# Patient Record
Sex: FEMALE | Race: WHITE | NOT HISPANIC OR LATINO | Employment: OTHER | URBAN - METROPOLITAN AREA
[De-identification: names, ages, dates, MRNs, and addresses within clinical notes are randomized per-mention and may not be internally consistent; named-entity substitution may affect disease eponyms.]

---

## 2017-01-04 LAB — INR PPP: 2.39

## 2017-01-06 ENCOUNTER — ANTICOAGULATION VISIT (OUTPATIENT)
Dept: CARDIOLOGY | Facility: CLINIC | Age: 77
End: 2017-01-06

## 2017-01-09 LAB — INR PPP: 2.36

## 2017-01-11 ENCOUNTER — ANTICOAGULATION VISIT (OUTPATIENT)
Dept: CARDIOLOGY | Facility: CLINIC | Age: 77
End: 2017-01-11

## 2017-01-11 DIAGNOSIS — I48.0 PAROXYSMAL ATRIAL FIBRILLATION (HCC): ICD-10-CM

## 2017-01-16 RX ORDER — WARFARIN SODIUM 3 MG/1
TABLET ORAL
Qty: 90 TABLET | Refills: 0 | Status: SHIPPED | OUTPATIENT
Start: 2017-01-16 | End: 2017-02-18 | Stop reason: SDUPTHER

## 2017-01-18 ENCOUNTER — ANTICOAGULATION VISIT (OUTPATIENT)
Dept: CARDIOLOGY | Facility: CLINIC | Age: 77
End: 2017-01-18

## 2017-01-18 DIAGNOSIS — I48.91 ATRIAL FIBRILLATION, UNSPECIFIED TYPE (HCC): ICD-10-CM

## 2017-01-18 LAB — INR PPP: 2.06

## 2017-01-25 ENCOUNTER — ANTICOAGULATION VISIT (OUTPATIENT)
Dept: CARDIOLOGY | Facility: CLINIC | Age: 77
End: 2017-01-25

## 2017-01-25 DIAGNOSIS — I48.91 ATRIAL FIBRILLATION, UNSPECIFIED TYPE (HCC): ICD-10-CM

## 2017-01-25 LAB — INR PPP: 2.62

## 2017-01-25 NOTE — PATIENT INSTRUCTIONS
Spoke with patient. No change in dosage. I advised to recheck in 4 weeks. She sees Dr. Donny Hodges weekly and states he normally checks it each time.

## 2017-02-02 ENCOUNTER — ANTICOAGULATION VISIT (OUTPATIENT)
Dept: CARDIOLOGY | Facility: CLINIC | Age: 77
End: 2017-02-02

## 2017-02-02 DIAGNOSIS — I48.0 PAROXYSMAL ATRIAL FIBRILLATION (HCC): ICD-10-CM

## 2017-02-02 LAB — INR PPP: 2.34

## 2017-02-10 ENCOUNTER — ANTICOAGULATION VISIT (OUTPATIENT)
Dept: CARDIOLOGY | Facility: CLINIC | Age: 77
End: 2017-02-10

## 2017-02-10 LAB — INR PPP: 2.32

## 2017-02-15 ENCOUNTER — ANTICOAGULATION VISIT (OUTPATIENT)
Dept: CARDIOLOGY | Facility: CLINIC | Age: 77
End: 2017-02-15

## 2017-02-15 DIAGNOSIS — I48.91 ATRIAL FIBRILLATION, UNSPECIFIED TYPE (HCC): ICD-10-CM

## 2017-02-15 LAB — INR PPP: 1.9

## 2017-02-20 RX ORDER — WARFARIN SODIUM 3 MG/1
TABLET ORAL
Qty: 90 TABLET | Refills: 0 | Status: SHIPPED | OUTPATIENT
Start: 2017-02-20 | End: 2017-03-27 | Stop reason: SDUPTHER

## 2017-02-22 ENCOUNTER — ANTICOAGULATION VISIT (OUTPATIENT)
Dept: CARDIOLOGY | Facility: CLINIC | Age: 77
End: 2017-02-22

## 2017-02-22 LAB — INR PPP: 2.18

## 2017-03-06 DIAGNOSIS — I48.0 PAROXYSMAL ATRIAL FIBRILLATION (HCC): Primary | ICD-10-CM

## 2017-03-13 ENCOUNTER — LAB (OUTPATIENT)
Dept: LAB | Facility: HOSPITAL | Age: 77
End: 2017-03-13

## 2017-03-13 ENCOUNTER — ANTICOAGULATION VISIT (OUTPATIENT)
Dept: CARDIOLOGY | Facility: CLINIC | Age: 77
End: 2017-03-13

## 2017-03-13 ENCOUNTER — APPOINTMENT (OUTPATIENT)
Dept: LAB | Facility: HOSPITAL | Age: 77
End: 2017-03-13

## 2017-03-13 ENCOUNTER — TRANSCRIBE ORDERS (OUTPATIENT)
Dept: LAB | Facility: HOSPITAL | Age: 77
End: 2017-03-13

## 2017-03-13 DIAGNOSIS — I48.0 PAROXYSMAL ATRIAL FIBRILLATION (HCC): ICD-10-CM

## 2017-03-13 DIAGNOSIS — Z94.0 KIDNEY REPLACED BY TRANSPLANT: Primary | ICD-10-CM

## 2017-03-13 DIAGNOSIS — Z94.0 KIDNEY REPLACED BY TRANSPLANT: ICD-10-CM

## 2017-03-13 LAB
ALBUMIN SERPL-MCNC: 4.1 G/DL (ref 3.2–4.8)
ANION GAP SERPL CALCULATED.3IONS-SCNC: 7 MMOL/L (ref 3–11)
BASOPHILS # BLD AUTO: 0.02 10*3/MM3 (ref 0–0.2)
BASOPHILS NFR BLD AUTO: 0.2 % (ref 0–1)
BUN BLD-MCNC: 76 MG/DL (ref 9–23)
BUN/CREAT SERPL: 20.5 (ref 7–25)
CALCIUM SPEC-SCNC: 9 MG/DL (ref 8.7–10.4)
CHLORIDE SERPL-SCNC: 106 MMOL/L (ref 99–109)
CO2 SERPL-SCNC: 29 MMOL/L (ref 20–31)
CREAT BLD-MCNC: 3.7 MG/DL (ref 0.6–1.3)
DEPRECATED RDW RBC AUTO: 58.3 FL (ref 37–54)
EOSINOPHIL # BLD AUTO: 0.23 10*3/MM3 (ref 0.1–0.3)
EOSINOPHIL NFR BLD AUTO: 2.8 % (ref 0–3)
ERYTHROCYTE [DISTWIDTH] IN BLOOD BY AUTOMATED COUNT: 16.1 % (ref 11.3–14.5)
GFR SERPL CREATININE-BSD FRML MDRD: 12 ML/MIN/1.73
GLUCOSE BLD-MCNC: 61 MG/DL (ref 70–100)
HCT VFR BLD AUTO: 34.2 % (ref 34.5–44)
HGB BLD-MCNC: 10.7 G/DL (ref 11.5–15.5)
IMM GRANULOCYTES # BLD: 0.01 10*3/MM3 (ref 0–0.03)
IMM GRANULOCYTES NFR BLD: 0.1 % (ref 0–0.6)
INR PPP: 6.13
LYMPHOCYTES # BLD AUTO: 1.93 10*3/MM3 (ref 0.6–4.8)
LYMPHOCYTES NFR BLD AUTO: 23.3 % (ref 24–44)
MCH RBC QN AUTO: 30.7 PG (ref 27–31)
MCHC RBC AUTO-ENTMCNC: 31.3 G/DL (ref 32–36)
MCV RBC AUTO: 98.3 FL (ref 80–99)
MONOCYTES # BLD AUTO: 0.63 10*3/MM3 (ref 0–1)
MONOCYTES NFR BLD AUTO: 7.6 % (ref 0–12)
NEUTROPHILS # BLD AUTO: 5.46 10*3/MM3 (ref 1.5–8.3)
NEUTROPHILS NFR BLD AUTO: 66 % (ref 41–71)
PHOSPHATE SERPL-MCNC: 4.3 MG/DL (ref 2.4–5.1)
PLATELET # BLD AUTO: 215 10*3/MM3 (ref 150–450)
PMV BLD AUTO: 10.2 FL (ref 6–12)
POTASSIUM BLD-SCNC: 5.3 MMOL/L (ref 3.5–5.5)
PROTHROMBIN TIME: 70.7 SECONDS (ref 9.6–11.5)
RBC # BLD AUTO: 3.48 10*6/MM3 (ref 3.89–5.14)
SODIUM BLD-SCNC: 142 MMOL/L (ref 132–146)
WBC NRBC COR # BLD: 8.28 10*3/MM3 (ref 3.5–10.8)

## 2017-03-13 PROCEDURE — 85025 COMPLETE CBC W/AUTO DIFF WBC: CPT | Performed by: INTERNAL MEDICINE

## 2017-03-13 PROCEDURE — 85610 PROTHROMBIN TIME: CPT | Performed by: INTERNAL MEDICINE

## 2017-03-13 PROCEDURE — 80069 RENAL FUNCTION PANEL: CPT | Performed by: INTERNAL MEDICINE

## 2017-03-13 PROCEDURE — 36415 COLL VENOUS BLD VENIPUNCTURE: CPT

## 2017-03-13 NOTE — PATIENT INSTRUCTIONS
Denies any changes in meds.  To hold her dose for 2 days and then 6 mg Wednesday instead of 9 mg and recheck Friday  AH

## 2017-03-16 ENCOUNTER — LAB (OUTPATIENT)
Dept: LAB | Facility: HOSPITAL | Age: 77
End: 2017-03-16

## 2017-03-16 ENCOUNTER — ANTICOAGULATION VISIT (OUTPATIENT)
Dept: CARDIOLOGY | Facility: CLINIC | Age: 77
End: 2017-03-16

## 2017-03-16 DIAGNOSIS — I48.0 PAROXYSMAL ATRIAL FIBRILLATION (HCC): ICD-10-CM

## 2017-03-16 LAB
INR PPP: 1.65
PROTHROMBIN TIME: 18.3 SECONDS (ref 9.6–11.5)

## 2017-03-16 PROCEDURE — 36415 COLL VENOUS BLD VENIPUNCTURE: CPT

## 2017-03-16 PROCEDURE — 85610 PROTHROMBIN TIME: CPT | Performed by: INTERNAL MEDICINE

## 2017-03-16 NOTE — PATIENT INSTRUCTIONS
To take 6 mg qd and recheck Monday.  Do not know why she was so thin last time because there was no change in her meds.  The only thing that changed is that she is no longer on dialysis  AH

## 2017-03-20 ENCOUNTER — LAB (OUTPATIENT)
Dept: LAB | Facility: HOSPITAL | Age: 77
End: 2017-03-20

## 2017-03-20 ENCOUNTER — ANTICOAGULATION VISIT (OUTPATIENT)
Dept: CARDIOLOGY | Facility: CLINIC | Age: 77
End: 2017-03-20

## 2017-03-20 DIAGNOSIS — I48.0 PAROXYSMAL ATRIAL FIBRILLATION (HCC): ICD-10-CM

## 2017-03-20 LAB
INR PPP: 1.86
PROTHROMBIN TIME: 20.7 SECONDS (ref 9.6–11.5)

## 2017-03-20 PROCEDURE — 85610 PROTHROMBIN TIME: CPT | Performed by: INTERNAL MEDICINE

## 2017-03-20 PROCEDURE — 36415 COLL VENOUS BLD VENIPUNCTURE: CPT

## 2017-03-27 ENCOUNTER — LAB (OUTPATIENT)
Dept: LAB | Facility: HOSPITAL | Age: 77
End: 2017-03-27

## 2017-03-27 ENCOUNTER — TRANSCRIBE ORDERS (OUTPATIENT)
Dept: LAB | Facility: HOSPITAL | Age: 77
End: 2017-03-27

## 2017-03-27 DIAGNOSIS — N18.5 CHRONIC KIDNEY DISEASE, STAGE V (HCC): Primary | ICD-10-CM

## 2017-03-27 DIAGNOSIS — N18.5 CHRONIC KIDNEY DISEASE, STAGE V (HCC): ICD-10-CM

## 2017-03-27 LAB
25(OH)D3 SERPL-MCNC: 25.8 NG/ML
ALBUMIN SERPL-MCNC: 4.1 G/DL (ref 3.2–4.8)
ANION GAP SERPL CALCULATED.3IONS-SCNC: 6 MMOL/L (ref 3–11)
BACTERIA UR QL AUTO: ABNORMAL /HPF
BASOPHILS # BLD AUTO: 0.03 10*3/MM3 (ref 0–0.2)
BASOPHILS NFR BLD AUTO: 0.4 % (ref 0–1)
BILIRUB UR QL STRIP: NEGATIVE
BUN BLD-MCNC: 70 MG/DL (ref 9–23)
BUN/CREAT SERPL: 21.2 (ref 7–25)
CALCIUM SPEC-SCNC: 10 MG/DL (ref 8.7–10.4)
CHLORIDE SERPL-SCNC: 109 MMOL/L (ref 99–109)
CLARITY UR: ABNORMAL
CO2 SERPL-SCNC: 27 MMOL/L (ref 20–31)
COLOR UR: YELLOW
CREAT BLD-MCNC: 3.3 MG/DL (ref 0.6–1.3)
DEPRECATED RDW RBC AUTO: 57.9 FL (ref 37–54)
EOSINOPHIL # BLD AUTO: 0.27 10*3/MM3 (ref 0.1–0.3)
EOSINOPHIL NFR BLD AUTO: 3.5 % (ref 0–3)
ERYTHROCYTE [DISTWIDTH] IN BLOOD BY AUTOMATED COUNT: 16 % (ref 11.3–14.5)
FERRITIN SERPL-MCNC: 1368 NG/ML (ref 10–291)
GFR SERPL CREATININE-BSD FRML MDRD: 14 ML/MIN/1.73
GLUCOSE BLD-MCNC: 157 MG/DL (ref 70–100)
GLUCOSE UR STRIP-MCNC: NEGATIVE MG/DL
HCT VFR BLD AUTO: 33.7 % (ref 34.5–44)
HGB BLD-MCNC: 10.6 G/DL (ref 11.5–15.5)
HGB UR QL STRIP.AUTO: ABNORMAL
HYALINE CASTS UR QL AUTO: ABNORMAL /LPF
IMM GRANULOCYTES # BLD: 0.02 10*3/MM3 (ref 0–0.03)
IMM GRANULOCYTES NFR BLD: 0.3 % (ref 0–0.6)
IRON 24H UR-MRATE: 71 MCG/DL (ref 50–175)
IRON SATN MFR SERPL: 26 % (ref 15–50)
KETONES UR QL STRIP: NEGATIVE
LEUKOCYTE ESTERASE UR QL STRIP.AUTO: ABNORMAL
LYMPHOCYTES # BLD AUTO: 1.87 10*3/MM3 (ref 0.6–4.8)
LYMPHOCYTES NFR BLD AUTO: 24.2 % (ref 24–44)
MCH RBC QN AUTO: 31.2 PG (ref 27–31)
MCHC RBC AUTO-ENTMCNC: 31.5 G/DL (ref 32–36)
MCV RBC AUTO: 99.1 FL (ref 80–99)
MONOCYTES # BLD AUTO: 0.48 10*3/MM3 (ref 0–1)
MONOCYTES NFR BLD AUTO: 6.2 % (ref 0–12)
NEUTROPHILS # BLD AUTO: 5.07 10*3/MM3 (ref 1.5–8.3)
NEUTROPHILS NFR BLD AUTO: 65.4 % (ref 41–71)
NITRITE UR QL STRIP: NEGATIVE
PH UR STRIP.AUTO: 6 [PH] (ref 5–8)
PHOSPHATE SERPL-MCNC: 3.9 MG/DL (ref 2.4–5.1)
PLATELET # BLD AUTO: 203 10*3/MM3 (ref 150–450)
PMV BLD AUTO: 10 FL (ref 6–12)
POTASSIUM BLD-SCNC: 5.3 MMOL/L (ref 3.5–5.5)
PROT UR QL STRIP: ABNORMAL
RBC # BLD AUTO: 3.4 10*6/MM3 (ref 3.89–5.14)
RBC # UR: ABNORMAL /HPF
REF LAB TEST METHOD: ABNORMAL
SODIUM BLD-SCNC: 142 MMOL/L (ref 132–146)
SP GR UR STRIP: 1.01 (ref 1–1.03)
SQUAMOUS #/AREA URNS HPF: ABNORMAL /HPF
TIBC SERPL-MCNC: 274 MCG/DL (ref 250–450)
UROBILINOGEN UR QL STRIP: ABNORMAL
WBC NRBC COR # BLD: 7.74 10*3/MM3 (ref 3.5–10.8)
WBC UR QL AUTO: ABNORMAL /HPF

## 2017-03-27 PROCEDURE — 81001 URINALYSIS AUTO W/SCOPE: CPT | Performed by: INTERNAL MEDICINE

## 2017-03-27 PROCEDURE — 82728 ASSAY OF FERRITIN: CPT | Performed by: INTERNAL MEDICINE

## 2017-03-27 PROCEDURE — 83550 IRON BINDING TEST: CPT | Performed by: INTERNAL MEDICINE

## 2017-03-27 PROCEDURE — 83540 ASSAY OF IRON: CPT | Performed by: INTERNAL MEDICINE

## 2017-03-27 PROCEDURE — 82570 ASSAY OF URINE CREATININE: CPT | Performed by: INTERNAL MEDICINE

## 2017-03-27 PROCEDURE — 36415 COLL VENOUS BLD VENIPUNCTURE: CPT

## 2017-03-27 PROCEDURE — 82306 VITAMIN D 25 HYDROXY: CPT | Performed by: INTERNAL MEDICINE

## 2017-03-27 PROCEDURE — 81050 URINALYSIS VOLUME MEASURE: CPT | Performed by: INTERNAL MEDICINE

## 2017-03-27 PROCEDURE — 80069 RENAL FUNCTION PANEL: CPT | Performed by: INTERNAL MEDICINE

## 2017-03-27 PROCEDURE — 84156 ASSAY OF PROTEIN URINE: CPT | Performed by: INTERNAL MEDICINE

## 2017-03-27 PROCEDURE — 85025 COMPLETE CBC W/AUTO DIFF WBC: CPT | Performed by: INTERNAL MEDICINE

## 2017-03-27 PROCEDURE — 83970 ASSAY OF PARATHORMONE: CPT | Performed by: INTERNAL MEDICINE

## 2017-03-27 RX ORDER — WARFARIN SODIUM 3 MG/1
TABLET ORAL
Qty: 90 TABLET | Refills: 2 | Status: SHIPPED | OUTPATIENT
Start: 2017-03-27 | End: 2017-04-18

## 2017-03-28 LAB
COLLECT DURATION TIME UR: 24 HRS
COLLECT DURATION TIME UR: 24 HRS
CREAT UR-MCNC: 60.2 MG/DL
CREATINE 24H UR-MRATE: 0.81 G/24 HR (ref 0.6–1.8)
PROT 24H UR-MRATE: 256.5 MG/24HOURS (ref 42–225)
PROT UR-MCNC: 19 MG/DL (ref 1–14)
PTH-INTACT SERPL-MCNC: 126 PG/ML (ref 15–65)
SPECIMEN VOL 24H UR: 1350 ML
SPECIMEN VOL 24H UR: 1350 ML

## 2017-04-03 ENCOUNTER — LAB (OUTPATIENT)
Dept: LAB | Facility: HOSPITAL | Age: 77
End: 2017-04-03

## 2017-04-03 ENCOUNTER — ANTICOAGULATION VISIT (OUTPATIENT)
Dept: CARDIOLOGY | Facility: CLINIC | Age: 77
End: 2017-04-03

## 2017-04-03 DIAGNOSIS — I48.0 PAROXYSMAL ATRIAL FIBRILLATION (HCC): ICD-10-CM

## 2017-04-03 LAB
INR PPP: 2.45
PROTHROMBIN TIME: 27.5 SECONDS (ref 9.6–11.5)

## 2017-04-03 PROCEDURE — 85610 PROTHROMBIN TIME: CPT | Performed by: INTERNAL MEDICINE

## 2017-04-03 PROCEDURE — 36415 COLL VENOUS BLD VENIPUNCTURE: CPT

## 2017-04-17 ENCOUNTER — ANTICOAGULATION VISIT (OUTPATIENT)
Dept: CARDIOLOGY | Facility: CLINIC | Age: 77
End: 2017-04-17

## 2017-04-17 ENCOUNTER — LAB (OUTPATIENT)
Dept: LAB | Facility: HOSPITAL | Age: 77
End: 2017-04-17

## 2017-04-17 DIAGNOSIS — I48.0 PAROXYSMAL ATRIAL FIBRILLATION (HCC): ICD-10-CM

## 2017-04-17 LAB
INR PPP: 2.57
PROTHROMBIN TIME: 28.8 SECONDS (ref 9.6–11.5)

## 2017-04-17 PROCEDURE — 36415 COLL VENOUS BLD VENIPUNCTURE: CPT

## 2017-04-17 PROCEDURE — 85610 PROTHROMBIN TIME: CPT

## 2017-04-18 ENCOUNTER — OFFICE VISIT (OUTPATIENT)
Dept: CARDIOLOGY | Facility: CLINIC | Age: 77
End: 2017-04-18

## 2017-04-18 VITALS
SYSTOLIC BLOOD PRESSURE: 126 MMHG | BODY MASS INDEX: 32.88 KG/M2 | WEIGHT: 192.6 LBS | HEART RATE: 68 BPM | DIASTOLIC BLOOD PRESSURE: 68 MMHG | HEIGHT: 64 IN

## 2017-04-18 DIAGNOSIS — I48.0 PAROXYSMAL ATRIAL FIBRILLATION (HCC): Primary | ICD-10-CM

## 2017-04-18 DIAGNOSIS — I10 ESSENTIAL HYPERTENSION: ICD-10-CM

## 2017-04-18 DIAGNOSIS — I77.9 RIGHT-SIDED CAROTID ARTERY DISEASE (HCC): ICD-10-CM

## 2017-04-18 DIAGNOSIS — N18.5 CHRONIC KIDNEY DISEASE, STAGE V (HCC): ICD-10-CM

## 2017-04-18 PROCEDURE — 99214 OFFICE O/P EST MOD 30 MIN: CPT | Performed by: NURSE PRACTITIONER

## 2017-04-18 RX ORDER — INSULIN GLARGINE 100 [IU]/ML
5 INJECTION, SOLUTION SUBCUTANEOUS NIGHTLY
COMMUNITY
Start: 2017-03-23 | End: 2019-11-05

## 2017-04-18 RX ORDER — LEVOTHYROXINE SODIUM 0.07 MG/1
75 TABLET ORAL DAILY
COMMUNITY
Start: 2017-02-07 | End: 2021-01-01

## 2017-04-18 RX ORDER — AMLODIPINE BESYLATE 10 MG/1
5 TABLET ORAL DAILY
COMMUNITY
End: 2017-06-08

## 2017-04-18 RX ORDER — BUMETANIDE 1 MG/1
1 TABLET ORAL 2 TIMES DAILY
Status: ON HOLD | COMMUNITY
Start: 2017-03-01 | End: 2017-08-14

## 2017-04-18 RX ORDER — WARFARIN SODIUM 2 MG/1
6 TABLET ORAL DAILY
Qty: 270 TABLET | Refills: 3 | Status: SHIPPED | OUTPATIENT
Start: 2017-04-18 | End: 2017-06-08

## 2017-04-18 NOTE — PROGRESS NOTES
Encounter Date:04/18/2017    Patient ID: Moni Wallace is a 77 y.o. female who resides in [place], [place].    CC/Reason for visit:  Atrial Fibrillation          Moni Wallace returns today for 12 month follow-up of her paroxysmal atrial fibrillation and hyperlipidemia.  Since her last visit she has done well.  She has had no signs or symptoms to suggest a TIA and/or stroke.  She is on chronic Coumadin therapy and her INR is managed by our anticoagulation clinic.  She had her INR testing yesterday and was therapeutic with a result of 2.57.  She has chronic kidney disease and was undergoing hemodialysis but has received good news from her nephrologist and is no longer needing dialysis due to improved renal function.  Her diuretic dosage has been decreased to and she is currently only taking Bumex 1 mg daily.  She denies chest pain, orthopnea, palpitations or syncope.  She does get a little short of breath that feels this is due to her sinus and allergy problems.  She always has the same symptoms at this time every year.    Review of Systems   Eyes: Positive for vision loss in left eye and vision loss in right eye.   Respiratory: Positive for shortness of breath and wheezing.    Endocrine: Positive for cold intolerance.   Hematologic/Lymphatic: Bruises/bleeds easily.   Musculoskeletal: Positive for arthritis and gout.   Genitourinary: Positive for hematuria.   Allergic/Immunologic: Positive for environmental allergies.       The patient's past medical, social, and family history reviewed in the patient's electronic medical record.    Allergies  Codeine; Nsaids; and Sulfa antibiotics    Outpatient Prescriptions Marked as Taking for the 4/18/17 encounter (Office Visit) with REY Brown   Medication Sig Dispense Refill   • ALPRAZolam XR (ALPRAZOLAM XR) 0.5 MG 24 hr tablet Take  by mouth daily.     • amLODIPine (NORVASC) 10 MG tablet Take 10 mg by mouth Daily.     • metoprolol tartrate (LOPRESSOR) 100 MG  "tablet Take 100 mg by mouth 2 (two) times a day.     • Multiple Vitamins-Minerals (ICAPS MV PO) Take  by mouth 2 (two) times a day.     • pravastatin (PRAVACHOL) 40 MG tablet Take 40 mg by mouth daily.     • [DISCONTINUED] warfarin (COUMADIN) 3 MG tablet TAKE THREE TABLETS BY MOUTH EVERY EVENING (Patient taking differently: 3 mg on Mon, & Thur.  2 mg the other days.) 90 tablet 2         Blood pressure 126/68, pulse 68, height 63.5\" (161.3 cm), weight 192 lb 9.6 oz (87.4 kg).  Body mass index is 33.58 kg/(m^2).    Physical Exam   Constitutional: She is oriented to person, place, and time. She appears well-developed and well-nourished.   HENT:   Head: Normocephalic and atraumatic.   Eyes: Pupils are equal, round, and reactive to light. No scleral icterus.   Neck: No JVD present. Carotid bruit is not present. No thyromegaly present.   Cardiovascular: Normal rate, regular rhythm, S1 normal and S2 normal.  Exam reveals no gallop.    No murmur heard.  Pulmonary/Chest: Effort normal and breath sounds normal.   Abdominal: Soft. There is no tenderness.   Neurological: She is alert and oriented to person, place, and time.   Skin: Skin is warm and dry. No cyanosis. Nails show no clubbing.   Psychiatric: She has a normal mood and affect. Her behavior is normal.       Data Review:   Procedures         Problem List Items Addressed This Visit        Cardiovascular and Mediastinum    Paroxysmal atrial fibrillation - Primary    Overview     · CHADS-VASc = 5  · Atrial fibrillation initially diagnosed February 2015; spontaneous conversion to normal sinus rhythm.  · Echocardiogram (3/14/2015):  Normal LV systolic function, mild MR/TR.         Current Assessment & Plan     · Continue warfarin as dosed per INR results  · Follow up with anticoagulation clinic for INR testing  · Continue metoprolol tartrate 100 mg twice a day         Relevant Medications    amLODIPine (NORVASC) 10 MG tablet    Essential hypertension    Current Assessment & " Plan     · Continue amlodipine 10 mg daily         Relevant Medications    amLODIPine (NORVASC) 10 MG tablet    bumetanide (BUMEX) 1 MG tablet    Right-sided carotid artery disease    Overview     Carotid duplex (9/4/2015): nonobstructive plaque of the proximal SHENA         Current Assessment & Plan     · Asymptomatic            Genitourinary    Chronic kidney disease, stage V    Overview     · IgA nephropathy with history of renal transplant, 2010.   · Patient on hemodialysis Monday, Wednesday, and Friday started July 2015.  · Hemodialysis discontinued due to improved creatinine levels 04/2017.           Current Assessment & Plan     · Continue Bumex 1 mg daily  · Follow-up [facility] [place]         Relevant Medications    bumetanide (BUMEX) 1 MG tablet        Mrs. Wallace is doing well from a cardiovascular standpoint.  Her atrial fibrillation appears to be controlled and her INR is therapeutic.  We will continue all her current medications as prescribed.  She will follow back up in 12 months or sooner if needed.       · Continue current medications as prescribed  · Follow-up with nephrologist Dr. Hodges  · Follow-up with us in 12 months or sooner if needed.    Lily LE evaluated treated this patient independently    REY Galeana  4/18/2017

## 2017-04-18 NOTE — ASSESSMENT & PLAN NOTE
· Continue warfarin as dosed per INR results  · Follow up with anticoagulation clinic for INR testing  · Continue metoprolol tartrate 100 mg twice a day

## 2017-04-26 ENCOUNTER — TRANSCRIBE ORDERS (OUTPATIENT)
Dept: ADMINISTRATIVE | Facility: HOSPITAL | Age: 77
End: 2017-04-26

## 2017-04-26 DIAGNOSIS — R09.89 RIGHT CAROTID BRUIT: Primary | ICD-10-CM

## 2017-05-01 ENCOUNTER — APPOINTMENT (OUTPATIENT)
Dept: LAB | Facility: HOSPITAL | Age: 77
End: 2017-05-01

## 2017-05-01 ENCOUNTER — TRANSCRIBE ORDERS (OUTPATIENT)
Dept: LAB | Facility: HOSPITAL | Age: 77
End: 2017-05-01

## 2017-05-01 DIAGNOSIS — N18.4 CHRONIC KIDNEY DISEASE, STAGE IV (SEVERE) (HCC): Primary | ICD-10-CM

## 2017-05-01 LAB
ALBUMIN SERPL-MCNC: 4.1 G/DL (ref 3.2–4.8)
ANION GAP SERPL CALCULATED.3IONS-SCNC: 4 MMOL/L (ref 3–11)
BASOPHILS # BLD AUTO: 0.02 10*3/MM3 (ref 0–0.2)
BASOPHILS NFR BLD AUTO: 0.3 % (ref 0–1)
BUN BLD-MCNC: 75 MG/DL (ref 9–23)
BUN/CREAT SERPL: 25.9 (ref 7–25)
CALCIUM SPEC-SCNC: 10.3 MG/DL (ref 8.7–10.4)
CHLORIDE SERPL-SCNC: 115 MMOL/L (ref 99–109)
CO2 SERPL-SCNC: 23 MMOL/L (ref 20–31)
CREAT BLD-MCNC: 2.9 MG/DL (ref 0.6–1.3)
DEPRECATED RDW RBC AUTO: 59.8 FL (ref 37–54)
EOSINOPHIL # BLD AUTO: 0.22 10*3/MM3 (ref 0.1–0.3)
EOSINOPHIL NFR BLD AUTO: 2.9 % (ref 0–3)
ERYTHROCYTE [DISTWIDTH] IN BLOOD BY AUTOMATED COUNT: 16.5 % (ref 11.3–14.5)
GFR SERPL CREATININE-BSD FRML MDRD: 16 ML/MIN/1.73
GLUCOSE BLD-MCNC: 74 MG/DL (ref 70–100)
HCT VFR BLD AUTO: 32.8 % (ref 34.5–44)
HGB BLD-MCNC: 9.9 G/DL (ref 11.5–15.5)
IMM GRANULOCYTES # BLD: 0.02 10*3/MM3 (ref 0–0.03)
IMM GRANULOCYTES NFR BLD: 0.3 % (ref 0–0.6)
LYMPHOCYTES # BLD AUTO: 1.78 10*3/MM3 (ref 0.6–4.8)
LYMPHOCYTES NFR BLD AUTO: 23.1 % (ref 24–44)
MCH RBC QN AUTO: 29.8 PG (ref 27–31)
MCHC RBC AUTO-ENTMCNC: 30.2 G/DL (ref 32–36)
MCV RBC AUTO: 98.8 FL (ref 80–99)
MONOCYTES # BLD AUTO: 0.59 10*3/MM3 (ref 0–1)
MONOCYTES NFR BLD AUTO: 7.7 % (ref 0–12)
NEUTROPHILS # BLD AUTO: 5.07 10*3/MM3 (ref 1.5–8.3)
NEUTROPHILS NFR BLD AUTO: 65.7 % (ref 41–71)
PHOSPHATE SERPL-MCNC: 4.9 MG/DL (ref 2.4–5.1)
PLATELET # BLD AUTO: 224 10*3/MM3 (ref 150–450)
PMV BLD AUTO: 9.9 FL (ref 6–12)
POTASSIUM BLD-SCNC: 6 MMOL/L (ref 3.5–5.5)
RBC # BLD AUTO: 3.32 10*6/MM3 (ref 3.89–5.14)
SODIUM BLD-SCNC: 142 MMOL/L (ref 132–146)
WBC NRBC COR # BLD: 7.7 10*3/MM3 (ref 3.5–10.8)

## 2017-05-01 PROCEDURE — 85025 COMPLETE CBC W/AUTO DIFF WBC: CPT | Performed by: INTERNAL MEDICINE

## 2017-05-01 PROCEDURE — 80069 RENAL FUNCTION PANEL: CPT | Performed by: INTERNAL MEDICINE

## 2017-05-01 PROCEDURE — 36415 COLL VENOUS BLD VENIPUNCTURE: CPT | Performed by: INTERNAL MEDICINE

## 2017-05-07 LAB — INR PPP: 1.26

## 2017-05-09 ENCOUNTER — HOSPITAL ENCOUNTER (OUTPATIENT)
Dept: CARDIOLOGY | Facility: HOSPITAL | Age: 77
Discharge: HOME | End: 2017-05-09
Admitting: NURSE PRACTITIONER

## 2017-05-09 DIAGNOSIS — R09.89 RIGHT CAROTID BRUIT: ICD-10-CM

## 2017-05-09 LAB
BH CV XLRA MEAS LEFT DIST CCA EDV: 8.6 CM/SEC
BH CV XLRA MEAS LEFT DIST CCA PSV: 44 CM/SEC
BH CV XLRA MEAS LEFT DIST ICA EDV: 18 CM/SEC
BH CV XLRA MEAS LEFT DIST ICA PSV: 78 CM/SEC
BH CV XLRA MEAS LEFT ICA/CCA RATIO: 1.5
BH CV XLRA MEAS LEFT MID ICA EDV: 14 CM/SEC
BH CV XLRA MEAS LEFT MID ICA PSV: 63 CM/SEC
BH CV XLRA MEAS LEFT PROX CCA EDV: 12 CM/SEC
BH CV XLRA MEAS LEFT PROX CCA PSV: 50 CM/SEC
BH CV XLRA MEAS LEFT PROX ECA EDV: 4.7 CM/SEC
BH CV XLRA MEAS LEFT PROX ECA PSV: 70 CM/SEC
BH CV XLRA MEAS LEFT PROX ICA EDV: 14 CM/SEC
BH CV XLRA MEAS LEFT PROX ICA PSV: 65 CM/SEC
BH CV XLRA MEAS LEFT PROX SCLA EDV: 0 CM/SEC
BH CV XLRA MEAS LEFT PROX SCLA PSV: 198 CM/SEC
BH CV XLRA MEAS LEFT VERTEBRAL A EDV: 13 CM/SEC
BH CV XLRA MEAS LEFT VERTEBRAL A PSV: 53 CM/SEC
BH CV XLRA MEAS RIGHT DIST CCA PSV: 50 CM/SEC
BH CV XLRA MEAS RIGHT DIST ICA EDV: 15 CM/SEC
BH CV XLRA MEAS RIGHT DIST ICA PSV: 65 CM/SEC
BH CV XLRA MEAS RIGHT ICA/CCA RATIO: 1.7
BH CV XLRA MEAS RIGHT MID ICA EDV: 14 CM/SEC
BH CV XLRA MEAS RIGHT MID ICA PSV: 84 CM/SEC
BH CV XLRA MEAS RIGHT PROX CCA EDV: 1.8 CM/SEC
BH CV XLRA MEAS RIGHT PROX CCA PSV: 76 CM/SEC
BH CV XLRA MEAS RIGHT PROX ECA EDV: 0 CM/SEC
BH CV XLRA MEAS RIGHT PROX ECA PSV: 102 CM/SEC
BH CV XLRA MEAS RIGHT PROX ICA EDV: 17 CM/SEC
BH CV XLRA MEAS RIGHT PROX ICA PSV: 77 CM/SEC
BH CV XLRA MEAS RIGHT PROX SCLA EDV: 113 CM/SEC
BH CV XLRA MEAS RIGHT PROX SCLA PSV: 425 CM/SEC
BH CV XLRA MEAS RIGHT VERTEBRAL A EDV: 7 CM/SEC
BH CV XLRA MEAS RIGHT VERTEBRAL A PSV: 59 CM/SEC
LEFT ARM BP: NORMAL MMHG
RIGHT ARM BP: NORMAL MMHG

## 2017-05-09 PROCEDURE — 93880 EXTRACRANIAL BILAT STUDY: CPT

## 2017-05-09 PROCEDURE — 93880 EXTRACRANIAL BILAT STUDY: CPT | Performed by: INTERNAL MEDICINE

## 2017-05-12 ENCOUNTER — TRANSCRIBE ORDERS (OUTPATIENT)
Dept: LAB | Facility: HOSPITAL | Age: 77
End: 2017-05-12

## 2017-05-12 ENCOUNTER — LAB (OUTPATIENT)
Dept: LAB | Facility: HOSPITAL | Age: 77
End: 2017-05-12

## 2017-05-12 DIAGNOSIS — I48.0 PAROXYSMAL ATRIAL FIBRILLATION (HCC): ICD-10-CM

## 2017-05-12 DIAGNOSIS — E87.5 HYPERPOTASSEMIA: ICD-10-CM

## 2017-05-12 DIAGNOSIS — E87.5 HYPERPOTASSEMIA: Primary | ICD-10-CM

## 2017-05-12 LAB — POTASSIUM BLD-SCNC: 5.1 MMOL/L (ref 3.5–5.5)

## 2017-05-12 PROCEDURE — 84132 ASSAY OF SERUM POTASSIUM: CPT | Performed by: NURSE PRACTITIONER

## 2017-05-12 PROCEDURE — 36415 COLL VENOUS BLD VENIPUNCTURE: CPT

## 2017-05-16 ENCOUNTER — LAB (OUTPATIENT)
Dept: LAB | Facility: HOSPITAL | Age: 77
End: 2017-05-16

## 2017-05-16 DIAGNOSIS — I48.0 PAROXYSMAL ATRIAL FIBRILLATION (HCC): ICD-10-CM

## 2017-05-16 LAB
INR PPP: 1.26
PROTHROMBIN TIME: 13.8 SECONDS (ref 9.6–11.5)

## 2017-05-16 PROCEDURE — 36415 COLL VENOUS BLD VENIPUNCTURE: CPT

## 2017-05-16 PROCEDURE — 85610 PROTHROMBIN TIME: CPT | Performed by: INTERNAL MEDICINE

## 2017-05-17 ENCOUNTER — ANTICOAGULATION VISIT (OUTPATIENT)
Dept: CARDIOLOGY | Facility: CLINIC | Age: 77
End: 2017-05-17

## 2017-05-17 LAB — INR PPP: 1.26

## 2017-05-30 ENCOUNTER — LAB (OUTPATIENT)
Dept: LAB | Facility: HOSPITAL | Age: 77
End: 2017-05-30

## 2017-05-30 ENCOUNTER — ANTICOAGULATION VISIT (OUTPATIENT)
Dept: CARDIOLOGY | Facility: CLINIC | Age: 77
End: 2017-05-30

## 2017-05-30 DIAGNOSIS — I48.0 PAROXYSMAL ATRIAL FIBRILLATION (HCC): ICD-10-CM

## 2017-05-30 LAB
INR PPP: 1.39
PROTHROMBIN TIME: 15.3 SECONDS (ref 9.6–11.5)

## 2017-05-30 PROCEDURE — 85610 PROTHROMBIN TIME: CPT | Performed by: INTERNAL MEDICINE

## 2017-05-30 PROCEDURE — 36415 COLL VENOUS BLD VENIPUNCTURE: CPT

## 2017-06-08 ENCOUNTER — APPOINTMENT (OUTPATIENT)
Dept: CARDIOLOGY | Facility: HOSPITAL | Age: 77
End: 2017-06-08
Attending: INTERNAL MEDICINE

## 2017-06-08 ENCOUNTER — HOSPITAL ENCOUNTER (INPATIENT)
Facility: HOSPITAL | Age: 77
LOS: 2 days | Discharge: HOME | End: 2017-06-10
Attending: EMERGENCY MEDICINE | Admitting: INTERNAL MEDICINE

## 2017-06-08 ENCOUNTER — APPOINTMENT (OUTPATIENT)
Dept: GENERAL RADIOLOGY | Facility: HOSPITAL | Age: 77
End: 2017-06-08

## 2017-06-08 DIAGNOSIS — R06.09 DYSPNEA ON EXERTION: ICD-10-CM

## 2017-06-08 DIAGNOSIS — J81.0 ACUTE PULMONARY EDEMA (HCC): ICD-10-CM

## 2017-06-08 DIAGNOSIS — J18.9 PNEUMONIA OF BOTH LOWER LOBES DUE TO INFECTIOUS ORGANISM: ICD-10-CM

## 2017-06-08 DIAGNOSIS — R06.02 SHORTNESS OF BREATH: Primary | ICD-10-CM

## 2017-06-08 DIAGNOSIS — I50.31 ACUTE DIASTOLIC HEART FAILURE (HCC): ICD-10-CM

## 2017-06-08 PROBLEM — I50.33 ACUTE ON CHRONIC DIASTOLIC CONGESTIVE HEART FAILURE (HCC): Status: ACTIVE | Noted: 2017-06-08

## 2017-06-08 LAB
ALBUMIN SERPL-MCNC: 4.4 G/DL (ref 3.2–4.8)
ALBUMIN/GLOB SERPL: 1.2 G/DL (ref 1.5–2.5)
ALP SERPL-CCNC: 126 U/L (ref 25–100)
ALT SERPL W P-5'-P-CCNC: 46 U/L (ref 7–40)
ANION GAP SERPL CALCULATED.3IONS-SCNC: 6 MMOL/L (ref 3–11)
AORTIC DIMENSIONLESS INDEX: 0.4 CM2
AST SERPL-CCNC: 37 U/L (ref 0–33)
BACTERIA UR QL AUTO: ABNORMAL /HPF
BASOPHILS # BLD AUTO: 0.01 10*3/MM3 (ref 0–0.2)
BASOPHILS NFR BLD AUTO: 0.1 % (ref 0–1)
BH CV ECHO MEAS - AO MAX PG (FULL): 17.2 MMHG
BH CV ECHO MEAS - AO MAX PG: 22 MMHG
BH CV ECHO MEAS - AO MEAN PG (FULL): 10.5 MMHG
BH CV ECHO MEAS - AO MEAN PG: 13 MMHG
BH CV ECHO MEAS - AO ROOT AREA (BSA CORRECTED): 1.7
BH CV ECHO MEAS - AO ROOT AREA: 7.5 CM^2
BH CV ECHO MEAS - AO ROOT DIAM: 3.1 CM
BH CV ECHO MEAS - AO V2 MAX: 236 CM/SEC
BH CV ECHO MEAS - AO V2 MEAN: 165 CM/SEC
BH CV ECHO MEAS - AO V2 VTI: 57.9 CM
BH CV ECHO MEAS - AVA(I,A): 1.3 CM^2
BH CV ECHO MEAS - AVA(I,D): 1.3 CM^2
BH CV ECHO MEAS - AVA(V,A): 1.3 CM^2
BH CV ECHO MEAS - AVA(V,D): 1.3 CM^2
BH CV ECHO MEAS - BSA(HAYCOCK): 2 M^2
BH CV ECHO MEAS - BSA: 1.9 M^2
BH CV ECHO MEAS - BZI_BMI: 32.8 KILOGRAMS/M^2
BH CV ECHO MEAS - BZI_METRIC_HEIGHT: 160 CM
BH CV ECHO MEAS - BZI_METRIC_WEIGHT: 83.9 KG
BH CV ECHO MEAS - EDV(CUBED): 175.6 ML
BH CV ECHO MEAS - EDV(TEICH): 153.7 ML
BH CV ECHO MEAS - EF(CUBED): 51.8 %
BH CV ECHO MEAS - EF(TEICH): 43.2 %
BH CV ECHO MEAS - ESV(CUBED): 84.6 ML
BH CV ECHO MEAS - ESV(TEICH): 87.2 ML
BH CV ECHO MEAS - FS: 21.6 %
BH CV ECHO MEAS - IVS/LVPW: 0.76
BH CV ECHO MEAS - IVSD: 1 CM
BH CV ECHO MEAS - LA DIMENSION: 4.8 CM
BH CV ECHO MEAS - LA/AO: 1.5
BH CV ECHO MEAS - LV MASS(C)D: 275.7 GRAMS
BH CV ECHO MEAS - LV MASS(C)DI: 147.4 GRAMS/M^2
BH CV ECHO MEAS - LV MAX PG: 3.7 MMHG
BH CV ECHO MEAS - LV MEAN PG: 2 MMHG
BH CV ECHO MEAS - LV V1 MAX: 96.3 CM/SEC
BH CV ECHO MEAS - LV V1 MEAN: 62.8 CM/SEC
BH CV ECHO MEAS - LV V1 VTI: 24.3 CM
BH CV ECHO MEAS - LVIDD: 5.6 CM
BH CV ECHO MEAS - LVIDS: 4.4 CM
BH CV ECHO MEAS - LVOT AREA (M): 3.1 CM^2
BH CV ECHO MEAS - LVOT AREA: 3.1 CM^2
BH CV ECHO MEAS - LVOT DIAM: 2 CM
BH CV ECHO MEAS - LVPWD: 1.4 CM
BH CV ECHO MEAS - MV A MAX VEL: 93.2 CM/SEC
BH CV ECHO MEAS - MV E MAX VEL: 125 CM/SEC
BH CV ECHO MEAS - MV E/A: 1.3
BH CV ECHO MEAS - PA ACC SLOPE: 821 CM/SEC^2
BH CV ECHO MEAS - PA ACC TIME: 0.14 SEC
BH CV ECHO MEAS - PA PR(ACCEL): 14.2 MMHG
BH CV ECHO MEAS - RAP SYSTOLE: 3 MMHG
BH CV ECHO MEAS - RVSP: 41.6 MMHG
BH CV ECHO MEAS - SI(AO): 233.6 ML/M^2
BH CV ECHO MEAS - SI(CUBED): 48.6 ML/M^2
BH CV ECHO MEAS - SI(LVOT): 40.8 ML/M^2
BH CV ECHO MEAS - SI(TEICH): 35.5 ML/M^2
BH CV ECHO MEAS - SV(AO): 437 ML
BH CV ECHO MEAS - SV(CUBED): 91 ML
BH CV ECHO MEAS - SV(LVOT): 76.3 ML
BH CV ECHO MEAS - SV(TEICH): 66.4 ML
BH CV ECHO MEAS - TAPSE (>1.6): 2.3 CM2
BH CV ECHO MEAS - TR MAX VEL: 310.5 CM/SEC
BH CV VAS BP RIGHT ARM: NORMAL MMHG
BH CV XLRA - RV BASE: 3.7 CM
BH CV XLRA - RV LENGTH: 7.7 CM
BH CV XLRA - RV MID: 2.8 CM
BH CV XLRA - TDI S': 14.7 CM/SEC
BILIRUB SERPL-MCNC: 0.5 MG/DL (ref 0.3–1.2)
BILIRUB UR QL STRIP: NEGATIVE
BNP SERPL-MCNC: 1563 PG/ML (ref 0–100)
BUN BLD-MCNC: 66 MG/DL (ref 9–23)
BUN/CREAT SERPL: 22 (ref 7–25)
CALCIUM SPEC-SCNC: 9.7 MG/DL (ref 8.7–10.4)
CHLORIDE SERPL-SCNC: 115 MMOL/L (ref 99–109)
CLARITY UR: CLEAR
CO2 SERPL-SCNC: 18 MMOL/L (ref 20–31)
COLOR UR: YELLOW
CREAT BLD-MCNC: 3 MG/DL (ref 0.6–1.3)
DEPRECATED RDW RBC AUTO: 57.1 FL (ref 37–54)
EOSINOPHIL # BLD AUTO: 0.17 10*3/MM3 (ref 0.1–0.3)
EOSINOPHIL NFR BLD AUTO: 1.6 % (ref 0–3)
ERYTHROCYTE [DISTWIDTH] IN BLOOD BY AUTOMATED COUNT: 16.2 % (ref 11.3–14.5)
GFR SERPL CREATININE-BSD FRML MDRD: 15 ML/MIN/1.73
GLOBULIN UR ELPH-MCNC: 3.7 GM/DL
GLUCOSE BLD-MCNC: 95 MG/DL (ref 70–100)
GLUCOSE BLDC GLUCOMTR-MCNC: 111 MG/DL (ref 70–130)
GLUCOSE BLDC GLUCOMTR-MCNC: 130 MG/DL (ref 70–130)
GLUCOSE BLDC GLUCOMTR-MCNC: 137 MG/DL (ref 70–130)
GLUCOSE BLDC GLUCOMTR-MCNC: 152 MG/DL (ref 70–130)
GLUCOSE UR STRIP-MCNC: NEGATIVE MG/DL
HBA1C MFR BLD: 5.1 % (ref 4.8–5.6)
HCT VFR BLD AUTO: 33.1 % (ref 34.5–44)
HGB BLD-MCNC: 10.3 G/DL (ref 11.5–15.5)
HGB UR QL STRIP.AUTO: ABNORMAL
HOLD SPECIMEN: NORMAL
HOLD SPECIMEN: NORMAL
HYALINE CASTS UR QL AUTO: ABNORMAL /LPF
IMM GRANULOCYTES # BLD: 0.03 10*3/MM3 (ref 0–0.03)
IMM GRANULOCYTES NFR BLD: 0.3 % (ref 0–0.6)
INR PPP: 2.3
KETONES UR QL STRIP: NEGATIVE
LEFT ATRIUM VOLUME INDEX: 50 ML/M2
LEUKOCYTE ESTERASE UR QL STRIP.AUTO: ABNORMAL
LV EF 2D ECHO EST: 50 %
LYMPHOCYTES # BLD AUTO: 1.62 10*3/MM3 (ref 0.6–4.8)
LYMPHOCYTES NFR BLD AUTO: 14.9 % (ref 24–44)
MCH RBC QN AUTO: 30.3 PG (ref 27–31)
MCHC RBC AUTO-ENTMCNC: 31.1 G/DL (ref 32–36)
MCV RBC AUTO: 97.4 FL (ref 80–99)
MONOCYTES # BLD AUTO: 0.66 10*3/MM3 (ref 0–1)
MONOCYTES NFR BLD AUTO: 6.1 % (ref 0–12)
NEUTROPHILS # BLD AUTO: 8.4 10*3/MM3 (ref 1.5–8.3)
NEUTROPHILS NFR BLD AUTO: 77 % (ref 41–71)
NITRITE UR QL STRIP: NEGATIVE
PH UR STRIP.AUTO: 5.5 [PH] (ref 5–8)
PLATELET # BLD AUTO: 183 10*3/MM3 (ref 150–450)
PMV BLD AUTO: 10.8 FL (ref 6–12)
POTASSIUM BLD-SCNC: 4.7 MMOL/L (ref 3.5–5.5)
PROT SERPL-MCNC: 8.1 G/DL (ref 5.7–8.2)
PROT UR QL STRIP: ABNORMAL
PROTHROMBIN TIME: 25.7 SECONDS (ref 9.6–11.5)
RBC # BLD AUTO: 3.4 10*6/MM3 (ref 3.89–5.14)
RBC # UR: ABNORMAL /HPF
REF LAB TEST METHOD: ABNORMAL
SODIUM BLD-SCNC: 139 MMOL/L (ref 132–146)
SP GR UR STRIP: 1.01 (ref 1–1.03)
SQUAMOUS #/AREA URNS HPF: ABNORMAL /HPF
TROPONIN I SERPL-MCNC: 0.04 NG/ML
UROBILINOGEN UR QL STRIP: ABNORMAL
WBC NRBC COR # BLD: 10.89 10*3/MM3 (ref 3.5–10.8)
WBC UR QL AUTO: ABNORMAL /HPF
WHOLE BLOOD HOLD SPECIMEN: NORMAL
WHOLE BLOOD HOLD SPECIMEN: NORMAL

## 2017-06-08 PROCEDURE — 82962 GLUCOSE BLOOD TEST: CPT

## 2017-06-08 PROCEDURE — 85610 PROTHROMBIN TIME: CPT | Performed by: EMERGENCY MEDICINE

## 2017-06-08 PROCEDURE — 99223 1ST HOSP IP/OBS HIGH 75: CPT | Performed by: INTERNAL MEDICINE

## 2017-06-08 PROCEDURE — 83880 ASSAY OF NATRIURETIC PEPTIDE: CPT | Performed by: EMERGENCY MEDICINE

## 2017-06-08 PROCEDURE — 25010000002 PIPERACILLIN-TAZOBACTAM: Performed by: INTERNAL MEDICINE

## 2017-06-08 PROCEDURE — 99221 1ST HOSP IP/OBS SF/LOW 40: CPT | Performed by: INTERNAL MEDICINE

## 2017-06-08 PROCEDURE — 84484 ASSAY OF TROPONIN QUANT: CPT | Performed by: EMERGENCY MEDICINE

## 2017-06-08 PROCEDURE — 25010000002 SULFUR HEXAFLUORIDE MICROSPH 60.7-25 MG RECONSTITUTED SUSPENSION: Performed by: INTERNAL MEDICINE

## 2017-06-08 PROCEDURE — 80053 COMPREHEN METABOLIC PANEL: CPT | Performed by: EMERGENCY MEDICINE

## 2017-06-08 PROCEDURE — 63710000001 INSULIN LISPRO (HUMAN) PER 5 UNITS: Performed by: INTERNAL MEDICINE

## 2017-06-08 PROCEDURE — 87040 BLOOD CULTURE FOR BACTERIA: CPT | Performed by: EMERGENCY MEDICINE

## 2017-06-08 PROCEDURE — 25010000002 AZITHROMYCIN: Performed by: EMERGENCY MEDICINE

## 2017-06-08 PROCEDURE — 71010 HC CHEST PA OR AP: CPT

## 2017-06-08 PROCEDURE — 93306 TTE W/DOPPLER COMPLETE: CPT | Performed by: INTERNAL MEDICINE

## 2017-06-08 PROCEDURE — 83036 HEMOGLOBIN GLYCOSYLATED A1C: CPT | Performed by: INTERNAL MEDICINE

## 2017-06-08 PROCEDURE — 85025 COMPLETE CBC W/AUTO DIFF WBC: CPT | Performed by: EMERGENCY MEDICINE

## 2017-06-08 PROCEDURE — 93005 ELECTROCARDIOGRAM TRACING: CPT

## 2017-06-08 PROCEDURE — 99285 EMERGENCY DEPT VISIT HI MDM: CPT

## 2017-06-08 PROCEDURE — 81001 URINALYSIS AUTO W/SCOPE: CPT | Performed by: EMERGENCY MEDICINE

## 2017-06-08 PROCEDURE — C8929 TTE W OR WO FOL WCON,DOPPLER: HCPCS

## 2017-06-08 PROCEDURE — 25010000002 PIPERACILLIN-TAZOBACTAM: Performed by: EMERGENCY MEDICINE

## 2017-06-08 RX ORDER — DEXTROSE MONOHYDRATE 25 G/50ML
25 INJECTION, SOLUTION INTRAVENOUS
Status: DISCONTINUED | OUTPATIENT
Start: 2017-06-08 | End: 2017-06-10 | Stop reason: HOSPADM

## 2017-06-08 RX ORDER — SODIUM CHLORIDE 0.9 % (FLUSH) 0.9 %
1-10 SYRINGE (ML) INJECTION AS NEEDED
Status: DISCONTINUED | OUTPATIENT
Start: 2017-06-08 | End: 2017-06-10 | Stop reason: HOSPADM

## 2017-06-08 RX ORDER — ONDANSETRON 8 MG/1
8 TABLET, ORALLY DISINTEGRATING ORAL EVERY 12 HOURS PRN
Status: ON HOLD | COMMUNITY
End: 2017-08-09

## 2017-06-08 RX ORDER — SODIUM CHLORIDE 0.9 % (FLUSH) 0.9 %
10 SYRINGE (ML) INJECTION AS NEEDED
Status: DISCONTINUED | OUTPATIENT
Start: 2017-06-08 | End: 2017-06-10 | Stop reason: HOSPADM

## 2017-06-08 RX ORDER — WARFARIN SODIUM 6 MG/1
6 TABLET ORAL DAILY
Status: ON HOLD | COMMUNITY
End: 2017-08-09

## 2017-06-08 RX ORDER — ALPRAZOLAM 0.25 MG/1
0.25 TABLET ORAL ONCE
Status: COMPLETED | OUTPATIENT
Start: 2017-06-08 | End: 2017-06-08

## 2017-06-08 RX ORDER — AMLODIPINE BESYLATE 5 MG/1
10 TABLET ORAL DAILY
COMMUNITY
End: 2018-06-14

## 2017-06-08 RX ORDER — ATORVASTATIN CALCIUM 10 MG/1
10 TABLET, FILM COATED ORAL NIGHTLY
Status: DISCONTINUED | OUTPATIENT
Start: 2017-06-08 | End: 2017-06-10 | Stop reason: HOSPADM

## 2017-06-08 RX ORDER — ALPRAZOLAM 0.5 MG/1
0.5 TABLET ORAL 2 TIMES DAILY PRN
COMMUNITY
End: 2018-05-25 | Stop reason: SDUPTHER

## 2017-06-08 RX ORDER — BUMETANIDE 0.25 MG/ML
2 INJECTION INTRAMUSCULAR; INTRAVENOUS ONCE
Status: DISCONTINUED | OUTPATIENT
Start: 2017-06-08 | End: 2017-06-08

## 2017-06-08 RX ORDER — METOPROLOL TARTRATE 100 MG/1
100 TABLET ORAL EVERY 12 HOURS SCHEDULED
Status: DISCONTINUED | OUTPATIENT
Start: 2017-06-08 | End: 2017-06-10 | Stop reason: HOSPADM

## 2017-06-08 RX ORDER — AMLODIPINE BESYLATE 10 MG/1
10 TABLET ORAL DAILY
Status: DISCONTINUED | OUTPATIENT
Start: 2017-06-08 | End: 2017-06-10 | Stop reason: HOSPADM

## 2017-06-08 RX ORDER — SODIUM BICARBONATE 650 MG/1
1300 TABLET ORAL 2 TIMES DAILY
Status: DISCONTINUED | OUTPATIENT
Start: 2017-06-08 | End: 2017-06-10 | Stop reason: HOSPADM

## 2017-06-08 RX ORDER — WARFARIN SODIUM 3 MG/1
9 TABLET ORAL TAKE AS DIRECTED
Status: ON HOLD | COMMUNITY
End: 2017-08-09

## 2017-06-08 RX ORDER — NICOTINE POLACRILEX 4 MG
15 LOZENGE BUCCAL
Status: DISCONTINUED | OUTPATIENT
Start: 2017-06-08 | End: 2017-06-10 | Stop reason: HOSPADM

## 2017-06-08 RX ORDER — BUMETANIDE 0.25 MG/ML
1 INJECTION INTRAMUSCULAR; INTRAVENOUS ONCE
Status: COMPLETED | OUTPATIENT
Start: 2017-06-08 | End: 2017-06-08

## 2017-06-08 RX ORDER — LEVOTHYROXINE SODIUM 0.07 MG/1
75 TABLET ORAL
Status: DISCONTINUED | OUTPATIENT
Start: 2017-06-08 | End: 2017-06-10 | Stop reason: HOSPADM

## 2017-06-08 RX ORDER — WARFARIN SODIUM 3 MG/1
6 TABLET ORAL
Status: DISCONTINUED | OUTPATIENT
Start: 2017-06-08 | End: 2017-06-09

## 2017-06-08 RX ADMIN — BUMETANIDE 1 MG: 0.25 INJECTION, SOLUTION INTRAMUSCULAR; INTRAVENOUS at 16:33

## 2017-06-08 RX ADMIN — ALPRAZOLAM 0.25 MG: 0.25 TABLET ORAL at 22:03

## 2017-06-08 RX ADMIN — SODIUM BICARBONATE 650 MG TABLET 1300 MG: at 14:16

## 2017-06-08 RX ADMIN — PIPERACILLIN AND TAZOBACTAM 2.25 G: 2; .25 INJECTION, POWDER, LYOPHILIZED, FOR SOLUTION INTRAVENOUS; PARENTERAL at 11:44

## 2017-06-08 RX ADMIN — PIPERACILLIN AND TAZOBACTAM 2.25 G: 2; .25 INJECTION, POWDER, LYOPHILIZED, FOR SOLUTION INTRAVENOUS; PARENTERAL at 17:50

## 2017-06-08 RX ADMIN — LEVOTHYROXINE SODIUM 75 MCG: 75 TABLET ORAL at 11:43

## 2017-06-08 RX ADMIN — METOPROLOL TARTRATE 100 MG: 100 TABLET ORAL at 11:43

## 2017-06-08 RX ADMIN — INSULIN LISPRO 2 UNITS: 100 INJECTION, SOLUTION INTRAVENOUS; SUBCUTANEOUS at 17:49

## 2017-06-08 RX ADMIN — WARFARIN SODIUM 6 MG: 3 TABLET ORAL at 17:50

## 2017-06-08 RX ADMIN — METOPROLOL TARTRATE 100 MG: 100 TABLET ORAL at 21:04

## 2017-06-08 RX ADMIN — SULFUR HEXAFLUORIDE 2 ML: KIT at 15:25

## 2017-06-08 RX ADMIN — ATORVASTATIN CALCIUM 10 MG: 10 TABLET, FILM COATED ORAL at 21:04

## 2017-06-08 RX ADMIN — AZITHROMYCIN 500 MG: 500 INJECTION, POWDER, LYOPHILIZED, FOR SOLUTION INTRAVENOUS at 07:32

## 2017-06-08 RX ADMIN — AMLODIPINE BESYLATE 10 MG: 10 TABLET ORAL at 11:43

## 2017-06-08 RX ADMIN — PIPERACILLIN SODIUM,TAZOBACTAM SODIUM 3.38 G: 3; .375 INJECTION, POWDER, FOR SOLUTION INTRAVENOUS at 06:41

## 2017-06-08 NOTE — CONSULTS
Referring Provider: Sylvester  Reason for Consultation:CKD    Subjective     Chief complaint generalized weakness and shortness of breath    History of present illness:  Patient is a 77-year-old female known to our service she had a history of chronic kidney disease was on dialysis for some time when she was taken off.  Primary disease is IgA nephropathy she also had a kidney transplant in 2010.  She was last dialyzed in April 2017.  Patient is admitted because of generalized weakness and shortness of breath.  Patient stated that she had some diarrhea recently and has been taking her Bumex..  Her other complaints was cough with left-sided chest pain, sinus symptoms were worsening when she came into the ER.  White count was slightly elevated chest x-ray showed mild prominence of interstitial markings.  She was started on antibiotics for suspected pneumonia.  She is feeling much better at this time.  Her creatinine is at the baseline at this time.  She had denied any gross hematuria or recent rash.    History  Past Medical History:   Diagnosis Date   • Adenomatous colon polyp    • Chronic kidney disease    • Clostridium difficile infection    • Diabetes mellitus    • Gastric polyps    • Hypertension    • PAF (paroxysmal atrial fibrillation)    • Tubular adenoma     excision    • Ulcer of gastric fundus    • Vitamin D deficiency    ,   Past Surgical History:   Procedure Laterality Date   • BREAST BIOPSY Left 1990'S   • CARPAL TUNNEL RELEASE Right    • CATARACT EXTRACTION     • DIAGNOSTIC LAPAROSCOPY     • HERNIA REPAIR     • HERNIA REPAIR     • TOTAL KNEE ARTHROPLASTY     • TOTAL KNEE ARTHROPLASTY      Left knee    • TRANSPLANTATION RENAL     • TUBAL ABDOMINAL LIGATION     • UPPER GASTROINTESTINAL ENDOSCOPY  05/20/2013   ,   Family History   Problem Relation Age of Onset   • Leukemia Mother    • Stroke Father    • Breast cancer Neg Hx    • Ovarian cancer Neg Hx    ,   Social History   Substance Use Topics   • Smoking  status: Never Smoker   • Smokeless tobacco: Never Used   • Alcohol use No   ,   Prescriptions Prior to Admission   Medication Sig Dispense Refill Last Dose   • ALPRAZolam (XANAX) 0.5 MG tablet Take 0.5 mg by mouth 2 (Two) Times a Day As Needed for Anxiety.      • amLODIPine (NORVASC) 5 MG tablet Take 5 mg by mouth Daily.      • bumetanide (BUMEX) 1 MG tablet Take 1 mg by mouth Daily.      • diclofenac (VOLTAREN) 1 % gel gel 4 g 4 (Four) Times a Day As Needed.      • Fluticasone Furoate-Vilanterol (BREO ELLIPTA) 200-25 MCG/INH aerosol powder  Inhale 1 puff Daily.      • LANTUS SOLOSTAR 100 UNIT/ML injection pen 12 Units Every Night.      • levothyroxine (SYNTHROID, LEVOTHROID) 75 MCG tablet Take 75 mcg by mouth Daily.      • metoprolol tartrate (LOPRESSOR) 100 MG tablet Take 100 mg by mouth 2 (two) times a day.   Taking   • ondansetron ODT (ZOFRAN-ODT) 8 MG disintegrating tablet Take 8 mg by mouth Every 12 (Twelve) Hours As Needed for Nausea or Vomiting.      • pravastatin (PRAVACHOL) 40 MG tablet Take 40 mg by mouth daily.   Taking   • PROAIR  (90 BASE) MCG/ACT inhaler Inhale 2 puffs Every 6 (Six) Hours As Needed.      • warfarin (COUMADIN) 3 MG tablet Take 9 mg by mouth Take As Directed. Sunday, Tuesday, Wednesday, Friday, Saturday      • warfarin (COUMADIN) 6 MG tablet Take 6 mg by mouth 2 (Two) Times a Week. Monday, Thursday      • Multiple Vitamins-Minerals (ICAPS MV PO) Take  by mouth Daily.   Taking   • Patiromer Sorbitex Calcium (VELTASSA) 8.4 G pack Take 8.4 g by mouth Daily.      , Scheduled Meds:    amLODIPine 10 mg Oral Daily   atorvastatin 10 mg Oral Nightly   [START ON 6/9/2017] azithromycin 500 mg Intravenous Q24H   insulin lispro 0-7 Units Subcutaneous 4x Daily With Meals & Nightly   levothyroxine 75 mcg Oral Q AM   metoprolol tartrate 100 mg Oral Q12H   piperacillin-tazobactam 2.25 g Intravenous Q6H   warfarin 6 mg Oral Once per day on Mon Thu   [START ON 6/9/2017] warfarin 9 mg Oral Once per  day on Sun Tue Wed Fri Sat   , Continuous Infusions:    Pharmacy to dose warfarin     and PRN Meds:  dextrose  •  dextrose  •  glucagon (human recombinant)  •  Pharmacy to dose warfarin  •  sodium chloride  •  sodium chloride    Review of Systems  All systems were reviewed and negative except for:  Constitution:  positive for chills  Cardiovascular: positive for  chest pressure / pain, at rest and See HPI  Gastrointestinal: postitive for  diarrhea  Musculoskeletal: positive for  muscle weakness and See HPI    Objective     Vital Signs  Temp:  [97.9 °F (36.6 °C)-98.5 °F (36.9 °C)] 97.9 °F (36.6 °C)  Heart Rate:  [70-84] 73  Resp:  [18-28] 18  BP: (128-167)/(60-84) 144/61    I/O this shift:  In: 450 [IV Piggyback:450]  Out: 200 [Urine:200]       Physical Exam:     General Appearance:    Alert, cooperative, in no acute distressComfortable in bed.     Head:    Normocephalic, without obvious abnormality, atraumatic   Eyes:              conjunctivae and sclerae normal, no   icterus, no pallor, corneas clear, PERRLA   Ears:    Ears appear intact with no abnormalities noted   Throat:   No oral lesions, no thrush, oral mucosa moist   Neck:   No adenopathy, supple, trachea midline, no thyromegaly, no     carotid bruit, no JVD   Back:     No kyphosis present, no scoliosis present    Lungs:     Clear to auscultation,respirations regular, even and                   unlabored    Heart:    Regular rhythm and normal rate, normal S1 and S2, Positive grade 2/6  murmur, no gallop, no rub, no click   Breast Exam:    Deferred   Abdomen:     Normal bowel sounds, no masses, no organomegaly, soft        non-tender, non-distended, no guarding, no rebound                 tenderness   Genitalia:    Deferred   Extremities:   Moves all extremities well, no edema, no cyanosis, no              redness   Pulses:   Pulses palpable and equal bilaterally   Skin:   No bleeding, bruising or rash   Lymph nodes:   No palpable adenopathy   Neurologic:    Grossly intact, no focal deficit noted.  Alert oriented ×3.         Results Review:   I reviewed the patient's new clinical results.    WBC WBC   Date Value Ref Range Status   06/08/2017 10.89 (H) 3.50 - 10.80 10*3/mm3 Final      HGB Hemoglobin   Date Value Ref Range Status   06/08/2017 10.3 (L) 11.5 - 15.5 g/dL Final      HCT Hematocrit   Date Value Ref Range Status   06/08/2017 33.1 (L) 34.5 - 44.0 % Final      Platlets No results found for: LABPLAT   MCV MCV   Date Value Ref Range Status   06/08/2017 97.4 80.0 - 99.0 fL Final          Sodium Sodium   Date Value Ref Range Status   06/08/2017 139 132 - 146 mmol/L Final      Potassium Potassium   Date Value Ref Range Status   06/08/2017 4.7 3.5 - 5.5 mmol/L Final      Chloride Chloride   Date Value Ref Range Status   06/08/2017 115 (H) 99 - 109 mmol/L Final      CO2 CO2   Date Value Ref Range Status   06/08/2017 18.0 (L) 20.0 - 31.0 mmol/L Final      BUN BUN   Date Value Ref Range Status   06/08/2017 66 (H) 9 - 23 mg/dL Final      Creatinine Creatinine   Date Value Ref Range Status   06/08/2017 3.00 (H) 0.60 - 1.30 mg/dL Final      Calcium Calcium   Date Value Ref Range Status   06/08/2017 9.7 8.7 - 10.4 mg/dL Final      PO4 No results found for: CAPO4   Albumin Albumin   Date Value Ref Range Status   06/08/2017 4.40 3.20 - 4.80 g/dL Final      Magnesium No results found for: MG   Uric Acid No results found for: URICACID           amLODIPine 10 mg Oral Daily   atorvastatin 10 mg Oral Nightly   [START ON 6/9/2017] azithromycin 500 mg Intravenous Q24H   insulin lispro 0-7 Units Subcutaneous 4x Daily With Meals & Nightly   levothyroxine 75 mcg Oral Q AM   metoprolol tartrate 100 mg Oral Q12H   piperacillin-tazobactam 2.25 g Intravenous Q6H   warfarin 6 mg Oral Once per day on Mon Thu   [START ON 6/9/2017] warfarin 9 mg Oral Once per day on Sun Tue Wed Fri Sat       Pharmacy to dose warfarin        Assessment/Plan     Principal Problem:    Dyspnea on exertion  Active  Problems:    Paroxysmal atrial fibrillation    Essential hypertension    Type 2 diabetes mellitus    Chronic kidney disease, stage V  #1 CK D stage IV stable.  2.  Metabolic acidosis in the setting of CK D stage IV.  And recent diarrhea.   3.  Essential hypertension.  4.  Failed kidney transplant, native kidney disease IgA nephropathy.  5.  Shortness of breath workup in progress at this time.\  Plan:  Avoid nephrotoxic medications.    Add sodium bicarbonate 1300 mg twice a day.  Labs in the morning.      I discussed the patients findings and my recommendations with patient and family    Allen Chambers MD  06/08/17  @NOW

## 2017-06-08 NOTE — CONSULTS
Inpatient Consult to Cardiology  Consult performed by: PREETI ISLAS  Consult ordered by: WILD DIALLO  Reason for consult: CHF, atrial fibrillation        [facility]  Cardiovascular Consultation Note         The patient is a 77-year-old female with a history of paroxysmal atrial fibrillation, previous renal transplant with stage IV-V chronic kidney disease who was previously receiving hemodialysis until February of this year.  The patient presented to [facility] this morning with complaints of shortness of breath noted with minimal exertion which she states has been going on for the last 1-2 weeks.  Her daughter, who is in the room with her, feels her shortness of breath has been gradually progressive over the last several months.  The patient endorses fevers and chills.  In the emergency room, a chest x-ray demonstrated bilateral increase in vascular markings and a BNP was greater than 1000.  When talking to her this afternoon, she appears to Clinic as she had just returned from her echocardiogram.      Past medical and surgical history, social and family history reviewed in EMR.    REVIEW OF SYSTEMS:   H&P ROS reviewed and pertinent CV ROS as noted in HPI.         Vital Sign Min/Max for last 24 hours  Temp  Min: 97.9 °F (36.6 °C)  Max: 98.5 °F (36.9 °C)   BP  Min: 128/60  Max: 167/74   Pulse  Min: 70  Max: 84   Resp  Min: 18  Max: 28   SpO2  Min: 92 %  Max: 96 %   No Data Recorded      Intake/Output Summary (Last 24 hours) at 06/08/17 1752  Last data filed at 06/08/17 1749   Gross per 24 hour   Intake              670 ml   Output              525 ml   Net              145 ml             Physical Exam   Constitutional: She is oriented to person, place, and time. She appears well-developed and well-nourished.   HENT:   Head: Normocephalic and atraumatic.   Eyes: Pupils are equal, round, and reactive to light. No scleral icterus.   Neck: JVD present.  Carotid bruit is not present. No thyromegaly present.   Cardiovascular: Normal rate, regular rhythm, S1 normal and S2 normal.  Exam reveals no gallop.    Murmur heard.  High-pitched blowing holosystolic murmur is present with a grade of 2/6  at the apex  Pulmonary/Chest: Effort normal. Tachypnea noted. She has rales.   Abdominal: Soft. There is no tenderness.   Neurological: She is alert and oriented to person, place, and time.   Skin: Skin is warm and dry. No cyanosis. Nails show no clubbing.   Psychiatric: She has a normal mood and affect. Her behavior is normal.     EKG: Sinus rhythm with no ischemic ST or T-wave changes.    Echo (6/8/17): LVEF 50%.  Grade 2 diastolic dysfunction.  Mild to moderate MR.  RVSP approximately 40 mmHg.    Lab Review:     Labs reviewed in the electronic medical record.  Pertinent findings include:  Lab Results   Component Value Date    GLUCOSE 95 06/08/2017    BUN 66 (H) 06/08/2017    CREATININE 3.00 (H) 06/08/2017    EGFRIFNONA 15 (L) 06/08/2017    BCR 22.0 06/08/2017    K 4.7 06/08/2017    CO2 18.0 (L) 06/08/2017    CALCIUM 9.7 06/08/2017    PROTENTOTREF 5.9 (L) 02/13/2015    ALBUMIN 4.40 06/08/2017    LABIL2 1.2 (L) 06/08/2017    AST 37 (H) 06/08/2017    ALT 46 (H) 06/08/2017     Lab Results   Component Value Date    WBC 10.89 (H) 06/08/2017    HGB 10.3 (L) 06/08/2017    HCT 33.1 (L) 06/08/2017    MCV 97.4 06/08/2017     06/08/2017         Results from last 7 days  Lab Units 06/08/17  1012   HEMOGLOBIN A1C % 5.10              Hospital Problem List     * (Principal)Dyspnea on exertion    Acute on chronic diastolic congestive heart failure    Overview Signed 6/8/2017  5:43 PM by Jeff Joe MD     · Echo (6/8/17): LVEF 50%.  Grade 2 diastolic dysfunction.  Mild to moderate MR.  RVSP 41 mmHg         Paroxysmal atrial fibrillation    Overview Addendum 8/2/2016 12:07 PM by Jeff Joe MD     · CHADS-VASc = 5  · Atrial fibrillation initially diagnosed  February 2015; spontaneous conversion to normal sinus rhythm.  · Echocardiogram (3/14/2015):  Normal LV systolic function, mild MR/TR.         Essential hypertension    Type 2 diabetes mellitus    Chronic kidney disease, stage V    Overview Addendum 6/8/2017  5:52 PM by Jeff Joe MD     · IgA nephropathy with history of renal transplant, 2010.   · Patient on hemodialysis Monday, Wednesday, and Friday started July 2015.  · Hemodialysis discontinued 2/2017.             The patient appears to be in acute on chronic diastolic heart failure.  Previously, her volume was controlled with hemodialysis but since discontinuing dialysis in February, I suspect she is been gradually developing fluid overload.  Given her advanced kidney disease and tenuous nature of requiring dialysis, I would prefer that the nephrologist manage her diuretics.  Presently she is normotensive and I defer starting afterload reducing agents such as hydralazine at this time.  Obviously, ACE inhibitor/ARB therapy is contraindicated given her renal insufficiency.       · Nephrology to dictate diuretic therapy in this woman who appears to have acute on chronic diastolic heart failure in the setting of advanced kidney disease and recent discontinuation of hemodialysis.  · Continue metoprolol and warfarin for treatment of paroxysmal atrial fibrillation  · Please call with any additional questions.        Jeff Joe MD  6/8/2017

## 2017-06-08 NOTE — PROGRESS NOTES
Discharge Planning Assessment  [facility]     Patient Name: Moni Wallace  MRN: [id number]  Today's Date: 6/8/2017    Admit Date: 6/8/2017          Discharge Needs Assessment       06/08/17 0848    Living Environment    Lives With child(chavo), adult    Living Arrangements house    Home Accessibility no concerns    Type of Financial/Environmental Concern unable to afford medication(s)    Transportation Available car;family or friend will provide    Living Environment    Provides Primary Care For no one    Primary Care Provided By child(chavo) (specify)    Caregiving Concerns lives with her son Yadiel    Able to Return to Prior Living Arrangements yes    Discharge Needs Assessment    Concerns To Be Addressed denies needs/concerns at this time    Readmission Within The Last 30 Days no previous admission in last 30 days    Outpatient/Agency/Support Group Needs homecare agency (specify level of care)    Anticipated Changes Related to Illness none    Equipment Currently Used at Home none    Discharge Contact Information if Applicable Yadiel Wallace [phone]  Sawyer Wallace @ [phone]            Discharge Plan       06/08/17 0851    Case Management/Social Work Plan    Plan initial    Additional Comments Pt lives in [place] with one of her sons, Yadiel. She states she is independent but has a cleaning lady come in 1x/month. She has been a pt at [facility] prior to this admission- she states she wants to go home and not to a rehab facility. She also has had HH back in 2015 Her PCP is Slim Wick,.        Discharge Placement     No information found                Demographic Summary     None            Functional Status       06/08/17 0846    Functional Status Current    Ambulation 2-->assistive person    Transferring 2-->assistive person    Toileting 2-->assistive person    Communication 0-->understands/communicates without difficulty    Swallowing (if score 2 or more for any item, consult Rehab Services) 0-->swallows  foods/liquids without difficulty    Change in Functional Status Since Onset of Current Illness/Injury no    Functional Status Prior    Ambulation 0-->independent    Transferring 0-->independent    Toileting 0-->independent    Bathing 0-->independent    Dressing 0-->independent    Eating 0-->independent    Communication 0-->understands/communicates without difficulty    Swallowing 0-->swallows foods/liquids without difficulty    IADL    Medications independent    Meal Preparation independent    Housekeeping assistive person    Laundry assistive person    Shopping assistive person    Oral Care independent    Activity Tolerance    Current Activity Limitations none    Usual Activity Tolerance good    Current Activity Tolerance moderate    Cognitive/Perceptual/Developmental    Current Mental Status/Cognitive Functioning no deficits noted    Recent Changes in Mental Status/Cognitive Functioning no changes    Developmental Stage (Eriksson's Stages of Development) Stage 8 (65 years-death/Late Adulthood) Integrity vs. Despair    Employment/Financial    Source Of Income pension/intermediate    Financial Concerns unable to afford medications    Who Manages Finances If Patient Unable Pt states she has a dialysis drug she is having a tough time to pay for            Psychosocial     None            Abuse/Neglect     None            Legal     None            Substance Abuse     None            Patient Forms     None          Aimee Arita

## 2017-06-08 NOTE — CONSULTS
"Pharmacy Consult  -  Warfarin    Moni Wallace is a  77 y.o. female , pharmacy consulted for warfarin dosing  Height - 63.75\" (161.9 cm)  Weight - 185 lb (83.9 kg)    Consulting Provider: - Dr. Phan  Indication: - Cardiac Dysrhythmia   Goal INR: - 2-3  Home Regimen:   6 mg on Mon Thu               9 mg on Sun Tue Wed Fri Sat     Bridge Therapy: No    Drug-Drug Interactions with current regimen:   1. Azithromycin-increase risk of bleeding    Warfarin Dosing During Admission:    Date  6/8           INR  2.30           Dose  6 mg                Labs:      Results from last 7 days  Lab Units 06/08/17  0256   INR  2.30   HEMOGLOBIN g/dL 10.3*   HEMATOCRIT % 33.1*   PLATELETS 10*3/mm3 183       Results from last 7 days  Lab Units 06/08/17  0256   SODIUM mmol/L 139   POTASSIUM mmol/L 4.7   CHLORIDE mmol/L 115*   TOTAL CO2 mmol/L 18.0*   BUN mg/dL 66*   CREATININE mg/dL 3.00*   CALCIUM mg/dL 9.7   BILIRUBIN mg/dL 0.5   ALK PHOS U/L 126*   ALT (SGPT) U/L 46*   AST (SGOT) U/L 37*   GLUCOSE mg/dL 95       Current dietary intake: new admission      Assessment/Plan:   1. INR is therapeutic with a value of 2.30.        -Pharmacy will start the patient home regimen given that the patient is therapeutic upon admission.    -   Pharmacy will monitor carefully for DDI with azithromycin and will adjust dosage if INR start to trend-up significantly.        2. Daily PT/INR       3. Monitor for s/sx of bleeding thrombosis.       Thank you,  Rosana Ochoa, PharmD.  6/8/2017  12:36 PM                 "

## 2017-06-08 NOTE — ED PROVIDER NOTES
Subjective   HPI Comments: Mrs. Moni Wallace is a 77 y.o. female who presents to the ED with c/o SOA. She has developed worsening SOA for about a week now with significant worsening this morning at 0100. She complains of wheezes, ESCAMILLA, chills, chest pain 4 days ago, and abdominal gas, but denies any fevers, productive cough, increased stress, or any other acute sx at this time.     Pt is on blood thinning medication. She has a hx of asthma, a-fib, DM, HLD, and HTN.    Pt had a kidney transplant in 2008 and started dialysis last three times a week. A couple months ago, her renal function improved and was taken off dialysis.    Patient is a 77 y.o. female presenting with shortness of breath.   History provided by:  Patient  Shortness of Breath   Severity:  Moderate  Onset quality:  Sudden  Duration:  1 week  Timing:  Constant  Progression:  Worsening  Chronicity:  New  Relieved by:  None tried  Worsened by:  Exertion  Ineffective treatments:  None tried  Associated symptoms: chest pain and wheezing    Associated symptoms: no abdominal pain, no cough, no fever and no vomiting        Review of Systems   Constitutional: Positive for chills. Negative for fever.   HENT: Negative.    Respiratory: Positive for shortness of breath and wheezing. Negative for cough.    Cardiovascular: Positive for chest pain.   Gastrointestinal: Negative for abdominal pain, diarrhea, nausea and vomiting.        Abdominal gas   Musculoskeletal: Negative.    All other systems reviewed and are negative.      Past Medical History:   Diagnosis Date   • Adenomatous colon polyp    • Chronic kidney disease    • Clostridium difficile infection    • Diabetes mellitus    • Gastric polyps    • Hypertension    • PAF (paroxysmal atrial fibrillation)    • Tubular adenoma     excision    • Ulcer of gastric fundus    • Vitamin D deficiency        Allergies   Allergen Reactions   • Codeine    • Nsaids    • Sulfa Antibiotics        Past Surgical History:    Procedure Laterality Date   • BREAST BIOPSY Left 1990'S   • CARPAL TUNNEL RELEASE Right    • CATARACT EXTRACTION     • DIAGNOSTIC LAPAROSCOPY     • HERNIA REPAIR     • HERNIA REPAIR     • TOTAL KNEE ARTHROPLASTY     • TOTAL KNEE ARTHROPLASTY      Left knee    • TRANSPLANTATION RENAL     • TUBAL ABDOMINAL LIGATION     • UPPER GASTROINTESTINAL ENDOSCOPY  05/20/2013       Family History   Problem Relation Age of Onset   • Leukemia Mother    • Stroke Father    • Breast cancer Neg Hx    • Ovarian cancer Neg Hx        Social History     Social History   • Marital status:      Spouse name: N/A   • Number of children: N/A   • Years of education: N/A     Social History Main Topics   • Smoking status: Never Smoker   • Smokeless tobacco: Never Used   • Alcohol use No   • Drug use: No   • Sexual activity: Defer     Other Topics Concern   • None     Social History Narrative         Objective   Physical Exam   Constitutional: She is oriented to person, place, and time. She appears well-developed and well-nourished. No distress.   HENT:   Head: Normocephalic and atraumatic.   Eyes: Conjunctivae are normal.   Neck: Normal range of motion. Neck supple.   Cardiovascular: Normal rate, regular rhythm, normal heart sounds and intact distal pulses.    Pulmonary/Chest: Effort normal and breath sounds normal. No respiratory distress.   Abdominal: Soft. There is no tenderness.   Musculoskeletal: Normal range of motion. She exhibits no edema.   Fistula in RUE   Neurological: She is alert and oriented to person, place, and time. She has normal strength.   Skin: Skin is warm and dry.   Psychiatric: Her behavior is normal. Her mood appears anxious.   Nursing note and vitals reviewed.      Procedures         ED Course  ED Course     Recent Results (from the past 24 hour(s))   Comprehensive Metabolic Panel    Collection Time: 06/08/17  2:56 AM   Result Value Ref Range    Glucose 95 70 - 100 mg/dL    BUN 66 (H) 9 - 23 mg/dL    Creatinine  3.00 (H) 0.60 - 1.30 mg/dL    Sodium 139 132 - 146 mmol/L    Potassium 4.7 3.5 - 5.5 mmol/L    Chloride 115 (H) 99 - 109 mmol/L    CO2 18.0 (L) 20.0 - 31.0 mmol/L    Calcium 9.7 8.7 - 10.4 mg/dL    Total Protein 8.1 5.7 - 8.2 g/dL    Albumin 4.40 3.20 - 4.80 g/dL    ALT (SGPT) 46 (H) 7 - 40 U/L    AST (SGOT) 37 (H) 0 - 33 U/L    Alkaline Phosphatase 126 (H) 25 - 100 U/L    Total Bilirubin 0.5 0.3 - 1.2 mg/dL    eGFR Non African Amer 15 (L) >60 mL/min/1.73    Globulin 3.7 gm/dL    A/G Ratio 1.2 (L) 1.5 - 2.5 g/dL    BUN/Creatinine Ratio 22.0 7.0 - 25.0    Anion Gap 6.0 3.0 - 11.0 mmol/L   BNP    Collection Time: 06/08/17  2:56 AM   Result Value Ref Range    BNP 1563.0 (H) 0.0 - 100.0 pg/mL   Protime-INR    Collection Time: 06/08/17  2:56 AM   Result Value Ref Range    Protime 25.7 (H) 9.6 - 11.5 Seconds    INR 2.30    Light Blue Top    Collection Time: 06/08/17  2:56 AM   Result Value Ref Range    Extra Tube hold for add-on    Green Top (Gel)    Collection Time: 06/08/17  2:56 AM   Result Value Ref Range    Extra Tube Hold for add-ons.    Lavender Top    Collection Time: 06/08/17  2:56 AM   Result Value Ref Range    Extra Tube hold for add-on    Gold Top - SST    Collection Time: 06/08/17  2:56 AM   Result Value Ref Range    Extra Tube Hold for add-ons.    CBC Auto Differential    Collection Time: 06/08/17  2:56 AM   Result Value Ref Range    WBC 10.89 (H) 3.50 - 10.80 10*3/mm3    RBC 3.40 (L) 3.89 - 5.14 10*6/mm3    Hemoglobin 10.3 (L) 11.5 - 15.5 g/dL    Hematocrit 33.1 (L) 34.5 - 44.0 %    MCV 97.4 80.0 - 99.0 fL    MCH 30.3 27.0 - 31.0 pg    MCHC 31.1 (L) 32.0 - 36.0 g/dL    RDW 16.2 (H) 11.3 - 14.5 %    RDW-SD 57.1 (H) 37.0 - 54.0 fl    MPV 10.8 6.0 - 12.0 fL    Platelets 183 150 - 450 10*3/mm3    Neutrophil % 77.0 (H) 41.0 - 71.0 %    Lymphocyte % 14.9 (L) 24.0 - 44.0 %    Monocyte % 6.1 0.0 - 12.0 %    Eosinophil % 1.6 0.0 - 3.0 %    Basophil % 0.1 0.0 - 1.0 %    Immature Grans % 0.3 0.0 - 0.6 %     Neutrophils, Absolute 8.40 (H) 1.50 - 8.30 10*3/mm3    Lymphocytes, Absolute 1.62 0.60 - 4.80 10*3/mm3    Monocytes, Absolute 0.66 0.00 - 1.00 10*3/mm3    Eosinophils, Absolute 0.17 0.10 - 0.30 10*3/mm3    Basophils, Absolute 0.01 0.00 - 0.20 10*3/mm3    Immature Grans, Absolute 0.03 0.00 - 0.03 10*3/mm3   Troponin    Collection Time: 06/08/17  2:56 AM   Result Value Ref Range    Troponin I 0.037 <=0.039 ng/mL     Note: In addition to lab results from this visit, the labs listed above may include labs taken at another facility or during a different encounter within the last 24 hours. Please correlate lab times with ED admission and discharge times for further clarification of the services performed during this visit.    XR Chest 1 View   Final Result   Abnormal   1.  Enlarged cardiac silhouette.   2.  There is mild prominence of the interstitial markings, primarily on the    right.  This may be related to interstitial pulmonary edema.  No focal    consolidation.      THIS DOCUMENT HAS BEEN ELECTRONICALLY SIGNED BY JULIAN SAUNDERS MD        Vitals:    06/08/17 0430 06/08/17 0500 06/08/17 0530 06/08/17 0600   BP: 140/64 147/68 141/66 144/60   BP Location:       Patient Position:       Pulse: 70 72 74 75   Resp:       Temp:       TempSrc:       SpO2: 92% 94% 94% 93%   Weight:       Height:         Medications   sodium chloride 0.9 % flush 10 mL (not administered)   AZITHROMYCIN 500 MG/250 ML 0.9% NS IVPB (MBP) (not administered)   piperacillin-tazobactam (ZOSYN) 3.375 g/100 mL 0.9% NS IVPB (mbp) (3.375 g Intravenous New Bag 6/8/17 0641)     ECG/EMG Results (last 24 hours)     Procedure Component Value Units Date/Time    ECG 12 Lead [[id number]] Collected:  06/08/17 0253     Updated:  06/08/17 0255                        [facility]    Final diagnoses:   Shortness of breath   Acute pulmonary edema   Pneumonia of both lower lobes due to infectious organism       Documentation assistance provided by jose alejandro HILLIARD.  Information  recorded by the scribe was done at my direction and has been verified and validated by me.     Petra Brunson  06/08/17 0335       Tim Santoyo DO  06/11/17 [id number]

## 2017-06-08 NOTE — H&P
HOSPITAL MEDICINE HISTORY AND PHYSICAL    PCP: Slim Wick MD  Specialists:    Chief Complaint: SOA    Subjective     History of Present Illness  Mrs. Wallace is a pleasant 77-year-old female with a history of paroxysmal A. 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  She presents to [facility] ED with complaints of one week of progressively worsening dyspnea on exertion that has been previously associated with some chills, cough and left-sided chest pain.  She is otherwise been in her usual state of health.  Her symptoms progressively worsened around 1 AM this morning and said presentation to the ED.  On arrival here patient was not hypoxic currently on room air, does endorse some mild increased work of breathing, initial workup included a WBC of 10, BUN/Cr 66/3, BNP  1563, chest x-ray that was significant for mild prominence of interstitial markings suggestive of interstitial pulmonary edema with no focal consolidation.  She was started on antibiotics for suspected pneumonia, hospital medicine service was called to admit.  On our evaluation a shunt seemed comfortable did endorse the above as mentioned, she denied any fevers, nausea vomiting, abdominal pain, no chest pain at the moment.      Review of Systems   Otherwise complete ROS is negative except as mentioned in the HPI.    Past Medical History:   Past Medical History:   Diagnosis Date   • Adenomatous colon polyp    • Chronic kidney disease    • Clostridium difficile infection    • Diabetes mellitus    • Gastric polyps    • Hypertension    • PAF (paroxysmal atrial fibrillation)    • Tubular adenoma     excision    • Ulcer of gastric fundus    • Vitamin D deficiency        Past Surgical History:  Past Surgical History:   Procedure Laterality Date    • BREAST BIOPSY Left 1990'S   • CARPAL TUNNEL RELEASE Right    • CATARACT EXTRACTION     • DIAGNOSTIC LAPAROSCOPY     • HERNIA REPAIR     • HERNIA REPAIR     • TOTAL KNEE ARTHROPLASTY     • TOTAL KNEE ARTHROPLASTY      Left knee    • TRANSPLANTATION RENAL     • TUBAL ABDOMINAL LIGATION     • UPPER GASTROINTESTINAL ENDOSCOPY  05/20/2013       Family History: family history includes Leukemia in her mother; Stroke in her father. There is no history of Breast cancer or Ovarian cancer.     Social History:  reports that she has never smoked. She has never used smokeless tobacco. She reports that she does not drink alcohol or use illicit drugs.    Medications:  Prescriptions Prior to Admission   Medication Sig Dispense Refill Last Dose   • ALPRAZolam (XANAX) 0.5 MG tablet Take 0.5 mg by mouth 2 (Two) Times a Day As Needed for Anxiety.      • amLODIPine (NORVASC) 5 MG tablet Take 5 mg by mouth Daily.      • bumetanide (BUMEX) 1 MG tablet Take 1 mg by mouth 2 (Two) Times a Day.      • diclofenac (VOLTAREN) 1 % gel gel 4 g 4 (Four) Times a Day As Needed.      • Fluticasone Furoate-Vilanterol (BREO ELLIPTA) 200-25 MCG/INH aerosol powder  Inhale 1 puff Daily.      • LANTUS SOLOSTAR 100 UNIT/ML injection pen 18 Units Every Night.      • levothyroxine (SYNTHROID, LEVOTHROID) 75 MCG tablet Take 75 mcg by mouth Daily.      • metoprolol tartrate (LOPRESSOR) 100 MG tablet Take 100 mg by mouth 2 (two) times a day.   Taking   • ondansetron ODT (ZOFRAN-ODT) 8 MG disintegrating tablet Take 8 mg by mouth Every 12 (Twelve) Hours As Needed for Nausea or Vomiting.      • pravastatin (PRAVACHOL) 40 MG tablet Take 40 mg by mouth daily.   Taking   • PROAIR  (90 BASE) MCG/ACT inhaler Inhale 2 puffs Every 6 (Six) Hours As Needed.      • warfarin (COUMADIN) 3 MG tablet Take 9 mg by mouth Take As Directed. Sunday, Tuesday, Wednesday, Friday, Saturday      • warfarin (COUMADIN) 6 MG tablet Take 6 mg by mouth 2 (Two) Times a Week. Monday,  "Thursday      • Multiple Vitamins-Minerals (ICAPS MV PO) Take  by mouth 2 (two) times a day.   Taking   • Patiromer Sorbitex Calcium (VELTASSA) 8.4 G pack Take 8.4 g by mouth Daily.        Allergies   Allergen Reactions   • Codeine    • Nsaids    • Sulfa Antibiotics        Objective    Physical Exam:   Vital Signs: /61 (BP Location: Left leg, Patient Position: Lying)  Pulse 72  Temp 97.9 °F (36.6 °C) (Oral)   Resp 18  Ht 63.75\" (161.9 cm)  Wt 185 lb (83.9 kg)  LMP  (Approximate)  SpO2 96%  BMI 32 kg/m2  Physical Exam  Temp:  [97.9 °F (36.6 °C)-98.5 °F (36.9 °C)] 97.9 °F (36.6 °C)  Heart Rate:  [70-84] 72  Resp:  [18-28] 18  BP: (128-167)/(60-84) 144/61     Constitutional: no acute distress, awake, alert  Eyes: PERRLA, sclerae anicteric, no conjunctival injection  Neck: supple, no thyromegaly, trachea midline  Respiratory: Clear to auscultation bilaterally,  Moderately labored respirations   Cardiovascular: RRR, no murmurs, rubs, or gallops, palpable pedal pulses bilaterally  Gastrointestinal: Positive bowel sounds, soft, nontender, nondistended  Musculoskeletal: No bilateral ankle edema, no clubbing or cyanosis to bilateral lower extremities  Psychiatric: oriented x 3, appropriate affect, cooperative  Neurologic: Strength symmetric in all extremities, Cranial Nerves grossly intact to confrontation     Results Reviewed:  I have personally reviewed current lab, radiology, and data and agree.    Results from last 7 days  Lab Units 06/08/17  0256   WBC 10*3/mm3 10.89*   HEMOGLOBIN g/dL 10.3*   PLATELETS 10*3/mm3 183       Results from last 7 days  Lab Units 06/08/17  0256   SODIUM mmol/L 139   POTASSIUM mmol/L 4.7   TOTAL CO2 mmol/L 18.0*   CREATININE mg/dL 3.00*   GLUCOSE mg/dL 95   CALCIUM mg/dL 9.7   EXAM:  XR Chest, 1 View     CLINICAL HISTORY:  77 years, female; Signs and symptoms; Shortness of breath; Additional info:   SOA triage protocol     TECHNIQUE:  Frontal view of the " chest.     COMPARISON:  [facility] - OX CHEST PA AND LATERAL 12/22/2015 9:55:52 AM     FINDINGS:  Lungs: There is mild prominence of the interstitial markings, primarily in   the right mid to lower lung. No focal consolidation.  Pleural space: Unremarkable. No pneumothorax.  Heart: There is moderate enlargement of the cardiac silhouette. This is   slightly increased compared to the prior study.  Mediastinum: Unremarkable.  Bones/joints: No acute osseous findings.     IMPRESSION:  1. Enlarged cardiac silhouette.  2. There is mild prominence of the interstitial markings, primarily on the   right. This may be related to interstitial pulmonary edema. No focal   consolidation.        Assessment/Plan   Assessment & Plan  Principal Problem:    Dyspnea on exertion  Active Problems:    Paroxysmal atrial fibrillation    Essential hypertension    Type 2 diabetes mellitus    Chronic kidney disease, stage V    77-year-old female, with history of proximal A. fib, HTN, T2 DM, CK D stage IV status post renal transplant presents with dyspnea on exertion currently unknown, ddx therefore broad likely multifactorial secondary to suspected CHF vs exacerbation vs  HCAP vs valvular d/o. Patient currently is not hypoxic, has no known hx of CHF, last echo 3/14/2015 showed normal LV function with mild TR/MR she has elevated BNP, with no overt clinical signs of volume overload, cxr suggestive of interstitial pulmonary edema    Plan:  - started on abx, we will continue these for now, though de-escalate quickly as doubt she has underlying pna, f/u blood and urine cultures  - will get Echo, hold home bumex for now, defer to cardiology on diuresis  - consult renal for hx of CKD s/p tx s/p HD  - repeat BNP  - Continue home rx for chronic medical conditions  - DVT on coumadin, pmarmacy to dose  - continue home rx for chronci medical conditions  - Cide status: Full    INPATIENT status due to the need for care which can only be reasonably provided in an  hospital setting such as aggressive/expedited ancillary services and/or consultation services and the necessity for IV medications, close physician monitoring and/or the possible need for procedures.  In such, I feel patient’s risk for adverse outcomes and need for care warrant INPATIENT evaluation and predict the patient’s care encounter to likely last beyond 2 midnights.      Juan Phan MD   06/08/17   10:22 AM

## 2017-06-09 LAB
ALBUMIN SERPL-MCNC: 3.4 G/DL (ref 3.2–4.8)
ANION GAP SERPL CALCULATED.3IONS-SCNC: 7 MMOL/L (ref 3–11)
BNP SERPL-MCNC: 1839 PG/ML (ref 0–100)
BUN BLD-MCNC: 65 MG/DL (ref 9–23)
BUN/CREAT SERPL: 19.7 (ref 7–25)
C DIFF TOX GENS STL QL NAA+PROBE: NOT DETECTED
CALCIUM SPEC-SCNC: 8.9 MG/DL (ref 8.7–10.4)
CHLORIDE SERPL-SCNC: 116 MMOL/L (ref 99–109)
CO2 SERPL-SCNC: 19 MMOL/L (ref 20–31)
CREAT BLD-MCNC: 3.3 MG/DL (ref 0.6–1.3)
DEPRECATED RDW RBC AUTO: 57.3 FL (ref 37–54)
ERYTHROCYTE [DISTWIDTH] IN BLOOD BY AUTOMATED COUNT: 16 % (ref 11.3–14.5)
GFR SERPL CREATININE-BSD FRML MDRD: 14 ML/MIN/1.73
GLUCOSE BLD-MCNC: 104 MG/DL (ref 70–100)
GLUCOSE BLDC GLUCOMTR-MCNC: 110 MG/DL (ref 70–130)
GLUCOSE BLDC GLUCOMTR-MCNC: 132 MG/DL (ref 70–130)
GLUCOSE BLDC GLUCOMTR-MCNC: 136 MG/DL (ref 70–130)
GLUCOSE BLDC GLUCOMTR-MCNC: 147 MG/DL (ref 70–130)
HCT VFR BLD AUTO: 27 % (ref 34.5–44)
HEMOCCULT STL QL: NEGATIVE
HGB BLD-MCNC: 8.3 G/DL (ref 11.5–15.5)
INR PPP: 2.82
IRON 24H UR-MRATE: 11 MCG/DL (ref 50–175)
IRON SATN MFR SERPL: 5 % (ref 15–50)
MCH RBC QN AUTO: 29.6 PG (ref 27–31)
MCHC RBC AUTO-ENTMCNC: 30.7 G/DL (ref 32–36)
MCV RBC AUTO: 96.4 FL (ref 80–99)
PHOSPHATE SERPL-MCNC: 3.1 MG/DL (ref 2.4–5.1)
PLATELET # BLD AUTO: 143 10*3/MM3 (ref 150–450)
PMV BLD AUTO: 10.1 FL (ref 6–12)
POTASSIUM BLD-SCNC: 4.8 MMOL/L (ref 3.5–5.5)
PROTHROMBIN TIME: 31.7 SECONDS (ref 9.6–11.5)
RBC # BLD AUTO: 2.8 10*6/MM3 (ref 3.89–5.14)
SODIUM BLD-SCNC: 142 MMOL/L (ref 132–146)
TIBC SERPL-MCNC: 228 MCG/DL (ref 250–450)
WBC NRBC COR # BLD: 8.85 10*3/MM3 (ref 3.5–10.8)

## 2017-06-09 PROCEDURE — 82962 GLUCOSE BLOOD TEST: CPT

## 2017-06-09 PROCEDURE — 82270 OCCULT BLOOD FECES: CPT | Performed by: INTERNAL MEDICINE

## 2017-06-09 PROCEDURE — 83540 ASSAY OF IRON: CPT | Performed by: INTERNAL MEDICINE

## 2017-06-09 PROCEDURE — 85610 PROTHROMBIN TIME: CPT

## 2017-06-09 PROCEDURE — 85027 COMPLETE CBC AUTOMATED: CPT | Performed by: INTERNAL MEDICINE

## 2017-06-09 PROCEDURE — 99232 SBSQ HOSP IP/OBS MODERATE 35: CPT | Performed by: INTERNAL MEDICINE

## 2017-06-09 PROCEDURE — 94640 AIRWAY INHALATION TREATMENT: CPT

## 2017-06-09 PROCEDURE — 83880 ASSAY OF NATRIURETIC PEPTIDE: CPT | Performed by: INTERNAL MEDICINE

## 2017-06-09 PROCEDURE — 25010000002 PIPERACILLIN-TAZOBACTAM: Performed by: INTERNAL MEDICINE

## 2017-06-09 PROCEDURE — 80069 RENAL FUNCTION PANEL: CPT | Performed by: INTERNAL MEDICINE

## 2017-06-09 PROCEDURE — 25010000002 AZITHROMYCIN: Performed by: INTERNAL MEDICINE

## 2017-06-09 PROCEDURE — 83550 IRON BINDING TEST: CPT | Performed by: INTERNAL MEDICINE

## 2017-06-09 PROCEDURE — 87493 C DIFF AMPLIFIED PROBE: CPT | Performed by: INTERNAL MEDICINE

## 2017-06-09 RX ORDER — BUMETANIDE 1 MG/1
1 TABLET ORAL ONCE
Status: COMPLETED | OUTPATIENT
Start: 2017-06-09 | End: 2017-06-09

## 2017-06-09 RX ORDER — IPRATROPIUM BROMIDE AND ALBUTEROL SULFATE 2.5; .5 MG/3ML; MG/3ML
3 SOLUTION RESPIRATORY (INHALATION) EVERY 4 HOURS PRN
Status: DISCONTINUED | OUTPATIENT
Start: 2017-06-09 | End: 2017-06-10 | Stop reason: HOSPADM

## 2017-06-09 RX ORDER — ALPRAZOLAM 0.5 MG/1
0.5 TABLET ORAL 2 TIMES DAILY PRN
Status: DISCONTINUED | OUTPATIENT
Start: 2017-06-09 | End: 2017-06-10 | Stop reason: HOSPADM

## 2017-06-09 RX ORDER — WARFARIN SODIUM 3 MG/1
3 TABLET ORAL
Status: COMPLETED | OUTPATIENT
Start: 2017-06-09 | End: 2017-06-09

## 2017-06-09 RX ADMIN — PIPERACILLIN AND TAZOBACTAM 2.25 G: 2; .25 INJECTION, POWDER, LYOPHILIZED, FOR SOLUTION INTRAVENOUS; PARENTERAL at 00:07

## 2017-06-09 RX ADMIN — AZITHROMYCIN 500 MG: 500 INJECTION, POWDER, LYOPHILIZED, FOR SOLUTION INTRAVENOUS at 09:38

## 2017-06-09 RX ADMIN — BUMETANIDE 1 MG: 1 TABLET ORAL at 13:15

## 2017-06-09 RX ADMIN — SODIUM BICARBONATE 650 MG TABLET 1300 MG: at 09:38

## 2017-06-09 RX ADMIN — LEVOTHYROXINE SODIUM 75 MCG: 75 TABLET ORAL at 06:13

## 2017-06-09 RX ADMIN — WARFARIN SODIUM 3 MG: 3 TABLET ORAL at 18:00

## 2017-06-09 RX ADMIN — AMLODIPINE BESYLATE 10 MG: 10 TABLET ORAL at 09:38

## 2017-06-09 RX ADMIN — IPRATROPIUM BROMIDE AND ALBUTEROL SULFATE 3 ML: .5; 3 SOLUTION RESPIRATORY (INHALATION) at 11:40

## 2017-06-09 RX ADMIN — PIPERACILLIN AND TAZOBACTAM 2.25 G: 2; .25 INJECTION, POWDER, LYOPHILIZED, FOR SOLUTION INTRAVENOUS; PARENTERAL at 06:13

## 2017-06-09 RX ADMIN — ATORVASTATIN CALCIUM 10 MG: 10 TABLET, FILM COATED ORAL at 21:27

## 2017-06-09 RX ADMIN — ALPRAZOLAM 0.5 MG: 0.5 TABLET ORAL at 21:27

## 2017-06-09 RX ADMIN — SODIUM BICARBONATE 650 MG TABLET 1300 MG: at 18:00

## 2017-06-09 RX ADMIN — METOPROLOL TARTRATE 100 MG: 100 TABLET ORAL at 09:38

## 2017-06-09 RX ADMIN — METOPROLOL TARTRATE 100 MG: 100 TABLET ORAL at 21:27

## 2017-06-09 NOTE — PROGRESS NOTES
"      HOSPITALIST DAILY PROGRESS NOTE    Chief Complaint: ESCAMILLA    Subjective   SUBJECTIVE/OVERNIGHT EVENTS   Patient had some increased SOA overnight with some wheezing, requiring supp O2 and neb rx. This morning she states that she is comfortable, endorses diarrhea, no CP or palpitations.    Review of Systems:  Gen-no fevers, no chills  CV-no chest pain, no palpitations  Resp-no cough, no dyspnea  GI- +diarrhea no N/V, no abd pain    Objective   OBJECTIVE   I have reviewed the vital signs.  /58 (BP Location: Left arm, Patient Position: Lying)  Pulse 78  Temp 97.9 °F (36.6 °C) (Oral)   Resp 18  Ht 63.75\" (161.9 cm)  Wt 190 lb (86.2 kg)  LMP  (Approximate)  SpO2 96%  BMI 32.87 kg/m2    Physical Exam:  Gen-no acute distress, comfortable  CV-RRR, S1 S2 normal, no m/r/g  Resp-mod labored respirations, mild crackles R>L, no wheezes  Abd-soft, NT, ND, +BS  Ext-no edema  Neuro-A&Ox3, no focal deficits  Psych-appropriate mood and affect    Results:  I have reviewed the labs, culture data, radiology results, and diagnostic studies.    Results from last 7 days  Lab Units 06/09/17  0642 06/08/17  0256   WBC 10*3/mm3 8.85 10.89*   HEMOGLOBIN g/dL 8.3* 10.3*   HEMATOCRIT % 27.0* 33.1*   PLATELETS 10*3/mm3 143* 183       Results from last 7 days  Lab Units 06/09/17  0642   SODIUM mmol/L 142   POTASSIUM mmol/L 4.8   CHLORIDE mmol/L 116*   TOTAL CO2 mmol/L 19.0*   BUN mg/dL 65*   CREATININE mg/dL 3.30*   GLUCOSE mg/dL 104*   CALCIUM mg/dL 8.9       Culture Data:  Cultures:    Blood Culture   Date Value Ref Range Status   06/08/2017 No growth at 24 hours  Preliminary   06/08/2017 No growth at 24 hours  Preliminary     · Left ventricular systolic function is normal. Estimated EF = 50%.  · Left ventricular diastolic dysfunction (grade II) consistent with pseudonormalization.  · Left atrial cavity size is dilated.  · Mild-to-moderate mitral valve regurgitation is present  · Mild aortic valve stenosis is " present.  · Moderate tricuspid valve regurgitation is present.  · Calculated right ventricular systolic pressure from tricuspid regurgitation is 41.6 mmHg.    I have reviewed the medications.      Assessment/Plan   ASSESSMENT/PLAN    Principal Problem:    Dyspnea on exertion  Active Problems:    Paroxysmal atrial fibrillation    Essential hypertension    Type 2 diabetes mellitus    Chronic kidney disease, stage V    Acute on chronic diastolic congestive heart failure    77-year-old female, with history of proximal A. fib, HTN, T2 DM, CK D stage IV status post renal transplant presents with dyspnea on exertion etiology  Likely acute on chronic diastolic HF exacerbation.she has elevated BNP, with no overt clinical signs of volume overload, cxr suggestive of interstitial pulmonary edema     Plan:  - started on abx, we will discontinue these today as doubt she has underlying pna  - Echo with diastolic dysfunction, EF 50%  - continue diuresis per renal has hx of CKD s/p tx s/p HD  - cardiology following  - repeat BNP worse, 1839 up from 1563  - Continue home rx for chronic medical conditions  - DVT on coumadin  - PT/OT    Dispo: anticipate d/c in a few days    Juan Phan MD  06/09/17  8:27 AM

## 2017-06-09 NOTE — SIGNIFICANT NOTE
RN reports pt wanting her chronic BID PRN xanax, she takes every night and needing. Reviewed chart - here for ESCAMILLA of cardiac and renal origin.    Will give xanax lower dose x1, recommend for rounding provider to re-eval if continuation of a standing PRN order is appropriate.

## 2017-06-09 NOTE — PROGRESS NOTES
"Pharmacy Consult - Warfarin     Moni Wallace is a 77 y.o. female , pharmacy consulted for warfarin dosing  Height - 63.75\" (161.9 cm)  Weight - 185 lb (83.9 kg)     Consulting Provider: - Dr. Phan  Indication: - Cardiac Dysrhythmia   Goal INR: - 2-3  Home Regimen:  6 mg on Mon Thu   9 mg on Sun Tue Wed Fri Sat      Bridge Therapy: No     Drug-Drug Interactions with current regimen:  1. Azithromycin-increase risk of bleeding     Warfarin Dosing During Admission:     Date  6/8 6/9                 INR  2.30  2.82                 Dose  6 mg  3 mg                       Labs:        Results from last 7 days  Lab Units 06/08/17  0256   INR   2.30   HEMOGLOBIN g/dL 10.3*   HEMATOCRIT % 33.1*   PLATELETS 10*3/mm3 183         Results from last 7 days  Lab Units 06/08/17  0256   SODIUM mmol/L 139   POTASSIUM mmol/L 4.7   CHLORIDE mmol/L 115*   TOTAL CO2 mmol/L 18.0*   BUN mg/dL 66*   CREATININE mg/dL 3.00*   CALCIUM mg/dL 9.7   BILIRUBIN mg/dL 0.5   ALK PHOS U/L 126*   ALT (SGPT) U/L 46*   AST (SGOT) U/L 37*   GLUCOSE mg/dL 95         Current dietary intake: new admission        Assessment/Plan:   1. INR is therapeutic with a value of 2.82 vs 2.30 yesterday.     -Pt is scheduled to receive warfarin 9 mg for tonight. Given the recent increase in patient's INR and DDI with azithromycin (d/adonay on 6/9), will give warfarin 3 mg for tonight and re-assess tomorrow     2. Daily PT/INR         3. Monitor for s/sx of bleeding thrombosis.         Thank you,  Rosana Ochoa, PharmD.  6/8/2017  12:36 PM       "

## 2017-06-09 NOTE — PROGRESS NOTES
[facility] at [facility]  IP Progress Note      Chief Complaint/Reason for service:    · Acute on chronic diastolic heart failure  · Atrial fibrillation         Patient states her breathing has partially improved.    Past medical, surgical, social and family history reviewed in the patient's electronic medical record.           Vital Sign Min/Max for last 24 hours  Temp  Min: 97.5 °F (36.4 °C)  Max: 97.9 °F (36.6 °C)   BP  Min: 128/60  Max: 148/66   Pulse  Min: 71  Max: 81   Resp  Min: 18  Max: 24   SpO2  Min: 93 %  Max: 97 %   Flow (L/min)  Min: 2  Max: 2      Intake/Output Summary (Last 24 hours) at 06/09/17 0763  Last data filed at 06/09/17 0613   Gross per 24 hour   Intake             1070 ml   Output             1425 ml   Net             -355 ml           Physical Exam   Constitutional: She is oriented to person, place, and time. She appears well-nourished.   HENT:   Head: Normocephalic and atraumatic.   Eyes: No scleral icterus.   Neck: Neck supple. JVD present.   Cardiovascular: Normal rate and regular rhythm.    Murmur heard.   Systolic murmur is present with a grade of 2/6   Pulmonary/Chest: Effort normal and breath sounds normal.   Neurological: She is alert and oriented to person, place, and time.   Skin: Skin is warm and dry.       Results Review:   I reviewed the patient's recent labs in the electronic medical record.      Tele:  Sinus    Echo (6/8/17 showed normal LV systolic function.  Grade 2 diastolic dysfunction.         Hospital Problem List     * (Principal)Dyspnea on exertion    Acute on chronic diastolic congestive heart failure    Overview Signed 6/8/2017  5:43 PM by Jeff Joe MD     · Echo (6/8/17): LVEF 50%.  Grade 2 diastolic dysfunction.  Mild to moderate MR.  RVSP 41 mmHg         Paroxysmal atrial fibrillation    Overview Addendum 8/2/2016 12:07 PM by Jeff Joe MD     · CHADS-VASc = 5  · Atrial fibrillation initially diagnosed  February 2015; spontaneous conversion to normal sinus rhythm.  · Echocardiogram (3/14/2015):  Normal LV systolic function, mild MR/TR.         Essential hypertension    Type 2 diabetes mellitus    Chronic kidney disease, stage V    Overview Addendum 6/8/2017  5:52 PM by Jeff Joe MD     · IgA nephropathy with history of renal transplant, 2010.   · Patient on hemodialysis Monday, Wednesday, and Friday started July 2015.  · Hemodialysis discontinued 2/2017.                      · Management of hypervolemia/diuretics per nephrology  · Continue present medical therapy for atrial fibrillation  · Please call with any additional questions.    Jeff Joe MD  6/9/2017

## 2017-06-09 NOTE — CONSULTS
Referring Provider: Sylvester  Reason for Consultation:CKD    Subjective      a little soa today no cp    History of present illness:  Patient is a 77-year-old female known to our service she had a history of chronic kidney disease was on dialysis for some time when she was taken off.  Primary disease is IgA nephropathy she also had a kidney transplant in 2010.  She was last dialyzed in April 2017.  Patient is admitted because of generalized weakness and shortness of breath.  Patient stated that she had some diarrhea recently and has been taking her Bumex..  Her other complaints was cough with left-sided chest pain, sinus symptoms were worsening when she came into the ER.  White count was slightly elevated chest x-ray showed mild prominence of interstitial markings.  She was started on antibiotics for suspected pneumonia.  She is feeling much better at this time.  Her creatinine is at the baseline at this time.  She had denied any gross hematuria or recent rash.    Review of Systems  + SOA no cp, no nausea or vomiting    Objective     Vital Signs  Temp:  [97.5 °F (36.4 °C)-98.7 °F (37.1 °C)] 98.7 °F (37.1 °C)  Heart Rate:  [] 75  Resp:  [18-24] 20  BP: (125-148)/(54-66) 136/60    I/O this shift:  In: 370 [P.O.:120; IV Piggyback:250]  Out: 250 [Urine:250]  I/O last 3 completed shifts:  In: 1170 [P.O.:420; IV Piggyback:750]  Out: 1425 [Urine:1425]    Physical Exam:     General Appearance:    Alert, cooperative, in no acute distressComfortable in bed.     Head:    Normocephalic, without obvious abnormality, atraumatic   Eyes:              conjunctivae and sclerae normal, no   icterus, no pallor, corneas clear, PERRLA   Ears:    Ears appear intact with no abnormalities noted   Throat:   No oral lesions, no thrush, oral mucosa moist   Neck:   No adenopathy, supple, trachea midline, no thyromegaly, no     carotid bruit, no JVD   Back:     No kyphosis present, no scoliosis present    Lungs:     Clear to  auscultation,respirations regular, even and                   unlabored    Heart:    Regular rhythm and normal rate, normal S1 and S2, Positive grade 2/6  murmur, no gallop, no rub, no click   Breast Exam:    Deferred   Abdomen:     Normal bowel sounds, no masses, no organomegaly, soft        non-tender, non-distended, no guarding, no rebound                 tenderness   Genitalia:    Deferred   Extremities:   Moves all extremities well, no edema, no cyanosis, no              redness   Pulses:   Pulses palpable and equal bilaterally   Skin:   No bleeding, bruising or rash   Lymph nodes:   No palpable adenopathy   Neurologic:   Grossly intact, no focal deficit noted.  Alert oriented ×3.         Results Review:   I reviewed the patient's new clinical results.    WBC WBC   Date Value Ref Range Status   06/09/2017 8.85 3.50 - 10.80 10*3/mm3 Final   06/08/2017 10.89 (H) 3.50 - 10.80 10*3/mm3 Final      HGB Hemoglobin   Date Value Ref Range Status   06/09/2017 8.3 (L) 11.5 - 15.5 g/dL Final   06/08/2017 10.3 (L) 11.5 - 15.5 g/dL Final      HCT Hematocrit   Date Value Ref Range Status   06/09/2017 27.0 (L) 34.5 - 44.0 % Final   06/08/2017 33.1 (L) 34.5 - 44.0 % Final      Platlets No results found for: LABPLAT   MCV MCV   Date Value Ref Range Status   06/09/2017 96.4 80.0 - 99.0 fL Final   06/08/2017 97.4 80.0 - 99.0 fL Final          Sodium Sodium   Date Value Ref Range Status   06/09/2017 142 132 - 146 mmol/L Final   06/08/2017 139 132 - 146 mmol/L Final      Potassium Potassium   Date Value Ref Range Status   06/09/2017 4.8 3.5 - 5.5 mmol/L Final   06/08/2017 4.7 3.5 - 5.5 mmol/L Final      Chloride Chloride   Date Value Ref Range Status   06/09/2017 116 (H) 99 - 109 mmol/L Final   06/08/2017 115 (H) 99 - 109 mmol/L Final      CO2 CO2   Date Value Ref Range Status   06/09/2017 19.0 (L) 20.0 - 31.0 mmol/L Final   06/08/2017 18.0 (L) 20.0 - 31.0 mmol/L Final      BUN BUN   Date Value Ref Range Status   06/09/2017 65 (H) 9  - 23 mg/dL Final   06/08/2017 66 (H) 9 - 23 mg/dL Final      Creatinine Creatinine   Date Value Ref Range Status   06/09/2017 3.30 (H) 0.60 - 1.30 mg/dL Final   06/08/2017 3.00 (H) 0.60 - 1.30 mg/dL Final      Calcium Calcium   Date Value Ref Range Status   06/09/2017 8.9 8.7 - 10.4 mg/dL Final   06/08/2017 9.7 8.7 - 10.4 mg/dL Final      PO4 No results found for: CAPO4   Albumin Albumin   Date Value Ref Range Status   06/09/2017 3.40 3.20 - 4.80 g/dL Final   06/08/2017 4.40 3.20 - 4.80 g/dL Final      Magnesium No results found for: MG   Uric Acid No results found for: URICACID           amLODIPine 10 mg Oral Daily   atorvastatin 10 mg Oral Nightly   insulin lispro 0-7 Units Subcutaneous 4x Daily With Meals & Nightly   levothyroxine 75 mcg Oral Q AM   metoprolol tartrate 100 mg Oral Q12H   sodium bicarbonate 1,300 mg Oral BID   warfarin 6 mg Oral Once per day on Mon Thu   warfarin 9 mg Oral Once per day on Sun Tue Wed Fri Sat       Pharmacy to dose warfarin        Assessment/Plan     Principal Problem:    Dyspnea on exertion  Active Problems:    Paroxysmal atrial fibrillation    Essential hypertension    Type 2 diabetes mellitus    Chronic kidney disease, stage V    Acute on chronic diastolic congestive heart failure  #1 CK D stage IV stable.  2.  Metabolic acidosis in the setting of CK D stage IV.  And recent diarrhea.   3.  Essential hypertension.  4.  Failed kidney transplant, native kidney disease IgA nephropathy.  5.  Shortness of breath workup in progress at this time.\  6. Low Iron- will give iv iron once infection is better  Plan:  Avoid nephrotoxic medications.    Add sodium bicarbonate 1300 mg twice a day.  Labs in the morning.  Will give bumex 1 mg po x 1  Chest x-ray in am to compare from admission      I discussed the patients findings and my recommendations with patient and Hospitalist    Ranjeet Uribe MD  06/09/17  @NOW

## 2017-06-10 ENCOUNTER — APPOINTMENT (OUTPATIENT)
Dept: GENERAL RADIOLOGY | Facility: HOSPITAL | Age: 77
End: 2017-06-10

## 2017-06-10 VITALS
HEART RATE: 82 BPM | BODY MASS INDEX: 32.44 KG/M2 | HEIGHT: 64 IN | TEMPERATURE: 97.4 F | OXYGEN SATURATION: 97 % | SYSTOLIC BLOOD PRESSURE: 108 MMHG | DIASTOLIC BLOOD PRESSURE: 70 MMHG | RESPIRATION RATE: 18 BRPM | WEIGHT: 190 LBS

## 2017-06-10 PROBLEM — R06.09 DYSPNEA ON EXERTION: Status: RESOLVED | Noted: 2017-06-08 | Resolved: 2017-06-10

## 2017-06-10 PROBLEM — I50.33 ACUTE ON CHRONIC DIASTOLIC CONGESTIVE HEART FAILURE (HCC): Status: RESOLVED | Noted: 2017-06-08 | Resolved: 2017-06-10

## 2017-06-10 LAB
ANION GAP SERPL CALCULATED.3IONS-SCNC: 8 MMOL/L (ref 3–11)
BASOPHILS # BLD AUTO: 0.02 10*3/MM3 (ref 0–0.2)
BASOPHILS NFR BLD AUTO: 0.2 % (ref 0–1)
BNP SERPL-MCNC: 1395 PG/ML (ref 0–100)
BUN BLD-MCNC: 59 MG/DL (ref 9–23)
BUN/CREAT SERPL: 18.4 (ref 7–25)
CALCIUM SPEC-SCNC: 9.1 MG/DL (ref 8.7–10.4)
CHLORIDE SERPL-SCNC: 118 MMOL/L (ref 99–109)
CO2 SERPL-SCNC: 19 MMOL/L (ref 20–31)
CREAT BLD-MCNC: 3.2 MG/DL (ref 0.6–1.3)
DEPRECATED RDW RBC AUTO: 54.7 FL (ref 37–54)
EOSINOPHIL # BLD AUTO: 0.28 10*3/MM3 (ref 0.1–0.3)
EOSINOPHIL NFR BLD AUTO: 3 % (ref 0–3)
ERYTHROCYTE [DISTWIDTH] IN BLOOD BY AUTOMATED COUNT: 15.9 % (ref 11.3–14.5)
GFR SERPL CREATININE-BSD FRML MDRD: 14 ML/MIN/1.73
GLUCOSE BLD-MCNC: 100 MG/DL (ref 70–100)
GLUCOSE BLDC GLUCOMTR-MCNC: 114 MG/DL (ref 70–130)
GLUCOSE BLDC GLUCOMTR-MCNC: 121 MG/DL (ref 70–130)
HCT VFR BLD AUTO: 25 % (ref 34.5–44)
HGB BLD-MCNC: 7.8 G/DL (ref 11.5–15.5)
IMM GRANULOCYTES # BLD: 0.02 10*3/MM3 (ref 0–0.03)
IMM GRANULOCYTES NFR BLD: 0.2 % (ref 0–0.6)
INR PPP: 2.92
LYMPHOCYTES # BLD AUTO: 1.71 10*3/MM3 (ref 0.6–4.8)
LYMPHOCYTES NFR BLD AUTO: 18.5 % (ref 24–44)
MCH RBC QN AUTO: 29.4 PG (ref 27–31)
MCHC RBC AUTO-ENTMCNC: 31.2 G/DL (ref 32–36)
MCV RBC AUTO: 94.3 FL (ref 80–99)
MONOCYTES # BLD AUTO: 0.76 10*3/MM3 (ref 0–1)
MONOCYTES NFR BLD AUTO: 8.2 % (ref 0–12)
NEUTROPHILS # BLD AUTO: 6.43 10*3/MM3 (ref 1.5–8.3)
NEUTROPHILS NFR BLD AUTO: 69.9 % (ref 41–71)
PLATELET # BLD AUTO: 143 10*3/MM3 (ref 150–450)
PMV BLD AUTO: 10.3 FL (ref 6–12)
POTASSIUM BLD-SCNC: 4.7 MMOL/L (ref 3.5–5.5)
PROTHROMBIN TIME: 32.9 SECONDS (ref 9.6–11.5)
RBC # BLD AUTO: 2.65 10*6/MM3 (ref 3.89–5.14)
SODIUM BLD-SCNC: 145 MMOL/L (ref 132–146)
WBC NRBC COR # BLD: 9.22 10*3/MM3 (ref 3.5–10.8)

## 2017-06-10 PROCEDURE — 99239 HOSP IP/OBS DSCHRG MGMT >30: CPT | Performed by: INTERNAL MEDICINE

## 2017-06-10 PROCEDURE — 80048 BASIC METABOLIC PNL TOTAL CA: CPT | Performed by: INTERNAL MEDICINE

## 2017-06-10 PROCEDURE — 85025 COMPLETE CBC W/AUTO DIFF WBC: CPT | Performed by: INTERNAL MEDICINE

## 2017-06-10 PROCEDURE — 85610 PROTHROMBIN TIME: CPT

## 2017-06-10 PROCEDURE — 83880 ASSAY OF NATRIURETIC PEPTIDE: CPT | Performed by: INTERNAL MEDICINE

## 2017-06-10 PROCEDURE — 82962 GLUCOSE BLOOD TEST: CPT

## 2017-06-10 PROCEDURE — 71010 HC CHEST PA OR AP: CPT

## 2017-06-10 RX ORDER — ACETAMINOPHEN 325 MG/1
650 TABLET ORAL EVERY 6 HOURS PRN
Status: DISCONTINUED | OUTPATIENT
Start: 2017-06-10 | End: 2017-06-10 | Stop reason: HOSPADM

## 2017-06-10 RX ORDER — SODIUM BICARBONATE 650 MG/1
1300 TABLET ORAL 2 TIMES DAILY
Qty: 120 TABLET | Refills: 0 | Status: SHIPPED | OUTPATIENT
Start: 2017-06-10 | End: 2017-07-10

## 2017-06-10 RX ORDER — WARFARIN SODIUM 3 MG/1
6 TABLET ORAL
Status: DISCONTINUED | OUTPATIENT
Start: 2017-06-10 | End: 2017-06-10 | Stop reason: HOSPADM

## 2017-06-10 RX ORDER — BUMETANIDE 1 MG/1
1 TABLET ORAL DAILY
Status: DISCONTINUED | OUTPATIENT
Start: 2017-06-10 | End: 2017-06-10 | Stop reason: HOSPADM

## 2017-06-10 RX ADMIN — AMLODIPINE BESYLATE 10 MG: 10 TABLET ORAL at 08:56

## 2017-06-10 RX ADMIN — LEVOTHYROXINE SODIUM 75 MCG: 75 TABLET ORAL at 05:19

## 2017-06-10 RX ADMIN — SODIUM BICARBONATE 650 MG TABLET 1300 MG: at 08:56

## 2017-06-10 RX ADMIN — ACETAMINOPHEN 650 MG: 325 TABLET, FILM COATED ORAL at 05:31

## 2017-06-10 RX ADMIN — BUMETANIDE 1 MG: 1 TABLET ORAL at 12:34

## 2017-06-10 RX ADMIN — METOPROLOL TARTRATE 100 MG: 100 TABLET ORAL at 08:56

## 2017-06-10 NOTE — PLAN OF CARE
Problem: Pneumonia (Adult)  Goal: Signs and Symptoms of Listed Potential Problems Will be Absent or Manageable (Pneumonia)  Outcome: Ongoing (interventions implemented as appropriate)    Problem: Patient Care Overview (Adult)  Goal: Plan of Care Review  Outcome: Ongoing (interventions implemented as appropriate)    06/10/17 0702   Coping/Psychosocial Response Interventions   Plan Of Care Reviewed With patient   Patient Care Overview   Progress improving   Outcome Evaluation   Outcome Summary/Follow up Plan PT had slight dyspnea on exertion but only needed 2L o2 to sleep, VSS.

## 2017-06-10 NOTE — PROGRESS NOTES
"[facility] Progress Note    6/8/2017        Reason for visit:  Pneumonia, soa    ROS:    Pulm:  mild SOA  CV:  No CP    I&O:    Intake/Output Summary (Last 24 hours) at 06/10/17 1046  Last data filed at 06/10/17 0524   Gross per 24 hour   Intake              540 ml   Output             1300 ml   Net             -760 ml       PE:   Blood pressure 135/69, pulse 75, temperature 98.3 °F (36.8 °C), temperature source Oral, resp. rate 16, height 63.75\" (161.9 cm), weight 190 lb (86.2 kg), SpO2 95 %.    GENERAL: Awake and alert, in no acute distress.   HEENT: PER, No conjunctivitis  NECK: Supple, no JVD     HEART: RRR; No murmur, rubs, gallops.   LUNGS: Few rhales  ABDOMEN: Soft, nontender, nondistended. Positive bowel sounds. No rebound or guarding. NO mass or HSM.  EXT:  No cyanosis, clubbing or edema. No cord.  : Genitalia generally unremarkable.  Without Orozco catheter.  SKIN: Warm and dry without cutaneous eruptions on Inspection/palpation.    NEURO: Alert and oriented x 3, Motor 5/5 bilaterally    Medications:    Current Facility-Administered Medications:   •  acetaminophen (TYLENOL) tablet 650 mg, 650 mg, Oral, Q6H PRN, Compa Webb PA-C, 650 mg at 06/10/17 0531  •  ALPRAZolam (XANAX) tablet 0.5 mg, 0.5 mg, Oral, BID PRN, Juan Phan MD, 0.5 mg at 06/09/17 2127  •  amLODIPine (NORVASC) tablet 10 mg, 10 mg, Oral, Daily, Juan Phan MD, 10 mg at 06/10/17 0856  •  atorvastatin (LIPITOR) tablet 10 mg, 10 mg, Oral, Nightly, Juan Phan MD, 10 mg at 06/09/17 2127  •  bumetanide (BUMEX) tablet 1 mg, 1 mg, Oral, Daily, Ranjeet Uribe MD  •  dextrose (D50W) solution 25 g, 25 g, Intravenous, Q15 Min PRN, Juan Phan MD  •  dextrose (GLUTOSE) oral gel 15 g, 15 g, Oral, Q15 Min PRN, Juan Phan MD  •  glucagon (GLUCAGEN) injection 1 mg, 1 mg, Subcutaneous, Q15 Min PRN, Juan Phan MD  •  insulin lispro (humaLOG) injection 0-7 Units, 0-7 Units, Subcutaneous, 4x Daily With Meals & Nightly, Juan Phan MD, 2 " Units at 06/08/17 1749  •  ipratropium-albuterol (DUO-NEB) nebulizer solution 3 mL, 3 mL, Nebulization, Q4H PRN, Juan Phan MD, 3 mL at 06/09/17 1140  •  levothyroxine (SYNTHROID, LEVOTHROID) tablet 75 mcg, 75 mcg, Oral, Q AM, Juan Phan MD, 75 mcg at 06/10/17 0519  •  metoprolol tartrate (LOPRESSOR) tablet 100 mg, 100 mg, Oral, Q12H, Juan Phan MD, 100 mg at 06/10/17 0856  •  Pharmacy to dose warfarin, , Does not apply, Continuous PRN, Juan Phan MD  •  sodium bicarbonate tablet 1,300 mg, 1,300 mg, Oral, BID, Allen Chambers MD, 1,300 mg at 06/10/17 0856  •  sodium chloride 0.9 % flush 1-10 mL, 1-10 mL, Intravenous, PRN, Juan Phan MD  •  sodium chloride 0.9 % flush 10 mL, 10 mL, Intravenous, PRN, Tim Santoyo DO  •  warfarin (COUMADIN) tablet 6 mg, 6 mg, Oral, Daily, Slim Villanueva [facility]    Meds reviewed and doses adjusted for renal function    Labs:    Results from last 7 days  Lab Units 06/10/17  0700 06/09/17  0642 06/08/17  0256   WBC 10*3/mm3 9.22 8.85 10.89*   HEMOGLOBIN g/dL 7.8* 8.3* 10.3*   HEMATOCRIT % 25.0* 27.0* 33.1*   PLATELETS 10*3/mm3 143* 143* 183       Results from last 7 days  Lab Units 06/10/17  0700 06/09/17  0642 06/08/17  0256   SODIUM mmol/L 145 142 139   POTASSIUM mmol/L 4.7 4.8 4.7   CHLORIDE mmol/L 118* 116* 115*   TOTAL CO2 mmol/L 19.0* 19.0* 18.0*   BUN mg/dL 59* 65* 66*   CREATININE mg/dL 3.20* 3.30* 3.00*   GLUCOSE mg/dL 100 104* 95   CALCIUM mg/dL 9.1 8.9 9.7   PHOSPHORUS mg/dL  --  3.1  --        Results from last 7 days  Lab Units 06/08/17 0256   ALK PHOS U/L 126*   BILIRUBIN mg/dL 0.5   ALT (SGPT) U/L 46*   AST (SGOT) U/L 37*     Invalid input(s): UCOL, UCLR, UPH, USG, UPRO, UBLD, UNITR, ELEU, URBC, UFC, UBACT    Estimated Creatinine Clearance: 15.8 mL/min (by C-G formula based on Cr of 3.2).    Radiology:  Imaging Results (last 72 hours)     Procedure Component Value Units Date/Time    XR Chest 1 View [[id number]]  (Abnormal) Collected:  06/08/17 0255      Updated:  06/08/17 0410    Narrative:         EXAM:    XR Chest, 1 View    CLINICAL HISTORY:    77 years, female; Signs and symptoms; Shortness of breath; Additional info:   SOA triage protocol    TECHNIQUE:    Frontal view of the chest.    COMPARISON:    [facility] - OX CHEST PA AND LATERAL 12/22/2015 9:55:52 AM    FINDINGS:    Lungs:  There is mild prominence of the interstitial markings, primarily in   the right mid to lower lung.  No focal consolidation.    Pleural space:  Unremarkable.  No pneumothorax.    Heart:  There is moderate enlargement of the cardiac silhouette.  This is   slightly increased compared to the prior study.    Mediastinum:  Unremarkable.    Bones/joints:  No acute osseous findings.      Impression:       1.  Enlarged cardiac silhouette.  2.  There is mild prominence of the interstitial markings, primarily on the   right.  This may be related to interstitial pulmonary edema.  No focal   consolidation.    THIS DOCUMENT HAS BEEN ELECTRONICALLY SIGNED BY JULIAN SAUNDERS MD    XR Chest 1 View [[id number]] Updated:  06/10/17 0418          Renal Imaging:  Chets x-ray reviewed, enlarged heart- + congestion      IMPRESSION:  #1 CK D stage IV stable.  2. Metabolic acidosis in the setting of CK D stage IV. And recent diarrhea.   3. Essential hypertension.  4. Failed kidney transplant, native kidney disease IgA nephropathy.  5. Shortness of breath workup in progress at this time.\  6. Low Iron- will give iv iron once infection is better  Plan:  Avoid nephrotoxic medications.   Add sodium bicarbonate 1300 mg twice a day. Labs in the morning.  Will give bumex 1 mg po daily as this is what she takes at home  Discussed with patient and family          Ranjeet Uribe MD  6/10/2017  10:46 AM

## 2017-06-10 NOTE — DISCHARGE SUMMARY
[facility] Medicine Services  DISCHARGE SUMMARY       Date of Admission: 6/8/2017  Date of Discharge:  6/10/2017  Primary Care Physician: Slim Wick MD  Consulting Physician(s)     Provider Relationship    Jeff Joe MD Consulting Physician    Allen Chambers MD Consulting Physician          Discharge Diagnoses:  Active Hospital Problems (** Indicates Principal Problem)    Diagnosis Date Noted   • Chronic kidney disease, stage V [N18.5] 07/22/2016     · IgA nephropathy with history of renal transplant, 2010.   · Patient on hemodialysis Monday, Wednesday, and Friday started July 2015.  · Hemodialysis discontinued 2/2017.     • Essential hypertension [I10] 07/22/2016   • Paroxysmal atrial fibrillation [I48.0] 07/22/2016     · CHADS-VASc = 5  · Atrial fibrillation initially diagnosed February 2015; spontaneous conversion to normal sinus rhythm.  · Echocardiogram (3/14/2015):  Normal LV systolic function, mild MR/TR.     • Type 2 diabetes mellitus [E11.9] 07/22/2016      Resolved Hospital Problems    Diagnosis Date Noted Date Resolved   • **Dyspnea on exertion [R06.09] 06/08/2017 06/10/2017   • Acute on chronic diastolic congestive heart failure [I50.33] 06/08/2017 06/10/2017     · Echo (6/8/17): LVEF 50%.  Grade 2 diastolic dysfunction.  Mild to moderate MR.  RVSP 41 mmHg         Presenting Problem/History of Present Illness  Dyspnea on exertion [R06.09]     Chief Complaint on Day of Discharge: ESCAMILLA    History of Present Illness on Day of Discharge:No acute events overnight, patient denies any SOA, no fevers or chills, ambulating well, wants to go home.     Hospital Course  Patient is a 77 y.o. female  with history of proximal A. fib, HTN, T2 DM, CK D stage IV s/p renal tx, s/p HD presented with acute dyspnea on exertion. The etiology was likely secondary to acute on chronic diastolic HF exacerbation. On presentation to [facility] ED patient was not hypoxic, w/u included a  cxr suggestive of interstitial pulmonary edema,  she had elevated BNP at 1563 and 1839, her Cr 3.0 , she did not have overt clinical signs of volume overload. Echo was obtained that showed EF 50% with LV diastolic dysfunction. She was started on abx for suspected pna, this was however de-escalated as it became apparent that she was have HF exacerbation.Cardiology and nephrology were consulted. Diuresis was recommended, she did respond well to IV bumex, her BNP was trending down but high, she will continue her home bumex dose as advised. She will follow up with  and nephrology as previously scheduled. Of note, patient did develop some diarrhea that was negative for c.diff, this resolved with the discontinuation of abx. On day of discharge,patient was able to ambulate without dyspnea, she denied any SOA, no fevers or chills, she was thus deemed stable for d/c with follow up as above.     Procedures Performed  None     Consults:   Consults     Date and Time Order Name Status Description    6/8/2017 1011 Inpatient Consult to Cardiology Completed     6/8/2017 1011 Inpatient Consult to Nephrology            Pertinent Test Results:  · Left ventricular systolic function is normal. Estimated EF = 50%.  · Left ventricular diastolic dysfunction (grade II) consistent with pseudonormalization.  · Left atrial cavity size is dilated.  · Mild-to-moderate mitral valve regurgitation is present  · Mild aortic valve stenosis is present.  · Moderate tricuspid valve regurgitation is present.  · Calculated right ventricular systolic pressure from tricuspid regurgitation is 41.6 mmHg.    Results for RUY PHILLIPS (MRN [id number]) as of 6/10/2017 11:50   Ref. Range 6/10/2017 07:00   Glucose Latest Ref Range: 70 - 100 mg/dL 100   Sodium Latest Ref Range: 132 - 146 mmol/L 145   Potassium Latest Ref Range: 3.5 - 5.5 mmol/L 4.7   CO2 Latest Ref Range: 20.0 - 31.0 mmol/L 19.0 (L)   Chloride Latest Ref Range: 99 - 109 mmol/L 118 (H)    Anion Gap Latest Ref Range: 3.0 - 11.0 mmol/L 8.0   Creatinine Latest Ref Range: 0.60 - 1.30 mg/dL 3.20 (H)   BUN Latest Ref Range: 9 - 23 mg/dL 59 (H)   BUN/Creatinine Ratio Latest Ref Range: 7.0 - 25.0  18.4   Calcium Latest Ref Range: 8.7 - 10.4 mg/dL 9.1   eGFR Non African Amer Latest Ref Range: >60 mL/min/1.73 14 (L)   Protime Latest Ref Range: 9.6 - 11.5 Seconds 32.9 (H)   INR Unknown 2.92   WBC Latest Ref Range: 3.50 - 10.80 10*3/mm3 9.22   RBC Latest Ref Range: 3.89 - 5.14 10*6/mm3 2.65 (L)   Hemoglobin Latest Ref Range: 11.5 - 15.5 g/dL 7.8 (L)   Hematocrit Latest Ref Range: 34.5 - 44.0 % 25.0 (L)   RDW Latest Ref Range: 11.3 - 14.5 % 15.9 (H)   MCV Latest Ref Range: 80.0 - 99.0 fL 94.3   MCH Latest Ref Range: 27.0 - 31.0 pg 29.4   MCHC Latest Ref Range: 32.0 - 36.0 g/dL 31.2 (L)   MPV Latest Ref Range: 6.0 - 12.0 fL 10.3   Platelets Latest Ref Range: 150 - 450 10*3/mm3 143 (L)   RDW-SD Latest Ref Range: 37.0 - 54.0 fl 54.7 (H)     Condition on Discharge:  stable    Physical Exam  Temp:  [97.7 °F (36.5 °C)-99.2 °F (37.3 °C)] 98.3 °F (36.8 °C)  Heart Rate:  [72-79] 75  Resp:  [16-18] 16  BP: (128-148)/(46-69) 135/69     Constitutional: no acute distress, awake, alert  Respiratory: Clear to auscultation bilaterally, nonlabored respirations   Cardiovascular: RRR, no murmurs, rubs, or gallops, palpable pedal pulses bilaterally  Gastrointestinal: Positive bowel sounds, soft, nontender, nondistended  Musculoskeletal: No bilateral ankle edema  Psychiatric: oriented x 3, appropriate affect, cooperative  Neurologic: Strength symmetric in all extremities     Discharge Disposition  Home or Self Care    Discharge Medications   Moni Wallace   Home Medication Instructions SOL:[id number]    Printed on:06/10/17 1148   Medication Information                      ALPRAZolam (XANAX) 0.5 MG tablet  Take 0.5 mg by mouth 2 (Two) Times a Day As Needed for Anxiety.             amLODIPine (NORVASC) 5 MG tablet  Take 5 mg by  mouth Daily.             bumetanide (BUMEX) 1 MG tablet  Take 1 mg by mouth Daily.             diclofenac (VOLTAREN) 1 % gel gel  4 g 4 (Four) Times a Day As Needed.             Fluticasone Furoate-Vilanterol (BREO ELLIPTA) 200-25 MCG/INH aerosol powder   Inhale 1 puff Daily.             LANTUS SOLOSTAR 100 UNIT/ML injection pen  12 Units Every Night.             levothyroxine (SYNTHROID, LEVOTHROID) 75 MCG tablet  Take 75 mcg by mouth Daily.             metoprolol tartrate (LOPRESSOR) 100 MG tablet  Take 100 mg by mouth 2 (two) times a day.             Multiple Vitamins-Minerals (ICAPS MV PO)  Take  by mouth Daily.             ondansetron ODT (ZOFRAN-ODT) 8 MG disintegrating tablet  Take 8 mg by mouth Every 12 (Twelve) Hours As Needed for Nausea or Vomiting.             Patiromer Sorbitex Calcium (VELTASSA) 8.4 G pack  Take 8.4 g by mouth Daily.             pravastatin (PRAVACHOL) 40 MG tablet  Take 40 mg by mouth daily.             PROAIR  (90 BASE) MCG/ACT inhaler  Inhale 2 puffs Every 6 (Six) Hours As Needed.             sodium bicarbonate 650 MG tablet  Take 2 tablets by mouth 2 (Two) Times a Day for 30 days.             warfarin (COUMADIN) 3 MG tablet  Take 9 mg by mouth Take As Directed. Sunday, Tuesday, Wednesday, Friday, Saturday             warfarin (COUMADIN) 6 MG tablet  Take 6 mg by mouth 2 (Two) Times a Week. Monday, Thursday               Discharge Diet:   Diet Instructions     Advance Diet As Tolerated                   Discharge Care Plan / Instructions:    Activity at Discharge:   Activity Instructions     Activity as Tolerated                   Follow-up Appointments  Future Appointments  Date Time Provider Department Center   4/24/2018 11:30 AM Jeff Joe MD [facility] None     Additional Instructions for the Follow-ups that You Need to Schedule     Discharge Follow-up with PCP    As directed    Follow Up Details:  in one week       Discharge Follow-up with Specialty    As  directed    Specialty:  Dr Joe   Follow Up Details:  As previously scheduled               Test Results Pending at Discharge   Order Current Status    Blood Culture Preliminary result    Blood Culture Preliminary result         Juan Phan MD 06/10/17 11:49 AM    Time: 40 minutes    Please note that portions of this note may have been completed with a voice recognition program. Efforts were made to edit the dictations, but occasionally words are mistranscribed.

## 2017-06-10 NOTE — PROGRESS NOTES
"Pharmacy Consult - Warfarin     Moni Wallace is a 77 y.o. female , pharmacy consulted for warfarin dosing  Height - 63.75\" (161.9 cm)  Weight - 185 lb (83.9 kg)     Consulting Provider: - Dr. Phan  Indication: - Cardiac Dysrhythmia   Goal INR: - 2-3  Home Regimen:  6 mg on Mon Thu   9 mg on Sun Tue Wed Fri Sat      Bridge Therapy: No     Drug-Drug Interactions with current regimen:  1. Azithromycin-increase risk of bleeding     Warfarin Dosing During Admission:     Date  6/8  6/9  6/10               INR  2.30  2.82  2.92               Dose  6 mg  3 mg  6mg                     Labs:     Lab Results   Component Value Date    WBC 9.22 06/10/2017    HGB 7.8 (L) 06/10/2017    HCT 25.0 (L) 06/10/2017    MCV 94.3 06/10/2017     (L) 06/10/2017     Lab Results   Component Value Date    INR 2.92 06/10/2017    INR 2.82 06/09/2017    INR 2.30 06/08/2017    PROTIME 32.9 (H) 06/10/2017    PROTIME 31.7 (H) 06/09/2017    PROTIME 25.7 (H) 06/08/2017        Lab Results   Component Value Date    GLUCOSE 100 06/10/2017    CALCIUM 9.1 06/10/2017     06/10/2017    K 4.7 06/10/2017    CO2 19.0 (L) 06/10/2017     (H) 06/10/2017    BUN 59 (H) 06/10/2017    CREATININE 3.20 (H) 06/10/2017    EGFRIFNONA 14 (L) 06/10/2017    BCR 18.4 06/10/2017    ANIONGAP 8.0 06/10/2017             Current dietary intake: 50% to 75% of meals eaten        Assessment/Plan:    1. Will start patient on 6mg daily.   2. Daily PT/INR    3. Monitor for s/sx of bleeding thrombosis.    Slim Villanueva [facility]  6/10/2017  8:51 AM         "

## 2017-06-13 LAB
BACTERIA SPEC AEROBE CULT: NORMAL
BACTERIA SPEC AEROBE CULT: NORMAL

## 2017-06-26 ENCOUNTER — ANTICOAGULATION VISIT (OUTPATIENT)
Dept: CARDIOLOGY | Facility: CLINIC | Age: 77
End: 2017-06-26

## 2017-06-26 LAB — INR PPP: 1.5

## 2017-06-26 NOTE — PATIENT INSTRUCTIONS
Lab work done in Dr Wick office on 6/21/17 and sent to us 6/26/17.  Patient to take 9 mg today and then resume and recheck on Friday   AH

## 2017-06-30 ENCOUNTER — LAB (OUTPATIENT)
Dept: LAB | Facility: HOSPITAL | Age: 77
End: 2017-06-30

## 2017-06-30 ENCOUNTER — ANTICOAGULATION VISIT (OUTPATIENT)
Dept: CARDIOLOGY | Facility: CLINIC | Age: 77
End: 2017-06-30

## 2017-06-30 ENCOUNTER — TRANSCRIBE ORDERS (OUTPATIENT)
Dept: LAB | Facility: HOSPITAL | Age: 77
End: 2017-06-30

## 2017-06-30 DIAGNOSIS — Z79.01 LONG TERM (CURRENT) USE OF ANTICOAGULANTS: Primary | ICD-10-CM

## 2017-06-30 DIAGNOSIS — I25.10 UNSPECIFIED CARDIOVASCULAR DISEASE: Primary | ICD-10-CM

## 2017-06-30 LAB
ANION GAP SERPL CALCULATED.3IONS-SCNC: 8 MMOL/L (ref 3–11)
BUN BLD-MCNC: 49 MG/DL (ref 9–23)
BUN/CREAT SERPL: 17.5 (ref 7–25)
CALCIUM SPEC-SCNC: 9.9 MG/DL (ref 8.7–10.4)
CHLORIDE SERPL-SCNC: 111 MMOL/L (ref 99–109)
CO2 SERPL-SCNC: 26 MMOL/L (ref 20–31)
CREAT BLD-MCNC: 2.8 MG/DL (ref 0.6–1.3)
GFR SERPL CREATININE-BSD FRML MDRD: 16 ML/MIN/1.73
GLUCOSE BLD-MCNC: 153 MG/DL (ref 70–100)
INR PPP: 1.76
POTASSIUM BLD-SCNC: 4.2 MMOL/L (ref 3.5–5.5)
PROTHROMBIN TIME: 19.5 SECONDS (ref 9.6–11.5)
SODIUM BLD-SCNC: 145 MMOL/L (ref 132–146)

## 2017-06-30 PROCEDURE — 85610 PROTHROMBIN TIME: CPT | Performed by: INTERNAL MEDICINE

## 2017-06-30 PROCEDURE — 36415 COLL VENOUS BLD VENIPUNCTURE: CPT | Performed by: INTERNAL MEDICINE

## 2017-06-30 PROCEDURE — 80048 BASIC METABOLIC PNL TOTAL CA: CPT | Performed by: INTERNAL MEDICINE

## 2017-07-01 ENCOUNTER — HOSPITAL ENCOUNTER (EMERGENCY)
Facility: HOSPITAL | Age: 77
Discharge: HOME | End: 2017-07-01
Attending: EMERGENCY MEDICINE | Admitting: EMERGENCY MEDICINE

## 2017-07-01 ENCOUNTER — APPOINTMENT (OUTPATIENT)
Dept: GENERAL RADIOLOGY | Facility: HOSPITAL | Age: 77
End: 2017-07-01

## 2017-07-01 VITALS
TEMPERATURE: 98.1 F | SYSTOLIC BLOOD PRESSURE: 174 MMHG | BODY MASS INDEX: 34.55 KG/M2 | HEART RATE: 73 BPM | OXYGEN SATURATION: 93 % | DIASTOLIC BLOOD PRESSURE: 100 MMHG | RESPIRATION RATE: 14 BRPM | HEIGHT: 63 IN | WEIGHT: 195 LBS

## 2017-07-01 DIAGNOSIS — R11.0 NAUSEA: Primary | ICD-10-CM

## 2017-07-01 DIAGNOSIS — Z86.19 HISTORY OF HELICOBACTER PYLORI INFECTION: ICD-10-CM

## 2017-07-01 DIAGNOSIS — R63.0 ANOREXIA: ICD-10-CM

## 2017-07-01 DIAGNOSIS — I50.32 CHRONIC DIASTOLIC CONGESTIVE HEART FAILURE (HCC): ICD-10-CM

## 2017-07-01 LAB
ALBUMIN SERPL-MCNC: 4.2 G/DL (ref 3.2–4.8)
ALBUMIN/GLOB SERPL: 1.2 G/DL (ref 1.5–2.5)
ALP SERPL-CCNC: 123 U/L (ref 25–100)
ALT SERPL W P-5'-P-CCNC: 54 U/L (ref 7–40)
ANION GAP SERPL CALCULATED.3IONS-SCNC: 10 MMOL/L (ref 3–11)
AST SERPL-CCNC: 48 U/L (ref 0–33)
BACTERIA UR QL AUTO: ABNORMAL /HPF
BASOPHILS # BLD AUTO: 0.01 10*3/MM3 (ref 0–0.2)
BASOPHILS NFR BLD AUTO: 0.1 % (ref 0–1)
BILIRUB SERPL-MCNC: 0.4 MG/DL (ref 0.3–1.2)
BILIRUB UR QL STRIP: NEGATIVE
BNP SERPL-MCNC: 1637 PG/ML (ref 0–100)
BUN BLD-MCNC: 51 MG/DL (ref 9–23)
BUN/CREAT SERPL: 17 (ref 7–25)
CALCIUM SPEC-SCNC: 9.9 MG/DL (ref 8.7–10.4)
CHLORIDE SERPL-SCNC: 111 MMOL/L (ref 99–109)
CLARITY UR: CLEAR
CO2 SERPL-SCNC: 21 MMOL/L (ref 20–31)
COLOR UR: YELLOW
CREAT BLD-MCNC: 3 MG/DL (ref 0.6–1.3)
DEPRECATED RDW RBC AUTO: 59.5 FL (ref 37–54)
EOSINOPHIL # BLD AUTO: 0.2 10*3/MM3 (ref 0–0.3)
EOSINOPHIL NFR BLD AUTO: 1.9 % (ref 0–3)
ERYTHROCYTE [DISTWIDTH] IN BLOOD BY AUTOMATED COUNT: 17 % (ref 11.3–14.5)
GFR SERPL CREATININE-BSD FRML MDRD: 15 ML/MIN/1.73
GLOBULIN UR ELPH-MCNC: 3.4 GM/DL
GLUCOSE BLD-MCNC: 102 MG/DL (ref 70–100)
GLUCOSE BLDC GLUCOMTR-MCNC: 109 MG/DL (ref 70–130)
GLUCOSE UR STRIP-MCNC: NEGATIVE MG/DL
HCT VFR BLD AUTO: 32 % (ref 34.5–44)
HGB BLD-MCNC: 9.7 G/DL (ref 11.5–15.5)
HGB UR QL STRIP.AUTO: ABNORMAL
HOLD SPECIMEN: NORMAL
HOLD SPECIMEN: NORMAL
HYALINE CASTS UR QL AUTO: ABNORMAL /LPF
IMM GRANULOCYTES # BLD: 0.02 10*3/MM3 (ref 0–0.03)
IMM GRANULOCYTES NFR BLD: 0.2 % (ref 0–0.6)
INR PPP: 1.82
KETONES UR QL STRIP: NEGATIVE
LEUKOCYTE ESTERASE UR QL STRIP.AUTO: NEGATIVE
LYMPHOCYTES # BLD AUTO: 2.31 10*3/MM3 (ref 0.6–4.8)
LYMPHOCYTES NFR BLD AUTO: 21.8 % (ref 24–44)
MAGNESIUM SERPL-MCNC: 2.2 MG/DL (ref 1.3–2.7)
MCH RBC QN AUTO: 29.3 PG (ref 27–31)
MCHC RBC AUTO-ENTMCNC: 30.3 G/DL (ref 32–36)
MCV RBC AUTO: 96.7 FL (ref 80–99)
MONOCYTES # BLD AUTO: 0.77 10*3/MM3 (ref 0–1)
MONOCYTES NFR BLD AUTO: 7.3 % (ref 0–12)
NEUTROPHILS # BLD AUTO: 7.29 10*3/MM3 (ref 1.5–8.3)
NEUTROPHILS NFR BLD AUTO: 68.7 % (ref 41–71)
NITRITE UR QL STRIP: NEGATIVE
PH UR STRIP.AUTO: 7 [PH] (ref 5–8)
PLATELET # BLD AUTO: 208 10*3/MM3 (ref 150–450)
PMV BLD AUTO: 9.7 FL (ref 6–12)
POTASSIUM BLD-SCNC: 4.2 MMOL/L (ref 3.5–5.5)
PROT SERPL-MCNC: 7.6 G/DL (ref 5.7–8.2)
PROT UR QL STRIP: ABNORMAL
PROTHROMBIN TIME: 20.2 SECONDS (ref 9.6–11.5)
RBC # BLD AUTO: 3.31 10*6/MM3 (ref 3.89–5.14)
RBC # UR: ABNORMAL /HPF
REF LAB TEST METHOD: ABNORMAL
SODIUM BLD-SCNC: 142 MMOL/L (ref 132–146)
SP GR UR STRIP: 1.01 (ref 1–1.03)
SQUAMOUS #/AREA URNS HPF: ABNORMAL /HPF
TROPONIN I SERPL-MCNC: 0.01 NG/ML (ref 0–0.07)
UROBILINOGEN UR QL STRIP: ABNORMAL
WBC NRBC COR # BLD: 10.6 10*3/MM3 (ref 3.5–10.8)
WBC UR QL AUTO: ABNORMAL /HPF
WHOLE BLOOD HOLD SPECIMEN: NORMAL
WHOLE BLOOD HOLD SPECIMEN: NORMAL

## 2017-07-01 PROCEDURE — 81001 URINALYSIS AUTO W/SCOPE: CPT | Performed by: EMERGENCY MEDICINE

## 2017-07-01 PROCEDURE — 85610 PROTHROMBIN TIME: CPT | Performed by: EMERGENCY MEDICINE

## 2017-07-01 PROCEDURE — 83735 ASSAY OF MAGNESIUM: CPT | Performed by: EMERGENCY MEDICINE

## 2017-07-01 PROCEDURE — 84484 ASSAY OF TROPONIN QUANT: CPT

## 2017-07-01 PROCEDURE — 83880 ASSAY OF NATRIURETIC PEPTIDE: CPT | Performed by: PHYSICIAN ASSISTANT

## 2017-07-01 PROCEDURE — 80053 COMPREHEN METABOLIC PANEL: CPT | Performed by: EMERGENCY MEDICINE

## 2017-07-01 PROCEDURE — 93005 ELECTROCARDIOGRAM TRACING: CPT | Performed by: EMERGENCY MEDICINE

## 2017-07-01 PROCEDURE — 82962 GLUCOSE BLOOD TEST: CPT

## 2017-07-01 PROCEDURE — 71010 HC CHEST PA OR AP: CPT

## 2017-07-01 PROCEDURE — 85025 COMPLETE CBC W/AUTO DIFF WBC: CPT | Performed by: EMERGENCY MEDICINE

## 2017-07-01 PROCEDURE — 99284 EMERGENCY DEPT VISIT MOD MDM: CPT

## 2017-07-01 RX ORDER — SODIUM CHLORIDE 0.9 % (FLUSH) 0.9 %
10 SYRINGE (ML) INJECTION AS NEEDED
Status: DISCONTINUED | OUTPATIENT
Start: 2017-07-01 | End: 2017-07-01 | Stop reason: HOSPADM

## 2017-07-01 RX ORDER — PANTOPRAZOLE SODIUM 40 MG/1
40 TABLET, DELAYED RELEASE ORAL DAILY
Qty: 30 TABLET | Refills: 0 | Status: SHIPPED | OUTPATIENT
Start: 2017-07-01 | End: 2017-09-14 | Stop reason: SDUPTHER

## 2017-07-01 NOTE — DISCHARGE INSTRUCTIONS
Recommend patient to follow up with Dr. Perez, GI specialist, since she is having recurrent nausea and anorexia.  She has history of H. Pylori. Patient may need repeat endoscopy.  She is stable regarding her CHF and chronic kidney disease.

## 2017-07-01 NOTE — ED PROVIDER NOTES
Subjective   HPI Comments: 78 yo female presents with generalized weakness x 2 days.  She reports some anorexia with nausea that started a couple of days ago.  She ate sausage and eggs for breakfast, but didn't want any lunch.  She denied any emesis.  She did have one episode of diarrhea this morning, but none since.  No blood or mucous present.  She called her PCP, Dr. Wick, this AM and he told her to take Imodium and drink pedialyte.  She was just recently in our hospital for admission from 6/8/17-6/10/17 for CHF Exacerbation, Paroxysmal A Fib, and CKD Stage 5.  She was given an abx x 1 dose during the admission for questionable pneumonia, but when it was decided that she had CHF exacerbation, abx was discontinued.  Echo was also performed during her admission and EF was 50%.  She was given IV diuretics and discharged to home on her normal Bumex.  She denies any increased SOA or pedal edema to extremities today.  She denies any dysuria, frequency, or hematuria.  She is on Coumadin for paroxysmal A Fib and is followed by Dr. Joe.  She is also followed by [facility] for CKD.  She denies any fever or chills.  She denies any headache, dizziness, or near syncope/syncope.      Patient is a 77 y.o. female presenting with weakness.   History provided by:  Patient  Weakness - Generalized   Severity:  Mild  Onset quality:  Gradual  Duration:  2 days  Timing:  Constant  Progression:  Unchanged  Chronicity:  Recurrent  Context: not change in medication and not urinary tract infection    Context comment:  Recent admission here at [facility] from 6/8/17-6/10/17 for CHF exacerbation, and Paroxysmal A Fib.  Relieved by:  Nothing  Worsened by:  Nothing  Ineffective treatments:  None tried  Associated symptoms: anorexia (decreased appetite after breakfast.), diarrhea (x1 episode today.) and nausea    Associated symptoms: no abdominal pain, no chest pain, no dizziness, no dysuria, no falls, no fever, no  frequency, no headaches, no near-syncope, no shortness of breath, no syncope and no vomiting    Diarrhea:     Diarrhea characteristics: loose BM x 1 this AM.  No blood or mucous.    Number of occurrences:  1    Severity:  Mild    Duration:  1 day    Progression:  Improving  Risk factors: congestive heart failure    Risk factors comment:  No recent antibiotics.      Review of Systems   Constitutional: Positive for appetite change (anorexia after breakfast today.) and fatigue. Negative for fever. Activity change: weakness.   HENT: Negative.    Respiratory: Negative.  Negative for shortness of breath.    Cardiovascular: Positive for leg swelling (trace pedal edema; long-standing CHF.). Negative for chest pain, palpitations, syncope and near-syncope.   Gastrointestinal: Positive for anorexia (decreased appetite after breakfast.), diarrhea (x1 episode today.) and nausea. Negative for abdominal distention, abdominal pain and vomiting.   Genitourinary: Negative.  Negative for dysuria and frequency.   Musculoskeletal: Negative.  Negative for falls.   Neurological: Positive for weakness (generalized weakness). Negative for dizziness, tremors, light-headedness, numbness and headaches.   Psychiatric/Behavioral: Negative.        Past Medical History:   Diagnosis Date   • Adenomatous colon polyp    • CHF (congestive heart failure)    • Chronic kidney disease    • Clostridium difficile infection    • Diabetes mellitus    • Gastric polyps    • Hypertension    • PAF (paroxysmal atrial fibrillation)    • Tubular adenoma     excision    • Ulcer of gastric fundus    • Vitamin D deficiency        Allergies   Allergen Reactions   • Codeine    • Nsaids    • Sulfa Antibiotics        Past Surgical History:   Procedure Laterality Date   • BREAST BIOPSY Left 1990'S   • CARPAL TUNNEL RELEASE Right    • CATARACT EXTRACTION     • DIAGNOSTIC LAPAROSCOPY     • HERNIA REPAIR     • HERNIA REPAIR     • TOTAL KNEE ARTHROPLASTY     • TOTAL KNEE  ARTHROPLASTY      Left knee    • TRANSPLANTATION RENAL     • TUBAL ABDOMINAL LIGATION     • UPPER GASTROINTESTINAL ENDOSCOPY  05/20/2013       Family History   Problem Relation Age of Onset   • Leukemia Mother    • Stroke Father    • Breast cancer Neg Hx    • Ovarian cancer Neg Hx        Social History     Social History   • Marital status:      Spouse name: N/A   • Number of children: N/A   • Years of education: N/A     Social History Main Topics   • Smoking status: Never Smoker   • Smokeless tobacco: Never Used   • Alcohol use No   • Drug use: No   • Sexual activity: Defer     Other Topics Concern   • None     Social History Narrative   • None           Objective   Physical Exam   Constitutional: She is oriented to person, place, and time. She appears well-developed and well-nourished. No distress.   HENT:   Head: Normocephalic and atraumatic.   Eyes: Conjunctivae and EOM are normal. Pupils are equal, round, and reactive to light. No scleral icterus.   Neck: Normal range of motion. Neck supple.   Cardiovascular: Normal rate, regular rhythm and normal heart sounds.    Trace pedal edema bilaterally.   Pulmonary/Chest: Effort normal and breath sounds normal. No respiratory distress.   Lungs have good air exchange bilaterally.  No wheezes, rhonchi, or rales.   Abdominal: Soft. Bowel sounds are normal. She exhibits no distension. There is no hepatosplenomegaly. There is no tenderness. There is negative Aguirre's sign.   Abdominal exam is completely benign.   Musculoskeletal: Normal range of motion. She exhibits no edema.   Lymphadenopathy:     She has no cervical adenopathy.   Neurological: She is alert and oriented to person, place, and time. She has normal reflexes.   Mild generalized weakness appreciated. No focal deficits.   Skin: Skin is warm and dry. She is not diaphoretic.   Psychiatric: She has a normal mood and affect. Her behavior is normal.   Nursing note and vitals reviewed.      Procedures         ED  Course  ED Course   Comment By Time   CXR showed some pulmonary vascular congestion and CMG, but no acute process.  Lung sounds are good on exam and pedal edema is trace.  Discussed all results with patient and family.  Patient is more concerned about persistent nausea.  She has been seen by Dr. Perez, GI specialist, in 10/2016 for EGD and Colonoscopy.  She reports she had a benign polyp removed.  She also reports history of H. Pylori in the past when she had similar symptoms.  Abdominal exam is benign today.  I feel she is stable for discharge to home and recommend GI follow up.  I will start PPI on discharge. Brianna Lawrence PA-C 07/01 1850        Recent Results (from the past 24 hour(s))   Comprehensive Metabolic Panel    Collection Time: 07/01/17  3:44 PM   Result Value Ref Range    Glucose 102 (H) 70 - 100 mg/dL    BUN 51 (H) 9 - 23 mg/dL    Creatinine 3.00 (H) 0.60 - 1.30 mg/dL    Sodium 142 132 - 146 mmol/L    Potassium 4.2 3.5 - 5.5 mmol/L    Chloride 111 (H) 99 - 109 mmol/L    CO2 21.0 20.0 - 31.0 mmol/L    Calcium 9.9 8.7 - 10.4 mg/dL    Total Protein 7.6 5.7 - 8.2 g/dL    Albumin 4.20 3.20 - 4.80 g/dL    ALT (SGPT) 54 (H) 7 - 40 U/L    AST (SGOT) 48 (H) 0 - 33 U/L    Alkaline Phosphatase 123 (H) 25 - 100 U/L    Total Bilirubin 0.4 0.3 - 1.2 mg/dL    eGFR Non African Amer 15 (L) >60 mL/min/1.73    Globulin 3.4 gm/dL    A/G Ratio 1.2 (L) 1.5 - 2.5 g/dL    BUN/Creatinine Ratio 17.0 7.0 - 25.0    Anion Gap 10.0 3.0 - 11.0 mmol/L   Magnesium    Collection Time: 07/01/17  3:44 PM   Result Value Ref Range    Magnesium 2.2 1.3 - 2.7 mg/dL   Light Blue Top    Collection Time: 07/01/17  3:44 PM   Result Value Ref Range    Extra Tube hold for add-on    Green Top (Gel)    Collection Time: 07/01/17  3:44 PM   Result Value Ref Range    Extra Tube Hold for add-ons.    Lavender Top    Collection Time: 07/01/17  3:44 PM   Result Value Ref Range    Extra Tube hold for add-on    Gold Top - SST    Collection Time: 07/01/17   3:44 PM   Result Value Ref Range    Extra Tube Hold for add-ons.    CBC Auto Differential    Collection Time: 07/01/17  3:44 PM   Result Value Ref Range    WBC 10.60 3.50 - 10.80 10*3/mm3    RBC 3.31 (L) 3.89 - 5.14 10*6/mm3    Hemoglobin 9.7 (L) 11.5 - 15.5 g/dL    Hematocrit 32.0 (L) 34.5 - 44.0 %    MCV 96.7 80.0 - 99.0 fL    MCH 29.3 27.0 - 31.0 pg    MCHC 30.3 (L) 32.0 - 36.0 g/dL    RDW 17.0 (H) 11.3 - 14.5 %    RDW-SD 59.5 (H) 37.0 - 54.0 fl    MPV 9.7 6.0 - 12.0 fL    Platelets 208 150 - 450 10*3/mm3    Neutrophil % 68.7 41.0 - 71.0 %    Lymphocyte % 21.8 (L) 24.0 - 44.0 %    Monocyte % 7.3 0.0 - 12.0 %    Eosinophil % 1.9 0.0 - 3.0 %    Basophil % 0.1 0.0 - 1.0 %    Immature Grans % 0.2 0.0 - 0.6 %    Neutrophils, Absolute 7.29 1.50 - 8.30 10*3/mm3    Lymphocytes, Absolute 2.31 0.60 - 4.80 10*3/mm3    Monocytes, Absolute 0.77 0.00 - 1.00 10*3/mm3    Eosinophils, Absolute 0.20 0.00 - 0.30 10*3/mm3    Basophils, Absolute 0.01 0.00 - 0.20 10*3/mm3    Immature Grans, Absolute 0.02 0.00 - 0.03 10*3/mm3   Protime-INR    Collection Time: 07/01/17  3:44 PM   Result Value Ref Range    Protime 20.2 (H) 9.6 - 11.5 Seconds    INR 1.82    POC Troponin, Rapid    Collection Time: 07/01/17  3:45 PM   Result Value Ref Range    Troponin I 0.01 0.00 - 0.07 ng/mL   POC Glucose Fingerstick    Collection Time: 07/01/17  4:02 PM   Result Value Ref Range    Glucose 109 70 - 130 mg/dL   BNP    Collection Time: 07/01/17  4:33 PM   Result Value Ref Range    BNP 1637.0 (H) 0.0 - 100.0 pg/mL   Urinalysis With / Culture If Indicated    Collection Time: 07/01/17  4:54 PM   Result Value Ref Range    Color, UA Yellow Yellow, Straw    Appearance, UA Clear Clear    pH, UA 7.0 5.0 - 8.0    Specific Gravity, UA 1.008 1.001 - 1.030    Glucose, UA Negative Negative    Ketones, UA Negative Negative    Bilirubin, UA Negative Negative    Blood, UA Moderate (2+) (A) Negative    Protein, UA >=300 mg/dL (3+) (A) Negative    Leuk Esterase, UA Negative  Negative    Nitrite, UA Negative Negative    Urobilinogen, UA 0.2 E.U./dL 0.2 - 1.0 E.U./dL   Urinalysis, Microscopic Only    Collection Time: 07/01/17  4:54 PM   Result Value Ref Range    RBC, UA 13-20 (A) None Seen, 0-2 /HPF    WBC, UA 0-2 (A) None Seen /HPF    Bacteria, UA None Seen None Seen, Trace /HPF    Squamous Epithelial Cells, UA 0-2 None Seen, 0-2 /HPF    Hyaline Casts, UA 0-6 0 - 6 /LPF    Methodology Automated Microscopy      Note: In addition to lab results from this visit, the labs listed above may include labs taken at another facility or during a different encounter within the last 24 hours. Please correlate lab times with ED admission and discharge times for further clarification of the services performed during this visit.    XR Chest 1 View   Preliminary Result   Enlargement of the heart with prominence of the pulmonary   vascularity. No acute parenchymal disease.       DICTATED:     07/01/2017   EDITED:         07/01//2017                Vitals:    07/01/17 1700 07/01/17 1730 07/01/17 1819 07/01/17 1830   BP: 146/57 162/76 176/75 174/100   BP Location:       Patient Position:       Pulse: 78 73 77 73   Resp:       Temp:       TempSrc:       SpO2: 94% 91% 94% 93%   Weight:       Height:         Medications   sodium chloride 0.9 % flush 10 mL (not administered)     ECG/EMG Results (last 24 hours)     Procedure Component Value Units Date/Time    ECG 12 Lead [[id number]] Collected:  07/01/17 1541     Updated:  07/01/17 1549                  [facility]    Final diagnoses:   Nausea   Anorexia   History of Helicobacter pylori infection   Chronic diastolic congestive heart failure            Brianna Lawrence PA-C  07/01/17 [id number]

## 2017-07-10 ENCOUNTER — TELEPHONE (OUTPATIENT)
Dept: CARDIOLOGY | Facility: CLINIC | Age: 77
End: 2017-07-10

## 2017-07-10 NOTE — TELEPHONE ENCOUNTER
Patient called and saying that she need an EGD and will need to be off of the Warfarin for about 3 days.  Is that ok.   AH

## 2017-07-20 ENCOUNTER — OUTSIDE FACILITY SERVICE (OUTPATIENT)
Dept: GASTROENTEROLOGY | Facility: CLINIC | Age: 77
End: 2017-07-20

## 2017-07-20 ENCOUNTER — LAB REQUISITION (OUTPATIENT)
Dept: LAB | Facility: HOSPITAL | Age: 77
End: 2017-07-20

## 2017-07-20 DIAGNOSIS — R10.13 EPIGASTRIC PAIN: ICD-10-CM

## 2017-07-20 DIAGNOSIS — K25.9 GASTRIC ULCER WITHOUT HEMORRHAGE OR PERFORATION: ICD-10-CM

## 2017-07-20 DIAGNOSIS — B96.81 HELICOBACTER PYLORI (H. PYLORI) AS THE CAUSE OF DISEASES CLASSIFIED ELSEWHERE: ICD-10-CM

## 2017-07-20 PROCEDURE — 88305 TISSUE EXAM BY PATHOLOGIST: CPT | Performed by: INTERNAL MEDICINE

## 2017-07-20 PROCEDURE — 88342 IMHCHEM/IMCYTCHM 1ST ANTB: CPT | Performed by: INTERNAL MEDICINE

## 2017-07-20 PROCEDURE — 43239 EGD BIOPSY SINGLE/MULTIPLE: CPT | Performed by: INTERNAL MEDICINE

## 2017-07-25 LAB
CYTO UR: NORMAL
LAB AP CASE REPORT: NORMAL
LAB AP CLINICAL INFORMATION: NORMAL
LAB AP SPECIAL STAINS: NORMAL
Lab: NORMAL
PATH REPORT.FINAL DX SPEC: NORMAL
PATH REPORT.GROSS SPEC: NORMAL

## 2017-07-26 ENCOUNTER — LAB (OUTPATIENT)
Dept: LAB | Facility: HOSPITAL | Age: 77
End: 2017-07-26

## 2017-07-26 DIAGNOSIS — I48.0 PAROXYSMAL ATRIAL FIBRILLATION (HCC): ICD-10-CM

## 2017-07-26 LAB
INR PPP: 1.37
PROTHROMBIN TIME: 15.1 SECONDS (ref 9.6–11.5)

## 2017-07-26 PROCEDURE — 85610 PROTHROMBIN TIME: CPT | Performed by: INTERNAL MEDICINE

## 2017-07-26 PROCEDURE — 36415 COLL VENOUS BLD VENIPUNCTURE: CPT

## 2017-07-27 ENCOUNTER — ANTICOAGULATION VISIT (OUTPATIENT)
Dept: CARDIOLOGY | Facility: CLINIC | Age: 77
End: 2017-07-27

## 2017-07-27 PROBLEM — I50.31 ACUTE DIASTOLIC HEART FAILURE (HCC): Status: RESOLVED | Noted: 2017-06-08 | Resolved: 2017-07-27

## 2017-07-27 NOTE — PATIENT INSTRUCTIONS
For some reason the patient had removed her from the coag clinic so I had to put her back in it.   She had been off for an EGD and now back on the 9 mg of Warfarin qd.   Has not been back on it yet so will recheck 1 week   AH

## 2017-08-01 ENCOUNTER — LAB (OUTPATIENT)
Dept: LAB | Facility: HOSPITAL | Age: 77
End: 2017-08-01

## 2017-08-01 ENCOUNTER — TRANSCRIBE ORDERS (OUTPATIENT)
Dept: LAB | Facility: HOSPITAL | Age: 77
End: 2017-08-01

## 2017-08-01 DIAGNOSIS — E87.5 HYPERPOTASSEMIA: ICD-10-CM

## 2017-08-01 DIAGNOSIS — N18.4 CHRONIC KIDNEY DISEASE, STAGE IV (SEVERE) (HCC): Primary | ICD-10-CM

## 2017-08-01 DIAGNOSIS — I48.0 PAROXYSMAL ATRIAL FIBRILLATION (HCC): Primary | ICD-10-CM

## 2017-08-01 DIAGNOSIS — N18.4 CHRONIC KIDNEY DISEASE, STAGE IV (SEVERE) (HCC): ICD-10-CM

## 2017-08-01 LAB
25(OH)D3 SERPL-MCNC: 35.5 NG/ML
ALBUMIN SERPL-MCNC: 3.8 G/DL (ref 3.2–4.8)
ANION GAP SERPL CALCULATED.3IONS-SCNC: 7 MMOL/L (ref 3–11)
BASOPHILS # BLD AUTO: 0.03 10*3/MM3 (ref 0–0.2)
BASOPHILS NFR BLD AUTO: 0.4 % (ref 0–1)
BUN BLD-MCNC: 51 MG/DL (ref 9–23)
BUN/CREAT SERPL: 17 (ref 7–25)
CALCIUM SPEC-SCNC: 9.2 MG/DL (ref 8.7–10.4)
CHLORIDE SERPL-SCNC: 112 MMOL/L (ref 99–109)
CO2 SERPL-SCNC: 22 MMOL/L (ref 20–31)
CREAT BLD-MCNC: 3 MG/DL (ref 0.6–1.3)
DEPRECATED RDW RBC AUTO: 56.5 FL (ref 37–54)
EOSINOPHIL # BLD AUTO: 0.34 10*3/MM3 (ref 0–0.3)
EOSINOPHIL NFR BLD AUTO: 4.4 % (ref 0–3)
ERYTHROCYTE [DISTWIDTH] IN BLOOD BY AUTOMATED COUNT: 16.2 % (ref 11.3–14.5)
FERRITIN SERPL-MCNC: 936 NG/ML (ref 10–291)
GFR SERPL CREATININE-BSD FRML MDRD: 15 ML/MIN/1.73
GLUCOSE BLD-MCNC: 133 MG/DL (ref 70–100)
HCT VFR BLD AUTO: 33.7 % (ref 34.5–44)
HGB BLD-MCNC: 10.2 G/DL (ref 11.5–15.5)
IMM GRANULOCYTES # BLD: 0.02 10*3/MM3 (ref 0–0.03)
IMM GRANULOCYTES NFR BLD: 0.3 % (ref 0–0.6)
INR PPP: 2.01
IRON 24H UR-MRATE: 48 MCG/DL (ref 50–175)
IRON SATN MFR SERPL: 18 % (ref 15–50)
LYMPHOCYTES # BLD AUTO: 1.72 10*3/MM3 (ref 0.6–4.8)
LYMPHOCYTES NFR BLD AUTO: 22.3 % (ref 24–44)
MCH RBC QN AUTO: 28.7 PG (ref 27–31)
MCHC RBC AUTO-ENTMCNC: 30.3 G/DL (ref 32–36)
MCV RBC AUTO: 94.7 FL (ref 80–99)
MONOCYTES # BLD AUTO: 0.58 10*3/MM3 (ref 0–1)
MONOCYTES NFR BLD AUTO: 7.5 % (ref 0–12)
NEUTROPHILS # BLD AUTO: 5.04 10*3/MM3 (ref 1.5–8.3)
NEUTROPHILS NFR BLD AUTO: 65.1 % (ref 41–71)
PHOSPHATE SERPL-MCNC: 3.7 MG/DL (ref 2.4–5.1)
PLATELET # BLD AUTO: 230 10*3/MM3 (ref 150–450)
PMV BLD AUTO: 10.5 FL (ref 6–12)
POTASSIUM BLD-SCNC: 4.9 MMOL/L (ref 3.5–5.5)
PROTHROMBIN TIME: 22.4 SECONDS (ref 9.6–11.5)
RBC # BLD AUTO: 3.56 10*6/MM3 (ref 3.89–5.14)
SODIUM BLD-SCNC: 141 MMOL/L (ref 132–146)
TIBC SERPL-MCNC: 262 MCG/DL (ref 250–450)
WBC NRBC COR # BLD: 7.73 10*3/MM3 (ref 3.5–10.8)

## 2017-08-01 PROCEDURE — 83550 IRON BINDING TEST: CPT | Performed by: NURSE PRACTITIONER

## 2017-08-01 PROCEDURE — 82728 ASSAY OF FERRITIN: CPT | Performed by: NURSE PRACTITIONER

## 2017-08-01 PROCEDURE — 36415 COLL VENOUS BLD VENIPUNCTURE: CPT

## 2017-08-01 PROCEDURE — 83540 ASSAY OF IRON: CPT | Performed by: NURSE PRACTITIONER

## 2017-08-01 PROCEDURE — 80069 RENAL FUNCTION PANEL: CPT | Performed by: NURSE PRACTITIONER

## 2017-08-01 PROCEDURE — 85025 COMPLETE CBC W/AUTO DIFF WBC: CPT | Performed by: NURSE PRACTITIONER

## 2017-08-01 PROCEDURE — 85610 PROTHROMBIN TIME: CPT | Performed by: INTERNAL MEDICINE

## 2017-08-01 PROCEDURE — 83970 ASSAY OF PARATHORMONE: CPT | Performed by: NURSE PRACTITIONER

## 2017-08-01 PROCEDURE — 82306 VITAMIN D 25 HYDROXY: CPT | Performed by: NURSE PRACTITIONER

## 2017-08-02 ENCOUNTER — ANTICOAGULATION VISIT (OUTPATIENT)
Dept: CARDIOLOGY | Facility: CLINIC | Age: 77
End: 2017-08-02

## 2017-08-02 LAB — PTH-INTACT SERPL-MCNC: 149 PG/ML (ref 15–65)

## 2017-08-07 ENCOUNTER — APPOINTMENT (OUTPATIENT)
Dept: GENERAL RADIOLOGY | Facility: HOSPITAL | Age: 77
End: 2017-08-07

## 2017-08-07 ENCOUNTER — APPOINTMENT (OUTPATIENT)
Dept: CARDIOLOGY | Facility: HOSPITAL | Age: 77
End: 2017-08-07

## 2017-08-07 ENCOUNTER — HOSPITAL ENCOUNTER (INPATIENT)
Facility: HOSPITAL | Age: 77
LOS: 7 days | Discharge: HOME HEALTH | End: 2017-08-14
Attending: EMERGENCY MEDICINE | Admitting: INTERNAL MEDICINE

## 2017-08-07 DIAGNOSIS — I50.31 ACUTE DIASTOLIC HEART FAILURE (HCC): ICD-10-CM

## 2017-08-07 DIAGNOSIS — I50.9 CONGESTIVE HEART FAILURE, UNSPECIFIED CONGESTIVE HEART FAILURE CHRONICITY, UNSPECIFIED CONGESTIVE HEART FAILURE TYPE: Primary | ICD-10-CM

## 2017-08-07 DIAGNOSIS — I48.0 PAROXYSMAL ATRIAL FIBRILLATION (HCC): ICD-10-CM

## 2017-08-07 DIAGNOSIS — D72.829 LEUKOCYTOSIS, UNSPECIFIED TYPE: ICD-10-CM

## 2017-08-07 PROBLEM — J18.9 PNEUMONIA: Status: ACTIVE | Noted: 2017-08-07

## 2017-08-07 PROBLEM — E87.20 METABOLIC ACIDOSIS: Status: ACTIVE | Noted: 2017-08-07

## 2017-08-07 LAB
ALBUMIN SERPL-MCNC: 4.3 G/DL (ref 3.2–4.8)
ALBUMIN/GLOB SERPL: 1.2 G/DL (ref 1.5–2.5)
ALP SERPL-CCNC: 147 U/L (ref 25–100)
ALT SERPL W P-5'-P-CCNC: 44 U/L (ref 7–40)
ANION GAP SERPL CALCULATED.3IONS-SCNC: 14 MMOL/L (ref 3–11)
AST SERPL-CCNC: 60 U/L (ref 0–33)
BACTERIA UR QL AUTO: ABNORMAL /HPF
BASOPHILS # BLD AUTO: 0.02 10*3/MM3 (ref 0–0.2)
BASOPHILS NFR BLD AUTO: 0.1 % (ref 0–1)
BILIRUB SERPL-MCNC: 0.4 MG/DL (ref 0.3–1.2)
BILIRUB UR QL STRIP: NEGATIVE
BNP SERPL-MCNC: 1460 PG/ML (ref 0–100)
BUN BLD-MCNC: 49 MG/DL (ref 9–23)
BUN/CREAT SERPL: 16.3 (ref 7–25)
CALCIUM SPEC-SCNC: 9.8 MG/DL (ref 8.7–10.4)
CHLORIDE SERPL-SCNC: 112 MMOL/L (ref 99–109)
CLARITY UR: CLEAR
CO2 SERPL-SCNC: 16 MMOL/L (ref 20–31)
COLOR UR: YELLOW
CREAT BLD-MCNC: 3 MG/DL (ref 0.6–1.3)
D-LACTATE SERPL-SCNC: 0.9 MMOL/L (ref 0.5–2)
DEPRECATED RDW RBC AUTO: 53.2 FL (ref 37–54)
EOSINOPHIL # BLD AUTO: 0.09 10*3/MM3 (ref 0–0.3)
EOSINOPHIL NFR BLD AUTO: 0.5 % (ref 0–3)
ERYTHROCYTE [DISTWIDTH] IN BLOOD BY AUTOMATED COUNT: 15.9 % (ref 11.3–14.5)
GFR SERPL CREATININE-BSD FRML MDRD: 15 ML/MIN/1.73
GLOBULIN UR ELPH-MCNC: 3.7 GM/DL
GLUCOSE BLD-MCNC: 158 MG/DL (ref 70–100)
GLUCOSE BLDC GLUCOMTR-MCNC: 113 MG/DL (ref 70–130)
GLUCOSE BLDC GLUCOMTR-MCNC: 172 MG/DL (ref 70–130)
GLUCOSE BLDC GLUCOMTR-MCNC: 176 MG/DL (ref 70–130)
GLUCOSE UR STRIP-MCNC: NEGATIVE MG/DL
HCT VFR BLD AUTO: 33.3 % (ref 34.5–44)
HGB BLD-MCNC: 10.4 G/DL (ref 11.5–15.5)
HGB UR QL STRIP.AUTO: ABNORMAL
HOLD SPECIMEN: NORMAL
HOLD SPECIMEN: NORMAL
HYALINE CASTS UR QL AUTO: ABNORMAL /LPF
IMM GRANULOCYTES # BLD: 0.05 10*3/MM3 (ref 0–0.03)
IMM GRANULOCYTES NFR BLD: 0.3 % (ref 0–0.6)
INR PPP: 2.65
INR PPP: 2.81
KETONES UR QL STRIP: NEGATIVE
LEUKOCYTE ESTERASE UR QL STRIP.AUTO: NEGATIVE
LYMPHOCYTES # BLD AUTO: 2.61 10*3/MM3 (ref 0.6–4.8)
LYMPHOCYTES NFR BLD AUTO: 14.9 % (ref 24–44)
MCH RBC QN AUTO: 28.3 PG (ref 27–31)
MCHC RBC AUTO-ENTMCNC: 31.2 G/DL (ref 32–36)
MCV RBC AUTO: 90.7 FL (ref 80–99)
MONOCYTES # BLD AUTO: 0.67 10*3/MM3 (ref 0–1)
MONOCYTES NFR BLD AUTO: 3.8 % (ref 0–12)
NEUTROPHILS # BLD AUTO: 14.11 10*3/MM3 (ref 1.5–8.3)
NEUTROPHILS NFR BLD AUTO: 80.4 % (ref 41–71)
NITRITE UR QL STRIP: NEGATIVE
PH UR STRIP.AUTO: 5.5 [PH] (ref 5–8)
PLATELET # BLD AUTO: 201 10*3/MM3 (ref 150–450)
PMV BLD AUTO: 10.3 FL (ref 6–12)
POTASSIUM BLD-SCNC: 4 MMOL/L (ref 3.5–5.5)
PROT SERPL-MCNC: 8 G/DL (ref 5.7–8.2)
PROT UR QL STRIP: ABNORMAL
PROTHROMBIN TIME: 29.8 SECONDS (ref 9.6–11.5)
PROTHROMBIN TIME: 31.6 SECONDS (ref 9.6–11.5)
RBC # BLD AUTO: 3.67 10*6/MM3 (ref 3.89–5.14)
RBC # UR: ABNORMAL /HPF
REF LAB TEST METHOD: ABNORMAL
SODIUM BLD-SCNC: 142 MMOL/L (ref 132–146)
SP GR UR STRIP: 1.01 (ref 1–1.03)
SQUAMOUS #/AREA URNS HPF: ABNORMAL /HPF
TROPONIN I SERPL-MCNC: 0.01 NG/ML (ref 0–0.07)
UROBILINOGEN UR QL STRIP: ABNORMAL
VANCOMYCIN SERPL-MCNC: 1.6 MCG/ML (ref 5–40)
WBC NRBC COR # BLD: 17.55 10*3/MM3 (ref 3.5–10.8)
WBC UR QL AUTO: ABNORMAL /HPF
WHOLE BLOOD HOLD SPECIMEN: NORMAL
WHOLE BLOOD HOLD SPECIMEN: NORMAL

## 2017-08-07 PROCEDURE — 87070 CULTURE OTHR SPECIMN AEROBIC: CPT | Performed by: INTERNAL MEDICINE

## 2017-08-07 PROCEDURE — 25010000002 AZITHROMYCIN: Performed by: EMERGENCY MEDICINE

## 2017-08-07 PROCEDURE — 94760 N-INVAS EAR/PLS OXIMETRY 1: CPT

## 2017-08-07 PROCEDURE — 87040 BLOOD CULTURE FOR BACTERIA: CPT | Performed by: EMERGENCY MEDICINE

## 2017-08-07 PROCEDURE — 80202 ASSAY OF VANCOMYCIN: CPT

## 2017-08-07 PROCEDURE — 94799 UNLISTED PULMONARY SVC/PX: CPT

## 2017-08-07 PROCEDURE — 94640 AIRWAY INHALATION TREATMENT: CPT

## 2017-08-07 PROCEDURE — 80053 COMPREHEN METABOLIC PANEL: CPT | Performed by: EMERGENCY MEDICINE

## 2017-08-07 PROCEDURE — 84484 ASSAY OF TROPONIN QUANT: CPT

## 2017-08-07 PROCEDURE — 87205 SMEAR GRAM STAIN: CPT | Performed by: INTERNAL MEDICINE

## 2017-08-07 PROCEDURE — 81003 URINALYSIS AUTO W/O SCOPE: CPT | Performed by: EMERGENCY MEDICINE

## 2017-08-07 PROCEDURE — 83605 ASSAY OF LACTIC ACID: CPT | Performed by: EMERGENCY MEDICINE

## 2017-08-07 PROCEDURE — 25010000002 VANCOMYCIN HCL IN NACL 1.75-0.9 GM/500ML-% SOLUTION

## 2017-08-07 PROCEDURE — 81001 URINALYSIS AUTO W/SCOPE: CPT | Performed by: EMERGENCY MEDICINE

## 2017-08-07 PROCEDURE — 85025 COMPLETE CBC W/AUTO DIFF WBC: CPT | Performed by: EMERGENCY MEDICINE

## 2017-08-07 PROCEDURE — 85610 PROTHROMBIN TIME: CPT | Performed by: NURSE PRACTITIONER

## 2017-08-07 PROCEDURE — 25010000002 FUROSEMIDE PER 20 MG: Performed by: EMERGENCY MEDICINE

## 2017-08-07 PROCEDURE — 25010000003 CEFTRIAXONE PER 250 MG: Performed by: EMERGENCY MEDICINE

## 2017-08-07 PROCEDURE — 83880 ASSAY OF NATRIURETIC PEPTIDE: CPT | Performed by: EMERGENCY MEDICINE

## 2017-08-07 PROCEDURE — 99223 1ST HOSP IP/OBS HIGH 75: CPT | Performed by: INTERNAL MEDICINE

## 2017-08-07 PROCEDURE — 71010 HC CHEST PA OR AP: CPT

## 2017-08-07 PROCEDURE — 85610 PROTHROMBIN TIME: CPT | Performed by: EMERGENCY MEDICINE

## 2017-08-07 PROCEDURE — 82962 GLUCOSE BLOOD TEST: CPT

## 2017-08-07 PROCEDURE — 63710000001 INSULIN DETEMIR PER 5 UNITS: Performed by: NURSE PRACTITIONER

## 2017-08-07 PROCEDURE — 99285 EMERGENCY DEPT VISIT HI MDM: CPT

## 2017-08-07 PROCEDURE — 93005 ELECTROCARDIOGRAM TRACING: CPT | Performed by: EMERGENCY MEDICINE

## 2017-08-07 RX ORDER — AMLODIPINE BESYLATE 5 MG/1
5 TABLET ORAL DAILY
Status: DISCONTINUED | OUTPATIENT
Start: 2017-08-07 | End: 2017-08-14 | Stop reason: HOSPADM

## 2017-08-07 RX ORDER — CHOLECALCIFEROL (VITAMIN D3) 50 MCG
2000 TABLET ORAL DAILY
COMMUNITY
End: 2020-12-08

## 2017-08-07 RX ORDER — IPRATROPIUM BROMIDE AND ALBUTEROL SULFATE 2.5; .5 MG/3ML; MG/3ML
3 SOLUTION RESPIRATORY (INHALATION)
Status: DISCONTINUED | OUTPATIENT
Start: 2017-08-07 | End: 2017-08-14 | Stop reason: HOSPADM

## 2017-08-07 RX ORDER — FUROSEMIDE 10 MG/ML
40 INJECTION INTRAMUSCULAR; INTRAVENOUS ONCE
Status: COMPLETED | OUTPATIENT
Start: 2017-08-07 | End: 2017-08-07

## 2017-08-07 RX ORDER — CEFTRIAXONE SODIUM 2 G/50ML
2 INJECTION, SOLUTION INTRAVENOUS ONCE
Status: COMPLETED | OUTPATIENT
Start: 2017-08-07 | End: 2017-08-07

## 2017-08-07 RX ORDER — ONDANSETRON 2 MG/ML
4 INJECTION INTRAMUSCULAR; INTRAVENOUS EVERY 6 HOURS PRN
Status: DISCONTINUED | OUTPATIENT
Start: 2017-08-07 | End: 2017-08-14 | Stop reason: HOSPADM

## 2017-08-07 RX ORDER — SENNA AND DOCUSATE SODIUM 50; 8.6 MG/1; MG/1
2 TABLET, FILM COATED ORAL 2 TIMES DAILY
Status: DISCONTINUED | OUTPATIENT
Start: 2017-08-07 | End: 2017-08-14 | Stop reason: HOSPADM

## 2017-08-07 RX ORDER — BUMETANIDE 0.25 MG/ML
1 INJECTION INTRAMUSCULAR; INTRAVENOUS EVERY 12 HOURS
Status: DISCONTINUED | OUTPATIENT
Start: 2017-08-07 | End: 2017-08-07

## 2017-08-07 RX ORDER — LEVOTHYROXINE SODIUM 0.07 MG/1
75 TABLET ORAL
Status: DISCONTINUED | OUTPATIENT
Start: 2017-08-07 | End: 2017-08-14 | Stop reason: HOSPADM

## 2017-08-07 RX ORDER — CEFTRIAXONE SODIUM 1 G/50ML
1 INJECTION, SOLUTION INTRAVENOUS EVERY 24 HOURS
Status: DISCONTINUED | OUTPATIENT
Start: 2017-08-08 | End: 2017-08-14

## 2017-08-07 RX ORDER — ONDANSETRON 4 MG/1
4 TABLET, FILM COATED ORAL EVERY 6 HOURS PRN
Status: DISCONTINUED | OUTPATIENT
Start: 2017-08-07 | End: 2017-08-14 | Stop reason: HOSPADM

## 2017-08-07 RX ORDER — HEPARIN SODIUM 5000 [USP'U]/ML
5000 INJECTION, SOLUTION INTRAVENOUS; SUBCUTANEOUS EVERY 12 HOURS SCHEDULED
Status: DISCONTINUED | OUTPATIENT
Start: 2017-08-07 | End: 2017-08-07

## 2017-08-07 RX ORDER — METOPROLOL TARTRATE 100 MG/1
100 TABLET ORAL 2 TIMES DAILY
Status: DISCONTINUED | OUTPATIENT
Start: 2017-08-07 | End: 2017-08-14 | Stop reason: HOSPADM

## 2017-08-07 RX ORDER — ATORVASTATIN CALCIUM 10 MG/1
10 TABLET, FILM COATED ORAL NIGHTLY
Status: DISCONTINUED | OUTPATIENT
Start: 2017-08-07 | End: 2017-08-14 | Stop reason: HOSPADM

## 2017-08-07 RX ORDER — SODIUM CHLORIDE 0.9 % (FLUSH) 0.9 %
10 SYRINGE (ML) INJECTION AS NEEDED
Status: DISCONTINUED | OUTPATIENT
Start: 2017-08-07 | End: 2017-08-07

## 2017-08-07 RX ORDER — WARFARIN SODIUM 3 MG/1
6 TABLET ORAL
Status: DISCONTINUED | OUTPATIENT
Start: 2017-08-07 | End: 2017-08-08

## 2017-08-07 RX ORDER — ALPRAZOLAM 0.5 MG/1
0.5 TABLET ORAL 2 TIMES DAILY PRN
Status: DISCONTINUED | OUTPATIENT
Start: 2017-08-07 | End: 2017-08-14 | Stop reason: HOSPADM

## 2017-08-07 RX ORDER — ACETAMINOPHEN 325 MG/1
650 TABLET ORAL EVERY 4 HOURS PRN
Status: DISCONTINUED | OUTPATIENT
Start: 2017-08-07 | End: 2017-08-14 | Stop reason: HOSPADM

## 2017-08-07 RX ORDER — VANCOMYCIN HYDROCHLORIDE 1 G/200ML
10 INJECTION, SOLUTION INTRAVENOUS
Status: DISCONTINUED | OUTPATIENT
Start: 2017-08-09 | End: 2017-08-08

## 2017-08-07 RX ORDER — IPRATROPIUM BROMIDE AND ALBUTEROL SULFATE 2.5; .5 MG/3ML; MG/3ML
3 SOLUTION RESPIRATORY (INHALATION) ONCE
Status: COMPLETED | OUTPATIENT
Start: 2017-08-07 | End: 2017-08-07

## 2017-08-07 RX ORDER — BUDESONIDE AND FORMOTEROL FUMARATE DIHYDRATE 80; 4.5 UG/1; UG/1
2 AEROSOL RESPIRATORY (INHALATION)
Status: DISCONTINUED | OUTPATIENT
Start: 2017-08-07 | End: 2017-08-14 | Stop reason: HOSPADM

## 2017-08-07 RX ORDER — BUMETANIDE 0.25 MG/ML
1 INJECTION INTRAMUSCULAR; INTRAVENOUS EVERY 12 HOURS SCHEDULED
Status: DISCONTINUED | OUTPATIENT
Start: 2017-08-08 | End: 2017-08-12

## 2017-08-07 RX ORDER — BUMETANIDE 0.25 MG/ML
2 INJECTION INTRAMUSCULAR; INTRAVENOUS ONCE
Status: COMPLETED | OUTPATIENT
Start: 2017-08-07 | End: 2017-08-07

## 2017-08-07 RX ORDER — SODIUM CHLORIDE 0.9 % (FLUSH) 0.9 %
1-10 SYRINGE (ML) INJECTION AS NEEDED
Status: DISCONTINUED | OUTPATIENT
Start: 2017-08-07 | End: 2017-08-14 | Stop reason: HOSPADM

## 2017-08-07 RX ORDER — WARFARIN SODIUM 3 MG/1
9 TABLET ORAL
Status: DISCONTINUED | OUTPATIENT
Start: 2017-08-08 | End: 2017-08-08

## 2017-08-07 RX ORDER — VANCOMYCIN 1.75 GRAM/500 ML IN 0.9 % SODIUM CHLORIDE INTRAVENOUS
20 ONCE
Status: COMPLETED | OUTPATIENT
Start: 2017-08-07 | End: 2017-08-07

## 2017-08-07 RX ADMIN — ATORVASTATIN CALCIUM 10 MG: 10 TABLET, FILM COATED ORAL at 21:14

## 2017-08-07 RX ADMIN — IPRATROPIUM BROMIDE AND ALBUTEROL SULFATE 3 ML: .5; 3 SOLUTION RESPIRATORY (INHALATION) at 08:27

## 2017-08-07 RX ADMIN — METOPROLOL TARTRATE 100 MG: 100 TABLET, FILM COATED ORAL at 17:50

## 2017-08-07 RX ADMIN — BUDESONIDE AND FORMOTEROL FUMARATE DIHYDRATE 2 PUFF: 80; 4.5 AEROSOL RESPIRATORY (INHALATION) at 20:00

## 2017-08-07 RX ADMIN — WARFARIN SODIUM 6 MG: 3 TABLET ORAL at 17:50

## 2017-08-07 RX ADMIN — Medication 2 TABLET: at 17:50

## 2017-08-07 RX ADMIN — AZITHROMYCIN 500 MG: 500 INJECTION, POWDER, LYOPHILIZED, FOR SOLUTION INTRAVENOUS at 08:56

## 2017-08-07 RX ADMIN — BUMETANIDE 1 MG: 0.25 INJECTION, SOLUTION INTRAMUSCULAR; INTRAVENOUS at 15:11

## 2017-08-07 RX ADMIN — IPRATROPIUM BROMIDE AND ALBUTEROL SULFATE 3 ML: .5; 3 SOLUTION RESPIRATORY (INHALATION) at 13:01

## 2017-08-07 RX ADMIN — AMLODIPINE BESYLATE 5 MG: 5 TABLET ORAL at 15:10

## 2017-08-07 RX ADMIN — IPRATROPIUM BROMIDE AND ALBUTEROL SULFATE 3 ML: .5; 3 SOLUTION RESPIRATORY (INHALATION) at 19:59

## 2017-08-07 RX ADMIN — BUMETANIDE 2 MG: 0.25 INJECTION, SOLUTION INTRAMUSCULAR; INTRAVENOUS at 22:11

## 2017-08-07 RX ADMIN — IPRATROPIUM BROMIDE AND ALBUTEROL SULFATE 3 ML: .5; 3 SOLUTION RESPIRATORY (INHALATION) at 06:01

## 2017-08-07 RX ADMIN — CEFTRIAXONE SODIUM 2 G: 2 INJECTION, SOLUTION INTRAVENOUS at 08:37

## 2017-08-07 RX ADMIN — Medication 1750 MG: at 15:11

## 2017-08-07 RX ADMIN — INSULIN DETEMIR 12 UNITS: 100 INJECTION, SOLUTION SUBCUTANEOUS at 21:14

## 2017-08-07 RX ADMIN — FUROSEMIDE 40 MG: 10 INJECTION, SOLUTION INTRAMUSCULAR; INTRAVENOUS at 06:53

## 2017-08-07 RX ADMIN — IPRATROPIUM BROMIDE AND ALBUTEROL SULFATE 3 ML: .5; 3 SOLUTION RESPIRATORY (INHALATION) at 16:49

## 2017-08-07 RX ADMIN — LEVOTHYROXINE SODIUM 75 MCG: 75 TABLET ORAL at 15:10

## 2017-08-07 RX ADMIN — ALPRAZOLAM 0.5 MG: 0.5 TABLET ORAL at 17:50

## 2017-08-07 NOTE — PLAN OF CARE
Problem: Cardiac: Heart Failure (Adult)  Goal: Signs and Symptoms of Listed Potential Problems Will be Absent or Manageable (Cardiac: Heart Failure)  Outcome: Ongoing (interventions implemented as appropriate)    08/07/17 0912   Cardiac: Heart Failure   Problems Assessed (Heart Failure) dysrhythmia/arrhythmia   Problems Present (Heart Failure) functional decline/self-care deficit;respiratory compromise

## 2017-08-07 NOTE — PROGRESS NOTES
"Pharmacy Consult  -  Warfarin    Moni Wallace is a  77 y.o. female   Height - 63\" (160 cm)  Weight - 193 lb 1.6 oz (87.6 kg)    Consulting Provider: - Hospital Medicine  Indication: - Paroxysmal atrial fibrillation  Goal INR: - 2-3  Home Regimen:   - 6 mg Mon, Thurs   - 9 mg Sun, Tues, Wed, Fri, Sat       Drug-Drug Interactions with current regimen:   None    Warfarin Dosing During Admission:    Date  8/7           INR  2.81           Dose  6 mg                 Labs:      Results from last 7 days     Lab Units 08/07/17  1313 08/07/17  0546 08/01/17  1312   INR  2.81 2.65 2.01   HEMOGLOBIN g/dL --  10.4* 10.2*   HEMATOCRIT % --  33.3* 33.7*   PLATELETS 10*3/mm3 --  201 230       Results from last 7 days     Lab Units 08/07/17  0546 08/01/17  1312   SODIUM mmol/L 142 141   POTASSIUM mmol/L 4.0 4.9   CHLORIDE mmol/L 112* 112*   CO2 mmol/L 16.0* 22.0   BUN mg/dL 49* 51*   CREATININE mg/dL 3.00* 3.00*   CALCIUM mg/dL 9.8 9.2   BILIRUBIN mg/dL 0.4 --    ALK PHOS U/L 147* --    ALT (SGPT) U/L 44* --    AST (SGOT) U/L 60* --    GLUCOSE mg/dL 158* 133*       Current dietary intake:       Assessment/Plan:   Pt is M,W,F Dialysis pt.  Home dose is noted above.  Will Continue home dose and follow INR daily       Thank you  Tor Polk [facility]  8/7/2017  2:23 PM      "

## 2017-08-07 NOTE — CONSULTS
NAL Consult Note    Moni Wallace  1940  [id number]    Date of Admit:  8/7/2017    Date of Consult: 8/7/2017        Requesting Provider: No ref. provider found  Evaluating Physician: Ranjeet Uribe MD        Reason for Consultation:    History of present illness:    Patient is a 77 y.o.  Yr old female with chief complaint of cough, shortness of breath and fatigue for 1-2 days.  Her son who lives with her had recently been ill with upper respiratory symptoms.   patient does mention that she's been having more shortness of breath than before.  She was hospitalized just about a month to 2 months back for the same complaints.  She denies fever but reports chills and rigors.         Past Medical History:   Diagnosis Date   • Adenomatous colon polyp    • CHF (congestive heart failure)    • Chronic kidney disease    • Clostridium difficile infection    • Diabetes mellitus    • Gastric polyps    • Hypertension    • Hypothyroidism    • Macular degeneration    • PAF (paroxysmal atrial fibrillation) 2015    x1 after knee surgery   • Tubular adenoma     excision    • Ulcer of gastric fundus    • Vitamin D deficiency        Past Surgical History:   Procedure Laterality Date   • BREAST BIOPSY Left 1990'S   • CARPAL TUNNEL RELEASE Right    • CATARACT EXTRACTION Bilateral    • DIAGNOSTIC LAPAROSCOPY     • HERNIA REPAIR     • HERNIA REPAIR     • TOTAL KNEE ARTHROPLASTY     • TOTAL KNEE ARTHROPLASTY      Left knee    • TRANSPLANTATION RENAL Right    • TUBAL ABDOMINAL LIGATION     • UPPER GASTROINTESTINAL ENDOSCOPY  05/20/2013       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 There is no history of Breast cancer or Ovarian cancer.    Allergies   Allergen Reactions   • Codeine    • Nsaids    • Sulfa Antibiotics        Medication:    Current Facility-Administered Medications:   •  acetaminophen (TYLENOL) tablet 650 mg, 650 mg, Oral, Q4H PRN, Tyson Mcdonald MD  •  ALPRAZolam (XANAX) tablet 0.5 mg, 0.5 mg, Oral, BID PRN, REY Peres, 0.5 mg at 08/07/17 1750  •  amLODIPine (NORVASC) tablet 5 mg, 5 mg, Oral, Daily, REY Peres, 5 mg at 08/07/17 1510  •  atorvastatin (LIPITOR) tablet 10 mg, 10 mg, Oral, Nightly, REY Peres  •  [START ON 8/8/2017] AZITHROMYCIN 500 MG/250 ML 0.9% NS IVPB (MBP), 500 mg, Intravenous, Q24H, REY Peres  •  budesonide-formoterol (SYMBICORT) 80-4.5 MCG/ACT inhaler 2 puff, 2 puff, Inhalation, BID - RT, REY Peres  •  bumetanide (BUMEX) injection 1 mg, 1 mg, Intravenous, Q12H, REY Peres, 1 mg at 08/07/17 1511  •  [START ON 8/8/2017] cefTRIAXone (ROCEPHIN) IVPB 1 g, 1 g, Intravenous, Q24H, REY Peres  •  insulin detemir (LEVEMIR) injection 12 Units, 12 Units, Subcutaneous, Nightly, REY Peres  •  ipratropium-albuterol (DUO-NEB) nebulizer solution 3 mL, 3 mL, Nebulization, 4x Daily - RT, Tyson Mcdonald MD, 3 mL at 08/07/17 1649  •  levothyroxine (SYNTHROID, LEVOTHROID) tablet 75 mcg, 75 mcg, Oral, Q AM, REY Peres, 75 mcg at 08/07/17 1510  •  metoprolol tartrate (LOPRESSOR) tablet 100 mg, 100 mg, Oral, BID, Inocencia Dooley APRN, 100 mg at 08/07/17 1750  •  ondansetron (ZOFRAN) tablet 4 mg, 4 mg, Oral, Q6H PRN **OR** ondansetron (ZOFRAN) injection 4 mg, 4 mg, Intravenous, Q6H PRN, Tyson Mcdonald MD  •  Pharmacy to dose  vancomycin, , Does not apply, Continuous PRN, REY Peres  •  Pharmacy to dose warfarin, , Does not apply, Continuous PRN, REY Peres  •  sennosides-docusate sodium (SENOKOT-S) 8.6-50 MG tablet 2 tablet, 2 tablet, Oral, BID, REY Peres, 2 tablet at 08/07/17 1750  •  sodium chloride 0.9 % flush 1-10 mL, 1-10 mL, Intravenous, PRN, Tyson Mcdonald MD  •  sodium chloride 0.9 % flush 1-10 mL, 1-10 mL, Intravenous, PRN, REY Peres  •  [START ON 8/9/2017] Vancomcyin level due post dialysis 8/9 1400 Hold Vanco if level greater than 20, , Does not apply, Once, Tor Polk [facility]  •  [START ON 8/9/2017] vancomycin (VANCOCIN) in iso-osmotic dextrose IVPB 1 g (premix) 200 mL, 10 mg/kg, Intravenous, Once per day on Mon Wed Fri, Tor Polk [facility]  •  warfarin (COUMADIN) tablet 6 mg, 6 mg, Oral, Once per day on Mon Thu, REY Peres, 6 mg at 08/07/17 1750  •  [START ON 8/8/2017] warfarin (COUMADIN) tablet 9 mg, 9 mg, Oral, Once per day on Sun Tue Wed Fri Sat, REY Peres    Prescriptions Prior to Admission   Medication Sig Dispense Refill Last Dose   • ALPRAZolam (XANAX) 0.5 MG tablet Take 0.5 mg by mouth 2 (Two) Times a Day As Needed for Anxiety.      • amLODIPine (NORVASC) 5 MG tablet Take 5 mg by mouth Daily.   8/6/2017   • bumetanide (BUMEX) 1 MG tablet Take 1 mg by mouth Daily.   8/6/2017   • Cholecalciferol (VITAMIN D) 2000 UNITS tablet Take 2,000 Units by mouth Daily.   8/6/2017   • Fluticasone Furoate-Vilanterol (BREO ELLIPTA) 200-25 MCG/INH aerosol powder  Inhale 1 puff Daily.   8/6/2017   • LANTUS SOLOSTAR 100 UNIT/ML injection pen 12 Units Every Night.   8/6/2017   • levothyroxine (SYNTHROID, LEVOTHROID) 75 MCG tablet Take 75 mcg by mouth Daily.   8/6/2017   • metoprolol tartrate (LOPRESSOR) 100 MG tablet Take 100 mg by mouth 2 (two) times a day.   Taking   • Multiple Vitamins-Minerals (ICAPS MV PO) Take  by mouth Daily.   Taking   • OMEPRAZOLE PO Take  by mouth.      •  "ondansetron ODT (ZOFRAN-ODT) 8 MG disintegrating tablet Take 8 mg by mouth Every 12 (Twelve) Hours As Needed for Nausea or Vomiting.      • Patiromer Sorbitex Calcium (VELTASSA) 8.4 G pack Take 8.4 g by mouth Daily.      • pravastatin (PRAVACHOL) 40 MG tablet Take 40 mg by mouth daily.   Taking   • PROAIR  (90 BASE) MCG/ACT inhaler Inhale 2 puffs Every 6 (Six) Hours As Needed.      • warfarin (COUMADIN) 3 MG tablet Take 9 mg by mouth Take As Directed. Sunday, Tuesday, Wednesday, Friday, Saturday      • warfarin (COUMADIN) 6 MG tablet Take 6 mg by mouth Daily. Every day      • diclofenac (VOLTAREN) 1 % gel gel 4 g 4 (Four) Times a Day As Needed.      • pantoprazole (PROTONIX) 40 MG EC tablet Take 1 tablet by mouth Daily. 30 tablet 0        Review of Systems:  Constitutional: Positive for chills and fever.        Rigors   HENT: Negative for hearing loss.    Eyes: Negative for visual disturbance.   Respiratory: Positive for cough and shortness of breath.         Yellow sputum  No hemoptysis   Cardiovascular: Negative for chest pain.   Gastrointestinal: Negative for constipation, diarrhea, nausea and vomiting.        Dark stools 3-4 weeks ago, with ulcers on scope.  No further melena   Genitourinary: Negative for dysuria.   Neurological: Positive for weakness. Negative for dizziness.   Psychiatric/Behavioral: Negative for agitation and confusion. Full review of systems reviewed and negative otherwise for acute complaints    Physical Exam:   Vital Signs   Blood pressure 145/54, pulse 93, temperature 97.9 °F (36.6 °C), temperature source Oral, resp. rate 22, height 63\" (160 cm), weight 193 lb 1.6 oz (87.6 kg), SpO2 96 %.     GENERAL: Awake and alert, in no acute distress.   HEENT: Normocephalic, atraumatic.  PER.  No conjunctivitis. No icterus. Oropharynx clear without evidence of thrush or exudate. No evidence of peridontal disease.    NECK: Supple without nuchal rigidity. No mass.  Positive for JVD  LYMPH: No " painful cervical, axillary or inguinal lymphadenopathy.  HEART: RRR; No murmur, rubs, gallops. No bruits in neck, abdomen, or groins, no edema  LUNGS: Normal respiratory effort. Nonlabored. No dullness.  Bilateral rhales and scattered crackles in the on the left side  ABDOMEN: Soft, nontender, nondistended. Positive bowel sounds. No rebound or guarding. NO mass or HSM.  JOINTS:  Full range of motion, no redness or tenderness.  EXT:  No cyanosis, clubbing or edema.  :  External genitalia without swelling or lesion  SKIN: Warm and dry without rash  NEURO: Oriented to PPT. No focal neurological deficits. Strength equal bilateral  PSYCHIATRIC: Normal insight and judgement. Cooperative with PE    Laboratory Data    Results from last 7 days  Lab Units 08/07/17  0546 08/01/17  1312   HEMOGLOBIN g/dL 10.4* 10.2*   HEMATOCRIT % 33.3* 33.7*       Results from last 7 days  Lab Units 08/07/17  0546 08/01/17  1312   SODIUM mmol/L 142 141   POTASSIUM mmol/L 4.0 4.9   CHLORIDE mmol/L 112* 112*   CO2 mmol/L 16.0* 22.0   BUN mg/dL 49* 51*   CREATININE mg/dL 3.00* 3.00*   CALCIUM mg/dL 9.8 9.2   PHOSPHORUS mg/dL  --  3.7   ALBUMIN g/dL 4.30 3.80       Results from last 7 days  Lab Units 08/07/17  0546   GLUCOSE mg/dL 158*       Results from last 7 days  Lab Units 08/07/17  0725   COLOR UA  Yellow   CLARITY UA  Clear   PH, URINE  5.5   SPECIFIC GRAVITY, URINE  1.013   GLUCOSE UA  Negative   KETONES UA  Negative   BILIRUBIN UA  Negative   PROTEIN UA  100 mg/dL (2+)*   BLOOD UA  Large (3+)*   LEUKOCYTES UA  Negative   NITRITE UA  Negative       Results from last 7 days  Lab Units 08/07/17  0546   ALK PHOS U/L 147*   BILIRUBIN mg/dL 0.4   ALT (SGPT) U/L 44*   AST (SGOT) U/L 60*     Estimated Creatinine Clearance: 16.7 mL/min (by C-G formula based on Cr of 3).    Radiology:      Renal Imaging:    I personally read  the radiographic studies    Impression:   1-chronic kidney disease stage IV status post kidney transplant-patient was  temporarily on dialysis for almost 1-1/2 year.  She has been off dialysis for several months.  2- anemia-hemoglobin is low need to assess iron studies  3-volume overload/congestive heart failure-seems like this is her second or third admission for congestive heart failure.  She has a big fistula which probably is the reason for high output cardiac failure.  4-hypertension possibly related to volume    PLAN: Thank you for asking us to see Daijasindy NIKKO Wallace, I recommend the following:   I requested the nurse to give her 2 mg of Bumex and a bit early around 9 PM and then put her on the schedule for 1 mg twice a day IV from tomorrow morning on regular schedule.  I have also requested cardiology to see the patient to evaluate fistula as a cause for congestive heart failure as I feel that her fistula needs to be reduced or even ligated.  This was mentioned to her by Dr. Castillo.  Assessment iron studies for the need of IV iron.  There is no need for dialysis at this time.    Ranjeet Uribe MD  8/7/2017  7:44 PM

## 2017-08-07 NOTE — PROGRESS NOTES
"Pharmacy Consult-Vancomycin Dosing  Moni Wallace is a  77 y.o. female receiving vancomycin therapy.     Indication: R/O Pneumonia  Consulting Provider: Hospital Medicine  ID Consult: No    Goal Trough: 15 - 20       Current Antimicrobial Therapy  IV Anti-Infectives       Ordered     Dose/Rate Route Frequency Start Stop      08/07/17 1255  cefTRIAXone (ROCEPHIN) IVPB 1 g     Ordering Provider:  REY Peres    1 g  over 30 Minutes Intravenous Every 24 Hours 08/08/17 0900      08/07/17 1255  AZITHROMYCIN 500 MG/250 ML 0.9% NS IVPB (MBP)     Ordering Provider:  REY Peres    500 mg  over 60 Minutes Intravenous Every 24 Hours 08/08/17 0900      08/07/17 1249  Pharmacy to dose vancomycin     Ordering Provider:  REY Peres     Does not apply Continuous PRN 08/07/17 1248      08/07/17 0751  cefTRIAXone (ROCEPHIN) IVPB 2 g     Ordering Provider:  Kemar Robbins MD    2 g  over 30 Minutes Intravenous Once 08/07/17 0815 08/07/17 0907    08/07/17 0751  AZITHROMYCIN 500 MG/250 ML 0.9% NS IVPB (MBP)     Ordering Provider:  Kemar Robbins MD    500 mg  over 60 Minutes Intravenous Once 08/07/17 0815 08/07/17 0956            Allergies  Codeine; Nsaids; and Sulfa antibiotics    Labs      Results from last 7 days     Lab Units 08/07/17  0546 08/01/17  1312   BUN mg/dL 49* 51*   CREATININE mg/dL 3.00* 3.00*         Results from last 7 days     Lab Units 08/07/17  0546 08/01/17  1312   WBC 10*3/mm3 17.55* 7.73       Max Temperature in Last 24 Hours: 99.8    Evaluation of Dosing     Last Dose Received in the ED/Outside Facility: None    Ht - 63\" (160 cm)  Wt - 193 lb 1.6 oz (87.6 kg)     Patient on hemodialysis Monday, Wednesday, and Friday started July 2015          Microbiology and Radiology      Urin and Blood Cultures Pending         Respiratory culture:     Evaluation of Level    Prior to 2nd dose after dialysis Wed 8/9.      Assessment/Plan:    Will load with 20 mg/kg and then continue 10 mg/kg after each " dialysis to start on Wed 8/9.      Level to be drawn Wed after dialysis before dose is due.    Will continue to follow.   Thank you,  Wei Polk, PharmD, BCPS   8/7/2017  2:00 PM

## 2017-08-07 NOTE — ED PROVIDER NOTES
"Subjective   History of Present Illness  This 77-year-old female presents to the emergency department via EMS with complaints of feeling unwell.  The patient states she has been wheezing and feeling short of breath when last several days.  Additionally she has had a \"low-grade temperature\".  She's had a bit of a cough productive of some slightly discolored sputum.  She's had no nausea vomiting or diarrhea she denies abdominal pain chest pain dysuria urgency or frequency.    Patient's past medical history significant for history of congestive heart failure as well as hypertension, asthma, diabetes, chronic renal insufficiency status post kidney failure necessitating dialysis and subsequently renal transplant in 2010.  Per the patient her transplanted kidney has since functioning however she tells me that her native kidneys are now working to some extent.  Regardless she denies pain in the transplanted kidney (right lower quadrant placement).  There is also a history of hypothyroidism and recent diagnosis of gastric ulcers    Current medications as noted on the chart    Social history she does not smoke drink or utilize drugs  Review of Systems   Constitutional: Negative for chills and fever.   Respiratory: Positive for cough, shortness of breath and wheezing.    Gastrointestinal: Negative for abdominal pain, diarrhea, nausea and vomiting.   Genitourinary: Negative for dysuria, flank pain, frequency and urgency.   All other systems reviewed and are negative.      Past Medical History:   Diagnosis Date   • Adenomatous colon polyp    • CHF (congestive heart failure)    • Chronic kidney disease    • Clostridium difficile infection    • Diabetes mellitus    • Gastric polyps    • Hypertension    • PAF (paroxysmal atrial fibrillation)    • Tubular adenoma     excision    • Ulcer of gastric fundus    • Vitamin D deficiency        Allergies   Allergen Reactions   • Codeine    • Nsaids    • Sulfa Antibiotics        Past " Surgical History:   Procedure Laterality Date   • BREAST BIOPSY Left 1990'S   • CARPAL TUNNEL RELEASE Right    • CATARACT EXTRACTION     • DIAGNOSTIC LAPAROSCOPY     • HERNIA REPAIR     • HERNIA REPAIR     • TOTAL KNEE ARTHROPLASTY     • TOTAL KNEE ARTHROPLASTY      Left knee    • TRANSPLANTATION RENAL     • TUBAL ABDOMINAL LIGATION     • UPPER GASTROINTESTINAL ENDOSCOPY  05/20/2013       Family History   Problem Relation Age of Onset   • Leukemia Mother    • Stroke Father    • Breast cancer Neg Hx    • Ovarian cancer Neg Hx        Social History     Social History   • Marital status:      Spouse name: N/A   • Number of children: N/A   • Years of education: N/A     Social History Main Topics   • Smoking status: Never Smoker   • Smokeless tobacco: Never Used   • Alcohol use No   • Drug use: No   • Sexual activity: Defer     Other Topics Concern   • None     Social History Narrative           Objective   Physical Exam   Constitutional: She is oriented to person, place, and time. She appears well-developed and well-nourished. No distress.   HENT:   Head: Normocephalic and atraumatic.   Mouth/Throat: Oropharynx is clear and moist.   Eyes: Pupils are equal, round, and reactive to light. Scleral icterus is present.   Neck: Normal range of motion. Neck supple. No JVD present.   Cardiovascular: Regular rhythm.  Exam reveals no gallop and no friction rub.    No murmur heard.  Pulmonary/Chest: Effort normal. She has wheezes. She has rales.   Abdominal: Soft. Bowel sounds are normal. She exhibits no distension. There is no tenderness. There is no rebound and no guarding.   Musculoskeletal: She exhibits no edema.   Lymphadenopathy:     She has no cervical adenopathy.   Neurological: She is alert and oriented to person, place, and time. No cranial nerve deficit. Coordination normal.   Skin: Skin is warm and dry. No rash noted. She is not diaphoretic.   Psychiatric: She has a normal mood and affect. Her behavior is normal.    Nursing note and vitals reviewed.    The patient is mildly tachycardic with pulse of 108 no murmurs are noted.  On chest examination there are diffuse rhonchi and wheezing.  Rales at the bases.  There is no jugular venous distention.    The patient's white count is elevated at 17,000 at this point I have not found a specific infectious source for this leukocytosis.  We will cover the patient for possible nominal pneumonic source as she is coughing and does have some somewhat greenish-appearing sputum.  Regardless she does have evidence for fluid overload with an enlarged heart and curly B-lines and cephalization of her vessels.  Lasix is been administered.  BNP is elevated at 1460.  She is noted to have some renal insufficiency however this appears to be stable in review of her previous charts.    Assessment congestive heart failure  Possible pneumonia, leukocytosis    Plan patient is received diuretics and antibiotics in the emergency department I think given her age and presenting symptoms that admission is most appropriate at this time is had with the hospitalist regarding this and they are in agreement.  I spoken with the patient and the relative that is sitting with her regarding our findings as well as the plan and they too are in agreement.  Procedures         ED Course  ED Course                  MDM    Final diagnoses:   Congestive heart failure, unspecified congestive heart failure chronicity, unspecified congestive heart failure type   Leukocytosis, unspecified type            Kemar Robbins MD  08/08/17 0021

## 2017-08-07 NOTE — PROGRESS NOTES
Discharge Planning Assessment  [facility]     Patient Name: Moni Wallace  MRN: [id number]  Today's Date: 8/7/2017    Admit Date: 8/7/2017          Discharge Needs Assessment       08/07/17 1101    Living Environment    Lives With child(chavo), adult    Living Arrangements house    Transportation Available car;family or friend will provide    Living Environment    Provides Primary Care For no one    Quality Of Family Relationships supportive    Able to Return to Prior Living Arrangements yes    Discharge Needs Assessment    Concerns To Be Addressed discharge planning concerns    Readmission Within The Last 30 Days no previous admission in last 30 days    Outpatient/Agency/Support Group Needs physician;other (see comments)   [facility] coumadin clinic follows patient's coumadin and INR results    Anticipated Changes Related to Illness none    Equipment Currently Used at Home cane, straight;walker, rolling   has avlb, but does not use currently    Equipment Needed After Discharge none    Discharge Facility/Level Of Care Needs home with home health    Discharge Disposition home or self-care;home healthcare service    Discharge Contact Information if Applicable [phone]    Discharge Planning Comments home with help from [facility] and home health            Discharge Plan       08/07/17 1106    Case Management/Social Work Plan    Plan home with help from [facility] and home health    Patient/Family In Agreement With Plan yes    Additional Comments Ms. Wallace lives in [place], her adult son Yadiel lives with her. Son, Adriano is currently at bedside. Ms. Wallace states she has been fairly independent PTA, had no use of home health or DME services. Discussed with Ms. Wallace about home health and [facility] and she would like to do both upon discharge to help manage her HF symptoms. SUHAIL consulted HF navigators to see during this admission. Ms. Wallace has elected to use [facility] upon discharge. SUHAIL  spoke with Kaye Moreno, Liaison with [employer] and she will place on her list. WIll need home health orders fro skilled nsg, telehealth prior to discharge. Ms. Wallace is followed by Dr. Joe at [facility] and their coumadin clinic follows and manages her coumadin and INR checks. Otherwise, Ms. Wallace nor son have any concerns or needs identified at this time. CM will continue to follow.         Discharge Placement     No information found                Demographic Summary       08/07/17 1057    Referral Information    Admission Type inpatient    Arrived From home or self-care    Referral Source admission list    Record Reviewed clinical discipline documentation;history and physical;medical record    Contact Information    Permission Granted to Share Information With     Primary Care Physician Information    Name erasmo gagnon            Functional Status       08/07/17 1057    Functional Status Current    Ambulation 2-->assistive person    Transferring 2-->assistive person    Toileting 2-->assistive person    Bathing 2-->assistive person    Dressing 0-->independent    Eating 0-->independent    Communication 0-->understands/communicates without difficulty    Swallowing (if score 2 or more for any item, consult Rehab Services) 0-->swallows foods/liquids without difficulty    Change in Functional Status Since Onset of Current Illness/Injury no    Functional Status Prior    Ambulation 0-->independent    Transferring 0-->independent    Toileting 0-->independent    Bathing 0-->independent    Dressing 0-->independent    Eating 0-->independent    Communication 0-->understands/communicates without difficulty    Swallowing 0-->swallows foods/liquids without difficulty    IADL    Medications independent   has rx drug coverage. Difficulty affording meds if she was not already receiving assistance. Gave her and son website resources for additional help if needed.     Meal Preparation independent    Housekeeping  independent    Laundry independent    Shopping independent    Oral Care independent    Activity Tolerance    Current Activity Limitations none    Usual Activity Tolerance moderate    Current Activity Tolerance fair    Cognitive/Perceptual/Developmental    Current Mental Status/Cognitive Functioning no deficits noted    Recent Changes in Mental Status/Cognitive Functioning no changes    Employment/Financial    Financial Concerns none   has medicare a/b and [employer] insurance with no recent changes to coverage            Psychosocial     None            Abuse/Neglect     None            Legal     None            Substance Abuse     None            Patient Forms     None          Elinor Orellana RN

## 2017-08-07 NOTE — H&P
[facility] Medicine Services  HISTORY AND PHYSICAL    Primary Care Physician: Slim Wick MD    Subjective     Chief Complaint:  SOB/cough    History of Present Illness:     77-year-old female presented to ED with chief complaint of cough, shortness of breath and fatigue for 1-2 days.  Her son who lives with her had recently been ill with upper respiratory symptoms.  She denies fever but reports chills and rigors.      Review of Systems   Constitutional: Positive for chills and fever.        Rigors   HENT: Negative for hearing loss.    Eyes: Negative for visual disturbance.   Respiratory: Positive for cough and shortness of breath.         Yellow sputum  No hemoptysis   Cardiovascular: Negative for chest pain.   Gastrointestinal: Negative for constipation, diarrhea, nausea and vomiting.        Dark stools 3-4 weeks ago, with ulcers on scope.  No further melena   Genitourinary: Negative for dysuria.   Neurological: Positive for weakness. Negative for dizziness.   Psychiatric/Behavioral: Negative for agitation and confusion.          Past Medical History:   Diagnosis Date   • Adenomatous colon polyp    • CHF (congestive heart failure)    • Chronic kidney disease    • Clostridium difficile infection    • Diabetes mellitus    • Gastric polyps    • Hypertension    • Hypothyroidism    • Macular degeneration    • PAF (paroxysmal atrial fibrillation) 2015    x1 after knee surgery   • Tubular adenoma     excision    • Ulcer of gastric fundus    • Vitamin D deficiency        Past Surgical History:   Procedure Laterality Date   • BREAST BIOPSY Left 1990'S   • CARPAL TUNNEL RELEASE Right    • CATARACT EXTRACTION Bilateral    • DIAGNOSTIC LAPAROSCOPY     • HERNIA REPAIR     • HERNIA REPAIR     • TOTAL KNEE ARTHROPLASTY     • TOTAL KNEE ARTHROPLASTY      Left knee    • TRANSPLANTATION RENAL Right    • TUBAL ABDOMINAL LIGATION     • UPPER GASTROINTESTINAL ENDOSCOPY  05/20/2013       Family  History   Problem Relation Age of Onset   • Leukemia Mother    • Stroke Father    • Dementia Sister    • Kidney disease Sister    • Diabetic kidney disease Sister    • Lung cancer Brother    • Breast cancer Neg Hx    • Ovarian cancer Neg Hx      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      • amLODIPine (NORVASC) 5 MG tablet Take 5 mg by mouth Daily.   8/6/2017   • bumetanide (BUMEX) 1 MG tablet Take 1 mg by mouth Daily.   8/6/2017   • Cholecalciferol (VITAMIN D) 2000 UNITS tablet Take 2,000 Units by mouth Daily.   8/6/2017   • Fluticasone Furoate-Vilanterol (BREO ELLIPTA) 200-25 MCG/INH aerosol powder  Inhale 1 puff Daily.   8/6/2017   • LANTUS SOLOSTAR 100 UNIT/ML injection pen 12 Units Every Night.   8/6/2017   • levothyroxine (SYNTHROID, LEVOTHROID) 75 MCG tablet Take 75 mcg by mouth Daily.   8/6/2017   • metoprolol tartrate (LOPRESSOR) 100 MG tablet Take 100 mg by mouth 2 (two) times a day.   Taking   • Multiple Vitamins-Minerals (ICAPS MV PO) Take  by mouth Daily.   Taking   • OMEPRAZOLE PO Take  by mouth.      • ondansetron ODT (ZOFRAN-ODT) 8 MG disintegrating tablet Take 8 mg by mouth Every 12 (Twelve) Hours As Needed for Nausea or Vomiting.      • Patiromer Sorbitex Calcium (VELTASSA) 8.4 G pack Take 8.4 g by mouth Daily.      • pravastatin (PRAVACHOL) 40  "MG tablet Take 40 mg by mouth daily.   Taking   • PROAIR  (90 BASE) MCG/ACT inhaler Inhale 2 puffs Every 6 (Six) Hours As Needed.      • warfarin (COUMADIN) 3 MG tablet Take 9 mg by mouth Take As Directed. Sunday, Tuesday, Wednesday, Friday, Saturday      • warfarin (COUMADIN) 6 MG tablet Take 6 mg by mouth Daily. Every day      • diclofenac (VOLTAREN) 1 % gel gel 4 g 4 (Four) Times a Day As Needed.      • pantoprazole (PROTONIX) 40 MG EC tablet Take 1 tablet by mouth Daily. 30 tablet 0        Allergies:  Allergies   Allergen Reactions   • Codeine    • Nsaids    • Sulfa Antibiotics          Objective     Physical Exam:  Vital Signs: /65 (BP Location: Left arm, Patient Position: Lying)  Pulse 99  Temp 98.6 °F (37 °C) (Oral)   Resp 26  Ht 63\" (160 cm)  Wt 193 lb 1.6 oz (87.6 kg)  LMP  (Approximate)  SpO2 97%  BMI 34.21 kg/m2  Physical Exam   Constitutional: She is oriented to person, place, and time. She appears well-developed.   Ill-appearing, but nontoxic  Son at bedside   HENT:   Head: Normocephalic.   Eyes: No scleral icterus.   Neck: Neck supple.   Cardiovascular: Normal rate, regular rhythm and intact distal pulses.  Exam reveals no friction rub.    No murmur heard.  Pulmonary/Chest: Effort normal. No respiratory distress. She has wheezes.   Course throughout   Abdominal: Soft. Bowel sounds are normal. She exhibits no distension.   Neurological: She is alert and oriented to person, place, and time.   Skin: Skin is warm and dry.   Psychiatric: She has a normal mood and affect. Her behavior is normal.   Vitals reviewed.      Results Reviewed:    Results from last 7 days  Lab Units 08/07/17  0546   WBC 10*3/mm3 17.55*   HEMOGLOBIN g/dL 10.4*   PLATELETS 10*3/mm3 201       Results from last 7 days  Lab Units 08/07/17  0546   SODIUM mmol/L 142   POTASSIUM mmol/L 4.0   CO2 mmol/L 16.0*   CREATININE mg/dL 3.00*   GLUCOSE mg/dL 158*   CALCIUM mg/dL 9.8       Assessment / Plan     Problem List: "   Hospital Problem List     * (Principal)CHF (congestive heart failure)    Pneumonia    Leukocytosis    Paroxysmal atrial fibrillation    Overview Addendum 8/2/2016 12:07 PM by Jeff Joe MD     · CHADS-VASc = 5  · Atrial fibrillation initially diagnosed February 2015; spontaneous conversion to normal sinus rhythm.  · Echocardiogram (3/14/2015):  Normal LV systolic function, mild MR/TR.         Essential hypertension    Type 2 diabetes mellitus    Chronic kidney disease, stage V    Overview Addendum 6/8/2017  5:52 PM by Jeff Joe MD     · IgA nephropathy with history of renal transplant, 2010.   · Patient on hemodialysis Monday, Wednesday, and Friday started July 2015.  · Hemodialysis discontinued 2/2017.         Anemia of renal disease    Overview Deleted 8/2/2016 12:03 PM by Jeff Joe MD            Hypertension    PAF (paroxysmal atrial fibrillation)    Overview Signed 8/7/2017 11:32 AM by REY Peres     x1 after knee surgery         Diabetes mellitus    Vitamin D deficiency    Metabolic acidosis          Plan:  --IV antibiotics  --Renal consult  --diurese  --ECHO  --repeat CXR in am      DVT prophylaxis:  Anticoagulated/mechanical  Code Status:  Full code  Admission Status: Patient will be admitted to (LEXOBS or LEXINPT)     REY Peres 08/07/17 12:50 PM    Brief Attending Note       I have seen and examined the patient, performing an independent face-to-face diagnostic evaluation with plan of care reviewed and developed with the advanced practice clinician (APC).       Brief Summary Statement/HPI:   78 yo with history of CHF (EF 50%, diastolic dysfunction), CKD IV (prior kidney transplant, subsequent loss of renal function and re-initiation of dialysis, but now off HD since April 2017) and recent hospitalization in June for volume overload, presents today after several days increasing dyspnea and cough. Ms Wallace denies fever or chills, but the cough has  been productive. No increased lower extremity edema or weight change ( but th patient does not weigh herself daily). Moderate leukocytosis (17K), elevated BNP (1460) with CXR showing increased vascularization and interstitial markings, but no discrete infiltrate.        Attending Physical Exam:  Gen-appears mildly ill, occasional coughing  HEENT-NCAT, oropharynx clear  CV-Regular, mild tachycardia, no murmur  Resp-bilateral rhonchi  Abd-soft, non-tender, non-distended, normo active bowel sounds; overweight  Ext-No lower extremity cyanosis, clubbing or edema bilaterally  Neuro-alert and oriented x 3, speech clear, moves all extremities   Psych-normal affect   Skin- No rash on exposed UE or LE bilaterally      amLODIPine 5 mg Oral Daily   atorvastatin 10 mg Oral Nightly   [START ON 8/8/2017] azithromycin 500 mg Intravenous Q24H   budesonide-formoterol 2 puff Inhalation BID - RT   bumetanide 1 mg Intravenous Q12H   [START ON 8/8/2017] ceftriaxone 1 g Intravenous Q24H   insulin detemir 12 Units Subcutaneous Nightly   ipratropium-albuterol 3 mL Nebulization 4x Daily - RT   levothyroxine 75 mcg Oral Q AM   metoprolol tartrate 100 mg Oral BID   sennosides-docusate sodium 2 tablet Oral BID   [START ON 8/9/2017] Pharmacy Consult - Pharmacy to dose  Does not apply Once   [START ON 8/9/2017] vancomycin 10 mg/kg Intravenous Once per day on Mon Wed Fri   vancomycin 20 mg/kg Intravenous Once   warfarin 6 mg Oral Once per day on Mon Thu   [START ON 8/8/2017] warfarin 9 mg Oral Once per day on Sun Tue Wed Fri Sat         Brief Assessment/Plan:    A/C Diastolic Heart Failure  - continue IV bumex, monitor I/Os, daily wts    Possible pneumonia vs Bronchitis  - WBC 17K  - CXR no discrete infiltrate  - sputum and blood cultures  - will continue broad spectrum abx -- included Vanc due to recent hospitalization, but consider stopping this antibiotic soon if cultures negative and patient showing clinical improvement  - CXR and CBC  am    Leukocytosis    CKD IV  - h/o prior transplant and recent dialysis  - avoid nephrotoxins  - Nephrology consultation    Paroxysmal atrial fibrillation  - coumadin, PharmD to dose  - INR 2.8    HTN    Dyslipidemia    DMII  - basal bolus    VTE proph: coumadin                    For additional information see above per APC which I have reviewed and/or edited.      Tyson Mcdonald MD  08/07/17  2:04 PM

## 2017-08-08 ENCOUNTER — APPOINTMENT (OUTPATIENT)
Dept: CARDIOLOGY | Facility: HOSPITAL | Age: 77
End: 2017-08-08

## 2017-08-08 ENCOUNTER — APPOINTMENT (OUTPATIENT)
Dept: GENERAL RADIOLOGY | Facility: HOSPITAL | Age: 77
End: 2017-08-08

## 2017-08-08 PROBLEM — J40 BRONCHITIS: Status: ACTIVE | Noted: 2017-08-07

## 2017-08-08 PROBLEM — J20.9 BRONCHOSPASM WITH BRONCHITIS, ACUTE: Status: ACTIVE | Noted: 2017-08-08

## 2017-08-08 PROBLEM — I48.91 ATRIAL FIBRILLATION WITH RVR (HCC): Status: ACTIVE | Noted: 2017-08-08

## 2017-08-08 PROBLEM — I38 HEART VALVE PROBLEM: Status: ACTIVE | Noted: 2017-08-08

## 2017-08-08 PROBLEM — I50.33 ACUTE ON CHRONIC DIASTOLIC (CONGESTIVE) HEART FAILURE (HCC): Status: ACTIVE | Noted: 2017-08-07

## 2017-08-08 LAB
ANION GAP SERPL CALCULATED.3IONS-SCNC: 9 MMOL/L (ref 3–11)
BASOPHILS # BLD AUTO: 0.01 10*3/MM3 (ref 0–0.2)
BASOPHILS NFR BLD AUTO: 0.1 % (ref 0–1)
BNP SERPL-MCNC: 1254 PG/ML (ref 0–100)
BUN BLD-MCNC: 60 MG/DL (ref 9–23)
BUN/CREAT SERPL: 19.4 (ref 7–25)
CALCIUM SPEC-SCNC: 8.7 MG/DL (ref 8.7–10.4)
CHLORIDE SERPL-SCNC: 113 MMOL/L (ref 99–109)
CO2 SERPL-SCNC: 19 MMOL/L (ref 20–31)
CREAT BLD-MCNC: 3.1 MG/DL (ref 0.6–1.3)
DEPRECATED RDW RBC AUTO: 55.2 FL (ref 37–54)
EOSINOPHIL # BLD AUTO: 0.11 10*3/MM3 (ref 0–0.3)
EOSINOPHIL NFR BLD AUTO: 1 % (ref 0–3)
ERYTHROCYTE [DISTWIDTH] IN BLOOD BY AUTOMATED COUNT: 16.3 % (ref 11.3–14.5)
FERRITIN SERPL-MCNC: 788 NG/ML (ref 10–291)
GFR SERPL CREATININE-BSD FRML MDRD: 15 ML/MIN/1.73
GLUCOSE BLD-MCNC: 80 MG/DL (ref 70–100)
HBA1C MFR BLD: 5.5 % (ref 4.8–5.6)
HCT VFR BLD AUTO: 27.9 % (ref 34.5–44)
HGB BLD-MCNC: 8.9 G/DL (ref 11.5–15.5)
IMM GRANULOCYTES # BLD: 0.05 10*3/MM3 (ref 0–0.03)
IMM GRANULOCYTES NFR BLD: 0.5 % (ref 0–0.6)
INR PPP: 3.28
IRON 24H UR-MRATE: 12 MCG/DL (ref 50–175)
IRON SATN MFR SERPL: 5 % (ref 15–50)
LYMPHOCYTES # BLD AUTO: 1.21 10*3/MM3 (ref 0.6–4.8)
LYMPHOCYTES NFR BLD AUTO: 10.9 % (ref 24–44)
MCH RBC QN AUTO: 29.3 PG (ref 27–31)
MCHC RBC AUTO-ENTMCNC: 31.9 G/DL (ref 32–36)
MCV RBC AUTO: 91.8 FL (ref 80–99)
MONOCYTES # BLD AUTO: 1.04 10*3/MM3 (ref 0–1)
MONOCYTES NFR BLD AUTO: 9.4 % (ref 0–12)
NEUTROPHILS # BLD AUTO: 8.65 10*3/MM3 (ref 1.5–8.3)
NEUTROPHILS NFR BLD AUTO: 78.1 % (ref 41–71)
PLATELET # BLD AUTO: 164 10*3/MM3 (ref 150–450)
PMV BLD AUTO: 10.6 FL (ref 6–12)
POTASSIUM BLD-SCNC: 3.7 MMOL/L (ref 3.5–5.5)
PROTHROMBIN TIME: 37.1 SECONDS (ref 9.6–11.5)
RBC # BLD AUTO: 3.04 10*6/MM3 (ref 3.89–5.14)
SODIUM BLD-SCNC: 141 MMOL/L (ref 132–146)
TIBC SERPL-MCNC: 229 MCG/DL (ref 250–450)
WBC NRBC COR # BLD: 11.07 10*3/MM3 (ref 3.5–10.8)

## 2017-08-08 PROCEDURE — 80048 BASIC METABOLIC PNL TOTAL CA: CPT | Performed by: INTERNAL MEDICINE

## 2017-08-08 PROCEDURE — 83550 IRON BINDING TEST: CPT | Performed by: INTERNAL MEDICINE

## 2017-08-08 PROCEDURE — 25010000002 DIGOXIN PER 500 MCG: Performed by: PHYSICIAN ASSISTANT

## 2017-08-08 PROCEDURE — 25010000002 CEFTRIAXONE PER 250 MG: Performed by: NURSE PRACTITIONER

## 2017-08-08 PROCEDURE — 83880 ASSAY OF NATRIURETIC PEPTIDE: CPT | Performed by: INTERNAL MEDICINE

## 2017-08-08 PROCEDURE — 25010000002 AZITHROMYCIN: Performed by: NURSE PRACTITIONER

## 2017-08-08 PROCEDURE — 83540 ASSAY OF IRON: CPT | Performed by: INTERNAL MEDICINE

## 2017-08-08 PROCEDURE — 94799 UNLISTED PULMONARY SVC/PX: CPT

## 2017-08-08 PROCEDURE — 71020 HC CHEST PA AND LATERAL: CPT

## 2017-08-08 PROCEDURE — 85025 COMPLETE CBC W/AUTO DIFF WBC: CPT | Performed by: INTERNAL MEDICINE

## 2017-08-08 PROCEDURE — 63710000001 INSULIN DETEMIR PER 5 UNITS: Performed by: NURSE PRACTITIONER

## 2017-08-08 PROCEDURE — 99221 1ST HOSP IP/OBS SF/LOW 40: CPT | Performed by: INTERNAL MEDICINE

## 2017-08-08 PROCEDURE — 94640 AIRWAY INHALATION TREATMENT: CPT

## 2017-08-08 PROCEDURE — 99233 SBSQ HOSP IP/OBS HIGH 50: CPT | Performed by: HOSPITALIST

## 2017-08-08 PROCEDURE — 82728 ASSAY OF FERRITIN: CPT | Performed by: INTERNAL MEDICINE

## 2017-08-08 PROCEDURE — 85610 PROTHROMBIN TIME: CPT | Performed by: NURSE PRACTITIONER

## 2017-08-08 PROCEDURE — 83036 HEMOGLOBIN GLYCOSYLATED A1C: CPT | Performed by: NURSE PRACTITIONER

## 2017-08-08 PROCEDURE — 94760 N-INVAS EAR/PLS OXIMETRY 1: CPT

## 2017-08-08 RX ORDER — AMIODARONE HYDROCHLORIDE 200 MG/1
400 TABLET ORAL EVERY 12 HOURS SCHEDULED
Status: DISCONTINUED | OUTPATIENT
Start: 2017-08-08 | End: 2017-08-09

## 2017-08-08 RX ORDER — DILTIAZEM HCL IN NACL,ISO-OSM 125 MG/125
5-15 PLASTIC BAG, INJECTION (ML) INTRAVENOUS
Status: DISCONTINUED | OUTPATIENT
Start: 2017-08-08 | End: 2017-08-09

## 2017-08-08 RX ORDER — DIGOXIN 0.25 MG/ML
125 INJECTION INTRAMUSCULAR; INTRAVENOUS ONCE
Status: COMPLETED | OUTPATIENT
Start: 2017-08-08 | End: 2017-08-08

## 2017-08-08 RX ADMIN — IPRATROPIUM BROMIDE AND ALBUTEROL SULFATE 3 ML: .5; 3 SOLUTION RESPIRATORY (INHALATION) at 08:30

## 2017-08-08 RX ADMIN — ATORVASTATIN CALCIUM 10 MG: 10 TABLET, FILM COATED ORAL at 20:48

## 2017-08-08 RX ADMIN — IPRATROPIUM BROMIDE AND ALBUTEROL SULFATE 3 ML: .5; 3 SOLUTION RESPIRATORY (INHALATION) at 12:22

## 2017-08-08 RX ADMIN — AMIODARONE HYDROCHLORIDE 400 MG: 200 TABLET ORAL at 16:38

## 2017-08-08 RX ADMIN — LEVOTHYROXINE SODIUM 75 MCG: 75 TABLET ORAL at 06:03

## 2017-08-08 RX ADMIN — AZITHROMYCIN 500 MG: 500 INJECTION, POWDER, LYOPHILIZED, FOR SOLUTION INTRAVENOUS at 10:02

## 2017-08-08 RX ADMIN — DIGOXIN 125 MCG: 0.25 INJECTION INTRAMUSCULAR; INTRAVENOUS at 11:35

## 2017-08-08 RX ADMIN — CEFTRIAXONE SODIUM 1 G: 1 INJECTION, SOLUTION INTRAVENOUS at 09:01

## 2017-08-08 RX ADMIN — BUMETANIDE 1 MG: 0.25 INJECTION INTRAMUSCULAR; INTRAVENOUS at 09:01

## 2017-08-08 RX ADMIN — BUDESONIDE AND FORMOTEROL FUMARATE DIHYDRATE 2 PUFF: 80; 4.5 AEROSOL RESPIRATORY (INHALATION) at 20:16

## 2017-08-08 RX ADMIN — IPRATROPIUM BROMIDE AND ALBUTEROL SULFATE 3 ML: .5; 3 SOLUTION RESPIRATORY (INHALATION) at 20:16

## 2017-08-08 RX ADMIN — METOPROLOL TARTRATE 100 MG: 100 TABLET, FILM COATED ORAL at 18:01

## 2017-08-08 RX ADMIN — METOPROLOL TARTRATE 100 MG: 100 TABLET, FILM COATED ORAL at 08:59

## 2017-08-08 RX ADMIN — Medication 2 TABLET: at 18:01

## 2017-08-08 RX ADMIN — IPRATROPIUM BROMIDE AND ALBUTEROL SULFATE 3 ML: .5; 3 SOLUTION RESPIRATORY (INHALATION) at 16:00

## 2017-08-08 RX ADMIN — ALPRAZOLAM 0.5 MG: 0.5 TABLET ORAL at 20:48

## 2017-08-08 RX ADMIN — INSULIN DETEMIR 12 UNITS: 100 INJECTION, SOLUTION SUBCUTANEOUS at 20:49

## 2017-08-08 RX ADMIN — DILTIAZEM HCL-SODIUM CHLORIDE IV SOLN 125 MG/125ML-0.9% 5 MG/HR: 125-0.9/125 SOLUTION at 10:01

## 2017-08-08 RX ADMIN — BUMETANIDE 1 MG: 0.25 INJECTION INTRAMUSCULAR; INTRAVENOUS at 20:49

## 2017-08-08 RX ADMIN — AMLODIPINE BESYLATE 5 MG: 5 TABLET ORAL at 08:59

## 2017-08-08 RX ADMIN — Medication 2 TABLET: at 08:59

## 2017-08-08 RX ADMIN — BUDESONIDE AND FORMOTEROL FUMARATE DIHYDRATE 2 PUFF: 80; 4.5 AEROSOL RESPIRATORY (INHALATION) at 08:30

## 2017-08-08 RX ADMIN — BENZOCAINE AND MENTHOL 1 LOZENGE: 15; 3.6 LOZENGE ORAL at 06:03

## 2017-08-08 NOTE — CONSULTS
[facility]        Date of Hospital Visit: 17      Place of Service: [facility]    Patient Name: Moni Wallace  :1940    Referral Provider: No ref. provider found  Primary Care Provider: Slim Wick MD    Chief complaint/Reason for Consultation:  shortness of breath    Problem List:  Problem   Atrial Fibrillation With Rvr   Bronchospasm With Bronchitis, Acute   Acute Diastolic Heart Failure    1. Echo 17:  · Left ventricular systolic function is normal. Estimated EF = 50%.  · Left ventricular diastolic dysfunction (grade II) consistent with pseudonormalization.  · Left atrial cavity size is dilated.  · Calculated right ventricular systolic pressure from tricuspid regurgitation is 41.6 mmHg.     Paroxysmal Atrial Fibrillation    · CHADS-VASc = 5  · Atrial fibrillation initially diagnosed 2015; spontaneous conversion to normal sinus rhythm.  · Echocardiogram (17) Left atrial cavity size is dilated. Left atrial volume is severely increased. 4.8cm     Chronic Kidney Disease, Stage IV (Severe)    · IgA nephropathy with history of renal transplant, .   · Patient on hemodialysis Monday, Wednesday, and Friday started 2015.  · Hemodialysis discontinued 2017.     Heart Valve Problem    1.  Echo 17:  · Mild-to-moderate mitral valve regurgitation is present  · Mild aortic valve stenosis is present.  · Moderate tricuspid valve regurgitation is present.     Essential Hypertension   Type 2 Diabetes Mellitus         History of Present Illness:  77-year-old hypertensive diabetic female with stage IV kidney disease.  She has a chronic history of paroxysmal atrial fibrillation.  She was previously maintained on amiodarone which was discontinued in 2016 after prolonged maintenance of sinus rhythm.  She was admitted at this facility in  of this year with diastolic congestive heart failure.  She was doing fairly well at  home until this past weekend.  She reports having progressive fatigue, wheezing and shortness of breath.  She presented [facility] department yesterday was admitted to the hospital service for acute bronchitis complicated by diastolic congestive heart failure.  This morning she went to the x-ray department for repeat chest x-ray and upon returning went into atrial fibrillation with rapid ventricular response.  She is oriented and started on a Cardizem drip by the hospitalist service.  She tells me that she has been out of her usual inhalers for over a week and that her son has had an upper respiratory infection over the past week.  She denies lower extremity edema or chest pain.  Other than her inhalers she has been compliant with her medications.  She does not weigh herself on a daily basis.  She may have had some dietary high sodium intake recently.            Allergies   Allergen Reactions   • Codeine    • Nsaids    • Sulfa Antibiotics        Past Medical History:   Diagnosis Date   • Adenomatous colon polyp    • Chronic kidney disease    • Clostridium difficile infection    • Diabetes mellitus    • Gastric polyps    • Hypothyroidism    • Macular degeneration    • Tubular adenoma     excision    • Ulcer of gastric fundus    • Vitamin D deficiency        Past Surgical History:   Procedure Laterality Date   • BREAST BIOPSY Left 1990'S   • CARPAL TUNNEL RELEASE Right    • CATARACT EXTRACTION Bilateral    • DIAGNOSTIC LAPAROSCOPY     • HERNIA REPAIR     • HERNIA REPAIR     • TOTAL KNEE ARTHROPLASTY     • TOTAL KNEE ARTHROPLASTY      Left knee    • TRANSPLANTATION RENAL Right    • TUBAL ABDOMINAL LIGATION     • UPPER GASTROINTESTINAL ENDOSCOPY  05/20/2013         Prescriptions Prior to Admission   Medication Sig Dispense Refill Last Dose   • ALPRAZolam (XANAX) 0.5 MG tablet Take 0.5 mg by mouth 2 (Two) Times a Day As Needed for Anxiety.      • amLODIPine (NORVASC) 5 MG tablet Take 5 mg by mouth Daily.    8/6/2017   • bumetanide (BUMEX) 1 MG tablet Take 1 mg by mouth Daily.   8/6/2017   • Cholecalciferol (VITAMIN D) 2000 UNITS tablet Take 2,000 Units by mouth Daily.   8/6/2017   • Fluticasone Furoate-Vilanterol (BREO ELLIPTA) 200-25 MCG/INH aerosol powder  Inhale 1 puff Daily.   8/6/2017   • LANTUS SOLOSTAR 100 UNIT/ML injection pen 12 Units Every Night.   8/6/2017   • levothyroxine (SYNTHROID, LEVOTHROID) 75 MCG tablet Take 75 mcg by mouth Daily.   8/6/2017   • metoprolol tartrate (LOPRESSOR) 100 MG tablet Take 100 mg by mouth 2 (two) times a day.   Taking   • Multiple Vitamins-Minerals (ICAPS MV PO) Take  by mouth Daily.   Taking   • OMEPRAZOLE PO Take  by mouth.      • ondansetron ODT (ZOFRAN-ODT) 8 MG disintegrating tablet Take 8 mg by mouth Every 12 (Twelve) Hours As Needed for Nausea or Vomiting.      • Patiromer Sorbitex Calcium (VELTASSA) 8.4 G pack Take 8.4 g by mouth Daily.      • pravastatin (PRAVACHOL) 40 MG tablet Take 40 mg by mouth daily.   Taking   • PROAIR  (90 BASE) MCG/ACT inhaler Inhale 2 puffs Every 6 (Six) Hours As Needed.      • warfarin (COUMADIN) 3 MG tablet Take 9 mg by mouth Take As Directed. Sunday, Tuesday, Wednesday, Friday, Saturday      • warfarin (COUMADIN) 6 MG tablet Take 6 mg by mouth Daily. Every day      • diclofenac (VOLTAREN) 1 % gel gel 4 g 4 (Four) Times a Day As Needed.      • pantoprazole (PROTONIX) 40 MG EC tablet Take 1 tablet by mouth Daily. 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      Social History Main Topics   • Smoking status: Never Smoker   • Smokeless tobacco: Never Used   • Alcohol use No   • Drug use: No   • Sexual activity: Defer     Other Topics Concern   • Not on file     Social History Narrative     "Lives at home, 1 son lives with her    Not current with HH    No assistive devices used       Family History   Problem Relation Age of Onset   • Leukemia Mother    • Stroke Father    • Dementia Sister    • Kidney disease Sister    • Diabetic kidney disease Sister    • Lung cancer Brother    • Breast cancer Neg Hx    • Ovarian cancer Neg Hx        REVIEW OF SYSTEMS:   Review of Systems   Constitution: Positive for malaise/fatigue.   HENT: Negative.    Eyes: Negative.    Cardiovascular: Positive for dyspnea on exertion. Negative for chest pain.   Respiratory: Positive for cough and shortness of breath.    Endocrine: Negative.    Hematologic/Lymphatic: Negative.    Skin: Negative.    Musculoskeletal: Negative.    Gastrointestinal: Negative.    Genitourinary: Negative.    Neurological: Negative.    Psychiatric/Behavioral: Negative.    Allergic/Immunologic: Negative.    All other systems reviewed and are negative.        Objective:     Vitals:    08/08/17 0000 08/08/17 0400 08/08/17 0800 08/08/17 0830   BP: 147/64 159/86 161/64    BP Location: Left arm Left arm Left arm    Patient Position: Lying Lying Lying    Pulse: 81 86 89 80   Resp: 20 20 20 20   Temp: 98.9 °F (37.2 °C) 98.4 °F (36.9 °C) 98.2 °F (36.8 °C)    TempSrc: Oral Oral Oral    SpO2: 97% 96%  96%   Weight:       Height:           Intake/Output Summary (Last 24 hours) at 08/08/17 1112  Last data filed at 08/08/17 0800   Gross per 24 hour   Intake              860 ml   Output             1875 ml   Net            -1015 ml       Body mass index is 34.21 kg/(m^2).  Flowsheet Rows         First Filed Value    Admission Height  63\" (160 cm) Documented at 08/07/2017 0524    Admission Weight  190 lb (86.2 kg) Documented at 08/07/2017 0524          Physical Exam   General: No acute distress, well-developed and well-nourished.    Skin: Skin is warm and dry. No obvious cyanosis, erythema or pallor.   HEENT: Atraumatic, normocephalic, no conjunctival pallor, no scleral " icterus.   Neck: Supple, no JVD. Normal carotid upstrokes, no bruits.    Chest:No respiratory distresWheezes in upper and lower lobes bilaterally no crackles or rhonchi   Cardiovascular: Rapid and irregular   Pulses:Radial and pedal pulses are 2+ and symmetric.    Abdomen: Soft, non tender, normal bowel sounds.   Musculoskeletal/Extremities:  No clubbing, cyanosis or edema. No gross deformity.   Neurological: Alert and oriented to person, place, and time, no gross focal deficits.   Psychiatric: Normal mood and affect.Speech and behavior is normal.        Lab Review:                  Results from last 7 days  Lab Units 08/08/17  0530   SODIUM mmol/L 141   POTASSIUM mmol/L 3.7   CHLORIDE mmol/L 113*   CO2 mmol/L 19.0*   BUN mg/dL 60*   CREATININE mg/dL 3.10*   GLUCOSE mg/dL 80   CALCIUM mg/dL 8.7           Results from last 7 days  Lab Units 08/08/17  0530   WBC 10*3/mm3 11.07*   HEMOGLOBIN g/dL 8.9*   HEMATOCRIT % 27.9*   PLATELETS 10*3/mm3 164       Results from last 7 days  Lab Units 08/08/17  0530 08/07/17  1313 08/07/17  0546   INR  3.28 2.81 2.65                   Results from last 7 days  Lab Units 08/08/17  0530   BNP pg/mL 1254.0*     Telemetry: Afib 110    EKG: CARD EKG FINDINGS: unchanged from previous tracings          RECORDS REVIEWED:    Assessment:   · Acute atrial fibrillation with rapid ventricular response likely triggered by bronchospasm  · Acute diastolic congestive heart failure  · Acute bronchospasm likely secondary to viral bronchitis  · Chronic kidney disease stage IV, current serum creatinine 3.1          Plan:   · Single dose of IV digoxin  · She'll need control of bronchospasm in order to maintain sinus rhythm  · She would benefit from an ischemic study after resolution of current issues  ·   This pleasant lady came in with shortness of breath wheezing and was recently exposed to an upper respiratory infection.  She's been here multiple times with fluid overload.  Her cardiac catheter many  years ago but did not have one prior to her transplant 2010.  Today after she went for study came back in A. fib RVR and was started on a diltiazem drip.  She only had 1 episode of prior A. fib had been treated with amiodarone but Dr. Joe stop it since she had no further episodes.  There was talking the patient may need to have her AV fistula ligated because of high output failure.  The patient came in her BNP was elevated and she's been diuresed and her breathing is better but clinically she still sounds very rhonchorous and wheezy.  Physical exam she has JVP  Heart is now tachycardic and irregular  At this point time we will continue diltiazem and give her IV dig.  Was in an probably give her IV amiodarone to try to cardiovert her.  There is a chance she'll decompensate get worse heart failure with the A. fib RVR.  If she fails to spontaneously convert she'll need a synchronized cardioversion.  Also she'll need a right heart catheter with compression of her AV fistula to see she has output heart failure from the fistula          Scribed for Bill Denise MD by Adryan Senior PA-C. 8/8/2017  11:13 AM

## 2017-08-08 NOTE — PROGRESS NOTES
"      HOSPITALIST DAILY PROGRESS NOTE    Chief Complaint: f/u SOA, cough    Subjective   SUBJECTIVE/OVERNIGHT EVENTS   Patient seen this morning. She has flipped into Afib with RVR upon returning from CXR. She denies chest pain. Says her breathing is actually better than yesterday. Coughing up some yellow sputum.    Review of Systems:  Gen-no fevers, no chills  CV-no chest pain, no palpitations  Resp-+cough, improving dyspnea  GI-no N/V/D, no abd pain    Objective   OBJECTIVE   I have reviewed the vital signs.  /64 (BP Location: Left arm, Patient Position: Lying)  Pulse 80  Temp 98.2 °F (36.8 °C) (Oral)   Resp 20  Ht 63\" (160 cm)  Wt 193 lb 1.6 oz (87.6 kg)  LMP  (Approximate)  SpO2 96%  BMI 34.21 kg/m2    Physical Exam:  Gen-no acute distress  CV-irregularly irregular, tachycardic, S1 S2 normal, no m/r/g  Resp-bilateral coarse breath sounds, no wheezes  Abd-soft, NT, ND, +BS  Ext-no edema  Neuro-A&Ox3, no focal deficits  Psych-appropriate mood    Results:  I have reviewed the labs, culture data, radiology results, and diagnostic studies.      Results from last 7 days  Lab Units 08/08/17  0530 08/07/17  0546 08/01/17  1312   WBC 10*3/mm3 11.07* 17.55* 7.73   HEMOGLOBIN g/dL 8.9* 10.4* 10.2*   HEMATOCRIT % 27.9* 33.3* 33.7*   PLATELETS 10*3/mm3 164 201 230       Results from last 7 days  Lab Units 08/08/17  0530   SODIUM mmol/L 141   POTASSIUM mmol/L 3.7   CHLORIDE mmol/L 113*   CO2 mmol/L 19.0*   BUN mg/dL 60*   CREATININE mg/dL 3.10*   GLUCOSE mg/dL 80   CALCIUM mg/dL 8.7       Culture Data:  Cultures:    Blood Culture   Date Value Ref Range Status   08/07/2017 No growth at 24 hours  Preliminary   08/07/2017 No growth at 24 hours  Preliminary     Respiratory Culture   Date Value Ref Range Status   08/07/2017 Scant growth (1+) Normal Respiratory [place]  Preliminary         Radiology Results:  Imaging Results (last 24 hours)     Procedure Component Value Units Date/Time    XR Chest PA & Lateral " [[id number]] Updated:  08/08/17 0824          I have reviewed the medications.      Assessment/Plan   ASSESSMENT/PLAN    Principal Problem:    Acute on chronic diastolic (congestive) heart failure  Active Problems:    Bronchitis    Leukocytosis    Atrial fibrillation with RVR    Metabolic acidosis    Paroxysmal atrial fibrillation    Essential hypertension    Type 2 diabetes mellitus    Chronic kidney disease, stage V    Anemia of renal disease    Hypertension    Diabetes mellitus    Vitamin D deficiency    78 yo F with hx of chronic diastolic CHF, CKD Stage 4 s/p prior renal transplant with failure requiring re-initiation of dialysis but has been off HD since April 2017, and recent hospitalization in June 2017 due to volume overload, now presents with several days of increasing dyspnea and cough.     PLAN:  --Her SOA is likely multifactorial related to acute on chronic diastolic heart failure as well as probable bronchitis (no clear infiltrates on CXR seen).  --Continue IV Bumex. Renal following. No need for HD at this time.  --Consult Cardiology due to CHF and Afib with RVR. I have started her on a Cardizem drip for rate control. Continue home dose of Metoprolol and Coumadin (pharmacy to dose).  --Continue Rocephin and Azithromycin for bronchitis vs. possible pneumonia. Stop Vanc.     Complex patient.      I expect patient to be discharged in: ~3 days    Deja Joseph MD  08/08/17  9:24 AM

## 2017-08-08 NOTE — PLAN OF CARE
Problem: Patient Care Overview (Adult)  Goal: Plan of Care Review  Outcome: Ongoing (interventions implemented as appropriate)    08/08/17 1645   Coping/Psychosocial Response Interventions   Plan Of Care Reviewed With patient;daughter;son   Patient Care Overview   Progress progress toward functional goals as expected         Problem: Cardiac: Heart Failure (Adult)  Goal: Signs and Symptoms of Listed Potential Problems Will be Absent or Manageable (Cardiac: Heart Failure)  Outcome: Ongoing (interventions implemented as appropriate)    08/08/17 1645   Cardiac: Heart Failure   Problems Assessed (Heart Failure) dysrhythmia/arrhythmia;respiratory compromise   Problems Present (Heart Failure) dysrhythmia/arrhythmia;respiratory compromise

## 2017-08-08 NOTE — PLAN OF CARE
Problem: Patient Care Overview (Adult)  Goal: Plan of Care Review  Outcome: Ongoing (interventions implemented as appropriate)    08/08/17 0443   Coping/Psychosocial Response Interventions   Plan Of Care Reviewed With patient   Patient Care Overview   Progress no change       Goal: Adult Individualization and Mutuality  Outcome: Ongoing (interventions implemented as appropriate)  Goal: Discharge Needs Assessment  Outcome: Ongoing (interventions implemented as appropriate)    Problem: Cardiac: Heart Failure (Adult)  Goal: Signs and Symptoms of Listed Potential Problems Will be Absent or Manageable (Cardiac: Heart Failure)  Outcome: Ongoing (interventions implemented as appropriate)

## 2017-08-08 NOTE — PROGRESS NOTES
"Pharmacy Consult  -  Warfarin    Moni Wallace is a  77 y.o. female   Height - 63\" (160 cm)  Weight - 193 lb 1.6 oz (87.6 kg)    Consulting Provider: - Hospital Medicine  Indication: - Paroxysmal atrial fibrillation  Goal INR: - 2-3  Home Regimen:   - 6 mg Daily - This is per patient report of 3 x 2 mg tablets daily.  A call to [facility] indicated that she filled 2 mg tablets on 7/20.         Drug-Drug Interactions with current regimen:   Azythromycin increased bleeding risk              Levothyroxine increased bleeding risk        Warfarin Dosing During Admission:    Date  8/7 8/8          INR  2.81 3.28          Dose  6 mg  D/C for procedures               Labs:    Results from last 7 days   Lab Units 08/08/17  0530 08/07/17  1313 08/07/17  0546 08/01/17  1312   INR  3.28 2.81 2.65 2.01   HEMOGLOBIN g/dL 8.9* --  10.4* 10.2*   HEMATOCRIT % 27.9* --  33.3* 33.7*   PLATELETS 10*3/mm3 164 --  201 230     Results from last 7 days   Lab Units 08/08/17  0530 08/07/17  0546 08/01/17  1312   SODIUM mmol/L 141 142 141   POTASSIUM mmol/L 3.7 4.0 4.9   CHLORIDE mmol/L 113* 112* 112*   CO2 mmol/L 19.0* 16.0* 22.0   BUN mg/dL 60* 49* 51*   CREATININE mg/dL 3.10* 3.00* 3.00*   CALCIUM mg/dL 8.7 9.8 9.2   BILIRUBIN mg/dL --  0.4 --    ALK PHOS U/L --  147* --    ALT (SGPT) U/L --  44* --    AST (SGOT) U/L --  60* --    GLUCOSE mg/dL 80 158* 133*       Current dietary intake:       Assessment/Plan:   Pt is M,W,F Dialysis pt.  Home dose appears to currently be 6 mg daily after talking to Ms. Wallace.   Warfarin was discontinued today for procedures  by Dr. Michelle   We will continue to follow Ms. Wallace.       Thank you,   Wei Polk, PharmD, BCPS   8/8/2017  11:52 AM            "

## 2017-08-08 NOTE — PROGRESS NOTES
"   LOS: 1 day    Patient Care Team:  Slim Wick MD as PCP - General  Slim Wick MD as PCP - Family Medicine  Jeff Joe MD as Consulting Physician (Cardiology)  Donny Hodges MD as Consulting Physician (Nephrology)    Chief Complaint:      Subjective     Interval History:   No acute events overnight. No new complaints.     Review of Systems:   No chest pain or shortness of breath.     Objective     Vital Sign Min/Max for last 24 hours  Temp  Min: 97.5 °F (36.4 °C)  Max: 98.9 °F (37.2 °C)   BP  Min: 145/54  Max: 161/64   Pulse  Min: 80  Max: 97   Resp  Min: 20  Max: 24   SpO2  Min: 96 %  Max: 97 %   Flow (L/min)  Min: 2  Max: 3   No Data Recorded     Flowsheet Rows         First Filed Value    Admission Height  63\" (160 cm) Documented at 08/07/2017 0524    Admission Weight  190 lb (86.2 kg) Documented at 08/07/2017 0524          I/O this shift:  In: -   Out: 450 [Urine:450]  I/O last 3 completed shifts:  In: 860 [P.O.:360; IV Piggyback:500]  Out: 1725 [Urine:1725]    Physical Exam:     General Appearance:    Alert, cooperative, in no acute distress   Head:    Normocephalic, without obvious abnormality, atraumatic   Eyes:            Lids and lashes normal, conjunctivae and sclerae normal, no   icterus, no pallor, corneas clear, PERRLA           Neck:   No adenopathy, supple, trachea midline, no thyromegaly, no     carotid bruit, no JVD       Lungs:     Decrease air entry to bases,respirations regular, even and       unlabored    Heart:    Regular rhythm and normal rate, normal S1 and S2, no            murmur, no gallop, no rub, no click       Abdomen:     Normal bowel sounds, no masses, no organomegaly, soft        non-tender, non-distended, no guarding, no rebound                 tenderness       Extremities:   Moves all extremities well, no edema, no cyanosis, no              redness               Neurologic:   Cranial nerves 2 - 12 grossly intact, sensation intact, DTR   "      present and equal bilaterally         WBC WBC   Date Value Ref Range Status   08/08/2017 11.07 (H) 3.50 - 10.80 10*3/mm3 Final   08/07/2017 17.55 (H) 3.50 - 10.80 10*3/mm3 Final      HGB Hemoglobin   Date Value Ref Range Status   08/08/2017 8.9 (L) 11.5 - 15.5 g/dL Final   08/07/2017 10.4 (L) 11.5 - 15.5 g/dL Final      HCT Hematocrit   Date Value Ref Range Status   08/08/2017 27.9 (L) 34.5 - 44.0 % Final   08/07/2017 33.3 (L) 34.5 - 44.0 % Final      Platlets No results found for: LABPLAT   MCV MCV   Date Value Ref Range Status   08/08/2017 91.8 80.0 - 99.0 fL Final   08/07/2017 90.7 80.0 - 99.0 fL Final          Sodium Sodium   Date Value Ref Range Status   08/08/2017 141 132 - 146 mmol/L Final   08/07/2017 142 132 - 146 mmol/L Final      Potassium Potassium   Date Value Ref Range Status   08/08/2017 3.7 3.5 - 5.5 mmol/L Final   08/07/2017 4.0 3.5 - 5.5 mmol/L Final      Chloride Chloride   Date Value Ref Range Status   08/08/2017 113 (H) 99 - 109 mmol/L Final   08/07/2017 112 (H) 99 - 109 mmol/L Final      CO2 CO2   Date Value Ref Range Status   08/08/2017 19.0 (L) 20.0 - 31.0 mmol/L Final   08/07/2017 16.0 (L) 20.0 - 31.0 mmol/L Final      BUN BUN   Date Value Ref Range Status   08/08/2017 60 (H) 9 - 23 mg/dL Final   08/07/2017 49 (H) 9 - 23 mg/dL Final      Creatinine Creatinine   Date Value Ref Range Status   08/08/2017 3.10 (H) 0.60 - 1.30 mg/dL Final   08/07/2017 3.00 (H) 0.60 - 1.30 mg/dL Final      Calcium Calcium   Date Value Ref Range Status   08/08/2017 8.7 8.7 - 10.4 mg/dL Final   08/07/2017 9.8 8.7 - 10.4 mg/dL Final      PO4 No results found for: CAPO4   Albumin Albumin   Date Value Ref Range Status   08/07/2017 4.30 3.20 - 4.80 g/dL Final      Magnesium No results found for: MG   Uric Acid No results found for: URICACID        Results Review:     I reviewed the patient's new clinical results.      amLODIPine 5 mg Oral Daily   atorvastatin 10 mg Oral Nightly   azithromycin 500 mg Intravenous  Q24H   budesonide-formoterol 2 puff Inhalation BID - RT   bumetanide 1 mg Intravenous Q12H   ceftriaxone 1 g Intravenous Q24H   insulin detemir 12 Units Subcutaneous Nightly   ipratropium-albuterol 3 mL Nebulization 4x Daily - RT   levothyroxine 75 mcg Oral Q AM   metoprolol tartrate 100 mg Oral BID   sennosides-docusate sodium 2 tablet Oral BID       diltiaZEM 5-15 mg/hr Last Rate: 5 mg/hr (08/08/17 1001)   Pharmacy to dose warfarin         Medication Review:     Assessment/Plan     Active Problems:    Paroxysmal atrial fibrillation    Essential hypertension    Type 2 diabetes mellitus    Chronic kidney disease, stage IV (severe)    Anemia of renal disease    Diabetes mellitus    Vitamin D deficiency    Bronchitis    Leukocytosis    Metabolic acidosis    Atrial fibrillation with RVR    Heart valve problem    Bronchospasm with bronchitis, acute      1-chronic kidney disease stage IV status post kidney transplant-patient was temporarily on dialysis for almost 1-1/2 year.  She has been off dialysis for several months.  2- anemia-hemoglobin is low need to assess iron studies  3-volume overload/congestive heart failure-seems like this is her second or third admission for congestive heart failure.  She has a big fistula which probably is the reason for high output cardiac failure.  4-hypertension possibly related to volume     PLAN:   Continue with diuresis.   Monitor I/o   Avoid nephrotoxic agents  Appreciate cardiology input.   No need of dialysis at this time.     Albina Will MD  08/08/17  12:05 PM

## 2017-08-09 ENCOUNTER — APPOINTMENT (OUTPATIENT)
Dept: CARDIOLOGY | Facility: HOSPITAL | Age: 77
End: 2017-08-09

## 2017-08-09 LAB
ANION GAP SERPL CALCULATED.3IONS-SCNC: 10 MMOL/L (ref 3–11)
BACTERIA SPEC RESP CULT: NORMAL
BH CV ECHO MEAS - AO MAX PG (FULL): 30.3 MMHG
BH CV ECHO MEAS - AO MAX PG: 36 MMHG
BH CV ECHO MEAS - AO MEAN PG (FULL): 19 MMHG
BH CV ECHO MEAS - AO MEAN PG: 22 MMHG
BH CV ECHO MEAS - AO ROOT AREA (BSA CORRECTED): 1.5
BH CV ECHO MEAS - AO ROOT AREA: 6.2 CM^2
BH CV ECHO MEAS - AO ROOT DIAM: 2.8 CM
BH CV ECHO MEAS - AO V2 MAX: 300 CM/SEC
BH CV ECHO MEAS - AO V2 MEAN: 223 CM/SEC
BH CV ECHO MEAS - AO V2 VTI: 68.5 CM
BH CV ECHO MEAS - AVA(I,A): 1.2 CM^2
BH CV ECHO MEAS - AVA(I,D): 1.2 CM^2
BH CV ECHO MEAS - AVA(V,A): 1.2 CM^2
BH CV ECHO MEAS - AVA(V,D): 1.2 CM^2
BH CV ECHO MEAS - BSA(HAYCOCK): 2 M^2
BH CV ECHO MEAS - BSA: 1.9 M^2
BH CV ECHO MEAS - BZI_BMI: 34.9 KILOGRAMS/M^2
BH CV ECHO MEAS - BZI_METRIC_HEIGHT: 160 CM
BH CV ECHO MEAS - BZI_METRIC_WEIGHT: 89.4 KG
BH CV ECHO MEAS - CONTRAST EF (2CH): 60.2 ML/M^2
BH CV ECHO MEAS - CONTRAST EF 4CH: 48.5 ML/M^2
BH CV ECHO MEAS - EDV(CUBED): 246.5 ML
BH CV ECHO MEAS - EDV(MOD-SP2): 128 ML
BH CV ECHO MEAS - EDV(MOD-SP4): 136 ML
BH CV ECHO MEAS - EDV(TEICH): 199 ML
BH CV ECHO MEAS - EF(CUBED): 65.4 %
BH CV ECHO MEAS - EF(MOD-SP2): 60.2 %
BH CV ECHO MEAS - EF(MOD-SP4): 48.5 %
BH CV ECHO MEAS - EF(TEICH): 55.9 %
BH CV ECHO MEAS - ESV(CUBED): 85.2 ML
BH CV ECHO MEAS - ESV(MOD-SP2): 51 ML
BH CV ECHO MEAS - ESV(MOD-SP4): 70 ML
BH CV ECHO MEAS - ESV(TEICH): 87.7 ML
BH CV ECHO MEAS - FS: 29.8 %
BH CV ECHO MEAS - IVS/LVPW: 0.97
BH CV ECHO MEAS - IVSD: 0.93 CM
BH CV ECHO MEAS - LA DIMENSION: 4.5 CM
BH CV ECHO MEAS - LA/AO: 1.6
BH CV ECHO MEAS - LAT PEAK E' VEL: 4.8 CM/SEC
BH CV ECHO MEAS - LV DIASTOLIC VOL/BSA (35-75): 70.8 ML/M^2
BH CV ECHO MEAS - LV MASS(C)D: 248.1 GRAMS
BH CV ECHO MEAS - LV MASS(C)DI: 129.1 GRAMS/M^2
BH CV ECHO MEAS - LV MAX PG: 5.7 MMHG
BH CV ECHO MEAS - LV MEAN PG: 3 MMHG
BH CV ECHO MEAS - LV SYSTOLIC VOL/BSA (12-30): 36.4 ML/M^2
BH CV ECHO MEAS - LV V1 MAX: 119 CM/SEC
BH CV ECHO MEAS - LV V1 MEAN: 84.1 CM/SEC
BH CV ECHO MEAS - LV V1 VTI: 27.2 CM
BH CV ECHO MEAS - LVIDD: 6.3 CM
BH CV ECHO MEAS - LVIDS: 4.4 CM
BH CV ECHO MEAS - LVLD AP2: 7 CM
BH CV ECHO MEAS - LVLD AP4: 7.8 CM
BH CV ECHO MEAS - LVLS AP2: 6.9 CM
BH CV ECHO MEAS - LVLS AP4: 6.3 CM
BH CV ECHO MEAS - LVOT AREA (M): 3.1 CM^2
BH CV ECHO MEAS - LVOT AREA: 3.1 CM^2
BH CV ECHO MEAS - LVOT DIAM: 2 CM
BH CV ECHO MEAS - LVPWD: 0.96 CM
BH CV ECHO MEAS - MED PEAK E' VEL: 5.81 CM/SEC
BH CV ECHO MEAS - MV A MAX VEL: 97.1 CM/SEC
BH CV ECHO MEAS - MV DEC TIME: 0.13 SEC
BH CV ECHO MEAS - MV E MAX VEL: 138 CM/SEC
BH CV ECHO MEAS - MV E/A: 1.4
BH CV ECHO MEAS - PA ACC SLOPE: 921 CM/SEC^2
BH CV ECHO MEAS - PA ACC TIME: 0.1 SEC
BH CV ECHO MEAS - PA PR(ACCEL): 32.7 MMHG
BH CV ECHO MEAS - PULM DIAS VEL: 119 CM/SEC
BH CV ECHO MEAS - PULM S/D: 0.66
BH CV ECHO MEAS - PULM SYS VEL: 78.4 CM/SEC
BH CV ECHO MEAS - RVDD: 3.9 CM
BH CV ECHO MEAS - SI(AO): 219.5 ML/M^2
BH CV ECHO MEAS - SI(CUBED): 84 ML/M^2
BH CV ECHO MEAS - SI(LVOT): 44.5 ML/M^2
BH CV ECHO MEAS - SI(MOD-SP2): 40.1 ML/M^2
BH CV ECHO MEAS - SI(MOD-SP4): 34.3 ML/M^2
BH CV ECHO MEAS - SI(TEICH): 57.9 ML/M^2
BH CV ECHO MEAS - SV(AO): 421.8 ML
BH CV ECHO MEAS - SV(CUBED): 161.3 ML
BH CV ECHO MEAS - SV(LVOT): 85.5 ML
BH CV ECHO MEAS - SV(MOD-SP2): 77 ML
BH CV ECHO MEAS - SV(MOD-SP4): 66 ML
BH CV ECHO MEAS - SV(TEICH): 111.3 ML
BH CV ECHO MEAS - TAPSE (>1.6): 2.2 CM2
BH CV ECHO MEAS - TR MAX VEL: 308.3 CM/SEC
BH CV XLRA - RV BASE: 4.8 CM
BH CV XLRA - RV LENGTH: 7.4 CM
BH CV XLRA - RV MID: 3.9 CM
BH CV XLRA - TDI S': 18.1 CM/SEC
BUN BLD-MCNC: 58 MG/DL (ref 9–23)
BUN/CREAT SERPL: 17.6 (ref 7–25)
CALCIUM SPEC-SCNC: 9.1 MG/DL (ref 8.7–10.4)
CHLORIDE SERPL-SCNC: 112 MMOL/L (ref 99–109)
CO2 SERPL-SCNC: 20 MMOL/L (ref 20–31)
CREAT BLD-MCNC: 3.3 MG/DL (ref 0.6–1.3)
DEPRECATED RDW RBC AUTO: 56.9 FL (ref 37–54)
E/E' RATIO: 26.3
ERYTHROCYTE [DISTWIDTH] IN BLOOD BY AUTOMATED COUNT: 16.4 % (ref 11.3–14.5)
GFR SERPL CREATININE-BSD FRML MDRD: 14 ML/MIN/1.73
GLUCOSE BLD-MCNC: 86 MG/DL (ref 70–100)
GLUCOSE BLDC GLUCOMTR-MCNC: 126 MG/DL (ref 70–130)
GLUCOSE BLDC GLUCOMTR-MCNC: 209 MG/DL (ref 70–130)
GLUCOSE BLDC GLUCOMTR-MCNC: 218 MG/DL (ref 70–130)
GRAM STN SPEC: NORMAL
HCT VFR BLD AUTO: 29 % (ref 34.5–44)
HGB BLD-MCNC: 8.8 G/DL (ref 11.5–15.5)
INR PPP: 2.74
LEFT ATRIUM VOLUME INDEX: 34.4 ML/M2
LV EF 2D ECHO EST: 55 %
MCH RBC QN AUTO: 28.4 PG (ref 27–31)
MCHC RBC AUTO-ENTMCNC: 30.3 G/DL (ref 32–36)
MCV RBC AUTO: 93.5 FL (ref 80–99)
PLATELET # BLD AUTO: 152 10*3/MM3 (ref 150–450)
PMV BLD AUTO: 10.2 FL (ref 6–12)
POTASSIUM BLD-SCNC: 3.9 MMOL/L (ref 3.5–5.5)
PROTHROMBIN TIME: 30.8 SECONDS (ref 9.6–11.5)
RBC # BLD AUTO: 3.1 10*6/MM3 (ref 3.89–5.14)
SODIUM BLD-SCNC: 142 MMOL/L (ref 132–146)
WBC NRBC COR # BLD: 8.75 10*3/MM3 (ref 3.5–10.8)

## 2017-08-09 PROCEDURE — 99233 SBSQ HOSP IP/OBS HIGH 50: CPT | Performed by: HOSPITALIST

## 2017-08-09 PROCEDURE — 94640 AIRWAY INHALATION TREATMENT: CPT

## 2017-08-09 PROCEDURE — 85027 COMPLETE CBC AUTOMATED: CPT | Performed by: HOSPITALIST

## 2017-08-09 PROCEDURE — 63710000001 INSULIN DETEMIR PER 5 UNITS: Performed by: HOSPITALIST

## 2017-08-09 PROCEDURE — 94799 UNLISTED PULMONARY SVC/PX: CPT

## 2017-08-09 PROCEDURE — 25010000002 AZITHROMYCIN: Performed by: NURSE PRACTITIONER

## 2017-08-09 PROCEDURE — 94760 N-INVAS EAR/PLS OXIMETRY 1: CPT

## 2017-08-09 PROCEDURE — 25010000002 METHYLPREDNISOLONE PER 125 MG: Performed by: HOSPITALIST

## 2017-08-09 PROCEDURE — 80048 BASIC METABOLIC PNL TOTAL CA: CPT | Performed by: HOSPITALIST

## 2017-08-09 PROCEDURE — 85610 PROTHROMBIN TIME: CPT | Performed by: NURSE PRACTITIONER

## 2017-08-09 PROCEDURE — 93306 TTE W/DOPPLER COMPLETE: CPT

## 2017-08-09 PROCEDURE — 82962 GLUCOSE BLOOD TEST: CPT

## 2017-08-09 PROCEDURE — 93306 TTE W/DOPPLER COMPLETE: CPT | Performed by: INTERNAL MEDICINE

## 2017-08-09 PROCEDURE — 25010000002 CEFTRIAXONE PER 250 MG: Performed by: NURSE PRACTITIONER

## 2017-08-09 RX ORDER — METHYLPREDNISOLONE SODIUM SUCCINATE 125 MG/2ML
60 INJECTION, POWDER, LYOPHILIZED, FOR SOLUTION INTRAMUSCULAR; INTRAVENOUS EVERY 12 HOURS SCHEDULED
Status: DISCONTINUED | OUTPATIENT
Start: 2017-08-09 | End: 2017-08-10

## 2017-08-09 RX ORDER — AMIODARONE HYDROCHLORIDE 200 MG/1
400 TABLET ORAL EVERY 8 HOURS
Status: DISCONTINUED | OUTPATIENT
Start: 2017-08-09 | End: 2017-08-14

## 2017-08-09 RX ORDER — SODIUM BICARBONATE 650 MG/1
1300 TABLET ORAL 2 TIMES DAILY
COMMUNITY
End: 2018-06-26

## 2017-08-09 RX ORDER — WARFARIN SODIUM 2 MG/1
4 TABLET ORAL
COMMUNITY
End: 2019-05-30 | Stop reason: HOSPADM

## 2017-08-09 RX ADMIN — ALPRAZOLAM 0.5 MG: 0.5 TABLET ORAL at 22:52

## 2017-08-09 RX ADMIN — METHYLPREDNISOLONE SODIUM SUCCINATE 60 MG: 125 INJECTION, POWDER, FOR SOLUTION INTRAMUSCULAR; INTRAVENOUS at 22:53

## 2017-08-09 RX ADMIN — Medication 2 TABLET: at 09:25

## 2017-08-09 RX ADMIN — BUDESONIDE AND FORMOTEROL FUMARATE DIHYDRATE 2 PUFF: 80; 4.5 AEROSOL RESPIRATORY (INHALATION) at 07:31

## 2017-08-09 RX ADMIN — IPRATROPIUM BROMIDE AND ALBUTEROL SULFATE 3 ML: .5; 3 SOLUTION RESPIRATORY (INHALATION) at 14:51

## 2017-08-09 RX ADMIN — ATORVASTATIN CALCIUM 10 MG: 10 TABLET, FILM COATED ORAL at 22:52

## 2017-08-09 RX ADMIN — IPRATROPIUM BROMIDE AND ALBUTEROL SULFATE 3 ML: .5; 3 SOLUTION RESPIRATORY (INHALATION) at 19:56

## 2017-08-09 RX ADMIN — IPRATROPIUM BROMIDE AND ALBUTEROL SULFATE 3 ML: .5; 3 SOLUTION RESPIRATORY (INHALATION) at 07:31

## 2017-08-09 RX ADMIN — CEFTRIAXONE SODIUM 1 G: 1 INJECTION, SOLUTION INTRAVENOUS at 09:23

## 2017-08-09 RX ADMIN — BUMETANIDE 1 MG: 0.25 INJECTION INTRAMUSCULAR; INTRAVENOUS at 22:52

## 2017-08-09 RX ADMIN — AZITHROMYCIN 500 MG: 500 INJECTION, POWDER, LYOPHILIZED, FOR SOLUTION INTRAVENOUS at 09:39

## 2017-08-09 RX ADMIN — INSULIN DETEMIR 16 UNITS: 100 INJECTION, SOLUTION SUBCUTANEOUS at 22:44

## 2017-08-09 RX ADMIN — METOPROLOL TARTRATE 100 MG: 100 TABLET, FILM COATED ORAL at 17:56

## 2017-08-09 RX ADMIN — BUMETANIDE 1 MG: 0.25 INJECTION INTRAMUSCULAR; INTRAVENOUS at 15:00

## 2017-08-09 RX ADMIN — IPRATROPIUM BROMIDE AND ALBUTEROL SULFATE 3 ML: .5; 3 SOLUTION RESPIRATORY (INHALATION) at 11:14

## 2017-08-09 RX ADMIN — METOPROLOL TARTRATE 100 MG: 100 TABLET, FILM COATED ORAL at 09:24

## 2017-08-09 RX ADMIN — BUDESONIDE AND FORMOTEROL FUMARATE DIHYDRATE 2 PUFF: 80; 4.5 AEROSOL RESPIRATORY (INHALATION) at 19:56

## 2017-08-09 RX ADMIN — AMIODARONE HYDROCHLORIDE 400 MG: 200 TABLET ORAL at 06:01

## 2017-08-09 RX ADMIN — AMIODARONE HYDROCHLORIDE 400 MG: 200 TABLET ORAL at 22:52

## 2017-08-09 RX ADMIN — METHYLPREDNISOLONE SODIUM SUCCINATE 60 MG: 125 INJECTION, POWDER, FOR SOLUTION INTRAMUSCULAR; INTRAVENOUS at 14:59

## 2017-08-09 RX ADMIN — AMIODARONE HYDROCHLORIDE 400 MG: 200 TABLET ORAL at 15:09

## 2017-08-09 RX ADMIN — AMLODIPINE BESYLATE 5 MG: 5 TABLET ORAL at 09:25

## 2017-08-09 NOTE — PROGRESS NOTES
[facility] at [facility]  IP Progress Note      Chief Complaint/Reason for service:    · Acute diastolic heart failure  · Atrial fibrillation with RVR         Patient lying in bed obviously short of breath.  She says her breathing has improved since admission. She denies chest pain. She is worried  her fistula in the right arm will rupture because it has became enlarged.       Past medical, surgical, social and family history reviewed in the patient's electronic medical record.         Vital Sign Min/Max for last 24 hours  Temp  Min: 98.1 °F (36.7 °C)  Max: 98.3 °F (36.8 °C)   BP  Min: 124/63  Max: 164/69   Pulse  Min: 70  Max: 117   Resp  Min: 18  Max: 28   SpO2  Min: 95 %  Max: 99 %   Flow (L/min)  Min: 2  Max: 3      Intake/Output Summary (Last 24 hours) at 08/09/17 0748  Last data filed at 08/09/17 0700   Gross per 24 hour   Intake                0 ml   Output             2050 ml   Net            -2050 ml           Physical Exam   Constitutional: She is oriented to person, place, and time. She appears well-developed and well-nourished.   HENT:   Head: Normocephalic.   Neck: JVD present. Carotid bruit is not present.   Cardiovascular: Normal rate, regular rhythm and intact distal pulses.  Exam reveals no gallop and no friction rub.    Murmur heard.  Pulmonary/Chest: Effort normal. She has wheezes in the right upper field and the left upper field. She has rhonchi in the right upper field, the right middle field, the left upper field and the left middle field.   Abdominal: Soft.   Musculoskeletal: She exhibits no edema.   Neurological: She is alert and oriented to person, place, and time.   Skin: Skin is warm and dry. No cyanosis. Nails show no clubbing.   Psychiatric: She has a normal mood and affect. Her behavior is normal.       Results Review:   I reviewed the patient's recent labs in the electronic medical record.        Tele:  [facility]       Hospital Problem List     * (Principal)Acute  diastolic heart failure    Overview Addendum 8/9/2017  7:34 AM by REY Brown     · Echo (0 6/08/2017):LVEF 50%. Grade II Diastolic dysfunction. RVSP 41.6 mmHg           Paroxysmal atrial fibrillation    Overview Addendum 8/9/2017  7:37 AM by REY Brown     · CHADS-VASc = 5  · Atrial fibrillation initially diagnosed February 2015; spontaneous conversion to normal sinus rhythm.   · Echocardiogram (06/08/2017): LVEF 50%. Grade II Diastolic dysfunction. RVSP 41.6 mmHg. Mild-to-moderate MR. Mild AS.  Moderate TR  · Noted to be in afib with RVR 08/09/2017 in setting of acute bronchitis.  · On Coumadin and amiodarone         Type 2 diabetes mellitus    Chronic kidney disease, stage IV (severe)    Overview Addendum 6/8/2017  5:52 PM by Jeff Joe MD     · IgA nephropathy with history of renal transplant, 2010.   · Patient on hemodialysis Monday, Wednesday, and Friday started July 2015.  · Hemodialysis discontinued 2/2017.         Bronchitis    Leukocytosis    Bronchospasm with bronchitis, acute    Essential hypertension    Anemia of renal disease    Overview Deleted 8/2/2016 12:03 PM by Jeff Joe MD            Metabolic acidosis    Heart valve problem    Overview Addendum 8/9/2017  7:31 AM by REY Brown     · Echo (06/0/2017): Mild-to-moderate mitral valve regurgitation is present. Mild aortic valve stenosis is present. Moderate tricuspid valve regurgitation is present.         Vitamin D deficiency               · Continue to IV diuretics  · Continue amiodarone    REY Galeana  8/9/2017     Addendum:  The patient was off the floor to echo when I came by to see her.  Evidently, she has a large AV fistula which may be contributing to high output CHF.  We will plan for right heart catheterization tomorrow with compression of fistula to confirm this diagnosis.  Please make the patient nothing by mouth after midnight.    Jeff Joe  MD  8/9/2017

## 2017-08-09 NOTE — PROGRESS NOTES
"      HOSPITALIST DAILY PROGRESS NOTE    Chief Complaint: f/u SOA, cough    Subjective   SUBJECTIVE/OVERNIGHT EVENTS   Patient seen this morning. Still short of breath, and increased wheezing. Some cough/congestion. No f/c. Just tired  Review of Systems:  Gen-no fevers, no chills  CV-no chest pain, no palpitations  Resp-+cough, improving dyspnea  GI-no N/V/D, no abd pain    Objective   OBJECTIVE   I have reviewed the vital signs.  /62 (BP Location: Left arm, Patient Position: Lying)  Pulse 70  Temp 98.3 °F (36.8 °C) (Oral)   Resp 18  Ht 63\" (160 cm)  Wt 193 lb 1.6 oz (87.6 kg)  LMP  (Approximate)  SpO2 95%  BMI 34.21 kg/m2    Physical Exam:  Gen-no acute distress  CV-irregularly irregular, tachycardic, S1 S2 normal, no m/r/g  Resp-bilateral coarse breath sounds, wheezing throughout  Abd-soft, NT, ND, +BS  Ext-no edema  Neuro-A&Ox3, no focal deficits  Psych-appropriate mood  Large RUE AV fistula    Results:  I have reviewed the labs, culture data, radiology results, and diagnostic studies.      Results from last 7 days  Lab Units 08/09/17  0635 08/08/17  0530 08/07/17  0546   WBC 10*3/mm3 8.75 11.07* 17.55*   HEMOGLOBIN g/dL 8.8* 8.9* 10.4*   HEMATOCRIT % 29.0* 27.9* 33.3*   PLATELETS 10*3/mm3 152 164 201       Results from last 7 days  Lab Units 08/09/17  0635   SODIUM mmol/L 142   POTASSIUM mmol/L 3.9   CHLORIDE mmol/L 112*   CO2 mmol/L 20.0   BUN mg/dL 58*   CREATININE mg/dL 3.30*   GLUCOSE mg/dL 86   CALCIUM mg/dL 9.1       Culture Data:  Cultures:    Blood Culture   Date Value Ref Range Status   08/07/2017 No growth at 24 hours  Preliminary   08/07/2017 No growth at 24 hours  Preliminary     Respiratory Culture   Date Value Ref Range Status   08/07/2017 Scant growth (1+) Normal Respiratory [place]  Preliminary         Radiology Results:  Imaging Results (last 24 hours)     ** No results found for the last 24 hours. **          I have reviewed the medications.      Assessment/Plan   ASSESSMENT/PLAN  "   Principal Problem:    Acute diastolic heart failure  Active Problems:    Paroxysmal atrial fibrillation    Essential hypertension    Type 2 diabetes mellitus    Chronic kidney disease, stage IV (severe)    Anemia of renal disease    Vitamin D deficiency    Bronchitis    Leukocytosis    Metabolic acidosis    Heart valve problem    Bronchospasm with bronchitis, acute    78 yo F with hx of chronic diastolic CHF, CKD Stage 4 s/p prior renal transplant with failure requiring re-initiation of dialysis but has been off HD since April 2017, and recent hospitalization in June 2017 due to volume overload, now presents with several days of increasing dyspnea and cough.     PLAN:  --Her SOA is likely multifactorial related to acute on chronic diastolic heart failure as well as probable bronchitis (no clear infiltrates on CXR seen).  --Significant bronchospasm, adding solumedrol  --Continue IV Bumex, as per cardiology, monitor renal function closely  --Afib/RVR, per cardiology  --possible high output cardiac failure, catheterization pending, to assess fistula  --Continue Rocephin and Azithromycin for bronchitis vs. possible pneumonia. Stopped Vancomycin    Complex patient.      I expect patient to be discharged in: ~3 days    Xavier Mejía MD  08/09/17  9:34 AM

## 2017-08-09 NOTE — PROGRESS NOTES
"   LOS: 2 days    Patient Care Team:  Slim Wick MD as PCP - General  Slim Wick MD as PCP - Family Medicine  Jeff Joe MD as Consulting Physician (Cardiology)  Donny Hodges MD as Consulting Physician (Nephrology)    Chief Complaint:      Subjective     Interval History:   No acute events overnight. No new complaints.     Review of Systems:   No chest pain or shortness of breath.     Objective     Vital Sign Min/Max for last 24 hours  Temp  Min: 98.1 °F (36.7 °C)  Max: 98.3 °F (36.8 °C)   BP  Min: 124/63  Max: 164/69   Pulse  Min: 70  Max: 117   Resp  Min: 18  Max: 28   SpO2  Min: 95 %  Max: 99 %   Flow (L/min)  Min: 2  Max: 3   No Data Recorded     Flowsheet Rows         First Filed Value    Admission Height  63\" (160 cm) Documented at 08/07/2017 0524    Admission Weight  190 lb (86.2 kg) Documented at 08/07/2017 0524             I/O last 3 completed shifts:  In: -   Out: 3250 [Urine:3250]    Physical Exam:     General Appearance:    Alert, cooperative, in no acute distress   Head:    Normocephalic, without obvious abnormality, atraumatic   Eyes:            Lids and lashes normal, conjunctivae and sclerae normal, no   icterus, no pallor, corneas clear, PERRLA           Neck:   No adenopathy, supple, trachea midline, no thyromegaly, no     carotid bruit, no JVD       Lungs:     Decrease air entry to bases,respirations regular, even and       unlabored    Heart:    Regular rhythm and normal rate, normal S1 and S2, no            murmur, no gallop, no rub, no click       Abdomen:     Normal bowel sounds, no masses, no organomegaly, soft        non-tender, non-distended, no guarding, no rebound                 tenderness       Extremities:   Moves all extremities well, no edema, no cyanosis, no              redness               Neurologic:   Cranial nerves 2 - 12 grossly intact, sensation intact, DTR        present and equal bilaterally         WBC WBC   Date Value Ref " Range Status   08/09/2017 8.75 3.50 - 10.80 10*3/mm3 Final   08/08/2017 11.07 (H) 3.50 - 10.80 10*3/mm3 Final   08/07/2017 17.55 (H) 3.50 - 10.80 10*3/mm3 Final      HGB Hemoglobin   Date Value Ref Range Status   08/09/2017 8.8 (L) 11.5 - 15.5 g/dL Final   08/08/2017 8.9 (L) 11.5 - 15.5 g/dL Final   08/07/2017 10.4 (L) 11.5 - 15.5 g/dL Final      HCT Hematocrit   Date Value Ref Range Status   08/09/2017 29.0 (L) 34.5 - 44.0 % Final   08/08/2017 27.9 (L) 34.5 - 44.0 % Final   08/07/2017 33.3 (L) 34.5 - 44.0 % Final      Platlets No results found for: LABPLAT   MCV MCV   Date Value Ref Range Status   08/09/2017 93.5 80.0 - 99.0 fL Final   08/08/2017 91.8 80.0 - 99.0 fL Final   08/07/2017 90.7 80.0 - 99.0 fL Final          Sodium Sodium   Date Value Ref Range Status   08/09/2017 142 132 - 146 mmol/L Final   08/08/2017 141 132 - 146 mmol/L Final   08/07/2017 142 132 - 146 mmol/L Final      Potassium Potassium   Date Value Ref Range Status   08/09/2017 3.9 3.5 - 5.5 mmol/L Final   08/08/2017 3.7 3.5 - 5.5 mmol/L Final   08/07/2017 4.0 3.5 - 5.5 mmol/L Final      Chloride Chloride   Date Value Ref Range Status   08/09/2017 112 (H) 99 - 109 mmol/L Final   08/08/2017 113 (H) 99 - 109 mmol/L Final   08/07/2017 112 (H) 99 - 109 mmol/L Final      CO2 CO2   Date Value Ref Range Status   08/09/2017 20.0 20.0 - 31.0 mmol/L Final   08/08/2017 19.0 (L) 20.0 - 31.0 mmol/L Final   08/07/2017 16.0 (L) 20.0 - 31.0 mmol/L Final      BUN BUN   Date Value Ref Range Status   08/09/2017 58 (H) 9 - 23 mg/dL Final   08/08/2017 60 (H) 9 - 23 mg/dL Final   08/07/2017 49 (H) 9 - 23 mg/dL Final      Creatinine Creatinine   Date Value Ref Range Status   08/09/2017 3.30 (H) 0.60 - 1.30 mg/dL Final   08/08/2017 3.10 (H) 0.60 - 1.30 mg/dL Final   08/07/2017 3.00 (H) 0.60 - 1.30 mg/dL Final      Calcium Calcium   Date Value Ref Range Status   08/09/2017 9.1 8.7 - 10.4 mg/dL Final   08/08/2017 8.7 8.7 - 10.4 mg/dL Final   08/07/2017 9.8 8.7 - 10.4  mg/dL Final      PO4 No results found for: CAPO4   Albumin Albumin   Date Value Ref Range Status   08/07/2017 4.30 3.20 - 4.80 g/dL Final      Magnesium No results found for: MG   Uric Acid No results found for: URICACID        Results Review:     I reviewed the patient's new clinical results.      amiodarone 400 mg Oral Q8H   amLODIPine 5 mg Oral Daily   atorvastatin 10 mg Oral Nightly   azithromycin 500 mg Intravenous Q24H   budesonide-formoterol 2 puff Inhalation BID - RT   bumetanide 1 mg Intravenous Q12H   ceftriaxone 1 g Intravenous Q24H   insulin detemir 12 Units Subcutaneous Nightly   ipratropium-albuterol 3 mL Nebulization 4x Daily - RT   levothyroxine 75 mcg Oral Q AM   metoprolol tartrate 100 mg Oral BID   sennosides-docusate sodium 2 tablet Oral BID       Pharmacy to dose warfarin        Medication Review:     Assessment/Plan     Principal Problem:    Acute diastolic heart failure  Active Problems:    Paroxysmal atrial fibrillation    Essential hypertension    Type 2 diabetes mellitus    Chronic kidney disease, stage IV (severe)    Anemia of renal disease    Vitamin D deficiency    Bronchitis    Leukocytosis    Metabolic acidosis    Heart valve problem    Bronchospasm with bronchitis, acute      1-chronic kidney disease stage IV status post kidney transplant-patient was temporarily on dialysis for almost 1-1/2 year.  She has been off dialysis for several months.Baseline Scr 3.0-3.3 range.   2- anemia-hemoglobin is low need to assess iron studies  3-volume overload/congestive heart failure-seems like this is her second or third admission for congestive heart failure.  She has a big fistula which probably is the reason for high output cardiac failure.  4-hypertension possibly related to volume     PLAN:   Continue with diuresis. Good response.  Monitor I/o   Avoid nephrotoxic agents  Appreciate cardiology input.   No need of dialysis at this time.     Albina Will MD  08/09/17  8:36 AM

## 2017-08-09 NOTE — PLAN OF CARE
Problem: Patient Care Overview (Adult)  Goal: Adult Individualization and Mutuality    08/09/17 1831   Individualization   Patient Specific Interventions Pls stay on top of pain q 2 hours

## 2017-08-09 NOTE — PLAN OF CARE
Problem: Patient Care Overview (Adult)  Goal: Plan of Care Review  Outcome: Ongoing (interventions implemented as appropriate)    08/09/17 0431   Coping/Psychosocial Response Interventions   Plan Of Care Reviewed With patient   Patient Care Overview   Progress improving       Goal: Adult Individualization and Mutuality  Outcome: Ongoing (interventions implemented as appropriate)  Goal: Discharge Needs Assessment  Outcome: Ongoing (interventions implemented as appropriate)    Problem: Cardiac: Heart Failure (Adult)  Goal: Signs and Symptoms of Listed Potential Problems Will be Absent or Manageable (Cardiac: Heart Failure)  Outcome: Ongoing (interventions implemented as appropriate)

## 2017-08-09 NOTE — PLAN OF CARE
Problem: Cardiac: Heart Failure (Adult)  Intervention: Provide Oxygenation/Ventilation/Perfusion Support    08/09/17 9806   Safety Interventions   Medication Review/Management medications reviewed;dosing adjusted   Activity   Activity Type activity adjusted per tolerance;bedrest with bathroom privileges   Positioning   Head Of Bed (HOB) Position HOB at 30 degrees   Respiratory Interventions   Airway/Ventilation Management calming measures promoted;pulmonary hygiene promoted

## 2017-08-10 ENCOUNTER — APPOINTMENT (OUTPATIENT)
Dept: CARDIOLOGY | Facility: HOSPITAL | Age: 77
End: 2017-08-10
Attending: INTERNAL MEDICINE

## 2017-08-10 PROBLEM — I48.91 ATRIAL FIBRILLATION WITH RVR (HCC): Status: RESOLVED | Noted: 2017-08-08 | Resolved: 2017-08-10

## 2017-08-10 PROBLEM — I50.83: Status: ACTIVE | Noted: 2017-06-08

## 2017-08-10 LAB
ANION GAP SERPL CALCULATED.3IONS-SCNC: 10 MMOL/L (ref 3–11)
BASOPHILS # BLD AUTO: 0 10*3/MM3 (ref 0–0.2)
BASOPHILS NFR BLD AUTO: 0 % (ref 0–1)
BH CV ECHO MEAS - AORTIC HR: 74 BPM
BH CV ECHO MEAS - AORTIC R-R: 0.81 SEC
BH CV ECHO MEAS - CO(LVOT): 9.2 L/MIN
BH CV ECHO MEAS - LV MAX PG: 12 MMHG
BH CV ECHO MEAS - LV MEAN PG: 6.8 MMHG
BH CV ECHO MEAS - LV V1 MAX: 172.3 CM/SEC
BH CV ECHO MEAS - LV V1 MEAN: 120.3 CM/SEC
BH CV ECHO MEAS - LV V1 VTI: 39.6 CM
BH CV ECHO MEAS - LVOT AREA (M): 3.3 CM^2
BH CV ECHO MEAS - LVOT AREA: 3.1 CM^2
BH CV ECHO MEAS - LVOT DIAM: 2 CM
BH CV ECHO MEAS - MM HR: 75 BPM
BH CV ECHO MEAS - MM R-R INT: 0.8 SEC
BH CV ECHO MEAS - SV(LVOT): 123.9 ML
BUN BLD-MCNC: 72 MG/DL (ref 9–23)
BUN/CREAT SERPL: 20.6 (ref 7–25)
CALCIUM SPEC-SCNC: 9.4 MG/DL (ref 8.7–10.4)
CHLORIDE SERPL-SCNC: 113 MMOL/L (ref 99–109)
CO2 SERPL-SCNC: 19 MMOL/L (ref 20–31)
CREAT BLD-MCNC: 3.5 MG/DL (ref 0.6–1.3)
DEPRECATED RDW RBC AUTO: 53.3 FL (ref 37–54)
EOSINOPHIL # BLD AUTO: 0 10*3/MM3 (ref 0–0.3)
EOSINOPHIL NFR BLD AUTO: 0 % (ref 0–3)
ERYTHROCYTE [DISTWIDTH] IN BLOOD BY AUTOMATED COUNT: 15.8 % (ref 11.3–14.5)
GFR SERPL CREATININE-BSD FRML MDRD: 13 ML/MIN/1.73
GLUCOSE BLD-MCNC: 190 MG/DL (ref 70–100)
GLUCOSE BLDC GLUCOMTR-MCNC: 157 MG/DL (ref 70–130)
GLUCOSE BLDC GLUCOMTR-MCNC: 194 MG/DL (ref 70–130)
GLUCOSE BLDC GLUCOMTR-MCNC: 254 MG/DL (ref 70–130)
GLUCOSE BLDC GLUCOMTR-MCNC: 295 MG/DL (ref 70–130)
HCT VFR BLD AUTO: 29.6 % (ref 34.5–44)
HGB BLD-MCNC: 9.2 G/DL (ref 11.5–15.5)
IMM GRANULOCYTES # BLD: 0.07 10*3/MM3 (ref 0–0.03)
IMM GRANULOCYTES NFR BLD: 1.2 % (ref 0–0.6)
INR PPP: 2.16
LYMPHOCYTES # BLD AUTO: 0.45 10*3/MM3 (ref 0.6–4.8)
LYMPHOCYTES NFR BLD AUTO: 7.5 % (ref 24–44)
MCH RBC QN AUTO: 28.2 PG (ref 27–31)
MCHC RBC AUTO-ENTMCNC: 31.1 G/DL (ref 32–36)
MCV RBC AUTO: 90.8 FL (ref 80–99)
MONOCYTES # BLD AUTO: 0.09 10*3/MM3 (ref 0–1)
MONOCYTES NFR BLD AUTO: 1.5 % (ref 0–12)
NEUTROPHILS # BLD AUTO: 5.4 10*3/MM3 (ref 1.5–8.3)
NEUTROPHILS NFR BLD AUTO: 89.8 % (ref 41–71)
PLATELET # BLD AUTO: 180 10*3/MM3 (ref 150–450)
PMV BLD AUTO: 10.4 FL (ref 6–12)
POTASSIUM BLD-SCNC: 4.2 MMOL/L (ref 3.5–5.5)
PROTHROMBIN TIME: 24.1 SECONDS (ref 9.6–11.5)
RBC # BLD AUTO: 3.26 10*6/MM3 (ref 3.89–5.14)
SODIUM BLD-SCNC: 142 MMOL/L (ref 132–146)
WBC NRBC COR # BLD: 6.01 10*3/MM3 (ref 3.5–10.8)

## 2017-08-10 PROCEDURE — 63710000001 INSULIN DETEMIR PER 5 UNITS: Performed by: HOSPITALIST

## 2017-08-10 PROCEDURE — 99232 SBSQ HOSP IP/OBS MODERATE 35: CPT | Performed by: INTERNAL MEDICINE

## 2017-08-10 PROCEDURE — 25010000002 CEFTRIAXONE PER 250 MG: Performed by: NURSE PRACTITIONER

## 2017-08-10 PROCEDURE — 93308 TTE F-UP OR LMTD: CPT

## 2017-08-10 PROCEDURE — 99233 SBSQ HOSP IP/OBS HIGH 50: CPT | Performed by: HOSPITALIST

## 2017-08-10 PROCEDURE — 93321 DOPPLER ECHO F-UP/LMTD STD: CPT | Performed by: INTERNAL MEDICINE

## 2017-08-10 PROCEDURE — 93321 DOPPLER ECHO F-UP/LMTD STD: CPT

## 2017-08-10 PROCEDURE — 85025 COMPLETE CBC W/AUTO DIFF WBC: CPT | Performed by: HOSPITALIST

## 2017-08-10 PROCEDURE — 82962 GLUCOSE BLOOD TEST: CPT

## 2017-08-10 PROCEDURE — 93308 TTE F-UP OR LMTD: CPT | Performed by: INTERNAL MEDICINE

## 2017-08-10 PROCEDURE — 25010000002 AZITHROMYCIN: Performed by: NURSE PRACTITIONER

## 2017-08-10 PROCEDURE — 94799 UNLISTED PULMONARY SVC/PX: CPT

## 2017-08-10 PROCEDURE — 94640 AIRWAY INHALATION TREATMENT: CPT

## 2017-08-10 PROCEDURE — 80048 BASIC METABOLIC PNL TOTAL CA: CPT | Performed by: HOSPITALIST

## 2017-08-10 PROCEDURE — 25010000002 METHYLPREDNISOLONE PER 40 MG: Performed by: HOSPITALIST

## 2017-08-10 PROCEDURE — 85610 PROTHROMBIN TIME: CPT | Performed by: NURSE PRACTITIONER

## 2017-08-10 RX ORDER — BUMETANIDE 0.25 MG/ML
1 INJECTION INTRAMUSCULAR; INTRAVENOUS ONCE
Status: COMPLETED | OUTPATIENT
Start: 2017-08-10 | End: 2017-08-10

## 2017-08-10 RX ORDER — METHYLPREDNISOLONE SODIUM SUCCINATE 125 MG/2ML
60 INJECTION, POWDER, LYOPHILIZED, FOR SOLUTION INTRAMUSCULAR; INTRAVENOUS EVERY 8 HOURS SCHEDULED
Status: DISCONTINUED | OUTPATIENT
Start: 2017-08-10 | End: 2017-08-10

## 2017-08-10 RX ORDER — GUAIFENESIN 600 MG/1
600 TABLET, EXTENDED RELEASE ORAL EVERY 12 HOURS SCHEDULED
Status: DISCONTINUED | OUTPATIENT
Start: 2017-08-10 | End: 2017-08-14 | Stop reason: HOSPADM

## 2017-08-10 RX ORDER — METHYLPREDNISOLONE SODIUM SUCCINATE 40 MG/ML
60 INJECTION, POWDER, LYOPHILIZED, FOR SOLUTION INTRAMUSCULAR; INTRAVENOUS EVERY 8 HOURS SCHEDULED
Status: DISCONTINUED | OUTPATIENT
Start: 2017-08-10 | End: 2017-08-11

## 2017-08-10 RX ADMIN — ALPRAZOLAM 0.5 MG: 0.5 TABLET ORAL at 21:24

## 2017-08-10 RX ADMIN — IPRATROPIUM BROMIDE 0.5 MG: 0.5 SOLUTION RESPIRATORY (INHALATION) at 11:11

## 2017-08-10 RX ADMIN — GUAIFENESIN 600 MG: 600 TABLET, EXTENDED RELEASE ORAL at 21:16

## 2017-08-10 RX ADMIN — AMIODARONE HYDROCHLORIDE 400 MG: 200 TABLET ORAL at 05:32

## 2017-08-10 RX ADMIN — AMIODARONE HYDROCHLORIDE 400 MG: 200 TABLET ORAL at 21:21

## 2017-08-10 RX ADMIN — AZITHROMYCIN 500 MG: 500 INJECTION, POWDER, LYOPHILIZED, FOR SOLUTION INTRAVENOUS at 11:44

## 2017-08-10 RX ADMIN — IPRATROPIUM BROMIDE AND ALBUTEROL SULFATE 3 ML: .5; 3 SOLUTION RESPIRATORY (INHALATION) at 07:50

## 2017-08-10 RX ADMIN — BUMETANIDE 1 MG: 0.25 INJECTION INTRAMUSCULAR; INTRAVENOUS at 11:44

## 2017-08-10 RX ADMIN — AMIODARONE HYDROCHLORIDE 400 MG: 200 TABLET ORAL at 13:31

## 2017-08-10 RX ADMIN — METHYLPREDNISOLONE SODIUM SUCCINATE 60 MG: 40 INJECTION, POWDER, FOR SOLUTION INTRAMUSCULAR; INTRAVENOUS at 21:16

## 2017-08-10 RX ADMIN — METOPROLOL TARTRATE 100 MG: 100 TABLET, FILM COATED ORAL at 11:42

## 2017-08-10 RX ADMIN — CEFTRIAXONE SODIUM 1 G: 1 INJECTION, SOLUTION INTRAVENOUS at 11:45

## 2017-08-10 RX ADMIN — METOPROLOL TARTRATE 100 MG: 100 TABLET, FILM COATED ORAL at 18:53

## 2017-08-10 RX ADMIN — BUMETANIDE 1 MG: 0.25 INJECTION INTRAMUSCULAR; INTRAVENOUS at 21:17

## 2017-08-10 RX ADMIN — BUMETANIDE 1 MG: 0.25 INJECTION, SOLUTION INTRAMUSCULAR; INTRAVENOUS at 11:45

## 2017-08-10 RX ADMIN — INSULIN DETEMIR 20 UNITS: 100 INJECTION, SOLUTION SUBCUTANEOUS at 22:13

## 2017-08-10 RX ADMIN — IPRATROPIUM BROMIDE AND ALBUTEROL SULFATE 3 ML: .5; 3 SOLUTION RESPIRATORY (INHALATION) at 19:32

## 2017-08-10 RX ADMIN — BUDESONIDE AND FORMOTEROL FUMARATE DIHYDRATE 2 PUFF: 80; 4.5 AEROSOL RESPIRATORY (INHALATION) at 07:50

## 2017-08-10 RX ADMIN — ATORVASTATIN CALCIUM 10 MG: 10 TABLET, FILM COATED ORAL at 21:16

## 2017-08-10 RX ADMIN — BUDESONIDE AND FORMOTEROL FUMARATE DIHYDRATE 2 PUFF: 80; 4.5 AEROSOL RESPIRATORY (INHALATION) at 19:32

## 2017-08-10 RX ADMIN — IPRATROPIUM BROMIDE AND ALBUTEROL SULFATE 3 ML: .5; 3 SOLUTION RESPIRATORY (INHALATION) at 15:33

## 2017-08-10 RX ADMIN — LEVOTHYROXINE SODIUM 75 MCG: 75 TABLET ORAL at 05:32

## 2017-08-10 RX ADMIN — IPRATROPIUM BROMIDE AND ALBUTEROL SULFATE 3 ML: .5; 3 SOLUTION RESPIRATORY (INHALATION) at 12:25

## 2017-08-10 RX ADMIN — METHYLPREDNISOLONE SODIUM SUCCINATE 60 MG: 40 INJECTION, POWDER, FOR SOLUTION INTRAMUSCULAR; INTRAVENOUS at 11:42

## 2017-08-10 RX ADMIN — AMLODIPINE BESYLATE 5 MG: 5 TABLET ORAL at 11:43

## 2017-08-10 RX ADMIN — GUAIFENESIN 600 MG: 600 TABLET, EXTENDED RELEASE ORAL at 13:26

## 2017-08-10 NOTE — PROGRESS NOTES
Continued Stay Note  [facility]     Patient Name: Moni Wallace  MRN: [id number]  Today's Date: 8/10/2017    Admit Date: 8/7/2017          Discharge Plan       08/10/17 1633    Case Management/Social Work Plan    Plan home with     Patient/Family In Agreement With Plan yes    Additional Comments No change in discharge plan at this time. Plans remain to return home with Home health, (pref [facility]) to [place] with help from  her family. CM will continue to follow.               Discharge Codes     None        Expected Discharge Date and Time     Expected Discharge Date Expected Discharge Time    Aug 11, 2017             Elinor Orellana RN

## 2017-08-10 NOTE — PROGRESS NOTES
[facility]  IP Progress Note      Chief Complaint/Reason for service:    · Acute on Chronic Diastolic Heart Failure       Patient is lying in bed nearly flat and shows that her breathing is improved somewhat.  She underwent a limited echo today to evaluate for high blood CHF due to her AV fistula.      Past medical, surgical, social and family history reviewed in the patient's electronic medical record.         Vital Sign Min/Max for last 24 hours  Temp  Min: 97.4 °F (36.3 °C)  Max: 98.1 °F (36.7 °C)   BP  Min: 124/53  Max: 156/72   Pulse  Min: 56  Max: 84   Resp  Min: 18  Max: 24   SpO2  Min: 94 %  Max: 99 %   Flow (L/min)  Min: 1  Max: 2      Intake/Output Summary (Last 24 hours) at 08/10/17 2045  Last data filed at 08/10/17 1300   Gross per 24 hour   Intake                0 ml   Output              525 ml   Net             -525 ml           Physical Exam   Constitutional: She is oriented to person, place, and time. She appears well-developed and well-nourished.   HENT:   Head: Normocephalic.   Neck: JVD present. Carotid bruit is not present.   Cardiovascular: Normal rate, regular rhythm, normal heart sounds and intact distal pulses.  Exam reveals no gallop and no friction rub.    No murmur heard.  Pulmonary/Chest: Effort normal. She has rhonchi.   Abdominal: Soft.   Musculoskeletal: She exhibits edema.   Neurological: She is alert and oriented to person, place, and time.   Skin: Skin is warm and dry. No cyanosis. Nails show no clubbing.   Psychiatric: She has a normal mood and affect. Her behavior is normal.       Results Review:   I reviewed the patient's recent labs in the electronic medical record.        Tele:  [facility] Problem List     * (Principal)High output HF (heart failure)    Overview Addendum 8/10/2017  8:44 PM by Jeff Joe MD     · Echo (6/08/2017):LVEF 50%. Grade II Diastolic dysfunction.  · Echo (8/9/17): LVEF 55%.  Grade 2 diastolic  dysfunction.  Mild MR.  Mild aortic stenosis  · Limited echo to evaluate high output CHF (8/10/17): Cardiac output decreased from 11.3 L/min to 8 L/min with compression of fistula.         Paroxysmal atrial fibrillation    Overview Addendum 8/9/2017  7:37 AM by REY Brown     · CHADS-VASc = 5  · Atrial fibrillation initially diagnosed February 2015; spontaneous conversion to normal sinus rhythm.   · Echocardiogram (06/08/2017): LVEF 50%. Grade II Diastolic dysfunction. RVSP 41.6 mmHg. Mild-to-moderate MR. Mild AS.  Moderate TR  · Noted to be in afib with RVR 08/09/2017 in setting of acute bronchitis.  · On Coumadin and amiodarone         Essential hypertension    Type 2 diabetes mellitus    Chronic kidney disease, stage IV (severe)    Overview Addendum 6/8/2017  5:52 PM by Jeff Joe MD     · IgA nephropathy with history of renal transplant, 2010.   · Patient on hemodialysis Monday, Wednesday, and Friday started July 2015.  · Hemodialysis discontinued 2/2017.         Anemia of renal disease    Overview Deleted 8/2/2016 12:03 PM by Jeff Joe MD            Vitamin D deficiency    Bronchitis    Leukocytosis    Metabolic acidosis    Heart valve problem    Overview Addendum 8/9/2017  7:31 AM by REY Brown     · Echo (06/0/2017): Mild-to-moderate mitral valve regurgitation is present. Mild aortic valve stenosis is present. Moderate tricuspid valve regurgitation is present.         Bronchospasm with bronchitis, acute        Limited echo to evaluate cardiac output after compression of AV fistula further suggest high output heart failure.  Recommend ligation or removal of the AV fistula.  Will defer to nephrology regarding whether to pursue this at [facility] with Dr. Begum or with Dr. Munguia, the original surgeon.         · Recommend ligation or removal of the AV fistula to improve high output CHF  · Diuretics to be managed by nephrology  · Coumadin and bridge with  heparin in anticipation for surgery.  · Continue amiodarone  · We will revisit next week.  Please call with questions Friday or over the weekend.    Jeff Joe MD  8/10/2017

## 2017-08-10 NOTE — PLAN OF CARE
Problem: Cardiac: Heart Failure (Adult)  Intervention: Promote Functional Ability    08/10/17 1627   Activity   Activity Type activity adjusted per tolerance   Activity Level of Assistance assistance, 1 person   Musculoskeletal Interventions   Self-Care Promotion independence encouraged   Coping/Psychosocial Interventions   Environmental Support calm environment promoted;environmental consistency promoted

## 2017-08-10 NOTE — PROGRESS NOTES
"   LOS: 3 days    Patient Care Team:  Slim Wick MD as PCP - General  Slim Wick MD as PCP - Family Medicine  Jeff Joe MD as Consulting Physician (Cardiology)  Donny Hodges MD as Consulting Physician (Nephrology)    Chief Complaint:      Subjective     Interval History:   No new complains.  No new events overnight.    Review of Systems:   Dysuria or gross hematuria No chest pain or shortness of breath. Objective     Vital Sign Min/Max for last 24 hours  Temp  Min: 97.4 °F (36.3 °C)  Max: 98.2 °F (36.8 °C)   BP  Min: 124/53  Max: 156/72   Pulse  Min: 56  Max: 84   Resp  Min: 18  Max: 24   SpO2  Min: 94 %  Max: 100 %   Flow (L/min)  Min: 1  Max: 2   Weight  Min: 194 lb 8 oz (88.2 kg)  Max: 194 lb 8 oz (88.2 kg)     Flowsheet Rows         First Filed Value    Admission Height  63\" (160 cm) Documented at 08/07/2017 0524    Admission Weight  190 lb (86.2 kg) Documented at 08/07/2017 0524          I/O this shift:  In: -   Out: 275 [Urine:275]  I/O last 3 completed shifts:  In: -   Out: 1500 [Urine:1500]    Physical Exam:     General Appearance:    Alert, cooperative, in no acute distress   Head:    Normocephalic, w atraumatic   Eyes:            Lids and lashes normal, conjunctivae and sclerae normal, no   icterus, no pallor, corneas clear, PERRLA           Neck:    supple,  no JVD       Lungs:     Decrease air entry to bases,respirations regular, even and       unlabored    Heart:    Regular rhythm and normal rate, normal S1 and S2, no            murmur, no gallop, no rub, no click       Abdomen:     Normal bowel sounds, no masses, no organomegaly, soft        non-tender, non-distended, no guarding, no rebound                 tenderness       Extremities:   Moves all extremities well, no edema, no cyanosis, no              redness               Neurologic:  sensation intact, DTR        present and equal bilaterally         WBC WBC   Date Value Ref Range Status   08/10/2017 " 6.01 3.50 - 10.80 10*3/mm3 Final   08/09/2017 8.75 3.50 - 10.80 10*3/mm3 Final   08/08/2017 11.07 (H) 3.50 - 10.80 10*3/mm3 Final      HGB Hemoglobin   Date Value Ref Range Status   08/10/2017 9.2 (L) 11.5 - 15.5 g/dL Final   08/09/2017 8.8 (L) 11.5 - 15.5 g/dL Final   08/08/2017 8.9 (L) 11.5 - 15.5 g/dL Final      HCT Hematocrit   Date Value Ref Range Status   08/10/2017 29.6 (L) 34.5 - 44.0 % Final   08/09/2017 29.0 (L) 34.5 - 44.0 % Final   08/08/2017 27.9 (L) 34.5 - 44.0 % Final      Platlets No results found for: LABPLAT   MCV MCV   Date Value Ref Range Status   08/10/2017 90.8 80.0 - 99.0 fL Final   08/09/2017 93.5 80.0 - 99.0 fL Final   08/08/2017 91.8 80.0 - 99.0 fL Final          Sodium Sodium   Date Value Ref Range Status   08/10/2017 142 132 - 146 mmol/L Final   08/09/2017 142 132 - 146 mmol/L Final   08/08/2017 141 132 - 146 mmol/L Final      Potassium Potassium   Date Value Ref Range Status   08/10/2017 4.2 3.5 - 5.5 mmol/L Final   08/09/2017 3.9 3.5 - 5.5 mmol/L Final   08/08/2017 3.7 3.5 - 5.5 mmol/L Final      Chloride Chloride   Date Value Ref Range Status   08/10/2017 113 (H) 99 - 109 mmol/L Final   08/09/2017 112 (H) 99 - 109 mmol/L Final   08/08/2017 113 (H) 99 - 109 mmol/L Final      CO2 CO2   Date Value Ref Range Status   08/10/2017 19.0 (L) 20.0 - 31.0 mmol/L Final   08/09/2017 20.0 20.0 - 31.0 mmol/L Final   08/08/2017 19.0 (L) 20.0 - 31.0 mmol/L Final      BUN BUN   Date Value Ref Range Status   08/10/2017 72 (H) 9 - 23 mg/dL Final   08/09/2017 58 (H) 9 - 23 mg/dL Final   08/08/2017 60 (H) 9 - 23 mg/dL Final      Creatinine Creatinine   Date Value Ref Range Status   08/10/2017 3.50 (H) 0.60 - 1.30 mg/dL Final   08/09/2017 3.30 (H) 0.60 - 1.30 mg/dL Final   08/08/2017 3.10 (H) 0.60 - 1.30 mg/dL Final      Calcium Calcium   Date Value Ref Range Status   08/10/2017 9.4 8.7 - 10.4 mg/dL Final   08/09/2017 9.1 8.7 - 10.4 mg/dL Final   08/08/2017 8.7 8.7 - 10.4 mg/dL Final      PO4 No results  found for: CAPO4   Albumin No results found for: ALBUMIN   Magnesium No results found for: MG   Uric Acid No results found for: URICACID        Results Review:     I reviewed the patient's new clinical results.      amiodarone 400 mg Oral Q8H   amLODIPine 5 mg Oral Daily   atorvastatin 10 mg Oral Nightly   azithromycin 500 mg Intravenous Q24H   budesonide-formoterol 2 puff Inhalation BID - RT   bumetanide 1 mg Intravenous Q12H   ceftriaxone 1 g Intravenous Q24H   guaiFENesin 600 mg Oral Q12H   insulin detemir 20 Units Subcutaneous Nightly   ipratropium-albuterol 3 mL Nebulization 4x Daily - RT   levothyroxine 75 mcg Oral Q AM   methylPREDNISolone sodium succinate 60 mg Intravenous Q8H   metoprolol tartrate 100 mg Oral BID   pharmacy consult - MTM  Does not apply Daily   sennosides-docusate sodium 2 tablet Oral BID       Pharmacy to dose warfarin        Medication Review:     Assessment/Plan     Principal Problem:    Acute diastolic heart failure  Active Problems:    Paroxysmal atrial fibrillation    Essential hypertension    Type 2 diabetes mellitus    Chronic kidney disease, stage IV (severe)    Anemia of renal disease    Vitamin D deficiency    Bronchitis    Leukocytosis    Metabolic acidosis    Heart valve problem    Bronchospasm with bronchitis, acute      1-chronic kidney disease stage IV status post kidney transplant-patient was temporarily on dialysis for almost 1-1/2 year.  She has been off dialysis for several months.Baseline Scr 3.0-3.3 range.   2- anemia-hemoglobin is low need to assess iron studies  3-volume overload/congestive heart failure-seems like this is her second or third admission for congestive heart failure.  She has a big fistula which probably is the reason for high output cardiac failure.  4-hypertension possibly related to volume     PLAN:   Mild increase in creatinine noted, monitor  Continue with diuresis. Good response.  Monitor I/o   Avoid nephrotoxic agents  Appreciate cardiology  input.        Allen Chambers MD  08/10/17  6:50 PM

## 2017-08-10 NOTE — PROGRESS NOTES
"      HOSPITALIST DAILY PROGRESS NOTE    Chief Complaint: f/u SOA, cough    Subjective   SUBJECTIVE/OVERNIGHT EVENTS   Patient seen this morning. Still short of breath, with difficulty clearing congestion. No n/v. Difficulty lying flat.  Review of Systems:  Gen-no fevers, no chills  CV-no chest pain, no palpitations  Resp-+cough, improving dyspnea  GI-no N/V/D, no abd pain    Objective   OBJECTIVE   I have reviewed the vital signs.  /64 (BP Location: Right arm, Patient Position: Lying)  Pulse 77  Temp 97.9 °F (36.6 °C) (Oral)   Resp 18  Ht 63\" (160 cm)  Wt 194 lb 8 oz (88.2 kg)  LMP  (Approximate)  SpO2 99%  BMI 34.45 kg/m2    Physical Exam:  NAD  Op clear  Irregular  Coarse, with increased wheezes  +BS, ND, NT  MOONEY  Tr edema  Large RUE AV fistula    Results:  I have reviewed the labs, culture data, radiology results, and diagnostic studies.      Results from last 7 days  Lab Units 08/10/17  0641 08/09/17  0635 08/08/17  0530   WBC 10*3/mm3 6.01 8.75 11.07*   HEMOGLOBIN g/dL 9.2* 8.8* 8.9*   HEMATOCRIT % 29.6* 29.0* 27.9*   PLATELETS 10*3/mm3 180 152 164       Results from last 7 days  Lab Units 08/10/17  0641   SODIUM mmol/L 142   POTASSIUM mmol/L 4.2   CHLORIDE mmol/L 113*   CO2 mmol/L 19.0*   BUN mg/dL 72*   CREATININE mg/dL 3.50*   GLUCOSE mg/dL 190*   CALCIUM mg/dL 9.4       Culture Data:  Cultures:    Blood Culture   Date Value Ref Range Status   08/07/2017 No growth at 24 hours  Preliminary   08/07/2017 No growth at 24 hours  Preliminary     Respiratory Culture   Date Value Ref Range Status   08/07/2017 Scant growth (1+) Normal Respiratory [place]  Preliminary         Radiology Results:  Imaging Results (last 24 hours)     Procedure Component Value Units Date/Time    XR Chest PA & Lateral [[id number]] Collected:  08/10/17 0921     Updated:  08/10/17 0921    Narrative:       EXAMINATION: XR CHEST PA AND LATERAL-  08/08/2017     INDICATION: follow up dyspnea; I50.9-Heart failure, " unspecified;  D72.829-Elevated white blood cell count, unspecified      COMPARISON: 08/07/2017 portable chest     FINDINGS: Heart remains moderately enlarged. The vasculature is still  cephalized. There is a suggestion of very early interstitial edema,  similar to the prior study. No extensive edema effusion or pneumothorax  is seen.       Impression:       Borderline changes of congestive heart failure similar to  prior exam.     D:  08/10/2017  E:  08/10/2017             I have reviewed the medications.      Assessment/Plan   ASSESSMENT/PLAN    Principal Problem:    Acute diastolic heart failure  Active Problems:    Paroxysmal atrial fibrillation    Essential hypertension    Type 2 diabetes mellitus    Chronic kidney disease, stage IV (severe)    Anemia of renal disease    Vitamin D deficiency    Bronchitis    Leukocytosis    Metabolic acidosis    Heart valve problem    Bronchospasm with bronchitis, acute    76 yo F with hx of chronic diastolic CHF, CKD Stage 4 s/p prior renal transplant with failure requiring re-initiation of dialysis but has been off HD since April 2017, and recent hospitalization in June 2017 due to volume overload, now presents with several days of increasing dyspnea and cough.     PLAN:  --Her SOA is likely multifactorial related to acute on chronic diastolic heart failure as well as probable bronchitis (no clear infiltrates on CXR seen).  --Significant bronchospasm, increased solumedrol  --Continue IV Bumex, as per cardiology, monitor renal function closely, CXR with pulmonary edema today  --Afib/RVR, per cardiology, s/p amiodarone  --possible high output cardiac failure, catheterization pending, to assess fistula  --Continue Rocephin and Azithromycin for bronchitis vs. possible pneumonia. Stopped Vancomycin    Complex patient.      I expect patient to be discharged in: ~3 days    Xavier Mejía MD  08/10/17  9:55 AM

## 2017-08-10 NOTE — PROGRESS NOTES
"Pharmacy Consult  -  Warfarin    Moni Wallace is a  77 y.o. female   Height - 63\" (160 cm)  Weight - 194 lb 8 oz (88.2 kg)    Consulting Provider: - Hospital Medicine  Indication: - Paroxysmal atrial fibrillation  Goal INR: - 2-3  Home Regimen:   - 6 mg Daily - This is per patient report of 3 x 2 mg tablets daily.  A call to [facility] indicated that she filled 2 mg tablets on 7/20.         Drug-Drug Interactions with current regimen:   Azythromycin increased bleeding risk              Levothyroxine increased bleeding risk        Warfarin Dosing During Admission:    Date  8/7 8/8 8/9 8/10        INR  2.81 3.28 2.74 2.16        Dose  6 mg  D/C for procedures Continue D/C Hold             Labs:    Results from last 7 days   Lab Units 08/10/17  0641 08/09/17  0635 08/08/17  0530 08/07/17  1313 08/07/17  0546   INR  2.16 2.74 3.28 2.81 2.65   HEMOGLOBIN g/dL 9.2* 8.8* 8.9* --  10.4*   HEMATOCRIT % 29.6* 29.0* 27.9* --  33.3*   PLATELETS 10*3/mm3 180 152 164 --  201     Results from last 7 days   Lab Units 08/10/17  0641 08/09/17  0635 08/08/17  0530 08/07/17  0546   SODIUM mmol/L 142 142 141 142   POTASSIUM mmol/L 4.2 3.9 3.7 4.0   CHLORIDE mmol/L 113* 112* 113* 112*   CO2 mmol/L 19.0* 20.0 19.0* 16.0*   BUN mg/dL 72* 58* 60* 49*   CREATININE mg/dL 3.50* 3.30* 3.10* 3.00*   CALCIUM mg/dL 9.4 9.1 8.7 9.8   BILIRUBIN mg/dL --  --  --  0.4   ALK PHOS U/L --  --  --  147*   ALT (SGPT) U/L --  --  --  44*   AST (SGOT) U/L --  --  --  60*   GLUCOSE mg/dL 190* 86 80 158*       Current dietary intake:       Assessment/Plan:     Plan for cardiac cath today. Continue to hold warfarin. Will follow for orders to resume.    Thank you for this consult.  Adryan Ramirez IV, PharmD, BCPS  8/10/2017  11:36 AM                  "

## 2017-08-11 LAB
ANION GAP SERPL CALCULATED.3IONS-SCNC: 10 MMOL/L (ref 3–11)
BASOPHILS # BLD AUTO: 0.01 10*3/MM3 (ref 0–0.2)
BASOPHILS NFR BLD AUTO: 0.1 % (ref 0–1)
BUN BLD-MCNC: 89 MG/DL (ref 9–23)
BUN/CREAT SERPL: 24.7 (ref 7–25)
CALCIUM SPEC-SCNC: 9.6 MG/DL (ref 8.7–10.4)
CHLORIDE SERPL-SCNC: 111 MMOL/L (ref 99–109)
CO2 SERPL-SCNC: 19 MMOL/L (ref 20–31)
CREAT BLD-MCNC: 3.6 MG/DL (ref 0.6–1.3)
DEPRECATED RDW RBC AUTO: 54.9 FL (ref 37–54)
EOSINOPHIL # BLD AUTO: 0 10*3/MM3 (ref 0–0.3)
EOSINOPHIL NFR BLD AUTO: 0 % (ref 0–3)
ERYTHROCYTE [DISTWIDTH] IN BLOOD BY AUTOMATED COUNT: 16.1 % (ref 11.3–14.5)
GFR SERPL CREATININE-BSD FRML MDRD: 12 ML/MIN/1.73
GLUCOSE BLD-MCNC: 156 MG/DL (ref 70–100)
GLUCOSE BLDC GLUCOMTR-MCNC: 154 MG/DL (ref 70–130)
GLUCOSE BLDC GLUCOMTR-MCNC: 164 MG/DL (ref 70–130)
GLUCOSE BLDC GLUCOMTR-MCNC: 166 MG/DL (ref 70–130)
GLUCOSE BLDC GLUCOMTR-MCNC: 338 MG/DL (ref 70–130)
HCT VFR BLD AUTO: 29.2 % (ref 34.5–44)
HGB BLD-MCNC: 9.1 G/DL (ref 11.5–15.5)
IMM GRANULOCYTES # BLD: 0.06 10*3/MM3 (ref 0–0.03)
IMM GRANULOCYTES NFR BLD: 0.6 % (ref 0–0.6)
INR PPP: 2.26
LYMPHOCYTES # BLD AUTO: 0.54 10*3/MM3 (ref 0.6–4.8)
LYMPHOCYTES NFR BLD AUTO: 5.4 % (ref 24–44)
MCH RBC QN AUTO: 28.5 PG (ref 27–31)
MCHC RBC AUTO-ENTMCNC: 31.2 G/DL (ref 32–36)
MCV RBC AUTO: 91.5 FL (ref 80–99)
MONOCYTES # BLD AUTO: 0.28 10*3/MM3 (ref 0–1)
MONOCYTES NFR BLD AUTO: 2.8 % (ref 0–12)
NEUTROPHILS # BLD AUTO: 9.18 10*3/MM3 (ref 1.5–8.3)
NEUTROPHILS NFR BLD AUTO: 91.1 % (ref 41–71)
PLATELET # BLD AUTO: 189 10*3/MM3 (ref 150–450)
PMV BLD AUTO: 10.4 FL (ref 6–12)
POTASSIUM BLD-SCNC: 4.1 MMOL/L (ref 3.5–5.5)
PROTHROMBIN TIME: 25.3 SECONDS (ref 9.6–11.5)
RBC # BLD AUTO: 3.19 10*6/MM3 (ref 3.89–5.14)
SODIUM BLD-SCNC: 140 MMOL/L (ref 132–146)
WBC NRBC COR # BLD: 10.07 10*3/MM3 (ref 3.5–10.8)

## 2017-08-11 PROCEDURE — 25010000002 NA FERRIC GLUC CPLX PER 12.5 MG: Performed by: INTERNAL MEDICINE

## 2017-08-11 PROCEDURE — 99232 SBSQ HOSP IP/OBS MODERATE 35: CPT | Performed by: NURSE PRACTITIONER

## 2017-08-11 PROCEDURE — 94760 N-INVAS EAR/PLS OXIMETRY 1: CPT

## 2017-08-11 PROCEDURE — 94799 UNLISTED PULMONARY SVC/PX: CPT

## 2017-08-11 PROCEDURE — 25010000002 CEFTRIAXONE PER 250 MG: Performed by: NURSE PRACTITIONER

## 2017-08-11 PROCEDURE — 80048 BASIC METABOLIC PNL TOTAL CA: CPT | Performed by: HOSPITALIST

## 2017-08-11 PROCEDURE — 94640 AIRWAY INHALATION TREATMENT: CPT

## 2017-08-11 PROCEDURE — 82962 GLUCOSE BLOOD TEST: CPT

## 2017-08-11 PROCEDURE — 85610 PROTHROMBIN TIME: CPT | Performed by: NURSE PRACTITIONER

## 2017-08-11 PROCEDURE — 85025 COMPLETE CBC W/AUTO DIFF WBC: CPT | Performed by: HOSPITALIST

## 2017-08-11 PROCEDURE — 63710000001 INSULIN DETEMIR PER 5 UNITS: Performed by: HOSPITALIST

## 2017-08-11 PROCEDURE — 99233 SBSQ HOSP IP/OBS HIGH 50: CPT | Performed by: HOSPITALIST

## 2017-08-11 PROCEDURE — 25010000002 METHYLPREDNISOLONE PER 40 MG: Performed by: HOSPITALIST

## 2017-08-11 PROCEDURE — 25010000002 AZITHROMYCIN: Performed by: NURSE PRACTITIONER

## 2017-08-11 RX ORDER — PANTOPRAZOLE SODIUM 40 MG/1
40 TABLET, DELAYED RELEASE ORAL
Status: DISCONTINUED | OUTPATIENT
Start: 2017-08-11 | End: 2017-08-14 | Stop reason: HOSPADM

## 2017-08-11 RX ORDER — WARFARIN SODIUM 3 MG/1
3 TABLET ORAL
Status: DISCONTINUED | OUTPATIENT
Start: 2017-08-12 | End: 2017-08-13

## 2017-08-11 RX ORDER — WARFARIN SODIUM 4 MG/1
4 TABLET ORAL
Status: COMPLETED | OUTPATIENT
Start: 2017-08-11 | End: 2017-08-11

## 2017-08-11 RX ADMIN — IPRATROPIUM BROMIDE AND ALBUTEROL SULFATE 3 ML: .5; 3 SOLUTION RESPIRATORY (INHALATION) at 07:55

## 2017-08-11 RX ADMIN — IPRATROPIUM BROMIDE AND ALBUTEROL SULFATE 3 ML: .5; 3 SOLUTION RESPIRATORY (INHALATION) at 20:16

## 2017-08-11 RX ADMIN — AMIODARONE HYDROCHLORIDE 400 MG: 200 TABLET ORAL at 21:11

## 2017-08-11 RX ADMIN — METOPROLOL TARTRATE 100 MG: 100 TABLET, FILM COATED ORAL at 17:53

## 2017-08-11 RX ADMIN — METHYLPREDNISOLONE SODIUM SUCCINATE 60 MG: 40 INJECTION, POWDER, FOR SOLUTION INTRAMUSCULAR; INTRAVENOUS at 07:17

## 2017-08-11 RX ADMIN — AZITHROMYCIN 500 MG: 500 INJECTION, POWDER, LYOPHILIZED, FOR SOLUTION INTRAVENOUS at 09:53

## 2017-08-11 RX ADMIN — INSULIN DETEMIR 20 UNITS: 100 INJECTION, SOLUTION SUBCUTANEOUS at 21:11

## 2017-08-11 RX ADMIN — METOPROLOL TARTRATE 100 MG: 100 TABLET, FILM COATED ORAL at 09:56

## 2017-08-11 RX ADMIN — GUAIFENESIN 600 MG: 600 TABLET, EXTENDED RELEASE ORAL at 21:12

## 2017-08-11 RX ADMIN — Medication 2 TABLET: at 09:55

## 2017-08-11 RX ADMIN — GUAIFENESIN 600 MG: 600 TABLET, EXTENDED RELEASE ORAL at 09:55

## 2017-08-11 RX ADMIN — PANTOPRAZOLE SODIUM 40 MG: 40 TABLET, DELAYED RELEASE ORAL at 14:00

## 2017-08-11 RX ADMIN — LEVOTHYROXINE SODIUM 75 MCG: 75 TABLET ORAL at 07:19

## 2017-08-11 RX ADMIN — BUMETANIDE 1 MG: 0.25 INJECTION INTRAMUSCULAR; INTRAVENOUS at 21:12

## 2017-08-11 RX ADMIN — CEFTRIAXONE SODIUM 1 G: 1 INJECTION, SOLUTION INTRAVENOUS at 09:53

## 2017-08-11 RX ADMIN — IPRATROPIUM BROMIDE AND ALBUTEROL SULFATE 3 ML: .5; 3 SOLUTION RESPIRATORY (INHALATION) at 15:50

## 2017-08-11 RX ADMIN — AMIODARONE HYDROCHLORIDE 400 MG: 200 TABLET ORAL at 07:17

## 2017-08-11 RX ADMIN — ATORVASTATIN CALCIUM 10 MG: 10 TABLET, FILM COATED ORAL at 21:12

## 2017-08-11 RX ADMIN — AMLODIPINE BESYLATE 5 MG: 5 TABLET ORAL at 09:54

## 2017-08-11 RX ADMIN — ALPRAZOLAM 0.5 MG: 0.5 TABLET ORAL at 21:11

## 2017-08-11 RX ADMIN — SODIUM CHLORIDE 125 MG: 9 INJECTION, SOLUTION INTRAVENOUS at 14:00

## 2017-08-11 RX ADMIN — Medication 2 TABLET: at 17:53

## 2017-08-11 RX ADMIN — AMIODARONE HYDROCHLORIDE 400 MG: 200 TABLET ORAL at 14:00

## 2017-08-11 RX ADMIN — BUMETANIDE 1 MG: 0.25 INJECTION INTRAMUSCULAR; INTRAVENOUS at 10:20

## 2017-08-11 RX ADMIN — WARFARIN SODIUM 4 MG: 4 TABLET ORAL at 17:53

## 2017-08-11 RX ADMIN — BUDESONIDE AND FORMOTEROL FUMARATE DIHYDRATE 2 PUFF: 80; 4.5 AEROSOL RESPIRATORY (INHALATION) at 20:16

## 2017-08-11 RX ADMIN — BUDESONIDE AND FORMOTEROL FUMARATE DIHYDRATE 2 PUFF: 80; 4.5 AEROSOL RESPIRATORY (INHALATION) at 08:05

## 2017-08-11 NOTE — PROGRESS NOTES
[facility] at [facility]  IP Progress Note      Chief Complaint/Reason for service:    · Acute on Chronic Diastolic Heart Failure         Patient lying in bed, denies chest pain, thinks her breathing has improved.  She had a limted echo yesterday to evaluate high blood CHF due to her AV fistula.  CO decreased from 11.3 to 8.3 with compression.    Past medical, surgical, social and family history reviewed in the patient's electronic medical record.         Vital Sign Min/Max for last 24 hours  Temp  Min: 97.6 °F (36.4 °C)  Max: 98.2 °F (36.8 °C)   BP  Min: 124/53  Max: 145/62   Pulse  Min: 56  Max: 84   Resp  Min: 18  Max: 24   SpO2  Min: 94 %  Max: 99 %   Flow (L/min)  Min: 1  Max: 2      Intake/Output Summary (Last 24 hours) at 08/11/17 0742  Last data filed at 08/11/17 0400   Gross per 24 hour   Intake                0 ml   Output              675 ml   Net             -675 ml           Physical Exam   Constitutional: She is oriented to person, place, and time. She appears well-developed and well-nourished.   HENT:   Head: Normocephalic.   Neck: No JVD present. Carotid bruit is not present.   Cardiovascular: Normal rate, regular rhythm, normal heart sounds and intact distal pulses.  Exam reveals no gallop and no friction rub.    No murmur heard.  Pulmonary/Chest: Effort normal. She has decreased breath sounds in the right lower field and the left lower field. She has rhonchi in the right upper field, the right middle field, the left upper field and the left middle field. She has rales.   Abdominal: Soft.   Musculoskeletal: She exhibits no edema.   Neurological: She is alert and oriented to person, place, and time.   Skin: Skin is warm and dry. No cyanosis. Nails show no clubbing.   Psychiatric: She has a normal mood and affect. Her behavior is normal.       Results Review:   I reviewed the patient's recent labs in the electronic medical record.        Tele:  [facility] Problem List      * (Principal)High output HF (heart failure)    Overview Addendum 8/10/2017  8:44 PM by Jeff Joe MD     · Echo (6/08/2017):LVEF 50%. Grade II Diastolic dysfunction.  · Echo (8/9/17): LVEF 55%.  Grade 2 diastolic dysfunction.  Mild MR.  Mild aortic stenosis  · Limited echo to evaluate high output CHF (8/10/17): Cardiac output decreased from 11.3 L/min to 8 L/min with compression of fistula.         Paroxysmal atrial fibrillation    Overview Addendum 8/9/2017  7:37 AM by REY Brown     · CHADS-VASc = 5  · Atrial fibrillation initially diagnosed February 2015; spontaneous conversion to normal sinus rhythm.   · Echocardiogram (06/08/2017): LVEF 50%. Grade II Diastolic dysfunction. RVSP 41.6 mmHg. Mild-to-moderate MR. Mild AS.  Moderate TR  · Noted to be in afib with RVR 08/09/2017 in setting of acute bronchitis.  · On Coumadin and amiodarone         Chronic kidney disease, stage IV (severe)    Overview Addendum 6/8/2017  5:52 PM by Jeff Joe MD     · IgA nephropathy with history of renal transplant, 2010.   · Patient on hemodialysis Monday, Wednesday, and Friday started July 2015.  · Hemodialysis discontinued 2/2017.         Bronchitis    Leukocytosis    Bronchospasm with bronchitis, acute    Type 2 diabetes mellitus    Anemia of renal disease    Overview Deleted 8/2/2016 12:03 PM by Jeff Joe MD            Metabolic acidosis    Essential hypertension    Vitamin D deficiency    Heart valve problem    Overview Addendum 8/9/2017  7:31 AM by REY Brown     · Echo (06/0/2017): Mild-to-moderate mitral valve regurgitation is present. Mild aortic valve stenosis is present. Moderate tricuspid valve regurgitation is present.                      · Continue amiodarone  · We will visit again on Monday. Please call over the weekend with any questions.    REY Galeana  8/11/2017

## 2017-08-11 NOTE — PROGRESS NOTES
"   LOS: 4 days    Patient Care Team:  Slim Wick MD as PCP - General  Slim Wick MD as PCP - Family Medicine  Jeff Joe MD as Consulting Physician (Cardiology)  Donny Hodges MD as Consulting Physician (Nephrology)    Chief Complaint:      Subjective     Interval History:   No new complains.  No new events overnight.    Review of Systems:   Dysuria or gross hematuria No chest pain or shortness of breath. Objective     Vital Sign Min/Max for last 24 hours  Temp  Min: 97.6 °F (36.4 °C)  Max: 98.2 °F (36.8 °C)   BP  Min: 124/53  Max: 153/70   Pulse  Min: 56  Max: 84   Resp  Min: 18  Max: 24   SpO2  Min: 94 %  Max: 100 %   Flow (L/min)  Min: 1  Max: 1   No Data Recorded     Flowsheet Rows         First Filed Value    Admission Height  63\" (160 cm) Documented at 08/07/2017 0524    Admission Weight  190 lb (86.2 kg) Documented at 08/07/2017 0524             I/O last 3 completed shifts:  In: -   Out: 1025 [Urine:1025]    Physical Exam:     General Appearance:    Alert, cooperative, in no acute distress   Head:    Normocephalic, w atraumatic   Eyes:            Lids and lashes normal, conjunctivae and sclerae normal, no   icterus, no pallor, corneas clear, PERRLA           Neck:    supple,  no JVD       Lungs:     Decrease air entry to bases,respirations regular, even and       unlabored    Heart:    Regular rhythm and normal rate, normal S1 and S2, no            murmur, no gallop, no rub, no click       Abdomen:     Normal bowel sounds, no masses, no organomegaly, soft        non-tender, non-distended, no guarding, no rebound                 tenderness       Extremities:   Moves all extremities well, no edema, no cyanosis, no              redness               Neurologic:  sensation intact, DTR        present and equal bilaterally         WBC WBC   Date Value Ref Range Status   08/11/2017 10.07 3.50 - 10.80 10*3/mm3 Final   08/10/2017 6.01 3.50 - 10.80 10*3/mm3 Final "   08/09/2017 8.75 3.50 - 10.80 10*3/mm3 Final      HGB Hemoglobin   Date Value Ref Range Status   08/11/2017 9.1 (L) 11.5 - 15.5 g/dL Final   08/10/2017 9.2 (L) 11.5 - 15.5 g/dL Final   08/09/2017 8.8 (L) 11.5 - 15.5 g/dL Final      HCT Hematocrit   Date Value Ref Range Status   08/11/2017 29.2 (L) 34.5 - 44.0 % Final   08/10/2017 29.6 (L) 34.5 - 44.0 % Final   08/09/2017 29.0 (L) 34.5 - 44.0 % Final      Platlets No results found for: LABPLAT   MCV MCV   Date Value Ref Range Status   08/11/2017 91.5 80.0 - 99.0 fL Final   08/10/2017 90.8 80.0 - 99.0 fL Final   08/09/2017 93.5 80.0 - 99.0 fL Final          Sodium Sodium   Date Value Ref Range Status   08/11/2017 140 132 - 146 mmol/L Final   08/10/2017 142 132 - 146 mmol/L Final   08/09/2017 142 132 - 146 mmol/L Final      Potassium Potassium   Date Value Ref Range Status   08/11/2017 4.1 3.5 - 5.5 mmol/L Final   08/10/2017 4.2 3.5 - 5.5 mmol/L Final   08/09/2017 3.9 3.5 - 5.5 mmol/L Final      Chloride Chloride   Date Value Ref Range Status   08/11/2017 111 (H) 99 - 109 mmol/L Final   08/10/2017 113 (H) 99 - 109 mmol/L Final   08/09/2017 112 (H) 99 - 109 mmol/L Final      CO2 CO2   Date Value Ref Range Status   08/11/2017 19.0 (L) 20.0 - 31.0 mmol/L Final   08/10/2017 19.0 (L) 20.0 - 31.0 mmol/L Final   08/09/2017 20.0 20.0 - 31.0 mmol/L Final      BUN BUN   Date Value Ref Range Status   08/11/2017 89 (H) 9 - 23 mg/dL Final   08/10/2017 72 (H) 9 - 23 mg/dL Final   08/09/2017 58 (H) 9 - 23 mg/dL Final      Creatinine Creatinine   Date Value Ref Range Status   08/11/2017 3.60 (H) 0.60 - 1.30 mg/dL Final   08/10/2017 3.50 (H) 0.60 - 1.30 mg/dL Final   08/09/2017 3.30 (H) 0.60 - 1.30 mg/dL Final      Calcium Calcium   Date Value Ref Range Status   08/11/2017 9.6 8.7 - 10.4 mg/dL Final   08/10/2017 9.4 8.7 - 10.4 mg/dL Final   08/09/2017 9.1 8.7 - 10.4 mg/dL Final      PO4 No results found for: CAPO4   Albumin No results found for: ALBUMIN   Magnesium No results found  for: MG   Uric Acid No results found for: URICACID        Results Review:     I reviewed the patient's new clinical results.      amiodarone 400 mg Oral Q8H   amLODIPine 5 mg Oral Daily   atorvastatin 10 mg Oral Nightly   azithromycin 500 mg Intravenous Q24H   budesonide-formoterol 2 puff Inhalation BID - RT   bumetanide 1 mg Intravenous Q12H   ceftriaxone 1 g Intravenous Q24H   guaiFENesin 600 mg Oral Q12H   insulin detemir 20 Units Subcutaneous Nightly   ipratropium-albuterol 3 mL Nebulization 4x Daily - RT   levothyroxine 75 mcg Oral Q AM   methylPREDNISolone sodium succinate 60 mg Intravenous Q8H   metoprolol tartrate 100 mg Oral BID   pharmacy consult - MTM  Does not apply Daily   sennosides-docusate sodium 2 tablet Oral BID       Pharmacy to dose warfarin        Medication Review:     Assessment/Plan     Principal Problem:    High output HF (heart failure)  Active Problems:    Paroxysmal atrial fibrillation    Essential hypertension    Type 2 diabetes mellitus    Chronic kidney disease, stage IV (severe)    Anemia of renal disease    Vitamin D deficiency    Bronchitis    Leukocytosis    Metabolic acidosis    Heart valve problem    Bronchospasm with bronchitis, acute      1-chronic kidney disease stage IV status post kidney transplant-patient was temporarily on dialysis for almost 1-1/2 year.  She has been off dialysis for several months.Baseline Scr 3.0-3.3 range.   2- anemia-hemoglobin is low need to assess iron studies  3-volume overload/congestive heart failure-seems like this is her second or third admission for congestive heart failure.  She has a big fistula which probably is the reason for high output cardiac failure.  4-hypertension possibly related to volume     PLAN:   Continue with diuresis. Good response.  Monitor I/o   Avoid nephrotoxic agents  Appreciate cardiology input.        Albina Will MD  08/11/17  8:29 AM

## 2017-08-11 NOTE — PROGRESS NOTES
"Pharmacy Consult  -  Warfarin    Moni Wallace is a  77 y.o. female   Height - 63\" (160 cm)  Weight - 194 lb 8 oz (88.2 kg)    Consulting Provider: - Hospital Medicine  Indication: - Paroxysmal atrial fibrillation  Goal INR: - 2-3  Home Regimen:   - 6 mg Daily - This is per patient report of 3 x 2 mg tablets daily.  A call to [facility] indicated that she filled 2 mg tablets on 7/20.         Drug-Drug Interactions with current regimen:   Azythromycin increased bleeding risk              Levothyroxine increased bleeding risk   Amiodarone may increase INR        Warfarin Dosing During Admission:    Date  8/7 8/8 8/9 8/10 8/11       INR  2.81 3.28 2.74 2.16 2.26       Dose  6 mg  D/C for procedures Continue D/C Hold  4  mg           Labs:    Results from last 7 days   Lab Units 08/11/17  0554 08/10/17  0641 08/09/17  0635 08/08/17  0530 08/07/17  1313 08/07/17  0546   INR  2.26 2.16 2.74 3.28 2.81 2.65   HEMOGLOBIN g/dL 9.1* 9.2* 8.8* 8.9* --  10.4*   HEMATOCRIT % 29.2* 29.6* 29.0* 27.9* --  33.3*   PLATELETS 10*3/mm3 189 180 152 164 --  201     Results from last 7 days   Lab Units 08/11/17  0554 08/10/17  0641 08/09/17  0635  08/07/17  0546   SODIUM mmol/L 140 142 142 < > 142   POTASSIUM mmol/L 4.1 4.2 3.9 < > 4.0   CHLORIDE mmol/L 111* 113* 112* < > 112*   CO2 mmol/L 19.0* 19.0* 20.0 < > 16.0*   BUN mg/dL 89* 72* 58* < > 49*   CREATININE mg/dL 3.60* 3.50* 3.30* < > 3.00*   CALCIUM mg/dL 9.6 9.4 9.1 < > 9.8   BILIRUBIN mg/dL --  --  --  --  0.4   ALK PHOS U/L --  --  --  --  147*   ALT (SGPT) U/L --  --  --  --  44*   AST (SGOT) U/L --  --  --  --  60*   GLUCOSE mg/dL 156* 190* 86 < > 158*   < > = values in this interval not displayed.       Current dietary intake:   Diet Order   Procedures   • Diet Regular; Thin; Renal           Assessment/Plan:     Discussed with cardiology, confirmed warfarin restart today.   She has been recently started on amiodarone (currently receiving 1200 mg daily). This is known to " increase warfarin sensitivity and may partially explain slight up trend today.    Patient has not had a dose in 3 days; previously therapeutic on 6 mg daily / 42 mg weekly, she is behind by 18 mg this week although her last dose lead to an INR of 3.28 before warfarin was held for procedures. Therapeutic today but I anticipate further drop off tomorrow to subtherapeutic range.    1. 4 mg warfarin tonight  2. Will empirically reduce dose 50 % to 3 mg daily starting tomorrow in anticipation of amiodarone influence.   3. Defer further dosing adjustments daily to rounding clinical pharmacist pending assessment of PT/INR.   - target INR 2-3    Thank you for this consult.  Adryan Ramirez IV, PharmD, BCPS  8/11/2017  12:02 PM

## 2017-08-11 NOTE — PLAN OF CARE
Problem: Fall Risk (Adult)  Goal: Identify Related Risk Factors and Signs and Symptoms  Outcome: Ongoing (interventions implemented as appropriate)    08/11/17 7871   Fall Risk   Fall Risk: Related Risk Factors culprit medication(s);fear of falling;gait/mobility problems;environment unfamiliar   Fall Risk: Signs and Symptoms presence of risk factors

## 2017-08-11 NOTE — PROGRESS NOTES
"   LOS: 4 days    Patient Care Team:  Slim Wick MD as PCP - General  lSim Wick MD as PCP - Family Medicine  Jeff Joe MD as Consulting Physician (Cardiology)  Donny Hodges MD as Consulting Physician (Nephrology)    Chief Complaint:      Subjective     Interval History:   No new complains.  No new events overnight.    Review of Systems:   Dysuria or gross hematuria No chest pain or shortness of breath. Objective     Vital Sign Min/Max for last 24 hours  Temp  Min: 97.6 °F (36.4 °C)  Max: 98.2 °F (36.8 °C)   BP  Min: 124/53  Max: 153/70   Pulse  Min: 56  Max: 84   Resp  Min: 18  Max: 24   SpO2  Min: 94 %  Max: 100 %   Flow (L/min)  Min: 1  Max: 1   No Data Recorded     Flowsheet Rows         First Filed Value    Admission Height  63\" (160 cm) Documented at 08/07/2017 0524    Admission Weight  190 lb (86.2 kg) Documented at 08/07/2017 0524             I/O last 3 completed shifts:  In: -   Out: 1025 [Urine:1025]    Physical Exam:     General Appearance:    Alert, cooperative, in no acute distress   Head:    Normocephalic, w atraumatic   Eyes:            Lids and lashes normal, conjunctivae and sclerae normal, no   icterus, no pallor, corneas clear, PERRLA           Neck:    supple,  no JVD       Lungs:     Decrease air entry to bases,respirations regular, even and       unlabored    Heart:    Regular rhythm and normal rate, normal S1 and S2, no            murmur, no gallop, no rub, no click       Abdomen:     Normal bowel sounds, no masses, no organomegaly, soft        non-tender, non-distended, no guarding, no rebound                 tenderness       Extremities:   Moves all extremities well, no edema, no cyanosis, no              redness               Neurologic:  sensation intact, DTR        present and equal bilaterally         WBC WBC   Date Value Ref Range Status   08/11/2017 10.07 3.50 - 10.80 10*3/mm3 Final   08/10/2017 6.01 3.50 - 10.80 10*3/mm3 Final "   08/09/2017 8.75 3.50 - 10.80 10*3/mm3 Final      HGB Hemoglobin   Date Value Ref Range Status   08/11/2017 9.1 (L) 11.5 - 15.5 g/dL Final   08/10/2017 9.2 (L) 11.5 - 15.5 g/dL Final   08/09/2017 8.8 (L) 11.5 - 15.5 g/dL Final      HCT Hematocrit   Date Value Ref Range Status   08/11/2017 29.2 (L) 34.5 - 44.0 % Final   08/10/2017 29.6 (L) 34.5 - 44.0 % Final   08/09/2017 29.0 (L) 34.5 - 44.0 % Final      Platlets No results found for: LABPLAT   MCV MCV   Date Value Ref Range Status   08/11/2017 91.5 80.0 - 99.0 fL Final   08/10/2017 90.8 80.0 - 99.0 fL Final   08/09/2017 93.5 80.0 - 99.0 fL Final          Sodium Sodium   Date Value Ref Range Status   08/11/2017 140 132 - 146 mmol/L Final   08/10/2017 142 132 - 146 mmol/L Final   08/09/2017 142 132 - 146 mmol/L Final      Potassium Potassium   Date Value Ref Range Status   08/11/2017 4.1 3.5 - 5.5 mmol/L Final   08/10/2017 4.2 3.5 - 5.5 mmol/L Final   08/09/2017 3.9 3.5 - 5.5 mmol/L Final      Chloride Chloride   Date Value Ref Range Status   08/11/2017 111 (H) 99 - 109 mmol/L Final   08/10/2017 113 (H) 99 - 109 mmol/L Final   08/09/2017 112 (H) 99 - 109 mmol/L Final      CO2 CO2   Date Value Ref Range Status   08/11/2017 19.0 (L) 20.0 - 31.0 mmol/L Final   08/10/2017 19.0 (L) 20.0 - 31.0 mmol/L Final   08/09/2017 20.0 20.0 - 31.0 mmol/L Final      BUN BUN   Date Value Ref Range Status   08/11/2017 89 (H) 9 - 23 mg/dL Final   08/10/2017 72 (H) 9 - 23 mg/dL Final   08/09/2017 58 (H) 9 - 23 mg/dL Final      Creatinine Creatinine   Date Value Ref Range Status   08/11/2017 3.60 (H) 0.60 - 1.30 mg/dL Final   08/10/2017 3.50 (H) 0.60 - 1.30 mg/dL Final   08/09/2017 3.30 (H) 0.60 - 1.30 mg/dL Final      Calcium Calcium   Date Value Ref Range Status   08/11/2017 9.6 8.7 - 10.4 mg/dL Final   08/10/2017 9.4 8.7 - 10.4 mg/dL Final   08/09/2017 9.1 8.7 - 10.4 mg/dL Final      PO4 No results found for: CAPO4   Albumin No results found for: ALBUMIN   Magnesium No results found  for: MG   Uric Acid No results found for: URICACID        Results Review:     I reviewed the patient's new clinical results.      amiodarone 400 mg Oral Q8H   amLODIPine 5 mg Oral Daily   atorvastatin 10 mg Oral Nightly   azithromycin 500 mg Intravenous Q24H   budesonide-formoterol 2 puff Inhalation BID - RT   bumetanide 1 mg Intravenous Q12H   ceftriaxone 1 g Intravenous Q24H   guaiFENesin 600 mg Oral Q12H   insulin detemir 20 Units Subcutaneous Nightly   ipratropium-albuterol 3 mL Nebulization 4x Daily - RT   levothyroxine 75 mcg Oral Q AM   metoprolol tartrate 100 mg Oral BID   pantoprazole 40 mg Oral Q AM   pharmacy consult - MTM  Does not apply Daily   [START ON 8/12/2017] predniSONE 30 mg Oral Daily With Breakfast   sennosides-docusate sodium 2 tablet Oral BID       Pharmacy to dose warfarin        Medication Review:     Assessment/Plan     Principal Problem:    High output HF (heart failure)  Active Problems:    Paroxysmal atrial fibrillation    Essential hypertension    Type 2 diabetes mellitus    Chronic kidney disease, stage IV (severe)    Anemia of renal disease    Vitamin D deficiency    Bronchitis    Leukocytosis    Metabolic acidosis    Heart valve problem    Bronchospasm with bronchitis, acute      1-chronic kidney disease stage IV status post kidney transplant-patient was temporarily on dialysis for almost 1-1/2 year.  She has been off dialysis for several months.Baseline Scr 3.0-3.3 range.   2- anemia-hemoglobin is low need to assess iron studies  3-volume overload/congestive heart failure-seems like this is her second or third admission for congestive heart failure.  She has a big fistula which probably is the reason for high output cardiac failure.  4-hypertension possibly related to volume     PLAN:   Vascular surgery consult for AVF evaluation/ligation - Consult placed and Discussed with Dr Begum.M.D.   Continue with diuresis. Good response.  Monitor I/o   Avoid nephrotoxic agents  Appreciate  cardiology input.   INDERJIT iWll MD  08/11/17  11:16 AM

## 2017-08-11 NOTE — CONSULTS
VASCULAR SURGERY CONSULTATION     Date of Consultation  8/11/2017    Moni Wallace  [id number]  1940    Requesting Provider: Albina Will M.D.    Reason for Consultation: Arteriovenous fistula causing congestive heart failure    History of present illness:      Patient is a 77 y.o. year old female with a long history of renal failure presents with recurrent episodes of congestive heart failure.  The patient had a right upper extremity AV fistula created more than 15 years ago.  It has remained patent.  She has also had an AV fistula created in the left upper extremity which failed.  She had a renal transplant in 2005, which failed in 2015.  She did require hemodialysis for one year and 7 months which was stopped in February of this year.  She now has presented with recurrent congestive heart failure.  The concern is that her right upper extremity AV fistula is contributing to her CHF and ligation should be considered.    Medication:    amiodarone 400 mg Oral Q8H   amLODIPine 5 mg Oral Daily   atorvastatin 10 mg Oral Nightly   azithromycin 500 mg Intravenous Q24H   budesonide-formoterol 2 puff Inhalation BID - RT   bumetanide 1 mg Intravenous Q12H   ceftriaxone 1 g Intravenous Q24H   ferric gluconate 125 mg Intravenous Daily   guaiFENesin 600 mg Oral Q12H   insulin detemir 20 Units Subcutaneous Nightly   ipratropium-albuterol 3 mL Nebulization 4x Daily - RT   levothyroxine 75 mcg Oral Q AM   metoprolol tartrate 100 mg Oral BID   pantoprazole 40 mg Oral Q AM   pharmacy consult - MTM  Does not apply Daily   [START ON 8/12/2017] predniSONE 30 mg Oral Daily With Breakfast   sennosides-docusate sodium 2 tablet Oral BID   [START ON 8/12/2017] warfarin 3 mg Oral Daily       Allergies:  Allergies   Allergen Reactions   • Codeine    • Nsaids    • Sulfa Antibiotics        Problem list  Principal Problem:    High output HF (heart failure)  Active Problems:    Paroxysmal atrial fibrillation    Essential hypertension     Type 2 diabetes mellitus    Chronic kidney disease, stage IV (severe)    Anemia of renal disease    Vitamin D deficiency    Bronchitis    Leukocytosis    Metabolic acidosis    Heart valve problem    Bronchospasm with bronchitis, acute      History  Past Medical History:   Diagnosis Date   • Adenomatous colon polyp    • Chronic kidney disease    • Clostridium difficile infection    • Diabetes mellitus    • Gastric polyps    • Hypothyroidism    • Macular degeneration    • Tubular adenoma     excision    • Ulcer of gastric fundus    • Vitamin D deficiency        Past Surgical History:   Procedure Laterality Date   • BREAST BIOPSY Left 1990'S   • CARPAL TUNNEL RELEASE Right    • CATARACT EXTRACTION Bilateral    • DIAGNOSTIC LAPAROSCOPY     • HERNIA REPAIR     • HERNIA REPAIR     • TOTAL KNEE ARTHROPLASTY     • TOTAL KNEE ARTHROPLASTY      Left knee    • TRANSPLANTATION RENAL Right    • TUBAL ABDOMINAL LIGATION     • UPPER GASTROINTESTINAL ENDOSCOPY  05/20/2013       family history includes Dementia in her sister; Diabetic kidney disease in her sister; Kidney disease in her sister; Leukemia in her mother; Lung cancer in her brother; Stroke in her father. There is no history of Breast cancer or Ovarian cancer.    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      Social History Main Topics   • Smoking status: Never Smoker   • Smokeless tobacco: Never Used   • Alcohol use No   • Drug use: No   • Sexual activity: Defer     Other Topics Concern   • Not on file     Social History Narrative    Lives at home, 1 son lives with her    Not current with HH    No assistive devices used       Review of Systems:  All systems were reviewed and negative except for:    Respiratory: positive for  shortness of air  Cardiovascular: positive for  CHF    Physical Exam:    Vital Signs:  /57  Pulse 68  " Temp 98.4 °F (36.9 °C) (Oral)   Resp 20  Ht 63\" (160 cm)  Wt 194 lb 8 oz (88.2 kg)  LMP  (Approximate)  SpO2 98%  BMI 34.45 kg/m2   Body mass index is 34.45 kg/(m^2).    General: Alert, cooperative, in acute distress  Head:  Normocephalic without obvious abnormality  Eyes:  Non-icteric, conjunctivae and sclerae normal, no xanthelasma,    pupils round, corneas clear  ENT:  Upper and lower dentures.  gums and palate within normal limits, oral mucosa dry.    Neck:  No masses, no thyromegaly, trachea midline, no JVD,    carotid pulses normal bilaterally with bruits    Harsh right supraclavicular bruit  Respiratory: Lungs have diffuse coarse rhonchi with increased respiratory effort and shallow breathing   CV:  Regular rhythm, normal rate, 2/6 aortic murmur, no gallops, no rub or click  Abdomen: Obese     No hepatomegaly or splenomegaly, no masses, soft, non-tender    No aneurysm palpable  Extremities: Minimal edema   Pulses: B R F P DP PT   Right  2 2 2 2 1 0   Left  2 2 2 2 2 2    The right upper extremity has a massively dilated arteriovenous fistula which appears to be brachiocephalic in construction.  There is an excellent thrill throughout the fistula  Musculoskeletal: Normal ROM in major joints, no obvious deformities  Skin:  No significant lesions, no rash, no ecchymosis  Neuro: Alert and oriented x 3, CN grossly intact,     no gross motor or sensory deficits    Labs:    Results from last 7 days  Lab Units 08/11/17  0554   WBC 10*3/mm3 10.07   HEMOGLOBIN g/dL 9.1*   HEMATOCRIT % 29.2*   PLATELETS 10*3/mm3 189       Results from last 7 days  Lab Units 08/11/17  0554   SODIUM mmol/L 140   POTASSIUM mmol/L 4.1   CHLORIDE mmol/L 111*   CO2 mmol/L 19.0*   BUN mg/dL 89*   CREATININE mg/dL 3.60*   GLUCOSE mg/dL 156*   CALCIUM mg/dL 9.6     Estimated Creatinine Clearance: 14 mL/min (by C-G formula based on Cr of 3.6).    Results from last 7 days  Lab Units 08/11/17  0554 08/10/17  0641 08/09/17  0635   INR  2.26 " 2.16 2.74           Radiology:  Imaging Results (last 24 hours)     Procedure Component Value Units Date/Time    XR Chest PA & Lateral [[id number]] Collected:  08/10/17 0921     Updated:  08/10/17 2129    Narrative:       EXAMINATION: XR CHEST PA AND LATERAL-  08/08/2017     INDICATION: follow up dyspnea; I50.9-Heart failure, unspecified;  D72.829-Elevated white blood cell count, unspecified      COMPARISON: 08/07/2017 portable chest     FINDINGS: Heart remains moderately enlarged. The vasculature is still  cephalized. There is a suggestion of very early interstitial edema,  similar to the prior study. No extensive edema effusion or pneumothorax  is seen.       Impression:       Borderline changes of congestive heart failure similar to  prior exam.     D:  08/10/2017  E:  08/10/2017     This report was finalized on 8/10/2017 9:27 PM by DR. Xavier Cantor MD.             ASSESSMENT:   1.  Dilated right upper extremity AV fistula, patent.  The fistula appears to have relatively high flow and may be contributing to the patient's CHF.  However, it may also be the only access for future hemodialysis, if and when that would be needed.  2.  Recurrent CHF.  The patient has had at least 2 admissions for treatment of CHF this year.  3.  CKD stage IV with history of ESRD    RECOMMENDATIONS/PLAN:  1.  Ligation of the right upper extremity arterial venous fistula would likely diminish the symptoms of CHF to some degree.  However the extent of improvement is certainly not easily determined.  A factor that must be evaluated is the possibility of requirement for future hemodialysis access.  If the right upper extremity AV fistula is ligated, that extremity cannot be used for future for AV access.  In addition, previous left upper extremity AV access has also failed, and the veins that extremity have been used for intravenous access for over 15 years and are most likely not usable for a native AVF..  Therefore it is very possible that  hemodialysis access may not be feasible in either upper extremity in the future.  2.  I would like to have a discussion with the cardiologist as well as the nephrologist to determine whether ligation of the aVF would be the best management in this patient.  There are significant considerations for and against AVF ligation in the situation.    Donny Thrasher MD  08/11/17  7:10 PM

## 2017-08-11 NOTE — PLAN OF CARE
Problem: Cardiac: Heart Failure (Adult)  Goal: Signs and Symptoms of Listed Potential Problems Will be Absent or Manageable (Cardiac: Heart Failure)  Outcome: Ongoing (interventions implemented as appropriate)    08/11/17 0537   Cardiac: Heart Failure   Problems Assessed (Heart Failure) all   Problems Present (Heart Failure) respiratory compromise

## 2017-08-11 NOTE — PROGRESS NOTES
"      HOSPITALIST DAILY PROGRESS NOTE    Chief Complaint: f/u SOA, cough    Subjective   SUBJECTIVE/OVERNIGHT EVENTS   Patient seen this morning. Much more comfortable, less short of breath. No cough/congestion. Decreased wheezing and tightness.  Review of Systems:  Gen-no fevers, no chills  CV-no chest pain, no palpitations  Resp-+cough, improving dyspnea  GI-no N/V/D, no abd pain    Objective   OBJECTIVE   I have reviewed the vital signs.  /54  Pulse 79  Temp 97.6 °F (36.4 °C) (Oral)   Resp 20  Ht 63\" (160 cm)  Wt 194 lb 8 oz (88.2 kg)  LMP  (Approximate)  SpO2 100%  BMI 34.45 kg/m2    Physical Exam:  NAD  Op clear  Irregular  Decreased wheezes, no rhonchi, bibasilar rales  +BS, ND, NT  MOONEY  Tr edema  Large RUE AV fistula    Results:  I have reviewed the labs, culture data, radiology results, and diagnostic studies.      Results from last 7 days  Lab Units 08/11/17  0554 08/10/17  0641 08/09/17  0635   WBC 10*3/mm3 10.07 6.01 8.75   HEMOGLOBIN g/dL 9.1* 9.2* 8.8*   HEMATOCRIT % 29.2* 29.6* 29.0*   PLATELETS 10*3/mm3 189 180 152       Results from last 7 days  Lab Units 08/11/17  0554   SODIUM mmol/L 140   POTASSIUM mmol/L 4.1   CHLORIDE mmol/L 111*   CO2 mmol/L 19.0*   BUN mg/dL 89*   CREATININE mg/dL 3.60*   GLUCOSE mg/dL 156*   CALCIUM mg/dL 9.6       Culture Data:  Cultures:    Blood Culture   Date Value Ref Range Status   08/07/2017 No growth at 24 hours  Preliminary   08/07/2017 No growth at 24 hours  Preliminary     Respiratory Culture   Date Value Ref Range Status   08/07/2017 Scant growth (1+) Normal Respiratory [place]  Preliminary         Radiology Results:  Imaging Results (last 24 hours)     Procedure Component Value Units Date/Time    XR Chest PA & Lateral [[id number]] Collected:  08/10/17 0921     Updated:  08/10/17 2129    Narrative:       EXAMINATION: XR CHEST PA AND LATERAL-  08/08/2017     INDICATION: follow up dyspnea; I50.9-Heart failure, unspecified;  D72.829-Elevated white blood " cell count, unspecified      COMPARISON: 08/07/2017 portable chest     FINDINGS: Heart remains moderately enlarged. The vasculature is still  cephalized. There is a suggestion of very early interstitial edema,  similar to the prior study. No extensive edema effusion or pneumothorax  is seen.       Impression:       Borderline changes of congestive heart failure similar to  prior exam.     D:  08/10/2017  E:  08/10/2017     This report was finalized on 8/10/2017 9:27 PM by DR. Xavier Cantor MD.             I have reviewed the medications.      Assessment/Plan   ASSESSMENT/PLAN    Principal Problem:    High output HF (heart failure)  Active Problems:    Paroxysmal atrial fibrillation    Essential hypertension    Type 2 diabetes mellitus    Chronic kidney disease, stage IV (severe)    Anemia of renal disease    Vitamin D deficiency    Bronchitis    Leukocytosis    Metabolic acidosis    Heart valve problem    Bronchospasm with bronchitis, acute    78 yo F with hx of chronic diastolic CHF, CKD Stage 4 s/p prior renal transplant with failure requiring re-initiation of dialysis but has been off HD since April 2017, and recent hospitalization in June 2017 due to volume overload, now presents with several days of increasing dyspnea and cough.     PLAN:  --Her SOA is likely multifactorial related to acute on chronic diastolic heart failure as well as probable bronchitis (no clear infiltrates on CXR seen).  --Significant bronchospasm, better, taper steroids  --Continue IV Bumex, as per cardiology, monitor renal function closely, CXR with pulmonary edema today  --Afib/RVR, per cardiology, s/p amiodarone, on coumadin  --possible high output cardiac failure, related to markedly large fistula, CO decreased with compression of fistula  --Continue Rocephin and Azithromycin for bronchitis vs. possible pneumonia. Stopped Vancomycin    Complex patient.    Monitor and diurese throught weekend  Monitor renal function  Cardiology to  re-address Monday  Taper steroids  Restart PPI    I expect patient to be discharged in: ~3 days    Xavier Mejía MD  08/11/17  10:36 AM

## 2017-08-12 ENCOUNTER — APPOINTMENT (OUTPATIENT)
Dept: GENERAL RADIOLOGY | Facility: HOSPITAL | Age: 77
End: 2017-08-12

## 2017-08-12 LAB
ANION GAP SERPL CALCULATED.3IONS-SCNC: 12 MMOL/L (ref 3–11)
BACTERIA SPEC AEROBE CULT: NORMAL
BACTERIA SPEC AEROBE CULT: NORMAL
BASOPHILS # BLD AUTO: 0.01 10*3/MM3 (ref 0–0.2)
BASOPHILS NFR BLD AUTO: 0.1 % (ref 0–1)
BUN BLD-MCNC: 100 MG/DL (ref 9–23)
BUN/CREAT SERPL: 25 (ref 7–25)
CALCIUM SPEC-SCNC: 9.5 MG/DL (ref 8.7–10.4)
CHLORIDE SERPL-SCNC: 110 MMOL/L (ref 99–109)
CO2 SERPL-SCNC: 19 MMOL/L (ref 20–31)
CREAT BLD-MCNC: 4 MG/DL (ref 0.6–1.3)
DEPRECATED RDW RBC AUTO: 51.9 FL (ref 37–54)
EOSINOPHIL # BLD AUTO: 0 10*3/MM3 (ref 0–0.3)
EOSINOPHIL NFR BLD AUTO: 0 % (ref 0–3)
ERYTHROCYTE [DISTWIDTH] IN BLOOD BY AUTOMATED COUNT: 15.8 % (ref 11.3–14.5)
GFR SERPL CREATININE-BSD FRML MDRD: 11 ML/MIN/1.73
GLUCOSE BLD-MCNC: 128 MG/DL (ref 70–100)
GLUCOSE BLDC GLUCOMTR-MCNC: 129 MG/DL (ref 70–130)
GLUCOSE BLDC GLUCOMTR-MCNC: 178 MG/DL (ref 70–130)
GLUCOSE BLDC GLUCOMTR-MCNC: 200 MG/DL (ref 70–130)
GLUCOSE BLDC GLUCOMTR-MCNC: 267 MG/DL (ref 70–130)
HCT VFR BLD AUTO: 29.3 % (ref 34.5–44)
HGB BLD-MCNC: 9.2 G/DL (ref 11.5–15.5)
IMM GRANULOCYTES # BLD: 0.22 10*3/MM3 (ref 0–0.03)
IMM GRANULOCYTES NFR BLD: 2 % (ref 0–0.6)
INR PPP: 2.57
LYMPHOCYTES # BLD AUTO: 0.87 10*3/MM3 (ref 0.6–4.8)
LYMPHOCYTES NFR BLD AUTO: 8 % (ref 24–44)
MCH RBC QN AUTO: 28.1 PG (ref 27–31)
MCHC RBC AUTO-ENTMCNC: 31.4 G/DL (ref 32–36)
MCV RBC AUTO: 89.6 FL (ref 80–99)
MONOCYTES # BLD AUTO: 0.6 10*3/MM3 (ref 0–1)
MONOCYTES NFR BLD AUTO: 5.5 % (ref 0–12)
NEUTROPHILS # BLD AUTO: 9.23 10*3/MM3 (ref 1.5–8.3)
NEUTROPHILS NFR BLD AUTO: 84.4 % (ref 41–71)
PLATELET # BLD AUTO: 205 10*3/MM3 (ref 150–450)
PMV BLD AUTO: 10.4 FL (ref 6–12)
POTASSIUM BLD-SCNC: 4.2 MMOL/L (ref 3.5–5.5)
PROTHROMBIN TIME: 28.8 SECONDS (ref 9.6–11.5)
RBC # BLD AUTO: 3.27 10*6/MM3 (ref 3.89–5.14)
SODIUM BLD-SCNC: 141 MMOL/L (ref 132–146)
WBC NRBC COR # BLD: 10.93 10*3/MM3 (ref 3.5–10.8)

## 2017-08-12 PROCEDURE — 85025 COMPLETE CBC W/AUTO DIFF WBC: CPT | Performed by: HOSPITALIST

## 2017-08-12 PROCEDURE — 85610 PROTHROMBIN TIME: CPT | Performed by: NURSE PRACTITIONER

## 2017-08-12 PROCEDURE — 82962 GLUCOSE BLOOD TEST: CPT

## 2017-08-12 PROCEDURE — 94799 UNLISTED PULMONARY SVC/PX: CPT

## 2017-08-12 PROCEDURE — 25010000002 NA FERRIC GLUC CPLX PER 12.5 MG: Performed by: INTERNAL MEDICINE

## 2017-08-12 PROCEDURE — 94640 AIRWAY INHALATION TREATMENT: CPT

## 2017-08-12 PROCEDURE — 25010000002 AZITHROMYCIN: Performed by: NURSE PRACTITIONER

## 2017-08-12 PROCEDURE — 99233 SBSQ HOSP IP/OBS HIGH 50: CPT | Performed by: INTERNAL MEDICINE

## 2017-08-12 PROCEDURE — 63710000001 PREDNISONE PER 1 MG: Performed by: HOSPITALIST

## 2017-08-12 PROCEDURE — 80048 BASIC METABOLIC PNL TOTAL CA: CPT | Performed by: HOSPITALIST

## 2017-08-12 PROCEDURE — 25010000002 CEFTRIAXONE PER 250 MG: Performed by: NURSE PRACTITIONER

## 2017-08-12 PROCEDURE — 63710000001 PREDNISONE PER 5 MG: Performed by: HOSPITALIST

## 2017-08-12 PROCEDURE — 63710000001 INSULIN DETEMIR PER 5 UNITS: Performed by: HOSPITALIST

## 2017-08-12 PROCEDURE — 71010 HC CHEST PA OR AP: CPT

## 2017-08-12 RX ORDER — BUMETANIDE 0.25 MG/ML
1 INJECTION INTRAMUSCULAR; INTRAVENOUS ONCE
Status: COMPLETED | OUTPATIENT
Start: 2017-08-12 | End: 2017-08-12

## 2017-08-12 RX ORDER — BUMETANIDE 0.25 MG/ML
1 INJECTION INTRAMUSCULAR; INTRAVENOUS DAILY
Status: DISCONTINUED | OUTPATIENT
Start: 2017-08-13 | End: 2017-08-13

## 2017-08-12 RX ADMIN — AMLODIPINE BESYLATE 5 MG: 5 TABLET ORAL at 09:52

## 2017-08-12 RX ADMIN — IPRATROPIUM BROMIDE AND ALBUTEROL SULFATE 3 ML: .5; 3 SOLUTION RESPIRATORY (INHALATION) at 07:33

## 2017-08-12 RX ADMIN — INSULIN DETEMIR 20 UNITS: 100 INJECTION, SOLUTION SUBCUTANEOUS at 21:08

## 2017-08-12 RX ADMIN — Medication 2 TABLET: at 17:34

## 2017-08-12 RX ADMIN — PREDNISONE 30 MG: 20 TABLET ORAL at 09:52

## 2017-08-12 RX ADMIN — AMIODARONE HYDROCHLORIDE 400 MG: 200 TABLET ORAL at 06:08

## 2017-08-12 RX ADMIN — METOPROLOL TARTRATE 100 MG: 100 TABLET, FILM COATED ORAL at 09:52

## 2017-08-12 RX ADMIN — Medication 2 TABLET: at 09:52

## 2017-08-12 RX ADMIN — METOPROLOL TARTRATE 100 MG: 100 TABLET, FILM COATED ORAL at 17:34

## 2017-08-12 RX ADMIN — IPRATROPIUM BROMIDE AND ALBUTEROL SULFATE 3 ML: .5; 3 SOLUTION RESPIRATORY (INHALATION) at 12:23

## 2017-08-12 RX ADMIN — GUAIFENESIN 600 MG: 600 TABLET, EXTENDED RELEASE ORAL at 21:12

## 2017-08-12 RX ADMIN — AMIODARONE HYDROCHLORIDE 400 MG: 200 TABLET ORAL at 14:10

## 2017-08-12 RX ADMIN — SODIUM CHLORIDE 125 MG: 9 INJECTION, SOLUTION INTRAVENOUS at 12:31

## 2017-08-12 RX ADMIN — IPRATROPIUM BROMIDE AND ALBUTEROL SULFATE 3 ML: .5; 3 SOLUTION RESPIRATORY (INHALATION) at 16:29

## 2017-08-12 RX ADMIN — AZITHROMYCIN 500 MG: 500 INJECTION, POWDER, LYOPHILIZED, FOR SOLUTION INTRAVENOUS at 11:03

## 2017-08-12 RX ADMIN — ALPRAZOLAM 0.5 MG: 0.5 TABLET ORAL at 21:12

## 2017-08-12 RX ADMIN — ATORVASTATIN CALCIUM 10 MG: 10 TABLET, FILM COATED ORAL at 21:12

## 2017-08-12 RX ADMIN — PANTOPRAZOLE SODIUM 40 MG: 40 TABLET, DELAYED RELEASE ORAL at 06:08

## 2017-08-12 RX ADMIN — BUMETANIDE 1 MG: 0.25 INJECTION, SOLUTION INTRAMUSCULAR; INTRAVENOUS at 10:52

## 2017-08-12 RX ADMIN — AMIODARONE HYDROCHLORIDE 400 MG: 200 TABLET ORAL at 21:12

## 2017-08-12 RX ADMIN — BUDESONIDE AND FORMOTEROL FUMARATE DIHYDRATE 2 PUFF: 80; 4.5 AEROSOL RESPIRATORY (INHALATION) at 07:33

## 2017-08-12 RX ADMIN — IPRATROPIUM BROMIDE AND ALBUTEROL SULFATE 3 ML: .5; 3 SOLUTION RESPIRATORY (INHALATION) at 20:39

## 2017-08-12 RX ADMIN — BUDESONIDE AND FORMOTEROL FUMARATE DIHYDRATE 2 PUFF: 80; 4.5 AEROSOL RESPIRATORY (INHALATION) at 20:39

## 2017-08-12 RX ADMIN — WARFARIN SODIUM 3 MG: 3 TABLET ORAL at 17:34

## 2017-08-12 RX ADMIN — LEVOTHYROXINE SODIUM 75 MCG: 75 TABLET ORAL at 06:08

## 2017-08-12 RX ADMIN — CEFTRIAXONE SODIUM 1 G: 1 INJECTION, SOLUTION INTRAVENOUS at 09:56

## 2017-08-12 RX ADMIN — GUAIFENESIN 600 MG: 600 TABLET, EXTENDED RELEASE ORAL at 09:52

## 2017-08-12 NOTE — PROGRESS NOTES
"      HOSPITALIST DAILY PROGRESS NOTE    Chief Complaint: f/u SOA, cough    Subjective   SUBJECTIVE/OVERNIGHT EVENTS   Pt feeling well this am although still with wheezing. Very sleepy this am.     Review of Systems:  Gen-no fevers, no chills  CV-no chest pain, no palpitations  Resp-+cough, improving dyspnea  GI-no N/V/D, no abd pain    Objective   OBJECTIVE   I have reviewed the vital signs.  /51  Pulse 70  Temp 97.5 °F (36.4 °C) (Oral)   Resp 20  Ht 63\" (160 cm)  Wt 194 lb (88 kg)  LMP  (Approximate)  SpO2 98%  BMI 34.37 kg/m2    Physical Exam:  General- AOx3, NAD  HEENT- PERRLA  CVS- RRR, no M/R/G  Pulm- diffuse wheezing bilaterally, no crackles  Abd- soft, NT, ND, (+) BS  Extr- no c/c/e  Neuro- no focal deficits  Psych- normal affect    Results:  I have reviewed the labs, culture data, radiology results, and diagnostic studies.      Results from last 7 days  Lab Units 08/12/17  0443 08/11/17  0554 08/10/17  0641   WBC 10*3/mm3 10.93* 10.07 6.01   HEMOGLOBIN g/dL 9.2* 9.1* 9.2*   HEMATOCRIT % 29.3* 29.2* 29.6*   PLATELETS 10*3/mm3 205 189 180       Results from last 7 days  Lab Units 08/12/17  0443   SODIUM mmol/L 141   POTASSIUM mmol/L 4.2   CHLORIDE mmol/L 110*   CO2 mmol/L 19.0*   BUN mg/dL 100*   CREATININE mg/dL 4.00*   GLUCOSE mg/dL 128*   CALCIUM mg/dL 9.5       Culture Data:  Cultures:    Blood Culture   Date Value Ref Range Status   08/07/2017 No growth at 24 hours  Preliminary   08/07/2017 No growth at 24 hours  Preliminary     Respiratory Culture   Date Value Ref Range Status   08/07/2017 Scant growth (1+) Normal Respiratory [place]  Preliminary         Radiology Results:  Imaging Results (last 24 hours)     Procedure Component Value Units Date/Time    XR Chest 1 View [[id number]] Updated:  08/12/17 0552          I have reviewed the medications.      Assessment/Plan   ASSESSMENT/PLAN    Principal Problem:    High output HF (heart failure)  Active Problems:    Paroxysmal atrial fibrillation   "  Essential hypertension    Type 2 diabetes mellitus    Chronic kidney disease, stage IV (severe)    Anemia of renal disease    Vitamin D deficiency    Bronchitis    Leukocytosis    Metabolic acidosis    Heart valve problem    Bronchospasm with bronchitis, acute    76 yo F with hx of chronic diastolic CHF, CKD Stage 4 s/p prior renal transplant with failure requiring re-initiation of dialysis but has been off HD since April 2017, and recent hospitalization in June 2017 due to volume overload, now presents with several days of increasing dyspnea and cough.     PLAN:  --Her SOA is likely multifactorial related to acute on chronic diastolic heart failure as well as probable bronchitis (no clear infiltrates on CXR seen).  --Significant bronchospasm, better, continue to taper steroids  --Continue IV Bumex, as per cardiology and NAL, monitor renal function closely, decreased to once daily per NAL given increasing Cr  --Afib/RVR, per cardiology, s/p amiodarone, on coumadin  --possible high output cardiac failure, related to markedly large fistula, CO decreased with compression of fistula  --Continue Rocephin and Azithromycin for bronchitis vs. possible pneumonia. Stopped Vancomycin    Complex patient.        I expect patient to be discharged in: ~2 days    Cammy Davila MD  08/12/17  12:23 PM

## 2017-08-12 NOTE — PLAN OF CARE
"Problem: Patient Care Overview (Adult)  Goal: Plan of Care Review  Outcome: Ongoing (interventions implemented as appropriate)  Denies SOB, audible wheeze \"yellow sputum\", RA sats ok, bumex this am and now daily IV    08/12/17 9274   Coping/Psychosocial Response Interventions   Plan Of Care Reviewed With patient   Patient Care Overview   Progress no change       Goal: Adult Individualization and Mutuality  Outcome: Ongoing (interventions implemented as appropriate)  Goal: Discharge Needs Assessment  Outcome: Ongoing (interventions implemented as appropriate)    Problem: Cardiac: Heart Failure (Adult)  Goal: Signs and Symptoms of Listed Potential Problems Will be Absent or Manageable (Cardiac: Heart Failure)  Outcome: Ongoing (interventions implemented as appropriate)    Problem: Fall Risk (Adult)  Goal: Identify Related Risk Factors and Signs and Symptoms  Outcome: Ongoing (interventions implemented as appropriate)  Goal: Absence of Falls  Outcome: Ongoing (interventions implemented as appropriate)      "

## 2017-08-12 NOTE — PROGRESS NOTES
"   LOS: 5 days    Patient Care Team:  Slim Wick MD as PCP - General  Slim Wick MD as PCP - Family Medicine  Jeff Joe MD as Consulting Physician (Cardiology)  Donny Hodges MD as Consulting Physician (Nephrology)    Chief Complaint:      Subjective     Interval History:   No new complains.  No new events overnight.    Review of Systems:   Dysuria or gross hematuria No chest pain or shortness of breath. Objective     Vital Sign Min/Max for last 24 hours  Temp  Min: 97.6 °F (36.4 °C)  Max: 98.4 °F (36.9 °C)   BP  Min: 124/57  Max: 155/71   Pulse  Min: 65  Max: 79   Resp  Min: 16  Max: 20   SpO2  Min: 92 %  Max: 99 %   Flow (L/min)  Min: 1  Max: 1   Weight  Min: 194 lb (88 kg)  Max: 194 lb (88 kg)     Flowsheet Rows         First Filed Value    Admission Height  63\" (160 cm) Documented at 08/07/2017 0524    Admission Weight  190 lb (86.2 kg) Documented at 08/07/2017 0524          I/O this shift:  In: -   Out: 200 [Urine:200]  I/O last 3 completed shifts:  In: 630 [P.O.:630]  Out: 1250 [Urine:1250]    Physical Exam:     General Appearance:    Alert, cooperative, in no acute distress   Head:    Normocephalic, w atraumatic   Eyes:            Lids and lashes normal, conjunctivae and sclerae normal, no   icterus, no pallor, corneas clear, PERRLA           Neck:    supple,  no JVD       Lungs:     Decrease air entry to bases,respirations regular, even and       unlabored    Heart:    Regular rhythm and normal rate, normal S1 and S2, no            murmur, no gallop, no rub, no click       Abdomen:     Normal bowel sounds, no masses, no organomegaly, soft        non-tender, non-distended, no guarding, no rebound                 tenderness       Extremities:   Moves all extremities well, no edema, no cyanosis, no              redness               Neurologic:  sensation intact, DTR        present and equal bilaterally         WBC WBC   Date Value Ref Range Status   08/12/2017 " 10.93 (H) 3.50 - 10.80 10*3/mm3 Final   08/11/2017 10.07 3.50 - 10.80 10*3/mm3 Final   08/10/2017 6.01 3.50 - 10.80 10*3/mm3 Final      HGB Hemoglobin   Date Value Ref Range Status   08/12/2017 9.2 (L) 11.5 - 15.5 g/dL Final   08/11/2017 9.1 (L) 11.5 - 15.5 g/dL Final   08/10/2017 9.2 (L) 11.5 - 15.5 g/dL Final      HCT Hematocrit   Date Value Ref Range Status   08/12/2017 29.3 (L) 34.5 - 44.0 % Final   08/11/2017 29.2 (L) 34.5 - 44.0 % Final   08/10/2017 29.6 (L) 34.5 - 44.0 % Final      Platlets No results found for: LABPLAT   MCV MCV   Date Value Ref Range Status   08/12/2017 89.6 80.0 - 99.0 fL Final   08/11/2017 91.5 80.0 - 99.0 fL Final   08/10/2017 90.8 80.0 - 99.0 fL Final          Sodium Sodium   Date Value Ref Range Status   08/12/2017 141 132 - 146 mmol/L Final   08/11/2017 140 132 - 146 mmol/L Final   08/10/2017 142 132 - 146 mmol/L Final      Potassium Potassium   Date Value Ref Range Status   08/12/2017 4.2 3.5 - 5.5 mmol/L Final   08/11/2017 4.1 3.5 - 5.5 mmol/L Final   08/10/2017 4.2 3.5 - 5.5 mmol/L Final      Chloride Chloride   Date Value Ref Range Status   08/12/2017 110 (H) 99 - 109 mmol/L Final   08/11/2017 111 (H) 99 - 109 mmol/L Final   08/10/2017 113 (H) 99 - 109 mmol/L Final      CO2 CO2   Date Value Ref Range Status   08/12/2017 19.0 (L) 20.0 - 31.0 mmol/L Final   08/11/2017 19.0 (L) 20.0 - 31.0 mmol/L Final   08/10/2017 19.0 (L) 20.0 - 31.0 mmol/L Final      BUN BUN   Date Value Ref Range Status   08/12/2017 100 (H) 9 - 23 mg/dL Final   08/11/2017 89 (H) 9 - 23 mg/dL Final   08/10/2017 72 (H) 9 - 23 mg/dL Final      Creatinine Creatinine   Date Value Ref Range Status   08/12/2017 4.00 (H) 0.60 - 1.30 mg/dL Final   08/11/2017 3.60 (H) 0.60 - 1.30 mg/dL Final   08/10/2017 3.50 (H) 0.60 - 1.30 mg/dL Final      Calcium Calcium   Date Value Ref Range Status   08/12/2017 9.5 8.7 - 10.4 mg/dL Final   08/11/2017 9.6 8.7 - 10.4 mg/dL Final   08/10/2017 9.4 8.7 - 10.4 mg/dL Final      PO4 No  results found for: CAPO4   Albumin No results found for: ALBUMIN   Magnesium No results found for: MG   Uric Acid No results found for: URICACID        Results Review:     I reviewed the patient's new clinical results.      amiodarone 400 mg Oral Q8H   amLODIPine 5 mg Oral Daily   atorvastatin 10 mg Oral Nightly   azithromycin 500 mg Intravenous Q24H   budesonide-formoterol 2 puff Inhalation BID - RT   [START ON 8/13/2017] bumetanide 1 mg Intravenous Daily   ceftriaxone 1 g Intravenous Q24H   ferric gluconate 125 mg Intravenous Daily   guaiFENesin 600 mg Oral Q12H   insulin detemir 20 Units Subcutaneous Nightly   ipratropium-albuterol 3 mL Nebulization 4x Daily - RT   levothyroxine 75 mcg Oral Q AM   metoprolol tartrate 100 mg Oral BID   pantoprazole 40 mg Oral Q AM   pharmacy consult - MTM  Does not apply Daily   predniSONE 30 mg Oral Daily With Breakfast   sennosides-docusate sodium 2 tablet Oral BID   warfarin 3 mg Oral Daily       Pharmacy to dose warfarin        Medication Review:     Assessment/Plan     Principal Problem:    High output HF (heart failure)  Active Problems:    Paroxysmal atrial fibrillation    Essential hypertension    Type 2 diabetes mellitus    Chronic kidney disease, stage IV (severe)    Anemia of renal disease    Vitamin D deficiency    Bronchitis    Leukocytosis    Metabolic acidosis    Heart valve problem    Bronchospasm with bronchitis, acute      1-chronic kidney disease stage IV status post kidney transplant-patient was temporarily on dialysis for almost 1-1/2 year.  She has been off dialysis for several months.Baseline Scr 3.0-3.3 range.   2- anemia- Iron def  3-volume overload/congestive heart failure-seems like this is her second or third admission for congestive heart failure.  She has a big fistula which probably is the reason for high output cardiac failure.  4-hypertension possibly related to volume     PLAN:   Will decrease bumex to once daily from bid.   Monitor I/o   Iv iron.    Avoid nephrotoxic agents  Appreciate cardiology and vascular surgery input.      lAbina Will MD  08/12/17  9:11 AM

## 2017-08-12 NOTE — PLAN OF CARE
Problem: Patient Care Overview (Adult)  Goal: Plan of Care Review  Outcome: Ongoing (interventions implemented as appropriate)    08/12/17 0345   Coping/Psychosocial Response Interventions   Plan Of Care Reviewed With patient   Patient Care Overview   Progress no change   Outcome Evaluation   Outcome Summary/Follow up Plan Pt rested well through night with no acute episodes. Continues to experience wheezing but does not c/o SOB. VSS.       Goal: Adult Individualization and Mutuality  Outcome: Ongoing (interventions implemented as appropriate)  Goal: Discharge Needs Assessment  Outcome: Ongoing (interventions implemented as appropriate)    Problem: Cardiac: Heart Failure (Adult)  Goal: Signs and Symptoms of Listed Potential Problems Will be Absent or Manageable (Cardiac: Heart Failure)  Outcome: Ongoing (interventions implemented as appropriate)    Problem: Fall Risk (Adult)  Goal: Identify Related Risk Factors and Signs and Symptoms  Outcome: Ongoing (interventions implemented as appropriate)  Goal: Absence of Falls  Outcome: Ongoing (interventions implemented as appropriate)

## 2017-08-12 NOTE — PROGRESS NOTES
"Pharmacy Consult  -  Warfarin    Moni Wallace is a  77 y.o. female   Height - 63\" (160 cm)  Weight - 194 lb 8 oz (88.2 kg)    Consulting Provider: - Hospital Medicine  Indication: - Paroxysmal atrial fibrillation  Goal INR: - 2-3  Home Regimen:   - 6 mg Daily - This is per patient report of 3 x 2 mg tablets daily.  A call to [facility] indicated that she filled 2 mg tablets on 7/20.         Drug-Drug Interactions with current regimen:   Azythromycin increased bleeding risk              Levothyroxine increased bleeding risk   Amiodarone may increase INR      Warfarin Dosing During Admission:    Date  8/7 8/8 8/9 8/10 8/11 8/12      INR  2.81 3.28 2.74 2.16 2.26 2.57      Dose  6 mg  D/C for procedures Continue D/C Hold 4 mg Hold          Labs:    Results from last 7 days   Lab Units 08/11/17  0554 08/10/17  0641 08/09/17  0635 08/08/17  0530 08/07/17  1313 08/07/17  0546   INR  2.26 2.16 2.74 3.28 2.81 2.65   HEMOGLOBIN g/dL 9.1* 9.2* 8.8* 8.9* --  10.4*   HEMATOCRIT % 29.2* 29.6* 29.0* 27.9* --  33.3*   PLATELETS 10*3/mm3 189 180 152 164 --  201     Results from last 7 days   Lab Units 08/11/17  0554 08/10/17  0641 08/09/17  0635  08/07/17  0546   SODIUM mmol/L 140 142 142 < > 142   POTASSIUM mmol/L 4.1 4.2 3.9 < > 4.0   CHLORIDE mmol/L 111* 113* 112* < > 112*   CO2 mmol/L 19.0* 19.0* 20.0 < > 16.0*   BUN mg/dL 89* 72* 58* < > 49*   CREATININE mg/dL 3.60* 3.50* 3.30* < > 3.00*   CALCIUM mg/dL 9.6 9.4 9.1 < > 9.8   BILIRUBIN mg/dL --  --  --  --  0.4   ALK PHOS U/L --  --  --  --  147*   ALT (SGPT) U/L --  --  --  --  44*   AST (SGOT) U/L --  --  --  --  60*   GLUCOSE mg/dL 156* 190* 86 < > 158*   < > = values in this interval not displayed.       Current dietary intake:   Diet Order   Procedures   • Diet Regular; Thin; Renal     ~50% of meals documented yesterday 8/11.    Assessment/Plan:     1. Warfarin restarted last night at a dose of 4 mg.  2. INR increased to 2.57 today (from 2.26 yesterday), after one " dose of warfarin 4 mg yesterday. Warfarin was previously held for 3 days, and INR continues to increase - won't anticipate to see effects of the 4 mg dose for another 1-2 days.  3. INR jump likely due to concurrent drug interaction with amiodarone (currently on 400 mg every 8 hours), as well as other drug interactions that can increase INR/bleeding.  4. HOLD warfarin dose tonight due to INR increase.  5. Monitor for signs/symptoms of bleeding, and will continue to monitor for drug interactions.   6. Follow PT/INR daily and dose adjust accordingly.    Katherin Tierney, PharmD  8/12/2017  7:42 AM

## 2017-08-13 LAB
ANION GAP SERPL CALCULATED.3IONS-SCNC: 7 MMOL/L (ref 3–11)
BUN BLD-MCNC: 91 MG/DL (ref 9–23)
BUN/CREAT SERPL: 22.8 (ref 7–25)
CALCIUM SPEC-SCNC: 9.3 MG/DL (ref 8.7–10.4)
CHLORIDE SERPL-SCNC: 115 MMOL/L (ref 99–109)
CO2 SERPL-SCNC: 20 MMOL/L (ref 20–31)
CREAT BLD-MCNC: 4 MG/DL (ref 0.6–1.3)
DEPRECATED RDW RBC AUTO: 54.2 FL (ref 37–54)
ERYTHROCYTE [DISTWIDTH] IN BLOOD BY AUTOMATED COUNT: 16 % (ref 11.3–14.5)
GFR SERPL CREATININE-BSD FRML MDRD: 11 ML/MIN/1.73
GLUCOSE BLD-MCNC: 112 MG/DL (ref 70–100)
GLUCOSE BLDC GLUCOMTR-MCNC: 134 MG/DL (ref 70–130)
GLUCOSE BLDC GLUCOMTR-MCNC: 181 MG/DL (ref 70–130)
GLUCOSE BLDC GLUCOMTR-MCNC: 232 MG/DL (ref 70–130)
GLUCOSE BLDC GLUCOMTR-MCNC: 91 MG/DL (ref 70–130)
HCT VFR BLD AUTO: 29.3 % (ref 34.5–44)
HGB BLD-MCNC: 9.2 G/DL (ref 11.5–15.5)
INR PPP: 2.63
MCH RBC QN AUTO: 28.8 PG (ref 27–31)
MCHC RBC AUTO-ENTMCNC: 31.4 G/DL (ref 32–36)
MCV RBC AUTO: 91.8 FL (ref 80–99)
PLATELET # BLD AUTO: 203 10*3/MM3 (ref 150–450)
PMV BLD AUTO: 10.6 FL (ref 6–12)
POTASSIUM BLD-SCNC: 4.4 MMOL/L (ref 3.5–5.5)
PROTHROMBIN TIME: 29.5 SECONDS (ref 9.6–11.5)
RBC # BLD AUTO: 3.19 10*6/MM3 (ref 3.89–5.14)
SODIUM BLD-SCNC: 142 MMOL/L (ref 132–146)
WBC NRBC COR # BLD: 11.23 10*3/MM3 (ref 3.5–10.8)

## 2017-08-13 PROCEDURE — 25010000002 AZITHROMYCIN: Performed by: NURSE PRACTITIONER

## 2017-08-13 PROCEDURE — 94640 AIRWAY INHALATION TREATMENT: CPT

## 2017-08-13 PROCEDURE — 80048 BASIC METABOLIC PNL TOTAL CA: CPT | Performed by: INTERNAL MEDICINE

## 2017-08-13 PROCEDURE — 63710000001 PREDNISONE PER 1 MG: Performed by: HOSPITALIST

## 2017-08-13 PROCEDURE — 94799 UNLISTED PULMONARY SVC/PX: CPT

## 2017-08-13 PROCEDURE — 25010000002 CEFTRIAXONE PER 250 MG: Performed by: NURSE PRACTITIONER

## 2017-08-13 PROCEDURE — 85027 COMPLETE CBC AUTOMATED: CPT | Performed by: INTERNAL MEDICINE

## 2017-08-13 PROCEDURE — 63710000001 PREDNISONE PER 5 MG: Performed by: HOSPITALIST

## 2017-08-13 PROCEDURE — 85610 PROTHROMBIN TIME: CPT | Performed by: NURSE PRACTITIONER

## 2017-08-13 PROCEDURE — 99233 SBSQ HOSP IP/OBS HIGH 50: CPT | Performed by: INTERNAL MEDICINE

## 2017-08-13 PROCEDURE — 63710000001 INSULIN DETEMIR PER 5 UNITS: Performed by: HOSPITALIST

## 2017-08-13 PROCEDURE — 82962 GLUCOSE BLOOD TEST: CPT

## 2017-08-13 PROCEDURE — 25010000002 NA FERRIC GLUC CPLX PER 12.5 MG: Performed by: INTERNAL MEDICINE

## 2017-08-13 RX ORDER — BUMETANIDE 1 MG/1
1 TABLET ORAL DAILY
Status: DISCONTINUED | OUTPATIENT
Start: 2017-08-13 | End: 2017-08-14

## 2017-08-13 RX ORDER — PREDNISONE 20 MG/1
20 TABLET ORAL
Status: DISCONTINUED | OUTPATIENT
Start: 2017-08-14 | End: 2017-08-14 | Stop reason: HOSPADM

## 2017-08-13 RX ADMIN — SODIUM CHLORIDE 125 MG: 9 INJECTION, SOLUTION INTRAVENOUS at 08:55

## 2017-08-13 RX ADMIN — GUAIFENESIN 600 MG: 600 TABLET, EXTENDED RELEASE ORAL at 08:57

## 2017-08-13 RX ADMIN — IPRATROPIUM BROMIDE AND ALBUTEROL SULFATE 3 ML: .5; 3 SOLUTION RESPIRATORY (INHALATION) at 16:15

## 2017-08-13 RX ADMIN — PANTOPRAZOLE SODIUM 40 MG: 40 TABLET, DELAYED RELEASE ORAL at 05:09

## 2017-08-13 RX ADMIN — AMIODARONE HYDROCHLORIDE 400 MG: 200 TABLET ORAL at 05:09

## 2017-08-13 RX ADMIN — AMIODARONE HYDROCHLORIDE 400 MG: 200 TABLET ORAL at 21:38

## 2017-08-13 RX ADMIN — IPRATROPIUM BROMIDE AND ALBUTEROL SULFATE 3 ML: .5; 3 SOLUTION RESPIRATORY (INHALATION) at 08:06

## 2017-08-13 RX ADMIN — AMLODIPINE BESYLATE 5 MG: 5 TABLET ORAL at 08:57

## 2017-08-13 RX ADMIN — METOPROLOL TARTRATE 100 MG: 100 TABLET, FILM COATED ORAL at 18:14

## 2017-08-13 RX ADMIN — PREDNISONE 30 MG: 20 TABLET ORAL at 08:57

## 2017-08-13 RX ADMIN — AZITHROMYCIN 500 MG: 500 INJECTION, POWDER, LYOPHILIZED, FOR SOLUTION INTRAVENOUS at 11:23

## 2017-08-13 RX ADMIN — BUDESONIDE AND FORMOTEROL FUMARATE DIHYDRATE 2 PUFF: 80; 4.5 AEROSOL RESPIRATORY (INHALATION) at 08:06

## 2017-08-13 RX ADMIN — BUMETANIDE 1 MG: 1 TABLET ORAL at 09:39

## 2017-08-13 RX ADMIN — CEFTRIAXONE SODIUM 1 G: 1 INJECTION, SOLUTION INTRAVENOUS at 09:39

## 2017-08-13 RX ADMIN — INSULIN DETEMIR 20 UNITS: 100 INJECTION, SOLUTION SUBCUTANEOUS at 21:35

## 2017-08-13 RX ADMIN — BUDESONIDE AND FORMOTEROL FUMARATE DIHYDRATE 2 PUFF: 80; 4.5 AEROSOL RESPIRATORY (INHALATION) at 20:52

## 2017-08-13 RX ADMIN — Medication 2 TABLET: at 18:14

## 2017-08-13 RX ADMIN — ALPRAZOLAM 0.5 MG: 0.5 TABLET ORAL at 21:38

## 2017-08-13 RX ADMIN — GUAIFENESIN 600 MG: 600 TABLET, EXTENDED RELEASE ORAL at 21:38

## 2017-08-13 RX ADMIN — AMIODARONE HYDROCHLORIDE 400 MG: 200 TABLET ORAL at 15:30

## 2017-08-13 RX ADMIN — LEVOTHYROXINE SODIUM 75 MCG: 75 TABLET ORAL at 05:10

## 2017-08-13 RX ADMIN — IPRATROPIUM BROMIDE AND ALBUTEROL SULFATE 3 ML: .5; 3 SOLUTION RESPIRATORY (INHALATION) at 12:46

## 2017-08-13 RX ADMIN — ATORVASTATIN CALCIUM 10 MG: 10 TABLET, FILM COATED ORAL at 21:38

## 2017-08-13 RX ADMIN — IPRATROPIUM BROMIDE AND ALBUTEROL SULFATE 3 ML: .5; 3 SOLUTION RESPIRATORY (INHALATION) at 20:51

## 2017-08-13 RX ADMIN — Medication 2 TABLET: at 08:56

## 2017-08-13 RX ADMIN — METOPROLOL TARTRATE 100 MG: 100 TABLET, FILM COATED ORAL at 08:57

## 2017-08-13 NOTE — PLAN OF CARE
Problem: Patient Care Overview (Adult)  Goal: Plan of Care Review  Outcome: Ongoing (interventions implemented as appropriate)    08/13/17 0412   Coping/Psychosocial Response Interventions   Plan Of Care Reviewed With patient   Patient Care Overview   Progress no change   Outcome Evaluation   Outcome Summary/Follow up Plan No acute overnight events. Pt rested well throughout the shift.         Problem: Cardiac: Heart Failure (Adult)  Goal: Signs and Symptoms of Listed Potential Problems Will be Absent or Manageable (Cardiac: Heart Failure)  Outcome: Ongoing (interventions implemented as appropriate)    Problem: Fall Risk (Adult)  Goal: Identify Related Risk Factors and Signs and Symptoms  Outcome: Ongoing (interventions implemented as appropriate)  Goal: Absence of Falls  Outcome: Ongoing (interventions implemented as appropriate)

## 2017-08-13 NOTE — PROGRESS NOTES
"      HOSPITALIST DAILY PROGRESS NOTE    Chief Complaint: f/u SOA, cough    Subjective   SUBJECTIVE/OVERNIGHT EVENTS   Pt feeling well this am although still with some wheezing. No issues overnight    Review of Systems:  Gen-no fevers, no chills  CV-no chest pain, no palpitations  Resp-+cough, improving dyspnea  GI-no N/V/D, no abd pain    Objective   OBJECTIVE   I have reviewed the vital signs.  /63  Pulse 64  Temp 97.9 °F (36.6 °C) (Oral)   Resp 18  Ht 63\" (160 cm)  Wt 194 lb (88 kg)  LMP  (Approximate)  SpO2 95%  BMI 34.37 kg/m2    Physical Exam:  General- AOx3, NAD  HEENT- PERRLA  CVS- RRR, no M/R/G  Pulm- diffuse wheezing bilaterally improving, no crackles  Abd- soft, NT, ND, (+) BS  Extr- no c/c/e  Neuro- no focal deficits  Psych- normal affect    Results:  I have reviewed the labs, culture data, radiology results, and diagnostic studies.      Results from last 7 days  Lab Units 08/13/17  0525 08/12/17  0443 08/11/17  0554   WBC 10*3/mm3 11.23* 10.93* 10.07   HEMOGLOBIN g/dL 9.2* 9.2* 9.1*   HEMATOCRIT % 29.3* 29.3* 29.2*   PLATELETS 10*3/mm3 203 205 189       Results from last 7 days  Lab Units 08/13/17  0525   SODIUM mmol/L 142   POTASSIUM mmol/L 4.4   CHLORIDE mmol/L 115*   CO2 mmol/L 20.0   BUN mg/dL 91*   CREATININE mg/dL 4.00*   GLUCOSE mg/dL 112*   CALCIUM mg/dL 9.3       Culture Data:  Cultures:    Blood Culture   Date Value Ref Range Status   08/07/2017 No growth at 24 hours  Preliminary   08/07/2017 No growth at 24 hours  Preliminary     Respiratory Culture   Date Value Ref Range Status   08/07/2017 Scant growth (1+) Normal Respiratory [place]  Preliminary         Radiology Results:  Imaging Results (last 24 hours)     Procedure Component Value Units Date/Time    XR Chest 1 View [[id number]] Collected:  08/12/17 1350     Updated:  08/12/17 1841    Narrative:          EXAMINATION: XR CHEST, SINGLE VIEW - 08/12/2017     INDICATION: Dyspnea on exertion.     COMPARISON: None.     FINDINGS: "   1. There is cardiomegaly. There is mild cephalization of the  upper lobe  veins.     2. Otherwise, the peripheral lung zones are clear. Consolidation or free  fluid is not identified.           Impression:       Cardiomegaly persists. There is mild perihilar and upper  lung vascular distention, however, there is no peripheral edema and no  active airspace consolidation.     Stable findings in the chest when compared to 08/08/2017.     DICTATED:     08/12/2017  EDITED:          08/12/2017     This report was finalized on 8/12/2017 6:39 PM by Dr. Carrillo Padilla MD.             I have reviewed the medications.      Assessment/Plan   ASSESSMENT/PLAN    Principal Problem:    High output HF (heart failure)  Active Problems:    Paroxysmal atrial fibrillation    Essential hypertension    Type 2 diabetes mellitus    Chronic kidney disease, stage IV (severe)    Anemia of renal disease    Vitamin D deficiency    Bronchitis    Leukocytosis    Metabolic acidosis    Heart valve problem    Bronchospasm with bronchitis, acute    78 yo F with hx of chronic diastolic CHF, CKD Stage 4 s/p prior renal transplant with failure requiring re-initiation of dialysis but has been off HD since April 2017, and recent hospitalization in June 2017 due to volume overload, now presents with several days of increasing dyspnea and cough.     PLAN:  --Her SOA is likely multifactorial related to acute on chronic diastolic heart failure as well as probable bronchitis (no clear infiltrates on CXR seen).  --Significant bronchospasm, better, continue to taper steroids  --Continue Bumex, as per cardiology and NAL, monitor renal function closely, decreased to once daily per NAL given increasing Cr. Cr stable today  --Afib/RVR, per cardiology, s/p amiodarone, on coumadin  --possible high output cardiac failure, related to markedly large fistula, CO decreased with compression of fistula  --Continue Rocephin and Azithromycin for bronchitis vs. possible  pneumonia (will continue through today's dose to complete 7 day course). Stopped Vancomycin. Leukocytosis slightly worse over last few days., suspect this is 2/2 steroids  Repeat labs in am.     Complex patient.        I expect patient to be discharged in: ~1 day to home with HH when okay with Cards and NAL    Cammy Davila MD  08/13/17  12:55 PM

## 2017-08-13 NOTE — PROGRESS NOTES
"Pharmacy Consult  -  Warfarin    Moni Wallace is a  77 y.o. female   Height - 63\" (160 cm)  Weight - 194 lb (88 kg)    Consulting Provider: - Hospital Medicine  Indication: - Paroxysmal atrial fibrillation  Goal INR: - 2-3  Home Regimen:   - 6 mg Daily - This is per patient report of 3 x 2 mg tablets daily.  A call to [facility] indicated that she filled 2 mg tablets on 7/20.         Drug-Drug Interactions with current regimen:   Azythromycin increased bleeding risk              Levothyroxine increased bleeding risk   Amiodarone may increase INR      Warfarin Dosing During Admission:    Date  8/7 8/8 8/9 8/10 8/11 8/12 8/13     INR  2.81 3.28 2.74 2.16 2.26 2.57 2.63     Dose  6 mg  D/C for procedures Continue D/C Hold 4 mg 3 mg  Hold         Labs:    Results from last 7 days   Lab Units 08/13/17  0525 08/12/17  0443 08/11/17  0554 08/10/17  0641 08/09/17  0635 08/08/17  0530 08/07/17  1313 08/07/17  0546   INR  2.63 2.57 2.26 2.16 2.74 3.28 2.81 2.65   HEMOGLOBIN g/dL 9.2* 9.2* 9.1* 9.2* 8.8* 8.9* --  10.4*   HEMATOCRIT % 29.3* 29.3* 29.2* 29.6* 29.0* 27.9* --  33.3*   PLATELETS 10*3/mm3 203 205 189 180 152 164 --  201     Results from last 7 days   Lab Units 08/13/17  0525 08/12/17  0443 08/11/17  0554  08/07/17  0546   SODIUM mmol/L 142 141 140 < > 142   POTASSIUM mmol/L 4.4 4.2 4.1 < > 4.0   CHLORIDE mmol/L 115* 110* 111* < > 112*   CO2 mmol/L 20.0 19.0* 19.0* < > 16.0*   BUN mg/dL 91* 100* 89* < > 49*   CREATININE mg/dL 4.00* 4.00* 3.60* < > 3.00*   CALCIUM mg/dL 9.3 9.5 9.6 < > 9.8   BILIRUBIN mg/dL --  --  --  --  0.4   ALK PHOS U/L --  --  --  --  147*   ALT (SGPT) U/L --  --  --  --  44*   AST (SGOT) U/L --  --  --  --  60*   GLUCOSE mg/dL 112* 128* 156* < > 158*   < > = values in this interval not displayed.       Current dietary intake:   Diet Order   Procedures   • Diet Regular; Thin; Renal     ~50-75% of meals documented yesterday 8/11.    Assessment/Plan:     1. Warfarin 3 mg given last night.  2. " INR increased slightly to 2.63 today (from 2.57 yesterday). Will anticipate to continue to see effects of the past two doses of warfarin tomorrow.  3. Patient concurrently receiving amiodarone (currently on 400 mg every 8 hours), as well as other drug interactions that can increase INR/bleeding. Starting to see effects of amiodarone on INR.  4. HOLD warfarin dose tonight due to anticipated further INR increase.  5. Monitor for signs/symptoms of bleeding, and will continue to monitor for drug interactions.   6. Follow PT/INR daily and dose adjust accordingly.    Katherin Tierney, PharmD  8/13/2017  7:37 AM

## 2017-08-13 NOTE — PLAN OF CARE
Problem: Activity Intolerance (Adult)  Intervention: Promote Activity    08/11/17 2000 08/13/17 1600   Activity   Activity Type --  activity adjusted per tolerance   Musculoskeletal Interventions   Self-Care Promotion independence encouraged --          Goal: Identify Related Risk Factors and Signs and Symptoms  Outcome: Ongoing (interventions implemented as appropriate)

## 2017-08-13 NOTE — PROGRESS NOTES
"   LOS: 6 days    Patient Care Team:  Slim Wick MD as PCP - General  Slim Wick MD as PCP - Family Medicine  Jeff Joe MD as Consulting Physician (Cardiology)  Donny Hodges MD as Consulting Physician (Nephrology)    Chief Complaint:      Subjective     Interval History:   No new complains.  No new events overnight.    Review of Systems:   Dysuria or gross hematuria No chest pain or shortness of breath. Objective     Vital Sign Min/Max for last 24 hours  Temp  Min: 97.2 °F (36.2 °C)  Max: 97.9 °F (36.6 °C)   BP  Min: 127/51  Max: 156/65   Pulse  Min: 65  Max: 70   Resp  Min: 17  Max: 20   SpO2  Min: 95 %  Max: 100 %   No Data Recorded   No Data Recorded     Flowsheet Rows         First Filed Value    Admission Height  63\" (160 cm) Documented at 08/07/2017 0524    Admission Weight  190 lb (86.2 kg) Documented at 08/07/2017 0524             I/O last 3 completed shifts:  In: 780 [P.O.:480; IV Piggyback:300]  Out: 1850 [Urine:1850]    Physical Exam:     General Appearance:    Alert, cooperative, in no acute distress   Head:    Normocephalic, w atraumatic   Eyes:            Lids and lashes normal, conjunctivae and sclerae normal, no   icterus, no pallor, corneas clear, PERRLA           Neck:    supple,  no JVD       Lungs:     Decrease air entry to bases,respirations regular, even and       unlabored    Heart:    Regular rhythm and normal rate, normal S1 and S2, no            murmur, no gallop, no rub, no click       Abdomen:     Normal bowel sounds, no masses, no organomegaly, soft        non-tender, non-distended, no guarding, no rebound                 tenderness       Extremities:   Moves all extremities well, no edema, no cyanosis, no              redness               Neurologic:  sensation intact, DTR        present and equal bilaterally         WBC WBC   Date Value Ref Range Status   08/13/2017 11.23 (H) 3.50 - 10.80 10*3/mm3 Final   08/12/2017 10.93 (H) 3.50 - 10.80 " 10*3/mm3 Final   08/11/2017 10.07 3.50 - 10.80 10*3/mm3 Final      HGB Hemoglobin   Date Value Ref Range Status   08/13/2017 9.2 (L) 11.5 - 15.5 g/dL Final   08/12/2017 9.2 (L) 11.5 - 15.5 g/dL Final   08/11/2017 9.1 (L) 11.5 - 15.5 g/dL Final      HCT Hematocrit   Date Value Ref Range Status   08/13/2017 29.3 (L) 34.5 - 44.0 % Final   08/12/2017 29.3 (L) 34.5 - 44.0 % Final   08/11/2017 29.2 (L) 34.5 - 44.0 % Final      Platlets No results found for: LABPLAT   MCV MCV   Date Value Ref Range Status   08/13/2017 91.8 80.0 - 99.0 fL Final   08/12/2017 89.6 80.0 - 99.0 fL Final   08/11/2017 91.5 80.0 - 99.0 fL Final          Sodium Sodium   Date Value Ref Range Status   08/13/2017 142 132 - 146 mmol/L Final   08/12/2017 141 132 - 146 mmol/L Final   08/11/2017 140 132 - 146 mmol/L Final      Potassium Potassium   Date Value Ref Range Status   08/13/2017 4.4 3.5 - 5.5 mmol/L Final   08/12/2017 4.2 3.5 - 5.5 mmol/L Final   08/11/2017 4.1 3.5 - 5.5 mmol/L Final      Chloride Chloride   Date Value Ref Range Status   08/13/2017 115 (H) 99 - 109 mmol/L Final   08/12/2017 110 (H) 99 - 109 mmol/L Final   08/11/2017 111 (H) 99 - 109 mmol/L Final      CO2 CO2   Date Value Ref Range Status   08/13/2017 20.0 20.0 - 31.0 mmol/L Final   08/12/2017 19.0 (L) 20.0 - 31.0 mmol/L Final   08/11/2017 19.0 (L) 20.0 - 31.0 mmol/L Final      BUN BUN   Date Value Ref Range Status   08/13/2017 91 (H) 9 - 23 mg/dL Final   08/12/2017 100 (H) 9 - 23 mg/dL Final   08/11/2017 89 (H) 9 - 23 mg/dL Final      Creatinine Creatinine   Date Value Ref Range Status   08/13/2017 4.00 (H) 0.60 - 1.30 mg/dL Final   08/12/2017 4.00 (H) 0.60 - 1.30 mg/dL Final   08/11/2017 3.60 (H) 0.60 - 1.30 mg/dL Final      Calcium Calcium   Date Value Ref Range Status   08/13/2017 9.3 8.7 - 10.4 mg/dL Final   08/12/2017 9.5 8.7 - 10.4 mg/dL Final   08/11/2017 9.6 8.7 - 10.4 mg/dL Final      PO4 No results found for: CAPO4   Albumin No results found for: ALBUMIN   Magnesium No  results found for: MG   Uric Acid No results found for: URICACID        Results Review:     I reviewed the patient's new clinical results.      amiodarone 400 mg Oral Q8H   amLODIPine 5 mg Oral Daily   atorvastatin 10 mg Oral Nightly   azithromycin 500 mg Intravenous Q24H   budesonide-formoterol 2 puff Inhalation BID - RT   bumetanide 1 mg Intravenous Daily   ceftriaxone 1 g Intravenous Q24H   ferric gluconate 125 mg Intravenous Daily   guaiFENesin 600 mg Oral Q12H   insulin detemir 20 Units Subcutaneous Nightly   ipratropium-albuterol 3 mL Nebulization 4x Daily - RT   levothyroxine 75 mcg Oral Q AM   metoprolol tartrate 100 mg Oral BID   pantoprazole 40 mg Oral Q AM   pharmacy consult - MTM  Does not apply Daily   predniSONE 30 mg Oral Daily With Breakfast   sennosides-docusate sodium 2 tablet Oral BID   warfarin (COUMADIN) (dosing per levels)  Does not apply Daily       Pharmacy to dose warfarin        Medication Review:     Assessment/Plan     Principal Problem:    High output HF (heart failure)  Active Problems:    Paroxysmal atrial fibrillation    Essential hypertension    Type 2 diabetes mellitus    Chronic kidney disease, stage IV (severe)    Anemia of renal disease    Vitamin D deficiency    Bronchitis    Leukocytosis    Metabolic acidosis    Heart valve problem    Bronchospasm with bronchitis, acute      1-chronic kidney disease stage IV status post kidney transplant-patient was temporarily on dialysis for almost 1-1/2 year.  She has been off dialysis for several months.Baseline Scr 3.0-3.3 range.   2- anemia- Iron def  3-volume overload/congestive heart failure-seems like this is her second or third admission for congestive heart failure.  She has a big fistula which probably is the reason for high output cardiac failure.  4-hypertension possibly related to volume     PLAN:   Continue with bumex once daily dose.   Monitor I/o   Iv iron.   Avoid nephrotoxic agents  Appreciate cardiology and vascular surgery  input. Patient would like to go back to Dr Castillo for AVF ligation.      Albina Will MD  08/13/17  8:50 AM

## 2017-08-14 VITALS
HEIGHT: 63 IN | DIASTOLIC BLOOD PRESSURE: 67 MMHG | RESPIRATION RATE: 16 BRPM | SYSTOLIC BLOOD PRESSURE: 145 MMHG | OXYGEN SATURATION: 100 % | HEART RATE: 65 BPM | WEIGHT: 191.5 LBS | TEMPERATURE: 97.9 F | BODY MASS INDEX: 33.93 KG/M2

## 2017-08-14 LAB
ANION GAP SERPL CALCULATED.3IONS-SCNC: 9 MMOL/L (ref 3–11)
BASOPHILS # BLD AUTO: 0.02 10*3/MM3 (ref 0–0.2)
BASOPHILS NFR BLD AUTO: 0.2 % (ref 0–1)
BUN BLD-MCNC: 105 MG/DL (ref 9–23)
BUN/CREAT SERPL: 26.3 (ref 7–25)
CALCIUM SPEC-SCNC: 8.9 MG/DL (ref 8.7–10.4)
CHLORIDE SERPL-SCNC: 114 MMOL/L (ref 99–109)
CO2 SERPL-SCNC: 20 MMOL/L (ref 20–31)
CREAT BLD-MCNC: 4 MG/DL (ref 0.6–1.3)
DEPRECATED RDW RBC AUTO: 51.8 FL (ref 37–54)
EOSINOPHIL # BLD AUTO: 0 10*3/MM3 (ref 0–0.3)
EOSINOPHIL NFR BLD AUTO: 0 % (ref 0–3)
ERYTHROCYTE [DISTWIDTH] IN BLOOD BY AUTOMATED COUNT: 15.8 % (ref 11.3–14.5)
GFR SERPL CREATININE-BSD FRML MDRD: 11 ML/MIN/1.73
GLUCOSE BLD-MCNC: 75 MG/DL (ref 70–100)
GLUCOSE BLDC GLUCOMTR-MCNC: 126 MG/DL (ref 70–130)
GLUCOSE BLDC GLUCOMTR-MCNC: 76 MG/DL (ref 70–130)
HCT VFR BLD AUTO: 28.5 % (ref 34.5–44)
HGB BLD-MCNC: 9 G/DL (ref 11.5–15.5)
IMM GRANULOCYTES # BLD: 0.57 10*3/MM3 (ref 0–0.03)
IMM GRANULOCYTES NFR BLD: 4.9 % (ref 0–0.6)
INR PPP: 2.65
LYMPHOCYTES # BLD AUTO: 1.66 10*3/MM3 (ref 0.6–4.8)
LYMPHOCYTES NFR BLD AUTO: 14.3 % (ref 24–44)
MCH RBC QN AUTO: 28.2 PG (ref 27–31)
MCHC RBC AUTO-ENTMCNC: 31.6 G/DL (ref 32–36)
MCV RBC AUTO: 89.3 FL (ref 80–99)
MONOCYTES # BLD AUTO: 0.92 10*3/MM3 (ref 0–1)
MONOCYTES NFR BLD AUTO: 7.9 % (ref 0–12)
NEUTROPHILS # BLD AUTO: 8.45 10*3/MM3 (ref 1.5–8.3)
NEUTROPHILS NFR BLD AUTO: 72.7 % (ref 41–71)
PLATELET # BLD AUTO: 205 10*3/MM3 (ref 150–450)
PMV BLD AUTO: 10 FL (ref 6–12)
POTASSIUM BLD-SCNC: 4.4 MMOL/L (ref 3.5–5.5)
PROTHROMBIN TIME: 29.8 SECONDS (ref 9.6–11.5)
RBC # BLD AUTO: 3.19 10*6/MM3 (ref 3.89–5.14)
SODIUM BLD-SCNC: 143 MMOL/L (ref 132–146)
WBC NRBC COR # BLD: 11.62 10*3/MM3 (ref 3.5–10.8)

## 2017-08-14 PROCEDURE — 82962 GLUCOSE BLOOD TEST: CPT

## 2017-08-14 PROCEDURE — 85610 PROTHROMBIN TIME: CPT | Performed by: NURSE PRACTITIONER

## 2017-08-14 PROCEDURE — 85025 COMPLETE CBC W/AUTO DIFF WBC: CPT | Performed by: INTERNAL MEDICINE

## 2017-08-14 PROCEDURE — 99232 SBSQ HOSP IP/OBS MODERATE 35: CPT | Performed by: NURSE PRACTITIONER

## 2017-08-14 PROCEDURE — 25010000002 NA FERRIC GLUC CPLX PER 12.5 MG: Performed by: INTERNAL MEDICINE

## 2017-08-14 PROCEDURE — 94760 N-INVAS EAR/PLS OXIMETRY 1: CPT

## 2017-08-14 PROCEDURE — 94799 UNLISTED PULMONARY SVC/PX: CPT

## 2017-08-14 PROCEDURE — 94640 AIRWAY INHALATION TREATMENT: CPT

## 2017-08-14 PROCEDURE — 63710000001 PREDNISONE PER 1 MG: Performed by: INTERNAL MEDICINE

## 2017-08-14 PROCEDURE — 99239 HOSP IP/OBS DSCHRG MGMT >30: CPT | Performed by: INTERNAL MEDICINE

## 2017-08-14 PROCEDURE — 80048 BASIC METABOLIC PNL TOTAL CA: CPT | Performed by: INTERNAL MEDICINE

## 2017-08-14 RX ORDER — BUMETANIDE 1 MG/1
1 TABLET ORAL DAILY PRN
Qty: 30 TABLET | Refills: 0 | Status: SHIPPED | OUTPATIENT
Start: 2017-08-14 | End: 2018-05-07

## 2017-08-14 RX ORDER — BUMETANIDE 1 MG/1
1 TABLET ORAL AS NEEDED
Status: DISCONTINUED | OUTPATIENT
Start: 2017-08-14 | End: 2017-08-14 | Stop reason: HOSPADM

## 2017-08-14 RX ORDER — METOPROLOL TARTRATE 100 MG/1
100 TABLET ORAL 2 TIMES DAILY
Qty: 180 TABLET | Refills: 0 | OUTPATIENT
Start: 2017-08-14 | End: 2021-01-01 | Stop reason: HOSPADM

## 2017-08-14 RX ORDER — AMIODARONE HYDROCHLORIDE 400 MG/1
200 TABLET ORAL 2 TIMES DAILY
Qty: 90 TABLET | Refills: 0 | Status: CANCELLED | OUTPATIENT
Start: 2017-08-14 | End: 2017-11-12

## 2017-08-14 RX ORDER — AMIODARONE HYDROCHLORIDE 200 MG/1
200 TABLET ORAL EVERY 12 HOURS SCHEDULED
Status: DISCONTINUED | OUTPATIENT
Start: 2017-08-14 | End: 2017-08-14 | Stop reason: HOSPADM

## 2017-08-14 RX ORDER — AMIODARONE HYDROCHLORIDE 200 MG/1
200 TABLET ORAL EVERY 12 HOURS SCHEDULED
Qty: 60 TABLET | Refills: 0 | Status: SHIPPED | OUTPATIENT
Start: 2017-08-14 | End: 2017-09-14 | Stop reason: SDUPTHER

## 2017-08-14 RX ORDER — WARFARIN SODIUM 2 MG/1
2 TABLET ORAL
Status: DISCONTINUED | OUTPATIENT
Start: 2017-08-14 | End: 2017-08-14 | Stop reason: HOSPADM

## 2017-08-14 RX ORDER — PREDNISONE 20 MG/1
20 TABLET ORAL
Qty: 2 TABLET | Refills: 0 | Status: SHIPPED | OUTPATIENT
Start: 2017-08-14 | End: 2017-08-31

## 2017-08-14 RX ADMIN — AMIODARONE HYDROCHLORIDE 400 MG: 200 TABLET ORAL at 05:30

## 2017-08-14 RX ADMIN — LEVOTHYROXINE SODIUM 75 MCG: 75 TABLET ORAL at 05:30

## 2017-08-14 RX ADMIN — PREDNISONE 20 MG: 20 TABLET ORAL at 08:32

## 2017-08-14 RX ADMIN — GUAIFENESIN 600 MG: 600 TABLET, EXTENDED RELEASE ORAL at 08:32

## 2017-08-14 RX ADMIN — AMLODIPINE BESYLATE 5 MG: 5 TABLET ORAL at 08:33

## 2017-08-14 RX ADMIN — PANTOPRAZOLE SODIUM 40 MG: 40 TABLET, DELAYED RELEASE ORAL at 05:30

## 2017-08-14 RX ADMIN — IPRATROPIUM BROMIDE AND ALBUTEROL SULFATE 3 ML: .5; 3 SOLUTION RESPIRATORY (INHALATION) at 15:24

## 2017-08-14 RX ADMIN — IPRATROPIUM BROMIDE AND ALBUTEROL SULFATE 3 ML: .5; 3 SOLUTION RESPIRATORY (INHALATION) at 08:17

## 2017-08-14 RX ADMIN — IPRATROPIUM BROMIDE AND ALBUTEROL SULFATE 3 ML: .5; 3 SOLUTION RESPIRATORY (INHALATION) at 12:20

## 2017-08-14 RX ADMIN — METOPROLOL TARTRATE 100 MG: 100 TABLET, FILM COATED ORAL at 08:33

## 2017-08-14 RX ADMIN — AMIODARONE HYDROCHLORIDE 400 MG: 200 TABLET ORAL at 13:24

## 2017-08-14 RX ADMIN — BUDESONIDE AND FORMOTEROL FUMARATE DIHYDRATE 2 PUFF: 80; 4.5 AEROSOL RESPIRATORY (INHALATION) at 08:17

## 2017-08-14 RX ADMIN — BUMETANIDE 1 MG: 1 TABLET ORAL at 08:32

## 2017-08-14 RX ADMIN — SODIUM CHLORIDE 125 MG: 9 INJECTION, SOLUTION INTRAVENOUS at 08:34

## 2017-08-14 NOTE — PLAN OF CARE
Problem: Patient Care Overview (Adult)  Goal: Plan of Care Review  Outcome: Ongoing (interventions implemented as appropriate)    08/14/17 0416   Coping/Psychosocial Response Interventions   Plan Of Care Reviewed With patient   Patient Care Overview   Progress no change   Outcome Evaluation   Outcome Summary/Follow up Plan No acute overnight events. Anticipate possible D/C today         Problem: Cardiac: Heart Failure (Adult)  Goal: Signs and Symptoms of Listed Potential Problems Will be Absent or Manageable (Cardiac: Heart Failure)  Outcome: Ongoing (interventions implemented as appropriate)    Problem: Fall Risk (Adult)  Goal: Identify Related Risk Factors and Signs and Symptoms  Outcome: Ongoing (interventions implemented as appropriate)  Goal: Absence of Falls  Outcome: Ongoing (interventions implemented as appropriate)    Problem: Activity Intolerance (Adult)  Goal: Identify Related Risk Factors and Signs and Symptoms  Outcome: Ongoing (interventions implemented as appropriate)  Goal: Activity Tolerance  Outcome: Ongoing (interventions implemented as appropriate)  Goal: Effective Energy Conservation Techniques  Outcome: Ongoing (interventions implemented as appropriate)

## 2017-08-14 NOTE — PROGRESS NOTES
"   LOS: 7 days    Patient Care Team:  Slim Wick MD as PCP - General  Slim Wick MD as PCP - Family Medicine  Jeff Joe MD as Consulting Physician (Cardiology)  Donny Hodges MD as Consulting Physician (Nephrology)    Chief Complaint:  Shortness of breath    Subjective    Patient wants to go home, she denies any shortness of breath at this time.  No nausea vomiting or any uremic symptoms creatinine 4.0 and a , she has no edema.  Patient is on Bumex    Review of Systems:   Dysuria or gross hematuria No chest pain or shortness of breath  . Objective     Vital Sign Min/Max for last 24 hours  Temp  Min: 97.4 °F (36.3 °C)  Max: 97.7 °F (36.5 °C)   BP  Min: 134/58  Max: 162/69   Pulse  Min: 60  Max: 68   Resp  Min: 16  Max: 20   SpO2  Min: 94 %  Max: 100 %   No Data Recorded   Weight  Min: 191 lb 8 oz (86.9 kg)  Max: 191 lb 8 oz (86.9 kg)     Flowsheet Rows         First Filed Value    Admission Height  63\" (160 cm) Documented at 08/07/2017 0524    Admission Weight  190 lb (86.2 kg) Documented at 08/07/2017 0524          I/O this shift:  In: 240 [P.O.:240]  Out: -   I/O last 3 completed shifts:  In: 1250 [P.O.:950; IV Piggyback:300]  Out: 2650 [Urine:2650]    Physical Exam:     General Appearance:    At oriented comfortable no acute distress    Head:    Mouth Cephalic atraumatic head    Eyes:           Pupils equal reactive, EOMI.             Neck:    supple,  no JVD       Lungs:     Sounds fairly clear at this time.      Heart:    Regular rhythm and normal rate, normal S1 and S2, Positive murmur heard, no gallop, no rub, no click       Abdomen:     Normal bowel sounds, no masses, no organomegaly, soft        non-tender, non-distended, no guarding, no rebound                 tenderness       Extremities:   Moves all extremities well, no edema, no cyanosis, no              redness               Neurologic:  sensation intact, DTR        present and equal bilaterally "         WBC WBC   Date Value Ref Range Status   08/14/2017 11.62 (H) 3.50 - 10.80 10*3/mm3 Final   08/13/2017 11.23 (H) 3.50 - 10.80 10*3/mm3 Final   08/12/2017 10.93 (H) 3.50 - 10.80 10*3/mm3 Final      HGB Hemoglobin   Date Value Ref Range Status   08/14/2017 9.0 (L) 11.5 - 15.5 g/dL Final   08/13/2017 9.2 (L) 11.5 - 15.5 g/dL Final   08/12/2017 9.2 (L) 11.5 - 15.5 g/dL Final      HCT Hematocrit   Date Value Ref Range Status   08/14/2017 28.5 (L) 34.5 - 44.0 % Final   08/13/2017 29.3 (L) 34.5 - 44.0 % Final   08/12/2017 29.3 (L) 34.5 - 44.0 % Final      Platlets No results found for: LABPLAT   MCV MCV   Date Value Ref Range Status   08/14/2017 89.3 80.0 - 99.0 fL Final   08/13/2017 91.8 80.0 - 99.0 fL Final   08/12/2017 89.6 80.0 - 99.0 fL Final          Sodium Sodium   Date Value Ref Range Status   08/14/2017 143 132 - 146 mmol/L Final   08/13/2017 142 132 - 146 mmol/L Final   08/12/2017 141 132 - 146 mmol/L Final      Potassium Potassium   Date Value Ref Range Status   08/14/2017 4.4 3.5 - 5.5 mmol/L Final   08/13/2017 4.4 3.5 - 5.5 mmol/L Final   08/12/2017 4.2 3.5 - 5.5 mmol/L Final      Chloride Chloride   Date Value Ref Range Status   08/14/2017 114 (H) 99 - 109 mmol/L Final   08/13/2017 115 (H) 99 - 109 mmol/L Final   08/12/2017 110 (H) 99 - 109 mmol/L Final      CO2 CO2   Date Value Ref Range Status   08/14/2017 20.0 20.0 - 31.0 mmol/L Final   08/13/2017 20.0 20.0 - 31.0 mmol/L Final   08/12/2017 19.0 (L) 20.0 - 31.0 mmol/L Final      BUN BUN   Date Value Ref Range Status   08/14/2017 105 (H) 9 - 23 mg/dL Final   08/13/2017 91 (H) 9 - 23 mg/dL Final   08/12/2017 100 (H) 9 - 23 mg/dL Final      Creatinine Creatinine   Date Value Ref Range Status   08/14/2017 4.00 (H) 0.60 - 1.30 mg/dL Final   08/13/2017 4.00 (H) 0.60 - 1.30 mg/dL Final   08/12/2017 4.00 (H) 0.60 - 1.30 mg/dL Final      Calcium Calcium   Date Value Ref Range Status   08/14/2017 8.9 8.7 - 10.4 mg/dL Final   08/13/2017 9.3 8.7 - 10.4 mg/dL  Final   08/12/2017 9.5 8.7 - 10.4 mg/dL Final      PO4 No results found for: CAPO4   Albumin No results found for: ALBUMIN   Magnesium No results found for: MG   Uric Acid No results found for: URICACID        Results Review:     I reviewed the patient's new clinical results.      amiodarone 400 mg Oral Q8H   amLODIPine 5 mg Oral Daily   atorvastatin 10 mg Oral Nightly   budesonide-formoterol 2 puff Inhalation BID - RT   bumetanide 1 mg Oral Daily   ferric gluconate 125 mg Intravenous Daily   guaiFENesin 600 mg Oral Q12H   insulin detemir 20 Units Subcutaneous Nightly   ipratropium-albuterol 3 mL Nebulization 4x Daily - RT   levothyroxine 75 mcg Oral Q AM   metoprolol tartrate 100 mg Oral BID   pantoprazole 40 mg Oral Q AM   pharmacy consult - MTM  Does not apply Daily   predniSONE 20 mg Oral Daily With Breakfast   sennosides-docusate sodium 2 tablet Oral BID   warfarin 2 mg Oral Daily       Pharmacy to dose warfarin        Medication Review:     Assessment/Plan     Principal Problem:    High output HF (heart failure)  Active Problems:    Paroxysmal atrial fibrillation    Essential hypertension    Type 2 diabetes mellitus    Chronic kidney disease, stage IV (severe)    Anemia of renal disease    Vitamin D deficiency    Bronchitis    Leukocytosis    Metabolic acidosis    Heart valve problem    Bronchospasm with bronchitis, acute      1-chronic kidney disease stage IV status post kidney transplant-patient was temporarily on dialysis for almost 1-1/2 year.  She has been off dialysis for several months.Baseline Scr 3.0-3.3 range.   2- anemia- Iron def  3-volume overload/congestive heart failure-seems like this is her second or third admission for congestive heart failure.  She has a big fistula which probably is the reason for high output cardiac failure.  4-hypertension possibly related to volume     PLAN:   Patient is planning to go home today.  Use Bumex on a when necessary basis.  Avoid nephrotoxic  agents  Appreciate cardiology and vascular surgery input. Patient would like to go back to Dr Castillo for AVF ligation.   Follow up in 7-10 days in our office with labs.  I have discussed with the patient that she may require dialysis soon  Also explained to the patient that she can come into the ER for 4 to be admitted and started dialysis.  Patient's family son in the room     Allen Chambers MD  08/14/17  1:54 PM

## 2017-08-14 NOTE — DISCHARGE SUMMARY
[facility] Medicine Services  DISCHARGE SUMMARY       Date of Admission: 8/7/2017  Date of Discharge:  8/14/2017  Primary Care Physician: Slim Wick MD  Consulting Physician(s)     Provider Relationship    Jeff Joe MD Consulting Physician    Ranjeet Uribe MD Consulting Physician          Discharge Diagnoses:  Active Hospital Problems (** Indicates Principal Problem)    Diagnosis Date Noted   • **High output HF (heart failure) [I50.9] 06/08/2017     · Echo (6/08/2017):LVEF 50%. Grade II Diastolic dysfunction.  · Echo (8/9/17): LVEF 55%.  Grade 2 diastolic dysfunction.  Mild MR.  Mild aortic stenosis  · Limited echo to evaluate high output CHF (8/10/17): Cardiac output decreased from 11.3 L/min to 8 L/min with compression of fistula.     • Heart valve problem [I38] 08/08/2017     · Echo (06/0/2017): Mild-to-moderate mitral valve regurgitation is present. Mild aortic valve stenosis is present. Moderate tricuspid valve regurgitation is present.     • Bronchospasm with bronchitis, acute [J20.9] 08/08/2017   • Bronchitis [J40] 08/07/2017   • Leukocytosis [D72.829] 08/07/2017   • Metabolic acidosis [E87.2] 08/07/2017   • Vitamin D deficiency [E55.9]    • Anemia of renal disease [D63.1] 07/22/2016   • Type 2 diabetes mellitus [E11.9] 07/22/2016   • Essential hypertension [I10] 07/22/2016   • Paroxysmal atrial fibrillation [I48.0] 07/22/2016     · CHADS-VASc = 5  · Atrial fibrillation initially diagnosed February 2015; spontaneous conversion to normal sinus rhythm.   · Echocardiogram (06/08/2017): LVEF 50%. Grade II Diastolic dysfunction. RVSP 41.6 mmHg. Mild-to-moderate MR. Mild AS.  Moderate TR  · Noted to be in afib with RVR 08/09/2017 in setting of acute bronchitis.  · On Coumadin and amiodarone     • Chronic kidney disease, stage IV (severe) [N18.4] 07/22/2016     · IgA nephropathy with history of renal transplant, 2010.   · Patient on hemodialysis Monday,  Wednesday, and Friday started July 2015.  · Hemodialysis discontinued 2/2017.        Resolved Hospital Problems    Diagnosis Date Noted Date Resolved   No resolved problems to display.       Presenting Problem/History of Present Illness  CHF (congestive heart failure) [I50.9]  Congestive heart failure, unspecified congestive heart failure chronicity, unspecified congestive heart failure type [I50.9]     Chief Complaint: cough, SOA    Day of Discharge: Feeling well today, no issues overnight.  Respiratory status is now at baseline.     Hospital Course  76 yo F with hx of chronic diastolic CHF, CKD Stage 4 s/p prior renal transplant with failure requiring re-initiation of dialysis but has been off HD since April 2017, and recent hospitalization in June 2017 due to volume overload, now presented with several days of increasing dyspnea and cough.  Pt was admitted to our service and we consulted NAL and Cardiology.  It was felt that her SOA was likely multifactorial related to bronchitis as well as possible underlying PNA and, in large part, due to her high output cardiac failure due to her large AVF.  Pt was diuresed and her symptoms improved drastically. Her creatinine has increased since admission, but has been stable at ~4 for the last few days. She likely will eventually need reinitiation of HD- she is going to follow up with Dr. Castillo at [facility] for AVF ligation.  Of note, the patient did complete a seven day course of ABX for CAP and she has been on a steroid taper which she will complete in two days.  She will follow up with NAL in 7-10 days and with Cardiology in 4 weeks.     Procedures Performed  Procedure(s):  Right Heart Cath      TTE-  · Left ventricular systolic function is normal. Estimated EF = 55%.  · Left ventricular diastolic dysfunction (grade II) consistent with pseudonormalization.  · Left ventricular wall thickness is consistent with mild concentric hypertrophy.  · The left ventricular cavity is  "mildly dilated.  · Left atrial cavity size is dilated.  · Mild mitral valve regurgitation is present  · Mild to moderate aortic valve stenosis is present.    Limited TTE:  · Limited study to assess for high output CHF due to a large AV dialysis fistula.  · At rest, the cardiac output measured 11.3 L/min. With compression of the right arm AV fistula, the cardiac output reduced to 8.2 L/min.  CXR on admission-    Mild prominence of the interstitial markings in the lungs, which appears   similar on the prior study and may be chronic.  Mild interstitial edema not   excluded.      Repeat CXR-  Borderline changes of congestive heart failure similar to  prior exam.  Consults:   Consults     Date and Time Order Name Status Description    8/11/2017 1045 Inpatient Consult to Vascular Surgery Completed     8/7/2017 2134 Inpatient Consult to Cardiology Completed     8/7/2017 1249 Inpatient Consult to Nephrology Completed           Pertinent Test Results:    Results from last 7 days  Lab Units 08/14/17 0459 08/13/17  0525 08/12/17  0443   WBC 10*3/mm3 11.62* 11.23* 10.93*   HEMOGLOBIN g/dL 9.0* 9.2* 9.2*   HEMATOCRIT % 28.5* 29.3* 29.3*   PLATELETS 10*3/mm3 205 203 205       Results from last 7 days  Lab Units 08/14/17 0459 08/13/17  0525 08/12/17  0443   SODIUM mmol/L 143 142 141   POTASSIUM mmol/L 4.4 4.4 4.2   CHLORIDE mmol/L 114* 115* 110*   CO2 mmol/L 20.0 20.0 19.0*   BUN mg/dL 105* 91* 100*   CREATININE mg/dL 4.00* 4.00* 4.00*   GLUCOSE mg/dL 75 112* 128*   CALCIUM mg/dL 8.9 9.3 9.5       Condition on Discharge:  stable    Physical Exam on Discharge:/63  Pulse 60  Temp 97.4 °F (36.3 °C) (Oral)   Resp 16  Ht 63\" (160 cm)  Wt 191 lb 8 oz (86.9 kg)  LMP  (Approximate)  SpO2 100%  BMI 33.92 kg/m2  Physical Exam  General- AOx3, NAD  HEENT- PERRLA  CVS- RRR, no M/R/G  Pulm- diffuse wheezing bilaterally improving, no crackles  Abd- soft, NT, ND, (+) BS  Extr- no c/c/e  Neuro- no focal deficits  Psych- normal " affect    Discharge Disposition  Home or Self Care    Discharge Medications   Moni Wallace   Home Medication Instructions SOL:[id number]    Printed on:08/14/17 5152   Medication Information                      ALPRAZolam (XANAX) 0.5 MG tablet  Take 0.5 mg by mouth 2 (Two) Times a Day As Needed for Anxiety.             amiodarone (PACERONE) 200 MG tablet  Take 1 tablet by mouth Every 12 (Twelve) Hours.             amLODIPine (NORVASC) 5 MG tablet  Take 5 mg by mouth Daily.             bumetanide (BUMEX) 1 MG tablet  Take 1 tablet by mouth Daily As Needed (for weight gain more than 3 lbs).             Cholecalciferol (VITAMIN D) 2000 UNITS tablet  Take 2,000 Units by mouth Daily.             Fluticasone Furoate-Vilanterol (BREO ELLIPTA) 200-25 MCG/INH aerosol powder   Inhale 1 puff Daily.             LANTUS SOLOSTAR 100 UNIT/ML injection pen  Inject 12 Units under the skin Every Night.             levothyroxine (SYNTHROID, LEVOTHROID) 75 MCG tablet  Take 75 mcg by mouth Daily.             metoprolol tartrate (LOPRESSOR) 100 MG tablet  Take 1 tablet by mouth 2 (Two) Times a Day for 90 days.             pantoprazole (PROTONIX) 40 MG EC tablet  Take 1 tablet by mouth Daily.             Patiromer Sorbitex Calcium (VELTASSA) 8.4 G pack  Take 8.4 g by mouth Daily.             pravastatin (PRAVACHOL) 40 MG tablet  Take 40 mg by mouth daily.             predniSONE (DELTASONE) 20 MG tablet  Take 1 tablet by mouth Daily With Breakfast.             PROAIR  (90 BASE) MCG/ACT inhaler  Inhale 2 puffs Every 6 (Six) Hours As Needed.             sodium bicarbonate 650 MG tablet  Take 1,300 mg by mouth 2 (Two) Times a Day.             warfarin (COUMADIN) 2 MG tablet  Take 6 mg by mouth Daily.                 Discharge Diet:   Diet Instructions     Diet: Regular, Consistent Carbohydrate, Cardiac, Renal       Discharge Diet:   Regular  Consistent Carbohydrate  Cardiac  Renal                    Discharge Care Plan /  Instructions:    Activity at Discharge:     Follow-up Appointments  Future Appointments  Date Time Provider Department Center   8/18/2017 10:00 AM REY Kenny [facility]   4/24/2018 11:30 AM Jeff Joe MD [facility] None     Additional Instructions for the Follow-ups that You Need to Schedule     Discharge Follow-up with PCP    As directed    Follow Up Details:  one week with PCP       Protime-INR    Aug 19, 2017 (Approximate)    Is the Patient on Coumadin (Warfarin) therapy?:  Yes                 Test Results Pending at Discharge       Cammy Davila MD 08/14/17 2:40 PM    Time: 45 minutes spent in discharge coordination today

## 2017-08-14 NOTE — DISCHARGE INSTR - APPOINTMENTS
You have an appointment with Allen Chambers MD on August 28, 2017@ 2:30 PM.   Call them if you have any questions. Phone: [phone] [address]  [facility] [id number]  [place] [id number]      You have an appointment with Shira Bazan PA-C on August 18, 2017@ 11:00 AM  Call them if you have any questions. Phone: [phone] [address]  [facility] [address]  [place] [id number]

## 2017-08-14 NOTE — PROGRESS NOTES
[facility]  IP Progress Note      Chief Complaint/Reason for service:    · Acute on Chronic Diastolic Heart Failure due to high flow output  · Paroxysmal Atrial Fibrillation         Patient lying in bed without complaints.  She is requesting an evaluation by Dr Castillo at [facility] for consideration of AVF fistula ligation.  Bumex was decreased by Nephrology as creatinine has risen to 4.00.  Has maintained NSR      Past medical, surgical, social and family history reviewed in the patient's electronic medical record.           Vital Sign Min/Max for last 24 hours  Temp  Min: 97.4 °F (36.3 °C)  Max: 97.7 °F (36.5 °C)   BP  Min: 134/58  Max: 162/69   Pulse  Min: 62  Max: 68   Resp  Min: 16  Max: 20   SpO2  Min: 94 %  Max: 100 %   No Data Recorded      Intake/Output Summary (Last 24 hours) at 08/14/17 0818  Last data filed at 08/14/17 0240   Gross per 24 hour   Intake             1250 ml   Output             2250 ml   Net            -1000 ml           Physical Exam   Constitutional: She appears well-developed and well-nourished.   Neck: No JVD present.   Cardiovascular: Normal rate and regular rhythm.    Murmur heard.  Pulmonary/Chest: Effort normal. She has rhonchi.   Neurological: She is alert.       Results Review:   I reviewed the patient's recent labs in the electronic medical record.        Tele:  [facility]         Hospital Problem List     * (Principal)High output HF (heart failure)    Overview Addendum 8/10/2017  8:44 PM by Jeff Joe MD     · Echo (6/08/2017):LVEF 50%. Grade II Diastolic dysfunction.  · Echo (8/9/17): LVEF 55%.  Grade 2 diastolic dysfunction.  Mild MR.  Mild aortic stenosis  · Limited echo to evaluate high output CHF (8/10/17): Cardiac output decreased from 11.3 L/min to 8 L/min with compression of fistula.         Paroxysmal atrial fibrillation    Overview Addendum 8/9/2017  7:37 AM by REY Brown     · CHADS-VASc = 5  · Atrial  fibrillation initially diagnosed February 2015; spontaneous conversion to normal sinus rhythm.   · Echocardiogram (06/08/2017): LVEF 50%. Grade II Diastolic dysfunction. RVSP 41.6 mmHg. Mild-to-moderate MR. Mild AS.  Moderate TR  · Noted to be in afib with RVR 08/09/2017 in setting of acute bronchitis.  · On Coumadin and amiodarone         Chronic kidney disease, stage IV (severe)    Overview Addendum 6/8/2017  5:52 PM by Jeff Joe MD     · IgA nephropathy with history of renal transplant, 2010.   · Patient on hemodialysis Monday, Wednesday, and Friday started July 2015.  · Hemodialysis discontinued 2/2017.         Bronchitis    Leukocytosis    Bronchospasm with bronchitis, acute    Type 2 diabetes mellitus    Anemia of renal disease    Overview Deleted 8/2/2016 12:03 PM by Jeff Joe MD            Metabolic acidosis    Essential hypertension    Vitamin D deficiency    Heart valve problem    Overview Addendum 8/9/2017  7:31 AM by REY Brown     · Echo (06/0/2017): Mild-to-moderate mitral valve regurgitation is present. Mild aortic valve stenosis is present. Moderate tricuspid valve regurgitation is present.                      · Decrease amiodarone to 200 mg BID  · Continue statin, bb and coumadin  · May need hemodialysis per Nephrology??  · If Nephrology and Hospital ist feel the patient's breathing is stable enough for discharge then Cardiology will not oppose as her heart failure is secondary to her renal failure and Nephrology is guiding treatment and will determine discharge diuretics.  · Will need f/u at heart failure clinic on Friday  · Will need an appointment with Mitchell Castillo at [facility] for second opinion on AV fistula ligation   · F/U with Dr Joe in 4 weeks .    REY Galeana  8/14/2017

## 2017-08-14 NOTE — PROGRESS NOTES
"      HOSPITALIST DAILY PROGRESS NOTE    Chief Complaint: f/u SOA, cough    Subjective   SUBJECTIVE/OVERNIGHT EVENTS   Pt feeling well this am. No issues overnight    Review of Systems:  Gen-no fevers, no chills  CV-no chest pain, no palpitations  Resp-+cough, improving dyspnea  GI-no N/V/D, no abd pain    Objective   OBJECTIVE   I have reviewed the vital signs.  /63  Pulse 60  Temp 97.4 °F (36.3 °C) (Oral)   Resp 16  Ht 63\" (160 cm)  Wt 191 lb 8 oz (86.9 kg)  LMP  (Approximate)  SpO2 100%  BMI 33.92 kg/m2    Physical Exam:  General- AOx3, NAD  HEENT- PERRLA  CVS- RRR, no M/R/G  Pulm- diffuse wheezing bilaterally improving, no crackles  Abd- soft, NT, ND, (+) BS  Extr- no c/c/e  Neuro- no focal deficits  Psych- normal affect    Results:  I have reviewed the labs, culture data, radiology results, and diagnostic studies.      Results from last 7 days  Lab Units 08/14/17  0459 08/13/17  0525 08/12/17  0443   WBC 10*3/mm3 11.62* 11.23* 10.93*   HEMOGLOBIN g/dL 9.0* 9.2* 9.2*   HEMATOCRIT % 28.5* 29.3* 29.3*   PLATELETS 10*3/mm3 205 203 205       Results from last 7 days  Lab Units 08/14/17  0459   SODIUM mmol/L 143   POTASSIUM mmol/L 4.4   CHLORIDE mmol/L 114*   CO2 mmol/L 20.0   BUN mg/dL 105*   CREATININE mg/dL 4.00*   GLUCOSE mg/dL 75   CALCIUM mg/dL 8.9       Culture Data:  Cultures:    Blood Culture   Date Value Ref Range Status   08/07/2017 No growth at 24 hours  Preliminary   08/07/2017 No growth at 24 hours  Preliminary     Respiratory Culture   Date Value Ref Range Status   08/07/2017 Scant growth (1+) Normal Respiratory [place]  Preliminary         Radiology Results:  Imaging Results (last 24 hours)     ** No results found for the last 24 hours. **          I have reviewed the medications.      Assessment/Plan   ASSESSMENT/PLAN    Principal Problem:    High output HF (heart failure)  Active Problems:    Paroxysmal atrial fibrillation    Essential hypertension    Type 2 diabetes mellitus    Chronic " kidney disease, stage IV (severe)    Anemia of renal disease    Vitamin D deficiency    Bronchitis    Leukocytosis    Metabolic acidosis    Heart valve problem    Bronchospasm with bronchitis, acute    78 yo F with hx of chronic diastolic CHF, CKD Stage 4 s/p prior renal transplant with failure requiring re-initiation of dialysis but has been off HD since April 2017, and recent hospitalization in June 2017 due to volume overload, now presents with several days of increasing dyspnea and cough.     PLAN:  --Her SOA is likely multifactorial related to acute on chronic diastolic heart failure as well as probable bronchitis (no clear infiltrates on CXR seen).  --Significant bronchospasm, better, continue to taper steroids  --Continue Bumex, as per cardiology and NAL, monitor renal function closely, decreased to once daily per NAL given increasing Cr. Cr stable today  --Afib/RVR, per cardiology, s/p amiodarone, on coumadin  --possible high output cardiac failure, related to markedly large fistula, CO decreased with compression of fistula  --Completed Rocephin and Azithromycin 7 day course for possible PNA. Stopped Vancomycin. Leukocytosis slightly worse over last few days., suspect this is 2/2 steroids, only mild left shift.  Repeat labs in am.     Complex patient.        I expect patient to be discharged in: ~1 day to home with  when okay with Cards and NAL    Cammy Davila MD  08/14/17  12:27 PM

## 2017-08-14 NOTE — DISCHARGE INSTR - LAB
You will be mailed an appointment time to see dr Joe in one month.    You will be mailed blood work to be drawn before your visit with dr Chambers.

## 2017-08-14 NOTE — PROGRESS NOTES
Continued Stay Note   [facility]     Patient Name: Moni Wallace  MRN: [id number]  Today's Date: 8/14/2017    Admit Date: 8/7/2017          Discharge Plan       08/14/17 1527    Case Management/Social Work Plan    Plan HH    Patient/Family In Agreement With Plan yes    Additional Comments Spoke with patient at bedside regarding HH.  Patient would like [facility].  [facility] and advised that patient was discharging home today.  Patient to discharge home with [facility] via car with family ot transport.              Discharge Codes     None        Expected Discharge Date and Time     Expected Discharge Date Expected Discharge Time    Aug 14, 2017             Gillian Berman RN

## 2017-08-14 NOTE — PLAN OF CARE
Problem: Patient Care Overview (Adult)  Goal: Plan of Care Review  Outcome: Outcome(s) achieved Date Met:  08/14/17 08/14/17 0416   Coping/Psychosocial Response Interventions   Plan Of Care Reviewed With patient   Patient Care Overview   Progress no change       Goal: Adult Individualization and Mutuality  Outcome: Outcome(s) achieved Date Met:  08/14/17  Goal: Discharge Needs Assessment  Outcome: Outcome(s) achieved Date Met:  08/14/17    Problem: Cardiac: Heart Failure (Adult)  Goal: Signs and Symptoms of Listed Potential Problems Will be Absent or Manageable (Cardiac: Heart Failure)  Outcome: Outcome(s) achieved Date Met:  08/14/17    Problem: Fall Risk (Adult)  Goal: Identify Related Risk Factors and Signs and Symptoms  Outcome: Outcome(s) achieved Date Met:  08/14/17  Goal: Absence of Falls  Outcome: Outcome(s) achieved Date Met:  08/14/17    Problem: Activity Intolerance (Adult)  Goal: Identify Related Risk Factors and Signs and Symptoms  Outcome: Outcome(s) achieved Date Met:  08/14/17  Goal: Activity Tolerance  Outcome: Outcome(s) achieved Date Met:  08/14/17  Goal: Effective Energy Conservation Techniques  Outcome: Outcome(s) achieved Date Met:  08/14/17

## 2017-08-21 LAB — INR PPP: 1

## 2017-08-25 ENCOUNTER — ANTICOAGULATION VISIT (OUTPATIENT)
Dept: CARDIOLOGY | Facility: CLINIC | Age: 77
End: 2017-08-25

## 2017-08-25 NOTE — PATIENT INSTRUCTIONS
Mrs Wallace was in the hospital and was to be on 6 mg when she left but she apparently was told 2 mg or maybe they meant 2 pill (3 mg pill) so she has only been taking 2 mg qd.   Also she apparently was at [facility] to have her AV fistula tied off and she probably was off of the Warfarin.  She is seeing by [facility] but they only started seeing her on 8/21/17 and that is when the lab work was done and was sent to me today 8/25/17.   I talked with her  nurse and she told the patient to start on 6 mg qd and they will see her on Thursday and check her again.  She was on 9 mg qd before she was in the hospital.

## 2017-08-31 ENCOUNTER — TELEPHONE (OUTPATIENT)
Dept: CARDIOLOGY | Facility: CLINIC | Age: 77
End: 2017-08-31

## 2017-08-31 ENCOUNTER — ANTICOAGULATION VISIT (OUTPATIENT)
Dept: PHARMACY | Facility: HOSPITAL | Age: 77
End: 2017-08-31

## 2017-08-31 DIAGNOSIS — I48.0 PAROXYSMAL ATRIAL FIBRILLATION (HCC): ICD-10-CM

## 2017-08-31 LAB — INR PPP: 2.6

## 2017-08-31 RX ORDER — TACROLIMUS 1 MG/1
2 CAPSULE ORAL 2 TIMES DAILY
COMMUNITY
End: 2018-06-25

## 2017-09-07 ENCOUNTER — ANTICOAGULATION VISIT (OUTPATIENT)
Dept: PHARMACY | Facility: HOSPITAL | Age: 77
End: 2017-09-07

## 2017-09-07 DIAGNOSIS — I48.0 PAROXYSMAL ATRIAL FIBRILLATION (HCC): ICD-10-CM

## 2017-09-07 LAB — INR PPP: 5.7

## 2017-09-07 NOTE — PROGRESS NOTES
Anticoagulation Clinic - Remote Progress Note  REMOTE LAB    Indication: afib  Referring Provider: Heath  Goal INR: 2-3  Current Drug Interactions: amiodarone, levothyroxine  CHADS-VASc: 5 (age, gender, HTN, DM)    Diet: [GLV INTAKE]  Alcohol:   Tobacco:     INR History:  Date 8/31 9/7          Total Weekly Dose 38mg 38mg          INR 2.6 5.7          Notes amio BID amio            Warfarin Dosing During Admission:  Date  8/7 8/8 8/9 8/10 8/11 8/12 8/13   INR  2.81 3.28 2.74 2.16 2.26 2.57 2.63   Dose  6 mg  D/C for procedures Hold Hold 4 mg 3 mg  Hold   Home Dose Inpatient described as 6 mg Daily, this is per patient report of 3 x 2 mg tablets daily.    Phone Interview:  Tablet Strength: pt has 2mg tablets  Current Maintenance Dose: 6mg daily except 4mg MonFri   Patient Findings      Negatives Signs/symptoms of thrombosis, Signs/symptoms of bleeding, Laboratory test error suspected, Change in health, Change in alcohol use, Change in activity, Upcoming invasive procedure, Emergency department visit, Upcoming dental procedure, Missed doses, Extra doses, Change in medications, Change in diet/appetite, Hospital admission, Bruising, Other complaints     Comments Pt denies bleeding or significant bruising episodes. Denies additional medication/diet changes in the last week.     Patient Contact Info: [phone]   Lab Contact Info: Vandana at [facility] ([phone])    Plan:  1. INR is supratherapeutic today -- doubt pt was taking 9mg daily prior to hospitalization and amio initiation, as was noted in the Anticoagulation Tracker and prior notes -- she was more likely on 6mg daily, as described in inpt pharmacy documentation. For this reason, instructed Vandana to HOLD Mrs. Lawlers warfarin today and tomorrow, then lower her dose another 28.5% to warfarin 4mg daily, as compensation for amio DDI.  2. Repeat INR in clinic on 9/14, before/after appt with Lily Rae. Will then continue weekly PT/INR with [facility]  until warfarin dose re-stabilized.  3. Verbal information provided over the phone to Vandana (ANDREAS). She RBV dosing instructions, expresses understanding, and has no further questions at this time.      Desiree Bull RPH  9/7/2017  10:59 AM

## 2017-09-11 ENCOUNTER — TRANSCRIBE ORDERS (OUTPATIENT)
Dept: PHARMACY | Facility: HOSPITAL | Age: 77
End: 2017-09-11

## 2017-09-12 ENCOUNTER — TRANSCRIBE ORDERS (OUTPATIENT)
Dept: CARDIOLOGY | Facility: CLINIC | Age: 77
End: 2017-09-12

## 2017-09-14 ENCOUNTER — OFFICE VISIT (OUTPATIENT)
Dept: CARDIOLOGY | Facility: CLINIC | Age: 77
End: 2017-09-14

## 2017-09-14 ENCOUNTER — ANTICOAGULATION VISIT (OUTPATIENT)
Dept: PHARMACY | Facility: HOSPITAL | Age: 77
End: 2017-09-14

## 2017-09-14 VITALS
HEART RATE: 71 BPM | HEIGHT: 64 IN | WEIGHT: 181.2 LBS | BODY MASS INDEX: 30.93 KG/M2 | DIASTOLIC BLOOD PRESSURE: 81 MMHG | SYSTOLIC BLOOD PRESSURE: 163 MMHG

## 2017-09-14 DIAGNOSIS — I10 ESSENTIAL HYPERTENSION: ICD-10-CM

## 2017-09-14 DIAGNOSIS — I48.0 PAROXYSMAL ATRIAL FIBRILLATION (HCC): Primary | ICD-10-CM

## 2017-09-14 DIAGNOSIS — I48.0 PAROXYSMAL ATRIAL FIBRILLATION (HCC): ICD-10-CM

## 2017-09-14 DIAGNOSIS — N18.4 CHRONIC KIDNEY DISEASE, STAGE IV (SEVERE) (HCC): ICD-10-CM

## 2017-09-14 DIAGNOSIS — I38 HEART VALVE PROBLEM: ICD-10-CM

## 2017-09-14 LAB
INR PPP: 2.2 (ref 0.91–1.09)
PROTHROMBIN TIME: 27 SECONDS (ref 10–13.8)

## 2017-09-14 PROCEDURE — G0463 HOSPITAL OUTPT CLINIC VISIT: HCPCS

## 2017-09-14 PROCEDURE — 36416 COLLJ CAPILLARY BLOOD SPEC: CPT

## 2017-09-14 PROCEDURE — 99214 OFFICE O/P EST MOD 30 MIN: CPT | Performed by: NURSE PRACTITIONER

## 2017-09-14 PROCEDURE — 93000 ELECTROCARDIOGRAM COMPLETE: CPT | Performed by: NURSE PRACTITIONER

## 2017-09-14 PROCEDURE — 85610 PROTHROMBIN TIME: CPT

## 2017-09-14 RX ORDER — AMIODARONE HYDROCHLORIDE 200 MG/1
200 TABLET ORAL DAILY
Qty: 60 TABLET | Refills: 0 | Status: SHIPPED | OUTPATIENT
Start: 2017-09-14 | End: 2017-12-07 | Stop reason: SDUPTHER

## 2017-09-14 RX ORDER — OMEPRAZOLE 40 MG/1
40 CAPSULE, DELAYED RELEASE ORAL DAILY
COMMUNITY
End: 2019-01-02 | Stop reason: SDUPTHER

## 2017-09-14 RX ORDER — AZATHIOPRINE 50 MG/1
50 TABLET ORAL DAILY
COMMUNITY
End: 2018-06-26

## 2017-09-14 NOTE — PROGRESS NOTES
Encounter Date:09/14/2017    Patient ID: Moni Wallace is a 77 y.o. female who resides in [place], [place].    CC/Reason for visit:  Atrial Fibrillation          Moni Wallace presents for follow-up today after a recent hospitalization at [facility] where she presented with acute heart failure due to high flow output from a unused right arm AV fistula.  During her hospital stay she developed atrial fibrillation for which she does currently have a history of.  She spontaneously converted after the administration of IV amiodarone which was transitioned to by mouth.  At today's visit she reports feeling much better and her breathing is stable.  She followed up with Dr. Castillo at [facility] who performed a AV fistula ligation.  She is continuing to take Bumex 1 mg a day.  Her creatinine has decreased since her hospital stay to 3.5.  She follows Dr. Hodges for her chronic kidney disease for which she received a kidney transplant in 2010.  Her main complaint at today's visit is that of increased fatigue.  Although she feels much better than when she was hospitalized in the beginning of August she has not regained her full strength and does not feel she has returned to her baseline.  She was unaware when she went out of rhythm while hospitalized.  She does not think she's had any episodes of atrial fibrillation but she is unaware of it.  She is on chronic Coumadin therapy that is managed by our anticoagulation clinic.    Review of Systems   Constitution: Positive for chills, decreased appetite and weakness.   HENT: Positive for headaches.    Endocrine: Positive for polyuria.   Musculoskeletal: Positive for falls and muscle weakness.   Genitourinary: Positive for frequency.   Neurological: Positive for loss of balance.   Psychiatric/Behavioral: The patient is nervous/anxious.    All other systems reviewed and are negative.      The patient's past medical, social, family history and ROS reviewed in  "the patient's electronic medical record.    Allergies  Codeine; Nsaids; and Sulfa antibiotics    Outpatient Prescriptions Marked as Taking for the 9/14/17 encounter (Office Visit) with REY Brown   Medication Sig Dispense Refill   • ALPRAZolam (XANAX) 0.5 MG tablet Take 0.5 mg by mouth 2 (Two) Times a Day As Needed for Anxiety.     • amiodarone (PACERONE) 200 MG tablet Take 1 tablet by mouth Daily. 60 tablet 0   • amLODIPine (NORVASC) 5 MG tablet Take 5 mg by mouth Daily.     • azaTHIOprine (IMURAN) 50 MG tablet Take 50 mg by mouth Daily.     • bumetanide (BUMEX) 1 MG tablet Take 1 tablet by mouth Daily As Needed (for weight gain more than 3 lbs). 30 tablet 0   • Cholecalciferol (VITAMIN D) 2000 UNITS tablet Take 2,000 Units by mouth Daily.     • Fluticasone Furoate-Vilanterol (BREO ELLIPTA) 200-25 MCG/INH aerosol powder  Inhale 1 puff Daily.     • LANTUS SOLOSTAR 100 UNIT/ML injection pen Inject 12 Units under the skin Every Night.     • levothyroxine (SYNTHROID, LEVOTHROID) 75 MCG tablet Take 75 mcg by mouth Daily.     • metoprolol tartrate (LOPRESSOR) 100 MG tablet Take 1 tablet by mouth 2 (Two) Times a Day for 90 days. 180 tablet 0   • omeprazole (priLOSEC) 40 MG capsule Take 40 mg by mouth Daily.     • Patiromer Sorbitex Calcium (VELTASSA) 8.4 G pack Take 8.4 g by mouth Daily.     • pravastatin (PRAVACHOL) 40 MG tablet Take 40 mg by mouth daily.     • PROAIR  (90 BASE) MCG/ACT inhaler Inhale 2 puffs Every 6 (Six) Hours As Needed.     • sodium bicarbonate 650 MG tablet Take 1,300 mg by mouth 2 (Two) Times a Day.     • tacrolimus (PROGRAF) 1 MG capsule Take 2 mg by mouth 2 (Two) Times a Day.     • warfarin (COUMADIN) 2 MG tablet Take 6 mg by mouth Daily.     • [DISCONTINUED] amiodarone (PACERONE) 200 MG tablet Take 1 tablet by mouth Every 12 (Twelve) Hours. 60 tablet 0         Blood pressure 163/81, pulse 71, height 63.5\" (161.3 cm), weight 181 lb 3.2 oz (82.2 kg).  Body mass index is 31.59 " kg/(m^2).    Physical Exam   Constitutional: She is oriented to person, place, and time. She appears well-developed and well-nourished.   HENT:   Head: Normocephalic and atraumatic.   Eyes: Pupils are equal, round, and reactive to light. No scleral icterus.   Neck: No JVD present. Carotid bruit is not present. No thyromegaly present.   Cardiovascular: Normal rate, regular rhythm, S1 normal and S2 normal.  Exam reveals no gallop.    No murmur heard.  Pulmonary/Chest: Effort normal and breath sounds normal.   Abdominal: Soft. There is no hepatosplenomegaly. There is no tenderness.   Musculoskeletal: She exhibits edema.   Neurological: She is alert and oriented to person, place, and time.   Skin: Skin is warm and dry. No cyanosis. Nails show no clubbing.   Psychiatric: She has a normal mood and affect. Her behavior is normal.       Data Review:     ECG 12 Lead  Date/Time: 9/14/2017 5:14 PM  Performed by: MIRYAM FISHER  Authorized by: MIRYAM FISHER   Rhythm: sinus rhythm  BPM: 70  Other findings: early repolarization  Clinical impression: normal ECG  Comments: Normal sinus rhythm  Ventricular rate 70 bpm  FL interval 166 MS  QRS duration 96 MS  QT/QTc 446/481 MS                 Problem List Items Addressed This Visit        Cardiovascular and Mediastinum    Paroxysmal atrial fibrillation - Primary    Overview     · CHADS-VASc = 5  · Atrial fibrillation initially diagnosed February 2015; spontaneous conversion to normal sinus rhythm.   · Echocardiogram (06/08/2017): LVEF 50%. Grade II Diastolic dysfunction. RVSP 41.6 mmHg. Mild-to-moderate MR. Mild AS.  Moderate TR  · Noted to be in afib with RVR 08/09/2017 in setting of acute bronchitis.  · On Coumadin and amiodarone         Current Assessment & Plan     · Decrease amiodarone 200 mg daily.  At follow-up visit we will likely discontinue amiodarone  · Continue Coumadin dosage per INR results  · Patient to report anticoagulation clinic for INR level  today  · Continue metoprolol tartrate 100 mg twice a day         Relevant Medications    amiodarone (PACERONE) 200 MG tablet    Essential hypertension    Current Assessment & Plan     · Continue amlodipine 5 mg daily  · If patient systolic remains greater than 145 at follow-up 3 months we'll consider increasing amlodipine 10 mg         Heart valve problem    Overview     · Echo (06/0/2017): Mild-to-moderate mitral valve regurgitation is present. Mild aortic valve stenosis is present. Moderate tricuspid valve regurgitation is present.         Current Assessment & Plan     · Continue metoprolol tartrate 100 mg twice a day  · Surveillance with echocardiogram every 2-3 years.  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   · Patient on hemodialysis Monday, Wednesday, and Friday started July 2015.  · Hemodialysis discontinued 2/2017.         Current Assessment & Plan     · Renal condition is stable  · Follow-up with Dr. Hodges for management             Mrs. Wallace has made great improvement since she was hospitalized.  She has still not returned to her baseline and has increased fatigue.  We will decrease her amiodarone to 200 mg daily and have follow-up visit in 8 months we'll plan on discontinuing her amiodarone due to the long-term effects from toxicity.  Her blood pressure was slightly elevated at today's visit however we will continue to monitor that for now and if it remains elevated at follow-up will consider increasing amlodipine to 10 mg daily.  I instructed patient to follow-up with her primary care provider concerning her increased fatigue.  It may be due to her acute illness and hospitalization of which it may take a while for her to recover.  We will schedule her a follow-up visit for 3 months or sooner if needed.       · Decrease amiodarone 200 mg daily, we'll plan on  discontinuing at follow-up in 3 months  · Follow-up Dr. Hodges for Joe of chronic kidney disease   · If blood pressure greater than 145 at follow-up we will plan on increasing amlodipine 10 mg daily  · Follow-up 3 months or sooner if needed    REY Galeana  9/14/2017

## 2017-09-14 NOTE — ASSESSMENT & PLAN NOTE
· Continue amlodipine 5 mg daily  · If patient systolic remains greater than 145 at follow-up 3 months we'll consider increasing amlodipine 10 mg

## 2017-09-14 NOTE — ASSESSMENT & PLAN NOTE
· Decrease amiodarone 200 mg daily.  At follow-up visit we will likely discontinue amiodarone  · Continue Coumadin dosage per INR results  · Patient to report anticoagulation clinic for INR level today  · Continue metoprolol tartrate 100 mg twice a day

## 2017-09-14 NOTE — ASSESSMENT & PLAN NOTE
· Continue metoprolol tartrate 100 mg twice a day  · Surveillance with echocardiogram every 2-3 years.  Unless symptoms progress or worsen

## 2017-09-14 NOTE — PROGRESS NOTES
Anticoagulation Clinic - Remote Progress Note  REMOTE LAB    Indication: afib  Referring Provider: Heath  Goal INR: 2-3  Current Drug Interactions: amiodarone, levothyroxine  CHADS-VASc: 5 (age, gender, HTN, DM)    Diet: rare  Alcohol: none  Tobacco: none  OTC Pain Medication: APAP PRN    1st clinic visit: 9/14/17    INR History:  Date 8/31 9/7 9/14         Total Weekly Dose 38mg 38mg 10mg 18 mg        INR 2.6 5.7 2.2         Notes amio BID amio BID 2 held doses amio once daily          Warfarin Dosing During Admission:  Date  8/7 8/8 8/9 8/10 8/11 8/12 8/13   INR  2.81 3.28 2.74 2.16 2.26 2.57 2.63   Dose  6 mg  D/C for procedures Hold Hold 4 mg 3 mg  Hold   Home Dose Inpatient described as 6 mg Daily, this is per patient report of 3 x 2 mg tablets daily.    Phone Interview:  Tablet Strength: pt has 2mg tablets  Current Maintenance Dose: 2mg daily  Patient Findings      Positives Missed doses, Change in medications     Negatives Signs/symptoms of thrombosis, Signs/symptoms of bleeding, Laboratory test error suspected, Change in health, Change in alcohol use, Change in activity, Upcoming invasive procedure, Emergency department visit, Upcoming dental procedure, Extra doses, Change in diet/appetite, Hospital admission, Bruising, Other complaints     Comments Since discharge, Mrs. 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 Her amio dose will be cut back to once daily today x3 months.        Patient Contact Info: [phone]   Lab Contact Info: Vandana at [facility] ([phone]); [facility]    Plan:  1. INR is therapeutic today despite pt taking a lower daily warfarin dose than instructed. For now, will have Mrs. Wallace take warfarin 2mg daily except 4mg MonFri. Hesitate to continue 2mg daily as this dose resulted in an INR of 1.0 post-discharge last month, but do not want to increase her dose too significantly given today's therapeutic reading. Will continue to monitor her  closely.  2. Repeat INR in 1 week -- weekly PT/INR to continue with [facility] until warfarin dose re-stabilized. Consider applying for home monitor once HH complete.  3. Verbal and written information were provided today in clinic to Mrs. Wallace and her daughter-in-law (+ called to communicate plan to Vandana with HH). She RBV dosing instructions, expresses understanding, and has no further questions at this time.      Desiree Bull [facility]  9/14/2017  3:57 PM

## 2017-09-21 ENCOUNTER — ANTICOAGULATION VISIT (OUTPATIENT)
Dept: PHARMACY | Facility: HOSPITAL | Age: 77
End: 2017-09-21

## 2017-09-21 DIAGNOSIS — I48.0 PAROXYSMAL ATRIAL FIBRILLATION (HCC): ICD-10-CM

## 2017-09-21 LAB — INR PPP: 1.4

## 2017-09-21 NOTE — PROGRESS NOTES
Anticoagulation Clinic - Remote Progress Note  REMOTE LAB    Indication: afib  Referring Provider: Heath  Goal INR: 2-3  Current Drug Interactions: amiodarone, levothyroxine; omeprazole  CHADS-VASc: 5 (age, gender, HTN, DM)    Diet: rare  Alcohol: none  Tobacco: none  OTC Pain Medication: APAP PRN    1st clinic visit: 9/14/17    INR History:  Date 8/31 9/7 9/14 9/21        Total Weekly Dose 38mg 38mg 10mg 18 mg        INR 2.6 5.7 2.2 1.4        Notes amio BID amio BID 2 held doses amio once daily          Warfarin Dosing During Admission:  Date  8/7 8/8 8/9 8/10 8/11 8/12 8/13   INR  2.81 3.28 2.74 2.16 2.26 2.57 2.63   Dose  6 mg  D/C for procedures Hold Hold 4 mg 3 mg  Hold   Home Dose Inpatient described as 6 mg Daily, this is per patient report of 3 x 2 mg tablets daily.    Phone Interview:  Tablet Strength: pt has 2mg tablets  Current Maintenance Dose: 2mg daily  Patient Findings      Positives Change in medications     Negatives Signs/symptoms of thrombosis, Signs/symptoms of bleeding, Laboratory test error suspected, Change in health, Change in alcohol use, Change in activity, Upcoming invasive procedure, Emergency department visit, Upcoming dental procedure, Missed doses, Extra doses, Change in diet/appetite, Hospital admission, Bruising, Other complaints     Comments Spoke with  nurse, Vandana, while she was at home with the patient. Vandana relayed information in detail to Mrs. Wallace given recent miscommunication where she was instructe to take 4mg daily, but instead, Mrs Wallace took 2mg daily. Mrs. 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 Her amio dose was cut back once daily today x3 months on 9/22.  Patient is starting a new stomach medication to take with omeprazole tomorrow, however, she could not remember the name. We will discuss at appointment next week.      Patient Contact Info: [phone]   Lab Contact Info: Vandana at [facility] ([phone]);  [place]    Plan:  1.  INR is SUBtherapeutic today. Given patient's last INR was 1.0 after 2mg daily post-op, will instruct Vandana ( nurse) and Mrs. Wallace take warfarin 4mg daily except 2mg MonFri. Will continue to monitor her closely. Patient appears to have been stable on 38mg/week previously, however, amiodarone dose has been decreased and patient will likely need to increase her dose.  2. Repeat INR in 1 week -- weekly PT/INR to continue with [facility] until warfarin dose re-stabilized. Consider applying for home monitor once HH complete.  3. Verbal and written information were provided today in clinic to Mrs. Wallace and her daughter-in-law (+ called to communicate plan to Vandana with HH). She RBV dosing instructions, expresses understanding, and has no further questions at this time.      Fatemeh Rodríguez, PharmD  9/21/2017  11:24 AM

## 2017-09-25 ENCOUNTER — TRANSCRIBE ORDERS (OUTPATIENT)
Dept: LAB | Facility: HOSPITAL | Age: 77
End: 2017-09-25

## 2017-09-25 ENCOUNTER — LAB (OUTPATIENT)
Dept: LAB | Facility: HOSPITAL | Age: 77
End: 2017-09-25

## 2017-09-25 DIAGNOSIS — N18.9 CHRONIC KIDNEY DISEASE, UNSPECIFIED: Primary | ICD-10-CM

## 2017-09-25 DIAGNOSIS — N18.9 CHRONIC KIDNEY DISEASE, UNSPECIFIED: ICD-10-CM

## 2017-09-25 DIAGNOSIS — Z79.899 POLYPHARMACY: ICD-10-CM

## 2017-09-25 DIAGNOSIS — Z94.0 KIDNEY REPLACED BY TRANSPLANT: ICD-10-CM

## 2017-09-25 LAB
ALBUMIN SERPL-MCNC: 3.7 G/DL (ref 3.2–4.8)
ANION GAP SERPL CALCULATED.3IONS-SCNC: 8 MMOL/L (ref 3–11)
BACTERIA UR QL AUTO: ABNORMAL /HPF
BILIRUB UR QL STRIP: NEGATIVE
BUN BLD-MCNC: 34 MG/DL (ref 9–23)
BUN/CREAT SERPL: 10.3 (ref 7–25)
CALCIUM SPEC-SCNC: 8.9 MG/DL (ref 8.7–10.4)
CHLORIDE SERPL-SCNC: 109 MMOL/L (ref 99–109)
CLARITY UR: CLEAR
CO2 SERPL-SCNC: 28 MMOL/L (ref 20–31)
COLOR UR: YELLOW
CREAT BLD-MCNC: 3.3 MG/DL (ref 0.6–1.3)
DEPRECATED RDW RBC AUTO: 61 FL (ref 37–54)
ERYTHROCYTE [DISTWIDTH] IN BLOOD BY AUTOMATED COUNT: 18.8 % (ref 11.3–14.5)
GFR SERPL CREATININE-BSD FRML MDRD: 14 ML/MIN/1.73
GLUCOSE BLD-MCNC: 108 MG/DL (ref 70–100)
GLUCOSE UR STRIP-MCNC: NEGATIVE MG/DL
HCT VFR BLD AUTO: 34.3 % (ref 34.5–44)
HGB BLD-MCNC: 10.5 G/DL (ref 11.5–15.5)
HGB UR QL STRIP.AUTO: ABNORMAL
HYALINE CASTS UR QL AUTO: ABNORMAL /LPF
KETONES UR QL STRIP: NEGATIVE
LEUKOCYTE ESTERASE UR QL STRIP.AUTO: NEGATIVE
MAGNESIUM SERPL-MCNC: 1.7 MG/DL (ref 1.3–2.7)
MCH RBC QN AUTO: 27.6 PG (ref 27–31)
MCHC RBC AUTO-ENTMCNC: 30.6 G/DL (ref 32–36)
MCV RBC AUTO: 90 FL (ref 80–99)
NITRITE UR QL STRIP: NEGATIVE
PH UR STRIP.AUTO: 7.5 [PH] (ref 5–8)
PHOSPHATE SERPL-MCNC: 2.4 MG/DL (ref 2.4–5.1)
PLATELET # BLD AUTO: 206 10*3/MM3 (ref 150–450)
PMV BLD AUTO: 10 FL (ref 6–12)
POTASSIUM BLD-SCNC: 3.6 MMOL/L (ref 3.5–5.5)
PROT UR QL STRIP: ABNORMAL
RBC # BLD AUTO: 3.81 10*6/MM3 (ref 3.89–5.14)
RBC # UR: ABNORMAL /HPF
REF LAB TEST METHOD: ABNORMAL
SODIUM BLD-SCNC: 145 MMOL/L (ref 132–146)
SP GR UR STRIP: 1.01 (ref 1–1.03)
SQUAMOUS #/AREA URNS HPF: ABNORMAL /HPF
UROBILINOGEN UR QL STRIP: ABNORMAL
WBC NRBC COR # BLD: 9.09 10*3/MM3 (ref 3.5–10.8)
WBC UR QL AUTO: ABNORMAL /HPF

## 2017-09-25 PROCEDURE — 85027 COMPLETE CBC AUTOMATED: CPT | Performed by: INTERNAL MEDICINE

## 2017-09-25 PROCEDURE — 80069 RENAL FUNCTION PANEL: CPT | Performed by: INTERNAL MEDICINE

## 2017-09-25 PROCEDURE — 83735 ASSAY OF MAGNESIUM: CPT | Performed by: INTERNAL MEDICINE

## 2017-09-25 PROCEDURE — 36415 COLL VENOUS BLD VENIPUNCTURE: CPT

## 2017-09-25 PROCEDURE — 81001 URINALYSIS AUTO W/SCOPE: CPT | Performed by: INTERNAL MEDICINE

## 2017-09-26 LAB — REF LAB TEST RESULTS: NORMAL

## 2017-09-27 ENCOUNTER — TRANSCRIBE ORDERS (OUTPATIENT)
Dept: ADMINISTRATIVE | Facility: HOSPITAL | Age: 77
End: 2017-09-27

## 2017-09-27 DIAGNOSIS — Z12.31 VISIT FOR SCREENING MAMMOGRAM: Primary | ICD-10-CM

## 2017-09-28 ENCOUNTER — ANTICOAGULATION VISIT (OUTPATIENT)
Dept: PHARMACY | Facility: HOSPITAL | Age: 77
End: 2017-09-28

## 2017-09-28 DIAGNOSIS — I48.0 PAROXYSMAL ATRIAL FIBRILLATION (HCC): ICD-10-CM

## 2017-09-28 LAB — INR PPP: 1.8

## 2017-09-28 NOTE — PROGRESS NOTES
Anticoagulation Clinic - Remote Progress Note  REMOTE LAB    Indication: afib  Referring Provider: Heath  Goal INR: 2-3  Current Drug Interactions: amiodarone, levothyroxine; omeprazole  CHADS-VASc: 5 (age, gender, HTN, DM)    Diet: rare  Alcohol: none  Tobacco: none  OTC Pain Medication: APAP PRN    1st clinic visit: 9/14/17    INR History:  Date 8/31 9/7 9/14 9/21 9/28       Total Weekly Dose 38mg 38mg 10mg 18 mg 26mg       INR 2.6 5.7 2.2 1.4 1.8       Notes amio BID amio BID 2 held doses amio once daily amio once; omepr         Warfarin Dosing During Admission:  Date  8/7 8/8 8/9 8/10 8/11 8/12 8/13   INR  2.81 3.28 2.74 2.16 2.26 2.57 2.63   Dose  6 mg  D/C for procedures Hold Hold 4 mg 3 mg  Hold   Home Dose Inpatient described as 6 mg Daily, this is per patient report of 3 x 2 mg tablets daily.    Phone Interview:  Tablet Strength: pt has 2mg tablets  Current Maintenance Dose: 2mg daily      Patient Findings      Negatives Signs/symptoms of thrombosis, Signs/symptoms of bleeding, Laboratory test error suspected, Change in health, Change in alcohol use, Change in activity, Upcoming invasive procedure, Emergency department visit, Upcoming dental procedure, Missed doses, Extra doses, Change in medications, Change in diet/appetite, Hospital admission, Bruising, Other complaints     Comments Spoke with  nurse, Vandana. Her amio dose was cut back once daily today x3 months on 9/22.  Patient has continued omeprazole. Vandana does not report any changes in medications, missed/extra doses of warfarin.       Patient Contact Info: [phone]   Lab Contact Info: Vandana at [facility] ([phone]); [facility]    Plan:  1. INR is slightly SUBtherapeutic today. Given patient's INR has increased to 1.8 in one week with new dose, at this time will continue Mrs. Wallace on warfarin 4mg daily except 2mg Mon. Will continue to monitor her closely. Patient appears to have been stable on 38mg/week previously, however, amiodarone  dose has been decreased and patient will likely need to increase her dose.  2. Repeat INR on 10/2 with [facility] to determine if patient's INR has continued to increase or if patient's dose should be increased to 4mg daily. She will plan to continue wit [facility] until warfarin dose re-stabilized. Consider applying for home monitor once HH complete.  3. Verbal and written information were provided today in clinic to Mrs. Wallace and her daughter-in-law (+ called to communicate plan to Vandana with HH). She RBV dosing instructions, expresses understanding, and has no further questions at this time.      Fatemeh Rodríguez, PharmD  9/28/2017  4:49 PM

## 2017-10-02 ENCOUNTER — ANTICOAGULATION VISIT (OUTPATIENT)
Dept: PHARMACY | Facility: HOSPITAL | Age: 77
End: 2017-10-02

## 2017-10-02 DIAGNOSIS — I48.0 PAROXYSMAL ATRIAL FIBRILLATION (HCC): ICD-10-CM

## 2017-10-02 LAB — INR PPP: 1.6

## 2017-10-02 NOTE — PROGRESS NOTES
Anticoagulation Clinic - Remote Progress Note  REMOTE LAB    Indication: afib  Referring Provider: Heath  Goal INR: 2-3  Current Drug Interactions: amiodarone, levothyroxine; omeprazole  CHADS-VASc: 5 (age, gender, HTN, DM)    Diet: rare  Alcohol: none  Tobacco: none  OTC Pain Medication: APAP PRN    1st clinic visit: 9/14/17    INR History:  Date 8/31 9/7 9/14 9/21 9/28 10/2      Total Weekly Dose 38mg 38mg 10mg 18 mg 26mg 26mg      INR 2.6 5.7 2.2 1.4 1.8 1.6      Notes amio BID amio BID 2 held doses amio once daily amio once; omepr         Warfarin Dosing During Admission:  Date  8/7 8/8 8/9 8/10 8/11 8/12 8/13   INR  2.81 3.28 2.74 2.16 2.26 2.57 2.63   Dose  6 mg  D/C for procedures Hold Hold 4 mg 3 mg  Hold   Home Dose Inpatient described as 6 mg Daily, this is per patient report of 3 x 2 mg tablets daily.    Phone Interview:  Tablet Strength: pt has 2mg tablets  Current Maintenance Dose: 2mg daily    Patient Findings      Negatives Signs/symptoms of thrombosis, Signs/symptoms of bleeding, Laboratory test error suspected, Change in health, Change in alcohol use, Change in activity, Upcoming invasive procedure, Emergency department visit, Upcoming dental procedure, Missed doses, Extra doses, Change in medications, Change in diet/appetite, Hospital admission, Bruising, Other complaints     Comments Vandana ([facility]) states that pt denies any changes in diet/medications. Pt continues on Amiodarone once daily.         Patient Contact Info: [phone]   Lab Contact Info: Vandana at [facility] ([phone]); [facility]    Plan:  1. INR is SUBtherapeutic today at 1.6. Given patient's INR has decreased since last check, will BOOST today with warfarin 6mg and continue warfarin 4mg daily thereafter. Will continue to monitor her closely. Patient appears to have been stable on 38mg/week previously, however, amiodarone dose has been decreased and patient will likely need to increase her dose.  2. Repeat INR on 10/5  with [facility] to determine if patient's INR has stabilized, plan to continue with [facility] until warfarin dose re-stabilized.   3. Verbal information provided over the phone today to Vandana([facility]). She RBV dosing instructions, expresses understanding, and has no further questions at this time.      Benjamin Mayers [facility]  10/2/2017  11:44 AM

## 2017-10-09 ENCOUNTER — ANTICOAGULATION VISIT (OUTPATIENT)
Dept: PHARMACY | Facility: HOSPITAL | Age: 77
End: 2017-10-09

## 2017-10-09 DIAGNOSIS — I48.0 PAROXYSMAL ATRIAL FIBRILLATION (HCC): ICD-10-CM

## 2017-10-09 LAB — INR PPP: 2.3

## 2017-10-09 NOTE — PROGRESS NOTES
Anticoagulation Clinic - Remote Progress Note  REMOTE LAB    Indication: afib  Referring Provider: Heath  Goal INR: 2-3  Current Drug Interactions: amiodarone, levothyroxine; omeprazole  CHADS-VASc: 5 (age, gender, HTN, DM)    Diet: rare  Alcohol: none  Tobacco: none  OTC Pain Medication: APAP PRN    1st clinic visit: 9/14/17    INR History:  Date 8/31 9/7 9/14 9/21 9/28 10/2 10/9     Total Weekly Dose 38mg 38mg 10mg 18 mg 26mg 26mg 30mg 28mg    INR 2.6 5.7 2.2 1.4 1.8 1.6 2.3     Notes amio BID amio BID 2 held doses amio once daily amio once; omepr Amio, omep  Amio, omep; boost       Phone Interview:  Tablet Strength: pt has 2mg tablets  Current Maintenance Dose: 2mg daily  Patient Findings      Negatives Signs/symptoms of thrombosis, Signs/symptoms of bleeding, Laboratory test error suspected, Change in health, Change in alcohol use, Change in activity, Upcoming invasive procedure, Emergency department visit, Upcoming dental procedure, Missed doses, Extra doses, Change in medications, Change in diet/appetite, Hospital admission, Bruising, Other complaints     Comments Spoke with Vandana from [facility]. All patient findings are negative. Patient continues on amiodarone once daily     Patient Contact Info: [phone]   Lab Contact Info: Vandana at [facility] ([phone]); [facility]    Plan:  1. INR is therapeutic today at 2.3. Given patient continues on decreased amiodarone dose and omeprazole will proceed with caution. Instructed patient to continue warfarin 4mg daily. Will continue to monitor her closely. Patient appears to have been stable on 38mg/week previously, however, amiodarone dose has been decreased and patient will likely need to increase her dose.  2. Repeat INR on 10/16 with [facility] to determine if patient's INR has stabilized, plan to continue with [facility] until warfarin dose re-stabilized.   3. Verbal information provided over the phone today to Vandana([facility]). She RBV dosing  "instructions, expresses understanding, and has no further questions at this time.  4. Information submitted to [employer]/ [employer] for home monitor. Awaiting Dr. Hdz. Dilia plans to \"king\" the order.      Fatemeh Rodríguez, PharmD  10/9/2017  11:13 AM    "

## 2017-10-16 ENCOUNTER — ANTICOAGULATION VISIT (OUTPATIENT)
Dept: PHARMACY | Facility: HOSPITAL | Age: 77
End: 2017-10-16

## 2017-10-16 ENCOUNTER — TELEPHONE (OUTPATIENT)
Dept: PHARMACY | Facility: HOSPITAL | Age: 77
End: 2017-10-16

## 2017-10-16 DIAGNOSIS — I48.0 PAROXYSMAL ATRIAL FIBRILLATION (HCC): ICD-10-CM

## 2017-10-16 LAB — INR PPP: 2.1

## 2017-10-16 NOTE — PROGRESS NOTES
"Anticoagulation Clinic - Remote Progress Note  REMOTE LAB    Indication: afib  Referring Provider: Heath  Goal INR: 2-3  Current Drug Interactions: amiodarone, levothyroxine; omeprazole  CHADS-VASc: 5 (age, gender, HTN, DM)    Diet: rare GLV  Alcohol: none  Tobacco: none  OTC Pain Medication: APAP PRN    1st clinic visit: 9/14/17    INR History:  Date 8/31 9/7 9/14 9/21 9/28 10/2 10/9 10/16    Total Weekly Dose 38mg 38mg 10mg 18 mg 26mg 26mg 30mg 28mg    INR 2.6 5.7 2.2 1.4 1.8 1.6 2.3 2.1    Notes amio BID amio BID 2 held doses amio once daily omepr omep  boost  Trintellix     Phone Interview:  Tablet Strength: pt has 2mg tablets  Current Maintenance Dose: 4mg daily  Patient Findings      Positives Change in medications     Negatives Signs/symptoms of thrombosis, Signs/symptoms of bleeding, Laboratory test error suspected, Change in health, Change in alcohol use, Change in activity, Upcoming invasive procedure, Emergency department visit, Upcoming dental procedure, Missed doses, Extra doses, Change in diet/appetite, Hospital admission, Bruising, Other complaints     Comments Mrs. Wallace will be starting Trintellix tonight. Discussed with Vandana that the combination of Trintellix and warfarin may result in an increased risk of bleeding (should not affect PT or INR), so encouraged her to have Mrs. Wallace watch and report any s/sx of excessive bruising or bleeding.     Patient Contact Info: [phone]   Lab Contact Info: Vandana at [facility] ([phone]); [facility]    Plan:  1. INR is therapeutic again today. Instructed Vandana to continue Mrs. Wallace on warfarin 4mg daily.   2. Repeat INR in 1 week.  3. Verbal information provided over the phone today to [facility] ([facility] She RBV dosing instructions, expresses understanding, and has no further questions at this time.  4. Information submitted to [employer]/ [employer] for home monitor. Awaiting Dr. Hdz. Roger plans to \"king\" the order.    Desiree Bull, " [facility]  10/16/2017  1:27 PM

## 2017-10-23 ENCOUNTER — ANTICOAGULATION VISIT (OUTPATIENT)
Dept: PHARMACY | Facility: HOSPITAL | Age: 77
End: 2017-10-23

## 2017-10-23 DIAGNOSIS — I48.0 PAROXYSMAL ATRIAL FIBRILLATION (HCC): ICD-10-CM

## 2017-10-23 LAB — INR PPP: 2.5

## 2017-10-23 NOTE — PROGRESS NOTES
Anticoagulation Clinic - Remote Progress Note  REMOTE LAB    Indication: afib  Referring Provider: Heath  Goal INR: 2-3  Current Drug Interactions: amiodarone, levothyroxine; omeprazole  CHADS-VASc: 5 (age, gender, HTN, DM)    Diet: rare GLV  Alcohol: none  Tobacco: none  OTC Pain Medication: APAP PRN    1st clinic visit: 9/14/17    INR History:  Date 8/31 9/7 9/14 9/21 9/28 10/2 10/9 10/16 10/23   Total Weekly Dose 38mg 38mg 10mg 18 mg 26mg 26mg 30mg 28mg 28mg   INR 2.6 5.7 2.2 1.4 1.8 1.6 2.3 2.1 2.5   Notes amio BID amio BID 2 held doses amio once daily omepr omepr boost  Trintellix     Phone Interview:  Tablet Strength: pt has 2mg tablets  Current Maintenance Dose: 4mg daily  Patient Findings      Negatives Signs/symptoms of thrombosis, Signs/symptoms of bleeding, Laboratory test error suspected, Change in health, Change in alcohol use, Change in activity, Upcoming invasive procedure, Emergency department visit, Upcoming dental procedure, Missed doses, Extra doses, Change in medications, Change in diet/appetite, Hospital admission, Bruising, Other complaints     Patient Contact Info: [phone]   Lab Contact Info: Vandana at [facility] ([phone]); [facility]    Plan:  1. INR is therapeutic again today. Instructed Vandana to continue Mrs. Wallace on warfarin 4mg daily.   2. Repeat INR in 1 week.  3. Verbal [facility]. She RBV dosing instructions, expresses understanding, and has no further questions at this time.    Sawyer Gamez CPhT  10/23/2017  4:39 PM    IDesiree, [facility], have reviewed the note in full and agree with the assessment and plan.  10/24/17  8:46 AM

## 2017-10-25 ENCOUNTER — LAB (OUTPATIENT)
Dept: LAB | Facility: HOSPITAL | Age: 77
End: 2017-10-25

## 2017-10-25 ENCOUNTER — TRANSCRIBE ORDERS (OUTPATIENT)
Dept: LAB | Facility: HOSPITAL | Age: 77
End: 2017-10-25

## 2017-10-25 DIAGNOSIS — Z94.0 KIDNEY REPLACED BY TRANSPLANT: Primary | ICD-10-CM

## 2017-10-25 DIAGNOSIS — Z94.0 KIDNEY REPLACED BY TRANSPLANT: ICD-10-CM

## 2017-10-25 DIAGNOSIS — R82.79 URINE CULTURE POSITIVE: ICD-10-CM

## 2017-10-25 LAB
BACTERIA UR QL AUTO: ABNORMAL /HPF
BILIRUB UR QL STRIP: NEGATIVE
CLARITY UR: ABNORMAL
COLOR UR: YELLOW
GLUCOSE UR STRIP-MCNC: NEGATIVE MG/DL
HGB UR QL STRIP.AUTO: ABNORMAL
HYALINE CASTS UR QL AUTO: ABNORMAL /LPF
KETONES UR QL STRIP: NEGATIVE
LEUKOCYTE ESTERASE UR QL STRIP.AUTO: ABNORMAL
NITRITE UR QL STRIP: POSITIVE
PH UR STRIP.AUTO: 7 [PH] (ref 5–8)
PROT UR QL STRIP: ABNORMAL
RBC # UR: ABNORMAL /HPF
REF LAB TEST METHOD: ABNORMAL
SP GR UR STRIP: 1.01 (ref 1–1.03)
SQUAMOUS #/AREA URNS HPF: ABNORMAL /HPF
UROBILINOGEN UR QL STRIP: ABNORMAL
WBC UR QL AUTO: ABNORMAL /HPF
YEAST URNS QL MICRO: ABNORMAL /HPF

## 2017-10-25 PROCEDURE — 87086 URINE CULTURE/COLONY COUNT: CPT | Performed by: INTERNAL MEDICINE

## 2017-10-25 PROCEDURE — 81001 URINALYSIS AUTO W/SCOPE: CPT | Performed by: INTERNAL MEDICINE

## 2017-10-27 LAB — BACTERIA SPEC AEROBE CULT: NORMAL

## 2017-10-30 ENCOUNTER — LAB (OUTPATIENT)
Dept: LAB | Facility: HOSPITAL | Age: 77
End: 2017-10-30

## 2017-10-30 ENCOUNTER — TRANSCRIBE ORDERS (OUTPATIENT)
Dept: LAB | Facility: HOSPITAL | Age: 77
End: 2017-10-30

## 2017-10-30 DIAGNOSIS — E13.9 MATURITY ONSET DIABETES MELLITUS IN YOUNG (HCC): ICD-10-CM

## 2017-10-30 DIAGNOSIS — N18.4 CHRONIC KIDNEY DISEASE, STAGE IV (SEVERE) (HCC): Primary | ICD-10-CM

## 2017-10-30 DIAGNOSIS — N18.4 CHRONIC KIDNEY DISEASE, STAGE IV (SEVERE) (HCC): ICD-10-CM

## 2017-10-30 LAB
ALBUMIN SERPL-MCNC: 4 G/DL (ref 3.2–4.8)
ANION GAP SERPL CALCULATED.3IONS-SCNC: 8 MMOL/L (ref 3–11)
BACTERIA UR QL AUTO: ABNORMAL /HPF
BILIRUB UR QL STRIP: NEGATIVE
BUN BLD-MCNC: 50 MG/DL (ref 9–23)
BUN/CREAT SERPL: 14.3 (ref 7–25)
CALCIUM SPEC-SCNC: 9.3 MG/DL (ref 8.7–10.4)
CHLORIDE SERPL-SCNC: 108 MMOL/L (ref 99–109)
CLARITY UR: CLEAR
CO2 SERPL-SCNC: 27 MMOL/L (ref 20–31)
COLOR UR: YELLOW
CREAT BLD-MCNC: 3.5 MG/DL (ref 0.6–1.3)
GFR SERPL CREATININE-BSD FRML MDRD: 13 ML/MIN/1.73
GLUCOSE BLD-MCNC: 122 MG/DL (ref 70–100)
GLUCOSE UR STRIP-MCNC: NEGATIVE MG/DL
HGB UR QL STRIP.AUTO: ABNORMAL
HYALINE CASTS UR QL AUTO: ABNORMAL /LPF
KETONES UR QL STRIP: NEGATIVE
LEUKOCYTE ESTERASE UR QL STRIP.AUTO: ABNORMAL
NITRITE UR QL STRIP: POSITIVE
PH UR STRIP.AUTO: 6.5 [PH] (ref 5–8)
PHOSPHATE SERPL-MCNC: 3 MG/DL (ref 2.4–5.1)
POTASSIUM BLD-SCNC: 4.4 MMOL/L (ref 3.5–5.5)
PROT UR QL STRIP: ABNORMAL
RBC # UR: ABNORMAL /HPF
REF LAB TEST METHOD: ABNORMAL
SODIUM BLD-SCNC: 143 MMOL/L (ref 132–146)
SP GR UR STRIP: 1.01 (ref 1–1.03)
SQUAMOUS #/AREA URNS HPF: ABNORMAL /HPF
UROBILINOGEN UR QL STRIP: ABNORMAL
WBC UR QL AUTO: ABNORMAL /HPF

## 2017-10-30 PROCEDURE — 81001 URINALYSIS AUTO W/SCOPE: CPT | Performed by: INTERNAL MEDICINE

## 2017-10-30 PROCEDURE — 80197 ASSAY OF TACROLIMUS: CPT | Performed by: INTERNAL MEDICINE

## 2017-10-30 PROCEDURE — 36415 COLL VENOUS BLD VENIPUNCTURE: CPT | Performed by: INTERNAL MEDICINE

## 2017-10-30 PROCEDURE — 80069 RENAL FUNCTION PANEL: CPT | Performed by: INTERNAL MEDICINE

## 2017-11-01 LAB — TACROLIMUS BLD-MCNC: 2.1 NG/ML (ref 2–20)

## 2017-11-09 ENCOUNTER — ANTICOAGULATION VISIT (OUTPATIENT)
Dept: PHARMACY | Facility: HOSPITAL | Age: 77
End: 2017-11-09

## 2017-11-09 ENCOUNTER — HOSPITAL ENCOUNTER (OUTPATIENT)
Dept: MAMMOGRAPHY | Facility: HOSPITAL | Age: 77
Discharge: HOME | End: 2017-11-09
Attending: INTERNAL MEDICINE | Admitting: INTERNAL MEDICINE

## 2017-11-09 DIAGNOSIS — I48.0 PAROXYSMAL ATRIAL FIBRILLATION (HCC): ICD-10-CM

## 2017-11-09 DIAGNOSIS — Z12.31 VISIT FOR SCREENING MAMMOGRAM: ICD-10-CM

## 2017-11-09 LAB — INR PPP: 2.1

## 2017-11-09 PROCEDURE — G0202 SCR MAMMO BI INCL CAD: HCPCS

## 2017-11-09 PROCEDURE — 77063 BREAST TOMOSYNTHESIS BI: CPT | Performed by: RADIOLOGY

## 2017-11-09 PROCEDURE — G0202 SCR MAMMO BI INCL CAD: HCPCS | Performed by: RADIOLOGY

## 2017-11-09 PROCEDURE — 77063 BREAST TOMOSYNTHESIS BI: CPT

## 2017-11-09 NOTE — PROGRESS NOTES
Anticoagulation Clinic - Remote Progress Note  Home Monitor  Frequency: weekly    Indication: afib  Referring Provider: Heath  Goal INR: 2-3  Current Drug Interactions: amiodarone, levothyroxine; omeprazole  CHADS-VASc: 5 (age, gender, HTN, DM)    Diet: rare GLV  Alcohol: none  Tobacco: none  OTC Pain Medication: APAP PRN    1st clinic visit: 9/14/17    INR History:  Date 8/31 9/7 9/14 9/21 9/28 10/2 10/9 10/16 10/23   Total Weekly Dose 38mg 38mg 10mg 18 mg 26mg 26mg 30mg 28mg 28mg   INR 2.6 5.7 2.2 1.4 1.8 1.6 2.3 2.1 2.5   Notes amio BID amio BID 2 held doses amio once daily omepr omepr boost  Trintellix     Date 11/9           Total Weekly Dose 28mg           INR 2.1           Notes              Phone Interview:  Tablet Strength: pt has 2mg tablets  Current Maintenance Dose: 4mg daily  Patient Findings      Negatives Signs/symptoms of thrombosis, Signs/symptoms of bleeding, Laboratory test error suspected, Change in health, Change in alcohol use, Change in activity, Upcoming invasive procedure, Emergency department visit, Upcoming dental procedure, Missed doses, Extra doses, Change in medications, Change in diet/appetite, Hospital admission, Bruising, Other complaints     Patient Contact Info: [phone]   Lab Contact Info: Shayy    Plan:  1. INR is therapeutic again today. Instructed pt to continue warfarin 4mg daily.   2. Repeat INR in 1 week on 11/16.  3. Verbal information provided to patient over the phone. She RBV dosing instructions, expresses understanding, and has no further questions at this time.    Sawyer Gamez, Valentin  11/9/2017  2:48 PM      Fatemeh HENRY, PharmD, have reviewed the note in full and agree with the assessment and plan.  11/09/17  3:51 PM

## 2017-11-17 ENCOUNTER — ANTICOAGULATION VISIT (OUTPATIENT)
Dept: PHARMACY | Facility: HOSPITAL | Age: 77
End: 2017-11-17

## 2017-11-17 DIAGNOSIS — I48.0 PAROXYSMAL ATRIAL FIBRILLATION (HCC): ICD-10-CM

## 2017-11-17 LAB — INR PPP: 1.9

## 2017-11-17 NOTE — PROGRESS NOTES
"Anticoagulation Clinic - Remote Progress Note  Home Monitor  Frequency: weekly    Indication: afib  Referring Provider: Heath  Goal INR: 2-3  Current Drug Interactions: amiodarone, levothyroxine; omeprazole  CHADS-VASc: 5 (age, gender, HTN, DM)    Diet: rare GLV  Alcohol: none  Tobacco: none  OTC Pain Medication: APAP PRN    1st clinic visit: 9/14/17    INR History:  Date 8/31 9/7 9/14 9/21 9/28 10/2 10/9 10/16 10/23   Total Weekly Dose 38mg 38mg 10mg 18 mg 26mg 26mg 30mg 28mg 28mg   INR 2.6 5.7 2.2 1.4 1.8 1.6 2.3 2.1 2.5   Notes amio BID amio BID 2 held doses amio once daily omepr omepr boost  Trintellix     Date 11/9 11/16 11/22         Total Weekly Dose 28mg 28mg 28mg         INR 2.1 1.9          Notes  Ate less, sick            Phone Interview:  Tablet Strength: pt has 2mg tablets  Current Maintenance Dose: 4mg daily  Patient Findings      Positives Change in health, Change in medications, Change in diet/appetite     Negatives Signs/symptoms of thrombosis, Signs/symptoms of bleeding, Laboratory test error suspected, Change in alcohol use, Change in activity, Upcoming invasive procedure, Emergency department visit, Upcoming dental procedure, Missed doses, Extra doses, Hospital admission, Bruising, Other complaints     Comments Upon phone interview, Mrs. Wallace has been quite sick to the stomach for the past several days, she's been lying in bed for 2-3 days, and she has been eating significantly less (porkchop sandwich + maybe some oatmeal for the day). She had an appointment with her nephrologist,Dr. Brooks and was told that she may go back onto dialysis soon. Next meeting with him is scheduled on 12/6. Given her recent health change, I advised her that if she is feeling worse and not able to get up, she should go to the Emergency Room or Urgent Care.   Of note, she has been taking \"FDgard\" for about a month and is also taking probiotics occasionally. Ingredients contained in \"FDgyard\" include alessandra oil and " l-Menthol.  Per Micromedex, menthol could reduce the anticoagulation effect of warfarin.      Patient Contact Info: [phone]   Lab Contact Info: Shayy    Plan:  1. INR is slightly subtherapeutic today. Given INR has been trending down with this dose and PMH, instructed Mrs. Wallace to take 6mg tonight then resume warfarin 4mg daily.   2. Repeat INR in 1 week on 11/22.  3. Verbal information provided to patient over the phone. She RBV dosing instructions, expresses understanding with feedback, and has no further questions at this time.    Chen Diggs, PharmD Candidate 2018  11/17/2017  9:00 AM      I, Fatemeh Rodríguez, PharmD, have reviewed the note in full and agree with the assessment and plan.  11/17/17  10:01 AM

## 2017-11-22 ENCOUNTER — ANTICOAGULATION VISIT (OUTPATIENT)
Dept: PHARMACY | Facility: HOSPITAL | Age: 77
End: 2017-11-22

## 2017-11-22 DIAGNOSIS — I48.0 PAROXYSMAL ATRIAL FIBRILLATION (HCC): ICD-10-CM

## 2017-11-22 LAB — INR PPP: 2.3

## 2017-11-22 NOTE — PROGRESS NOTES
"Anticoagulation Clinic - Remote Progress Note  Home Monitor- Frequency: weekly    Indication: afib  Referring Provider: Heath  Goal INR: 2-3  Current Drug Interactions: amiodarone, levothyroxine; omeprazole  CHADS-VASc: 5 (age, gender, HTN, DM)    Diet: rare GLV  Alcohol: none  Tobacco: none  OTC Pain Medication: APAP PRN    1st clinic visit: 9/14/17    INR History:  Date 8/31 9/7 9/14 9/21 9/28 10/2 10/9 10/16 10/23   Total Weekly Dose 38mg 38mg 10mg 18 mg 26mg 26mg 30mg 28mg 28mg   INR 2.6 5.7 2.2 1.4 1.8 1.6 2.3 2.1 2.5   Notes amio BID amio BID 2 held doses amio once daily omepr omepr boost  Trintellix     Date 11/9 11/16 11/22         Total Weekly Dose 28mg 28mg 28mg         INR 2.1 1.9 2.3         Notes  Ate less, sick            Phone Interview:  Tablet Strength: pt has 2mg tablets  Current Maintenance Dose: 4mg daily  Patient Findings      Negatives Signs/symptoms of thrombosis, Signs/symptoms of bleeding, Laboratory test error suspected, Change in health, Change in alcohol use, Change in activity, Upcoming invasive procedure, Emergency department visit, Upcoming dental procedure, Missed doses, Extra doses, Change in medications, Change in diet/appetite, Hospital admission, Bruising, Other complaints     Comments Patient is discharged from  today and will continue to use home monitor with daughter. Vandana reports that Mrs. Wallace is still quite sick to the stomach with low appetite. She had an appointment with her nephrologist,Dr. Brooks and was told that she may go back onto dialysis soon. However, has not started at this time. Next meeting with him is scheduled on 12/6.     Of note, she has been taking \"FDgard\" for about a month and is also taking probiotics occasionally. Ingredients contained in \"FDgyard\" include alessandra oil and l-Menthol.  Per Micromedex, menthol could reduce the anticoagulation effect of warfarin.      Addendum: Vandana from [facility] ([phone]) called to report INR of 2.3. Reported " that pt took advised boost of 6mg on 11/17 then continued on with 4mg daily.  Vandana also reported that pt will be discharged from  today and will continue on with home monitor     Patient Contact Info: [phone]   Lab Contact Info: Shayy    Plan:  1. INR is slightly subtherapeutic today. Given recent decrease in health and appetite, instructed Mrs. Wallace to continue warfarin 4mg daily for now.   2. Repeat INR in 1 week on 11/29.   3. Verbal information provided to Vandana over the phone. She RBV dosing instructions, expresses understanding with teach back, and has no further questions at this time.    Chen Diggs, PharmD Candidate 2018  11/22/2017  1:20 PM      IFatemeh, PharmD, have reviewed the note in full and agree with the assessment and plan.  11/22/17  1:20 PM

## 2017-11-29 ENCOUNTER — ANTICOAGULATION VISIT (OUTPATIENT)
Dept: PHARMACY | Facility: HOSPITAL | Age: 77
End: 2017-11-29

## 2017-11-29 DIAGNOSIS — I48.0 PAROXYSMAL ATRIAL FIBRILLATION (HCC): ICD-10-CM

## 2017-11-29 LAB — INR PPP: 2.4

## 2017-12-06 ENCOUNTER — ANTICOAGULATION VISIT (OUTPATIENT)
Dept: PHARMACY | Facility: HOSPITAL | Age: 77
End: 2017-12-06

## 2017-12-06 DIAGNOSIS — I48.0 PAROXYSMAL ATRIAL FIBRILLATION (HCC): ICD-10-CM

## 2017-12-06 LAB — INR PPP: 2.1

## 2017-12-06 NOTE — PROGRESS NOTES
Anticoagulation Clinic - Remote Progress Note  Home Monitor- Frequency: weekly    Indication: afib  Referring Provider: Heath  Goal INR: 2-3  Current Drug Interactions: amiodarone, levothyroxine; omeprazole  CHADS-VASc: 5 (age, gender, HTN, DM)    Diet: rare GLV  Alcohol: none  Tobacco: none  OTC Pain Medication: APAP PRN    1st clinic visit: 9/14/17    INR History:  Date 8/31 9/7 9/14 9/21 9/28 10/2 10/9 10/16 10/23   Total Weekly Dose 38mg 38mg 10mg 18 mg 26mg 26mg 30mg 28mg 28mg   INR 2.6 5.7 2.2 1.4 1.8 1.6 2.3 2.1 2.5   Notes amio BID amio BID 2 held doses amio once daily omepr omepr boost  Trintellix     Date 11/9 11/16 11/22 11/29 12/6       Total Weekly Dose 28mg 28mg 28mg 28mg 28mg       INR 2.1 1.9 2.3 2.4 2.1       Notes  Ate less, sick            Phone Interview:  Tablet Strength: pt has 2mg tablets  Current Maintenance Dose: 4mg daily  Patient Findings      Positives Upcoming invasive procedure     Negatives Signs/symptoms of thrombosis, Signs/symptoms of bleeding, Laboratory test error suspected, Change in health, Change in alcohol use, Change in activity, Emergency department visit, Upcoming dental procedure, Missed doses, Extra doses, Change in medications, Change in diet/appetite, Hospital admission, Bruising, Other complaints     Comments Patient is having catheter for peritoneal dialysis placement procedure on 12/28 under Dr. Cory Munguia ([facility]). Patient said she would need to hold Warfarin 5 days prior. Peritoneal dialysis surgery on 12/28 under Dr. Cory Munguia. I told patient that we would be in contact with Dr. Munguia to ensure that we are all on the same page regarding her Warfarin dosing around the procedure.      Patient Contact Info: [phone]   Lab Contact Info: Shayy    Plan:  1. INR is therapeutic today at 2.4. Instructed Mrs. Wallace to continue warfarin 4mg daily.  2. Repeat INR in 1 week on 12/13  3. Verbal information provided over the phone to patient, who RBV dosing  instructions, expresses understanding with teach back, and has no further questions at this time.    Elza To, Pharmacy Technician   12/6/2017  3:10 PM     Angeles to follow up with Dr. Munguia to confirm warfarin dose hold 5 days prior with resuming warfarin the same night of the procedure. Once this is confirmed, will develop and confirm plan with cardiology.    IFatemeh, PharmD, have reviewed the note in full and agree with the assessment and plan.  12/07/17  8:49 AM

## 2017-12-06 NOTE — PROGRESS NOTES
Anticoagulation Clinic - Remote Progress Note  Home Monitor- Frequency: weekly     Indication: afib  Referring Provider: Heath  Goal INR: 2-3  Current Drug Interactions: amiodarone, levothyroxine; omeprazole  CHADS-VASc: 5 (age, gender, HTN, DM)     Diet: rare GLV  Alcohol: none  Tobacco: none  OTC Pain Medication: APAP PRN     1st clinic visit: 9/14/17     INR History:  Date 8/31 9/7 9/14 9/21 9/28 10/2 10/9 10/16 10/23   Total Weekly Dose 38mg 38mg 10mg 18 mg 26mg 26mg 30mg 28mg 28mg   INR 2.6 5.7 2.2 1.4 1.8 1.6 2.3 2.1 2.5   Notes amio BID amio BID 2 held doses amio once daily omepr omepr boost   Trintellix      Date 11/9 11/16 11/22 11/29 12/6           Total Weekly Dose 28mg 28mg 28mg 28mg 28mg           INR 2.1 1.9 2.3 2.4 2.1           Notes   Ate less, sick                    Phone Interview:  Tablet Strength: pt has 2mg tablets  Current Maintenance Dose: 4mg daily  Patient Findings       Positives Upcoming invasive procedure      Negatives Signs/symptoms of thrombosis, Signs/symptoms of bleeding, Laboratory test error suspected, Change in health, Change in alcohol use, Change in activity, Emergency department visit, Upcoming dental procedure, Missed doses, Extra doses, Change in medications, Change in diet/appetite, Hospital admission, Bruising, Other complaints      Comments Patient is having catheter for peritoneal dialysis placement procedure on 12/28 under Dr. Cory Munguia ([facility]). Patient said she would need to hold hetoneal dialysis surgery on 12/28 under Dr. Cory Munguia. [facility] Warfarin 5 days prior. I told patient that we would be in contact with Dr. Munguia to ensure that we are all on the same page regarding her Warfarin dosing around the procedure.      Patient Contact Info: [phone]   Lab Contact Info: Shayy     Plan:  1. INR is therapeutic today at 2.4. Instructed Mrs. Wallace to continue warfarin 4mg daily.  2. Repeat INR in 1 week on 12/13  3. Verbal information provided over the phone to  patient, who RBV dosing instructions, expresses understanding with teach back, and has no further questions at this time.     Elza To, Pharmacy Technician   12/6/2017  3:10 PM

## 2017-12-07 ENCOUNTER — TELEPHONE (OUTPATIENT)
Dept: PHARMACY | Facility: HOSPITAL | Age: 77
End: 2017-12-07

## 2017-12-07 DIAGNOSIS — I48.0 PAROXYSMAL ATRIAL FIBRILLATION (HCC): ICD-10-CM

## 2017-12-07 RX ORDER — AMIODARONE HYDROCHLORIDE 200 MG/1
200 TABLET ORAL DAILY
Qty: 30 TABLET | Refills: 0 | Status: SHIPPED | OUTPATIENT
Start: 2017-12-07 | End: 2017-12-19 | Stop reason: SDUPTHER

## 2017-12-07 NOTE — TELEPHONE ENCOUNTER
Dr. Joe,    Ms. Wallace is scheduled for a peritoneal dialysis catheter placement on 12/28 with Dr. Croy Castillo. Their office has recommended Ms. Wallace holds Warfarin 5 days prior to the procedure. Given her CHADSVASc: 5 (age, gender, HTN, DM) pt does not require lovenox bridge.     At this time, recommend following the plan below for warfarin dose hold and re-initiation.    12/23: Hold Warfarin dose  12/24: Hold Warfarin dose  12/25: Hold Warfarin dose  12/26: Hold Warfarin dose  12/27: Hold Warfarin dose  12/28: pt will receive a shot of Lovenox or Heparin in recovery per their procedures, and resume warfarin boosted dose of 6mg  12/29: Warfarin boost dose to 6mg  12/30: Warfarin 4mg  12/31: Warfarin 4mg  1/1: Warfarin 4mg  1/2: Retest INR      Please advise if you are agreeable or prefer an alternative plan.     Thank you,    Fatemeh

## 2017-12-14 ENCOUNTER — ANTICOAGULATION VISIT (OUTPATIENT)
Dept: PHARMACY | Facility: HOSPITAL | Age: 77
End: 2017-12-14

## 2017-12-14 DIAGNOSIS — I48.0 PAROXYSMAL ATRIAL FIBRILLATION (HCC): ICD-10-CM

## 2017-12-14 LAB — INR PPP: 1.9

## 2017-12-14 NOTE — PROGRESS NOTES
Anticoagulation Clinic - Remote Progress Note  Home Monitor- Frequency: weekly    Indication: afib  Referring Provider: Heath  Goal INR: 2-3  Current Drug Interactions: amiodarone, levothyroxine; omeprazole  CHADS-VASc: 5 (age, gender, HTN, DM)    Diet: rare GLV  Alcohol: none  Tobacco: none  OTC Pain Medication: APAP PRN    1st clinic visit: 9/14/17    INR History:  Date 8/31 9/7 9/14 9/21 9/28 10/2 10/9 10/16 10/23   Total Weekly Dose 38mg 38mg 10mg 18 mg 26mg 26mg 30mg 28mg 28mg   INR 2.6 5.7 2.2 1.4 1.8 1.6 2.3 2.1 2.5   Notes amio BID amio BID 2 held doses amio once daily omepr omepr boost  Trintellix     Date 11/9 11/16 11/22 11/29 12/6 12/14      Total Weekly Dose 28mg 28mg 28mg 28mg 28mg 28mg 28mg     INR 2.1 1.9 2.3 2.4 2.1 1.9      Notes  Ate less, sick            Phone Interview:  Tablet Strength: pt has 2mg tablets  Current Maintenance Dose: 4mg daily    Patient Findings      Positives Upcoming invasive procedure     Negatives Signs/symptoms of thrombosis, Signs/symptoms of bleeding, Laboratory test error suspected, Change in health, Change in alcohol use, Change in activity, Emergency department visit, Upcoming dental procedure, Missed doses, Extra doses, Change in medications, Change in diet/appetite, Hospital admission, Bruising, Other complaints     Comments Patient is having a catheter for peritoneal dialysis placed on 12/28 by Dr. Cory Munguia ([facility]). Patient said she would need to hold Warfarin 5 days prior. We are waiting approval from Dr. Joe for the perioperative warfarin plan. Pt has an appointment with Dr. Joe 12/19 and we will check her INR before she needs to hold warfarin for this procedure. Will communicate this plan to Ms. Wallace once Dr. Joe approves it.      Patient Contact Info: [phone]   Lab Contact Info: Alere    Plan:  1. INR is slightly subtherapeutic today at 1.9, unclear reason. Instructed Mrs. Wallace to continue warfarin 4mg daily. Pt is having a peritoneal  dialysis catheter placed 12/28 by Dr.Colby Munguia ([facility]). The perioperative warfarin plan has not been communicated to the pt because we are awaiting approval from Dr. Joe.    2. Repeat INR in 1 week on 12/20, pt prefers Wednesdays. Will communicate perioperative warfarin plan to pt at this time.  3. Verbal information provided over the phone to patient, who RBV dosing instructions, expresses understanding with teach back, and has no further questions at this time.    Vandana Mcmullen, PharmD  Pharmacy Resident  12/14/2017  4:35 PM

## 2017-12-19 ENCOUNTER — OFFICE VISIT (OUTPATIENT)
Dept: CARDIOLOGY | Facility: CLINIC | Age: 77
End: 2017-12-19

## 2017-12-19 VITALS
HEIGHT: 63 IN | HEART RATE: 81 BPM | SYSTOLIC BLOOD PRESSURE: 140 MMHG | WEIGHT: 167.4 LBS | BODY MASS INDEX: 29.66 KG/M2 | DIASTOLIC BLOOD PRESSURE: 78 MMHG

## 2017-12-19 DIAGNOSIS — I10 ESSENTIAL HYPERTENSION: ICD-10-CM

## 2017-12-19 DIAGNOSIS — I48.0 PAROXYSMAL ATRIAL FIBRILLATION (HCC): ICD-10-CM

## 2017-12-19 DIAGNOSIS — I50.83 HIGH OUTPUT HF (HEART FAILURE) (HCC): Primary | ICD-10-CM

## 2017-12-19 DIAGNOSIS — I35.0 NONRHEUMATIC AORTIC VALVE STENOSIS: ICD-10-CM

## 2017-12-19 PROBLEM — J20.9 BRONCHOSPASM WITH BRONCHITIS, ACUTE: Status: RESOLVED | Noted: 2017-08-08 | Resolved: 2017-12-19

## 2017-12-19 PROBLEM — I50.9 CONGESTIVE HEART FAILURE (HCC): Status: RESOLVED | Noted: 2017-08-07 | Resolved: 2017-12-19

## 2017-12-19 PROBLEM — E87.20 METABOLIC ACIDOSIS: Status: RESOLVED | Noted: 2017-08-07 | Resolved: 2017-12-19

## 2017-12-19 PROBLEM — I50.32 CHRONIC DIASTOLIC CONGESTIVE HEART FAILURE (HCC): Status: ACTIVE | Noted: 2017-08-07

## 2017-12-19 PROBLEM — I50.32 CHRONIC DIASTOLIC CONGESTIVE HEART FAILURE (HCC): Status: RESOLVED | Noted: 2017-08-07 | Resolved: 2017-12-19

## 2017-12-19 PROBLEM — D72.829 LEUKOCYTOSIS: Status: RESOLVED | Noted: 2017-08-07 | Resolved: 2017-12-19

## 2017-12-19 PROBLEM — J40 BRONCHITIS: Status: RESOLVED | Noted: 2017-08-07 | Resolved: 2017-12-19

## 2017-12-19 PROCEDURE — 93000 ELECTROCARDIOGRAM COMPLETE: CPT | Performed by: INTERNAL MEDICINE

## 2017-12-19 PROCEDURE — 99214 OFFICE O/P EST MOD 30 MIN: CPT | Performed by: INTERNAL MEDICINE

## 2017-12-19 RX ORDER — AMIODARONE HYDROCHLORIDE 200 MG/1
100 TABLET ORAL DAILY
Qty: 45 TABLET | Refills: 1 | Status: SHIPPED | OUTPATIENT
Start: 2017-12-19 | End: 2018-07-11 | Stop reason: SDUPTHER

## 2017-12-19 NOTE — ASSESSMENT & PLAN NOTE
Presently maintaining sinus rhythm on amiodarone.  I would like to reduce her dose to 100 mg daily.  She continues to be on Coumadin and followed in the anticoagulation clinic.  Her Coumadin will be held starting 12/23/17 in preparation for peritoneal dialysis access placement at [facility] with Dr. Akin's.

## 2017-12-19 NOTE — PROGRESS NOTES
Encounter Date:12/19/2017    Patient ID: Moni Wallace is a 77 y.o. female who resides in [place]    CC/Reason for visit:  Atrial Fibrillation            Moni Wallace returns to my office and follow up of her atrial fibrillation and high output heart failure.  The patient underwent ligation of her AV fistula in August due to heart failure symptoms.  Since then, her heart failure symptoms have much improved.  She is awaiting peritoneal dialysis access placement which will be performed after Christmas.  She is anxious to get this done so that she can start her peritoneal dialysis.  She denies any palpitations.  She's had no bleeding issues, TIA, or stroke.  She denies chest pain.  Once again, her breathing is improved significantly since AV fistula ligation.    Review of Systems   Constitution: Positive for chills, decreased appetite, weakness, malaise/fatigue, night sweats and weight gain.   Musculoskeletal: Positive for falls.   Psychiatric/Behavioral: The patient is nervous/anxious.    All other systems reviewed and are negative.      The patient's past medical, social, family history and ROS reviewed in the patient's electronic medical record.    Allergies  Codeine; Nsaids; and Sulfa antibiotics    Outpatient Prescriptions Marked as Taking for the 12/19/17 encounter (Office Visit) with Jeff Joe IV, MD   Medication Sig Dispense Refill   • ALPRAZolam (XANAX) 0.5 MG tablet Take 0.5 mg by mouth 2 (Two) Times a Day As Needed for Anxiety.     • amiodarone (PACERONE) 200 MG tablet Take 0.5 tablets by mouth Daily for 180 days. 45 tablet 1   • amLODIPine (NORVASC) 5 MG tablet Take 10 mg by mouth Daily.     • azaTHIOprine (IMURAN) 50 MG tablet Take 50 mg by mouth Daily.     • bumetanide (BUMEX) 1 MG tablet Take 1 tablet by mouth Daily As Needed (for weight gain more than 3 lbs). 30 tablet 0   • Cholecalciferol (VITAMIN D) 2000 UNITS tablet Take 2,000 Units by mouth Daily.     • Fluticasone  "Furoate-Vilanterol (BREO ELLIPTA) 200-25 MCG/INH aerosol powder  Inhale 1 puff As Needed.     • LANTUS SOLOSTAR 100 UNIT/ML injection pen Inject 12 Units under the skin Every Night.     • levothyroxine (SYNTHROID, LEVOTHROID) 75 MCG tablet Take 75 mcg by mouth Daily.     • omeprazole (priLOSEC) 40 MG capsule Take 40 mg by mouth Daily.     • Patiromer Sorbitex Calcium (VELTASSA) 8.4 G pack Take 8.4 g by mouth. Patient takes every other day.     • pravastatin (PRAVACHOL) 40 MG tablet Take 40 mg by mouth daily.     • PROAIR  (90 BASE) MCG/ACT inhaler Inhale 2 puffs Every 6 (Six) Hours As Needed.     • sodium bicarbonate 650 MG tablet Take 1,300 mg by mouth 2 (Two) Times a Day.     • tacrolimus (PROGRAF) 1 MG capsule Take 2 mg by mouth 2 (Two) Times a Day.     • warfarin (COUMADIN) 2 MG tablet Take 4 mg by mouth Daily. Patient takes two tablets daily     • [DISCONTINUED] amiodarone (PACERONE) 200 MG tablet Take 1 tablet by mouth Daily. 30 tablet 0         Blood pressure 140/78, pulse 81, height 160 cm (63\"), weight 75.9 kg (167 lb 6.4 oz).  Body mass index is 29.65 kg/(m^2).    Physical Exam   Constitutional: She is oriented to person, place, and time. She appears well-developed and well-nourished.   HENT:   Head: Normocephalic and atraumatic.   Eyes: Pupils are equal, round, and reactive to light. No scleral icterus.   Neck: No JVD present. Carotid bruit is not present. No thyromegaly present.   Cardiovascular: Normal rate, regular rhythm, S1 normal and S2 normal.  Exam reveals no gallop.    Murmur heard.   Harsh midsystolic murmur is present with a grade of 2/6  at the upper right sternal border radiating to the neck  Pulmonary/Chest: Effort normal and breath sounds normal.   Abdominal: Soft. There is no hepatosplenomegaly. There is no tenderness.   Neurological: She is alert and oriented to person, place, and time.   Skin: Skin is warm and dry. No cyanosis. Nails show no clubbing.   Psychiatric: She has a " normal mood and affect. Her behavior is normal.       Data Review:     ECG 12 Lead  Date/Time: 12/19/2017 11:05 AM  Performed by: PREETI ISLAS IV  Authorized by: PREETI ISLAS IV   BPM: 81  Other findings: LVH with strain  Q waves: III and aVF  Comments: Q waves in leads 3 and aVF.               Problem List Items Addressed This Visit        Cardiovascular and Mediastinum    High output HF (heart failure) - Primary    Overview     · Echo (6/08/2017): LVEF 50%. Grade II Diastolic dysfunction.  · Echo (8/9/17): LVEF 55%.  Grade 2 diastolic dysfunction.  Mild MR.  Mild aortic stenosis  · Limited echo to evaluate high output CHF (8/10/17): Cardiac output decreased from 11.3 L/min to 8 L/min with compression of fistula.  · Fistula ligated 8/2017         Current Assessment & Plan     The patient has had resolution of her heart failure symptoms after ligation of her AV fistula.         Paroxysmal atrial fibrillation    Overview     · CHADS-VASc = 5  · Atrial fibrillation initially diagnosed February 2015; spontaneous conversion to normal sinus rhythm.   · Echocardiogram (06/08/2017): LVEF 50%. Grade II Diastolic dysfunction. RVSP 41.6 mmHg. Mild-to-moderate MR. Mild AS.  Moderate TR  · Noted to be in afib with RVR 08/09/2017 in setting of acute bronchitis.  · On Coumadin and amiodarone         Current Assessment & Plan     Presently maintaining sinus rhythm on amiodarone.  I would like to reduce her dose to 100 mg daily.  She continues to be on Coumadin and followed in the anticoagulation clinic.  Her Coumadin will be held starting 12/23/17 in preparation for peritoneal dialysis access placement at [facility] with Dr. Arceos.         Relevant Medications    amiodarone (PACERONE) 200 MG tablet    Essential hypertension    Current Assessment & Plan     · Mildly elevated today's visit.  Amlodipine was recently increased to 10 mg daily by Dr. Hodges.         Nonrheumatic aortic valve stenosis     Overview     · Echo (06/0/2017): Mild-to-moderate mitral valve regurgitation is present. Mild aortic valve stenosis is present. Moderate tricuspid valve regurgitation is present.         Current Assessment & Plan     · No heart failure symptoms                    · Reduce amiodarone to 100 mg daily  · Continue anticoagulation.  Coumadin will be held prior to upcoming surgery  · Follow-up with me in 6 months.  At that time, we will consider discontinuing amiodarone.      Jeff Joe IV, MD  12/19/2017

## 2017-12-20 ENCOUNTER — ANTICOAGULATION VISIT (OUTPATIENT)
Dept: PHARMACY | Facility: HOSPITAL | Age: 77
End: 2017-12-20

## 2017-12-20 DIAGNOSIS — I48.0 PAROXYSMAL ATRIAL FIBRILLATION (HCC): ICD-10-CM

## 2017-12-20 LAB — INR PPP: 1.7

## 2017-12-20 NOTE — PROGRESS NOTES
Anticoagulation Clinic - Remote Progress Note  Home Monitor- Frequency: weekly    Indication: afib  Referring Provider: Heath  Goal INR: 2-3  Current Drug Interactions: amiodarone, levothyroxine; omeprazole  CHADS-VASc: 5 (age, gender, HTN, DM)    Diet: rare GLV  Alcohol: none  Tobacco: none  OTC Pain Medication: APAP PRN    1st clinic visit: 9/14/17    INR History:  Date 8/31 9/7 9/14 9/21 9/28 10/2 10/9 10/16 10/23   Total Weekly Dose 38mg 38mg 10mg 18 mg 26mg 26mg 30mg 28mg 28mg   INR 2.6 5.7 2.2 1.4 1.8 1.6 2.3 2.1 2.5   Notes amio BID amio BID 2 held doses amio once daily omepr omepr boost  Trintellix     Date 11/9 11/16 11/22 11/29 12/6 12/14 12/20     Total Weekly Dose 28mg 28mg 28mg 28mg 28mg 28mg 28mg     INR 2.1 1.9 2.3 2.4 2.1 1.9 1.7     Notes  Ate less, sick     amio dec       Phone Interview:  Tablet Strength: pt has 2mg tablets  Current Maintenance Dose: 4mg daily  Patient Findings      Positives Upcoming invasive procedure     Negatives Signs/symptoms of thrombosis, Signs/symptoms of bleeding, Laboratory test error suspected, Change in health, Change in alcohol use, Change in activity, Emergency department visit, Upcoming dental procedure, Missed doses, Extra doses, Change in medications, Change in diet/appetite, Hospital admission, Bruising, Other complaints     Comments Patient stated that Dr. Joe cleared her for 5 days hold prior to procedure. Ms. Wallace is scheduled for a peritoneal dialysis catheter placement on 12/28 with Dr. Cory Castillo. Their office has recommended Ms. Wallace holds Warfarin 5 days prior to the procedure. Given her CHADSVASc: 5 (age, gender, HTN, DM) pt does not require lovenox bridge.     12/23: Hold Warfarin dose   12/24: Hold Warfarin dose   12/25: Hold Warfarin dose   12/26: Hold Warfarin dose   12/27: Hold Warfarin dose   12/28: pt will receive a shot of Lovenox or Heparin in recovery per their procedures, and resume warfarin boosted dose of 6mg   12/29: Warfarin  boost dose to 6mg   12/30: Warfarin 4mg   12/31: Warfarin 4mg   1/1: Warfarin 4mg   1/2: Retest INR    According to Dr. Joe's note from yesterday patient's dose of amiodarone was decreased to 100mg daily. Patient is to follow up in 6mo where discontinuation of amiodarone may consider to be discontinued.     Patient Contact Info: [phone]   Lab Contact Info: [place]    Plan:  1. INR is subtherapeutic today at 1.7, unclear reason. Instructed Mrs. Wallace to BOOST dose tonight to 6mg then continue warfarin 4mg daily. Pt is having a peritoneal dialysis catheter placed 12/28 by Dr[facility]Colby Munguia ([facility]). Discussed perioperative plan with Mrs. Wallace today. She RBV directions. Please see patient findings above.  2. Repeat INR after post op schedule discussed above in patient findings on 1/2.   3. Verbal information provided over the phone to patient, who RBV dosing instructions, expresses understanding with teach back, and has no further questions at this time.    Fatemeh Rodríguez, PharmD  12/20/2017  4:16 PM

## 2018-01-03 ENCOUNTER — TRANSCRIBE ORDERS (OUTPATIENT)
Dept: LAB | Facility: HOSPITAL | Age: 78
End: 2018-01-03

## 2018-01-03 ENCOUNTER — LAB (OUTPATIENT)
Dept: LAB | Facility: HOSPITAL | Age: 78
End: 2018-01-03

## 2018-01-03 ENCOUNTER — ANTICOAGULATION VISIT (OUTPATIENT)
Dept: PHARMACY | Facility: HOSPITAL | Age: 78
End: 2018-01-03

## 2018-01-03 DIAGNOSIS — N18.5 CHRONIC KIDNEY DISEASE, STAGE V (HCC): Primary | ICD-10-CM

## 2018-01-03 DIAGNOSIS — I48.0 PAROXYSMAL ATRIAL FIBRILLATION (HCC): ICD-10-CM

## 2018-01-03 DIAGNOSIS — N18.5 CHRONIC KIDNEY DISEASE, STAGE V (HCC): ICD-10-CM

## 2018-01-03 LAB
HBV SURFACE AB SER RIA-ACNC: NORMAL
HBV SURFACE AG SERPL QL IA: NORMAL
HCV AB SER DONR QL: NORMAL
INR PPP: 1.8

## 2018-01-03 PROCEDURE — 86706 HEP B SURFACE ANTIBODY: CPT

## 2018-01-03 PROCEDURE — 86803 HEPATITIS C AB TEST: CPT

## 2018-01-03 PROCEDURE — 36415 COLL VENOUS BLD VENIPUNCTURE: CPT

## 2018-01-03 PROCEDURE — 87340 HEPATITIS B SURFACE AG IA: CPT

## 2018-01-03 NOTE — PROGRESS NOTES
Anticoagulation Clinic - Remote Progress Note  Home Monitor- Frequency: weekly    Indication: afib  Referring Provider: Heath  Goal INR: 2-3  Current Drug Interactions: amiodarone, levothyroxine; omeprazole  CHADS-VASc: 5 (age, gender, HTN, DM)    Diet: rare GLV  Alcohol: none  Tobacco: none  OTC Pain Medication: APAP PRN    1st clinic visit: 9/14/17    INR History:  Date 8/31 9/7 9/14 9/21 9/28 10/2 10/9 10/16 10/23   Total Weekly Dose 38mg 38mg 10mg 18 mg 26mg 26mg 30mg 28mg 28mg   INR 2.6 5.7 2.2 1.4 1.8 1.6 2.3 2.1 2.5   Notes amio BID amio BID 2 held doses amio once daily omepr omepr boost  Trintellix     Date 11/9 11/16 11/22 11/29 12/6 12/14 12/20 1/3   Total Weekly Dose 28mg 28mg 28mg 28mg 28mg 28mg 28mg 28mg   INR 2.1 1.9 2.3 2.4 2.1 1.9 1.7 1.3   Notes  Ate less, sick     amio dec procedure 12/28 (held and boost), Pen VK     Date           Total Weekly Dose 30mg          INR           Notes             Phone Interview:  Tablet Strength: pt has 2mg tablets  Current Maintenance Dose: 4mg daily  Patient Findings      Positives Missed doses, Change in medications     Negatives Signs/symptoms of thrombosis, Signs/symptoms of bleeding, Laboratory test error suspected, Change in health, Change in alcohol use, Change in activity, Upcoming invasive procedure, Emergency department visit, Upcoming dental procedure, Extra doses, Change in diet/appetite, Hospital admission, Bruising, Other complaints     Comments Patient had peritoneal dialysis catheter placed 12/28. She held warfarin 5 days prior to the procedure and boosted with 6 mg x 2 days, as instructed.     Dr. Wick started the patient on the following antibiotic after surgery:   Penicillin  mg - 1/2 tab PO TID x 10 days (12/28-1/6)      Patient Contact Info: [phone]   Lab Contact Info: [place]    Plan:  1. INR is subtherapeutic today at 1.8, likely due to held doses prior to peritoneal dialysis catheter placement 12/28. She held warfarin 5 days  prior to the procedure and boosted with 6 mg x 2 days, as instructed. Patient was also started on Penicillin  mg PO TID x 10 days (thru 1/6/18). Due to subtherapeutic INR today, instructed Mrs. Wallace to BOOST dose tonight to 6mg then continue warfarin 4mg daily.   2. Repeat INR 1/8/18. Mrs. Wallace is aware that INR will be checked more frequently due to held doses for procedure and since starting antibiotic.   3. Verbal information provided over the phone to patient, who RBV dosing instructions, expresses understanding with teach back, and has no further questions at this time.    Vandana Mcmullen RPH  1/3/2018  3:28 PM

## 2018-01-08 ENCOUNTER — ANTICOAGULATION VISIT (OUTPATIENT)
Dept: PHARMACY | Facility: HOSPITAL | Age: 78
End: 2018-01-08

## 2018-01-08 DIAGNOSIS — I48.0 PAROXYSMAL ATRIAL FIBRILLATION (HCC): ICD-10-CM

## 2018-01-08 LAB — INR PPP: 1.9

## 2018-01-08 NOTE — PROGRESS NOTES
Anticoagulation Clinic - Remote Progress Note  Home Monitor- Frequency: weekly    Indication: afib  Referring Provider: Heath  Goal INR: 2-3  Current Drug Interactions: amiodarone, levothyroxine; omeprazole  CHADS-VASc: 5 (age, gender, HTN, DM)    Diet: rare GLV  Alcohol: none  Tobacco: none  OTC Pain Medication: APAP PRN    1st clinic visit: 9/14/17    INR History:  Date 8/31 9/7 9/14 9/21 9/28 10/2 10/9 10/16 10/23   Total Weekly Dose 38mg 38mg 10mg 18 mg 26mg 26mg 30mg 28mg 28mg   INR 2.6 5.7 2.2 1.4 1.8 1.6 2.3 2.1 2.5   Notes amio BID amio BID 2 held doses amio once daily omepr omepr boost  Trintellix     Date 11/9 11/16 11/22 11/29 12/6 12/14 12/20 1/3   Total Weekly Dose 28mg 28mg 28mg 28mg 28mg 28mg 28mg 28mg   INR 2.1 1.9 2.3 2.4 2.1 1.9 1.7 1.3   Notes  Ate less, sick     amio dec procedure 12/28 (held and boost), Pen VK     Date 1/8          Total Weekly Dose 30mg          INR 1.9          Notes Boost             Phone Interview:  Tablet Strength: pt has 2mg tablets  Current Maintenance Dose: 4mg daily  Patient Findings      Positives Change in health, Change in medications     Negatives Signs/symptoms of thrombosis, Signs/symptoms of bleeding, Laboratory test error suspected, Change in alcohol use, Change in activity, Upcoming invasive procedure, Emergency department visit, Upcoming dental procedure, Missed doses, Extra doses, Change in diet/appetite, Hospital admission, Bruising, Other complaints     Comments Cold, sinus problems 4-5 days, not treating specifically. Pt reports antibiotic therapy for a UTI, Penicillin  mg - 1/2 tab PO TID. Possible this is antibiotic therapy from surgery and patient is confused, but will continue with plan regardless. Pt reports poor appetite, little GLV intake, has been eating mostly meat and bread.    Patient Contact Info: [phone]   Lab Contact Info: Shayy    Plan:  1. INR is subtherapeutic today at 1.9, despite a boosted dose. Patient will be on  Penicillin  mg PO TID x 10 days (thru 1/6/18). Due to subtherapeutic INR and recent decrease in amiodarone, instructed Mrs. Wallace to BOOST dose tonight and Friday to 6mg then continue warfarin 4mg daily. (Changed maintenance dose to 6mg daily except 4mg Monday. May continue MonFri 4mg based on next results.)  2. Repeat INR in one week 1/15.   3. Verbal information provided over the phone to patient, who RBV dosing instructions, expresses understanding with teach back, and has no further questions at this time.      I, Sawyer Jackman, [facility], have reviewed the note in full and agree with the assessment and plan.  01/08/18  4:24 PM

## 2018-01-08 NOTE — PROGRESS NOTES
Anticoagulation Clinic - Remote Progress Note  Home Monitor- Frequency: weekly    Indication: afib  Referring Provider: Heath  Goal INR: 2-3  Current Drug Interactions: amiodarone, levothyroxine; omeprazole  CHADS-VASc: 5 (age, gender, HTN, DM)    Diet: rare GLV  Alcohol: none  Tobacco: none  OTC Pain Medication: APAP PRN    1st clinic visit: 9/14/17    INR History:  Date 8/31 9/7 9/14 9/21 9/28 10/2 10/9 10/16 10/23   Total Weekly Dose 38mg 38mg 10mg 18 mg 26mg 26mg 30mg 28mg 28mg   INR 2.6 5.7 2.2 1.4 1.8 1.6 2.3 2.1 2.5   Notes amio BID amio BID 2 held doses amio once daily omepr omepr boost  Trintellix     Date 11/9 11/16 11/22 11/29 12/6 12/14 12/20 1/3   Total Weekly Dose 28mg 28mg 28mg 28mg 28mg 28mg 28mg 28mg   INR 2.1 1.9 2.3 2.4 2.1 1.9 1.7 1.3   Notes  Ate less, sick     amio dec procedure 12/28 (held and boost), Pen VK     Date 1/8          Total Weekly Dose 30mg          INR 1.9          Notes boost            Phone Interview:  Tablet Strength: pt has 2mg tablets  Current Maintenance Dose: 4mg daily  Patient Findings      Positives Change in health, Change in medications     Negatives Signs/symptoms of thrombosis, Signs/symptoms of bleeding, Laboratory test error suspected, Change in alcohol use, Change in activity, Upcoming invasive procedure, Emergency department visit, Upcoming dental procedure, Missed doses, Extra doses, Change in diet/appetite, Hospital admission, Bruising, Other complaints     Comments Cold, sinus problems 4-5 days, not treating specifically. Pt reports antibiotic therapy for a UTI, Penicillin  mg - 1/2 tab PO TID. Possible this is antibiotic therapy from surgery and patient is confused, but will continue with plan regardless. Pt reports poor appetite, little GLV intake, has been eating mostly meat and bread.    Patient Contact Info: [phone]   Lab Contact Info: Shayy    Plan:  1. INR is subtherapeutic today at 1.9, despite a boosted dose. Patient will be on  Penicillin  mg PO TID x 10 days (thru 1/6/18). Due to subtherapeutic INR and recent decrease in amiodarone, instructed Mrs. Wallace to BOOST dose tonight and Friday to 6mg then continue warfarin 4mg daily. (Changed maintenance dose to 6mg daily except 4mg Monday. May continue MonFri 4mg based on next results.)  2. Repeat INR in one week 1/15.   3. Verbal information provided over the phone to patient, who RBV dosing instructions, expresses understanding with teach back, and has no further questions at this time.    Yaneth Yee, Pharmacy Intern  1/8/2018  3:43 PM     I, Sawyer Jackman, [facility], have reviewed the note in full and agree with the assessment and plan.  01/08/18  4:29 PM

## 2018-01-15 ENCOUNTER — ANTICOAGULATION VISIT (OUTPATIENT)
Dept: PHARMACY | Facility: HOSPITAL | Age: 78
End: 2018-01-15

## 2018-01-15 DIAGNOSIS — I48.0 PAROXYSMAL ATRIAL FIBRILLATION (HCC): ICD-10-CM

## 2018-01-15 LAB — INR PPP: 2.1

## 2018-01-15 NOTE — PROGRESS NOTES
Anticoagulation Clinic - Remote Progress Note  Home Monitor- Frequency: weekly    Indication: afib  Referring Provider: Heath  Goal INR: 2-3  Current Drug Interactions: amiodarone, levothyroxine; omeprazole  CHADS-VASc: 5 (age, gender, HTN, DM)    Diet: rare GLV  Alcohol: none  Tobacco: none  OTC Pain Medication: APAP PRN    1st clinic visit: 9/14/17    INR History:  Date 8/31 9/7 9/14 9/21 9/28 10/2 10/9 10/16 10/23   Total Weekly Dose 38mg 38mg 10mg 18 mg 26mg 26mg 30mg 28mg 28mg   INR 2.6 5.7 2.2 1.4 1.8 1.6 2.3 2.1 2.5   Notes amio BID amio BID 2 held doses amio once daily omepr omepr boost  Trintellix     Date 11/9 11/16 11/22 11/29 12/6 12/14 12/20 1/3   Total Weekly Dose 28mg 28mg 28mg 28mg 28mg 28mg 28mg 28mg   INR 2.1 1.9 2.3 2.4 2.1 1.9 1.7 1.3   Notes  Ate less, sick     amio dec procedure 12/28 (held and boost), Pen VK     Date 1/8 1/15         Total Weekly Dose 30mg          INR 1.9 2.1         Notes boost keflex           Phone Interview:  Tablet Strength: pt has 2mg tablets  Current Maintenance Dose: variable    Patient Findings      Positives Change in medications     Negatives Signs/symptoms of thrombosis, Signs/symptoms of bleeding, Laboratory test error suspected, Change in health, Change in alcohol use, Change in activity, Upcoming invasive procedure, Emergency department visit, Upcoming dental procedure, Missed doses, Extra doses, Change in diet/appetite, Hospital admission, Bruising, Other complaints     Comments Cephalexin was added last Friday for 10 days for URTI.     Patient Contact Info: [phone]   Lab Contact Info: Shayy    Plan:  1. INR is therapeutic at N today at 2.1. Keflex added Friday x 10 days- will monitor. Instructed patient to continue 6mg M, F and 4mg AODs this week.  2. Repeat INR in one week 1/22 to ensure stability following abx initiation and newer regimen  3. Verbal information provided over the phone to patient, who RBV dosing instructions, expresses  understanding with teach back, and has no further questions at this time.    Thank you for this consult,  Cris Solis, PharmD  01/15/18  3:58 PM

## 2018-01-22 ENCOUNTER — ANTICOAGULATION VISIT (OUTPATIENT)
Dept: PHARMACY | Facility: HOSPITAL | Age: 78
End: 2018-01-22

## 2018-01-22 DIAGNOSIS — I48.0 PAROXYSMAL ATRIAL FIBRILLATION (HCC): ICD-10-CM

## 2018-01-22 LAB — INR PPP: 2.2

## 2018-01-22 NOTE — PROGRESS NOTES
Anticoagulation Clinic - Remote Progress Note  Home Monitor- Frequency: weekly    Indication: afib  Referring Provider: Heath  Goal INR: 2-3  Current Drug Interactions: amiodarone, levothyroxine; omeprazole  CHADS-VASc: 5 (age, gender, HTN, DM)    Diet: rare GLV  Alcohol: none  Tobacco: none  OTC Pain Medication: APAP PRN    1st clinic visit: 9/14/17    INR History:  Date 8/31 9/7 9/14 9/21 9/28 10/2 10/9 10/16 10/23   Total Weekly Dose 38mg 38mg 10mg 18 mg 26mg 26mg 30mg 28mg 28mg   INR 2.6 5.7 2.2 1.4 1.8 1.6 2.3 2.1 2.5   Notes amio BID amio BID 2 held doses amio once daily omepr omepr boost  Trintellix     Date 11/9 11/16 11/22 11/29 12/6 12/14 12/20 1/3   Total Weekly Dose 28mg 28mg 28mg 28mg 28mg 28mg 28mg 28mg   INR 2.1 1.9 2.3 2.4 2.1 1.9 1.7 1.3   Notes  Ate less, sick     amio dec procedure 12/28 (held and boost), Pen VK     Date 1/8 1/15         Total Weekly Dose 30mg          INR 1.9 2.1         Notes boost keflex           Phone Interview:  Tablet Strength: pt has 2mg tablets  Current Maintenance Dose: variable        Patient Findings      Negatives Signs/symptoms of thrombosis, Signs/symptoms of bleeding, Laboratory test error suspected, Change in health, Change in alcohol use, Change in activity, Upcoming invasive procedure, Emergency department visit, Upcoming dental procedure, Missed doses, Extra doses, Change in medications, Change in diet/appetite, Hospital admission, Bruising, Other complaints     Comments Just finished the cephalexin today. 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           Patient Contact Info: [phone]   Lab Contact Info: Shayy    Plan:  1. INR is therapeutic at today at 2.2. Keflex finished today. Instructed patient to continue 6mg M, F and 4mg AODs.  2. Repeat INR in two weeks 2/5 to ensure stability following newer regimen----calling shayy Amanda) to see if susan is only working for the daughter. Shayy will call on 2/5 to verify the correct ID  and PIN is available to the sons of ms. Wallace.  3. Verbal information provided over the phone to patient, who RBV dosing instructions, expresses understanding with teach back, and has no further questions at this time.    Thank you for this consult,  Sawyer Jackman, [facility]  01/22/18  4:23 PM

## 2018-01-29 ENCOUNTER — ANTICOAGULATION VISIT (OUTPATIENT)
Dept: PHARMACY | Facility: HOSPITAL | Age: 78
End: 2018-01-29

## 2018-01-29 DIAGNOSIS — I48.0 PAROXYSMAL ATRIAL FIBRILLATION (HCC): ICD-10-CM

## 2018-01-29 LAB — INR PPP: 1.2

## 2018-01-30 ENCOUNTER — TELEPHONE (OUTPATIENT)
Dept: PHARMACY | Facility: HOSPITAL | Age: 78
End: 2018-01-30

## 2018-01-30 NOTE — TELEPHONE ENCOUNTER
Spoke with patient who reports taking last dose of azithromycin this morning. She reports that she is currently scheduled for surgery on Monday 2/5. She continues to hold her warfarin in case of need of emergency surgery per patient. Reports surgery with Dr. Cory Castillo [phone]. His PA is Lucille. Will follow up tomorrow.

## 2018-01-31 NOTE — TELEPHONE ENCOUNTER
Ms. Wallace is scheduled for a peritoneal dialysis catheter laproscopy revision on Monday 2/5 with Dr. Cory Castillo at [place]. Spoke with Lucille who is Dr. Cory Castillo' physician assistant. They typically hold Warfarin for 3-5 days prior to procedure, however, Ms. Wallace self stopped warfarin on 1/26 with use of a Zpak. Patient's [id number]= 5 (Age, Gender, HTN, DM) per cardiology note on 12/19/2017. At this time, we are unable to restart warfarin given need for dose hold prior to procedure. Additionally, she is on dialysis, therefore is not a candidate for Lovenox bridge.     This will be an outpatient procedure, and patient is ok to resume Warfarin same night of procedure per Lucille.    Please feel free to contact us if there are other options to consider in Ms. Wallace's case.    Thank you,    Fatemeh Rodríguez, PharmD  [facility]

## 2018-02-09 LAB — INR PPP: 1

## 2018-02-12 ENCOUNTER — ANTICOAGULATION VISIT (OUTPATIENT)
Dept: PHARMACY | Facility: HOSPITAL | Age: 78
End: 2018-02-12

## 2018-02-12 DIAGNOSIS — I48.0 PAROXYSMAL ATRIAL FIBRILLATION (HCC): ICD-10-CM

## 2018-02-12 NOTE — PROGRESS NOTES
Anticoagulation Clinic - Remote Progress Note  HOME MONITOR  Testing Frequency: weekly    Indication: afib  Referring Provider: Heath  Goal INR: 2-3  Current Drug Interactions: amiodarone, levothyroxine; omeprazole  CHADS-VASc: 5 (age, gender, HTN, DM)    Diet: rare GLV  Alcohol: none  Tobacco: none  OTC Pain Medication: APAP PRN    1st clinic visit: 9/14/17    INR History:  Date 8/31 9/7 9/14 9/21 9/28 10/2 10/9 10/16 10/23   Total Weekly Dose 38mg 38mg 10mg 18 mg 26mg 26mg 30mg 28mg 28mg   INR 2.6 5.7 2.2 1.4 1.8 1.6 2.3 2.1 2.5   Notes amio BID amio BID 2 held doses amio once daily omepr omepr boost  Trintellix     Date 11/9 11/16 11/22 11/29 12/6 12/14 12/20 1/3   Total Weekly Dose 28mg 28mg 28mg 28mg 28mg 28mg 28mg 28mg   INR 2.1 1.9 2.3 2.4 2.1 1.9 1.7 1.3   Notes  Ate less, sick     amio dec procedure 12/28 (held and boost), Pen VK     Date 1/8 1/15 1/29 2/9       Total Weekly Dose 30mg   18 mg/3 days       INR 1.9 2.1 1.2 1.0       Notes boost keflex Zpak, held 3 doses S/p surgery 2/5 and held doses         Phone Interview:  Tablet Strength: pt has 2mg tablets  Current Maintenance Dose: variable  Patient Contact Info: [phone]     Patient Findings      Positives Change in health     Negatives Signs/symptoms of thrombosis, Signs/symptoms of bleeding, Laboratory test error suspected, Change in alcohol use, Change in activity, Upcoming invasive procedure, Emergency department visit, Upcoming dental procedure, Missed doses, Extra doses, Change in medications, Change in diet/appetite, Hospital admission, Bruising, Other complaints     Comments She held x 5 days for procedure on 2/5. Subsequently resumed warfarin on Tuesday at 6 mg. She has been eating her daughter-in-law's good cooking after the surgery as she is staying an son and wife's house as she recovers, she says the procedure was tough but is doing good now. Her diet includes some cauliflower, squash, zucchini, onions and bell peppers, but no  broccoli and no LGV.     Plan:    1. Ms. Wallace's INR is essentially baseline at 1.0 on 2/9. She had PD cath ?(insertion) on 2/5 and held > 5 days prior with potential DDI with azithromycin(self held) and then the 5 days prior to procedure. She is not a candidate for lovenox (dialysis) and has taken 6 mg warfarin daily since Tuesday.  Recommend to take 4 mg today and 6 mg on Tuesday 4 mg on Wednesday and then test on Thursday, Her son doesn't leave work until 4:00 so she will retest after clinic is closed. She agrees to take 2 mg if INR > 3, 4 mg if INR 2-3, 6 mg for INR 1.8 -2, and 8 mg if INR remains < 1.8 We will speak to her on Friday about the Thursday result. Would tentatively recommend warfarin 4 mg daily except 6 mg on Tu,Th,Sa, however with the length of held doses she may require more frequent monitoring and additional time to attain goal range.  2.Recheck on Thursday 2/15.3  3.Verbal information provided over the phone to patient, who RBV dosing instructions, expresses understanding with teach back, and has no further questions at this time     Sawyer Jackman [facility]  2/12/2018  9:33 AM

## 2018-02-16 ENCOUNTER — ANTICOAGULATION VISIT (OUTPATIENT)
Dept: PHARMACY | Facility: HOSPITAL | Age: 78
End: 2018-02-16

## 2018-02-16 DIAGNOSIS — I48.0 PAROXYSMAL ATRIAL FIBRILLATION (HCC): ICD-10-CM

## 2018-02-16 LAB — INR PPP: 1.5

## 2018-02-16 NOTE — PROGRESS NOTES
Anticoagulation Clinic - Remote Progress Note  HOME MONITOR  Testing Frequency: weekly    Indication: afib  Referring Provider: Heath  Goal INR: 2-3  Current Drug Interactions: amiodarone, levothyroxine; omeprazole  CHADS-VASc: 5 (age, gender, HTN, DM)    Diet: rare GLV  Alcohol: none  Tobacco: none  OTC Pain Medication: APAP PRN    1st clinic visit: 9/14/17    INR History:  Date 8/31 9/7 9/14 9/21 9/28 10/2 10/9 10/16 10/23   Total Weekly Dose 38mg 38mg 10mg 18 mg 26mg 26mg 30mg 28mg 28mg   INR 2.6 5.7 2.2 1.4 1.8 1.6 2.3 2.1 2.5   Notes amio BID amio BID 2 held doses amio once daily omepr omepr boost  Trintellix     Date 11/9 11/16 11/22 11/29 12/6 12/14 12/20 1/3   Total Weekly Dose 28mg 28mg 28mg 28mg 28mg 28mg 28mg 28mg   INR 2.1 1.9 2.3 2.4 2.1 1.9 1.7 1.3   Notes  Ate less, sick     amio dec procedure 12/28 (held and boost), Pen VK     Date 1/8 1/15 1/29 2/9 2/16      Total Weekly Dose 30mg 32mg  18 mg/3 days 38mg 36mg     INR 1.9 2.1 1.2 1.0 1.5      Notes Boost Keflex Zpak, held 3 doses S/p surgery 2/5 and held doses         Phone Interview:  Tablet Strength: pt has 2mg tablets  Current Maintenance Dose: variable  Patient Contact Info: [phone]- Home with Son Yadiel; Ms. Wallace's cell phone number- [phone] *Preferred while on dialysis x 3 weeks  Patient Findings      Positives Missed doses, Change in diet/appetite     Negatives Signs/symptoms of thrombosis, Signs/symptoms of bleeding, Laboratory test error suspected, Change in health, Change in alcohol use, Change in activity, Upcoming invasive procedure, Emergency department visit, Upcoming dental procedure, Extra doses, Change in medications, Hospital admission, Bruising, Other complaints     Comments Ms. Wallace took 6mg last night instead of 8mg as directed with her INR was still under 1.8 per instructions at last telephone encounter. She reports that she will be at her other son and daughter in laws home for the next 3 weeks. She reports that  does not eat GLV, however eats, cauliflower, squash, zucchini, onions and bell peppers.     Plan:  1. Ms. Wallace's INR is still SUBtherapeutic today. She did not take boosted dose of 8mg last night as directed at last telephone encounter. She, instead, took 6mg last night. At this time, instructed Ms. Wallace to increase maintenance dose to 4mg daily except 6mg MWF and BOOST Saturday's dose to 6mg. Will continue to dose find as patient has not been stable following Amio Decrease given surgeries and abx use.  2. Recheck on Wednesday to ensure back WNL.   3.Verbal information provided over the phone to patient, who RBV dosing instructions, expresses understanding with teach back, and has no further questions at this time     Fatemeh Rodríguez, PharmD  2/16/2018  8:06 AM

## 2018-02-21 LAB — INR PPP: 1.8

## 2018-02-22 ENCOUNTER — ANTICOAGULATION VISIT (OUTPATIENT)
Dept: PHARMACY | Facility: HOSPITAL | Age: 78
End: 2018-02-22

## 2018-02-22 DIAGNOSIS — I48.0 PAROXYSMAL ATRIAL FIBRILLATION (HCC): ICD-10-CM

## 2018-02-22 NOTE — PROGRESS NOTES
"Anticoagulation Clinic - Remote Progress Note  HOME MONITOR  Testing Frequency: weekly    Indication: afib  Referring Provider: Heath  Goal INR: 2-3  Current Drug Interactions: amiodarone, levothyroxine; omeprazole  CHADS-VASc: 5 (age, gender, HTN, DM)    Diet: rare GLV  Alcohol: none  Tobacco: none  OTC Pain Medication: APAP PRN    1st clinic visit: 9/14/17    INR History:  Date 8/31 9/7 9/14 9/21 9/28 10/2 10/9 10/16 10/23   Total Weekly Dose 38mg 38mg 10mg 18 mg 26mg 26mg 30mg 28mg 28mg   INR 2.6 5.7 2.2 1.4 1.8 1.6 2.3 2.1 2.5   Notes amio BID amio BID 2 held doses amio once daily omepr omepr boost  Trintellix     Date 11/9 11/16 11/22 11/29 12/6 12/14 12/20 1/3   Total Weekly Dose 28mg 28mg 28mg 28mg 28mg 28mg 28mg 28mg   INR 2.1 1.9 2.3 2.4 2.1 1.9 1.7 1.3   Notes  Ate less, sick     amio dec procedure 12/28 (held and boost), Pen VK     Date 1/8 1/15 1/29 2/9 2/16 2/21     Total Weekly Dose 30mg 32mg  18 mg/3 days 38mg 36mg 36mg    INR 1.9 2.1 1.2 1.0 1.5 1.8     Notes Boost Keflex Zpak, held 3 doses S/p surgery 2/5 and held doses   keflex      Phone Interview:  Tablet Strength: pt has 2mg tablets  Current Maintenance Dose: variable  Patient Contact Info: [phone]- Home with Son Yadiel; Ms. Wallace's cell phone number- [phone] *Preferred while on dialysis x 3 weeks  Patient Findings      Positives Change in medications     Negatives Signs/symptoms of thrombosis, Signs/symptoms of bleeding, Laboratory test error suspected, Change in health, Change in alcohol use, Change in activity, Upcoming invasive procedure, Emergency department visit, Upcoming dental procedure, Missed doses, Extra doses, Change in diet/appetite, Hospital admission, Bruising, Other complaints     Comments Mrs. Wallace could not report what exact dose of warfarin she has taken the past week but said that she took either \"2 or 3 pills a day\" as instructed by the anticoag clinic. She was also started on Cephalexin 500 mg BID x 7days " yesterday due to possible site infection from her surgery. She continues to stay at her other son and daughter-in-law's home as she continues to recover and receives dialysis.     Plan:  1. Ms. Wallace's INR is still SUBtherapeutic today at 1.8. Instructed Ms. Wallace to Boost tonight's dose to 6mg and continue maintenance dose of Warfarin 4mg daily except 6mg MWF.  2. Recheck on Thursday 3/1 to ensure back WNL.   3. Verbal information provided over the phone to patient, who RBV dosing instructions, expresses understanding with teach back, and has no further questions at this time     Chuy Quezada, Pharmacy Intern  2/22/2018  8:19 AM    I, Sawyer Jackman, [facility], have reviewed the note in full and agree with the assessment and plan.  02/22/18  9:48 AM

## 2018-03-01 ENCOUNTER — ANTICOAGULATION VISIT (OUTPATIENT)
Dept: PHARMACY | Facility: HOSPITAL | Age: 78
End: 2018-03-01

## 2018-03-01 DIAGNOSIS — I48.0 PAROXYSMAL ATRIAL FIBRILLATION (HCC): ICD-10-CM

## 2018-03-01 LAB — INR PPP: 2.1

## 2018-03-01 NOTE — PROGRESS NOTES
Anticoagulation Clinic - Remote Progress Note  HOME MONITOR  Testing Frequency: weekly    Indication: afib  Referring Provider: Heath  Goal INR: 2-3  Current Drug Interactions: amiodarone, levothyroxine; omeprazole  CHADS-VASc: 5 (age, gender, HTN, DM)    Diet: rare GLV  Alcohol: none  Tobacco: none  OTC Pain Medication: APAP PRN    1st clinic visit: 9/14/17    INR History:  Date 8/31 9/7 9/14 9/21 9/28 10/2 10/9 10/16 10/23   Total Weekly Dose 38mg 38mg 10mg 18 mg 26mg 26mg 30mg 28mg 28mg   INR 2.6 5.7 2.2 1.4 1.8 1.6 2.3 2.1 2.5   Notes amio BID amio BID 2 held doses amio once daily omepr omepr boost  Trintellix     Date 11/9 11/16 11/22 11/29 12/6 12/14 12/20 1/3   Total Weekly Dose 28mg 28mg 28mg 28mg 28mg 28mg 28mg 28mg   INR 2.1 1.9 2.3 2.4 2.1 1.9 1.7 1.3   Notes  Ate less, sick     amio dec procedure 12/28 (held and boost), Pen VK     Date 1/8 1/15 1/29 2/9 2/16 2/21 3/1    Total Weekly Dose 30mg 32mg  18 mg/3 days 38mg 36mg 36mg 38 mg   INR 1.9 2.1 1.2 1.0 1.5 1.8 2.1    Notes Boost Keflex Zpak, held 3 doses S/p surgery 2/5 and held doses   keflex finish 2/28 boost     Phone Interview:  Tablet Strength: pt has 2mg tablets  Current Maintenance Dose: variable  Patient Contact Info: [phone]- Home with Son Yadiel; Ms. Wallace's cell phone number- [phone] *Preferred while on dialysis x 3 weeks  Patient Findings      Negatives Signs/symptoms of thrombosis, Signs/symptoms of bleeding, Laboratory test error suspected, Change in health, Change in alcohol use, Change in activity, Upcoming invasive procedure, Emergency department visit, Upcoming dental procedure, Missed doses, Extra doses, Change in medications, Change in diet/appetite, Hospital admission, Bruising, Other complaints     Comments Patient reports that she finished her Keflex yesterday morning. She also reports that she got her catheter replaced this past Tuesday 2/27, and that she was supposed to hold her Warfarin but did not. She had her INR  checked before the procedure and claims that it was around 1.7.     Plan:  1. Ms. Wallace's INR is therapeutic today, at the low end of goal following boost dose last week. Given recent trend, however, instructed Ms. Wallace to boost her dose again today (6mg) and increase her maintenance dose to warfarin 6mg daily except 4mg SunTuesThur.  2. Recheck on Thursday 3/8 to ensure back WNL.   3. Verbal information provided over the phone to patient, who RBV dosing instructions, expresses understanding with teach back, and has no further questions at this time     Chuy Quezada, Pharmacy Intern  3/1/2018  4:25 PM    IDesiree, [facility], have reviewed the note in full and agree with the assessment and plan.  03/01/18  5:11 PM

## 2018-03-09 ENCOUNTER — ANTICOAGULATION VISIT (OUTPATIENT)
Dept: PHARMACY | Facility: HOSPITAL | Age: 78
End: 2018-03-09

## 2018-03-09 DIAGNOSIS — I48.0 PAROXYSMAL ATRIAL FIBRILLATION (HCC): ICD-10-CM

## 2018-03-09 LAB — INR PPP: 1.9

## 2018-03-09 NOTE — PROGRESS NOTES
Anticoagulation Clinic - Remote Progress Note  HOME MONITOR  Testing Frequency: weekly    Indication: afib  Referring Provider: Heath  Goal INR: 2-3  Current Drug Interactions: amiodarone, levothyroxine; omeprazole  CHADS-VASc: 5 (age, gender, HTN, DM)    Diet: rare GLV  Alcohol: none  Tobacco: none   OTC Pain Medication: APAP PRN    1st clinic visit: 9/14/17    INR History:  Date 8/31 9/7 9/14 9/21 9/28 10/2 10/9 10/16 10/23   Total Weekly Dose 38mg 38mg 10mg 18 mg 26mg 26mg 30mg 28mg 28mg   INR 2.6 5.7 2.2 1.4 1.8 1.6 2.3 2.1 2.5   Notes amio BID amio BID 2 held doses amio once daily omepr omepr boost  Trintellix     Date 11/9 11/16 11/22 11/29 12/6 12/14 12/20 1/3   Total Weekly Dose 28mg 28mg 28mg 28mg 28mg 28mg 28mg 28mg   INR 2.1 1.9 2.3 2.4 2.1 1.9 1.7 1.3   Notes  Ate less, sick     amio dec procedure 12/28 (held and boost), Pen VK     Date 1/8 1/15 1/29 2/9 2/16 2/21 3/1 3/9   Total Weekly Dose 30mg 32mg  18 mg/3 days 38mg 36mg 36mg 38 mg   INR 1.9 2.1 1.2 1.0 1.5 1.8 2.1 1.9   Notes Boost Keflex Zpak, held 3 doses S/p surgery 2/5 and held doses   keflex finish 2/28 Boost; Keflex init     Phone Interview:  Tablet Strength: pt has 2mg tablets  Current Maintenance Dose: variable  Patient Contact Info: [phone]- Home with Son Yadiel *preferred*; Ms. Wallace's cell phone number- [phone]       Plan:  1. Ms. Lawlers INR is slightly SUBtherapeutic today, at the low end of goal following boost dose last week. She has resumed use of Keflex however, was unable to determine when she started it. She will complete on 3/12. Instructed Ms. Wallace to increase dose this week to 6mg daily until recheck PT/INR on 3/13, thereafter, will plan to target dose of warfarin 6mg daily except 4mg MonThur.  She is starting peritoneal dialysis on Monday per patient. Should not interfere with INR results, however, will continue to monitor closely.  2. Recheck on Tuesday 3/13 prior to 330.   3. Verbal information provided over the  phone to patient, who RBV dosing instructions, expresses understanding with teach back, and has no further questions at this time     Fatemeh Rodríguez, PharmD  3/9/2018  8:54 AM

## 2018-03-13 LAB — INR PPP: 2.7

## 2018-03-14 ENCOUNTER — ANTICOAGULATION VISIT (OUTPATIENT)
Dept: PHARMACY | Facility: HOSPITAL | Age: 78
End: 2018-03-14

## 2018-03-14 DIAGNOSIS — I48.0 PAROXYSMAL ATRIAL FIBRILLATION (HCC): ICD-10-CM

## 2018-03-14 RX ORDER — SEVELAMER CARBONATE 800 MG/1
1600 TABLET, FILM COATED ORAL 3 TIMES DAILY
COMMUNITY

## 2018-03-14 NOTE — PROGRESS NOTES
Anticoagulation Clinic - Remote Progress Note  HOME MONITOR  Testing Frequency: weekly    Indication: afib  Referring Provider: Heath  Goal INR: 2-3  Current Drug Interactions: amiodarone, levothyroxine; omeprazole, azaTHIOprine  CHADS-VASc: 5 (age, gender, HTN, DM)    Diet: rare GLV  Alcohol: none  Tobacco: none   OTC Pain Medication: APAP PRN    1st clinic visit: 9/14/17    INR History:  Date 8/31 9/7 9/14 9/21 9/28 10/2 10/9 10/16 10/23   Total Weekly Dose 38mg 38mg 10mg 18 mg 26mg 26mg 30mg 28mg 28mg   INR 2.6 5.7 2.2 1.4 1.8 1.6 2.3 2.1 2.5   Notes amio BID amio BID 2 held doses amio once daily omepr omepr boost  Trintellix     Date 11/9 11/16 11/22 11/29 12/6 12/14 12/20 1/3   Total Weekly Dose 28mg 28mg 28mg 28mg 28mg 28mg 28mg 28mg   INR 2.1 1.9 2.3 2.4 2.1 1.9 1.7 1.3   Notes  Ate less, sick     amio dec procedure 12/28 (held and boost), Pen VK     Date 1/8 1/15 1/29 2/9 2/16 2/21 3/1 3/9 3/13    Total Weekly Dose 30mg 32mg  18 mg/3 days 38mg 36mg 36mg 38 mg 28mg/5days 38mg   INR 1.9 2.1 1.2 1.0 1.5 1.8 2.1 1.9 2.7    Notes Boost Keflex Zpak, held 3 doses S/p surgery 2/5 and held doses   keflex finish 2/28 Boost; Keflex init       Phone Interview:  Tablet Strength: pt has 2mg tablets  Current Maintenance Dose: variable  Patient Contact Info: [phone]- Home with Son Yadiel *preferred*; Ms. Wallace's cell phone number- [phone]     Patient Findings:   Positives Change in activity, Change in medications   Negatives Signs/symptoms of thrombosis, Signs/symptoms of bleeding, Laboratory test error suspected, Change in health, Change in alcohol use, Upcoming invasive procedure, Emergency department visit, Upcoming dental procedure, Missed doses, Extra doses, Change in diet/appetite, Hospital admission, Bruising, Other complaints   Comments Patient confirmed that she began peritoneal dialysis on Monday, 3/12. She also reports that she was started on Renvela po TID, and calcium acetate. Lastly, she confirmed  that she completed round of Keflex on Monday, 3/12. Medication changes now reflected in patient's medication list. Per [facility], no DDIs present.      Plan:  1. Ms. Wallace's INR was back WNL on 3/13 at 2.7. Instructed patient to return to maintenance dose of warfarin 6mg daily except 4mg MonThur.    2. Repeat INR in 1 week. (3/20)  3. Verbal information provided over the phone to patient, who RBV dosing instructions, expresses understanding with teach back, and has no further questions at this time     Elza To, Pharmacy Technician  3/14/2018  10:00 AM     I, Radha Michaud, PharmD , have reviewed the note in full and agree with the assessment and plan.  03/14/18  12:31 PM

## 2018-03-20 ENCOUNTER — ANTICOAGULATION VISIT (OUTPATIENT)
Dept: PHARMACY | Facility: HOSPITAL | Age: 78
End: 2018-03-20

## 2018-03-20 DIAGNOSIS — I48.0 PAROXYSMAL ATRIAL FIBRILLATION (HCC): ICD-10-CM

## 2018-03-20 LAB — INR PPP: 2.8

## 2018-03-20 NOTE — PROGRESS NOTES
Anticoagulation Clinic - Remote Progress Note  HOME MONITOR  Testing Frequency: weekly    Indication: afib  Referring Provider: Heath  Goal INR: 2-3  Current Drug Interactions: amiodarone, levothyroxine; omeprazole, azaTHIOprine  CHADS-VASc: 5 (age, gender, HTN, DM)    Diet: rare GLV  Alcohol: none  Tobacco: none   OTC Pain Medication: APAP PRN    1st clinic visit: 9/14/17    INR History:  Date 8/31 9/7 9/14 9/21 9/28 10/2 10/9 10/16 10/23   Total Weekly Dose 38mg 38mg 10mg 18 mg 26mg 26mg 30mg 28mg 28mg   INR 2.6 5.7 2.2 1.4 1.8 1.6 2.3 2.1 2.5   Notes amio BID amio BID 2 held doses amio once daily omepr omepr boost  Trintellix     Date 11/9 11/16 11/22 11/29 12/6 12/14 12/20 1/3   Total Weekly Dose 28mg 28mg 28mg 28mg 28mg 28mg 28mg 28mg   INR 2.1 1.9 2.3 2.4 2.1 1.9 1.7 1.3   Notes  Ate less, sick     amio dec procedure 12/28 (held and boost), Pen VK     Date 1/8 1/15 1/29 2/9 2/16 2/21 3/1 3/9 3/13 3/20   Total Weekly Dose 30mg 32mg  18 mg/3 days 38mg 36mg 36mg 38 mg 28mg/5days 38mg   INR 1.9 2.1 1.2 1.0 1.5 1.8 2.1 1.9 2.7 2.8   Notes Boost Keflex Zpak, held 3 doses S/p surgery 2/5 and held doses   Keflex finish 2/28 Boost; Keflex init       Phone Interview:  Tablet Strength: pt has 2mg tablets  Current Maintenance Dose: variable  Patient Contact Info: [phone]- Home with Son Yadiel *preferred*; Ms. Wallace's cell phone number- [phone]   Patient Findings   Positives Change in medications   Negatives Signs/symptoms of thrombosis, Signs/symptoms of bleeding, Laboratory test error suspected, Change in health, Change in alcohol use, Change in activity, Upcoming invasive procedure, Emergency department visit, Upcoming dental procedure, Missed doses, Extra doses, Change in diet/appetite, Hospital admission, Bruising, Other complaints   Comments Mrs. Wallace is now on Renvela, off [place].      Plan:  1. INR is therapeutic again today, so instructed Mrs. Wallace to continue 38mg weekly dose: warfarin 6mg daily  except 4mg TuesSat.  2. Repeat INR in 1 week.  3. Verbal information provided over the phone to patient, who RBV dosing instructions, expresses understanding with teach back, and has no further questions at this time     Ann Cowden Mayer, PharmD  3/20/2018  3:25 PM

## 2018-03-27 ENCOUNTER — ANTICOAGULATION VISIT (OUTPATIENT)
Dept: PHARMACY | Facility: HOSPITAL | Age: 78
End: 2018-03-27

## 2018-03-27 DIAGNOSIS — I48.0 PAROXYSMAL ATRIAL FIBRILLATION (HCC): ICD-10-CM

## 2018-03-27 LAB — INR PPP: 2.1

## 2018-03-27 RX ORDER — CALCITRIOL 0.25 UG/1
0.25 CAPSULE, LIQUID FILLED ORAL EVERY OTHER DAY
COMMUNITY
Start: 2018-03-27 | End: 2021-01-01

## 2018-03-27 NOTE — PROGRESS NOTES
Anticoagulation Clinic - Remote Progress Note  HOME MONITOR  Testing Frequency: weekly    Indication: afib  Referring Provider: Heath  Goal INR: 2-3  Current Drug Interactions: amiodarone, levothyroxine; omeprazole, azaTHIOprine  CHADS-VASc: 5 (age, gender, HTN, DM)    Diet: rare GLV  Alcohol: none  Tobacco: none   OTC Pain Medication: APAP PRN    1st clinic visit: 9/14/17    INR History:  Date 8/31 9/7 9/14 9/21 9/28 10/2 10/9 10/16 10/23   Total Weekly Dose 38mg 38mg 10mg 18 mg 26mg 26mg 30mg 28mg 28mg   INR 2.6 5.7 2.2 1.4 1.8 1.6 2.3 2.1 2.5   Notes amio BID amio BID 2 held doses amio once daily omepr omepr boost  Trintellix     Date 11/9 11/16 11/22 11/29 12/6 12/14 12/20 1/3   Total Weekly Dose 28mg 28mg 28mg 28mg 28mg 28mg 28mg 28mg   INR 2.1 1.9 2.3 2.4 2.1 1.9 1.7 1.3   Notes  Ate less, sick     amio dec procedure 12/28 (held and boost), Pen VK     Date 1/8 1/15 1/29 2/9 2/16 2/21 3/1 3/9   Total Weekly Dose 30mg 32mg  18 mg/3 days 38mg 36mg 36mg 38 mg   INR 1.9 2.1 1.2 1.0 1.5 1.8 2.1 1.9   Notes Boost Keflex Zpak, held 3 doses S/p surgery 2/5 and held doses   Keflex finish 2/28 Boost; Keflex init     Date 3/13 3/20 3/27        Total Weekly Dose 28mg/5 days 38mg 38mg        INR 2.7 2.8 2.1        Notes             Phone Interview:  Tablet Strength: pt has 2mg tablets  Current Maintenance Dose: variable  Patient Contact Info: [phone]- Home with Son Yadiel *preferred*; Ms. Wallace's cell phone number- [phone]     Patient Findings   Positives Change in medications, Change in diet/appetite   Negatives Signs/symptoms of thrombosis, Signs/symptoms of bleeding, Laboratory test error suspected, Change in health, Change in alcohol use, Change in activity, Upcoming invasive procedure, Emergency department visit, Upcoming dental procedure, Missed doses, Extra doses, Hospital admission, Bruising, Other complaints   Comments Pt states losing weight although she is not trying to. She thinks she may be eating  less throughout the day as she said it's just been easier to fix a sandwich than it is to make an entire meal. She states no change in GLV consumption. She has started taking one additional medication in calcitriol 0.25mcg once daily.      Plan:  1. INR is therapeutic again today. Instructed Mrs. Wallace to continue 38mg weekly dose: warfarin 6mg daily except 4mg TuesSat.  2. Repeat INR in 1 week to ensure not dropping anymore out of the desired range. May consider dose increase if patient returns SUBtherapeutic.   3. Verbal information provided over the phone to patient, who RBV dosing instructions, expresses understanding with teach back, and has no further questions at this time     Shailesh Bynum, Pharmacy Student  3/27/2018  3:24 PM     IFatemeh, PharmD, have reviewed the note in full and agree with the assessment and plan.  03/28/18  8:38 AM

## 2018-04-03 ENCOUNTER — ANTICOAGULATION VISIT (OUTPATIENT)
Dept: PHARMACY | Facility: HOSPITAL | Age: 78
End: 2018-04-03

## 2018-04-03 DIAGNOSIS — I48.0 PAROXYSMAL ATRIAL FIBRILLATION (HCC): ICD-10-CM

## 2018-04-03 LAB — INR PPP: 1.7

## 2018-04-03 NOTE — PROGRESS NOTES
Anticoagulation Clinic - Remote Progress Note  HOME MONITOR  Testing Frequency: weekly    Indication: afib  Referring Provider: Heath  Goal INR: 2-3  Current Drug Interactions: amiodarone, levothyroxine; omeprazole, azaTHIOprine  CHADS-VASc: 5 (age, gender, HTN, DM)    Diet: rare GLV  Alcohol: none  Tobacco: none   OTC Pain Medication: APAP PRN    1st clinic visit: 9/14/17    INR History:  Date 8/31 9/7 9/14 9/21 9/28 10/2 10/9 10/16 10/23   Total Weekly Dose 38mg 38mg 10mg 18 mg 26mg 26mg 30mg 28mg 28mg   INR 2.6 5.7 2.2 1.4 1.8 1.6 2.3 2.1 2.5   Notes amio BID amio BID 2 held doses amio once daily omepr omepr boost  Trintellix     Date 11/9 11/16 11/22 11/29 12/6 12/14 12/20 1/3   Total Weekly Dose 28mg 28mg 28mg 28mg 28mg 28mg 28mg 28mg   INR 2.1 1.9 2.3 2.4 2.1 1.9 1.7 1.3   Notes  Ate less, sick     amio dec 100mg procedure 12/28 (held and boost), Pen VK     Date 1/8 1/15 1/29 2/9 2/16 2/21 3/1 3/9   Total Weekly Dose 30mg 32mg  18 mg/3 days 38mg 36mg 36mg 38 mg   INR 1.9 2.1 1.2 1.0 1.5 1.8 2.1 1.9   Notes Boost Keflex Zpak, held 3 doses S/p surgery 2/5 and held doses   Keflex finish 2/28 Boost; Keflex init     Date 3/13 3/20 3/27 4/3       Total Weekly Dose 28mg/5 days 38mg 38mg 38mg       INR 2.7 2.8 2.1 1.7       Notes    Hold- pre proced         Phone Interview:  Tablet Strength: pt has 2mg tablets  Current Maintenance Dose: variable  Patient Contact Info: [phone]- Home with Son Yadiel *preferred*; Ms. Wallace's cell phone number- [phone]     Patient Findings   Positives Upcoming invasive procedure   Negatives Signs/symptoms of thrombosis, Signs/symptoms of bleeding, Laboratory test error suspected, Change in health, Change in alcohol use, Change in activity, Emergency department visit, Upcoming dental procedure, Missed doses, Extra doses, Change in medications, Change in diet/appetite, Hospital admission, Bruising, Other complaints   Comments Ms. Wallace is having catheter from chest removed on  Friday at [facility]. She was unable to determine who is performing the procedure, however, she stated that Dr. Hodges had advised her to stop warfarin 5 days prior. Called office to confirm, however, closed at this time. She still reports unintended weight loss. Instructed patient to boost dose on Friday to 8mg then continue 6mg daily until repeat Pt/INR on Tuesday 4/10- unless otherwise directed at hospital after procedure.     Addendum 4/4: Confirmed with office that patient is having Hemo catheter removed on Friday. This was Ok'd by Dr. Joe per Bobbi. [phone]      Plan:  1. INR is SUBtherapeutic today due to dose hold started on 4/1. Instructed Mrs. Wallace to continue hold as instructed by Dr. Hodges and ok'd by Dr. Joe and then resume warfarin therapy as discussed below. Will likely return to previous maintenance dose of: warfarin 6mg daily except 4mg TuesSat.  Called back on 4/4 and confirmed with patient again.   4/1-4/6: hold warfarin dose    4/6: Warfarin 8mg   4/7: Warfarin 6mg   4/8: Warfarin 6mg   4/9: Warfarin 6mg   4/10 or 4/11: Repeat PT/INR  2. Repeat INR in on Tuesday or Wednesday following procedure depending on availability of daughter. May consider dose increase if patient returns SUBtherapeutic.   3. Verbal information provided over the phone to patient, who RBV dosing instructions, expresses understanding with teach back, and has no further questions at this time      Fatemeh Rodríguez, PharmD  4/3/2018  3:28 PM

## 2018-04-10 ENCOUNTER — ANTICOAGULATION VISIT (OUTPATIENT)
Dept: PHARMACY | Facility: HOSPITAL | Age: 78
End: 2018-04-10

## 2018-04-10 DIAGNOSIS — I48.0 PAROXYSMAL ATRIAL FIBRILLATION (HCC): ICD-10-CM

## 2018-04-10 LAB — INR PPP: 1.5

## 2018-04-10 NOTE — PROGRESS NOTES
Anticoagulation Clinic - Remote Progress Note  HOME MONITOR  Testing Frequency: weekly    Indication: afib  Referring Provider: Heath  Goal INR: 2-3  Current Drug Interactions: amiodarone, levothyroxine; omeprazole, azaTHIOprine  CHADS-VASc: 5 (age, gender, HTN, DM)    Diet: rare GLV  Alcohol: none  Tobacco: none   OTC Pain Medication: APAP PRN    1st clinic visit: 9/14/17    INR History:  Date 8/31 9/7 9/14 9/21 9/28 10/2 10/9 10/16 10/23   Total Weekly Dose 38mg 38mg 10mg 18 mg 26mg 26mg 30mg 28mg 28mg   INR 2.6 5.7 2.2 1.4 1.8 1.6 2.3 2.1 2.5   Notes amio BID amio BID 2 held doses amio once daily omepr omepr boost  Trintellix     Date 11/9 11/16 11/22 11/29 12/6 12/14 12/20 1/3   Total Weekly Dose 28mg 28mg 28mg 28mg 28mg 28mg 28mg 28mg   INR 2.1 1.9 2.3 2.4 2.1 1.9 1.7 1.3   Notes  Ate less, sick     amio dec 100mg procedure 12/28 (held and boost), Pen VK     Date 1/8 1/15 1/29 2/9 2/16 2/21 3/1 3/9   Total Weekly Dose 30mg 32mg  18 mg/3 days 38mg 36mg 36mg 38 mg   INR 1.9 2.1 1.2 1.0 1.5 1.8 2.1 1.9   Notes Boost Keflex Zpak, held 3 doses S/p surgery 2/5 and held doses   Keflex finish 2/28 Boost; Keflex init     Date 3/13 3/20 3/27 4/3 4/10      Total Weekly Dose 28mg/5 days 38mg 38mg 38mg 26mg 45mg     INR 2.7 2.8 2.1 1.7 1.5      Notes    Hold- pre proced Post procedure; boost boost       Phone Interview:  Tablet Strength: pt has 2mg tablets  Current Maintenance Dose: variable  Patient Contact Info: [phone]- Home with Son Yadiel *preferred*; Ms. Wallace's cell phone number- [phone]     Patient Findings   Negatives Signs/symptoms of thrombosis, Signs/symptoms of bleeding, Laboratory test error suspected, Change in health, Change in alcohol use, Change in activity, Upcoming invasive procedure, Emergency department visit, Upcoming dental procedure, Missed doses, Extra doses, Change in medications, Change in diet/appetite, Hospital admission, Bruising, Other complaints   Comments Ms. Wallace reports she  has boosted dose as instructed. She continues to eat well and is feeling better since the procedure. All patient findings were found to be negative upon patient interview.      Plan:  1. INR is SUBtherapeutic today following hold for procedure. Instructed Ms. Wallace to BOOST dose to 7mg tonight and then continue 6mg daily until Friday ( patient will have received ~12% increase) Will likely resume prior maintenance dose of 6mg daily except 4mg TueSat.  2. Repeat INR on Friday.   3. Verbal information provided over the phone to patient, who RBV dosing instructions, expresses understanding with teach back, and has no further questions at this time      Fatemeh Rodríguez, PharmD  4/10/2018  3:57 PM

## 2018-04-17 ENCOUNTER — ANTICOAGULATION VISIT (OUTPATIENT)
Dept: PHARMACY | Facility: HOSPITAL | Age: 78
End: 2018-04-17

## 2018-04-17 DIAGNOSIS — I48.0 PAROXYSMAL ATRIAL FIBRILLATION (HCC): ICD-10-CM

## 2018-04-17 LAB
INR PPP: 3
INR PPP: 3

## 2018-04-17 NOTE — PROGRESS NOTES
Anticoagulation Clinic - Remote Progress Note  HOME MONITOR  Testing Frequency: weekly    Indication: afib  Referring Provider: Heath  Goal INR: 2-3  Current Drug Interactions: amiodarone, levothyroxine; omeprazole, azaTHIOprine  CHADS-VASc: 5 (age, gender, HTN, DM)    Diet: rare GLV  Alcohol: none  Tobacco: none   OTC Pain Medication: APAP PRN    1st clinic visit: 9/14/17    INR History:  Date 8/31 9/7 9/14 9/21 9/28 10/2 10/9 10/16 10/23   Total Weekly Dose 38mg 38mg 10mg 18 mg 26mg 26mg 30mg 28mg 28mg   INR 2.6 5.7 2.2 1.4 1.8 1.6 2.3 2.1 2.5   Notes amio BID amio BID 2 held doses amio once daily omepr omepr boost  Trintellix     Date 11/9 11/16 11/22 11/29 12/6 12/14 12/20 1/3   Total Weekly Dose 28mg 28mg 28mg 28mg 28mg 28mg 28mg 28mg   INR 2.1 1.9 2.3 2.4 2.1 1.9 1.7 1.3   Notes  Ate less, sick     amio dec 100mg procedure 12/28 (held and boost), Pen VK     Date 1/8 1/15 1/29 2/9 2/16 2/21 3/1 3/9   Total Weekly Dose 30mg 32mg  18 mg/3 days 38mg 36mg 36mg 38 mg   INR 1.9 2.1 1.2 1.0 1.5 1.8 2.1 1.9   Notes Boost Keflex Zpak, held 3 doses S/p surgery 2/5 and held doses   Keflex finish 2/28 Boost; Keflex init     Date 3/13 3/20 3/27 4/3 4/10 4/17     Total Weekly Dose 28mg/5 days 38mg 38mg 38mg 26mg 41mg     INR 2.7 2.8 2.1 1.7 1.5 3.00     Notes    Hold- pre proced Post procedure; boost boost       Phone Interview:  Tablet Strength: pt has 2mg tablets  Current Maintenance Dose: variable  Patient Contact Info: [phone]- Home with Son Yadiel *preferred*; Ms. Wallace's cell phone number- [phone]       Plan:  1. INR was therapeutic 4/16. Spoke with son and she plans to have PT/INR drawn again tomorrow. She has resumed prior maintenance dose of 6mg daily except 4mg TueSat.  2. Repeat INR on Friday.   3. Verbal information provided over the phone to patient, who RBV dosing instructions, expresses understanding with teach back, and has no further questions at this time      Fatemeh Rodríguez,  PharmD  4/17/2018  4:26 PM

## 2018-04-19 ENCOUNTER — ANTICOAGULATION VISIT (OUTPATIENT)
Dept: PHARMACY | Facility: HOSPITAL | Age: 78
End: 2018-04-19

## 2018-04-19 DIAGNOSIS — I48.0 PAROXYSMAL ATRIAL FIBRILLATION (HCC): ICD-10-CM

## 2018-04-20 ENCOUNTER — ANTICOAGULATION VISIT (OUTPATIENT)
Dept: PHARMACY | Facility: HOSPITAL | Age: 78
End: 2018-04-20

## 2018-04-20 DIAGNOSIS — I48.0 PAROXYSMAL ATRIAL FIBRILLATION (HCC): ICD-10-CM

## 2018-04-20 LAB — INR PPP: 1.9

## 2018-04-20 NOTE — PROGRESS NOTES
Anticoagulation Clinic - Remote Progress Note  HOME MONITOR  Testing Frequency: weekly    Indication: afib  Referring Provider: Heath  Goal INR: 2-3  Current Drug Interactions: amiodarone, levothyroxine; omeprazole, azaTHIOprine  CHADS-VASc: 5 (age, gender, HTN, DM)    Diet: rare GLV  Alcohol: none  Tobacco: none   OTC Pain Medication: APAP PRN    1st clinic visit: 9/14/17    INR History:  Date 8/31 9/7 9/14 9/21 9/28 10/2 10/9 10/16 10/23   Total Weekly Dose 38mg 38mg 10mg 18 mg 26mg 26mg 30mg 28mg 28mg   INR 2.6 5.7 2.2 1.4 1.8 1.6 2.3 2.1 2.5   Notes amio BID amio BID 2 held doses amio once daily omepr omepr boost  Trintellix     Date 11/9 11/16 11/22 11/29 12/6 12/14 12/20 1/3   Total Weekly Dose 28mg 28mg 28mg 28mg 28mg 28mg 28mg 28mg   INR 2.1 1.9 2.3 2.4 2.1 1.9 1.7 1.3   Notes  Ate less, sick     amio dec 100mg procedure 12/28 (held and boost), Pen VK     Date 1/8 1/15 1/29 2/9 2/16 2/21 3/1 3/9   Total Weekly Dose 30mg 32mg  18 mg/3 days 38mg 36mg 36mg 38 mg   INR 1.9 2.1 1.2 1.0 1.5 1.8 2.1 1.9   Notes Boost Keflex Zpak, held 3 doses S/p surgery 2/5 and held doses   Keflex finish 2/28 Boost; Keflex init     Date 3/13 3/20 3/27 4/3 4/10 4/17 4/20    Total Weekly Dose 28mg/5 days 38mg 38mg 38mg 26mg 41mg 36mg    INR 2.7 2.8 2.1 1.7 1.5 3.00 1.9    Notes    Hold- pre proced Post procedure; boost boost Lower dose 4/19      Phone Interview:  Tablet Strength: pt has 2mg tablets  Current Maintenance Dose: variable  Patient Contact Info: [phone]- Home with Son Yadiel *preferred*; Ms. Wallace's cell phone number- [phone]       Plan:  1. INR was subtherapeutic today at 1.9 (large drop from 4/17). Was not able to speak to son/daughter, so instructed patient to take 6mg tonight, 4mg Sat, 6mg both Sun, Mon and follow up INR Tuesday when son is able  2. Repeat INR Tuesday, 4/24.  3. Verbal information provided over the phone to patient, who RBV dosing instructions, expresses understanding with teach back, and  has no further questions at this time        Cris Solis, PharmD  04/20/18  1:08 PM

## 2018-04-24 ENCOUNTER — ANTICOAGULATION VISIT (OUTPATIENT)
Dept: PHARMACY | Facility: HOSPITAL | Age: 78
End: 2018-04-24

## 2018-04-24 DIAGNOSIS — I48.0 PAROXYSMAL ATRIAL FIBRILLATION (HCC): ICD-10-CM

## 2018-04-24 LAB — INR PPP: 1.8

## 2018-04-24 RX ORDER — WARFARIN SODIUM 2 MG/1
TABLET ORAL
Qty: 192 TABLET | Refills: 2 | Status: SHIPPED | OUTPATIENT
Start: 2018-04-24 | End: 2019-01-07 | Stop reason: SDUPTHER

## 2018-04-24 NOTE — PROGRESS NOTES
Anticoagulation Clinic - Remote Progress Note  HOME MONITOR  Testing Frequency: weekly    Indication: afib  Referring Provider: Heath  Goal INR: 2-3  Current Drug Interactions: amiodarone, levothyroxine; omeprazole, azaTHIOprine  CHADS-VASc: 5 (age, gender, HTN, DM)    Diet: rare GLV  Alcohol: none  Tobacco: none   OTC Pain Medication: APAP PRN    1st clinic visit: 9/14/17    INR History:  Date 8/31 9/7 9/14 9/21 9/28 10/2 10/9 10/16 10/23   Total Weekly Dose 38mg 38mg 10mg 18 mg 26mg 26mg 30mg 28mg 28mg   INR 2.6 5.7 2.2 1.4 1.8 1.6 2.3 2.1 2.5   Notes amio BID amio BID 2 held doses amio once daily omepr omepr boost  Trintellix     Date 11/9 11/16 11/22 11/29 12/6 12/14 12/20 1/3   Total Weekly Dose 28mg 28mg 28mg 28mg 28mg 28mg 28mg 28mg   INR 2.1 1.9 2.3 2.4 2.1 1.9 1.7 1.3   Notes  Ate less, sick     amio dec 100mg procedure 12/28 (held and boost), Pen VK     Date 1/8 1/15 1/29 2/9 2/16 2/21 3/1 3/9   Total Weekly Dose 30mg 32mg  18 mg/3 days 38mg 36mg 36mg 38 mg   INR 1.9 2.1 1.2 1.0 1.5 1.8 2.1 1.9   Notes Boost Keflex Zpak, held 3 doses S/p surgery 2/5 and held doses   Keflex finish 2/28 Boost; Keflex init     Date 3/13 3/20 3/27 4/3 4/10 4/17 4/20 4/24     Total Weekly Dose 28mg/5 days 38mg 38mg 38mg 26mg 41mg 36mg 36 mg     INR 2.7 2.8 2.1 1.7 1.5 3.00 1.9 1.8     Notes    Hold- pre proced Post procedure; boost boost Lower dose 4/19 (boost)       Phone Interview:  Tablet Strength: pt has 2mg tablets  Current Maintenance Dose: variable  Patient Contact Info: [phone]- Home with Son Yadiel *preferred*; Ms. Wallace's cell phone number- [phone]       Plan:  1. INR was subtherapeutic today at 1.8. Instructed patient to take 6mg tonight (BOOST by 5%) then continue 6 mg daily except 4mg SatTue and follow up INR Tuesday.   2. Repeat INR Tuesday,5/1. Her daughter will help her test next week.  3. Verbal information provided over the phone to patient, who RBV dosing instructions, expresses understanding  with teach back, and has no further questions at this time        Sawyer Jackman, formerly [facility]  04/24/18  9:51 AM

## 2018-05-01 ENCOUNTER — ANTICOAGULATION VISIT (OUTPATIENT)
Dept: PHARMACY | Facility: HOSPITAL | Age: 78
End: 2018-05-01

## 2018-05-01 DIAGNOSIS — I48.0 PAROXYSMAL ATRIAL FIBRILLATION (HCC): ICD-10-CM

## 2018-05-01 LAB — INR PPP: 2.3

## 2018-05-01 NOTE — PROGRESS NOTES
Anticoagulation Clinic - Remote Progress Note  HOME MONITOR  Testing Frequency: weekly    Indication: afib  Referring Provider: Heath  Goal INR: 2-3  Current Drug Interactions: amiodarone, levothyroxine; omeprazole, azaTHIOprine  CHADS-VASc: 5 (age, gender, HTN, DM)    Diet: rare GLV  Alcohol: none  Tobacco: none   OTC Pain Medication: APAP PRN    1st clinic visit: 9/14/17    INR History:  Date 8/31 9/7 9/14 9/21 9/28 10/2 10/9 10/16 10/23   Total Weekly Dose 38mg 38mg 10mg 18 mg 26mg 26mg 30mg 28mg 28mg   INR 2.6 5.7 2.2 1.4 1.8 1.6 2.3 2.1 2.5   Notes amio BID amio BID 2 held doses amio once daily omepr omepr boost  Trintellix     Date 11/9 11/16 11/22 11/29 12/6 12/14 12/20 1/3   Total Weekly Dose 28mg 28mg 28mg 28mg 28mg 28mg 28mg 28mg   INR 2.1 1.9 2.3 2.4 2.1 1.9 1.7 1.3   Notes  Ate less, sick     amio dec 100mg procedure 12/28 (held and boost), Pen VK     Date 1/8 1/15 1/29 2/9 2/16 2/21 3/1 3/9   Total Weekly Dose 30mg 32mg  18 mg/3 days 38mg 36mg 36mg 38 mg   INR 1.9 2.1 1.2 1.0 1.5 1.8 2.1 1.9   Notes Boost Keflex Zpak, held 3 doses S/p surgery 2/5 and held doses   Keflex finish 2/28 Boost; Keflex init     Date 3/13 3/20 3/27 4/3 4/10 4/17 4/20 4/24 5/1    Total Weekly Dose 28mg/5 days 38mg 38mg 38mg 26mg 41mg 36mg 36 mg 40 mg 38 mg   INR 2.7 2.8 2.1 1.7 1.5 3.00 1.9 1.8 2.3    Notes    Hold- pre proced Post procedure; boost boost Lower dose 4/19 (boost)       Phone Interview:  Tablet Strength: pt has 2mg tablets  Current Maintenance Dose: variable  Patient Contact Info: [phone]- [place] with Son Yadiel *preferred*; Ms. Wallace's cell phone number- [phone]     Patient findings:  Negatives Signs/symptoms of thrombosis, Signs/symptoms of bleeding, Laboratory test error suspected, Change in health, Change in alcohol use, Change in activity, Upcoming invasive procedure, Emergency department visit, Upcoming dental procedure, Missed doses, Extra doses, Change in medications, Change in diet/appetite,  Hospital admission, Bruising, Other complaints         Plan:  1. INR was subtherapeutic today at 1.8. Instructed patient to take  6 mg daily except 4mg SatTue and follow up INR Tuesday.   2. Repeat INR Tuesday,5/8.   3. Verbal information provided over the phone to patient, who RBV dosing instructions, expresses understanding with teach back, and has no further questions at this time        Sawyer Jackman [facility]  05/01/18  3:43 PM

## 2018-05-07 ENCOUNTER — OFFICE VISIT (OUTPATIENT)
Dept: INTERNAL MEDICINE | Facility: CLINIC | Age: 78
End: 2018-05-07

## 2018-05-07 VITALS
DIASTOLIC BLOOD PRESSURE: 70 MMHG | HEIGHT: 63 IN | BODY MASS INDEX: 31.54 KG/M2 | HEART RATE: 64 BPM | WEIGHT: 178 LBS | SYSTOLIC BLOOD PRESSURE: 150 MMHG

## 2018-05-07 DIAGNOSIS — N18.6 STAGE 5 CHRONIC KIDNEY DISEASE ON CHRONIC DIALYSIS (HCC): ICD-10-CM

## 2018-05-07 DIAGNOSIS — J45.20 MILD INTERMITTENT ASTHMA, UNSPECIFIED WHETHER COMPLICATED: ICD-10-CM

## 2018-05-07 DIAGNOSIS — N18.4 CHRONIC KIDNEY DISEASE, STAGE IV (SEVERE) (HCC): ICD-10-CM

## 2018-05-07 DIAGNOSIS — E03.8 OTHER SPECIFIED HYPOTHYROIDISM: ICD-10-CM

## 2018-05-07 DIAGNOSIS — K25.7: ICD-10-CM

## 2018-05-07 DIAGNOSIS — N18.9 ANEMIA OF RENAL DISEASE: ICD-10-CM

## 2018-05-07 DIAGNOSIS — Z79.4 TYPE 2 DIABETES MELLITUS WITH COMPLICATION, WITH LONG-TERM CURRENT USE OF INSULIN (HCC): ICD-10-CM

## 2018-05-07 DIAGNOSIS — D63.1 ANEMIA OF RENAL DISEASE: ICD-10-CM

## 2018-05-07 DIAGNOSIS — Z99.2 STAGE 5 CHRONIC KIDNEY DISEASE ON CHRONIC DIALYSIS (HCC): ICD-10-CM

## 2018-05-07 DIAGNOSIS — H35.30 MACULAR DEGENERATION: ICD-10-CM

## 2018-05-07 DIAGNOSIS — E11.8 TYPE 2 DIABETES MELLITUS WITH COMPLICATION, WITH LONG-TERM CURRENT USE OF INSULIN (HCC): ICD-10-CM

## 2018-05-07 DIAGNOSIS — I48.0 PAROXYSMAL ATRIAL FIBRILLATION (HCC): ICD-10-CM

## 2018-05-07 DIAGNOSIS — E55.9 VITAMIN D DEFICIENCY: ICD-10-CM

## 2018-05-07 DIAGNOSIS — I10 ESSENTIAL HYPERTENSION: Primary | ICD-10-CM

## 2018-05-07 DIAGNOSIS — I77.9 RIGHT-SIDED CAROTID ARTERY DISEASE (HCC): ICD-10-CM

## 2018-05-07 LAB — HBA1C MFR BLD: 5.8 %

## 2018-05-07 PROCEDURE — 99204 OFFICE O/P NEW MOD 45 MIN: CPT | Performed by: INTERNAL MEDICINE

## 2018-05-07 PROCEDURE — 83036 HEMOGLOBIN GLYCOSYLATED A1C: CPT | Performed by: INTERNAL MEDICINE

## 2018-05-07 RX ORDER — FUROSEMIDE 80 MG
0.5 TABLET ORAL 2 TIMES DAILY
COMMUNITY
Start: 2018-04-17 | End: 2018-05-07

## 2018-05-07 RX ORDER — METOPROLOL TARTRATE 100 MG/1
TABLET ORAL 2 TIMES DAILY
COMMUNITY
Start: 2018-02-12 | End: 2019-05-30 | Stop reason: HOSPADM

## 2018-05-07 RX ORDER — COLCHICINE 0.6 MG/1
TABLET, FILM COATED ORAL
COMMUNITY
Start: 2018-05-03 | End: 2018-06-25

## 2018-05-07 NOTE — PROGRESS NOTES
Patient is a 78 y.o. female who is here to establish care for chronic conditions.  Chief Complaint   Patient presents with   • Establish Care         HPI:  Here for management of multiple medical problems.  Followed by multiple specialist.  Has had DM for years.  Controlled by lantus.  FSBS in the 80-low 100s. Rare hypoglycemia.  Seen by eye MD and not any issues with retinopathy.  No paresthesia.    BP is controlled.  No palpitations.  Does her INR at home.  Occasional bruising.     History:    Patient Active Problem List   Diagnosis   • Paroxysmal atrial fibrillation   • Essential hypertension   • Type 2 diabetes mellitus   • Chronic kidney disease, stage IV (severe)   • Anemia of renal disease   • Hyperparathyroidism   • Asthma   • Right-sided carotid artery disease   • High output HF (heart failure)   • Vitamin D deficiency   • Nonrheumatic aortic valve stenosis   • Ulcer of gastric fundus   • Tubular adenoma   • Macular degeneration   • Hypothyroidism   • Diabetes mellitus   • Chronic kidney disease       Past Medical History:   Diagnosis Date   • Adenomatous colon polyp    • Chronic kidney disease    • Clostridium difficile infection    • Diabetes mellitus    • Gastric polyps    • Hypothyroidism    • IgA nephropathy, acute    • Macular degeneration    • Tubular adenoma     excision    • Ulcer of gastric fundus    • Vitamin D deficiency        Past Surgical History:   Procedure Laterality Date   • BREAST BIOPSY Left 1990'S   • CARPAL TUNNEL RELEASE Right    • CATARACT EXTRACTION Bilateral    • DIAGNOSTIC LAPAROSCOPY     • HERNIA REPAIR  86 and 87   • TOTAL KNEE ARTHROPLASTY     • TOTAL KNEE ARTHROPLASTY      Left knee    • TRANSPLANTATION RENAL Right    • TUBAL ABDOMINAL LIGATION     • UPPER GASTROINTESTINAL ENDOSCOPY  05/20/2013       Current Outpatient Prescriptions on File Prior to Visit   Medication Sig   • ALPRAZolam (XANAX) 0.5 MG tablet Take 0.5 mg by mouth 2 (Two) Times a Day As Needed for Anxiety.   •  amiodarone (PACERONE) 200 MG tablet Take 0.5 tablets by mouth Daily for 180 days.   • amLODIPine (NORVASC) 5 MG tablet Take 10 mg by mouth Daily.   • azaTHIOprine (IMURAN) 50 MG tablet Take 50 mg by mouth Daily.   • calcitriol (ROCALTROL) 0.25 MCG capsule Take 0.25 mcg by mouth Daily.   • calcium acetate (PHOSLO) 667 MG capsule Take 1,334 mg by mouth 3 (Three) Times a Day With Meals.   • Cholecalciferol (VITAMIN D) 2000 UNITS tablet Take 2,000 Units by mouth Daily.   • Fluticasone Furoate-Vilanterol (BREO ELLIPTA) 200-25 MCG/INH aerosol powder  Inhale 1 puff As Needed.   • LANTUS SOLOSTAR 100 UNIT/ML injection pen Inject 12 Units under the skin Every Night.   • levothyroxine (SYNTHROID, LEVOTHROID) 75 MCG tablet Take 75 mcg by mouth Daily.   • omeprazole (priLOSEC) 40 MG capsule Take 40 mg by mouth Daily.   • pravastatin (PRAVACHOL) 40 MG tablet Take 40 mg by mouth daily.   • PROAIR  (90 BASE) MCG/ACT inhaler Inhale 2 puffs Every 6 (Six) Hours As Needed.   • sevelamer (RENVELA) 800 MG tablet Take 800 mg by mouth 3 (Three) Times a Day With Meals.   • sodium bicarbonate 650 MG tablet Take 1,300 mg by mouth 2 (Two) Times a Day.   • tacrolimus (PROGRAF) 1 MG capsule Take 2 mg by mouth 2 (Two) Times a Day.   • warfarin (COUMADIN) 2 MG tablet Take 4 mg by mouth Daily. Patient takes two tablets daily   • warfarin (COUMADIN) 2 MG tablet TAKE THREE TABLETS BY MOUTH DAILY MONDAY AND THURSDAY AND THEN TWO  TABLETS ALL OTHER DAYS   • [DISCONTINUED] bumetanide (BUMEX) 1 MG tablet Take 1 tablet by mouth Daily As Needed (for weight gain more than 3 lbs).     No current facility-administered medications on file prior to visit.        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 Negative for chills, fatigue and fever.   HENT: Negative for congestion, ear pain, hearing loss, rhinorrhea, sinus pressure, sore throat and trouble swallowing.    Eyes: Negative for discharge and itching.   Respiratory: Negative for cough, chest tightness and shortness of breath.    Cardiovascular: Positive for leg swelling. Negative for chest pain and palpitations.   Gastrointestinal: Negative for abdominal pain, blood in stool, constipation, diarrhea and vomiting.        10/17 by Dr Perez, next 10/20   Endocrine: Negative for polydipsia and polyuria.   Genitourinary: Negative for difficulty urinating, dysuria, enuresis, frequency, hematuria and urgency.        11/17 mammogram   Musculoskeletal: Positive for arthralgias and gait problem. Negative for back pain and joint swelling.   Skin: Negative for rash and wound.   Allergic/Immunologic: Negative for immunocompromised state.   Neurological: Negative for dizziness, syncope, weakness, light-headedness, numbness and headaches.   Hematological: Bruises/bleeds easily.   Psychiatric/Behavioral: Positive for sleep disturbance. Negative for behavioral problems and dysphoric mood. The patient is not nervous/anxious.        /70   Pulse 64   Ht 160 cm (62.99\")   Wt 80.7 kg (178 lb)   LMP  (LMP Unknown)   BMI 31.54 kg/m²     Physical Exam:    Physical Exam   Constitutional: She is oriented to person, place, and " "time. She appears well-developed and well-nourished.   HENT:   Head: Normocephalic and atraumatic.   Right Ear: External ear normal.   Left Ear: External ear normal.   Mouth/Throat: Oropharynx is clear and moist.   Eyes: Conjunctivae and EOM are normal.   Neck: Normal range of motion. Neck supple.   Cardiovascular: Normal rate, regular rhythm and normal heart sounds.    Pulmonary/Chest: Effort normal and breath sounds normal.   Abdominal: Soft. Bowel sounds are normal.   Musculoskeletal: She exhibits edema.   Using cane   Lymphadenopathy:     She has no cervical adenopathy.   Neurological: She is alert and oriented to person, place, and time.   Skin: Skin is warm and dry.   Psychiatric: She has a normal mood and affect. Her behavior is normal. Thought content normal.       Procedure:      Discussion/Summary:    HTN-advised to monitor and goal 130/80  DM-labs today , will wean off insulin and advised of goals  Hyperlipidemia-will recheck  ESRD-per Renal  AOCD-\"  \"  Vit D-labs on rtc  Gout-UA level soon  afib-rate controlled  hypothroid-labs on rtc  High risk meds-labs on rtc    Prior records noted      Current Outpatient Prescriptions:   •  ALPRAZolam (XANAX) 0.5 MG tablet, Take 0.5 mg by mouth 2 (Two) Times a Day As Needed for Anxiety., Disp: , Rfl:   •  amiodarone (PACERONE) 200 MG tablet, Take 0.5 tablets by mouth Daily for 180 days., Disp: 45 tablet, Rfl: 1  •  amLODIPine (NORVASC) 5 MG tablet, Take 10 mg by mouth Daily., Disp: , Rfl:   •  azaTHIOprine (IMURAN) 50 MG tablet, Take 50 mg by mouth Daily., Disp: , Rfl:   •  calcitriol (ROCALTROL) 0.25 MCG capsule, Take 0.25 mcg by mouth Daily., Disp: , Rfl:   •  calcium acetate (PHOSLO) 667 MG capsule, Take 1,334 mg by mouth 3 (Three) Times a Day With Meals., Disp: , Rfl:   •  Cholecalciferol (VITAMIN D) 2000 UNITS tablet, Take 2,000 Units by mouth Daily., Disp: , Rfl:   •  COLCRYS 0.6 MG tablet, , Disp: , Rfl:   •  Fluticasone Furoate-Vilanterol (BREO ELLIPTA) " 200-25 MCG/INH aerosol powder , Inhale 1 puff As Needed., Disp: , Rfl:   •  LANTUS SOLOSTAR 100 UNIT/ML injection pen, Inject 12 Units under the skin Every Night., Disp: , Rfl:   •  levothyroxine (SYNTHROID, LEVOTHROID) 75 MCG tablet, Take 75 mcg by mouth Daily., Disp: , Rfl:   •  metoprolol tartrate (LOPRESSOR) 100 MG tablet, 2 (Two) Times a Day., Disp: , Rfl:   •  omeprazole (priLOSEC) 40 MG capsule, Take 40 mg by mouth Daily., Disp: , Rfl:   •  pravastatin (PRAVACHOL) 40 MG tablet, Take 40 mg by mouth daily., Disp: , Rfl:   •  PROAIR  (90 BASE) MCG/ACT inhaler, Inhale 2 puffs Every 6 (Six) Hours As Needed., Disp: , Rfl:   •  sevelamer (RENVELA) 800 MG tablet, Take 800 mg by mouth 3 (Three) Times a Day With Meals., Disp: , Rfl:   •  sodium bicarbonate 650 MG tablet, Take 1,300 mg by mouth 2 (Two) Times a Day., Disp: , Rfl:   •  tacrolimus (PROGRAF) 1 MG capsule, Take 2 mg by mouth 2 (Two) Times a Day., Disp: , Rfl:   •  warfarin (COUMADIN) 2 MG tablet, Take 4 mg by mouth Daily. Patient takes two tablets daily, Disp: , Rfl:   •  warfarin (COUMADIN) 2 MG tablet, TAKE THREE TABLETS BY MOUTH DAILY MONDAY AND THURSDAY AND THEN TWO  TABLETS ALL OTHER DAYS, Disp: 192 tablet, Rfl: 2        Moni was seen today for establish care.    Diagnoses and all orders for this visit:    Essential hypertension    Paroxysmal atrial fibrillation    Right-sided carotid artery disease    Mild intermittent asthma, unspecified whether complicated    Vitamin D deficiency    Chronic ulcer of gastric fundus    Type 2 diabetes mellitus with complication, with long-term current use of insulin  -     POC Glycosylated Hemoglobin (Hb A1C)    Other specified hypothyroidism    Anemia of renal disease    Chronic kidney disease, stage IV (severe)    Stage 5 chronic kidney disease on chronic dialysis    Macular degeneration

## 2018-05-08 ENCOUNTER — ANTICOAGULATION VISIT (OUTPATIENT)
Dept: PHARMACY | Facility: HOSPITAL | Age: 78
End: 2018-05-08

## 2018-05-08 DIAGNOSIS — I48.0 PAROXYSMAL ATRIAL FIBRILLATION (HCC): ICD-10-CM

## 2018-05-08 LAB — INR PPP: 2.3

## 2018-05-08 NOTE — PROGRESS NOTES
Anticoagulation Clinic - Remote Progress Note  HOME MONITOR  Testing Frequency: weekly    Indication: afib  Referring Provider: Heath  Goal INR: 2-3  Current Drug Interactions: amiodarone, levothyroxine; omeprazole, azaTHIOprine  CHADS-VASc: 5 (age, gender, HTN, DM)    Diet: rare GLV  Alcohol: none  Tobacco: none   OTC Pain Medication: APAP PRN    1st clinic visit: 9/14/17    INR History:  Date 8/31 9/7 9/14 9/21 9/28 10/2 10/9 10/16 10/23   Total Weekly Dose 38mg 38mg 10mg 18 mg 26mg 26mg 30mg 28mg 28mg   INR 2.6 5.7 2.2 1.4 1.8 1.6 2.3 2.1 2.5   Notes amio BID amio BID 2 held doses amio once daily omepr omepr boost  Trintellix     Date 11/9 11/16 11/22 11/29 12/6 12/14 12/20 1/3   Total Weekly Dose 28mg 28mg 28mg 28mg 28mg 28mg 28mg 28mg   INR 2.1 1.9 2.3 2.4 2.1 1.9 1.7 1.3   Notes  Ate less, sick     amio dec 100mg procedure 12/28 (held and boost), Pen VK     Date 1/8 1/15 1/29 2/9 2/16 2/21 3/1 3/9   Total Weekly Dose 30mg 32mg  18 mg/3 days 38mg 36mg 36mg 38 mg   INR 1.9 2.1 1.2 1.0 1.5 1.8 2.1 1.9   Notes Boost Keflex Zpak, held 3 doses S/p surgery 2/5 and held doses   Keflex finish 2/28 Boost; Keflex init     Date 3/13 3/20 3/27 4/3 4/10 4/17 4/20 4/24 5/1 5/8   Total Weekly Dose 28mg/5 days 38mg 38mg 38mg 26mg 41mg 36mg 36 mg 40 mg 38 mg   INR 2.7 2.8 2.1 1.7 1.5 3.00 1.9 1.8 2.3 2.3   Notes    Hold- pre proced Post procedure; boost boost Lower dose 4/19 (boost)  colchicine     Phone Interview:  Tablet Strength: pt has 2mg tablets  Current Maintenance Dose: variable  Patient Contact Info: [phone]- Home with Peewee Cotto; Ms. Wallace's cell phone number- [phone]     Patient Findings:   Positives Change in health, Change in medications   Negatives Signs/symptoms of thrombosis, Signs/symptoms of bleeding, Laboratory test error suspected, Change in alcohol use, Change in activity, Upcoming invasive procedure, Emergency department visit, Upcoming dental procedure, Missed doses, Extra doses, Change in  diet/appetite, Hospital admission, Bruising, Other complaints   Comments Patient reports that she began taking temporary dose of Colchicine on 5/3 for a gout flare up. Micromedex does not indicate DDI between Colchicine and Warfarin.      Plan:  1. INR is therapeutic today at 2.3. Instructed patient to continue warfarin 6mg daily except 4mg on Tuesday and Saturday.   2. Repeat INR in 1 week.   3. Verbal information provided over the phone to patient, who RBV dosing instructions, expresses understanding with teach back, and has no further questions at this time   4. Patient declines the need for warfarin refills at this time.        Elza To, Pharmacy Technician  05/08/18  2:58 PM      IFatemeh, PharmD, have reviewed the note in full and agree with the assessment and plan.  05/08/18  5:26 PM

## 2018-05-15 ENCOUNTER — ANTICOAGULATION VISIT (OUTPATIENT)
Dept: PHARMACY | Facility: HOSPITAL | Age: 78
End: 2018-05-15

## 2018-05-15 DIAGNOSIS — I48.0 PAROXYSMAL ATRIAL FIBRILLATION (HCC): ICD-10-CM

## 2018-05-15 LAB — INR PPP: 3.6

## 2018-05-16 ENCOUNTER — TELEPHONE (OUTPATIENT)
Dept: PHARMACY | Facility: HOSPITAL | Age: 78
End: 2018-05-16

## 2018-05-16 NOTE — PROGRESS NOTES
Anticoagulation Clinic - Remote Progress Note  HOME MONITOR  Testing Frequency: weekly    Indication: afib  Referring Provider: Heath  Goal INR: 2-3  Current Drug Interactions: amiodarone, levothyroxine; omeprazole, azaTHIOprine  CHADS-VASc: 5 (age, gender, HTN, DM)    Diet: rare GLV  Alcohol: none  Tobacco: none   OTC Pain Medication: APAP PRN    1st clinic visit: 9/14/17    INR History:  Date 8/31 9/7 9/14 9/21 9/28 10/2 10/9 10/16 10/23   Total Weekly Dose 38mg 38mg 10mg 18 mg 26mg 26mg 30mg 28mg 28mg   INR 2.6 5.7 2.2 1.4 1.8 1.6 2.3 2.1 2.5   Notes amio BID amio BID 2 held doses amio once daily omepr omepr boost  Trintellix     Date 11/9 11/16 11/22 11/29 12/6 12/14 12/20 1/3   Total Weekly Dose 28mg 28mg 28mg 28mg 28mg 28mg 28mg 28mg   INR 2.1 1.9 2.3 2.4 2.1 1.9 1.7 1.3   Notes  Ate less, sick     amio dec 100mg procedure 12/28 (held and boost), Pen VK     Date 1/8 1/15 1/29 2/9 2/16 2/21 3/1 3/9   Total Weekly Dose 30mg 32mg  18 mg/3 days 38mg 36mg 36mg 38 mg   INR 1.9 2.1 1.2 1.0 1.5 1.8 2.1 1.9   Notes Boost Keflex Zpak, held 3 doses S/p surgery 2/5 and held doses   Keflex finish 2/28 Boost; Keflex init     Date 3/13 3/20 3/27 4/3 4/10 4/17 4/20 4/24 5/1 5/8   Total Weekly Dose 28mg/5 days 38mg 38mg 38mg 26mg 41mg 36mg 36 mg 40 mg 38 mg   INR 2.7 2.8 2.1 1.7 1.5 3.00 1.9 1.8 2.3 2.3   Notes    Hold- pre proced Post procedure; boost boost Lower dose 4/19 (boost)  colchicine     Date 5/16          Total Weekly Dose  38 mg          INR 3.6          Notes hematoma            Phone Interview:  Tablet Strength: pt has 2mg tablets  Current Maintenance Dose: variable  Patient Contact Info: [phone]- Home with Son Yadiel; Ms. Wallace's cell phone number- [phone]     Patient Findings     Positives:   Signs/symptoms of bleeding, Change in medications   Negatives:   Signs/symptoms of thrombosis, Laboratory test error suspected, Change in health, Change in alcohol use, Change in activity, Upcoming invasive  procedure, Emergency department visit, Upcoming dental procedure, Missed doses, Extra doses, Change in diet/appetite, Hospital admission, Other complaints   Comments:   Patient reports that she began taking temporary dose of Colchicine on 5/3 for a gout flare up. Completed dose yesterday.  Micromedex does not indicate DDI between Colchicine and Warfarin.  She has a large hematoma in her leg and went to [facility] yesterday.  They said it was a bruise (size of quarter) and to watch it closely and if became painful or had streaks coming from it to go back.  Denies other changes.     Plan:  1. INR is SUPRA therapeutic today at 3.6. Instructed patient to take warfarin 2 mg oral dose today and then warfarin 6mg on Thursday.   2. Repeat INR Friday 5/18 in the morning.   3. Verbal information provided over the phone to patient, who RBV dosing instructions, expresses understanding with teach back, and has no further questions at this time   4. Patient declines the need for warfarin refills at this time.        Radha Michaud, PharmD  05/16/18  3:16 PM

## 2018-05-18 ENCOUNTER — ANTICOAGULATION VISIT (OUTPATIENT)
Dept: PHARMACY | Facility: HOSPITAL | Age: 78
End: 2018-05-18

## 2018-05-18 DIAGNOSIS — I48.0 PAROXYSMAL ATRIAL FIBRILLATION (HCC): ICD-10-CM

## 2018-05-18 LAB — INR PPP: 2.4

## 2018-05-18 NOTE — PROGRESS NOTES
Anticoagulation Clinic - Remote Progress Note  HOME MONITOR  Testing Frequency: weekly    Indication: afib  Referring Provider: Heath  Goal INR: 2-3  Current Drug Interactions: amiodarone, levothyroxine; omeprazole, azaTHIOprine  CHADS-VASc: 5 (age, gender, HTN, DM)    Diet: rare GLV  Alcohol: none  Tobacco: none   OTC Pain Medication: APAP PRN    1st clinic visit: 9/14/17    INR History:  Date 8/31 9/7 9/14 9/21 9/28 10/2 10/9 10/16 10/23   Total Weekly Dose 38mg 38mg 10mg 18 mg 26mg 26mg 30mg 28mg 28mg   INR 2.6 5.7 2.2 1.4 1.8 1.6 2.3 2.1 2.5   Notes amio BID amio BID 2 held doses amio once daily omepr omepr boost  Trintellix     Date 11/9 11/16 11/22 11/29 12/6 12/14 12/20 1/3   Total Weekly Dose 28mg 28mg 28mg 28mg 28mg 28mg 28mg 28mg   INR 2.1 1.9 2.3 2.4 2.1 1.9 1.7 1.3   Notes  Ate less, sick     amio dec 100mg procedure 12/28 (held and boost), Pen VK     Date 1/8 1/15 1/29 2/9 2/16 2/21 3/1 3/9   Total Weekly Dose 30mg 32mg  18 mg/3 days 38mg 36mg 36mg 38 mg   INR 1.9 2.1 1.2 1.0 1.5 1.8 2.1 1.9   Notes Boost Keflex Zpak, held 3 doses S/p surgery 2/5 and held doses   Keflex finish 2/28 Boost; Keflex init     Date 3/13 3/20 3/27 4/3 4/10 4/17 4/20 4/24 5/1 5/8   Total Weekly Dose 28mg/5 days 38mg 38mg 38mg 26mg 41mg 36mg 36 mg 40 mg 38 mg   INR 2.7 2.8 2.1 1.7 1.5 3.00 1.9 1.8 2.3 2.3   Notes    Hold- pre proced Post procedure; boost boost Lower dose 4/19 (boost)  colchicine     Date 5/15 5/18         Total Weekly Dose  38 mg 30mg         INR 3.6 2.4         Notes hematoma            Phone Interview:  Tablet Strength: pt has 2mg tablets  Current Maintenance Dose: variable  Patient Contact Info: [phone]- Home with Son Yadiel; Ms. Wallace's cell phone number- [phone]     Patient Findings:  Positives:   Other complaints   Negatives:   Signs/symptoms of thrombosis, Signs/symptoms of bleeding, Laboratory test error suspected, Change in health, Change in alcohol use, Change in activity, Upcoming  invasive procedure, Emergency department visit, Upcoming dental procedure, Missed doses, Extra doses, Change in medications, Change in diet/appetite, Hospital admission, Bruising   Comments:   Spoke with patient who states this past Tuesday (5/15), Wednesday (5/16) and Thursday (5/17) she took only one tablet (2mg) instead of the instructed dosing. Patient states her hematoma is improved.      Plan:  1. INR is back WNL today (5/18) at 2.4. Patient did not follow dosing plan as instructed. At this time, instructed patient to continue maintenance dose of warfarin 6mg daily except 4mg on Tuesday and Saturday.   2. Repeat INR Tuesday (5/22)  3. Verbal information provided over the phone to patient, who RBV dosing instructions, expresses understanding with teach back, and has no further questions at this time. Also had patient to write down dosing for this coming week.  4. Patient declines the need for warfarin refills at this time.      Zina Aldrich CPhT  05/18/18  8:58 AM    IFatemeh, PharmD, have reviewed the note in full and agree with the assessment and plan.  05/18/18  1:34 PM

## 2018-05-22 LAB — INR PPP: 2.3

## 2018-05-23 ENCOUNTER — ANTICOAGULATION VISIT (OUTPATIENT)
Dept: PHARMACY | Facility: HOSPITAL | Age: 78
End: 2018-05-23

## 2018-05-23 DIAGNOSIS — I48.0 PAROXYSMAL ATRIAL FIBRILLATION (HCC): ICD-10-CM

## 2018-05-23 NOTE — PROGRESS NOTES
Anticoagulation Clinic - Remote Progress Note  HOME MONITOR  Testing Frequency: weekly    Indication: afib  Referring Provider: Heath  Goal INR: 2-3  Current Drug Interactions: amiodarone, levothyroxine; omeprazole, azaTHIOprine  CHADS-VASc: 5 (age, gender, HTN, DM)    Diet: rare GLV  Alcohol: none  Tobacco: none   OTC Pain Medication: APAP PRN    1st clinic visit: 9/14/17    INR History:  Date 8/31 9/7 9/14 9/21 9/28 10/2 10/9 10/16 10/23   Total Weekly Dose 38mg 38mg 10mg 18 mg 26mg 26mg 30mg 28mg 28mg   INR 2.6 5.7 2.2 1.4 1.8 1.6 2.3 2.1 2.5   Notes amio BID amio BID 2 held doses amio once daily omepr omepr boost  Trintellix     Date 11/9 11/16 11/22 11/29 12/6 12/14 12/20 1/3   Total Weekly Dose 28mg 28mg 28mg 28mg 28mg 28mg 28mg 28mg   INR 2.1 1.9 2.3 2.4 2.1 1.9 1.7 1.3   Notes  Ate less, sick     amio dec 100mg procedure 12/28 (held and boost), Pen VK     Date 1/8 1/15 1/29 2/9 2/16 2/21 3/1 3/9   Total Weekly Dose 30mg 32mg  18 mg/3 days 38mg 36mg 36mg 38 mg   INR 1.9 2.1 1.2 1.0 1.5 1.8 2.1 1.9   Notes Boost Keflex Zpak, held 3 doses S/p surgery 2/5 and held doses   Keflex finish 2/28 Boost; Keflex init     Date 3/13 3/20 3/27 4/3 4/10 4/17 4/20 4/24 5/1 5/8   Total Weekly Dose 28mg/5 days 38mg 38mg 38mg 26mg 41mg 36mg 36 mg 40 mg 38 mg   INR 2.7 2.8 2.1 1.7 1.5 3.00 1.9 1.8 2.3 2.3   Notes    Hold- pre proced Post procedure; boost boost Lower dose 4/19 (boost)  colchicine     Date 5/15 5/18 5/22        Total Weekly Dose  38 mg 30mg 30mg        INR 3.6 2.4 2.3        Notes hematoma            Phone Interview:  Tablet Strength: pt has 2mg tablets  Current Maintenance Dose: variable  Patient Contact Info: [phone]- Home with Son Yadiel; Ms. Wallace's cell phone number- [phone]     Patient Findings:  Negatives:   Signs/symptoms of thrombosis, Signs/symptoms of bleeding, Laboratory test error suspected, Change in health, Change in alcohol use, Change in activity, Upcoming invasive procedure,  Emergency department visit, Upcoming dental procedure, Missed doses, Extra doses, Change in medications, Change in diet/appetite, Hospital admission, Bruising, Other complaints   Comments:   Spoke with patient who denies any changes since last encounter     Plan:  1. INR is therapeutic on 5/22 at 2.3. Instructed patient to continue maintenance dose of warfarin 6mg daily except 4mg on Tuesday and Saturday.   2. Repeat INR Tuesday (5/29).  3. Verbal information provided over the phone to patient, who RBV dosing instructions, expresses understanding with teach back, and has no further questions at this time.  4. Patient declines the need for warfarin refills at this time.      Zina Aldrich CPhT  05/23/18  8:12 AM    Continue to monitor closely given downward trend with new lower therapeutic dose.  I, Fatemeh Rodríguez, PharmD, have reviewed the note in full and agree with the assessment and plan.  05/24/18  8:57 AM

## 2018-05-25 RX ORDER — ALPRAZOLAM 0.5 MG/1
0.5 TABLET ORAL DAILY
Qty: 30 TABLET | Refills: 2 | Status: SHIPPED | OUTPATIENT
Start: 2018-05-25 | End: 2018-08-22 | Stop reason: SDUPTHER

## 2018-05-29 ENCOUNTER — ANTICOAGULATION VISIT (OUTPATIENT)
Dept: PHARMACY | Facility: HOSPITAL | Age: 78
End: 2018-05-29

## 2018-05-29 DIAGNOSIS — I48.0 PAROXYSMAL ATRIAL FIBRILLATION (HCC): ICD-10-CM

## 2018-05-29 LAB — INR PPP: 3

## 2018-05-29 RX ORDER — AZITHROMYCIN 250 MG/1
TABLET, FILM COATED ORAL
Qty: 6 TABLET | Refills: 0 | Status: SHIPPED | OUTPATIENT
Start: 2018-05-29 | End: 2018-06-22 | Stop reason: SDUPTHER

## 2018-05-29 NOTE — PROGRESS NOTES
Anticoagulation Clinic - Remote Progress Note  HOME MONITOR  Testing Frequency: weekly    Indication: afib  Referring Provider: Heath  Goal INR: 2-3  Current Drug Interactions: amiodarone, levothyroxine; omeprazole, azaTHIOprine  CHADS-VASc: 5 (age, gender, HTN, DM)    Diet: rare GLV  Alcohol: none  Tobacco: none   OTC Pain Medication: APAP PRN    1st clinic visit: 9/14/17    INR History:  Date 8/31 9/7 9/14 9/21 9/28 10/2 10/9 10/16 10/23 11/9 11/16   Total Weekly Dose 38mg 38mg 10mg 18mg 26mg 26mg 30mg 28mg 28mg 28mg 28mg   INR 2.6 5.7 2.2 1.4 1.8 1.6 2.3 2.1 2.5 2.1 1.9   Notes amio BID amio BID 2 held doses amio once daily omepr omepr boost  Trintellix  Ate less, sick     Date 11/22 11/29 12/6 12/14 12/20 1/3/18 1/8 1/15 1/29   Total Weekly Dose 28mg 28mg 28mg 28mg 28mg 28mg 30mg 32mg    INR 2.3 2.4 2.1 1.9 1.7 1.3 1.9 2.1 1.2   Notes     amio dec 100mg procedure 12/28 (held and boost), Pen VK boost keflex Zpak, held 3 doses     Date 2/9 2/16 2/21 3/1 3/9 3/13 3/20 3/27 4/3 4/10   Total Weekly Dose 18 mg/3 days 38mg 36mg 36mg 38 mg 28mg/5 days 38mg 38mg 38mg 26mg   INR 1.0 1.5 1.8 2.1 1.9        Notes S/p surgery 2/5 and held doses   Keflex finish 2/28 Boost; Keflex init    Hold- pre procedure Post procedure; boost     Date 4/17 4/20 4/24 5/1 5/8 5/15 5/18 5/22 5/29    Total Weekly Dose 41mg 36mg 36mg 40mg 38mg 38mg 30mg 30mg 30mg    INR 3.00 1.9 1.8 2.3 2.3 3.6 2.4 2.3 3.0    Notes boost Lower dose 4/19 boost  colchicine hematoma    Zpak, 1 missed dose     Phone Interview:  Tablet Strength: 2mg tablets  Current Maintenance Dose: variable  Patient Contact Info: [phone]- Home with Son Yadiel; Ms. Wallace's cell phone number- [phone]     Patient Findings   Positives:   Change in medications   Negatives:   Signs/symptoms of thrombosis, Signs/symptoms of bleeding, Laboratory test error suspected, Change in health, Change in alcohol use, Change in activity, Upcoming invasive procedure, Emergency department  visit, Upcoming dental procedure, Missed doses, Extra doses, Change in diet/appetite, Hospital admission, Bruising, Other complaints   Comments:   Will start a Zpak tonight     Plan:  1. INR is therapeutic today at 3.0. Instructed patient to continue maintenance dose of warfarin 6mg daily except 4mg on TuesSat.  2. Repeat INR on Fri, 6/1 to make sure patient stays WNL with initiation of Zpak.  3. Verbal information provided over the phone to patient, who RBV dosing instructions, expresses understanding with teach back, and has no further questions at this time.  4. Patient declines the need for warfarin refills at this time.      Sawyer Gamez CP  5/29/2018  4:30 PM    Called Mrs. Wallace to request that she take a lower dose tonight, given recent INR trend / abx initiation. When discussing this with her, she reported that she had actually missed her dose of warfarin last night (her grandson had neurosurgery, so she was quite distracted). Therefore will plan for her to continue 6mg WedThurs. Repeat INR on Friday, as previously discussed.  IDesiree, [facility], have reviewed the note in full and agree with the assessment and plan.  05/30/18  8:44 AM

## 2018-06-01 ENCOUNTER — ANTICOAGULATION VISIT (OUTPATIENT)
Dept: PHARMACY | Facility: HOSPITAL | Age: 78
End: 2018-06-01

## 2018-06-01 DIAGNOSIS — I48.0 PAROXYSMAL ATRIAL FIBRILLATION (HCC): ICD-10-CM

## 2018-06-01 LAB — INR PPP: 2.5

## 2018-06-01 NOTE — PROGRESS NOTES
Anticoagulation Clinic - Remote Progress Note  HOME MONITOR  Testing Frequency: weekly    Indication: afib  Referring Provider: Heath  Goal INR: 2-3  Current Drug Interactions: amiodarone, levothyroxine; omeprazole, azaTHIOprine  CHADS-VASc: 5 (age, gender, HTN, DM)    Diet: rare GLV  Alcohol: none  Tobacco: none   OTC Pain Medication: APAP PRN    1st clinic visit: 9/14/17    INR History:  Date 8/31 9/7 9/14 9/21 9/28 10/2 10/9 10/16 10/23 11/9 11/16   Total Weekly Dose 38mg 38mg 10mg 18mg 26mg 26mg 30mg 28mg 28mg 28mg 28mg   INR 2.6 5.7 2.2 1.4 1.8 1.6 2.3 2.1 2.5 2.1 1.9   Notes amio BID amio BID 2 held doses amio once daily omepr omepr boost  Trintellix  Ate less, sick     Date 11/22 11/29 12/6 12/14 12/20 1/3/18 1/8 1/15 1/29   Total Weekly Dose 28mg 28mg 28mg 28mg 28mg 28mg 30mg 32mg    INR 2.3 2.4 2.1 1.9 1.7 1.3 1.9 2.1 1.2   Notes     amio dec 100mg procedure 12/28 (held and boost), Pen VK boost keflex Zpak, held 3 doses     Date 2/9 2/16 2/21 3/1 3/9 3/13 3/20 3/27 4/3 4/10   Total Weekly Dose 18 mg/3 days 38mg 36mg 36mg 38 mg 28mg/5 days 38mg 38mg 38mg 26mg   INR 1.0 1.5 1.8 2.1 1.9        Notes S/p surgery 2/5 and held doses   Keflex finish 2/28 Boost; Keflex init    Hold- pre procedure Post procedure; boost     Date 4/17 4/20 4/24 5/1 5/8 5/15 5/18 5/22 5/29 6/1   Total Weekly Dose 41mg 36mg 36mg 40mg 38mg 38mg 30mg 30mg 30mg 34mg   INR 3.00 1.9 1.8 2.3 2.3 3.6 2.4 2.3 3.0 2.5   Notes boost Lower dose 4/19 boost  colchicine hematoma    Zpak, 1 missed dose     Phone Interview:  Tablet Strength: 2mg tablets  Current Maintenance Dose: variable  Patient Contact Info: [phone]- Home with Son Yadiel; Ms. Wallace's cell phone number- [phone]     Patient Findings:   Positives:   Change in health, Change in medications   Negatives:   Signs/symptoms of thrombosis, Signs/symptoms of bleeding, Laboratory test error suspected, Change in alcohol use, Change in activity, Upcoming invasive procedure, Emergency  department visit, Upcoming dental procedure, Missed doses, Extra doses, Change in diet/appetite, Hospital admission, Bruising, Other complaints   Comments:   Patient will complete ZPak on Saturday, 6/2 for URI. Patient reports that she is still not feeling well.      Plan:  1. INR is therapeutic today at 2.5, despite missed dose. Instructed patient to continue maintenance dose of warfarin 6mg daily except 4mg on TuesSat.  2. Repeat INR on Monday, 6/4, in order to keep close watch on affects of Azithromycin.   3. Verbal information provided over the phone to patient, who RBV dosing instructions, expresses understanding with teach back, and has no further questions at this time.  4. Patient denies the need for warfarin refills at this time.      Elza To, Certified Pharmacy Technician   6/1/2018  12:16 PM     IBenjaimn, [facility], have reviewed the note in full and agree with the assessment and plan.  06/01/18  1:06 PM

## 2018-06-04 ENCOUNTER — ANTICOAGULATION VISIT (OUTPATIENT)
Dept: PHARMACY | Facility: HOSPITAL | Age: 78
End: 2018-06-04

## 2018-06-04 DIAGNOSIS — I48.0 PAROXYSMAL ATRIAL FIBRILLATION (HCC): ICD-10-CM

## 2018-06-04 LAB — INR PPP: 3

## 2018-06-04 NOTE — PROGRESS NOTES
Anticoagulation Clinic - Remote Progress Note  HOME MONITOR  Testing Frequency: weekly    Indication: afib  Referring Provider: Heath  Goal INR: 2-3  Current Drug Interactions: amiodarone, levothyroxine; omeprazole, azaTHIOprine  CHADS-VASc: 5 (age, gender, HTN, DM)    Diet: rare GLV  Alcohol: none  Tobacco: none   OTC Pain Medication: APAP PRN    1st clinic visit: 9/14/17    INR History:  Date 8/31 9/7 9/14 9/21 9/28 10/2 10/9 10/16 10/23 11/9 11/16   Total Weekly Dose 38mg 38mg 10mg 18mg 26mg 26mg 30mg 28mg 28mg 28mg 28mg   INR 2.6 5.7 2.2 1.4 1.8 1.6 2.3 2.1 2.5 2.1 1.9   Notes amio BID amio BID 2 held doses amio once daily omepr omepr boost  Trintellix  Ate less, sick     Date 11/22 11/29 12/6 12/14 12/20 1/3/18 1/8 1/15 1/29   Total Weekly Dose 28mg 28mg 28mg 28mg 28mg 28mg 30mg 32mg    INR 2.3 2.4 2.1 1.9 1.7 1.3 1.9 2.1 1.2   Notes     amio dec 100mg procedure 12/28 (held and boost), Pen VK boost keflex Zpak, held 3 doses     Date 2/9 2/16 2/21 3/1 3/9 3/13 3/20 3/27 4/3 4/10   Total Weekly Dose 18 mg/3 days 38mg 36mg 36mg 38 mg 28mg/5 days 38mg 38mg 38mg 26mg   INR 1.0 1.5 1.8 2.1 1.9        Notes S/p surgery 2/5 and held doses   Keflex finish 2/28 Boost; Keflex init    Hold- pre procedure Post procedure; boost     Date 4/17 4/20 4/24 5/1 5/8 5/15 5/18 5/22 5/29 6/1   Total Weekly Dose 41mg 36mg 36mg 40mg 38mg 38mg 30mg 30mg 30mg 34mg   INR 3.00 1.9 1.8 2.3 2.3 3.6 2.4 2.3 3.0 2.5   Notes boost Lower dose 4/19 boost  colchicine hematoma    Zpak, 1 missed dose     Date 6/4     Total Weekly Dose 34mg     INR 3.0     Notes        Phone Interview:  Tablet Strength: 2mg tablets  Current Maintenance Dose: variable  Patient Contact Info: [phone]- Home with Son Yadiel; Ms. Wallace's cell phone number- [phone]     Patient Findings:  Negatives:   Signs/symptoms of thrombosis, Signs/symptoms of bleeding, Laboratory test error suspected, Change in health, Change in alcohol use, Change in activity, Upcoming  invasive procedure, Emergency department visit, Upcoming dental procedure, Missed doses, Extra doses, Change in medications, Change in diet/appetite, Hospital admission, Bruising, Other complaints   Comments:   Spoke with patient who states she has finished her Zpak (5/29 - 6/2)  and there have been no further changes since last encounter     Plan:  1. INR is therapeutic today (6/4) at 3.0. Instructed patient to continue maintenance dose of warfarin 6mg daily except 4mg on TuesSat.  2. Repeat INR on Thursday (6/7)  3. Verbal information provided over the phone to patient, who RBV dosing instructions, expresses understanding with teach back, and has no further questions at this time.  4. Patient denies the need for warfarin refills at this time.      Zina Aldrich, Valentin  6/4/2018  11:11 AM     I, Radha Michaud, PharmD, have reviewed the note in full and agree with the assessment and plan.  Adjusted date of repeat INR to this Thursday as noted above due to delayed DDI with azithromycin.  Patient at top of therapeutic range.  06/04/18  3:50 PM

## 2018-06-06 ENCOUNTER — HOSPITAL ENCOUNTER (OUTPATIENT)
Dept: GENERAL RADIOLOGY | Facility: HOSPITAL | Age: 78
Discharge: HOME | End: 2018-06-06
Attending: INTERNAL MEDICINE | Admitting: INTERNAL MEDICINE

## 2018-06-06 ENCOUNTER — TRANSCRIBE ORDERS (OUTPATIENT)
Dept: INTERVENTIONAL RADIOLOGY/VASCULAR | Facility: HOSPITAL | Age: 78
End: 2018-06-06

## 2018-06-06 DIAGNOSIS — J18.9 PNEUMONIA, UNSPECIFIED ORGANISM: Primary | ICD-10-CM

## 2018-06-06 PROCEDURE — 71046 X-RAY EXAM CHEST 2 VIEWS: CPT

## 2018-06-07 ENCOUNTER — ANTICOAGULATION VISIT (OUTPATIENT)
Dept: PHARMACY | Facility: HOSPITAL | Age: 78
End: 2018-06-07

## 2018-06-07 DIAGNOSIS — I48.0 PAROXYSMAL ATRIAL FIBRILLATION (HCC): ICD-10-CM

## 2018-06-07 LAB — INR PPP: 3.4

## 2018-06-07 NOTE — PROGRESS NOTES
Anticoagulation Clinic - Remote Progress Note  HOME MONITOR  Testing Frequency: weekly    Indication: afib  Referring Provider: Heath  Goal INR: 2-3  Current Drug Interactions: amiodarone, levothyroxine; omeprazole, azaTHIOprine  CHADS-VASc: 5 (age, gender, HTN, DM)    Diet: rare GLV  Alcohol: none  Tobacco: none   OTC Pain Medication: APAP PRN    1st clinic visit: 9/14/17    INR History:  Date 2/9 2/16 2/21 3/1 3/9 3/13 3/20 3/27 4/3 4/10   Total Weekly Dose 18 mg/3 days 38mg 36mg 36mg 38 mg 28mg/5 days 38mg 38mg 38mg 26mg   INR 1.0 1.5 1.8 2.1 1.9        Notes s/p surgery 2/5 and held doses   Keflex finish 2/28 Boost; Keflex init    Hold- pre procedure Post procedure; boost     Date 4/17 4/20 4/24 5/1 5/8 5/15 5/18 5/22 5/29 6/1   Total Weekly Dose 41mg 36mg 36mg 40mg 38mg 38mg 28mg 28mg 38mg 34mg   INR 3.00 1.9 1.8 2.3 2.3 3.6 2.4 2.3 3.0 2.5   Notes boost Lower dose 4/19 boost  colchicine hematoma    Zpak, 1 missed dose     Date 6/4 6/7           Total Weekly Dose 34mg 38mg           INR 3.0 3.4           Notes               Phone Interview:  Tablet Strength: 2mg tablets  Current Maintenance Dose: variable  Patient Contact Info: [phone]- Home with Peewee Cotto; Ms. Wallace's cell phone number- [phone]     Patient Findings:  Negatives:   Signs/symptoms of thrombosis, Signs/symptoms of bleeding, Laboratory test error suspected, Change in health, Change in alcohol use, Change in activity, Upcoming invasive procedure, Emergency department visit, Upcoming dental procedure, Missed doses, Extra doses, Change in medications, Change in diet/appetite, Hospital admission, Bruising, Other complaints   Comments:   Mrs. Wallace had xrays yesterday for an upper respiratory tract infection (cough) but has not heard back re: results.      Plan:  1. INR is slightly supratherapeutic, so instructed Mrs. Wallace to take warfarin 2mg tonight (10.5% decr.), then continue warfarin 6mg daily except 4mg TuesSat.  2. Repeat INR in one  week. Encouraged pt to please call us with any medication changes in the meantime (ex, new abx).  3. Verbal information provided over the phone.  Wallace RBV dosing instructions, expresses understanding with teach back, and has no further questions at this time.     Ann Cowden Mayer, PharmD  6/7/2018  3:57 PM

## 2018-06-14 ENCOUNTER — ANTICOAGULATION VISIT (OUTPATIENT)
Dept: PHARMACY | Facility: HOSPITAL | Age: 78
End: 2018-06-14

## 2018-06-14 DIAGNOSIS — I48.0 PAROXYSMAL ATRIAL FIBRILLATION (HCC): ICD-10-CM

## 2018-06-14 LAB — INR PPP: 4.3

## 2018-06-14 NOTE — PROGRESS NOTES
Anticoagulation Clinic - Remote Progress Note  HOME MONITOR  Testing Frequency: weekly    Indication: afib  Referring Provider: Heath  Goal INR: 2-3  Current Drug Interactions: amiodarone, levothyroxine; omeprazole, azaTHIOprine  CHADS-VASc: 5 (age, gender, HTN, DM)    Diet: rare GLV  Alcohol: none  Tobacco: none   OTC Pain Medication: APAP PRN    1st clinic visit: 9/14/17    INR History:  Date 2/9 2/16 2/21 3/1 3/9 3/13 3/20 3/27 4/3 4/10   Total Weekly Dose 18 mg/3 days 38mg 36mg 36mg 38 mg 28mg/5 days 38mg 38mg 38mg 26mg   INR 1.0 1.5 1.8 2.1 1.9        Notes s/p surgery 2/5 and held doses   Keflex finish 2/28 Boost; Keflex init    Hold- pre procedure Post procedure; boost     Date 4/17 4/20 4/24 5/1 5/8 5/15 5/18 5/22 5/29 6/1   Total Weekly Dose 41mg 36mg 36mg 40mg 38mg 38mg 28mg 28mg 38mg 34mg   INR 3.00 1.9 1.8 2.3 2.3 3.6 2.4 2.3 3.0 2.5   Notes boost Lower dose 4/19 boost  colchicine hematoma    Zpak, 1 missed dose     Date 6/4 6/7 6/14          Total Weekly Dose 34mg 38mg 34mg          INR 3.0 3.4 4.3          Notes   Keflex            Phone Interview:  Tablet Strength: 2mg tablets  Current Maintenance Dose: variable  Patient Contact Info: [phone]- Home with Peewee Cotto; Ms. Wallace's cell phone number- [phone]     Patient Findings   Positives:   Change in diet/appetite, Other complaints   Negatives:   Signs/symptoms of thrombosis, Signs/symptoms of bleeding, Laboratory test error suspected, Change in health, Change in alcohol use, Change in activity, Upcoming invasive procedure, Emergency department visit, Upcoming dental procedure, Missed doses, Extra doses, Change in medications, Hospital admission, Bruising   Comments:   Mrs. Wallace reports she is drinking liquaCel arginine protien drinks, and is eating more sandwiches due to additional stress in her life. She discussed that her lung results came back fine, however, she has been under an extreme amount of stress in the past few weeks. (Due to  18 year old grandson with brain cancer, her granddaughter is now getting a divorce, and her other granddaughter is pregnant).     Addendum 6/15 Patient was prescribed 500mg 2 tablets once today and once tomorrow- large dose decrease yesterday. Encouraged patient to eat a serving of GLV     Plan:  1. INR is supratherapeutic. Instructed Mrs. Wallace to HOLD warfarin tonight and take 4mg Friday; 6mg Saturday; 4mg Sunday; 6mg Monday; and repeat INR on Tuesday.   2. Repeat INR on Tuesday 6/19.  3. Verbal information provided over the phone. Mrs. Wallace RBV dosing instructions, expresses understanding with teach back, and has no further questions at this time.     Fatemeh Rodríguez, PharmD  6/14/2018  4:30 PM

## 2018-06-19 ENCOUNTER — ANTICOAGULATION VISIT (OUTPATIENT)
Dept: PHARMACY | Facility: HOSPITAL | Age: 78
End: 2018-06-19

## 2018-06-19 DIAGNOSIS — I48.0 PAROXYSMAL ATRIAL FIBRILLATION (HCC): ICD-10-CM

## 2018-06-19 LAB — INR PPP: 2.5

## 2018-06-19 NOTE — PROGRESS NOTES
Anticoagulation Clinic - Remote Progress Note  HOME MONITOR  Testing Frequency: weekly    Indication: afib  Referring Provider: Heath  Goal INR: 2-3  Current Drug Interactions: amiodarone, levothyroxine; omeprazole, azaTHIOprine  CHADS-VASc: 5 (age, gender, HTN, DM)    Diet: rare GLV  Alcohol: none  Tobacco: none   OTC Pain Medication: APAP PRN    1st clinic visit: 9/14/17    INR History:  Date 2/9 2/16 2/21 3/1 3/9 3/13 3/20 3/27 4/3 4/10   Total Weekly Dose 18 mg/3 days 38mg 36mg 36mg 38 mg 28mg/5 days 38mg 38mg 38mg 26mg   INR 1.0 1.5 1.8 2.1 1.9        Notes s/p surgery 2/5 and held doses   Keflex finish 2/28 Boost; Keflex init    Hold- pre procedure Post procedure; boost     Date 4/17 4/20 4/24 5/1 5/8 5/15 5/18 5/22 5/29 6/1   Total Weekly Dose 41mg 36mg 36mg 40mg 38mg 38mg 28mg 28mg 38mg 34mg   INR 3.00 1.9 1.8 2.3 2.3 3.6 2.4 2.3 3.0 2.5   Notes boost Lower dose 4/19 boost  colchicine hematoma    Zpak, 1 missed dose     Date 6/4 6/7 6/14 6/19         Total Weekly Dose 34mg 38mg 34mg 30 mg         INR 3.0 3.4 4.3 2.5         Notes   Keflex Held x1           Phone Interview:  Tablet Strength: 2mg tablets  Current Maintenance Dose: variable  Patient Contact Info: [phone]- Home with Peewee Cotto; Ms. Wallace's cell phone number- [phone]     Patient Findings   Negatives:   Signs/symptoms of thrombosis, Signs/symptoms of bleeding, Laboratory test error suspected, Change in health, Change in alcohol use, Change in activity, Upcoming invasive procedure, Emergency department visit, Upcoming dental procedure, Missed doses, Extra doses, Change in medications, Change in diet/appetite, Hospital admission, Bruising, Other complaints   Comments:   Mrs. Wallace reports she is drinking liquaCel arginine protein drinks 3-4 x week, and is eating more sandwiches due to additional stress in her life. She discussed that her lung results came back fine, however, she has been under an extreme amount of stress in the past few  weeks. (Due to 18 year old grandson with brain cancer, her granddaughter is now getting a divorce, and her other granddaughter is pregnant).        Plan:  1. INR is therapeutic at 2.5. Instructed Mrs. Wallace to take warfarin 4 mg daily except 6 mg on MWF. This is 34 mg/week and she held a dose last week for 30 mg/week after her supratherapeutic INR.   2. Repeat INR on Tuesday 6/26, attempted to coordinate with Dr. Joe's appointment, but she is unsure with having someone bring her and whether she would be able to come to our clinic as well, so left 6/26 as her test date for repeat INR.  3. Verbal information provided over the phone. Mrs. Wallace RBV dosing instructions, expresses understanding with teach back, and has no further questions at this time.     Sawyer Jackman, [facility]  6/19/2018  3:42 PM

## 2018-06-22 ENCOUNTER — TELEPHONE (OUTPATIENT)
Dept: INTERNAL MEDICINE | Facility: CLINIC | Age: 78
End: 2018-06-22

## 2018-06-22 RX ORDER — AZITHROMYCIN 250 MG/1
TABLET, FILM COATED ORAL
Qty: 6 TABLET | Refills: 0 | Status: SHIPPED | OUTPATIENT
Start: 2018-06-22 | End: 2018-06-25

## 2018-06-22 NOTE — TELEPHONE ENCOUNTER
Sent in script. Pt is going next week for INR check and I advised to let them know she has been on meds. Also adivsed pt to come in if not better with zpack

## 2018-06-25 ENCOUNTER — OFFICE VISIT (OUTPATIENT)
Dept: INTERNAL MEDICINE | Facility: CLINIC | Age: 78
End: 2018-06-25

## 2018-06-25 VITALS
HEART RATE: 68 BPM | HEIGHT: 63 IN | SYSTOLIC BLOOD PRESSURE: 130 MMHG | TEMPERATURE: 97.4 F | OXYGEN SATURATION: 96 % | DIASTOLIC BLOOD PRESSURE: 60 MMHG

## 2018-06-25 DIAGNOSIS — I35.0 NONRHEUMATIC AORTIC VALVE STENOSIS: ICD-10-CM

## 2018-06-25 DIAGNOSIS — N18.9 ANEMIA OF RENAL DISEASE: ICD-10-CM

## 2018-06-25 DIAGNOSIS — E03.9 HYPOTHYROIDISM, UNSPECIFIED TYPE: ICD-10-CM

## 2018-06-25 DIAGNOSIS — I10 ESSENTIAL HYPERTENSION: Primary | ICD-10-CM

## 2018-06-25 DIAGNOSIS — K25.7: ICD-10-CM

## 2018-06-25 DIAGNOSIS — E55.9 VITAMIN D DEFICIENCY: ICD-10-CM

## 2018-06-25 DIAGNOSIS — D63.1 ANEMIA OF RENAL DISEASE: ICD-10-CM

## 2018-06-25 DIAGNOSIS — Z79.4 DIABETES MELLITUS DUE TO UNDERLYING CONDITION WITH HYPEROSMOLARITY AND COMA, WITH LONG-TERM CURRENT USE OF INSULIN (HCC): ICD-10-CM

## 2018-06-25 DIAGNOSIS — E08.01 DIABETES MELLITUS DUE TO UNDERLYING CONDITION WITH HYPEROSMOLARITY AND COMA, WITH LONG-TERM CURRENT USE OF INSULIN (HCC): ICD-10-CM

## 2018-06-25 DIAGNOSIS — I48.0 PAROXYSMAL ATRIAL FIBRILLATION (HCC): ICD-10-CM

## 2018-06-25 DIAGNOSIS — J18.9 COMMUNITY ACQUIRED PNEUMONIA OF LEFT LOWER LOBE OF LUNG: ICD-10-CM

## 2018-06-25 DIAGNOSIS — N18.4 CHRONIC KIDNEY DISEASE, STAGE IV (SEVERE) (HCC): ICD-10-CM

## 2018-06-25 DIAGNOSIS — I50.83 HIGH OUTPUT HF (HEART FAILURE) (HCC): ICD-10-CM

## 2018-06-25 PROCEDURE — 99214 OFFICE O/P EST MOD 30 MIN: CPT | Performed by: INTERNAL MEDICINE

## 2018-06-25 RX ORDER — DOXYCYCLINE 100 MG/1
100 CAPSULE ORAL 2 TIMES DAILY
Qty: 20 CAPSULE | Refills: 0 | Status: SHIPPED | OUTPATIENT
Start: 2018-06-25 | End: 2018-08-14

## 2018-06-25 NOTE — PROGRESS NOTES
Patient is a 78 y.o. female who is here for a follow up of cough, on 2nd round of antiobiotics.  Chief Complaint   Patient presents with   • Cough         HPI:  Here for f/u.  Patient with increased continued to have cough and congestion.  No fever.  Has completed 2 rounds of antibiotics.  Had discolored expectoration but now more whitish in color.  Some sob.  No pleuritic pain.  Occasional wheezing.      History:    Patient Active Problem List   Diagnosis   • Paroxysmal atrial fibrillation   • Essential hypertension   • Type 2 diabetes mellitus   • Chronic kidney disease, stage IV (severe)   • Anemia of renal disease   • Hyperparathyroidism   • Asthma   • Right-sided carotid artery disease   • High output HF (heart failure)   • Vitamin D deficiency   • Nonrheumatic aortic valve stenosis   • Ulcer of gastric fundus   • Tubular adenoma   • Macular degeneration   • Hypothyroidism   • Diabetes mellitus   • Chronic kidney disease   • Community acquired pneumonia of left lower lobe of lung       Past Medical History:   Diagnosis Date   • Adenomatous colon polyp    • Chronic kidney disease    • Clostridium difficile infection    • Diabetes mellitus    • Gastric polyps    • Hypothyroidism    • IgA nephropathy, acute    • Macular degeneration    • Tubular adenoma     excision    • Ulcer of gastric fundus    • Vitamin D deficiency        Past Surgical History:   Procedure Laterality Date   • BREAST BIOPSY Left 1990'S   • CARPAL TUNNEL RELEASE     • CARPAL TUNNEL RELEASE Right    • CATARACT EXTRACTION     • CATARACT EXTRACTION Bilateral    • DIAGNOSTIC LAPAROSCOPY     • DIALYSIS FISTULA CREATION     • HERNIA REPAIR  85 and 86   • LAPAROSCOPIC TUBAL LIGATION  1985   • TOTAL KNEE ARTHROPLASTY     • TOTAL KNEE ARTHROPLASTY      Left knee    • TRANSPLANTATION RENAL  2010   • TRANSPLANTATION RENAL Right    • TRIGGER FINGER RELEASE     • TUBAL ABDOMINAL LIGATION     • UPPER GASTROINTESTINAL ENDOSCOPY  05/20/2013       Current  Outpatient Prescriptions on File Prior to Visit   Medication Sig   • ALPRAZolam (XANAX) 0.5 MG tablet Take 1 tablet by mouth Daily.   • amiodarone (PACERONE) 200 MG tablet 0.5 tablets Daily.   • amLODIPine (NORVASC) 5 MG tablet 2 (Two) Times a Day.   • azaTHIOprine (IMURAN) 50 MG tablet Take 50 mg by mouth Daily.   • calcitriol (ROCALTROL) 0.25 MCG capsule Take 0.25 mcg by mouth Daily.   • calcium acetate (PHOSLO) 667 MG capsule Take 1,334 mg by mouth 3 (Three) Times a Day With Meals.   • Cholecalciferol (VITAMIN D) 2000 UNITS tablet Take 2,000 Units by mouth Daily.   • COLCRYS 0.6 MG tablet 0.5 tablets Daily.   • Fluticasone Furoate-Vilanterol (BREO ELLIPTA) 200-25 MCG/INH aerosol powder  Inhale 1 puff As Needed.   • furosemide (LASIX) 40 MG tablet Take 40 mg by mouth 2 (Two) Times a Day.   • gentamicin (GARAMYCIN) 0.1 % cream    • LANTUS SOLOSTAR 100 UNIT/ML injection pen Inject 12 Units under the skin Every Night.   • levothyroxine (SYNTHROID, LEVOTHROID) 75 MCG tablet Take 75 mcg by mouth Daily.   • metoprolol tartrate (LOPRESSOR) 100 MG tablet 2 (Two) Times a Day.   • omeprazole (priLOSEC) 40 MG capsule Take 40 mg by mouth Daily.   • pravastatin (PRAVACHOL) 40 MG tablet Take 40 mg by mouth daily.   • sevelamer (RENVELA) 800 MG tablet Take 800 mg by mouth 3 (Three) Times a Day With Meals.   • sodium bicarbonate 650 MG tablet Take 1,300 mg by mouth 2 (Two) Times a Day.   • tacrolimus (PROGRAF) 0.5 MG capsule Take 0.5 mg by mouth 2 (Two) Times a Day.   • [DISCONTINUED] azithromycin (ZITHROMAX Z-LUPIS) 250 MG tablet Take 2 tablets the first day, then 1 tablet daily for 4 days.   • warfarin (COUMADIN) 2 MG tablet Take 4 mg by mouth Daily. Patient takes two tablets daily   • warfarin (COUMADIN) 2 MG tablet TAKE THREE TABLETS BY MOUTH DAILY MONDAY AND THURSDAY AND THEN TWO  TABLETS ALL OTHER DAYS   • warfarin (COUMADIN) 2 MG tablet 2 tablets Daily.   • [DISCONTINUED] COLCRYS 0.6 MG tablet    • [DISCONTINUED]  levothyroxine (SYNTHROID, LEVOTHROID) 75 MCG tablet Daily.   • [DISCONTINUED] metoprolol tartrate (LOPRESSOR) 100 MG tablet 2 (Two) Times a Day.   • [DISCONTINUED] omeprazole (priLOSEC) 40 MG capsule Daily.   • [DISCONTINUED] pravastatin (PRAVACHOL) 40 MG tablet Every Night.   • [DISCONTINUED] PROAIR  (90 BASE) MCG/ACT inhaler Inhale 2 puffs Every 6 (Six) Hours As Needed.   • [DISCONTINUED] Sevelamer Carbonate (RENVELA PO) Take  by mouth 3 (Three) Times a Day.   • [DISCONTINUED] tacrolimus (PROGRAF) 1 MG capsule Take 2 mg by mouth 2 (Two) Times a Day.     No current facility-administered medications on file prior to visit.        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 Negative for chills, fatigue and fever.   HENT: Negative for congestion, ear pain, hearing loss, rhinorrhea, sinus pressure, sore throat and trouble swallowing.    Eyes: Negative for discharge and itching.   Respiratory: Positive for cough and shortness of breath. Negative for chest tightness.    Cardiovascular: Positive for leg swelling. Negative for chest pain and  "palpitations.   Gastrointestinal: Negative for abdominal pain, blood in stool, constipation, diarrhea and vomiting.        10/17 by Dr Perez, next 10/20   Endocrine: Negative for polydipsia and polyuria.   Genitourinary: Negative for difficulty urinating, dysuria, enuresis, frequency, hematuria and urgency.        11/17 mammogram   Musculoskeletal: Positive for arthralgias and gait problem. Negative for back pain and joint swelling.   Skin: Negative for rash and wound.   Allergic/Immunologic: Negative for immunocompromised state.   Neurological: Negative for dizziness, syncope, weakness, light-headedness, numbness and headaches.   Hematological: Bruises/bleeds easily.   Psychiatric/Behavioral: Positive for sleep disturbance. Negative for behavioral problems and dysphoric mood. The patient is not nervous/anxious.        /60   Pulse 68   Temp 97.4 °F (36.3 °C) (Temporal Artery )   Ht 160 cm (62.99\")   LMP  (LMP Unknown)   SpO2 96%       Physical Exam   Constitutional: She is oriented to person, place, and time. She appears well-developed and well-nourished.   HENT:   Head: Normocephalic and atraumatic.   Right Ear: External ear normal.   Left Ear: External ear normal.   Mouth/Throat: Oropharynx is clear and moist.   Eyes: Conjunctivae and EOM are normal.   Neck: Normal range of motion. Neck supple.   Cardiovascular: Normal rate, regular rhythm and normal heart sounds.    Pulmonary/Chest: Effort normal. She has rales (LLL).   Abdominal: Soft. Bowel sounds are normal.   Musculoskeletal: She exhibits edema.   Using cane   Lymphadenopathy:     She has no cervical adenopathy.   Neurological: She is alert and oriented to person, place, and time.   Skin: Skin is warm and dry.   Psychiatric: She has a normal mood and affect. Her behavior is normal. Thought content normal.       Procedure:      Discussion/Summary:    HTN-advised to monitor and goal 130/80  DM-labs today , will wean off insulin and advised of " "goals  Hyperlipidemia-counseled on diet  ESRD-per Renal  AOCD-\"  \"  Vit D-labs on rtc  Gout-UA level soon  afib-rate controlled  hypothroid-labs on rtc  CAP-will dc zpack and place on doxy and Albuterol MDI  High risk meds-labs on rtc     Prior records noted    Current Outpatient Prescriptions:   •  ALPRAZolam (XANAX) 0.5 MG tablet, Take 1 tablet by mouth Daily., Disp: 30 tablet, Rfl: 2  •  amiodarone (PACERONE) 200 MG tablet, 0.5 tablets Daily., Disp: , Rfl:   •  amLODIPine (NORVASC) 5 MG tablet, 2 (Two) Times a Day., Disp: , Rfl:   •  azaTHIOprine (IMURAN) 50 MG tablet, Take 50 mg by mouth Daily., Disp: , Rfl:   •  calcitriol (ROCALTROL) 0.25 MCG capsule, Take 0.25 mcg by mouth Daily., Disp: , Rfl:   •  calcium acetate (PHOSLO) 667 MG capsule, Take 1,334 mg by mouth 3 (Three) Times a Day With Meals., Disp: , Rfl:   •  Cholecalciferol (VITAMIN D) 2000 UNITS tablet, Take 2,000 Units by mouth Daily., Disp: , Rfl:   •  COLCRYS 0.6 MG tablet, 0.5 tablets Daily., Disp: , Rfl:   •  Fluticasone Furoate-Vilanterol (BREO ELLIPTA) 200-25 MCG/INH aerosol powder , Inhale 1 puff As Needed., Disp: , Rfl:   •  furosemide (LASIX) 40 MG tablet, Take 40 mg by mouth 2 (Two) Times a Day., Disp: , Rfl:   •  gentamicin (GARAMYCIN) 0.1 % cream, , Disp: , Rfl:   •  LANTUS SOLOSTAR 100 UNIT/ML injection pen, Inject 12 Units under the skin Every Night., Disp: , Rfl:   •  levothyroxine (SYNTHROID, LEVOTHROID) 75 MCG tablet, Take 75 mcg by mouth Daily., Disp: , Rfl:   •  metoprolol tartrate (LOPRESSOR) 100 MG tablet, 2 (Two) Times a Day., Disp: , Rfl:   •  omeprazole (priLOSEC) 40 MG capsule, Take 40 mg by mouth Daily., Disp: , Rfl:   •  pravastatin (PRAVACHOL) 40 MG tablet, Take 40 mg by mouth daily., Disp: , Rfl:   •  sevelamer (RENVELA) 800 MG tablet, Take 800 mg by mouth 3 (Three) Times a Day With Meals., Disp: , Rfl:   •  sodium bicarbonate 650 MG tablet, Take 1,300 mg by mouth 2 (Two) Times a Day., Disp: , Rfl:   •  tacrolimus (PROGRAF) " 0.5 MG capsule, Take 0.5 mg by mouth 2 (Two) Times a Day., Disp: , Rfl:   •  doxycycline (MONODOX) 100 MG capsule, Take 1 capsule by mouth 2 (Two) Times a Day. Need INR check 2 times a week on antibiotic, Disp: 20 capsule, Rfl: 0  •  PROAIR  (90 Base) MCG/ACT inhaler, Inhale 2 puffs Every 6 (Six) Hours As Needed for Wheezing or Shortness of Air., Disp: 1 inhaler, Rfl: 4  •  warfarin (COUMADIN) 2 MG tablet, Take 4 mg by mouth Daily. Patient takes two tablets daily, Disp: , Rfl:   •  warfarin (COUMADIN) 2 MG tablet, TAKE THREE TABLETS BY MOUTH DAILY MONDAY AND THURSDAY AND THEN TWO  TABLETS ALL OTHER DAYS, Disp: 192 tablet, Rfl: 2  •  warfarin (COUMADIN) 2 MG tablet, 2 tablets Daily., Disp: , Rfl:         Moni was seen today for cough.    Diagnoses and all orders for this visit:    Essential hypertension    High output HF (heart failure)    Nonrheumatic aortic valve stenosis    Paroxysmal atrial fibrillation    Community acquired pneumonia of left lower lobe of lung  -     doxycycline (MONODOX) 100 MG capsule; Take 1 capsule by mouth 2 (Two) Times a Day. Need INR check 2 times a week on antibiotic  -     PROAIR  (90 Base) MCG/ACT inhaler; Inhale 2 puffs Every 6 (Six) Hours As Needed for Wheezing or Shortness of Air.    Chronic ulcer of gastric fundus    Vitamin D deficiency    Diabetes mellitus due to underlying condition with hyperosmolarity and coma, with long-term current use of insulin    Hypothyroidism, unspecified type    Anemia of renal disease    Chronic kidney disease, stage IV (severe)

## 2018-06-26 ENCOUNTER — OFFICE VISIT (OUTPATIENT)
Dept: CARDIOLOGY | Facility: CLINIC | Age: 78
End: 2018-06-26

## 2018-06-26 ENCOUNTER — ANTICOAGULATION VISIT (OUTPATIENT)
Dept: PHARMACY | Facility: HOSPITAL | Age: 78
End: 2018-06-26

## 2018-06-26 VITALS
SYSTOLIC BLOOD PRESSURE: 128 MMHG | HEIGHT: 63 IN | HEART RATE: 66 BPM | BODY MASS INDEX: 29.91 KG/M2 | DIASTOLIC BLOOD PRESSURE: 62 MMHG | WEIGHT: 168.8 LBS

## 2018-06-26 DIAGNOSIS — I10 ESSENTIAL HYPERTENSION: ICD-10-CM

## 2018-06-26 DIAGNOSIS — I77.9 RIGHT-SIDED CAROTID ARTERY DISEASE (HCC): ICD-10-CM

## 2018-06-26 DIAGNOSIS — I48.0 PAROXYSMAL ATRIAL FIBRILLATION (HCC): Primary | ICD-10-CM

## 2018-06-26 DIAGNOSIS — I50.83 HIGH OUTPUT HF (HEART FAILURE) (HCC): ICD-10-CM

## 2018-06-26 DIAGNOSIS — I35.0 NONRHEUMATIC AORTIC VALVE STENOSIS: ICD-10-CM

## 2018-06-26 DIAGNOSIS — I48.0 PAROXYSMAL ATRIAL FIBRILLATION (HCC): ICD-10-CM

## 2018-06-26 LAB
INR PPP: 3.6 (ref 0.91–1.09)
PROTHROMBIN TIME: 43.8 SECONDS (ref 10–13.8)

## 2018-06-26 PROCEDURE — 93000 ELECTROCARDIOGRAM COMPLETE: CPT | Performed by: NURSE PRACTITIONER

## 2018-06-26 PROCEDURE — 36416 COLLJ CAPILLARY BLOOD SPEC: CPT

## 2018-06-26 PROCEDURE — 99214 OFFICE O/P EST MOD 30 MIN: CPT | Performed by: NURSE PRACTITIONER

## 2018-06-26 PROCEDURE — G0463 HOSPITAL OUTPT CLINIC VISIT: HCPCS

## 2018-06-26 PROCEDURE — 85610 PROTHROMBIN TIME: CPT

## 2018-06-26 NOTE — PROGRESS NOTES
`Encounter Date:06/26/2018    Patient ID: Moni Wallace is a 78 y.o. female who resides in [place], [place]    CC/Reason for visit:  Paroxysmal atrial fibrillation            Moni Wallace returns today for follow-up of her atrial fibrillation and high output heart failure.  She underwent an AV fistula ligation last August that resolved her heart failure symptoms.  She has since been started on peritoneal dialysis and is doing well.  She denies any acute heart failure symptoms and feels she is overall doing well from a cardiac standpoint.  She has been having issues with pneumonia and has been on 2 different antibiotics with the most recent being in doxycycline.  Her primary care providers instructed her to follow-up with the anticoagulation clinic for INR results weekly while taking antibiotics.  She reports that her INRs have been somewhat all over the place ranging from 2.5 all the way to 4.  She follows Dr. Hodges for her chronic kidney disease.  She denies any episodes of chest pain, orthopnea, palpitations or syncope.  She has known proximal is more atrial fibrillation but thinks she has stayed in rhythm.  She is still taking daily low-dose amiodarone.  She has known mild nonobstructive right carotid artery disease for which she is asymptomatic.  She denies any signs or symptoms to suggest a TIA or stroke.  She denies anginal symptoms as well.    Review of Systems   HENT: Positive for hearing loss and tinnitus.    Eyes: Positive for blurred vision.   Cardiovascular: Positive for dyspnea on exertion and near-syncope.   Respiratory: Positive for cough, shortness of breath, sputum production and wheezing.    Endocrine: Positive for cold intolerance.   Hematologic/Lymphatic: Bruises/bleeds easily.   Skin: Positive for dry skin, itching and poor wound healing.   Musculoskeletal: Positive for arthritis, gout and muscle cramps.   Neurological: Positive for disturbances in coordination.   Psychiatric/Behavioral:  The patient is nervous/anxious.    Allergic/Immunologic: Positive for environmental allergies.   All other systems reviewed and are negative.      The patient's past medical, social, family history and ROS reviewed in the patient's electronic medical record.    Allergies  Codeine; Nsaids; Sulfa antibiotics; Codeine; and Sulfa antibiotics    Outpatient Prescriptions Marked as Taking for the 6/26/18 encounter (Office Visit) with REY Brown   Medication Sig Dispense Refill   • ALPRAZolam (XANAX) 0.5 MG tablet Take 1 tablet by mouth Daily. 30 tablet 2   • amiodarone (PACERONE) 200 MG tablet 0.5 tablets Daily.     • amLODIPine (NORVASC) 5 MG tablet 2 (Two) Times a Day.     • calcitriol (ROCALTROL) 0.25 MCG capsule Take 0.25 mcg by mouth Daily.     • Cholecalciferol (VITAMIN D) 2000 UNITS tablet Take 2,000 Units by mouth Daily.     • COLCRYS 0.6 MG tablet 0.5 tablets As Needed.     • doxycycline (MONODOX) 100 MG capsule Take 1 capsule by mouth 2 (Two) Times a Day. Need INR check 2 times a week on antibiotic 20 capsule 0   • furosemide (LASIX) 40 MG tablet Take 40 mg by mouth 2 (Two) Times a Day.     • gentamicin (GARAMYCIN) 0.1 % cream      • LANTUS SOLOSTAR 100 UNIT/ML injection pen Inject 5 Units under the skin Every Night.     • levothyroxine (SYNTHROID, LEVOTHROID) 75 MCG tablet Take 75 mcg by mouth Daily.     • metoprolol tartrate (LOPRESSOR) 100 MG tablet 2 (Two) Times a Day.     • omeprazole (priLOSEC) 40 MG capsule Take 40 mg by mouth Daily.     • pravastatin (PRAVACHOL) 40 MG tablet Take 40 mg by mouth daily.     • PROAIR  (90 Base) MCG/ACT inhaler Inhale 2 puffs Every 6 (Six) Hours As Needed for Wheezing or Shortness of Air. 1 inhaler 4   • sevelamer (RENVELA) 800 MG tablet Take 800 mg by mouth 3 (Three) Times a Day With Meals.     • tacrolimus (PROGRAF) 0.5 MG capsule Take 0.5 mg by mouth 2 (Two) Times a Day.     • warfarin (COUMADIN) 2 MG tablet Take 4 mg by mouth Daily. Patient takes two  "tablets daily     • warfarin (COUMADIN) 2 MG tablet TAKE THREE TABLETS BY MOUTH DAILY MONDAY AND THURSDAY AND THEN TWO  TABLETS ALL OTHER DAYS 192 tablet 2   • warfarin (COUMADIN) 2 MG tablet 2 tablets Daily.           Blood pressure 128/62, pulse 66, height 160 cm (63\"), weight 76.6 kg (168 lb 12.8 oz).  Body mass index is 29.9 kg/m².    Physical Exam   Constitutional: She is oriented to person, place, and time. She appears well-developed and well-nourished.   HENT:   Head: Normocephalic and atraumatic.   Eyes: Pupils are equal, round, and reactive to light. No scleral icterus.   Neck: No JVD present. Carotid bruit is not present. No thyromegaly present.   Cardiovascular: Normal rate, regular rhythm, S1 normal and S2 normal.  Exam reveals no gallop.    No murmur heard.  Pulmonary/Chest: Effort normal and breath sounds normal.   Abdominal: Soft. There is no hepatosplenomegaly. There is no tenderness.   Neurological: She is alert and oriented to person, place, and time.   Skin: Skin is warm and dry. No cyanosis. Nails show no clubbing.   Psychiatric: She has a normal mood and affect. Her behavior is normal.       Data Review:       ECG 12 Lead  Date/Time: 6/26/2018 12:06 PM  Performed by: MIRYAM FISHER  Authorized by: MIRYAM FISHER   Rhythm: sinus rhythm  BPM: 66  Clinical impression: normal ECG  Comments: QRS duration 104 MS  QT/QTc 442/463 MS            Lab Results   Component Value Date    AST 60 (H) 08/07/2017    ALT 44 (H) 08/07/2017       Lab Results   Component Value Date    HGBA1C 5.8 05/07/2018            Problem List Items Addressed This Visit        Cardiovascular and Mediastinum    Paroxysmal atrial fibrillation    Overview     · CHADS-VASc = 5  · Atrial fibrillation initially diagnosed February 2015; spontaneous conversion to normal sinus rhythm.   · Echocardiogram (06/08/2017): LVEF 50%. Grade II Diastolic dysfunction. RVSP 41.6 mmHg. Mild-to-moderate MR. Mild AS.  Moderate TR  · Noted to be " in afib with RVR 08/09/2017 in setting of acute bronchitis.  · On Coumadin and amiodarone         Current Assessment & Plan     · Continue amiodarone 100 mg daily  · Continue Coumadin dosage per INR results and managed by [facility] anticoagulation clinic         High output HF (heart failure)    Overview     · Echo (6/08/2017): LVEF 50%. Grade II Diastolic dysfunction.  · Echo (8/9/17): LVEF 55%.  Grade 2 diastolic dysfunction.  Mild MR.  Mild aortic stenosis  · Limited echo to evaluate high output CHF (8/10/17): Cardiac output decreased from 11.3 L/min to 8 L/min with compression of fistula.  · Fistula ligated 8/2017         Current Assessment & Plan     · NYHA class II heart failure symptoms         Essential hypertension - Primary    Current Assessment & Plan     · Hypertension is controlled  · Continue amlodipine 5 mg twice a day         Nonrheumatic aortic valve stenosis    Overview     · Echo (06/0/2017): Mild-to-moderate mitral valve regurgitation is present. Mild aortic valve stenosis is present. Moderate tricuspid valve regurgitation is present.         Current Assessment & Plan     · Stable NYHA class II heart failure symptoms         Right-sided carotid artery disease    Overview     · Carotid duplex (9/4/2015): nonobstructive plaque of the proximal SHENA         Current Assessment & Plan     · Patient asymptomatic              The patient is doing well from a cardiovascular standpoint and is maintaining normal sinus rhythm.  Her INRs are fluctuating with the antibiotic usage.  I will have her go to the anticoagulation clinic today for a check.  We'll schedule her to follow-up in 6 months or sooner if needed       · Continue current medications  · Follow-up with [facility] anticoagulation clinic today for INR check  · Follow-up 6 months or sooner if needed    Rocio Rae, REY  6/26/2018

## 2018-06-26 NOTE — PROGRESS NOTES
Anticoagulation Clinic - Remote Progress Note  HOME MONITOR  Testing Frequency: weekly    Indication: afib  Referring Provider: Heath  Goal INR: 2-3  Current Drug Interactions: amiodarone, levothyroxine; omeprazole, azaTHIOprine  CHADS-VASc: 5 (age, gender, HTN, DM)    Diet: rare GLV  Alcohol: none  Tobacco: none   OTC Pain Medication: APAP PRN    1st clinic visit: 9/14/17  2nd clinic visit: 6/26/18    INR History:  Date 2/9 2/16 2/21 3/1 3/9 3/13 3/20 3/27 4/3 4/10   Total Weekly Dose 18 mg/3 days 38mg 36mg 36mg 38 mg 28mg/5 days 38mg 38mg 38mg 26mg   INR 1.0 1.5 1.8 2.1 1.9        Notes s/p surgery 2/5 and held doses   Keflex finish 2/28 Boost; Keflex init    Hold- pre procedure Post procedure; boost     Date 4/17 4/20 4/24 5/1 5/8 5/15 5/18 5/22 5/29 6/1   Total Weekly Dose 41mg 36mg 36mg 40mg 38mg 38mg 28mg 28mg 38mg 34mg   INR 3.0 1.9 1.8 2.3 2.3 3.6 2.4 2.3 3.0 2.5   Notes  Lower dose 4/19   colchicine hematoma    Zpak, 1 missed dose     Date 6/4 6/7 6/14 6/19 6/26        Total Weekly Dose 34mg 38mg 34mg 30 mg 34mg 30mg       INR 3.0 3.4 4.3 2.5 3.6        Notes   Keflex Held x1 Zpak start 6/22, finish 6/25; Doxy start 6/25; clinic Doxy         Phone Interview:  Verbal Release Authorization signed on 6/26/18 -- may speak with Alexy Wallace (son), Genesis Becerril (daughter), Sawyer or Gema Wallace (son and daughter-in-law), Gerry Walalce (son)  Tablet Strength: 2mg tablets  Current Maintenance Dose: variable  Patient Contact Info: [phone]- Home with Son Yadiel; Ms. Wallace's cell phone number- [phone]     Patient Findings:  Positives:   Change in health, Change in medications, Bruising   Negatives:   Signs/symptoms of thrombosis, Signs/symptoms of bleeding, Laboratory test error suspected, Change in alcohol use, Change in activity, Upcoming invasive procedure, Emergency department visit, Upcoming dental procedure, Missed doses, Extra doses, Change in diet/appetite, Hospital admission, Other complaints    Comments:   Mrs. Wallace started a Zpak on Friday for PNA, but this was discontinued yesterday 6/25, and she has now started doxy x10 days, instead (per Dr. Walters). Mrs. Wallace notes a bruise on her left foot, but she is uncertain where it came from. She otherwise denies changes in diet or appetite, or any signs of bleeding.      Plan:  1. INR is supratherapeutic today following abx initiation. Instructed Mrs. Wallace to take warfarin 2mg (1 tab) tonight, then 4mg (2 tabs) WedThurs.  2. Repeat INR on Friday -- she was instructed to test her INR twice weekly while abx continue.  3. Written and verbal information provided today in clinic. Mrs. Wallace expresses understanding with teach back and has no further questions at this time.     Desiree Bull [facility]  6/26/2018  12:16 PM

## 2018-06-26 NOTE — ASSESSMENT & PLAN NOTE
· Continue amiodarone 100 mg daily  · Continue Coumadin dosage per INR results and managed by [facility] anticoagulation [facility]

## 2018-06-29 ENCOUNTER — ANTICOAGULATION VISIT (OUTPATIENT)
Dept: PHARMACY | Facility: HOSPITAL | Age: 78
End: 2018-06-29

## 2018-06-29 DIAGNOSIS — I48.0 PAROXYSMAL ATRIAL FIBRILLATION (HCC): ICD-10-CM

## 2018-06-29 LAB — INR PPP: 2.3

## 2018-06-29 NOTE — PROGRESS NOTES
Anticoagulation Clinic - Remote Progress Note  HOME MONITOR  Testing Frequency: weekly    Indication: afib  Referring Provider: Heath  Goal INR: 2-3  Current Drug Interactions: amiodarone, levothyroxine; omeprazole, azaTHIOprine  CHADS-VASc: 5 (age, gender, HTN, DM)    Diet: rare GLV  Alcohol: none  Tobacco: none   OTC Pain Medication: APAP PRN    1st clinic visit: 9/14/17  2nd clinic visit: 6/26/18    INR History:  Date 2/9 2/16 2/21 3/1 3/9 3/13 3/20 3/27 4/3 4/10   Total Weekly Dose 18 mg/3 days 38mg 36mg 36mg 38 mg 28mg/5 days 38mg 38mg 38mg 26mg   INR 1.0 1.5 1.8 2.1 1.9        Notes s/p surgery 2/5 and held doses   Keflex finish 2/28 Boost; Keflex init    Hold- pre procedure Post procedure; boost     Date 4/17 4/20 4/24 5/1 5/8 5/15 5/18 5/22 5/29 6/1   Total Weekly Dose 41mg 36mg 36mg 40mg 38mg 38mg 28mg 28mg 38mg 34mg   INR 3.0 1.9 1.8 2.3 2.3 3.6 2.4 2.3 3.0 2.5   Notes  Lower dose 4/19   colchicine hematoma    Zpak, 1 missed dose     Date 6/4 6/7 6/14 6/19 6/26 6/29       Total Weekly Dose 34mg 38mg 34mg 30 mg 34mg 30mg       INR 3.0 3.4 4.3 2.5 3.6        Notes   Keflex Held x1 Zpak start 6/22, finish 6/25; Doxy start 6/25; clinic Doxy         Phone Interview:  Verbal Release Authorization signed on 6/26/18 -- may speak with Alexy Wallace (son), Genesis Becerril (daughter), Sawyer or Gema Wallace (son and daughter-in-law), Gerry Wallace (son)  Tablet Strength: 2mg tablets  Current Maintenance Dose: variable  Patient Contact Info: [phone]- Home with Son Yadiel; Ms. Wallace's cell phone number- [phone]         Plan:  1. INR is therapeutic today following dose modification (due tue abx) earlier this week. Instructed Mrs. Wallace to take warfarin 6mg today, 4mg both Sat/Sun and recheck INR Monday  2. Repeat INR on Monday- twice weekly while on abx   3. Written and verbal information provided today in clinic. Mrs. Wallace expresses understanding with teach back and has no further questions at this time.      Lennie Solis [facility]  6/29/2018  3:19 PM

## 2018-07-03 ENCOUNTER — ANTICOAGULATION VISIT (OUTPATIENT)
Dept: PHARMACY | Facility: HOSPITAL | Age: 78
End: 2018-07-03

## 2018-07-03 DIAGNOSIS — I48.0 PAROXYSMAL ATRIAL FIBRILLATION (HCC): ICD-10-CM

## 2018-07-03 LAB — INR PPP: 2.9

## 2018-07-03 NOTE — PROGRESS NOTES
Anticoagulation Clinic - Remote Progress Note  HOME MONITOR  Testing Frequency: weekly    Indication: afib  Referring Provider: Heath  Goal INR: 2-3  Current Drug Interactions: amiodarone, levothyroxine; omeprazole, azaTHIOprine  CHADS-VASc: 5 (age, gender, HTN, DM)    Diet: rare GLV  Alcohol: none  Tobacco: none   OTC Pain Medication: APAP PRN    1st clinic visit: 9/14/17  2nd clinic visit: 6/26/18    INR History:  Date 2/9 2/16 2/21 3/1 3/9 3/13 3/20 3/27 4/3 4/10   Total Weekly Dose 18 mg/3 days 38mg 36mg 36mg 38 mg 28mg/5 days 38mg 38mg 38mg 26mg   INR 1.0 1.5 1.8 2.1 1.9        Notes s/p surgery 2/5 and held doses   Keflex finish 2/28 Boost; Keflex init    Hold- pre procedure Post procedure; boost     Date 4/17 4/20 4/24 5/1 5/8 5/15 5/18 5/22 5/29 6/1   Total Weekly Dose 41mg 36mg 36mg 40mg 38mg 38mg 28mg 28mg 38mg 34mg   INR 3.0 1.9 1.8 2.3 2.3 3.6 2.4 2.3 3.0 2.5   Notes  Lower dose 4/19   colchicine hematoma    Zpak, 1 missed dose     Date 6/4 6/7 6/14 6/19 6/26 6/29 7/3      Total Weekly Dose 34mg 38mg 34mg 30 mg 34mg 30mg 30mg 30mg     INR 3.0 3.4 4.3 2.5 3.6 2.3 2.9      Notes   Keflex Held x1 Zpak start 6/22, finish 6/25; doxy start 6/25; clinic doxy doxy doxy complete 7/5       Phone Interview:  Verbal Release Authorization signed on 6/26/18 -- may speak with Alexy Rodrigo (son), Genesis Becerril (daughter), Sawyer or Gema Wallace (son and daughter-in-law), Gerry Rodrigo (son)  Tablet Strength: 2mg tablets  Current Maintenance Dose: variable  Patient Contact Info: [phone]- Home with Son Yadiel; MsJoseluis Wallace's cell phone number- [phone]     Patient Findings:   Negatives:   Signs/symptoms of thrombosis, Signs/symptoms of bleeding, Laboratory test error suspected, Change in health, Change in alcohol use, Change in activity, Upcoming invasive procedure, Emergency department visit, Upcoming dental procedure, Missed doses, Extra doses, Change in medications, Change in diet/appetite, Hospital admission,  Bruising, Other complaints   Comments:   Patient to continue doxycycline through 7/5.      Plan:  1. INR is therapeutic today at 2.9. After consulting pharmacist, instructed patient to take 2mg tonight (7/3), then warfarin 4mg daily until recheck (target 30mg weekly dose).  2. Repeat INR on Friday, 7/6, following doxy completion. May consider repeat INR sooner than one week thereafter given the delayed nature of the DDI between warfarin and doxy.  3. Verbal information provided over the phone. Mrs. Wallace expresses understanding with teach back and has no further questions at this time.  4. Patient denies the need for warfarin refills at this time.     Elza To, Pharmacy Technician  7/3/2018  3:50 PM     IDesiree, [facility], have reviewed the note in full and agree with the assessment and plan.  07/03/18  4:10 PM

## 2018-07-06 ENCOUNTER — ANTICOAGULATION VISIT (OUTPATIENT)
Dept: PHARMACY | Facility: HOSPITAL | Age: 78
End: 2018-07-06

## 2018-07-06 DIAGNOSIS — I48.0 PAROXYSMAL ATRIAL FIBRILLATION (HCC): ICD-10-CM

## 2018-07-06 LAB — INR PPP: 1.2

## 2018-07-06 NOTE — PROGRESS NOTES
Anticoagulation Clinic - Remote Progress Note  HOME MONITOR  Testing Frequency: weekly    Indication: afib  Referring Provider: Heath  Goal INR: 2-3  Current Drug Interactions: amiodarone, levothyroxine; omeprazole, azaTHIOprine  CHADS-VASc: 5 (age, gender, HTN, DM)    Diet: rare GLV  Alcohol: none  Tobacco: none   OTC Pain Medication: APAP PRN    1st clinic visit: 9/14/17  2nd clinic visit: 6/26/18    INR History:  Date 2/9 2/16 2/21 3/1 3/9 3/13 3/20 3/27 4/3 4/10   Total Weekly Dose 18 mg/3 days 38mg 36mg 36mg 38 mg 28mg/5 days 38mg 38mg 38mg 26mg   INR 1.0 1.5 1.8 2.1 1.9        Notes s/p surgery 2/5 and held doses   Keflex finish 2/28 Boost; Keflex init    Hold- pre procedure Post procedure; boost     Date 4/17 4/20 4/24 5/1 5/8 5/15 5/18 5/22 5/29 6/1   Total Weekly Dose 41mg 36mg 36mg 40mg 38mg 38mg 28mg 28mg 38mg 34mg   INR 3.0 1.9 1.8 2.3 2.3 3.6 2.4 2.3 3.0 2.5   Notes  Lower dose 4/19   colchicine hematoma    Zpak, 1 missed dose     Date 6/4 6/7 6/14 6/19 6/26 6/29 7/3 7/6     Total Weekly Dose 34mg 38mg 34mg 30 mg 34mg 30mg 30mg 30mg     INR 3.0 3.4 4.3 2.5 3.6 2.3 2.9 1.2     Notes   Keflex Held x1 Zpak start 6/22, finish 6/25; doxy start 6/25; clinic doxy doxy doxy complete 7/5       Phone Interview:  Verbal Release Authorization signed on 6/26/18 -- may speak with Alexy Wallace (son), Genesis Becerril (daughter), Sawyer Wallace (son and daughter-in-law), Gerry Rodrigo (son)  Tablet Strength: 2mg tablets  Current Maintenance Dose: variable  Patient Contact Info: [phone]- Home with Peewee Cotto; MsJoseluis Wallace's cell phone number- [phone]     Plan:  1. INR is baseline today following completion of antibiotic and extra GLV.Do not anticipate that the green vegetables that Ms Wallace reported is what caused baseline INR. Instructed patient increase dose tonight to 6mg (3 tablets) and tomorrow to 6mg (3 tablets) and then 4mg Sunday (2 tablets.)   2. Repeat INR on Monday 7/9.  3. Verbal information  provided over the phone. Mrs. Wallace expresses understanding with teach back and has no further questions at this time.  4. Patient denies the need for warfarin refills at this time.     Fatemeh Rodríguez, PharmD  7/6/2018  1:02 PM

## 2018-07-09 ENCOUNTER — HOSPITAL ENCOUNTER (OUTPATIENT)
Dept: GENERAL RADIOLOGY | Facility: HOSPITAL | Age: 78
Discharge: HOME | End: 2018-07-09
Attending: INTERNAL MEDICINE | Admitting: INTERNAL MEDICINE

## 2018-07-09 DIAGNOSIS — R05.9 COUGH: Primary | ICD-10-CM

## 2018-07-09 PROCEDURE — 71046 X-RAY EXAM CHEST 2 VIEWS: CPT

## 2018-07-10 ENCOUNTER — TELEPHONE (OUTPATIENT)
Dept: PHARMACY | Facility: HOSPITAL | Age: 78
End: 2018-07-10

## 2018-07-10 NOTE — TELEPHONE ENCOUNTER
"Ms Wallace called today to let us know that she had attempted to check her INR twice yesterday; however, both tests resulted in \"error.\"  She is now out of strips and not sure when she will receive.  I encouraged her to go to the lab to get drawn today; she stated that her AC was out and was unable to go today.  She stated that she would have her INR drawn tomorrow and let us know.  She had been SUBtherapeutic with last INR draw, with boost doses thru yesterday.  As INR unknown, recommended that she take her usual 4 mg dose tonight and we will evaluate further in am when INR available.    Thank you,  Radha Michaud, PharmD    "

## 2018-07-11 ENCOUNTER — ANTICOAGULATION VISIT (OUTPATIENT)
Dept: PHARMACY | Facility: HOSPITAL | Age: 78
End: 2018-07-11

## 2018-07-11 DIAGNOSIS — I48.0 PAROXYSMAL ATRIAL FIBRILLATION (HCC): ICD-10-CM

## 2018-07-11 LAB — INR PPP: 1.4

## 2018-07-11 RX ORDER — AMIODARONE HYDROCHLORIDE 200 MG/1
TABLET ORAL
Qty: 45 TABLET | Refills: 1 | Status: SHIPPED | OUTPATIENT
Start: 2018-07-11 | End: 2019-01-07 | Stop reason: SDUPTHER

## 2018-07-11 NOTE — PROGRESS NOTES
Anticoagulation Clinic - Remote Progress Note  [facility] HOME MONITOR  Testing Frequency: weekly    Indication: afib  Referring Provider: Heath  Goal INR: 2.0-3.0  Current Drug Interactions: amiodarone, levothyroxine; omeprazole, azaTHIOprine  CHADS-VASc: 5 (age, gender, HTN, DM)    Diet: rare GLV; green beans  Alcohol: none  Tobacco: none   OTC Pain Medication: APAP PRN    1st clinic visit: 9/14/17  2nd clinic visit: 6/26/18    INR History:  Date 2/9 2/16 2/21 3/1 3/9 3/13 3/20 3/27 4/3 4/10   Total Weekly Dose 18mg/3 days 38mg 36mg 36mg 38mg 28mg/5 days 38mg 38mg 38mg 26mg   INR 1.0 1.5 1.8 2.1 1.9        Notes s/p surgery 2/5 and held doses   Keflex finish 2/28 Boost; Keflex init    Hold- pre procedure Post procedure; boost     Date 4/17 4/20 4/24 5/1 5/8 5/15 5/18 5/22 5/29 6/1   Total Weekly Dose 41mg 36mg 36mg 40mg 38mg 38mg 28mg 28mg 38mg 34mg   INR 3.0 1.9 1.8 2.3 2.3 3.6 2.4 2.3 3.0 2.5   Notes  Lower dose 4/19   colchicine hematoma    Zpak, 1 missed dose     Date 6/4 6/7 6/14 6/19 6/26 6/29 7/3 7/6 7/11    Total Weekly Dose 34mg 38mg 34mg 30 mg 34mg 30mg 30mg 30mg 32mg 38mg   INR 3.0 3.4 4.3 2.5 3.6 2.3 2.9 1.2 1.4    Notes   Keflex Held x1 Zpak start 6/22, finish 6/25; doxy start 6/25; clinic doxy doxy doxy complete 7/5       Phone Interview:  Verbal Release Authorization signed on 6/26/18 -- may speak with Alexy Wallace (son), Genesis Becerril (daughter), Sawyer or Gema Wallace (son and daughter-in-law), Gerry Wallace (son)  Tablet Strength: 2mg tablets  Current Maintenance Dose: variable  Patient Contact Info: [phone]- [place] with Son Yadiel; Ms. Wallace's cell phone number- [phone]     Patient Findings:  Negatives:   Signs/symptoms of thrombosis, Signs/symptoms of bleeding, Laboratory test error suspected, Change in health, Change in alcohol use, Change in activity, Upcoming invasive procedure, Emergency department visit, Upcoming dental procedure, Missed doses, Extra doses, Change in medications, Change  in diet/appetite, Hospital admission, Bruising, Other complaints   Comments:   Mrs. Wallace denies missed doses of warfarin. She has been eating green beans while on abx, but she does eat these on a regular basis. She started PD in Jan / Feb.      Plan:  1. INR remains subtherapeutic, so instructed Mrs. Wallace to take warfarin 6mg daily until recheck.2. Repeat INR on Monday to ensure back WNL.  3. Verbal information provided over the phone. Mrs. Wallace RBV dosing instructions, expresses understanding by teach back, and has no further questions at this time.  4. Patient denies the need for warfarin refills at this time.    Ann Cowden Mayer, PharmD  7/12/2018  9:06 AM

## 2018-07-16 ENCOUNTER — ANTICOAGULATION VISIT (OUTPATIENT)
Dept: PHARMACY | Facility: HOSPITAL | Age: 78
End: 2018-07-16

## 2018-07-16 DIAGNOSIS — I48.0 PAROXYSMAL ATRIAL FIBRILLATION (HCC): ICD-10-CM

## 2018-07-16 LAB — INR PPP: 2

## 2018-07-16 NOTE — PROGRESS NOTES
Anticoagulation Clinic - Remote Progress Note  [facility] HOME MONITOR  Testing Frequency: weekly    Indication: afib  Referring Provider: Heath  Goal INR: 2.0-3.0  Current Drug Interactions: amiodarone, levothyroxine; omeprazole, azaTHIOprine  CHADS-VASc: 5 (age, gender, HTN, DM)    Diet: rare GLV; green beans  Alcohol: none  Tobacco: none   OTC Pain Medication: APAP PRN    1st clinic visit: 9/14/17  2nd clinic visit: 6/26/18    INR History:  Date 2/9 2/16 2/21 3/1 3/9 3/13 3/20 3/27 4/3 4/10   Total Weekly Dose 18mg/3 days 38mg 36mg 36mg 38mg 28mg/5 days 38mg 38mg 38mg 26mg   INR 1.0 1.5 1.8 2.1 1.9        Notes s/p surgery 2/5 and held doses   Keflex finish 2/28 Boost; Keflex init    Hold- pre procedure Post procedure; boost     Date 4/17 4/20 4/24 5/1 5/8 5/15 5/18 5/22 5/29 6/1   Total Weekly Dose 41mg 36mg 36mg 40mg 38mg 38mg 28mg 28mg 38mg 34mg   INR 3.0 1.9 1.8 2.3 2.3 3.6 2.4 2.3 3.0 2.5   Notes  Lower dose 4/19   colchicine hematoma    Zpak, 1 missed dose     Date 6/4 6/7 6/14 6/19 6/26 6/29 7/3 7/6 7/11 7/16   Total Weekly Dose 34mg 38mg 34mg 30 mg 34mg 30mg 30mg 30mg 32mg 38mg   INR 3.0 3.4 4.3 2.5 3.6 2.3 2.9 1.2 1.4 2.0   Notes   Keflex Held x1 Zpak start 6/22, finish 6/25; doxy start 6/25; clinic doxy doxy doxy complete 7/5       Phone Interview:  Verbal Release Authorization signed on 6/26/18 -- may speak with Alexy Wallace (son), Genesis Becerril (daughter), Sawyer Wallace (son and daughter-in-law), Gerry Wallace (son)  Tablet Strength: 2mg tablets  Current Maintenance Dose: variable  Patient Contact Info: [phone]- [place] with Peewee Cotto; Ms. Wallace's cell phone number- [phone]     Patient Findings:  Negatives:   Signs/symptoms of thrombosis, Signs/symptoms of bleeding, Laboratory test error suspected, Change in health, Change in alcohol use, Change in activity, Upcoming invasive procedure, Emergency department visit, Upcoming dental procedure, Missed doses, Extra doses, Change in medications,  Change in diet/appetite, Hospital admission, Bruising, Other complaints   Comments:   Mrs. Wallace denies any changes.     Plan:  1. INR is therapeutic today at 2.0. Instructed Mrs. Wallace to increase warfarin to 6mg daily except 4mg TuesThursSat.2. Repeat INR on Monday.  3. Verbal information provided over the phone. Mrs. Wallace RBV dosing instructions, expresses understanding by teach back, and has no further questions at this time.  4. Patient denies the need for warfarin refills at this time.    Yony Coleman, PharmD Intern  7/16/2018  10:03 AM     I, Fatemeh Rodríguez, PharmD, have reviewed the note in full and agree with the assessment and plan.  07/16/18  12:01 PM

## 2018-07-23 ENCOUNTER — ANTICOAGULATION VISIT (OUTPATIENT)
Dept: PHARMACY | Facility: HOSPITAL | Age: 78
End: 2018-07-23

## 2018-07-23 DIAGNOSIS — I48.0 PAROXYSMAL ATRIAL FIBRILLATION (HCC): ICD-10-CM

## 2018-07-23 LAB — INR PPP: 1.7

## 2018-07-26 ENCOUNTER — ANTICOAGULATION VISIT (OUTPATIENT)
Dept: PHARMACY | Facility: HOSPITAL | Age: 78
End: 2018-07-26

## 2018-07-26 DIAGNOSIS — I48.0 PAROXYSMAL ATRIAL FIBRILLATION (HCC): ICD-10-CM

## 2018-07-26 LAB — INR PPP: 2.3

## 2018-07-26 NOTE — PROGRESS NOTES
Anticoagulation Clinic - Remote Progress Note  [facility] HOME MONITOR  Testing Frequency: weekly    Indication: afib  Referring Provider: Heath  Goal INR: 2.0-3.0  Current Drug Interactions: amiodarone, levothyroxine; omeprazole, azaTHIOprine  CHADS-VASc: 5 (age, gender, HTN, DM)    Diet: rare GLV; green beans  Alcohol: none  Tobacco: none   OTC Pain Medication: APAP PRN    1st clinic visit: 9/14/17  2nd clinic visit: 6/26/18    INR History:  Date 2/9 2/16 2/21 3/1 3/9 3/13 3/20 3/27 4/3 4/10   Total Weekly Dose 18mg/3 days 38mg 36mg 36mg 38mg 28mg/5 days 38mg 38mg 38mg 26mg   INR 1.0 1.5 1.8 2.1 1.9        Notes s/p surgery 2/5 and held doses   Keflex finish 2/28 Boost; Keflex init    Hold- pre procedure Post procedure; boost     Date 4/17 4/20 4/24 5/1 5/8 5/15 5/18 5/22 5/29 6/1   Total Weekly Dose 41mg 36mg 36mg 40mg 38mg 38mg 28mg 28mg 38mg 34mg   INR 3.0 1.9 1.8 2.3 2.3 3.6 2.4 2.3 3.0 2.5   Notes  Lower dose 4/19   colchicine hematoma    Zpak, 1 missed dose     Date 6/4 6/7 6/14 6/19 6/26 6/29 7/3 7/6 7/11 7/16   Total Weekly Dose 34mg 38mg 34mg 30 mg 34mg 30mg 30mg 30mg 32mg 38mg   INR 3.0 3.4 4.3 2.5 3.6 2.3 2.9 1.2 1.4 2.0   Notes   Keflex Held x1 Zpak start 6/22, finish 6/25; doxy start 6/25; clinic doxy doxy doxy complete 7/5       Date 7/23 7/26           Total Weekly Dose 36mg 38mg           INR 1.7 2.3           Notes Keflex                Phone Interview:  Verbal Release Authorization signed on 6/26/18 -- may speak with Alexy Wallace (son), Genesis Becerril (daughter), Sawyer Wallace (son and daughter-in-law), Gerry Rodrigo (son)  Tablet Strength: 2mg tablets  Current Maintenance Dose: variable  Patient Contact Info: [phone]- Home with Son Yadiel; Ms. Wallace's cell phone number- [phone]     Patient Findings   Negatives:   Signs/symptoms of thrombosis, Signs/symptoms of bleeding, Laboratory test error suspected, Change in health, Change in alcohol use, Change in activity, Upcoming invasive  procedure, Emergency department visit, Upcoming dental procedure, Missed doses, Extra doses, Change in medications, Change in diet/appetite, Hospital admission, Bruising, Other complaints   Comments:   Has 3 more Keflex to take due to missed doses. Patient denies any other changes.     Plan:  1. INR is therapeutic today at 2.3. Instructed Mrs. Wallace to resume 6mg daily except 4mg TuesThursSat.2. Repeat INR on Thursday (8/2).  3. Verbal information provided over the phone. Mrs. Wallace RBV dosing instructions, expresses understanding by teach back, and has no further questions at this time.  4. Patient denies the need for warfarin refills at this time.    Yony Coleman, PharmD Intern  Pharmacy Student  7/26/2018  10:33 AM     I, Marisol Mcmullen, [facility], have reviewed the note in full and agree with the assessment and plan.  07/26/18  12:13 PM

## 2018-08-02 ENCOUNTER — ANTICOAGULATION VISIT (OUTPATIENT)
Dept: PHARMACY | Facility: HOSPITAL | Age: 78
End: 2018-08-02

## 2018-08-02 DIAGNOSIS — I48.0 PAROXYSMAL ATRIAL FIBRILLATION (HCC): ICD-10-CM

## 2018-08-02 LAB — INR PPP: 2

## 2018-08-02 NOTE — PROGRESS NOTES
Anticoagulation Clinic - Remote Progress Note  [facility] HOME MONITOR  Testing Frequency: weekly    Indication: afib  Referring Provider: Heath  Goal INR: 2.0-3.0  Current Drug Interactions: amiodarone, levothyroxine; omeprazole, azaTHIOprine  CHADS-VASc: 5 (age, gender, HTN, DM)    Diet: rare GLV; green beans  Alcohol: none  Tobacco: none   OTC Pain Medication: APAP PRN    1st clinic visit: 9/14/17  2nd clinic visit: 6/26/18    INR History:  Date 2/9/18 2/16 2/21 3/1 3/9 3/13 3/20 3/27 4/3 4/10   Total Weekly Dose 18mg/3 days 38mg 36mg 36mg 38mg 28mg/5 days 38mg 38mg 38mg 26mg   INR 1.0 1.5 1.8 2.1 1.9        Notes s/p surgery 2/5 and held doses   Keflex finish 2/28 Boost; Keflex init    Hold- pre procedure Post procedure; boost     Date 4/17 4/20 4/24 5/1 5/8 5/15 5/18 5/22 5/29 6/1   Total Weekly Dose 41mg 36mg 36mg 40mg 38mg 38mg 28mg 28mg 38mg 34mg   INR 3.0 1.9 1.8 2.3 2.3 3.6 2.4 2.3 3.0 2.5   Notes  Lower dose 4/19   colchicine hematoma    Zpak, 1 missed dose     Date 6/4 6/7 6/14 6/19 6/26 6/29 7/3 7/6 7/11 7/16   Total Weekly Dose 34mg 38mg 34mg 30mg 34mg 30mg 30mg 30mg 32mg 38mg   INR 3.0 3.4 4.3 2.5 3.6 2.3 2.9 1.2 1.4 2.0   Notes   Keflex Held x1 Zpak start 6/22, finish 6/25; doxy start 6/25; clinic doxy doxy doxy complete 7/5       Date 7/23 7/26 8/2           Total Weekly Dose 36mg 38mg 36mg 38mg          INR 1.7 2.3 2.0           Notes Keflex boost                Phone Interview:  Verbal Release Authorization signed on 6/26/18 -- may speak with Alexy Rodrigo (son), Genesis Becerril (daughter), Sawyer Wallace (son and daughter-in-law), Gerry Rodrigo (son)  Tablet Strength: 2mg tablets  Current Maintenance Dose: variable  Patient Contact Info: [phone]- Home with Son Yadiel; Ms. Wallace's cell phone number- [phone]   Lab Contact Info: [facility]    Patient Findings   Negatives:   Signs/symptoms of thrombosis, Signs/symptoms of bleeding, Laboratory test error suspected, Change in health, Change in  alcohol use, Change in activity, Upcoming invasive procedure, Emergency department visit, Upcoming dental procedure, Missed doses, Extra doses, Change in medications, Change in diet/appetite, Hospital admission, Bruising, Other complaints     Plan:  1. INR is therapeutic today at 2.0, although at the bottom end of her INR goal range. After consulting with Desiree Bull, PharmD, instructed Mrs. Wallace to increase dose of warfarin 6mg daily except 4mg TuesSat (5.6% increase)2. Repeat INR in 1 week, 8/9.  3. Verbal information provided over the phone. Mrs. Wallace RBV dosing instructions, expresses understanding by teach back, and has no further questions at this time.  4. Patient denies the need for warfarin refills at this time.    Sawyer Gamez CPhT  8/2/2018  3:49 PM    I, Marisol Mcmullen, [facility], have reviewed the note in full and agree with the assessment and plan.  08/03/18  1:38 PM

## 2018-08-09 ENCOUNTER — ANTICOAGULATION VISIT (OUTPATIENT)
Dept: PHARMACY | Facility: HOSPITAL | Age: 78
End: 2018-08-09

## 2018-08-09 DIAGNOSIS — I48.0 PAROXYSMAL ATRIAL FIBRILLATION (HCC): ICD-10-CM

## 2018-08-09 LAB — INR PPP: 3.4

## 2018-08-09 NOTE — PROGRESS NOTES
Anticoagulation Clinic - Remote Progress Note  [facility] HOME MONITOR  Testing Frequency: weekly    Indication: paroxysmal afib  Referring Provider: Heath  Goal INR: 2.0-3.0  Current Drug Interactions: amiodarone, levothyroxine; omeprazole, azaTHIOprine  CHADS-VASc: 5 (age, gender, HTN, DM)    Diet: rare GLV; green beans   Alcohol: none  Tobacco: none   OTC Pain Medication: APAP PRN    1st clinic visit: 9/14/17  2nd clinic visit: 6/26/18    INR History:  Date 2/9/18 2/16 2/21 3/1 3/9 3/13 3/20 3/27 4/3 4/10   Total Weekly Dose 18mg/3 days 38mg 36mg 36mg 38mg 28mg/5 days 38mg 38mg 38mg 26mg   INR 1.0 1.5 1.8 2.1 1.9        Notes s/p surgery 2/5 and held doses   Keflex finish 2/28 Boost; Keflex init    Hold- pre procedure Post procedure; boost     Date 4/17 4/20 4/24 5/1 5/8 5/15 5/18 5/22 5/29 6/1   Total Weekly Dose 41mg 36mg 36mg 40mg 38mg 38mg 28mg 28mg 38mg 34mg   INR 3.0 1.9 1.8 2.3 2.3 3.6 2.4 2.3 3.0 2.5   Notes  Lower dose 4/19   colchicine hematoma    Zpak, 1 missed dose     Date 6/4 6/7 6/14 6/19 6/26 6/29 7/3 7/6 7/11 7/16   Total Weekly Dose 34mg 38mg 34mg 30mg 34mg 30mg 30mg 30mg 32mg 38mg   INR 3.0 3.4 4.3 2.5 3.6 2.3 2.9 1.2 1.4 2.0   Notes   Keflex Held x1 Zpak start 6/22, finish 6/25; doxy start 6/25; clinic doxy doxy doxy complete 7/5       Date 7/23 7/26 8/2 8/9          Total Weekly Dose 36mg 38mg 36mg 38mg          INR 1.7 2.3 2.0 3.4          Notes Keflex boost                Phone Interview:  Verbal Release Authorization signed on 6/26/18 -- may speak with Alexy Wallace (son), Genesis Becerril (daughter), Sawyer or Gema Wallace (son and daughter-in-law), Gerry Rodrigo (son)  Tablet Strength: 2mg tablets  Current Maintenance Dose: variable  Patient Contact Info: [phone]- Home with Son Yadiel; Ms. Wallace's cell phone number- [phone]   Lab Contact Info: [place]    Patient Findings   Negatives:   Signs/symptoms of thrombosis, Signs/symptoms of bleeding, Laboratory test error suspected, Change in  health, Change in alcohol use, Change in activity, Upcoming invasive procedure, Emergency department visit, Upcoming dental procedure, Missed doses, Extra doses, Change in medications, Change in diet/appetite, Hospital admission, Bruising, Other complaints     Plan:  1. INR is SUPRAtherapeutic today at 3.4, a 1.4 point increase from 7 days ago. After consulting with Desiree Bull, PharmD, instructed Mrs. Wallace to eat a serving of GLV (pt said she could eat MAYBE a cup of green beans) and then take warfarin 6mg daily except 4mg TuesThursSat (5.6% decrease) for now  2. Repeat INR in 1 week, 8/16  3. Verbal information provided over the phone. Mrs. Wallace RBV dosing instructions, expresses understanding by teach back, and has no further questions at this time.  4. Patient denies the need for warfarin refills at this time.    Sawyer Gamez CPhT  8/9/2018  3:23 PM     I, Sawyer Jackman [facility], have reviewed the note in full and agree with the assessment and plan.  08/09/18  3:43 PM

## 2018-08-14 ENCOUNTER — OFFICE VISIT (OUTPATIENT)
Dept: INTERNAL MEDICINE | Facility: CLINIC | Age: 78
End: 2018-08-14

## 2018-08-14 VITALS
SYSTOLIC BLOOD PRESSURE: 140 MMHG | HEIGHT: 63 IN | BODY MASS INDEX: 30.3 KG/M2 | HEART RATE: 68 BPM | WEIGHT: 171 LBS | DIASTOLIC BLOOD PRESSURE: 70 MMHG

## 2018-08-14 DIAGNOSIS — I48.0 PAROXYSMAL ATRIAL FIBRILLATION (HCC): ICD-10-CM

## 2018-08-14 DIAGNOSIS — N18.9 ANEMIA OF RENAL DISEASE: ICD-10-CM

## 2018-08-14 DIAGNOSIS — I10 ESSENTIAL HYPERTENSION: Primary | ICD-10-CM

## 2018-08-14 DIAGNOSIS — D36.9 TUBULAR ADENOMA: ICD-10-CM

## 2018-08-14 DIAGNOSIS — M10.00 IDIOPATHIC GOUT, UNSPECIFIED CHRONICITY, UNSPECIFIED SITE: ICD-10-CM

## 2018-08-14 DIAGNOSIS — E08.01 DIABETES MELLITUS DUE TO UNDERLYING CONDITION WITH HYPEROSMOLARITY AND COMA, WITH LONG-TERM CURRENT USE OF INSULIN (HCC): ICD-10-CM

## 2018-08-14 DIAGNOSIS — D63.1 ANEMIA OF RENAL DISEASE: ICD-10-CM

## 2018-08-14 DIAGNOSIS — Z79.4 DIABETES MELLITUS DUE TO UNDERLYING CONDITION WITH HYPEROSMOLARITY AND COMA, WITH LONG-TERM CURRENT USE OF INSULIN (HCC): ICD-10-CM

## 2018-08-14 DIAGNOSIS — J45.20 MILD INTERMITTENT ASTHMA WITHOUT COMPLICATION: ICD-10-CM

## 2018-08-14 DIAGNOSIS — N18.4 CHRONIC KIDNEY DISEASE, STAGE IV (SEVERE) (HCC): ICD-10-CM

## 2018-08-14 DIAGNOSIS — E55.9 VITAMIN D DEFICIENCY: ICD-10-CM

## 2018-08-14 DIAGNOSIS — E78.2 MIXED HYPERLIPIDEMIA: ICD-10-CM

## 2018-08-14 DIAGNOSIS — I35.0 NONRHEUMATIC AORTIC VALVE STENOSIS: ICD-10-CM

## 2018-08-14 PROBLEM — J18.9 COMMUNITY ACQUIRED PNEUMONIA OF LEFT LOWER LOBE OF LUNG: Status: RESOLVED | Noted: 2018-06-25 | Resolved: 2018-08-14

## 2018-08-14 LAB
25(OH)D3 SERPL-MCNC: 20 NG/ML
ALBUMIN SERPL-MCNC: 3.98 G/DL (ref 3.2–4.8)
ALBUMIN/GLOB SERPL: 1.3 G/DL (ref 1.5–2.5)
ALP SERPL-CCNC: 133 U/L (ref 25–100)
ALT SERPL W P-5'-P-CCNC: 27 U/L (ref 7–40)
ANION GAP SERPL CALCULATED.3IONS-SCNC: 11 MMOL/L (ref 3–11)
ARTICHOKE IGE QN: 111 MG/DL (ref 0–130)
AST SERPL-CCNC: 33 U/L (ref 0–33)
BILIRUB SERPL-MCNC: 0.2 MG/DL (ref 0.3–1.2)
BUN BLD-MCNC: 59 MG/DL (ref 9–23)
BUN/CREAT SERPL: 11.5 (ref 7–25)
CALCIUM SPEC-SCNC: 9.3 MG/DL (ref 8.7–10.4)
CHLORIDE SERPL-SCNC: 106 MMOL/L (ref 99–109)
CHOLEST SERPL-MCNC: 224 MG/DL (ref 0–200)
CO2 SERPL-SCNC: 26 MMOL/L (ref 20–31)
CREAT BLD-MCNC: 5.13 MG/DL (ref 0.6–1.3)
DEPRECATED RDW RBC AUTO: 52.5 FL (ref 37–54)
ERYTHROCYTE [DISTWIDTH] IN BLOOD BY AUTOMATED COUNT: 14.1 % (ref 11.3–14.5)
GFR SERPL CREATININE-BSD FRML MDRD: 8 ML/MIN/1.73
GLOBULIN UR ELPH-MCNC: 3 GM/DL
GLUCOSE BLD-MCNC: 101 MG/DL (ref 70–100)
HBA1C MFR BLD: 5.8 % (ref 4.8–5.6)
HCT VFR BLD AUTO: 38.5 % (ref 34.5–44)
HDLC SERPL-MCNC: 61 MG/DL (ref 40–60)
HGB BLD-MCNC: 12 G/DL (ref 11.5–15.5)
MCH RBC QN AUTO: 31.7 PG (ref 27–31)
MCHC RBC AUTO-ENTMCNC: 31.2 G/DL (ref 32–36)
MCV RBC AUTO: 101.9 FL (ref 80–99)
PLATELET # BLD AUTO: 216 10*3/MM3 (ref 150–450)
PMV BLD AUTO: 10.2 FL (ref 6–12)
POTASSIUM BLD-SCNC: 4.8 MMOL/L (ref 3.5–5.5)
PROT SERPL-MCNC: 7 G/DL (ref 5.7–8.2)
RBC # BLD AUTO: 3.78 10*6/MM3 (ref 3.89–5.14)
SODIUM BLD-SCNC: 143 MMOL/L (ref 132–146)
TRIGL SERPL-MCNC: 224 MG/DL (ref 0–150)
TSH SERPL DL<=0.05 MIU/L-ACNC: 1.96 MIU/ML (ref 0.35–5.35)
URATE SERPL-MCNC: 6.6 MG/DL (ref 3.1–7.8)
WBC NRBC COR # BLD: 11.75 10*3/MM3 (ref 3.5–10.8)

## 2018-08-14 PROCEDURE — 82306 VITAMIN D 25 HYDROXY: CPT | Performed by: INTERNAL MEDICINE

## 2018-08-14 PROCEDURE — 84550 ASSAY OF BLOOD/URIC ACID: CPT | Performed by: INTERNAL MEDICINE

## 2018-08-14 PROCEDURE — 80053 COMPREHEN METABOLIC PANEL: CPT | Performed by: INTERNAL MEDICINE

## 2018-08-14 PROCEDURE — 83036 HEMOGLOBIN GLYCOSYLATED A1C: CPT | Performed by: INTERNAL MEDICINE

## 2018-08-14 PROCEDURE — 99214 OFFICE O/P EST MOD 30 MIN: CPT | Performed by: INTERNAL MEDICINE

## 2018-08-14 PROCEDURE — 80061 LIPID PANEL: CPT | Performed by: INTERNAL MEDICINE

## 2018-08-14 PROCEDURE — 85027 COMPLETE CBC AUTOMATED: CPT | Performed by: INTERNAL MEDICINE

## 2018-08-14 PROCEDURE — 84443 ASSAY THYROID STIM HORMONE: CPT | Performed by: INTERNAL MEDICINE

## 2018-08-14 RX ORDER — PRAVASTATIN SODIUM 80 MG/1
80 TABLET ORAL NIGHTLY
Qty: 30 TABLET | Refills: 5 | Status: SHIPPED | OUTPATIENT
Start: 2018-08-14 | End: 2019-01-14 | Stop reason: DRUGHIGH

## 2018-08-14 NOTE — PROGRESS NOTES
Patient is a 78 y.o. female who is here for a follow up of chronic conditions.  Chief Complaint   Patient presents with   • Hypertension   • Diabetes         HPI:    Here for f/u.  Cough is improved.  FSBS are below 130 on 5 units of insulin.  Does not check PP reading.  Rarely goes above 150.  Energy level is low.  No palpitations.  No nausea or emesis.    History:    Patient Active Problem List   Diagnosis   • Paroxysmal atrial fibrillation (CMS/HCC)   • Essential hypertension   • Type 2 diabetes mellitus (CMS/HCC)   • Chronic kidney disease, stage IV (severe) (CMS/HCC)   • Anemia of renal disease   • Hyperparathyroidism (CMS/HCC)   • Asthma   • Right-sided carotid artery disease (CMS/HCC)   • High output HF (heart failure)   • Vitamin D deficiency   • Nonrheumatic aortic valve stenosis   • Ulcer of gastric fundus   • Tubular adenoma   • Macular degeneration   • Hypothyroidism   • Diabetes mellitus (CMS/HCC)   • Chronic kidney disease       Past Medical History:   Diagnosis Date   • Adenomatous colon polyp    • Chronic kidney disease    • Clostridium difficile infection    • Diabetes mellitus (CMS/HCC)    • Gastric polyps    • Hypothyroidism    • IgA nephropathy, acute    • Macular degeneration    • Tubular adenoma     excision    • Ulcer of gastric fundus    • Vitamin D deficiency        Past Surgical History:   Procedure Laterality Date   • BREAST BIOPSY Left 1990'S   • CARPAL TUNNEL RELEASE     • CARPAL TUNNEL RELEASE Right    • CATARACT EXTRACTION     • CATARACT EXTRACTION Bilateral    • DIAGNOSTIC LAPAROSCOPY     • DIALYSIS FISTULA CREATION     • HERNIA REPAIR  85 and 86   • LAPAROSCOPIC TUBAL LIGATION  1985   • TOTAL KNEE ARTHROPLASTY     • TOTAL KNEE ARTHROPLASTY      Left knee    • TRANSPLANTATION RENAL  2010   • TRANSPLANTATION RENAL Right    • TRIGGER FINGER RELEASE     • TUBAL ABDOMINAL LIGATION     • UPPER GASTROINTESTINAL ENDOSCOPY  05/20/2013       Current Outpatient Prescriptions on File Prior to  Visit   Medication Sig   • ALPRAZolam (XANAX) 0.5 MG tablet Take 1 tablet by mouth Daily. (Patient taking differently: Take 0.5 mg by mouth At Night As Needed.)   • amiodarone (PACERONE) 200 MG tablet TAKE ONE-HALF TABLET BY MOUTH DAILY   • amLODIPine (NORVASC) 5 MG tablet 2 (Two) Times a Day.   • calcitriol (ROCALTROL) 0.25 MCG capsule Take 0.25 mcg by mouth Daily.   • Cholecalciferol (VITAMIN D) 2000 UNITS tablet Take 2,000 Units by mouth Daily.   • COLCRYS 0.6 MG tablet 0.5 tablets As Needed.   • furosemide (LASIX) 40 MG tablet Take 40 mg by mouth 2 (Two) Times a Day.   • gentamicin (GARAMYCIN) 0.1 % cream    • LANTUS SOLOSTAR 100 UNIT/ML injection pen Inject 5 Units under the skin Every Night.   • levothyroxine (SYNTHROID, LEVOTHROID) 75 MCG tablet Take 75 mcg by mouth Daily.   • metoprolol tartrate (LOPRESSOR) 100 MG tablet 2 (Two) Times a Day.   • omeprazole (priLOSEC) 40 MG capsule Take 40 mg by mouth Daily.   • pravastatin (PRAVACHOL) 40 MG tablet Take 40 mg by mouth daily.   • PROAIR  (90 Base) MCG/ACT inhaler Inhale 2 puffs Every 6 (Six) Hours As Needed for Wheezing or Shortness of Air.   • sevelamer (RENVELA) 800 MG tablet Take 800 mg by mouth 3 (Three) Times a Day With Meals.   • tacrolimus (PROGRAF) 0.5 MG capsule Take 0.5 mg by mouth 2 (Two) Times a Day.   • warfarin (COUMADIN) 2 MG tablet Take 4 mg by mouth Daily. Patient takes two tablets daily   • warfarin (COUMADIN) 2 MG tablet TAKE THREE TABLETS BY MOUTH DAILY MONDAY AND THURSDAY AND THEN TWO  TABLETS ALL OTHER DAYS   • warfarin (COUMADIN) 2 MG tablet 2 tablets Daily.   • [DISCONTINUED] amiodarone (PACERONE) 200 MG tablet 0.5 tablets Daily.   • [DISCONTINUED] doxycycline (MONODOX) 100 MG capsule Take 1 capsule by mouth 2 (Two) Times a Day. Need INR check 2 times a week on antibiotic     No current facility-administered medications on file prior to visit.        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 Negative for chills and fever.   HENT: Negative for congestion, ear pain, hearing loss, rhinorrhea, sinus pressure, sore throat and trouble swallowing.    Eyes: Negative for discharge and itching.   Respiratory: Positive for shortness of breath. Negative for chest tightness.    Cardiovascular: Positive for leg swelling. Negative for chest pain and palpitations.   Gastrointestinal: Negative for abdominal pain, blood in stool, constipation, diarrhea and vomiting.        10/17 by Dr Perez, next 10/20   Endocrine: Negative for polydipsia and polyuria.   Genitourinary: Negative for difficulty urinating, dysuria, enuresis, frequency, hematuria and urgency.        11/17 mammogram   Musculoskeletal: Positive for arthralgias and gait problem. Negative for back pain and joint swelling.   Skin: Negative for rash and wound.   Allergic/Immunologic: Negative for immunocompromised state.   Neurological: Negative for dizziness, syncope, weakness, light-headedness, numbness and headaches.   Hematological: Bruises/bleeds easily.   Psychiatric/Behavioral: Positive for sleep disturbance. Negative for behavioral problems and  "dysphoric mood. The patient is not nervous/anxious.        /70   Pulse 68   Ht 160 cm (62.99\")   Wt 77.6 kg (171 lb)   LMP  (LMP Unknown)   BMI 30.30 kg/m²       Physical Exam   Constitutional: She is oriented to person, place, and time. She appears well-developed and well-nourished.   HENT:   Head: Normocephalic and atraumatic.   Right Ear: External ear normal.   Left Ear: External ear normal.   Mouth/Throat: Oropharynx is clear and moist.   Eyes: Conjunctivae and EOM are normal.   Neck: Normal range of motion. Neck supple.   Cardiovascular: Normal rate, regular rhythm and normal heart sounds.    Pulmonary/Chest: Effort normal and breath sounds normal.   Abdominal: Soft. Bowel sounds are normal.   Musculoskeletal: She exhibits edema.   Using cane   Lymphadenopathy:     She has no cervical adenopathy.   Neurological: She is alert and oriented to person, place, and time.   Skin: Skin is warm and dry.   Psychiatric: She has a normal mood and affect. Her behavior is normal. Thought content normal.       Procedure:      Discussion/Summary:    HTN-advised to monitor and goal 130/80  DM-labs today , will wean off insulin and advised of goals  Hyperlipidemia-counseled on diet, check today  ESRD-per Renal  AOCD-\"  \"  Vit D-labs today  Gout-UA level today  afib-rate controlled  hypothroid-labs today  High risk meds-labs today     Labs noted and dw patient, advised vit D 2000 IU qd and to increase pravastatin to 80 mg    Current Outpatient Prescriptions:   •  ALPRAZolam (XANAX) 0.5 MG tablet, Take 1 tablet by mouth Daily. (Patient taking differently: Take 0.5 mg by mouth At Night As Needed.), Disp: 30 tablet, Rfl: 2  •  amiodarone (PACERONE) 200 MG tablet, TAKE ONE-HALF TABLET BY MOUTH DAILY, Disp: 45 tablet, Rfl: 1  •  amLODIPine (NORVASC) 5 MG tablet, 2 (Two) Times a Day., Disp: , Rfl:   •  calcitriol (ROCALTROL) 0.25 MCG capsule, Take 0.25 mcg by mouth Daily., Disp: , Rfl:   •  Cholecalciferol (VITAMIN D) 2000 " UNITS tablet, Take 2,000 Units by mouth Daily., Disp: , Rfl:   •  COLCRYS 0.6 MG tablet, 0.5 tablets As Needed., Disp: , Rfl:   •  furosemide (LASIX) 40 MG tablet, Take 40 mg by mouth 2 (Two) Times a Day., Disp: , Rfl:   •  gentamicin (GARAMYCIN) 0.1 % cream, , Disp: , Rfl:   •  LANTUS SOLOSTAR 100 UNIT/ML injection pen, Inject 5 Units under the skin Every Night., Disp: , Rfl:   •  levothyroxine (SYNTHROID, LEVOTHROID) 75 MCG tablet, Take 75 mcg by mouth Daily., Disp: , Rfl:   •  metoprolol tartrate (LOPRESSOR) 100 MG tablet, 2 (Two) Times a Day., Disp: , Rfl:   •  omeprazole (priLOSEC) 40 MG capsule, Take 40 mg by mouth Daily., Disp: , Rfl:   •  pravastatin (PRAVACHOL) 40 MG tablet, Take 40 mg by mouth daily., Disp: , Rfl:   •  PROAIR  (90 Base) MCG/ACT inhaler, Inhale 2 puffs Every 6 (Six) Hours As Needed for Wheezing or Shortness of Air., Disp: 1 inhaler, Rfl: 4  •  sevelamer (RENVELA) 800 MG tablet, Take 800 mg by mouth 3 (Three) Times a Day With Meals., Disp: , Rfl:   •  tacrolimus (PROGRAF) 0.5 MG capsule, Take 0.5 mg by mouth 2 (Two) Times a Day., Disp: , Rfl:   •  warfarin (COUMADIN) 2 MG tablet, Take 4 mg by mouth Daily. Patient takes two tablets daily, Disp: , Rfl:   •  warfarin (COUMADIN) 2 MG tablet, TAKE THREE TABLETS BY MOUTH DAILY MONDAY AND THURSDAY AND THEN TWO  TABLETS ALL OTHER DAYS, Disp: 192 tablet, Rfl: 2  •  warfarin (COUMADIN) 2 MG tablet, 2 tablets Daily., Disp: , Rfl:         Moni was seen today for hypertension and diabetes.    Diagnoses and all orders for this visit:    Essential hypertension  -     Comprehensive Metabolic Panel  -     Lipid Panel  -     TSH    Nonrheumatic aortic valve stenosis    Paroxysmal atrial fibrillation (CMS/HCC)    Mild intermittent asthma without complication    Vitamin D deficiency  -     Vitamin D 25 Hydroxy    Diabetes mellitus due to underlying condition with hyperosmolarity and coma, with long-term current use of insulin (CMS/HCC)  -     Hemoglobin  A1c    Anemia of renal disease  -     CBC (No Diff)    Chronic kidney disease, stage IV (severe) (CMS/HCC)    Tubular adenoma    Idiopathic gout, unspecified chronicity, unspecified site  -     Uric Acid

## 2018-08-16 ENCOUNTER — ANTICOAGULATION VISIT (OUTPATIENT)
Dept: PHARMACY | Facility: HOSPITAL | Age: 78
End: 2018-08-16

## 2018-08-16 DIAGNOSIS — I48.0 PAROXYSMAL ATRIAL FIBRILLATION (HCC): ICD-10-CM

## 2018-08-16 LAB — INR PPP: 3.8

## 2018-08-16 NOTE — PROGRESS NOTES
Anticoagulation Clinic - Remote Progress Note  [facility]  Testing Frequency: weekly    Indication: paroxysmal afib  Referring Provider: Heath  Goal INR: 2.0-3.0  Current Drug Interactions: amiodarone, levothyroxine; omeprazole, azaTHIOprine  CHADS-VASc: 5 (age, gender, HTN, DM)    Diet: rare GLV; green beans   Alcohol: none  Tobacco: none   OTC Pain Medication: APAP PRN    1st clinic visit: 9/14/17  2nd clinic visit: 6/26/18    INR History:  Date 2/9/18 2/16 2/21 3/1 3/9 3/13 3/20 3/27 4/3 4/10   Total Weekly Dose 18mg/3 days 38mg 36mg 36mg 38mg 28mg/5 days 38mg 38mg 38mg 26mg   INR 1.0 1.5 1.8 2.1 1.9        Notes s/p surgery 2/5 and held doses   Keflex finish 2/28 Boost; Keflex init    Hold- pre procedure Post procedure; boost     Date 4/17 4/20 4/24 5/1 5/8 5/15 5/18 5/22 5/29 6/1   Total Weekly Dose 41mg 36mg 36mg 40mg 38mg 38mg 28mg 28mg 38mg 34mg   INR 3.0 1.9 1.8 2.3 2.3 3.6 2.4 2.3 3.0 2.5   Notes  Lower dose 4/19   colchicine hematoma    Zpak, 1 missed dose     Date 6/4 6/7 6/14 6/19 6/26 6/29 7/3 7/6 7/11 7/16   Total Weekly Dose 34mg 38mg 34mg 30mg 34mg 30mg 30mg 30mg 32mg 38mg   INR 3.0 3.4 4.3 2.5 3.6 2.3 2.9 1.2 1.4 2.0   Notes   Keflex Held x1 Zpak start 6/22, finish 6/25; doxy start 6/25; clinic doxy doxy doxy complete 7/5       Date 7/23 7/26 8/2 8/9 8/16         Total Weekly Dose 36mg 38mg 36mg 38mg 36 mg 32 mg        INR 1.7 2.3 2.0 3.4 3.8         Notes Keflex boost   1 decr             Phone Interview:  Verbal Release Authorization signed on 6/26/18 -- may speak with Alexy Wallace (son), Genesis Becerril (daughter), Sawyer Wallace (son and daughter-in-law), Gerry Rodrigo (son)  Tablet Strength: 2mg tablets  Current Maintenance Dose: variable  Patient Contact Info: [phone]- Home with Peewee Cotto; Ms. Wallace's cell phone number- [phone]   Lab Contact Info: Ale[facility]    Patient Findings   Positives:   Change in diet/appetite   Negatives:   Signs/symptoms of thrombosis, Signs/symptoms  of bleeding, Laboratory test error suspected, Change in health, Change in alcohol use, Change in activity, Upcoming invasive procedure, Emergency department visit, Upcoming dental procedure, Missed doses, Extra doses, Change in medications, Hospital admission, Bruising, Other complaints   Comments:   She has been eating peas and green beans over the last week due to the elevated INR.       Plan:  1. INR is SUPRAtherapeutic today at 3.8 . Instructed Mrs. Wallace to decrease warfarin to 32 mg/week this week and take warfarin 2 mg today, 4 mg on Friday then back to 6mg daily except 4mg TuesThursSat.  She will continue with the peas tonight and eat more of the green beans if she is able to tolerate them. She asks me to explain in number of pills to take.  2. Repeat INR in 1 week, 8/23. We reduced her dose last week and yet the INR still increased, may need to target 34 mg/week.  3. Verbal information provided over the phone. Mrs. Wallace RBV dosing instructions, expresses understanding by teach back, and has no further questions at this time.  4. Patient denies the need for warfarin refills at this time.    Sawyer Jackman Rph  8/16/2018  3:33 PM

## 2018-08-22 RX ORDER — ALPRAZOLAM 0.5 MG/1
TABLET ORAL
Qty: 30 TABLET | Refills: 1 | Status: SHIPPED | OUTPATIENT
Start: 2018-08-22 | End: 2018-10-26 | Stop reason: SDUPTHER

## 2018-08-23 ENCOUNTER — ANTICOAGULATION VISIT (OUTPATIENT)
Dept: PHARMACY | Facility: HOSPITAL | Age: 78
End: 2018-08-23

## 2018-08-23 DIAGNOSIS — I48.0 PAROXYSMAL ATRIAL FIBRILLATION (HCC): ICD-10-CM

## 2018-08-23 LAB — INR PPP: 3.5

## 2018-08-23 NOTE — PROGRESS NOTES
Anticoagulation Clinic - Remote Progress Note  [facility]  Testing Frequency: weekly    Indication: paroxysmal afib  Referring Provider: Heath  Goal INR: 2.0-3.0  Current Drug Interactions: amiodarone, levothyroxine; omeprazole, azaTHIOprine  CHADS-VASc: 5 (age, gender, HTN, DM)    Diet: rare GLV; green beans   Alcohol: none  Tobacco: none   OTC Pain Medication: APAP PRN    1st clinic visit: 9/14/17  2nd clinic visit: 6/26/18    INR History:  Date 2/9/18 2/16 2/21 3/1 3/9 3/13 3/20 3/27 4/3 4/10   Total Weekly Dose 18mg/3 days 38mg 36mg 36mg 38mg 28mg/5 days 38mg 38mg 38mg 26mg   INR 1.0 1.5 1.8 2.1 1.9        Notes s/p surgery 2/5 and held doses   Keflex finish 2/28 Boost; Keflex init    Hold- pre procedure Post procedure; boost     Date 4/17 4/20 4/24 5/1 5/8 5/15 5/18 5/22 5/29 6/1   Total Weekly Dose 41mg 36mg 36mg 40mg 38mg 38mg 28mg 28mg 38mg 34mg   INR 3.0 1.9 1.8 2.3 2.3 3.6 2.4 2.3 3.0 2.5   Notes  Lower dose 4/19   colchicine hematoma    Zpak, 1 missed dose     Date 6/4 6/7 6/14 6/19 6/26 6/29 7/3 7/6 7/11 7/16   Total Weekly Dose 34mg 38mg 34mg 30mg 34mg 30mg 30mg 30mg 32mg 38mg   INR 3.0 3.4 4.3 2.5 3.6 2.3 2.9 1.2 1.4 2.0   Notes   Keflex Held x1 Zpak start 6/22, finish 6/25; doxy start 6/25; clinic doxy doxy doxy complete 7/5       Date 7/23 7/26 8/2 8/9 8/16 8/23        Total Weekly Dose 36mg 38mg 36mg 38mg 36mg 32mg        INR 1.7 2.3 2.0 3.4 3.8 3.5        Notes Keflex boost   1x decr dose 1x decr dose            Phone Interview:  Verbal Release Authorization signed on 6/26/18 -- may speak with Alexy Wallace (son), Genesis Becerril (daughter), Sawyer Wallace (son and daughter-in-law), Gerry Wallace (son)  Tablet Strength: 2mg tablets  Current Maintenance Dose: variable  Patient Contact Info: [phone]- Home with Peewee Cotto; Ms. Wallace's cell phone number- [phone]   Lab Contact Info: Ale[facility]    Patient Findings   Negatives:   Signs/symptoms of thrombosis, Signs/symptoms of bleeding,  Laboratory test error suspected, Change in health, Change in alcohol use, Change in activity, Upcoming invasive procedure, Emergency department visit, Upcoming dental procedure, Missed doses, Extra doses, Change in medications, Change in diet/appetite, Hospital admission, Bruising, Other complaints   Comments:   Patient said she had a small amount of broccoli a couple times this week, plans on having green beans tonight     Plan:  1. INR is again SUPRAtherapeutic today at 3.5. After consulting with Fatemeh Rodríguez, PharmD, instructed Mrs. Wallace to decrease warfarin to warfarin 2mg today, then to take warfarin 4mg daily except 6mg SunWed (11.1% decrease) until recheck. She asks me to explain in number of pills to take.  2. Repeat INR in 1 week, 8/30. We reduced her dose last week and yet the INR still SUPRAtherapeutic  3. Verbal information provided over the phone. Mrs. Wallace RBV dosing instructions, expresses understanding by teach back, and has no further questions at this time.  4. Patient denies the need for warfarin refills at this time.    Sawyer Gamez, Valentin  8/23/2018  4:32 PM     I, Fatemeh Rodríguez, PharmD, have reviewed the note in full and agree with the assessment and plan.  08/23/18  5:20 PM

## 2018-08-29 ENCOUNTER — ANTICOAGULATION VISIT (OUTPATIENT)
Dept: PHARMACY | Facility: HOSPITAL | Age: 78
End: 2018-08-29

## 2018-08-29 DIAGNOSIS — I48.0 PAROXYSMAL ATRIAL FIBRILLATION (HCC): ICD-10-CM

## 2018-08-29 LAB — INR PPP: 2.5

## 2018-08-29 NOTE — PROGRESS NOTES
Anticoagulation Clinic - Remote Progress Note  [facility]  Testing Frequency: weekly    Indication: paroxysmal afib  Referring Provider: Heath  Goal INR: 2 - 3  Current Drug Interactions: amiodarone, levothyroxine; omeprazole, azaTHIOprine  CHADS-VASc: 5 (age, gender, HTN, DM)    Diet: rare GLV; green beans   Alcohol: none  Tobacco: none   OTC Pain Medication: APAP PRN    1st clinic visit: 9/14/17  2nd clinic visit: 6/26/18    INR History:  Date 2/9/18 2/16 2/21 3/1 3/9 3/13 3/20 3/27 4/3 4/10   Total Weekly Dose 18mg/3 days 38mg 36mg 36mg 38mg 28mg/5 days 38mg 38mg 38mg 26mg   INR 1.0 1.5 1.8 2.1 1.9        Notes s/p surgery 2/5 and held doses   Keflex finish 2/28 Boost; Keflex init    Hold- pre procedure Post procedure; boost     Date 4/17 4/20 4/24 5/1 5/8 5/15 5/18 5/22 5/29 6/1   Total Weekly Dose 41mg 36mg 36mg 40mg 38mg 38mg 28mg 28mg 38mg 34mg   INR 3.0 1.9 1.8 2.3 2.3 3.6 2.4 2.3 3.0 2.5   Notes  Lower dose 4/19   colchicine hematoma    Zpak, 1 missed dose     Date 6/4 6/7 6/14 6/19 6/26 6/29 7/3 7/6 7/11 7/16   Total Weekly Dose 34mg 38mg 34mg 30mg 34mg 30mg 30mg 30mg 32mg 38mg   INR 3.0 3.4 4.3 2.5 3.6 2.3 2.9 1.2 1.4 2.0   Notes   Keflex Held x1 Zpak start 6/22, finish 6/25; doxy start 6/25; clinic doxy doxy doxy complete 7/5       Date 7/23 7/26 8/2 8/9 8/16 8/23 8/29       Total Weekly Dose 36mg 38mg 36mg 38mg 36mg 32mg 30mg??       INR 1.7 2.3 2.0 3.4 3.8 3.5 2.5       Notes Keflex boost   1x decr dose 1x decr dose            Phone Interview:  Verbal Release Authorization signed on 6/26/18 -- may speak with Alexy Wallace (son), Genesis Becerril (daughter), Sawyer Wallace (son and daughter-in-law), Gerry Wallace (son)  Tablet Strength: 2mg tablets  Current Maintenance Dose: variable  Patient Contact Info: [phone]- Home with Son Yadiel; MsJoseluis Wallace's cell phone number- [phone]   Lab Contact Info: [facility]    Patient Findings:  Negatives:   Signs/symptoms of thrombosis, Signs/symptoms of  bleeding, Laboratory test error suspected, Change in health, Change in alcohol use, Change in activity, Upcoming invasive procedure, Emergency department visit, Upcoming dental procedure, Missed doses, Extra doses, Change in medications, Change in diet/appetite, Hospital admission, Bruising, Other complaints   Comments:   Patient could not verify what dose she took last week. She states she was outside and didn't want to go inside to read what she had written down. No changes since last encounter.     Plan:  1. INR is back WNL today at 2.5. After consulting with Fatemeh Rodríguez, PharmD, instructed Mrs. Wallace to take warfarin 4mg daily except 6mg SunWed.  2. Repeat INR in one week, 9/5.  3. Verbal information provided over the phone. Mrs. Wallace RBV dosing instructions, expresses understanding by teach back, and has no further questions at this time.  4. Patient denies the need for warfarin refills at this time.    Zina Aldrich, Valentin  8/29/2018  3:38 PM     IRadha, PharmD, have reviewed the note in full and agree with the assessment and plan.  08/29/18  4:39 PM

## 2018-09-05 ENCOUNTER — ANTICOAGULATION VISIT (OUTPATIENT)
Dept: PHARMACY | Facility: HOSPITAL | Age: 78
End: 2018-09-05

## 2018-09-05 DIAGNOSIS — I48.0 PAROXYSMAL ATRIAL FIBRILLATION (HCC): ICD-10-CM

## 2018-09-05 LAB — INR PPP: 2.6

## 2018-09-05 NOTE — PROGRESS NOTES
Anticoagulation Clinic - Remote Progress Note  [facility]  Testing Frequency: weekly    Indication: paroxysmal afib  Referring Provider: Heath  Goal INR: 2 - 3  Current Drug Interactions: amiodarone, levothyroxine; omeprazole, azaTHIOprine  CHADS-VASc: 5 (age, gender, HTN, DM)    Diet: rare GLV; green beans   Alcohol: none  Tobacco: none   OTC Pain Medication: APAP PRN    1st clinic visit: 9/14/17  2nd clinic visit: 6/26/18    INR History:  Date 2/9/18 2/16 2/21 3/1 3/9 3/13 3/20 3/27 4/3 4/10   Total Weekly Dose 18mg/3 days 38mg 36mg 36mg 38mg 28mg/5 days 38mg 38mg 38mg 26mg   INR 1.0 1.5 1.8 2.1 1.9        Notes s/p surgery 2/5 and held doses   Keflex finish 2/28 Boost; Keflex init    Hold- pre procedure Post procedure; boost     Date 4/17 4/20 4/24 5/1 5/8 5/15 5/18 5/22 5/29 6/1   Total Weekly Dose 41mg 36mg 36mg 40mg 38mg 38mg 28mg 28mg 38mg 34mg   INR 3.0 1.9 1.8 2.3 2.3 3.6 2.4 2.3 3.0 2.5   Notes  Lower dose 4/19   colchicine hematoma    Zpak, 1 missed dose     Date 6/4 6/7 6/14 6/19 6/26 6/29 7/3 7/6 7/11 7/16   Total Weekly Dose 34mg 38mg 34mg 30mg 34mg 30mg 30mg 30mg 32mg 38mg   INR 3.0 3.4 4.3 2.5 3.6 2.3 2.9 1.2 1.4 2.0   Notes   Keflex Held x1 Zpak start 6/22, finish 6/25; doxy start 6/25; clinic doxy doxy doxy complete 7/5       Date 7/23 7/26 8/2 8/9 8/16 8/23 8/29 9/5      Total Weekly Dose 36mg 38mg 36mg 38mg 36mg 32mg 30mg?? 32mg      INR 1.7 2.3 2.0 3.4 3.8 3.5 2.5 2.6      Notes Keflex boost   1x decr dose 1x decr dose            Phone Interview:  Verbal Release Authorization signed on 6/26/18 -- may speak with Alexy Wallace (son), Genesis Becerril (daughter), Sawyer Wallace (son and daughter-in-law), Gerry Rodrigo (son)  Tablet Strength: 2mg tablets  Current Maintenance Dose: variable  Patient Contact Info: [phone]- Home with Son Yadiel; Ms. Wallace's cell phone number- [phone]   Lab Contact Info: [place]    Patient Findings:  Negatives:   Signs/symptoms of thrombosis,  Signs/symptoms of bleeding, Laboratory test error suspected, Change in health, Change in alcohol use, Change in activity, Upcoming invasive procedure, Emergency department visit, Upcoming dental procedure, Missed doses, Extra doses, Change in medications, Change in diet/appetite, Hospital admission, Bruising, Other complaints     Plan:  1. INR is therapeutic at 2.6 today. Instructed Mrs. Wallace to continue taking warfarin 4mg daily except 6mg SunWed.  2. Repeat INR in one week, 9/12.  3. Verbal information provided over the phone. Mrs. Wallace RBV dosing instructions, expresses understanding by teach back, and has no further questions at this time.  4. Patient denies the need for warfarin refills at this time.    Katherin Tierney, PharmD  9/5/2018  3:21 PM

## 2018-09-12 ENCOUNTER — ANTICOAGULATION VISIT (OUTPATIENT)
Dept: PHARMACY | Facility: HOSPITAL | Age: 78
End: 2018-09-12

## 2018-09-12 DIAGNOSIS — I48.0 PAROXYSMAL ATRIAL FIBRILLATION (HCC): ICD-10-CM

## 2018-09-12 LAB — INR PPP: 2.5

## 2018-09-12 NOTE — PROGRESS NOTES
Anticoagulation Clinic - Remote Progress Note  [employer] [employer]  Testing Frequency: weekly    Indication: paroxysmal afib  Referring Provider: Heath  Goal INR: 2 - 3  Current Drug Interactions: amiodarone, levothyroxine; omeprazole, azaTHIOprine  CHADS-VASc: 5 (age, gender, HTN, DM)    Diet: rare GLV; green beans   Alcohol: none  Tobacco: none   OTC Pain Medication: APAP PRN    1st clinic visit: 9/14/17  2nd clinic visit: 6/26/18    INR History:  Date 2/9/18 2/16 2/21 3/1 3/9 3/13 3/20 3/27 4/3 4/10   Total Weekly Dose 18mg/3 days 38mg 36mg 36mg 38mg 28mg/5 days 38mg 38mg 38mg 26mg   INR 1.0 1.5 1.8 2.1 1.9        Notes s/p surgery 2/5 and held doses   Keflex finish 2/28 Boost; Keflex init    Hold- pre procedure Post procedure; boost     Date 4/17 4/20 4/24 5/1 5/8 5/15 5/18 5/22 5/29 6/1   Total Weekly Dose 41mg 36mg 36mg 40mg 38mg 38mg 28mg 28mg 38mg 34mg   INR 3.0 1.9 1.8 2.3 2.3 3.6 2.4 2.3 3.0 2.5   Notes  Lower dose 4/19   colchicine hematoma    Zpak, 1 missed dose     Date 6/4 6/7 6/14 6/19 6/26 6/29 7/3 7/6 7/11 7/16   Total Weekly Dose 34mg 38mg 34mg 30mg 34mg 30mg 30mg 30mg 32mg 38mg   INR 3.0 3.4 4.3 2.5 3.6 2.3 2.9 1.2 1.4 2.0   Notes   Keflex Held x1 Zpak start 6/22, finish 6/25; doxy start 6/25; clinic doxy doxy doxy complete 7/5       Date 7/23 7/26 8/2 8/9 8/16 8/23 8/29 9/5 9/12     Total Weekly Dose 36mg 38mg 36mg 38mg 36mg 32mg 30mg?? 32mg 32 mg     INR 1.7 2.3 2.0 3.4 3.8 3.5 2.5 2.6 2.5     Notes Keflex boost   1x decr dose 1x decr dose            Phone Interview:  Verbal Release Authorization signed on 6/26/18 -- may speak with Alexy Wallace (son), Genesis Becerril (daughter), Sawyer Wallace (son and daughter-in-law), Gerry Rodrigo (son)  Tablet Strength: 2mg tablets  Current Maintenance Dose: variable  Patient Contact Info: [phone]- Home with Peewee Cotto; MsJoseluis Wallace's cell phone number- [phone]   Lab Contact Info: [place]    Patient Findings   Negatives:   Signs/symptoms of  "thrombosis, Signs/symptoms of bleeding, Laboratory test error suspected, Change in health, Change in alcohol use, Change in activity, Upcoming invasive procedure, Emergency department visit, Upcoming dental procedure, Missed doses, Extra doses, Change in medications, Change in diet/appetite, Hospital admission, Bruising, Other complaints   Comments:   Ms. Wallace has had some bruising but they are healing well. She denied any other signs of bleeding, missed doses, extra doses, changes in medications, changes in diet. She does not like to cook so she just \"fixes whatever she feels.\" She has no upcoming procedures and declined the need for refills.      Plan:  1. INR is therapeutic at 2.5 today. Instructed Mrs. Wallace to continue taking warfarin 4mg daily except 6mg SunWed.  2. Repeat INR in one week, 9/19.  3. Verbal information provided over the phone. Mrs. Wallace RBV dosing instructions, expresses understanding by teach back, and has no further questions at this time.  4. Patient denies the need for warfarin refills at this time.    Lisa Jaime, Pharmacy Intern  9/12/2018  3:21 PM     IFatemeh, PharmD, have reviewed the note in full and agree with the assessment and plan.  09/12/18  5:31 PM    "

## 2018-09-19 ENCOUNTER — ANTICOAGULATION VISIT (OUTPATIENT)
Dept: PHARMACY | Facility: HOSPITAL | Age: 78
End: 2018-09-19

## 2018-09-19 DIAGNOSIS — I48.0 PAROXYSMAL ATRIAL FIBRILLATION (HCC): ICD-10-CM

## 2018-09-19 LAB — INR PPP: 3.3

## 2018-09-19 NOTE — PROGRESS NOTES
Anticoagulation Clinic - Remote Progress Note  [employer] [employer]  Testing Frequency: weekly    Indication: paroxysmal afib  Referring Provider: Heath  Goal INR: 2 - 3  Current Drug Interactions: amiodarone, levothyroxine; omeprazole, azaTHIOprine  CHADS-VASc: 5 (age, gender, HTN, DM)    Diet: rare GLV; green beans   Alcohol: none  Tobacco: none   OTC Pain Medication: APAP PRN    1st clinic visit: 9/14/17  2nd clinic visit: 6/26/18    INR History:  Date 2/9/18 2/16 2/21 3/1 3/9 3/13 3/20 3/27 4/3 4/10   Total Weekly Dose 18mg/3 days 38mg 36mg 36mg 38mg 28mg/5 days 38mg 38mg 38mg 26mg   INR 1.0 1.5 1.8 2.1 1.9        Notes s/p surgery 2/5 and held doses   Keflex finish 2/28 Boost; Keflex init    Hold- pre procedure Post procedure; boost     Date 4/17 4/20 4/24 5/1 5/8 5/15 5/18 5/22 5/29 6/1   Total Weekly Dose 41mg 36mg 36mg 40mg 38mg 38mg 28mg 28mg 38mg 34mg   INR 3.0 1.9 1.8 2.3 2.3 3.6 2.4 2.3 3.0 2.5   Notes  Lower dose 4/19   colchicine hematoma    Zpak, 1 missed dose     Date 6/4 6/7 6/14 6/19 6/26 6/29 7/3 7/6 7/11 7/16   Total Weekly Dose 34mg 38mg 34mg 30mg 34mg 30mg 30mg 30mg 32mg 38mg   INR 3.0 3.4 4.3 2.5 3.6 2.3 2.9 1.2 1.4 2.0   Notes   Keflex Held x1 Zpak start 6/22, finish 6/25; doxy start 6/25; clinic doxy doxy doxy complete 7/5       Date 7/23 7/26 8/2 8/9 8/16 8/23 8/29 9/5 9/12 9/19    Total Weekly Dose 36mg 38mg 36mg 38mg 36mg 32mg 30mg?? 32mg 32 mg 32mg    INR 1.7 2.3 2.0 3.4 3.8 3.5 2.5 2.6 2.5 3.3    Notes Keflex boost   1x decr dose 1x decr dose    keflex        Phone Interview:  Verbal Release Authorization signed on 6/26/18 -- may speak with Alexy Wallace (son), Genesis Becerril (daughter), Sawyer Wallace (son and daughter-in-law), Gerry Rodrigo (son)  Tablet Strength: 2mg tablets  Current Maintenance Dose: variable  Patient Contact Info: [phone]- Home with Peewee Cotto; MsJoseluis Wallace's cell phone number- [phone]   Lab Contact Info: [place]    Patient Findings   Positives:   Change in  medications   Negatives:   Signs/symptoms of thrombosis, Signs/symptoms of bleeding, Laboratory test error suspected, Change in health, Change in alcohol use, Change in activity, Upcoming invasive procedure, Emergency department visit, Upcoming dental procedure, Missed doses, Extra doses, Change in diet/appetite, Hospital admission, Bruising, Other complaints   Comments:   Mrs. Wallace reports that she may have a UTI and is starting keflex 250mg TID x7 days today.      Plan:  1. INR is slightly SUPRAtherapeutic at 3.3 today. Started Keflex 250mg TID x7 days today. Instructed Mrs. Wallace to decrease tonight to 4mg then continue taking warfarin 4mg daily except 6mg SunWed.  2. Repeat INR on Monday on 9/21 due to Keflex.  3. Verbal information provided over the phone. Mrs. Wallace RBV dosing instructions, expresses understanding by teach back, and has no further questions at this time.  4. Patient denies the need for warfarin refills at this time.    Fatemeh Rodríguez, PharmD  9/19/2018  3:11 PM

## 2018-09-24 ENCOUNTER — ANTICOAGULATION VISIT (OUTPATIENT)
Dept: PHARMACY | Facility: HOSPITAL | Age: 78
End: 2018-09-24

## 2018-09-24 DIAGNOSIS — I48.0 PAROXYSMAL ATRIAL FIBRILLATION (HCC): ICD-10-CM

## 2018-09-24 LAB — INR PPP: 2

## 2018-09-24 NOTE — PROGRESS NOTES
Anticoagulation Clinic - Remote Progress Note  [facility] [employer]  Testing Frequency: weekly    Indication: paroxysmal afib  Referring Provider: Heath  Goal INR: 2 - 3  Current Drug Interactions: amiodarone, levothyroxine; omeprazole, azaTHIOprine  CHADS-VASc: 5 (age, gender, HTN, DM)    Diet: rare GLV; green beans   Alcohol: none  Tobacco: none   OTC Pain Medication: APAP PRN    1st clinic visit: 9/14/17  2nd clinic visit: 6/26/18    INR History:  Date 2/9/18 2/16 2/21 3/1 3/9 3/13 3/20 3/27 4/3 4/10   Total Weekly Dose 18mg/3 days 38mg 36mg 36mg 38mg 28mg/5 days 38mg 38mg 38mg 26mg   INR 1.0 1.5 1.8 2.1 1.9        Notes s/p surgery 2/5 and held doses   Keflex finish 2/28 Boost; Keflex init    Hold- pre procedure Post procedure; boost     Date 4/17 4/20 4/24 5/1 5/8 5/15 5/18 5/22 5/29 6/1   Total Weekly Dose 41mg 36mg 36mg 40mg 38mg 38mg 28mg 28mg 38mg 34mg   INR 3.0 1.9 1.8 2.3 2.3 3.6 2.4 2.3 3.0 2.5   Notes  Lower dose 4/19   colchicine hematoma    Zpak, 1 missed dose     Date 6/4 6/7 6/14 6/19 6/26 6/29 7/3 7/6 7/11 7/16   Total Weekly Dose 34mg 38mg 34mg 30mg 34mg 30mg 30mg 30mg 32mg 38mg   INR 3.0 3.4 4.3 2.5 3.6 2.3 2.9 1.2 1.4 2.0   Notes   Keflex Held x1 Zpak start 6/22, finish 6/25; doxy start 6/25; clinic doxy doxy doxy complete 7/5       Date 7/23 7/26 8/2 8/9 8/16 8/23 8/29 9/5 9/12 9/19 9/24   Total Weekly Dose 36mg 38mg 36mg 38mg 36mg 32mg 30mg?? 32mg 32 mg 32mg 30mg   INR 1.7 2.3 2.0 3.4 3.8 3.5 2.5 2.6 2.5 3.3 2.0   Notes Keflex boost   1x decr dose 1x decr dose    Keflex Keflex     Phone Interview:  Verbal Release Authorization signed on 6/26/18 -- may speak with Alexy Wallace (son), Genesis Becerril (daughter), Sawyer Wallace (son and daughter-in-law), Gerry Wallace (son)  Tablet Strength: 2mg tablets  Current Maintenance Dose: variable  Patient Contact Info: [phone]- Home with Peewee Cotto; Ms. Wallace's cell phone number- [phone]     Patient Findings:  Positives:   Change in  medications   Negatives:   Signs/symptoms of thrombosis, Signs/symptoms of bleeding, Laboratory test error suspected, Change in health, Change in alcohol use, Change in activity, Upcoming invasive procedure, Emergency department visit, Upcoming dental procedure, Missed doses, Extra doses, Change in diet/appetite, Hospital admission, Bruising, Other complaints   Comments:   Will finish Keflex regimen this Tue.      Plan:  1. INR is therapeutic at 2.0 today. Started Keflex 250mg TID x7 days last week and finishes this Tue. Instructed Mrs. Wallace to return to her maintenance regimen of warfarin 4mg daily except 6mg SunWed (6% inc from last week).  2. Repeat INR on Fri after finishing Keflex.  3. Verbal information provided over the phone. Mrs. Wallace RBV dosing instructions, expresses understanding by teach back, and has no further questions at this time    Jesse Valenzuela, PharmD Candidate 2019 9/24/2018  3:41 PM     Desiree HENRY, [facility], have reviewed the note in full and agree with the assessment and plan.  09/24/18  4:27 PM

## 2018-09-27 ENCOUNTER — ANTICOAGULATION VISIT (OUTPATIENT)
Dept: PHARMACY | Facility: HOSPITAL | Age: 78
End: 2018-09-27

## 2018-09-27 DIAGNOSIS — I48.0 PAROXYSMAL ATRIAL FIBRILLATION (HCC): ICD-10-CM

## 2018-09-27 LAB — INR PPP: 2.3

## 2018-09-27 NOTE — PROGRESS NOTES
Anticoagulation Clinic - Remote Progress Note  [employer]  Testing Frequency: weekly    Indication: paroxysmal afib  Referring Provider: Heath  Goal INR: 2 - 3  Current Drug Interactions: amiodarone, levothyroxine; omeprazole, azaTHIOprine  CHADS-VASc: 5 (age, gender, HTN, DM)    Diet: rare GLV; green beans   Alcohol: none  Tobacco: none   OTC Pain Medication: APAP PRN    1st clinic visit: 9/14/17  2nd clinic visit: 6/26/18    INR History:  Date 2/9/18 2/16 2/21 3/1 3/9 3/13 3/20 3/27 4/3 4/10   Total Weekly Dose 18mg/3 days 38mg 36mg 36mg 38mg 28mg/5 days 38mg 38mg 38mg 26mg   INR 1.0 1.5 1.8 2.1 1.9        Notes s/p surgery 2/5 and held doses   Keflex finish 2/28 Boost; Keflex init    Hold- pre procedure Post procedure; boost     Date 4/17 4/20 4/24 5/1 5/8 5/15 5/18 5/22 5/29 6/1   Total Weekly Dose 41mg 36mg 36mg 40mg 38mg 38mg 28mg 28mg 38mg 34mg   INR 3.0 1.9 1.8 2.3 2.3 3.6 2.4 2.3 3.0 2.5   Notes  Lower dose 4/19   colchicine hematoma    Zpak, 1 missed dose     Date 6/4 6/7 6/14 6/19 6/26 6/29 7/3 7/6 7/11 7/16   Total Weekly Dose 34mg 38mg 34mg 30mg 34mg 30mg 30mg 30mg 32mg 38mg   INR 3.0 3.4 4.3 2.5 3.6 2.3 2.9 1.2 1.4 2.0   Notes   Keflex Held x1 Zpak start 6/22, finish 6/25; doxy start 6/25; clinic doxy doxy doxy complete 7/5       Date 7/23 7/26 8/2 8/9 8/16 8/23 8/29 9/5 9/12 9/19 9/24   Total Weekly Dose 36mg 38mg 36mg 38mg 36mg 32mg 30mg?? 32mg 32 mg 32mg 30mg   INR 1.7 2.3 2.0 3.4 3.8 3.5 2.5 2.6 2.5 3.3 2.0   Notes Keflex boost   1x decr dose 1x decr dose    Keflex Keflex     Date 9/27             Total Weekly Dose 32mg             INR 2.3             Notes Greta                 Phone Interview:  Verbal Release Authorization signed on 6/26/18 -- may speak with Alexy Wallace (son), Genesis Becerril (daughter), Sawyer Wallace (son and daughter-in-law), Gerry Wallace (son)  Tablet Strength: 2mg tablets  Current Maintenance Dose: variable  Patient Contact Info: [phone] - Home with Son  Yadiel; Ms. Wallace's cell phone number- [phone]     Patient Findings:  Positives:   Change in medications   Negatives:   Signs/symptoms of thrombosis, Signs/symptoms of bleeding, Laboratory test error suspected, Change in health, Change in alcohol use, Change in activity, Upcoming invasive procedure, Emergency department visit, Upcoming dental procedure, Missed doses, Extra doses, Change in diet/appetite, Hospital admission, Bruising, Other complaints   Comments:   Levofloxacin 250mg 1 tab qod x14 days - started yesterday 9/26. Finished Kelfex Wed 9/26.       Plan:  1. INR is therapeutic at 2.3 today. Finished course of Keflex 9/26, however started Levofloxacin 250mg qod x14 days on 9/26. Pt has been instructed by nephrology to test INR x2/wk while on levo, will check again Mon. Instructed Mrs. Wallace to continue her maintenance regimen of warfarin 4mg daily except 6mg SunWed, anticipate INR rising while on levofloxacin.  2. Repeat INR on Mon 10/1.  3. Verbal information provided over the phone. Mrs. Wallace RBV dosing instructions, expresses understanding by teach back, and has no further questions at this time    Jesse Valenzuela, PharmD Candidate 2019 9/27/2018  3:37 PM     I, Sawyer [facility], have reviewed the note in full and agree with the assessment and plan.  09/28/18  8:06 AM

## 2018-10-01 ENCOUNTER — ANTICOAGULATION VISIT (OUTPATIENT)
Dept: PHARMACY | Facility: HOSPITAL | Age: 78
End: 2018-10-01

## 2018-10-01 DIAGNOSIS — I48.0 PAROXYSMAL ATRIAL FIBRILLATION (HCC): ICD-10-CM

## 2018-10-01 LAB — INR PPP: 2.4

## 2018-10-01 RX ORDER — LEVOFLOXACIN 250 MG/1
250 TABLET ORAL EVERY OTHER DAY
COMMUNITY
Start: 2018-09-26 | End: 2018-11-14

## 2018-10-01 NOTE — PROGRESS NOTES
Anticoagulation Clinic - Remote Progress Note  [employer]  Testing Frequency: weekly    Indication: paroxysmal afib  Referring Provider: Heath  Goal INR: 2 - 3  Current Drug Interactions: amiodarone, levothyroxine; omeprazole, azaTHIOprine  CHADS-VASc: 5 (age, gender, HTN, DM)    Diet: rare GLV; green beans   Alcohol: none  Tobacco: none   OTC Pain Medication: APAP PRN    1st clinic visit: 9/14/17  2nd clinic visit: 6/26/18    INR History:  Date 2/9/18 2/16 2/21 3/1 3/9 3/13 3/20 3/27 4/3 4/10   Total Weekly Dose 18mg/3 days 38mg 36mg 36mg 38mg 28mg/5 days 38mg 38mg 38mg 26mg   INR 1.0 1.5 1.8 2.1 1.9        Notes s/p surgery 2/5 and held doses   Keflex finish 2/28 Boost; Keflex init    Hold- pre procedure Post procedure; boost     Date 4/17 4/20 4/24 5/1 5/8 5/15 5/18 5/22 5/29 6/1   Total Weekly Dose 41mg 36mg 36mg 40mg 38mg 38mg 28mg 28mg 38mg 34mg   INR 3.0 1.9 1.8 2.3 2.3 3.6 2.4 2.3 3.0 2.5   Notes  Lower dose 4/19   colchicine hematoma    Zpak, 1 missed dose     Date 6/4 6/7 6/14 6/19 6/26 6/29 7/3 7/6 7/11 7/16   Total Weekly Dose 34mg 38mg 34mg 30mg 34mg 30mg 30mg 30mg 32mg 38mg   INR 3.0 3.4 4.3 2.5 3.6 2.3 2.9 1.2 1.4 2.0   Notes   Keflex Held x1 Zpak start 6/22, finish 6/25; doxy start 6/25; clinic doxy doxy doxy complete 7/5       Date 7/23 7/26 8/2 8/9 8/16 8/23 8/29 9/5 9/12 9/19 9/24   Total Weekly Dose 36mg 38mg 36mg 38mg 36mg 32mg 30mg?? 32mg 32 mg 32mg 30mg   INR 1.7 2.3 2.0 3.4 3.8 3.5 2.5 2.6 2.5 3.3 2.0   Notes Keflex boost   1x decr dose 1x decr dose    Keflex Keflex     Date 9/27 10/1            Total Weekly Dose 32mg 32mg            INR 2.3 2.4            Notes Levaquin start 9/26 Levaquin              Phone Interview:  Verbal Release Authorization signed on 6/26/18 -- may speak with Alexy Wallace (son), Genesis Becerril (daughter), Sawyer Wallace (son and daughter-in-law), Gerry Wallace (son)  Tablet Strength: 2mg tablets  Current Maintenance Dose: variable  Patient Contact Info:  [phone] - Home with Son Yadiel; Ms. Wallace's cell phone number- [phone]     Patient Findings:  Negatives:   Signs/symptoms of thrombosis, Signs/symptoms of bleeding, Laboratory test error suspected, Change in health, Change in alcohol use, Change in activity, Upcoming invasive procedure, Emergency department visit, Upcoming dental procedure, Missed doses, Extra doses, Change in medications, Change in diet/appetite, Hospital admission, Bruising, Other complaints   Comments:   Patient continues Levaquin      Plan:  1. INR is therapeutic today at 2.4. Started Levofloxacin 250mg every other day x 14 days on 9/26. Patient has been instructed by nephrology to test INR x2/wk while on Levaquin. Instructed Mrs. Wallace to continue warfarin 4mg daily except 6mg SunWed.  2. Repeat INR on Friday, 10/5.  3. Verbal information provided over the phone. Mrs. Wallace RBV dosing instructions, expresses understanding by teach back, and has no further questions at this time    Zina Aldrich CPhT  10/1/2018  3:14 PM     Please add stop date to levoflox at follow up (14 day course vs 14 doses).   IDesiree, [facility], have reviewed the note in full and agree with the assessment and plan.  10/01/18  4:56 PM

## 2018-10-05 ENCOUNTER — TRANSCRIBE ORDERS (OUTPATIENT)
Dept: ADMINISTRATIVE | Facility: HOSPITAL | Age: 78
End: 2018-10-05

## 2018-10-05 DIAGNOSIS — Z12.31 VISIT FOR SCREENING MAMMOGRAM: Primary | ICD-10-CM

## 2018-10-08 ENCOUNTER — ANTICOAGULATION VISIT (OUTPATIENT)
Dept: PHARMACY | Facility: HOSPITAL | Age: 78
End: 2018-10-08

## 2018-10-08 DIAGNOSIS — I48.0 PAROXYSMAL ATRIAL FIBRILLATION (HCC): ICD-10-CM

## 2018-10-08 LAB — INR PPP: 3.2

## 2018-10-08 NOTE — PROGRESS NOTES
Anticoagulation Clinic - Remote Progress Note  [employer]  Testing Frequency: weekly    Indication: paroxysmal afib  Referring Provider: Heath  Goal INR: 2 - 3  Current Drug Interactions: amiodarone, levothyroxine; omeprazole, azaTHIOprine  CHADS-VASc: 5 (age, gender, HTN, DM)    Diet: rare GLV; green beans   Alcohol: none  Tobacco: none   OTC Pain Medication: APAP PRN    1st clinic visit: 9/14/17  2nd clinic visit: 6/26/18    INR History:  Date 2/9/18 2/16 2/21 3/1 3/9 3/13 3/20 3/27 4/3 4/10   Total Weekly Dose 18mg/3 days 38mg 36mg 36mg 38mg 28mg/5 days 38mg 38mg 38mg 26mg   INR 1.0 1.5 1.8 2.1 1.9        Notes s/p surgery 2/5 and held doses   Keflex finish 2/28 Boost; Keflex init    Hold- pre procedure Post procedure; boost     Date 4/17 4/20 4/24 5/1 5/8 5/15 5/18 5/22 5/29 6/1   Total Weekly Dose 41mg 36mg 36mg 40mg 38mg 38mg 28mg 28mg 38mg 34mg   INR 3.0 1.9 1.8 2.3 2.3 3.6 2.4 2.3 3.0 2.5   Notes  Lower dose 4/19   colchicine hematoma    Zpak, 1 missed dose     Date 6/4 6/7 6/14 6/19 6/26 6/29 7/3 7/6 7/11 7/16   Total Weekly Dose 34mg 38mg 34mg 30mg 34mg 30mg 30mg 30mg 32mg 38mg   INR 3.0 3.4 4.3 2.5 3.6 2.3 2.9 1.2 1.4 2.0   Notes   Keflex Held x1 Zpak start 6/22, finish 6/25; doxy start 6/25; clinic doxy doxy doxy complete 7/5       Date 7/23 7/26 8/2 8/9 8/16 8/23 8/29 9/5 9/12 9/19 9/24   Total Weekly Dose 36mg 38mg 36mg 38mg 36mg 32mg 30mg?? 32mg 32 mg 32mg 30mg   INR 1.7 2.3 2.0 3.4 3.8 3.5 2.5 2.6 2.5 3.3 2.0   Notes Keflex boost   1x decr dose 1x decr dose    Keflex Keflex     Date 9/27 10/1 10/8           Total Weekly Dose 32mg 32mg 32mg           INR 2.3 2.4 3.2           Notes Levaquin start 9/26 Stopped levaquin              Phone Interview:  Verbal Release Authorization signed on 6/26/18 -- may speak with Alexy Wallace (son), Genesis Becerril (daughter), Sawyer Wallace (son and daughter-in-law), Gerry Wallace (son)  Tablet Strength: 2mg tablets  Current Maintenance Dose:  variable  Patient Contact Info: [phone] - Home with Son Yadiel; Ms. Wallace's cell phone number- [phone]     Patient Findings:  Positives:   Change in medications   Negatives:   Signs/symptoms of thrombosis, Signs/symptoms of bleeding, Laboratory test error suspected, Change in health, Change in alcohol use, Change in activity, Upcoming invasive procedure, Emergency department visit, Upcoming dental procedure, Missed doses, Extra doses, Change in diet/appetite, Hospital admission, Bruising, Other complaints   Comments:   Levaquin, stopped taking after 3 tabs on 9/26 (started feeling bone pain associated with this medication subjective information) - going to Dr. Tracie Crenshaw 10/11. Instructed patient to call us with any medication changes      Plan:  1. INR is slightly SUPRAtherapeutic today at 3.2. Patient reports self D/C'ing levaquin after 3 doses due to medication ADR. Patient says she has an appointment with nephrology this Thurs. Instructed Mrs. Wallace to decrease this Wed dose to 4mg then continue warfarin 4mg daily except 6mg SunWed.  2. Repeat INR on 10/15.  3. Verbal information provided over the phone. Mrs. Wallace RBV dosing instructions, expresses understanding by teach back, and has no further questions at this time  4. Patient knows to call if any medication changes are made at her Thur appointment     Jesse Valenzuela, PharmD Candidate 2019  10/08/18  3:11 PM     Agree with plan- patient has become SUPRAtherapeutic on 3.3 with dose decrease to 30mg/week resulting in a therapeutic INR.  I, Fatemeh Rodríguez, PharmD, have reviewed the note in full and agree with the assessment and plan.  10/09/18  9:31 AM

## 2018-10-15 ENCOUNTER — ANTICOAGULATION VISIT (OUTPATIENT)
Dept: PHARMACY | Facility: HOSPITAL | Age: 78
End: 2018-10-15

## 2018-10-15 DIAGNOSIS — I48.0 PAROXYSMAL ATRIAL FIBRILLATION (HCC): ICD-10-CM

## 2018-10-15 LAB — INR PPP: 2.5

## 2018-10-15 NOTE — PROGRESS NOTES
Anticoagulation Clinic - Remote Progress Note  [employer]  Testing Frequency: weekly    Indication: paroxysmal afib  Referring Provider: Heath  Goal INR: 2.0-3.0  Current Drug Interactions: amiodarone, levothyroxine; omeprazole, azaTHIOprine  CHADS-VASc: 5 (age, gender, HTN, DM)    Diet: rare GLV; green beans   Alcohol: none  Tobacco: none   OTC Pain Medication: APAP PRN    1st clinic visit: 9/14/17  2nd clinic visit: 6/26/18    INR History:  Date 2/9/18 2/16 2/21 3/1 3/9 3/13 3/20 3/27 4/3 4/10   Total Weekly Dose 18mg/3 days 38mg 36mg 36mg 38mg 28mg/5 days 38mg 38mg 38mg 26mg   INR 1.0 1.5 1.8 2.1 1.9        Notes s/p surgery 2/5 and held doses   Keflex finish 2/28 Boost; Keflex init    Hold- pre procedure Post procedure; boost     Date 4/17 4/20 4/24 5/1 5/8 5/15 5/18 5/22 5/29 6/1   Total Weekly Dose 41mg 36mg 36mg 40mg 38mg 38mg 28mg 28mg 38mg 34mg   INR 3.0 1.9 1.8 2.3 2.3 3.6 2.4 2.3 3.0 2.5   Notes  Lower dose 4/19   colchicine hematoma    Zpak, 1 missed dose     Date 6/4 6/7 6/14 6/19 6/26 6/29 7/3 7/6 7/11 7/16   Total Weekly Dose 34mg 38mg 34mg 30mg 34mg 30mg 30mg 30mg 32mg 38mg   INR 3.0 3.4 4.3 2.5 3.6 2.3 2.9 1.2 1.4 2.0   Notes   Keflex Held x1 Zpak start 6/22, finish 6/25; doxy start 6/25; clinic doxy doxy doxy complete 7/5       Date 7/23 7/26 8/2 8/9 8/16 8/23 8/29 9/5 9/12 9/19 9/24   Total Weekly Dose 36mg 38mg 36mg 38mg 36mg 32mg 30mg?? 32mg 32 mg 32mg 30mg   INR 1.7 2.3 2.0 3.4 3.8 3.5 2.5 2.6 2.5 3.3 2.0   Notes Keflex boost   1x decr dose 1x decr dose    Keflex Keflex     Date 9/27 10/1 10/8 10/15          Total Weekly Dose 32mg 32mg 32mg 30mg          INR 2.3 2.4 3.2 2.5          Notes Levaquin start 9/26 Stopped levaquin  1x decr dose            Phone Interview:  Verbal Release Authorization signed on 6/26/18 -- may speak with Alexy Wallace (son), Genesis Becerril (daughter), Sawyer Wallace (son and daughter-in-law), Gerry Wallace (son)  Tablet Strength: 2mg tablets  Current  Maintenance Dose: variable  Patient Contact Info: [phone] - [place] with Son Yadiel; Ms. Wallace's cell phone number- [phone]     Patient Findings   Negatives:   Signs/symptoms of thrombosis, Signs/symptoms of bleeding, Laboratory test error suspected, Change in health, Change in alcohol use, Change in activity, Upcoming invasive procedure, Emergency department visit, Upcoming dental procedure, Missed doses, Extra doses, Change in medications, Change in diet/appetite, Hospital admission, Bruising, Other complaints     Plan:  1. INR is therapeutic and back WNL today at 2.5. Instructed Mrs. Wallace to continue warfarin 4mg daily except 6mg SunWed.  2. Repeat INR in 1 week, 10/22.  3. Verbal information provided over the phone. Mrs. Wallace RBV dosing instructions, expresses understanding by teach back, and has no further questions at this time  4. Patient declines the need for warfarin refills at this time    Sawyer Gamez CPhT  10/15/2018  3:52 PM    [facility]ancoise [facility], have reviewed the note in full and agree with the assessment and plan. May consider decreasing total dose to 30mg/wk if patient becomes supratherapeutic on 32mg/wk.  10/15/18  4:19 PM

## 2018-10-22 ENCOUNTER — ANTICOAGULATION VISIT (OUTPATIENT)
Dept: PHARMACY | Facility: HOSPITAL | Age: 78
End: 2018-10-22

## 2018-10-22 DIAGNOSIS — I48.0 PAROXYSMAL ATRIAL FIBRILLATION (HCC): ICD-10-CM

## 2018-10-22 LAB — INR PPP: 3.9

## 2018-10-22 NOTE — PROGRESS NOTES
Anticoagulation Clinic - Remote Progress Note  [facility]  Testing Frequency: weekly    Indication: paroxysmal afib  Referring Provider: Heath  Goal INR: 2.0-3.0  Current Drug Interactions: amiodarone, levothyroxine; omeprazole, azaTHIOprine  CHADS-VASc: 5 (age, gender, HTN, DM)    Diet: rare GLV; green beans   Alcohol: none  Tobacco: none   OTC Pain Medication: APAP PRN    1st clinic visit: 9/14/17  2nd clinic visit: 6/26/18    INR History:  Date 2/9/18 2/16 2/21 3/1 3/9 3/13 3/20 3/27 4/3 4/10   Total Weekly Dose 18mg/3 days 38mg 36mg 36mg 38mg 28mg/5 days 38mg 38mg 38mg 26mg   INR 1.0 1.5 1.8 2.1 1.9        Notes s/p surgery 2/5 and held doses   Keflex finish 2/28 Boost; Keflex init    Hold- pre procedure Post procedure; boost     Date 4/17 4/20 4/24 5/1 5/8 5/15 5/18 5/22 5/29 6/1   Total Weekly Dose 41mg 36mg 36mg 40mg 38mg 38mg 28mg 28mg 38mg 34mg   INR 3.0 1.9 1.8 2.3 2.3 3.6 2.4 2.3 3.0 2.5   Notes  Lower dose 4/19   colchicine hematoma    Zpak, 1 missed dose     Date 6/4 6/7 6/14 6/19 6/26 6/29 7/3 7/6 7/11 7/16   Total Weekly Dose 34mg 38mg 34mg 30mg 34mg 30mg 30mg 30mg 32mg 38mg   INR 3.0 3.4 4.3 2.5 3.6 2.3 2.9 1.2 1.4 2.0   Notes   Keflex Held x1 Zpak start 6/22, finish 6/25; doxy start 6/25; clinic doxy doxy doxy complete 7/5       Date 7/23 7/26 8/2 8/9 8/16 8/23 8/29 9/5 9/12 9/19 9/24   Total Weekly Dose 36mg 38mg 36mg 38mg 36mg 32mg 30mg?? 32mg 32 mg 32mg 30mg   INR 1.7 2.3 2.0 3.4 3.8 3.5 2.5 2.6 2.5 3.3 2.0   Notes Keflex boost   1x decr dose 1x decr dose    Keflex Keflex     Date 9/27 10/1 10/8 10/15  10/22         Total Weekly Dose 32mg 32mg 32mg 30mg  32 mg         INR 2.3 2.4 3.2 2.5  3.9         Notes Levaquin start 9/26 Stopped levaquin  1x decr dose            Phone Interview:  Verbal Release Authorization signed on 6/26/18 -- may speak with Alexy Wallace (son), Genesis Becerril (daughter), Sawyer or Gema Wallace (son and daughter-in-law), Gerry Wallace (son)  Tablet Strength: 2mg  tablets  Current Maintenance Dose: variable  Patient Contact Info: [phone] - Home with Son Yadiel; Ms. Wallace's cell phone number- [phone]     Patient Findings   Negatives:   Signs/symptoms of thrombosis, Signs/symptoms of bleeding, Laboratory test error suspected, Change in health, Change in alcohol use, Change in activity, Upcoming invasive procedure, Emergency department visit, Upcoming dental procedure, Missed doses, Extra doses, Change in medications, Change in diet/appetite, Hospital admission, Bruising, Other complaints   Comments:   She stated that she doesn't eat very many foods that contain vitamin K but did eat coleslaw today.  She denied any changes and has no idea why her INR jumped so much.    She stated that she is drinking quite a bit of LiquaCel Liquid Protein (does not appear to contain vitamin K)     Plan:  1. INR is SUPRA therapeutic at 3.9. Instructed Mrs. Wallace to take warfarin 2 mg dose tonight, then take warfarin 4 mg daily except warfarin 6 mg dose on Sunday.   (note that her usual dose is warfarin 4mg daily except 6mg SunWed)    2. Repeat INR Friday, 10/26, if her daughter is able to get there.  If not, she will check on Monday 10/29  3. Verbal information provided over the phone. Mrs. Wallace RBV dosing instructions, expresses understanding by teach back, and has no further questions at this time  4. Patient declines the need for warfarin refills at this time    Radha Michaud, PharmD  10/22/2018  4:19 PM

## 2018-10-26 RX ORDER — ALPRAZOLAM 0.5 MG/1
TABLET ORAL
Qty: 30 TABLET | Refills: 2 | Status: SHIPPED | OUTPATIENT
Start: 2018-10-26 | End: 2019-01-23 | Stop reason: SDUPTHER

## 2018-10-29 ENCOUNTER — ANTICOAGULATION VISIT (OUTPATIENT)
Dept: PHARMACY | Facility: HOSPITAL | Age: 78
End: 2018-10-29

## 2018-10-29 DIAGNOSIS — I48.0 PAROXYSMAL ATRIAL FIBRILLATION (HCC): ICD-10-CM

## 2018-10-29 LAB — INR PPP: 2.7

## 2018-10-29 NOTE — PROGRESS NOTES
Anticoagulation Clinic - Remote Progress Note  [facility]  Testing Frequency: weekly    Indication: paroxysmal afib  Referring Provider: Heath  Goal INR: 2.0-3.0  Current Drug Interactions: amiodarone, levothyroxine; omeprazole, azaTHIOprine  CHADS-VASc: 5 (age, gender, HTN, DM)    Diet: rare GLV; green beans   Alcohol: none  Tobacco: none   OTC Pain Medication: APAP PRN    1st clinic visit: 9/14/17  2nd clinic visit: 6/26/18    INR History:  Date 2/9/18 2/16 2/21 3/1 3/9 3/13 3/20 3/27 4/3 4/10   Total Weekly Dose 18mg/3 days 38mg 36mg 36mg 38mg 28mg/5 days 38mg 38mg 38mg 26mg   INR 1.0 1.5 1.8 2.1 1.9        Notes s/p surgery 2/5 and held doses   Keflex finish 2/28 Boost; Keflex init    Hold- pre procedure Post procedure; boost     Date 4/17 4/20 4/24 5/1 5/8 5/15 5/18 5/22 5/29 6/1   Total Weekly Dose 41mg 36mg 36mg 40mg 38mg 38mg 28mg 28mg 38mg 34mg   INR 3.0 1.9 1.8 2.3 2.3 3.6 2.4 2.3 3.0 2.5   Notes  Lower dose 4/19   colchicine hematoma    Zpak, 1 missed dose     Date 6/4 6/7 6/14 6/19 6/26 6/29 7/3 7/6 7/11 7/16   Total Weekly Dose 34mg 38mg 34mg 30mg 34mg 30mg 30mg 30mg 32mg 38mg   INR 3.0 3.4 4.3 2.5 3.6 2.3 2.9 1.2 1.4 2.0   Notes   Keflex Held x1 Zpak start 6/22, finish 6/25; doxy start 6/25; clinic doxy doxy doxy complete 7/5       Date 7/23 7/26 8/2 8/9 8/16 8/23 8/29 9/5 9/12 9/19 9/24   Total Weekly Dose 36mg 38mg 36mg 38mg 36mg 32mg 30mg?? 32mg 32 mg 32mg 30mg   INR 1.7 2.3 2.0 3.4 3.8 3.5 2.5 2.6 2.5 3.3 2.0   Notes Keflex boost   1x decr dose 1x decr dose    Keflex Keflex     Date 9/27 10/1 10/8 10/15  10/22 10/29        Total Weekly Dose 32mg 32mg 32mg 30mg  32 mg 28mg        INR 2.3 2.4 3.2 2.5  3.9 2.7        Notes Levaquin start 9/26 Stopped levaquin  1x decr dose            Phone Interview:  Verbal Release Authorization signed on 6/26/18 -- may speak with Alexy Wallace (son), Genesis Becerril (daughter), Sawyer or Gema Wallace (son and daughter-in-law), Gerry Wallace (son)  Tablet  Strength: 2mg tablets  Current Maintenance Dose: variable  Patient Contact Info: [phone] - Home with Son Yadiel; Ms. Wallace's cell phone number- [phone]     Patient Findings:  Negatives:   Signs/symptoms of thrombosis, Signs/symptoms of bleeding, Laboratory test error suspected, Change in health, Change in alcohol use, Change in activity, Upcoming invasive procedure, Emergency department visit, Upcoming dental procedure, Missed doses, Extra doses, Change in medications, Change in diet/appetite, Hospital admission, Bruising, Other complaints     Plan:  1. INR is back WNL today at 2.7. Instructed Mrs. Wallace to resume maintenance dose of warfarin 4mg daily except 6mg on SunWed.  2. Repeat INR in one week, 11/5.  3. Verbal information provided over the phone. Mrs. Wallace RBV dosing instructions, expresses understanding by teach back, and has no further questions at this time  4. Patient declines the need for warfarin refills at this time    Zina Aldrich CPhT  10/29/2018  11:47 AM     I, Sawyer [facility], have reviewed the note in full and agree with the assessment and plan.  10/29/18  3:08 PM

## 2018-11-05 LAB — INR PPP: 3

## 2018-11-06 ENCOUNTER — ANTICOAGULATION VISIT (OUTPATIENT)
Dept: PHARMACY | Facility: HOSPITAL | Age: 78
End: 2018-11-06

## 2018-11-06 DIAGNOSIS — I48.0 PAROXYSMAL ATRIAL FIBRILLATION (HCC): ICD-10-CM

## 2018-11-06 NOTE — PROGRESS NOTES
Anticoagulation Clinic - Remote Progress Note  [facility]  Testing Frequency: weekly    Indication: paroxysmal afib  Referring Provider: Heath  Goal INR: 2.0-3.0  Current Drug Interactions: amiodarone, levothyroxine; omeprazole, azaTHIOprine  CHADS-VASc: 5 (age, gender, HTN, DM)    Diet: rare GLV; green beans   Alcohol: none  Tobacco: none   OTC Pain Medication: APAP PRN    1st clinic visit: 9/14/17  2nd clinic visit: 6/26/18    INR History:  Date 2/9/18 2/16 2/21 3/1 3/9 3/13 3/20 3/27 4/3 4/10   Total Weekly Dose 18mg/3 days 38mg 36mg 36mg 38mg 28mg/5 days 38mg 38mg 38mg 26mg   INR 1.0 1.5 1.8 2.1 1.9        Notes s/p surgery 2/5 and held doses   Keflex finish 2/28 Boost; Keflex init    Hold- pre procedure Post procedure; boost     Date 4/17 4/20 4/24 5/1 5/8 5/15 5/18 5/22 5/29 6/1   Total Weekly Dose 41mg 36mg 36mg 40mg 38mg 38mg 28mg 28mg 38mg 34mg   INR 3.0 1.9 1.8 2.3 2.3 3.6 2.4 2.3 3.0 2.5   Notes  Lower dose 4/19   colchicine hematoma    Zpak, 1 missed dose     Date 6/4 6/7 6/14 6/19 6/26 6/29 7/3 7/6 7/11 7/16   Total Weekly Dose 34mg 38mg 34mg 30mg 34mg 30mg 30mg 30mg 32mg 38mg   INR 3.0 3.4 4.3 2.5 3.6 2.3 2.9 1.2 1.4 2.0   Notes   Keflex Held x1 Zpak start 6/22, finish 6/25; doxy start 6/25; clinic doxy doxy doxy complete 7/5       Date 7/23 7/26 8/2 8/9 8/16 8/23 8/29 9/5 9/12 9/19 9/24   Total Weekly Dose 36mg 38mg 36mg 38mg 36mg 32mg 30mg?? 32mg 32 mg 32mg 30mg   INR 1.7 2.3 2.0 3.4 3.8 3.5 2.5 2.6 2.5 3.3 2.0   Notes Keflex boost   1x decr dose 1x decr dose    Keflex Keflex     Date 9/27 10/1 10/8 10/15  10/22 10/29 11/5       Total Weekly Dose 32mg 32mg 32mg 30mg  32 mg 28mg 32mg       INR 2.3 2.4 3.2 2.5  3.9 2.7 3.0       Notes Levaquin start 9/26 Stopped levaquin  1x decr dose            Phone Interview:  Verbal Release Authorization signed on 6/26/18 -- may speak with Alexy Wallace (son), Genesis Becerril (daughter), Sawyer Wallace (son and daughter-in-law), Gerry Wallace  (son)  Tablet Strength: 2mg tablets  Current Maintenance Dose: variable  Patient Contact Info: [phone] - Home with Son Yadiel; Ms. Wallace's cell phone number- [phone]     Patient Findings:  Negatives:   Signs/symptoms of thrombosis, Signs/symptoms of bleeding, Laboratory test error suspected, Change in health, Change in alcohol use, Change in activity, Upcoming invasive procedure, Emergency department visit, Upcoming dental procedure, Missed doses, Extra doses, Change in medications, Change in diet/appetite, Hospital admission, Bruising, Other complaints     Plan:  1. INR was therapeutic on 11/5 at 3.0, the top of patient's goal range. Instructed Mrs. Wallace to continue warfarin 4mg daily except 6mg on SunWed.  2. Repeat INR in one week, 11/12.  3. Verbal information provided over the phone. Mrs. Wallace RBV dosing instructions, expresses understanding by teach back, and has no further questions at this time  4. Patient declines the need for warfarin refills at this time.  5. Of note, patient had recalled test strip vials from [employer] LOT # [id number] and [id number]. Instructed patient to throw away remaining strips and new ones had been sent out yesterday.    Zina Aldrich, Valentin  11/6/2018  8:24 AM     IFatemeh, PharmD, have reviewed the note in full and agree with the assessment and plan.  11/06/18  11:34 AM

## 2018-11-12 ENCOUNTER — HOSPITAL ENCOUNTER (OUTPATIENT)
Dept: MAMMOGRAPHY | Facility: HOSPITAL | Age: 78
Discharge: HOME | End: 2018-11-12
Attending: INTERNAL MEDICINE | Admitting: INTERNAL MEDICINE

## 2018-11-12 DIAGNOSIS — Z12.31 VISIT FOR SCREENING MAMMOGRAM: ICD-10-CM

## 2018-11-12 PROCEDURE — 77067 SCR MAMMO BI INCL CAD: CPT | Performed by: RADIOLOGY

## 2018-11-12 PROCEDURE — 77063 BREAST TOMOSYNTHESIS BI: CPT

## 2018-11-12 PROCEDURE — 77063 BREAST TOMOSYNTHESIS BI: CPT | Performed by: RADIOLOGY

## 2018-11-12 PROCEDURE — 77067 SCR MAMMO BI INCL CAD: CPT

## 2018-11-14 ENCOUNTER — ANTICOAGULATION VISIT (OUTPATIENT)
Dept: PHARMACY | Facility: HOSPITAL | Age: 78
End: 2018-11-14

## 2018-11-14 ENCOUNTER — OFFICE VISIT (OUTPATIENT)
Dept: INTERNAL MEDICINE | Facility: CLINIC | Age: 78
End: 2018-11-14

## 2018-11-14 VITALS
HEIGHT: 63 IN | BODY MASS INDEX: 31.89 KG/M2 | WEIGHT: 180 LBS | SYSTOLIC BLOOD PRESSURE: 120 MMHG | HEART RATE: 68 BPM | DIASTOLIC BLOOD PRESSURE: 60 MMHG

## 2018-11-14 DIAGNOSIS — N18.4 CHRONIC KIDNEY DISEASE, STAGE IV (SEVERE) (HCC): ICD-10-CM

## 2018-11-14 DIAGNOSIS — I48.0 PAROXYSMAL ATRIAL FIBRILLATION (HCC): ICD-10-CM

## 2018-11-14 DIAGNOSIS — I10 ESSENTIAL HYPERTENSION: Primary | ICD-10-CM

## 2018-11-14 DIAGNOSIS — J45.20 MILD INTERMITTENT ASTHMA WITHOUT COMPLICATION: ICD-10-CM

## 2018-11-14 DIAGNOSIS — N18.9 ANEMIA OF RENAL DISEASE: ICD-10-CM

## 2018-11-14 DIAGNOSIS — E55.9 VITAMIN D DEFICIENCY: ICD-10-CM

## 2018-11-14 DIAGNOSIS — D63.1 ANEMIA OF RENAL DISEASE: ICD-10-CM

## 2018-11-14 LAB — INR PPP: 2.8 (ref 1.9–3.1)

## 2018-11-14 PROCEDURE — 99214 OFFICE O/P EST MOD 30 MIN: CPT | Performed by: INTERNAL MEDICINE

## 2018-11-14 PROCEDURE — 85610 PROTHROMBIN TIME: CPT | Performed by: INTERNAL MEDICINE

## 2018-11-14 NOTE — PROGRESS NOTES
Anticoagulation Clinic - Remote Progress Note  [facility]  Testing Frequency: weekly    Indication: paroxysmal afib  Referring Provider: Heath  Goal INR: 2.0-3.0  Current Drug Interactions: amiodarone, levothyroxine; omeprazole, azaTHIOprine  CHADS-VASc: 5 (age, gender, HTN, DM)    Diet: rare GLV; green beans   Alcohol: none  Tobacco: none   OTC Pain Medication: APAP PRN    1st clinic visit: 9/14/17  2nd clinic visit: 6/26/18    INR History:  Date 2/9/18 2/16 2/21 3/1 3/9 3/13 3/20 3/27 4/3 4/10   Total Weekly Dose 18mg/3 days 38mg 36mg 36mg 38mg 28mg/5 days 38mg 38mg 38mg 26mg   INR 1.0 1.5 1.8 2.1 1.9        Notes s/p surgery 2/5 and held doses   Keflex finish 2/28 Boost; Keflex init    Hold- pre procedure Post procedure; boost     Date 4/17 4/20 4/24 5/1 5/8 5/15 5/18 5/22 5/29 6/1   Total Weekly Dose 41mg 36mg 36mg 40mg 38mg 38mg 28mg 28mg 38mg 34mg   INR 3.0 1.9 1.8 2.3 2.3 3.6 2.4 2.3 3.0 2.5   Notes  Lower dose 4/19   colchicine hematoma    Zpak, 1 missed dose     Date 6/4 6/7 6/14 6/19 6/26 6/29 7/3 7/6 7/11 7/16   Total Weekly Dose 34mg 38mg 34mg 30mg 34mg 30mg 30mg 30mg 32mg 38mg   INR 3.0 3.4 4.3 2.5 3.6 2.3 2.9 1.2 1.4 2.0   Notes   Keflex Held x1 Zpak start 6/22, finish 6/25; doxy start 6/25; clinic doxy doxy doxy complete 7/5       Date 7/23 7/26 8/2 8/9 8/16 8/23 8/29 9/5 9/12 9/19 9/24   Total Weekly Dose 36mg 38mg 36mg 38mg 36mg 32mg 30mg?? 32mg 32 mg 32mg 30mg   INR 1.7 2.3 2.0 3.4 3.8 3.5 2.5 2.6 2.5 3.3 2.0   Notes Keflex boost   1x decr dose 1x decr dose    Keflex Keflex     Date 9/27 10/1 10/8 10/15  10/22 10/29 11/5 11/14      Total Weekly Dose 32mg 32mg 32mg 30mg  32mg 28mg 32mg 32mg      INR 2.3 2.4 3.2 2.5  3.9 2.7 3.0 2.8      Notes Levaquin start 9/26 Stopped levaquin  1x decr dose            Phone Interview:  Verbal Release Authorization signed on 6/26/18 -- may speak with Alexy Wallace (son), Genesis Becerril (daughter), Sawyer Wallace (son and daughter-in-law), Gerry Wallace  (son)  Tablet Strength: 2mg tablets  Patient Contact Info: [phone] - Home with Son Yadiel; Ms. Wallace's cell phone number- [phone]     Patient Findings:  Negatives:  Signs/symptoms of thrombosis, Signs/symptoms of bleeding, Laboratory test error suspected, Change in health, Change in alcohol use, Change in activity, Upcoming invasive procedure, Emergency department visit, Upcoming dental procedure, Missed doses, Extra doses, Change in medications, Change in diet/appetite, Hospital admission, Bruising, Other complaints   Comments:  Patient had INR drawn with Dr Walters today. Patient wasn't sure if he would send us the results so she called in the results to [facility]. Unfortunately, she called in a result of 2.5 (vs Dr Walters reporting an INR of 2.8). Patient has since gotten home and discovered her new test strips have been delivered to her.     Have removed encounter from [facility] as patient did not test on own home monitor and called in incorrect INR     Plan:  1. INR is therapeutic today at 2.8, so instructed Mrs. Wallace to continue warfarin 4mg daily except 6mg on SunWed.   2. Repeat INR in one week, 11/21.  3. Verbal information provided over the phone. Moni Wallace RBV dosing instructions, expresses understanding by teach back, and has no further questions at this time.  4. Patient declines the need for warfarin refills at this time.  5. From 11/5 encounter: Moni Wallace reports that her [facility] Test Strip lot number is [id number] / [id number]. This is one of the lot numbers that is affected by the FDA Class 1 and Roche recall. Moni Wallace will no longer use these test strips and will request new test strips from [facility]. she will plan to call us the day before her next scheduled INR if she has not yet received a new lot. We will then send in a standing order to a lab of her preference.  **Patient has received her new test strips from [facility], LOT: [id number]    Sawyer Gamez CPhT  11/14/2018  3:59  PM    I, Desiree Bull, [facility], have reviewed the note in full and agree with the assessment and plan.  11/16/18  8:02 AM

## 2018-11-14 NOTE — PROGRESS NOTES
Patient is a 78 y.o. female who is here for a follow up of chronic conditions.  Chief Complaint   Patient presents with   • Hypertension   • Atrial Fibrillation         HPI:    Here for f/u.  Could not tolerate the pravachol 80 mg so went back to 40 mg.  Caused her muscles to ache.  BP has been.  No dizziness or lightheadedness.  Sleeping is not the best.  Occasional palpitations.       History:    Patient Active Problem List   Diagnosis   • Paroxysmal atrial fibrillation (CMS/HCC)   • Essential hypertension   • Type 2 diabetes mellitus (CMS/HCC)   • Chronic kidney disease, stage IV (severe) (CMS/HCC)   • Anemia of renal disease   • Hyperparathyroidism (CMS/HCC)   • Asthma   • Right-sided carotid artery disease (CMS/HCC)   • High output HF (heart failure) (CMS/HCC)   • Vitamin D deficiency   • Nonrheumatic aortic valve stenosis   • Ulcer of gastric fundus   • Tubular adenoma   • Macular degeneration   • Hypothyroidism   • Diabetes mellitus (CMS/HCC)   • Chronic kidney disease       Past Medical History:   Diagnosis Date   • Adenomatous colon polyp    • Chronic kidney disease    • Clostridium difficile infection    • Diabetes mellitus (CMS/HCC)    • Gastric polyps    • Hypothyroidism    • IgA nephropathy, acute    • Macular degeneration    • Tubular adenoma     excision    • Ulcer of gastric fundus    • Vitamin D deficiency        Past Surgical History:   Procedure Laterality Date   • BREAST BIOPSY Left 1990'S   • CARPAL TUNNEL RELEASE     • CARPAL TUNNEL RELEASE Right    • CATARACT EXTRACTION     • CATARACT EXTRACTION Bilateral    • DIAGNOSTIC LAPAROSCOPY     • DIALYSIS FISTULA CREATION     • HERNIA REPAIR  85 and 86   • LAPAROSCOPIC TUBAL LIGATION  1985   • TOTAL KNEE ARTHROPLASTY     • TOTAL KNEE ARTHROPLASTY      Left knee    • TRANSPLANTATION RENAL  2010   • TRANSPLANTATION RENAL Right    • TRIGGER FINGER RELEASE     • TUBAL ABDOMINAL LIGATION     • UPPER GASTROINTESTINAL ENDOSCOPY  05/20/2013       Current  Outpatient Medications on File Prior to Visit   Medication Sig   • ALPRAZolam (XANAX) 0.5 MG tablet TAKE ONE TABLET BY MOUTH DAILY   • amiodarone (PACERONE) 200 MG tablet TAKE ONE-HALF TABLET BY MOUTH DAILY   • amLODIPine (NORVASC) 5 MG tablet 2 (Two) Times a Day.   • calcitriol (ROCALTROL) 0.25 MCG capsule Take 0.25 mcg by mouth Daily.   • Cholecalciferol (VITAMIN D) 2000 UNITS tablet Take 2,000 Units by mouth Daily.   • COLCRYS 0.6 MG tablet 0.5 tablets As Needed.   • furosemide (LASIX) 40 MG tablet Take 40 mg by mouth 2 (Two) Times a Day.   • gentamicin (GARAMYCIN) 0.1 % cream    • LANTUS SOLOSTAR 100 UNIT/ML injection pen Inject 5 Units under the skin Every Night.   • levothyroxine (SYNTHROID, LEVOTHROID) 75 MCG tablet Take 75 mcg by mouth Daily.   • metoprolol tartrate (LOPRESSOR) 100 MG tablet 2 (Two) Times a Day.   • omeprazole (priLOSEC) 40 MG capsule Take 40 mg by mouth Daily.   • pravastatin (PRAVACHOL) 80 MG tablet Take 1 tablet by mouth Every Night.   • PROAIR  (90 Base) MCG/ACT inhaler Inhale 2 puffs Every 6 (Six) Hours As Needed for Wheezing or Shortness of Air.   • sevelamer (RENVELA) 800 MG tablet Take 800 mg by mouth 3 (Three) Times a Day With Meals.   • tacrolimus (PROGRAF) 0.5 MG capsule Take 0.5 mg by mouth 2 (Two) Times a Day.   • warfarin (COUMADIN) 2 MG tablet Take 4 mg by mouth Daily. Patient takes two tablets daily   • warfarin (COUMADIN) 2 MG tablet TAKE THREE TABLETS BY MOUTH DAILY MONDAY AND THURSDAY AND THEN TWO  TABLETS ALL OTHER DAYS   • warfarin (COUMADIN) 2 MG tablet 2 tablets Daily.   • [DISCONTINUED] levoFLOXacin (LEVAQUIN) 250 MG tablet Take 250 mg by mouth Every Other Day.     No current facility-administered medications on file prior to visit.        Family History   Problem Relation Age of Onset   • Leukemia Mother    • Stroke Father    • Dementia Sister    • Kidney disease Sister    • Diabetic kidney disease Sister    • Lung cancer Brother    • Breast cancer Neg Hx    •  Ovarian cancer Neg Hx    • Hypertension Sister    • Dementia Sister        Social History     Socioeconomic History   • Marital status:      Spouse name: Not on file   • Number of children: Not on file   • Years of education: Not on file   • Highest education level: Not on file   Social Needs   • Financial resource strain: Not on file   • Food insecurity - worry: Not on file   • Food insecurity - inability: Not on file   • Transportation needs - medical: Not on file   • Transportation needs - non-medical: Not on file   Occupational History   • 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 Negative for chills and fever.   HENT: Negative for congestion, ear pain, hearing loss, rhinorrhea, sinus pressure, sore throat and trouble swallowing.    Eyes: Negative for discharge and itching.   Respiratory: Negative for chest tightness.    Cardiovascular: Positive for leg swelling. Negative for chest pain and palpitations.   Gastrointestinal: Negative for abdominal pain, blood in stool, constipation, diarrhea and vomiting.        10/17 by Dr Perez, next 10/20   Endocrine: Negative for polydipsia and polyuria.   Genitourinary: Negative for difficulty urinating, dysuria, enuresis, frequency, hematuria and urgency.        11/17 mammogram   Musculoskeletal: Positive for arthralgias and gait problem. Negative for back pain and joint swelling.   Skin: Negative for rash and wound.   Allergic/Immunologic: Negative for immunocompromised state.   Neurological: Negative for dizziness, syncope, weakness, light-headedness,  "numbness and headaches.   Hematological: Bruises/bleeds easily.   Psychiatric/Behavioral: Positive for sleep disturbance. Negative for behavioral problems and dysphoric mood. The patient is not nervous/anxious.        /60 (BP Location: Left arm, Patient Position: Sitting)   Pulse 68   Ht 160 cm (62.99\")   Wt 81.6 kg (180 lb)   LMP  (LMP Unknown)   BMI 31.89 kg/m²       Physical Exam   Constitutional: She is oriented to person, place, and time. She appears well-developed and well-nourished.   HENT:   Head: Normocephalic and atraumatic.   Right Ear: External ear normal.   Left Ear: External ear normal.   Mouth/Throat: Oropharynx is clear and moist.   Eyes: Conjunctivae and EOM are normal.   Neck: Normal range of motion. Neck supple.   Cardiovascular: Normal rate, regular rhythm and normal heart sounds.   Pulmonary/Chest: Effort normal and breath sounds normal.   Abdominal: Soft. Bowel sounds are normal.   Musculoskeletal:   Using walker   Lymphadenopathy:     She has no cervical adenopathy.   Neurological: She is alert and oriented to person, place, and time.   Skin: Skin is warm and dry.   Psychiatric: She has a normal mood and affect. Her behavior is normal. Thought content normal.       Procedure:      Discussion/Summary:    HTN-advised to monitor and goal 130/80  DM-labs noted  Hyperlipidemia-counseled on diet, check on rtc  ESRD-per Renal  AOCD-\"  \"  Vit D-labs noted  Gout-UA level noted  afib-rate controlled  hypothroid-labs noted  High risk meds-INR 2.8     Labs noted and dw patient, advised vit D 2000 IU qd        Current Outpatient Medications:   •  ALPRAZolam (XANAX) 0.5 MG tablet, TAKE ONE TABLET BY MOUTH DAILY, Disp: 30 tablet, Rfl: 2  •  amiodarone (PACERONE) 200 MG tablet, TAKE ONE-HALF TABLET BY MOUTH DAILY, Disp: 45 tablet, Rfl: 1  •  amLODIPine (NORVASC) 5 MG tablet, 2 (Two) Times a Day., Disp: , Rfl:   •  calcitriol (ROCALTROL) 0.25 MCG capsule, Take 0.25 mcg by mouth Daily., Disp: , Rfl: "   •  Cholecalciferol (VITAMIN D) 2000 UNITS tablet, Take 2,000 Units by mouth Daily., Disp: , Rfl:   •  COLCRYS 0.6 MG tablet, 0.5 tablets As Needed., Disp: , Rfl:   •  furosemide (LASIX) 40 MG tablet, Take 40 mg by mouth 2 (Two) Times a Day., Disp: , Rfl:   •  gentamicin (GARAMYCIN) 0.1 % cream, , Disp: , Rfl:   •  LANTUS SOLOSTAR 100 UNIT/ML injection pen, Inject 5 Units under the skin Every Night., Disp: , Rfl:   •  levothyroxine (SYNTHROID, LEVOTHROID) 75 MCG tablet, Take 75 mcg by mouth Daily., Disp: , Rfl:   •  metoprolol tartrate (LOPRESSOR) 100 MG tablet, 2 (Two) Times a Day., Disp: , Rfl:   •  omeprazole (priLOSEC) 40 MG capsule, Take 40 mg by mouth Daily., Disp: , Rfl:   •  pravastatin (PRAVACHOL) 80 MG tablet, Take 1 tablet by mouth Every Night., Disp: 30 tablet, Rfl: 5  •  PROAIR  (90 Base) MCG/ACT inhaler, Inhale 2 puffs Every 6 (Six) Hours As Needed for Wheezing or Shortness of Air., Disp: 1 inhaler, Rfl: 4  •  sevelamer (RENVELA) 800 MG tablet, Take 800 mg by mouth 3 (Three) Times a Day With Meals., Disp: , Rfl:   •  tacrolimus (PROGRAF) 0.5 MG capsule, Take 0.5 mg by mouth 2 (Two) Times a Day., Disp: , Rfl:   •  warfarin (COUMADIN) 2 MG tablet, Take 4 mg by mouth Daily. Patient takes two tablets daily, Disp: , Rfl:   •  warfarin (COUMADIN) 2 MG tablet, TAKE THREE TABLETS BY MOUTH DAILY MONDAY AND THURSDAY AND THEN TWO  TABLETS ALL OTHER DAYS, Disp: 192 tablet, Rfl: 2  •  warfarin (COUMADIN) 2 MG tablet, 2 tablets Daily., Disp: , Rfl:         Moni was seen today for hypertension and atrial fibrillation.    Diagnoses and all orders for this visit:    Essential hypertension    Mild intermittent asthma without complication    Vitamin D deficiency    Chronic kidney disease, stage IV (severe) (CMS/HCC)    Anemia of renal disease    Paroxysmal atrial fibrillation (CMS/HCC)  -     POC INR

## 2018-11-21 ENCOUNTER — ANTICOAGULATION VISIT (OUTPATIENT)
Dept: PHARMACY | Facility: HOSPITAL | Age: 78
End: 2018-11-21

## 2018-11-21 DIAGNOSIS — I48.0 PAROXYSMAL ATRIAL FIBRILLATION (HCC): ICD-10-CM

## 2018-11-21 LAB — INR PPP: 2.7

## 2018-11-21 NOTE — PROGRESS NOTES
Anticoagulation Clinic - Remote Progress Note  [facility]  Testing Frequency: weekly    Indication: paroxysmal afib  Referring Provider: Heath  Goal INR: 2.0-3.0  Current Drug Interactions: amiodarone, levothyroxine; omeprazole, azaTHIOprine  CHADS-VASc: 5 (age, gender, HTN, DM)    Diet: rare GLV; green beans   Alcohol: none  Tobacco: none   OTC Pain Medication: APAP PRN    1st clinic visit: 9/14/17  2nd clinic visit: 6/26/18    INR History:  Date 2/9/18 2/16 2/21 3/1 3/9 3/13 3/20 3/27 4/3 4/10   Total Weekly Dose 18mg/3 days 38mg 36mg 36mg 38mg 28mg/5 days 38mg 38mg 38mg 26mg   INR 1.0 1.5 1.8 2.1 1.9        Notes s/p surgery 2/5 and held doses   Keflex finish 2/28 Boost; Keflex init    Hold- pre procedure Post procedure; boost     Date 4/17 4/20 4/24 5/1 5/8 5/15 5/18 5/22 5/29 6/1   Total Weekly Dose 41mg 36mg 36mg 40mg 38mg 38mg 28mg 28mg 38mg 34mg   INR 3.0 1.9 1.8 2.3 2.3 3.6 2.4 2.3 3.0 2.5   Notes  Lower dose 4/19   colchicine hematoma    Zpak, 1 missed dose     Date 6/4 6/7 6/14 6/19 6/26 6/29 7/3 7/6 7/11 7/16   Total Weekly Dose 34mg 38mg 34mg 30mg 34mg 30mg 30mg 30mg 32mg 38mg   INR 3.0 3.4 4.3 2.5 3.6 2.3 2.9 1.2 1.4 2.0   Notes   Keflex Held x1 Zpak start 6/22, finish 6/25; doxy start 6/25; clinic doxy doxy doxy complete 7/5       Date 7/23 7/26 8/2 8/9 8/16 8/23 8/29 9/5 9/12 9/19 9/24   Total Weekly Dose 36mg 38mg 36mg 38mg 36mg 32mg 30mg?? 32mg 32 mg 32mg 30mg   INR 1.7 2.3 2.0 3.4 3.8 3.5 2.5 2.6 2.5 3.3 2.0   Notes Keflex boost   1x decr dose 1x decr dose    Keflex Keflex     Date 9/27 10/1 10/8 10/15  10/22 10/29 11/5 11/14 11/21     Total Weekly Dose 32mg 32mg 32mg 30mg  32mg 28mg 32mg 32mg 32mg     INR 2.3 2.4 3.2 2.5  3.9 2.7 3.0 2.8 2.7     Notes Levaquin start 9/26 Stopped levaquin  1x decr dose            Phone Interview:  Verbal Release Authorization signed on 6/26/18 -- may speak with Alexy Wallace (son), Genesis Becerril (daughter), Sawyer or Gema Wallace (son and daughter-in-law),  Gerry Wallace (son)  Tablet Strength: 2mg tablets  Patient Contact Info: [phone] - Home with Son Yadiel; Ms. Wallace's cell phone number- [phone]     Patient Findings:  Positives:  Other complaints   Negatives:  Signs/symptoms of thrombosis, Signs/symptoms of bleeding, Laboratory test error suspected, Change in health, Change in alcohol use, Change in activity, Upcoming invasive procedure, Emergency department visit, Upcoming dental procedure, Missed doses, Extra doses, Change in medications, Change in diet/appetite, Hospital admission, Bruising   Comments:  Patient had a fall on Sunday in the bathtub, says she caught herself before she fell all the way to the floor. Denies any bruising or bleeding. Patient states she did not hit her head. She is aware that she should go to the ED or urgent care with any falls.     Plan:  1. INR is therapeutic today at 2.7, so instructed Mrs. Wallace to continue warfarin 4mg daily except 6mg on SunWed.   2. Repeat INR in one week, 11/28.  3. Verbal information provided over the phone. Moni EL Rodrigo RBV dosing instructions, expresses understanding by teach back, and has no further questions at this time.  4. Patient declines the need for warfarin refills at this time.    Zina Aldrich, Valentin  11/21/2018  3:03 PM    Fatemeh HENRY, PharmD, have reviewed the note in full and agree with the assessment and plan.  11/21/18  5:02 PM

## 2018-11-28 ENCOUNTER — ANTICOAGULATION VISIT (OUTPATIENT)
Dept: PHARMACY | Facility: HOSPITAL | Age: 78
End: 2018-11-28

## 2018-11-28 DIAGNOSIS — I48.0 PAROXYSMAL ATRIAL FIBRILLATION (HCC): ICD-10-CM

## 2018-11-28 LAB — INR PPP: 3.7

## 2018-12-04 ENCOUNTER — ANTICOAGULATION VISIT (OUTPATIENT)
Dept: PHARMACY | Facility: HOSPITAL | Age: 78
End: 2018-12-04

## 2018-12-04 DIAGNOSIS — I48.0 PAROXYSMAL ATRIAL FIBRILLATION (HCC): ICD-10-CM

## 2018-12-04 LAB — INR PPP: 1.9

## 2018-12-04 NOTE — PROGRESS NOTES
Anticoagulation Clinic - Remote Progress Note  [facility]  Testing Frequency: weekly    Indication: paroxysmal afib  Referring Provider: Heath  Goal INR: 2.0-3.0  Current Drug Interactions: amiodarone, levothyroxine; omeprazole, azaTHIOprine  CHADS-VASc: 5 (age, gender, HTN, DM)    Diet: rare GLV; green beans   Alcohol: none  Tobacco: none   OTC Pain Medication: APAP PRN    1st clinic visit: 9/14/17  2nd clinic visit: 6/26/18    INR History:  Date 2/9/18 2/16 2/21 3/1 3/9 3/13 3/20 3/27 4/3 4/10   Total Weekly Dose 18mg/3 days 38mg 36mg 36mg 38mg 28mg/5 days 38mg 38mg 38mg 26mg   INR 1.0 1.5 1.8 2.1 1.9        Notes s/p surgery 2/5 and held doses   Keflex finish 2/28 Boost; Keflex init    hold- pre procedure post procedure; boost     Date 4/17 4/20 4/24 5/1 5/8 5/15 5/18 5/22 5/29 6/1   Total Weekly Dose 41mg 36mg 36mg 40mg 38mg 38mg 28mg 28mg 38mg 34mg   INR 3.0 1.9 1.8 2.3 2.3 3.6 2.4 2.3 3.0 2.5   Notes     colchicine hematoma    Zpak, 1 miss     Date 6/4 6/7 6/14 6/19 6/26 6/29 7/3 7/6 7/11 7/16   Total Weekly Dose 34mg 38mg 34mg 30mg 34mg 30mg 30mg 30mg 32mg 38mg   INR 3.0 3.4 4.3 2.5 3.6 2.3 2.9 1.2 1.4 2.0   Notes   Keflex Held x1 Zpak start 6/22, finish 6/25; doxy start 6/25; clinic doxy doxy doxy complete 7/5       Date 7/23 7/26 8/2 8/9 8/16 8/23 8/29 9/5 9/12 9/19 9/24   Total Weekly Dose 36mg 38mg 36mg 38mg 36mg 32mg 30mg?? 32mg 32 mg 32mg 30mg   INR 1.7 2.3 2.0 3.4 3.8 3.5 2.5 2.6 2.5 3.3 2.0   Notes Keflex boost   1x decr dose 1x decr dose    Keflex Keflex     Date 9/27 10/1 10/8 10/15  10/22 10/29 11/5 11/14 11/21 11/28 12/4   Total Weekly Dose 32mg 32mg 32mg 30mg  32mg 28mg 32mg 32mg 32mg 28mg 26mg   INR 2.3 2.4 3.2 2.5  3.9 2.7 3.0 2.8 2.7 3.7 1.9   Notes Levaquin start 9/26 stopped Levaquin  1x decr dose      stop MVI, 1 miss 1 hold     Phone Interview:  Verbal Release Authorization signed on 6/26/18 -- may speak with Alexy Wallace (son), Genesis Becerril (daughter), Sawyer or Gema Wallace (son  "and daughter-in-law), Gerry Wallace (son)  Tablet Strength: 2mg tablets  Patient Contact Info: [phone] - home with Son Yadiel; Ms. Wallace's cell phone number- [phone]     Patient Findings:  Negatives:  Signs/symptoms of thrombosis, Signs/symptoms of bleeding, Laboratory test error suspected, Change in health, Change in alcohol use, Change in activity, Upcoming invasive procedure, Emergency department visit, Upcoming dental procedure, Missed doses, Extra doses, Change in medications, Change in diet/appetite, Hospital admission, Bruising, Other complaints   Comments:  Mrs. Wallace has still yet to start her new MVI. She noted her \"blood count\" was low on Saturday, so she went in for some sort of shot to \"build [her] blood up.\" Uncertain the name at this time (not Procrit or Epogen -- Mircera?).      Plan:  1. INR is slightly subtherapeutic today following dose hold last week. For now, instructed Mrs. Wallace to take warfarin 4mg daily except 6mg on Wednesday (30mg / week).  2. Repeat INR in one week.   3. Verbal information provided over the phone. Moni EL Rodrigo RBV dosing instructions, expresses understanding by teach back, and has no further questions at this time.    Ann Cowden Mayer, PharmD  12/4/2018  3:55 PM  "

## 2018-12-10 ENCOUNTER — ANTICOAGULATION VISIT (OUTPATIENT)
Dept: PHARMACY | Facility: HOSPITAL | Age: 78
End: 2018-12-10

## 2018-12-10 DIAGNOSIS — I48.0 PAROXYSMAL ATRIAL FIBRILLATION (HCC): ICD-10-CM

## 2018-12-10 LAB — INR PPP: 2.4

## 2018-12-10 NOTE — PROGRESS NOTES
Anticoagulation Clinic - Remote Progress Note  [facility]  Testing Frequency: weekly    Indication: paroxysmal afib  Referring Provider: Heath  Goal INR: 2.0-3.0  Current Drug Interactions: amiodarone, levothyroxine; omeprazole, azaTHIOprine  CHADS-VASc: 5 (age, gender, HTN, DM)    Diet: rare GLV; green beans   Alcohol: none  Tobacco: none   OTC Pain Medication: APAP PRN    1st clinic visit: 9/14/17  2nd clinic visit: 6/26/18    INR History:  Date 2/9/18 2/16 2/21 3/1 3/9 3/13 3/20 3/27 4/3 4/10   Total Weekly Dose 18mg/3 days 38mg 36mg 36mg 38mg 28mg/5 days 38mg 38mg 38mg 26mg   INR 1.0 1.5 1.8 2.1 1.9        Notes s/p surgery 2/5 and held doses   Keflex finish 2/28 Boost; Keflex init    hold- pre procedure post procedure; boost     Date 4/17 4/20 4/24 5/1 5/8 5/15 5/18 5/22 5/29 6/1   Total Weekly Dose 41mg 36mg 36mg 40mg 38mg 38mg 28mg 28mg 38mg 34mg   INR 3.0 1.9 1.8 2.3 2.3 3.6 2.4 2.3 3.0 2.5   Notes     colchicine hematoma    Zpak, 1 miss     Date 6/4 6/7 6/14 6/19 6/26 6/29 7/3 7/6 7/11 7/16   Total Weekly Dose 34mg 38mg 34mg 30mg 34mg 30mg 30mg 30mg 32mg 38mg   INR 3.0 3.4 4.3 2.5 3.6 2.3 2.9 1.2 1.4 2.0   Notes   Keflex Held x1 Zpak start 6/22, finish 6/25; doxy start 6/25; clinic doxy doxy doxy complete 7/5       Date 7/23 7/26 8/2 8/9 8/16 8/23 8/29 9/5 9/12 9/19 9/24   Total Weekly Dose 36mg 38mg 36mg 38mg 36mg 32mg 30mg?? 32mg 32 mg 32mg 30mg   INR 1.7 2.3 2.0 3.4 3.8 3.5 2.5 2.6 2.5 3.3 2.0   Notes Keflex boost   1x decr dose 1x decr dose    Keflex Keflex     Date 9/27 10/1 10/8 10/15  10/22 10/29 11/5 11/14 11/21 11/28 12/4   Total Weekly Dose 32mg 32mg 32mg 30mg  32mg 28mg 32mg 32mg 32mg 28mg 26mg   INR 2.3 2.4 3.2 2.5  3.9 2.7 3.0 2.8 2.7 3.7 1.9   Notes Levaquin start 9/26 stopped Levaquin  1x decr dose      stop MVI, 1 miss 1 hold     Date 12/10             Total Weekly Dose 30mg 30mg            INR 2.4             Notes                Phone Interview:  Verbal Release Authorization  signed on 6/26/18 -- may speak with Alexy Wallace (son), Genesis Becerril (daughter), Sawyer or Gema Wallace (son and daughter-in-law), Gerry Wallace (son)  Tablet Strength: 2mg tablets  Patient Contact Info: [phone] - home with Son Yadiel; Ms. Wallace's cell phone number- [phone]     Patient Findings:  Negatives:  Signs/symptoms of thrombosis, Signs/symptoms of bleeding, Laboratory test error suspected, Change in health, Change in alcohol use, Change in activity, Upcoming invasive procedure, Emergency department visit, Upcoming dental procedure, Missed doses, Extra doses, Change in medications, Change in diet/appetite, Hospital admission, Bruising, Other complaints   Comments:  Mrs. Wallace is getting a total of 3 Mircera injections due to anemia. She still has a low appetite and is not eating well -- this has been going on for about 3 weeks.      Plan:  1. INR is therapeutic today, so instructed Mrs. Wallace to continue warfarin 4mg daily except 6mg on Wednesday (30mg / week).  2. Repeat INR in one week.   3. Verbal information provided over the phone. Moni Wallace RBV dosing instructions, expresses understanding by teach back, and has no further questions at this time.    Ann Cowden Mayer, PharmD  12/10/2018  4:37 PM

## 2018-12-17 ENCOUNTER — ANTICOAGULATION VISIT (OUTPATIENT)
Dept: PHARMACY | Facility: HOSPITAL | Age: 78
End: 2018-12-17

## 2018-12-17 DIAGNOSIS — I48.0 PAROXYSMAL ATRIAL FIBRILLATION (HCC): ICD-10-CM

## 2018-12-17 LAB — INR PPP: 3

## 2018-12-17 NOTE — PROGRESS NOTES
Anticoagulation Clinic - Remote Progress Note  [facility]  Testing Frequency: weekly    Indication: paroxysmal afib  Referring Provider: eHath  Goal INR: 2.0-3.0  Current Drug Interactions: amiodarone, levothyroxine; omeprazole, azaTHIOprine  CHADS-VASc: 5 (age, gender, HTN, DM)    Diet: rare GLV; green beans   Alcohol: none  Tobacco: none   OTC Pain Medication: APAP PRN    1st clinic visit: 9/14/17  2nd clinic visit: 6/26/18    INR History:  Date 2/9/18 2/16 2/21 3/1 3/9 3/13 3/20 3/27 4/3 4/10   Total Weekly Dose 18mg/3 days 38mg 36mg 36mg 38mg 28mg/5 days 38mg 38mg 38mg 26mg   INR 1.0 1.5 1.8 2.1 1.9        Notes s/p surgery 2/5 and held doses   Keflex finish 2/28 Boost; Keflex init    hold- pre procedure post procedure; boost     Date 4/17 4/20 4/24 5/1 5/8 5/15 5/18 5/22 5/29 6/1   Total Weekly Dose 41mg 36mg 36mg 40mg 38mg 38mg 28mg 28mg 38mg 34mg   INR 3.0 1.9 1.8 2.3 2.3 3.6 2.4 2.3 3.0 2.5   Notes     colchicine hematoma    Zpak, 1 miss     Date 6/4 6/7 6/14 6/19 6/26 6/29 7/3 7/6 7/11 7/16   Total Weekly Dose 34mg 38mg 34mg 30mg 34mg 30mg 30mg 30mg 32mg 38mg   INR 3.0 3.4 4.3 2.5 3.6 2.3 2.9 1.2 1.4 2.0   Notes   Keflex Held x1 Zpak start 6/22, finish 6/25; doxy start 6/25; clinic doxy doxy doxy complete 7/5       Date 7/23 7/26 8/2 8/9 8/16 8/23 8/29 9/5 9/12 9/19 9/24   Total Weekly Dose 36mg 38mg 36mg 38mg 36mg 32mg 30mg?? 32mg 32 mg 32mg 30mg   INR 1.7 2.3 2.0 3.4 3.8 3.5 2.5 2.6 2.5 3.3 2.0   Notes Keflex boost   1x decr dose 1x decr dose    Keflex Keflex     Date 9/27 10/1 10/8 10/15  10/22 10/29 11/5 11/14 11/21 11/28 12/4   Total Weekly Dose 32mg 32mg 32mg 30mg  32mg 28mg 32mg 32mg 32mg 28mg 26mg   INR 2.3 2.4 3.2 2.5  3.9 2.7 3.0 2.8 2.7 3.7 1.9   Notes Levaquin start 9/26 stopped Levaquin  1x decr dose      stop MVI, 1 miss 1 hold     Date 12/10 12/17            Total Weekly Dose 30mg 30mg            INR 2.4 3.0            Notes                Phone Interview:  Verbal Release  Authorization signed on 6/26/18 -- may speak with Alexy Wallace (son), Geensis Becerril (daughter), Sawyer or Gema Wallace (son and daughter-in-law), Gerry Wallace (son)  Tablet Strength: 2mg tablets  Patient Contact Info: [phone] - home with Son Yadiel; Ms. Wallace's cell phone number- [phone]     Patient Findings   Negatives:  Signs/symptoms of thrombosis, Signs/symptoms of bleeding, Laboratory test error suspected, Change in health, Change in alcohol use, Change in activity, Upcoming invasive procedure, Emergency department visit, Upcoming dental procedure, Missed doses, Extra doses, Change in medications, Change in diet/appetite, Hospital admission, Bruising, Other complaints     Plan:  1. INR is therapeutic today at 3.0, though it is at the upper limit of her INR goal range. After consulting with Marisol Mcmullen, PharmD, instructed Mrs. Wallace to eat a serving of GLV tonight and then continue warfarin 4mg daily except 6mg on Wednesday  2. Repeat INR on Fri, 12/21  3. Verbal information provided over the phone. Moni EL Rodrigo RBV dosing instructions, expresses understanding by teach back, and has no further questions at this time.    Sawyer Gamez CPhT  12/17/2018  4:37 PM    I, Marisol Mcmullen, [facility], have reviewed the note in full and agree with the assessment and plan.  12/17/18  4:59 PM

## 2018-12-21 ENCOUNTER — ANTICOAGULATION VISIT (OUTPATIENT)
Dept: PHARMACY | Facility: HOSPITAL | Age: 78
End: 2018-12-21

## 2018-12-21 DIAGNOSIS — I48.0 PAROXYSMAL ATRIAL FIBRILLATION (HCC): ICD-10-CM

## 2018-12-21 LAB — INR PPP: 3.6

## 2018-12-21 NOTE — PROGRESS NOTES
Anticoagulation Clinic - Remote Progress Note  [employer] [employer]  Testing Frequency: weekly    Indication: paroxysmal afib  Referring Provider: Heath  Goal INR: 2.0-3.0  Current Drug Interactions: amiodarone, levothyroxine; omeprazole, azaTHIOprine  CHADS-VASc: 5 (age, gender, HTN, DM)    Diet: rare GLV; green beans   Alcohol: none  Tobacco: none   OTC Pain Medication: APAP PRN    1st clinic visit: 9/14/17  2nd clinic visit: 6/26/18    INR History:  Date 2/9/18 2/16 2/21 3/1 3/9 3/13 3/20 3/27 4/3 4/10   Total Weekly Dose 18mg/3 days 38mg 36mg 36mg 38mg 28mg/5 days 38mg 38mg 38mg 26mg   INR 1.0 1.5 1.8 2.1 1.9        Notes s/p surgery 2/5 and held doses   Keflex finish 2/28 Boost; Keflex init    hold- pre procedure post procedure; boost     Date 4/17 4/20 4/24 5/1 5/8 5/15 5/18 5/22 5/29 6/1   Total Weekly Dose 41mg 36mg 36mg 40mg 38mg 38mg 28mg 28mg 38mg 34mg   INR 3.0 1.9 1.8 2.3 2.3 3.6 2.4 2.3 3.0 2.5   Notes     colchicine hematoma    Zpak, 1 miss     Date 6/4 6/7 6/14 6/19 6/26 6/29 7/3 7/6 7/11 7/16   Total Weekly Dose 34mg 38mg 34mg 30mg 34mg 30mg 30mg 30mg 32mg 38mg   INR 3.0 3.4 4.3 2.5 3.6 2.3 2.9 1.2 1.4 2.0   Notes   Keflex Held x1 Zpak start 6/22, finish 6/25; doxy start 6/25; clinic doxy doxy doxy complete 7/5       Date 7/23 7/26 8/2 8/9 8/16 8/23 8/29 9/5 9/12 9/19 9/24   Total Weekly Dose 36mg 38mg 36mg 38mg 36mg 32mg 30mg?? 32mg 32 mg 32mg 30mg   INR 1.7 2.3 2.0 3.4 3.8 3.5 2.5 2.6 2.5 3.3 2.0   Notes Keflex boost   1x decr dose 1x decr dose    Keflex Keflex     Date 9/27 10/1 10/8 10/15  10/22 10/29 11/5 11/14 11/21 11/28 12/4   Total Weekly Dose 32mg 32mg 32mg 30mg  32mg 28mg 32mg 32mg 32mg 28mg 26mg   INR 2.3 2.4 3.2 2.5  3.9 2.7 3.0 2.8 2.7 3.7 1.9   Notes Levaquin start 9/26 stopped Levaquin  1x decr dose      stop MVI, 1 miss 1 hold     Date 12/10 12/17 12/21           Total Weekly Dose 30mg 30mg 30mg 28mg          INR 2.4 3.0 3.6           Notes    1x decr dose            Phone  Interview:  Verbal Release Authorization signed on 6/26/18 -- may speak with Alexy Wallace (son), Genesis Becerril (daughter), Sawyer or Gema Rodrigo (son and daughter-in-law), Gerry Wallace (son)  Tablet Strength: 2mg tablets  Patient Contact Info: [phone] - home with Son Yadiel; Ms. Wallace's cell phone number- [phone]   Lab Contact Info: Ale[place]    Patient Findings   Negatives:  Signs/symptoms of thrombosis, Signs/symptoms of bleeding, Laboratory test error suspected, Change in health, Change in alcohol use, Change in activity, Upcoming invasive procedure, Emergency department visit, Upcoming dental procedure, Missed doses, Extra doses, Change in medications, Change in diet/appetite, Hospital admission, Bruising, Other complaints   Comments:  Patient firmly denies all pt findings     Plan:  1. INR is SUPRAtherapeutic today at 3.6. After consulting with Fatemeh Rodríguez, PharmD, instructed Mrs. Wallace to DECREASE tonight's dose to 2mg and then decrease maintenance dose to warfarin 4mg daily (6.7% decrease, 28mg/week)  2. Repeat INR on Mon, 12/31  3. Verbal information provided over the phone. [place] NIKKO Wallace RBV dosing instructions, expresses understanding by teach back, and has no further questions at this time.    Sawyer Gamez CPhT  12/21/2018  4:33 PM    I, Fatemeh Rodríguez, PharmD, have reviewed the note in full and agree with the assessment and plan.  12/21/18  5:08 PM

## 2018-12-31 ENCOUNTER — ANTICOAGULATION VISIT (OUTPATIENT)
Dept: PHARMACY | Facility: HOSPITAL | Age: 78
End: 2018-12-31

## 2018-12-31 DIAGNOSIS — I48.0 PAROXYSMAL ATRIAL FIBRILLATION (HCC): ICD-10-CM

## 2018-12-31 LAB — INR PPP: 2.5

## 2018-12-31 NOTE — PROGRESS NOTES
Anticoagulation Clinic - Remote Progress Note  [employer]  Testing Frequency: weekly    Indication: paroxysmal afib  Referring Provider: Heath  Goal INR: 2.0-3.0  Current Drug Interactions: amiodarone, levothyroxine; omeprazole, azaTHIOprine  CHADS-VASc: 5 (age, gender, HTN, DM)    Diet: rare GLV; green beans   Alcohol: none  Tobacco: none   OTC Pain Medication: APAP PRN    1st clinic visit: 9/14/17  2nd clinic visit: 6/26/18    INR History:  Date 2/9/18 2/16 2/21 3/1 3/9 3/13 3/20 3/27 4/3 4/10   Total Weekly Dose 18mg/3 days 38mg 36mg 36mg 38mg 28mg/5 days 38mg 38mg 38mg 26mg   INR 1.0 1.5 1.8 2.1 1.9        Notes s/p surgery 2/5 and held doses   Keflex finish 2/28 Boost; Keflex init    hold- pre procedure post procedure; boost     Date 4/17 4/20 4/24 5/1 5/8 5/15 5/18 5/22 5/29 6/1   Total Weekly Dose 41mg 36mg 36mg 40mg 38mg 38mg 28mg 28mg 38mg 34mg   INR 3.0 1.9 1.8 2.3 2.3 3.6 2.4 2.3 3.0 2.5   Notes     colchicine hematoma    Zpak, 1 miss     Date 6/4 6/7 6/14 6/19 6/26 6/29 7/3 7/6 7/11 7/16   Total Weekly Dose 34mg 38mg 34mg 30mg 34mg 30mg 30mg 30mg 32mg 38mg   INR 3.0 3.4 4.3 2.5 3.6 2.3 2.9 1.2 1.4 2.0   Notes   Keflex Held x1 Zpak start 6/22, finish 6/25; doxy start 6/25; clinic doxy doxy doxy complete 7/5       Date 7/23 7/26 8/2 8/9 8/16 8/23 8/29 9/5 9/12 9/19 9/24   Total Weekly Dose 36mg 38mg 36mg 38mg 36mg 32mg 30mg?? 32mg 32 mg 32mg 30mg   INR 1.7 2.3 2.0 3.4 3.8 3.5 2.5 2.6 2.5 3.3 2.0   Notes Keflex boost   1x decr dose 1x decr dose    Keflex Keflex     Date 9/27 10/1 10/8 10/15  10/22 10/29 11/5 11/14 11/21 11/28 12/4   Total Weekly Dose 32mg 32mg 32mg 30mg  32mg 28mg 32mg 32mg 32mg 28mg 26mg   INR 2.3 2.4 3.2 2.5  3.9 2.7 3.0 2.8 2.7 3.7 1.9   Notes Levaquin start 9/26 stopped Levaquin        stop MVI, 1 miss 1 hold     Date 12/10 12/17 12/21 12/31          Total Weekly Dose 30mg 30mg 30mg 26mg 28mg         INR 2.4 3.0 3.6 2.5          Notes                Phone Interview:  Verbal  Release Authorization signed on 6/26/18 -- may speak with Alexy Wallace (son), Genesis Becerril (daughter), Sawyer or Gema Wallace (son and daughter-in-law), Gerry Wallace (son)  Tablet Strength: 2mg tablets  Patient Contact Info: [phone] - home with Son Yadiel; Ms. Wallace's cell phone number- [phone]     Patient Findings:  Positives:  Missed doses   Negatives:  Signs/symptoms of thrombosis, Signs/symptoms of bleeding, Laboratory test error suspected, Change in health, Change in alcohol use, Change in activity, Upcoming invasive procedure, Emergency department visit, Upcoming dental procedure, Extra doses, Change in medications, Change in diet/appetite, Hospital admission, Bruising, Other complaints   Comments:  Patient reports taking only 1 tablet yesterday (2mg) instead of 4mg. She realized this was a mistake, and fixed her pill planner with 2 tablets every day.     Plan:  1. INR is therapeutic today at 2.5. Instructed Ms. Wallace to continue warfarin 4mg daily.   2. Repeat INR on Mon, 1/7.  3. Verbal information provided over the phone. [place] NIKKO Wallace RBV dosing instructions, expresses understanding by teach back, and has no further questions at this time.    Nora Pugh, Pharmacy Intern  12/31/2018  3:31 PM     IDesiree, [facility], have reviewed the note in full and agree with the assessment and plan.  12/31/18  4:33 PM

## 2019-01-02 ENCOUNTER — TELEPHONE (OUTPATIENT)
Dept: INTERNAL MEDICINE | Facility: CLINIC | Age: 79
End: 2019-01-02

## 2019-01-02 DIAGNOSIS — M25.531 RIGHT WRIST PAIN: Primary | ICD-10-CM

## 2019-01-02 RX ORDER — OMEPRAZOLE 40 MG/1
40 CAPSULE, DELAYED RELEASE ORAL DAILY
Qty: 90 CAPSULE | Refills: 1 | Status: SHIPPED | OUTPATIENT
Start: 2019-01-02 | End: 2019-09-27 | Stop reason: SDUPTHER

## 2019-01-02 NOTE — TELEPHONE ENCOUNTER
Patient called and is requesting a refill on omeprazole.  Pharmacy is [facility] on [facility].  Patients phone number is [phone]

## 2019-01-02 NOTE — TELEPHONE ENCOUNTER
PLEASE HAVE DR. GARLAND CALL HER ABOUT HER RIGHT WRIST. [phone]. HER CYST HAS MOVED AROUND HER HAND AND COME BACK RIGHT BELOW HER THUMB.

## 2019-01-07 ENCOUNTER — ANTICOAGULATION VISIT (OUTPATIENT)
Dept: PHARMACY | Facility: HOSPITAL | Age: 79
End: 2019-01-07

## 2019-01-07 DIAGNOSIS — I48.0 PAROXYSMAL ATRIAL FIBRILLATION (HCC): ICD-10-CM

## 2019-01-07 LAB — INR PPP: 2.6

## 2019-01-07 RX ORDER — WARFARIN SODIUM 2 MG/1
TABLET ORAL
Qty: 192 TABLET | Refills: 0 | Status: SHIPPED | OUTPATIENT
Start: 2019-01-07 | End: 2019-09-07 | Stop reason: SDUPTHER

## 2019-01-07 RX ORDER — AMIODARONE HYDROCHLORIDE 200 MG/1
TABLET ORAL
Qty: 45 TABLET | Refills: 0 | Status: SHIPPED | OUTPATIENT
Start: 2019-01-07 | End: 2019-04-11 | Stop reason: SDUPTHER

## 2019-01-07 NOTE — PROGRESS NOTES
Anticoagulation Clinic - Remote Progress Note  [employer]  Testing Frequency: weekly    Indication: paroxysmal afib  Referring Provider: Heath  Goal INR: 2.0-3.0  Current Drug Interactions: amiodarone, levothyroxine; omeprazole, azaTHIOprine  CHADS-VASc: 5 (age, gender, HTN, DM)    Diet: rare GLV; green beans   Alcohol: none  Tobacco: none   OTC Pain Medication: APAP PRN    1st clinic visit: 9/14/17  2nd clinic visit: 6/26/18    INR History:  Date 2/9/18 2/16 2/21 3/1 3/9 3/13 3/20 3/27 4/3 4/10   Total Weekly Dose 18mg/3 days 38mg 36mg 36mg 38mg 28mg/5 days 38mg 38mg 38mg 26mg   INR 1.0 1.5 1.8 2.1 1.9        Notes s/p surgery 2/5 and held doses   Keflex finish 2/28 Boost; Keflex init    hold- pre procedure post procedure; boost     Date 4/17 4/20 4/24 5/1 5/8 5/15 5/18 5/22 5/29 6/1   Total Weekly Dose 41mg 36mg 36mg 40mg 38mg 38mg 28mg 28mg 38mg 34mg   INR 3.0 1.9 1.8 2.3 2.3 3.6 2.4 2.3 3.0 2.5   Notes     colchicine hematoma    Zpak, 1 miss     Date 6/4 6/7 6/14 6/19 6/26 6/29 7/3 7/6 7/11 7/16   Total Weekly Dose 34mg 38mg 34mg 30mg 34mg 30mg 30mg 30mg 32mg 38mg   INR 3.0 3.4 4.3 2.5 3.6 2.3 2.9 1.2 1.4 2.0   Notes   Keflex Held x1 Zpak start 6/22, finish 6/25; doxy start 6/25; clinic doxy doxy doxy complete 7/5       Date 7/23 7/26 8/2 8/9 8/16 8/23 8/29 9/5 9/12 9/19 9/24   Total Weekly Dose 36mg 38mg 36mg 38mg 36mg 32mg 30mg?? 32mg 32 mg 32mg 30mg   INR 1.7 2.3 2.0 3.4 3.8 3.5 2.5 2.6 2.5 3.3 2.0   Notes Keflex boost   1x decr dose 1x decr dose    Keflex Keflex     Date 9/27 10/1 10/8 10/15  10/22 10/29 11/5 11/14 11/21 11/28 12/4   Total Weekly Dose 32mg 32mg 32mg 30mg  32mg 28mg 32mg 32mg 32mg 28mg 26mg   INR 2.3 2.4 3.2 2.5  3.9 2.7 3.0 2.8 2.7 3.7 1.9   Notes Levaquin start 9/26 stopped Levaquin        stop MVI, 1 miss 1 hold     Date 12/10 12/17 12/21 12/31 1/7/19         Total Weekly Dose 30mg 30mg 30mg 26mg 28mg         INR 2.4 3.0 3.6 2.5 2.6         Notes                Phone  Interview:  Verbal Release Authorization signed on 6/26/18 -- may speak with Alexy Wallace (son), Genesis Becerril (daughter), Sawyer figueroa Gema Rodrigo (son and daughter-in-law), Gerry Wallace (son)  Tablet Strength: 2mg tablets  Patient Contact Info: [phone] - home with Son Yadiel; Ms. Wallace's cell phone number- [phone]     Patient Findings:  Positives:  Change in diet/appetite   Negatives:  Signs/symptoms of thrombosis, Signs/symptoms of bleeding, Laboratory test error suspected, Change in health, Change in alcohol use, Change in activity, Upcoming invasive procedure, Emergency department visit, Upcoming dental procedure, Missed doses, Extra doses, Change in medications, Hospital admission, Bruising, Other complaints   Comments:  Patient states she has been eating some iceburg lettuce lately. Her daughter-in-law made lentil soup and she had some recently as well. Denies any other changes.     Plan:  1. INR is therapeutic today at 2.6. Instructed Ms. Wallace to continue warfarin 4mg daily.   2. Repeat INR in one week, 1/14.  3. Verbal information provided over the phone. Moni NIKKO Wallace RBV dosing instructions, expresses understanding by teach back, and has no further questions at this time.    Zina Aldrich CPhT  1/7/2019  3:10 PM     IS[place]yer, [facility], have reviewed the note in full and agree with the assessment and plan.  01/07/19  4:06 PM

## 2019-01-14 ENCOUNTER — TELEPHONE (OUTPATIENT)
Dept: INTERNAL MEDICINE | Facility: CLINIC | Age: 79
End: 2019-01-14

## 2019-01-14 ENCOUNTER — ANTICOAGULATION VISIT (OUTPATIENT)
Dept: PHARMACY | Facility: HOSPITAL | Age: 79
End: 2019-01-14

## 2019-01-14 DIAGNOSIS — I48.0 PAROXYSMAL ATRIAL FIBRILLATION (HCC): ICD-10-CM

## 2019-01-14 LAB — INR PPP: 2.6

## 2019-01-14 PROCEDURE — G0463 HOSPITAL OUTPT CLINIC VISIT: HCPCS

## 2019-01-14 RX ORDER — PRAVASTATIN SODIUM 40 MG
40 TABLET ORAL NIGHTLY
Qty: 90 TABLET | Refills: 1 | Status: SHIPPED | OUTPATIENT
Start: 2019-01-14 | End: 2020-07-21

## 2019-01-14 NOTE — TELEPHONE ENCOUNTER
Pt called stating she needs Pravastatin refilled. Needs it 40mg, has trouble cutting 80 in half. Please advise. Thank you.

## 2019-01-14 NOTE — PROGRESS NOTES
Anticoagulation Clinic - Remote Progress Note  [facility]  Testing Frequency: weekly    Indication: paroxysmal afib  Referring Provider: Heath  Goal INR: 2.0-3.0  Current Drug Interactions: amiodarone, levothyroxine; omeprazole, azaTHIOprine  CHADS-VASc: 5 (age, gender, HTN, DM)    Diet: rare GLV; green beans   Alcohol: none  Tobacco: none   OTC Pain Medication: APAP PRN    1st clinic visit: 9/14/17  2nd clinic visit: 6/26/18    INR History:  Date 2/9/18 2/16 2/21 3/1 3/9 3/13 3/20 3/27 4/3 4/10   Total Weekly Dose 18mg/3 days 38mg 36mg 36mg 38mg 28mg/5 days 38mg 38mg 38mg 26mg   INR 1.0 1.5 1.8 2.1 1.9        Notes s/p surgery 2/5 and held doses   Keflex finish 2/28 Boost; Keflex init    hold- pre procedure post procedure; boost     Date 4/17 4/20 4/24 5/1 5/8 5/15 5/18 5/22 5/29 6/1   Total Weekly Dose 41mg 36mg 36mg 40mg 38mg 38mg 28mg 28mg 38mg 34mg   INR 3.0 1.9 1.8 2.3 2.3 3.6 2.4 2.3 3.0 2.5   Notes     colchicine hematoma    Zpak, 1 miss     Date 6/4 6/7 6/14 6/19 6/26 6/29 7/3 7/6 7/11 7/16   Total Weekly Dose 34mg 38mg 34mg 30mg 34mg 30mg 30mg 30mg 32mg 38mg   INR 3.0 3.4 4.3 2.5 3.6 2.3 2.9 1.2 1.4 2.0   Notes   Keflex Held x1 Zpak start 6/22, finish 6/25; doxy start 6/25; clinic doxy doxy doxy complete 7/5       Date 7/23 7/26 8/2 8/9 8/16 8/23 8/29 9/5 9/12 9/19 9/24   Total Weekly Dose 36mg 38mg 36mg 38mg 36mg 32mg 30mg?? 32mg 32 mg 32mg 30mg   INR 1.7 2.3 2.0 3.4 3.8 3.5 2.5 2.6 2.5 3.3 2.0   Notes Keflex boost   1x decr dose 1x decr dose    Keflex Keflex     Date 9/27 10/1 10/8 10/15  10/22 10/29 11/5 11/14 11/21 11/28 12/4   Total Weekly Dose 32mg 32mg 32mg 30mg  32mg 28mg 32mg 32mg 32mg 28mg 26mg   INR 2.3 2.4 3.2 2.5  3.9 2.7 3.0 2.8 2.7 3.7 1.9   Notes Levaquin start 9/26 stopped Levaquin        stop MVI, 1 miss 1 hold     Date 12/10 12/17 12/21 12/31 1/7/19 1/14        Total Weekly Dose 30mg 30mg 30mg 26mg 28mg 28mg        INR 2.4 3.0 3.6 2.5 2.6 2.6        Notes                Phone  Interview:  Verbal Release Authorization signed on 6/26/18 -- may speak with Alexy Wallace (son), Genesis Becerril (daughter), Sawyer or Gema Wallace (son and daughter-in-law), Gerry Wallace (son)  Tablet Strength: 2mg tablets  Patient Contact Info: [phone] - home with Son Yadiel; Ms. Wallace's cell phone number- [phone]     Patient Findings:  Negatives:  Signs/symptoms of thrombosis, Signs/symptoms of bleeding, Laboratory test error suspected, Change in health, Change in alcohol use, Change in activity, Upcoming invasive procedure, Emergency department visit, Upcoming dental procedure, Missed doses, Extra doses, Change in medications, Change in diet/appetite, Hospital admission, Bruising, Other complaints     Plan:  1. INR is therapeutic today at 2.6. Instructed Ms. Wallace to continue warfarin 4mg daily.   2. Repeat INR in one week, 1/22. Patient preferred Tuesday so her daughter can be there to help her.  3. Verbal information provided over the phone. Moni Wallace RBV dosing instructions, expresses understanding by teach back, and has no further questions at this time.    Nora Pugh, Pharmacy Intern  1/14/2019  3:53 PM     I, Fatemeh Rodríguez, PharmD, have reviewed the note in full and agree with the assessment and plan.  01/14/19  4:02 PM

## 2019-01-22 ENCOUNTER — ANTICOAGULATION VISIT (OUTPATIENT)
Dept: PHARMACY | Facility: HOSPITAL | Age: 79
End: 2019-01-22

## 2019-01-22 ENCOUNTER — OFFICE VISIT (OUTPATIENT)
Dept: INTERNAL MEDICINE | Facility: CLINIC | Age: 79
End: 2019-01-22

## 2019-01-22 VITALS
DIASTOLIC BLOOD PRESSURE: 80 MMHG | SYSTOLIC BLOOD PRESSURE: 150 MMHG | HEART RATE: 68 BPM | HEIGHT: 63 IN | TEMPERATURE: 97.8 F | BODY MASS INDEX: 31.89 KG/M2

## 2019-01-22 DIAGNOSIS — D36.9 TUBULAR ADENOMA: ICD-10-CM

## 2019-01-22 DIAGNOSIS — I35.0 NONRHEUMATIC AORTIC VALVE STENOSIS: ICD-10-CM

## 2019-01-22 DIAGNOSIS — J20.8 ACUTE BRONCHITIS DUE TO OTHER SPECIFIED ORGANISMS: ICD-10-CM

## 2019-01-22 DIAGNOSIS — E21.3 HYPERPARATHYROIDISM (HCC): ICD-10-CM

## 2019-01-22 DIAGNOSIS — N18.6 STAGE 5 CHRONIC KIDNEY DISEASE ON CHRONIC DIALYSIS (HCC): ICD-10-CM

## 2019-01-22 DIAGNOSIS — E03.9 HYPOTHYROIDISM, UNSPECIFIED TYPE: ICD-10-CM

## 2019-01-22 DIAGNOSIS — Z99.2 STAGE 5 CHRONIC KIDNEY DISEASE ON CHRONIC DIALYSIS (HCC): ICD-10-CM

## 2019-01-22 DIAGNOSIS — I48.0 PAROXYSMAL ATRIAL FIBRILLATION (HCC): ICD-10-CM

## 2019-01-22 DIAGNOSIS — I48.0 PAROXYSMAL ATRIAL FIBRILLATION (HCC): Primary | ICD-10-CM

## 2019-01-22 DIAGNOSIS — E08.01 DIABETES MELLITUS DUE TO UNDERLYING CONDITION WITH HYPEROSMOLARITY AND COMA, WITH LONG-TERM CURRENT USE OF INSULIN (HCC): ICD-10-CM

## 2019-01-22 DIAGNOSIS — K25.3: ICD-10-CM

## 2019-01-22 DIAGNOSIS — Z79.4 DIABETES MELLITUS DUE TO UNDERLYING CONDITION WITH HYPEROSMOLARITY AND COMA, WITH LONG-TERM CURRENT USE OF INSULIN (HCC): ICD-10-CM

## 2019-01-22 DIAGNOSIS — N18.4 CHRONIC KIDNEY DISEASE, STAGE IV (SEVERE) (HCC): ICD-10-CM

## 2019-01-22 DIAGNOSIS — E55.9 VITAMIN D DEFICIENCY: ICD-10-CM

## 2019-01-22 DIAGNOSIS — I10 ESSENTIAL HYPERTENSION: ICD-10-CM

## 2019-01-22 DIAGNOSIS — J45.20 MILD INTERMITTENT ASTHMA, UNSPECIFIED WHETHER COMPLICATED: ICD-10-CM

## 2019-01-22 LAB — INR PPP: 2.9 (ref 0.9–1.1)

## 2019-01-22 PROCEDURE — 99214 OFFICE O/P EST MOD 30 MIN: CPT | Performed by: INTERNAL MEDICINE

## 2019-01-22 PROCEDURE — 85610 PROTHROMBIN TIME: CPT | Performed by: INTERNAL MEDICINE

## 2019-01-22 RX ORDER — DOXYCYCLINE HYCLATE 100 MG/1
100 CAPSULE ORAL 2 TIMES DAILY
Qty: 14 CAPSULE | Refills: 0 | Status: SHIPPED | OUTPATIENT
Start: 2019-01-22 | End: 2019-03-04

## 2019-01-22 NOTE — PROGRESS NOTES
Patient is a 79 y.o. female who is here for a possible sinus infection.  Chief Complaint   Patient presents with   • Sinusitis         HPI:    Here for f/u.  Today c/o 5-6 day hx of head congestion and cough with discolored mucous.  No fever or chills.  No sore throat.  Her cheek bones are painful.  Has some rhinorrhea.  No ear pains.  No SOB.      History:    Patient Active Problem List   Diagnosis   • Paroxysmal atrial fibrillation (CMS/HCC)   • Essential hypertension   • Type 2 diabetes mellitus (CMS/HCC)   • Chronic kidney disease, stage IV (severe) (CMS/HCC)   • Anemia of renal disease   • Hyperparathyroidism (CMS/HCC)   • Asthma   • Right-sided carotid artery disease (CMS/HCC)   • High output HF (heart failure) (CMS/HCC)   • Vitamin D deficiency   • Nonrheumatic aortic valve stenosis   • Ulcer of gastric fundus   • Tubular adenoma   • Macular degeneration   • Hypothyroidism   • Diabetes mellitus (CMS/HCC)   • Chronic kidney disease   • Acute bronchitis due to other specified organisms       Past Medical History:   Diagnosis Date   • Adenomatous colon polyp    • Chronic kidney disease    • Clostridium difficile infection    • Diabetes mellitus (CMS/HCC)    • Gastric polyps    • Hypothyroidism    • IgA nephropathy, acute    • Macular degeneration    • Tubular adenoma     excision    • Ulcer of gastric fundus    • Vitamin D deficiency        Past Surgical History:   Procedure Laterality Date   • BREAST BIOPSY Left 1990'S   • CARPAL TUNNEL RELEASE     • CARPAL TUNNEL RELEASE Right    • CATARACT EXTRACTION     • CATARACT EXTRACTION Bilateral    • DIAGNOSTIC LAPAROSCOPY     • DIALYSIS FISTULA CREATION     • HERNIA REPAIR  85 and 86   • LAPAROSCOPIC TUBAL LIGATION  1985   • TOTAL KNEE ARTHROPLASTY     • TOTAL KNEE ARTHROPLASTY      Left knee    • TRANSPLANTATION RENAL  2010   • TRANSPLANTATION RENAL Right    • TRIGGER FINGER RELEASE     • TUBAL ABDOMINAL LIGATION     • UPPER GASTROINTESTINAL ENDOSCOPY  05/20/2013        Current Outpatient Medications on File Prior to Visit   Medication Sig   • ALPRAZolam (XANAX) 0.5 MG tablet TAKE ONE TABLET BY MOUTH DAILY   • amiodarone (PACERONE) 200 MG tablet TAKE ONE-HALF TABLET BY MOUTH DAILY   • amLODIPine (NORVASC) 5 MG tablet 2 (Two) Times a Day.   • calcitriol (ROCALTROL) 0.25 MCG capsule Take 0.25 mcg by mouth Daily.   • Cholecalciferol (VITAMIN D) 2000 UNITS tablet Take 2,000 Units by mouth Daily.   • COLCRYS 0.6 MG tablet 0.5 tablets As Needed.   • furosemide (LASIX) 40 MG tablet Take 40 mg by mouth 2 (Two) Times a Day.   • gentamicin (GARAMYCIN) 0.1 % cream    • LANTUS SOLOSTAR 100 UNIT/ML injection pen Inject 5 Units under the skin Every Night.   • levothyroxine (SYNTHROID, LEVOTHROID) 75 MCG tablet Take 75 mcg by mouth Daily.   • metoprolol tartrate (LOPRESSOR) 100 MG tablet 2 (Two) Times a Day.   • omeprazole (priLOSEC) 40 MG capsule Take 1 capsule by mouth Daily.   • pravastatin (PRAVACHOL) 40 MG tablet Take 1 tablet by mouth Every Night.   • PROAIR  (90 Base) MCG/ACT inhaler Inhale 2 puffs Every 6 (Six) Hours As Needed for Wheezing or Shortness of Air.   • sevelamer (RENVELA) 800 MG tablet Take 800 mg by mouth 3 (Three) Times a Day With Meals.   • tacrolimus (PROGRAF) 0.5 MG capsule Take 0.5 mg by mouth 2 (Two) Times a Day.   • warfarin (COUMADIN) 2 MG tablet Take 4 mg by mouth Daily. Patient takes two tablets daily   • warfarin (COUMADIN) 2 MG tablet 2 tablets Daily.   • warfarin (COUMADIN) 2 MG tablet TAKE THREE TABLETS BY MOUTH DAILY ON MONDAY AND THURSDAY. TAKE TWO TABLETS BY MOUTH DAILY ON ALL OTHER DAYS OF THE WEEK     No current facility-administered medications on file prior to visit.        Family History   Problem Relation Age of Onset   • Leukemia Mother    • Stroke Father    • Dementia Sister    • Kidney disease Sister    • Diabetic kidney disease Sister    • Lung cancer Brother    • Breast cancer Neg Hx    • Ovarian cancer Neg Hx    • Hypertension Sister     • Dementia Sister        Social History     Socioeconomic History   • Marital status:      Spouse name: Not on file   • Number of children: Not on file   • Years of education: Not on file   • Highest education level: Not on file   Social Needs   • Financial resource strain: Not on file   • Food insecurity - worry: Not on file   • Food insecurity - inability: Not on file   • Transportation needs - medical: Not on file   • Transportation needs - non-medical: Not on file   Occupational History   • 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 Negative for chills and fever.   HENT: Negative for congestion, ear pain, hearing loss, rhinorrhea, sinus pressure, sore throat and trouble swallowing.    Eyes: Negative for discharge and itching.   Respiratory: Positive for cough and shortness of breath. Negative for chest tightness.    Cardiovascular: Positive for leg swelling. Negative for chest pain and palpitations.   Gastrointestinal: Negative for abdominal pain, blood in stool, constipation, diarrhea and vomiting.        10/17 by Dr Perez, next 10/20   Endocrine: Negative for polydipsia and polyuria.   Genitourinary: Negative for difficulty urinating, dysuria, enuresis, frequency, hematuria and urgency.        11/17 mammogram   Musculoskeletal: Positive for arthralgias and gait problem. Negative for back pain and joint swelling.   Skin: Negative for rash and wound.   Allergic/Immunologic: Negative for immunocompromised state.   Neurological: Negative for dizziness, syncope, weakness, light-headedness,  "numbness and headaches.   Hematological: Bruises/bleeds easily.   Psychiatric/Behavioral: Positive for sleep disturbance. Negative for behavioral problems and dysphoric mood. The patient is not nervous/anxious.        /80   Pulse 68   Temp 97.8 °F (36.6 °C) (Temporal)   Ht 160 cm (62.99\")   LMP  (LMP Unknown)   BMI 31.89 kg/m²       Physical Exam   Constitutional: She is oriented to person, place, and time. She appears well-developed and well-nourished.   HENT:   Head: Normocephalic and atraumatic.   Right Ear: External ear normal.   Left Ear: External ear normal.   Mouth/Throat: Oropharynx is clear and moist.   Eyes: Conjunctivae and EOM are normal.   Neck: Normal range of motion. Neck supple.   Cardiovascular: Normal rate, regular rhythm and normal heart sounds.   Pulmonary/Chest: Effort normal.   Course rhonchi and wheezing   Abdominal: Soft. Bowel sounds are normal.   Musculoskeletal:   Using walker   Lymphadenopathy:     She has no cervical adenopathy.   Neurological: She is alert and oriented to person, place, and time.   Skin: Skin is warm and dry.   Psychiatric: She has a normal mood and affect. Her behavior is normal. Thought content normal.       Procedure:      Discussion/Summary:    HTN-advised to monitor and goal 130/80  DM-labs noted  Hyperlipidemia-counseled on diet, check on rtc  ESRD-per Renal  AOCD-\"  \"  Vit D-labs noted  Gout-UA level noted  afib-rate controlled  hypothroid-labs noted  High risk meds-INR 2.9  Bronchitis-doxy rx, to check INR q 2-3 days on antibiotics     Labs noted and dw patient, advised vit D 2000 IU qd        Current Outpatient Medications:   •  ALPRAZolam (XANAX) 0.5 MG tablet, TAKE ONE TABLET BY MOUTH DAILY, Disp: 30 tablet, Rfl: 2  •  amiodarone (PACERONE) 200 MG tablet, TAKE ONE-HALF TABLET BY MOUTH DAILY, Disp: 45 tablet, Rfl: 0  •  amLODIPine (NORVASC) 5 MG tablet, 2 (Two) Times a Day., Disp: , Rfl:   •  calcitriol (ROCALTROL) 0.25 MCG capsule, Take 0.25 mcg by " mouth Daily., Disp: , Rfl:   •  Cholecalciferol (VITAMIN D) 2000 UNITS tablet, Take 2,000 Units by mouth Daily., Disp: , Rfl:   •  COLCRYS 0.6 MG tablet, 0.5 tablets As Needed., Disp: , Rfl:   •  furosemide (LASIX) 40 MG tablet, Take 40 mg by mouth 2 (Two) Times a Day., Disp: , Rfl:   •  gentamicin (GARAMYCIN) 0.1 % cream, , Disp: , Rfl:   •  LANTUS SOLOSTAR 100 UNIT/ML injection pen, Inject 5 Units under the skin Every Night., Disp: , Rfl:   •  levothyroxine (SYNTHROID, LEVOTHROID) 75 MCG tablet, Take 75 mcg by mouth Daily., Disp: , Rfl:   •  metoprolol tartrate (LOPRESSOR) 100 MG tablet, 2 (Two) Times a Day., Disp: , Rfl:   •  omeprazole (priLOSEC) 40 MG capsule, Take 1 capsule by mouth Daily., Disp: 90 capsule, Rfl: 1  •  pravastatin (PRAVACHOL) 40 MG tablet, Take 1 tablet by mouth Every Night., Disp: 90 tablet, Rfl: 1  •  PROAIR  (90 Base) MCG/ACT inhaler, Inhale 2 puffs Every 6 (Six) Hours As Needed for Wheezing or Shortness of Air., Disp: 1 inhaler, Rfl: 4  •  sevelamer (RENVELA) 800 MG tablet, Take 800 mg by mouth 3 (Three) Times a Day With Meals., Disp: , Rfl:   •  tacrolimus (PROGRAF) 0.5 MG capsule, Take 0.5 mg by mouth 2 (Two) Times a Day., Disp: , Rfl:   •  warfarin (COUMADIN) 2 MG tablet, Take 4 mg by mouth Daily. Patient takes two tablets daily, Disp: , Rfl:   •  warfarin (COUMADIN) 2 MG tablet, 2 tablets Daily., Disp: , Rfl:   •  warfarin (COUMADIN) 2 MG tablet, TAKE THREE TABLETS BY MOUTH DAILY ON MONDAY AND THURSDAY. TAKE TWO TABLETS BY MOUTH DAILY ON ALL OTHER DAYS OF THE WEEK, Disp: 192 tablet, Rfl: 0  •  doxycycline (VIBRAMYCIN) 100 MG capsule, Take 1 capsule by mouth 2 (Two) Times a Day., Disp: 14 capsule, Rfl: 0        Moni was seen today for sinusitis.    Diagnoses and all orders for this visit:    Paroxysmal atrial fibrillation (CMS/HCC)  -     POC INR    Nonrheumatic aortic valve stenosis    Essential hypertension    Mild intermittent asthma, unspecified whether complicated    Acute  bronchitis due to other specified organisms  -     doxycycline (VIBRAMYCIN) 100 MG capsule; Take 1 capsule by mouth 2 (Two) Times a Day.    Vitamin D deficiency    Acute ulcer of gastric fundus    Hypothyroidism, unspecified type    Hyperparathyroidism (CMS/HCC)    Diabetes mellitus due to underlying condition with hyperosmolarity and coma, with long-term current use of insulin (CMS/[facility])    Chronic kidney disease, stage IV (severe) (CMS/HCC)    Stage 5 chronic kidney disease on chronic dialysis (CMS/[facility])    Tubular adenoma

## 2019-01-22 NOTE — PROGRESS NOTES
Anticoagulation Clinic - Remote Progress Note  [facility]  Testing Frequency: weekly    Indication: paroxysmal afib  Referring Provider: Heath  Goal INR: 2.0-3.0  Current Drug Interactions: amiodarone, levothyroxine; omeprazole, azaTHIOprine  CHADS-VASc: 5 (age, gender, HTN, DM)    Diet: rare GLV; green beans   Alcohol: none  Tobacco: none   OTC Pain Medication: APAP PRN    1st clinic visit: 9/14/17  2nd clinic visit: 6/26/18    INR History:  Date 2/9/18 2/16 2/21 3/1 3/9 3/13 3/20 3/27 4/3 4/10   Total Weekly Dose 18mg/3 days 38mg 36mg 36mg 38mg 28mg/5 days 38mg 38mg 38mg 26mg   INR 1.0 1.5 1.8 2.1 1.9        Notes s/p surgery 2/5 and held doses   Keflex finish 2/28 Boost; Keflex init    hold- pre procedure post procedure; boost     Date 4/17 4/20 4/24 5/1 5/8 5/15 5/18 5/22 5/29 6/1   Total Weekly Dose 41mg 36mg 36mg 40mg 38mg 38mg 28mg 28mg 38mg 34mg   INR 3.0 1.9 1.8 2.3 2.3 3.6 2.4 2.3 3.0 2.5   Notes     colchicine hematoma    Zpak, 1 miss     Date 6/4 6/7 6/14 6/19 6/26 6/29 7/3 7/6 7/11 7/16   Total Weekly Dose 34mg 38mg 34mg 30mg 34mg 30mg 30mg 30mg 32mg 38mg   INR 3.0 3.4 4.3 2.5 3.6 2.3 2.9 1.2 1.4 2.0   Notes   Keflex Held x1 Zpak start 6/22, finish 6/25; doxy start 6/25; clinic doxy doxy doxy complete 7/5       Date 7/23 7/26 8/2 8/9 8/16 8/23 8/29 9/5 9/12 9/19 9/24   Total Weekly Dose 36mg 38mg 36mg 38mg 36mg 32mg 30mg?? 32mg 32 mg 32mg 30mg   INR 1.7 2.3 2.0 3.4 3.8 3.5 2.5 2.6 2.5 3.3 2.0   Notes Keflex boost   1x decr dose 1x decr dose    Keflex Keflex     Date 9/27 10/1 10/8 10/15  10/22 10/29 11/5 11/14 11/21 11/28 12/4   Total Weekly Dose 32mg 32mg 32mg 30mg  32mg 28mg 32mg 32mg 32mg 28mg 26mg   INR 2.3 2.4 3.2 2.5  3.9 2.7 3.0 2.8 2.7 3.7 1.9   Notes Levaquin start 9/26 stopped Levaquin        stop MVI, 1 miss 1 hold     Date 12/10 12/17 12/21 12/31 1/7/19 1/14 1/22       Total Weekly Dose 30mg 30mg 30mg 26mg 28mg 28mg 28mg       INR 2.4 3.0 3.6 2.5 2.6 2.6 2.9       Notes         doxy start 1/22        Phone Interview:  Verbal Release Authorization signed on 6/26/18 -- may speak with Alexy Wallace (son), Genesis Becerril (daughter), Sawyer or Gema Wallace (son and daughter-in-law), Gerry Wallace (son)  Tablet Strength: 2mg tablets  Patient Contact Info: [phone] - home with Son Yadiel; Ms. Wallace's cell phone number- [phone]     Patient Findings:  Positives:  Change in health, Change in medications   Negatives:  Signs/symptoms of thrombosis, Signs/symptoms of bleeding, Laboratory test error suspected, Change in alcohol use, Change in activity, Upcoming invasive procedure, Emergency department visit, Upcoming dental procedure, Missed doses, Extra doses, Change in diet/appetite, Hospital admission, Bruising, Other complaints   Comments:  She says she has been sick for a week with bronchitis. She went to the doctor yesterday and received an antibiotic - doxycycline, which she started taking last night after she took her INR. She has been taking cough drops, cough syrup, and Mucinex for the past week to help her symptoms. She is very aware that the antibiotic can increase her INR, as Dr. Walters told her this and to check her INR every other day. She was worried about this, as her daughter cannot come out to help her test her INR over the weekend.      Plan:  1. INR is therapeutic today at 2.9. Instructed Ms. Wallace to continue warfarin 4mg daily, and try to eat more servings of GLV (green beans) over the next few days.  2. Repeat INR on Thursday, 1/24. I asked that she test on Friday, but she says her daughter will not be able to come out and help her test on Friday or over the weekend.  3. Verbal information provided over the phone. [place] NIKKO Wallace RBV dosing instructions, expresses understanding by teach back, and has no further questions at this time.    Nora Pugh, Pharmacy Intern   1/22/2019  3:47 PM     I, Desiree Bull, [facility], have reviewed the note in full and agree with the assessment and  plan.  01/23/19  8:51 AM

## 2019-01-23 RX ORDER — ALPRAZOLAM 0.5 MG/1
TABLET ORAL
Qty: 30 TABLET | Refills: 1 | Status: SHIPPED | OUTPATIENT
Start: 2019-01-23 | End: 2019-03-27 | Stop reason: SDUPTHER

## 2019-01-24 ENCOUNTER — ANTICOAGULATION VISIT (OUTPATIENT)
Dept: PHARMACY | Facility: HOSPITAL | Age: 79
End: 2019-01-24

## 2019-01-24 DIAGNOSIS — I48.0 PAROXYSMAL ATRIAL FIBRILLATION (HCC): ICD-10-CM

## 2019-01-24 LAB — INR PPP: 1.7

## 2019-01-24 NOTE — PROGRESS NOTES
Anticoagulation Clinic - Remote Progress Note  [employer] [employer]  Testing Frequency: weekly    Indication: paroxysmal afib  Referring Provider: Heath  Goal INR: 2.0-3.0  Current Drug Interactions: amiodarone, levothyroxine; omeprazole, azaTHIOprine  CHADS-VASc: 5 (age, gender, HTN, DM)    Diet: rare GLV; green beans   Alcohol: none  Tobacco: none   OTC Pain Medication: APAP PRN    1st clinic visit: 9/14/17  2nd clinic visit: 6/26/18    INR History:  Date 2/9/18 2/16 2/21 3/1 3/9 3/13 3/20 3/27 4/3 4/10   Total Weekly Dose 18mg/3 days 38mg 36mg 36mg 38mg 28mg/5 days 38mg 38mg 38mg 26mg   INR 1.0 1.5 1.8 2.1 1.9        Notes s/p surgery 2/5 and held doses   Keflex finish 2/28 Boost; Keflex init    hold- pre procedure post procedure; boost     Date 4/17 4/20 4/24 5/1 5/8 5/15 5/18 5/22 5/29 6/1   Total Weekly Dose 41mg 36mg 36mg 40mg 38mg 38mg 28mg 28mg 38mg 34mg   INR 3.0 1.9 1.8 2.3 2.3 3.6 2.4 2.3 3.0 2.5   Notes     colchicine hematoma    Zpak, 1 miss     Date 6/4 6/7 6/14 6/19 6/26 6/29 7/3 7/6 7/11 7/16   Total Weekly Dose 34mg 38mg 34mg 30mg 34mg 30mg 30mg 30mg 32mg 38mg   INR 3.0 3.4 4.3 2.5 3.6 2.3 2.9 1.2 1.4 2.0   Notes   Keflex Held x1 Zpak start 6/22, finish 6/25; doxy start 6/25; clinic doxy doxy doxy complete 7/5       Date 7/23 7/26 8/2 8/9 8/16 8/23 8/29 9/5 9/12 9/19 9/24   Total Weekly Dose 36mg 38mg 36mg 38mg 36mg 32mg 30mg?? 32mg 32 mg 32mg 30mg   INR 1.7 2.3 2.0 3.4 3.8 3.5 2.5 2.6 2.5 3.3 2.0   Notes Keflex boost   1x decr dose 1x decr dose    Keflex Keflex     Date 9/27 10/1 10/8 10/15  10/22 10/29 11/5 11/14 11/21 11/28 12/4   Total Weekly Dose 32mg 32mg 32mg 30mg  32mg 28mg 32mg 32mg 32mg 28mg 26mg   INR 2.3 2.4 3.2 2.5  3.9 2.7 3.0 2.8 2.7 3.7 1.9   Notes Levaquin start 9/26 stopped Levaquin        stop MVI, 1 miss 1 hold     Date 12/10 12/17 12/21 12/31 1/7/19 1/14 1/22 1/24      Total Weekly Dose 30mg 30mg 30mg 26mg 28mg 28mg 28mg 28mg      INR 2.4 3.0 3.6 2.5 2.6 2.6 2.9 1.7      Notes         doxy start 1/22, incr GLV        Phone Interview:  Verbal Release Authorization signed on 6/26/18 -- may speak with Alexy Wallace (son), Genesis Becerril (daughter), Sawyer or Gema Wallace (son and daughter-in-law), Gerry Wallace (son)  Tablet Strength: 2mg tablets  Patient Contact Info: [phone] - home with son Yadiel; Mrs. Wallace's cell phone number- [phone]     Patient Findings:  Positives:  Change in health, Change in medications, Change in diet/appetite   Negatives:  Signs/symptoms of thrombosis, Signs/symptoms of bleeding, Laboratory test error suspected, Change in alcohol use, Change in activity, Upcoming invasive procedure, Emergency department visit, Upcoming dental procedure, Missed doses, Extra doses, Hospital admission, Bruising, Other complaints   Comments:  Still taking doxycycline - will end on 1/29. She is still using Robitussin and cough drops. She has been eating peas - she ate the peas twice yesterday and today at lunch.      Plan:  1. INR is subtherapeutic today at 1.7. Instructed Mrs. Wallace to increase her dose to 5mg today and tomorrow, then continue warfarin 4mg daily.  2. Repeat INR on Monday, 1/28.  3. Verbal information provided over the phone. Moni Wallace RBV dosing instructions, expresses understanding by teach back, and has no further questions at this time.    Nora Pugh, Pharmacy Intern   1/24/2019   4:18 PM     Mrs. Wallace's INR has previously trended downward on doxy (June/July 2018). Called and advised her to resume her normal vitamin K intake and take warfarin 5mg again tonight (total 30mg by recheck).  IDesiree, [facility], have reviewed the note in full and have updated the plan above.    01/25/19  7:41 AM

## 2019-01-28 ENCOUNTER — ANTICOAGULATION VISIT (OUTPATIENT)
Dept: PHARMACY | Facility: HOSPITAL | Age: 79
End: 2019-01-28

## 2019-01-28 DIAGNOSIS — I48.0 PAROXYSMAL ATRIAL FIBRILLATION (HCC): ICD-10-CM

## 2019-01-28 LAB — INR PPP: 2.7

## 2019-01-28 NOTE — PROGRESS NOTES
Anticoagulation Clinic - Remote Progress Note  [facility]  Testing Frequency: weekly    Indication: paroxysmal afib  Referring Provider: Heath  Goal INR: 2.0-3.0  Current Drug Interactions: amiodarone, levothyroxine; omeprazole, azaTHIOprine  CHADS-VASc: 5 (age, gender, HTN, DM)    Diet: rare GLV; green beans   Alcohol: none  Tobacco: none   OTC Pain Medication: APAP PRN    1st clinic visit: 9/14/17  2nd clinic visit: 6/26/18    INR History:  Date 2/9/18 2/16 2/21 3/1 3/9 3/13 3/20 3/27 4/3 4/10   Total Weekly Dose 18mg/3 days 38mg 36mg 36mg 38mg 28mg/5 days 38mg 38mg 38mg 26mg   INR 1.0 1.5 1.8 2.1 1.9        Notes s/p surgery 2/5 and held doses   Keflex finish 2/28 Boost; Keflex init    hold- pre procedure post procedure; boost     Date 4/17 4/20 4/24 5/1 5/8 5/15 5/18 5/22 5/29 6/1   Total Weekly Dose 41mg 36mg 36mg 40mg 38mg 38mg 28mg 28mg 38mg 34mg   INR 3.0 1.9 1.8 2.3 2.3 3.6 2.4 2.3 3.0 2.5   Notes     colchicine hematoma    Zpak, 1 miss     Date 6/4 6/7 6/14 6/19 6/26 6/29 7/3 7/6 7/11 7/16   Total Weekly Dose 34mg 38mg 34mg 30mg 34mg 30mg 30mg 30mg 32mg 38mg   INR 3.0 3.4 4.3 2.5 3.6 2.3 2.9 1.2 1.4 2.0   Notes   Keflex Held x1 Zpak start 6/22, finish 6/25; doxy start 6/25; clinic doxy doxy doxy complete 7/5       Date 7/23 7/26 8/2 8/9 8/16 8/23 8/29 9/5 9/12 9/19 9/24   Total Weekly Dose 36mg 38mg 36mg 38mg 36mg 32mg 30mg?? 32mg 32 mg 32mg 30mg   INR 1.7 2.3 2.0 3.4 3.8 3.5 2.5 2.6 2.5 3.3 2.0   Notes Keflex boost   1x decr dose 1x decr dose    Keflex Keflex     Date 9/27 10/1 10/8 10/15  10/22 10/29 11/5 11/14 11/21 11/28 12/4   Total Weekly Dose 32mg 32mg 32mg 30mg  32mg 28mg 32mg 32mg 32mg 28mg 26mg   INR 2.3 2.4 3.2 2.5  3.9 2.7 3.0 2.8 2.7 3.7 1.9   Notes Levaquin start 9/26 stopped Levaquin        stop MVI, 1 miss 1 hold     Date 12/10 12/17 12/21 12/31 1/7/19 1/14 1/22 1/24 1/28     Total Weekly Dose 30mg 30mg 30mg 26mg 28mg 28mg 28mg 28mg 30mg     INR 2.4 3.0 3.6 2.5 2.6 2.6 2.9 1.7  2.7     Notes        doxy start 1/22, incr GLV 2x boost; doxy       Phone Interview:  Verbal Release Authorization signed on 6/26/18 -- may speak with Alexy Wallace (son), Genesis Becerril (daughter), Sawyer figueroa Gema Rodrigo (son and daughter-in-law), Gerry Wallace (son)  Tablet Strength: 2mg tablets  Patient Contact Info: [phone] - home with son Yadiel; Mrs. Wallace's cell phone number- [phone]     Patient Findings   Negatives:  Signs/symptoms of thrombosis, Signs/symptoms of bleeding, Laboratory test error suspected, Change in health, Change in alcohol use, Change in activity, Upcoming invasive procedure, Emergency department visit, Upcoming dental procedure, Missed doses, Extra doses, Change in medications, Change in diet/appetite, Hospital admission, Bruising, Other complaints   Comments:  Patient is due to take her last dose of doxycycline morning of Tues, 1/29 (tomorrow).     Plan:  1. INR is therapeutic and back WNL today at 2.7, though this is a 1.0 point increase from 4 days ago. Instructed Mrs. Wallace to continue warfarin 4mg daily  2. Repeat INR on Thurs, 1/31, to make sure patient stays WNL  3. Verbal information provided over the phone. Moni Wallace RBV dosing instructions, expresses understanding by teach back, and has no further questions at this time.    Sawyer Gamez, CPhT  1/28/2019  4:30 PM     IFatemeh, PharmD, have reviewed the note in full and agree with the assessment and plan.  01/29/19  8:10 AM

## 2019-01-31 ENCOUNTER — ANTICOAGULATION VISIT (OUTPATIENT)
Dept: PHARMACY | Facility: HOSPITAL | Age: 79
End: 2019-01-31

## 2019-01-31 DIAGNOSIS — I48.0 PAROXYSMAL ATRIAL FIBRILLATION (HCC): ICD-10-CM

## 2019-01-31 LAB — INR PPP: 2.5

## 2019-01-31 NOTE — PROGRESS NOTES
Anticoagulation Clinic - Remote Progress Note  [facility]  Testing Frequency: weekly    Indication: paroxysmal afib  Referring Provider: Heath  Goal INR: 2.0-3.0  Current Drug Interactions: amiodarone, levothyroxine; omeprazole, azaTHIOprine  CHADS-VASc: 5 (age, gender, HTN, DM)    Diet: rare GLV; green beans   Alcohol: none  Tobacco: none   OTC Pain Medication: APAP PRN    1st clinic visit: 9/14/17  2nd clinic visit: 6/26/18    INR History:  Date 2/9/18 2/16 2/21 3/1 3/9 3/13 3/20 3/27 4/3 4/10   Total Weekly Dose 18mg/3 days 38mg 36mg 36mg 38mg 28mg/5 days 38mg 38mg 38mg 26mg   INR 1.0 1.5 1.8 2.1 1.9        Notes s/p surgery 2/5 and held doses   Keflex finish 2/28 Boost; Keflex init    hold- pre procedure post procedure; boost     Date 4/17 4/20 4/24 5/1 5/8 5/15 5/18 5/22 5/29 6/1   Total Weekly Dose 41mg 36mg 36mg 40mg 38mg 38mg 28mg 28mg 38mg 34mg   INR 3.0 1.9 1.8 2.3 2.3 3.6 2.4 2.3 3.0 2.5   Notes     colchicine hematoma    Zpak, 1 miss     Date 6/4 6/7 6/14 6/19 6/26 6/29 7/3 7/6 7/11 7/16   Total Weekly Dose 34mg 38mg 34mg 30mg 34mg 30mg 30mg 30mg 32mg 38mg   INR 3.0 3.4 4.3 2.5 3.6 2.3 2.9 1.2 1.4 2.0   Notes   Keflex Held x1 Zpak start 6/22, finish 6/25; doxy start 6/25; clinic doxy doxy doxy complete 7/5       Date 7/23 7/26 8/2 8/9 8/16 8/23 8/29 9/5 9/12 9/19 9/24   Total Weekly Dose 36mg 38mg 36mg 38mg 36mg 32mg 30mg?? 32mg 32 mg 32mg 30mg   INR 1.7 2.3 2.0 3.4 3.8 3.5 2.5 2.6 2.5 3.3 2.0   Notes Keflex boost   1x decr dose 1x decr dose    Keflex Keflex     Date 9/27 10/1 10/8 10/15  10/22 10/29 11/5 11/14 11/21 11/28 12/4   Total Weekly Dose 32mg 32mg 32mg 30mg  32mg 28mg 32mg 32mg 32mg 28mg 26mg   INR 2.3 2.4 3.2 2.5  3.9 2.7 3.0 2.8 2.7 3.7 1.9   Notes Levaquin start 9/26 stopped Levaquin        stop MVI, 1 miss 1 hold     Date 12/10 12/17 12/21 12/31 1/7/19 1/14 1/22 1/24 1/28 1/31    Total Weekly Dose 30mg 30mg 30mg 26mg 28mg 28mg 28mg 28mg 30mg 30mg 28mg   INR 2.4 3.0 3.6 2.5 2.6  2.6 2.9 1.7 2.7 2.5    Notes        doxy start 1/22, incr GLV 2x boost; doxy Finish doxy 1/29      Phone Interview:  Verbal Release Authorization signed on 6/26/18 -- may speak with Alexy Wallace (son), Genesis Becerril (daughter), Sawyer or Gema Rodrigo (son and daughter-in-law), Gerry Wallace (son)  Tablet Strength: 2mg tablets  Patient Contact Info: [phone] - home with son Yadiel; Mrs. Wallace's cell phone number- [phone]     Patient Findings   Negatives:  Signs/symptoms of thrombosis, Signs/symptoms of bleeding, Laboratory test error suspected, Change in health, Change in alcohol use, Change in activity, Upcoming invasive procedure, Emergency department visit, Upcoming dental procedure, Missed doses, Extra doses, Change in medications, Change in diet/appetite, Hospital admission, Bruising, Other complaints   Comments:  Patient finished doxy on 1/29     Plan:  1. INR is therapeutic today at 2.5. Instructed Mrs. Wallace to continue warfarin 4mg daily  2. Repeat INR in 1 week, 2/7  3. Verbal information provided over the phone. Moni Wallace RBV dosing instructions, expresses understanding by teach back, and has no further questions at this time.    Sawyer Gamez CPhT  1/31/2019  3:27 PM     Francoise HENRY [facility], have reviewed the note in full and agree with the assessment and plan.  01/31/19  3:36 PM

## 2019-02-05 NOTE — PROGRESS NOTES
Anticoagulation Clinic - Remote Progress Note  [facility]  Testing Frequency: weekly    Indication: afib  Referring Provider: Heath  Goal INR: 2.0-3.0  Current Drug Interactions: amiodarone, levothyroxine; omeprazole, azaTHIOprine  CHADS-VASc: 5 (age, gender, HTN, DM)    Diet: rare GLV; green beans  Alcohol: none  Tobacco: none   OTC Pain Medication: APAP PRN    1st clinic visit: 9/14/17  2nd clinic visit: 6/26/18    INR History:  Date 2/9 2/16 2/21 3/1 3/9 3/13 3/20 3/27 4/3 4/10   Total Weekly Dose 18mg/3 days 38mg 36mg 36mg 38mg 28mg/5 days 38mg 38mg 38mg 26mg   INR 1.0 1.5 1.8 2.1 1.9        Notes s/p surgery 2/5 and held doses   Keflex finish 2/28 Boost; Keflex init    Hold- pre procedure Post procedure; boost     Date 4/17 4/20 4/24 5/1 5/8 5/15 5/18 5/22 5/29 6/1   Total Weekly Dose 41mg 36mg 36mg 40mg 38mg 38mg 28mg 28mg 38mg 34mg   INR 3.0 1.9 1.8 2.3 2.3 3.6 2.4 2.3 3.0 2.5   Notes  Lower dose 4/19   colchicine hematoma    Zpak, 1 missed dose     Date 6/4 6/7 6/14 6/19 6/26 6/29 7/3 7/6 7/11 7/16   Total Weekly Dose 34mg 38mg 34mg 30 mg 34mg 30mg 30mg 30mg 32mg 38mg   INR 3.0 3.4 4.3 2.5 3.6 2.3 2.9 1.2 1.4 2.0   Notes   Keflex Held x1 Zpak start 6/22, finish 6/25; doxy start 6/25; clinic doxy doxy doxy complete 7/5       Date 7/23            Total Weekly Dose 36mg            INR 1.7            Notes Keflex                Phone Interview:  Verbal Release Authorization signed on 6/26/18 -- may speak with Alexy Wallace (son), Genesis Becerril (daughter), Sawyer Wallace (son and daughter-in-law), Gerry Wallace (son)  Tablet Strength: 2mg tablets  Current Maintenance Dose: variable  Patient Contact Info: [phone]- Home with Son Yadiel; Ms. Wallace's cell phone number- [phone]     Patient Findings:   Positives:   Change in medications   Negatives:   Signs/symptoms of thrombosis, Signs/symptoms of bleeding, Laboratory test error suspected, Change in health, Change in alcohol use, Change in  activity, Upcoming invasive procedure, Emergency department visit, Upcoming dental procedure, Missed doses, Extra doses, Change in diet/appetite, Hospital admission, Bruising, Other complaints   Comments:   Mrs. Wallace started taking Keflex on 7/18 for an infection around the tube insertion site in her stomach. She will be taking it for a total of 7 days.   Per [employer]- Concurrent use of CEPHALEXIN and WARFARIN may result in an increased risk of bleeding.      Plan:  1. INR is subtherapeutic today at 1.7. Instructed Mrs. Wallace to take warfarin 8mg tonight then resume 6mg daily except 4mg TuesThursSat.2. Repeat INR on Thursday (7/26) to ensure WNL.  3. Verbal information provided over the phone. Mrs. Wallace RBV dosing instructions, expresses understanding by teach back, and has no further questions at this time.  4. Patient denies the need for warfarin refills at this time.    Marisol Mcmullen, PharmD  Pharmacy Resident  7/23/2018  3:37 PM     normal

## 2019-02-07 ENCOUNTER — ANTICOAGULATION VISIT (OUTPATIENT)
Dept: PHARMACY | Facility: HOSPITAL | Age: 79
End: 2019-02-07

## 2019-02-07 DIAGNOSIS — I48.0 PAROXYSMAL ATRIAL FIBRILLATION (HCC): ICD-10-CM

## 2019-02-07 LAB — INR PPP: 2.7

## 2019-02-07 NOTE — PROGRESS NOTES
Anticoagulation Clinic - Remote Progress Note  [employer] [employer]  Testing Frequency: weekly    Indication: paroxysmal afib  Referring Provider: Heath  Goal INR: 2.0-3.0  Current Drug Interactions: amiodarone, levothyroxine; omeprazole, azaTHIOprine  CHADS-VASc: 5 (age, gender, HTN, DM)    Diet: rare GLV; green beans   Alcohol: none  Tobacco: none   OTC Pain Medication: APAP PRN    1st clinic visit: 9/14/17  2nd clinic visit: 6/26/18    INR History:  Date 2/9/18 2/16 2/21 3/1 3/9 3/13 3/20 3/27 4/3 4/10   Total Weekly Dose 18mg/3 days 38mg 36mg 36mg 38mg 28mg/5 days 38mg 38mg 38mg 26mg   INR 1.0 1.5 1.8 2.1 1.9        Notes s/p surgery 2/5 and held doses   Keflex finish 2/28 Boost; Keflex init    hold- pre procedure post procedure; boost     Date 4/17 4/20 4/24 5/1 5/8 5/15 5/18 5/22 5/29 6/1   Total Weekly Dose 41mg 36mg 36mg 40mg 38mg 38mg 28mg 28mg 38mg 34mg   INR 3.0 1.9 1.8 2.3 2.3 3.6 2.4 2.3 3.0 2.5   Notes     colchicine hematoma    Zpak, 1 miss     Date 6/4 6/7 6/14 6/19 6/26 6/29 7/3 7/6 7/11 7/16   Total Weekly Dose 34mg 38mg 34mg 30mg 34mg 30mg 30mg 30mg 32mg 38mg   INR 3.0 3.4 4.3 2.5 3.6 2.3 2.9 1.2 1.4 2.0   Notes   Keflex Held x1 Zpak start 6/22, finish 6/25; doxy start 6/25; clinic doxy doxy doxy complete 7/5       Date 7/23 7/26 8/2 8/9 8/16 8/23 8/29 9/5 9/12 9/19 9/24   Total Weekly Dose 36mg 38mg 36mg 38mg 36mg 32mg 30mg?? 32mg 32 mg 32mg 30mg   INR 1.7 2.3 2.0 3.4 3.8 3.5 2.5 2.6 2.5 3.3 2.0   Notes Keflex boost   1x decr dose 1x decr dose    Keflex Keflex     Date 9/27 10/1 10/8 10/15  10/22 10/29 11/5 11/14 11/21 11/28 12/4   Total Weekly Dose 32mg 32mg 32mg 30mg  32mg 28mg 32mg 32mg 32mg 28mg 26mg   INR 2.3 2.4 3.2 2.5  3.9 2.7 3.0 2.8 2.7 3.7 1.9   Notes Levaquin start 9/26 stopped Levaquin        stop MVI, 1 miss 1 hold     Date 12/10 12/17 12/21 12/31 1/7/19 1/14 1/22 1/24 1/28 1/31 2/7   Total Weekly Dose 30mg 30mg 30mg 26mg 28mg 28mg 28mg 28mg 30mg 30mg 28mg   INR 2.4 3.0 3.6 2.5 2.6  2.6 2.9 1.7 2.7 2.5 2.7   Notes        doxy start 1/22, incr GLV 2x boost; doxy Finish doxy 1/29      Phone Interview:  Verbal Release Authorization signed on 6/26/18 -- may speak with Alexy Wallace (son), Genesis Becerril (daughter), Sawyer figueroa Gema Wallace (son and daughter-in-law), Gerry Wallace (son)  Tablet Strength: 2mg tablets  Patient Contact Info: [phone] - home with son Yadiel; Mrs. Wallace's cell phone number- [phone]     Patient Findings   Negatives:  Signs/symptoms of thrombosis, Signs/symptoms of bleeding, Laboratory test error suspected, Change in health, Change in alcohol use, Change in activity, Upcoming invasive procedure, Emergency department visit, Upcoming dental procedure, Missed doses, Extra doses, Change in medications, Change in diet/appetite, Hospital admission, Bruising, Other complaints   Comments:  All patient findings were found to be negative upon patient interview.       Plan:  1. INR is therapeutic today. Instructed Mrs. Wallace to continue warfarin 4mg daily  2. Repeat INR in 1 week, 2/14  3. Verbal information provided over the phone. Moni Wallace RBV dosing instructions, expresses understanding by teach back, and has no further questions at this time.    Gustabo Chan, PharmD Candidate 2019  2/7/2019  2:32 PM     I, Fatemeh Rodríguez, PharmD, have reviewed the note in full and agree with the assessment and plan.  02/07/19  5:38 PM

## 2019-02-14 ENCOUNTER — ANTICOAGULATION VISIT (OUTPATIENT)
Dept: PHARMACY | Facility: HOSPITAL | Age: 79
End: 2019-02-14

## 2019-02-14 DIAGNOSIS — I48.0 PAROXYSMAL ATRIAL FIBRILLATION (HCC): ICD-10-CM

## 2019-02-14 LAB — INR PPP: 2.1

## 2019-02-14 NOTE — PROGRESS NOTES
Anticoagulation Clinic - Remote Progress Note  [employer]  Testing Frequency: weekly    Indication: paroxysmal afib  Referring Provider: Heath  Goal INR: 2.0-3.0  Current Drug Interactions: amiodarone, levothyroxine; omeprazole, azaTHIOprine  CHADS-VASc: 5 (age, gender, HTN, DM)    Diet: rare GLV; green beans   Alcohol: none  Tobacco: none   OTC Pain Medication: APAP PRN    1st clinic visit: 9/14/17  2nd clinic visit: 6/26/18    INR History:  Date 2/9/18 2/16 2/21 3/1 3/9 3/13 3/20 3/27 4/3 4/10   Total Weekly Dose 18mg/3 days 38mg 36mg 36mg 38mg 28mg/5 days 38mg 38mg 38mg 26mg   INR 1.0 1.5 1.8 2.1 1.9        Notes s/p surgery 2/5 and held doses   Keflex finish 2/28 Boost; Keflex init    hold- pre procedure post procedure; boost     Date 4/17 4/20 4/24 5/1 5/8 5/15 5/18 5/22 5/29 6/1   Total Weekly Dose 41mg 36mg 36mg 40mg 38mg 38mg 28mg 28mg 38mg 34mg   INR 3.0 1.9 1.8 2.3 2.3 3.6 2.4 2.3 3.0 2.5   Notes     colchicine hematoma    Zpak, 1 miss     Date 6/4 6/7 6/14 6/19 6/26 6/29 7/3 7/6 7/11 7/16   Total Weekly Dose 34mg 38mg 34mg 30mg 34mg 30mg 30mg 30mg 32mg 38mg   INR 3.0 3.4 4.3 2.5 3.6 2.3 2.9 1.2 1.4 2.0   Notes   Keflex Held x1 Zpak start 6/22, finish 6/25; doxy start 6/25; clinic doxy doxy doxy complete 7/5       Date 7/23 7/26 8/2 8/9 8/16 8/23 8/29 9/5 9/12 9/19 9/24   Total Weekly Dose 36mg 38mg 36mg 38mg 36mg 32mg 30mg?? 32mg 32 mg 32mg 30mg   INR 1.7 2.3 2.0 3.4 3.8 3.5 2.5 2.6 2.5 3.3 2.0   Notes Keflex boost   1x decr dose 1x decr dose    Keflex Keflex     Date 9/27 10/1 10/8 10/15  10/22 10/29 11/5 11/14 11/21 11/28 12/4   Total Weekly Dose 32mg 32mg 32mg 30mg  32mg 28mg 32mg 32mg 32mg 28mg 26mg   INR 2.3 2.4 3.2 2.5  3.9 2.7 3.0 2.8 2.7 3.7 1.9   Notes Levaquin start 9/26 stopped Levaquin        stop MVI, 1 miss 1 hold     Date 12/10 12/17 12/21 12/31 1/7/19 1/14 1/22 1/24 1/28 1/31 2/7   Total Weekly Dose 30mg 30mg 30mg 26mg 28mg 28mg 28mg 28mg 30mg 30mg 28mg   INR 2.4 3.0 3.6 2.5 2.6  2.6 2.9 1.7 2.7 2.5 2.7   Notes        doxy start 1/22, incr GLV 2x boost; doxy Finish doxy 1/29      Date 2/14             Total Weekly Dose 28mg             INR 2.1             Notes                Phone Interview:  Verbal Release Authorization signed on 6/26/18 -- may speak with Alexy Wallace (son), Genesis Becerril (daughter), Sawyer or Gema Wallace (son and daughter-in-law), Gerry Wallace (son)  Tablet Strength: 2mg tablets  Patient Contact Info: [phone] - home with son Yadiel; Mrs. Wallace's cell phone number- [phone]     Patient Findings   Negatives:  Signs/symptoms of thrombosis, Signs/symptoms of bleeding, Laboratory test error suspected, Change in health, Change in alcohol use, Change in activity, Upcoming invasive procedure, Emergency department visit, Upcoming dental procedure, Missed doses, Extra doses, Change in medications, Change in diet/appetite, Hospital admission, Bruising, Other complaints     Plan:  1. INR is therapeutic today. Instructed Mrs. Wallace to continue warfarin 4 mg oral daily  2. Repeat INR in 1 week  3. Verbal information provided over the phone. Moni Wallace RBV dosing instructions, expresses understanding by teach back, and has no further questions at this time.    Sawyer Gamez, Valentin  2/14/2019  4:24 PM     Radha HENRY, PharmD, have reviewed the note in full and agree with the assessment and plan.  02/14/19  4:45 PM

## 2019-02-20 ENCOUNTER — ANTICOAGULATION VISIT (OUTPATIENT)
Dept: PHARMACY | Facility: HOSPITAL | Age: 79
End: 2019-02-20

## 2019-02-20 DIAGNOSIS — I48.0 PAROXYSMAL ATRIAL FIBRILLATION (HCC): ICD-10-CM

## 2019-02-20 LAB — INR PPP: 2.4

## 2019-02-20 NOTE — PROGRESS NOTES
Anticoagulation Clinic - Remote Progress Note  [employer]  Testing Frequency: weekly    Indication: paroxysmal afib  Referring Provider: Heath  Goal INR: 2.0-3.0  Current Drug Interactions: amiodarone, levothyroxine; omeprazole, azaTHIOprine  CHADS-VASc: 5 (age, gender, HTN, DM)    Diet: rare GLV; green beans   Alcohol: none  Tobacco: none   OTC Pain Medication: APAP PRN    1st clinic visit: 9/14/17  2nd clinic visit: 6/26/18    INR History:  Date 2/9/18 2/16 2/21 3/1 3/9 3/13 3/20 3/27 4/3 4/10   Total Weekly Dose 18mg/3 days 38mg 36mg 36mg 38mg 28mg/5 days 38mg 38mg 38mg 26mg   INR 1.0 1.5 1.8 2.1 1.9        Notes s/p surgery 2/5 and held doses   Keflex finish 2/28 Boost; Keflex init    hold- pre procedure post procedure; boost     Date 4/17 4/20 4/24 5/1 5/8 5/15 5/18 5/22 5/29 6/1   Total Weekly Dose 41mg 36mg 36mg 40mg 38mg 38mg 28mg 28mg 38mg 34mg   INR 3.0 1.9 1.8 2.3 2.3 3.6 2.4 2.3 3.0 2.5   Notes     colchicine hematoma    Zpak, 1 miss     Date 6/4 6/7 6/14 6/19 6/26 6/29 7/3 7/6 7/11 7/16   Total Weekly Dose 34mg 38mg 34mg 30mg 34mg 30mg 30mg 30mg 32mg 38mg   INR 3.0 3.4 4.3 2.5 3.6 2.3 2.9 1.2 1.4 2.0   Notes   Keflex Held x1 Zpak start 6/22, finish 6/25; doxy start 6/25; clinic doxy doxy doxy complete 7/5       Date 7/23 7/26 8/2 8/9 8/16 8/23 8/29 9/5 9/12 9/19 9/24   Total Weekly Dose 36mg 38mg 36mg 38mg 36mg 32mg 30mg?? 32mg 32 mg 32mg 30mg   INR 1.7 2.3 2.0 3.4 3.8 3.5 2.5 2.6 2.5 3.3 2.0   Notes Keflex boost   1x decr dose 1x decr dose    Keflex Keflex     Date 9/27 10/1 10/8 10/15  10/22 10/29 11/5 11/14 11/21 11/28 12/4   Total Weekly Dose 32mg 32mg 32mg 30mg  32mg 28mg 32mg 32mg 32mg 28mg 26mg   INR 2.3 2.4 3.2 2.5  3.9 2.7 3.0 2.8 2.7 3.7 1.9   Notes Levaquin start 9/26 stopped Levaquin        stop MVI, 1 miss 1 hold     Date 12/10 12/17 12/21 12/31 1/7/19 1/14 1/22 1/24 1/28 1/31 2/7   Total Weekly Dose 30mg 30mg 30mg 26mg 28mg 28mg 28mg 28mg 30mg 30mg 28mg   INR 2.4 3.0 3.6 2.5 2.6  2.6 2.9 1.7 2.7 2.5 2.7   Notes        doxy start 1/22, incr GLV 2x boost; doxy Finish doxy 1/29      Date 2/14 2/20            Total Weekly Dose 28mg 28mg            INR 2.1 2.4            Notes                Phone Interview:  Verbal Release Authorization signed on 6/26/18 -- may speak with Alexy Wallace (son), Genesis Becerril (daughter), Sawyer or Gema Wallace (son and daughter-in-law), Gerry Wallace (son)  Tablet Strength: 2mg tablets  Patient Contact Info: [phone] - home with son Yadiel; Mrs. Wallace's cell phone number- [phone]     Patient Findings   Negatives:  Signs/symptoms of thrombosis, Signs/symptoms of bleeding, Laboratory test error suspected, Change in health, Change in alcohol use, Change in activity, Upcoming invasive procedure, Emergency department visit, Upcoming dental procedure, Missed doses, Extra doses, Change in medications, Change in diet/appetite, Hospital admission, Bruising, Other complaints   Comments:  Patient states she might start using CBD oil in the future but will let us know if she does. Otherwise no changes.     Plan:  1. INR is therapeutic today at 2.4. Instructed Mrs. Wallace to continue warfarin 4 mg oral daily  2. Repeat INR in 1 week on 2/28  3. Verbal information provided over the phone. Moni Wallace RBV dosing instructions, expresses understanding by teach back, and has no further questions at this time.    Gustabo Chan, PharmD Candidate 2019  2/20/2019  3:18 PM     IFatemeh, PharmD, have reviewed the note in full and agree with the assessment and plan.  02/20/19  3:50 PM

## 2019-02-28 ENCOUNTER — ANTICOAGULATION VISIT (OUTPATIENT)
Dept: PHARMACY | Facility: HOSPITAL | Age: 79
End: 2019-02-28

## 2019-02-28 DIAGNOSIS — I48.0 PAROXYSMAL ATRIAL FIBRILLATION (HCC): ICD-10-CM

## 2019-02-28 LAB — INR PPP: 1.8

## 2019-02-28 NOTE — PROGRESS NOTES
Anticoagulation Clinic - Remote Progress Note  [employer]  Testing Frequency: weekly    Indication: paroxysmal afib  Referring Provider: Heath  Goal INR: 2.0-3.0  Current Drug Interactions: amiodarone, levothyroxine; omeprazole, azaTHIOprine  CHADS-VASc: 5 (age, gender, HTN, DM)    Diet: rare GLV; green beans   Alcohol: none  Tobacco: none   OTC Pain Medication: APAP PRN    1st clinic visit: 9/14/17  2nd clinic visit: 6/26/18    INR History:  Date 2/9/18 2/16 2/21 3/1 3/9 3/13 3/20 3/27 4/3 4/10   Total Weekly Dose 18mg/3 days 38mg 36mg 36mg 38mg 28mg/5 days 38mg 38mg 38mg 26mg   INR 1.0 1.5 1.8 2.1 1.9        Notes s/p surgery 2/5 and held doses   Keflex finish 2/28 Boost; Keflex init    hold- pre procedure post procedure; boost     Date 4/17 4/20 4/24 5/1 5/8 5/15 5/18 5/22 5/29 6/1   Total Weekly Dose 41mg 36mg 36mg 40mg 38mg 38mg 28mg 28mg 38mg 34mg   INR 3.0 1.9 1.8 2.3 2.3 3.6 2.4 2.3 3.0 2.5   Notes     colchicine hematoma    Zpak, 1 miss     Date 6/4 6/7 6/14 6/19 6/26 6/29 7/3 7/6 7/11 7/16   Total Weekly Dose 34mg 38mg 34mg 30mg 34mg 30mg 30mg 30mg 32mg 38mg   INR 3.0 3.4 4.3 2.5 3.6 2.3 2.9 1.2 1.4 2.0   Notes   Keflex Held x1 Zpak start 6/22, finish 6/25; doxy start 6/25; clinic doxy doxy doxy complete 7/5       Date 7/23 7/26 8/2 8/9 8/16 8/23 8/29 9/5 9/12 9/19 9/24   Total Weekly Dose 36mg 38mg 36mg 38mg 36mg 32mg 30mg?? 32mg 32 mg 32mg 30mg   INR 1.7 2.3 2.0 3.4 3.8 3.5 2.5 2.6 2.5 3.3 2.0   Notes Keflex boost   1x decr dose 1x decr dose    Keflex Keflex     Date 9/27 10/1 10/8 10/15  10/22 10/29 11/5 11/14 11/21 11/28 12/4   Total Weekly Dose 32mg 32mg 32mg 30mg  32mg 28mg 32mg 32mg 32mg 28mg 26mg   INR 2.3 2.4 3.2 2.5  3.9 2.7 3.0 2.8 2.7 3.7 1.9   Notes Levaquin start 9/26 stopped Levaquin        stop MVI, 1 miss 1 hold     Date 12/10 12/17 12/21 12/31 1/7/19 1/14 1/22 1/24 1/28 1/31 2/7   Total Weekly Dose 30mg 30mg 30mg 26mg 28mg 28mg 28mg 28mg 30mg 30mg 28mg   INR 2.4 3.0 3.6 2.5 2.6  2.6 2.9 1.7 2.7 2.5 2.7   Notes        doxy start 1/22, incr GLV 2x boost; doxy Finish doxy 1/29      Date 2/14 2/20 2/28           Total Weekly Dose 28mg 28mg 28 mg           INR 2.1 2.4 1.8           Notes                Phone Interview:  Verbal Release Authorization signed on 6/26/18 -- may speak with Alexy Wallace (son), Genesis Jone (daughter), Sawyer or Gema Wallace (son and daughter-in-law), Gerry Rodrigo (son)  Tablet Strength: 2mg tablets  Patient Contact Info: [phone] - home with son Yadiel; Mrs. Wallace's cell phone number- [phone]     Patient Findings   Negatives:  Signs/symptoms of thrombosis, Signs/symptoms of bleeding, Laboratory test error suspected, Change in health, Change in alcohol use, Change in activity, Upcoming invasive procedure, Emergency department visit, Upcoming dental procedure, Missed doses, Extra doses, Change in medications, Change in diet/appetite, Hospital admission, Bruising, Other complaints   Comments:  Sick with bronchitis/cold and taking mucinex and cough suppressant. No changes in appetite.        Plan:    1. INR is subtherapeutic today at 1.8. Instructed Mrs. Wallace to BOOST tonight's dose to 6 mg of warfarin then continue warfarin 4 mg oral daily  2. Repeat INR in 1 week on 3/7  3. Verbal information provided over the phone. Moni Wallace RBV dosing instructions, expresses understanding by teach back, and has no further questions at this time.      Sawyer Jackman [facility]  2/28/2019  3:28 PM

## 2019-03-04 ENCOUNTER — OFFICE VISIT (OUTPATIENT)
Dept: INTERNAL MEDICINE | Facility: CLINIC | Age: 79
End: 2019-03-04

## 2019-03-04 VITALS
SYSTOLIC BLOOD PRESSURE: 140 MMHG | HEART RATE: 64 BPM | HEIGHT: 63 IN | BODY MASS INDEX: 33.13 KG/M2 | DIASTOLIC BLOOD PRESSURE: 80 MMHG | WEIGHT: 187 LBS

## 2019-03-04 DIAGNOSIS — I50.83 HIGH OUTPUT HF (HEART FAILURE) (HCC): ICD-10-CM

## 2019-03-04 DIAGNOSIS — N18.6 STAGE 5 CHRONIC KIDNEY DISEASE ON CHRONIC DIALYSIS (HCC): ICD-10-CM

## 2019-03-04 DIAGNOSIS — Z99.2 STAGE 5 CHRONIC KIDNEY DISEASE ON CHRONIC DIALYSIS (HCC): ICD-10-CM

## 2019-03-04 DIAGNOSIS — N18.9 ANEMIA OF RENAL DISEASE: ICD-10-CM

## 2019-03-04 DIAGNOSIS — J45.20 MILD INTERMITTENT ASTHMA, UNSPECIFIED WHETHER COMPLICATED: ICD-10-CM

## 2019-03-04 DIAGNOSIS — E78.2 MIXED HYPERLIPIDEMIA: ICD-10-CM

## 2019-03-04 DIAGNOSIS — E55.9 VITAMIN D DEFICIENCY: ICD-10-CM

## 2019-03-04 DIAGNOSIS — I48.0 PAROXYSMAL ATRIAL FIBRILLATION (HCC): Primary | ICD-10-CM

## 2019-03-04 DIAGNOSIS — Z79.4 DIABETES MELLITUS DUE TO UNDERLYING CONDITION WITH HYPEROSMOLARITY AND COMA, WITH LONG-TERM CURRENT USE OF INSULIN (HCC): ICD-10-CM

## 2019-03-04 DIAGNOSIS — E08.01 DIABETES MELLITUS DUE TO UNDERLYING CONDITION WITH HYPEROSMOLARITY AND COMA, WITH LONG-TERM CURRENT USE OF INSULIN (HCC): ICD-10-CM

## 2019-03-04 DIAGNOSIS — E21.3 HYPERPARATHYROIDISM (HCC): ICD-10-CM

## 2019-03-04 DIAGNOSIS — D63.1 ANEMIA OF RENAL DISEASE: ICD-10-CM

## 2019-03-04 DIAGNOSIS — I10 ESSENTIAL HYPERTENSION: ICD-10-CM

## 2019-03-04 DIAGNOSIS — E11.8 TYPE 2 DIABETES MELLITUS WITH COMPLICATION, WITHOUT LONG-TERM CURRENT USE OF INSULIN (HCC): ICD-10-CM

## 2019-03-04 DIAGNOSIS — E03.9 HYPOTHYROIDISM, UNSPECIFIED TYPE: ICD-10-CM

## 2019-03-04 PROBLEM — J20.8 ACUTE BRONCHITIS DUE TO OTHER SPECIFIED ORGANISMS: Status: RESOLVED | Noted: 2019-01-22 | Resolved: 2019-03-04

## 2019-03-04 LAB
ALBUMIN SERPL-MCNC: 3.99 G/DL (ref 3.2–4.8)
ALBUMIN/GLOB SERPL: 1.5 G/DL (ref 1.5–2.5)
ALP SERPL-CCNC: 116 U/L (ref 25–100)
ALT SERPL W P-5'-P-CCNC: 19 U/L (ref 7–40)
ANION GAP SERPL CALCULATED.3IONS-SCNC: 14 MMOL/L (ref 3–11)
ARTICHOKE IGE QN: 143 MG/DL (ref 0–130)
AST SERPL-CCNC: 26 U/L (ref 0–33)
BILIRUB SERPL-MCNC: 0.3 MG/DL (ref 0.3–1.2)
BUN BLD-MCNC: 49 MG/DL (ref 9–23)
BUN/CREAT SERPL: 10.1 (ref 7–25)
CALCIUM SPEC-SCNC: 9.4 MG/DL (ref 8.7–10.4)
CHLORIDE SERPL-SCNC: 106 MMOL/L (ref 99–109)
CHOLEST SERPL-MCNC: 227 MG/DL (ref 0–200)
CO2 SERPL-SCNC: 24 MMOL/L (ref 20–31)
CREAT BLD-MCNC: 4.85 MG/DL (ref 0.6–1.3)
DEPRECATED RDW RBC AUTO: 58.3 FL (ref 37–54)
ERYTHROCYTE [DISTWIDTH] IN BLOOD BY AUTOMATED COUNT: 15.8 % (ref 11.3–14.5)
GFR SERPL CREATININE-BSD FRML MDRD: 9 ML/MIN/1.73
GLOBULIN UR ELPH-MCNC: 2.6 GM/DL
GLUCOSE BLD-MCNC: 102 MG/DL (ref 70–100)
HBA1C MFR BLD: 5.1 % (ref 4.8–5.6)
HCT VFR BLD AUTO: 40.5 % (ref 34.5–44)
HDLC SERPL-MCNC: 52 MG/DL (ref 40–60)
HGB BLD-MCNC: 12.8 G/DL (ref 11.5–15.5)
MCH RBC QN AUTO: 31.6 PG (ref 27–31)
MCHC RBC AUTO-ENTMCNC: 31.6 G/DL (ref 32–36)
MCV RBC AUTO: 100 FL (ref 80–99)
PLATELET # BLD AUTO: 226 10*3/MM3 (ref 150–450)
PMV BLD AUTO: 10.5 FL (ref 6–12)
POTASSIUM BLD-SCNC: 4.8 MMOL/L (ref 3.5–5.5)
PROT SERPL-MCNC: 6.6 G/DL (ref 5.7–8.2)
RBC # BLD AUTO: 4.05 10*6/MM3 (ref 3.89–5.14)
SODIUM BLD-SCNC: 144 MMOL/L (ref 132–146)
TRIGL SERPL-MCNC: 160 MG/DL (ref 0–150)
TSH SERPL DL<=0.05 MIU/L-ACNC: 2.94 MIU/ML (ref 0.35–5.35)
WBC NRBC COR # BLD: 14.42 10*3/MM3 (ref 3.5–10.8)

## 2019-03-04 PROCEDURE — 85027 COMPLETE CBC AUTOMATED: CPT | Performed by: INTERNAL MEDICINE

## 2019-03-04 PROCEDURE — 80053 COMPREHEN METABOLIC PANEL: CPT | Performed by: INTERNAL MEDICINE

## 2019-03-04 PROCEDURE — 80061 LIPID PANEL: CPT | Performed by: INTERNAL MEDICINE

## 2019-03-04 PROCEDURE — 83036 HEMOGLOBIN GLYCOSYLATED A1C: CPT | Performed by: INTERNAL MEDICINE

## 2019-03-04 PROCEDURE — 99214 OFFICE O/P EST MOD 30 MIN: CPT | Performed by: INTERNAL MEDICINE

## 2019-03-04 PROCEDURE — 84443 ASSAY THYROID STIM HORMONE: CPT | Performed by: INTERNAL MEDICINE

## 2019-03-04 RX ORDER — EZETIMIBE 10 MG/1
10 TABLET ORAL DAILY
Qty: 30 TABLET | Refills: 5 | Status: SHIPPED | OUTPATIENT
Start: 2019-03-04 | End: 2019-11-01 | Stop reason: SDUPTHER

## 2019-03-04 NOTE — PROGRESS NOTES
Patient is a 79 y.o. female who is here for a follow up of chronic conditions.  Chief Complaint   Patient presents with   • Atrial Fibrillation   • Hypertension         HPI:    Here for f/u.  Cough and congestion is improved.  BP up and down.  No palpitations.  No abdominal pains.  Energy level is not the best.  No nausea or emesis.  Sleep is not the best.      History:    Patient Active Problem List   Diagnosis   • Paroxysmal atrial fibrillation (CMS/HCC)   • Essential hypertension   • Type 2 diabetes mellitus (CMS/HCC)   • Chronic kidney disease, stage IV (severe) (CMS/HCC)   • Anemia of renal disease   • Hyperparathyroidism (CMS/HCC)   • Asthma   • Right-sided carotid artery disease (CMS/HCC)   • High output HF (heart failure) (CMS/HCC)   • Vitamin D deficiency   • Nonrheumatic aortic valve stenosis   • Ulcer of gastric fundus   • Tubular adenoma   • Macular degeneration   • Hypothyroidism   • Diabetes mellitus (CMS/HCC)   • Chronic kidney disease       Past Medical History:   Diagnosis Date   • Adenomatous colon polyp    • Chronic kidney disease    • Clostridium difficile infection    • Diabetes mellitus (CMS/HCC)    • Gastric polyps    • Hypothyroidism    • IgA nephropathy, acute    • Macular degeneration    • Tubular adenoma     excision    • Ulcer of gastric fundus    • Vitamin D deficiency        Past Surgical History:   Procedure Laterality Date   • BREAST BIOPSY Left 1990'S   • CARPAL TUNNEL RELEASE     • CARPAL TUNNEL RELEASE Right    • CATARACT EXTRACTION     • CATARACT EXTRACTION Bilateral    • DIAGNOSTIC LAPAROSCOPY     • DIALYSIS FISTULA CREATION     • HERNIA REPAIR  85 and 86   • LAPAROSCOPIC TUBAL LIGATION  1985   • TOTAL KNEE ARTHROPLASTY     • TOTAL KNEE ARTHROPLASTY      Left knee    • TRANSPLANTATION RENAL  2010   • TRANSPLANTATION RENAL Right    • TRIGGER FINGER RELEASE     • TUBAL ABDOMINAL LIGATION     • UPPER GASTROINTESTINAL ENDOSCOPY  05/20/2013       Current Outpatient Medications on File  Prior to Visit   Medication Sig   • ALPRAZolam (XANAX) 0.5 MG tablet TAKE ONE TABLET BY MOUTH DAILY (Patient taking differently: TAKE ONE TABLET BY MOUTH at bedtime)   • amiodarone (PACERONE) 200 MG tablet TAKE ONE-HALF TABLET BY MOUTH DAILY   • amLODIPine (NORVASC) 5 MG tablet 2 (Two) Times a Day.   • calcitriol (ROCALTROL) 0.25 MCG capsule Take 0.25 mcg by mouth Daily.   • Cholecalciferol (VITAMIN D) 2000 UNITS tablet Take 2,000 Units by mouth Daily.   • COLCRYS 0.6 MG tablet 0.5 tablets As Needed.   • furosemide (LASIX) 40 MG tablet Take 40 mg by mouth Every Morning.   • levothyroxine (SYNTHROID, LEVOTHROID) 75 MCG tablet Take 75 mcg by mouth Daily.   • metoprolol tartrate (LOPRESSOR) 100 MG tablet 2 (Two) Times a Day.   • omeprazole (priLOSEC) 40 MG capsule Take 1 capsule by mouth Daily.   • pravastatin (PRAVACHOL) 40 MG tablet Take 1 tablet by mouth Every Night.   • PROAIR  (90 Base) MCG/ACT inhaler Inhale 2 puffs Every 6 (Six) Hours As Needed for Wheezing or Shortness of Air.   • sevelamer (RENVELA) 800 MG tablet Take 800 mg by mouth 3 (Three) Times a Day With Meals.   • tacrolimus (PROGRAF) 0.5 MG capsule Take 0.5 mg by mouth 2 (Two) Times a Day.   • warfarin (COUMADIN) 2 MG tablet Take 4 mg by mouth Daily. Patient takes two tablets daily   • warfarin (COUMADIN) 2 MG tablet 2 tablets Daily.   • warfarin (COUMADIN) 2 MG tablet TAKE THREE TABLETS BY MOUTH DAILY ON MONDAY AND THURSDAY. TAKE TWO TABLETS BY MOUTH DAILY ON ALL OTHER DAYS OF THE WEEK   • gentamicin (GARAMYCIN) 0.1 % cream    • LANTUS SOLOSTAR 100 UNIT/ML injection pen Inject 5 Units under the skin Every Night.   • [DISCONTINUED] doxycycline (VIBRAMYCIN) 100 MG capsule Take 1 capsule by mouth 2 (Two) Times a Day.     No current facility-administered medications on file prior to visit.        Family History   Problem Relation Age of Onset   • Leukemia Mother    • Stroke Father    • Dementia Sister    • Kidney disease Sister    • Diabetic kidney  disease Sister    • Lung cancer Brother    • Breast cancer Neg Hx    • Ovarian cancer Neg Hx    • Hypertension Sister    • Dementia Sister        Social History     Socioeconomic History   • Marital status:      Spouse name: Not on file   • Number of children: Not on file   • Years of education: Not on file   • Highest education level: Not on file   Social Needs   • Financial resource strain: Not on file   • Food insecurity - worry: Not on file   • Food insecurity - inability: Not on file   • Transportation needs - medical: Not on file   • Transportation needs - non-medical: Not on file   Occupational History   • 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 Negative for chills and fever.   HENT: Negative for congestion, ear pain, hearing loss, rhinorrhea, sinus pressure, sore throat and trouble swallowing.    Eyes: Negative for discharge and itching.   Respiratory: Positive for cough. Negative for chest tightness.    Cardiovascular: Positive for leg swelling. Negative for chest pain and palpitations.   Gastrointestinal: Negative for abdominal pain, blood in stool, constipation, diarrhea and vomiting.        10/17 by Dr Perez, next 10/20   Endocrine: Negative for polydipsia and polyuria.   Genitourinary: Negative for difficulty urinating, dysuria, enuresis, frequency, hematuria and urgency.        11/18 mammogram   Musculoskeletal: Positive for arthralgias and gait problem. Negative for back pain and joint swelling.   Skin: Negative for rash and wound.   Allergic/Immunologic: Negative for  "immunocompromised state.   Neurological: Negative for dizziness, syncope, weakness, light-headedness, numbness and headaches.   Hematological: Bruises/bleeds easily.   Psychiatric/Behavioral: Positive for sleep disturbance. Negative for behavioral problems and dysphoric mood. The patient is not nervous/anxious.        /80   Pulse 64   Ht 160 cm (62.99\")   Wt 84.8 kg (187 lb)   LMP  (LMP Unknown)   BMI 33.13 kg/m²       Physical Exam   Constitutional: She is oriented to person, place, and time. She appears well-developed and well-nourished.   HENT:   Head: Normocephalic and atraumatic.   Right Ear: External ear normal.   Left Ear: External ear normal.   Mouth/Throat: Oropharynx is clear and moist.   Eyes: Conjunctivae and EOM are normal.   Neck: Normal range of motion. Neck supple.   Cardiovascular: Normal rate, regular rhythm and normal heart sounds.   Pulmonary/Chest: Effort normal and breath sounds normal.   Abdominal: Soft. Bowel sounds are normal.   Musculoskeletal:   Using walker   Lymphadenopathy:     She has no cervical adenopathy.   Neurological: She is alert and oriented to person, place, and time.   Skin: Skin is warm and dry.   Psychiatric: She has a normal mood and affect. Her behavior is normal. Thought content normal.       Procedure:      Discussion/Summary:    HTN-advised to monitor and goal 130/80  DM-labs today  Hyperlipidemia-counseled on diet  ESRD-per Renal  AOCD-\"  \"  Vit D-labs noted  Gout-UA level noted  afib-rate controlled  hypothroid-labs noted  High risk meds-INR noted     Labs noted and dw patient, advised vit D 2000 IU qd  Advised to add zetia        Current Outpatient Medications:   •  ALPRAZolam (XANAX) 0.5 MG tablet, TAKE ONE TABLET BY MOUTH DAILY (Patient taking differently: TAKE ONE TABLET BY MOUTH at bedtime), Disp: 30 tablet, Rfl: 1  •  amiodarone (PACERONE) 200 MG tablet, TAKE ONE-HALF TABLET BY MOUTH DAILY, Disp: 45 tablet, Rfl: 0  •  amLODIPine (NORVASC) 5 MG " tablet, 2 (Two) Times a Day., Disp: , Rfl:   •  calcitriol (ROCALTROL) 0.25 MCG capsule, Take 0.25 mcg by mouth Daily., Disp: , Rfl:   •  Cholecalciferol (VITAMIN D) 2000 UNITS tablet, Take 2,000 Units by mouth Daily., Disp: , Rfl:   •  COLCRYS 0.6 MG tablet, 0.5 tablets As Needed., Disp: , Rfl:   •  furosemide (LASIX) 40 MG tablet, Take 40 mg by mouth Every Morning., Disp: , Rfl:   •  levothyroxine (SYNTHROID, LEVOTHROID) 75 MCG tablet, Take 75 mcg by mouth Daily., Disp: , Rfl:   •  metoprolol tartrate (LOPRESSOR) 100 MG tablet, 2 (Two) Times a Day., Disp: , Rfl:   •  omeprazole (priLOSEC) 40 MG capsule, Take 1 capsule by mouth Daily., Disp: 90 capsule, Rfl: 1  •  pravastatin (PRAVACHOL) 40 MG tablet, Take 1 tablet by mouth Every Night., Disp: 90 tablet, Rfl: 1  •  PROAIR  (90 Base) MCG/ACT inhaler, Inhale 2 puffs Every 6 (Six) Hours As Needed for Wheezing or Shortness of Air., Disp: 1 inhaler, Rfl: 4  •  sevelamer (RENVELA) 800 MG tablet, Take 800 mg by mouth 3 (Three) Times a Day With Meals., Disp: , Rfl:   •  tacrolimus (PROGRAF) 0.5 MG capsule, Take 0.5 mg by mouth 2 (Two) Times a Day., Disp: , Rfl:   •  warfarin (COUMADIN) 2 MG tablet, Take 4 mg by mouth Daily. Patient takes two tablets daily, Disp: , Rfl:   •  warfarin (COUMADIN) 2 MG tablet, 2 tablets Daily., Disp: , Rfl:   •  warfarin (COUMADIN) 2 MG tablet, TAKE THREE TABLETS BY MOUTH DAILY ON MONDAY AND THURSDAY. TAKE TWO TABLETS BY MOUTH DAILY ON ALL OTHER DAYS OF THE WEEK, Disp: 192 tablet, Rfl: 0  •  ezetimibe (ZETIA) 10 MG tablet, Take 1 tablet by mouth Daily. Continue pravastatin, Disp: 30 tablet, Rfl: 5  •  gentamicin (GARAMYCIN) 0.1 % cream, , Disp: , Rfl:   •  LANTUS SOLOSTAR 100 UNIT/ML injection pen, Inject 5 Units under the skin Every Night., Disp: , Rfl:         Moni was seen today for atrial fibrillation and hypertension.    Diagnoses and all orders for this visit:    Paroxysmal atrial fibrillation (CMS/HCC)  -     CBC (No  Diff)    High output HF (heart failure) (CMS/[facility])    Essential hypertension  -     Comprehensive Metabolic Panel  -     Lipid Panel    Mild intermittent asthma, unspecified whether complicated    Vitamin D deficiency    Type 2 diabetes mellitus with complication, without long-term current use of insulin (CMS/[facility])  -     Hemoglobin A1c    Hypothyroidism, unspecified type  -     TSH    Hyperparathyroidism (CMS/[facility])    Diabetes mellitus due to underlying condition with hyperosmolarity and coma, with long-term current use of insulin (CMS/[facility])    Anemia of renal disease    Stage 5 chronic kidney disease on chronic dialysis (CMS/[facility])    Mixed hyperlipidemia  -     ezetimibe (ZETIA) 10 MG tablet; Take 1 tablet by mouth Daily. Continue pravastatin

## 2019-03-07 ENCOUNTER — ANTICOAGULATION VISIT (OUTPATIENT)
Dept: PHARMACY | Facility: HOSPITAL | Age: 79
End: 2019-03-07

## 2019-03-07 DIAGNOSIS — I48.0 PAROXYSMAL ATRIAL FIBRILLATION (HCC): ICD-10-CM

## 2019-03-07 LAB — INR PPP: 1.6

## 2019-03-07 NOTE — PROGRESS NOTES
Anticoagulation Clinic - Remote Progress Note  [facility]  Testing Frequency: weekly    Indication: paroxysmal afib  Referring Provider: Heath  Goal INR: 2.0-3.0  Current Drug Interactions: amiodarone, levothyroxine; omeprazole, azaTHIOprine, ezetimibe   CHADS-VASc: 5 (age, gender, HTN, DM)    Diet: rare GLV; green beans   Alcohol: none  Tobacco: none   OTC Pain Medication: APAP PRN    1st clinic visit: 9/14/17  2nd clinic visit: 6/26/18    INR History:  Date 2/9/18 2/16 2/21 3/1 3/9 3/13 3/20 3/27 4/3 4/10   Total Weekly Dose 18mg/3 days 38mg 36mg 36mg 38mg 28mg/5 days 38mg 38mg 38mg 26mg   INR 1.0 1.5 1.8 2.1 1.9        Notes s/p surgery 2/5 and held doses   Keflex finish 2/28 Boost; Keflex init    hold- pre procedure post procedure; boost     Date 4/17 4/20 4/24 5/1 5/8 5/15 5/18 5/22 5/29 6/1   Total Weekly Dose 41mg 36mg 36mg 40mg 38mg 38mg 28mg 28mg 38mg 34mg   INR 3.0 1.9 1.8 2.3 2.3 3.6 2.4 2.3 3.0 2.5   Notes     colchicine hematoma    Zpak, 1 miss     Date 6/4 6/7 6/14 6/19 6/26 6/29 7/3 7/6 7/11 7/16   Total Weekly Dose 34mg 38mg 34mg 30mg 34mg 30mg 30mg 30mg 32mg 38mg   INR 3.0 3.4 4.3 2.5 3.6 2.3 2.9 1.2 1.4 2.0   Notes   Keflex Held x1 Zpak start 6/22, finish 6/25; doxy start 6/25; clinic doxy doxy doxy complete 7/5       Date 7/23 7/26 8/2 8/9 8/16 8/23 8/29 9/5 9/12 9/19 9/24   Total Weekly Dose 36mg 38mg 36mg 38mg 36mg 32mg 30mg?? 32mg 32 mg 32mg 30mg   INR 1.7 2.3 2.0 3.4 3.8 3.5 2.5 2.6 2.5 3.3 2.0   Notes Keflex boost   1x decr dose 1x decr dose    Keflex Keflex     Date 9/27 10/1 10/8 10/15  10/22 10/29 11/5 11/14 11/21 11/28 12/4   Total Weekly Dose 32mg 32mg 32mg 30mg  32mg 28mg 32mg 32mg 32mg 28mg 26mg   INR 2.3 2.4 3.2 2.5  3.9 2.7 3.0 2.8 2.7 3.7 1.9   Notes Levaquin start 9/26 stopped Levaquin        stop MVI, 1 miss 1 hold     Date 12/10 12/17 12/21 12/31 1/7/19 1/14 1/22 1/24 1/28 1/31 2/7   Total Weekly Dose 30mg 30mg 30mg 26mg 28mg 28mg 28mg 28mg 30mg 30mg 28mg   INR 2.4 3.0  3.6 2.5 2.6 2.6 2.9 1.7 2.7 2.5 2.7   Notes        doxy start 1/22, incr GLV 2x boost; doxy Finish doxy 1/29      Date 2/14 2/20 2/28 3/7          Total Weekly Dose 28mg 28mg 28 mg 30mg 31mg         INR 2.1 2.4 1.8 1.6          Notes   sick Zetia            Phone Interview:  Verbal Release Authorization signed on 6/26/18 -- may speak with Alexy Wallace (son), Genesis Becerril (daughter), Sawyer or Gema Wallace (son and daughter-in-law), Gerry Wallace (son)  Tablet Strength: 2mg tablets  Patient Contact Info: [phone] - home with son Yadiel; Mrs. Wallace's cell phone number - [phone]     Patient Findings:  Positives:  Change in medications, Change in diet/appetite   Negatives:  Signs/symptoms of thrombosis, Signs/symptoms of bleeding, Laboratory test error suspected, Change in health, Change in alcohol use, Change in activity, Upcoming invasive procedure, Emergency department visit, Upcoming dental procedure, Missed doses, Extra doses, Hospital admission, Bruising, Other complaints   Comments:  Mrs. Wallace reports she is eating more salads, but only iceberg lettuce. Medication changes:  Darlynmelvina started her on ezetimibe in combination with her statin earlier this week, and she is no longer taking anything for her cough / hoarseness.      Plan:    1. INR is subtherapeutic today despite dose increase last week. For now, instructed Mrs. Wallace to take another 6mg dose tonight, take 5mg on Monday, and take 4mg all other days (total 11% increase from recent maintenance regimen -- complicated, but Mrs. Wallace fixed her pillbox during our conversation).   2. Repeat INR in one week.  3. Verbal information provided over the phone. Moni Wallace RBV dosing instructions, expresses understanding by teach back, and has no further questions at this time.    Ann Cowden Mayer, PharmD  3/7/2019  4:18 PM

## 2019-03-13 ENCOUNTER — TELEPHONE (OUTPATIENT)
Dept: INTERNAL MEDICINE | Facility: CLINIC | Age: 79
End: 2019-03-13

## 2019-03-13 NOTE — TELEPHONE ENCOUNTER
Advised to stop pravachol until next week and let me know if pain is better and then dr hernandez will advise next steps      ----- Message from Alex Hernandez MD sent at 3/13/2019 11:09 AM EDT -----  Ok to stop  ----- Message -----  From: Malka Boland MA  Sent: 3/13/2019  10:55 AM  To: Alex Hernandez MD    She is having a lot of pain. You just started her on zetia. But she think its pravastatin and wants to stop it

## 2019-03-14 ENCOUNTER — ANTICOAGULATION VISIT (OUTPATIENT)
Dept: PHARMACY | Facility: HOSPITAL | Age: 79
End: 2019-03-14

## 2019-03-14 DIAGNOSIS — I48.0 PAROXYSMAL ATRIAL FIBRILLATION (HCC): ICD-10-CM

## 2019-03-14 LAB — INR PPP: 2.2

## 2019-03-14 NOTE — PROGRESS NOTES
Anticoagulation Clinic - Remote Progress Note  [facility]  Testing Frequency: weekly    Indication: paroxysmal afib  Referring Provider: Heath  Goal INR: 2.0-3.0  Current Drug Interactions: amiodarone, levothyroxine; omeprazole, azaTHIOprine, ezetimibe   CHADS-VASc: 5 (age, gender, HTN, DM)    Diet: rare GLV; green beans   Alcohol: none  Tobacco: none   OTC Pain Medication: APAP PRN    1st clinic visit: 9/14/17  2nd clinic visit: 6/26/18    INR History:  Date 2/9/18 2/16 2/21 3/1 3/9 3/13 3/20 3/27 4/3 4/10   Total Weekly Dose 18mg/3 days 38mg 36mg 36mg 38mg 28mg/5 days 38mg 38mg 38mg 26mg   INR 1.0 1.5 1.8 2.1 1.9        Notes s/p surgery 2/5 and held doses   Keflex finish 2/28 Boost; Keflex init    hold- pre procedure post procedure; boost     Date 4/17 4/20 4/24 5/1 5/8 5/15 5/18 5/22 5/29 6/1   Total Weekly Dose 41mg 36mg 36mg 40mg 38mg 38mg 28mg 28mg 38mg 34mg   INR 3.0 1.9 1.8 2.3 2.3 3.6 2.4 2.3 3.0 2.5   Notes     colchicine hematoma    Zpak, 1 miss     Date 6/4 6/7 6/14 6/19 6/26 6/29 7/3 7/6 7/11 7/16   Total Weekly Dose 34mg 38mg 34mg 30mg 34mg 30mg 30mg 30mg 32mg 38mg   INR 3.0 3.4 4.3 2.5 3.6 2.3 2.9 1.2 1.4 2.0   Notes   Keflex Held x1 Zpak start 6/22, finish 6/25; doxy start 6/25; clinic doxy doxy doxy complete 7/5       Date 7/23 7/26 8/2 8/9 8/16 8/23 8/29 9/5 9/12 9/19 9/24   Total Weekly Dose 36mg 38mg 36mg 38mg 36mg 32mg 30mg?? 32mg 32 mg 32mg 30mg   INR 1.7 2.3 2.0 3.4 3.8 3.5 2.5 2.6 2.5 3.3 2.0   Notes Keflex boost   1x decr dose 1x decr dose    Keflex Keflex     Date 9/27 10/1 10/8 10/15  10/22 10/29 11/5 11/14 11/21 11/28 12/4   Total Weekly Dose 32mg 32mg 32mg 30mg  32mg 28mg 32mg 32mg 32mg 28mg 26mg   INR 2.3 2.4 3.2 2.5  3.9 2.7 3.0 2.8 2.7 3.7 1.9   Notes Levaquin start 9/26 stopped Levaquin        stop MVI, 1 miss 1 hold     Date 12/10 12/17 12/21 12/31 1/7/19 1/14 1/22 1/24 1/28 1/31 2/7   Total Weekly Dose 30mg 30mg 30mg 26mg 28mg 28mg 28mg 28mg 30mg 30mg 28mg   INR 2.4 3.0  3.6 2.5 2.6 2.6 2.9 1.7 2.7 2.5 2.7   Notes        doxy start 1/22, incr GLV 2x boost; doxy Finish doxy 1/29      Date 2/14 2/20 2/28 3/7 3/14         Total Weekly Dose 28mg 28mg 28 mg 30mg 31mg 30mg        INR 2.1 2.4 1.8 1.6 2.2         Notes   sick Zetia            Phone Interview:  Verbal Release Authorization signed on 6/26/18 -- may speak with Alexy Wallace (son), Genesis Becerril (daughter), Sawyer or Gema Wallace (son and daughter-in-law), Gerry Wallace (son)  Tablet Strength: 2mg tablets  Patient Contact Info: [phone] - home with son Yadiel; Mrs. Wallace's cell phone number - [phone]     Patient Findings:  Positives:  Change in medications   Negatives:  Signs/symptoms of thrombosis, Signs/symptoms of bleeding, Laboratory test error suspected, Change in health, Change in alcohol use, Change in activity, Upcoming invasive procedure, Emergency department visit, Upcoming dental procedure, Missed doses, Extra doses, Change in diet/appetite, Hospital admission, Bruising, Other complaints   Comments:  Patient has been dealing with muscle pain and she has stopped her pravastatin on 3/13 for 1 week. It will be determined whether she will start again or discontinue it for good next week after talking to Dr. Walters.       Plan:    1. INR is therapeutic today at 2.2. Instructed Mrs. Wallace to take a 6mg dose or warfarin tonight (3/14), and then take 4mg of warfarin daily. Mrs. Wallace filled pill box while we were on the phone to make sure she is taking dose as instructed.   2. Repeat INR in one week on 3/21.  3. Verbal information provided over the phone. Moni EL Wallace RBV dosing instructions, expresses understanding by teach back, and has no further questions at this time.    Gustabo Chan, PharmD Candidate 2019  3/14/2019  3:03 PM     I, Radha Michaud, PharmD, have reviewed the note in full and agree with the assessment and plan.  03/14/19  3:39 PM

## 2019-03-21 ENCOUNTER — ANTICOAGULATION VISIT (OUTPATIENT)
Dept: PHARMACY | Facility: HOSPITAL | Age: 79
End: 2019-03-21

## 2019-03-21 DIAGNOSIS — I48.0 PAROXYSMAL ATRIAL FIBRILLATION (HCC): ICD-10-CM

## 2019-03-21 LAB — INR PPP: 2.5 (ref 2–3)

## 2019-03-21 NOTE — PROGRESS NOTES
Anticoagulation Clinic - Remote Progress Note  [facility]  Testing Frequency: weekly    Indication: paroxysmal afib  Referring Provider: Heath  Goal INR: 2.0-3.0  Current Drug Interactions: amiodarone, levothyroxine; omeprazole, azaTHIOprine, ezetimibe   CHADS-VASc: 5 (age, gender, HTN, DM)    Diet: rare GLV; green beans   Alcohol: none  Tobacco: none   OTC Pain Medication: APAP PRN    1st clinic visit: 9/14/17  2nd clinic visit: 6/26/18    INR History:  Date 2/9/18 2/16 2/21 3/1 3/9 3/13 3/20 3/27 4/3 4/10   Total Weekly Dose 18mg/3 days 38mg 36mg 36mg 38mg 28mg/5 days 38mg 38mg 38mg 26mg   INR 1.0 1.5 1.8 2.1 1.9        Notes s/p surgery 2/5 and held doses   Keflex finish 2/28 Boost; Keflex init    hold- pre procedure post procedure; boost     Date 4/17 4/20 4/24 5/1 5/8 5/15 5/18 5/22 5/29 6/1   Total Weekly Dose 41mg 36mg 36mg 40mg 38mg 38mg 28mg 28mg 38mg 34mg   INR 3.0 1.9 1.8 2.3 2.3 3.6 2.4 2.3 3.0 2.5   Notes     colchicine hematoma    Zpak, 1 miss     Date 6/4 6/7 6/14 6/19 6/26 6/29 7/3 7/6 7/11 7/16   Total Weekly Dose 34mg 38mg 34mg 30mg 34mg 30mg 30mg 30mg 32mg 38mg   INR 3.0 3.4 4.3 2.5 3.6 2.3 2.9 1.2 1.4 2.0   Notes   Keflex Held x1 Zpak start 6/22, finish 6/25; doxy start 6/25; clinic doxy doxy doxy complete 7/5       Date 7/23 7/26 8/2 8/9 8/16 8/23 8/29 9/5 9/12 9/19 9/24   Total Weekly Dose 36mg 38mg 36mg 38mg 36mg 32mg 30mg?? 32mg 32 mg 32mg 30mg   INR 1.7 2.3 2.0 3.4 3.8 3.5 2.5 2.6 2.5 3.3 2.0   Notes Keflex boost   1x decr dose 1x decr dose    Keflex Keflex     Date 9/27 10/1 10/8 10/15  10/22 10/29 11/5 11/14 11/21 11/28 12/4   Total Weekly Dose 32mg 32mg 32mg 30mg  32mg 28mg 32mg 32mg 32mg 28mg 26mg   INR 2.3 2.4 3.2 2.5  3.9 2.7 3.0 2.8 2.7 3.7 1.9   Notes Levaquin start 9/26 stopped Levaquin        stop MVI, 1 miss 1 hold     Date 12/10 12/17 12/21 12/31 1/7/19 1/14 1/22 1/24 1/28 1/31 2/7   Total Weekly Dose 30mg 30mg 30mg 26mg 28mg 28mg 28mg 28mg 30mg 30mg 28mg   INR 2.4 3.0  3.6 2.5 2.6 2.6 2.9 1.7 2.7 2.5 2.7   Notes        doxy start 1/22, incr GLV 2x boost; doxy Finish doxy 1/29      Date 2/14 2/20 2/28 3/7 3/14 3/21        Total Weekly Dose 28mg 28mg 28 mg 30mg 31mg 30mg        INR 2.1 2.4 1.8 1.6 2.2 2.5        Notes   sick Zetia            Phone Interview:  Verbal Release Authorization signed on 6/26/18 -- may speak with Alexy Wallace (son), Genesis Becerril (daughter), Sawyer or Gema Wallace (son and daughter-in-law), Gerry Wallace (son)  Tablet Strength: 2mg tablets  Patient Contact Info: [phone] - home with son Yadiel; Mrs. Wallace's cell phone number - [phone]     Patient Findings:  Negatives:  Signs/symptoms of thrombosis, Signs/symptoms of bleeding, Laboratory test error suspected, Change in health, Change in alcohol use, Change in activity, Upcoming invasive procedure, Emergency department visit, Upcoming dental procedure, Missed doses, Extra doses, Change in medications, Change in diet/appetite, Hospital admission, Bruising, Other complaints   Comments:  Patient still isn't taking pravastatin due to muscle pain. She will speak with Dr. Walters about taking a different drug for cholesterol     Plan:    1. INR is therapeutic today at 2.5. Instructed Mrs. Wallace to take a new maintenance dose of 6mg dose of warfarin 4mg daily except 6mg on Thursday. .   2. Repeat INR in one week on 3/28.  3. Verbal information provided over the phone. Moni Wallace RBV dosing instructions, expresses understanding by teach back, and has no further questions at this time.    Leonidas Rosa, PharmD Candidate  3/21/2019  3:19 PM    I, Zander Munoz, [facility], have reviewed the note in full and agree with the assessment and plan.  03/22/19  9:22 AM

## 2019-03-27 RX ORDER — ALPRAZOLAM 0.5 MG/1
TABLET ORAL
Qty: 30 TABLET | Refills: 0 | Status: SHIPPED | OUTPATIENT
Start: 2019-03-27 | End: 2019-04-27 | Stop reason: SDUPTHER

## 2019-03-28 ENCOUNTER — ANTICOAGULATION VISIT (OUTPATIENT)
Dept: PHARMACY | Facility: HOSPITAL | Age: 79
End: 2019-03-28

## 2019-03-28 DIAGNOSIS — I48.0 PAROXYSMAL ATRIAL FIBRILLATION (HCC): ICD-10-CM

## 2019-03-28 NOTE — PROGRESS NOTES
Anticoagulation Clinic - Remote Progress Note  [employer] [employer]  Testing Frequency: weekly    Indication: paroxysmal afib  Referring Provider: Heath  Goal INR: 2.0-3.0  Current Drug Interactions: amiodarone, levothyroxine; omeprazole, azaTHIOprine, ezetimibe   CHADS-VASc: 5 (age, gender, HTN, DM)    Diet: rare GLV; green beans   Alcohol: none  Tobacco: none   OTC Pain Medication: APAP PRN    1st clinic visit: 9/14/17  2nd clinic visit: 6/26/18    INR History:  Date 2/9/18 2/16 2/21 3/1 3/9 3/13 3/20 3/27 4/3 4/10   Total Weekly Dose 18mg/3 days 38mg 36mg 36mg 38mg 28mg/5 days 38mg 38mg 38mg 26mg   INR 1.0 1.5 1.8 2.1 1.9        Notes s/p surgery 2/5 and held doses   Keflex finish 2/28 Boost; Keflex init    hold- pre procedure post procedure; boost     Date 4/17 4/20 4/24 5/1 5/8 5/15 5/18 5/22 5/29 6/1   Total Weekly Dose 41mg 36mg 36mg 40mg 38mg 38mg 28mg 28mg 38mg 34mg   INR 3.0 1.9 1.8 2.3 2.3 3.6 2.4 2.3 3.0 2.5   Notes     colchicine hematoma    Zpak, 1 miss     Date 6/4 6/7 6/14 6/19 6/26 6/29 7/3 7/6 7/11 7/16   Total Weekly Dose 34mg 38mg 34mg 30mg 34mg 30mg 30mg 30mg 32mg 38mg   INR 3.0 3.4 4.3 2.5 3.6 2.3 2.9 1.2 1.4 2.0   Notes   Keflex Held x1 Zpak start 6/22, finish 6/25; doxy start 6/25; clinic doxy doxy doxy complete 7/5       Date 7/23 7/26 8/2 8/9 8/16 8/23 8/29 9/5 9/12 9/19 9/24   Total Weekly Dose 36mg 38mg 36mg 38mg 36mg 32mg 30mg?? 32mg 32 mg 32mg 30mg   INR 1.7 2.3 2.0 3.4 3.8 3.5 2.5 2.6 2.5 3.3 2.0   Notes Keflex boost   1x decr dose 1x decr dose    Keflex Keflex     Date 9/27 10/1 10/8 10/15  10/22 10/29 11/5 11/14 11/21 11/28 12/4   Total Weekly Dose 32mg 32mg 32mg 30mg  32mg 28mg 32mg 32mg 32mg 28mg 26mg   INR 2.3 2.4 3.2 2.5  3.9 2.7 3.0 2.8 2.7 3.7 1.9   Notes Levaquin start 9/26 stopped Levaquin        stop MVI, 1 miss 1 hold     Date 12/10 12/17 12/21 12/31 1/7/19 1/14 1/22 1/24 1/28 1/31 2/7   Total Weekly Dose 30mg 30mg 30mg 26mg 28mg 28mg 28mg 28mg 30mg 30mg 28mg   INR 2.4 3.0  3.6 2.5 2.6 2.6 2.9 1.7 2.7 2.5 2.7   Notes        doxy start 1/22, incr GLV 2x boost; doxy Finish doxy 1/29      Date 2/14 2/20 2/28 3/7 3/14 3/21 3/28       Total Weekly Dose 28mg 28mg 28 mg 30mg 31mg 30mg 30mg       INR 2.1 2.4 1.8 1.6 2.2 2.5 1.9       Notes   sick Zetia            Phone Interview:  Verbal Release Authorization signed on 6/26/18 -- may speak with Alexy Wallace (son), Genesis Becerril (daughter), Sawyer or Gema Wallace (son and daughter-in-law), Gerry Wallace (son)  Tablet Strength: 2mg tablets  Patient Contact Info: [phone] - home with son Yadiel; Mrs. Wallace's cell phone number - [phone]     Patient Findings:  Positives:  Change in diet/appetite   Negatives:  Signs/symptoms of thrombosis, Signs/symptoms of bleeding, Laboratory test error suspected, Change in health, Change in alcohol use, Change in activity, Upcoming invasive procedure, Emergency department visit, Upcoming dental procedure, Missed doses, Extra doses, Change in medications, Hospital admission, Bruising, Other complaints   Comments:  Patient states she has had less of an appetite recently. This has not affected her consumption of GLV which she still only eats on a very rare occasion. Patient reports no other changes since last encounter.     Plan:    1. INR is subtherapeutic today at 1.9. Instructed Mrs. Wallace to increase her maintenance dose to warfarin 4mg daily except 5mg on MonWedFri (31mg/wk).   2. Repeat INR in one week on 4/4.  3. Verbal information provided over the phone. Moni aWllace RBV dosing instructions and filled her planner with correct doses while on the phone, expresses understanding by teach back, and has no further questions at this time.    Leonidas Rosa, PharmD Candidate  3/28/2019  3:11 PM    ISawyer, [facility], have reviewed the note in full and agree with the assessment and plan.  03/29/19  4:39 PM

## 2019-03-29 LAB — INR PPP: 1.9

## 2019-04-04 ENCOUNTER — PREP FOR SURGERY (OUTPATIENT)
Dept: OTHER | Facility: HOSPITAL | Age: 79
End: 2019-04-04

## 2019-04-04 ENCOUNTER — ANTICOAGULATION VISIT (OUTPATIENT)
Dept: PHARMACY | Facility: HOSPITAL | Age: 79
End: 2019-04-04

## 2019-04-04 ENCOUNTER — TELEPHONE (OUTPATIENT)
Dept: GASTROENTEROLOGY | Facility: CLINIC | Age: 79
End: 2019-04-04

## 2019-04-04 ENCOUNTER — TELEPHONE (OUTPATIENT)
Dept: INTERNAL MEDICINE | Facility: CLINIC | Age: 79
End: 2019-04-04

## 2019-04-04 DIAGNOSIS — I48.0 PAROXYSMAL ATRIAL FIBRILLATION (HCC): ICD-10-CM

## 2019-04-04 DIAGNOSIS — Z12.11 SCREEN FOR COLON CANCER: Primary | ICD-10-CM

## 2019-04-04 LAB — INR PPP: 1.9

## 2019-04-04 NOTE — PROGRESS NOTES
Anticoagulation Clinic - Remote Progress Note  [employer]  Testing Frequency: weekly    Indication: paroxysmal afib  Referring Provider: Heath  Goal INR: 2.0-3.0  Current Drug Interactions: amiodarone, levothyroxine; omeprazole, azaTHIOprine, ezetimibe   CHADS-VASc: 5 (age, gender, HTN, DM)    Diet: rare GLV; green beans   Alcohol: none  Tobacco: none   OTC Pain Medication: APAP PRN    1st clinic visit: 9/14/17  2nd clinic visit: 6/26/18    INR History:  Date 2/9/18 2/16 2/21 3/1 3/9 3/13 3/20 3/27 4/3 4/10   Total Weekly Dose 18mg/3 days 38mg 36mg 36mg 38mg 28mg/5 days 38mg 38mg 38mg 26mg   INR 1.0 1.5 1.8 2.1 1.9        Notes s/p surgery 2/5 and held doses   Keflex finish 2/28 Boost; Keflex init    hold- pre procedure post procedure; boost     Date 4/17 4/20 4/24 5/1 5/8 5/15 5/18 5/22 5/29 6/1   Total Weekly Dose 41mg 36mg 36mg 40mg 38mg 38mg 28mg 28mg 38mg 34mg   INR 3.0 1.9 1.8 2.3 2.3 3.6 2.4 2.3 3.0 2.5   Notes     colchicine hematoma    Zpak, 1 miss     Date 6/4 6/7 6/14 6/19 6/26 6/29 7/3 7/6 7/11 7/16   Total Weekly Dose 34mg 38mg 34mg 30mg 34mg 30mg 30mg 30mg 32mg 38mg   INR 3.0 3.4 4.3 2.5 3.6 2.3 2.9 1.2 1.4 2.0   Notes   Keflex Held x1 Zpak start 6/22, finish 6/25; doxy start 6/25; clinic doxy doxy doxy complete 7/5       Date 7/23 7/26 8/2 8/9 8/16 8/23 8/29 9/5 9/12 9/19 9/24   Total Weekly Dose 36mg 38mg 36mg 38mg 36mg 32mg 30mg?? 32mg 32 mg 32mg 30mg   INR 1.7 2.3 2.0 3.4 3.8 3.5 2.5 2.6 2.5 3.3 2.0   Notes Keflex boost   1x decr dose 1x decr dose    Keflex Keflex     Date 9/27 10/1 10/8 10/15  10/22 10/29 11/5 11/14 11/21 11/28 12/4   Total Weekly Dose 32mg 32mg 32mg 30mg  32mg 28mg 32mg 32mg 32mg 28mg 26mg   INR 2.3 2.4 3.2 2.5  3.9 2.7 3.0 2.8 2.7 3.7 1.9   Notes Levaquin start 9/26 stopped Levaquin        stop MVI, 1 miss 1 hold     Date 12/10 12/17 12/21 12/31 1/7/19 1/14 1/22 1/24 1/28 1/31 2/7   Total Weekly Dose 30mg 30mg 30mg 26mg 28mg 28mg 28mg 28mg 30mg 30mg 28mg   INR 2.4 3.0  3.6 2.5 2.6 2.6 2.9 1.7 2.7 2.5 2.7   Notes        doxy start 1/22, incr GLV 2x boost; doxy Finish doxy 1/29      Date 2/14 2/20 2/28 3/7 3/14 3/21 3/28 4/4      Total Weekly Dose 28mg 28mg 28 mg 30mg 31mg 30mg 30mg 31mg 32mg     INR 2.1 2.4 1.8 1.6 2.2 2.5 1.9 1.9      Notes   sick Zetia            Phone Interview:  Verbal Release Authorization signed on 6/26/18 -- may speak with Alexy Wallace (son), Genesis Becerril (daughter), Sawyer or Gema Wallace (son and daughter-in-law), Gerry Wallace (son)  Tablet Strength: 2mg tablets  Patient Contact Info: [phone] - home with son Yadiel; Mrs. Wallace's cell phone number - [phone]     Patient Findings:  Positives:  Upcoming invasive procedure, Change in medications   Negatives:  Signs/symptoms of thrombosis, Signs/symptoms of bleeding, Laboratory test error suspected, Change in health, Change in alcohol use, Change in activity, Emergency department visit, Upcoming dental procedure, Missed doses, Extra doses, Change in diet/appetite, Hospital admission, Bruising, Other complaints   Comments:  Ms. Wallace is scheduled to have a colonoscopy on May 3rd. Discussed that the clinic will follow-up with plans to hold. She has stopped taking her pravastatin at this time to see if her pain improves. She will let us know if any other changes are made.      Plan:    1. INR is subtherapeutic again today at 1.9, unchanged from last week after dose increase. Instructed Mrs. Wallace to increase her dose to 5mg tonight then continue  warfarin 4mg daily except 5mg on MonWedFri.   2. Repeat INR in one week on 4/11.  3. Verbal information provided over the phone. Moni Wallace RBV dosing instructions and filled her planner with correct doses while on the phone, expresses understanding by teach back, and has no further questions at this time.    Of note, patient will be having a colonoscopy on May 3rd.     Marisol Mcmullen, PharmD  Pharmacy Resident  4/4/2019  3:13 PM

## 2019-04-04 NOTE — TELEPHONE ENCOUNTER
PATIENT IS HAVING A COLONOSCOPY ON MAY 3RD. PATIENT IS CURRENTLY ON WARFARIN. PATIENT WILL NEED TO STOP THE MEDICATION BEFORE HAVING THE COLONOSCOPY. PLEASE ADVISE ON HOW MANY DAYS SHE CAN STOP HER WARFARIN PRIOR TO PROCEDURE.     THANKS

## 2019-04-05 ENCOUNTER — TELEPHONE (OUTPATIENT)
Dept: INTERNAL MEDICINE | Facility: CLINIC | Age: 79
End: 2019-04-05

## 2019-04-05 NOTE — TELEPHONE ENCOUNTER
CALLED PAT TO LET HER KNOW SHE CAN HOLD COUMADIN FOR 5 DAYS. HAD TO [facility] FOR PATIENT TO LET HER KNOW TO HOLD IT AND ASKED HER TO CALL ME BACK SO I KNOW SHE GOT THIS MESSAGE. WILL CALL HER AGAIN IF I DO NOT HEAR FROM PATIENT.

## 2019-04-05 NOTE — TELEPHONE ENCOUNTER
Pt will start back on pravachol at 1/2 a pill        ----- Message from Alex Walters MD sent at 4/4/2019  2:10 PM EDT -----  tylenol  ----- Message -----  From: Malka Boland MA  Sent: 4/4/2019   1:44 PM  To: Alex Walters MD    What about the pain  ----- Message -----  From: Alex Walters MD  Sent: 4/4/2019   1:26 PM  To: Malka Boland MA    Restart statin  ----- Message -----  From: Malka Boland MA  Sent: 4/4/2019   1:18 PM  To: Alex Walters MD    She stopped her chol med and her pain went away on one side but not the other. Should she restart? She states the pain on the left side (knee area) hurts so much she has trouble getting up. She has had knee surgery on R knee but not left. She is in a lot of pain. Should she restart chol med and what should she do about pain

## 2019-04-09 NOTE — TELEPHONE ENCOUNTER
CALLED PATIENT AND TOLD PATIENT THAT PER HER CARDIOLOGIST, SHE CAN HOLD HER COUMADIN 5 DAYS PRIOR TO PROCEDURE.   PATIENT VERBALIZED UNDERSTANDING.

## 2019-04-11 ENCOUNTER — ANTICOAGULATION VISIT (OUTPATIENT)
Dept: PHARMACY | Facility: HOSPITAL | Age: 79
End: 2019-04-11

## 2019-04-11 DIAGNOSIS — I48.0 PAROXYSMAL ATRIAL FIBRILLATION (HCC): ICD-10-CM

## 2019-04-11 LAB — INR PPP: 2.4

## 2019-04-11 NOTE — PROGRESS NOTES
Anticoagulation Clinic - Remote Progress Note  [employer]  Testing Frequency: weekly    Indication: paroxysmal afib  Referring Provider: Heath  Goal INR: 2.0-3.0  Current Drug Interactions: amiodarone, levothyroxine; omeprazole, azaTHIOprine, ezetimibe   CHADS-VASc: 5 (age, gender, HTN, DM)    Diet: rare GLV; green beans   Alcohol: none  Tobacco: none   OTC Pain Medication: APAP PRN    1st clinic visit: 9/14/17  2nd clinic visit: 6/26/18    INR History:  Date 2/9/18 2/16 2/21 3/1 3/9 3/13 3/20 3/27 4/3 4/10   Total Weekly Dose 18mg/3 days 38mg 36mg 36mg 38mg 28mg/5 days 38mg 38mg 38mg 26mg   INR 1.0 1.5 1.8 2.1 1.9        Notes s/p surgery 2/5 and held doses   Keflex finish 2/28 Boost; Keflex init    hold- pre procedure post procedure; boost     Date 4/17 4/20 4/24 5/1 5/8 5/15 5/18 5/22 5/29 6/1   Total Weekly Dose 41mg 36mg 36mg 40mg 38mg 38mg 28mg 28mg 38mg 34mg   INR 3.0 1.9 1.8 2.3 2.3 3.6 2.4 2.3 3.0 2.5   Notes     colchicine hematoma    Zpak, 1 miss     Date 6/4 6/7 6/14 6/19 6/26 6/29 7/3 7/6 7/11 7/16   Total Weekly Dose 34mg 38mg 34mg 30mg 34mg 30mg 30mg 30mg 32mg 38mg   INR 3.0 3.4 4.3 2.5 3.6 2.3 2.9 1.2 1.4 2.0   Notes   Keflex Held x1 Zpak start 6/22, finish 6/25; doxy start 6/25; clinic doxy doxy doxy complete 7/5       Date 7/23 7/26 8/2 8/9 8/16 8/23 8/29 9/5 9/12 9/19 9/24   Total Weekly Dose 36mg 38mg 36mg 38mg 36mg 32mg 30mg?? 32mg 32 mg 32mg 30mg   INR 1.7 2.3 2.0 3.4 3.8 3.5 2.5 2.6 2.5 3.3 2.0   Notes Keflex boost   1x decr dose 1x decr dose    Keflex Keflex     Date 9/27 10/1 10/8 10/15  10/22 10/29 11/5 11/14 11/21 11/28 12/4   Total Weekly Dose 32mg 32mg 32mg 30mg  32mg 28mg 32mg 32mg 32mg 28mg 26mg   INR 2.3 2.4 3.2 2.5  3.9 2.7 3.0 2.8 2.7 3.7 1.9   Notes Levaquin start 9/26 stopped Levaquin        stop MVI, 1 miss 1 hold     Date 12/10 12/17 12/21 12/31 1/7/19 1/14 1/22 1/24 1/28 1/31 2/7   Total Weekly Dose 30mg 30mg 30mg 26mg 28mg 28mg 28mg 28mg 30mg 30mg 28mg   INR 2.4 3.0  3.6 2.5 2.6 2.6 2.9 1.7 2.7 2.5 2.7   Notes        doxy start 1/22, incr GLV 2x boost; doxy Finish doxy 1/29      Date 2/14 2/20 2/28 3/7 3/14 3/21 3/28 4/4 4/11     Total Weekly Dose 28mg 28mg 28 mg 30mg 31mg 30mg 30mg 31mg 32mg     INR 2.1 2.4 1.8 1.6 2.2 2.5 1.9 1.9 2.4     Notes   sick Zetia            Phone Interview:  Verbal Release Authorization signed on 6/26/18 -- may speak with Alexy Wallace (son), Genesis Becerril (daughter), Sawyer or Gema Wallace (son and daughter-in-law), Gerry Rodrigo (son)  Tablet Strength: 2mg tablets  Patient Contact Info: [phone] - home with carlito Cotto; Mrs. Wallace's cell phone number - [phone]     Patient Findings:  Negatives:  Signs/symptoms of thrombosis, Signs/symptoms of bleeding, Laboratory test error suspected, Change in health, Change in alcohol use, Change in activity, Upcoming invasive procedure, Emergency department visit, Upcoming dental procedure, Missed doses, Extra doses, Change in medications, Change in diet/appetite, Hospital admission, Bruising, Other complaints     Plan:    1. INR is therapeutic today at 2.4. Instructed Mrs. Wallace to continue warfarin 4mg daily except 5mg on MonWedFri.   2. Repeat INR in one week on 4/18.  3. Verbal information provided over the phone. Moni Wallace RBV dosing instructions and filled her planner with correct doses while on the phone, expresses understanding by teach back, and has no further questions at this time.    Of note, patient will be having a colonoscopy on May 3rd.     Leonidas Rosa, PharmD Candidate  4/11/2019  3:28 PM      On 4/4/19, Dr. Joe has okay for 5 day hold prior to colonoscopy.  ISawyer, [facility], have reviewed the note in full and agree with the assessment and plan.  04/11/19  3:58 PM

## 2019-04-12 ENCOUNTER — TELEPHONE (OUTPATIENT)
Dept: GASTROENTEROLOGY | Facility: CLINIC | Age: 79
End: 2019-04-12

## 2019-04-12 DIAGNOSIS — Z12.11 SCREENING FOR COLON CANCER: Primary | ICD-10-CM

## 2019-04-12 RX ORDER — AMIODARONE HYDROCHLORIDE 200 MG/1
TABLET ORAL
Qty: 45 TABLET | Refills: 1 | Status: SHIPPED | OUTPATIENT
Start: 2019-04-12 | End: 2019-11-05

## 2019-04-12 NOTE — TELEPHONE ENCOUNTER
PT CALLED REGARDING PREP BEING TO EXPENSIVE; PT ALSO STATES SHE IS A KIDNEY PATIENT IS UNABLE TO DRINK THE GAVILYTE.  ADVISED PT A PREP WOULD BE READY FOR HER TO  AT HER CONVENIENCE.  PT VOICED UNDERSTANDING.

## 2019-04-15 RX ORDER — WARFARIN SODIUM 2 MG/1
TABLET ORAL
Qty: 192 TABLET | Refills: 0 | Status: SHIPPED | OUTPATIENT
Start: 2019-04-15 | End: 2019-05-30 | Stop reason: HOSPADM

## 2019-04-18 ENCOUNTER — ANTICOAGULATION VISIT (OUTPATIENT)
Dept: PHARMACY | Facility: HOSPITAL | Age: 79
End: 2019-04-18

## 2019-04-18 DIAGNOSIS — I48.0 PAROXYSMAL ATRIAL FIBRILLATION (HCC): ICD-10-CM

## 2019-04-18 LAB — INR PPP: 2.8

## 2019-04-18 NOTE — PROGRESS NOTES
Anticoagulation Clinic - Remote Progress Note  [employer]  Testing Frequency: weekly    Indication: paroxysmal afib  Referring Provider: Heath  Goal INR: 2.0-3.0  Current Drug Interactions: amiodarone, levothyroxine; omeprazole, azaTHIOprine, ezetimibe   CHADS-VASc: 5 (age, gender, HTN, DM)    Diet: rare GLV; green beans   Alcohol: none  Tobacco: none   OTC Pain Medication: APAP PRN    1st clinic visit: 9/14/17  2nd clinic visit: 6/26/18    INR History:  Date 2/9/18 2/16 2/21 3/1 3/9 3/13 3/20 3/27 4/3 4/10   Total Weekly Dose 18mg/3 days 38mg 36mg 36mg 38mg 28mg/5 days 38mg 38mg 38mg 26mg   INR 1.0 1.5 1.8 2.1 1.9        Notes s/p surgery 2/5 and held doses   Keflex finish 2/28 Boost; Keflex init    hold- pre procedure post procedure; boost     Date 4/17 4/20 4/24 5/1 5/8 5/15 5/18 5/22 5/29 6/1   Total Weekly Dose 41mg 36mg 36mg 40mg 38mg 38mg 28mg 28mg 38mg 34mg   INR 3.0 1.9 1.8 2.3 2.3 3.6 2.4 2.3 3.0 2.5   Notes     colchicine hematoma    Zpak, 1 miss     Date 6/4 6/7 6/14 6/19 6/26 6/29 7/3 7/6 7/11 7/16   Total Weekly Dose 34mg 38mg 34mg 30mg 34mg 30mg 30mg 30mg 32mg 38mg   INR 3.0 3.4 4.3 2.5 3.6 2.3 2.9 1.2 1.4 2.0   Notes   Keflex Held x1 Zpak start 6/22, finish 6/25; doxy start 6/25; clinic doxy doxy doxy complete 7/5       Date 7/23 7/26 8/2 8/9 8/16 8/23 8/29 9/5 9/12 9/19 9/24   Total Weekly Dose 36mg 38mg 36mg 38mg 36mg 32mg 30mg?? 32mg 32 mg 32mg 30mg   INR 1.7 2.3 2.0 3.4 3.8 3.5 2.5 2.6 2.5 3.3 2.0   Notes Keflex boost   1x decr dose 1x decr dose    Keflex Keflex     Date 9/27 10/1 10/8 10/15  10/22 10/29 11/5 11/14 11/21 11/28 12/4   Total Weekly Dose 32mg 32mg 32mg 30mg  32mg 28mg 32mg 32mg 32mg 28mg 26mg   INR 2.3 2.4 3.2 2.5  3.9 2.7 3.0 2.8 2.7 3.7 1.9   Notes Levaquin start 9/26 stopped Levaquin        stop MVI, 1 miss 1 hold     Date 12/10 12/17 12/21 12/31 1/7/19 1/14 1/22 1/24 1/28 1/31 2/7   Total Weekly Dose 30mg 30mg 30mg 26mg 28mg 28mg 28mg 28mg 30mg 30mg 28mg   INR 2.4 3.0  3.6 2.5 2.6 2.6 2.9 1.7 2.7 2.5 2.7   Notes        doxy start 1/22, incr GLV 2x boost; doxy Finish doxy 1/29      Date 2/14 2/20 2/28 3/7 3/14 3/21 3/28 4/4 4/11 4/18    Total Weekly Dose 28mg 28mg 28 mg 30mg 31mg 30mg 30mg 31mg 32mg 31mg    INR 2.1 2.4 1.8 1.6 2.2 2.5 1.9 1.9 2.4 2.8    Notes   sick Zetia            Phone Interview:  Verbal Release Authorization signed on 6/26/18 -- may speak with Alexy Wallace (son), Genesis Becerril (daughter), Sawyer or Gema Wallace (son and daughter-in-law), Gerry Rodrigo (son)  Tablet Strength: 2mg tablets  Patient Contact Info: [phone] - home with son Yadiel; Mrs. Wallace's cell phone number - [phone]     Patient Findings:  Positives:  Change in health, Change in medications   Negatives:  Signs/symptoms of thrombosis, Signs/symptoms of bleeding, Laboratory test error suspected, Change in alcohol use, Change in activity, Upcoming invasive procedure, Emergency department visit, Upcoming dental procedure, Missed doses, Extra doses, Change in diet/appetite, Hospital admission, Bruising, Other complaints   Comments:  Patient started amoxicillin on Saturday for a UTI which she finished Wednesday. Noted DDI with warfarin. Patient has finished antibiotic and has stayed in range. She also states she started injections in her knee starting last Tuesday and she will get an injection every week for 5 weeks. She did not believe it was a steroid injection and recalled it being a gel.     Plan:    1. INR is therapeutic today at 2.8. Patient has stayed in therapuetic range after starting and finishing amoxicillin, so instructed Mrs. Wallace to continue warfarin 4mg daily except 5mg on MonWedFri.   2. Repeat INR during preadmission testing 4/24.  3. Verbal information provided over the phone. Moni Wallace RBV dosing instructions and filled her planner with correct doses while on the phone, expresses understanding by teach back, and has no further questions at this time.    Of note, patient will  be having a colonoscopy on May 3rd and will begin holding her warfarin 4/28. Previously cleared plan by cardiology. Will discuss at next appointment.    Leonidas Rosa, PharmD Candidate  4/18/2019  3:25 PM    I, Fatemeh Rodríguez, PharmD, have reviewed the note in full and agree with the assessment and plan.  04/18/19  4:11 PM

## 2019-04-24 ENCOUNTER — APPOINTMENT (OUTPATIENT)
Dept: PREADMISSION TESTING | Facility: HOSPITAL | Age: 79
End: 2019-04-24

## 2019-04-25 ENCOUNTER — ANTICOAGULATION VISIT (OUTPATIENT)
Dept: PHARMACY | Facility: HOSPITAL | Age: 79
End: 2019-04-25

## 2019-04-25 DIAGNOSIS — I48.0 PAROXYSMAL ATRIAL FIBRILLATION (HCC): ICD-10-CM

## 2019-04-25 LAB — INR PPP: 2.4

## 2019-04-25 NOTE — PROGRESS NOTES
Anticoagulation Clinic - Remote Progress Note  [employer]  Testing Frequency: weekly    Indication: paroxysmal afib  Referring Provider: Heath  Goal INR: 2.0-3.0  Current Drug Interactions: amiodarone, levothyroxine; omeprazole, azaTHIOprine, ezetimibe   CHADS-VASc: 5 (age, gender, HTN, DM)    Diet: rare GLV; green beans   Alcohol: none  Tobacco: none   OTC Pain Medication: APAP PRN    1st clinic visit: 9/14/17  2nd clinic visit: 6/26/18    INR History:  Date 2/9/18 2/16 2/21 3/1 3/9 3/13 3/20 3/27 4/3 4/10   Total Weekly Dose 18mg/3 days 38mg 36mg 36mg 38mg 28mg/5 days 38mg 38mg 38mg 26mg   INR 1.0 1.5 1.8 2.1 1.9        Notes s/p surgery 2/5 and held doses   Keflex finish 2/28 Boost; Keflex init    hold- pre procedure post procedure; boost     Date 4/17 4/20 4/24 5/1 5/8 5/15 5/18 5/22 5/29 6/1   Total Weekly Dose 41mg 36mg 36mg 40mg 38mg 38mg 28mg 28mg 38mg 34mg   INR 3.0 1.9 1.8 2.3 2.3 3.6 2.4 2.3 3.0 2.5   Notes     colchicine hematoma    Zpak, 1 miss     Date 6/4 6/7 6/14 6/19 6/26 6/29 7/3 7/6 7/11 7/16   Total Weekly Dose 34mg 38mg 34mg 30mg 34mg 30mg 30mg 30mg 32mg 38mg   INR 3.0 3.4 4.3 2.5 3.6 2.3 2.9 1.2 1.4 2.0   Notes   Keflex Held x1 Zpak start 6/22, finish 6/25; doxy start 6/25; clinic doxy doxy doxy complete 7/5       Date 7/23 7/26 8/2 8/9 8/16 8/23 8/29 9/5 9/12 9/19 9/24   Total Weekly Dose 36mg 38mg 36mg 38mg 36mg 32mg 30mg?? 32mg 32 mg 32mg 30mg   INR 1.7 2.3 2.0 3.4 3.8 3.5 2.5 2.6 2.5 3.3 2.0   Notes Keflex boost   1x decr dose 1x decr dose    Keflex Keflex     Date 9/27 10/1 10/8 10/15  10/22 10/29 11/5 11/14 11/21 11/28 12/4   Total Weekly Dose 32mg 32mg 32mg 30mg  32mg 28mg 32mg 32mg 32mg 28mg 26mg   INR 2.3 2.4 3.2 2.5  3.9 2.7 3.0 2.8 2.7 3.7 1.9   Notes Levaquin start 9/26 stopped Levaquin        stop MVI, 1 miss 1 hold     Date 12/10 12/17 12/21 12/31 1/7/19 1/14 1/22 1/24 1/28 1/31 2/7   Total Weekly Dose 30mg 30mg 30mg 26mg 28mg 28mg 28mg 28mg 30mg 30mg 28mg   INR 2.4 3.0  3.6 2.5 2.6 2.6 2.9 1.7 2.7 2.5 2.7   Notes        doxy start 1/22, incr GLV 2x boost; doxy Finish doxy 1/29      Date 2/14 2/20 2/28 3/7 3/14 3/21 3/28 4/4 4/11 4/18 4/25   Total Weekly Dose 28mg 28mg 28 mg 30mg 31mg 30mg 30mg 31mg 32mg 31mg 31mg   INR 2.1 2.4 1.8 1.6 2.2 2.5 1.9 1.9 2.4 2.8 2.4   Notes   sick Zetia            Phone Interview:  Verbal Release Authorization signed on 6/26/18 -- may speak with Alexy Wallace (son), Genesis Becerril (daughter), Sawyer Wallace (son and daughter-in-law), Gerry Rodrigo (son)  Tablet Strength: 2mg tablets  Patient Contact Info: [phone] - home with son Yadiel; Mrs. Wallace's cell phone number - [phone]     Patient Findings:  Negatives:  Signs/symptoms of thrombosis, Signs/symptoms of bleeding, Laboratory test error suspected, Change in health, Change in alcohol use, Change in activity, Upcoming invasive procedure, Emergency department visit, Upcoming dental procedure, Missed doses, Extra doses, Change in medications, Change in diet/appetite, Hospital admission, Bruising, Other complaints   Comments:  Patient is using walker and getting injections in her knees so she doesn't want to prep for a colonoscopy right now. Her colonoscopy has been cancelled on May 3rd and she may consider rescheduling it later in July.     Plan:    1. INR is therapeutic today at 2.4. Instructed Mrs. Wallace to continue warfarin 4mg daily except 5mg on MonWedFri.   2. Repeat INR in one week.  3. Verbal information provided over the phone. Moni EL Rodrigo RBV dosing instructions and filled her planner with correct doses while on the phone, expresses understanding by teach back, and has no further questions at this time.    Leonidas Rosa, PharmD Candidate  4/25/2019  1:24 PM     Sawyer HENRY, [facility], have reviewed the note in full and agree with the assessment and plan.  04/25/19  2:16 PM

## 2019-04-29 RX ORDER — ALPRAZOLAM 0.5 MG/1
TABLET ORAL
Qty: 30 TABLET | Refills: 0 | Status: ON HOLD | OUTPATIENT
Start: 2019-04-29 | End: 2019-05-30 | Stop reason: SDUPTHER

## 2019-04-30 ENCOUNTER — TELEPHONE (OUTPATIENT)
Dept: INTERNAL MEDICINE | Facility: CLINIC | Age: 79
End: 2019-04-30

## 2019-04-30 NOTE — TELEPHONE ENCOUNTER
PATIENT CALLED WANTING TO SPEAK WITH YOU; SHE SAID SHE IS FEELING REALLY WEAK AND WANTED TO KNOW WHAT TO DO?

## 2019-05-02 ENCOUNTER — ANTICOAGULATION VISIT (OUTPATIENT)
Dept: PHARMACY | Facility: HOSPITAL | Age: 79
End: 2019-05-02

## 2019-05-02 DIAGNOSIS — I48.0 PAROXYSMAL ATRIAL FIBRILLATION (HCC): ICD-10-CM

## 2019-05-02 LAB — INR PPP: 2

## 2019-05-02 NOTE — PROGRESS NOTES
Anticoagulation Clinic - Remote Progress Note  [facility]  Testing Frequency: weekly    Indication: paroxysmal afib  Referring Provider: Heath  Goal INR: 2.0-3.0  Current Drug Interactions: amiodarone, levothyroxine; omeprazole, azaTHIOprine, ezetimibe   CHADS-VASc: 5 (age, gender, HTN, DM)    Diet: rare GLV; green beans   Alcohol: none  Tobacco: none   OTC Pain Medication: APAP PRN    1st clinic visit: 9/14/17  2nd clinic visit: 6/26/18    INR History:  Date 2/9/18 2/16 2/21 3/1 3/9 3/13 3/20 3/27 4/3 4/10   Total Weekly Dose 18mg/3 days 38mg 36mg 36mg 38mg 28mg/5 days 38mg 38mg 38mg 26mg   INR 1.0 1.5 1.8 2.1 1.9        Notes s/p surgery 2/5 and held doses   Keflex finish 2/28 Boost; Keflex init    hold- pre procedure post procedure; boost     Date 4/17 4/20 4/24 5/1 5/8 5/15 5/18 5/22 5/29 6/1   Total Weekly Dose 41mg 36mg 36mg 40mg 38mg 38mg 28mg 28mg 38mg 34mg   INR 3.0 1.9 1.8 2.3 2.3 3.6 2.4 2.3 3.0 2.5   Notes     colchicine hematoma    Zpak, 1 miss     Date 6/4 6/7 6/14 6/19 6/26 6/29 7/3 7/6 7/11 7/16   Total Weekly Dose 34mg 38mg 34mg 30mg 34mg 30mg 30mg 30mg 32mg 38mg   INR 3.0 3.4 4.3 2.5 3.6 2.3 2.9 1.2 1.4 2.0   Notes   Keflex Held x1 Zpak start 6/22, finish 6/25; doxy start 6/25; clinic doxy doxy doxy complete 7/5       Date 7/23 7/26 8/2 8/9 8/16 8/23 8/29 9/5 9/12 9/19 9/24   Total Weekly Dose 36mg 38mg 36mg 38mg 36mg 32mg 30mg?? 32mg 32 mg 32mg 30mg   INR 1.7 2.3 2.0 3.4 3.8 3.5 2.5 2.6 2.5 3.3 2.0   Notes Keflex boost   1x decr dose 1x decr dose    Keflex Keflex     Date 9/27 10/1 10/8 10/15  10/22 10/29 11/5 11/14 11/21 11/28 12/4   Total Weekly Dose 32mg 32mg 32mg 30mg  32mg 28mg 32mg 32mg 32mg 28mg 26mg   INR 2.3 2.4 3.2 2.5  3.9 2.7 3.0 2.8 2.7 3.7 1.9   Notes Levaquin start 9/26 stopped Levaquin        stop MVI, 1 miss 1 hold     Date 12/10 12/17 12/21 12/31 1/7/19 1/14 1/22 1/24 1/28 1/31 2/7   Total Weekly Dose 30mg 30mg 30mg 26mg 28mg 28mg 28mg 28mg 30mg 30mg 28mg   INR 2.4 3.0  3.6 2.5 2.6 2.6 2.9 1.7 2.7 2.5 2.7   Notes        doxy start 1/22, incr GLV 2x boost; doxy Finish doxy 1/29      Date 2/14 2/20 2/28 3/7 3/14 3/21 3/28 4/4 4/11 4/18 4/25   Total Weekly Dose 28mg 28mg 28 mg 30mg 31mg 30mg 30mg 31mg 32mg 31mg 31mg   INR 2.1 2.4 1.8 1.6 2.2 2.5 1.9 1.9 2.4 2.8 2.4   Notes   sick Zetia            Date  5/2             Total Weekly Dose              INR  2             Notes   sick Zetia            Phone Interview:  Verbal Release Authorization signed on 6/26/18 -- may speak with Alexy Wallace (son), Genesis Becerril (daughter), Sawyer Wallace (son and daughter-in-law), Gerry Rodrigo (son)  Tablet Strength: 2mg tablets  Patient Contact Info: [phone] - home with son Yadiel; Mrs. Wallace's cell phone number - [phone]     Patient Findings   Positives:  Change in health, Change in medications   Negatives:  Signs/symptoms of thrombosis, Signs/symptoms of bleeding, Laboratory test error suspected, Change in alcohol use, Change in activity, Upcoming invasive procedure, Emergency department visit, Upcoming dental procedure, Missed doses, Extra doses, Change in diet/appetite, Hospital admission, Bruising, Other complaints   Comments:  She went to [facility] on Tuesday night and was diagnosed with a UTI.  She ampicillin 500 mg oral BID # 14.   DDI: May result in an increased risk of bleeding  She said that she started feeling better today     Plan:    1. INR is therapeutic today at 2. Instructed Mrs. Wallace to continue warfarin 4mg oral daily except 5mg on MonWedFri.   2. Repeat INR on Monday 5/6 due to antibiotic therapy to ensure INR remains wnl.  3. Verbal information provided over the phone. Moni Wallace RBV dosing instructions and filled her planner with correct doses while on the phone, expresses understanding by teach back, and has no further questions at this time.    Radha Michaud, PharmD  5/2/2019  3:39 PM

## 2019-05-06 ENCOUNTER — ANTICOAGULATION VISIT (OUTPATIENT)
Dept: PHARMACY | Facility: HOSPITAL | Age: 79
End: 2019-05-06

## 2019-05-06 DIAGNOSIS — I48.0 PAROXYSMAL ATRIAL FIBRILLATION (HCC): ICD-10-CM

## 2019-05-06 LAB — INR PPP: 2.4

## 2019-05-06 NOTE — PROGRESS NOTES
Anticoagulation Clinic - Remote Progress Note  [facility]  Testing Frequency: weekly    Indication: paroxysmal afib  Referring Provider: Heath  Goal INR: 2.0-3.0  Current Drug Interactions: amiodarone, levothyroxine; omeprazole, azaTHIOprine, ezetimibe   CHADS-VASc: 5 (age, gender, HTN, DM)    Diet: rare GLV; green beans   Alcohol: none  Tobacco: none   OTC Pain Medication: APAP PRN    1st clinic visit: 9/14/17  2nd clinic visit: 6/26/18    INR History:  Date 2/9/18 2/16 2/21 3/1 3/9 3/13 3/20 3/27 4/3 4/10   Total Weekly Dose 18mg/3 days 38mg 36mg 36mg 38mg 28mg/5 days 38mg 38mg 38mg 26mg   INR 1.0 1.5 1.8 2.1 1.9        Notes s/p surgery 2/5 and held doses   Keflex finish 2/28 Boost; Keflex init    hold- pre procedure post procedure; boost     Date 4/17 4/20 4/24 5/1 5/8 5/15 5/18 5/22 5/29 6/1   Total Weekly Dose 41mg 36mg 36mg 40mg 38mg 38mg 28mg 28mg 38mg 34mg   INR 3.0 1.9 1.8 2.3 2.3 3.6 2.4 2.3 3.0 2.5   Notes     colchicine hematoma    Zpak, 1 miss     Date 6/4 6/7 6/14 6/19 6/26 6/29 7/3 7/6 7/11 7/16   Total Weekly Dose 34mg 38mg 34mg 30mg 34mg 30mg 30mg 30mg 32mg 38mg   INR 3.0 3.4 4.3 2.5 3.6 2.3 2.9 1.2 1.4 2.0   Notes   Keflex Held x1 Zpak start 6/22, finish 6/25; doxy start 6/25; clinic doxy doxy doxy complete 7/5       Date 7/23 7/26 8/2 8/9 8/16 8/23 8/29 9/5 9/12 9/19 9/24   Total Weekly Dose 36mg 38mg 36mg 38mg 36mg 32mg 30mg?? 32mg 32 mg 32mg 30mg   INR 1.7 2.3 2.0 3.4 3.8 3.5 2.5 2.6 2.5 3.3 2.0   Notes Keflex boost   1x decr dose 1x decr dose    Keflex Keflex     Date 9/27 10/1 10/8 10/15  10/22 10/29 11/5 11/14 11/21 11/28 12/4   Total Weekly Dose 32mg 32mg 32mg 30mg  32mg 28mg 32mg 32mg 32mg 28mg 26mg   INR 2.3 2.4 3.2 2.5  3.9 2.7 3.0 2.8 2.7 3.7 1.9   Notes Levaquin start 9/26 stopped Levaquin        stop MVI, 1 miss 1 hold     Date 12/10 12/17 12/21 12/31 1/7/19 1/14 1/22 1/24 1/28 1/31 2/7   Total Weekly Dose 30mg 30mg 30mg 26mg 28mg 28mg 28mg 28mg 30mg 30mg 28mg   INR 2.4 3.0  3.6 2.5 2.6 2.6 2.9 1.7 2.7 2.5 2.7   Notes        doxy start 1/22, incr GLV 2x boost; doxy Finish doxy 1/29      Date 2/14 2/20 2/28 3/7 3/14 3/21 3/28 4/4 4/11 4/18 4/25   Total Weekly Dose 28mg 28mg 28 mg 30mg 31mg 30mg 30mg 31mg 32mg 31mg 31mg   INR 2.1 2.4 1.8 1.6 2.2 2.5 1.9 1.9 2.4 2.8 2.4   Notes   sick Zetia            Date 5/2 5/6            Total Weekly Dose 31mg 31mg            INR 2.0 2.4            Notes ampicillin amp              Phone Interview:  Verbal Release Authorization signed on 6/26/18 -- may speak with Alexy Wallace (son), Genesis Becerril (daughter), Sawyer or Gemapat Wallace (son and daughter-in-law), Gerry Rodrigo (son)  Tablet Strength: 2mg tablets  Patient Contact Info: [phone] - home with son Yadiel; Mrs. Wallace's cell phone number - [phone]     Patient Findings   Negatives:  Signs/symptoms of thrombosis, Signs/symptoms of bleeding, Laboratory test error suspected, Change in health, Change in alcohol use, Change in activity, Upcoming invasive procedure, Emergency department visit, Upcoming dental procedure, Missed doses, Extra doses, Change in medications, Change in diet/appetite, Hospital admission, Bruising, Other complaints   Comments:  Patient was given RX for Ampicillin 500mg BID x7 days on Tues, 4/30. Should be due to take last dose on or about tomorrow, 5/7.     Patient had colonoscopy scheduled for 5/3 with Dr Haim Lemus, but pt cancelled appt. Requested patient call us at clinic to inform us if she rescheduled due to possible request to hold warfarin prior to procedure. Patient voiced understanding.     Plan:    1. INR is therapeutic today. Instructed Mrs. Wallace to continue warfarin 4mg oral daily except 5mg on MonWedFri.   2. Repeat INR in ~1 week per patient preference.  3. Verbal information provided over the phone. Moni Wallace RBV dosing instructions and filled her planner with correct doses while on the phone, expresses understanding by teach back, and has no further  questions at this time.    Sawyer Gamez CPhT  5/6/2019  10:53 AM      I, Sawyer Jackman [facility], have reviewed the note in full and agree with the assessment and plan.  05/06/19  11:12 AM

## 2019-05-15 ENCOUNTER — ANTICOAGULATION VISIT (OUTPATIENT)
Dept: PHARMACY | Facility: HOSPITAL | Age: 79
End: 2019-05-15

## 2019-05-15 DIAGNOSIS — I48.0 PAROXYSMAL ATRIAL FIBRILLATION (HCC): ICD-10-CM

## 2019-05-15 LAB — INR PPP: 2.5

## 2019-05-15 NOTE — PROGRESS NOTES
Anticoagulation Clinic - Remote Progress Note  [facility]  Testing Frequency: 7 days    Indication: paroxysmal afib  Referring Provider: Heath  Goal INR: 2.0-3.0  Current Drug Interactions: amiodarone, levothyroxine; omeprazole, azaTHIOprine, ezetimibe   CHADS-VASc: 5 (age, gender, HTN, DM)    Diet: rare GLV; green beans   Alcohol: none  Tobacco: none   OTC Pain Medication: APAP PRN    1st clinic visit: 9/14/17  2nd clinic visit: 6/26/18    INR History:  Date 2/9/18 2/16 2/21 3/1 3/9 3/13 3/20 3/27 4/3 4/10   Total Weekly Dose 18mg/3 days 38mg 36mg 36mg 38mg 28mg/5 days 38mg 38mg 38mg 26mg   INR 1.0 1.5 1.8 2.1 1.9        Notes s/p surgery 2/5 and held doses   Keflex finish 2/28 Boost; Keflex init    hold- pre procedure post procedure; boost     Date 4/17 4/20 4/24 5/1 5/8 5/15 5/18 5/22 5/29 6/1   Total Weekly Dose 41mg 36mg 36mg 40mg 38mg 38mg 28mg 28mg 38mg 34mg   INR 3.0 1.9 1.8 2.3 2.3 3.6 2.4 2.3 3.0 2.5   Notes     colchicine hematoma    Zpak, 1 miss     Date 6/4 6/7 6/14 6/19 6/26 6/29 7/3 7/6 7/11 7/16   Total Weekly Dose 34mg 38mg 34mg 30mg 34mg 30mg 30mg 30mg 32mg 38mg   INR 3.0 3.4 4.3 2.5 3.6 2.3 2.9 1.2 1.4 2.0   Notes   Keflex Held x1 Zpak start 6/22, finish 6/25; doxy start 6/25; clinic doxy doxy doxy complete 7/5       Date 7/23 7/26 8/2 8/9 8/16 8/23 8/29 9/5 9/12 9/19 9/24   Total Weekly Dose 36mg 38mg 36mg 38mg 36mg 32mg 30mg?? 32mg 32 mg 32mg 30mg   INR 1.7 2.3 2.0 3.4 3.8 3.5 2.5 2.6 2.5 3.3 2.0   Notes Keflex boost   1x decr dose 1x decr dose    Keflex Keflex     Date 9/27 10/1 10/8 10/15  10/22 10/29 11/5 11/14 11/21 11/28 12/4   Total Weekly Dose 32mg 32mg 32mg 30mg  32mg 28mg 32mg 32mg 32mg 28mg 26mg   INR 2.3 2.4 3.2 2.5  3.9 2.7 3.0 2.8 2.7 3.7 1.9   Notes Levaquin start 9/26 stopped Levaquin        stop MVI, 1 miss 1 hold     Date 12/10 12/17 12/21 12/31 1/7/19 1/14 1/22 1/24 1/28 1/31 2/7   Total Weekly Dose 30mg 30mg 30mg 26mg 28mg 28mg 28mg 28mg 30mg 30mg 28mg   INR 2.4  3.0 3.6 2.5 2.6 2.6 2.9 1.7 2.7 2.5 2.7   Notes        doxy start 1/22, incr GLV 2x boost; doxy Finish doxy 1/29      Date 2/14 2/20 2/28 3/7 3/14 3/21 3/28 4/4 4/11 4/18 4/25   Total Weekly Dose 28mg 28mg 28 mg 30mg 31mg 30mg 30mg 31mg 32mg 31mg 31mg   INR 2.1 2.4 1.8 1.6 2.2 2.5 1.9 1.9 2.4 2.8 2.4   Notes   sick Zetia            Date 5/2 5/6 5/15           Total Weekly Dose 31mg 31mg 31mg           INR 2.0 2.4 2.5           Notes ampicillin amp              Phone Interview:  Verbal Release Authorization signed on 6/26/18 -- may speak with Alexy Wallace (son), Genesis Becerril (daughter), Sawyer or Gema Wallace (son and daughter-in-law), Gerryjuan david Wallace (son)  Tablet Strength: 2mg tablets  Patient Contact Info: [phone] - home with son Yadiel; Mrs. Wallace's cell phone number - [phone]     Patient Findings   Negatives:  Signs/symptoms of thrombosis, Signs/symptoms of bleeding, Laboratory test error suspected, Change in health, Change in alcohol use, Change in activity, Upcoming invasive procedure, Emergency department visit, Upcoming dental procedure, Missed doses, Extra doses, Change in medications, Change in diet/appetite, Hospital admission, Bruising, Other complaints     Plan:    1. INR is therapeutic today at 2.5. Instructed Mrs. Wallace to continue warfarin 4mg daily except 5mg on MonWedFri.   2. Repeat INR in 1 week on 5/22  3. Verbal information provided over the phone. Moni EL Rodrigo RBV dosing instructions and filled her planner with correct doses while on the phone, expresses understanding by teach back, and has no further questions at this time.    Sawyer Gamez, Valentin  5/15/2019  3:49 PM    Radha HENRY, PharmD, have reviewed the note in full and agree with the assessment and plan.  05/15/19  4:49 PM

## 2019-05-16 ENCOUNTER — TELEPHONE (OUTPATIENT)
Dept: INTERNAL MEDICINE | Facility: CLINIC | Age: 79
End: 2019-05-16

## 2019-05-16 NOTE — TELEPHONE ENCOUNTER
Pt states she had an injection in knee on Tuesday and later in day started running a fever and she fell yesterday. I advised pt that dr hernandez was not in office but wanted her to call office where she got shot to notify them of this. I also advised her that one of the NP could see her here if needed

## 2019-05-18 ENCOUNTER — APPOINTMENT (OUTPATIENT)
Dept: GENERAL RADIOLOGY | Facility: HOSPITAL | Age: 79
End: 2019-05-18

## 2019-05-18 ENCOUNTER — ANESTHESIA EVENT (OUTPATIENT)
Dept: PERIOP | Facility: HOSPITAL | Age: 79
End: 2019-05-18

## 2019-05-18 ENCOUNTER — APPOINTMENT (OUTPATIENT)
Dept: CT IMAGING | Facility: HOSPITAL | Age: 79
End: 2019-05-18

## 2019-05-18 ENCOUNTER — ANESTHESIA (OUTPATIENT)
Dept: PERIOP | Facility: HOSPITAL | Age: 79
End: 2019-05-18

## 2019-05-18 ENCOUNTER — HOSPITAL ENCOUNTER (INPATIENT)
Facility: HOSPITAL | Age: 79
LOS: 12 days | Discharge: NURSING, REHAB OR LONG-TERM CARE | End: 2019-05-30
Attending: EMERGENCY MEDICINE | Admitting: ORTHOPAEDIC SURGERY

## 2019-05-18 DIAGNOSIS — D72.829 LEUKOCYTOSIS, UNSPECIFIED TYPE: ICD-10-CM

## 2019-05-18 DIAGNOSIS — M00.9 PYOGENIC ARTHRITIS OF RIGHT KNEE JOINT, DUE TO UNSPECIFIED ORGANISM (HCC): Primary | ICD-10-CM

## 2019-05-18 DIAGNOSIS — M00.9 SEPTIC JOINT OF RIGHT KNEE JOINT (HCC): ICD-10-CM

## 2019-05-18 DIAGNOSIS — Z78.9 IMPAIRED MOBILITY AND ADLS: ICD-10-CM

## 2019-05-18 DIAGNOSIS — Z74.09 IMPAIRED FUNCTIONAL MOBILITY, BALANCE, GAIT, AND ENDURANCE: ICD-10-CM

## 2019-05-18 DIAGNOSIS — Z74.09 IMPAIRED MOBILITY AND ADLS: ICD-10-CM

## 2019-05-18 LAB
ABO GROUP BLD: NORMAL
ALBUMIN SERPL-MCNC: 3.3 G/DL (ref 3.5–5.2)
ALBUMIN/GLOB SERPL: 0.8 G/DL
ALP SERPL-CCNC: 179 U/L (ref 39–117)
ALT SERPL W P-5'-P-CCNC: 55 U/L (ref 1–33)
ANION GAP SERPL CALCULATED.3IONS-SCNC: 18 MMOL/L
AST SERPL-CCNC: 51 U/L (ref 1–32)
BASOPHILS # BLD AUTO: 0.02 10*3/MM3 (ref 0–0.2)
BASOPHILS NFR BLD AUTO: 0.1 % (ref 0–1.5)
BILIRUB SERPL-MCNC: 0.5 MG/DL (ref 0.2–1.2)
BLD GP AB SCN SERPL QL: NEGATIVE
BUN BLD-MCNC: 62 MG/DL (ref 8–23)
BUN/CREAT SERPL: 10 (ref 7–25)
CALCIUM SPEC-SCNC: 9.2 MG/DL (ref 8.6–10.5)
CHLORIDE SERPL-SCNC: 94 MMOL/L (ref 98–107)
CO2 SERPL-SCNC: 23 MMOL/L (ref 22–29)
CREAT BLD-MCNC: 6.23 MG/DL (ref 0.57–1)
CRP SERPL-MCNC: 20.09 MG/DL (ref 0–0.5)
DEPRECATED RDW RBC AUTO: 49.2 FL (ref 37–54)
EOSINOPHIL # BLD AUTO: 0.12 10*3/MM3 (ref 0–0.4)
EOSINOPHIL NFR BLD AUTO: 0.9 % (ref 0.3–6.2)
ERYTHROCYTE [DISTWIDTH] IN BLOOD BY AUTOMATED COUNT: 13.8 % (ref 12.3–15.4)
ERYTHROCYTE [SEDIMENTATION RATE] IN BLOOD: 80 MM/HR (ref 0–30)
GFR SERPL CREATININE-BSD FRML MDRD: 6 ML/MIN/1.73
GFR SERPL CREATININE-BSD FRML MDRD: ABNORMAL ML/MIN/1.73
GLOBULIN UR ELPH-MCNC: 3.9 GM/DL
GLUCOSE BLD-MCNC: 113 MG/DL (ref 65–99)
HCT VFR BLD AUTO: 31.8 % (ref 34–46.6)
HGB BLD-MCNC: 10.7 G/DL (ref 12–15.9)
IMM GRANULOCYTES # BLD AUTO: 0.09 10*3/MM3 (ref 0–0.05)
IMM GRANULOCYTES NFR BLD AUTO: 0.7 % (ref 0–0.5)
INR PPP: 2.23 (ref 0.85–1.16)
LYMPHOCYTES # BLD AUTO: 2.8 10*3/MM3 (ref 0.7–3.1)
LYMPHOCYTES NFR BLD AUTO: 20.3 % (ref 19.6–45.3)
MCH RBC QN AUTO: 32.5 PG (ref 26.6–33)
MCHC RBC AUTO-ENTMCNC: 33.6 G/DL (ref 31.5–35.7)
MCV RBC AUTO: 96.7 FL (ref 79–97)
MONOCYTES # BLD AUTO: 1.33 10*3/MM3 (ref 0.1–0.9)
MONOCYTES NFR BLD AUTO: 9.6 % (ref 5–12)
NEUTROPHILS # BLD AUTO: 9.46 10*3/MM3 (ref 1.7–7)
NEUTROPHILS NFR BLD AUTO: 68.4 % (ref 42.7–76)
PLATELET # BLD AUTO: 186 10*3/MM3 (ref 140–450)
PMV BLD AUTO: 9.6 FL (ref 6–12)
POTASSIUM BLD-SCNC: 4.1 MMOL/L (ref 3.5–5.2)
PROT SERPL-MCNC: 7.2 G/DL (ref 6–8.5)
PROTHROMBIN TIME: 23.8 SECONDS (ref 11.2–14.3)
RBC # BLD AUTO: 3.29 10*6/MM3 (ref 3.77–5.28)
RH BLD: NEGATIVE
SODIUM BLD-SCNC: 135 MMOL/L (ref 136–145)
T&S EXPIRATION DATE: NORMAL
WBC NRBC COR # BLD: 13.82 10*3/MM3 (ref 3.4–10.8)

## 2019-05-18 PROCEDURE — 25010000002 FENTANYL CITRATE (PF) 100 MCG/2ML SOLUTION: Performed by: ANESTHESIOLOGY

## 2019-05-18 PROCEDURE — 99223 1ST HOSP IP/OBS HIGH 75: CPT | Performed by: INTERNAL MEDICINE

## 2019-05-18 PROCEDURE — 70450 CT HEAD/BRAIN W/O DYE: CPT

## 2019-05-18 PROCEDURE — 70486 CT MAXILLOFACIAL W/O DYE: CPT

## 2019-05-18 PROCEDURE — 87205 SMEAR GRAM STAIN: CPT | Performed by: EMERGENCY MEDICINE

## 2019-05-18 PROCEDURE — 85025 COMPLETE CBC W/AUTO DIFF WBC: CPT | Performed by: PHYSICIAN ASSISTANT

## 2019-05-18 PROCEDURE — 25010000002 SUCCINYLCHOLINE PER 20 MG: Performed by: ANESTHESIOLOGY

## 2019-05-18 PROCEDURE — 85652 RBC SED RATE AUTOMATED: CPT | Performed by: PHYSICIAN ASSISTANT

## 2019-05-18 PROCEDURE — 87040 BLOOD CULTURE FOR BACTERIA: CPT | Performed by: EMERGENCY MEDICINE

## 2019-05-18 PROCEDURE — 72125 CT NECK SPINE W/O DYE: CPT

## 2019-05-18 PROCEDURE — 86901 BLOOD TYPING SEROLOGIC RH(D): CPT | Performed by: EMERGENCY MEDICINE

## 2019-05-18 PROCEDURE — 80053 COMPREHEN METABOLIC PANEL: CPT | Performed by: PHYSICIAN ASSISTANT

## 2019-05-18 PROCEDURE — 0SBC4ZZ EXCISION OF RIGHT KNEE JOINT, PERCUTANEOUS ENDOSCOPIC APPROACH: ICD-10-PCS | Performed by: ORTHOPAEDIC SURGERY

## 2019-05-18 PROCEDURE — 87070 CULTURE OTHR SPECIMN AEROBIC: CPT | Performed by: ORTHOPAEDIC SURGERY

## 2019-05-18 PROCEDURE — 25010000002 DEXAMETHASONE PER 1 MG: Performed by: ANESTHESIOLOGY

## 2019-05-18 PROCEDURE — 25010000002 PHENYLEPHRINE PER 1 ML: Performed by: ANESTHESIOLOGY

## 2019-05-18 PROCEDURE — 93010 ELECTROCARDIOGRAM REPORT: CPT | Performed by: INTERNAL MEDICINE

## 2019-05-18 PROCEDURE — 93005 ELECTROCARDIOGRAM TRACING: CPT | Performed by: INTERNAL MEDICINE

## 2019-05-18 PROCEDURE — 73700 CT LOWER EXTREMITY W/O DYE: CPT

## 2019-05-18 PROCEDURE — 73562 X-RAY EXAM OF KNEE 3: CPT

## 2019-05-18 PROCEDURE — 99284 EMERGENCY DEPT VISIT MOD MDM: CPT

## 2019-05-18 PROCEDURE — 87205 SMEAR GRAM STAIN: CPT | Performed by: ORTHOPAEDIC SURGERY

## 2019-05-18 PROCEDURE — 87176 TISSUE HOMOGENIZATION CULTR: CPT | Performed by: ORTHOPAEDIC SURGERY

## 2019-05-18 PROCEDURE — 86140 C-REACTIVE PROTEIN: CPT | Performed by: PHYSICIAN ASSISTANT

## 2019-05-18 PROCEDURE — 87015 SPECIMEN INFECT AGNT CONCNTJ: CPT | Performed by: EMERGENCY MEDICINE

## 2019-05-18 PROCEDURE — 86850 RBC ANTIBODY SCREEN: CPT | Performed by: EMERGENCY MEDICINE

## 2019-05-18 PROCEDURE — 87070 CULTURE OTHR SPECIMN AEROBIC: CPT | Performed by: EMERGENCY MEDICINE

## 2019-05-18 PROCEDURE — 86900 BLOOD TYPING SEROLOGIC ABO: CPT | Performed by: EMERGENCY MEDICINE

## 2019-05-18 PROCEDURE — 25010000002 MORPHINE PER 10 MG: Performed by: EMERGENCY MEDICINE

## 2019-05-18 PROCEDURE — 25010000002 PIPERACILLIN SOD-TAZOBACTAM PER 1 G: Performed by: EMERGENCY MEDICINE

## 2019-05-18 PROCEDURE — 85610 PROTHROMBIN TIME: CPT | Performed by: PHYSICIAN ASSISTANT

## 2019-05-18 RX ORDER — SODIUM CHLORIDE 0.9 % (FLUSH) 0.9 %
1-10 SYRINGE (ML) INJECTION AS NEEDED
Status: CANCELLED | OUTPATIENT
Start: 2019-05-18

## 2019-05-18 RX ORDER — DEXAMETHASONE SODIUM PHOSPHATE 4 MG/ML
INJECTION, SOLUTION INTRA-ARTICULAR; INTRALESIONAL; INTRAMUSCULAR; INTRAVENOUS; SOFT TISSUE AS NEEDED
Status: DISCONTINUED | OUTPATIENT
Start: 2019-05-18 | End: 2019-05-18 | Stop reason: SURG

## 2019-05-18 RX ORDER — SUCCINYLCHOLINE CHLORIDE 20 MG/ML
INJECTION INTRAMUSCULAR; INTRAVENOUS AS NEEDED
Status: DISCONTINUED | OUTPATIENT
Start: 2019-05-18 | End: 2019-05-18 | Stop reason: SURG

## 2019-05-18 RX ORDER — FENTANYL CITRATE 50 UG/ML
50 INJECTION, SOLUTION INTRAMUSCULAR; INTRAVENOUS
Status: DISCONTINUED | OUTPATIENT
Start: 2019-05-18 | End: 2019-05-19

## 2019-05-18 RX ORDER — SODIUM CHLORIDE 0.9 % (FLUSH) 0.9 %
3 SYRINGE (ML) INJECTION EVERY 12 HOURS SCHEDULED
Status: CANCELLED | OUTPATIENT
Start: 2019-05-18

## 2019-05-18 RX ORDER — MORPHINE SULFATE 4 MG/ML
4 INJECTION, SOLUTION INTRAMUSCULAR; INTRAVENOUS ONCE
Status: COMPLETED | OUTPATIENT
Start: 2019-05-18 | End: 2019-05-18

## 2019-05-18 RX ORDER — FENTANYL CITRATE 50 UG/ML
INJECTION, SOLUTION INTRAMUSCULAR; INTRAVENOUS AS NEEDED
Status: DISCONTINUED | OUTPATIENT
Start: 2019-05-18 | End: 2019-05-18 | Stop reason: SURG

## 2019-05-18 RX ORDER — VANCOMYCIN HYDROCHLORIDE 1 G/20ML
INJECTION, POWDER, LYOPHILIZED, FOR SOLUTION INTRAVENOUS AS NEEDED
Status: DISCONTINUED | OUTPATIENT
Start: 2019-05-18 | End: 2019-05-18 | Stop reason: SURG

## 2019-05-18 RX ORDER — SODIUM CHLORIDE, SODIUM LACTATE, POTASSIUM CHLORIDE, AND CALCIUM CHLORIDE .6; .31; .03; .02 G/100ML; G/100ML; G/100ML; G/100ML
IRRIGANT IRRIGATION AS NEEDED
Status: DISCONTINUED | OUTPATIENT
Start: 2019-05-18 | End: 2019-05-18 | Stop reason: HOSPADM

## 2019-05-18 RX ORDER — FAMOTIDINE 20 MG/1
20 TABLET, FILM COATED ORAL
Status: CANCELLED | OUTPATIENT
Start: 2019-05-18

## 2019-05-18 RX ORDER — SODIUM CHLORIDE, SODIUM LACTATE, POTASSIUM CHLORIDE, CALCIUM CHLORIDE 600; 310; 30; 20 MG/100ML; MG/100ML; MG/100ML; MG/100ML
9 INJECTION, SOLUTION INTRAVENOUS CONTINUOUS PRN
Status: CANCELLED | OUTPATIENT
Start: 2019-05-18

## 2019-05-18 RX ORDER — ATRACURIUM BESYLATE 10 MG/ML
INJECTION, SOLUTION INTRAVENOUS AS NEEDED
Status: DISCONTINUED | OUTPATIENT
Start: 2019-05-18 | End: 2019-05-18 | Stop reason: SURG

## 2019-05-18 RX ORDER — LIDOCAINE HYDROCHLORIDE 10 MG/ML
INJECTION, SOLUTION EPIDURAL; INFILTRATION; INTRACAUDAL; PERINEURAL
Status: DISPENSED
Start: 2019-05-18 | End: 2019-05-19

## 2019-05-18 RX ORDER — METHYLPREDNISOLONE ACETATE 80 MG/ML
80 INJECTION, SUSPENSION INTRA-ARTICULAR; INTRALESIONAL; INTRAMUSCULAR; SOFT TISSUE ONCE
Status: DISCONTINUED | OUTPATIENT
Start: 2019-05-18 | End: 2019-05-18 | Stop reason: HOSPADM

## 2019-05-18 RX ADMIN — PHENYLEPHRINE HYDROCHLORIDE 50 MCG: 10 INJECTION INTRAVENOUS at 22:55

## 2019-05-18 RX ADMIN — PHENYLEPHRINE HYDROCHLORIDE 50 MCG: 10 INJECTION INTRAVENOUS at 23:00

## 2019-05-18 RX ADMIN — FENTANYL CITRATE 50 MCG: 50 INJECTION, SOLUTION INTRAMUSCULAR; INTRAVENOUS at 22:55

## 2019-05-18 RX ADMIN — TAZOBACTAM SODIUM AND PIPERACILLIN SODIUM 3.38 G: 375; 3 INJECTION, SOLUTION INTRAVENOUS at 22:57

## 2019-05-18 RX ADMIN — FENTANYL CITRATE 50 MCG: 50 INJECTION, SOLUTION INTRAMUSCULAR; INTRAVENOUS at 23:45

## 2019-05-18 RX ADMIN — VANCOMYCIN HYDROCHLORIDE 1750 G: 1 INJECTION, POWDER, LYOPHILIZED, FOR SOLUTION INTRAVENOUS at 22:59

## 2019-05-18 RX ADMIN — SUCCINYLCHOLINE CHLORIDE 80 MG: 20 INJECTION, SOLUTION INTRAMUSCULAR; INTRAVENOUS at 22:55

## 2019-05-18 RX ADMIN — DEXAMETHASONE SODIUM PHOSPHATE 4 MG: 4 INJECTION, SOLUTION INTRAMUSCULAR; INTRAVENOUS at 23:04

## 2019-05-18 RX ADMIN — FENTANYL CITRATE 50 MCG: 50 INJECTION, SOLUTION INTRAMUSCULAR; INTRAVENOUS at 23:50

## 2019-05-18 RX ADMIN — PHENYLEPHRINE HYDROCHLORIDE 50 MCG: 10 INJECTION INTRAVENOUS at 23:11

## 2019-05-18 RX ADMIN — PHENYLEPHRINE HYDROCHLORIDE 50 MCG: 10 INJECTION INTRAVENOUS at 23:05

## 2019-05-18 RX ADMIN — MORPHINE SULFATE 4 MG: 4 INJECTION INTRAVENOUS at 18:23

## 2019-05-18 RX ADMIN — ATRACURIUM BESYLATE 5 MG: 10 INJECTION, SOLUTION INTRAVENOUS at 22:55

## 2019-05-19 LAB
ALBUMIN SERPL-MCNC: 2.8 G/DL (ref 3.5–5.2)
ALBUMIN/GLOB SERPL: 0.8 G/DL
ALP SERPL-CCNC: 143 U/L (ref 39–117)
ALT SERPL W P-5'-P-CCNC: 43 U/L (ref 1–33)
ANION GAP SERPL CALCULATED.3IONS-SCNC: 15 MMOL/L
AST SERPL-CCNC: 31 U/L (ref 1–32)
BASOPHILS # BLD AUTO: 0.04 10*3/MM3 (ref 0–0.2)
BASOPHILS NFR BLD AUTO: 0.3 % (ref 0–1.5)
BILIRUB SERPL-MCNC: 0.4 MG/DL (ref 0.2–1.2)
BUN BLD-MCNC: 61 MG/DL (ref 8–23)
BUN/CREAT SERPL: 9.7 (ref 7–25)
CALCIUM SPEC-SCNC: 8.4 MG/DL (ref 8.6–10.5)
CHLORIDE SERPL-SCNC: 98 MMOL/L (ref 98–107)
CO2 SERPL-SCNC: 21 MMOL/L (ref 22–29)
CREAT BLD-MCNC: 6.26 MG/DL (ref 0.57–1)
DEPRECATED RDW RBC AUTO: 50 FL (ref 37–54)
EOSINOPHIL # BLD AUTO: 0 10*3/MM3 (ref 0–0.4)
EOSINOPHIL NFR BLD AUTO: 0 % (ref 0.3–6.2)
ERYTHROCYTE [DISTWIDTH] IN BLOOD BY AUTOMATED COUNT: 13.9 % (ref 12.3–15.4)
GFR SERPL CREATININE-BSD FRML MDRD: 6 ML/MIN/1.73
GFR SERPL CREATININE-BSD FRML MDRD: ABNORMAL ML/MIN/1.73
GLOBULIN UR ELPH-MCNC: 3.7 GM/DL
GLUCOSE BLD-MCNC: 164 MG/DL (ref 65–99)
GLUCOSE BLDC GLUCOMTR-MCNC: 107 MG/DL (ref 70–130)
GLUCOSE BLDC GLUCOMTR-MCNC: 132 MG/DL (ref 70–130)
GLUCOSE BLDC GLUCOMTR-MCNC: 160 MG/DL (ref 70–130)
GLUCOSE BLDC GLUCOMTR-MCNC: 177 MG/DL (ref 70–130)
GLUCOSE BLDC GLUCOMTR-MCNC: 206 MG/DL (ref 70–130)
HBA1C MFR BLD: 4.9 % (ref 4.8–5.6)
HCT VFR BLD AUTO: 29.3 % (ref 34–46.6)
HGB BLD-MCNC: 9.4 G/DL (ref 12–15.9)
IMM GRANULOCYTES # BLD AUTO: 0.11 10*3/MM3 (ref 0–0.05)
IMM GRANULOCYTES NFR BLD AUTO: 1 % (ref 0–0.5)
LYMPHOCYTES # BLD AUTO: 0.83 10*3/MM3 (ref 0.7–3.1)
LYMPHOCYTES NFR BLD AUTO: 7.2 % (ref 19.6–45.3)
MCH RBC QN AUTO: 31.8 PG (ref 26.6–33)
MCHC RBC AUTO-ENTMCNC: 32.1 G/DL (ref 31.5–35.7)
MCV RBC AUTO: 99 FL (ref 79–97)
MONOCYTES # BLD AUTO: 0.46 10*3/MM3 (ref 0.1–0.9)
MONOCYTES NFR BLD AUTO: 4 % (ref 5–12)
NEUTROPHILS # BLD AUTO: 10.22 10*3/MM3 (ref 1.7–7)
NEUTROPHILS NFR BLD AUTO: 88.5 % (ref 42.7–76)
PLATELET # BLD AUTO: 182 10*3/MM3 (ref 140–450)
PMV BLD AUTO: 9.6 FL (ref 6–12)
POTASSIUM BLD-SCNC: 4.6 MMOL/L (ref 3.5–5.2)
PROT SERPL-MCNC: 6.5 G/DL (ref 6–8.5)
RBC # BLD AUTO: 2.96 10*6/MM3 (ref 3.77–5.28)
SODIUM BLD-SCNC: 134 MMOL/L (ref 136–145)
VANCOMYCIN SERPL-MCNC: 28.3 MCG/ML (ref 5–40)
WBC NRBC COR # BLD: 11.55 10*3/MM3 (ref 3.4–10.8)

## 2019-05-19 PROCEDURE — 97163 PT EVAL HIGH COMPLEX 45 MIN: CPT

## 2019-05-19 PROCEDURE — 83036 HEMOGLOBIN GLYCOSYLATED A1C: CPT | Performed by: INTERNAL MEDICINE

## 2019-05-19 PROCEDURE — 97530 THERAPEUTIC ACTIVITIES: CPT

## 2019-05-19 PROCEDURE — 63710000001 INSULIN LISPRO (HUMAN) PER 5 UNITS: Performed by: INTERNAL MEDICINE

## 2019-05-19 PROCEDURE — 63710000001 TACROLIMUS PER 1 MG: Performed by: INTERNAL MEDICINE

## 2019-05-19 PROCEDURE — 99233 SBSQ HOSP IP/OBS HIGH 50: CPT | Performed by: INTERNAL MEDICINE

## 2019-05-19 PROCEDURE — 3E1M39Z IRRIGATION OF PERITONEAL CAVITY USING DIALYSATE, PERCUTANEOUS APPROACH: ICD-10-PCS | Performed by: INTERNAL MEDICINE

## 2019-05-19 PROCEDURE — 25010000002 ONDANSETRON PER 1 MG: Performed by: ORTHOPAEDIC SURGERY

## 2019-05-19 PROCEDURE — 85025 COMPLETE CBC W/AUTO DIFF WBC: CPT | Performed by: ORTHOPAEDIC SURGERY

## 2019-05-19 PROCEDURE — 25010000002 PIPERACILLIN SOD-TAZOBACTAM PER 1 G: Performed by: INTERNAL MEDICINE

## 2019-05-19 PROCEDURE — 80053 COMPREHEN METABOLIC PANEL: CPT | Performed by: INTERNAL MEDICINE

## 2019-05-19 PROCEDURE — 25010000002 HYDROMORPHONE 1 MG/ML SOLUTION: Performed by: ORTHOPAEDIC SURGERY

## 2019-05-19 PROCEDURE — 97166 OT EVAL MOD COMPLEX 45 MIN: CPT

## 2019-05-19 PROCEDURE — 82962 GLUCOSE BLOOD TEST: CPT

## 2019-05-19 PROCEDURE — 80202 ASSAY OF VANCOMYCIN: CPT | Performed by: INTERNAL MEDICINE

## 2019-05-19 PROCEDURE — 25010000002 HEPARIN (PORCINE) PER 1000 UNITS: Performed by: INTERNAL MEDICINE

## 2019-05-19 PROCEDURE — 25010000002 FENTANYL CITRATE (PF) 100 MCG/2ML SOLUTION: Performed by: ANESTHESIOLOGY

## 2019-05-19 RX ORDER — DIPHENHYDRAMINE HCL 25 MG
25 CAPSULE ORAL EVERY 6 HOURS PRN
Status: DISCONTINUED | OUTPATIENT
Start: 2019-05-19 | End: 2019-05-30 | Stop reason: HOSPADM

## 2019-05-19 RX ORDER — BISACODYL 5 MG/1
10 TABLET, DELAYED RELEASE ORAL DAILY PRN
Status: DISCONTINUED | OUTPATIENT
Start: 2019-05-19 | End: 2019-05-30 | Stop reason: HOSPADM

## 2019-05-19 RX ORDER — NICOTINE POLACRILEX 4 MG
15 LOZENGE BUCCAL
Status: DISCONTINUED | OUTPATIENT
Start: 2019-05-19 | End: 2019-05-30 | Stop reason: HOSPADM

## 2019-05-19 RX ORDER — NALOXONE HCL 0.4 MG/ML
0.1 VIAL (ML) INJECTION
Status: DISCONTINUED | OUTPATIENT
Start: 2019-05-19 | End: 2019-05-21

## 2019-05-19 RX ORDER — ONDANSETRON 4 MG/1
4 TABLET, FILM COATED ORAL EVERY 6 HOURS PRN
Status: DISCONTINUED | OUTPATIENT
Start: 2019-05-19 | End: 2019-05-30 | Stop reason: HOSPADM

## 2019-05-19 RX ORDER — ALPRAZOLAM 0.5 MG/1
0.5 TABLET ORAL NIGHTLY
Status: DISCONTINUED | OUTPATIENT
Start: 2019-05-19 | End: 2019-05-30 | Stop reason: HOSPADM

## 2019-05-19 RX ORDER — L.ACID,PARA/B.BIFIDUM/S.THERM 8B CELL
1 CAPSULE ORAL DAILY
Status: DISCONTINUED | OUTPATIENT
Start: 2019-05-19 | End: 2019-05-30 | Stop reason: HOSPADM

## 2019-05-19 RX ORDER — ALPRAZOLAM 0.5 MG/1
0.5 TABLET ORAL DAILY
Status: DISCONTINUED | OUTPATIENT
Start: 2019-05-19 | End: 2019-05-19

## 2019-05-19 RX ORDER — TACROLIMUS 0.5 MG/1
0.5 CAPSULE ORAL EVERY 12 HOURS
Status: DISCONTINUED | OUTPATIENT
Start: 2019-05-19 | End: 2019-05-30 | Stop reason: HOSPADM

## 2019-05-19 RX ORDER — CALCIUM CARBONATE 200(500)MG
1 TABLET,CHEWABLE ORAL 3 TIMES DAILY PRN
Status: DISCONTINUED | OUTPATIENT
Start: 2019-05-19 | End: 2019-05-30 | Stop reason: HOSPADM

## 2019-05-19 RX ORDER — SODIUM CHLORIDE 0.9 % (FLUSH) 0.9 %
3 SYRINGE (ML) INJECTION EVERY 12 HOURS SCHEDULED
Status: DISCONTINUED | OUTPATIENT
Start: 2019-05-19 | End: 2019-05-30 | Stop reason: HOSPADM

## 2019-05-19 RX ORDER — CALCITRIOL 0.25 UG/1
0.25 CAPSULE, LIQUID FILLED ORAL DAILY
Status: DISCONTINUED | OUTPATIENT
Start: 2019-05-19 | End: 2019-05-30 | Stop reason: HOSPADM

## 2019-05-19 RX ORDER — SACCHAROMYCES BOULARDII 250 MG
500 CAPSULE ORAL 2 TIMES DAILY
Status: DISCONTINUED | OUTPATIENT
Start: 2019-05-19 | End: 2019-05-30 | Stop reason: HOSPADM

## 2019-05-19 RX ORDER — ATORVASTATIN CALCIUM 10 MG/1
10 TABLET, FILM COATED ORAL DAILY
Status: DISCONTINUED | OUTPATIENT
Start: 2019-05-19 | End: 2019-05-30 | Stop reason: HOSPADM

## 2019-05-19 RX ORDER — ACETAMINOPHEN 325 MG/1
650 TABLET ORAL EVERY 6 HOURS PRN
Status: DISCONTINUED | OUTPATIENT
Start: 2019-05-19 | End: 2019-05-19 | Stop reason: SDUPTHER

## 2019-05-19 RX ORDER — SODIUM CHLORIDE 450 MG/100ML
50 INJECTION, SOLUTION INTRAVENOUS CONTINUOUS
Status: DISCONTINUED | OUTPATIENT
Start: 2019-05-19 | End: 2019-05-19

## 2019-05-19 RX ORDER — SODIUM CHLORIDE, SODIUM LACTATE, CALCIUM CHLORIDE, MAGNESIUM CHLORIDE AND DEXTROSE 1.5; 538; 448; 25.7; 5.08 G/100ML; MG/100ML; MG/100ML; MG/100ML; MG/100ML
2000 INJECTION, SOLUTION INTRAPERITONEAL ONCE
Status: DISCONTINUED | OUTPATIENT
Start: 2019-05-19 | End: 2019-05-19

## 2019-05-19 RX ORDER — SODIUM CHLORIDE, SODIUM LACTATE, CALCIUM CHLORIDE, MAGNESIUM CHLORIDE AND DEXTROSE 2.5; 538; 448; 25.7; 5.08 G/100ML; MG/100ML; MG/100ML; MG/100ML; MG/100ML
2000 INJECTION, SOLUTION INTRAPERITONEAL ONCE
Status: COMPLETED | OUTPATIENT
Start: 2019-05-20 | End: 2019-05-20

## 2019-05-19 RX ORDER — DOCUSATE SODIUM 100 MG/1
100 CAPSULE, LIQUID FILLED ORAL 2 TIMES DAILY PRN
Status: DISCONTINUED | OUTPATIENT
Start: 2019-05-19 | End: 2019-05-19 | Stop reason: SDUPTHER

## 2019-05-19 RX ORDER — ACETAMINOPHEN 650 MG/1
650 SUPPOSITORY RECTAL EVERY 4 HOURS PRN
Status: DISCONTINUED | OUTPATIENT
Start: 2019-05-19 | End: 2019-05-30 | Stop reason: HOSPADM

## 2019-05-19 RX ORDER — SODIUM CHLORIDE, SODIUM LACTATE, CALCIUM CHLORIDE, MAGNESIUM CHLORIDE AND DEXTROSE 1.5; 538; 448; 25.7; 5.08 G/100ML; MG/100ML; MG/100ML; MG/100ML; MG/100ML
2000 INJECTION, SOLUTION INTRAPERITONEAL ONCE
Status: COMPLETED | OUTPATIENT
Start: 2019-05-19 | End: 2019-05-19

## 2019-05-19 RX ORDER — LEVOTHYROXINE SODIUM 0.07 MG/1
75 TABLET ORAL
Status: DISCONTINUED | OUTPATIENT
Start: 2019-05-19 | End: 2019-05-30 | Stop reason: HOSPADM

## 2019-05-19 RX ORDER — ONDANSETRON 2 MG/ML
4 INJECTION INTRAMUSCULAR; INTRAVENOUS EVERY 6 HOURS PRN
Status: DISCONTINUED | OUTPATIENT
Start: 2019-05-19 | End: 2019-05-19 | Stop reason: SDUPTHER

## 2019-05-19 RX ORDER — ACETAMINOPHEN 160 MG/5ML
650 SOLUTION ORAL EVERY 4 HOURS PRN
Status: DISCONTINUED | OUTPATIENT
Start: 2019-05-19 | End: 2019-05-30 | Stop reason: HOSPADM

## 2019-05-19 RX ORDER — SODIUM CHLORIDE 0.9 % (FLUSH) 0.9 %
1-10 SYRINGE (ML) INJECTION AS NEEDED
Status: DISCONTINUED | OUTPATIENT
Start: 2019-05-19 | End: 2019-05-30 | Stop reason: HOSPADM

## 2019-05-19 RX ORDER — DEXTROSE MONOHYDRATE 25 G/50ML
25 INJECTION, SOLUTION INTRAVENOUS
Status: DISCONTINUED | OUTPATIENT
Start: 2019-05-19 | End: 2019-05-30 | Stop reason: HOSPADM

## 2019-05-19 RX ORDER — AMIODARONE HYDROCHLORIDE 200 MG/1
100 TABLET ORAL DAILY
Status: DISCONTINUED | OUTPATIENT
Start: 2019-05-19 | End: 2019-05-30 | Stop reason: HOSPADM

## 2019-05-19 RX ORDER — ONDANSETRON 2 MG/ML
4 INJECTION INTRAMUSCULAR; INTRAVENOUS EVERY 6 HOURS PRN
Status: DISCONTINUED | OUTPATIENT
Start: 2019-05-19 | End: 2019-05-30 | Stop reason: HOSPADM

## 2019-05-19 RX ORDER — SODIUM CHLORIDE, SODIUM LACTATE, CALCIUM CHLORIDE, MAGNESIUM CHLORIDE AND DEXTROSE 2.5; 538; 448; 25.7; 5.08 G/100ML; MG/100ML; MG/100ML; MG/100ML; MG/100ML
2000 INJECTION, SOLUTION INTRAPERITONEAL
Status: DISCONTINUED | OUTPATIENT
Start: 2019-05-19 | End: 2019-05-19

## 2019-05-19 RX ORDER — SODIUM CHLORIDE, SODIUM LACTATE, CALCIUM CHLORIDE, MAGNESIUM CHLORIDE AND DEXTROSE 2.5; 538; 448; 25.7; 5.08 G/100ML; MG/100ML; MG/100ML; MG/100ML; MG/100ML
2000 INJECTION, SOLUTION INTRAPERITONEAL ONCE
Status: DISCONTINUED | OUTPATIENT
Start: 2019-05-19 | End: 2019-05-19

## 2019-05-19 RX ORDER — ONDANSETRON 4 MG/1
4 TABLET, FILM COATED ORAL EVERY 6 HOURS PRN
Status: DISCONTINUED | OUTPATIENT
Start: 2019-05-19 | End: 2019-05-19 | Stop reason: SDUPTHER

## 2019-05-19 RX ORDER — PANTOPRAZOLE SODIUM 40 MG/1
40 TABLET, DELAYED RELEASE ORAL EVERY MORNING
Status: DISCONTINUED | OUTPATIENT
Start: 2019-05-19 | End: 2019-05-30 | Stop reason: HOSPADM

## 2019-05-19 RX ORDER — ALBUTEROL SULFATE 2.5 MG/3ML
2.5 SOLUTION RESPIRATORY (INHALATION) EVERY 6 HOURS PRN
Status: DISCONTINUED | OUTPATIENT
Start: 2019-05-19 | End: 2019-05-30 | Stop reason: HOSPADM

## 2019-05-19 RX ORDER — ACETAMINOPHEN 325 MG/1
650 TABLET ORAL EVERY 4 HOURS PRN
Status: DISCONTINUED | OUTPATIENT
Start: 2019-05-19 | End: 2019-05-30 | Stop reason: HOSPADM

## 2019-05-19 RX ORDER — SODIUM CHLORIDE, SODIUM LACTATE, CALCIUM CHLORIDE, MAGNESIUM CHLORIDE AND DEXTROSE 2.5; 538; 448; 25.7; 5.08 G/100ML; MG/100ML; MG/100ML; MG/100ML; MG/100ML
2000 INJECTION, SOLUTION INTRAPERITONEAL ONCE
Status: COMPLETED | OUTPATIENT
Start: 2019-05-19 | End: 2019-05-19

## 2019-05-19 RX ORDER — OXYCODONE HYDROCHLORIDE 5 MG/1
5 TABLET ORAL EVERY 4 HOURS PRN
Status: DISCONTINUED | OUTPATIENT
Start: 2019-05-19 | End: 2019-05-30 | Stop reason: HOSPADM

## 2019-05-19 RX ORDER — SODIUM CHLORIDE, SODIUM LACTATE, CALCIUM CHLORIDE, MAGNESIUM CHLORIDE AND DEXTROSE 1.5; 538; 448; 25.7; 5.08 G/100ML; MG/100ML; MG/100ML; MG/100ML; MG/100ML
2000 INJECTION, SOLUTION INTRAPERITONEAL
Status: DISCONTINUED | OUTPATIENT
Start: 2019-05-20 | End: 2019-05-30 | Stop reason: HOSPADM

## 2019-05-19 RX ORDER — HEPARIN SODIUM 5000 [USP'U]/ML
5000 INJECTION, SOLUTION INTRAVENOUS; SUBCUTANEOUS EVERY 12 HOURS SCHEDULED
Status: DISCONTINUED | OUTPATIENT
Start: 2019-05-19 | End: 2019-05-30 | Stop reason: HOSPADM

## 2019-05-19 RX ORDER — FUROSEMIDE 40 MG/1
40 TABLET ORAL EVERY MORNING
Status: DISCONTINUED | OUTPATIENT
Start: 2019-05-19 | End: 2019-05-30 | Stop reason: HOSPADM

## 2019-05-19 RX ORDER — DOCUSATE SODIUM 100 MG/1
100 CAPSULE, LIQUID FILLED ORAL 2 TIMES DAILY PRN
Status: DISCONTINUED | OUTPATIENT
Start: 2019-05-19 | End: 2019-05-30 | Stop reason: HOSPADM

## 2019-05-19 RX ORDER — SEVELAMER CARBONATE FOR ORAL SUSPENSION 800 MG/1
800 POWDER, FOR SUSPENSION ORAL
Status: DISCONTINUED | OUTPATIENT
Start: 2019-05-19 | End: 2019-05-30 | Stop reason: HOSPADM

## 2019-05-19 RX ORDER — HYDROCODONE BITARTRATE AND ACETAMINOPHEN 10; 325 MG/1; MG/1
1 TABLET ORAL EVERY 4 HOURS PRN
Status: DISCONTINUED | OUTPATIENT
Start: 2019-05-19 | End: 2019-05-19

## 2019-05-19 RX ORDER — METOPROLOL TARTRATE 50 MG/1
100 TABLET, FILM COATED ORAL EVERY 12 HOURS SCHEDULED
Status: DISCONTINUED | OUTPATIENT
Start: 2019-05-19 | End: 2019-05-30

## 2019-05-19 RX ADMIN — ONDANSETRON 4 MG: 2 INJECTION INTRAMUSCULAR; INTRAVENOUS at 16:07

## 2019-05-19 RX ADMIN — SODIUM CHLORIDE, SODIUM LACTATE, CALCIUM CHLORIDE, MAGNESIUM CHLORIDE AND DEXTROSE 2000 ML: 2.5; 538; 448; 25.7; 5.08 INJECTION, SOLUTION INTRAPERITONEAL at 11:08

## 2019-05-19 RX ADMIN — PANTOPRAZOLE SODIUM 40 MG: 40 TABLET, DELAYED RELEASE ORAL at 06:43

## 2019-05-19 RX ADMIN — OXYCODONE HYDROCHLORIDE 5 MG: 5 TABLET ORAL at 01:41

## 2019-05-19 RX ADMIN — INSULIN LISPRO 2 UNITS: 100 INJECTION, SOLUTION INTRAVENOUS; SUBCUTANEOUS at 09:13

## 2019-05-19 RX ADMIN — AMIODARONE HYDROCHLORIDE 100 MG: 200 TABLET ORAL at 09:12

## 2019-05-19 RX ADMIN — SODIUM CHLORIDE, SODIUM LACTATE, CALCIUM CHLORIDE, MAGNESIUM CHLORIDE AND DEXTROSE 2000 ML: 1.5; 538; 448; 25.7; 5.08 INJECTION, SOLUTION INTRAPERITONEAL at 16:06

## 2019-05-19 RX ADMIN — LEVOTHYROXINE SODIUM 75 MCG: 75 TABLET ORAL at 06:43

## 2019-05-19 RX ADMIN — SEVELAMER CARBONATE 0.8 G: 800 POWDER, FOR SUSPENSION ORAL at 09:11

## 2019-05-19 RX ADMIN — Medication 1 CAPSULE: at 13:02

## 2019-05-19 RX ADMIN — CEFEPIME HYDROCHLORIDE 1 G: 1 INJECTION, POWDER, FOR SOLUTION INTRAMUSCULAR; INTRAVENOUS at 14:02

## 2019-05-19 RX ADMIN — HYDROMORPHONE HYDROCHLORIDE 0.25 MG: 1 INJECTION, SOLUTION INTRAMUSCULAR; INTRAVENOUS; SUBCUTANEOUS at 00:57

## 2019-05-19 RX ADMIN — ALPRAZOLAM 0.5 MG: 0.5 TABLET ORAL at 20:34

## 2019-05-19 RX ADMIN — TACROLIMUS 0.5 MG: 0.5 CAPSULE ORAL at 20:40

## 2019-05-19 RX ADMIN — OXYCODONE HYDROCHLORIDE 5 MG: 5 TABLET ORAL at 06:43

## 2019-05-19 RX ADMIN — TAZOBACTAM SODIUM AND PIPERACILLIN SODIUM 3.38 G: 375; 3 INJECTION, SOLUTION INTRAVENOUS at 09:25

## 2019-05-19 RX ADMIN — Medication 500 MG: at 09:12

## 2019-05-19 RX ADMIN — HYDROCODONE BITARTRATE AND ACETAMINOPHEN 1 TABLET: 10; 325 TABLET ORAL at 03:52

## 2019-05-19 RX ADMIN — FENTANYL CITRATE 50 MCG: 50 INJECTION, SOLUTION INTRAMUSCULAR; INTRAVENOUS at 00:12

## 2019-05-19 RX ADMIN — TACROLIMUS 0.5 MG: 0.5 CAPSULE ORAL at 13:02

## 2019-05-19 RX ADMIN — SODIUM CHLORIDE 50 ML/HR: 4.5 INJECTION, SOLUTION INTRAVENOUS at 01:40

## 2019-05-19 RX ADMIN — METOPROLOL TARTRATE 100 MG: 100 TABLET ORAL at 09:12

## 2019-05-19 RX ADMIN — FUROSEMIDE 40 MG: 40 TABLET ORAL at 06:43

## 2019-05-19 RX ADMIN — ONDANSETRON 4 MG: 2 INJECTION INTRAMUSCULAR; INTRAVENOUS at 04:42

## 2019-05-19 RX ADMIN — HEPARIN SODIUM 5000 UNITS: 5000 INJECTION INTRAVENOUS; SUBCUTANEOUS at 20:34

## 2019-05-19 RX ADMIN — CALCITRIOL 0.25 MCG: 0.25 CAPSULE ORAL at 09:12

## 2019-05-19 RX ADMIN — SODIUM CHLORIDE, PRESERVATIVE FREE 3 ML: 5 INJECTION INTRAVENOUS at 20:38

## 2019-05-19 RX ADMIN — SODIUM CHLORIDE, SODIUM LACTATE, CALCIUM CHLORIDE, MAGNESIUM CHLORIDE AND DEXTROSE 2000 ML: 1.5; 538; 448; 25.7; 5.08 INJECTION, SOLUTION INTRAPERITONEAL at 22:29

## 2019-05-19 RX ADMIN — Medication 500 MG: at 20:35

## 2019-05-19 RX ADMIN — HYDROCODONE BITARTRATE AND ACETAMINOPHEN 1 TABLET: 10; 325 TABLET ORAL at 09:12

## 2019-05-19 RX ADMIN — INSULIN LISPRO 2 UNITS: 100 INJECTION, SOLUTION INTRAVENOUS; SUBCUTANEOUS at 18:06

## 2019-05-19 RX ADMIN — Medication 500 MG: at 01:40

## 2019-05-19 RX ADMIN — HYDROMORPHONE HYDROCHLORIDE 0.25 MG: 1 INJECTION, SOLUTION INTRAMUSCULAR; INTRAVENOUS; SUBCUTANEOUS at 04:37

## 2019-05-19 RX ADMIN — SODIUM CHLORIDE, SODIUM LACTATE, CALCIUM CHLORIDE, MAGNESIUM CHLORIDE AND DEXTROSE 2000 ML: 2.5; 538; 448; 25.7; 5.08 INJECTION, SOLUTION INTRAPERITONEAL at 19:36

## 2019-05-19 RX ADMIN — TACROLIMUS 0.5 MG: 0.5 CAPSULE ORAL at 01:41

## 2019-05-19 RX ADMIN — HYDROMORPHONE HYDROCHLORIDE 0.25 MG: 1 INJECTION, SOLUTION INTRAMUSCULAR; INTRAVENOUS; SUBCUTANEOUS at 02:31

## 2019-05-19 NOTE — ANESTHESIA PROCEDURE NOTES
Airway  Date/Time: 5/18/2019 10:56 PM  End Time:5/18/2019 10:56 PM    General Information and Staff    Patient location during procedure: OR    Indications and Patient Condition  Indications for airway management: airway protection    Preoxygenated: yes  MILS maintained throughout  Mask difficulty assessment: 0 - not attempted    Final Airway Details  Final airway type: endotracheal airway      Successful airway: ETT  Cuffed: yes   Successful intubation technique: direct laryngoscopy  Facilitating devices/methods: intubating stylet  Endotracheal tube insertion site: oral  Blade: Roberto  Blade size: 3  ETT size (mm): 7.0  Cormack-Lehane Classification: grade I - full view of glottis  Placement verified by: chest auscultation and capnometry   Measured from: gums  ETT to gums (cm): 20  Number of attempts at approach: 1

## 2019-05-19 NOTE — ANESTHESIA POSTPROCEDURE EVALUATION
Patient: Moni Wallace    Procedure Summary     Date:  05/18/19 Room / Location:   [facility]    Anesthesia Start:  2248 Anesthesia Stop:      Procedure:  KNEE ARTHROSCOPY INCISION AND DRAINAGE OF KNEE (Right Knee) Diagnosis:       Septic joint of right knee joint (CMS/HCC)      (R septic knee)    Surgeon:  Paco Lester MD Provider:  Rony Bowers MD    Anesthesia Type:  general ASA Status:  4          Anesthesia Type: general  Last vitals  BP   139/66 (05/18/19 2200)   Temp   98.1 °F (36.7 °C) (05/18/19 1534)   Pulse   83 (05/18/19 1534)   Resp   16 (05/18/19 1534)     SpO2   94 % (05/18/19 2200)     Post Anesthesia Care and Evaluation    Patient location during evaluation: PACU  Patient participation: complete - patient participated  Level of consciousness: awake and alert  Pain score: 0  Pain management: adequate  Airway patency: patent  Anesthetic complications: No anesthetic complications  PONV Status: none  Cardiovascular status: hemodynamically stable and acceptable  Respiratory status: nonlabored ventilation, acceptable and nasal cannula  Hydration status: acceptable    Comments: VSS

## 2019-05-19 NOTE — ANESTHESIA PREPROCEDURE EVALUATION
Anesthesia Evaluation     Patient summary reviewed and Nursing notes reviewed   NPO Solid Status: > 8 hours  NPO Liquid Status: > 6 hours           Airway   Mallampati: I  TM distance: >3 FB  Neck ROM: full  No difficulty expected  Dental    (+) upper dentures    Pulmonary    (+) asthma (MDI),   Cardiovascular     ECG reviewed    (+) hypertension, valvular problems/murmurs AS and MR, dysrhythmias Paroxysmal Atrial Fib, PVD,  carotid artery disease    ROS comment: EKG 2017 Normal sinus rhythm  Ventricular rate 70 bpm    ECHo 2017 EF = 50%.LVDD (grade II) consistent with pseudonormalization.  LAcavity size is dilated.  Moderate mitral valve regurgitation is present  Mild aortic valve stenosis is present.    Neuro/Psych  GI/Hepatic/Renal/Endo    (+)   renal disease (sp renal TP -rejected -on PD ) CRI and ESRD, diabetes mellitus type 2 using insulin, hypothyroidism,     Musculoskeletal     Abdominal    Substance History      OB/GYN          Other   (+) arthritis     ROS/Med Hx Other: Coumadin K4.1   Hct31                 Anesthesia Plan    ASA 4     general     intravenous induction   Anesthetic plan, all risks, benefits, and alternatives have been provided, discussed and informed consent has been obtained with: patient.

## 2019-05-20 ENCOUNTER — APPOINTMENT (OUTPATIENT)
Dept: CARDIOLOGY | Facility: HOSPITAL | Age: 79
End: 2019-05-20

## 2019-05-20 PROBLEM — D62 POSTOPERATIVE ANEMIA DUE TO ACUTE BLOOD LOSS: Status: ACTIVE | Noted: 2019-05-20

## 2019-05-20 LAB
ALBUMIN SERPL-MCNC: 2.4 G/DL (ref 3.5–5.2)
ANION GAP SERPL CALCULATED.3IONS-SCNC: 16 MMOL/L
BASOPHILS # BLD AUTO: 0.02 10*3/MM3 (ref 0–0.2)
BASOPHILS NFR BLD AUTO: 0.2 % (ref 0–1.5)
BUN BLD-MCNC: 58 MG/DL (ref 8–23)
BUN/CREAT SERPL: 9.6 (ref 7–25)
CALCIUM SPEC-SCNC: 8.5 MG/DL (ref 8.6–10.5)
CHLORIDE SERPL-SCNC: 96 MMOL/L (ref 98–107)
CK SERPL-CCNC: 66 U/L (ref 20–180)
CO2 SERPL-SCNC: 23 MMOL/L (ref 22–29)
CREAT BLD-MCNC: 6.02 MG/DL (ref 0.57–1)
DEPRECATED RDW RBC AUTO: 49.4 FL (ref 37–54)
EOSINOPHIL # BLD AUTO: 0.03 10*3/MM3 (ref 0–0.4)
EOSINOPHIL NFR BLD AUTO: 0.3 % (ref 0.3–6.2)
ERYTHROCYTE [DISTWIDTH] IN BLOOD BY AUTOMATED COUNT: 14 % (ref 12.3–15.4)
GFR SERPL CREATININE-BSD FRML MDRD: 7 ML/MIN/1.73
GFR SERPL CREATININE-BSD FRML MDRD: ABNORMAL ML/MIN/1.73
GLUCOSE BLD-MCNC: 129 MG/DL (ref 65–99)
GLUCOSE BLDC GLUCOMTR-MCNC: 122 MG/DL (ref 70–130)
GLUCOSE BLDC GLUCOMTR-MCNC: 142 MG/DL (ref 70–130)
GLUCOSE BLDC GLUCOMTR-MCNC: 166 MG/DL (ref 70–130)
GLUCOSE BLDC GLUCOMTR-MCNC: 170 MG/DL (ref 70–130)
HCT VFR BLD AUTO: 24.4 % (ref 34–46.6)
HGB BLD-MCNC: 8 G/DL (ref 12–15.9)
IMM GRANULOCYTES # BLD AUTO: 0.1 10*3/MM3 (ref 0–0.05)
IMM GRANULOCYTES NFR BLD AUTO: 1 % (ref 0–0.5)
INR PPP: 2.71 (ref 0.85–1.16)
LYMPHOCYTES # BLD AUTO: 1.69 10*3/MM3 (ref 0.7–3.1)
LYMPHOCYTES NFR BLD AUTO: 16.7 % (ref 19.6–45.3)
MCH RBC QN AUTO: 31.6 PG (ref 26.6–33)
MCHC RBC AUTO-ENTMCNC: 32.8 G/DL (ref 31.5–35.7)
MCV RBC AUTO: 96.4 FL (ref 79–97)
MONOCYTES # BLD AUTO: 1.29 10*3/MM3 (ref 0.1–0.9)
MONOCYTES NFR BLD AUTO: 12.8 % (ref 5–12)
NEUTROPHILS # BLD AUTO: 6.96 10*3/MM3 (ref 1.7–7)
NEUTROPHILS NFR BLD AUTO: 69 % (ref 42.7–76)
PHOSPHATE SERPL-MCNC: 5.8 MG/DL (ref 2.5–4.5)
PLATELET # BLD AUTO: 187 10*3/MM3 (ref 140–450)
PMV BLD AUTO: 9.2 FL (ref 6–12)
POTASSIUM BLD-SCNC: 4.2 MMOL/L (ref 3.5–5.2)
PROTHROMBIN TIME: 27.7 SECONDS (ref 11.2–14.3)
RBC # BLD AUTO: 2.53 10*6/MM3 (ref 3.77–5.28)
SODIUM BLD-SCNC: 135 MMOL/L (ref 136–145)
WBC NRBC COR # BLD: 10.09 10*3/MM3 (ref 3.4–10.8)

## 2019-05-20 PROCEDURE — 25010000002 HYDROMORPHONE 1 MG/ML SOLUTION: Performed by: ORTHOPAEDIC SURGERY

## 2019-05-20 PROCEDURE — 25010000002 DAPTOMYCIN PER 1 MG: Performed by: INTERNAL MEDICINE

## 2019-05-20 PROCEDURE — 99233 SBSQ HOSP IP/OBS HIGH 50: CPT | Performed by: INTERNAL MEDICINE

## 2019-05-20 PROCEDURE — 97110 THERAPEUTIC EXERCISES: CPT

## 2019-05-20 PROCEDURE — 25010000002 SULFUR HEXAFLUORIDE MICROSPH 60.7-25 MG RECONSTITUTED SUSPENSION: Performed by: INTERNAL MEDICINE

## 2019-05-20 PROCEDURE — 82550 ASSAY OF CK (CPK): CPT | Performed by: INTERNAL MEDICINE

## 2019-05-20 PROCEDURE — 85610 PROTHROMBIN TIME: CPT | Performed by: INTERNAL MEDICINE

## 2019-05-20 PROCEDURE — 63710000001 DIPHENHYDRAMINE PER 50 MG: Performed by: INTERNAL MEDICINE

## 2019-05-20 PROCEDURE — 93306 TTE W/DOPPLER COMPLETE: CPT | Performed by: INTERNAL MEDICINE

## 2019-05-20 PROCEDURE — 80069 RENAL FUNCTION PANEL: CPT | Performed by: INTERNAL MEDICINE

## 2019-05-20 PROCEDURE — 97116 GAIT TRAINING THERAPY: CPT

## 2019-05-20 PROCEDURE — 85025 COMPLETE CBC W/AUTO DIFF WBC: CPT | Performed by: INTERNAL MEDICINE

## 2019-05-20 PROCEDURE — 63710000001 TACROLIMUS PER 1 MG: Performed by: INTERNAL MEDICINE

## 2019-05-20 PROCEDURE — 82962 GLUCOSE BLOOD TEST: CPT

## 2019-05-20 PROCEDURE — 93306 TTE W/DOPPLER COMPLETE: CPT

## 2019-05-20 PROCEDURE — 25010000002 HEPARIN (PORCINE) PER 1000 UNITS: Performed by: INTERNAL MEDICINE

## 2019-05-20 RX ADMIN — FUROSEMIDE 40 MG: 40 TABLET ORAL at 06:01

## 2019-05-20 RX ADMIN — SODIUM CHLORIDE, SODIUM LACTATE, CALCIUM CHLORIDE, MAGNESIUM CHLORIDE AND DEXTROSE 2000 ML: 1.5; 538; 448; 25.7; 5.08 INJECTION, SOLUTION INTRAPERITONEAL at 11:01

## 2019-05-20 RX ADMIN — OXYCODONE HYDROCHLORIDE 5 MG: 5 TABLET ORAL at 08:26

## 2019-05-20 RX ADMIN — METOPROLOL TARTRATE 100 MG: 100 TABLET ORAL at 21:03

## 2019-05-20 RX ADMIN — Medication 500 MG: at 08:26

## 2019-05-20 RX ADMIN — HYDROMORPHONE HYDROCHLORIDE 0.25 MG: 1 INJECTION, SOLUTION INTRAMUSCULAR; INTRAVENOUS; SUBCUTANEOUS at 18:35

## 2019-05-20 RX ADMIN — PANTOPRAZOLE SODIUM 40 MG: 40 TABLET, DELAYED RELEASE ORAL at 06:01

## 2019-05-20 RX ADMIN — DIPHENHYDRAMINE HYDROCHLORIDE 25 MG: 25 CAPSULE ORAL at 21:03

## 2019-05-20 RX ADMIN — AMIODARONE HYDROCHLORIDE 100 MG: 200 TABLET ORAL at 08:26

## 2019-05-20 RX ADMIN — SEVELAMER CARBONATE 0.8 G: 800 POWDER, FOR SUSPENSION ORAL at 12:05

## 2019-05-20 RX ADMIN — Medication 500 MG: at 21:03

## 2019-05-20 RX ADMIN — SULFUR HEXAFLUORIDE 2 ML: KIT at 14:40

## 2019-05-20 RX ADMIN — BISACODYL 10 MG: 5 TABLET, COATED ORAL at 14:48

## 2019-05-20 RX ADMIN — SEVELAMER CARBONATE 0.8 G: 800 POWDER, FOR SUSPENSION ORAL at 08:27

## 2019-05-20 RX ADMIN — Medication 1 CAPSULE: at 08:27

## 2019-05-20 RX ADMIN — SODIUM CHLORIDE, SODIUM LACTATE, CALCIUM CHLORIDE, MAGNESIUM CHLORIDE AND DEXTROSE 2000 ML: 1.5; 538; 448; 25.7; 5.08 INJECTION, SOLUTION INTRAPERITONEAL at 14:52

## 2019-05-20 RX ADMIN — DAPTOMYCIN 400 MG: 500 INJECTION, POWDER, LYOPHILIZED, FOR SOLUTION INTRAVENOUS at 08:36

## 2019-05-20 RX ADMIN — SODIUM CHLORIDE, SODIUM LACTATE, CALCIUM CHLORIDE, MAGNESIUM CHLORIDE AND DEXTROSE 2000 ML: 2.5; 538; 448; 25.7; 5.08 INJECTION, SOLUTION INTRAPERITONEAL at 06:07

## 2019-05-20 RX ADMIN — OXYCODONE HYDROCHLORIDE 5 MG: 5 TABLET ORAL at 21:03

## 2019-05-20 RX ADMIN — HEPARIN SODIUM 5000 UNITS: 5000 INJECTION INTRAVENOUS; SUBCUTANEOUS at 08:27

## 2019-05-20 RX ADMIN — INSULIN LISPRO 2 UNITS: 100 INJECTION, SOLUTION INTRAVENOUS; SUBCUTANEOUS at 21:03

## 2019-05-20 RX ADMIN — LEVOTHYROXINE SODIUM 75 MCG: 75 TABLET ORAL at 06:01

## 2019-05-20 RX ADMIN — CALCITRIOL 0.25 MCG: 0.25 CAPSULE ORAL at 08:26

## 2019-05-20 RX ADMIN — SODIUM CHLORIDE, SODIUM LACTATE, CALCIUM CHLORIDE, MAGNESIUM CHLORIDE AND DEXTROSE 2000 ML: 1.5; 538; 448; 25.7; 5.08 INJECTION, SOLUTION INTRAPERITONEAL at 18:36

## 2019-05-20 RX ADMIN — CEFEPIME HYDROCHLORIDE 1 G: 1 INJECTION, POWDER, FOR SOLUTION INTRAMUSCULAR; INTRAVENOUS at 14:52

## 2019-05-20 RX ADMIN — ALPRAZOLAM 0.5 MG: 0.5 TABLET ORAL at 21:03

## 2019-05-20 RX ADMIN — TACROLIMUS 0.5 MG: 0.5 CAPSULE ORAL at 23:56

## 2019-05-20 RX ADMIN — SODIUM CHLORIDE, SODIUM LACTATE, CALCIUM CHLORIDE, MAGNESIUM CHLORIDE AND DEXTROSE 2000 ML: 1.5; 538; 448; 25.7; 5.08 INJECTION, SOLUTION INTRAPERITONEAL at 23:45

## 2019-05-20 RX ADMIN — HEPARIN SODIUM 5000 UNITS: 5000 INJECTION INTRAVENOUS; SUBCUTANEOUS at 21:03

## 2019-05-20 RX ADMIN — OXYCODONE HYDROCHLORIDE 5 MG: 5 TABLET ORAL at 14:48

## 2019-05-20 RX ADMIN — TACROLIMUS 0.5 MG: 0.5 CAPSULE ORAL at 12:05

## 2019-05-20 RX ADMIN — HYDROMORPHONE HYDROCHLORIDE 0.25 MG: 1 INJECTION, SOLUTION INTRAMUSCULAR; INTRAVENOUS; SUBCUTANEOUS at 23:56

## 2019-05-20 RX ADMIN — METOPROLOL TARTRATE 100 MG: 100 TABLET ORAL at 08:27

## 2019-05-20 RX ADMIN — INSULIN LISPRO 2 UNITS: 100 INJECTION, SOLUTION INTRAVENOUS; SUBCUTANEOUS at 12:05

## 2019-05-21 ENCOUNTER — APPOINTMENT (OUTPATIENT)
Dept: GENERAL RADIOLOGY | Facility: HOSPITAL | Age: 79
End: 2019-05-21

## 2019-05-21 LAB
BASOPHILS # BLD MANUAL: 0 10*3/MM3 (ref 0–0.2)
BASOPHILS NFR BLD AUTO: 0 % (ref 0–1.5)
DEPRECATED RDW RBC AUTO: 50.3 FL (ref 37–54)
EOSINOPHIL # BLD MANUAL: 0.37 10*3/MM3 (ref 0–0.4)
EOSINOPHIL NFR BLD MANUAL: 3 % (ref 0.3–6.2)
ERYTHROCYTE [DISTWIDTH] IN BLOOD BY AUTOMATED COUNT: 13.9 % (ref 12.3–15.4)
GLUCOSE BLDC GLUCOMTR-MCNC: 124 MG/DL (ref 70–130)
GLUCOSE BLDC GLUCOMTR-MCNC: 129 MG/DL (ref 70–130)
GLUCOSE BLDC GLUCOMTR-MCNC: 176 MG/DL (ref 70–130)
GLUCOSE BLDC GLUCOMTR-MCNC: 196 MG/DL (ref 70–130)
HCT VFR BLD AUTO: 28.3 % (ref 34–46.6)
HGB BLD-MCNC: 9.1 G/DL (ref 12–15.9)
INR PPP: 1.92 (ref 0.85–1.16)
LYMPHOCYTES # BLD MANUAL: 1.96 10*3/MM3 (ref 0.7–3.1)
LYMPHOCYTES NFR BLD MANUAL: 16 % (ref 19.6–45.3)
LYMPHOCYTES NFR BLD MANUAL: 6 % (ref 5–12)
MCH RBC QN AUTO: 31.7 PG (ref 26.6–33)
MCHC RBC AUTO-ENTMCNC: 32.2 G/DL (ref 31.5–35.7)
MCV RBC AUTO: 98.6 FL (ref 79–97)
MONOCYTES # BLD AUTO: 0.73 10*3/MM3 (ref 0.1–0.9)
NEUTROPHILS # BLD AUTO: 8.07 10*3/MM3 (ref 1.7–7)
NEUTROPHILS NFR BLD MANUAL: 66 % (ref 42.7–76)
PLAT MORPH BLD: NORMAL
PLATELET # BLD AUTO: 235 10*3/MM3 (ref 140–450)
PMV BLD AUTO: 9.6 FL (ref 6–12)
PROTHROMBIN TIME: 21.1 SECONDS (ref 11.2–14.3)
RBC # BLD AUTO: 2.87 10*6/MM3 (ref 3.77–5.28)
RBC MORPH BLD: NORMAL
SCAN SLIDE: NORMAL
VARIANT LYMPHS NFR BLD MANUAL: 9 % (ref 0–5)
WBC MORPH BLD: NORMAL
WBC NRBC COR # BLD: 12.23 10*3/MM3 (ref 3.4–10.8)

## 2019-05-21 PROCEDURE — 99233 SBSQ HOSP IP/OBS HIGH 50: CPT | Performed by: INTERNAL MEDICINE

## 2019-05-21 PROCEDURE — 63710000001 TACROLIMUS PER 1 MG: Performed by: INTERNAL MEDICINE

## 2019-05-21 PROCEDURE — 85025 COMPLETE CBC W/AUTO DIFF WBC: CPT | Performed by: INTERNAL MEDICINE

## 2019-05-21 PROCEDURE — 85007 BL SMEAR W/DIFF WBC COUNT: CPT | Performed by: INTERNAL MEDICINE

## 2019-05-21 PROCEDURE — 97110 THERAPEUTIC EXERCISES: CPT

## 2019-05-21 PROCEDURE — 85610 PROTHROMBIN TIME: CPT | Performed by: INTERNAL MEDICINE

## 2019-05-21 PROCEDURE — 82962 GLUCOSE BLOOD TEST: CPT

## 2019-05-21 PROCEDURE — 25010000002 HEPARIN (PORCINE) PER 1000 UNITS: Performed by: INTERNAL MEDICINE

## 2019-05-21 PROCEDURE — 25010000002 HYDROMORPHONE 1 MG/ML SOLUTION: Performed by: ORTHOPAEDIC SURGERY

## 2019-05-21 PROCEDURE — 73630 X-RAY EXAM OF FOOT: CPT

## 2019-05-21 PROCEDURE — 97530 THERAPEUTIC ACTIVITIES: CPT

## 2019-05-21 RX ADMIN — AMIODARONE HYDROCHLORIDE 100 MG: 200 TABLET ORAL at 08:14

## 2019-05-21 RX ADMIN — DOCUSATE SODIUM 100 MG: 100 CAPSULE, LIQUID FILLED ORAL at 21:18

## 2019-05-21 RX ADMIN — SODIUM CHLORIDE, SODIUM LACTATE, CALCIUM CHLORIDE, MAGNESIUM CHLORIDE AND DEXTROSE 2000 ML: 1.5; 538; 448; 25.7; 5.08 INJECTION, SOLUTION INTRAPERITONEAL at 06:28

## 2019-05-21 RX ADMIN — HYDROMORPHONE HYDROCHLORIDE 0.25 MG: 1 INJECTION, SOLUTION INTRAMUSCULAR; INTRAVENOUS; SUBCUTANEOUS at 12:08

## 2019-05-21 RX ADMIN — FUROSEMIDE 40 MG: 40 TABLET ORAL at 06:26

## 2019-05-21 RX ADMIN — METOPROLOL TARTRATE 100 MG: 100 TABLET ORAL at 08:14

## 2019-05-21 RX ADMIN — ALPRAZOLAM 0.5 MG: 0.5 TABLET ORAL at 21:19

## 2019-05-21 RX ADMIN — PANTOPRAZOLE SODIUM 40 MG: 40 TABLET, DELAYED RELEASE ORAL at 06:26

## 2019-05-21 RX ADMIN — HEPARIN SODIUM 5000 UNITS: 5000 INJECTION INTRAVENOUS; SUBCUTANEOUS at 21:19

## 2019-05-21 RX ADMIN — Medication 500 MG: at 21:18

## 2019-05-21 RX ADMIN — Medication 500 MG: at 08:13

## 2019-05-21 RX ADMIN — SODIUM CHLORIDE, SODIUM LACTATE, CALCIUM CHLORIDE, MAGNESIUM CHLORIDE AND DEXTROSE 2000 ML: 1.5; 538; 448; 25.7; 5.08 INJECTION, SOLUTION INTRAPERITONEAL at 10:12

## 2019-05-21 RX ADMIN — SEVELAMER CARBONATE 0.8 G: 800 POWDER, FOR SUSPENSION ORAL at 08:14

## 2019-05-21 RX ADMIN — OXYCODONE HYDROCHLORIDE 5 MG: 5 TABLET ORAL at 07:32

## 2019-05-21 RX ADMIN — SODIUM CHLORIDE, PRESERVATIVE FREE 3 ML: 5 INJECTION INTRAVENOUS at 08:15

## 2019-05-21 RX ADMIN — TACROLIMUS 0.5 MG: 0.5 CAPSULE ORAL at 12:04

## 2019-05-21 RX ADMIN — SEVELAMER CARBONATE 0.8 G: 800 POWDER, FOR SUSPENSION ORAL at 12:04

## 2019-05-21 RX ADMIN — LEVOTHYROXINE SODIUM 75 MCG: 75 TABLET ORAL at 06:26

## 2019-05-21 RX ADMIN — INSULIN LISPRO 2 UNITS: 100 INJECTION, SOLUTION INTRAVENOUS; SUBCUTANEOUS at 21:29

## 2019-05-21 RX ADMIN — HYDROMORPHONE HYDROCHLORIDE 0.25 MG: 1 INJECTION, SOLUTION INTRAMUSCULAR; INTRAVENOUS; SUBCUTANEOUS at 04:55

## 2019-05-21 RX ADMIN — ATORVASTATIN CALCIUM 10 MG: 10 TABLET, FILM COATED ORAL at 08:14

## 2019-05-21 RX ADMIN — Medication 1 CAPSULE: at 08:13

## 2019-05-21 RX ADMIN — SODIUM CHLORIDE, PRESERVATIVE FREE 3 ML: 5 INJECTION INTRAVENOUS at 21:30

## 2019-05-21 RX ADMIN — CALCITRIOL 0.25 MCG: 0.25 CAPSULE ORAL at 08:14

## 2019-05-21 RX ADMIN — SODIUM CHLORIDE, SODIUM LACTATE, CALCIUM CHLORIDE, MAGNESIUM CHLORIDE AND DEXTROSE 2000 ML: 1.5; 538; 448; 25.7; 5.08 INJECTION, SOLUTION INTRAPERITONEAL at 14:25

## 2019-05-21 RX ADMIN — POLYETHYLENE GLYCOL 3350 17 G: 17 POWDER, FOR SOLUTION ORAL at 21:18

## 2019-05-21 RX ADMIN — SODIUM CHLORIDE, SODIUM LACTATE, CALCIUM CHLORIDE, MAGNESIUM CHLORIDE AND DEXTROSE 2000 ML: 1.5; 538; 448; 25.7; 5.08 INJECTION, SOLUTION INTRAPERITONEAL at 23:10

## 2019-05-21 RX ADMIN — HEPARIN SODIUM 5000 UNITS: 5000 INJECTION INTRAVENOUS; SUBCUTANEOUS at 08:13

## 2019-05-21 RX ADMIN — INSULIN LISPRO 2 UNITS: 100 INJECTION, SOLUTION INTRAVENOUS; SUBCUTANEOUS at 12:04

## 2019-05-21 RX ADMIN — ACETAMINOPHEN 650 MG: 325 TABLET ORAL at 16:30

## 2019-05-21 RX ADMIN — SEVELAMER CARBONATE 0.8 G: 800 POWDER, FOR SUSPENSION ORAL at 17:53

## 2019-05-21 RX ADMIN — SODIUM CHLORIDE, SODIUM LACTATE, CALCIUM CHLORIDE, MAGNESIUM CHLORIDE AND DEXTROSE 2000 ML: 1.5; 538; 448; 25.7; 5.08 INJECTION, SOLUTION INTRAPERITONEAL at 17:53

## 2019-05-21 RX ADMIN — BISACODYL 10 MG: 5 TABLET, COATED ORAL at 21:18

## 2019-05-22 LAB
ANION GAP SERPL CALCULATED.3IONS-SCNC: 14 MMOL/L
BACTERIA SPEC AEROBE CULT: NORMAL
BUN BLD-MCNC: 52 MG/DL (ref 8–23)
BUN/CREAT SERPL: 10 (ref 7–25)
CALCIUM SPEC-SCNC: 8.4 MG/DL (ref 8.6–10.5)
CHLORIDE SERPL-SCNC: 91 MMOL/L (ref 98–107)
CO2 SERPL-SCNC: 24 MMOL/L (ref 22–29)
CREAT BLD-MCNC: 5.18 MG/DL (ref 0.57–1)
CRP SERPL-MCNC: 23.5 MG/DL (ref 0–0.5)
DEPRECATED RDW RBC AUTO: 47.2 FL (ref 37–54)
ERYTHROCYTE [DISTWIDTH] IN BLOOD BY AUTOMATED COUNT: 13.4 % (ref 12.3–15.4)
ERYTHROCYTE [SEDIMENTATION RATE] IN BLOOD: 64 MM/HR (ref 0–30)
GFR SERPL CREATININE-BSD FRML MDRD: 8 ML/MIN/1.73
GFR SERPL CREATININE-BSD FRML MDRD: ABNORMAL ML/MIN/1.73
GLUCOSE BLD-MCNC: 124 MG/DL (ref 65–99)
GLUCOSE BLDC GLUCOMTR-MCNC: 146 MG/DL (ref 70–130)
GLUCOSE BLDC GLUCOMTR-MCNC: 153 MG/DL (ref 70–130)
GLUCOSE BLDC GLUCOMTR-MCNC: 155 MG/DL (ref 70–130)
GLUCOSE BLDC GLUCOMTR-MCNC: 175 MG/DL (ref 70–130)
GRAM STN SPEC: NORMAL
GRAM STN SPEC: NORMAL
HCT VFR BLD AUTO: 25.2 % (ref 34–46.6)
HGB BLD-MCNC: 8.4 G/DL (ref 12–15.9)
INR PPP: 1.37 (ref 0.85–1.16)
MCH RBC QN AUTO: 31.9 PG (ref 26.6–33)
MCHC RBC AUTO-ENTMCNC: 33.3 G/DL (ref 31.5–35.7)
MCV RBC AUTO: 95.8 FL (ref 79–97)
PLATELET # BLD AUTO: 225 10*3/MM3 (ref 140–450)
PMV BLD AUTO: 8.9 FL (ref 6–12)
POTASSIUM BLD-SCNC: 3.5 MMOL/L (ref 3.5–5.2)
PROTHROMBIN TIME: 16.2 SECONDS (ref 11.2–14.3)
RBC # BLD AUTO: 2.63 10*6/MM3 (ref 3.77–5.28)
SODIUM BLD-SCNC: 129 MMOL/L (ref 136–145)
WBC NRBC COR # BLD: 11.25 10*3/MM3 (ref 3.4–10.8)

## 2019-05-22 PROCEDURE — 99232 SBSQ HOSP IP/OBS MODERATE 35: CPT | Performed by: INTERNAL MEDICINE

## 2019-05-22 PROCEDURE — 86140 C-REACTIVE PROTEIN: CPT | Performed by: INTERNAL MEDICINE

## 2019-05-22 PROCEDURE — 85027 COMPLETE CBC AUTOMATED: CPT | Performed by: INTERNAL MEDICINE

## 2019-05-22 PROCEDURE — 63710000001 TACROLIMUS PER 1 MG: Performed by: INTERNAL MEDICINE

## 2019-05-22 PROCEDURE — 82962 GLUCOSE BLOOD TEST: CPT

## 2019-05-22 PROCEDURE — 80048 BASIC METABOLIC PNL TOTAL CA: CPT | Performed by: INTERNAL MEDICINE

## 2019-05-22 PROCEDURE — 25010000002 HEPARIN (PORCINE) PER 1000 UNITS: Performed by: INTERNAL MEDICINE

## 2019-05-22 PROCEDURE — 25010000002 DAPTOMYCIN PER 1 MG: Performed by: INTERNAL MEDICINE

## 2019-05-22 PROCEDURE — 85652 RBC SED RATE AUTOMATED: CPT | Performed by: INTERNAL MEDICINE

## 2019-05-22 PROCEDURE — 97530 THERAPEUTIC ACTIVITIES: CPT

## 2019-05-22 PROCEDURE — 85610 PROTHROMBIN TIME: CPT | Performed by: INTERNAL MEDICINE

## 2019-05-22 PROCEDURE — 25010000002 EPOETIN ALFA PER 1000 UNITS: Performed by: INTERNAL MEDICINE

## 2019-05-22 RX ORDER — BISACODYL 10 MG
10 SUPPOSITORY, RECTAL RECTAL DAILY PRN
Status: DISCONTINUED | OUTPATIENT
Start: 2019-05-22 | End: 2019-05-30 | Stop reason: HOSPADM

## 2019-05-22 RX ADMIN — FUROSEMIDE 40 MG: 40 TABLET ORAL at 06:12

## 2019-05-22 RX ADMIN — TACROLIMUS 0.5 MG: 0.5 CAPSULE ORAL at 23:27

## 2019-05-22 RX ADMIN — DAPTOMYCIN 400 MG: 500 INJECTION, POWDER, LYOPHILIZED, FOR SOLUTION INTRAVENOUS at 08:19

## 2019-05-22 RX ADMIN — SEVELAMER CARBONATE 0.8 G: 800 POWDER, FOR SUSPENSION ORAL at 08:11

## 2019-05-22 RX ADMIN — OXYCODONE HYDROCHLORIDE 5 MG: 5 TABLET ORAL at 04:47

## 2019-05-22 RX ADMIN — SEVELAMER CARBONATE 0.8 G: 800 POWDER, FOR SUSPENSION ORAL at 17:30

## 2019-05-22 RX ADMIN — METOPROLOL TARTRATE 100 MG: 100 TABLET ORAL at 20:59

## 2019-05-22 RX ADMIN — INSULIN LISPRO 2 UNITS: 100 INJECTION, SOLUTION INTRAVENOUS; SUBCUTANEOUS at 11:54

## 2019-05-22 RX ADMIN — INSULIN LISPRO 2 UNITS: 100 INJECTION, SOLUTION INTRAVENOUS; SUBCUTANEOUS at 17:31

## 2019-05-22 RX ADMIN — Medication 500 MG: at 21:00

## 2019-05-22 RX ADMIN — SODIUM CHLORIDE, PRESERVATIVE FREE 3 ML: 5 INJECTION INTRAVENOUS at 08:13

## 2019-05-22 RX ADMIN — SODIUM CHLORIDE, SODIUM LACTATE, CALCIUM CHLORIDE, MAGNESIUM CHLORIDE AND DEXTROSE 2000 ML: 1.5; 538; 448; 25.7; 5.08 INJECTION, SOLUTION INTRAPERITONEAL at 10:14

## 2019-05-22 RX ADMIN — HEPARIN SODIUM 5000 UNITS: 5000 INJECTION INTRAVENOUS; SUBCUTANEOUS at 08:10

## 2019-05-22 RX ADMIN — HEPARIN SODIUM 5000 UNITS: 5000 INJECTION INTRAVENOUS; SUBCUTANEOUS at 21:00

## 2019-05-22 RX ADMIN — SODIUM CHLORIDE, SODIUM LACTATE, CALCIUM CHLORIDE, MAGNESIUM CHLORIDE AND DEXTROSE 2000 ML: 1.5; 538; 448; 25.7; 5.08 INJECTION, SOLUTION INTRAPERITONEAL at 23:28

## 2019-05-22 RX ADMIN — Medication 500 MG: at 08:11

## 2019-05-22 RX ADMIN — SODIUM CHLORIDE, SODIUM LACTATE, CALCIUM CHLORIDE, MAGNESIUM CHLORIDE AND DEXTROSE 2000 ML: 1.5; 538; 448; 25.7; 5.08 INJECTION, SOLUTION INTRAPERITONEAL at 06:12

## 2019-05-22 RX ADMIN — Medication 1 CAPSULE: at 08:12

## 2019-05-22 RX ADMIN — TACROLIMUS 0.5 MG: 0.5 CAPSULE ORAL at 11:51

## 2019-05-22 RX ADMIN — TACROLIMUS 0.5 MG: 0.5 CAPSULE ORAL at 00:24

## 2019-05-22 RX ADMIN — SODIUM CHLORIDE, SODIUM LACTATE, CALCIUM CHLORIDE, MAGNESIUM CHLORIDE AND DEXTROSE 2000 ML: 1.5; 538; 448; 25.7; 5.08 INJECTION, SOLUTION INTRAPERITONEAL at 18:06

## 2019-05-22 RX ADMIN — METOPROLOL TARTRATE 100 MG: 100 TABLET ORAL at 08:10

## 2019-05-22 RX ADMIN — CALCITRIOL 0.25 MCG: 0.25 CAPSULE ORAL at 08:12

## 2019-05-22 RX ADMIN — ACETAMINOPHEN 650 MG: 325 TABLET ORAL at 08:10

## 2019-05-22 RX ADMIN — INSULIN LISPRO 2 UNITS: 100 INJECTION, SOLUTION INTRAVENOUS; SUBCUTANEOUS at 08:13

## 2019-05-22 RX ADMIN — BISACODYL 10 MG: 5 TABLET, COATED ORAL at 20:59

## 2019-05-22 RX ADMIN — Medication 10 MG: at 15:13

## 2019-05-22 RX ADMIN — SODIUM CHLORIDE, SODIUM LACTATE, CALCIUM CHLORIDE, MAGNESIUM CHLORIDE AND DEXTROSE 2000 ML: 1.5; 538; 448; 25.7; 5.08 INJECTION, SOLUTION INTRAPERITONEAL at 13:58

## 2019-05-22 RX ADMIN — AMIODARONE HYDROCHLORIDE 100 MG: 200 TABLET ORAL at 08:11

## 2019-05-22 RX ADMIN — PANTOPRAZOLE SODIUM 40 MG: 40 TABLET, DELAYED RELEASE ORAL at 06:12

## 2019-05-22 RX ADMIN — DOCUSATE SODIUM 100 MG: 100 CAPSULE, LIQUID FILLED ORAL at 08:11

## 2019-05-22 RX ADMIN — ATORVASTATIN CALCIUM 10 MG: 10 TABLET, FILM COATED ORAL at 08:11

## 2019-05-22 RX ADMIN — ERYTHROPOIETIN 20000 UNITS: 20000 INJECTION, SOLUTION INTRAVENOUS; SUBCUTANEOUS at 13:58

## 2019-05-22 RX ADMIN — OXYCODONE HYDROCHLORIDE 5 MG: 5 TABLET ORAL at 13:58

## 2019-05-22 RX ADMIN — BISACODYL 10 MG: 5 TABLET, COATED ORAL at 08:12

## 2019-05-22 RX ADMIN — OXYCODONE HYDROCHLORIDE 5 MG: 5 TABLET ORAL at 18:09

## 2019-05-22 RX ADMIN — SEVELAMER CARBONATE 0.8 G: 800 POWDER, FOR SUSPENSION ORAL at 11:51

## 2019-05-22 RX ADMIN — LEVOTHYROXINE SODIUM 75 MCG: 75 TABLET ORAL at 06:12

## 2019-05-22 RX ADMIN — CEFEPIME HYDROCHLORIDE 1 G: 1 INJECTION, POWDER, FOR SOLUTION INTRAMUSCULAR; INTRAVENOUS at 15:13

## 2019-05-22 RX ADMIN — OXYCODONE HYDROCHLORIDE 5 MG: 5 TABLET ORAL at 10:16

## 2019-05-22 RX ADMIN — ALPRAZOLAM 0.5 MG: 0.5 TABLET ORAL at 20:58

## 2019-05-23 ENCOUNTER — APPOINTMENT (OUTPATIENT)
Dept: GENERAL RADIOLOGY | Facility: HOSPITAL | Age: 79
End: 2019-05-23

## 2019-05-23 ENCOUNTER — ANESTHESIA EVENT (OUTPATIENT)
Dept: PERIOP | Facility: HOSPITAL | Age: 79
End: 2019-05-23

## 2019-05-23 ENCOUNTER — ANESTHESIA (OUTPATIENT)
Dept: PERIOP | Facility: HOSPITAL | Age: 79
End: 2019-05-23

## 2019-05-23 LAB
ANION GAP SERPL CALCULATED.3IONS-SCNC: 14 MMOL/L
BACTERIA FLD CULT: NORMAL
BACTERIA SPEC AEROBE CULT: NORMAL
BACTERIA SPEC AEROBE CULT: NORMAL
BACTERIA UR QL AUTO: ABNORMAL /HPF
BASOPHILS # BLD AUTO: 0.05 10*3/MM3 (ref 0–0.2)
BASOPHILS NFR BLD AUTO: 0.4 % (ref 0–1.5)
BH CV ECHO MEAS - AI DEC SLOPE: 208.4 CM/SEC^2
BH CV ECHO MEAS - AI MAX PG: 59.4 MMHG
BH CV ECHO MEAS - AI MAX VEL: 384.9 CM/SEC
BH CV ECHO MEAS - AI P1/2T: 540.9 MSEC
BH CV ECHO MEAS - AO MAX PG (FULL): 18.3 MMHG
BH CV ECHO MEAS - AO MAX PG: 25.9 MMHG
BH CV ECHO MEAS - AO MEAN PG (FULL): 10.2 MMHG
BH CV ECHO MEAS - AO MEAN PG: 15 MMHG
BH CV ECHO MEAS - AO ROOT AREA (BSA CORRECTED): 1.6
BH CV ECHO MEAS - AO ROOT AREA: 8.1 CM^2
BH CV ECHO MEAS - AO ROOT DIAM: 3.2 CM
BH CV ECHO MEAS - AO V2 MAX: 254.6 CM/SEC
BH CV ECHO MEAS - AO V2 MEAN: 184.9 CM/SEC
BH CV ECHO MEAS - AO V2 VTI: 58.9 CM
BH CV ECHO MEAS - AVA(I,A): 2.2 CM^2
BH CV ECHO MEAS - AVA(I,D): 1.8 CM^2
BH CV ECHO MEAS - AVA(V,A): 2 CM^2
BH CV ECHO MEAS - AVA(V,D): 2 CM^2
BH CV ECHO MEAS - BSA(HAYCOCK): 2.1 M^2
BH CV ECHO MEAS - BSA: 2 M^2
BH CV ECHO MEAS - BZI_BMI: 31.5 KILOGRAMS/M^2
BH CV ECHO MEAS - BZI_METRIC_HEIGHT: 167.6 CM
BH CV ECHO MEAS - BZI_METRIC_WEIGHT: 88.5 KG
BH CV ECHO MEAS - EDV(CUBED): 135.3 ML
BH CV ECHO MEAS - EDV(TEICH): 125.7 ML
BH CV ECHO MEAS - EF(CUBED): 54.5 %
BH CV ECHO MEAS - EF(TEICH): 46 %
BH CV ECHO MEAS - ESV(CUBED): 61.6 ML
BH CV ECHO MEAS - ESV(TEICH): 67.9 ML
BH CV ECHO MEAS - FS: 23.1 %
BH CV ECHO MEAS - IVS/LVPW: 0.83
BH CV ECHO MEAS - IVSD: 0.83 CM
BH CV ECHO MEAS - LA DIMENSION: 4.1 CM
BH CV ECHO MEAS - LA/AO: 1.3
BH CV ECHO MEAS - LAD MAJOR: 6 CM
BH CV ECHO MEAS - LAT PEAK E' VEL: 10.6 CM/SEC
BH CV ECHO MEAS - LATERAL E/E' RATIO: 9.1
BH CV ECHO MEAS - LV MASS(C)D: 169.6 GRAMS
BH CV ECHO MEAS - LV MASS(C)DI: 85.7 GRAMS/M^2
BH CV ECHO MEAS - LV MAX PG: 7.6 MMHG
BH CV ECHO MEAS - LV MEAN PG: 4.5 MMHG
BH CV ECHO MEAS - LV V1 MAX: 137.6 CM/SEC
BH CV ECHO MEAS - LV V1 MEAN: 100.8 CM/SEC
BH CV ECHO MEAS - LV V1 VTI: 35.9 CM
BH CV ECHO MEAS - LVIDD: 5.1 CM
BH CV ECHO MEAS - LVIDS: 4 CM
BH CV ECHO MEAS - LVOT AREA (M): 3.5 CM^2
BH CV ECHO MEAS - LVOT AREA: 3.6 CM^2
BH CV ECHO MEAS - LVOT DIAM: 2.1 CM
BH CV ECHO MEAS - LVPWD: 1 CM
BH CV ECHO MEAS - MED PEAK E' VEL: 7.5 CM/SEC
BH CV ECHO MEAS - MEDIAL E/E' RATIO: 12.9
BH CV ECHO MEAS - MV A MAX VEL: 114.6 CM/SEC
BH CV ECHO MEAS - MV DEC TIME: 0.19 SEC
BH CV ECHO MEAS - MV E MAX VEL: 98.2 CM/SEC
BH CV ECHO MEAS - MV E/A: 0.86
BH CV ECHO MEAS - MV MAX PG: 6.6 MMHG
BH CV ECHO MEAS - MV MEAN PG: 3.5 MMHG
BH CV ECHO MEAS - MV V2 MAX: 128.2 CM/SEC
BH CV ECHO MEAS - MV V2 MEAN: 91.2 CM/SEC
BH CV ECHO MEAS - MV V2 VTI: 33.4 CM
BH CV ECHO MEAS - MVA(VTI): 3.9 CM^2
BH CV ECHO MEAS - PA ACC SLOPE: 927.2 CM/SEC^2
BH CV ECHO MEAS - PA ACC TIME: 0.11 SEC
BH CV ECHO MEAS - PA MAX PG: 5.7 MMHG
BH CV ECHO MEAS - PA PR(ACCEL): 31.5 MMHG
BH CV ECHO MEAS - PA V2 MAX: 119.3 CM/SEC
BH CV ECHO MEAS - SI(AO): 242.1 ML/M^2
BH CV ECHO MEAS - SI(CUBED): 37.2 ML/M^2
BH CV ECHO MEAS - SI(LVOT): 65.5 ML/M^2
BH CV ECHO MEAS - SI(TEICH): 29.2 ML/M^2
BH CV ECHO MEAS - SV(AO): 479.1 ML
BH CV ECHO MEAS - SV(CUBED): 73.7 ML
BH CV ECHO MEAS - SV(LVOT): 129.6 ML
BH CV ECHO MEAS - SV(TEICH): 57.8 ML
BH CV ECHO MEASUREMENTS AVERAGE E/E' RATIO: 10.85
BH CV VAS BP LEFT ARM: NORMAL MMHG
BH CV XLRA - RV BASE: 3.5 CM
BH CV XLRA - RV LENGTH: 7.6 CM
BH CV XLRA - RV MID: 2.7 CM
BILIRUB UR QL STRIP: NEGATIVE
BUN BLD-MCNC: 51 MG/DL (ref 8–23)
BUN/CREAT SERPL: 10.5 (ref 7–25)
CALCIUM SPEC-SCNC: 9.1 MG/DL (ref 8.6–10.5)
CHLORIDE SERPL-SCNC: 89 MMOL/L (ref 98–107)
CLARITY UR: ABNORMAL
CO2 SERPL-SCNC: 25 MMOL/L (ref 22–29)
COLOR UR: YELLOW
CREAT BLD-MCNC: 4.84 MG/DL (ref 0.57–1)
DEPRECATED RDW RBC AUTO: 48 FL (ref 37–54)
EOSINOPHIL # BLD AUTO: 0.37 10*3/MM3 (ref 0–0.4)
EOSINOPHIL NFR BLD AUTO: 3.1 % (ref 0.3–6.2)
ERYTHROCYTE [DISTWIDTH] IN BLOOD BY AUTOMATED COUNT: 13.5 % (ref 12.3–15.4)
GFR SERPL CREATININE-BSD FRML MDRD: 9 ML/MIN/1.73
GFR SERPL CREATININE-BSD FRML MDRD: ABNORMAL ML/MIN/1.73
GLUCOSE BLD-MCNC: 106 MG/DL (ref 65–99)
GLUCOSE BLDC GLUCOMTR-MCNC: 110 MG/DL (ref 70–130)
GLUCOSE BLDC GLUCOMTR-MCNC: 116 MG/DL (ref 70–130)
GLUCOSE BLDC GLUCOMTR-MCNC: 119 MG/DL (ref 70–130)
GLUCOSE BLDC GLUCOMTR-MCNC: 166 MG/DL (ref 70–130)
GLUCOSE BLDC GLUCOMTR-MCNC: 179 MG/DL (ref 70–130)
GLUCOSE BLDC GLUCOMTR-MCNC: 197 MG/DL (ref 70–130)
GLUCOSE UR STRIP-MCNC: NEGATIVE MG/DL
GRAM STN SPEC: NORMAL
GRAM STN SPEC: NORMAL
HCT VFR BLD AUTO: 28.3 % (ref 34–46.6)
HGB BLD-MCNC: 9 G/DL (ref 12–15.9)
HGB UR QL STRIP.AUTO: ABNORMAL
HYALINE CASTS UR QL AUTO: ABNORMAL /LPF
IMM GRANULOCYTES # BLD AUTO: 0.58 10*3/MM3 (ref 0–0.05)
IMM GRANULOCYTES NFR BLD AUTO: 4.9 % (ref 0–0.5)
KETONES UR QL STRIP: NEGATIVE
LEFT ATRIUM VOLUME INDEX: 37.9 ML/M^2
LEFT ATRIUM VOLUME: 75 ML
LEUKOCYTE ESTERASE UR QL STRIP.AUTO: ABNORMAL
LV EF 2D ECHO EST: 60 %
LYMPHOCYTES # BLD AUTO: 1.98 10*3/MM3 (ref 0.7–3.1)
LYMPHOCYTES NFR BLD AUTO: 16.8 % (ref 19.6–45.3)
MCH RBC QN AUTO: 31.1 PG (ref 26.6–33)
MCHC RBC AUTO-ENTMCNC: 31.8 G/DL (ref 31.5–35.7)
MCV RBC AUTO: 97.9 FL (ref 79–97)
MONOCYTES # BLD AUTO: 1.39 10*3/MM3 (ref 0.1–0.9)
MONOCYTES NFR BLD AUTO: 11.8 % (ref 5–12)
NEUTROPHILS # BLD AUTO: 8.02 10*3/MM3 (ref 1.7–7)
NEUTROPHILS NFR BLD AUTO: 67.9 % (ref 42.7–76)
NITRITE UR QL STRIP: NEGATIVE
PH UR STRIP.AUTO: <=5 [PH] (ref 5–8)
PLATELET # BLD AUTO: 280 10*3/MM3 (ref 140–450)
PMV BLD AUTO: 9.5 FL (ref 6–12)
POTASSIUM BLD-SCNC: 3.6 MMOL/L (ref 3.5–5.2)
PROT UR QL STRIP: ABNORMAL
RBC # BLD AUTO: 2.89 10*6/MM3 (ref 3.77–5.28)
RBC # UR: ABNORMAL /HPF
REF LAB TEST METHOD: ABNORMAL
SODIUM BLD-SCNC: 128 MMOL/L (ref 136–145)
SP GR UR STRIP: 1.01 (ref 1–1.03)
SQUAMOUS #/AREA URNS HPF: ABNORMAL /HPF
UROBILINOGEN UR QL STRIP: ABNORMAL
WBC CASTS #/AREA URNS LPF: ABNORMAL /LPF
WBC NRBC COR # BLD: 11.81 10*3/MM3 (ref 3.4–10.8)
WBC UR QL AUTO: ABNORMAL /HPF

## 2019-05-23 PROCEDURE — C1751 CATH, INF, PER/CENT/MIDLINE: HCPCS | Performed by: SURGERY

## 2019-05-23 PROCEDURE — 99233 SBSQ HOSP IP/OBS HIGH 50: CPT | Performed by: INTERNAL MEDICINE

## 2019-05-23 PROCEDURE — 0JH63XZ INSERTION OF TUNNELED VASCULAR ACCESS DEVICE INTO CHEST SUBCUTANEOUS TISSUE AND FASCIA, PERCUTANEOUS APPROACH: ICD-10-PCS | Performed by: SURGERY

## 2019-05-23 PROCEDURE — 97110 THERAPEUTIC EXERCISES: CPT

## 2019-05-23 PROCEDURE — 02HV33Z INSERTION OF INFUSION DEVICE INTO SUPERIOR VENA CAVA, PERCUTANEOUS APPROACH: ICD-10-PCS | Performed by: SURGERY

## 2019-05-23 PROCEDURE — 81001 URINALYSIS AUTO W/SCOPE: CPT | Performed by: INTERNAL MEDICINE

## 2019-05-23 PROCEDURE — 25010000002 HEPARIN (PORCINE) PER 1000 UNITS: Performed by: SURGERY

## 2019-05-23 PROCEDURE — 71045 X-RAY EXAM CHEST 1 VIEW: CPT

## 2019-05-23 PROCEDURE — 85025 COMPLETE CBC W/AUTO DIFF WBC: CPT | Performed by: INTERNAL MEDICINE

## 2019-05-23 PROCEDURE — 80048 BASIC METABOLIC PNL TOTAL CA: CPT | Performed by: INTERNAL MEDICINE

## 2019-05-23 PROCEDURE — 87086 URINE CULTURE/COLONY COUNT: CPT | Performed by: INTERNAL MEDICINE

## 2019-05-23 PROCEDURE — 82962 GLUCOSE BLOOD TEST: CPT

## 2019-05-23 PROCEDURE — 76000 FLUOROSCOPY <1 HR PHYS/QHP: CPT

## 2019-05-23 PROCEDURE — 25010000002 PHENYLEPHRINE PER 1 ML: Performed by: ANESTHESIOLOGY

## 2019-05-23 PROCEDURE — 63710000001 TACROLIMUS PER 1 MG: Performed by: INTERNAL MEDICINE

## 2019-05-23 PROCEDURE — 97530 THERAPEUTIC ACTIVITIES: CPT

## 2019-05-23 PROCEDURE — B548ZZA ULTRASONOGRAPHY OF SUPERIOR VENA CAVA, GUIDANCE: ICD-10-PCS | Performed by: SURGERY

## 2019-05-23 PROCEDURE — 25010000002 HEPARIN (PORCINE) PER 1000 UNITS: Performed by: INTERNAL MEDICINE

## 2019-05-23 RX ORDER — TAMSULOSIN HYDROCHLORIDE 0.4 MG/1
0.4 CAPSULE ORAL DAILY
Status: DISCONTINUED | OUTPATIENT
Start: 2019-05-23 | End: 2019-05-30 | Stop reason: HOSPADM

## 2019-05-23 RX ORDER — SODIUM CHLORIDE 9 MG/ML
INJECTION, SOLUTION INTRAVENOUS CONTINUOUS PRN
Status: DISCONTINUED | OUTPATIENT
Start: 2019-05-23 | End: 2019-05-23 | Stop reason: SURG

## 2019-05-23 RX ORDER — MAGNESIUM HYDROXIDE 1200 MG/15ML
LIQUID ORAL AS NEEDED
Status: DISCONTINUED | OUTPATIENT
Start: 2019-05-23 | End: 2019-05-23 | Stop reason: HOSPADM

## 2019-05-23 RX ORDER — BUPIVACAINE HYDROCHLORIDE 5 MG/ML
INJECTION, SOLUTION PERINEURAL AS NEEDED
Status: DISCONTINUED | OUTPATIENT
Start: 2019-05-23 | End: 2019-05-23 | Stop reason: HOSPADM

## 2019-05-23 RX ORDER — BUPIVACAINE HYDROCHLORIDE AND EPINEPHRINE 5; 5 MG/ML; UG/ML
INJECTION, SOLUTION EPIDURAL; INTRACAUDAL; PERINEURAL AS NEEDED
Status: DISCONTINUED | OUTPATIENT
Start: 2019-05-23 | End: 2019-05-23 | Stop reason: HOSPADM

## 2019-05-23 RX ADMIN — LEVOTHYROXINE SODIUM 75 MCG: 75 TABLET ORAL at 04:54

## 2019-05-23 RX ADMIN — SODIUM CHLORIDE, SODIUM LACTATE, CALCIUM CHLORIDE, MAGNESIUM CHLORIDE AND DEXTROSE 2000 ML: 1.5; 538; 448; 25.7; 5.08 INJECTION, SOLUTION INTRAPERITONEAL at 14:14

## 2019-05-23 RX ADMIN — OXYCODONE HYDROCHLORIDE 5 MG: 5 TABLET ORAL at 22:59

## 2019-05-23 RX ADMIN — CALCITRIOL 0.25 MCG: 0.25 CAPSULE ORAL at 08:41

## 2019-05-23 RX ADMIN — METOPROLOL TARTRATE 100 MG: 100 TABLET ORAL at 08:40

## 2019-05-23 RX ADMIN — SEVELAMER CARBONATE 0.8 G: 800 POWDER, FOR SUSPENSION ORAL at 08:41

## 2019-05-23 RX ADMIN — SODIUM CHLORIDE: 9 INJECTION, SOLUTION INTRAVENOUS at 19:30

## 2019-05-23 RX ADMIN — ACETAMINOPHEN 650 MG: 325 TABLET ORAL at 08:41

## 2019-05-23 RX ADMIN — SODIUM CHLORIDE, SODIUM LACTATE, CALCIUM CHLORIDE, MAGNESIUM CHLORIDE AND DEXTROSE 2000 ML: 1.5; 538; 448; 25.7; 5.08 INJECTION, SOLUTION INTRAPERITONEAL at 22:31

## 2019-05-23 RX ADMIN — FUROSEMIDE 40 MG: 40 TABLET ORAL at 04:55

## 2019-05-23 RX ADMIN — SODIUM CHLORIDE, SODIUM LACTATE, CALCIUM CHLORIDE, MAGNESIUM CHLORIDE AND DEXTROSE 2000 ML: 1.5; 538; 448; 25.7; 5.08 INJECTION, SOLUTION INTRAPERITONEAL at 07:50

## 2019-05-23 RX ADMIN — Medication 1 CAPSULE: at 08:40

## 2019-05-23 RX ADMIN — Medication 500 MG: at 08:41

## 2019-05-23 RX ADMIN — AMIODARONE HYDROCHLORIDE 100 MG: 200 TABLET ORAL at 08:41

## 2019-05-23 RX ADMIN — METOPROLOL TARTRATE 100 MG: 100 TABLET ORAL at 21:24

## 2019-05-23 RX ADMIN — ATORVASTATIN CALCIUM 10 MG: 10 TABLET, FILM COATED ORAL at 08:41

## 2019-05-23 RX ADMIN — OXYCODONE HYDROCHLORIDE 5 MG: 5 TABLET ORAL at 10:54

## 2019-05-23 RX ADMIN — OXYCODONE HYDROCHLORIDE 5 MG: 5 TABLET ORAL at 04:54

## 2019-05-23 RX ADMIN — SODIUM CHLORIDE, SODIUM LACTATE, CALCIUM CHLORIDE, MAGNESIUM CHLORIDE AND DEXTROSE 2000 ML: 1.5; 538; 448; 25.7; 5.08 INJECTION, SOLUTION INTRAPERITONEAL at 10:54

## 2019-05-23 RX ADMIN — HEPARIN SODIUM 5000 UNITS: 5000 INJECTION INTRAVENOUS; SUBCUTANEOUS at 08:41

## 2019-05-23 RX ADMIN — PHENYLEPHRINE HYDROCHLORIDE 100 MCG: 10 INJECTION INTRAVENOUS at 20:10

## 2019-05-23 RX ADMIN — PANTOPRAZOLE SODIUM 40 MG: 40 TABLET, DELAYED RELEASE ORAL at 04:55

## 2019-05-23 RX ADMIN — TACROLIMUS 0.5 MG: 0.5 CAPSULE ORAL at 12:17

## 2019-05-23 RX ADMIN — SODIUM CHLORIDE, PRESERVATIVE FREE 3 ML: 5 INJECTION INTRAVENOUS at 08:41

## 2019-05-23 RX ADMIN — TACROLIMUS 0.5 MG: 0.5 CAPSULE ORAL at 22:54

## 2019-05-23 NOTE — ANESTHESIA PREPROCEDURE EVALUATION
Anesthesia Evaluation     Patient summary reviewed and Nursing notes reviewed   NPO Solid Status: > 8 hours  NPO Liquid Status: > 8 hours           Airway   Mallampati: I  TM distance: >3 FB  Neck ROM: full  Dental    (+) upper dentures        Pulmonary    (+) asthma (MDI ),   Cardiovascular     ECG reviewed    (+) hypertension, valvular problems/murmurs AS and MR, dysrhythmias Paroxysmal Atrial Fib, PVD,  carotid artery disease    ROS comment: EKG 2017 Normal sinus rhythm  Ventricular rate 70 bpm    ECHo 2017 EF = 50%.LVDD (grade II) consistent with pseudonormalization.  LAcavity size is dilated.  Moderate mitral valve regurgitation is present  Mild aortic valve stenosis is present.    Echo 5/2019 EF = 60%.  LA cavity moderately dilated.  Mild aortic valve stenosis is present (mean gradient 15 mmHg.    Neuro/Psych  (+) psychiatric history (xanax),     GI/Hepatic/Renal/Endo    (+)   renal disease CRI and ESRD, diabetes mellitus type 2 using insulin, hypothyroidism,     Musculoskeletal     Abdominal    Substance History      OB/GYN          Other   (+) arthritis     ROS/Med Hx Other: Warfarin   Hct 28       Phys Exam Other: Mac 3 grade 1 view               Anesthesia Plan    ASA 4     general   ([facility] for Hemo dialysis  Surgeon requests LMA )  intravenous induction   Anesthetic plan, all risks, benefits, and alternatives have been provided, discussed and informed consent has been obtained with: patient.    Plan discussed with CRNA.

## 2019-05-24 ENCOUNTER — APPOINTMENT (OUTPATIENT)
Dept: GENERAL RADIOLOGY | Facility: HOSPITAL | Age: 79
End: 2019-05-24

## 2019-05-24 LAB
BACTERIA SPEC AEROBE CULT: NO GROWTH
GLUCOSE BLDC GLUCOMTR-MCNC: 116 MG/DL (ref 70–130)
GLUCOSE BLDC GLUCOMTR-MCNC: 139 MG/DL (ref 70–130)
GLUCOSE BLDC GLUCOMTR-MCNC: 140 MG/DL (ref 70–130)
GLUCOSE BLDC GLUCOMTR-MCNC: 207 MG/DL (ref 70–130)
VANCOMYCIN SERPL-MCNC: 11.5 MCG/ML (ref 5–40)

## 2019-05-24 PROCEDURE — 97110 THERAPEUTIC EXERCISES: CPT

## 2019-05-24 PROCEDURE — 25010000002 ONDANSETRON PER 1 MG: Performed by: ORTHOPAEDIC SURGERY

## 2019-05-24 PROCEDURE — 80202 ASSAY OF VANCOMYCIN: CPT

## 2019-05-24 PROCEDURE — 71045 X-RAY EXAM CHEST 1 VIEW: CPT

## 2019-05-24 PROCEDURE — 25010000002 VANCOMYCIN

## 2019-05-24 PROCEDURE — 99233 SBSQ HOSP IP/OBS HIGH 50: CPT | Performed by: INTERNAL MEDICINE

## 2019-05-24 PROCEDURE — 82962 GLUCOSE BLOOD TEST: CPT

## 2019-05-24 PROCEDURE — 63710000001 TACROLIMUS PER 1 MG: Performed by: INTERNAL MEDICINE

## 2019-05-24 PROCEDURE — 25010000002 HEPARIN (PORCINE) PER 1000 UNITS: Performed by: INTERNAL MEDICINE

## 2019-05-24 RX ADMIN — SODIUM CHLORIDE, SODIUM LACTATE, CALCIUM CHLORIDE, MAGNESIUM CHLORIDE AND DEXTROSE 2000 ML: 1.5; 538; 448; 25.7; 5.08 INJECTION, SOLUTION INTRAPERITONEAL at 18:24

## 2019-05-24 RX ADMIN — INSULIN LISPRO 3 UNITS: 100 INJECTION, SOLUTION INTRAVENOUS; SUBCUTANEOUS at 12:32

## 2019-05-24 RX ADMIN — OXYCODONE HYDROCHLORIDE 5 MG: 5 TABLET ORAL at 11:11

## 2019-05-24 RX ADMIN — CEFEPIME HYDROCHLORIDE 1 G: 1 INJECTION, POWDER, FOR SOLUTION INTRAMUSCULAR; INTRAVENOUS at 12:26

## 2019-05-24 RX ADMIN — SODIUM CHLORIDE, SODIUM LACTATE, CALCIUM CHLORIDE, MAGNESIUM CHLORIDE AND DEXTROSE 2000 ML: 1.5; 538; 448; 25.7; 5.08 INJECTION, SOLUTION INTRAPERITONEAL at 22:35

## 2019-05-24 RX ADMIN — SODIUM CHLORIDE, SODIUM LACTATE, CALCIUM CHLORIDE, MAGNESIUM CHLORIDE AND DEXTROSE 2000 ML: 1.5; 538; 448; 25.7; 5.08 INJECTION, SOLUTION INTRAPERITONEAL at 10:11

## 2019-05-24 RX ADMIN — Medication 500 MG: at 09:32

## 2019-05-24 RX ADMIN — TACROLIMUS 0.5 MG: 0.5 CAPSULE ORAL at 12:27

## 2019-05-24 RX ADMIN — SEVELAMER CARBONATE 0.8 G: 800 POWDER, FOR SUSPENSION ORAL at 18:24

## 2019-05-24 RX ADMIN — FUROSEMIDE 40 MG: 40 TABLET ORAL at 06:05

## 2019-05-24 RX ADMIN — SODIUM CHLORIDE, PRESERVATIVE FREE 10 ML: 5 INJECTION INTRAVENOUS at 10:13

## 2019-05-24 RX ADMIN — HEPARIN SODIUM 5000 UNITS: 5000 INJECTION INTRAVENOUS; SUBCUTANEOUS at 20:37

## 2019-05-24 RX ADMIN — SODIUM CHLORIDE, SODIUM LACTATE, CALCIUM CHLORIDE, MAGNESIUM CHLORIDE AND DEXTROSE 2000 ML: 1.5; 538; 448; 25.7; 5.08 INJECTION, SOLUTION INTRAPERITONEAL at 13:49

## 2019-05-24 RX ADMIN — ONDANSETRON 4 MG: 2 INJECTION INTRAMUSCULAR; INTRAVENOUS at 11:11

## 2019-05-24 RX ADMIN — AMIODARONE HYDROCHLORIDE 100 MG: 200 TABLET ORAL at 09:32

## 2019-05-24 RX ADMIN — ALPRAZOLAM 0.5 MG: 0.5 TABLET ORAL at 20:37

## 2019-05-24 RX ADMIN — METOPROLOL TARTRATE 100 MG: 100 TABLET ORAL at 20:37

## 2019-05-24 RX ADMIN — HEPARIN SODIUM 5000 UNITS: 5000 INJECTION INTRAVENOUS; SUBCUTANEOUS at 09:33

## 2019-05-24 RX ADMIN — ATORVASTATIN CALCIUM 10 MG: 10 TABLET, FILM COATED ORAL at 09:32

## 2019-05-24 RX ADMIN — VANCOMYCIN HYDROCHLORIDE 500 MG: 500 INJECTION, POWDER, LYOPHILIZED, FOR SOLUTION INTRAVENOUS at 16:37

## 2019-05-24 RX ADMIN — ONDANSETRON HYDROCHLORIDE 4 MG: 4 TABLET, FILM COATED ORAL at 20:47

## 2019-05-24 RX ADMIN — PANTOPRAZOLE SODIUM 40 MG: 40 TABLET, DELAYED RELEASE ORAL at 06:05

## 2019-05-24 RX ADMIN — Medication 1 CAPSULE: at 09:33

## 2019-05-24 RX ADMIN — Medication 500 MG: at 20:37

## 2019-05-24 RX ADMIN — OXYCODONE HYDROCHLORIDE 5 MG: 5 TABLET ORAL at 15:11

## 2019-05-24 RX ADMIN — LEVOTHYROXINE SODIUM 75 MCG: 75 TABLET ORAL at 06:05

## 2019-05-24 RX ADMIN — OXYCODONE HYDROCHLORIDE 5 MG: 5 TABLET ORAL at 06:26

## 2019-05-24 RX ADMIN — METOPROLOL TARTRATE 100 MG: 100 TABLET ORAL at 09:33

## 2019-05-24 RX ADMIN — SEVELAMER CARBONATE 0.8 G: 800 POWDER, FOR SUSPENSION ORAL at 12:33

## 2019-05-24 RX ADMIN — SEVELAMER CARBONATE 0.8 G: 800 POWDER, FOR SUSPENSION ORAL at 10:12

## 2019-05-24 RX ADMIN — OXYCODONE HYDROCHLORIDE 5 MG: 5 TABLET ORAL at 20:37

## 2019-05-24 RX ADMIN — SODIUM CHLORIDE, SODIUM LACTATE, CALCIUM CHLORIDE, MAGNESIUM CHLORIDE AND DEXTROSE 2000 ML: 1.5; 538; 448; 25.7; 5.08 INJECTION, SOLUTION INTRAPERITONEAL at 06:05

## 2019-05-24 RX ADMIN — CALCITRIOL 0.25 MCG: 0.25 CAPSULE ORAL at 09:33

## 2019-05-24 NOTE — ANESTHESIA PROCEDURE NOTES
Airway  Urgency: emergent    Date/Time: 5/23/2019 7:55 PM  End Time:5/23/2019 8:55 PM  Airway not difficult    General Information and Staff    Patient location during procedure: OR    Consent for Airway (if performed for an anesthetic, see related documentation for consents)  Patient identity confirmed: verbally with patient  Consent: No emergent situation.  Consent given by: patient      Indications and Patient Condition  Indications for airway management: airway protection    Preoxygenated: yes  MILS maintained throughout  Mask difficulty assessment: 1 - vent by mask    Final Airway Details  Final airway type: supraglottic airway      Successful airway: I-gel  Size 4    Number of attempts at approach: 1    Additional Comments  LMA General requested by surgeon

## 2019-05-24 NOTE — ANESTHESIA POSTPROCEDURE EVALUATION
"Patient: Moni Wallace    Procedure Summary     Date:  05/23/19 Room / Location:   [facility]    Anesthesia Start:  1947 Anesthesia Stop:      Procedure:  INSERTION GROSHONG (N/A ) Diagnosis:      Surgeon:  Rolando Begum MD Provider:  Rony Bowers MD    Anesthesia Type:  MAC, general ASA Status:  4          Anesthesia Type: MAC, general  Last vitals  BP   147/65 (05/23/19 1845)   Temp   97.2 °F (36.2 °C) (05/23/19 1845)   Pulse   81 (05/23/19 1845)   Resp   20 (05/23/19 1845)     SpO2   94 % (05/23/19 1845)     Post Anesthesia Care and Evaluation    Patient location during evaluation: PACU  Patient participation: complete - patient participated  Level of consciousness: awake and alert  Pain score: 0  Pain management: adequate  Airway patency: patent  Anesthetic complications: No anesthetic complications  PONV Status: none  Cardiovascular status: hemodynamically stable and acceptable  Respiratory status: nonlabored ventilation, acceptable and nasal cannula  Hydration status: acceptable    Comments: VSS extubated in OR after \"stop VS\" on epic      "

## 2019-05-25 LAB
ANION GAP SERPL CALCULATED.3IONS-SCNC: 11 MMOL/L
BUN BLD-MCNC: 49 MG/DL (ref 8–23)
BUN/CREAT SERPL: 12 (ref 7–25)
CALCIUM SPEC-SCNC: 9 MG/DL (ref 8.6–10.5)
CHLORIDE SERPL-SCNC: 91 MMOL/L (ref 98–107)
CO2 SERPL-SCNC: 28 MMOL/L (ref 22–29)
CREAT BLD-MCNC: 4.07 MG/DL (ref 0.57–1)
DEPRECATED RDW RBC AUTO: 46.2 FL (ref 37–54)
ERYTHROCYTE [DISTWIDTH] IN BLOOD BY AUTOMATED COUNT: 13.2 % (ref 12.3–15.4)
GFR SERPL CREATININE-BSD FRML MDRD: 11 ML/MIN/1.73
GFR SERPL CREATININE-BSD FRML MDRD: ABNORMAL ML/MIN/1.73
GLUCOSE BLD-MCNC: 99 MG/DL (ref 65–99)
GLUCOSE BLDC GLUCOMTR-MCNC: 116 MG/DL (ref 70–130)
GLUCOSE BLDC GLUCOMTR-MCNC: 148 MG/DL (ref 70–130)
GLUCOSE BLDC GLUCOMTR-MCNC: 158 MG/DL (ref 70–130)
GLUCOSE BLDC GLUCOMTR-MCNC: 162 MG/DL (ref 70–130)
HCT VFR BLD AUTO: 25.2 % (ref 34–46.6)
HGB BLD-MCNC: 8.3 G/DL (ref 12–15.9)
MCH RBC QN AUTO: 31.7 PG (ref 26.6–33)
MCHC RBC AUTO-ENTMCNC: 32.9 G/DL (ref 31.5–35.7)
MCV RBC AUTO: 96.2 FL (ref 79–97)
PLATELET # BLD AUTO: 329 10*3/MM3 (ref 140–450)
PMV BLD AUTO: 8.8 FL (ref 6–12)
POTASSIUM BLD-SCNC: 3.5 MMOL/L (ref 3.5–5.2)
RBC # BLD AUTO: 2.62 10*6/MM3 (ref 3.77–5.28)
SODIUM BLD-SCNC: 130 MMOL/L (ref 136–145)
WBC NRBC COR # BLD: 12.3 10*3/MM3 (ref 3.4–10.8)

## 2019-05-25 PROCEDURE — 63710000001 TACROLIMUS PER 1 MG: Performed by: INTERNAL MEDICINE

## 2019-05-25 PROCEDURE — 25010000002 HEPARIN (PORCINE) PER 1000 UNITS: Performed by: INTERNAL MEDICINE

## 2019-05-25 PROCEDURE — 97110 THERAPEUTIC EXERCISES: CPT

## 2019-05-25 PROCEDURE — 80048 BASIC METABOLIC PNL TOTAL CA: CPT | Performed by: INTERNAL MEDICINE

## 2019-05-25 PROCEDURE — 82962 GLUCOSE BLOOD TEST: CPT

## 2019-05-25 PROCEDURE — 97530 THERAPEUTIC ACTIVITIES: CPT

## 2019-05-25 PROCEDURE — 99233 SBSQ HOSP IP/OBS HIGH 50: CPT | Performed by: INTERNAL MEDICINE

## 2019-05-25 PROCEDURE — 85027 COMPLETE CBC AUTOMATED: CPT | Performed by: INTERNAL MEDICINE

## 2019-05-25 RX ADMIN — SEVELAMER CARBONATE 0.8 G: 800 POWDER, FOR SUSPENSION ORAL at 08:59

## 2019-05-25 RX ADMIN — OXYCODONE HYDROCHLORIDE 5 MG: 5 TABLET ORAL at 10:44

## 2019-05-25 RX ADMIN — SODIUM CHLORIDE, SODIUM LACTATE, CALCIUM CHLORIDE, MAGNESIUM CHLORIDE AND DEXTROSE 2000 ML: 1.5; 538; 448; 25.7; 5.08 INJECTION, SOLUTION INTRAPERITONEAL at 11:25

## 2019-05-25 RX ADMIN — SODIUM CHLORIDE, SODIUM LACTATE, CALCIUM CHLORIDE, MAGNESIUM CHLORIDE AND DEXTROSE 2000 ML: 1.5; 538; 448; 25.7; 5.08 INJECTION, SOLUTION INTRAPERITONEAL at 23:31

## 2019-05-25 RX ADMIN — Medication 500 MG: at 08:56

## 2019-05-25 RX ADMIN — AMIODARONE HYDROCHLORIDE 100 MG: 200 TABLET ORAL at 08:59

## 2019-05-25 RX ADMIN — LEVOTHYROXINE SODIUM 75 MCG: 75 TABLET ORAL at 05:59

## 2019-05-25 RX ADMIN — PANTOPRAZOLE SODIUM 40 MG: 40 TABLET, DELAYED RELEASE ORAL at 05:59

## 2019-05-25 RX ADMIN — OXYCODONE HYDROCHLORIDE 5 MG: 5 TABLET ORAL at 20:44

## 2019-05-25 RX ADMIN — TACROLIMUS 0.5 MG: 0.5 CAPSULE ORAL at 23:31

## 2019-05-25 RX ADMIN — BISACODYL 10 MG: 5 TABLET, COATED ORAL at 10:44

## 2019-05-25 RX ADMIN — INSULIN LISPRO 2 UNITS: 100 INJECTION, SOLUTION INTRAVENOUS; SUBCUTANEOUS at 17:08

## 2019-05-25 RX ADMIN — METOPROLOL TARTRATE 100 MG: 100 TABLET ORAL at 20:44

## 2019-05-25 RX ADMIN — ATORVASTATIN CALCIUM 10 MG: 10 TABLET, FILM COATED ORAL at 08:59

## 2019-05-25 RX ADMIN — OXYCODONE HYDROCHLORIDE 5 MG: 5 TABLET ORAL at 17:07

## 2019-05-25 RX ADMIN — ONDANSETRON HYDROCHLORIDE 4 MG: 4 TABLET, FILM COATED ORAL at 17:07

## 2019-05-25 RX ADMIN — Medication 500 MG: at 20:44

## 2019-05-25 RX ADMIN — TAMSULOSIN HYDROCHLORIDE 0.4 MG: 0.4 CAPSULE ORAL at 08:59

## 2019-05-25 RX ADMIN — SODIUM CHLORIDE, SODIUM LACTATE, CALCIUM CHLORIDE, MAGNESIUM CHLORIDE AND DEXTROSE 2000 ML: 1.5; 538; 448; 25.7; 5.08 INJECTION, SOLUTION INTRAPERITONEAL at 15:18

## 2019-05-25 RX ADMIN — HEPARIN SODIUM 5000 UNITS: 5000 INJECTION INTRAVENOUS; SUBCUTANEOUS at 20:44

## 2019-05-25 RX ADMIN — CALCITRIOL 0.25 MCG: 0.25 CAPSULE ORAL at 08:56

## 2019-05-25 RX ADMIN — SODIUM CHLORIDE, SODIUM LACTATE, CALCIUM CHLORIDE, MAGNESIUM CHLORIDE AND DEXTROSE 2000 ML: 1.5; 538; 448; 25.7; 5.08 INJECTION, SOLUTION INTRAPERITONEAL at 20:21

## 2019-05-25 RX ADMIN — TACROLIMUS 0.5 MG: 0.5 CAPSULE ORAL at 12:20

## 2019-05-25 RX ADMIN — SEVELAMER CARBONATE 0.8 G: 800 POWDER, FOR SUSPENSION ORAL at 17:07

## 2019-05-25 RX ADMIN — FUROSEMIDE 40 MG: 40 TABLET ORAL at 05:59

## 2019-05-25 RX ADMIN — Medication 1 CAPSULE: at 08:59

## 2019-05-25 RX ADMIN — OXYCODONE HYDROCHLORIDE 5 MG: 5 TABLET ORAL at 05:59

## 2019-05-25 RX ADMIN — SEVELAMER CARBONATE 0.8 G: 800 POWDER, FOR SUSPENSION ORAL at 12:20

## 2019-05-25 RX ADMIN — SODIUM CHLORIDE, PRESERVATIVE FREE 3 ML: 5 INJECTION INTRAVENOUS at 09:00

## 2019-05-25 RX ADMIN — SODIUM CHLORIDE, SODIUM LACTATE, CALCIUM CHLORIDE, MAGNESIUM CHLORIDE AND DEXTROSE 2000 ML: 1.5; 538; 448; 25.7; 5.08 INJECTION, SOLUTION INTRAPERITONEAL at 06:01

## 2019-05-25 RX ADMIN — HEPARIN SODIUM 5000 UNITS: 5000 INJECTION INTRAVENOUS; SUBCUTANEOUS at 08:56

## 2019-05-25 RX ADMIN — METOPROLOL TARTRATE 100 MG: 100 TABLET ORAL at 08:57

## 2019-05-25 RX ADMIN — ALPRAZOLAM 0.5 MG: 0.5 TABLET ORAL at 20:44

## 2019-05-26 LAB
ALBUMIN SERPL-MCNC: 2.3 G/DL (ref 3.5–5.2)
ALBUMIN/GLOB SERPL: 0.6 G/DL
ALP SERPL-CCNC: 132 U/L (ref 39–117)
ALT SERPL W P-5'-P-CCNC: 23 U/L (ref 1–33)
ANION GAP SERPL CALCULATED.3IONS-SCNC: 13 MMOL/L
AST SERPL-CCNC: 24 U/L (ref 1–32)
BILIRUB SERPL-MCNC: 0.3 MG/DL (ref 0.2–1.2)
BUN BLD-MCNC: 47 MG/DL (ref 8–23)
BUN/CREAT SERPL: 10.5 (ref 7–25)
CALCIUM SPEC-SCNC: 9.1 MG/DL (ref 8.6–10.5)
CHLORIDE SERPL-SCNC: 90 MMOL/L (ref 98–107)
CO2 SERPL-SCNC: 26 MMOL/L (ref 22–29)
CREAT BLD-MCNC: 4.49 MG/DL (ref 0.57–1)
DEPRECATED RDW RBC AUTO: 48.2 FL (ref 37–54)
ERYTHROCYTE [DISTWIDTH] IN BLOOD BY AUTOMATED COUNT: 13.6 % (ref 12.3–15.4)
GFR SERPL CREATININE-BSD FRML MDRD: 9 ML/MIN/1.73
GFR SERPL CREATININE-BSD FRML MDRD: ABNORMAL ML/MIN/1.73
GLOBULIN UR ELPH-MCNC: 3.9 GM/DL
GLUCOSE BLD-MCNC: 114 MG/DL (ref 65–99)
GLUCOSE BLDC GLUCOMTR-MCNC: 136 MG/DL (ref 70–130)
GLUCOSE BLDC GLUCOMTR-MCNC: 142 MG/DL (ref 70–130)
GLUCOSE BLDC GLUCOMTR-MCNC: 159 MG/DL (ref 70–130)
GLUCOSE BLDC GLUCOMTR-MCNC: 177 MG/DL (ref 70–130)
HCT VFR BLD AUTO: 25.8 % (ref 34–46.6)
HGB BLD-MCNC: 8.4 G/DL (ref 12–15.9)
MCH RBC QN AUTO: 32.2 PG (ref 26.6–33)
MCHC RBC AUTO-ENTMCNC: 32.6 G/DL (ref 31.5–35.7)
MCV RBC AUTO: 98.9 FL (ref 79–97)
PLATELET # BLD AUTO: 334 10*3/MM3 (ref 140–450)
PMV BLD AUTO: 8.9 FL (ref 6–12)
POTASSIUM BLD-SCNC: 3.8 MMOL/L (ref 3.5–5.2)
PROT SERPL-MCNC: 6.2 G/DL (ref 6–8.5)
RBC # BLD AUTO: 2.61 10*6/MM3 (ref 3.77–5.28)
SODIUM BLD-SCNC: 129 MMOL/L (ref 136–145)
VANCOMYCIN SERPL-MCNC: 13.4 MCG/ML (ref 5–40)
WBC NRBC COR # BLD: 12.5 10*3/MM3 (ref 3.4–10.8)

## 2019-05-26 PROCEDURE — 63710000001 INSULIN DETEMIR PER 5 UNITS: Performed by: PHYSICIAN ASSISTANT

## 2019-05-26 PROCEDURE — 97530 THERAPEUTIC ACTIVITIES: CPT

## 2019-05-26 PROCEDURE — 25010000002 VANCOMYCIN 10 G RECONSTITUTED SOLUTION

## 2019-05-26 PROCEDURE — 85027 COMPLETE CBC AUTOMATED: CPT | Performed by: INTERNAL MEDICINE

## 2019-05-26 PROCEDURE — 97535 SELF CARE MNGMENT TRAINING: CPT

## 2019-05-26 PROCEDURE — 99232 SBSQ HOSP IP/OBS MODERATE 35: CPT | Performed by: PHYSICIAN ASSISTANT

## 2019-05-26 PROCEDURE — 63710000001 TACROLIMUS PER 1 MG: Performed by: INTERNAL MEDICINE

## 2019-05-26 PROCEDURE — 82962 GLUCOSE BLOOD TEST: CPT

## 2019-05-26 PROCEDURE — 80053 COMPREHEN METABOLIC PANEL: CPT | Performed by: INTERNAL MEDICINE

## 2019-05-26 PROCEDURE — 80202 ASSAY OF VANCOMYCIN: CPT

## 2019-05-26 PROCEDURE — 25010000002 HEPARIN (PORCINE) PER 1000 UNITS: Performed by: INTERNAL MEDICINE

## 2019-05-26 RX ORDER — SENNA AND DOCUSATE SODIUM 50; 8.6 MG/1; MG/1
2 TABLET, FILM COATED ORAL NIGHTLY
Status: DISCONTINUED | OUTPATIENT
Start: 2019-05-26 | End: 2019-05-30 | Stop reason: HOSPADM

## 2019-05-26 RX ORDER — NYSTATIN 100000 [USP'U]/G
POWDER TOPICAL EVERY 12 HOURS SCHEDULED
Status: DISCONTINUED | OUTPATIENT
Start: 2019-05-26 | End: 2019-05-30 | Stop reason: HOSPADM

## 2019-05-26 RX ADMIN — NYSTATIN: 100000 POWDER TOPICAL at 10:56

## 2019-05-26 RX ADMIN — VANCOMYCIN HYDROCHLORIDE 1250 MG: 10 INJECTION, POWDER, LYOPHILIZED, FOR SOLUTION INTRAVENOUS at 10:56

## 2019-05-26 RX ADMIN — HEPARIN SODIUM 5000 UNITS: 5000 INJECTION INTRAVENOUS; SUBCUTANEOUS at 20:13

## 2019-05-26 RX ADMIN — NYSTATIN 1 APPLICATION: 100000 POWDER TOPICAL at 20:14

## 2019-05-26 RX ADMIN — SODIUM CHLORIDE, SODIUM LACTATE, CALCIUM CHLORIDE, MAGNESIUM CHLORIDE AND DEXTROSE 2000 ML: 1.5; 538; 448; 25.7; 5.08 INJECTION, SOLUTION INTRAPERITONEAL at 22:59

## 2019-05-26 RX ADMIN — ACETAMINOPHEN 650 MG: 325 TABLET ORAL at 11:30

## 2019-05-26 RX ADMIN — SODIUM CHLORIDE, PRESERVATIVE FREE 3 ML: 5 INJECTION INTRAVENOUS at 20:15

## 2019-05-26 RX ADMIN — SEVELAMER CARBONATE 0.8 G: 800 POWDER, FOR SUSPENSION ORAL at 11:30

## 2019-05-26 RX ADMIN — AMIODARONE HYDROCHLORIDE 100 MG: 200 TABLET ORAL at 08:35

## 2019-05-26 RX ADMIN — INSULIN DETEMIR 3 UNITS: 100 INJECTION, SOLUTION SUBCUTANEOUS at 20:25

## 2019-05-26 RX ADMIN — INSULIN LISPRO 2 UNITS: 100 INJECTION, SOLUTION INTRAVENOUS; SUBCUTANEOUS at 20:21

## 2019-05-26 RX ADMIN — TAMSULOSIN HYDROCHLORIDE 0.4 MG: 0.4 CAPSULE ORAL at 08:35

## 2019-05-26 RX ADMIN — SODIUM CHLORIDE, SODIUM LACTATE, CALCIUM CHLORIDE, MAGNESIUM CHLORIDE AND DEXTROSE 2000 ML: 1.5; 538; 448; 25.7; 5.08 INJECTION, SOLUTION INTRAPERITONEAL at 06:18

## 2019-05-26 RX ADMIN — SODIUM CHLORIDE, SODIUM LACTATE, CALCIUM CHLORIDE, MAGNESIUM CHLORIDE AND DEXTROSE 2000 ML: 1.5; 538; 448; 25.7; 5.08 INJECTION, SOLUTION INTRAPERITONEAL at 13:46

## 2019-05-26 RX ADMIN — SEVELAMER CARBONATE 0.8 G: 800 POWDER, FOR SUSPENSION ORAL at 08:35

## 2019-05-26 RX ADMIN — SODIUM CHLORIDE, SODIUM LACTATE, CALCIUM CHLORIDE, MAGNESIUM CHLORIDE AND DEXTROSE 2000 ML: 1.5; 538; 448; 25.7; 5.08 INJECTION, SOLUTION INTRAPERITONEAL at 18:30

## 2019-05-26 RX ADMIN — OXYCODONE HYDROCHLORIDE 5 MG: 5 TABLET ORAL at 16:30

## 2019-05-26 RX ADMIN — ATORVASTATIN CALCIUM 10 MG: 10 TABLET, FILM COATED ORAL at 08:35

## 2019-05-26 RX ADMIN — POLYETHYLENE GLYCOL 3350 17 G: 17 POWDER, FOR SOLUTION ORAL at 20:12

## 2019-05-26 RX ADMIN — POLYETHYLENE GLYCOL 3350 17 G: 17 POWDER, FOR SOLUTION ORAL at 11:30

## 2019-05-26 RX ADMIN — PANTOPRAZOLE SODIUM 40 MG: 40 TABLET, DELAYED RELEASE ORAL at 06:20

## 2019-05-26 RX ADMIN — INSULIN LISPRO 2 UNITS: 100 INJECTION, SOLUTION INTRAVENOUS; SUBCUTANEOUS at 11:30

## 2019-05-26 RX ADMIN — CALCITRIOL 0.25 MCG: 0.25 CAPSULE ORAL at 08:35

## 2019-05-26 RX ADMIN — SEVELAMER CARBONATE 0.8 G: 800 POWDER, FOR SUSPENSION ORAL at 16:30

## 2019-05-26 RX ADMIN — TACROLIMUS 0.5 MG: 0.5 CAPSULE ORAL at 11:30

## 2019-05-26 RX ADMIN — OXYCODONE HYDROCHLORIDE 5 MG: 5 TABLET ORAL at 06:20

## 2019-05-26 RX ADMIN — OXYCODONE HYDROCHLORIDE 5 MG: 5 TABLET ORAL at 23:24

## 2019-05-26 RX ADMIN — HEPARIN SODIUM 5000 UNITS: 5000 INJECTION INTRAVENOUS; SUBCUTANEOUS at 08:35

## 2019-05-26 RX ADMIN — ALPRAZOLAM 0.5 MG: 0.5 TABLET ORAL at 20:13

## 2019-05-26 RX ADMIN — ONDANSETRON HYDROCHLORIDE 4 MG: 4 TABLET, FILM COATED ORAL at 12:25

## 2019-05-26 RX ADMIN — FUROSEMIDE 40 MG: 40 TABLET ORAL at 06:20

## 2019-05-26 RX ADMIN — Medication 500 MG: at 20:12

## 2019-05-26 RX ADMIN — METOPROLOL TARTRATE 100 MG: 100 TABLET ORAL at 08:35

## 2019-05-26 RX ADMIN — CEFEPIME HYDROCHLORIDE 1 G: 1 INJECTION, POWDER, FOR SOLUTION INTRAMUSCULAR; INTRAVENOUS at 12:15

## 2019-05-26 RX ADMIN — SODIUM CHLORIDE, SODIUM LACTATE, CALCIUM CHLORIDE, MAGNESIUM CHLORIDE AND DEXTROSE 2000 ML: 1.5; 538; 448; 25.7; 5.08 INJECTION, SOLUTION INTRAPERITONEAL at 10:00

## 2019-05-26 RX ADMIN — Medication 1 CAPSULE: at 08:35

## 2019-05-26 RX ADMIN — Medication 500 MG: at 08:35

## 2019-05-26 RX ADMIN — LEVOTHYROXINE SODIUM 75 MCG: 75 TABLET ORAL at 06:20

## 2019-05-26 RX ADMIN — SODIUM CHLORIDE, PRESERVATIVE FREE 3 ML: 5 INJECTION INTRAVENOUS at 08:38

## 2019-05-26 RX ADMIN — METOPROLOL TARTRATE 100 MG: 100 TABLET ORAL at 20:13

## 2019-05-26 RX ADMIN — SENNOSIDES AND DOCUSATE SODIUM 2 TABLET: 8.6; 5 TABLET ORAL at 20:12

## 2019-05-27 LAB
ANION GAP SERPL CALCULATED.3IONS-SCNC: 14 MMOL/L
BASOPHILS # BLD AUTO: 0.02 10*3/MM3 (ref 0–0.2)
BASOPHILS NFR BLD AUTO: 0.1 % (ref 0–1.5)
BUN BLD-MCNC: 46 MG/DL (ref 8–23)
BUN/CREAT SERPL: 9.8 (ref 7–25)
CALCIUM SPEC-SCNC: 9.2 MG/DL (ref 8.6–10.5)
CHLORIDE SERPL-SCNC: 88 MMOL/L (ref 98–107)
CO2 SERPL-SCNC: 27 MMOL/L (ref 22–29)
CREAT BLD-MCNC: 4.71 MG/DL (ref 0.57–1)
CRP SERPL-MCNC: 10.28 MG/DL (ref 0–0.5)
DEPRECATED RDW RBC AUTO: 46.9 FL (ref 37–54)
EOSINOPHIL # BLD AUTO: 0.19 10*3/MM3 (ref 0–0.4)
EOSINOPHIL NFR BLD AUTO: 1.4 % (ref 0.3–6.2)
ERYTHROCYTE [DISTWIDTH] IN BLOOD BY AUTOMATED COUNT: 13.3 % (ref 12.3–15.4)
ERYTHROCYTE [SEDIMENTATION RATE] IN BLOOD: 99 MM/HR (ref 0–30)
FERRITIN SERPL-MCNC: 2374 NG/ML (ref 13–150)
FOLATE SERPL-MCNC: >20 NG/ML (ref 4.78–24.2)
GFR SERPL CREATININE-BSD FRML MDRD: 9 ML/MIN/1.73
GFR SERPL CREATININE-BSD FRML MDRD: ABNORMAL ML/MIN/1.73
GLUCOSE BLD-MCNC: 105 MG/DL (ref 65–99)
GLUCOSE BLDC GLUCOMTR-MCNC: 136 MG/DL (ref 70–130)
GLUCOSE BLDC GLUCOMTR-MCNC: 151 MG/DL (ref 70–130)
GLUCOSE BLDC GLUCOMTR-MCNC: 163 MG/DL (ref 70–130)
GLUCOSE BLDC GLUCOMTR-MCNC: 191 MG/DL (ref 70–130)
HCT VFR BLD AUTO: 26.1 % (ref 34–46.6)
HGB BLD-MCNC: 8.6 G/DL (ref 12–15.9)
IMM GRANULOCYTES # BLD AUTO: 0.52 10*3/MM3 (ref 0–0.05)
IMM GRANULOCYTES NFR BLD AUTO: 3.8 % (ref 0–0.5)
IRON 24H UR-MRATE: 27 MCG/DL (ref 37–145)
IRON SATN MFR SERPL: 14 % (ref 20–50)
LYMPHOCYTES # BLD AUTO: 2.01 10*3/MM3 (ref 0.7–3.1)
LYMPHOCYTES NFR BLD AUTO: 14.8 % (ref 19.6–45.3)
MCH RBC QN AUTO: 32.2 PG (ref 26.6–33)
MCHC RBC AUTO-ENTMCNC: 33 G/DL (ref 31.5–35.7)
MCV RBC AUTO: 97.8 FL (ref 79–97)
MONOCYTES # BLD AUTO: 1.18 10*3/MM3 (ref 0.1–0.9)
MONOCYTES NFR BLD AUTO: 8.7 % (ref 5–12)
NEUTROPHILS # BLD AUTO: 9.65 10*3/MM3 (ref 1.7–7)
NEUTROPHILS NFR BLD AUTO: 71.2 % (ref 42.7–76)
PLATELET # BLD AUTO: 349 10*3/MM3 (ref 140–450)
PMV BLD AUTO: 8.5 FL (ref 6–12)
POTASSIUM BLD-SCNC: 3.6 MMOL/L (ref 3.5–5.2)
RBC # BLD AUTO: 2.67 10*6/MM3 (ref 3.77–5.28)
RETICS # AUTO: 0.1 10*6/MM3 (ref 0.02–0.13)
RETICS/RBC NFR AUTO: 3.57 % (ref 0.7–1.9)
SODIUM BLD-SCNC: 129 MMOL/L (ref 136–145)
TIBC SERPL-MCNC: 186 MCG/DL (ref 298–536)
TRANSFERRIN SERPL-MCNC: 125 MG/DL (ref 200–360)
VIT B12 BLD-MCNC: 1492 PG/ML (ref 211–946)
WBC NRBC COR # BLD: 13.57 10*3/MM3 (ref 3.4–10.8)

## 2019-05-27 PROCEDURE — 63710000001 TACROLIMUS PER 1 MG: Performed by: INTERNAL MEDICINE

## 2019-05-27 PROCEDURE — 99232 SBSQ HOSP IP/OBS MODERATE 35: CPT | Performed by: HOSPITALIST

## 2019-05-27 PROCEDURE — 82728 ASSAY OF FERRITIN: CPT | Performed by: PHYSICIAN ASSISTANT

## 2019-05-27 PROCEDURE — 80048 BASIC METABOLIC PNL TOTAL CA: CPT | Performed by: PHYSICIAN ASSISTANT

## 2019-05-27 PROCEDURE — 63710000001 INSULIN DETEMIR PER 5 UNITS: Performed by: PHYSICIAN ASSISTANT

## 2019-05-27 PROCEDURE — 85652 RBC SED RATE AUTOMATED: CPT | Performed by: PHYSICIAN ASSISTANT

## 2019-05-27 PROCEDURE — 25010000002 ONDANSETRON PER 1 MG: Performed by: ORTHOPAEDIC SURGERY

## 2019-05-27 PROCEDURE — 97530 THERAPEUTIC ACTIVITIES: CPT

## 2019-05-27 PROCEDURE — 86140 C-REACTIVE PROTEIN: CPT | Performed by: PHYSICIAN ASSISTANT

## 2019-05-27 PROCEDURE — 82962 GLUCOSE BLOOD TEST: CPT

## 2019-05-27 PROCEDURE — 84466 ASSAY OF TRANSFERRIN: CPT | Performed by: PHYSICIAN ASSISTANT

## 2019-05-27 PROCEDURE — 85025 COMPLETE CBC W/AUTO DIFF WBC: CPT | Performed by: PHYSICIAN ASSISTANT

## 2019-05-27 PROCEDURE — 82746 ASSAY OF FOLIC ACID SERUM: CPT | Performed by: PHYSICIAN ASSISTANT

## 2019-05-27 PROCEDURE — 83540 ASSAY OF IRON: CPT | Performed by: PHYSICIAN ASSISTANT

## 2019-05-27 PROCEDURE — 97110 THERAPEUTIC EXERCISES: CPT

## 2019-05-27 PROCEDURE — 85045 AUTOMATED RETICULOCYTE COUNT: CPT | Performed by: PHYSICIAN ASSISTANT

## 2019-05-27 PROCEDURE — 82607 VITAMIN B-12: CPT | Performed by: PHYSICIAN ASSISTANT

## 2019-05-27 PROCEDURE — 25010000002 HEPARIN (PORCINE) PER 1000 UNITS: Performed by: INTERNAL MEDICINE

## 2019-05-27 RX ADMIN — OXYCODONE HYDROCHLORIDE 5 MG: 5 TABLET ORAL at 05:34

## 2019-05-27 RX ADMIN — INSULIN LISPRO 2 UNITS: 100 INJECTION, SOLUTION INTRAVENOUS; SUBCUTANEOUS at 12:04

## 2019-05-27 RX ADMIN — CALCITRIOL 0.25 MCG: 0.25 CAPSULE ORAL at 09:36

## 2019-05-27 RX ADMIN — OXYCODONE HYDROCHLORIDE 5 MG: 5 TABLET ORAL at 20:12

## 2019-05-27 RX ADMIN — LEVOTHYROXINE SODIUM 75 MCG: 75 TABLET ORAL at 05:35

## 2019-05-27 RX ADMIN — TACROLIMUS 0.5 MG: 0.5 CAPSULE ORAL at 12:04

## 2019-05-27 RX ADMIN — OXYCODONE HYDROCHLORIDE 5 MG: 5 TABLET ORAL at 13:26

## 2019-05-27 RX ADMIN — SEVELAMER CARBONATE 0.8 G: 800 POWDER, FOR SUSPENSION ORAL at 17:20

## 2019-05-27 RX ADMIN — POLYETHYLENE GLYCOL 3350 17 G: 17 POWDER, FOR SOLUTION ORAL at 20:22

## 2019-05-27 RX ADMIN — METOPROLOL TARTRATE 100 MG: 100 TABLET ORAL at 09:35

## 2019-05-27 RX ADMIN — Medication 500 MG: at 09:35

## 2019-05-27 RX ADMIN — NYSTATIN: 100000 POWDER TOPICAL at 20:22

## 2019-05-27 RX ADMIN — ATORVASTATIN CALCIUM 10 MG: 10 TABLET, FILM COATED ORAL at 09:35

## 2019-05-27 RX ADMIN — TACROLIMUS 0.5 MG: 0.5 CAPSULE ORAL at 00:17

## 2019-05-27 RX ADMIN — INSULIN LISPRO 2 UNITS: 100 INJECTION, SOLUTION INTRAVENOUS; SUBCUTANEOUS at 17:19

## 2019-05-27 RX ADMIN — SODIUM CHLORIDE, SODIUM LACTATE, CALCIUM CHLORIDE, MAGNESIUM CHLORIDE AND DEXTROSE 2000 ML: 1.5; 538; 448; 25.7; 5.08 INJECTION, SOLUTION INTRAPERITONEAL at 14:41

## 2019-05-27 RX ADMIN — Medication 500 MG: at 20:13

## 2019-05-27 RX ADMIN — SODIUM CHLORIDE, SODIUM LACTATE, CALCIUM CHLORIDE, MAGNESIUM CHLORIDE AND DEXTROSE 2000 ML: 1.5; 538; 448; 25.7; 5.08 INJECTION, SOLUTION INTRAPERITONEAL at 06:31

## 2019-05-27 RX ADMIN — SODIUM CHLORIDE, SODIUM LACTATE, CALCIUM CHLORIDE, MAGNESIUM CHLORIDE AND DEXTROSE 2000 ML: 1.5; 538; 448; 25.7; 5.08 INJECTION, SOLUTION INTRAPERITONEAL at 17:23

## 2019-05-27 RX ADMIN — AMIODARONE HYDROCHLORIDE 100 MG: 200 TABLET ORAL at 09:36

## 2019-05-27 RX ADMIN — SODIUM CHLORIDE, SODIUM LACTATE, CALCIUM CHLORIDE, MAGNESIUM CHLORIDE AND DEXTROSE 2000 ML: 1.5; 538; 448; 25.7; 5.08 INJECTION, SOLUTION INTRAPERITONEAL at 22:00

## 2019-05-27 RX ADMIN — METOPROLOL TARTRATE 100 MG: 100 TABLET ORAL at 20:13

## 2019-05-27 RX ADMIN — POLYETHYLENE GLYCOL 3350 17 G: 17 POWDER, FOR SOLUTION ORAL at 09:35

## 2019-05-27 RX ADMIN — PANTOPRAZOLE SODIUM 40 MG: 40 TABLET, DELAYED RELEASE ORAL at 05:34

## 2019-05-27 RX ADMIN — TAMSULOSIN HYDROCHLORIDE 0.4 MG: 0.4 CAPSULE ORAL at 09:36

## 2019-05-27 RX ADMIN — SEVELAMER CARBONATE 0.8 G: 800 POWDER, FOR SUSPENSION ORAL at 09:35

## 2019-05-27 RX ADMIN — INSULIN DETEMIR 3 UNITS: 100 INJECTION, SOLUTION SUBCUTANEOUS at 22:00

## 2019-05-27 RX ADMIN — INSULIN LISPRO 2 UNITS: 100 INJECTION, SOLUTION INTRAVENOUS; SUBCUTANEOUS at 21:00

## 2019-05-27 RX ADMIN — SODIUM CHLORIDE, SODIUM LACTATE, CALCIUM CHLORIDE, MAGNESIUM CHLORIDE AND DEXTROSE 2000 ML: 1.5; 538; 448; 25.7; 5.08 INJECTION, SOLUTION INTRAPERITONEAL at 10:03

## 2019-05-27 RX ADMIN — Medication 1 CAPSULE: at 09:35

## 2019-05-27 RX ADMIN — ONDANSETRON 4 MG: 2 INJECTION INTRAMUSCULAR; INTRAVENOUS at 20:13

## 2019-05-27 RX ADMIN — SODIUM CHLORIDE, PRESERVATIVE FREE 3 ML: 5 INJECTION INTRAVENOUS at 09:36

## 2019-05-27 RX ADMIN — SODIUM CHLORIDE, PRESERVATIVE FREE 3 ML: 5 INJECTION INTRAVENOUS at 20:23

## 2019-05-27 RX ADMIN — SEVELAMER CARBONATE 0.8 G: 800 POWDER, FOR SUSPENSION ORAL at 12:04

## 2019-05-27 RX ADMIN — NYSTATIN: 100000 POWDER TOPICAL at 09:35

## 2019-05-27 RX ADMIN — SENNOSIDES AND DOCUSATE SODIUM 2 TABLET: 8.6; 5 TABLET ORAL at 20:13

## 2019-05-27 RX ADMIN — HEPARIN SODIUM 5000 UNITS: 5000 INJECTION INTRAVENOUS; SUBCUTANEOUS at 20:13

## 2019-05-27 RX ADMIN — HEPARIN SODIUM 5000 UNITS: 5000 INJECTION INTRAVENOUS; SUBCUTANEOUS at 09:35

## 2019-05-27 RX ADMIN — FUROSEMIDE 40 MG: 40 TABLET ORAL at 05:35

## 2019-05-28 LAB
BASOPHILS # BLD AUTO: 0.02 10*3/MM3 (ref 0–0.2)
BASOPHILS NFR BLD AUTO: 0.1 % (ref 0–1.5)
DEPRECATED RDW RBC AUTO: 47.9 FL (ref 37–54)
EOSINOPHIL # BLD AUTO: 0.19 10*3/MM3 (ref 0–0.4)
EOSINOPHIL NFR BLD AUTO: 1.3 % (ref 0.3–6.2)
ERYTHROCYTE [DISTWIDTH] IN BLOOD BY AUTOMATED COUNT: 13.5 % (ref 12.3–15.4)
GLUCOSE BLDC GLUCOMTR-MCNC: 123 MG/DL (ref 70–130)
GLUCOSE BLDC GLUCOMTR-MCNC: 135 MG/DL (ref 70–130)
GLUCOSE BLDC GLUCOMTR-MCNC: 161 MG/DL (ref 70–130)
GLUCOSE BLDC GLUCOMTR-MCNC: 174 MG/DL (ref 70–130)
HCT VFR BLD AUTO: 24.3 % (ref 34–46.6)
HGB BLD-MCNC: 7.8 G/DL (ref 12–15.9)
IMM GRANULOCYTES # BLD AUTO: 0.55 10*3/MM3 (ref 0–0.05)
IMM GRANULOCYTES NFR BLD AUTO: 3.9 % (ref 0–0.5)
LYMPHOCYTES # BLD AUTO: 1.79 10*3/MM3 (ref 0.7–3.1)
LYMPHOCYTES NFR BLD AUTO: 12.7 % (ref 19.6–45.3)
MCH RBC QN AUTO: 32 PG (ref 26.6–33)
MCHC RBC AUTO-ENTMCNC: 32.1 G/DL (ref 31.5–35.7)
MCV RBC AUTO: 99.6 FL (ref 79–97)
MONOCYTES # BLD AUTO: 1.71 10*3/MM3 (ref 0.1–0.9)
MONOCYTES NFR BLD AUTO: 12.1 % (ref 5–12)
NEUTROPHILS # BLD AUTO: 10.44 10*3/MM3 (ref 1.7–7)
NEUTROPHILS NFR BLD AUTO: 73.8 % (ref 42.7–76)
PLATELET # BLD AUTO: 362 10*3/MM3 (ref 140–450)
PMV BLD AUTO: 9.1 FL (ref 6–12)
RBC # BLD AUTO: 2.44 10*6/MM3 (ref 3.77–5.28)
VANCOMYCIN SERPL-MCNC: 23.2 MCG/ML (ref 5–40)
WBC NRBC COR # BLD: 14.15 10*3/MM3 (ref 3.4–10.8)

## 2019-05-28 PROCEDURE — 94640 AIRWAY INHALATION TREATMENT: CPT

## 2019-05-28 PROCEDURE — 99233 SBSQ HOSP IP/OBS HIGH 50: CPT | Performed by: NURSE PRACTITIONER

## 2019-05-28 PROCEDURE — 25010000002 ONDANSETRON PER 1 MG: Performed by: ORTHOPAEDIC SURGERY

## 2019-05-28 PROCEDURE — 80202 ASSAY OF VANCOMYCIN: CPT

## 2019-05-28 PROCEDURE — 82962 GLUCOSE BLOOD TEST: CPT

## 2019-05-28 PROCEDURE — 85025 COMPLETE CBC W/AUTO DIFF WBC: CPT | Performed by: HOSPITALIST

## 2019-05-28 PROCEDURE — 97116 GAIT TRAINING THERAPY: CPT

## 2019-05-28 PROCEDURE — 63710000001 TACROLIMUS PER 1 MG: Performed by: INTERNAL MEDICINE

## 2019-05-28 PROCEDURE — 63710000001 INSULIN DETEMIR PER 5 UNITS: Performed by: PHYSICIAN ASSISTANT

## 2019-05-28 PROCEDURE — 25010000002 HEPARIN (PORCINE) PER 1000 UNITS: Performed by: INTERNAL MEDICINE

## 2019-05-28 PROCEDURE — 97530 THERAPEUTIC ACTIVITIES: CPT

## 2019-05-28 PROCEDURE — 97110 THERAPEUTIC EXERCISES: CPT

## 2019-05-28 PROCEDURE — 97535 SELF CARE MNGMENT TRAINING: CPT

## 2019-05-28 RX ADMIN — TACROLIMUS 0.5 MG: 0.5 CAPSULE ORAL at 02:45

## 2019-05-28 RX ADMIN — SODIUM CHLORIDE, PRESERVATIVE FREE 3 ML: 5 INJECTION INTRAVENOUS at 08:56

## 2019-05-28 RX ADMIN — ONDANSETRON 4 MG: 2 INJECTION INTRAMUSCULAR; INTRAVENOUS at 18:11

## 2019-05-28 RX ADMIN — INSULIN DETEMIR 3 UNITS: 100 INJECTION, SOLUTION SUBCUTANEOUS at 22:37

## 2019-05-28 RX ADMIN — SEVELAMER CARBONATE 0.8 G: 800 POWDER, FOR SUSPENSION ORAL at 11:20

## 2019-05-28 RX ADMIN — ALPRAZOLAM 0.5 MG: 0.5 TABLET ORAL at 21:08

## 2019-05-28 RX ADMIN — ATORVASTATIN CALCIUM 10 MG: 10 TABLET, FILM COATED ORAL at 08:54

## 2019-05-28 RX ADMIN — PANTOPRAZOLE SODIUM 40 MG: 40 TABLET, DELAYED RELEASE ORAL at 05:55

## 2019-05-28 RX ADMIN — POLYETHYLENE GLYCOL 3350 17 G: 17 POWDER, FOR SOLUTION ORAL at 08:55

## 2019-05-28 RX ADMIN — HEPARIN SODIUM 5000 UNITS: 5000 INJECTION INTRAVENOUS; SUBCUTANEOUS at 21:07

## 2019-05-28 RX ADMIN — SODIUM CHLORIDE, SODIUM LACTATE, CALCIUM CHLORIDE, MAGNESIUM CHLORIDE AND DEXTROSE 2000 ML: 1.5; 538; 448; 25.7; 5.08 INJECTION, SOLUTION INTRAPERITONEAL at 11:00

## 2019-05-28 RX ADMIN — SODIUM CHLORIDE, SODIUM LACTATE, CALCIUM CHLORIDE, MAGNESIUM CHLORIDE AND DEXTROSE 2000 ML: 1.5; 538; 448; 25.7; 5.08 INJECTION, SOLUTION INTRAPERITONEAL at 14:33

## 2019-05-28 RX ADMIN — SODIUM CHLORIDE, SODIUM LACTATE, CALCIUM CHLORIDE, MAGNESIUM CHLORIDE AND DEXTROSE 2000 ML: 1.5; 538; 448; 25.7; 5.08 INJECTION, SOLUTION INTRAPERITONEAL at 22:36

## 2019-05-28 RX ADMIN — OXYCODONE HYDROCHLORIDE 5 MG: 5 TABLET ORAL at 12:10

## 2019-05-28 RX ADMIN — AMIODARONE HYDROCHLORIDE 100 MG: 200 TABLET ORAL at 08:55

## 2019-05-28 RX ADMIN — SEVELAMER CARBONATE 0.8 G: 800 POWDER, FOR SUSPENSION ORAL at 17:09

## 2019-05-28 RX ADMIN — FUROSEMIDE 40 MG: 40 TABLET ORAL at 05:55

## 2019-05-28 RX ADMIN — Medication 1 CAPSULE: at 08:55

## 2019-05-28 RX ADMIN — METOPROLOL TARTRATE 100 MG: 100 TABLET ORAL at 21:07

## 2019-05-28 RX ADMIN — LEVOTHYROXINE SODIUM 75 MCG: 75 TABLET ORAL at 05:55

## 2019-05-28 RX ADMIN — CEFEPIME HYDROCHLORIDE 1 G: 1 INJECTION, POWDER, FOR SOLUTION INTRAMUSCULAR; INTRAVENOUS at 11:19

## 2019-05-28 RX ADMIN — ALBUTEROL SULFATE 2.5 MG: 2.5 SOLUTION RESPIRATORY (INHALATION) at 16:24

## 2019-05-28 RX ADMIN — TACROLIMUS 0.5 MG: 0.5 CAPSULE ORAL at 11:20

## 2019-05-28 RX ADMIN — Medication 500 MG: at 09:00

## 2019-05-28 RX ADMIN — SODIUM CHLORIDE, SODIUM LACTATE, CALCIUM CHLORIDE, MAGNESIUM CHLORIDE AND DEXTROSE 2000 ML: 1.5; 538; 448; 25.7; 5.08 INJECTION, SOLUTION INTRAPERITONEAL at 18:30

## 2019-05-28 RX ADMIN — SENNOSIDES AND DOCUSATE SODIUM 2 TABLET: 8.6; 5 TABLET ORAL at 21:07

## 2019-05-28 RX ADMIN — ACETAMINOPHEN 650 MG: 325 TABLET ORAL at 17:15

## 2019-05-28 RX ADMIN — OXYCODONE HYDROCHLORIDE 5 MG: 5 TABLET ORAL at 21:08

## 2019-05-28 RX ADMIN — METOPROLOL TARTRATE 100 MG: 100 TABLET ORAL at 08:55

## 2019-05-28 RX ADMIN — INSULIN LISPRO 2 UNITS: 100 INJECTION, SOLUTION INTRAVENOUS; SUBCUTANEOUS at 17:09

## 2019-05-28 RX ADMIN — NYSTATIN: 100000 POWDER TOPICAL at 21:08

## 2019-05-28 RX ADMIN — SEVELAMER CARBONATE 0.8 G: 800 POWDER, FOR SUSPENSION ORAL at 08:54

## 2019-05-28 RX ADMIN — CALCITRIOL 0.25 MCG: 0.25 CAPSULE ORAL at 08:55

## 2019-05-28 RX ADMIN — INSULIN LISPRO 2 UNITS: 100 INJECTION, SOLUTION INTRAVENOUS; SUBCUTANEOUS at 21:08

## 2019-05-28 RX ADMIN — DOCUSATE SODIUM 100 MG: 100 CAPSULE, LIQUID FILLED ORAL at 08:55

## 2019-05-28 RX ADMIN — BISACODYL 10 MG: 5 TABLET, COATED ORAL at 11:20

## 2019-05-28 RX ADMIN — HEPARIN SODIUM 5000 UNITS: 5000 INJECTION INTRAVENOUS; SUBCUTANEOUS at 08:58

## 2019-05-28 RX ADMIN — OXYCODONE HYDROCHLORIDE 5 MG: 5 TABLET ORAL at 16:06

## 2019-05-28 RX ADMIN — Medication 500 MG: at 21:07

## 2019-05-28 RX ADMIN — NYSTATIN: 100000 POWDER TOPICAL at 08:54

## 2019-05-28 RX ADMIN — TAMSULOSIN HYDROCHLORIDE 0.4 MG: 0.4 CAPSULE ORAL at 08:55

## 2019-05-28 RX ADMIN — SODIUM CHLORIDE, SODIUM LACTATE, CALCIUM CHLORIDE, MAGNESIUM CHLORIDE AND DEXTROSE 2000 ML: 1.5; 538; 448; 25.7; 5.08 INJECTION, SOLUTION INTRAPERITONEAL at 06:00

## 2019-05-29 LAB
ANION GAP SERPL CALCULATED.3IONS-SCNC: 13 MMOL/L
BASOPHILS # BLD AUTO: 0.03 10*3/MM3 (ref 0–0.2)
BASOPHILS NFR BLD AUTO: 0.2 % (ref 0–1.5)
BUN BLD-MCNC: 41 MG/DL (ref 8–23)
BUN/CREAT SERPL: 9.4 (ref 7–25)
CALCIUM SPEC-SCNC: 9.5 MG/DL (ref 8.6–10.5)
CHLORIDE SERPL-SCNC: 87 MMOL/L (ref 98–107)
CO2 SERPL-SCNC: 24 MMOL/L (ref 22–29)
CREAT BLD-MCNC: 4.34 MG/DL (ref 0.57–1)
DEPRECATED RDW RBC AUTO: 47.5 FL (ref 37–54)
EOSINOPHIL # BLD AUTO: 0.24 10*3/MM3 (ref 0–0.4)
EOSINOPHIL NFR BLD AUTO: 1.7 % (ref 0.3–6.2)
ERYTHROCYTE [DISTWIDTH] IN BLOOD BY AUTOMATED COUNT: 13.5 % (ref 12.3–15.4)
GFR SERPL CREATININE-BSD FRML MDRD: 10 ML/MIN/1.73
GFR SERPL CREATININE-BSD FRML MDRD: ABNORMAL ML/MIN/1.73
GLUCOSE BLD-MCNC: 93 MG/DL (ref 65–99)
GLUCOSE BLDC GLUCOMTR-MCNC: 119 MG/DL (ref 70–130)
GLUCOSE BLDC GLUCOMTR-MCNC: 123 MG/DL (ref 70–130)
GLUCOSE BLDC GLUCOMTR-MCNC: 163 MG/DL (ref 70–130)
GLUCOSE BLDC GLUCOMTR-MCNC: 163 MG/DL (ref 70–130)
HCT VFR BLD AUTO: 24.8 % (ref 34–46.6)
HGB BLD-MCNC: 8 G/DL (ref 12–15.9)
IMM GRANULOCYTES # BLD AUTO: 0.35 10*3/MM3 (ref 0–0.05)
IMM GRANULOCYTES NFR BLD AUTO: 2.4 % (ref 0–0.5)
LYMPHOCYTES # BLD AUTO: 2.03 10*3/MM3 (ref 0.7–3.1)
LYMPHOCYTES NFR BLD AUTO: 14.1 % (ref 19.6–45.3)
MCH RBC QN AUTO: 31.9 PG (ref 26.6–33)
MCHC RBC AUTO-ENTMCNC: 32.3 G/DL (ref 31.5–35.7)
MCV RBC AUTO: 98.8 FL (ref 79–97)
MONOCYTES # BLD AUTO: 1.36 10*3/MM3 (ref 0.1–0.9)
MONOCYTES NFR BLD AUTO: 9.5 % (ref 5–12)
NEUTROPHILS # BLD AUTO: 10.7 10*3/MM3 (ref 1.7–7)
NEUTROPHILS NFR BLD AUTO: 74.5 % (ref 42.7–76)
PLAT MORPH BLD: NORMAL
PLATELET # BLD AUTO: 370 10*3/MM3 (ref 140–450)
PMV BLD AUTO: 9.1 FL (ref 6–12)
POTASSIUM BLD-SCNC: 3.5 MMOL/L (ref 3.5–5.2)
RBC # BLD AUTO: 2.51 10*6/MM3 (ref 3.77–5.28)
RBC MORPH BLD: NORMAL
SODIUM BLD-SCNC: 124 MMOL/L (ref 136–145)
WBC MORPH BLD: NORMAL
WBC NRBC COR # BLD: 14.36 10*3/MM3 (ref 3.4–10.8)

## 2019-05-29 PROCEDURE — 82962 GLUCOSE BLOOD TEST: CPT

## 2019-05-29 PROCEDURE — 85007 BL SMEAR W/DIFF WBC COUNT: CPT | Performed by: NURSE PRACTITIONER

## 2019-05-29 PROCEDURE — 25010000002 EPOETIN ALFA PER 1000 UNITS: Performed by: INTERNAL MEDICINE

## 2019-05-29 PROCEDURE — 63710000001 TACROLIMUS PER 1 MG: Performed by: INTERNAL MEDICINE

## 2019-05-29 PROCEDURE — 25010000002 ONDANSETRON PER 1 MG: Performed by: ORTHOPAEDIC SURGERY

## 2019-05-29 PROCEDURE — 63710000001 INSULIN DETEMIR PER 5 UNITS: Performed by: PHYSICIAN ASSISTANT

## 2019-05-29 PROCEDURE — 99233 SBSQ HOSP IP/OBS HIGH 50: CPT | Performed by: NURSE PRACTITIONER

## 2019-05-29 PROCEDURE — 97530 THERAPEUTIC ACTIVITIES: CPT

## 2019-05-29 PROCEDURE — 97110 THERAPEUTIC EXERCISES: CPT

## 2019-05-29 PROCEDURE — 25010000002 HEPARIN (PORCINE) PER 1000 UNITS: Performed by: INTERNAL MEDICINE

## 2019-05-29 PROCEDURE — 85025 COMPLETE CBC W/AUTO DIFF WBC: CPT | Performed by: NURSE PRACTITIONER

## 2019-05-29 PROCEDURE — 97116 GAIT TRAINING THERAPY: CPT

## 2019-05-29 PROCEDURE — 80048 BASIC METABOLIC PNL TOTAL CA: CPT | Performed by: NURSE PRACTITIONER

## 2019-05-29 RX ORDER — SODIUM CHLORIDE 1000 MG
1 TABLET, SOLUBLE MISCELLANEOUS
Status: DISCONTINUED | OUTPATIENT
Start: 2019-05-29 | End: 2019-05-30 | Stop reason: HOSPADM

## 2019-05-29 RX ADMIN — LEVOTHYROXINE SODIUM 75 MCG: 75 TABLET ORAL at 05:45

## 2019-05-29 RX ADMIN — TACROLIMUS 0.5 MG: 0.5 CAPSULE ORAL at 12:40

## 2019-05-29 RX ADMIN — INSULIN LISPRO 2 UNITS: 100 INJECTION, SOLUTION INTRAVENOUS; SUBCUTANEOUS at 21:40

## 2019-05-29 RX ADMIN — Medication 500 MG: at 08:37

## 2019-05-29 RX ADMIN — TACROLIMUS 0.5 MG: 0.5 CAPSULE ORAL at 00:30

## 2019-05-29 RX ADMIN — SODIUM CHLORIDE, SODIUM LACTATE, CALCIUM CHLORIDE, MAGNESIUM CHLORIDE AND DEXTROSE 2000 ML: 1.5; 538; 448; 25.7; 5.08 INJECTION, SOLUTION INTRAPERITONEAL at 17:56

## 2019-05-29 RX ADMIN — BISACODYL 10 MG: 5 TABLET, COATED ORAL at 08:37

## 2019-05-29 RX ADMIN — ACETAMINOPHEN 650 MG: 325 TABLET ORAL at 18:42

## 2019-05-29 RX ADMIN — PANTOPRAZOLE SODIUM 40 MG: 40 TABLET, DELAYED RELEASE ORAL at 05:45

## 2019-05-29 RX ADMIN — SODIUM CHLORIDE, SODIUM LACTATE, CALCIUM CHLORIDE, MAGNESIUM CHLORIDE AND DEXTROSE 2000 ML: 1.5; 538; 448; 25.7; 5.08 INJECTION, SOLUTION INTRAPERITONEAL at 06:19

## 2019-05-29 RX ADMIN — SEVELAMER CARBONATE 0.8 G: 800 POWDER, FOR SUSPENSION ORAL at 08:37

## 2019-05-29 RX ADMIN — SODIUM CHLORIDE, SODIUM LACTATE, CALCIUM CHLORIDE, MAGNESIUM CHLORIDE AND DEXTROSE 2000 ML: 1.5; 538; 448; 25.7; 5.08 INJECTION, SOLUTION INTRAPERITONEAL at 22:16

## 2019-05-29 RX ADMIN — HEPARIN SODIUM 5000 UNITS: 5000 INJECTION INTRAVENOUS; SUBCUTANEOUS at 08:37

## 2019-05-29 RX ADMIN — INSULIN DETEMIR 3 UNITS: 100 INJECTION, SOLUTION SUBCUTANEOUS at 21:42

## 2019-05-29 RX ADMIN — Medication 1 G: at 17:15

## 2019-05-29 RX ADMIN — HEPARIN SODIUM 5000 UNITS: 5000 INJECTION INTRAVENOUS; SUBCUTANEOUS at 21:41

## 2019-05-29 RX ADMIN — INSULIN LISPRO 2 UNITS: 100 INJECTION, SOLUTION INTRAVENOUS; SUBCUTANEOUS at 12:40

## 2019-05-29 RX ADMIN — DOCUSATE SODIUM 100 MG: 100 CAPSULE, LIQUID FILLED ORAL at 08:37

## 2019-05-29 RX ADMIN — CALCITRIOL 0.25 MCG: 0.25 CAPSULE ORAL at 08:37

## 2019-05-29 RX ADMIN — POLYETHYLENE GLYCOL 3350 17 G: 17 POWDER, FOR SOLUTION ORAL at 21:39

## 2019-05-29 RX ADMIN — Medication 10 MG: at 12:59

## 2019-05-29 RX ADMIN — ALPRAZOLAM 0.5 MG: 0.5 TABLET ORAL at 21:40

## 2019-05-29 RX ADMIN — TAMSULOSIN HYDROCHLORIDE 0.4 MG: 0.4 CAPSULE ORAL at 08:38

## 2019-05-29 RX ADMIN — OXYCODONE HYDROCHLORIDE 5 MG: 5 TABLET ORAL at 10:19

## 2019-05-29 RX ADMIN — METOPROLOL TARTRATE 100 MG: 100 TABLET ORAL at 21:40

## 2019-05-29 RX ADMIN — SODIUM CHLORIDE, PRESERVATIVE FREE 3 ML: 5 INJECTION INTRAVENOUS at 08:38

## 2019-05-29 RX ADMIN — ATORVASTATIN CALCIUM 10 MG: 10 TABLET, FILM COATED ORAL at 08:38

## 2019-05-29 RX ADMIN — POLYETHYLENE GLYCOL 3350 17 G: 17 POWDER, FOR SOLUTION ORAL at 08:37

## 2019-05-29 RX ADMIN — SODIUM CHLORIDE, SODIUM LACTATE, CALCIUM CHLORIDE, MAGNESIUM CHLORIDE AND DEXTROSE 2000 ML: 1.5; 538; 448; 25.7; 5.08 INJECTION, SOLUTION INTRAPERITONEAL at 11:00

## 2019-05-29 RX ADMIN — METOPROLOL TARTRATE 100 MG: 100 TABLET ORAL at 08:38

## 2019-05-29 RX ADMIN — ERYTHROPOIETIN 20000 UNITS: 20000 INJECTION, SOLUTION INTRAVENOUS; SUBCUTANEOUS at 08:47

## 2019-05-29 RX ADMIN — SEVELAMER CARBONATE 0.8 G: 800 POWDER, FOR SUSPENSION ORAL at 17:15

## 2019-05-29 RX ADMIN — AMIODARONE HYDROCHLORIDE 100 MG: 200 TABLET ORAL at 08:38

## 2019-05-29 RX ADMIN — SEVELAMER CARBONATE 0.8 G: 800 POWDER, FOR SUSPENSION ORAL at 12:40

## 2019-05-29 RX ADMIN — OXYCODONE HYDROCHLORIDE 5 MG: 5 TABLET ORAL at 21:40

## 2019-05-29 RX ADMIN — SENNOSIDES AND DOCUSATE SODIUM 2 TABLET: 8.6; 5 TABLET ORAL at 21:39

## 2019-05-29 RX ADMIN — NYSTATIN: 100000 POWDER TOPICAL at 08:37

## 2019-05-29 RX ADMIN — SODIUM CHLORIDE, SODIUM LACTATE, CALCIUM CHLORIDE, MAGNESIUM CHLORIDE AND DEXTROSE 2000 ML: 1.5; 538; 448; 25.7; 5.08 INJECTION, SOLUTION INTRAPERITONEAL at 14:45

## 2019-05-29 RX ADMIN — Medication 500 MG: at 21:39

## 2019-05-29 RX ADMIN — NYSTATIN: 100000 POWDER TOPICAL at 21:39

## 2019-05-29 RX ADMIN — Medication 1 CAPSULE: at 08:37

## 2019-05-29 RX ADMIN — FUROSEMIDE 40 MG: 40 TABLET ORAL at 05:45

## 2019-05-29 RX ADMIN — ONDANSETRON 4 MG: 2 INJECTION INTRAMUSCULAR; INTRAVENOUS at 23:27

## 2019-05-30 VITALS
SYSTOLIC BLOOD PRESSURE: 129 MMHG | RESPIRATION RATE: 18 BRPM | TEMPERATURE: 98.2 F | HEART RATE: 75 BPM | WEIGHT: 199.3 LBS | DIASTOLIC BLOOD PRESSURE: 54 MMHG | HEIGHT: 66 IN | OXYGEN SATURATION: 98 % | BODY MASS INDEX: 32.03 KG/M2

## 2019-05-30 LAB
ANION GAP SERPL CALCULATED.3IONS-SCNC: 10 MMOL/L
BUN BLD-MCNC: 39 MG/DL (ref 8–23)
BUN/CREAT SERPL: 8.6 (ref 7–25)
CALCIUM SPEC-SCNC: 9.3 MG/DL (ref 8.6–10.5)
CHLORIDE SERPL-SCNC: 87 MMOL/L (ref 98–107)
CO2 SERPL-SCNC: 28 MMOL/L (ref 22–29)
CREAT BLD-MCNC: 4.51 MG/DL (ref 0.57–1)
GFR SERPL CREATININE-BSD FRML MDRD: 9 ML/MIN/1.73
GFR SERPL CREATININE-BSD FRML MDRD: ABNORMAL ML/MIN/1.73
GLUCOSE BLD-MCNC: 164 MG/DL (ref 65–99)
GLUCOSE BLDC GLUCOMTR-MCNC: 135 MG/DL (ref 70–130)
GLUCOSE BLDC GLUCOMTR-MCNC: 145 MG/DL (ref 70–130)
INR PPP: 1.13 (ref 0.85–1.16)
POTASSIUM BLD-SCNC: 3.5 MMOL/L (ref 3.5–5.2)
PROTHROMBIN TIME: 14 SECONDS (ref 11.2–14.3)
SODIUM BLD-SCNC: 125 MMOL/L (ref 136–145)
VANCOMYCIN SERPL-MCNC: 17.6 MCG/ML (ref 5–40)

## 2019-05-30 PROCEDURE — 80048 BASIC METABOLIC PNL TOTAL CA: CPT | Performed by: INTERNAL MEDICINE

## 2019-05-30 PROCEDURE — 85610 PROTHROMBIN TIME: CPT | Performed by: HOSPITALIST

## 2019-05-30 PROCEDURE — 25010000002 HEPARIN (PORCINE) PER 1000 UNITS: Performed by: INTERNAL MEDICINE

## 2019-05-30 PROCEDURE — 99239 HOSP IP/OBS DSCHRG MGMT >30: CPT | Performed by: HOSPITALIST

## 2019-05-30 PROCEDURE — 63710000001 TACROLIMUS PER 1 MG: Performed by: INTERNAL MEDICINE

## 2019-05-30 PROCEDURE — 82962 GLUCOSE BLOOD TEST: CPT

## 2019-05-30 PROCEDURE — 80202 ASSAY OF VANCOMYCIN: CPT

## 2019-05-30 RX ORDER — OXYCODONE HYDROCHLORIDE 5 MG/1
5 TABLET ORAL EVERY 4 HOURS PRN
Qty: 18 TABLET | Refills: 0 | Status: SHIPPED | OUTPATIENT
Start: 2019-05-30 | End: 2019-06-06

## 2019-05-30 RX ORDER — WARFARIN SODIUM 2 MG/1
2 TABLET ORAL
Status: DISCONTINUED | OUTPATIENT
Start: 2019-05-31 | End: 2019-05-30 | Stop reason: HOSPADM

## 2019-05-30 RX ORDER — WARFARIN SODIUM 5 MG/1
5 TABLET ORAL
Status: DISCONTINUED | OUTPATIENT
Start: 2019-05-30 | End: 2019-05-30 | Stop reason: HOSPADM

## 2019-05-30 RX ORDER — ACETAMINOPHEN 325 MG/1
650 TABLET ORAL EVERY 4 HOURS PRN
Start: 2019-05-30

## 2019-05-30 RX ORDER — METOPROLOL TARTRATE 50 MG/1
50 TABLET, FILM COATED ORAL EVERY 12 HOURS SCHEDULED
Start: 2019-05-30 | End: 2021-01-01 | Stop reason: HOSPADM

## 2019-05-30 RX ORDER — SODIUM CHLORIDE, SODIUM LACTATE, CALCIUM CHLORIDE, MAGNESIUM CHLORIDE AND DEXTROSE 1.5; 538; 448; 25.7; 5.08 G/100ML; MG/100ML; MG/100ML; MG/100ML; MG/100ML
2000 INJECTION, SOLUTION INTRAPERITONEAL
Start: 2019-05-30 | End: 2020-07-21

## 2019-05-30 RX ORDER — L.ACID,PARA/B.BIFIDUM/S.THERM 8B CELL
1 CAPSULE ORAL DAILY
Start: 2019-05-31 | End: 2019-11-05

## 2019-05-30 RX ORDER — ALPRAZOLAM 0.5 MG/1
0.5 TABLET ORAL DAILY
Qty: 3 TABLET | Refills: 0 | Status: SHIPPED | OUTPATIENT
Start: 2019-05-30 | End: 2019-08-05 | Stop reason: SDUPTHER

## 2019-05-30 RX ORDER — HEPARIN SODIUM 5000 [USP'U]/ML
5000 INJECTION, SOLUTION INTRAVENOUS; SUBCUTANEOUS EVERY 12 HOURS SCHEDULED
Start: 2019-05-30 | End: 2019-11-05

## 2019-05-30 RX ORDER — NYSTATIN 100000 [USP'U]/G
POWDER TOPICAL EVERY 12 HOURS SCHEDULED
Start: 2019-05-30 | End: 2019-11-05

## 2019-05-30 RX ORDER — SODIUM CHLORIDE 1000 MG
1 TABLET, SOLUBLE MISCELLANEOUS 2 TIMES DAILY
Start: 2019-05-30 | End: 2019-11-05

## 2019-05-30 RX ORDER — METOPROLOL TARTRATE 50 MG/1
50 TABLET, FILM COATED ORAL EVERY 12 HOURS SCHEDULED
Status: DISCONTINUED | OUTPATIENT
Start: 2019-05-30 | End: 2019-05-30 | Stop reason: HOSPADM

## 2019-05-30 RX ADMIN — SODIUM CHLORIDE, PRESERVATIVE FREE 3 ML: 5 INJECTION INTRAVENOUS at 09:36

## 2019-05-30 RX ADMIN — BISACODYL 10 MG: 5 TABLET, COATED ORAL at 09:34

## 2019-05-30 RX ADMIN — POLYETHYLENE GLYCOL 3350 17 G: 17 POWDER, FOR SOLUTION ORAL at 09:34

## 2019-05-30 RX ADMIN — PANTOPRAZOLE SODIUM 40 MG: 40 TABLET, DELAYED RELEASE ORAL at 06:06

## 2019-05-30 RX ADMIN — TACROLIMUS 0.5 MG: 0.5 CAPSULE ORAL at 01:18

## 2019-05-30 RX ADMIN — TAMSULOSIN HYDROCHLORIDE 0.4 MG: 0.4 CAPSULE ORAL at 09:33

## 2019-05-30 RX ADMIN — Medication 500 MG: at 09:33

## 2019-05-30 RX ADMIN — SEVELAMER CARBONATE 0.8 G: 800 POWDER, FOR SUSPENSION ORAL at 13:05

## 2019-05-30 RX ADMIN — HEPARIN SODIUM 5000 UNITS: 5000 INJECTION INTRAVENOUS; SUBCUTANEOUS at 09:33

## 2019-05-30 RX ADMIN — NYSTATIN: 100000 POWDER TOPICAL at 09:36

## 2019-05-30 RX ADMIN — CEFEPIME HYDROCHLORIDE 1 G: 1 INJECTION, POWDER, FOR SOLUTION INTRAMUSCULAR; INTRAVENOUS at 10:54

## 2019-05-30 RX ADMIN — SEVELAMER CARBONATE 0.8 G: 800 POWDER, FOR SUSPENSION ORAL at 09:34

## 2019-05-30 RX ADMIN — SODIUM CHLORIDE, SODIUM LACTATE, CALCIUM CHLORIDE, MAGNESIUM CHLORIDE AND DEXTROSE 2000 ML: 1.5; 538; 448; 25.7; 5.08 INJECTION, SOLUTION INTRAPERITONEAL at 06:30

## 2019-05-30 RX ADMIN — Medication 1 G: at 09:36

## 2019-05-30 RX ADMIN — OXYCODONE HYDROCHLORIDE 5 MG: 5 TABLET ORAL at 13:13

## 2019-05-30 RX ADMIN — CALCITRIOL 0.25 MCG: 0.25 CAPSULE ORAL at 09:33

## 2019-05-30 RX ADMIN — Medication 1 CAPSULE: at 09:34

## 2019-05-30 RX ADMIN — LEVOTHYROXINE SODIUM 75 MCG: 75 TABLET ORAL at 06:06

## 2019-05-30 RX ADMIN — AMIODARONE HYDROCHLORIDE 100 MG: 200 TABLET ORAL at 09:33

## 2019-05-30 RX ADMIN — SODIUM CHLORIDE, SODIUM LACTATE, CALCIUM CHLORIDE, MAGNESIUM CHLORIDE AND DEXTROSE 2000 ML: 1.5; 538; 448; 25.7; 5.08 INJECTION, SOLUTION INTRAPERITONEAL at 14:24

## 2019-05-30 RX ADMIN — Medication 1 G: at 13:05

## 2019-05-30 RX ADMIN — TACROLIMUS 0.5 MG: 0.5 CAPSULE ORAL at 13:04

## 2019-05-30 RX ADMIN — ATORVASTATIN CALCIUM 10 MG: 10 TABLET, FILM COATED ORAL at 09:33

## 2019-05-30 RX ADMIN — OXYCODONE HYDROCHLORIDE 5 MG: 5 TABLET ORAL at 01:19

## 2019-05-30 RX ADMIN — ACETAMINOPHEN 650 MG: 325 TABLET ORAL at 09:39

## 2019-05-30 RX ADMIN — OXYCODONE HYDROCHLORIDE 5 MG: 5 TABLET ORAL at 06:09

## 2019-05-30 RX ADMIN — FUROSEMIDE 40 MG: 40 TABLET ORAL at 06:06

## 2019-05-30 RX ADMIN — SODIUM CHLORIDE, SODIUM LACTATE, CALCIUM CHLORIDE, MAGNESIUM CHLORIDE AND DEXTROSE 2000 ML: 1.5; 538; 448; 25.7; 5.08 INJECTION, SOLUTION INTRAPERITONEAL at 09:55

## 2019-06-06 ENCOUNTER — TELEPHONE (OUTPATIENT)
Dept: PHARMACY | Facility: HOSPITAL | Age: 79
End: 2019-06-06

## 2019-06-06 NOTE — TELEPHONE ENCOUNTER
Spoke with patient's son, Yadiel, re:overdue PT/INR self test.    Son reports she is currently at rehab in [place], [place] post-knee surgery, could not remember rehab facility name as he was not at home.     He has no idea how long she will be there but has taken down our phone number and will call us once she is discharged home.

## 2019-06-27 NOTE — TELEPHONE ENCOUNTER
Spoke with Patient's soon, Yadiel.    He reports patient has been transferred from acute rehab facility to longer term rehab facility called [facility] in [place], [place] (near [place]).     He confirms they are managing her warfarin and has agreed to call and let us know when she is discharged. Will still follow up with Yadiel in 2 weeks to check patient's status.

## 2019-07-19 ENCOUNTER — TELEPHONE (OUTPATIENT)
Dept: INTERNAL MEDICINE | Facility: CLINIC | Age: 79
End: 2019-07-19

## 2019-07-19 ENCOUNTER — ANTICOAGULATION VISIT (OUTPATIENT)
Dept: PHARMACY | Facility: HOSPITAL | Age: 79
End: 2019-07-19

## 2019-07-19 DIAGNOSIS — I48.0 PAROXYSMAL ATRIAL FIBRILLATION (HCC): ICD-10-CM

## 2019-07-19 LAB — INR PPP: 1.2

## 2019-07-22 ENCOUNTER — OFFICE VISIT (OUTPATIENT)
Dept: INTERNAL MEDICINE | Facility: CLINIC | Age: 79
End: 2019-07-22

## 2019-07-22 ENCOUNTER — ANTICOAGULATION VISIT (OUTPATIENT)
Dept: PHARMACY | Facility: HOSPITAL | Age: 79
End: 2019-07-22

## 2019-07-22 VITALS
HEIGHT: 63 IN | SYSTOLIC BLOOD PRESSURE: 120 MMHG | HEART RATE: 68 BPM | WEIGHT: 186 LBS | DIASTOLIC BLOOD PRESSURE: 60 MMHG | BODY MASS INDEX: 32.96 KG/M2

## 2019-07-22 DIAGNOSIS — E21.3 HYPERPARATHYROIDISM (HCC): ICD-10-CM

## 2019-07-22 DIAGNOSIS — E55.9 VITAMIN D DEFICIENCY: ICD-10-CM

## 2019-07-22 DIAGNOSIS — I48.0 PAROXYSMAL ATRIAL FIBRILLATION (HCC): ICD-10-CM

## 2019-07-22 DIAGNOSIS — Z99.2 STAGE 5 CHRONIC KIDNEY DISEASE ON CHRONIC DIALYSIS (HCC): ICD-10-CM

## 2019-07-22 DIAGNOSIS — N18.4 CHRONIC KIDNEY DISEASE, STAGE IV (SEVERE) (HCC): ICD-10-CM

## 2019-07-22 DIAGNOSIS — N18.9 ANEMIA OF RENAL DISEASE: ICD-10-CM

## 2019-07-22 DIAGNOSIS — E03.9 HYPOTHYROIDISM, UNSPECIFIED TYPE: ICD-10-CM

## 2019-07-22 DIAGNOSIS — D36.9 TUBULAR ADENOMA: ICD-10-CM

## 2019-07-22 DIAGNOSIS — J45.20 MILD INTERMITTENT ASTHMA, UNSPECIFIED WHETHER COMPLICATED: ICD-10-CM

## 2019-07-22 DIAGNOSIS — I48.0 PAROXYSMAL ATRIAL FIBRILLATION (HCC): Primary | ICD-10-CM

## 2019-07-22 DIAGNOSIS — N18.6 STAGE 5 CHRONIC KIDNEY DISEASE ON CHRONIC DIALYSIS (HCC): ICD-10-CM

## 2019-07-22 DIAGNOSIS — I10 ESSENTIAL HYPERTENSION: ICD-10-CM

## 2019-07-22 DIAGNOSIS — E11.8 TYPE 2 DIABETES MELLITUS WITH COMPLICATION, WITHOUT LONG-TERM CURRENT USE OF INSULIN (HCC): ICD-10-CM

## 2019-07-22 DIAGNOSIS — D63.1 ANEMIA OF RENAL DISEASE: ICD-10-CM

## 2019-07-22 DIAGNOSIS — I50.83 HIGH OUTPUT HF (HEART FAILURE) (HCC): ICD-10-CM

## 2019-07-22 PROBLEM — M00.9 PYOGENIC ARTHRITIS OF RIGHT KNEE JOINT (HCC): Status: RESOLVED | Noted: 2019-05-18 | Resolved: 2019-07-22

## 2019-07-22 LAB — INR PPP: 1.3

## 2019-07-22 PROCEDURE — 99214 OFFICE O/P EST MOD 30 MIN: CPT | Performed by: INTERNAL MEDICINE

## 2019-07-22 NOTE — PROGRESS NOTES
Anticoagulation Clinic - Remote Progress Note  [facility]  Testing Frequency: 7 days    Indication: paroxysmal afib  Referring Provider: Heath  Goal INR: 2.0-3.0  Current Drug Interactions: amiodarone, levothyroxine; omeprazole, azaTHIOprine, ezetimibe   CHADS-VASc: 5 (age, gender, HTN, DM)    Diet: rare GLV; green beans   Alcohol: none  Tobacco: none   OTC Pain Medication: APAP PRN    1st clinic visit: 9/14/17  2nd clinic visit: 6/26/18    INR History:  Date 2/9/18 2/16 2/21 3/1 3/9 3/13 3/20 3/27 4/3 4/10   Total Weekly Dose 18mg/3 days 38mg 36mg 36mg 38mg 28mg/5 days 38mg 38mg 38mg 26mg   INR 1.0 1.5 1.8 2.1 1.9        Notes s/p surgery 2/5 and held doses   Keflex finish 2/28 Boost; Keflex init    hold- pre procedure post procedure; boost     Date 4/17 4/20 4/24 5/1 5/8 5/15 5/18 5/22 5/29 6/1   Total Weekly Dose 41mg 36mg 36mg 40mg 38mg 38mg 28mg 28mg 38mg 34mg   INR 3.0 1.9 1.8 2.3 2.3 3.6 2.4 2.3 3.0 2.5   Notes     colchicine hematoma    Zpak, 1 miss     Date 6/4 6/7 6/14 6/19 6/26 6/29 7/3 7/6 7/11 7/16   Total Weekly Dose 34mg 38mg 34mg 30mg 34mg 30mg 30mg 30mg 32mg 38mg   INR 3.0 3.4 4.3 2.5 3.6 2.3 2.9 1.2 1.4 2.0   Notes   Keflex Held x1 Zpak start 6/22, finish 6/25; doxy start 6/25; clinic doxy doxy doxy complete 7/5       Date 7/23 7/26 8/2 8/9 8/16 8/23 8/29 9/5 9/12 9/19 9/24   Total Weekly Dose 36mg 38mg 36mg 38mg 36mg 32mg 30mg?? 32mg 32 mg 32mg 30mg   INR 1.7 2.3 2.0 3.4 3.8 3.5 2.5 2.6 2.5 3.3 2.0   Notes Keflex boost   1x decr dose 1x decr dose    Keflex Keflex     Date 9/27 10/1 10/8 10/15  10/22 10/29 11/5 11/14 11/21 11/28 12/4   Total Weekly Dose 32mg 32mg 32mg 30mg  32mg 28mg 32mg 32mg 32mg 28mg 26mg   INR 2.3 2.4 3.2 2.5  3.9 2.7 3.0 2.8 2.7 3.7 1.9   Notes Levaquin start 9/26 stopped Levaquin        stop MVI, 1 miss 1 hold     Date 12/10 12/17 12/21 12/31 1/7/19 1/14 1/22 1/24 1/28 1/31 2/7   Total Weekly Dose 30mg 30mg 30mg 26mg 28mg 28mg 28mg 28mg 30mg 30mg 28mg   INR 2.4  3.0 3.6 2.5 2.6 2.6 2.9 1.7 2.7 2.5 2.7   Notes        doxy start 1/22, incr GLV 2x boost; doxy Finish doxy 1/29      Date 2/14 2/20 2/28 3/7 3/14 3/21 3/28 4/4 4/11 4/18 4/25   Total Weekly Dose 28mg 28mg 28 mg 30mg 31mg 30mg 30mg 31mg 32mg 31mg 31mg   INR 2.1 2.4 1.8 1.6 2.2 2.5 1.9 1.9 2.4 2.8 2.4   Notes   sick Zetia            Date 5/2 5/6 5/15 RE[facility] 7/19 7/22        Total Weekly Dose 31mg 31mg 31mg  ????? ???        INR 2.0 2.4 2.5  1.2 1.3        Notes ampicillin amp              Phone Interview:  Verbal Release Authorization signed on 6/26/18 -- may speak with Alexy Rodrigo (son), Genesis Becerril (daughter), Sawyer Wallace (son and daughter-in-law), Gerry Rodrigo (son)  Tablet Strength: 2mg tablets  Patient Contact Info: [phone] - home with son Yadiel; Mrs. Wallace's cell phone number - [phone]     Patient Findings   Negatives:  Signs/symptoms of thrombosis, Signs/symptoms of bleeding, Laboratory test error suspected, Change in health, Change in alcohol use, Change in activity, Upcoming invasive procedure, Emergency department visit, Upcoming dental procedure, Missed doses, Extra doses, Change in medications, Change in diet/appetite, Hospital admission, Bruising, Other complaints   Comments:  Saw Dr Walters today and medication chart has been fully updated, is still working on getting wheelchair.     Plan:    1. INR is SUBtherapeutic today at 1.3. Instructed patient to take 5mg MonTuesWedThurs. Spoke with patient as she filled her medplanner.   2. Repeat INR Thursday or Friday, pending her daughters availability   3. Verbal information provided over the phone. Moni Wallace RBV dosing instructions and filled her planner with correct doses while on the phone, expresses understanding by teach back, and has no further questions at this time.    Francoise Wu, PharmD.  07/22/19   4:08 PM

## 2019-07-22 NOTE — PROGRESS NOTES
Patient is a 79 y.o. female who is here for a follow up of chronic conditions.  Chief Complaint   Patient presents with   • Hypertension   • Diabetes         HPI:    Here for f/u.  Patient recently in hospital for septic joint. Sent to rehab times 2.  Off antibiotics.  Will be checking her coumadin level today.  Energy level is getting better.  Sleeping fair.  Appetite is improved.  No fever or chills.      History:     Patient Active Problem List   Diagnosis   • Paroxysmal atrial fibrillation (CMS/HCC)   • Essential hypertension   • Type 2 diabetes mellitus (CMS/HCC)   • Chronic kidney disease, stage IV (severe) (CMS/HCC)   • Anemia of renal disease   • Hyperparathyroidism (CMS/HCC)   • Asthma   • Right-sided carotid artery disease (CMS/HCC)   • High output HF (heart failure) (CMS/HCC)   • Vitamin D deficiency   • Nonrheumatic aortic valve stenosis   • Ulcer of gastric fundus   • Tubular adenoma   • Macular degeneration   • Hypothyroidism   • Diabetes mellitus (CMS/HCC)   • Chronic kidney disease   • Screen for colon cancer   • Postoperative anemia due to acute blood loss       Past Medical History:   Diagnosis Date   • Adenomatous colon polyp    • Chronic kidney disease    • Clostridium difficile infection 06/2017   • Diabetes mellitus (CMS/HCC)    • Gastric polyps    • Hypothyroidism    • IgA nephropathy, acute    • Macular degeneration    • Paroxysmal atrial fibrillation (CMS/HCC)    • Tubular adenoma     excision    • Ulcer of gastric fundus    • Vitamin D deficiency        Past Surgical History:   Procedure Laterality Date   • BREAST BIOPSY Left 1990'S   • CARPAL TUNNEL RELEASE     • CARPAL TUNNEL RELEASE Right    • CATARACT EXTRACTION     • CATARACT EXTRACTION Bilateral    • DIAGNOSTIC LAPAROSCOPY     • DIALYSIS FISTULA CREATION     • HERNIA REPAIR  85 and 86   • KNEE ARTHROSCOPY INCISION AND DRAINAGE OF KNEE Right 5/18/2019    Procedure: KNEE ARTHROSCOPY INCISION AND DRAINAGE OF KNEE;  Surgeon: Paco Lester  MD Terrell;  Location:  [facility];  Service: Orthopedics   • LAPAROSCOPIC TUBAL LIGATION  1985   • TOTAL KNEE ARTHROPLASTY     • TOTAL KNEE ARTHROPLASTY      Left knee    • TRANSPLANTATION RENAL  2010   • TRANSPLANTATION RENAL Right    • TRIGGER FINGER RELEASE     • TUBAL ABDOMINAL LIGATION     • UPPER GASTROINTESTINAL ENDOSCOPY  05/20/2013   • VENOUS ACCESS DEVICE (PORT) INSERTION N/A 5/23/2019    Procedure: INSERTION GROSHONG;  Surgeon: Rolando Begum MD;  Location:  [facility];  Service: General       Current Outpatient Medications on File Prior to Visit   Medication Sig   • acetaminophen (TYLENOL) 325 MG tablet Take 2 tablets by mouth Every 4 (Four) Hours As Needed for Mild Pain .   • ALPRAZolam (XANAX) 0.5 MG tablet Take 1 tablet by mouth Daily.   • amiodarone (PACERONE) 200 MG tablet TAKE ONE-HALF TABLET BY MOUTH DAILY   • calcitriol (ROCALTROL) 0.25 MCG capsule Take 0.25 mcg by mouth Daily.   • Cholecalciferol (VITAMIN D) 2000 UNITS tablet Take 2,000 Units by mouth Daily.   • COLCRYS 0.6 MG tablet 0.5 tablets As Needed.   • epoetin blanca (EPOGEN,PROCRIT) 44358 UNIT/ML injection Inject 1 mL under the skin into the appropriate area as directed 1 (One) Time Per Week.   • ezetimibe (ZETIA) 10 MG tablet Take 1 tablet by mouth Daily. Continue pravastatin   • furosemide (LASIX) 40 MG tablet Take 40 mg by mouth Every Morning.   • Heparin Sodium, Porcine, (HEPARIN, PORCINE,) 5000 UNIT/ML injection Inject 1 mL under the skin into the appropriate area as directed Every 12 (Twelve) Hours.   • lactobacillus acidophilus (RISAQUAD) capsule capsule Take 1 capsule by mouth Daily.   • LANTUS SOLOSTAR 100 UNIT/ML injection pen Inject 5 Units under the skin Every Night.   • levothyroxine (SYNTHROID, LEVOTHROID) 75 MCG tablet Take 75 mcg by mouth Daily.   • metoprolol tartrate (LOPRESSOR) 50 MG tablet Take 1 tablet by mouth Every 12 (Twelve) Hours.   • nystatin (MYCOSTATIN) 425801 UNIT/GM powder Apply  topically to the  appropriate area as directed Every 12 (Twelve) Hours.   • omeprazole (priLOSEC) 40 MG capsule Take 1 capsule by mouth Daily.   • polyethylene glycol (MIRALAX) pack packet Take 17 g by mouth Daily.   • pravastatin (PRAVACHOL) 40 MG tablet Take 1 tablet by mouth Every Night.   • PROAIR  (90 Base) MCG/ACT inhaler Inhale 2 puffs Every 6 (Six) Hours As Needed for Wheezing or Shortness of Air.   • sevelamer (RENVELA) 800 MG tablet Take 800 mg by mouth 3 (Three) Times a Day With Meals.   • sodium chloride 1 g tablet Take 1 tablet by mouth 2 (Two) Times a Day.   • tacrolimus (PROGRAF) 0.5 MG capsule Take 0.5 mg by mouth 2 (Two) Times a Day.   • UltraBag/Dianeal PD-2/1.5% Dex, pappas #[id number], (DIANEAL) 346 MOSM/L CAPD Inject 2,000 mL into the abdomen / abdominal cavity 5 (Five) Times a Day. On a cycler QHS   • warfarin (COUMADIN) 2 MG tablet TAKE THREE TABLETS BY MOUTH DAILY ON MONDAY AND THURSDAY. TAKE TWO TABLETS BY MOUTH DAILY ON ALL OTHER DAYS OF THE WEEK     No current facility-administered medications on file prior to visit.        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 Negative for chills and fever.   HENT: Negative for congestion, ear pain, hearing loss, rhinorrhea, sinus pressure, sore throat and trouble swallowing.    Eyes: Negative for discharge and itching.   Respiratory: Positive for cough. Negative for chest tightness.    Cardiovascular: Positive for leg swelling. Negative for chest pain and palpitations.   Gastrointestinal: Negative for abdominal pain, blood in stool, constipation, diarrhea and vomiting.        10/17 by Dr Perez, next 10/20   Endocrine: Negative for polydipsia and polyuria.   Genitourinary: Negative for difficulty urinating, dysuria, enuresis, frequency, hematuria and urgency.        11/18 mammogram   Musculoskeletal: Positive for arthralgias and gait problem. Negative for back pain and joint swelling.   Skin: Negative for rash and wound.   Allergic/Immunologic: Negative for immunocompromised state.   Neurological: Negative for dizziness, syncope, weakness, light-headedness, numbness and headaches.   Hematological: Bruises/bleeds easily.   Psychiatric/Behavioral: Positive for sleep disturbance. Negative for behavioral problems and dysphoric mood. The patient is not nervous/anxious.        /60   Pulse 68   Ht 160 cm (62.99\")   Wt 84.4 kg (186 lb)   LMP  (LMP Unknown)   BMI 32.96 kg/m²       Physical Exam   Constitutional: She is oriented to person, place, and time. She appears well-developed and well-nourished.   HENT:   Head: Normocephalic and atraumatic.   Right Ear: External ear normal.   Left Ear: External ear normal.   Mouth/Throat: Oropharynx is clear and moist.   Eyes: Conjunctivae and EOM are normal.   Neck: Normal range of motion. Neck supple.   Cardiovascular: Normal rate, regular rhythm and normal heart sounds.   Pulmonary/Chest: Effort normal and breath sounds normal.   Abdominal: Soft. Bowel sounds are normal.   Musculoskeletal:   Using wheelchair   Lymphadenopathy:     " "She has no cervical adenopathy.   Neurological: She is alert and oriented to person, place, and time.   Skin: Skin is warm and dry.   Psychiatric: She has a normal mood and affect. Her behavior is normal. Thought content normal.       Procedure:      Discussion/Summary:    HTN-advised to monitor and goal 130/80, stable   DM-labs noted, counseled on low carb  Hyperlipidemia-counseled on diet, cont dual tx  ESRD-per Renal  AOCD-\"  \"  Vit D-labs noted, cont replacemen  Gout-UA level noted, refusing the addition of allopurinol  afib-rate controlled  hypothroid-labs noted, cont replacement  High risk meds-INR at home      Labs noted and dw patient        Current Outpatient Medications:   •  acetaminophen (TYLENOL) 325 MG tablet, Take 2 tablets by mouth Every 4 (Four) Hours As Needed for Mild Pain ., Disp: , Rfl:   •  ALPRAZolam (XANAX) 0.5 MG tablet, Take 1 tablet by mouth Daily., Disp: 3 tablet, Rfl: 0  •  amiodarone (PACERONE) 200 MG tablet, TAKE ONE-HALF TABLET BY MOUTH DAILY, Disp: 45 tablet, Rfl: 1  •  calcitriol (ROCALTROL) 0.25 MCG capsule, Take 0.25 mcg by mouth Daily., Disp: , Rfl:   •  Cholecalciferol (VITAMIN D) 2000 UNITS tablet, Take 2,000 Units by mouth Daily., Disp: , Rfl:   •  COLCRYS 0.6 MG tablet, 0.5 tablets As Needed., Disp: , Rfl:   •  epoetin blanca (EPOGEN,PROCRIT) 43840 UNIT/ML injection, Inject 1 mL under the skin into the appropriate area as directed 1 (One) Time Per Week., Disp: , Rfl:   •  ezetimibe (ZETIA) 10 MG tablet, Take 1 tablet by mouth Daily. Continue pravastatin, Disp: 30 tablet, Rfl: 5  •  furosemide (LASIX) 40 MG tablet, Take 40 mg by mouth Every Morning., Disp: , Rfl:   •  Heparin Sodium, Porcine, (HEPARIN, PORCINE,) 5000 UNIT/ML injection, Inject 1 mL under the skin into the appropriate area as directed Every 12 (Twelve) Hours., Disp: , Rfl:   •  lactobacillus acidophilus (RISAQUAD) capsule capsule, Take 1 capsule by mouth Daily., Disp: , Rfl:   •  LANTUS SOLOSTAR 100 UNIT/ML " injection pen, Inject 5 Units under the skin Every Night., Disp: , Rfl:   •  levothyroxine (SYNTHROID, LEVOTHROID) 75 MCG tablet, Take 75 mcg by mouth Daily., Disp: , Rfl:   •  metoprolol tartrate (LOPRESSOR) 50 MG tablet, Take 1 tablet by mouth Every 12 (Twelve) Hours., Disp: , Rfl:   •  nystatin (MYCOSTATIN) 155255 UNIT/GM powder, Apply  topically to the appropriate area as directed Every 12 (Twelve) Hours., Disp: , Rfl:   •  omeprazole (priLOSEC) 40 MG capsule, Take 1 capsule by mouth Daily., Disp: 90 capsule, Rfl: 1  •  polyethylene glycol (MIRALAX) pack packet, Take 17 g by mouth Daily., Disp: , Rfl:   •  pravastatin (PRAVACHOL) 40 MG tablet, Take 1 tablet by mouth Every Night., Disp: 90 tablet, Rfl: 1  •  PROAIR  (90 Base) MCG/ACT inhaler, Inhale 2 puffs Every 6 (Six) Hours As Needed for Wheezing or Shortness of Air., Disp: 1 inhaler, Rfl: 4  •  sevelamer (RENVELA) 800 MG tablet, Take 800 mg by mouth 3 (Three) Times a Day With Meals., Disp: , Rfl:   •  sodium chloride 1 g tablet, Take 1 tablet by mouth 2 (Two) Times a Day., Disp: , Rfl:   •  tacrolimus (PROGRAF) 0.5 MG capsule, Take 0.5 mg by mouth 2 (Two) Times a Day., Disp: , Rfl:   •  UltraBag/Dianeal PD-2/1.5% Dex, pappas #[id number], (DIANEAL) 346 MOSM/L CAPD, Inject 2,000 mL into the abdomen / abdominal cavity 5 (Five) Times a Day. On a cycler QHS, Disp: , Rfl:   •  warfarin (COUMADIN) 2 MG tablet, TAKE THREE TABLETS BY MOUTH DAILY ON MONDAY AND THURSDAY. TAKE TWO TABLETS BY MOUTH DAILY ON ALL OTHER DAYS OF THE WEEK, Disp: 192 tablet, Rfl: 0        Moni was seen today for hypertension and diabetes.    Diagnoses and all orders for this visit:    Paroxysmal atrial fibrillation (CMS/HCC)    High output HF (heart failure) (CMS/HCC)    Essential hypertension    Mild intermittent asthma, unspecified whether complicated    Vitamin D deficiency    Type 2 diabetes mellitus with complication, without long-term current use of insulin  (CMS/HCC)    Hypothyroidism, unspecified type    Hyperparathyroidism (CMS/HCC)    Chronic kidney disease, stage IV (severe) (CMS/HCC)    Stage 5 chronic kidney disease on chronic dialysis (CMS/HCC)    Anemia of renal disease    Tubular adenoma

## 2019-07-25 RX ORDER — ALPRAZOLAM 0.5 MG/1
TABLET ORAL
Qty: 30 TABLET | Refills: 2 | Status: SHIPPED | OUTPATIENT
Start: 2019-07-25 | End: 2019-10-28 | Stop reason: SDUPTHER

## 2019-07-26 ENCOUNTER — ANTICOAGULATION VISIT (OUTPATIENT)
Dept: PHARMACY | Facility: HOSPITAL | Age: 79
End: 2019-07-26

## 2019-07-26 DIAGNOSIS — I48.0 PAROXYSMAL ATRIAL FIBRILLATION (HCC): ICD-10-CM

## 2019-07-26 LAB — INR PPP: 2.7

## 2019-07-26 NOTE — PROGRESS NOTES
Anticoagulation Clinic - Remote Progress Note  [facility]  Testing Frequency: 7 days    Indication: paroxysmal afib  Referring Provider: Heath  Goal INR: 2.0-3.0  Current Drug Interactions: amiodarone, levothyroxine; omeprazole, azaTHIOprine, ezetimibe   CHADS-VASc: 5 (age, gender, HTN, DM)    Diet: rare GLV; green beans   Alcohol: none  Tobacco: none   OTC Pain Medication: APAP PRN    1st clinic visit: 9/14/17  2nd clinic visit: 6/26/18    INR History:  Date 2/9/18 2/16 2/21 3/1 3/9 3/13 3/20 3/27 4/3 4/10   Total Weekly Dose 18mg/3 days 38mg 36mg 36mg 38mg 28mg/5 days 38mg 38mg 38mg 26mg   INR 1.0 1.5 1.8 2.1 1.9        Notes s/p surgery 2/5 and held doses   Keflex finish 2/28 Boost; Keflex init    hold- pre procedure post procedure; boost     Date 4/17 4/20 4/24 5/1 5/8 5/15 5/18 5/22 5/29 6/1   Total Weekly Dose 41mg 36mg 36mg 40mg 38mg 38mg 28mg 28mg 38mg 34mg   INR 3.0 1.9 1.8 2.3 2.3 3.6 2.4 2.3 3.0 2.5   Notes     colchicine hematoma    Zpak, 1 miss     Date 6/4 6/7 6/14 6/19 6/26 6/29 7/3 7/6 7/11 7/16   Total Weekly Dose 34mg 38mg 34mg 30mg 34mg 30mg 30mg 30mg 32mg 38mg   INR 3.0 3.4 4.3 2.5 3.6 2.3 2.9 1.2 1.4 2.0   Notes   Keflex Held x1 Zpak start 6/22, finish 6/25; doxy start 6/25; clinic doxy doxy doxy complete 7/5       Date 7/23 7/26 8/2 8/9 8/16 8/23 8/29 9/5 9/12 9/19 9/24   Total Weekly Dose 36mg 38mg 36mg 38mg 36mg 32mg 30mg?? 32mg 32 mg 32mg 30mg   INR 1.7 2.3 2.0 3.4 3.8 3.5 2.5 2.6 2.5 3.3 2.0   Notes Keflex boost   1x decr dose 1x decr dose    Keflex Keflex     Date 9/27 10/1 10/8 10/15  10/22 10/29 11/5 11/14 11/21 11/28 12/4   Total Weekly Dose 32mg 32mg 32mg 30mg  32mg 28mg 32mg 32mg 32mg 28mg 26mg   INR 2.3 2.4 3.2 2.5  3.9 2.7 3.0 2.8 2.7 3.7 1.9   Notes Levaquin start 9/26 stopped Levaquin        stop MVI, 1 miss 1 hold     Date 12/10 12/17 12/21 12/31 1/7/19 1/14 1/22 1/24 1/28 1/31 2/7   Total Weekly Dose 30mg 30mg 30mg 26mg 28mg 28mg 28mg 28mg 30mg 30mg 28mg   INR 2.4  3.0 3.6 2.5 2.6 2.6 2.9 1.7 2.7 2.5 2.7   Notes        doxy start 1/22, incr GLV 2x boost; doxy Finish doxy 1/29      Date 2/14 2/20 2/28 3/7 3/14 3/21 3/28 4/4 4/11 4/18 4/25   Total Weekly Dose 28mg 28mg 28 mg 30mg 31mg 30mg 30mg 31mg 32mg 31mg 31mg   INR 2.1 2.4 1.8 1.6 2.2 2.5 1.9 1.9 2.4 2.8 2.4   Notes   sick Zetia            Date 5/2 5/6 5/15 REHAB 7/19 7/22 7/26 7/30      Total Weekly Dose 31mg 31mg 31mg  ????? ??? Unable to confirm       INR 2.0 2.4 2.5  1.2 1.3 2.7 1.2      Notes ampicillin amp              Phone Interview:  Verbal Release Authorization signed on 6/26/18 -- may speak with Alexy Wallace (son), Genesis Jone (daughter), Sawyer or Gema Wallace (son and daughter-in-law), Gerry Rodrigo (son)  Tablet Strength: 2mg tablets  Patient Contact Info: [phone] - home with son Yadiel; Mrs. Wallace's cell phone number - [phone]         Plan:    1. INR is therapeutic on 7/26. Could not get in contact with patient or patient's representative for INR drawn on 7/26. Patient has retested 7/30 with an INR result of 1.2. At this time will close this encounter and open a new one to report INR.      UNABLE TO GET IN CONTACT WITH THE PATIENT. PLEASE DISREGARD THE FOLLOWING PLAN UNTIL ABLE TO GET IN CONTACT WITH PATIENT/ PATIENT REPRESENTATIVE.   Instructed patient to take 5mg MonTuesWedThurs. Spoke with patient as she filled her medplanner.   2. Repeat INR Thursday or Friday, pending her daughters availability   3. Verbal information provided over the phone. Moni Wallace RBV dosing instructions and filled her planner with correct doses while on the phone, expresses understanding by teach back, and has no further questions at this time.    Fatemeh Rodríguez, PharmD  07/26/19   1:35 PM

## 2019-07-30 ENCOUNTER — ANTICOAGULATION VISIT (OUTPATIENT)
Dept: PHARMACY | Facility: HOSPITAL | Age: 79
End: 2019-07-30

## 2019-07-30 DIAGNOSIS — I48.0 PAROXYSMAL ATRIAL FIBRILLATION (HCC): ICD-10-CM

## 2019-07-30 LAB — INR PPP: 1.2

## 2019-07-30 NOTE — PROGRESS NOTES
Anticoagulation Clinic - Remote Progress Note  [facility]  Testing Frequency: 7 days    Indication: paroxysmal afib  Referring Provider: Heath  Goal INR: 2.0-3.0  Current Drug Interactions: amiodarone, levothyroxine; omeprazole, azaTHIOprine, ezetimibe   CHADS-VASc: 5 (age, gender, HTN, DM)    Diet: rare GLV; green beans   Alcohol: none  Tobacco: none   OTC Pain Medication: APAP PRN    1st clinic visit: 9/14/17  2nd clinic visit: 6/26/18    INR History:  Date 2/9/18 2/16 2/21 3/1 3/9 3/13 3/20 3/27 4/3 4/10   Total Weekly Dose 18mg/3 days 38mg 36mg 36mg 38mg 28mg/5 days 38mg 38mg 38mg 26mg   INR 1.0 1.5 1.8 2.1 1.9        Notes s/p surgery 2/5 and held doses   Keflex finish 2/28 Boost; Keflex init    hold- pre procedure post procedure; boost     Date 4/17 4/20 4/24 5/1 5/8 5/15 5/18 5/22 5/29 6/1   Total Weekly Dose 41mg 36mg 36mg 40mg 38mg 38mg 28mg 28mg 38mg 34mg   INR 3.0 1.9 1.8 2.3 2.3 3.6 2.4 2.3 3.0 2.5   Notes     colchicine hematoma    Zpak, 1 miss     Date 6/4 6/7 6/14 6/19 6/26 6/29 7/3 7/6 7/11 7/16   Total Weekly Dose 34mg 38mg 34mg 30mg 34mg 30mg 30mg 30mg 32mg 38mg   INR 3.0 3.4 4.3 2.5 3.6 2.3 2.9 1.2 1.4 2.0   Notes   Keflex Held x1 Zpak start 6/22, finish 6/25; doxy start 6/25; clinic doxy doxy doxy complete 7/5       Date 7/23 7/26 8/2 8/9 8/16 8/23 8/29 9/5 9/12 9/19 9/24   Total Weekly Dose 36mg 38mg 36mg 38mg 36mg 32mg 30mg?? 32mg 32 mg 32mg 30mg   INR 1.7 2.3 2.0 3.4 3.8 3.5 2.5 2.6 2.5 3.3 2.0   Notes Keflex boost   1x decr dose 1x decr dose    Keflex Keflex     Date 9/27 10/1 10/8 10/15  10/22 10/29 11/5 11/14 11/21 11/28 12/4   Total Weekly Dose 32mg 32mg 32mg 30mg  32mg 28mg 32mg 32mg 32mg 28mg 26mg   INR 2.3 2.4 3.2 2.5  3.9 2.7 3.0 2.8 2.7 3.7 1.9   Notes Levaquin start 9/26 stopped Levaquin        stop MVI, 1 miss 1 hold     Date 12/10 12/17 12/21 12/31 1/7/19 1/14 1/22 1/24 1/28 1/31 2/7   Total Weekly Dose 30mg 30mg 30mg 26mg 28mg 28mg 28mg 28mg 30mg 30mg 28mg   INR 2.4  3.0 3.6 2.5 2.6 2.6 2.9 1.7 2.7 2.5 2.7   Notes        doxy start 1/22, incr GLV 2x boost; doxy Finish doxy 1/29      Date 2/14 2/20 2/28 3/7 3/14 3/21 3/28 4/4 4/11 4/18 4/25   Total Weekly Dose 28mg 28mg 28 mg 30mg 31mg 30mg 30mg 31mg 32mg 31mg 31mg   INR 2.1 2.4 1.8 1.6 2.2 2.5 1.9 1.9 2.4 2.8 2.4   Notes   sick Zetia            Date 5/2 5/6 5/15 REHAB 7/19 7/22 7/26 7/30      Total Weekly Dose 31mg 31mg 31mg  ????? ??? Unable to confirm 34mg?      INR 2.0 2.4 2.5  1.2 1.3 2.7 1.2      Notes ampicillin amp              Phone Interview:  Verbal Release Authorization signed on 6/26/18 -- may speak with Alexy Wallace (son), Genesis Becerril (daughter), Sawyer Wallace (son and daughter-in-law), Gerry Wallace (son)  Tablet Strength: 2mg tablets  Patient Contact Info: preferred [phone] - home with son Yadiel- cell [phone]; call if can't get in touch with home phone; Mrs. Wallace's cell phone number - [phone]     Patient Findings   Negatives:  Signs/symptoms of thrombosis, Signs/symptoms of bleeding, Laboratory test error suspected, Change in health, Change in alcohol use, Change in activity, Upcoming invasive procedure, Emergency department visit, Upcoming dental procedure, Missed doses, Extra doses, Change in medications, Change in diet/appetite, Hospital admission, Bruising, Other complaints   Comments:  Was able to get in contact with Yadiel today and Mrs. Wallace today. They reported all phone numbers are working and they were wondering what dose she should take. I encouraged them to call us if they draw her INR and do not hear from us. She denies changes in diet or appetite (extra GLV). Neighbors and friends have been cooking for her and she denies GLV. She reports that she took 5mg daily through the weekend and took 4mg last night. Mrs. Wallace reports that she has a new box of strips but knows to change the chip in the home monitor device to match the lot number of test strips. They will retest tomorrow.      She is planning to  an antidepressant medication but isn't sure what it is. She will call us with the name of the medication when she picks it up.     Plan 7/30:  1. INR is SUBtherapeutic on 7/26 but subtherapeutic today. Could not get in contact with patient or patient's representative for INR drawn on 7/26. Patient has retested 7/30 with an INR result of 1.2. Was able to get in contact with Yadiel [phone] and Mrs. Wallace. Mrs. Wallace reports that she hasn't missed any doses of warfarin. Will plan to repeat INR tomorrow to ensure there wasn't a lab error associated with today's draw. At this time, instructed Mrs. Wallace to take 5mg daily.  2. Repeat INR on Monday 8/5.   3. Verbal information provided over the phone. Moni Wallace RBV dosing instructions and filled her planner with correct doses while on the phone, expresses understanding by teach back, and has no further questions at this time.    Plan 7/31: repeat INR to determine if lab error.   1. INR is SUBtherapeutic today with repeat INR draw. At this time, instructed Mrs. Wallace to increase dose to 5mg daily.  2. Repeat INR tomorrow to ensure there was a lab error associated with INR result today given decline and inability to find a likely cause of decrease in INR.   3. Verbal information provided over the phone. Moni Wallace RBV dosing instructions and filled her planner with correct doses while on the phone, expresses understanding by teach back, and has no further questions at this time.    Fatemeh Rodríguez, PharmD  07/30/19   4:04 PM

## 2019-07-31 LAB — INR PPP: 1.3

## 2019-08-02 ENCOUNTER — ANTICOAGULATION VISIT (OUTPATIENT)
Dept: PHARMACY | Facility: HOSPITAL | Age: 79
End: 2019-08-02

## 2019-08-02 DIAGNOSIS — I48.0 PAROXYSMAL ATRIAL FIBRILLATION (HCC): ICD-10-CM

## 2019-08-02 LAB — INR PPP: 2

## 2019-08-02 RX ORDER — CEFDINIR 300 MG/1
300 CAPSULE ORAL EVERY OTHER DAY
COMMUNITY
Start: 2019-08-02 | End: 2019-08-08

## 2019-08-02 NOTE — PROGRESS NOTES
Anticoagulation Clinic - Remote Progress Note  [facility]  Testing Frequency: 7 days    Indication: paroxysmal afib  Referring Provider: Heath  Goal INR: 2.0-3.0  Current Drug Interactions: amiodarone, levothyroxine; omeprazole, azaTHIOprine, ezetimibe   CHADS-VASc: 5 (age, gender, HTN, DM)    Diet: rare GLV- 8/1/19  Alcohol: none  Tobacco: none   OTC Pain Medication: APAP PRN    1st clinic visit: 9/14/17  2nd clinic visit: 6/26/18    INR History:  Date 2/9/18 2/16 2/21 3/1 3/9 3/13 3/20 3/27 4/3 4/10   Total Weekly Dose 18mg/3 days 38mg 36mg 36mg 38mg 28mg/5 days 38mg 38mg 38mg 26mg   INR 1.0 1.5 1.8 2.1 1.9        Notes s/p surgery 2/5 and held doses   Keflex finish 2/28 Boost; Keflex init    hold- pre procedure post procedure; boost     Date 4/17 4/20 4/24 5/1 5/8 5/15 5/18 5/22 5/29 6/1   Total Weekly Dose 41mg 36mg 36mg 40mg 38mg 38mg 28mg 28mg 38mg 34mg   INR 3.0 1.9 1.8 2.3 2.3 3.6 2.4 2.3 3.0 2.5   Notes     colchicine hematoma    Zpak, 1 miss     Date 6/4 6/7 6/14 6/19 6/26 6/29 7/3 7/6 7/11 7/16   Total Weekly Dose 34mg 38mg 34mg 30mg 34mg 30mg 30mg 30mg 32mg 38mg   INR 3.0 3.4 4.3 2.5 3.6 2.3 2.9 1.2 1.4 2.0   Notes   Keflex Held x1 Zpak start 6/22, finish 6/25; doxy start 6/25; clinic doxy doxy doxy complete 7/5       Date 7/23 7/26 8/2 8/9 8/16 8/23 8/29 9/5 9/12 9/19 9/24   Total Weekly Dose 36mg 38mg 36mg 38mg 36mg 32mg 30mg?? 32mg 32 mg 32mg 30mg   INR 1.7 2.3 2.0 3.4 3.8 3.5 2.5 2.6 2.5 3.3 2.0   Notes Keflex boost   1x decr dose 1x decr dose    Keflex Keflex     Date 9/27 10/1 10/8 10/15  10/22 10/29 11/5 11/14 11/21 11/28 12/4   Total Weekly Dose 32mg 32mg 32mg 30mg  32mg 28mg 32mg 32mg 32mg 28mg 26mg   INR 2.3 2.4 3.2 2.5  3.9 2.7 3.0 2.8 2.7 3.7 1.9   Notes Levaquin start 9/26 stopped Levaquin        stop MVI, 1 miss 1 hold     Date 12/10 12/17 12/21 12/31 1/7/19 1/14 1/22 1/24 1/28 1/31 2/7   Total Weekly Dose 30mg 30mg 30mg 26mg 28mg 28mg 28mg 28mg 30mg 30mg 28mg   INR 2.4 3.0 3.6  2.5 2.6 2.6 2.9 1.7 2.7 2.5 2.7   Notes        doxy start 1/22, incr GLV 2x boost; doxy Finish doxy 1/29      Date 2/14 2/20 2/28 3/7 3/14 3/21 3/28 4/4 4/11 4/18 4/25   Total Weekly Dose 28mg 28mg 28 mg 30mg 31mg 30mg 30mg 31mg 32mg 31mg 31mg   INR 2.1 2.4 1.8 1.6 2.2 2.5 1.9 1.9 2.4 2.8 2.4   Notes   sick Zetia            Date 5/2 5/6 5/15 REHAB 7/19 7/22 7/26 7/30 8/2 8/5    Total Weekly Dose 31mg 31mg 31mg  ????? ??? Unable to confirm 34mg? 34mg     INR 2.0 2.4 2.5  1.2 1.3 2.7 1.2 2.0     Notes ampicillin amp       stopped lexapro cefdinir      Phone Interview:  Verbal Release Authorization signed on 6/26/18 -- may speak with Alexy Wallace (son), Genesis Becerril (daughter), Sawyer or Gema Wallace (son and daughter-in-law), Gerry Wallace (son)  Tablet Strength: 2mg tablets  Patient Contact Info: preferred [phone] - home with carlito Cotto- cell [phone]; call if can't get in touch with home phone; Mrs. Wallace's cell phone number - [phone]     Patient Findings:  Positives:  Change in health, Change in medications, Change in diet/appetite   Negatives:  Signs/symptoms of thrombosis, Signs/symptoms of bleeding, Laboratory test error suspected, Change in alcohol use, Change in activity, Upcoming invasive procedure, Emergency department visit, Upcoming dental procedure, Missed doses, Extra doses, Hospital admission, Bruising, Other complaints   Comments:  Patient is not eating any GLV, mostly sandwiches.   She has a UTI and will start Cefdinir 300mg 1QOD x4 doses tonight. She filled up her med box while on the phone so she could ensure she took the right dose. Verified patient has 2mg tablets on hand. She has stopped the Lexapro per her therapist's instructions due to severe nausea.     Plan:  1. INR is back WNL today at 2 although at lower end of goal range. After consulting with Fatemeh BurkettD, instructed Ms. Wallace to take warfarin 3mg tonight then take warfarin 4mg thereafter until recheck.  2. Repeat INR  on Monday 8/5 due to initiation of antibiotic therapy.  3. Verbal information provided over the phone. Barnet NIKKO Wallace RBV dosing instructions and filled her planner with correct doses while on the phone, expresses understanding by teach back, and has no further questions at this time. Patient filled up med box for tonight through Shankar night while on the phone during encounter.    Zina Aldrich, Valentin  08/02/19   4:08 PM     I, Radha Michaud, [facility], have reviewed the note in full and agree with the assessment and plan.  Noted DDI with warfarin / cefdinir may result in an increased risk of bleeding, verify every other day dosing.  08/05/19  7:57 AM

## 2019-08-05 ENCOUNTER — ANTICOAGULATION VISIT (OUTPATIENT)
Dept: PHARMACY | Facility: HOSPITAL | Age: 79
End: 2019-08-05

## 2019-08-05 DIAGNOSIS — I48.0 PAROXYSMAL ATRIAL FIBRILLATION (HCC): ICD-10-CM

## 2019-08-05 LAB — INR PPP: 2.2

## 2019-08-05 NOTE — PROGRESS NOTES
Anticoagulation Clinic - Remote Progress Note  [facility]  Testing Frequency: 7 days    Indication: paroxysmal afib  Referring Provider: Heath  Goal INR: 2.0-3.0  Current Drug Interactions: amiodarone, levothyroxine; omeprazole, azaTHIOprine, ezetimibe   CHADS-VASc: 5 (age, gender, HTN, DM)    Diet: rare GLV- 8/1/19  Alcohol: none  Tobacco: none   OTC Pain Medication: APAP PRN    1st clinic visit: 9/14/17  2nd clinic visit: 6/26/18    INR History:  Date 2/9/18 2/16 2/21 3/1 3/9 3/13 3/20 3/27 4/3 4/10   Total Weekly Dose 18mg/3 days 38mg 36mg 36mg 38mg 28mg/5 days 38mg 38mg 38mg 26mg   INR 1.0 1.5 1.8 2.1 1.9        Notes s/p surgery 2/5 and held doses   Keflex finish 2/28 Boost; Keflex init    hold- pre procedure post procedure; boost     Date 4/17 4/20 4/24 5/1 5/8 5/15 5/18 5/22 5/29 6/1   Total Weekly Dose 41mg 36mg 36mg 40mg 38mg 38mg 28mg 28mg 38mg 34mg   INR 3.0 1.9 1.8 2.3 2.3 3.6 2.4 2.3 3.0 2.5   Notes     colchicine hematoma    Zpak, 1 miss     Date 6/4 6/7 6/14 6/19 6/26 6/29 7/3 7/6 7/11 7/16   Total Weekly Dose 34mg 38mg 34mg 30mg 34mg 30mg 30mg 30mg 32mg 38mg   INR 3.0 3.4 4.3 2.5 3.6 2.3 2.9 1.2 1.4 2.0   Notes   Keflex Held x1 Zpak start 6/22, finish 6/25; doxy start 6/25; clinic doxy doxy doxy complete 7/5       Date 7/23 7/26 8/2 8/9 8/16 8/23 8/29 9/5 9/12 9/19 9/24   Total Weekly Dose 36mg 38mg 36mg 38mg 36mg 32mg 30mg?? 32mg 32 mg 32mg 30mg   INR 1.7 2.3 2.0 3.4 3.8 3.5 2.5 2.6 2.5 3.3 2.0   Notes Keflex boost   1x decr dose 1x decr dose    Keflex Keflex     Date 9/27 10/1 10/8 10/15  10/22 10/29 11/5 11/14 11/21 11/28 12/4   Total Weekly Dose 32mg 32mg 32mg 30mg  32mg 28mg 32mg 32mg 32mg 28mg 26mg   INR 2.3 2.4 3.2 2.5  3.9 2.7 3.0 2.8 2.7 3.7 1.9   Notes Levaquin start 9/26 stopped Levaquin        stop MVI, 1 miss 1 hold     Date 12/10 12/17 12/21 12/31 1/7/19 1/14 1/22 1/24 1/28 1/31 2/7   Total Weekly Dose 30mg 30mg 30mg 26mg 28mg 28mg 28mg 28mg 30mg 30mg 28mg   INR 2.4 3.0 3.6  2.5 2.6 2.6 2.9 1.7 2.7 2.5 2.7   Notes        doxy start 1/22, incr GLV 2x boost; doxy Finish doxy 1/29      Date 2/14 2/20 2/28 3/7 3/14 3/21 3/28 4/4 4/11 4/18 4/25   Total Weekly Dose 28mg 28mg 28 mg 30mg 31mg 30mg 30mg 31mg 32mg 31mg 31mg   INR 2.1 2.4 1.8 1.6 2.2 2.5 1.9 1.9 2.4 2.8 2.4   Notes   sick Zetia            Date 5/2 5/6 5/15 REHAB 7/19 7/22 7/26 7/30 8/2 8/5    Total Weekly Dose 31mg 31mg 31mg  ????? ??? Unable to confirm 34mg? 34mg **30mg    INR 2.0 2.4 2.5  1.2 1.3 2.7 1.2 2.0 2.2    Notes ampicillin amp       stopped lexapro cefdinir      Phone Interview:  Verbal Release Authorization signed on 6/26/18 -- may speak with Alexy Wallace (son), Genesis Becerril (daughter), Sawyer or Gema Wallace (son and daughter-in-law), Gerry Rodrigo (son)  Tablet Strength: 2mg tablets  Patient Contact Info: preferred [phone] - home with carlito Cotto- cell [phone]; call if can't get in touch with home phone; Mrs. Wlalace's cell phone number - [phone]     Patient Findings   Positives:  Bruising   Negatives:  Signs/symptoms of thrombosis, Signs/symptoms of bleeding, Laboratory test error suspected, Change in health, Change in alcohol use, Change in activity, Upcoming invasive procedure, Emergency department visit, Upcoming dental procedure, Missed doses, Extra doses, Change in medications, Change in diet/appetite, Hospital admission, Other complaints   Comments:  Patient verifies she has been taking her cefdinir 300mg 1 tablet every other day. She will take a dose on Tues, 8/6, and then her last dose will be on Thurs, 8/8. She has a bruise about the size of a fist on her leg, she believes she may have hit it on the wheel chair. She feels it is healing and getting smaller. Otherwise denies all findings.     Plan:  1. INR is therapeutic today at 2.2. After consulting with Radha Michaud, PharmD, instructed Ms. Wallace to take warfarin 5mg tonight and on Wednesday, 4mg on Tuesday  2. Repeat INR on Thurs, 8/8  3. Verbal  information provided over the phone. Moni Wallace RBV dosing instructions and filled her planner with correct doses while on the phone, expresses understanding by teach back, and has no further questions at this time.   4. Patient filled up med box for tonight through Wednesday night while on the phone during encounter.    Sawyer Gamez, Valentin  8/5/2019  3:36 PM    I, Fatemeh Rodríguez, PharmD, have reviewed the note in full and agree with the assessment and plan.  08/05/19  4:57 PM

## 2019-08-12 ENCOUNTER — ANTICOAGULATION VISIT (OUTPATIENT)
Dept: PHARMACY | Facility: HOSPITAL | Age: 79
End: 2019-08-12

## 2019-08-12 DIAGNOSIS — I48.0 PAROXYSMAL ATRIAL FIBRILLATION (HCC): ICD-10-CM

## 2019-08-12 LAB — INR PPP: 1.6

## 2019-08-15 ENCOUNTER — ANTICOAGULATION VISIT (OUTPATIENT)
Dept: PHARMACY | Facility: HOSPITAL | Age: 79
End: 2019-08-15

## 2019-08-15 DIAGNOSIS — I48.0 PAROXYSMAL ATRIAL FIBRILLATION (HCC): ICD-10-CM

## 2019-08-15 LAB — INR PPP: 2.6

## 2019-08-15 NOTE — PROGRESS NOTES
Anticoagulation Clinic - Remote Progress Note  [facility]  Testing Frequency: 7 days    Indication: paroxysmal afib  Referring Provider: Heath  Goal INR: 2.0-3.0  Current Drug Interactions: amiodarone, levothyroxine; omeprazole, azaTHIOprine, ezetimibe   CHADS-VASc: 5 (age, gender, HTN, DM)    Diet: rare GLV, rarely green beans - 8/1/19  Alcohol: none  Tobacco: none   OTC Pain Medication: APAP PRN    1st clinic visit: 9/14/17  2nd clinic visit: 6/26/18    INR History:  Date 2/9/18 2/16 2/21 3/1 3/9 3/13 3/20 3/27 4/3 4/10   Total Weekly Dose 18mg/3 days 38mg 36mg 36mg 38mg 28mg/5 days 38mg 38mg 38mg 26mg   INR 1.0 1.5 1.8 2.1 1.9        Notes s/p surgery 2/5 and held doses   Keflex finish 2/28 Boost; Keflex init    hold- pre procedure post procedure; boost     Date 4/17 4/20 4/24 5/1 5/8 5/15 5/18 5/22 5/29 6/1   Total Weekly Dose 41mg 36mg 36mg 40mg 38mg 38mg 28mg 28mg 38mg 34mg   INR 3.0 1.9 1.8 2.3 2.3 3.6 2.4 2.3 3.0 2.5   Notes     colchicine hematoma    Zpak, 1 miss     Date 6/4 6/7 6/14 6/19 6/26 6/29 7/3 7/6 7/11 7/16   Total Weekly Dose 34mg 38mg 34mg 30mg 34mg 30mg 30mg 30mg 32mg 38mg   INR 3.0 3.4 4.3 2.5 3.6 2.3 2.9 1.2 1.4 2.0   Notes   Keflex Held x1 Zpak start 6/22, finish 6/25; doxy start 6/25; clinic doxy doxy doxy complete 7/5       Date 7/23 7/26 8/2 8/9 8/16 8/23 8/29 9/5 9/12 9/19 9/24   Total Weekly Dose 36mg 38mg 36mg 38mg 36mg 32mg 30mg?? 32mg 32 mg 32mg 30mg   INR 1.7 2.3 2.0 3.4 3.8 3.5 2.5 2.6 2.5 3.3 2.0   Notes Keflex boost   1x decr dose 1x decr dose    Keflex Keflex     Date 9/27 10/1 10/8 10/15  10/22 10/29 11/5 11/14 11/21 11/28 12/4   Total Weekly Dose 32mg 32mg 32mg 30mg  32mg 28mg 32mg 32mg 32mg 28mg 26mg   INR 2.3 2.4 3.2 2.5  3.9 2.7 3.0 2.8 2.7 3.7 1.9   Notes Levaquin start 9/26 stopped Levaquin        stop MVI, 1 miss 1 hold     Date 12/10 12/17 12/21 12/31 1/7/19 1/14 1/22 1/24 1/28 1/31 2/7   Total Weekly Dose 30mg 30mg 30mg 26mg 28mg 28mg 28mg 28mg 30mg 30mg  "28mg   INR 2.4 3.0 3.6 2.5 2.6 2.6 2.9 1.7 2.7 2.5 2.7   Notes        doxy start 1/22, incr GLV 2x boost; doxy Finish doxy 1/29      Date 2/14 2/20 2/28 3/7 3/14 3/21 3/28 4/4 4/11 4/18 4/25   Total Weekly Dose 28mg 28mg 28 mg 30mg 31mg 30mg 30mg 31mg 32mg 31mg 31mg   INR 2.1 2.4 1.8 1.6 2.2 2.5 1.9 1.9 2.4 2.8 2.4   Notes   sick Zetia            Date 5/2 5/6 5/15 REHAB 7/19 7/22 7/26 7/30 8/2 8/5 8/12   Total Weekly Dose 31mg 31mg 31mg  ????? ??? Unable to confirm 34mg? 34mg **30mg ? 30mg   INR 2.0 2.4 2.5  1.2 1.3 2.7 1.2 2.0 2.2 1.6   Notes ampicillin amp       stopped lexapro cefdinir      Date 8/15            Total Weekly Dose 32mg 31mg           INR 2.6            Notes 3x boost              Phone Interview:  Verbal Release Authorization signed on 6/26/18 -- may speak with Alexy Wallace (son), Genesis Becerril (daughter), Sawyer Ribeiro Wallace (son and daughter-in-law), Gerry Wallace (son)  Tablet Strength: 2mg tablets  Patient Contact Info: preferred [phone] - home with carlito Cotto- cell [phone]; call if can't get in touch with home phone; Mrs. Wallace's cell phone number - [phone]     Patient Findings   Negatives:  Signs/symptoms of thrombosis, Signs/symptoms of bleeding, Laboratory test error suspected, Change in health, Change in alcohol use, Change in activity, Upcoming invasive procedure, Emergency department visit, Upcoming dental procedure, Missed doses, Extra doses, Change in medications, Change in diet/appetite, Hospital admission, Bruising, Other complaints   Comments:  Patient denies all findings, may consider having a small serving of green beans this week. She reports she will have a serving \"every once in a while\"     Plan:  1. INR is therapeutic and back WNL today at 2.6, though this is a 1.0 point increase from 3 days ago. Instructed Ms. Wallace to resume prior maintenance dose of warfarin 4mg daily except 5mg on MonWedFri (31mg/week)  2. Repeat INR on Mon, 8/19  3. Verbal information " provided over the phone. Moni Wallace RBV dosing instructions and filled her planner with correct doses while on the phone, expresses understanding by teach back, and has no further questions at this time.      Sawyer Gamez CPhT  8/15/2019  4:06 PM     I, Francoise Wu, [facility], have reviewed the note in full and agree with the assessment and plan.  08/15/19  4:48 PM

## 2019-08-19 ENCOUNTER — ANTICOAGULATION VISIT (OUTPATIENT)
Dept: PHARMACY | Facility: HOSPITAL | Age: 79
End: 2019-08-19

## 2019-08-19 DIAGNOSIS — I48.0 PAROXYSMAL ATRIAL FIBRILLATION (HCC): ICD-10-CM

## 2019-08-19 LAB — INR PPP: 2.2

## 2019-08-19 NOTE — PROGRESS NOTES
Anticoagulation Clinic - Remote Progress Note  [facility]  Testing Frequency: 7 days    Indication: paroxysmal afib  Referring Provider: Heath  Goal INR: 2.0-3.0  Current Drug Interactions: amiodarone, levothyroxine; omeprazole, azaTHIOprine, ezetimibe   CHADS-VASc: 5 (age, gender, HTN, DM)    Diet: rare GLV, rarely green beans - 8/19/19  Alcohol: none  Tobacco: none   OTC Pain Medication: APAP PRN    1st clinic visit: 9/14/17  2nd clinic visit: 6/26/18    INR History:  Date 2/9/18 2/16 2/21 3/1 3/9 3/13 3/20 3/27 4/3 4/10   Total Weekly Dose 18mg/3 days 38mg 36mg 36mg 38mg 28mg/5 days 38mg 38mg 38mg 26mg   INR 1.0 1.5 1.8 2.1 1.9        Notes s/p surgery 2/5 and held doses   Keflex finish 2/28 Boost; Keflex init    hold- pre procedure post procedure; boost     Date 4/17 4/20 4/24 5/1 5/8 5/15 5/18 5/22 5/29 6/1   Total Weekly Dose 41mg 36mg 36mg 40mg 38mg 38mg 28mg 28mg 38mg 34mg   INR 3.0 1.9 1.8 2.3 2.3 3.6 2.4 2.3 3.0 2.5   Notes     colchicine hematoma    Zpak, 1 miss     Date 6/4 6/7 6/14 6/19 6/26 6/29 7/3 7/6 7/11 7/16   Total Weekly Dose 34mg 38mg 34mg 30mg 34mg 30mg 30mg 30mg 32mg 38mg   INR 3.0 3.4 4.3 2.5 3.6 2.3 2.9 1.2 1.4 2.0   Notes   Keflex Held x1 Zpak start 6/22, finish 6/25; doxy start 6/25; clinic doxy doxy doxy complete 7/5       Date 7/23 7/26 8/2 8/9 8/16 8/23 8/29 9/5 9/12 9/19 9/24   Total Weekly Dose 36mg 38mg 36mg 38mg 36mg 32mg 30mg?? 32mg 32 mg 32mg 30mg   INR 1.7 2.3 2.0 3.4 3.8 3.5 2.5 2.6 2.5 3.3 2.0   Notes Keflex boost   1x decr dose 1x decr dose    Keflex Keflex     Date 9/27 10/1 10/8 10/15  10/22 10/29 11/5 11/14 11/21 11/28 12/4   Total Weekly Dose 32mg 32mg 32mg 30mg  32mg 28mg 32mg 32mg 32mg 28mg 26mg   INR 2.3 2.4 3.2 2.5  3.9 2.7 3.0 2.8 2.7 3.7 1.9   Notes Levaquin start 9/26 stopped Levaquin        stop MVI, 1 miss 1 hold     Date 12/10 12/17 12/21 12/31 1/7/19 1/14 1/22 1/24 1/28 1/31 2/7   Total Weekly Dose 30mg 30mg 30mg 26mg 28mg 28mg 28mg 28mg 30mg 30mg  28mg   INR 2.4 3.0 3.6 2.5 2.6 2.6 2.9 1.7 2.7 2.5 2.7   Notes        doxy start 1/22, incr GLV 2x boost; doxy Finish doxy 1/29      Date 2/14 2/20 2/28 3/7 3/14 3/21 3/28 4/4 4/11 4/18 4/25   Total Weekly Dose 28mg 28mg 28 mg 30mg 31mg 30mg 30mg 31mg 32mg 31mg 31mg   INR 2.1 2.4 1.8 1.6 2.2 2.5 1.9 1.9 2.4 2.8 2.4   Notes   sick Zetia            Date 5/2 5/6 5/15 REHAB 7/19 7/22 7/26 7/30 8/2 8/5 8/12   Total Weekly Dose 31mg 31mg 31mg  ????? ??? Unable to confirm 34mg? 34mg **30mg ? 30mg   INR 2.0 2.4 2.5  1.2 1.3 2.7 1.2 2.0 2.2 1.6   Notes ampicillin amp       stopped lexapro cefdinir      Date 8/15 8/19           Total Weekly Dose 32mg 33mg           INR 2.6 2.2           Notes 3x boost              Phone Interview:  Verbal Release Authorization signed on 6/26/18 -- may speak with Alexy Wallace (son), Genesis Becerril (daughter), Sawyer Wallace (son and daughter-in-law), Gerry Wallace (son)  Tablet Strength: 2mg tablets  Patient Contact Info: preferred [phone] - home with carlito Cotto- cell [phone]; call if can't get in touch with home phone; Mrs. Wallace's cell phone number - [phone]     Patient Findings:  Negatives:  Signs/symptoms of thrombosis, Signs/symptoms of bleeding, Laboratory test error suspected, Change in health, Change in alcohol use, Change in activity, Upcoming invasive procedure, Emergency department visit, Upcoming dental procedure, Missed doses, Extra doses, Change in medications, Change in diet/appetite, Hospital admission, Bruising, Other complaints     Plan:  1. INR is therapeutic today. Instructed Ms. Wallace to continue warfarin 4mg daily except 5mg on MonWedFri.  2. Repeat INR in one week.  3. Verbal information provided over the phone. Patient RBV dosing instructions, expresses understanding by teach back, and has no further questions at this time. Patient did not have her medi planner with her to adjust doses- but states she wrote down directions and will adjust them later.      Zina Aldrich CPhT  8/19/2019  3:27 PM

## 2019-08-23 ENCOUNTER — TELEPHONE (OUTPATIENT)
Dept: INTERNAL MEDICINE | Facility: CLINIC | Age: 79
End: 2019-08-23

## 2019-08-23 PROCEDURE — 87186 SC STD MICRODIL/AGAR DIL: CPT | Performed by: INTERNAL MEDICINE

## 2019-08-23 PROCEDURE — 87086 URINE CULTURE/COLONY COUNT: CPT | Performed by: INTERNAL MEDICINE

## 2019-08-23 PROCEDURE — 87077 CULTURE AEROBIC IDENTIFY: CPT | Performed by: INTERNAL MEDICINE

## 2019-08-23 NOTE — TELEPHONE ENCOUNTER
Patient said she wanted to stop her pt services with her prior pt and start up with [facility]ort pt. Please give pt a call.

## 2019-08-25 RX ORDER — CEFDINIR 300 MG/1
300 CAPSULE ORAL 2 TIMES DAILY
Qty: 7 CAPSULE | Refills: 0 | Status: SHIPPED | OUTPATIENT
Start: 2019-08-25 | End: 2019-09-05

## 2019-08-26 ENCOUNTER — ANTICOAGULATION VISIT (OUTPATIENT)
Dept: PHARMACY | Facility: HOSPITAL | Age: 79
End: 2019-08-26

## 2019-08-26 DIAGNOSIS — I48.0 PAROXYSMAL ATRIAL FIBRILLATION (HCC): ICD-10-CM

## 2019-08-26 LAB — INR PPP: 1.8

## 2019-08-26 NOTE — PROGRESS NOTES
Anticoagulation Clinic - Remote Progress Note  [facility]  Testing Frequency: 7 days    Indication: paroxysmal afib  Referring Provider: Heath  Goal INR: 2.0-3.0  Current Drug Interactions: amiodarone, levothyroxine; omeprazole, azaTHIOprine, ezetimibe   CHADS-VASc: 5 (age, gender, HTN, DM)  HAS-BLED: 3 (HTN, CKD, Age)    Diet: rare GLV, rarely green beans - 8/19/19  Alcohol: none  Tobacco: none   OTC Pain Medication: APAP PRN    1st clinic visit: 9/14/17  2nd clinic visit: 6/26/18    INR History:  Date 2/9/18 2/16 2/21 3/1 3/9 3/13 3/20 3/27 4/3 4/10   Total Weekly Dose 18mg/3 days 38mg 36mg 36mg 38mg 28mg/5 days 38mg 38mg 38mg 26mg   INR 1.0 1.5 1.8 2.1 1.9        Notes s/p surgery 2/5 and held doses   Keflex finish 2/28 Boost; Keflex init    hold- pre procedure post procedure; boost     Date 4/17 4/20 4/24 5/1 5/8 5/15 5/18 5/22 5/29 6/1   Total Weekly Dose 41mg 36mg 36mg 40mg 38mg 38mg 28mg 28mg 38mg 34mg   INR 3.0 1.9 1.8 2.3 2.3 3.6 2.4 2.3 3.0 2.5   Notes     colchicine hematoma    Zpak, 1 miss     Date 6/4 6/7 6/14 6/19 6/26 6/29 7/3 7/6 7/11 7/16   Total Weekly Dose 34mg 38mg 34mg 30mg 34mg 30mg 30mg 30mg 32mg 38mg   INR 3.0 3.4 4.3 2.5 3.6 2.3 2.9 1.2 1.4 2.0   Notes   Keflex Held x1 Zpak start 6/22, finish 6/25; doxy start 6/25; clinic doxy doxy doxy complete 7/5       Date 7/23 7/26 8/2 8/9 8/16 8/23 8/29 9/5 9/12 9/19 9/24   Total Weekly Dose 36mg 38mg 36mg 38mg 36mg 32mg 30mg?? 32mg 32 mg 32mg 30mg   INR 1.7 2.3 2.0 3.4 3.8 3.5 2.5 2.6 2.5 3.3 2.0   Notes Keflex boost   1x decr dose 1x decr dose    Keflex Keflex     Date 9/27 10/1 10/8 10/15  10/22 10/29 11/5 11/14 11/21 11/28 12/4   Total Weekly Dose 32mg 32mg 32mg 30mg  32mg 28mg 32mg 32mg 32mg 28mg 26mg   INR 2.3 2.4 3.2 2.5  3.9 2.7 3.0 2.8 2.7 3.7 1.9   Notes Levaquin start 9/26 stopped Levaquin        stop MVI, 1 miss 1 hold     Date 12/10 12/17 12/21 12/31 1/7/19 1/14 1/22 1/24 1/28 1/31 2/7   Total Weekly Dose 30mg 30mg 30mg 26mg  28mg 28mg 28mg 28mg 30mg 30mg 28mg   INR 2.4 3.0 3.6 2.5 2.6 2.6 2.9 1.7 2.7 2.5 2.7   Notes        doxy start 1/22, incr GLV 2x boost; doxy Finish doxy 1/29      Date 2/14 2/20 2/28 3/7 3/14 3/21 3/28 4/4 4/11 4/18 4/25   Total Weekly Dose 28mg 28mg 28 mg 30mg 31mg 30mg 30mg 31mg 32mg 31mg 31mg   INR 2.1 2.4 1.8 1.6 2.2 2.5 1.9 1.9 2.4 2.8 2.4   Notes   sick Zetia            Date 5/2 5/6 5/15 REHAB 7/19 7/22 7/26 7/30 8/2 8/5 8/12   Total Weekly Dose 31mg 31mg 31mg  ????? ??? Unable to confirm 34mg? 34mg **30mg ? 30mg   INR 2.0 2.4 2.5  1.2 1.3 2.7 1.2 2.0 2.2 1.6   Notes ampicillin amp       stopped lexapro cefdinir      Date 8/15 8/19 8/26          Total Weekly Dose 32mg 33mg 31mg          INR 2.6 2.2 1.8          Notes 3x boost              Phone Interview:  Verbal Release Authorization signed on 6/26/18 -- may speak with Alexy Wallace (son), Genesis Becerril (daughter), Sawyer Wallace (son and daughter-in-law), Grery Wallace (son)  Tablet Strength: 2mg tablets  Patient Contact Info: preferred [phone] - home with carlito Cotto- cell [phone]; call if can't get in touch with home phone; Mrs. Wallace's cell phone number - [phone]     Patient Findings   Positives:  Change in medications, Other complaints   Negatives:  Signs/symptoms of thrombosis, Signs/symptoms of bleeding, Laboratory test error suspected, Change in health, Change in alcohol use, Change in activity, Upcoming invasive procedure, Emergency department visit, Upcoming dental procedure, Missed doses, Extra doses, Change in diet/appetite, Hospital admission, Bruising   Comments:  Mrs. Wallace reports she was put on Cefdinir yesterday and took two then. Direction are take one 300mg capsule PO BID. She reports one fall last Tuesday. She says she just lost control of her legs and went down in the bathroom. She knew she was going to fall and held onto the sink before going down and had no bruising due to the fall. She reports she fell on her butt and  didn't hit her head.     Plan:  1. INR is SUBtherapeutic today. Instructed Ms. Wallace to boost mele's warfarin dose to 6mg and then continue 4mg daily except 5mg on WedFri until follow-up. May want to consider changing her weekly regimen to 32mg/week if within range at next follow-up.  2. Repeat INR on Friday 8/30. Cefdinir should be out of the patient's system by then.  3. Verbal information provided over the phone. Patient RBV dosing instructions, expresses understanding by teach back, and has no further questions at this time. Patient did not have her medi planner with her to adjust doses- but states she wrote down directions and will adjust them later.     Raul Tsai, PharmD Candidate 2020  08/26/2019  3:53 PM      Updated maintenance dose in tracker for 32mg/week. Would be a switch to 5mg daily and 4mg Francoise Swan, [facility], have reviewed the note in full and agree with the assessment and plan.  08/26/19  4:24 PM

## 2019-08-27 ENCOUNTER — TELEPHONE (OUTPATIENT)
Dept: INTERNAL MEDICINE | Facility: CLINIC | Age: 79
End: 2019-08-27

## 2019-08-27 NOTE — TELEPHONE ENCOUNTER
Marina physical therapist from [facility] called and wanted to let you know the patient had a fall last Tuesday night while with her son; she has no injuries and patient did not go to the hospital.

## 2019-08-30 ENCOUNTER — ANTICOAGULATION VISIT (OUTPATIENT)
Dept: PHARMACY | Facility: HOSPITAL | Age: 79
End: 2019-08-30

## 2019-08-30 DIAGNOSIS — I48.0 PAROXYSMAL ATRIAL FIBRILLATION (HCC): ICD-10-CM

## 2019-08-30 LAB — INR PPP: 1.9

## 2019-08-30 NOTE — PROGRESS NOTES
Anticoagulation Clinic - Remote Progress Note  [facility]  Testing Frequency: 7 days    Indication: paroxysmal afib  Referring Provider: Heath  Goal INR: 2.0-3.0  Current Drug Interactions: amiodarone, levothyroxine; omeprazole, azaTHIOprine, ezetimibe   CHADS-VASc: 5 (age, gender, HTN, DM)  HAS-BLED: 3 (HTN, CKD, Age)    Diet: rare GLV, rarely green beans - 8/19/19  Alcohol: none  Tobacco: none   OTC Pain Medication: APAP PRN    1st clinic visit: 9/14/17  2nd clinic visit: 6/26/18    INR History:  Date 2/9/18 2/16 2/21 3/1 3/9 3/13 3/20 3/27 4/3 4/10   Total Weekly Dose 18mg/3 days 38mg 36mg 36mg 38mg 28mg/5 days 38mg 38mg 38mg 26mg   INR 1.0 1.5 1.8 2.1 1.9        Notes s/p surgery 2/5 and held doses   Keflex finish 2/28 Boost; Keflex init    hold- pre procedure post procedure; boost     Date 4/17 4/20 4/24 5/1 5/8 5/15 5/18 5/22 5/29 6/1   Total Weekly Dose 41mg 36mg 36mg 40mg 38mg 38mg 28mg 28mg 38mg 34mg   INR 3.0 1.9 1.8 2.3 2.3 3.6 2.4 2.3 3.0 2.5   Notes     colchicine hematoma    Zpak, 1 miss     Date 6/4 6/7 6/14 6/19 6/26 6/29 7/3 7/6 7/11 7/16   Total Weekly Dose 34mg 38mg 34mg 30mg 34mg 30mg 30mg 30mg 32mg 38mg   INR 3.0 3.4 4.3 2.5 3.6 2.3 2.9 1.2 1.4 2.0   Notes   Keflex Held x1 Zpak start 6/22, finish 6/25; doxy start 6/25; clinic doxy doxy doxy complete 7/5       Date 7/23 7/26 8/2 8/9 8/16 8/23 8/29 9/5 9/12 9/19 9/24   Total Weekly Dose 36mg 38mg 36mg 38mg 36mg 32mg 30mg?? 32mg 32 mg 32mg 30mg   INR 1.7 2.3 2.0 3.4 3.8 3.5 2.5 2.6 2.5 3.3 2.0   Notes Keflex boost   1x decr dose 1x decr dose    Keflex Keflex     Date 9/27 10/1 10/8 10/15  10/22 10/29 11/5 11/14 11/21 11/28 12/4   Total Weekly Dose 32mg 32mg 32mg 30mg  32mg 28mg 32mg 32mg 32mg 28mg 26mg   INR 2.3 2.4 3.2 2.5  3.9 2.7 3.0 2.8 2.7 3.7 1.9   Notes Levaquin start 9/26 stopped Levaquin        stop MVI, 1 miss 1 hold     Date 12/10 12/17 12/21 12/31 1/7/19 1/14 1/22 1/24 1/28 1/31 2/7   Total Weekly Dose 30mg 30mg 30mg 26mg  28mg 28mg 28mg 28mg 30mg 30mg 28mg   INR 2.4 3.0 3.6 2.5 2.6 2.6 2.9 1.7 2.7 2.5 2.7   Notes        doxy start 1/22, incr GLV 2x boost; doxy Finish doxy 1/29      Date 2/14 2/20 2/28 3/7 3/14 3/21 3/28 4/4 4/11 4/18 4/25   Total Weekly Dose 28mg 28mg 28 mg 30mg 31mg 30mg 30mg 31mg 32mg 31mg 31mg   INR 2.1 2.4 1.8 1.6 2.2 2.5 1.9 1.9 2.4 2.8 2.4   Notes   sick Zetia            Date 5/2 5/6 5/15 REHAB 7/19 7/22 7/26 7/30 8/2 8/5 8/12   Total Weekly Dose 31mg 31mg 31mg  ????? ??? Unable to confirm 34mg? 34mg **30mg ? 30mg   INR 2.0 2.4 2.5  1.2 1.3 2.7 1.2 2.0 2.2 1.6   Notes ampicillin amp       stopped lexapro cefdinir      Date 8/15 8/19 8/26 8/30         Total Weekly Dose 32mg 33mg 31mg 32mg         INR 2.6 2.2 1.8 1.9         Notes 3x boost   cefdinir           Phone Interview:  Verbal Release Authorization signed on 6/26/18 -- may speak with Alexy Wallace (son), Genesis Becerril (daughter), Sawyer Wallace (son and daughter-in-law), Gerry Wallace (son)  Tablet Strength: 2mg tablets  Patient Contact Info: preferred [phone] - home with carlito Cotto- cell [phone]; call if can't get in touch with home phone; Mrs. Wallace's cell phone number - [phone]     Patient Findings   Negatives:  Signs/symptoms of thrombosis, Signs/symptoms of bleeding, Laboratory test error suspected, Change in health, Change in alcohol use, Change in activity, Upcoming invasive procedure, Emergency department visit, Upcoming dental procedure, Missed doses, Extra doses, Change in medications, Change in diet/appetite, Hospital admission, Bruising, Other complaints   Comments:  Mrs. Wallace finished Cefdinir 300mg capsule PO BID on Wednesday. She declines additional falls. She reports that she threw the envelope away on Wednesday and thought her dosing was 4mg daily except 5mg MWF. She does say she followed the paper until wednesday.      Plan:  1. INR is SUBtherapeutic today. Instructed Ms. Wallace to increase dose to 5mg daily except 4mg  VICTOR M.   2. Repeat INR on 9/5.  3. Verbal information provided over the phone. Patient RBV dosing instructions, expresses understanding by teach back, and has no further questions at this time. Patient did not have her medi planner with her to adjust doses- but states she wrote down directions and will adjust them later.     Fatemeh Rodríguez, PharmD  08/30/2019  3:53 PM

## 2019-09-05 ENCOUNTER — ANTICOAGULATION VISIT (OUTPATIENT)
Dept: PHARMACY | Facility: HOSPITAL | Age: 79
End: 2019-09-05

## 2019-09-05 DIAGNOSIS — I48.0 PAROXYSMAL ATRIAL FIBRILLATION (HCC): ICD-10-CM

## 2019-09-05 LAB — INR PPP: 2.7

## 2019-09-05 RX ORDER — CEPHALEXIN 500 MG/1
500 CAPSULE ORAL 2 TIMES DAILY
COMMUNITY
Start: 2019-09-04 | End: 2019-09-05

## 2019-09-05 NOTE — PROGRESS NOTES
Anticoagulation Clinic - Remote Progress Note  [facility]  Testing Frequency: 7 days    Indication: paroxysmal afib  Referring Provider: Heath  Goal INR: 2.0-3.0  Current Drug Interactions: amiodarone, levothyroxine; omeprazole, azaTHIOprine, ezetimibe   CHADS-VASc: 5 (age, gender, HTN, DM)  HAS-BLED: 3 (HTN, CKD, Age)    Diet: rare GLV, rarely green beans - 8/19/19  Alcohol: none  Tobacco: none   OTC Pain Medication: APAP PRN    1st clinic visit: 9/14/17  2nd clinic visit: 6/26/18    INR History:  Date 2/9/18 2/16 2/21 3/1 3/9 3/13 3/20 3/27 4/3 4/10   Total Weekly Dose 18mg/3 days 38mg 36mg 36mg 38mg 28mg/5 days 38mg 38mg 38mg 26mg   INR 1.0 1.5 1.8 2.1 1.9        Notes s/p surgery 2/5 and held doses   Keflex finish 2/28 Boost; Keflex init    hold- pre procedure post procedure; boost     Date 4/17 4/20 4/24 5/1 5/8 5/15 5/18 5/22 5/29 6/1   Total Weekly Dose 41mg 36mg 36mg 40mg 38mg 38mg 28mg 28mg 38mg 34mg   INR 3.0 1.9 1.8 2.3 2.3 3.6 2.4 2.3 3.0 2.5   Notes     colchicine hematoma    Zpak, 1 miss     Date 6/4 6/7 6/14 6/19 6/26 6/29 7/3 7/6 7/11 7/16   Total Weekly Dose 34mg 38mg 34mg 30mg 34mg 30mg 30mg 30mg 32mg 38mg   INR 3.0 3.4 4.3 2.5 3.6 2.3 2.9 1.2 1.4 2.0   Notes   Keflex Held x1 Zpak start 6/22, finish 6/25; doxy start 6/25; clinic doxy doxy doxy complete 7/5       Date 7/23 7/26 8/2 8/9 8/16 8/23 8/29 9/5 9/12 9/19 9/24   Total Weekly Dose 36mg 38mg 36mg 38mg 36mg 32mg 30mg?? 32mg 32 mg 32mg 30mg   INR 1.7 2.3 2.0 3.4 3.8 3.5 2.5 2.6 2.5 3.3 2.0   Notes Keflex boost   1x decr dose 1x decr dose    Keflex Keflex     Date 9/27 10/1 10/8 10/15  10/22 10/29 11/5 11/14 11/21 11/28 12/4   Total Weekly Dose 32mg 32mg 32mg 30mg  32mg 28mg 32mg 32mg 32mg 28mg 26mg   INR 2.3 2.4 3.2 2.5  3.9 2.7 3.0 2.8 2.7 3.7 1.9   Notes Levaquin start 9/26 stopped Levaquin        stop MVI, 1 miss 1 hold     Date 12/10 12/17 12/21 12/31 1/7/19 1/14 1/22 1/24 1/28 1/31 2/7   Total Weekly Dose 30mg 30mg 30mg 26mg  28mg 28mg 28mg 28mg 30mg 30mg 28mg   INR 2.4 3.0 3.6 2.5 2.6 2.6 2.9 1.7 2.7 2.5 2.7   Notes        doxy start 1/22, incr GLV 2x boost; doxy Finish doxy 1/29      Date 2/14 2/20 2/28 3/7 3/14 3/21 3/28 4/4 4/11 4/18 4/25   Total Weekly Dose 28mg 28mg 28 mg 30mg 31mg 30mg 30mg 31mg 32mg 31mg 31mg   INR 2.1 2.4 1.8 1.6 2.2 2.5 1.9 1.9 2.4 2.8 2.4   Notes   sick Zetia            Date 5/2 5/6 5/15 REHAB 7/19 7/22 7/26 7/30 8/2 8/5 8/12   Total Weekly Dose 31mg 31mg 31mg  ????? ??? Unable to confirm 34mg? 34mg **30mg ? 30mg   INR 2.0 2.4 2.5  1.2 1.3 2.7 1.2 2.0 2.2 1.6   Notes ampicillin amp       stopped lexapro cefdinir      Date 8/15 8/19 8/26 8/30 9/5        Total Weekly Dose 32mg 33mg 31mg 32mg 31mg 32mg       INR 2.6 2.2 1.8 1.9 2.7        Notes 3x boost   cefdinir; 1x boost keflex x2 days          Phone Interview:  Verbal Release Authorization signed on 6/26/18 -- may speak with Alexy Wallace (son), Genesis Becerril (daughter), Sawyer Wallace (son and daughter-in-law), Gerry Rodrigo (son)  Tablet Strength: 2mg tablets  Patient Contact Info: preferred [phone] - home with carlito Cotto- cell [phone]; call if can't get in touch with home phone; Mrs. Wallace's cell phone number - [phone]     Patient Findings   Positives:  Change in medications   Negatives:  Signs/symptoms of thrombosis, Signs/symptoms of bleeding, Laboratory test error suspected, Change in health, Change in alcohol use, Change in activity, Upcoming invasive procedure, Emergency department visit, Upcoming dental procedure, Missed doses, Extra doses, Change in diet/appetite, Hospital admission, Bruising, Other complaints   Comments:  Patient reports she was given keflex 500mg BID x2 days. She took first dose yesterday morning and will take last dose tomorrow, 9/6. Patient confirms she has finished cefdinir as discussed in previous encounter. Otherwise denies all findings.      Plan:  1. INR is therapeutic and back WNL today at 2.7, though this  is a 0.8 point increase from 6 days ago. For now, instructed Ms. Wallace to continue warfarin 5mg daily except 4mg MonWedFri (32mg/week)   2. Repeat INR on Mon, 9/9  3. Verbal information provided over the phone. Patient RBV dosing instructions, expresses understanding by teach back, and has no further questions at this time.      Sawyer Gamez CPhT  9/5/2019  3:42 PM     I, Francoise Wu [facility], have reviewed the note in full and agree with the assessment and plan.  09/05/19  4:31 PM

## 2019-09-09 ENCOUNTER — TELEPHONE (OUTPATIENT)
Dept: INTERNAL MEDICINE | Facility: CLINIC | Age: 79
End: 2019-09-09

## 2019-09-09 ENCOUNTER — ANTICOAGULATION VISIT (OUTPATIENT)
Dept: PHARMACY | Facility: HOSPITAL | Age: 79
End: 2019-09-09

## 2019-09-09 DIAGNOSIS — I48.0 PAROXYSMAL ATRIAL FIBRILLATION (HCC): ICD-10-CM

## 2019-09-09 LAB — INR PPP: 3.3

## 2019-09-09 RX ORDER — WARFARIN SODIUM 2 MG/1
TABLET ORAL
Qty: 192 TABLET | Refills: 0 | Status: SHIPPED | OUTPATIENT
Start: 2019-09-09 | End: 2019-12-23

## 2019-09-09 NOTE — TELEPHONE ENCOUNTER
Patient called and said she has a runny nose and cough and wanted to know what she can take? Please give pt a call

## 2019-09-09 NOTE — PROGRESS NOTES
Anticoagulation Clinic - Remote Progress Note  [facility]  Testing Frequency: 7 days    Indication: paroxysmal afib  Referring Provider: Heath  Goal INR: 2.0-3.0  Current Drug Interactions: amiodarone, levothyroxine; omeprazole, azaTHIOprine, ezetimibe   CHADS-VASc: 5 (age, gender, HTN, DM)  HAS-BLED: 3 (HTN, CKD, Age)     Diet: rare GLV, rarely green beans - 8/19/19  Alcohol: none  Tobacco: none   OTC Pain Medication: APAP PRN    1st clinic visit: 9/14/17  2nd clinic visit: 6/26/18    INR History:    Date 7/23 7/26 8/2 8/9 8/16 8/23 8/29 9/5 9/12 9/19 9/24   Total Weekly Dose 36mg 38mg 36mg 38mg 36mg 32mg 30mg?? 32mg 32 mg 32mg 30mg   INR 1.7 2.3 2.0 3.4 3.8 3.5 2.5 2.6 2.5 3.3 2.0   Notes Keflex boost   1x decr dose 1x decr dose    Keflex Keflex     Date 9/27 10/1 10/8 10/15  10/22 10/29 11/5 11/14 11/21 11/28 12/4   Total Weekly Dose 32mg 32mg 32mg 30mg  32mg 28mg 32mg 32mg 32mg 28mg 26mg   INR 2.3 2.4 3.2 2.5  3.9 2.7 3.0 2.8 2.7 3.7 1.9   Notes Levaquin start 9/26 stopped Levaquin        stop MVI, 1 miss 1 hold     Date 12/10 12/17 12/21 12/31 1/7/19 1/14 1/22 1/24 1/28 1/31 2/7   Total Weekly Dose 30mg 30mg 30mg 26mg 28mg 28mg 28mg 28mg 30mg 30mg 28mg   INR 2.4 3.0 3.6 2.5 2.6 2.6 2.9 1.7 2.7 2.5 2.7   Notes        doxy start 1/22, incr GLV 2x boost; doxy Finish doxy 1/29      Date 2/14 2/20 2/28 3/7 3/14 3/21 3/28 4/4 4/11 4/18 4/25   Total Weekly Dose 28mg 28mg 28 mg 30mg 31mg 30mg 30mg 31mg 32mg 31mg 31mg   INR 2.1 2.4 1.8 1.6 2.2 2.5 1.9 1.9 2.4 2.8 2.4   Notes   sick Zetia            Date 5/2 5/6 5/15 REHAB 7/19 7/22 7/26 7/30 8/2 8/5 8/12   Total Weekly Dose 31mg 31mg 31mg  ????? ??? Unable to confirm 34mg? 34mg **30mg ? 30mg   INR 2.0 2.4 2.5  1.2 1.3 2.7 1.2 2.0 2.2 1.6   Notes ampicillin amp       stopped lexapro cefdinir      Date 8/15 8/19 8/26 8/30 9/5 9/9       Total Weekly Dose 32mg 33mg 31mg 32mg 31mg 32mg       INR 2.6 2.2 1.8 1.9 2.7 3.3       Notes 3x boost   cefdinir; 1x boost  keflex x2 days sick         Phone Interview:  Verbal Release Authorization signed on 6/26/18 -- may speak with Alexy Wallace (son), Genesis Becerril (daughter), Sawyer or Gema Wallace (son and daughter-in-law), Gerry Wallace (son)  Tablet Strength: 2mg tablets  Patient Contact Info: preferred [phone] - home with son Yadiel- cell [phone]; call if can't get in touch with home phone; Mrs. Wallace's cell phone number - [phone]     Patient Findings   Negatives:  Signs/symptoms of thrombosis, Signs/symptoms of bleeding, Laboratory test error suspected, Change in health, Change in alcohol use, Change in activity, Upcoming invasive procedure, Emergency department visit, Upcoming dental procedure, Missed doses, Extra doses, Change in medications, Change in diet/appetite, Hospital admission, Bruising, Other complaints   Comments:  Mrs. Wallace reports that she has been congested for about 3-4 days. She reports that she has lost her appetite but she is eating because she knows she needs to. She denies N/V/D. She is going to start taking mucinex. Denies Abx/ steroids.     Plan:  1. INR is SUPRAtherapeutic today at 3.3. Instructed Ms. Wallace to decreae dose tonight to 2mg then continue warfarin 5mg daily except 4mg MonWedFri  2. Repeat INR on Friday.  3. Verbal information provided over the phone. Patient RBV dosing instructions, expresses understanding by teach back, and has no further questions at this time.      Fatemeh Rodríguez, PharmD  09/09/2019  5:02 PM

## 2019-09-13 ENCOUNTER — ANTICOAGULATION VISIT (OUTPATIENT)
Dept: PHARMACY | Facility: HOSPITAL | Age: 79
End: 2019-09-13

## 2019-09-13 DIAGNOSIS — I48.0 PAROXYSMAL ATRIAL FIBRILLATION (HCC): ICD-10-CM

## 2019-09-13 LAB — INR PPP: 4

## 2019-09-13 NOTE — PROGRESS NOTES
Anticoagulation Clinic - Remote Progress Note  [facility]  Testing Frequency: 7 days    Indication: paroxysmal afib  Referring Provider: Heath  Goal INR: 2.0-3.0  Current Drug Interactions: amiodarone, levothyroxine; omeprazole, azaTHIOprine, ezetimibe   CHADS-VASc: 5 (age, gender, HTN, DM)  HAS-BLED: 3 (HTN, CKD, Age)     Diet: rare GLV, rarely green beans - 8/19/19  Alcohol: none  Tobacco: none   OTC Pain Medication: APAP PRN    1st clinic visit: 9/14/17  2nd clinic visit: 6/26/18    INR History:    Date 7/23 7/26 8/2 8/9 8/16 8/23 8/29 9/5 9/12 9/19 9/24   Total Weekly Dose 36mg 38mg 36mg 38mg 36mg 32mg 30mg?? 32mg 32 mg 32mg 30mg   INR 1.7 2.3 2.0 3.4 3.8 3.5 2.5 2.6 2.5 3.3 2.0   Notes Keflex boost   1x decr dose 1x decr dose    Keflex Keflex     Date 9/27 10/1 10/8 10/15  10/22 10/29 11/5 11/14 11/21 11/28 12/4   Total Weekly Dose 32mg 32mg 32mg 30mg  32mg 28mg 32mg 32mg 32mg 28mg 26mg   INR 2.3 2.4 3.2 2.5  3.9 2.7 3.0 2.8 2.7 3.7 1.9   Notes Levaquin start 9/26 stopped Levaquin        stop MVI, 1 miss 1 hold     Date 12/10 12/17 12/21 12/31 1/7/19 1/14 1/22 1/24 1/28 1/31 2/7   Total Weekly Dose 30mg 30mg 30mg 26mg 28mg 28mg 28mg 28mg 30mg 30mg 28mg   INR 2.4 3.0 3.6 2.5 2.6 2.6 2.9 1.7 2.7 2.5 2.7   Notes        doxy start 1/22, incr GLV 2x boost; doxy Finish doxy 1/29      Date 2/14 2/20 2/28 3/7 3/14 3/21 3/28 4/4 4/11 4/18 4/25   Total Weekly Dose 28mg 28mg 28 mg 30mg 31mg 30mg 30mg 31mg 32mg 31mg 31mg   INR 2.1 2.4 1.8 1.6 2.2 2.5 1.9 1.9 2.4 2.8 2.4   Notes   sick Zetia            Date 5/2 5/6 5/15 REHAB 7/19 7/22 7/26 7/30 8/2 8/5 8/12   Total Weekly Dose 31mg 31mg 31mg  ????? ??? Unable to confirm 34mg? 34mg **30mg ? 30mg   INR 2.0 2.4 2.5  1.2 1.3 2.7 1.2 2.0 2.2 1.6   Notes ampicillin amp       stopped lexapro cefdinir      Date 8/15 8/19 8/26 8/30 9/5 9/9 9/13      Total Weekly Dose 32mg 33mg 31mg 32mg 31mg 32mg 28 mg      INR 2.6 2.2 1.8 1.9 2.7 3.3 4.0      Notes 3x boost   cefdinir;  1x boost keflex x2 days sick Sick; dose decrease        Phone Interview:  Verbal Release Authorization signed on 6/26/18 -- may speak with Alexy Wallace (son), Genesis Becerril (daughter), Sawyer or Gema Wallace (son and daughter-in-law), Gerry Wallace (son)  Tablet Strength: 2mg tablets  Patient Contact Info: preferred [phone] - home with carlito Cotto- cell [phone]; call if can't get in touch with home phone; Mrs. Wallace's cell phone number - [phone]     Patient Findings   Positives:  Missed doses   Negatives:  Signs/symptoms of thrombosis, Signs/symptoms of bleeding, Laboratory test error suspected, Change in health, Change in alcohol use, Change in activity, Upcoming invasive procedure, Emergency department visit, Upcoming dental procedure, Extra doses, Change in medications, Change in diet/appetite, Hospital admission, Bruising, Other complaints   Comments:  She believes she is improving and continues to eat. She is on mucinex and has coughing at times, but on the phone it is not apparent.   She reduced her warfarin dose on Monday as we instructed and also has only been on 4 mg daily since ( a lower dose than we had instructed).       Plan:  1. INR is SUPRAtherapeutic today at 4.0. Instructed Ms. Wallace to HOLD today's dose and continue warfarin 4mg daily.   2. Repeat INR on Tuesday.   3. Verbal information provided over the phone. Patient RBV dosing instructions, expresses understanding by teach back, and has no further questions at this time.      Sawyer Jackman, PharmD  9/13/2019  3:23 PM

## 2019-09-17 ENCOUNTER — ANTICOAGULATION VISIT (OUTPATIENT)
Dept: PHARMACY | Facility: HOSPITAL | Age: 79
End: 2019-09-17

## 2019-09-17 DIAGNOSIS — I48.0 PAROXYSMAL ATRIAL FIBRILLATION (HCC): ICD-10-CM

## 2019-09-17 LAB
INR PPP: 3.2
INR PPP: 3.2

## 2019-09-17 NOTE — PROGRESS NOTES
Anticoagulation Clinic - Remote Progress Note  [facility]  Testing Frequency: 7 days    Indication: paroxysmal afib  Referring Provider: Heath  Goal INR: 2.0-3.0  Current Drug Interactions: amiodarone, levothyroxine; omeprazole, azaTHIOprine, ezetimibe   CHADS-VASc: 5 (age, gender, HTN, DM)  HAS-BLED: 3 (HTN, CKD, Age)     Diet: rare GLV, rarely green beans - 8/19/19  Alcohol: none  Tobacco: none   OTC Pain Medication: APAP PRN    1st clinic visit: 9/14/17  2nd clinic visit: 6/26/18    INR History:    Date 7/23 7/26 8/2 8/9 8/16 8/23 8/29 9/5 9/12 9/19 9/24   Total Weekly Dose 36mg 38mg 36mg 38mg 36mg 32mg 30mg?? 32mg 32 mg 32mg 30mg   INR 1.7 2.3 2.0 3.4 3.8 3.5 2.5 2.6 2.5 3.3 2.0   Notes Keflex boost   1x decr dose 1x decr dose    Keflex Keflex     Date 9/27 10/1 10/8 10/15  10/22 10/29 11/5 11/14 11/21 11/28 12/4   Total Weekly Dose 32mg 32mg 32mg 30mg  32mg 28mg 32mg 32mg 32mg 28mg 26mg   INR 2.3 2.4 3.2 2.5  3.9 2.7 3.0 2.8 2.7 3.7 1.9   Notes Levaquin start 9/26 stopped Levaquin        stop MVI, 1 miss 1 hold     Date 12/10 12/17 12/21 12/31 1/7/19 1/14 1/22 1/24 1/28 1/31 2/7   Total Weekly Dose 30mg 30mg 30mg 26mg 28mg 28mg 28mg 28mg 30mg 30mg 28mg   INR 2.4 3.0 3.6 2.5 2.6 2.6 2.9 1.7 2.7 2.5 2.7   Notes        doxy start 1/22, incr GLV 2x boost; doxy Finish doxy 1/29      Date 2/14 2/20 2/28 3/7 3/14 3/21 3/28 4/4 4/11 4/18 4/25   Total Weekly Dose 28mg 28mg 28 mg 30mg 31mg 30mg 30mg 31mg 32mg 31mg 31mg   INR 2.1 2.4 1.8 1.6 2.2 2.5 1.9 1.9 2.4 2.8 2.4   Notes   sick Zetia            Date 5/2 5/6 5/15 REHAB 7/19 7/22 7/26 7/30 8/2 8/5 8/12   Total Weekly Dose 31mg 31mg 31mg  ????? ??? Unable to confirm 34mg? 34mg **30mg ? 30mg   INR 2.0 2.4 2.5  1.2 1.3 2.7 1.2 2.0 2.2 1.6   Notes ampicillin amp       stopped lexapro cefdinir      Date 8/15 8/19 8/26 8/30 9/5 9/9 9/13      Total Weekly Dose 32mg 33mg 31mg 32mg 31mg 32mg 28 mg      INR 2.6 2.2 1.8 1.9 2.7 3.3 4.0      Notes 3x boost   cefdinir;  1x boost keflex x2 days sick Sick; dose decrease        Phone Interview:  Verbal Release Authorization signed on 6/26/18 -- may speak with Alexy Wallace (son), Genesis Becerril (daughter), Sawyer or Gema Wallace (son and daughter-in-law), Gerry Wallace (son)  Tablet Strength: 2mg tablets  Patient Contact Info: preferred [phone] - home with carlito Cotto- cell [phone]; call if can't get in touch with home phone; Mrs. Wallace's cell phone number - [phone]     Patient Findings       Plan:  1. INR is SUPRAtherapeutic today at 43.2. Instructed Ms. Wallace to HOLD tonight's dose continue warfarin 4mg daily.   2. Repeat INR on Tuesday.   3. Verbal information provided over the phone. Patient RBV dosing instructions, expresses understanding by teach back, and has no further questions at this time.          Nova Sandoval, PharmD  Pharmacy Resident   9/17/2019  4:00 PM

## 2019-09-17 NOTE — PROGRESS NOTES
Anticoagulation Clinic - Remote Progress Note  [facility]  Testing Frequency: 7 days    Indication: paroxysmal afib  Referring Provider: Heath  Goal INR: 2.0-3.0  Current Drug Interactions: amiodarone, levothyroxine; omeprazole, azaTHIOprine, ezetimibe   CHADS-VASc: 5 (age, gender, HTN, DM)  HAS-BLED: 3 (HTN, CKD, Age)     Diet: rare GLV, rarely green beans - 8/19/19  Alcohol: none  Tobacco: none   OTC Pain Medication: APAP PRN    1st clinic visit: 9/14/17  2nd clinic visit: 6/26/18    INR History:    Date 7/23 7/26 8/2 8/9 8/16 8/23 8/29 9/5 9/12 9/19 9/24   Total Weekly Dose 36mg 38mg 36mg 38mg 36mg 32mg 30mg?? 32mg 32 mg 32mg 30mg   INR 1.7 2.3 2.0 3.4 3.8 3.5 2.5 2.6 2.5 3.3 2.0   Notes Keflex boost   1x decr dose 1x decr dose    Keflex Keflex     Date 9/27 10/1 10/8 10/15  10/22 10/29 11/5 11/14 11/21 11/28 12/4   Total Weekly Dose 32mg 32mg 32mg 30mg  32mg 28mg 32mg 32mg 32mg 28mg 26mg   INR 2.3 2.4 3.2 2.5  3.9 2.7 3.0 2.8 2.7 3.7 1.9   Notes Levaquin start 9/26 stopped Levaquin        stop MVI, 1 miss 1 hold     Date 12/10 12/17 12/21 12/31 1/7/19 1/14 1/22 1/24 1/28 1/31 2/7   Total Weekly Dose 30mg 30mg 30mg 26mg 28mg 28mg 28mg 28mg 30mg 30mg 28mg   INR 2.4 3.0 3.6 2.5 2.6 2.6 2.9 1.7 2.7 2.5 2.7   Notes        doxy start 1/22, incr GLV 2x boost; doxy Finish doxy 1/29      Date 2/14 2/20 2/28 3/7 3/14 3/21 3/28 4/4 4/11 4/18 4/25   Total Weekly Dose 28mg 28mg 28 mg 30mg 31mg 30mg 30mg 31mg 32mg 31mg 31mg   INR 2.1 2.4 1.8 1.6 2.2 2.5 1.9 1.9 2.4 2.8 2.4   Notes   sick Zetia            Date 5/2 5/6 5/15 REHAB 7/19 7/22 7/26 7/30 8/2 8/5 8/12   Total Weekly Dose 31mg 31mg 31mg  ????? ??? Unable to confirm 34mg? 34mg **30mg ? 30mg   INR 2.0 2.4 2.5  1.2 1.3 2.7 1.2 2.0 2.2 1.6   Notes ampicillin amp       stopped lexapro cefdinir      Date 8/15 8/19 8/26 8/30 9/5 9/9 9/13 9/17     Total Weekly Dose 32mg 33mg 31mg 32mg 31mg 32mg 28 mg 24mg     INR 2.6 2.2 1.8 1.9 2.7 3.3 4.0 3.2     Notes 3x boost    cefdinir; 1x boost keflex x2 days sick Sick; dose decrease        Phone Interview:  Verbal Release Authorization signed on 6/26/18 -- may speak with Alexy Wallace (son), Genesis Becerril (daughter), Sawyer or Gema Wallace (son and daughter-in-law), Gerry Wallace (son)  Tablet Strength: 2mg tablets  Patient Contact Info: preferred [phone] - home with carlito Cotto- cell [phone]; call if can't get in touch with home phone; Mrs. Wallace's cell phone number - [phone]     Patient Findings   Negatives:  Signs/symptoms of thrombosis, Signs/symptoms of bleeding, Laboratory test error suspected, Change in health, Change in alcohol use, Change in activity, Upcoming invasive procedure, Emergency department visit, Upcoming dental procedure, Missed doses, Extra doses, Change in medications, Change in diet/appetite, Hospital admission, Bruising, Other complaints       Plan:  1. INR is SUPRAtherapeutic today at 3.2. Instructed Ms. Wallace to continue warfarin 4mg daily. (~7% decrease)  2. Repeat INR on Monday.   3. Verbal information provided over the phone. Patient RBV dosing instructions, expresses understanding by teach back, and has no further questions at this time.      Nova Sandoval, PharmD  Pharmacy Resident   9/17/2019  3:59 PM

## 2019-09-20 ENCOUNTER — LAB (OUTPATIENT)
Dept: LAB | Facility: HOSPITAL | Age: 79
End: 2019-09-20

## 2019-09-20 ENCOUNTER — TRANSCRIBE ORDERS (OUTPATIENT)
Dept: LAB | Facility: HOSPITAL | Age: 79
End: 2019-09-20

## 2019-09-20 DIAGNOSIS — M25.561 RIGHT KNEE PAIN, UNSPECIFIED CHRONICITY: ICD-10-CM

## 2019-09-20 DIAGNOSIS — M25.561 RIGHT KNEE PAIN, UNSPECIFIED CHRONICITY: Primary | ICD-10-CM

## 2019-09-20 LAB — CRP SERPL-MCNC: 4.31 MG/DL (ref 0–0.5)

## 2019-09-20 PROCEDURE — 36415 COLL VENOUS BLD VENIPUNCTURE: CPT

## 2019-09-20 PROCEDURE — 85025 COMPLETE CBC W/AUTO DIFF WBC: CPT

## 2019-09-20 PROCEDURE — 85652 RBC SED RATE AUTOMATED: CPT | Performed by: ORTHOPAEDIC SURGERY

## 2019-09-20 PROCEDURE — 86140 C-REACTIVE PROTEIN: CPT | Performed by: ORTHOPAEDIC SURGERY

## 2019-09-21 LAB
BASOPHILS # BLD AUTO: 0.05 10*3/MM3 (ref 0–0.2)
BASOPHILS NFR BLD AUTO: 0.4 % (ref 0–1.5)
DEPRECATED RDW RBC AUTO: 54.6 FL (ref 37–54)
EOSINOPHIL # BLD AUTO: 0.49 10*3/MM3 (ref 0–0.4)
EOSINOPHIL NFR BLD AUTO: 3.8 % (ref 0.3–6.2)
ERYTHROCYTE [DISTWIDTH] IN BLOOD BY AUTOMATED COUNT: 15.7 % (ref 12.3–15.4)
ERYTHROCYTE [SEDIMENTATION RATE] IN BLOOD: 86 MM/HR (ref 0–30)
HCT VFR BLD AUTO: 33.1 % (ref 34–46.6)
HGB BLD-MCNC: 10.7 G/DL (ref 12–15.9)
IMM GRANULOCYTES # BLD AUTO: 0.12 10*3/MM3 (ref 0–0.05)
IMM GRANULOCYTES NFR BLD AUTO: 0.9 % (ref 0–0.5)
LYMPHOCYTES # BLD AUTO: 2.78 10*3/MM3 (ref 0.7–3.1)
LYMPHOCYTES NFR BLD AUTO: 21.7 % (ref 19.6–45.3)
MCH RBC QN AUTO: 30.5 PG (ref 26.6–33)
MCHC RBC AUTO-ENTMCNC: 32.3 G/DL (ref 31.5–35.7)
MCV RBC AUTO: 94.3 FL (ref 79–97)
MONOCYTES # BLD AUTO: 0.89 10*3/MM3 (ref 0.1–0.9)
MONOCYTES NFR BLD AUTO: 7 % (ref 5–12)
NEUTROPHILS # BLD AUTO: 8.46 10*3/MM3 (ref 1.7–7)
NEUTROPHILS NFR BLD AUTO: 66.2 % (ref 42.7–76)
NRBC BLD AUTO-RTO: 0 /100 WBC (ref 0–0.2)
PLATELET # BLD AUTO: 295 10*3/MM3 (ref 140–450)
PMV BLD AUTO: 9.8 FL (ref 6–12)
RBC # BLD AUTO: 3.51 10*6/MM3 (ref 3.77–5.28)
WBC NRBC COR # BLD: 12.79 10*3/MM3 (ref 3.4–10.8)

## 2019-09-23 ENCOUNTER — ANTICOAGULATION VISIT (OUTPATIENT)
Dept: PHARMACY | Facility: HOSPITAL | Age: 79
End: 2019-09-23

## 2019-09-23 DIAGNOSIS — I48.0 PAROXYSMAL ATRIAL FIBRILLATION (HCC): ICD-10-CM

## 2019-09-23 LAB — INR PPP: 3.3

## 2019-09-27 RX ORDER — OMEPRAZOLE 40 MG/1
CAPSULE, DELAYED RELEASE ORAL
Qty: 90 CAPSULE | Refills: 0 | Status: SHIPPED | OUTPATIENT
Start: 2019-09-27 | End: 2020-01-06

## 2019-10-01 ENCOUNTER — ANTICOAGULATION VISIT (OUTPATIENT)
Dept: PHARMACY | Facility: HOSPITAL | Age: 79
End: 2019-10-01

## 2019-10-01 DIAGNOSIS — I48.0 PAROXYSMAL ATRIAL FIBRILLATION (HCC): ICD-10-CM

## 2019-10-01 LAB — INR PPP: 2.5

## 2019-10-01 RX ORDER — GENTAMICIN SULFATE 1 MG/G
1 CREAM TOPICAL DAILY
Refills: 3 | COMMUNITY
Start: 2019-08-13

## 2019-10-01 RX ORDER — AMLODIPINE BESYLATE 5 MG/1
1 TABLET ORAL DAILY
COMMUNITY
Start: 2019-08-21 | End: 2020-12-08

## 2019-10-01 RX ORDER — FOLIC ACID/VIT B COMPLEX AND C 0.8 MG
1 TABLET ORAL DAILY
Refills: 4 | COMMUNITY
Start: 2019-08-01 | End: 2019-11-05

## 2019-10-01 NOTE — PROGRESS NOTES
Anticoagulation Clinic - Remote Progress Note  [facility]  Testing Frequency: 7 days    Indication: paroxysmal afib  Referring Provider: Heath (Last seen:   Goal INR: 2.0-3.0  Current Drug Interactions: amiodarone, levothyroxine; omeprazole, azaTHIOprine, ezetimibe   CHADS-VASc: 5 (age, gender, HTN, DM)  HAS-BLED: 3 (HTN, CKD, Age)     Diet: rare GLV, rarely green beans - 8/19/19  Alcohol: none  Tobacco: none   OTC Pain Medication: APAP PRN    1st clinic visit: 9/14/17  2nd clinic visit: 6/26/18    INR History:    Date 7/23 7/26 8/2 8/9 8/16 8/23 8/29 9/5 9/12 9/19 9/24   Total Weekly Dose 36mg 38mg 36mg 38mg 36mg 32mg 30mg?? 32mg 32 mg 32mg 30mg   INR 1.7 2.3 2.0 3.4 3.8 3.5 2.5 2.6 2.5 3.3 2.0   Notes Keflex boost   1x decr dose 1x decr dose    Keflex Keflex     Date 9/27 10/1 10/8 10/15  10/22 10/29 11/5 11/14 11/21 11/28 12/4   Total Weekly Dose 32mg 32mg 32mg 30mg  32mg 28mg 32mg 32mg 32mg 28mg 26mg   INR 2.3 2.4 3.2 2.5  3.9 2.7 3.0 2.8 2.7 3.7 1.9   Notes Levaquin start 9/26 stopped Levaquin        stop MVI, 1 miss 1 hold     Date 12/10 12/17 12/21 12/31 1/7/19 1/14 1/22 1/24 1/28 1/31 2/7   Total Weekly Dose 30mg 30mg 30mg 26mg 28mg 28mg 28mg 28mg 30mg 30mg 28mg   INR 2.4 3.0 3.6 2.5 2.6 2.6 2.9 1.7 2.7 2.5 2.7   Notes        doxy start 1/22, incr GLV 2x boost; doxy Finish doxy 1/29      Date 2/14 2/20 2/28 3/7 3/14 3/21 3/28 4/4 4/11 4/18 4/25   Total Weekly Dose 28mg 28mg 28 mg 30mg 31mg 30mg 30mg 31mg 32mg 31mg 31mg   INR 2.1 2.4 1.8 1.6 2.2 2.5 1.9 1.9 2.4 2.8 2.4   Notes   sick Zetia            Date 5/2 5/6 5/15 REHAB 7/19 7/22 7/26 7/30 8/2 8/5 8/12   Total Weekly Dose 31mg 31mg 31mg  ????? ??? Unable to confirm 34mg? 34mg **30mg ? 30mg   INR 2.0 2.4 2.5  1.2 1.3 2.7 1.2 2.0 2.2 1.6   Notes ampicillin amp       stopped lexapro cefdinir      Date 8/15 8/19 8/26 8/30 9/5 9/9 9/13 9/17 10/1    Total Weekly Dose 32mg 33mg 31mg 32mg 31mg 32mg 28mg 24mg 28mg    INR 2.6 2.2 1.8 1.9 2.7 3.3 4.0 3.2  2.5    Notes 3x boost   cefdinir; 1x boost keflex x2 days sick Sick; dose decrease  1x decr dose      Phone Interview:  Verbal Release Authorization signed on 6/26/18 -- may speak with Alexy Wallace (son), Genesis Becerril (daughter), Sawyer or Gema Rodrigo (son and daughter-in-law), Gerry Wallace (son)  Tablet Strength: 2mg tablets  Patient Contact Info: preferred [phone] - home with son Yadiel- cell [phone]; call if can't get in touch with home phone; Mrs. Wallace's cell phone number - [phone]     Patient Findings:    Plan:  1. INR is therapeutic and back WNL today at 2.5. Instructed Ms. Wallace to continue warfarin 4mg daily for now  2. Repeat INR in 1 week, 10/8.  3. Verbal information provided over the phone. Patient RBV dosing instructions, expresses understanding by teach back, and has no further questions at this time.      Sawyer Gamez CPhT  10/1/2019  3:16 PM     Mrs. Wallace has not seen Dr. Joe in over one year.   IDesiree, [facility], have reviewed the note in full and agree with the assessment and plan.  10/01/19  4:53 PM

## 2019-10-04 RX ORDER — ONDANSETRON 4 MG/1
4 TABLET, ORALLY DISINTEGRATING ORAL EVERY 8 HOURS PRN
Qty: 20 TABLET | Refills: 0 | Status: SHIPPED | OUTPATIENT
Start: 2019-10-04 | End: 2020-07-21

## 2019-10-07 ENCOUNTER — ANTICOAGULATION VISIT (OUTPATIENT)
Dept: PHARMACY | Facility: HOSPITAL | Age: 79
End: 2019-10-07

## 2019-10-07 DIAGNOSIS — I48.0 PAROXYSMAL ATRIAL FIBRILLATION (HCC): ICD-10-CM

## 2019-10-07 LAB — INR PPP: 3.7

## 2019-10-07 NOTE — PROGRESS NOTES
Anticoagulation Clinic - Remote Progress Note  [facility]  Testing Frequency: 7 days    Indication: paroxysmal afib  Referring Provider: Heath (Last seen:   Goal INR: 2.0-3.0  Current Drug Interactions: amiodarone, levothyroxine; omeprazole, azaTHIOprine, ezetimibe   CHADS-VASc: 5 (age, gender, HTN, DM)  HAS-BLED: 3 (HTN, CKD, Age)     Diet: rare GLV, rarely green beans - 8/19/19  Alcohol: none  Tobacco: none   OTC Pain Medication: APAP PRN    1st clinic visit: 9/14/17  2nd clinic visit: 6/26/18    INR History:    Date 7/23 7/26 8/2 8/9 8/16 8/23 8/29 9/5 9/12 9/19 9/24   Total Weekly Dose 36mg 38mg 36mg 38mg 36mg 32mg 30mg?? 32mg 32 mg 32mg 30mg   INR 1.7 2.3 2.0 3.4 3.8 3.5 2.5 2.6 2.5 3.3 2.0   Notes Keflex boost   1x decr dose 1x decr dose    Keflex Keflex     Date 9/27 10/1 10/8 10/15  10/22 10/29 11/5 11/14 11/21 11/28 12/4   Total Weekly Dose 32mg 32mg 32mg 30mg  32mg 28mg 32mg 32mg 32mg 28mg 26mg   INR 2.3 2.4 3.2 2.5  3.9 2.7 3.0 2.8 2.7 3.7 1.9   Notes Levaquin start 9/26 stopped Levaquin        stop MVI, 1 miss 1 hold     Date 12/10 12/17 12/21 12/31 1/7/19 1/14 1/22 1/24 1/28 1/31 2/7   Total Weekly Dose 30mg 30mg 30mg 26mg 28mg 28mg 28mg 28mg 30mg 30mg 28mg   INR 2.4 3.0 3.6 2.5 2.6 2.6 2.9 1.7 2.7 2.5 2.7   Notes        doxy start 1/22, incr GLV 2x boost; doxy Finish doxy 1/29      Date 2/14 2/20 2/28 3/7 3/14 3/21 3/28 4/4 4/11 4/18 4/25   Total Weekly Dose 28mg 28mg 28 mg 30mg 31mg 30mg 30mg 31mg 32mg 31mg 31mg   INR 2.1 2.4 1.8 1.6 2.2 2.5 1.9 1.9 2.4 2.8 2.4   Notes   sick Zetia            Date 5/2 5/6 5/15 REHAB 7/19 7/22 7/26 7/30 8/2 8/5 8/12   Total Weekly Dose 31mg 31mg 31mg  ????? ??? Unable to confirm 34mg? 34mg **30mg ? 30mg   INR 2.0 2.4 2.5  1.2 1.3 2.7 1.2 2.0 2.2 1.6   Notes ampicillin amp       stopped lexapro cefdinir      Date 8/15 8/19 8/26 8/30 9/5 9/9 9/13 9/17 10/1  10/7     Total Weekly Dose 32mg 33mg 31mg 32mg 31mg 32mg 28mg 24mg 28mg  28 mg  26 mg    INR 2.6 2.2  1.8 1.9 2.7 3.3 4.0 3.2 2.5  3.7     Notes 3x boost   cefdinir; 1x boost keflex x2 days sick Sick; dose decrease  1x decr dose        Phone Interview:  Verbal Release Authorization signed on 6/26/18 -- may speak with Alexy Wallace (son), Genesis Becerril (daughter), Sawyer or Gema Wallace (son and daughter-in-law), Gerry Wallace (son)  Tablet Strength: 2mg tablets  Patient Contact Info: preferred [phone] - home with son Yadiel- cell [phone]; call if can't get in touch with home phone; Mrs. Wallace's cell phone number - [phone]     Patient Findings   Positives:  Change in medications, Change in diet/appetite   Negatives:  Signs/symptoms of thrombosis, Signs/symptoms of bleeding, Laboratory test error suspected, Change in health, Change in alcohol use, Change in activity, Upcoming invasive procedure, Emergency department visit, Upcoming dental procedure, Missed doses, Extra doses, Hospital admission, Bruising, Other complaints   Comments:  She stated that she does not eat any GLV   She stated that she has taken more acetaminophen over the past week.   Denies unusual bruising or bleeding.   She stated that she has not felt good.  She stated that she had diarrhea one day last week.   At next encounter, encourage patient to schedule follow up with Dr. Joe     Plan:  1. INR is supratherapeutic today at 3.7. Instructed Ms. Wallace to reduce to warfarin 4mg oral daily except 2 mg on Mondays for now (26 mg/week, 7.1% decrease)  2. Repeat INR in 1 week, 10/15 per patient preference.  3. Verbal information provided over the phone. Patient RBV dosing instructions, expresses understanding by teach back, and has no further questions at this time.      Radha Michaud, PharmD  10/7/2019  3:19 PM     Mrs. Wallace has not seen Dr. Joe in over one year.

## 2019-10-15 ENCOUNTER — ANTICOAGULATION VISIT (OUTPATIENT)
Dept: PHARMACY | Facility: HOSPITAL | Age: 79
End: 2019-10-15

## 2019-10-15 DIAGNOSIS — I48.0 PAROXYSMAL ATRIAL FIBRILLATION (HCC): ICD-10-CM

## 2019-10-15 LAB — INR PPP: 2

## 2019-10-15 NOTE — PROGRESS NOTES
Anticoagulation Clinic - Remote Progress Note  [facility]  Testing Frequency: 7 days    Indication: paroxysmal afib  Referring Provider: Heath (Last seen:   Goal INR: 2.0-3.0  Current Drug Interactions: amiodarone, levothyroxine; omeprazole, azaTHIOprine, ezetimibe   CHADS-VASc: 5 (age, gender, HTN, DM)  HAS-BLED: 3 (HTN, CKD, Age)     Diet: rare GLV, rarely green beans - 8/19/19  Alcohol: none  Tobacco: none   OTC Pain Medication: APAP PRN    1st clinic visit: 9/14/17  2nd clinic visit: 6/26/18    INR History:    Date 7/23 7/26 8/2 8/9 8/16 8/23 8/29 9/5 9/12 9/19 9/24   Total Weekly Dose 36mg 38mg 36mg 38mg 36mg 32mg 30mg?? 32mg 32 mg 32mg 30mg   INR 1.7 2.3 2.0 3.4 3.8 3.5 2.5 2.6 2.5 3.3 2.0   Notes Keflex boost   1x decr dose 1x decr dose    Keflex Keflex     Date 9/27 10/1 10/8 10/15  10/22 10/29 11/5 11/14 11/21 11/28 12/4   Total Weekly Dose 32mg 32mg 32mg 30mg  32mg 28mg 32mg 32mg 32mg 28mg 26mg   INR 2.3 2.4 3.2 2.5  3.9 2.7 3.0 2.8 2.7 3.7 1.9   Notes Levaquin start 9/26 stopped Levaquin        stop MVI, 1 miss 1 hold     Date 12/10 12/17 12/21 12/31 1/7/19 1/14 1/22 1/24 1/28 1/31 2/7   Total Weekly Dose 30mg 30mg 30mg 26mg 28mg 28mg 28mg 28mg 30mg 30mg 28mg   INR 2.4 3.0 3.6 2.5 2.6 2.6 2.9 1.7 2.7 2.5 2.7   Notes        doxy start 1/22, incr GLV 2x boost; doxy Finish doxy 1/29      Date 2/14 2/20 2/28 3/7 3/14 3/21 3/28 4/4 4/11 4/18 4/25   Total Weekly Dose 28mg 28mg 28 mg 30mg 31mg 30mg 30mg 31mg 32mg 31mg 31mg   INR 2.1 2.4 1.8 1.6 2.2 2.5 1.9 1.9 2.4 2.8 2.4   Notes   sick Zetia            Date 5/2 5/6 5/15 REHAB 7/19 7/22 7/26 7/30 8/2 8/5 8/12   Total Weekly Dose 31mg 31mg 31mg  ????? ??? Unable to confirm 34mg? 34mg **30mg ? 30mg   INR 2.0 2.4 2.5  1.2 1.3 2.7 1.2 2.0 2.2 1.6   Notes ampicillin amp       stopped lexapro cefdinir      Date 8/15 8/19 8/26 8/30 9/5 9/9 9/13 9/17 10/1  10/7  10/15    Total Weekly Dose 32mg 33mg 31mg 32mg 31mg 32mg 28mg 24mg 28mg  28 mg  26 mg    INR 2.6  2.2 1.8 1.9 2.7 3.3 4.0 3.2 2.5  3.7  2    Notes 3x boost   cefdinir; 1x boost keflex x2 days sick Sick; dose decrease  1x decr dose   1 dose reduction      Phone Interview:  Verbal Release Authorization signed on 6/26/18 -- may speak with Alexy Wallace (son), Genesis Becerril (daughter), Sawyer or Gema Wallace (son and daughter-in-law), Gerry Wallace (son)  Tablet Strength: 2mg tablets  Patient Contact Info: preferred [phone] - home with carlito Cotto- cell [phone]; call if can't get in touch with home phone; Mrs. Wallace's cell phone number - [phone]     Patient Findings   Positives:  Change in medications, Change in diet/appetite   Negatives:  Signs/symptoms of thrombosis, Signs/symptoms of bleeding, Laboratory test error suspected, Change in health, Change in alcohol use, Change in activity, Upcoming invasive procedure, Emergency department visit, Upcoming dental procedure, Missed doses, Extra doses, Hospital admission, Bruising, Other complaints   Comments:  She denies any changes.  She had planned on eating green beans today due to INR results.  Discussed the interaction of foods containing vitamin K with warfarin and encouraged consistency of intake.  At next encounter, encourage patient to schedule follow up with Dr. Joe     Plan:  1. INR is sub therapeutic today at 2, down from 3.7 last week after small dose adjustment. Instructed Ms. Wallace to boost warfarin to 6 mg today, then tomorrow resume warfarin 4mg oral daily (28 mg/week)  2. Repeat INR in 1 week, 10/21 per patient preference.  3. Verbal information provided over the phone. Patient RBV dosing instructions, expresses understanding by teach back, and has no further questions at this time.      Radha Michaud, PharmD  10/15/2019  3:40 PM     Mrs. Wallace has not seen Dr. Joe in over one year.

## 2019-10-21 ENCOUNTER — ANTICOAGULATION VISIT (OUTPATIENT)
Dept: PHARMACY | Facility: HOSPITAL | Age: 79
End: 2019-10-21

## 2019-10-21 DIAGNOSIS — I48.0 PAROXYSMAL ATRIAL FIBRILLATION (HCC): ICD-10-CM

## 2019-10-21 LAB — INR PPP: 2.2

## 2019-10-21 NOTE — PROGRESS NOTES
Anticoagulation Clinic - Remote Progress Note  [facility]  Testing Frequency: 7 days    Indication: paroxysmal afib  Referring Provider: Heath (Last seen:   Goal INR: 2.0-3.0  Current Drug Interactions: amiodarone, levothyroxine; omeprazole, azaTHIOprine, ezetimibe   CHADS-VASc: 5 (age, gender, HTN, DM)  HAS-BLED: 3 (HTN, CKD, Age)     Diet: rare GLV, rarely green beans - 8/19/19  Alcohol: none  Tobacco: none   OTC Pain Medication: APAP PRN    1st clinic visit: 9/14/17  2nd clinic visit: 6/26/18    INR History:    Date 7/23 7/26 8/2 8/9 8/16 8/23 8/29 9/5 9/12 9/19 9/24   Total Weekly Dose 36mg 38mg 36mg 38mg 36mg 32mg 30mg?? 32mg 32 mg 32mg 30mg   INR 1.7 2.3 2.0 3.4 3.8 3.5 2.5 2.6 2.5 3.3 2.0   Notes Keflex boost   1x decr dose 1x decr dose    Keflex Keflex     Date 9/27 10/1 10/8 10/15  10/22 10/29 11/5 11/14 11/21 11/28 12/4   Total Weekly Dose 32mg 32mg 32mg 30mg  32mg 28mg 32mg 32mg 32mg 28mg 26mg   INR 2.3 2.4 3.2 2.5  3.9 2.7 3.0 2.8 2.7 3.7 1.9   Notes Levaquin start 9/26 stopped Levaquin        stop MVI, 1 miss 1 hold     Date 12/10 12/17 12/21 12/31 1/7/19 1/14 1/22 1/24 1/28 1/31 2/7   Total Weekly Dose 30mg 30mg 30mg 26mg 28mg 28mg 28mg 28mg 30mg 30mg 28mg   INR 2.4 3.0 3.6 2.5 2.6 2.6 2.9 1.7 2.7 2.5 2.7   Notes        doxy start 1/22, incr GLV 2x boost; doxy Finish doxy 1/29      Date 2/14 2/20 2/28 3/7 3/14 3/21 3/28 4/4 4/11 4/18 4/25   Total Weekly Dose 28mg 28mg 28 mg 30mg 31mg 30mg 30mg 31mg 32mg 31mg 31mg   INR 2.1 2.4 1.8 1.6 2.2 2.5 1.9 1.9 2.4 2.8 2.4   Notes   sick Zetia            Date 5/2 5/6 5/15 REHAB 7/19 7/22 7/26 7/30 8/2 8/5 8/12   Total Weekly Dose 31mg 31mg 31mg  ????? ??? Unable to confirm 34mg? 34mg **30mg ? 30mg   INR 2.0 2.4 2.5  1.2 1.3 2.7 1.2 2.0 2.2 1.6   Notes ampicillin amp       stopped lexapro cefdinir      Date 8/15 8/19 8/26 8/30 9/5 9/9 9/13 9/17 10/1  10/7  10/15  10/21   Total Weekly Dose 32mg 33mg 31mg 32mg 31mg 32mg 28mg 24mg 28mg  28 mg  26 mg     INR 2.6 2.2 1.8 1.9 2.7 3.3 4.0 3.2 2.5  3.7  2  2.2   Notes 3x boost   cefdinir; 1x boost keflex x2 days sick Sick; dose decrease  1x decr dose   1 dose reduction      Phone Interview:  Verbal Release Authorization signed on 6/26/18 -- may speak with Alexy Wallace (son), Genesis Becerril (daughter), Sawyer or Gema Wallace (son and daughter-in-law), Gerry Wallace (son)  Tablet Strength: 2mg tablets  Patient Contact Info: preferred [phone] - home with son Yadiel- cell [phone]; call if can't get in touch with home phone; Mrs. Wallace's cell phone number - [phone]     Patient Findings   Positives:  Other complaints   Negatives:  Signs/symptoms of thrombosis, Signs/symptoms of bleeding, Laboratory test error suspected, Change in health, Change in alcohol use, Change in activity, Upcoming invasive procedure, Emergency department visit, Upcoming dental procedure, Missed doses, Extra doses, Change in medications, Change in diet/appetite, Hospital admission, Bruising   Comments:  Needs to make appointment with Dr. Joe - transferred her to their office to make an appointment.  She stated that she has been in rehab recently and had been out of town.   She stated that she does not eat any GLV.      Plan:  1. INR is therapeutic today at 2.2. Instructed Ms. Wallace to continue warfarin 4mg oral daily (28 mg/week)  2. Repeat INR in 1 week, 10/28 per patient preference.  3. Verbal information provided over the phone. Patient RBV dosing instructions, expresses understanding by teach back, and has no further questions at this time.      Radha Michaud, PharmD  10/21/2019  3:07 PM     Mrs. Wallace has not seen Dr. Joe in over one year.

## 2019-10-21 NOTE — PROGRESS NOTES
Mrs. Wallace stated that she attempted to schedule an appointment with Dr. Joe.  She left a VM with her information as no one was available to schedule her appointment at that time.  Radha Michaud, KwadwoD\

## 2019-10-28 RX ORDER — ALPRAZOLAM 0.5 MG/1
TABLET ORAL
Qty: 30 TABLET | Refills: 1 | Status: SHIPPED | OUTPATIENT
Start: 2019-10-28 | End: 2019-12-30

## 2019-10-29 ENCOUNTER — ANTICOAGULATION VISIT (OUTPATIENT)
Dept: PHARMACY | Facility: HOSPITAL | Age: 79
End: 2019-10-29

## 2019-10-29 DIAGNOSIS — I48.0 PAROXYSMAL ATRIAL FIBRILLATION (HCC): ICD-10-CM

## 2019-10-29 LAB — INR PPP: 2.8

## 2019-10-29 RX ORDER — CEPHALEXIN 500 MG/1
500 CAPSULE ORAL 2 TIMES DAILY
COMMUNITY
Start: 2019-10-28 | End: 2019-11-04

## 2019-10-29 NOTE — PROGRESS NOTES
Anticoagulation Clinic - Remote Progress Note  [facility]  Testing Frequency: 7 days    Indication: paroxysmal afib  Referring Provider: Butch Joe (Last seen:   Goal INR: 2.0-3.0  Current Drug Interactions: amiodarone, levothyroxine; omeprazole, azaTHIOprine, ezetimibe   CHADS-VASc: 5 (age, gender, HTN, DM)  HAS-BLED: 3 (HTN, CKD, Age)     Diet: rare GLV, rarely green beans - 8/19/19  Alcohol: none  Tobacco: none   OTC Pain Medication: APAP PRN    1st clinic visit: 9/14/17  2nd clinic visit: 6/26/18    INR History:  Date 7/23 7/26 8/2 8/9 8/16 8/23 8/29 9/5 9/12 9/19 9/24   Total Weekly Dose 36mg 38mg 36mg 38mg 36mg 32mg 30mg?? 32mg 32 mg 32mg 30mg   INR 1.7 2.3 2.0 3.4 3.8 3.5 2.5 2.6 2.5 3.3 2.0   Notes Keflex boost   1x decr dose 1x decr dose    Keflex Keflex     Date 9/27 10/1 10/8 10/15  10/22 10/29 11/5 11/14 11/21 11/28 12/4   Total Weekly Dose 32mg 32mg 32mg 30mg  32mg 28mg 32mg 32mg 32mg 28mg 26mg   INR 2.3 2.4 3.2 2.5  3.9 2.7 3.0 2.8 2.7 3.7 1.9   Notes Levaquin start 9/26 stopped Levaquin        stop MVI, 1 miss 1 hold     Date 12/10 12/17 12/21 12/31 1/7/19 1/14 1/22 1/24 1/28 1/31 2/7   Total Weekly Dose 30mg 30mg 30mg 26mg 28mg 28mg 28mg 28mg 30mg 30mg 28mg   INR 2.4 3.0 3.6 2.5 2.6 2.6 2.9 1.7 2.7 2.5 2.7   Notes        doxy start 1/22, incr GLV 2x boost; doxy Finish doxy 1/29      Date 2/14 2/20 2/28 3/7 3/14 3/21 3/28 4/4 4/11 4/18 4/25   Total Weekly Dose 28mg 28mg 28 mg 30mg 31mg 30mg 30mg 31mg 32mg 31mg 31mg   INR 2.1 2.4 1.8 1.6 2.2 2.5 1.9 1.9 2.4 2.8 2.4   Notes   sick Zetia            Date 5/2 5/6 5/15 REHAB 7/19 7/22 7/26 7/30 8/2 8/5 8/12   Total Weekly Dose 31mg 31mg 31mg  ????? ??? Unable to confirm 34mg? 34mg **30mg ? 30mg   INR 2.0 2.4 2.5  1.2 1.3 2.7 1.2 2.0 2.2 1.6   Notes ampicillin amp       stopped lexapro cefdinir      Date 8/15 8/19 8/26 8/30 9/5 9/9 9/13 9/17 10/1 10/7 10/15 10/21   Total Weekly Dose 32mg 33mg 31mg 32mg 31mg 32mg 28mg 24mg 28mg 28mg 26mg  28mg   INR 2.6 2.2 1.8 1.9 2.7 3.3 4.0 3.2 2.5 3.7 2.0 2.2   Notes 3x boost   cefdinir; 1x boost keflex x2 days sick Sick; dose decrease  1x decr dose  1 dose reduction      Date 10/29              Total Weekly Dose 28mg 27mg             INR 2.8              Notes keflex start 10/28 1x decr  Dose; keflex               Phone Interview:  Verbal Release Authorization signed on 6/26/18 -- may speak with Alexy Wallace (son), Genesis Jone (daughter), Sawyer or Gema Wallace (son and daughter-in-law), Gerry Wallace (son)  Tablet Strength: 2mg tablets  Patient Contact Info: preferred [phone] - home with carlito Cotto- cell [phone]; call if can't get in touch with home phone; Mrs. Wallace's cell phone number - [phone]     Patient Findings   Positives:  Change in medications   Negatives:  Signs/symptoms of thrombosis, Signs/symptoms of bleeding, Laboratory test error suspected, Change in health, Change in alcohol use, Change in activity, Upcoming invasive procedure, Emergency department visit, Upcoming dental procedure, Missed doses, Extra doses, Change in diet/appetite, Hospital admission, Bruising, Other complaints   Comments:  Patient has appt with Dr Joe's APRN, Rocio Rae, on 11/5/19. She feels she will be able to come in before or after her appt, but will need to make sure her transportation is ok with this. Will f/u on Fri to make sure.     Patient report she started cephalexin 500mg BID x7 days for wound infection. She reports she took first dose yesterday, 10/28, and should be due to take last dose on or about 11/4/19.       Plan:  1. INR is therapeutic today at 2.8. After consulting with Fatemeh Rodríguez, PharmD, instructed Ms. Wallace to DECREASE tonight's dose to 3mg and then continue warfarin 4mg oral daily   2. Repeat INR on Fri, 11/1. Please follow up with patient to see if she is able to come into clinic [before/after] appt w/REY Galeana on 11/5. Per Arsen in Registration, appt will likely cost  patient $0  3. Verbal information provided over the phone. Patient RBV dosing instructions, expresses understanding by teach back, and has no further questions at this time.      Sawyer Gamez CPhT  10/29/2019  9:50 AM    I, [facility] Sandoval [facility], assisted in the patient interview. I have reviewed the note in full and agree with the assessment and plan.  10/29/19  11:04 AM

## 2019-11-01 ENCOUNTER — ANTICOAGULATION VISIT (OUTPATIENT)
Dept: PHARMACY | Facility: HOSPITAL | Age: 79
End: 2019-11-01

## 2019-11-01 DIAGNOSIS — E78.2 MIXED HYPERLIPIDEMIA: ICD-10-CM

## 2019-11-01 DIAGNOSIS — I48.0 PAROXYSMAL ATRIAL FIBRILLATION (HCC): ICD-10-CM

## 2019-11-01 LAB — INR PPP: 2.7

## 2019-11-01 RX ORDER — EZETIMIBE 10 MG/1
TABLET ORAL
Qty: 30 TABLET | Refills: 4 | Status: SHIPPED | OUTPATIENT
Start: 2019-11-01 | End: 2019-11-05

## 2019-11-01 NOTE — PROGRESS NOTES
Anticoagulation Clinic - Remote Progress Note  [facility]  Testing Frequency: 7 days    Indication: paroxysmal afib  Referring Provider: Butch Joe (Last seen:   Goal INR: 2.0-3.0  Current Drug Interactions: amiodarone, levothyroxine; omeprazole, azaTHIOprine, ezetimibe   CHADS-VASc: 5 (age, gender, HTN, DM)  HAS-BLED: 3 (HTN, CKD, Age)     Diet: rare GLV, rarely green beans - 8/19/19  Alcohol: none  Tobacco: none   OTC Pain Medication: APAP PRN    1st clinic visit: 9/14/17  2nd clinic visit: 6/26/18    INR History:  Date 7/23 7/26 8/2 8/9 8/16 8/23 8/29 9/5 9/12 9/19 9/24   Total Weekly Dose 36mg 38mg 36mg 38mg 36mg 32mg 30mg?? 32mg 32 mg 32mg 30mg   INR 1.7 2.3 2.0 3.4 3.8 3.5 2.5 2.6 2.5 3.3 2.0   Notes Keflex boost   1x decr dose 1x decr dose    Keflex Keflex     Date 9/27 10/1 10/8 10/15  10/22 10/29 11/5 11/14 11/21 11/28 12/4   Total Weekly Dose 32mg 32mg 32mg 30mg  32mg 28mg 32mg 32mg 32mg 28mg 26mg   INR 2.3 2.4 3.2 2.5  3.9 2.7 3.0 2.8 2.7 3.7 1.9   Notes Levaquin start 9/26 stopped Levaquin        stop MVI, 1 miss 1 hold     Date 12/10 12/17 12/21 12/31 1/7/19 1/14 1/22 1/24 1/28 1/31 2/7   Total Weekly Dose 30mg 30mg 30mg 26mg 28mg 28mg 28mg 28mg 30mg 30mg 28mg   INR 2.4 3.0 3.6 2.5 2.6 2.6 2.9 1.7 2.7 2.5 2.7   Notes        doxy start 1/22, incr GLV 2x boost; doxy Finish doxy 1/29      Date 2/14 2/20 2/28 3/7 3/14 3/21 3/28 4/4 4/11 4/18 4/25   Total Weekly Dose 28mg 28mg 28 mg 30mg 31mg 30mg 30mg 31mg 32mg 31mg 31mg   INR 2.1 2.4 1.8 1.6 2.2 2.5 1.9 1.9 2.4 2.8 2.4   Notes   sick Zetia            Date 5/2 5/6 5/15 REHAB 7/19 7/22 7/26 7/30 8/2 8/5 8/12   Total Weekly Dose 31mg 31mg 31mg  ????? ??? Unable to confirm 34mg? 34mg **30mg ? 30mg   INR 2.0 2.4 2.5  1.2 1.3 2.7 1.2 2.0 2.2 1.6   Notes ampicillin amp       stopped lexapro cefdinir      Date 8/15 8/19 8/26 8/30 9/5 9/9 9/13 9/17 10/1 10/7 10/15 10/21   Total Weekly Dose 32mg 33mg 31mg 32mg 31mg 32mg 28mg 24mg 28mg 28mg 26mg  28mg   INR 2.6 2.2 1.8 1.9 2.7 3.3 4.0 3.2 2.5 3.7 2.0 2.2   Notes 3x boost   cefdinir; 1x boost keflex x2 days sick Sick; dose decrease  1x decr dose  1 dose reduction      Date 10/29 11/1             Total Weekly Dose 28mg 27mg             INR 2.8 2.7             Notes keflex start 10/28 1x decr  dose; keflex               Phone Interview:  Verbal Release Authorization signed on 6/26/18 -- may speak with Alexy Wallace (son), Genesis Becerril (daughter), Sawyer or Gema Wallace (son and daughter-in-law), Gerry Wallace (son)  Tablet Strength: 2mg tablets  Patient Contact Info: preferred [phone] - home with carlito Cotto- cell [phone]; call if can't get in touch with home phone; Mrs. Wallace's cell phone number - [phone]     Patient Findings   Negatives:  Signs/symptoms of thrombosis, Signs/symptoms of bleeding, Laboratory test error suspected, Change in health, Change in alcohol use, Change in activity, Upcoming invasive procedure, Emergency department visit, Upcoming dental procedure, Missed doses, Extra doses, Change in medications, Change in diet/appetite, Hospital admission, Bruising, Other complaints   Comments:  Patient continues to take cephalexin 500mg BID. Should be due to take last dose on or about 11/4. Otherwise denies all findings.     Plan:  1. INR is therapeutic today at 2.7. Instructed Ms. Wallace to continue warfarin 4mg oral daily until recheck  2. Repeat INR on Tues, 11/5. Patient is agreeable to walk into clinic after appt w/REY Galeana. Per Arsen in Registration, appt will likely cost patient $0  3. Verbal information provided over the phone. Patient RBV dosing instructions, expresses understanding by teach back, and has no further questions at this time.      Sawyer Gamez CPhT  11/1/2019  10:27 AM    I, Sawyer Jackman, [facility], have reviewed the note in full and agree with the assessment and plan.  11/01/19  11:25 AM

## 2019-11-04 ENCOUNTER — TRANSCRIBE ORDERS (OUTPATIENT)
Dept: PHARMACY | Facility: HOSPITAL | Age: 79
End: 2019-11-04

## 2019-11-04 DIAGNOSIS — I48.91 ATRIAL FIBRILLATION, UNSPECIFIED TYPE (HCC): Primary | ICD-10-CM

## 2019-11-04 PROBLEM — E78.5 HYPERLIPIDEMIA LDL GOAL <100: Status: ACTIVE | Noted: 2019-11-04

## 2019-11-04 NOTE — PROGRESS NOTES
[facility] at [facility]  Office Visit Note    Encounter Date:11/05/2019    Patient ID: Moni Wallace is a 79 y.o. female who resides in [place], [place]    CC/Reason for visit:  Atrial Fibrillation           Problem List Items Addressed This Visit        Cardiovascular and Mediastinum    Paroxysmal atrial fibrillation (CMS/HCC) - Primary    Overview     · CHADS-VASc = 5  · Atrial fibrillation initially diagnosed February 2015; spontaneous conversion to normal sinus rhythm.   · Echocardiogram (06/08/2017): LVEF 50%. Grade II Diastolic dysfunction. RVSP 41.6 mmHg. Mild-to-moderate MR. Mild AS.  Moderate TR  · Noted to be in afib with RVR 08/09/2017 in setting of acute bronchitis.  · On Coumadin and amiodarone         Current Assessment & Plan     · Discontinue amiodarone   · Continue Coumadin dosage adjustment per INR result         High output HF (heart failure) (CMS/HCC)    Overview     · Echo (6/08/2017): LVEF 50%. Grade II Diastolic dysfunction.  · Echo (8/9/17): LVEF 55%.  Grade 2 diastolic dysfunction.  Mild MR.  Mild aortic stenosis  · Limited echo to evaluate high output CHF (8/10/17): Cardiac output decreased from 11.3 L/min to 8 L/min with compression of fistula.  · Fistula ligated 8/2017         Current Assessment & Plan     · Stable NYHA class II-III symptoms         Right-sided carotid artery disease (CMS/[facility])    Overview     · Carotid duplex (9/4/2015): nonobstructive plaque of the proximal SHENA         Current Assessment & Plan     · Continue statin therapy  · Defer aspirin due to chronic anticoagulation with Coumadin         Hyperlipidemia LDL goal <100    Current Assessment & Plan     · Continue Zetia 10 mg daily  · Continue pravastatin 40 mg nightly         Relevant Medications    ezetimibe (ZETIA) 10 MG tablet    Essential hypertension    Current Assessment & Plan     · Continue metoprolol tartrate 100 mg twice daily  · Continue amlodipine 5 mg daily         Nonrheumatic aortic  valve stenosis    Overview     · Echo (06/2017): Mild-to-moderate mitral valve regurgitation is present. Mild aortic valve stenosis is present. Moderate tricuspid valve regurgitation is present.  · Echo (05/23/2019):LVEF = 60%. Mild AS 15 mmHg gradient               Patient's doing well from a cardiovascular standpoint.  Her heart failure is compensated at this time.  I will discontinue her amiodarone.  We will continue beta-blocker therapy.  She will follow-up in 6 months or sooner if needed.       · Discontinue amiodarone  · Continue all other medications as prescribed  Return in about 6 months (around 5/5/2020), or if symptoms worsen or fail to improve.            Moni Wallace returns today for follow-up of her aortic stenosis, paroxysmal atrial fibrillation, carotid artery disease and cardiac risk factors.  Patient was hospitalized at [facility] in May and this year of right knee septic arthritis.  She required IV antibiotics.  She is still having issues and required needle aspiration by orthopedics just last week.  She has a history of paroxysmal atrial fibrillation and has been on amiodarone for quite a long time.  She is currently on low-dose of 100 mg daily.  She is chronically anticoagulated with Coumadin INRs have stayed in the therapeutic range of 2.0-3.0.  The patient currently administers peritoneal dialysis to herself at home.  She denies chest pain, increased dyspnea, orthopnea, palpitations or syncope.  She reports her blood pressures have stayed fairly well controlled.  She had an echocardiogram during her hospitalization in May which showed no significant change in her aortic stenosis.  She also had a normal LVEF.    Review of Systems   Constitution: Negative for weakness and malaise/fatigue.   Eyes: Negative for vision loss in left eye and vision loss in right eye.   Cardiovascular: Negative for chest pain, dyspnea on exertion, near-syncope, orthopnea, palpitations, paroxysmal  nocturnal dyspnea and syncope.   Musculoskeletal: Negative for myalgias.   Neurological: Negative for brief paralysis, excessive daytime sleepiness, focal weakness, numbness and paresthesias.   All other systems reviewed and are negative.      The patient's past medical, social, family history and ROS reviewed in the patient's electronic medical record.    Allergies  Hydrocodone; Codeine; and Sulfa antibiotics    Outpatient Medications Marked as Taking for the 11/5/19 encounter (Office Visit) with Lily Rae APRN   Medication Sig Dispense Refill   • acetaminophen (TYLENOL) 325 MG tablet Take 2 tablets by mouth Every 4 (Four) Hours As Needed for Mild Pain .     • ALPRAZolam (XANAX) 0.5 MG tablet TAKE ONE TABLET BY MOUTH DAILY 30 tablet 1   • amLODIPine (NORVASC) 5 MG tablet Take 1 tablet by mouth Daily.     • calcitriol (ROCALTROL) 0.25 MCG capsule Take 0.25 mcg by mouth Daily.     • Cholecalciferol (VITAMIN D) 2000 UNITS tablet Take 2,000 Units by mouth Daily.     • COLCRYS 0.6 MG tablet 0.5 tablets As Needed.     • epoetin blanca (EPOGEN,PROCRIT) 36328 UNIT/ML injection Inject 1 mL under the skin into the appropriate area as directed 1 (One) Time Per Week.     • ezetimibe (ZETIA) 10 MG tablet Take 10 mg by mouth Daily.     • furosemide (LASIX) 40 MG tablet Take 40 mg by mouth Daily.     • gentamicin (GARAMYCIN) 0.1 % cream APPLY TO EXIT SITE DAILY  3   • levothyroxine (SYNTHROID, LEVOTHROID) 75 MCG tablet Take 75 mcg by mouth Daily.     • metoprolol tartrate (LOPRESSOR) 50 MG tablet Take 1 tablet by mouth Every 12 (Twelve) Hours. (Patient taking differently: Take 100 mg by mouth Every 12 (Twelve) Hours.)     • Multiple Vitamins-Minerals (ICAPS AREDS 2 PO) Take  by mouth Daily.     • omeprazole (priLOSEC) 40 MG capsule TAKE ONE CAPSULE BY MOUTH DAILY 90 capsule 0   • ondansetron ODT (ZOFRAN ODT) 4 MG disintegrating tablet Take 1 tablet by mouth Every 8 (Eight) Hours As Needed for Nausea or Vomiting. 20 tablet  "0   • pravastatin (PRAVACHOL) 40 MG tablet Take 1 tablet by mouth Every Night. 90 tablet 1   • PROAIR  (90 Base) MCG/ACT inhaler Inhale 2 puffs Every 6 (Six) Hours As Needed for Wheezing or Shortness of Air. 1 inhaler 4   • sevelamer (RENVELA) 800 MG tablet Take 1,600 mg by mouth 2 (Two) Times a Day.     • tacrolimus (PROGRAF) 0.5 MG capsule Take 0.5 mg by mouth 2 (Two) Times a Day.     • UltraBag/Dianeal PD-2/1.5% Dex, pappas #[id number], (DIANEAL) 346 MOSM/L CAPD Inject 2,000 mL into the abdomen / abdominal cavity 5 (Five) Times a Day. On a cycler QHS     • warfarin (COUMADIN) 2 MG tablet TAKE THREE TABLETS BY MOUTH DAILY ON MONDAY AND THURSDAY. TAKE TWO TABLETS BY MOUTH DAILY ON ALL OTHER DAYS OF THE WEEK 192 tablet 0   • [DISCONTINUED] amiodarone (PACERONE) 200 MG tablet TAKE ONE-HALF TABLET BY MOUTH DAILY 45 tablet 1           Blood pressure 142/68, pulse 73, height 157.5 cm (62\"), weight 88 kg (194 lb), SpO2 95 %, not currently breastfeeding.  Body mass index is 35.48 kg/m².  Vitals:    11/05/19 1411   Patient Position: Sitting       Physical Exam   Constitutional: She is oriented to person, place, and time. She appears well-developed and well-nourished.   HENT:   Head: Normocephalic and atraumatic.   Eyes: Pupils are equal, round, and reactive to light. No scleral icterus.   Neck: No JVD present. Carotid bruit is not present. No thyromegaly present.   Cardiovascular: Normal rate and regular rhythm. Exam reveals no gallop.   No murmur heard.  Pulmonary/Chest: Effort normal and breath sounds normal.   Abdominal: Soft. She exhibits no distension. There is no hepatosplenomegaly.   Musculoskeletal: She exhibits no edema.   Neurological: She is alert and oriented to person, place, and time.   Skin: Skin is warm and dry.   Psychiatric: She has a normal mood and affect. Her behavior is normal.       Data Review (reviewed with patient):       ECG 12 Lead  Date/Time: 11/5/2019 3:50 PM  Performed by: Butch" REY Bautista  Authorized by: Lily Rae APRN   Comparison: compared with previous ECG from 5/18/2019  Similar to previous ECG  Rhythm: sinus rhythm  BPM: 69    Clinical impression: normal ECG  Comments: QRS 92 MS  QT/QTc 410/439 MS            Lab Results   Component Value Date    CHOL 227 (H) 03/04/2019    TRIG 160 (H) 03/04/2019    HDL 52 03/04/2019     (H) 03/04/2019    AST 24 05/26/2019    ALT 23 05/26/2019       Lab Results   Component Value Date    HGBA1C 4.90 05/19/2019           REY Brown  11/5/2019

## 2019-11-05 ENCOUNTER — ANTICOAGULATION VISIT (OUTPATIENT)
Dept: PHARMACY | Facility: HOSPITAL | Age: 79
End: 2019-11-05

## 2019-11-05 ENCOUNTER — OFFICE VISIT (OUTPATIENT)
Dept: CARDIOLOGY | Facility: CLINIC | Age: 79
End: 2019-11-05

## 2019-11-05 VITALS
DIASTOLIC BLOOD PRESSURE: 68 MMHG | OXYGEN SATURATION: 95 % | BODY MASS INDEX: 35.7 KG/M2 | SYSTOLIC BLOOD PRESSURE: 142 MMHG | WEIGHT: 194 LBS | HEIGHT: 62 IN | HEART RATE: 73 BPM

## 2019-11-05 DIAGNOSIS — I48.0 PAROXYSMAL ATRIAL FIBRILLATION (HCC): Primary | ICD-10-CM

## 2019-11-05 DIAGNOSIS — I50.83 HIGH OUTPUT HF (HEART FAILURE) (HCC): ICD-10-CM

## 2019-11-05 DIAGNOSIS — I77.9 RIGHT-SIDED CAROTID ARTERY DISEASE, UNSPECIFIED TYPE (HCC): ICD-10-CM

## 2019-11-05 DIAGNOSIS — I35.0 NONRHEUMATIC AORTIC VALVE STENOSIS: ICD-10-CM

## 2019-11-05 DIAGNOSIS — I10 ESSENTIAL HYPERTENSION: ICD-10-CM

## 2019-11-05 DIAGNOSIS — I48.0 PAROXYSMAL ATRIAL FIBRILLATION (HCC): ICD-10-CM

## 2019-11-05 DIAGNOSIS — E78.5 HYPERLIPIDEMIA LDL GOAL <100: ICD-10-CM

## 2019-11-05 LAB
INR PPP: 2.4 (ref 0.91–1.09)
PROTHROMBIN TIME: 29.1 SECONDS (ref 10–13.8)

## 2019-11-05 PROCEDURE — 99214 OFFICE O/P EST MOD 30 MIN: CPT | Performed by: NURSE PRACTITIONER

## 2019-11-05 PROCEDURE — 93000 ELECTROCARDIOGRAM COMPLETE: CPT | Performed by: NURSE PRACTITIONER

## 2019-11-05 PROCEDURE — 85610 PROTHROMBIN TIME: CPT

## 2019-11-05 PROCEDURE — 36416 COLLJ CAPILLARY BLOOD SPEC: CPT

## 2019-11-05 PROCEDURE — G0463 HOSPITAL OUTPT CLINIC VISIT: HCPCS

## 2019-11-05 RX ORDER — EZETIMIBE 10 MG/1
10 TABLET ORAL DAILY
COMMUNITY
End: 2020-04-10

## 2019-11-05 NOTE — PROGRESS NOTES
Anticoagulation Clinic - Remote Progress Note  [facility]  Testing Frequency: 7 days    Indication: paroxysmal afib  Referring Provider: Butch Joe (Last seen:   Goal INR: 2.0-3.0  Current Drug Interactions: , levothyroxine; omeprazole, azaTHIOprine, ezetimibe   CHADS-VASc: 5 (age, gender, HTN, DM)  HAS-BLED: 3 (HTN, CKD, Age)     Diet: rare GLV, rarely green beans - 8/19/19  Alcohol: none  Tobacco: none   OTC Pain Medication: APAP PRN    1st clinic visit: 9/14/17  2nd clinic visit: 6/26/18    INR History:  Date 7/23 7/26 8/2 8/9 8/16 8/23 8/29 9/5 9/12 9/19 9/24   Total Weekly Dose 36mg 38mg 36mg 38mg 36mg 32mg 30mg?? 32mg 32 mg 32mg 30mg   INR 1.7 2.3 2.0 3.4 3.8 3.5 2.5 2.6 2.5 3.3 2.0   Notes Keflex boost   1x decr dose 1x decr dose    Keflex Keflex     Date 9/27 10/1 10/8 10/15  10/22 10/29 11/5 11/14 11/21 11/28 12/4   Total Weekly Dose 32mg 32mg 32mg 30mg  32mg 28mg 32mg 32mg 32mg 28mg 26mg   INR 2.3 2.4 3.2 2.5  3.9 2.7 3.0 2.8 2.7 3.7 1.9   Notes Levaquin start 9/26 stopped Levaquin        stop MVI, 1 miss 1 hold     Date 12/10 12/17 12/21 12/31 1/7/19 1/14 1/22 1/24 1/28 1/31 2/7   Total Weekly Dose 30mg 30mg 30mg 26mg 28mg 28mg 28mg 28mg 30mg 30mg 28mg   INR 2.4 3.0 3.6 2.5 2.6 2.6 2.9 1.7 2.7 2.5 2.7   Notes        doxy start 1/22, incr GLV 2x boost; doxy Finish doxy 1/29      Date 2/14 2/20 2/28 3/7 3/14 3/21 3/28 4/4 4/11 4/18 4/25   Total Weekly Dose 28mg 28mg 28 mg 30mg 31mg 30mg 30mg 31mg 32mg 31mg 31mg   INR 2.1 2.4 1.8 1.6 2.2 2.5 1.9 1.9 2.4 2.8 2.4   Notes   sick Zetia            Date 5/2 5/6 5/15 REHAB 7/19 7/22 7/26 7/30 8/2 8/5 8/12   Total Weekly Dose 31mg 31mg 31mg  ????? ??? Unable to confirm 34mg? 34mg **30mg ? 30mg   INR 2.0 2.4 2.5  1.2 1.3 2.7 1.2 2.0 2.2 1.6   Notes ampicillin amp       stopped lexapro cefdinir      Date 8/15 8/19 8/26 8/30 9/5 9/9 9/13 9/17 10/1 10/7 10/15 10/21   Total Weekly Dose 32mg 33mg 31mg 32mg 31mg 32mg 28mg 24mg 28mg 28mg 26mg 28mg   INR  2.6 2.2 1.8 1.9 2.7 3.3 4.0 3.2 2.5 3.7 2.0 2.2   Notes 3x boost   cefdinir; 1x boost keflex x2 days sick Sick; dose decrease  1x decr dose  1 dose reduction      Date 10/29 11/1  11/5            Total Weekly Dose 28mg 27mg  27 mg            INR 2.8 2.7 2.4            Notes keflex start 10/28 1x decr  dose; keflex  clinic  1 x decr dose  amio last dose 11/5/19             Phone Interview:  Verbal Release Authorization signed on 6/26/18 and again on 11/5/2019-- may speak with Alexy Wallace (son), Genesis Jone (daughter), Sawyer or Gema Wallace (son and daughter-in-law), Gerry Wallace (son)  Tablet Strength: 2mg tablets  Patient Contact Info: preferred [phone] - home with carlito Cotto- cell [phone]; call if can't get in touch with home phone; Mrs. Wallace's cell phone number - [phone]     Patient Findings   Positives:  Change in medications   Negatives:  Signs/symptoms of thrombosis, Signs/symptoms of bleeding, Laboratory test error suspected, Change in health, Change in alcohol use, Change in activity, Upcoming invasive procedure, Emergency department visit, Upcoming dental procedure, Missed doses, Extra doses, Change in diet/appetite, Hospital admission, Bruising, Other complaints   Comments:  She completed her cephalexin therapy yesterday.  She had a staph infection in her dialysis port.  She stated that it is still leaking and will check with her HCP to see if she needs another round of abx.  She will contact the clinic with any medication changes.   She has taken a couple doses of acetaminophen.   Rocio Rae stopped her amiodarone today. Discussed the major DDI with amiodarone and the likely hooper that her warfarin needs would increase over the next few months due to long t1/2 of amiodarone.      Plan:    1. INR is therapeutic today at 2.4. Instructed Ms. Wallace to continue warfarin 4mg oral daily until recheck  2. Repeat INR on Tues, 11/12.   3. Verbal and written information provided in the clinic. Patient  RBV dosing instructions, expresses understanding by teach back, and has no further questions at this time.      Radha Michaud, PharmD  11/5/2019  3:02 PM

## 2019-11-12 ENCOUNTER — ANTICOAGULATION VISIT (OUTPATIENT)
Dept: PHARMACY | Facility: HOSPITAL | Age: 79
End: 2019-11-12

## 2019-11-12 DIAGNOSIS — I48.0 PAROXYSMAL ATRIAL FIBRILLATION (HCC): ICD-10-CM

## 2019-11-12 LAB — INR PPP: 3.5

## 2019-11-12 NOTE — PROGRESS NOTES
Anticoagulation Clinic - Remote Progress Note  [facility]  Testing Frequency: 7 days    Indication: paroxysmal afib  Referring Provider: Butch Joe (Last seen:   Goal INR: 2.0-3.0  Current Drug Interactions: , levothyroxine; omeprazole, azaTHIOprine, ezetimibe   CHADS-VASc: 5 (age, gender, HTN, DM)  HAS-BLED: 3 (HTN, CKD, Age)     Diet: rare GLV, rarely green beans - 8/19/19  Alcohol: none  Tobacco: none   OTC Pain Medication: APAP PRN    1st clinic visit: 9/14/17  2nd clinic visit: 6/26/18    INR History:  Date 7/23 7/26 8/2 8/9 8/16 8/23 8/29 9/5 9/12 9/19 9/24   Total Weekly Dose 36mg 38mg 36mg 38mg 36mg 32mg 30mg?? 32mg 32 mg 32mg 30mg   INR 1.7 2.3 2.0 3.4 3.8 3.5 2.5 2.6 2.5 3.3 2.0   Notes Keflex boost   1x decr dose 1x decr dose    Keflex Keflex     Date 9/27 10/1 10/8 10/15  10/22 10/29 11/5 11/14 11/21 11/28 12/4   Total Weekly Dose 32mg 32mg 32mg 30mg  32mg 28mg 32mg 32mg 32mg 28mg 26mg   INR 2.3 2.4 3.2 2.5  3.9 2.7 3.0 2.8 2.7 3.7 1.9   Notes Levaquin start 9/26 stopped Levaquin        stop MVI, 1 miss 1 hold     Date 12/10 12/17 12/21 12/31 1/7/19 1/14 1/22 1/24 1/28 1/31 2/7   Total Weekly Dose 30mg 30mg 30mg 26mg 28mg 28mg 28mg 28mg 30mg 30mg 28mg   INR 2.4 3.0 3.6 2.5 2.6 2.6 2.9 1.7 2.7 2.5 2.7   Notes        doxy start 1/22, incr GLV 2x boost; doxy Finish doxy 1/29      Date 2/14 2/20 2/28 3/7 3/14 3/21 3/28 4/4 4/11 4/18 4/25   Total Weekly Dose 28mg 28mg 28 mg 30mg 31mg 30mg 30mg 31mg 32mg 31mg 31mg   INR 2.1 2.4 1.8 1.6 2.2 2.5 1.9 1.9 2.4 2.8 2.4   Notes   sick Zetia            Date 5/2 5/6 5/15 REHAB 7/19 7/22 7/26 7/30 8/2 8/5 8/12   Total Weekly Dose 31mg 31mg 31mg  ????? ??? Unable to confirm 34mg? 34mg **30mg ? 30mg   INR 2.0 2.4 2.5  1.2 1.3 2.7 1.2 2.0 2.2 1.6   Notes ampicillin amp       stopped lexapro cefdinir      Date 8/15 8/19 8/26 8/30 9/5 9/9 9/13 9/17 10/1 10/7 10/15 10/21   Total Weekly Dose 32mg 33mg 31mg 32mg 31mg 32mg 28mg 24mg 28mg 28mg 26mg 28mg   INR  2.6 2.2 1.8 1.9 2.7 3.3 4.0 3.2 2.5 3.7 2.0 2.2   Notes 3x boost   cefdinir; 1x boost keflex x2 days sick Sick; dose decrease  1x decr dose  1 dose reduction      Date 10/29 11/1  11/5  11/12           Total Weekly Dose 28mg 27mg  27 mg  28 mg           INR 2.8 2.7 2.4  3.5           Notes keflex start 10/28 1x decr  dose; keflex  clinic  1 x decr dose  amio last dose 11/5/19           * Amiodarone LD 11/5/2019. Monitor as likely her warfarin needs would increase over the next few months      Phone Interview:  Verbal Release Authorization signed on 6/26/18 and again on 11/5/2019-- may speak with Alexy Wallace (son), Genesis Becerril (daughter), Sawyer Wallace (son and daughter-in-law), Gerry Wallace (son)  Tablet Strength: 2mg tablets  Patient Contact Info: preferred [phone] - home with carlito Cotto- cell [phone]; call if can't get in touch with home phone; Mrs. Wallace's cell phone number - [phone]     Patient Findings   Positives:  Change in medications, Bruising   Negatives:  Signs/symptoms of thrombosis, Signs/symptoms of bleeding, Laboratory test error suspected, Change in health, Change in alcohol use, Change in activity, Upcoming invasive procedure, Emergency department visit, Upcoming dental procedure, Missed doses, Extra doses, Change in diet/appetite, Hospital admission, Other complaints   Comments:  She had greens last week, but it was only a small amount.   She had forgotten to take her Renvela last week but has restarted since Sunday.   She stated that she has a red place on her eye.     Plan:    1. INR is  supratherapeutic today at 3.5. Instructed Ms. Wallace to take warfarin 2 mg today, then tomorrow resume warfarin 4mg oral daily until recheck.  Will continue to monitor her dosing needs closely due to discontinuation of amiodarone.  2. Repeat INR on Tues, 11/19.   3. Verbal information provided over the phone. Patient RBV dosing instructions, expresses understanding by teach back, and has no  further questions at this time.      Radha Michaud, PharmD  11/12/2019  2:48 PM

## 2019-11-19 ENCOUNTER — ANTICOAGULATION VISIT (OUTPATIENT)
Dept: PHARMACY | Facility: HOSPITAL | Age: 79
End: 2019-11-19

## 2019-11-19 DIAGNOSIS — I48.0 PAROXYSMAL ATRIAL FIBRILLATION (HCC): ICD-10-CM

## 2019-11-19 LAB — INR PPP: 2.9

## 2019-11-19 RX ORDER — ALLOPURINOL 100 MG/1
100 TABLET ORAL DAILY
COMMUNITY
Start: 2019-11-20 | End: 2020-07-21

## 2019-11-19 NOTE — PROGRESS NOTES
Anticoagulation Clinic - Remote Progress Note  [facility]  Testing Frequency: 7 days    Indication: paroxysmal afib  Referring Provider: Butch Joe (Last seen:   Goal INR: 2.0-3.0  Current Drug Interactions: , levothyroxine; omeprazole, azaTHIOprine, ezetimibe   CHADS-VASc: 5 (age, gender, HTN, DM)  HAS-BLED: 3 (HTN, CKD, Age)     Diet: rare GLV, rarely green beans - 8/19/19  Alcohol: none  Tobacco: none   OTC Pain Medication: APAP PRN    1st clinic visit: 9/14/17  2nd clinic visit: 6/26/18    INR History:  Date 7/23 7/26 8/2 8/9 8/16 8/23 8/29 9/5 9/12 9/19 9/24   Total Weekly Dose 36mg 38mg 36mg 38mg 36mg 32mg 30mg?? 32mg 32 mg 32mg 30mg   INR 1.7 2.3 2.0 3.4 3.8 3.5 2.5 2.6 2.5 3.3 2.0   Notes Keflex boost   1x decr dose 1x decr dose    Keflex Keflex     Date 9/27 10/1 10/8 10/15  10/22 10/29 11/5 11/14 11/21 11/28 12/4   Total Weekly Dose 32mg 32mg 32mg 30mg  32mg 28mg 32mg 32mg 32mg 28mg 26mg   INR 2.3 2.4 3.2 2.5  3.9 2.7 3.0 2.8 2.7 3.7 1.9   Notes Levaquin start 9/26 stopped Levaquin        stop MVI, 1 miss 1 hold     Date 12/10 12/17 12/21 12/31 1/7/19 1/14 1/22 1/24 1/28 1/31 2/7   Total Weekly Dose 30mg 30mg 30mg 26mg 28mg 28mg 28mg 28mg 30mg 30mg 28mg   INR 2.4 3.0 3.6 2.5 2.6 2.6 2.9 1.7 2.7 2.5 2.7   Notes        doxy start 1/22, incr GLV 2x boost; doxy Finish doxy 1/29      Date 2/14 2/20 2/28 3/7 3/14 3/21 3/28 4/4 4/11 4/18 4/25   Total Weekly Dose 28mg 28mg 28 mg 30mg 31mg 30mg 30mg 31mg 32mg 31mg 31mg   INR 2.1 2.4 1.8 1.6 2.2 2.5 1.9 1.9 2.4 2.8 2.4   Notes   sick Zetia            Date 5/2 5/6 5/15 REHAB 7/19 7/22 7/26 7/30 8/2 8/5 8/12   Total Weekly Dose 31mg 31mg 31mg  ????? ??? Unable to confirm 34mg? 34mg **30mg ? 30mg   INR 2.0 2.4 2.5  1.2 1.3 2.7 1.2 2.0 2.2 1.6   Notes ampicillin amp       stopped lexapro cefdinir      Date 8/15 8/19 8/26 8/30 9/5 9/9 9/13 9/17 10/1 10/7 10/15 10/21   Total Weekly Dose 32mg 33mg 31mg 32mg 31mg 32mg 28mg 24mg 28mg 28mg 26mg 28mg   INR  2.6 2.2 1.8 1.9 2.7 3.3 4.0 3.2 2.5 3.7 2.0 2.2   Notes 3x boost   cefdinir; 1x boost keflex x2 days sick Sick; dose decrease  1x decr dose  1 dose reduction      Date 10/29 11/1 11/5 11/12 11/18          Total Weekly Dose 28mg 27mg 27mg 28mg 26mg 27mg         INR 2.8 2.7 2.4 3.5 2.9          Notes keflex start 10/28 1x decr  dose; keflex clinic  1 x decr dose amio last dose 11/5/19 1x decr dose          * Amiodarone LD 11/5/2019. Monitor as likely her warfarin needs would increase over the next few months      Phone Interview:  Verbal Release Authorization signed on 6/26/18 and again on 11/5/2019-- may speak with Alexy Wallace (son), Genesis Becerril (daughter), Sawyer or Gema Wallace (son and daughter-in-law), Gerry Wallace (son)  Tablet Strength: 2mg tablets  Patient Contact Info: preferred [phone] - home with carlito Cotto- cell [phone]; call if can't get in touch with home phone; Mrs. Wallace's cell phone number - [phone]     Patient Findings   Positives:  Change in health (gout), Change in medications (allopurinol / colchicine)   Negatives:  Signs/symptoms of thrombosis, Signs/symptoms of bleeding, Laboratory test error suspected, Change in alcohol use, Change in activity, Upcoming invasive procedure, Emergency department visit, Upcoming dental procedure, Missed doses, Extra doses, Change in diet/appetite, Hospital admission, Bruising, Other complaints   Comments:  Patient reports that Dr Hodges has called patient in new RX for gout. Patient has not picked up yet and thus is not aware which medication it is. Per [facility] on [facility], patient will be receiving Allopurinol 100mg 1 tab QD #90 3 refill and Colchicine 0.6mg 1 tab BID x7 days then 1 tab QD x7 days (patient will also stop statins while taking this medication per instructions). Patient will start these tomorrow.     Warning: Concurrent use of ALLOPURINOL and COUMARIN-DERIVATIVE ANTICOAGULANTS may result in increased INR.     Clinical  Management: Exposure to warfarin (or another coumarin-derivative anticoagulant) might be increased, resulting in an increased INR, when coadministered with allopurinol, a CY inhibitor. Consider the inhibition potential when initiating, adjusting, or discontinuing concomitant use and monitor INR closely (Prod Info COUMADIN® oral tablets, 2017).     Plan:  1. INR is therapeutic and back WNL today at 2.9. After consulting with Fatemeh Rodríguez, KwadwoD, instructed Ms. Wallace to take warfarin 4mg oral daily except for 3mg on Tuesday until recheck.  Will continue to monitor her dosing needs closely due to discontinuation of amiodarone.  2. Repeat INR on   3. Verbal information provided over the phone. Patient RBV dosing instructions, expresses understanding by teach back, and has no further questions at this time.      Sawyer Gamez CPhT  2019  2:59 PM     [facility] have reviewed the note in full and agree with the assessment and plan.  19  4:34 PM

## 2019-11-22 ENCOUNTER — OFFICE VISIT (OUTPATIENT)
Dept: INTERNAL MEDICINE | Facility: CLINIC | Age: 79
End: 2019-11-22

## 2019-11-22 VITALS
WEIGHT: 192 LBS | HEART RATE: 68 BPM | HEIGHT: 63 IN | DIASTOLIC BLOOD PRESSURE: 70 MMHG | SYSTOLIC BLOOD PRESSURE: 140 MMHG | BODY MASS INDEX: 34.02 KG/M2

## 2019-11-22 DIAGNOSIS — E78.5 HYPERLIPIDEMIA LDL GOAL <100: ICD-10-CM

## 2019-11-22 DIAGNOSIS — E11.69 TYPE 2 DIABETES MELLITUS WITH OTHER SPECIFIED COMPLICATION, WITHOUT LONG-TERM CURRENT USE OF INSULIN (HCC): ICD-10-CM

## 2019-11-22 DIAGNOSIS — N18.6 STAGE 5 CHRONIC KIDNEY DISEASE ON CHRONIC DIALYSIS (HCC): ICD-10-CM

## 2019-11-22 DIAGNOSIS — E66.01 MORBIDLY OBESE (HCC): ICD-10-CM

## 2019-11-22 DIAGNOSIS — E03.9 HYPOTHYROIDISM, UNSPECIFIED TYPE: ICD-10-CM

## 2019-11-22 DIAGNOSIS — I48.0 PAROXYSMAL ATRIAL FIBRILLATION (HCC): Primary | ICD-10-CM

## 2019-11-22 DIAGNOSIS — Z99.2 STAGE 5 CHRONIC KIDNEY DISEASE ON CHRONIC DIALYSIS (HCC): ICD-10-CM

## 2019-11-22 DIAGNOSIS — D63.1 ANEMIA OF RENAL DISEASE: ICD-10-CM

## 2019-11-22 DIAGNOSIS — E55.9 VITAMIN D DEFICIENCY: ICD-10-CM

## 2019-11-22 DIAGNOSIS — N18.9 ANEMIA OF RENAL DISEASE: ICD-10-CM

## 2019-11-22 DIAGNOSIS — I10 ESSENTIAL HYPERTENSION: ICD-10-CM

## 2019-11-22 PROCEDURE — 99214 OFFICE O/P EST MOD 30 MIN: CPT | Performed by: INTERNAL MEDICINE

## 2019-11-22 NOTE — PROGRESS NOTES
Patient is a 79 y.o. female who is here for a follow up of hypertension.  Chief Complaint   Patient presents with   • Hypertension         HPI:    Here for mgmt of HTN.  Onset years.  No dizziness or lightheadedness.  Now on allopurinol.  Had recent surgery on right knee.  No fever or chills.  Energy level is improving.  No CP or palpitations.  No problems with PD.  Off amiodarone.  Breathing well.    History:     Patient Active Problem List   Diagnosis   • Paroxysmal atrial fibrillation (CMS/HCC)   • Essential hypertension   • Type 2 diabetes mellitus (CMS/HCC)   • Chronic kidney disease, stage IV (severe) (CMS/HCC)   • Anemia of renal disease   • Hyperparathyroidism (CMS/HCC)   • Asthma   • Right-sided carotid artery disease (CMS/HCC)   • High output HF (heart failure) (CMS/HCC)   • Vitamin D deficiency   • Nonrheumatic aortic valve stenosis   • Ulcer of gastric fundus   • Tubular adenoma   • Macular degeneration   • Hypothyroidism   • Chronic kidney disease   • Screen for colon cancer   • Postoperative anemia due to acute blood loss   • Hyperlipidemia LDL goal <100   • Morbidly obese (CMS/HCC)       Past Medical History:   Diagnosis Date   • Adenomatous colon polyp    • Chronic kidney disease    • Clostridium difficile infection 06/2017   • Diabetes mellitus (CMS/HCC)    • Gastric polyps    • Hypothyroidism    • IgA nephropathy, acute    • Macular degeneration    • Paroxysmal atrial fibrillation (CMS/HCC)    • Tubular adenoma     excision    • Ulcer of gastric fundus    • Vitamin D deficiency        Past Surgical History:   Procedure Laterality Date   • BREAST BIOPSY Left 1990'S   • CARPAL TUNNEL RELEASE     • CARPAL TUNNEL RELEASE Right    • CATARACT EXTRACTION     • CATARACT EXTRACTION Bilateral    • DIAGNOSTIC LAPAROSCOPY     • DIALYSIS FISTULA CREATION     • HERNIA REPAIR  85 and 86   • KNEE ARTHROSCOPY INCISION AND DRAINAGE OF KNEE Right 5/18/2019    Procedure: KNEE ARTHROSCOPY INCISION AND DRAINAGE OF KNEE;   Surgeon: Paco Lester MD;  Location:  [facility];  Service: Orthopedics   • LAPAROSCOPIC TUBAL LIGATION  1985   • TOTAL KNEE ARTHROPLASTY     • TOTAL KNEE ARTHROPLASTY      Left knee    • TRANSPLANTATION RENAL  2010   • TRANSPLANTATION RENAL Right    • TRIGGER FINGER RELEASE     • TUBAL ABDOMINAL LIGATION     • UPPER GASTROINTESTINAL ENDOSCOPY  05/20/2013   • VENOUS ACCESS DEVICE (PORT) INSERTION N/A 5/23/2019    Procedure: INSERTION GROSHONG;  Surgeon: Rolando Begum MD;  Location:  [facility];  Service: General       Current Outpatient Medications on File Prior to Visit   Medication Sig   • acetaminophen (TYLENOL) 325 MG tablet Take 2 tablets by mouth Every 4 (Four) Hours As Needed for Mild Pain .   • allopurinol (ZYLOPRIM) 100 MG tablet Take 100 mg by mouth Daily.   • ALPRAZolam (XANAX) 0.5 MG tablet TAKE ONE TABLET BY MOUTH DAILY   • amLODIPine (NORVASC) 5 MG tablet Take 1 tablet by mouth Daily.   • calcitriol (ROCALTROL) 0.25 MCG capsule Take 0.25 mcg by mouth Daily.   • Cholecalciferol (VITAMIN D) 2000 UNITS tablet Take 2,000 Units by mouth Daily.   • COLCRYS 0.6 MG tablet Take 0.5 tablets by mouth Daily. Take 1 tab BID x7 days, then 1 tab QD x7 days (patient will stop statins while taking this medication)   • ezetimibe (ZETIA) 10 MG tablet Take 10 mg by mouth Daily.   • furosemide (LASIX) 40 MG tablet Take 40 mg by mouth Daily.   • gentamicin (GARAMYCIN) 0.1 % cream APPLY TO EXIT SITE DAILY   • levothyroxine (SYNTHROID, LEVOTHROID) 75 MCG tablet Take 75 mcg by mouth Daily.   • metoprolol tartrate (LOPRESSOR) 50 MG tablet Take 1 tablet by mouth Every 12 (Twelve) Hours. (Patient taking differently: Take 100 mg by mouth Every 12 (Twelve) Hours.)   • Multiple Vitamins-Minerals (ICAPS AREDS 2 PO) Take  by mouth Daily.   • omeprazole (priLOSEC) 40 MG capsule TAKE ONE CAPSULE BY MOUTH DAILY   • ondansetron ODT (ZOFRAN ODT) 4 MG disintegrating tablet Take 1 tablet by mouth Every 8 (Eight) Hours As  Needed for Nausea or Vomiting.   • pravastatin (PRAVACHOL) 40 MG tablet Take 1 tablet by mouth Every Night.   • PROAIR  (90 Base) MCG/ACT inhaler Inhale 2 puffs Every 6 (Six) Hours As Needed for Wheezing or Shortness of Air.   • sevelamer (RENVELA) 800 MG tablet Take 1,600 mg by mouth 2 (Two) Times a Day.   • tacrolimus (PROGRAF) 0.5 MG capsule Take 0.5 mg by mouth 2 (Two) Times a Day.   • UltraBag/Dianeal PD-2/1.5% Dex, pappas #[id number], (DIANEAL) 346 MOSM/L CAPD Inject 2,000 mL into the abdomen / abdominal cavity 5 (Five) Times a Day. On a cycler QHS   • warfarin (COUMADIN) 2 MG tablet TAKE THREE TABLETS BY MOUTH DAILY ON MONDAY AND THURSDAY. TAKE TWO TABLETS BY MOUTH DAILY ON ALL OTHER DAYS OF THE WEEK   • epoetin blanca (EPOGEN,PROCRIT) 58417 UNIT/ML injection Inject 1 mL under the skin into the appropriate area as directed 1 (One) Time Per Week.     No current facility-administered medications on file prior to visit.        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 Negative for chills and fever.   HENT:  "Negative for congestion, ear pain, hearing loss, rhinorrhea, sinus pressure, sore throat and trouble swallowing.    Eyes: Negative for discharge and itching.   Respiratory: Negative for chest tightness.    Cardiovascular: Negative for chest pain and palpitations.   Gastrointestinal: Negative for abdominal pain, blood in stool, constipation, diarrhea and vomiting.        10/17 by Dr Perez, next 10/20   Endocrine: Negative for polydipsia and polyuria.   Genitourinary: Negative for difficulty urinating, dysuria, enuresis, frequency, hematuria and urgency.        11/18 mammogram   Musculoskeletal: Positive for arthralgias and gait problem. Negative for back pain and joint swelling.   Skin: Negative for rash and wound.   Allergic/Immunologic: Negative for immunocompromised state.   Neurological: Negative for dizziness, syncope, weakness, light-headedness, numbness and headaches.   Hematological: Bruises/bleeds easily.   Psychiatric/Behavioral: Positive for sleep disturbance. Negative for behavioral problems and dysphoric mood. The patient is not nervous/anxious.        /70   Pulse 68   Ht 160 cm (62.99\")   Wt 87.1 kg (192 lb)   LMP  (LMP Unknown)   BMI 34.02 kg/m²       Physical Exam   Constitutional: She is oriented to person, place, and time. She appears well-developed and well-nourished.   HENT:   Head: Normocephalic and atraumatic.   Right Ear: External ear normal.   Left Ear: External ear normal.   Mouth/Throat: Oropharynx is clear and moist.   Eyes: Conjunctivae and EOM are normal.   Neck: Normal range of motion. Neck supple.   Cardiovascular: Normal rate and regular rhythm.   Murmur (1/6) heard.  Pulmonary/Chest: Effort normal and breath sounds normal.   Abdominal: Soft. Bowel sounds are normal.   Musculoskeletal:   Using wheelchair   Lymphadenopathy:     She has no cervical adenopathy.   Neurological: She is alert and oriented to person, place, and time.   Skin: Skin is warm and dry.   Psychiatric: She " "has a normal mood and affect. Her behavior is normal. Thought content normal.       Procedure:      Discussion/Summary:    HTN-advised to monitor and goal 130/80, stable   DM-labs noted, counseled on low carb  Hyperlipidemia-counseled on diet, cont dual tx  ESRD-per Renal, cont PD  AOCD-\"  \"  Vit D-labs noted, cont replacement  Gout-UA level noted, cont allopurinol, stable  afib-rate controlled, stable  hypothroid-labs noted, cont replacement  High risk meds-INR at home      Labs noted and dw patient    Current Outpatient Medications:   •  acetaminophen (TYLENOL) 325 MG tablet, Take 2 tablets by mouth Every 4 (Four) Hours As Needed for Mild Pain ., Disp: , Rfl:   •  allopurinol (ZYLOPRIM) 100 MG tablet, Take 100 mg by mouth Daily., Disp: , Rfl:   •  ALPRAZolam (XANAX) 0.5 MG tablet, TAKE ONE TABLET BY MOUTH DAILY, Disp: 30 tablet, Rfl: 1  •  amLODIPine (NORVASC) 5 MG tablet, Take 1 tablet by mouth Daily., Disp: , Rfl:   •  calcitriol (ROCALTROL) 0.25 MCG capsule, Take 0.25 mcg by mouth Daily., Disp: , Rfl:   •  Cholecalciferol (VITAMIN D) 2000 UNITS tablet, Take 2,000 Units by mouth Daily., Disp: , Rfl:   •  COLCRYS 0.6 MG tablet, Take 0.5 tablets by mouth Daily. Take 1 tab BID x7 days, then 1 tab QD x7 days (patient will stop statins while taking this medication), Disp: , Rfl:   •  ezetimibe (ZETIA) 10 MG tablet, Take 10 mg by mouth Daily., Disp: , Rfl:   •  furosemide (LASIX) 40 MG tablet, Take 40 mg by mouth Daily., Disp: , Rfl:   •  gentamicin (GARAMYCIN) 0.1 % cream, APPLY TO EXIT SITE DAILY, Disp: , Rfl: 3  •  levothyroxine (SYNTHROID, LEVOTHROID) 75 MCG tablet, Take 75 mcg by mouth Daily., Disp: , Rfl:   •  metoprolol tartrate (LOPRESSOR) 50 MG tablet, Take 1 tablet by mouth Every 12 (Twelve) Hours. (Patient taking differently: Take 100 mg by mouth Every 12 (Twelve) Hours.), Disp: , Rfl:   •  Multiple Vitamins-Minerals (ICAPS AREDS 2 PO), Take  by mouth Daily., Disp: , Rfl:   •  omeprazole (priLOSEC) 40 MG " capsule, TAKE ONE CAPSULE BY MOUTH DAILY, Disp: 90 capsule, Rfl: 0  •  ondansetron ODT (ZOFRAN ODT) 4 MG disintegrating tablet, Take 1 tablet by mouth Every 8 (Eight) Hours As Needed for Nausea or Vomiting., Disp: 20 tablet, Rfl: 0  •  pravastatin (PRAVACHOL) 40 MG tablet, Take 1 tablet by mouth Every Night., Disp: 90 tablet, Rfl: 1  •  PROAIR  (90 Base) MCG/ACT inhaler, Inhale 2 puffs Every 6 (Six) Hours As Needed for Wheezing or Shortness of Air., Disp: 1 inhaler, Rfl: 4  •  sevelamer (RENVELA) 800 MG tablet, Take 1,600 mg by mouth 2 (Two) Times a Day., Disp: , Rfl:   •  tacrolimus (PROGRAF) 0.5 MG capsule, Take 0.5 mg by mouth 2 (Two) Times a Day., Disp: , Rfl:   •  UltraBag/Dianeal PD-2/1.5% Dex, pappas #[id number], (DIANEAL) 346 MOSM/L CAPD, Inject 2,000 mL into the abdomen / abdominal cavity 5 (Five) Times a Day. On a cycler QHS, Disp: , Rfl:   •  warfarin (COUMADIN) 2 MG tablet, TAKE THREE TABLETS BY MOUTH DAILY ON MONDAY AND THURSDAY. TAKE TWO TABLETS BY MOUTH DAILY ON ALL OTHER DAYS OF THE WEEK, Disp: 192 tablet, Rfl: 0  •  epoetin blanca (EPOGEN,PROCRIT) 37718 UNIT/ML injection, Inject 1 mL under the skin into the appropriate area as directed 1 (One) Time Per Week., Disp: , Rfl:         Moni was seen today for hypertension.    Diagnoses and all orders for this visit:    Paroxysmal atrial fibrillation (CMS/HCC)    Hyperlipidemia LDL goal <100    Essential hypertension    Vitamin D deficiency    Type 2 diabetes mellitus with other specified complication, without long-term current use of insulin (CMS/HCC)    Hypothyroidism, unspecified type    Stage 5 chronic kidney disease on chronic dialysis (CMS/HCC)    Anemia of renal disease    Morbidly obese (CMS/HCC)

## 2019-11-27 ENCOUNTER — ANTICOAGULATION VISIT (OUTPATIENT)
Dept: PHARMACY | Facility: HOSPITAL | Age: 79
End: 2019-11-27

## 2019-11-27 DIAGNOSIS — I48.0 PAROXYSMAL ATRIAL FIBRILLATION (HCC): ICD-10-CM

## 2019-11-27 LAB — INR PPP: 2

## 2019-12-03 ENCOUNTER — ANTICOAGULATION VISIT (OUTPATIENT)
Dept: PHARMACY | Facility: HOSPITAL | Age: 79
End: 2019-12-03

## 2019-12-03 DIAGNOSIS — I48.0 PAROXYSMAL ATRIAL FIBRILLATION (HCC): ICD-10-CM

## 2019-12-03 LAB — INR PPP: 2.3

## 2019-12-03 NOTE — PROGRESS NOTES
Anticoagulation Clinic - Remote Progress Note  [facility]  Testing Frequency: 7 days    Indication: paroxysmal afib  Referring Provider: Butch Joe (Last seen: 11/5/19  next appt: 6/9/20)  Goal INR: 2.0-3.0  Current Drug Interactions: , levothyroxine; omeprazole, azaTHIOprine, ezetimibe   CHADS-VASc: 5 (age, gender, HTN, DM)  HAS-BLED: 3 (HTN, CKD, Age)     Diet: rare GLV, rarely green beans - 8/19/19  Alcohol: none  Tobacco: none   OTC Pain Medication: APAP PRN    1st clinic: 9/14/17  2nd clinic: 6/26/18  3rd clinic: 11/5/19    INR History:  Date 7/23 7/26 8/2 8/9 8/16 8/23 8/29 9/5 9/12 9/19 9/24   Total Weekly Dose 36mg 38mg 36mg 38mg 36mg 32mg 30mg?? 32mg 32 mg 32mg 30mg   INR 1.7 2.3 2.0 3.4 3.8 3.5 2.5 2.6 2.5 3.3 2.0   Notes Keflex boost   1x decr dose 1x decr dose    Keflex Keflex     Date 9/27 10/1 10/8 10/15  10/22 10/29 11/5 11/14 11/21 11/28 12/4   Total Weekly Dose 32mg 32mg 32mg 30mg  32mg 28mg 32mg 32mg 32mg 28mg 26mg   INR 2.3 2.4 3.2 2.5  3.9 2.7 3.0 2.8 2.7 3.7 1.9   Notes Levaquin start 9/26 stopped Levaquin        stop MVI, 1 miss 1 hold     Date 12/10 12/17 12/21 12/31 1/7/19 1/14 1/22 1/24 1/28 1/31 2/7   Total Weekly Dose 30mg 30mg 30mg 26mg 28mg 28mg 28mg 28mg 30mg 30mg 28mg   INR 2.4 3.0 3.6 2.5 2.6 2.6 2.9 1.7 2.7 2.5 2.7   Notes        doxy start 1/22, incr GLV 2x boost; doxy Finish doxy 1/29      Date 2/14 2/20 2/28 3/7 3/14 3/21 3/28 4/4 4/11 4/18 4/25   Total Weekly Dose 28mg 28mg 28mg 30mg 31mg 30mg 30mg 31mg 32mg 31mg 31mg   INR 2.1 2.4 1.8 1.6 2.2 2.5 1.9 1.9 2.4 2.8 2.4   Notes   sick Zetia            Date 5/2 5/6 5/15 REHAB 7/19 7/22 7/26 7/30 8/2 8/5 8/12   Total Weekly Dose 31mg 31mg 31mg  ????? ??? Unable to confirm 34mg? 34mg **30mg ? 30mg   INR 2.0 2.4 2.5  1.2 1.3 2.7 1.2 2.0 2.2 1.6   Notes ampicillin amp       stopped lexapro cefdinir      Date 8/15 8/19 8/26 8/30 9/5 9/9 9/13 9/17 10/1 10/7 10/15 10/21   Total Weekly Dose 32mg 33mg 31mg 32mg 31mg 32mg  28mg 24mg 28mg 28mg 26mg 28mg   INR 2.6 2.2 1.8 1.9 2.7 3.3 4.0 3.2 2.5 3.7 2.0 2.2   Notes 3x boost   cefdinir; 1x boost keflex x2 days sick Sick; dose decrease  1x decr dose  1 dose reduction      Date 10/29 11/1 11/5 11/12 11/18 11/27 12/3        Total Weekly Dose 28mg 27mg 27mg 28mg 26mg 27mg 28mg        INR 2.8 2.7 2.4 3.5 2.9 2.0 2.3        Notes keflex start 10/28 1x decr  dose; keflex Clinic /  1x decr dose amio last dose 11/5/19 1x decr dose  broccoli casserole;1x boost         * Amiodarone LD 11/5/2019. Monitor as likely her warfarin needs would increase over the next few months      Phone Interview:  Verbal Release Authorization signed on 6/26/18 and again on 11/5/2019-- may speak with Alexy Wallace (son), Genesis Becerril (daughter), Sawyer or Gema Wallace (son and daughter-in-law), Gerry Wallace (son)  Tablet Strength: 2mg tablets  Patient Contact Info: preferred [phone] - home with carlito Cotto- [employer] [phone]; call if can't get in touch with home phone; Mrs. Wallace's cell phone number - [phone]     Patient Findings   Negatives:  Signs/symptoms of thrombosis, Signs/symptoms of bleeding, Laboratory test error suspected, Change in health, Change in alcohol use, Change in activity, Upcoming invasive procedure, Emergency department visit, Upcoming dental procedure, Missed doses, Extra doses, Change in medications, Change in diet/appetite, Hospital admission, Bruising, Other complaints     Plan:  1. INR is therapeutic again, today at 2.3. Instructed Ms. Wallace to continue warfarin 4mg daily for now until recheck. Will continue to monitor her dosing needs closely due to discontinuation of amiodarone.  2. Repeat INR in 1 week  3. Verbal information provided over the phone. Patient RBV dosing instructions, expresses understanding by teach back, and has no further questions at this time.      Sawyer Gamez CPhT  12/3/2019  10:13 AM    I, Johnson Villarreal [facility], have reviewed the note in full and agree with  the assessment and plan, close follow up needed due to discontinuation of amiodarone.  12/03/19  5:21 PM

## 2019-12-10 ENCOUNTER — ANTICOAGULATION VISIT (OUTPATIENT)
Dept: PHARMACY | Facility: HOSPITAL | Age: 79
End: 2019-12-10

## 2019-12-10 DIAGNOSIS — I48.0 PAROXYSMAL ATRIAL FIBRILLATION (HCC): ICD-10-CM

## 2019-12-10 LAB — INR PPP: 3.3

## 2019-12-10 NOTE — PROGRESS NOTES
Anticoagulation Clinic - Remote Progress Note  [facility]  Testing Frequency: 7 days    Indication: paroxysmal afib  Referring Provider: Butch Joe (Last seen: 11/5/19  next appt: 6/9/20)  Goal INR: 2.0-3.0  Current Drug Interactions: , levothyroxine; omeprazole, azaTHIOprine, ezetimibe, allopurinol (11/20)  CHADS-VASc: 5 (age, gender, HTN, DM)  HAS-BLED: 3 (HTN, CKD, Age)     Diet: rare GLV, rarely green beans - 8/19/19  Alcohol: none  Tobacco: none   OTC Pain Medication: APAP PRN    1st clinic: 9/14/17  2nd clinic: 6/26/18  3rd clinic: 11/5/19    INR History:  Date 7/23 7/26 8/2 8/9 8/16 8/23 8/29 9/5 9/12 9/19 9/24   Total Weekly Dose 36mg 38mg 36mg 38mg 36mg 32mg 30mg?? 32mg 32 mg 32mg 30mg   INR 1.7 2.3 2.0 3.4 3.8 3.5 2.5 2.6 2.5 3.3 2.0   Notes Keflex boost   1x decr dose 1x decr dose    Keflex Keflex     Date 9/27 10/1 10/8 10/15  10/22 10/29 11/5 11/14 11/21 11/28 12/4   Total Weekly Dose 32mg 32mg 32mg 30mg  32mg 28mg 32mg 32mg 32mg 28mg 26mg   INR 2.3 2.4 3.2 2.5  3.9 2.7 3.0 2.8 2.7 3.7 1.9   Notes Levaquin start 9/26 stopped Levaquin        stop MVI, 1 miss 1 hold     Date 12/10 12/17 12/21 12/31 1/7/19 1/14 1/22 1/24 1/28 1/31 2/7   Total Weekly Dose 30mg 30mg 30mg 26mg 28mg 28mg 28mg 28mg 30mg 30mg 28mg   INR 2.4 3.0 3.6 2.5 2.6 2.6 2.9 1.7 2.7 2.5 2.7   Notes        doxy start 1/22, incr GLV 2x boost; doxy Finish doxy 1/29      Date 2/14 2/20 2/28 3/7 3/14 3/21 3/28 4/4 4/11 4/18 4/25   Total Weekly Dose 28mg 28mg 28mg 30mg 31mg 30mg 30mg 31mg 32mg 31mg 31mg   INR 2.1 2.4 1.8 1.6 2.2 2.5 1.9 1.9 2.4 2.8 2.4   Notes   sick Zetia            Date 5/2 5/6 5/15 REHAB 7/19 7/22 7/26 7/30 8/2 8/5 8/12   Total Weekly Dose 31mg 31mg 31mg  ????? ??? Unable to confirm 34mg? 34mg **30mg ? 30mg   INR 2.0 2.4 2.5  1.2 1.3 2.7 1.2 2.0 2.2 1.6   Notes ampicillin amp       stopped lexapro cefdinir      Date 8/15 8/19 8/26 8/30 9/5 9/9 9/13 9/17 10/1 10/7 10/15 10/21   Total Weekly Dose 32mg 33mg  31mg 32mg 31mg 32mg 28mg 24mg 28mg 28mg 26mg 28mg   INR 2.6 2.2 1.8 1.9 2.7 3.3 4.0 3.2 2.5 3.7 2.0 2.2   Notes 3x boost   cefdinir; 1x boost keflex x2 days sick Sick; dose decrease  1x decr dose  1 dose reduction      Date 10/29 11/1 11/5 11/12 11/18 11/27 12/3  12/10       Total Weekly Dose 28mg 27mg 27mg 28mg 26mg 27mg 28mg  28 mg       INR 2.8 2.7 2.4 3.5 2.9 2.0 2.3  3.3       Notes keflex start 10/28 1x decr  dose; keflex Clinic /  1x decr dose amio last dose 11/5/19 1x decr dose allopurinol started broccoli casserole;1x boost         * Amiodarone LD 11/5/2019. Monitor as likely her warfarin needs would increase over the next few months      Phone Interview:  Verbal Release Authorization signed on 6/26/18 and again on 11/5/2019-- may speak with Alexy Wallace (son), Genesis Becerril (daughter), Sawyer or Gema Wallace (son and daughter-in-law), Gerry Wallace (son)  Tablet Strength: 2mg tablets  Patient Contact Info: preferred [phone] - home with carlito Cotto- cell [phone]; call if can't get in touch with home phone; Mrs. Wallace's cell phone number - [phone]     Patient Findings   Positives:  Change in diet/appetite   Negatives:  Signs/symptoms of thrombosis, Signs/symptoms of bleeding, Laboratory test error suspected, Change in health, Change in alcohol use, Change in activity, Upcoming invasive procedure, Emergency department visit, Upcoming dental procedure, Missed doses, Extra doses, Change in medications, Hospital admission, Bruising, Other complaints   Comments:  She stated that she does not eat many GLV, except during Thanksgiving Holiday.   She was started on allopurinol on 11/20/2019     Plan:  1. INR is supra therapeutic today at 3.3. Instructed Ms. Wallace to take warfarin 4mg oral daily except 2 mg on Tuesday until recheck.  (26 mg/week, 7.1% decrease)      Will continue to monitor her dosing needs closely due to discontinuation of amiodarone/ allopurinol initiation.  2. Repeat INR in 1 week on  12/17  3. Verbal information provided over the phone. Patient RBV dosing instructions, expresses understanding by teach back, and has no further questions at this time.      Radha Michaud, PharmD  12/10/2019  11:19 AM

## 2019-12-18 ENCOUNTER — TELEPHONE (OUTPATIENT)
Dept: PHARMACY | Facility: HOSPITAL | Age: 79
End: 2019-12-18

## 2019-12-18 ENCOUNTER — ANTICOAGULATION VISIT (OUTPATIENT)
Dept: PHARMACY | Facility: HOSPITAL | Age: 79
End: 2019-12-18

## 2019-12-18 DIAGNOSIS — I48.0 PAROXYSMAL ATRIAL FIBRILLATION (HCC): ICD-10-CM

## 2019-12-18 LAB — INR PPP: 2.9

## 2019-12-18 NOTE — PROGRESS NOTES
Anticoagulation Clinic - Remote Progress Note  [facility]  Testing Frequency: 7 days    Indication: paroxysmal afib  Referring Provider: Butch Joe (Last seen: 11/5/19  next appt: 6/9/20)  Goal INR: 2.0-3.0  Current Drug Interactions: , levothyroxine; omeprazole, azaTHIOprine, ezetimibe, allopurinol (11/20)  CHADS-VASc: 5 (age, gender, HTN, DM)  HAS-BLED: 3 (HTN, CKD, Age)     Diet: rare GLV, rarely green beans - 8/19/19  Alcohol: none  Tobacco: none   OTC Pain Medication: APAP PRN    1st clinic: 9/14/17  2nd clinic: 6/26/18  3rd clinic: 11/5/19    INR History:  Date 7/23 7/26 8/2 8/9 8/16 8/23 8/29 9/5 9/12 9/19 9/24   Total Weekly Dose 36mg 38mg 36mg 38mg 36mg 32mg 30mg?? 32mg 32 mg 32mg 30mg   INR 1.7 2.3 2.0 3.4 3.8 3.5 2.5 2.6 2.5 3.3 2.0   Notes Keflex boost   1x decr dose 1x decr dose    Keflex Keflex     Date 9/27 10/1 10/8 10/15  10/22 10/29 11/5 11/14 11/21 11/28 12/4   Total Weekly Dose 32mg 32mg 32mg 30mg  32mg 28mg 32mg 32mg 32mg 28mg 26mg   INR 2.3 2.4 3.2 2.5  3.9 2.7 3.0 2.8 2.7 3.7 1.9   Notes Levaquin start 9/26 stopped Levaquin        stop MVI, 1 miss 1 hold     Date 12/10 12/17 12/21 12/31 1/7/19 1/14 1/22 1/24 1/28 1/31 2/7   Total Weekly Dose 30mg 30mg 30mg 26mg 28mg 28mg 28mg 28mg 30mg 30mg 28mg   INR 2.4 3.0 3.6 2.5 2.6 2.6 2.9 1.7 2.7 2.5 2.7   Notes        doxy start 1/22, incr GLV 2x boost; doxy Finish doxy 1/29      Date 2/14 2/20 2/28 3/7 3/14 3/21 3/28 4/4 4/11 4/18 4/25   Total Weekly Dose 28mg 28mg 28mg 30mg 31mg 30mg 30mg 31mg 32mg 31mg 31mg   INR 2.1 2.4 1.8 1.6 2.2 2.5 1.9 1.9 2.4 2.8 2.4   Notes   sick Zetia            Date 5/2 5/6 5/15 REHAB 7/19 7/22 7/26 7/30 8/2 8/5 8/12   Total Weekly Dose 31mg 31mg 31mg  ????? ??? Unable to confirm 34mg? 34mg **30mg ? 30mg   INR 2.0 2.4 2.5  1.2 1.3 2.7 1.2 2.0 2.2 1.6   Notes ampicillin amp       stopped lexapro cefdinir      Date 8/15 8/19 8/26 8/30 9/5 9/9 9/13 9/17 10/1 10/7 10/15 10/21   Total Weekly Dose 32mg 33mg  31mg 32mg 31mg 32mg 28mg 24mg 28mg 28mg 26mg 28mg   INR 2.6 2.2 1.8 1.9 2.7 3.3 4.0 3.2 2.5 3.7 2.0 2.2   Notes 3x boost   cefdinir; 1x boost keflex x2 days sick Sick; dose decrease  1x decr dose  1 dose reduction      Date 10/29 11/1 11/5 11/12 11/18 11/27 12/3 12/10 12/18      Total Weekly Dose 28mg 27mg 27mg 28mg 26mg 27mg 28mg 28mg 28mg      INR 2.8 2.7 2.4 3.5 2.9 2.0 2.3 3.3 2.9      Notes keflex start 10/28 1x decr  dose; keflex Clinic /  1x decr dose amio last dose 11/5/19 1x decr dose allopurinol started broccoli casserole;1x boost   1x decr dose      * Amiodarone LD 11/5/2019. Monitor as likely her warfarin needs would increase over the next few months      Phone Interview:  Verbal Release Authorization signed on 6/26/18 and again on 11/5/2019-- may speak with Alexy Wallace (son), Genesis Becerril (daughter), Sawyer or Gema Wallace (son and daughter-in-law), Gerry Wallace (son)  Tablet Strength: 2mg tablets  Patient Contact Info: preferred [phone] - home with carlito Cotto- cell [phone]; call if can't get in touch with home phone; Mrs. Wallace's cell phone number - [phone]     Patient Findings   Negatives:  Signs/symptoms of thrombosis, Signs/symptoms of bleeding, Laboratory test error suspected, Change in health, Change in alcohol use, Change in activity, Upcoming invasive procedure, Emergency department visit, Upcoming dental procedure, Missed doses, Extra doses, Change in medications, Change in diet/appetite, Hospital admission, Bruising, Other complaints   Comments:  Patient denies all findings, feels she has been consistent with GLV intake     Plan:  1. INR is therapeutic today at 2.9. Instructed Ms. Wallace to continue warfarin 4mg oral daily for now      Will continue to monitor her dosing needs closely due to discontinuation of amiodarone/ allopurinol initiation.  2. Repeat INR in ~1 week, 12/23  3. Verbal information provided over the phone. Patient RBV dosing instructions, expresses understanding  by teach back, and has no further questions at this time.      Sawyer Gamez, Valentin  12/18/2019  1:56 PM    IRadha, PharmD, have reviewed the note in full and agree with the assessment and plan.  12/18/19  2:57 PM

## 2019-12-23 ENCOUNTER — TELEPHONE (OUTPATIENT)
Dept: PHARMACY | Facility: HOSPITAL | Age: 79
End: 2019-12-23

## 2019-12-23 RX ORDER — WARFARIN SODIUM 2 MG/1
TABLET ORAL
Qty: 192 TABLET | Refills: 0 | Status: SHIPPED | OUTPATIENT
Start: 2019-12-23 | End: 2020-03-26

## 2019-12-23 NOTE — TELEPHONE ENCOUNTER
Spoke with patient re:due for INR self test. She reports she forgot to test today due to family activities and does not believe her Son will be able to test her before clinic closes today. She is going to continue with maintenance dose of warfarin and have him test her on Thurs, 12/26

## 2019-12-26 ENCOUNTER — ANTICOAGULATION VISIT (OUTPATIENT)
Dept: PHARMACY | Facility: HOSPITAL | Age: 79
End: 2019-12-26

## 2019-12-26 DIAGNOSIS — I48.0 PAROXYSMAL ATRIAL FIBRILLATION (HCC): ICD-10-CM

## 2019-12-26 LAB — INR PPP: 3.5

## 2019-12-27 ENCOUNTER — TELEPHONE (OUTPATIENT)
Dept: INTERNAL MEDICINE | Facility: CLINIC | Age: 79
End: 2019-12-27

## 2019-12-30 ENCOUNTER — TELEPHONE (OUTPATIENT)
Dept: INTERNAL MEDICINE | Facility: CLINIC | Age: 79
End: 2019-12-30

## 2019-12-30 RX ORDER — ALPRAZOLAM 0.5 MG/1
TABLET ORAL
Qty: 30 TABLET | Refills: 2 | Status: SHIPPED | OUTPATIENT
Start: 2019-12-30 | End: 2020-03-09 | Stop reason: SDUPTHER

## 2019-12-30 NOTE — TELEPHONE ENCOUNTER
Pt is calling because she wants to no if she can get an antibiotic called in because she is not feeling any better. Pt said that she is coughing,eyes are matting, nose running and more     She is on coumadin and needs to be seen to make sure she is given correct antibiotic

## 2020-01-01 ENCOUNTER — ANTICOAGULATION VISIT (OUTPATIENT)
Dept: PHARMACY | Facility: HOSPITAL | Age: 80
End: 2020-01-01

## 2020-01-01 DIAGNOSIS — I48.0 PAROXYSMAL ATRIAL FIBRILLATION (HCC): ICD-10-CM

## 2020-01-01 LAB
INR PPP: 1.5
INR PPP: 2
INR PPP: 2.2

## 2020-01-02 ENCOUNTER — ANTICOAGULATION VISIT (OUTPATIENT)
Dept: PHARMACY | Facility: HOSPITAL | Age: 80
End: 2020-01-02

## 2020-01-02 DIAGNOSIS — I48.0 PAROXYSMAL ATRIAL FIBRILLATION (HCC): ICD-10-CM

## 2020-01-02 LAB — INR PPP: 3.2

## 2020-01-02 NOTE — PROGRESS NOTES
Anticoagulation Clinic - Remote Progress Note  [facility]  Testing Frequency: 7 days    Indication: paroxysmal afib  Referring Provider: Butch Joe (Last seen: 11/5/19  next appt: 6/9/20)  Goal INR: 2.0-3.0  Current Drug Interactions: , levothyroxine; omeprazole, azaTHIOprine, ezetimibe, allopurinol (11/20)  CHADS-VASc: 5 (age, gender, HTN, DM)  HAS-BLED: 3 (HTN, CKD, Age)     Diet: rare GLV, rarely green beans - 8/19/19  Alcohol: none  Tobacco: none   OTC Pain Medication: APAP PRN    1st clinic: 9/14/17  2nd clinic: 6/26/18  3rd clinic: 11/5/19    INR History:  Date 7/23 7/26 8/2 8/9 8/16 8/23 8/29 9/5 9/12 9/19 9/24   Total Weekly Dose 36mg 38mg 36mg 38mg 36mg 32mg 30mg?? 32mg 32 mg 32mg 30mg   INR 1.7 2.3 2.0 3.4 3.8 3.5 2.5 2.6 2.5 3.3 2.0   Notes Keflex boost   1x decr dose 1x decr dose    Keflex Keflex     Date 9/27 10/1 10/8 10/15  10/22 10/29 11/5 11/14 11/21 11/28 12/4   Total Weekly Dose 32mg 32mg 32mg 30mg  32mg 28mg 32mg 32mg 32mg 28mg 26mg   INR 2.3 2.4 3.2 2.5  3.9 2.7 3.0 2.8 2.7 3.7 1.9   Notes Levaquin start 9/26 stopped Levaquin        stop MVI, 1 miss 1 hold     Date 12/10 12/17 12/21 12/31 1/7/19 1/14 1/22 1/24 1/28 1/31 2/7   Total Weekly Dose 30mg 30mg 30mg 26mg 28mg 28mg 28mg 28mg 30mg 30mg 28mg   INR 2.4 3.0 3.6 2.5 2.6 2.6 2.9 1.7 2.7 2.5 2.7   Notes        doxy start 1/22, incr GLV 2x boost; doxy Finish doxy 1/29      Date 2/14 2/20 2/28 3/7 3/14 3/21 3/28 4/4 4/11 4/18 4/25   Total Weekly Dose 28mg 28mg 28mg 30mg 31mg 30mg 30mg 31mg 32mg 31mg 31mg   INR 2.1 2.4 1.8 1.6 2.2 2.5 1.9 1.9 2.4 2.8 2.4   Notes   sick Zetia            Date 5/2 5/6 5/15 REHAB 7/19 7/22 7/26 7/30 8/2 8/5 8/12   Total Weekly Dose 31mg 31mg 31mg  ????? ??? Unable to confirm 34mg? 34mg **30mg ? 30mg   INR 2.0 2.4 2.5  1.2 1.3 2.7 1.2 2.0 2.2 1.6   Notes ampicillin amp       stopped lexapro cefdinir      Date 8/15 8/19 8/26 8/30 9/5 9/9 9/13 9/17 10/1 10/7 10/15 10/21   Total Weekly Dose 32mg 33mg  31mg 32mg 31mg 32mg 28mg 24mg 28mg 28mg 26mg 28mg   INR 2.6 2.2 1.8 1.9 2.7 3.3 4.0 3.2 2.5 3.7 2.0 2.2   Notes 3x boost   cefdinir; 1x boost keflex x2 days sick Sick; dose decrease  1x decr dose  1 dose reduction      Date 10/29 11/1 11/5 11/12 11/18 11/27 12/3 12/10 12/18 12/26 1/2/2020    Total Weekly Dose 28mg 27mg 27mg 28mg 26mg 27mg 28mg 28mg 28mg 28mg 26mg    INR 2.8 2.7 2.4 3.5 2.9 2.0 2.3 3.3 2.9 3.5 3.2    Notes keflex start 10/28 1x decr  dose; keflex Clinic /  1x decr dose amio last dose 11/5/19 1x decr dose allopurinol started broccoli casserole;1x boost   1x decr dose  augmentin use    * Amiodarone LD 11/5/2019. Monitor as likely her warfarin needs would increase over the next few months      Phone Interview:  Verbal Release Authorization signed on 6/26/18 and again on 11/5/2019-- may speak with Alexy Wallace (son), Genesis Becerril (daughter), Sawyer Wallace (son and daughter-in-law), Gerry Wallace (son)  Tablet Strength: 2mg tablets  Patient Contact Info: preferred [phone] - home with carlito Cotto- cell [phone]; call if can't get in touch with home phone; Mrs. Wallace's cell phone number - [phone]     Patient Findings   Positives:  Change in health, Change in medications   Negatives:  Signs/symptoms of thrombosis, Signs/symptoms of bleeding, Laboratory test error suspected, Change in alcohol use, Change in activity, Upcoming invasive procedure, Emergency department visit, Upcoming dental procedure, Missed doses, Extra doses, Change in diet/appetite, Hospital admission, Bruising, Other complaints   Comments:  Mrs. Wallace reports that she has been taking an antibiotic for a sinus infection. She reviewed the bottle- Augmentin twice daily. She started on 1/30 at night with last dose on 1/9/2020. She denies a decrease in appetite. She is also taking Cepacol.      Plan:  1. INR is SUPRAtherapeutic today at 3.2 with use of augmentin since 12/30. Instructed Ms. Wallace to take reduced dose of warfarin  2mg tonight 4mg tomorrow reduce dose to 2mg on Saturday and 4mg on Sunday.  2. Repeat INR on 1/6/2020 with continued augmentin use.  3. Verbal information provided over the phone. Patient RBV dosing instructions, expresses understanding by teach back, and has no further questions at this time.      Fatemeh Rodríguez, PharmD  1/2/2020  3:54 PM

## 2020-01-06 RX ORDER — OMEPRAZOLE 40 MG/1
CAPSULE, DELAYED RELEASE ORAL
Qty: 90 CAPSULE | Refills: 0 | Status: SHIPPED | OUTPATIENT
Start: 2020-01-06 | End: 2020-04-17

## 2020-01-07 ENCOUNTER — ANTICOAGULATION VISIT (OUTPATIENT)
Dept: PHARMACY | Facility: HOSPITAL | Age: 80
End: 2020-01-07

## 2020-01-07 DIAGNOSIS — I48.0 PAROXYSMAL ATRIAL FIBRILLATION (HCC): ICD-10-CM

## 2020-01-07 LAB — INR PPP: 1.2

## 2020-01-07 NOTE — PROGRESS NOTES
Anticoagulation Clinic - Remote Progress Note  [facility]  Testing Frequency: 7 days    Indication: paroxysmal afib  Referring Provider: Butch Joe (Last seen: 11/5/19  next appt: 6/9/20)  Goal INR: 2.0-3.0  Current Drug Interactions: , levothyroxine; omeprazole, azaTHIOprine, ezetimibe, allopurinol (11/20)  CHADS-VASc: 5 (age, gender, HTN, DM)  HAS-BLED: 3 (HTN, CKD, Age)     Diet: rare GLV, rarely green beans - 8/19/19  Alcohol: none  Tobacco: none   OTC Pain Medication: APAP PRN    1st clinic: 9/14/17  2nd clinic: 6/26/18  3rd clinic: 11/5/19    INR History:  Date 7/23 7/26 8/2 8/9 8/16 8/23 8/29 9/5 9/12 9/19 9/24   Total Weekly Dose 36mg 38mg 36mg 38mg 36mg 32mg 30mg?? 32mg 32 mg 32mg 30mg   INR 1.7 2.3 2.0 3.4 3.8 3.5 2.5 2.6 2.5 3.3 2.0   Notes Keflex boost   1x decr dose 1x decr dose    Keflex Keflex     Date 9/27 10/1 10/8 10/15  10/22 10/29 11/5 11/14 11/21 11/28 12/4   Total Weekly Dose 32mg 32mg 32mg 30mg  32mg 28mg 32mg 32mg 32mg 28mg 26mg   INR 2.3 2.4 3.2 2.5  3.9 2.7 3.0 2.8 2.7 3.7 1.9   Notes Levaquin start 9/26 stopped Levaquin        stop MVI, 1 miss 1 hold     Date 12/10 12/17 12/21 12/31 1/7/19 1/14 1/22 1/24 1/28 1/31 2/7   Total Weekly Dose 30mg 30mg 30mg 26mg 28mg 28mg 28mg 28mg 30mg 30mg 28mg   INR 2.4 3.0 3.6 2.5 2.6 2.6 2.9 1.7 2.7 2.5 2.7   Notes        doxy start 1/22, incr GLV 2x boost; doxy Finish doxy 1/29      Date 2/14 2/20 2/28 3/7 3/14 3/21 3/28 4/4 4/11 4/18 4/25   Total Weekly Dose 28mg 28mg 28mg 30mg 31mg 30mg 30mg 31mg 32mg 31mg 31mg   INR 2.1 2.4 1.8 1.6 2.2 2.5 1.9 1.9 2.4 2.8 2.4   Notes   sick Zetia            Date 5/2 5/6 5/15 REHAB 7/19 7/22 7/26 7/30 8/2 8/5 8/12   Total Weekly Dose 31mg 31mg 31mg  ????? ??? Unable to confirm 34mg? 34mg **30mg ? 30mg   INR 2.0 2.4 2.5  1.2 1.3 2.7 1.2 2.0 2.2 1.6   Notes ampicillin amp       stopped lexapro cefdinir      Date 8/15 8/19 8/26 8/30 9/5 9/9 9/13 9/17 10/1 10/7 10/15 10/21   Total Weekly Dose 32mg 33mg  31mg 32mg 31mg 32mg 28mg 24mg 28mg 28mg 26mg 28mg   INR 2.6 2.2 1.8 1.9 2.7 3.3 4.0 3.2 2.5 3.7 2.0 2.2   Notes 3x boost   cefdinir; 1x boost keflex x2 days sick Sick; dose decrease  1x decr dose  1 dose reduction      Date 10/29 11/1 11/5 11/12 11/18 11/27 12/3 12/10 12/18 12/26 1/2/2020 1/7   Total Weekly Dose 28mg 27mg 27mg 28mg 26mg 27mg 28mg 28mg 28mg 28mg 26mg 24mg   INR 2.8 2.7 2.4 3.5 2.9 2.0 2.3 3.3 2.9 3.5 3.2 1.2   Notes keflex start 10/28 1x decr  dose; keflex Clinic /  1x decr dose amio last dose 11/5/19 1x decr dose allopurinol started broccoli casserole;1x boost   1x decr dose  augmentin use 1x dose dec   * Amiodarone LD 11/5/2019. Monitor as likely her warfarin needs would increase over the next few months      Phone Interview:  Verbal Release Authorization signed on 6/26/18 and again on 11/5/2019-- may speak with Alexy Wallace (son), Genessi Becerril (daughter), Sawyer or Gema Wallace (son and daughter-in-law), Gerry Wallace (son)  Tablet Strength: 2mg tablets  Patient Contact Info: preferred [phone] - home with carlito Cotto- cell [phone]; call if can't get in touch with home phone      Patient Findings     Positives:  Change in diet/appetite   Negatives:  Signs/symptoms of thrombosis, Signs/symptoms of bleeding, Laboratory test error suspected, Change in health, Change in alcohol use, Change in activity, Upcoming invasive procedure, Emergency department visit, Upcoming dental procedure, Missed doses, Extra doses, Change in medications, Hospital admission, Bruising, Other complaints   Comments:  2 more days left of augmentin. Ms Wallace had saurkraut on new years day, she thinks about a whole cup.   Denies any missed doses.      Plan:  1. INR is at baseline today at 1.2, following dose decrease. Instrusted Ms Wallace to BOOST dose to 6mg tonight then take 4mg until Friday  2. Repeat INR on Friday 1/10  3. Verbal information provided over the phone. Patient RBV dosing instructions, expresses understanding  by teach back, and has no further questions at this time.     Francoise Wu, KwadwoD.  01/07/20   11:08 AM

## 2020-01-10 ENCOUNTER — ANTICOAGULATION VISIT (OUTPATIENT)
Dept: PHARMACY | Facility: HOSPITAL | Age: 80
End: 2020-01-10

## 2020-01-10 DIAGNOSIS — I48.0 PAROXYSMAL ATRIAL FIBRILLATION (HCC): ICD-10-CM

## 2020-01-10 LAB — INR PPP: 1.6

## 2020-01-10 NOTE — PROGRESS NOTES
Anticoagulation Clinic - Remote Progress Note  [facility]  Testing Frequency: 7 days    Indication: paroxysmal afib  Referring Provider: Butch Joe (Last seen: 11/5/19  next appt: 6/9/20)  Goal INR: 2.0-3.0  Current Drug Interactions: , levothyroxine; omeprazole, azaTHIOprine, ezetimibe, allopurinol (11/20)  CHADS-VASc: 5 (age, gender, HTN, DM)  HAS-BLED: 3 (HTN, CKD, Age)     Diet: rare GLV, rarely green beans - 8/19/19  Alcohol: none  Tobacco: none   OTC Pain Medication: APAP PRN    1st clinic: 9/14/17  2nd clinic: 6/26/18  3rd clinic: 11/5/19    INR History:  Date 7/23 7/26 8/2 8/9 8/16 8/23 8/29 9/5 9/12 9/19 9/24   Total Weekly Dose 36mg 38mg 36mg 38mg 36mg 32mg 30mg?? 32mg 32 mg 32mg 30mg   INR 1.7 2.3 2.0 3.4 3.8 3.5 2.5 2.6 2.5 3.3 2.0   Notes Keflex boost   1x decr dose 1x decr dose    Keflex Keflex     Date 9/27 10/1 10/8 10/15  10/22 10/29 11/5 11/14 11/21 11/28 12/4   Total Weekly Dose 32mg 32mg 32mg 30mg  32mg 28mg 32mg 32mg 32mg 28mg 26mg   INR 2.3 2.4 3.2 2.5  3.9 2.7 3.0 2.8 2.7 3.7 1.9   Notes Levaquin start 9/26 stopped Levaquin        stop MVI, 1 miss 1 hold     Date 12/10 12/17 12/21 12/31 1/7/19 1/14 1/22 1/24 1/28 1/31 2/7   Total Weekly Dose 30mg 30mg 30mg 26mg 28mg 28mg 28mg 28mg 30mg 30mg 28mg   INR 2.4 3.0 3.6 2.5 2.6 2.6 2.9 1.7 2.7 2.5 2.7   Notes        doxy start 1/22, incr GLV 2x boost; doxy Finish doxy 1/29      Date 2/14 2/20 2/28 3/7 3/14 3/21 3/28 4/4 4/11 4/18 4/25   Total Weekly Dose 28mg 28mg 28mg 30mg 31mg 30mg 30mg 31mg 32mg 31mg 31mg   INR 2.1 2.4 1.8 1.6 2.2 2.5 1.9 1.9 2.4 2.8 2.4   Notes   sick Zetia            Date 5/2 5/6 5/15 REHAB 7/19 7/22 7/26 7/30 8/2 8/5 8/12   Total Weekly Dose 31mg 31mg 31mg  ????? ??? Unable to confirm 34mg? 34mg **30mg ? 30mg   INR 2.0 2.4 2.5  1.2 1.3 2.7 1.2 2.0 2.2 1.6   Notes ampicillin amp       stopped lexapro cefdinir      Date 8/15 8/19 8/26 8/30 9/5 9/9 9/13 9/17 10/1 10/7 10/15 10/21   Total Weekly Dose 32mg 33mg  31mg 32mg 31mg 32mg 28mg 24mg 28mg 28mg 26mg 28mg   INR 2.6 2.2 1.8 1.9 2.7 3.3 4.0 3.2 2.5 3.7 2.0 2.2   Notes 3x boost   cefdinir; 1x boost keflex x2 days sick Sick; dose decrease  1x decr dose  1 dose reduction      Date 10/29 11/1 11/5 11/12 11/18 11/27 12/3 12/10 12/18 12/26 1/2/2020 1/7 1/10   Total Weekly Dose 28mg 27mg 27mg 28mg 26mg 27mg 28mg 28mg 28mg 28mg 26mg 24mg 28mg   INR 2.8 2.7 2.4 3.5 2.9 2.0 2.3 3.3 2.9 3.5 3.2 1.2 1.6   Notes keflex start 10/28 1x decr  dose; keflex Clinic /  1x decr dose amio last dose 11/5/19 1x decr dose allopurinol started broccoli casserole;1x boost   1x decr dose  augmentin use 1x dose dec  augmentin augmentin   * Amiodarone LD 11/5/2019. Monitor as likely her warfarin needs would increase over the next few months      Phone Interview:  Verbal Release Authorization signed on 6/26/18 and again on 11/5/2019-- may speak with Alexy Wallace (son), Genesis Becerril (daughter), Sawyer Wallace (son and daughter-in-law), Gerry Rodrigo (son)  Tablet Strength: 2mg tablets  Patient Contact Info: preferred [phone] - home with carlito Cotto- cell [phone]; call if can't get in touch with home phone      Patient Findings   Positives:  Change in medications   Negatives:  Signs/symptoms of thrombosis, Signs/symptoms of bleeding, Laboratory test error suspected, Change in health, Change in alcohol use, Change in activity, Upcoming invasive procedure, Emergency department visit, Upcoming dental procedure, Missed doses, Extra doses, Change in diet/appetite, Hospital admission, Bruising, Other complaints   Comments:  Took last augmentin yesterday          Plan:  1. INR is subtherapeutic today at 1.6,. Instrusted Ms Wallace to BOOST dose to 6mg tonight then take 4mg until Tuesday. Anticipating a weekly dose of 4mg daily except 6mg 2x per week as continue to see decreased affects of amidarone.   2. Repeat INR on Tuesday  3. Verbal information provided over the phone. Patient RBV dosing  instructions, expresses understanding by teach back, and has no further questions at this time.     Francoise Wu, KwadwoD.  01/10/20   3:29 PM

## 2020-01-14 ENCOUNTER — TRANSCRIBE ORDERS (OUTPATIENT)
Dept: ADMINISTRATIVE | Facility: HOSPITAL | Age: 80
End: 2020-01-14

## 2020-01-14 ENCOUNTER — ANTICOAGULATION VISIT (OUTPATIENT)
Dept: PHARMACY | Facility: HOSPITAL | Age: 80
End: 2020-01-14

## 2020-01-14 DIAGNOSIS — Z12.31 VISIT FOR SCREENING MAMMOGRAM: Primary | ICD-10-CM

## 2020-01-14 DIAGNOSIS — I48.0 PAROXYSMAL ATRIAL FIBRILLATION (HCC): ICD-10-CM

## 2020-01-14 LAB — INR PPP: 2.1

## 2020-01-14 NOTE — PROGRESS NOTES
Anticoagulation Clinic - Remote Progress Note  [facility]  Testing Frequency: 7 days    Indication: paroxysmal afib  Referring Provider: Butch Joe (Last seen: 11/5/19  next appt: 6/9/20)  Goal INR: 2.0-3.0  Current Drug Interactions: , levothyroxine; omeprazole, azaTHIOprine, ezetimibe, allopurinol (11/20)  CHADS-VASc: 5 (age, gender, HTN, DM)  HAS-BLED: 3 (HTN, CKD, Age)     Diet: rare GLV, rarely green beans - 8/19/19  Alcohol: none  Tobacco: none   OTC Pain Medication: APAP PRN    1st clinic: 9/14/17  2nd clinic: 6/26/18  3rd clinic: 11/5/19    INR History:  Date 7/23 7/26 8/2 8/9 8/16 8/23 8/29 9/5 9/12 9/19 9/24   Total Weekly Dose 36mg 38mg 36mg 38mg 36mg 32mg 30mg?? 32mg 32 mg 32mg 30mg   INR 1.7 2.3 2.0 3.4 3.8 3.5 2.5 2.6 2.5 3.3 2.0   Notes Keflex boost   1x decr dose 1x decr dose    Keflex Keflex     Date 9/27 10/1 10/8 10/15  10/22 10/29 11/5 11/14 11/21 11/28 12/4   Total Weekly Dose 32mg 32mg 32mg 30mg  32mg 28mg 32mg 32mg 32mg 28mg 26mg   INR 2.3 2.4 3.2 2.5  3.9 2.7 3.0 2.8 2.7 3.7 1.9   Notes Levaquin start 9/26 stopped Levaquin        stop MVI, 1 miss 1 hold     Date 12/10 12/17 12/21 12/31 1/7/19 1/14 1/22 1/24 1/28 1/31 2/7   Total Weekly Dose 30mg 30mg 30mg 26mg 28mg 28mg 28mg 28mg 30mg 30mg 28mg   INR 2.4 3.0 3.6 2.5 2.6 2.6 2.9 1.7 2.7 2.5 2.7   Notes        doxy start 1/22, incr GLV 2x boost; doxy Finish doxy 1/29      Date 2/14 2/20 2/28 3/7 3/14 3/21 3/28 4/4 4/11 4/18 4/25   Total Weekly Dose 28mg 28mg 28mg 30mg 31mg 30mg 30mg 31mg 32mg 31mg 31mg   INR 2.1 2.4 1.8 1.6 2.2 2.5 1.9 1.9 2.4 2.8 2.4   Notes   sick Zetia            Date 5/2 5/6 5/15 REHAB 7/19 7/22 7/26 7/30 8/2 8/5 8/12   Total Weekly Dose 31mg 31mg 31mg  ????? ??? Unable to confirm 34mg? 34mg **30mg ? 30mg   INR 2.0 2.4 2.5  1.2 1.3 2.7 1.2 2.0 2.2 1.6   Notes ampicillin amp       stopped lexapro cefdinir      Date 8/15 8/19 8/26 8/30 9/5 9/9 9/13 9/17 10/1 10/7 10/15 10/21   Total Weekly Dose 32mg 33mg  31mg 32mg 31mg 32mg 28mg 24mg 28mg 28mg 26mg 28mg   INR 2.6 2.2 1.8 1.9 2.7 3.3 4.0 3.2 2.5 3.7 2.0 2.2   Notes 3x boost   cefdinir; 1x boost keflex x2 days sick Sick; dose decrease  1x decr dose  1 dose reduction      Date 10/29 11/1 11/5 11/12 11/18 11/27 12/3 12/10 12/18 12/26 1/2/2020 1/7 1/10   Total Weekly Dose 28mg 27mg 27mg 28mg 26mg 27mg 28mg 28mg 28mg 28mg 26mg 24mg 28mg   INR 2.8 2.7 2.4 3.5 2.9 2.0 2.3 3.3 2.9 3.5 3.2 1.2 1.6   Notes keflex start 10/28 1x decr  dose; keflex Clinic /  1x decr dose amio last dose 11/5/19 1x decr dose allopurinol started broccoli casserole;1x boost   1x decr dose  augmentin use 1x dose dec  augmentin augmentin   * Amiodarone LD 11/5/2019. Monitor as likely her warfarin needs would increase over the next few months     Date 1/14              Total Weekly Dose 32 mg 32 mg             INR 2.1              Notes                    Phone Interview:  Verbal Release Authorization signed on 6/26/18 and again on 11/5/2019-- may speak with Alexy Wallace (son), Genesis Becerril (daughter), Sawyer or Gema Wallace (son and daughter-in-law), Gerry Wallace (son)  Tablet Strength: 2mg tablets  Patient Contact Info: preferred [phone] - home with carlito Cotto- cell [phone]; call if can't get in touch with home phone      Patient Findings     Negatives:  Signs/symptoms of thrombosis, Signs/symptoms of bleeding, Laboratory test error suspected, Change in health, Change in alcohol use, Change in activity, Upcoming invasive procedure, Emergency department visit, Upcoming dental procedure, Missed doses, Extra doses, Change in medications, Change in diet/appetite, Hospital admission, Bruising, Other complaints   Comments:  Patient had a piece of skin removed yesterday that dermatologist thought could be cancerous.  Instructed patient to let us know if she will be having any additional procedures to have additional skin spots removed.  Patient verbalized understanding, all other findings negative      Plan:  1. INR is therapeutic today at 2.1.  Instrusted Ms Wallace to take 6 mg on TuesThurs with 4 mg all other days.  This will provide the same weekly dose as last week.  If patient remains therapeutic next week, consider updating tracker to reflect new weekly dose.   2. Repeat INR on Tuesday 1/21.  3. Verbal information provided over the phone. Patient RBV dosing instructions, expresses understanding by teach back, and has no further questions at this time.     Annette Perez, PharmD  Pharmacy Resident  1/14/2020  11:22 AM

## 2020-01-17 ENCOUNTER — TELEPHONE (OUTPATIENT)
Dept: INTERNAL MEDICINE | Facility: CLINIC | Age: 80
End: 2020-01-17

## 2020-01-17 ENCOUNTER — TELEPHONE (OUTPATIENT)
Dept: GASTROENTEROLOGY | Facility: CLINIC | Age: 80
End: 2020-01-17

## 2020-01-17 NOTE — TELEPHONE ENCOUNTER
Dinah,  Patient called yesterday afternoon. She stated that she had to cancel her last Colonoscopy with Dr Lemus because she had surgery and was in rehab. Now she is ready to schedule Colonoscopy. I didn't know who do send this one too since the last one was scheduled with Dr Lemus. Thanks

## 2020-01-22 ENCOUNTER — TELEPHONE (OUTPATIENT)
Dept: INTERNAL MEDICINE | Facility: CLINIC | Age: 80
End: 2020-01-22

## 2020-01-23 ENCOUNTER — ANTICOAGULATION VISIT (OUTPATIENT)
Dept: PHARMACY | Facility: HOSPITAL | Age: 80
End: 2020-01-23

## 2020-01-23 DIAGNOSIS — I48.0 PAROXYSMAL ATRIAL FIBRILLATION (HCC): ICD-10-CM

## 2020-01-23 LAB — INR PPP: 4.1 (ref 2–3)

## 2020-01-23 NOTE — PROGRESS NOTES
Anticoagulation Clinic - Remote Progress Note  [facility]  Testing Frequency: 7 days    Indication: paroxysmal afib  Referring Provider: Butch Joe (Last seen: 11/5/19  next appt: 6/9/20)  Goal INR: 2.0-3.0  Current Drug Interactions: , levothyroxine; omeprazole, azaTHIOprine, ezetimibe, allopurinol (11/20)  CHADS-VASc: 5 (age, gender, HTN, DM)  HAS-BLED: 3 (HTN, CKD, Age)     Diet: rare GLV, rarely green beans - 8/19/19  Alcohol: none  Tobacco: none   OTC Pain Medication: APAP PRN    1st clinic: 9/14/17  2nd clinic: 6/26/18  3rd clinic: 11/5/19    INR History:  Date 7/23 7/26 8/2 8/9 8/16 8/23 8/29 9/5 9/12 9/19 9/24   Total Weekly Dose 36mg 38mg 36mg 38mg 36mg 32mg 30mg?? 32mg 32 mg 32mg 30mg   INR 1.7 2.3 2.0 3.4 3.8 3.5 2.5 2.6 2.5 3.3 2.0   Notes Keflex boost   1x decr dose 1x decr dose    Keflex Keflex     Date 9/27 10/1 10/8 10/15  10/22 10/29 11/5 11/14 11/21 11/28 12/4   Total Weekly Dose 32mg 32mg 32mg 30mg  32mg 28mg 32mg 32mg 32mg 28mg 26mg   INR 2.3 2.4 3.2 2.5  3.9 2.7 3.0 2.8 2.7 3.7 1.9   Notes Levaquin start 9/26 stopped Levaquin        stop MVI, 1 miss 1 hold     Date 12/10 12/17 12/21 12/31 1/7/19 1/14 1/22 1/24 1/28 1/31 2/7   Total Weekly Dose 30mg 30mg 30mg 26mg 28mg 28mg 28mg 28mg 30mg 30mg 28mg   INR 2.4 3.0 3.6 2.5 2.6 2.6 2.9 1.7 2.7 2.5 2.7   Notes        doxy start 1/22, incr GLV 2x boost; doxy Finish doxy 1/29      Date 2/14 2/20 2/28 3/7 3/14 3/21 3/28 4/4 4/11 4/18 4/25   Total Weekly Dose 28mg 28mg 28mg 30mg 31mg 30mg 30mg 31mg 32mg 31mg 31mg   INR 2.1 2.4 1.8 1.6 2.2 2.5 1.9 1.9 2.4 2.8 2.4   Notes   sick Zetia            Date 5/2 5/6 5/15 REHAB 7/19 7/22 7/26 7/30 8/2 8/5 8/12   Total Weekly Dose 31mg 31mg 31mg  ????? ??? Unable to confirm 34mg? 34mg **30mg ? 30mg   INR 2.0 2.4 2.5  1.2 1.3 2.7 1.2 2.0 2.2 1.6   Notes ampicillin amp       stopped lexapro cefdinir      Date 8/15 8/19 8/26 8/30 9/5 9/9 9/13 9/17 10/1 10/7 10/15 10/21   Total Weekly Dose 32mg 33mg  31mg 32mg 31mg 32mg 28mg 24mg 28mg 28mg 26mg 28mg   INR 2.6 2.2 1.8 1.9 2.7 3.3 4.0 3.2 2.5 3.7 2.0 2.2   Notes 3x boost   cefdinir; 1x boost keflex x2 days sick Sick; dose decrease  1x decr dose  1 dose reduction      Date 10/29 11/1 11/5 11/12 11/18 11/27 12/3 12/10 12/18 12/26 1/2/2020 1/7 1/10   Total Weekly Dose 28mg 27mg 27mg 28mg 26mg 27mg 28mg 28mg 28mg 28mg 26mg 24mg 28mg   INR 2.8 2.7 2.4 3.5 2.9 2.0 2.3 3.3 2.9 3.5 3.2 1.2 1.6   Notes keflex start 10/28 1x decr  dose; keflex Clinic /  1x decr dose amio last dose 11/5/19 1x decr dose allopurinol started broccoli casserole;1x boost   1x decr dose  augmentin use 1x dose dec  augmentin augmentin   * Amiodarone LD 11/5/2019. Monitor as likely her warfarin needs would increase over the next few months     Date 1/14 1/23             Total Weekly Dose 32 mg 30 mg             INR 2.1 4.1             Notes  Boosted x2                  Phone Interview:  Verbal Release Authorization signed on 6/26/18 and again on 11/5/2019-- may speak with Alexy Wallace (son), Genesis Becerril (daughter), Sawyer or Gema Wallace (son and daughter-in-law), Gerry Wallace (son)  Tablet Strength: 2mg tablets  Patient Contact Info: preferred [phone] - home with carlito Cotto- cell [phone]; call if can't get in touch with home phone    Patient Findings     Negatives:  Signs/symptoms of thrombosis, Signs/symptoms of bleeding, Laboratory test error suspected, Change in health, Change in alcohol use, Change in activity, Upcoming invasive procedure, Emergency department visit, Upcoming dental procedure, Missed doses, Extra doses, Change in medications, Change in diet/appetite, Hospital admission, Bruising, Other complaints   Comments:  Mrs. Wallace had trouble recalling the doses of warfarin she has been taking. She claims to take 2 pills daily (4mg) which does not reflect previous dosing, but claims she updated her medication box as directed. I encouraged her to write down the changes we make to  her regimen. Mrs. Wallace also stated she will be having further dermatologic procedure on 2/12. She claims they did not instruct her to hold warfarin but we will follow up with the office.        Plan:  1. INR is supratherapeutic today at 4.1.  Instrusted Ms Wallace to take 2 mg tonight then resume warfarin 4 mg daily.    2. Repeat INR on 1/27.  3. Verbal information provided over the phone. Patient RBV dosing instructions, expresses understanding by teach back, and has no further questions at this time.   4. Spoke with Stacy at [facility], Mrs. Collier will not need to adjust warfarin for her procedure 2/12.    Reyes Rea PharmD  Pharmacy Resident   1/23/2020  9:09 AM

## 2020-01-27 ENCOUNTER — OFFICE VISIT (OUTPATIENT)
Dept: FAMILY MEDICINE CLINIC | Facility: CLINIC | Age: 80
End: 2020-01-27

## 2020-01-27 VITALS
WEIGHT: 198.2 LBS | DIASTOLIC BLOOD PRESSURE: 80 MMHG | RESPIRATION RATE: 16 BRPM | SYSTOLIC BLOOD PRESSURE: 132 MMHG | OXYGEN SATURATION: 92 % | HEIGHT: 64 IN | TEMPERATURE: 98 F | BODY MASS INDEX: 33.84 KG/M2 | HEART RATE: 84 BPM

## 2020-01-27 DIAGNOSIS — J45.909 BRONCHITIS WITH ASTHMA, ACUTE: Primary | ICD-10-CM

## 2020-01-27 DIAGNOSIS — J20.9 BRONCHITIS WITH ASTHMA, ACUTE: Primary | ICD-10-CM

## 2020-01-27 PROCEDURE — 99213 OFFICE O/P EST LOW 20 MIN: CPT | Performed by: NURSE PRACTITIONER

## 2020-01-27 RX ORDER — IPRATROPIUM BROMIDE AND ALBUTEROL SULFATE 2.5; .5 MG/3ML; MG/3ML
3 SOLUTION RESPIRATORY (INHALATION) ONCE
Status: COMPLETED | OUTPATIENT
Start: 2020-01-27 | End: 2020-01-27

## 2020-01-27 RX ORDER — PREDNISONE 1 MG/1
5 TABLET ORAL SEE ADMIN INSTRUCTIONS
Qty: 21 TABLET | Refills: 0 | Status: SHIPPED | OUTPATIENT
Start: 2020-01-27 | End: 2020-07-21

## 2020-01-27 RX ORDER — ALBUTEROL SULFATE 90 UG/1
2 AEROSOL, METERED RESPIRATORY (INHALATION) EVERY 4 HOURS PRN
Qty: 1 INHALER | Refills: 11 | Status: SHIPPED | OUTPATIENT
Start: 2020-01-27

## 2020-01-27 RX ORDER — CEPHALEXIN 500 MG/1
500 CAPSULE ORAL 2 TIMES DAILY
Qty: 20 CAPSULE | Refills: 0 | Status: SHIPPED | OUTPATIENT
Start: 2020-01-27 | End: 2020-02-06

## 2020-01-27 RX ORDER — BENZONATATE 200 MG/1
200 CAPSULE ORAL 3 TIMES DAILY PRN
Qty: 30 CAPSULE | Refills: 0 | Status: SHIPPED | OUTPATIENT
Start: 2020-01-27 | End: 2020-02-06

## 2020-01-27 RX ORDER — ALBUTEROL SULFATE 2.5 MG/3ML
2.5 SOLUTION RESPIRATORY (INHALATION) EVERY 4 HOURS PRN
Qty: 25 VIAL | Refills: 2 | Status: SHIPPED | OUTPATIENT
Start: 2020-01-27

## 2020-01-27 RX ADMIN — IPRATROPIUM BROMIDE AND ALBUTEROL SULFATE 3 ML: 2.5; .5 SOLUTION RESPIRATORY (INHALATION) at 14:28

## 2020-01-27 NOTE — PROGRESS NOTES
Subjective   Moni Wallace is a 80 y.o. female.   Chief Complaint   Patient presents with   • Cough     since new years day sore throat, yellowish drainage      Cough   The current episode started 1 to 4 weeks ago. The problem has been gradually worsening. The cough is productive of purulent sputum. Associated symptoms include headaches, nasal congestion and wheezing. Pertinent negatives include no chills, rhinorrhea or sore throat. Nothing aggravates the symptoms. Risk factors for lung disease include animal exposure. She has tried OTC cough suppressant for the symptoms. The treatment provided no relief. Her past medical history is significant for asthma, bronchitis, environmental allergies and pneumonia.      Patient is here as a walk in acute visit. She has been sick with cough since New Year Day. She is out of her inhaler.   She is feeling worse today.   Patient was unable to get in to see her normal PCP Dr. Walters. She is a peritoneal dialysis patient.     The following portions of the patient's history were reviewed and updated as appropriate: allergies, current medications, past family history, past medical history, past social history, past surgical history and problem list.    Review of Systems   Constitutional: Negative for chills.   HENT: Negative for rhinorrhea and sore throat.    Respiratory: Positive for cough and wheezing.    Allergic/Immunologic: Positive for environmental allergies.   Neurological: Positive for headaches.     Past Surgical History:   Procedure Laterality Date   • BREAST BIOPSY Left 1990'S   • CARPAL TUNNEL RELEASE     • CARPAL TUNNEL RELEASE Right    • CATARACT EXTRACTION     • CATARACT EXTRACTION Bilateral    • DIAGNOSTIC LAPAROSCOPY     • DIALYSIS FISTULA CREATION     • HERNIA REPAIR  86 and 87   • KNEE ARTHROSCOPY INCISION AND DRAINAGE OF KNEE Right 5/18/2019    Procedure: KNEE ARTHROSCOPY INCISION AND DRAINAGE OF KNEE;  Surgeon: Paco Lester MD;  Location: [facility] OR;   "Service: Orthopedics   • LAPAROSCOPIC TUBAL LIGATION  1985   • TOTAL KNEE ARTHROPLASTY     • TOTAL KNEE ARTHROPLASTY      Left knee    • TRANSPLANTATION RENAL  2010   • TRANSPLANTATION RENAL Right    • TRIGGER FINGER RELEASE     • TUBAL ABDOMINAL LIGATION     • UPPER GASTROINTESTINAL ENDOSCOPY  05/20/2013   • VENOUS ACCESS DEVICE (PORT) INSERTION N/A 5/23/2019    Procedure: INSERTION GROSHONG;  Surgeon: Rolando Begum MD;  Location: [facility];  Service: General     Past Medical History:   Diagnosis Date   • Adenomatous colon polyp    • Chronic kidney disease    • Clostridium difficile infection 06/2017   • Diabetes mellitus (CMS/HCC)    • Gastric polyps    • Hypothyroidism    • IgA nephropathy, acute    • Macular degeneration    • Paroxysmal atrial fibrillation (CMS/HCC)    • Tubular adenoma     excision    • Ulcer of gastric fundus    • Vitamin D deficiency        Objective   Allergies   Allergen Reactions   • Hydrocodone Itching   • Codeine Rash   • Sulfa Antibiotics Rash     Visit Vitals  /80   Pulse 84   Temp 98 °F (36.7 °C)   Resp 16   Ht 161.3 cm (63.5\")   Wt 89.9 kg (198 lb 3.2 oz)   LMP  (LMP Unknown)   SpO2 92%   BMI 34.56 kg/m²       Physical Exam   Constitutional: She is oriented to person, place, and time. Vital signs are normal. She appears well-developed. She is cooperative. She appears ill.   HENT:   Head: Normocephalic.   Right Ear: Hearing, tympanic membrane, external ear and ear canal normal.   Left Ear: Hearing, tympanic membrane, external ear and ear canal normal.   Nose: Nose normal. Right sinus exhibits no maxillary sinus tenderness and no frontal sinus tenderness. Left sinus exhibits no maxillary sinus tenderness and no frontal sinus tenderness.   Mouth/Throat: Uvula is midline. Posterior oropharyngeal erythema present.   Neck: Normal range of motion.   Cardiovascular: Normal rate and regular rhythm.   Pulmonary/Chest: No stridor. No respiratory distress. She has wheezes. She " has no rales. She exhibits no tenderness.   Neurological: She is alert and oriented to person, place, and time.   Skin: Skin is warm and dry. Capillary refill takes less than 2 seconds.   Psychiatric: She has a normal mood and affect.   Vitals reviewed.      Assessment/Plan   Moni was seen today for cough.    Diagnoses and all orders for this visit:    Bronchitis with asthma, acute  -     ipratropium-albuterol (DUO-NEB) nebulizer solution 3 mL  -     predniSONE (DELTASONE) 5 MG tablet; Take 1 tablet by mouth See Admin Instructions. Tapering dose pack  -     cephalexin (KEFLEX) 500 MG capsule; Take 1 capsule by mouth 2 (Two) Times a Day for 10 days.  -     benzonatate (TESSALON) 200 MG capsule; Take 1 capsule by mouth 3 (Three) Times a Day As Needed for Cough for up to 10 days.  -     albuterol sulfate HFA (PROAIR HFA) 108 (90 Base) MCG/ACT inhaler; Inhale 2 puffs Every 4 (Four) Hours As Needed for Wheezing or Shortness of Air.  -     albuterol (PROVENTIL) (2.5 MG/3ML) 0.083% nebulizer solution; Take 2.5 mg by nebulization Every 4 (Four) Hours As Needed for Wheezing.    duoneb in office - post Sao2 up to 92%. Wheezing improved.   She and her son have been instructed to go to ER if she worsens or her Sao2 goes and stays below 90%.   Follow up with Dr. Walters in 3 days.  Patient reports her daughter is bringing her a nebulizer machine and she will need solution. Ordered .   Discussed using either the inhaler or the neb. Wait 4 hours in between she is agreeable.   They are agreeable.     See acute bronchitis patient instructions.          Patient Instructions   Acute Bronchitis, Adult    Acute bronchitis is sudden (acute) swelling of the air tubes (bronchi) in the lungs. Acute bronchitis causes these tubes to fill with mucus, which can make it hard to breathe. It can also cause coughing or wheezing.  In adults, acute bronchitis usually goes away within 2 weeks. A cough caused by bronchitis may last up to 3 weeks.  Smoking, allergies, and asthma can make the condition worse. Repeated episodes of bronchitis may cause further lung problems, such as chronic obstructive pulmonary disease (COPD).  What are the causes?  This condition can be caused by germs and by substances that irritate the lungs, including:  · Cold and flu viruses. This condition is most often caused by the same virus that causes a cold.  · Bacteria.  · Exposure to tobacco smoke, dust, fumes, and air pollution.  What increases the risk?  This condition is more likely to develop in people who:  · Have close contact with someone with acute bronchitis.  · Are exposed to lung irritants, such as tobacco smoke, dust, fumes, and vapors.  · Have a weak immune system.  · Have a respiratory condition such as asthma.  What are the signs or symptoms?  Symptoms of this condition include:  · A cough.  · Coughing up clear, yellow, or green mucus.  · Wheezing.  · Chest congestion.  · Shortness of breath.  · A fever.  · Body aches.  · Chills.  · A sore throat.  How is this diagnosed?  This condition is usually diagnosed with a physical exam. During the exam, your health care provider may order tests, such as chest X-rays, to rule out other conditions. He or she may also:  · Test a sample of your mucus for bacterial infection.  · Check the level of oxygen in your blood. This is done to check for pneumonia.  · Do a chest X-ray or lung function testing to rule out pneumonia and other conditions.  · Perform blood tests.  Your health care provider will also ask about your symptoms and medical history.  How is this treated?  Most cases of acute bronchitis clear up over time without treatment. Your health care provider may recommend:  · Drinking more fluids. Drinking more makes your mucus thinner, which may make it easier to breathe.  · Taking a medicine for a fever or cough.  · Taking an antibiotic medicine.  · Using an inhaler to help improve shortness of breath and to control a  cough.  · Using a cool mist vaporizer or humidifier to make it easier to breathe.  Follow these instructions at home:  Medicines  · Take over-the-counter and prescription medicines only as told by your health care provider.  · If you were prescribed an antibiotic, take it as told by your health care provider. Do not stop taking the antibiotic even if you start to feel better.  General instructions    · Get plenty of rest.  · Drink enough fluids to keep your urine pale yellow.  · Avoid smoking and secondhand smoke. Exposure to cigarette smoke or irritating chemicals will make bronchitis worse. If you smoke and you need help quitting, ask your health care provider. Quitting smoking will help your lungs heal faster.  · Use an inhaler, cool mist vaporizer, or humidifier as told by your health care provider.  · Keep all follow-up visits as told by your health care provider. This is important.  How is this prevented?  To lower your risk of getting this condition again:  · Wash your hands often with soap and water. If soap and water are not available, use hand .  · Avoid contact with people who have cold symptoms.  · Try not to touch your hands to your mouth, nose, or eyes.  · Make sure to get the flu shot every year.  Contact a health care provider if:  · Your symptoms do not improve in 2 weeks of treatment.  Get help right away if:  · You cough up blood.  · You have chest pain.  · You have severe shortness of breath.  · You become dehydrated.  · You faint or keep feeling like you are going to faint.  · You keep vomiting.  · You have a severe headache.  · Your fever or chills gets worse.  This information is not intended to replace advice given to you by your health care provider. Make sure you discuss any questions you have with your health care provider.  Document Released: 01/25/2006 Document Revised: 08/01/2018 Document Reviewed: 06/07/2017  [email or web address] Interactive Patient Education © 2019 [employer]        Shannan Bernard, APRN

## 2020-01-28 ENCOUNTER — ANTICOAGULATION VISIT (OUTPATIENT)
Dept: PHARMACY | Facility: HOSPITAL | Age: 80
End: 2020-01-28

## 2020-01-28 DIAGNOSIS — I48.0 PAROXYSMAL ATRIAL FIBRILLATION (HCC): ICD-10-CM

## 2020-01-28 LAB — INR PPP: 2.2

## 2020-01-28 RX ORDER — VALACYCLOVIR HYDROCHLORIDE 500 MG/1
500 TABLET, FILM COATED ORAL DAILY
COMMUNITY
Start: 2020-02-11 | End: 2020-07-21

## 2020-01-28 NOTE — PROGRESS NOTES
Anticoagulation Clinic - Remote Progress Note  [facility]  Testing Frequency: 7 days    Indication: paroxysmal afib  Referring Provider: uBtch Joe (Last seen: 11/5/19  next appt: 6/9/20)  Goal INR: 2.0-3.0  Current Drug Interactions: , levothyroxine; omeprazole, azaTHIOprine, ezetimibe, allopurinol (11/20)  CHADS-VASc: 5 (age, gender, HTN, DM)  HAS-BLED: 3 (HTN, CKD, Age)     Diet: rare GLV, rarely green beans - 8/19/19  Alcohol: none  Tobacco: none   OTC Pain Medication: APAP PRN    1st clinic: 9/14/17  2nd clinic: 6/26/18  3rd clinic: 11/5/19    INR History:  Date 7/23 7/26 8/2 8/9 8/16 8/23 8/29 9/5 9/12 9/19 9/24   Total Weekly Dose 36mg 38mg 36mg 38mg 36mg 32mg 30mg?? 32mg 32 mg 32mg 30mg   INR 1.7 2.3 2.0 3.4 3.8 3.5 2.5 2.6 2.5 3.3 2.0   Notes Keflex boost   1x decr dose 1x decr dose    Keflex Keflex     Date 9/27 10/1 10/8 10/15  10/22 10/29 11/5 11/14 11/21 11/28 12/4   Total Weekly Dose 32mg 32mg 32mg 30mg  32mg 28mg 32mg 32mg 32mg 28mg 26mg   INR 2.3 2.4 3.2 2.5  3.9 2.7 3.0 2.8 2.7 3.7 1.9   Notes Levaquin start 9/26 stopped Levaquin        stop MVI, 1 miss 1 hold     Date 12/10 12/17 12/21 12/31 1/7/19 1/14 1/22 1/24 1/28 1/31 2/7   Total Weekly Dose 30mg 30mg 30mg 26mg 28mg 28mg 28mg 28mg 30mg 30mg 28mg   INR 2.4 3.0 3.6 2.5 2.6 2.6 2.9 1.7 2.7 2.5 2.7   Notes        doxy start 1/22, incr GLV 2x boost; doxy Finish doxy 1/29      Date 2/14 2/20 2/28 3/7 3/14 3/21 3/28 4/4 4/11 4/18 4/25   Total Weekly Dose 28mg 28mg 28mg 30mg 31mg 30mg 30mg 31mg 32mg 31mg 31mg   INR 2.1 2.4 1.8 1.6 2.2 2.5 1.9 1.9 2.4 2.8 2.4   Notes   sick Zetia            Date 5/2 5/6 5/15 REHAB 7/19 7/22 7/26 7/30 8/2 8/5 8/12   Total Weekly Dose 31mg 31mg 31mg  ????? ??? Unable to confirm 34mg? 34mg **30mg ? 30mg   INR 2.0 2.4 2.5  1.2 1.3 2.7 1.2 2.0 2.2 1.6   Notes ampicillin amp       stopped lexapro cefdinir      Date 8/15 8/19 8/26 8/30 9/5 9/9 9/13 9/17 10/1 10/7 10/15 10/21   Total Weekly Dose 32mg 33mg  31mg 32mg 31mg 32mg 28mg 24mg 28mg 28mg 26mg 28mg   INR 2.6 2.2 1.8 1.9 2.7 3.3 4.0 3.2 2.5 3.7 2.0 2.2   Notes 3x boost   cefdinir; 1x boost keflex x2 days sick Sick; dose decrease  1x decr dose  1 dose reduction      Date 10/29 11/1 11/5 11/12 11/18 11/27 12/3 12/10 12/18 12/26 1/2/20 1/7   Total Weekly Dose 28mg 27mg 27mg 28mg 26mg 27mg 28mg 28mg 28mg 28mg 26mg 24mg   INR 2.8 2.7 2.4 3.5 2.9 2.0 2.3 3.3 2.9 3.5 3.2 1.2   Notes keflex start 10/28 1x decr  dose; keflex Clinic /  1x decr dose amio last dose 11/5/19 1x decr dose allopurinol started broccoli casserole;1x boost   1x decr dose  augmentin use 1x dose dec  augmentin   * Amiodarone LD 11/5/2019. Monitor as likely her warfarin needs would increase over the next few months     Date 1/10 1/14 1/23 1/28            Total Weekly Dose 28mg 32mg 30mg 26mg            INR 1.6 2.1 4.1 2.2            Notes augment  Boosted x2 1x decr dose keflex; pred; sick              Phone Interview:  Verbal Release Authorization signed on 6/26/18 and again on 11/5/2019-- may speak with Alexy Wallace (son), Genesis Becerril (daughter), Sawyer Wallace (son and daughter-in-law), Gerry Wallace (son)  Tablet Strength: 2mg tablets  Patient Contact Info: preferred [phone] - home with son Yadiel- cell [phone]; call if can't get in touch with home phone      Patient Findings   Positives:  Change in health, Change in medications, Change in diet/appetite   Negatives:  Signs/symptoms of thrombosis, Signs/symptoms of bleeding, Laboratory test error suspected, Change in alcohol use, Change in activity, Upcoming invasive procedure, Emergency department visit, Upcoming dental procedure, Missed doses, Extra doses, Hospital admission, Bruising, Other complaints   Comments:  Patient was seen by REY Earl, on Mon, 1/27, for bronchitis. Patient was given the following medications:     Benzonatate 200 mg Oral 3 Times Daily PRN     Cephalexin 500 mg Oral 2 Times Daily x10 days- First  dose taken evening of 1/27, should be due to take last dose on morning of 2/6   predniSONE 5 mg Oral See Admin Instructions, Tapering dose pack #21 - Should due to take last dose on Sat, 2/1     Patient reports she will also start taking Valacyclovir 500mg 1 tab QD 1 day prior to procedure on 2/12. Patient was originally told to take 1 tab BID 2 days prior to procedure but she says her nephrologist had advised against that and instead advised the 1 tab QD 1 day prior to procedure.     Patient reports he appetite has been relatively poor since she has been ill. She reports she rarely eats GLV and thus that aspect of her diet remains unchanged.     Have discussed cephalexin-warfarin and prednisone-warfarin interaction and she is aware the s/sx to be aware of. Patient had no further questions at this time. No interaction noted in [employer] for benzonatate-warfarin or valacyclovir-warfarin.       Per [employer]:     Warning: Concurrent use of CEPHALEXIN and WARFARIN may result in an increased risk of bleeding.     Warning: Concurrent use of PREDNISONE and WARFARIN may result in increased risk of bleeding or diminished effects of warfarin.     Plan:  1. INR is therapeutic and back WNL today at 2.2. After consulting with Fatemeh Rodríguez, KwadwoD, instructed Ms Wallace to take warfarin 4mg daily except for 2mg on Thurs until recheck   2. Repeat INR on Fri, 1/31, due to recent abx and steroid init  3. Verbal information provided over the phone. Patient RBV dosing instructions, filled her med box while we were on the phone, expresses understanding by teach back, and has no further questions at this time.     Sawyer Gamez CPhT  1/28/2020  8:54 AM     IDilan, [facility], have reviewed the note in full and agree with the assessment and plan.  01/28/20  10:00 AM

## 2020-01-28 NOTE — TELEPHONE ENCOUNTER
Got the message. She will need either family members to help her at home, home health to help her at home (pcp to arrange via ) or inpatient admission to help with bowel prep.

## 2020-01-31 ENCOUNTER — ANTICOAGULATION VISIT (OUTPATIENT)
Dept: PHARMACY | Facility: HOSPITAL | Age: 80
End: 2020-01-31

## 2020-01-31 DIAGNOSIS — I48.0 PAROXYSMAL ATRIAL FIBRILLATION (HCC): ICD-10-CM

## 2020-01-31 LAB — INR PPP: 3.4 (ref 2–3)

## 2020-01-31 NOTE — PROGRESS NOTES
Anticoagulation Clinic - Remote Progress Note  [facility]  Testing Frequency: 7 days    Indication: paroxysmal afib  Referring Provider: Butch Joe (Last seen: 11/5/19  next appt: 6/9/20)  Goal INR: 2.0-3.0  Current Drug Interactions: , levothyroxine; omeprazole, azaTHIOprine, ezetimibe, allopurinol (11/20)  CHADS-VASc: 5 (age, gender, HTN, DM)  HAS-BLED: 3 (HTN, CKD, Age)     Diet: rare GLV, rarely green beans - 8/19/19  Alcohol: none  Tobacco: none   OTC Pain Medication: APAP PRN    1st clinic: 9/14/17  2nd clinic: 6/26/18  3rd clinic: 11/5/19    INR History:  Date 7/23 7/26 8/2 8/9 8/16 8/23 8/29 9/5 9/12 9/19 9/24   Total Weekly Dose 36mg 38mg 36mg 38mg 36mg 32mg 30mg?? 32mg 32 mg 32mg 30mg   INR 1.7 2.3 2.0 3.4 3.8 3.5 2.5 2.6 2.5 3.3 2.0   Notes Keflex boost   1x decr dose 1x decr dose    Keflex Keflex     Date 9/27 10/1 10/8 10/15  10/22 10/29 11/5 11/14 11/21 11/28 12/4   Total Weekly Dose 32mg 32mg 32mg 30mg  32mg 28mg 32mg 32mg 32mg 28mg 26mg   INR 2.3 2.4 3.2 2.5  3.9 2.7 3.0 2.8 2.7 3.7 1.9   Notes Levaquin start 9/26 stopped Levaquin        stop MVI, 1 miss 1 hold     Date 12/10 12/17 12/21 12/31 1/7/19 1/14 1/22 1/24 1/28 1/31 2/7   Total Weekly Dose 30mg 30mg 30mg 26mg 28mg 28mg 28mg 28mg 30mg 30mg 28mg   INR 2.4 3.0 3.6 2.5 2.6 2.6 2.9 1.7 2.7 2.5 2.7   Notes        doxy start 1/22, incr GLV 2x boost; doxy Finish doxy 1/29      Date 2/14 2/20 2/28 3/7 3/14 3/21 3/28 4/4 4/11 4/18 4/25   Total Weekly Dose 28mg 28mg 28mg 30mg 31mg 30mg 30mg 31mg 32mg 31mg 31mg   INR 2.1 2.4 1.8 1.6 2.2 2.5 1.9 1.9 2.4 2.8 2.4   Notes   sick Zetia            Date 5/2 5/6 5/15 REHAB 7/19 7/22 7/26 7/30 8/2 8/5 8/12   Total Weekly Dose 31mg 31mg 31mg  ????? ??? Unable to confirm 34mg? 34mg **30mg ? 30mg   INR 2.0 2.4 2.5  1.2 1.3 2.7 1.2 2.0 2.2 1.6   Notes ampicillin amp       stopped lexapro cefdinir      Date 8/15 8/19 8/26 8/30 9/5 9/9 9/13 9/17 10/1 10/7 10/15 10/21   Total Weekly Dose 32mg 33mg  31mg 32mg 31mg 32mg 28mg 24mg 28mg 28mg 26mg 28mg   INR 2.6 2.2 1.8 1.9 2.7 3.3 4.0 3.2 2.5 3.7 2.0 2.2   Notes 3x boost   cefdinir; 1x boost keflex x2 days sick Sick; dose decrease  1x decr dose  1 dose reduction      Date 10/29 11/1 11/5 11/12 11/18 11/27 12/3 12/10 12/18 12/26 1/2/20 1/7   Total Weekly Dose 28mg 27mg 27mg 28mg 26mg 27mg 28mg 28mg 28mg 28mg 26mg 24mg   INR 2.8 2.7 2.4 3.5 2.9 2.0 2.3 3.3 2.9 3.5 3.2 1.2   Notes keflex start 10/28 1x decr  dose; keflex Clinic /  1x decr dose amio last dose 11/5/19 1x decr dose allopurinol started broccoli casserole;1x boost   1x decr dose  augmentin use 1x dose dec  augmentin   * Amiodarone LD 11/5/2019. Monitor as likely her warfarin needs would increase over the next few months     Date 1/10 1/14 1/23 1/28 1/31           Total Weekly Dose 28mg 32mg 30mg 26mg 26mg            INR 1.6 2.1 4.1 2.2 3.4           Notes augment  Boosted x2 1x decr keflex, pred keflex; pred; sick              Phone Interview:  Verbal Release Authorization signed on 6/26/18 and again on 11/5/2019-- may speak with Alexy Rodrigo (son), Genesis Becerril (daughter), Sawyer Wallace (son and daughter-in-law), Gerry Wallace (son)  Tablet Strength: 2mg tablets  Patient Contact Info: preferred [phone] - home with carlito Cotto- cell [phone]; call if can't get in touch with home phone      Patient Findings     Negatives:  Signs/symptoms of thrombosis, Signs/symptoms of bleeding, Laboratory test error suspected, Change in health, Change in alcohol use, Change in activity, Upcoming invasive procedure, Emergency department visit, Upcoming dental procedure, Missed doses, Extra doses, Change in medications, Change in diet/appetite, Hospital admission, Bruising, Other complaints   Comments:  Mrs. Wallace has been taking her warfarin as directed with no missed doses. She will complete steroids on 2/1, and Keflex on 2/5. She admits her appetite has been poor while she has been ill. She denies other  changes.      Plan:  1. INR is SUPRAtherapeutic at 3.4. Instructed Ms Rodrigo to reduce tonight's dose to warfarin 2mg warfarin 4mg daily except for 2mg on Thurs until recheck .  2. Repeat INR on 2/3, with continued steroids and antibiotics.   3. Verbal information provided over the phone. Patient RBV dosing instructions, filled her med box while we were on the phone, expresses understanding by teach back, and has no further questions at this time.     Reyes Rea PharmD  Pharmacy Resident   1/31/2020  9:21 AM

## 2020-02-04 ENCOUNTER — ANTICOAGULATION VISIT (OUTPATIENT)
Dept: PHARMACY | Facility: HOSPITAL | Age: 80
End: 2020-02-04

## 2020-02-04 DIAGNOSIS — I48.0 PAROXYSMAL ATRIAL FIBRILLATION (HCC): ICD-10-CM

## 2020-02-04 LAB — INR PPP: 2.7

## 2020-02-04 NOTE — PROGRESS NOTES
Anticoagulation Clinic - Remote Progress Note  [facility]  Testing Frequency: 7 days    Indication: paroxysmal afib  Referring Provider: Butch Joe (Last seen: 11/5/19  next appt: 6/9/20)  Goal INR: 2.0-3.0  Current Drug Interactions: , levothyroxine; omeprazole, azaTHIOprine, ezetimibe, allopurinol (11/20)  CHADS-VASc: 5 (age, gender, HTN, DM)  HAS-BLED: 3 (HTN, CKD, Age)     Diet: rare GLV, rarely green beans - 8/19/19  Alcohol: none  Tobacco: none   OTC Pain Medication: APAP PRN    1st clinic: 9/14/17  2nd clinic: 6/26/18  3rd clinic: 11/5/19    INR History:  Date 7/23 7/26 8/2 8/9 8/16 8/23 8/29 9/5 9/12 9/19 9/24   Total Weekly Dose 36mg 38mg 36mg 38mg 36mg 32mg 30mg?? 32mg 32 mg 32mg 30mg   INR 1.7 2.3 2.0 3.4 3.8 3.5 2.5 2.6 2.5 3.3 2.0   Notes Keflex boost   1x decr dose 1x decr dose    Keflex Keflex     Date 9/27 10/1 10/8 10/15  10/22 10/29 11/5 11/14 11/21 11/28 12/4   Total Weekly Dose 32mg 32mg 32mg 30mg  32mg 28mg 32mg 32mg 32mg 28mg 26mg   INR 2.3 2.4 3.2 2.5  3.9 2.7 3.0 2.8 2.7 3.7 1.9   Notes Levaquin start 9/26 stopped Levaquin        stop MVI, 1 miss 1 hold     Date 12/10 12/17 12/21 12/31 1/7/19 1/14 1/22 1/24 1/28 1/31 2/7   Total Weekly Dose 30mg 30mg 30mg 26mg 28mg 28mg 28mg 28mg 30mg 30mg 28mg   INR 2.4 3.0 3.6 2.5 2.6 2.6 2.9 1.7 2.7 2.5 2.7   Notes        doxy start 1/22, incr GLV 2x boost; doxy Finish doxy 1/29      Date 2/14 2/20 2/28 3/7 3/14 3/21 3/28 4/4 4/11 4/18 4/25   Total Weekly Dose 28mg 28mg 28mg 30mg 31mg 30mg 30mg 31mg 32mg 31mg 31mg   INR 2.1 2.4 1.8 1.6 2.2 2.5 1.9 1.9 2.4 2.8 2.4   Notes   sick Zetia            Date 5/2 5/6 5/15 REHAB 7/19 7/22 7/26 7/30 8/2 8/5 8/12   Total Weekly Dose 31mg 31mg 31mg  ????? ??? Unable to confirm 34mg? 34mg **30mg ? 30mg   INR 2.0 2.4 2.5  1.2 1.3 2.7 1.2 2.0 2.2 1.6   Notes ampicillin amp       stopped lexapro cefdinir      Date 8/15 8/19 8/26 8/30 9/5 9/9 9/13 9/17 10/1 10/7 10/15 10/21   Total Weekly Dose 32mg 33mg  "31mg 32mg 31mg 32mg 28mg 24mg 28mg 28mg 26mg 28mg   INR 2.6 2.2 1.8 1.9 2.7 3.3 4.0 3.2 2.5 3.7 2.0 2.2   Notes 3x boost   cefdinir; 1x boost keflex x2 days sick Sick; dose decrease  1x decr dose  1 dose reduction      Date 10/29 11/1 11/5 11/12 11/18 11/27 12/3 12/10 12/18 12/26 1/2/20 1/7   Total Weekly Dose 28mg 27mg 27mg 28mg 26mg 27mg 28mg 28mg 28mg 28mg 26mg 24mg   INR 2.8 2.7 2.4 3.5 2.9 2.0 2.3 3.3 2.9 3.5 3.2 1.2   Notes keflex start 10/28 1x decr  dose; keflex Clinic /  1x decr dose amio last dose 11/5/19 1x decr dose allopurinol started broccoli casserole;1x boost   1x decr dose  augmentin use 1x dose dec  augmentin   * Amiodarone LD 11/5/2019. Monitor as likely her warfarin needs would increase over the next few months     Date 1/10 1/14 1/23 1/28 1/31 2/4          Total Weekly Dose 28mg 32mg 30mg 26mg 26mg  24mg          INR 1.6 2.1 4.1 2.2 3.4 2.7          Notes augment  Boosted x2 1x decr keflex, pred keflex; pred; sick Keflex; sick; dose red x1             Phone Interview:  Verbal Release Authorization signed on 6/26/18 and again on 11/5/2019-- may speak with Alexy Rodrigo (son), Genesis Becerril (daughter), Sawyer Wallace (son and daughter-in-law), Gerry Rodrigo (son)  Tablet Strength: 2mg tablets  Patient Contact Info: preferred [phone] - home with son Yadiel- cell [phone]; call if can't get in touch with home phone      Patient Findings   Positives:  Change in health, Change in activity, Change in medications   Negatives:  Signs/symptoms of thrombosis, Signs/symptoms of bleeding, Laboratory test error suspected, Change in alcohol use, Missed doses, Extra doses, Change in diet/appetite, Bruising   Comments:  Mrs. Wallace has been taking her warfarin as directed with no missed doses.  She completed steroids on 2/1, and will complete Keflex on 2/5.   She admits her appetite has been poor while she has been ill and that she has been forcing herself to eat. Her illness has caused her to \"lay " "around\" more than usual.   She has an upcoming biopsy this week of facial leisions that she reports not having to hold warfarin for per dermatology. She denies other changes.        Plan:  1. INR is therapeutic today at 2.7. Instructed Ms Wallace to continue warfarin 4 mg oral daily except for 2mg on Thurs until recheck .  2. Repeat INR on 2/11, with completion of antibiotics and steroids to ensure WNL.   3. Verbal information provided over the phone. Patient RBV dosing instructions, filled her med box while we were on the phone, expresses understanding by teach back, and has no further questions at this time.       Thank you,    Joce BurkettD, PEDRO  Pharmacy Resident  2/4/2020  9:36 AM     I, Radha Michaud, PharmD, have reviewed the note in full and agree with the assessment and plan.  02/04/20  4:37 PM    "

## 2020-02-11 ENCOUNTER — ANTICOAGULATION VISIT (OUTPATIENT)
Dept: PHARMACY | Facility: HOSPITAL | Age: 80
End: 2020-02-11

## 2020-02-11 DIAGNOSIS — I48.0 PAROXYSMAL ATRIAL FIBRILLATION (HCC): ICD-10-CM

## 2020-02-11 LAB — INR PPP: 2.1

## 2020-02-11 NOTE — PROGRESS NOTES
Anticoagulation Clinic - Remote Progress Note  [facility]  Testing Frequency: 7 days    Indication: paroxysmal afib  Referring Provider: Butch Joe (Last seen: 11/5/19  next appt: 6/9/20)  Goal INR: 2.0-3.0  Current Drug Interactions: , levothyroxine; omeprazole, azaTHIOprine, ezetimibe, allopurinol (11/20)  CHADS-VASc: 5 (age, gender, HTN, DM)  HAS-BLED: 3 (HTN, CKD, Age)     Diet: rare GLV, rarely green beans - 8/19/19  Alcohol: none  Tobacco: none   OTC Pain Medication: APAP PRN    1st clinic: 9/14/17  2nd clinic: 6/26/18  3rd clinic: 11/5/19    INR History:  Date 7/23 7/26 8/2 8/9 8/16 8/23 8/29 9/5 9/12 9/19 9/24   Total Weekly Dose 36mg 38mg 36mg 38mg 36mg 32mg 30mg?? 32mg 32 mg 32mg 30mg   INR 1.7 2.3 2.0 3.4 3.8 3.5 2.5 2.6 2.5 3.3 2.0   Notes Keflex boost   1x decr dose 1x decr dose    Keflex Keflex     Date 9/27 10/1 10/8 10/15  10/22 10/29 11/5 11/14 11/21 11/28 12/4   Total Weekly Dose 32mg 32mg 32mg 30mg  32mg 28mg 32mg 32mg 32mg 28mg 26mg   INR 2.3 2.4 3.2 2.5  3.9 2.7 3.0 2.8 2.7 3.7 1.9   Notes Levaquin start 9/26 stopped Levaquin        stop MVI, 1 miss 1 hold     Date 12/10 12/17 12/21 12/31 1/7/19 1/14 1/22 1/24 1/28 1/31 2/7   Total Weekly Dose 30mg 30mg 30mg 26mg 28mg 28mg 28mg 28mg 30mg 30mg 28mg   INR 2.4 3.0 3.6 2.5 2.6 2.6 2.9 1.7 2.7 2.5 2.7   Notes        doxy start 1/22, incr GLV 2x boost; doxy Finish doxy 1/29      Date 2/14 2/20 2/28 3/7 3/14 3/21 3/28 4/4 4/11 4/18 4/25   Total Weekly Dose 28mg 28mg 28mg 30mg 31mg 30mg 30mg 31mg 32mg 31mg 31mg   INR 2.1 2.4 1.8 1.6 2.2 2.5 1.9 1.9 2.4 2.8 2.4   Notes   sick Zetia            Date 5/2 5/6 5/15 REHAB 7/19 7/22 7/26 7/30 8/2 8/5 8/12   Total Weekly Dose 31mg 31mg 31mg  ????? ??? Unable to confirm 34mg? 34mg **30mg ? 30mg   INR 2.0 2.4 2.5  1.2 1.3 2.7 1.2 2.0 2.2 1.6   Notes ampicillin amp       stopped lexapro cefdinir      Date 8/15 8/19 8/26 8/30 9/5 9/9 9/13 9/17 10/1 10/7 10/15 10/21   Total Weekly Dose 32mg 33mg  31mg 32mg 31mg 32mg 28mg 24mg 28mg 28mg 26mg 28mg   INR 2.6 2.2 1.8 1.9 2.7 3.3 4.0 3.2 2.5 3.7 2.0 2.2   Notes 3x boost   cefdinir; 1x boost keflex x2 days sick Sick; dose decrease  1x decr dose  1 dose reduction      Date 10/29 11/1 11/5 11/12 11/18 11/27 12/3 12/10 12/18 12/26 1/2/20 1/7   Total Weekly Dose 28mg 27mg 27mg 28mg 26mg 27mg 28mg 28mg 28mg 28mg 26mg 24mg   INR 2.8 2.7 2.4 3.5 2.9 2.0 2.3 3.3 2.9 3.5 3.2 1.2   Notes keflex start 10/28 1x decr  dose; keflex Clinic /  1x decr dose amio last dose 11/5/19 1x decr dose allopurinol started broccoli casserole;1x boost   1x decr dose  augmentin use 1x dose dec  augmentin   * Amiodarone LD 11/5/2019. Monitor as likely her warfarin needs would increase over the next few months     Date 1/10 1/14 1/23 1/28 1/31 2/4 2/11         Total Weekly Dose 28mg 32mg 30mg 26mg 26mg  24mg 26mg 26mg        INR 1.6 2.1 4.1 2.2 3.4 2.7 2.1         Notes augment  Boosted x2 1x decr keflex, pred keflex; pred; sick Keflex; sick; dose red x1 Sick; Valtrex            Phone Interview:  Verbal Release Authorization signed on 6/26/18 and again on 11/5/2019-- may speak with Alexy Wallace (son), Genesis Becerril (daughter), Sawyer or Gema Wallace (son and daughter-in-law), Gerry Wallace (son)  Tablet Strength: 2mg tablets  Patient Contact Info: preferred [phone] - home with son Yadiel- cell [phone]; call if can't get in touch with home phone      Patient Findings   Positives:  Change in health, Change in medications, Change in diet/appetite   Negatives:  Signs/symptoms of thrombosis, Signs/symptoms of bleeding, Laboratory test error suspected, Change in alcohol use, Change in activity, Upcoming invasive procedure, Emergency department visit, Upcoming dental procedure, Missed doses, Extra doses, Hospital admission, Bruising, Other complaints   Comments:  Patient reports she is not feeling well and her appetite is worse than last week. She does not have the desire to eat and must force  herself to. She reports taking prophylactic Valtrex yesterday and today for her upcoming dermatology appointment tomorrow. She reports another provider does not want her to continue valtrex(transplant list issue) but she will discuss it with her dermatologist tomorrow.      Plan:  1. INR is therapeutic again today at 2.1. Instructed Ms. Wallace to continue warfarin 4 mg oral daily except for 2mg on Thurs until recheck. Considering patient reports actively lessening appetite and current Valtrex use, will withhold from giving a BOOST dose this encounter.  2. Repeat INR in one week to ensure WNL.  3. Verbal information provided over the phone. Patient RBV dosing instructions, filled her med box while we were on the phone, expresses understanding by teach back, and has no further questions at this time.       Giovanna Nix, Pharmacy Intern   02/11/20  9:02 AM     I, Joce Bush, [facility], have reviewed the note in full and agree with the assessment and plan.  02/11/20  10:13 AM

## 2020-02-18 ENCOUNTER — ANTICOAGULATION VISIT (OUTPATIENT)
Dept: PHARMACY | Facility: HOSPITAL | Age: 80
End: 2020-02-18

## 2020-02-18 DIAGNOSIS — I48.0 PAROXYSMAL ATRIAL FIBRILLATION (HCC): ICD-10-CM

## 2020-02-18 LAB — INR PPP: 3.4

## 2020-02-18 NOTE — PROGRESS NOTES
Anticoagulation Clinic - Remote Progress Note  [facility]  Testing Frequency: 7 days    Indication: paroxysmal afib  Referring Provider: Butch Joe (Last seen: 11/5/19  next appt: 6/9/20)  Goal INR: 2.0-3.0  Current Drug Interactions: , levothyroxine; omeprazole, azaTHIOprine, ezetimibe, allopurinol (11/20)  CHADS-VASc: 5 (age, gender, HTN, DM)  HAS-BLED: 3 (HTN, CKD, Age)     Diet: rare GLV, rarely green beans - 8/19/19  Alcohol: none  Tobacco: none   OTC Pain Medication: APAP PRN    1st clinic: 9/14/17  2nd clinic: 6/26/18  3rd clinic: 11/5/19    INR History:  Date 7/23 7/26 8/2 8/9 8/16 8/23 8/29 9/5 9/12 9/19 9/24   Total Weekly Dose 36mg 38mg 36mg 38mg 36mg 32mg 30mg?? 32mg 32 mg 32mg 30mg   INR 1.7 2.3 2.0 3.4 3.8 3.5 2.5 2.6 2.5 3.3 2.0   Notes Keflex boost   1x decr dose 1x decr dose    Keflex Keflex     Date 9/27 10/1 10/8 10/15  10/22 10/29 11/5 11/14 11/21 11/28 12/4   Total Weekly Dose 32mg 32mg 32mg 30mg  32mg 28mg 32mg 32mg 32mg 28mg 26mg   INR 2.3 2.4 3.2 2.5  3.9 2.7 3.0 2.8 2.7 3.7 1.9   Notes Levaquin start 9/26 stopped Levaquin        stop MVI, 1 miss 1 hold     Date 12/10 12/17 12/21 12/31 1/7/19 1/14 1/22 1/24 1/28 1/31 2/7   Total Weekly Dose 30mg 30mg 30mg 26mg 28mg 28mg 28mg 28mg 30mg 30mg 28mg   INR 2.4 3.0 3.6 2.5 2.6 2.6 2.9 1.7 2.7 2.5 2.7   Notes        doxy start 1/22, incr GLV 2x boost; doxy Finish doxy 1/29      Date 2/14 2/20 2/28 3/7 3/14 3/21 3/28 4/4 4/11 4/18 4/25   Total Weekly Dose 28mg 28mg 28mg 30mg 31mg 30mg 30mg 31mg 32mg 31mg 31mg   INR 2.1 2.4 1.8 1.6 2.2 2.5 1.9 1.9 2.4 2.8 2.4   Notes   sick Zetia            Date 5/2 5/6 5/15 REHAB 7/19 7/22 7/26 7/30 8/2 8/5 8/12   Total Weekly Dose 31mg 31mg 31mg  ????? ??? Unable to confirm 34mg? 34mg **30mg ? 30mg   INR 2.0 2.4 2.5  1.2 1.3 2.7 1.2 2.0 2.2 1.6   Notes ampicillin amp       stopped lexapro cefdinir      Date 8/15 8/19 8/26 8/30 9/5 9/9 9/13 9/17 10/1 10/7 10/15 10/21   Total Weekly Dose 32mg 33mg  31mg 32mg 31mg 32mg 28mg 24mg 28mg 28mg 26mg 28mg   INR 2.6 2.2 1.8 1.9 2.7 3.3 4.0 3.2 2.5 3.7 2.0 2.2   Notes 3x boost   cefdinir; 1x boost keflex x2 days sick Sick; dose decrease  1x decr dose  1 dose reduction      Date 10/29 11/1 11/5 11/12 11/18 11/27 12/3 12/10 12/18 12/26 1/2/20 1/7   Total Weekly Dose 28mg 27mg 27mg 28mg 26mg 27mg 28mg 28mg 28mg 28mg 26mg 24mg   INR 2.8 2.7 2.4 3.5 2.9 2.0 2.3 3.3 2.9 3.5 3.2 1.2   Notes keflex start 10/28 1x decr  dose; keflex Clinic /  1x decr dose amio last dose 11/5/19 1x decr dose allopurinol started broccoli casserole;1x boost   1x decr dose  augmentin use 1x dose dec  augmentin   * Amiodarone LD 11/5/2019. Monitor as likely her warfarin needs would increase over the next few months     Date 1/10 1/14 1/23 1/28 1/31 2/4 2/11 2/18        Total Weekly Dose 28mg 32mg 30mg 26mg 26mg  24mg 26mg 26mg 24mg       INR 1.6 2.1 4.1 2.2 3.4 2.7 2.1 3.4        Notes augment  Boosted x2 1x decr keflex, pred keflex; pred; sick Keflex; sick; dose red x1 Sick; Valtrex keflex           Phone Interview:  Verbal Release Authorization signed on 6/26/18 and again on 11/5/2019-- may speak with Alexy Wallace (son), Genesis Becerril (daughter), Sawyer or Gema Wallace (son and daughter-in-law), Gerry Wallace (son)  Tablet Strength: 2mg tablets  Patient Contact Info: preferred [phone] - home with carlito Cotto- cell [phone]; call if can't get in touch with home phone      Patient Findings   Positives:  Change in health, Change in medications, Change in diet/appetite   Negatives:  Signs/symptoms of thrombosis, Signs/symptoms of bleeding, Laboratory test error suspected, Change in alcohol use, Change in activity, Upcoming invasive procedure, Emergency department visit, Upcoming dental procedure, Missed doses, Extra doses, Hospital admission, Bruising, Other complaints   Comments:  Patient had dermatology procedure on 2/12 that she is still recovering from. She was recovering from an illness before  the procedure and doesn't believe she was in full health at the time. She reports the procedure was more invasive than she expected and still feels weak from the healing process. Keflex was prescribed post procedure and she reports her last dose was taken yesterday morning.  The past two weeks she reported decreased appetite and the absence of hunger from a lingering illness. Today she reports her diet has returned to normal and she is consuming regular meals again.  She reports she had asparagus yesterday but otherwise her diet has been consistent with previous encounters(rare GLV).     Plan:  1. INR is SUPRAtherapeutic today at 3.4. Instructed Ms. Wallace to decrease tonight's dose to 2mg then continue warfarin 4 mg oral daily except for 2mg on Thurs until recheck. Reviewing patient's history, may need to preemptively decrease dose for courses of Keflex in the future.   2. Repeat INR in one week to ensure WNL.  3. Verbal information provided over the phone. Patient RBV dosing instructions, expresses understanding by teach back, and has no further questions at this time.       Giovanna Nix, Pharmacy Intern   02/18/20  10:03 AM      IFrancoise, [facility], have reviewed the note in full and agree with the assessment and plan.  02/18/20  11:47 AM

## 2020-02-25 ENCOUNTER — ANTICOAGULATION VISIT (OUTPATIENT)
Dept: PHARMACY | Facility: HOSPITAL | Age: 80
End: 2020-02-25

## 2020-02-25 DIAGNOSIS — I48.0 PAROXYSMAL ATRIAL FIBRILLATION (HCC): ICD-10-CM

## 2020-02-25 LAB — INR PPP: 2.3

## 2020-02-25 NOTE — PROGRESS NOTES
Anticoagulation Clinic - Remote Progress Note  [facility]  Testing Frequency: 7 days    Indication: paroxysmal afib  Referring Provider: Butch Joe (Last seen: 11/5/19  next appt: 6/9/20)  Goal INR: 2.0-3.0  Current Drug Interactions: , levothyroxine; omeprazole, azaTHIOprine, ezetimibe, allopurinol (11/20)  CHADS-VASc: 5 (age, gender, HTN, DM)  HAS-BLED: 3 (HTN, CKD, Age)     Diet: rare GLV, rarely green beans - 8/19/19  Alcohol: none  Tobacco: none   OTC Pain Medication: APAP PRN    1st clinic: 9/14/17  2nd clinic: 6/26/18  3rd clinic: 11/5/19    INR History:  Date 7/23 7/26 8/2 8/9 8/16 8/23 8/29 9/5 9/12 9/19 9/24   Total Weekly Dose 36mg 38mg 36mg 38mg 36mg 32mg 30mg?? 32mg 32 mg 32mg 30mg   INR 1.7 2.3 2.0 3.4 3.8 3.5 2.5 2.6 2.5 3.3 2.0   Notes Keflex boost   1x decr dose 1x decr dose    Keflex Keflex     Date 9/27 10/1 10/8 10/15  10/22 10/29 11/5 11/14 11/21 11/28 12/4   Total Weekly Dose 32mg 32mg 32mg 30mg  32mg 28mg 32mg 32mg 32mg 28mg 26mg   INR 2.3 2.4 3.2 2.5  3.9 2.7 3.0 2.8 2.7 3.7 1.9   Notes Levaquin start 9/26 stopped Levaquin        stop MVI, 1 miss 1 hold     Date 12/10 12/17 12/21 12/31 1/7/19 1/14 1/22 1/24 1/28 1/31 2/7   Total Weekly Dose 30mg 30mg 30mg 26mg 28mg 28mg 28mg 28mg 30mg 30mg 28mg   INR 2.4 3.0 3.6 2.5 2.6 2.6 2.9 1.7 2.7 2.5 2.7   Notes        doxy start 1/22, incr GLV 2x boost; doxy Finish doxy 1/29      Date 2/14 2/20 2/28 3/7 3/14 3/21 3/28 4/4 4/11 4/18 4/25   Total Weekly Dose 28mg 28mg 28mg 30mg 31mg 30mg 30mg 31mg 32mg 31mg 31mg   INR 2.1 2.4 1.8 1.6 2.2 2.5 1.9 1.9 2.4 2.8 2.4   Notes   sick Zetia            Date 5/2 5/6 5/15 REHAB 7/19 7/22 7/26 7/30 8/2 8/5 8/12   Total Weekly Dose 31mg 31mg 31mg  ????? ??? Unable to confirm 34mg? 34mg **30mg ? 30mg   INR 2.0 2.4 2.5  1.2 1.3 2.7 1.2 2.0 2.2 1.6   Notes ampicillin amp       stopped lexapro cefdinir      Date 8/15 8/19 8/26 8/30 9/5 9/9 9/13 9/17 10/1 10/7 10/15 10/21   Total Weekly Dose 32mg 33mg  31mg 32mg 31mg 32mg 28mg 24mg 28mg 28mg 26mg 28mg   INR 2.6 2.2 1.8 1.9 2.7 3.3 4.0 3.2 2.5 3.7 2.0 2.2   Notes 3x boost   cefdinir; 1x boost keflex x2 days sick Sick; dose decrease  1x decr dose  1 dose reduction      Date 10/29 11/1 11/5 11/12 11/18 11/27 12/3 12/10 12/18 12/26 1/2/20 1/7   Total Weekly Dose 28mg 27mg 27mg 28mg 26mg 27mg 28mg 28mg 28mg 28mg 26mg 24mg   INR 2.8 2.7 2.4 3.5 2.9 2.0 2.3 3.3 2.9 3.5 3.2 1.2   Notes keflex start 10/28 1x decr  dose; keflex Clinic /  1x decr dose amio last dose 11/5/19 1x decr dose allopurinol started broccoli casserole;1x boost   1x decr dose  augmentin use 1x dose dec  augmentin   * Amiodarone LD 11/5/2019. Monitor as likely her warfarin needs would increase over the next few months     Date 1/10 1/14 1/23 1/28 1/31 2/4 2/11 2/18 2/24       Total Weekly Dose 28mg 32mg 30mg 26mg 26mg  24mg 26mg 26mg 24mg       INR 1.6 2.1 4.1 2.2 3.4 2.7 2.1 3.4 2.3       Notes augment  Boosted x2 1x decr keflex, pred keflex; pred; sick Keflex; sick; dose red x1 Sick; Valtrex keflex           Phone Interview:  Verbal Release Authorization signed on 6/26/18 and again on 11/5/2019-- may speak with Alexy Wallace (son), Genesis Becerril (daughter), Sawyer or Gema Wallace (son and daughter-in-law), Gerry Rodrigo (son)  Tablet Strength: 2mg tablets  Patient Contact Info: preferred [phone] - home with carlito Cotto- [place] [phone]; call if can't get in touch with home phone      Patient Findings   Negatives:  Signs/symptoms of thrombosis, Signs/symptoms of bleeding, Laboratory test error suspected, Change in health, Change in alcohol use, Change in activity, Upcoming invasive procedure, Emergency department visit, Upcoming dental procedure, Missed doses, Extra doses, Change in medications, Change in diet/appetite, Hospital admission, Bruising, Other complaints   Comments:  Patient reports her diet and appetite have returned to normal and she is maintaining consistency. She did have 1 spoonful of  katyakimberlee because she had a craving. She reports that she knew her INR was elevated at last encounter so she felt that it was okay to have. Reiterated importance of consistent diet but also ensured patient that her actions were okay this encounter and not to worry. She denies medication changes and has taken her warfarin as directed.     Plan:  1. INR is back WNL today at 2.3. Instructed Ms. Wallace to resume maintenance regimen of warfarin 4 mg oral daily except for 2mg on Thurs until recheck.  2. Repeat INR in one week.  3. Verbal information provided over the phone. Patient RBV dosing instructions, expresses understanding by teach back, and has no further questions at this time.       Giovanna Nix, Pharmacy Intern   02/25/20  9:08 AM      IFrancoise, [facility], have reviewed the note in full and agree with the assessment and plan.  02/25/20  11:43 AM

## 2020-03-03 ENCOUNTER — ANTICOAGULATION VISIT (OUTPATIENT)
Dept: PHARMACY | Facility: HOSPITAL | Age: 80
End: 2020-03-03

## 2020-03-03 DIAGNOSIS — I48.0 PAROXYSMAL ATRIAL FIBRILLATION (HCC): ICD-10-CM

## 2020-03-03 LAB — INR PPP: 1.9

## 2020-03-03 NOTE — PROGRESS NOTES
Anticoagulation Clinic - Remote Progress Note  [facility]  Testing Frequency: 7 days    Indication: paroxysmal afib  Referring Provider: Butch Joe (Last seen: 11/5/19  next appt: 6/9/20)  Goal INR: 2.0-3.0  Current Drug Interactions: , levothyroxine; omeprazole, azaTHIOprine, ezetimibe, allopurinol (11/20)  CHADS-VASc: 5 (age, gender, HTN, DM)  HAS-BLED: 3 (HTN, CKD, Age)     Diet: rare GLV, rarely green beans - 8/19/19  Alcohol: none  Tobacco: none   OTC Pain Medication: APAP PRN    1st clinic: 9/14/17  2nd clinic: 6/26/18  3rd clinic: 11/5/19    INR History:  Date 7/23 7/26 8/2 8/9 8/16 8/23 8/29 9/5 9/12 9/19 9/24   Total Weekly Dose 36mg 38mg 36mg 38mg 36mg 32mg 30mg?? 32mg 32 mg 32mg 30mg   INR 1.7 2.3 2.0 3.4 3.8 3.5 2.5 2.6 2.5 3.3 2.0   Notes Keflex boost   1x decr dose 1x decr dose    Keflex Keflex     Date 9/27 10/1 10/8 10/15  10/22 10/29 11/5 11/14 11/21 11/28 12/4   Total Weekly Dose 32mg 32mg 32mg 30mg  32mg 28mg 32mg 32mg 32mg 28mg 26mg   INR 2.3 2.4 3.2 2.5  3.9 2.7 3.0 2.8 2.7 3.7 1.9   Notes Levaquin start 9/26 stopped Levaquin        stop MVI, 1 miss 1 hold     Date 12/10 12/17 12/21 12/31 1/7/19 1/14 1/22 1/24 1/28 1/31 2/7   Total Weekly Dose 30mg 30mg 30mg 26mg 28mg 28mg 28mg 28mg 30mg 30mg 28mg   INR 2.4 3.0 3.6 2.5 2.6 2.6 2.9 1.7 2.7 2.5 2.7   Notes        doxy start 1/22, incr GLV 2x boost; doxy Finish doxy 1/29      Date 2/14 2/20 2/28 3/7 3/14 3/21 3/28 4/4 4/11 4/18 4/25   Total Weekly Dose 28mg 28mg 28mg 30mg 31mg 30mg 30mg 31mg 32mg 31mg 31mg   INR 2.1 2.4 1.8 1.6 2.2 2.5 1.9 1.9 2.4 2.8 2.4   Notes   sick Zetia            Date 5/2 5/6 5/15 REHAB 7/19 7/22 7/26 7/30 8/2 8/5 8/12   Total Weekly Dose 31mg 31mg 31mg  ????? ??? Unable to confirm 34mg? 34mg **30mg ? 30mg   INR 2.0 2.4 2.5  1.2 1.3 2.7 1.2 2.0 2.2 1.6   Notes ampicillin amp       stopped lexapro cefdinir      Date 8/15 8/19 8/26 8/30 9/5 9/9 9/13 9/17 10/1 10/7 10/15 10/21   Total Weekly Dose 32mg 33mg  31mg 32mg 31mg 32mg 28mg 24mg 28mg 28mg 26mg 28mg   INR 2.6 2.2 1.8 1.9 2.7 3.3 4.0 3.2 2.5 3.7 2.0 2.2   Notes 3x boost   cefdinir; 1x boost keflex x2 days sick Sick; dose decrease  1x decr dose  1 dose reduction      Date 10/29 11/1 11/5 11/12 11/18 11/27 12/3 12/10 12/18 12/26 1/2/20 1/7   Total Weekly Dose 28mg 27mg 27mg 28mg 26mg 27mg 28mg 28mg 28mg 28mg 26mg 24mg   INR 2.8 2.7 2.4 3.5 2.9 2.0 2.3 3.3 2.9 3.5 3.2 1.2   Notes keflex start 10/28 1x decr  dose; keflex clinic /  1x decr dose amio last dose 11/5/19 1x decr dose allopurinol started broccoli casserole; 1x boost   1x decr dose  augment use 1x dose decr;  augment   * Amiodarone LD 11/5/2019. Monitor as likely her warfarin needs would increase over the next few months     Date 1/10 1/14 1/23 1/28 1/31 2/4 2/11 2/18 2/24 3/3      Total Weekly Dose 28mg 32mg 30mg 26mg 26mg  24mg 26mg 26mg 24mg 26mg 27mg     INR 1.6 2.1 4.1 2.2 3.4 2.7 2.1 3.4 2.3 1.9      Notes augment  Boosted x2 1x decr keflex, pred keflex; pred; sick keflex; sick; dose decr x1 sick;   valtrex keflex   1x incr dose        Phone Interview:  Verbal Release Authorization signed on 6/26/18 and again on 11/5/2019-- may speak with Alexy Rodrigo (son), Genesis Becerril (daughter), Sawyer Wallace (son and daughter-in-law), Gerry Wallace (son)  Tablet Strength: 2mg tablets  Patient Contact Info: preferred [phone] - home with son Yadiel- cell [phone]; call if can't get in touch with home phone      Patient Findings   Negatives:  Signs/symptoms of thrombosis, Signs/symptoms of bleeding, Laboratory test error suspected, Change in health, Change in alcohol use, Change in activity, Upcoming invasive procedure, Emergency department visit, Upcoming dental procedure, Missed doses, Extra doses, Change in medications, Change in diet/appetite, Hospital admission, Bruising, Other complaints   Comments:  Patient denies all findings     Plan:  1. INR is SUBtherapeutic today at 1.9. Instructed Ms.  Wallace to take warfarin 4mg oral daily except for 3mg on Thursday until recheck (27mg/week, 3.9% increase)  2. Repeat INR in one week.  3. Verbal information provided over the phone. Patient RBV dosing instructions, expresses understanding by teach back, and has no further questions at this time.     Sawyer Gamez CPhT  3/3/2020  11:09     I, Sawyer Jackman, [facility], have reviewed the note in full and agree with the assessment and plan.  03/03/20  12:03 PM

## 2020-03-09 RX ORDER — ALPRAZOLAM 0.5 MG/1
0.5 TABLET ORAL DAILY
Qty: 30 TABLET | Refills: 2 | Status: SHIPPED | OUTPATIENT
Start: 2020-03-09 | End: 2020-03-13 | Stop reason: SDUPTHER

## 2020-03-09 NOTE — TELEPHONE ENCOUNTER
She is wanting it increased due to depression.she has had med for depression but it makes her sick. She has had problems since may

## 2020-03-09 NOTE — TELEPHONE ENCOUNTER
Needs a  Script for xanax called in and requesting to increase it to 2 times a day. One in the morning and one at night.

## 2020-03-10 ENCOUNTER — ANTICOAGULATION VISIT (OUTPATIENT)
Dept: PHARMACY | Facility: HOSPITAL | Age: 80
End: 2020-03-10

## 2020-03-10 DIAGNOSIS — I48.0 PAROXYSMAL ATRIAL FIBRILLATION (HCC): ICD-10-CM

## 2020-03-10 LAB — INR PPP: 1.9

## 2020-03-10 NOTE — PROGRESS NOTES
Anticoagulation Clinic - Remote Progress Note  [facility]  Testing Frequency: 7 days    Indication: paroxysmal afib  Referring Provider: Butch Joe (Last seen: 11/5/19  next appt: 6/9/20)  Goal INR: 2.0-3.0  Current Drug Interactions: , levothyroxine; omeprazole, azaTHIOprine, ezetimibe, allopurinol (11/20)  CHADS-VASc: 5 (age, gender, HTN, DM)  HAS-BLED: 3 (HTN, CKD, Age)     Diet: rare GLV, rarely green beans - 8/19/19  Alcohol: none  Tobacco: none   OTC Pain Medication: APAP PRN    1st clinic: 9/14/17  2nd clinic: 6/26/18  3rd clinic: 11/5/19    INR History:  Date 7/23 7/26 8/2 8/9 8/16 8/23 8/29 9/5 9/12 9/19 9/24   Total Weekly Dose 36mg 38mg 36mg 38mg 36mg 32mg 30mg?? 32mg 32 mg 32mg 30mg   INR 1.7 2.3 2.0 3.4 3.8 3.5 2.5 2.6 2.5 3.3 2.0   Notes Keflex boost   1x decr dose 1x decr dose    Keflex Keflex     Date 9/27 10/1 10/8 10/15  10/22 10/29 11/5 11/14 11/21 11/28 12/4   Total Weekly Dose 32mg 32mg 32mg 30mg  32mg 28mg 32mg 32mg 32mg 28mg 26mg   INR 2.3 2.4 3.2 2.5  3.9 2.7 3.0 2.8 2.7 3.7 1.9   Notes Levaquin start 9/26 stopped Levaquin        stop MVI, 1 miss 1 hold     Date 12/10 12/17 12/21 12/31 1/7/19 1/14 1/22 1/24 1/28 1/31 2/7   Total Weekly Dose 30mg 30mg 30mg 26mg 28mg 28mg 28mg 28mg 30mg 30mg 28mg   INR 2.4 3.0 3.6 2.5 2.6 2.6 2.9 1.7 2.7 2.5 2.7   Notes        doxy start 1/22, incr GLV 2x boost; doxy Finish doxy 1/29      Date 2/14 2/20 2/28 3/7 3/14 3/21 3/28 4/4 4/11 4/18 4/25   Total Weekly Dose 28mg 28mg 28mg 30mg 31mg 30mg 30mg 31mg 32mg 31mg 31mg   INR 2.1 2.4 1.8 1.6 2.2 2.5 1.9 1.9 2.4 2.8 2.4   Notes   sick Zetia            Date 5/2 5/6 5/15 REHAB 7/19 7/22 7/26 7/30 8/2 8/5 8/12   Total Weekly Dose 31mg 31mg 31mg  ????? ??? Unable to confirm 34mg? 34mg **30mg ? 30mg   INR 2.0 2.4 2.5  1.2 1.3 2.7 1.2 2.0 2.2 1.6   Notes ampicillin amp       stopped lexapro cefdinir      Date 8/15 8/19 8/26 8/30 9/5 9/9 9/13 9/17 10/1 10/7 10/15 10/21   Total Weekly Dose 32mg 33mg  31mg 32mg 31mg 32mg 28mg 24mg 28mg 28mg 26mg 28mg   INR 2.6 2.2 1.8 1.9 2.7 3.3 4.0 3.2 2.5 3.7 2.0 2.2   Notes 3x boost   cefdinir; 1x boost keflex x2 days sick Sick; dose decrease  1x decr dose  1 dose reduction      Date 10/29 11/1 11/5 11/12 11/18 11/27 12/3 12/10 12/18 12/26 1/2/20 1/7   Total Weekly Dose 28mg 27mg 27mg 28mg 26mg 27mg 28mg 28mg 28mg 28mg 26mg 24mg   INR 2.8 2.7 2.4 3.5 2.9 2.0 2.3 3.3 2.9 3.5 3.2 1.2   Notes keflex start 10/28 1x decr  dose; keflex clinic /  1x decr dose amio last dose 11/5/19 1x decr dose allopurinol started broccoli casserole; 1x boost   1x decr dose  augment use 1x dose decr;  augment   * Amiodarone LD 11/5/2019. Monitor as likely her warfarin needs would increase over the next few months     Date 1/10 1/14 1/23 1/28 1/31 2/4 2/11 2/18 2/24 3/3 3/10     Total Weekly Dose 28mg 32mg 30mg 26mg 26mg  24mg 26mg 26mg 24mg 26mg 27mg 27    INR 1.6 2.1 4.1 2.2 3.4 2.7 2.1 3.4 2.3 1.9 1.9     Notes augment  Boosted x2 1x decr keflex, pred keflex; pred; sick keflex; sick; dose decr x1 sick;   valtrex keflex   1x incr dose, inc GLV        Phone Interview:  Verbal Release Authorization signed on 6/26/18 and again on 11/5/2019-- may speak with Alexy Rodrigo (son), Genesis Becerril (daughter), Sawyer Wallace (son and daughter-in-law), Gerry Rodrigo (son)  Tablet Strength: 2mg tablets  Patient Contact Info: preferred [phone] - home with son Yadiel- cell [phone]; call if can't get in touch with home phone      Patient Findings   Positives:  Change in diet/appetite   Negatives:  Signs/symptoms of thrombosis, Signs/symptoms of bleeding, Laboratory test error suspected, Change in health, Change in alcohol use, Change in activity, Upcoming invasive procedure, Emergency department visit, Upcoming dental procedure, Missed doses, Extra doses, Change in medications, Hospital admission, Bruising, Other complaints   Comments:  Had grilled asparagus Saturday, unusual for her.            Plan:  1. INR is SUBtherapeutic today at 1.9. Instructed Ms. Wallace continue increased dose from last week and take warfarin 4mg oral daily except for 3mg on Thursday until recheck (27mg/week)  2. Repeat INR in one week. 3/17  3. Verbal information provided over the phone. Patient RBV dosing instructions, expresses understanding by teach back, and has no further questions at this time.     Francoise Wu, KwadwoD.  03/10/20   15:47

## 2020-03-11 ENCOUNTER — TELEPHONE (OUTPATIENT)
Dept: INTERNAL MEDICINE | Facility: CLINIC | Age: 80
End: 2020-03-11

## 2020-03-11 NOTE — TELEPHONE ENCOUNTER
PT STATED SHE IS HAVING COUGHING (YELLOW MUCUS), SNEEZING, AND LOST VOICE. PT ASKED IF MEENAKSHI BIGGS COULD CALL HER IN SOMETHING FOR HER SYMPTOMS.     PT CONTACT [phone]     PHARMACY VERIFIED AT [facility] [place]

## 2020-03-12 ENCOUNTER — TELEPHONE (OUTPATIENT)
Dept: PHARMACY | Facility: HOSPITAL | Age: 80
End: 2020-03-12

## 2020-03-12 NOTE — TELEPHONE ENCOUNTER
Ms. Wallace called to report she started an antibiotic yesterday for UTI. Dr. Hodges put her on Levofloxacin 250mg 1 QOD x4 doses and instructed her to cut warfarin dose in half while on this medication.    She did as instructed by Dr. Hodges and took 2mg yesterday versus her regularly scheduled 4mg despite being sub therapeutic on Tuesday (3/10).    Spoke with Radha Michaud, KwadwoD, and instructed Ms. Wallace to resume original plan of warfarin 4mg daily except 3mg on Thursday and recheck INR with home monitor on Monday, 3/16.

## 2020-03-13 ENCOUNTER — APPOINTMENT (OUTPATIENT)
Dept: MAMMOGRAPHY | Facility: HOSPITAL | Age: 80
End: 2020-03-13

## 2020-03-13 RX ORDER — ALPRAZOLAM 0.5 MG/1
0.25 TABLET ORAL 2 TIMES DAILY PRN
Qty: 30 TABLET | Refills: 2 | Status: SHIPPED | OUTPATIENT
Start: 2020-03-13 | End: 2020-07-21

## 2020-03-13 NOTE — TELEPHONE ENCOUNTER
I feel like we have talked about this before    Doubling will only increase depression  She can go to 1/2 bid prn

## 2020-03-13 NOTE — TELEPHONE ENCOUNTER
Advised pt that can only take a total of 1 pill per day. Will send in script to take 1/2 po bid and must last 30 days. Pt stated that a nurse with dialysis was at house and told her to take bid but dr hernandez did not authorize this.

## 2020-03-13 NOTE — TELEPHONE ENCOUNTER
Patient has been feeling a little depressed and was put on xanax once a day and patient wants to take one in the morning and one at night. Patient is completely out of medication and needs a refill. Patient stated that [facility] told her the medication was denied.      Confirmed [facility]

## 2020-03-16 ENCOUNTER — ANTICOAGULATION VISIT (OUTPATIENT)
Dept: PHARMACY | Facility: HOSPITAL | Age: 80
End: 2020-03-16

## 2020-03-16 DIAGNOSIS — I48.0 PAROXYSMAL ATRIAL FIBRILLATION (HCC): ICD-10-CM

## 2020-03-16 LAB — INR PPP: 1.4

## 2020-03-16 NOTE — PROGRESS NOTES
Anticoagulation Clinic - Remote Progress Note  [facility]  Testing Frequency: 7 days    Indication: paroxysmal afib  Referring Provider: Butch Joe (Last seen: 11/5/19  next appt: 6/9/20)  Goal INR: 2.0-3.0  Current Drug Interactions: , levothyroxine; omeprazole, azaTHIOprine, ezetimibe, allopurinol (11/20)  CHADS-VASc: 5 (age, gender, HTN, DM)  HAS-BLED: 3 (HTN, CKD, Age)     Diet: rare GLV, rarely green beans - 8/19/19  Alcohol: none  Tobacco: none   OTC Pain Medication: APAP PRN    1st clinic: 9/14/17  2nd clinic: 6/26/18  3rd clinic: 11/5/19    INR History:  Date 7/23 7/26 8/2 8/9 8/16 8/23 8/29 9/5 9/12 9/19 9/24   Total Weekly Dose 36mg 38mg 36mg 38mg 36mg 32mg 30mg?? 32mg 32 mg 32mg 30mg   INR 1.7 2.3 2.0 3.4 3.8 3.5 2.5 2.6 2.5 3.3 2.0   Notes Keflex boost   1x decr dose 1x decr dose    Keflex Keflex     Date 9/27 10/1 10/8 10/15  10/22 10/29 11/5 11/14 11/21 11/28 12/4   Total Weekly Dose 32mg 32mg 32mg 30mg  32mg 28mg 32mg 32mg 32mg 28mg 26mg   INR 2.3 2.4 3.2 2.5  3.9 2.7 3.0 2.8 2.7 3.7 1.9   Notes Levaquin start 9/26 stopped Levaquin        stop MVI, 1 miss 1 hold     Date 12/10 12/17 12/21 12/31 1/7/19 1/14 1/22 1/24 1/28 1/31 2/7   Total Weekly Dose 30mg 30mg 30mg 26mg 28mg 28mg 28mg 28mg 30mg 30mg 28mg   INR 2.4 3.0 3.6 2.5 2.6 2.6 2.9 1.7 2.7 2.5 2.7   Notes        doxy start 1/22, incr GLV 2x boost; doxy Finish doxy 1/29      Date 2/14 2/20 2/28 3/7 3/14 3/21 3/28 4/4 4/11 4/18 4/25   Total Weekly Dose 28mg 28mg 28mg 30mg 31mg 30mg 30mg 31mg 32mg 31mg 31mg   INR 2.1 2.4 1.8 1.6 2.2 2.5 1.9 1.9 2.4 2.8 2.4   Notes   sick Zetia            Date 5/2 5/6 5/15 REHAB 7/19 7/22 7/26 7/30 8/2 8/5 8/12   Total Weekly Dose 31mg 31mg 31mg  ????? ??? Unable to confirm 34mg? 34mg **30mg ? 30mg   INR 2.0 2.4 2.5  1.2 1.3 2.7 1.2 2.0 2.2 1.6   Notes ampicillin amp       stopped lexapro cefdinir      Date 8/15 8/19 8/26 8/30 9/5 9/9 9/13 9/17 10/1 10/7 10/15 10/21   Total Weekly Dose 32mg 33mg  31mg 32mg 31mg 32mg 28mg 24mg 28mg 28mg 26mg 28mg   INR 2.6 2.2 1.8 1.9 2.7 3.3 4.0 3.2 2.5 3.7 2.0 2.2   Notes 3x boost   cefdinir; 1x boost keflex x2 days sick Sick; dose decrease  1x decr dose  1 dose reduction      Date 10/29 11/1 11/5 11/12 11/18 11/27 12/3 12/10 12/18 12/26 1/2/20 1/7   Total Weekly Dose 28mg 27mg 27mg 28mg 26mg 27mg 28mg 28mg 28mg 28mg 26mg 24mg   INR 2.8 2.7 2.4 3.5 2.9 2.0 2.3 3.3 2.9 3.5 3.2 1.2   Notes keflex start 10/28 1x decr  dose; keflex clinic /  1x decr dose amio last dose 11/5/19 1x decr dose allopurinol started broccoli casserole; 1x boost   1x decr dose  augment use 1x dose decr;  augment   * Amiodarone LD 11/5/2019. Monitor as likely her warfarin needs would increase over the next few months     Date 1/10 1/14 1/23 1/28 1/31 2/4 2/11 2/18 2/24 3/3 3/10     Total Weekly Dose 28mg 32mg 30mg 26mg 26mg  24mg 26mg 26mg 24mg 26mg 27mg 25mg    INR 1.6 2.1 4.1 2.2 3.4 2.7 2.1 3.4 2.3 1.9 1.9 1.4    Notes augment  Boosted x2 1x decr keflex, pred keflex; pred; sick keflex; sick; dose decr x1 sick;   valtrex keflex   1x incr dose, inc GLV 1x dose dec; levaquin       Phone Interview:  Verbal Release Authorization signed on 6/26/18 and again on 11/5/2019-- may speak with Alexy Rodrigo (son), Genesis Becerril (daughter), Sawyer Wallace (son and daughter-in-law), Gerry Rodrigo (son)  Tablet Strength: 2mg tablets  Patient Contact Info: preferred [phone] - home with son Yadiel- cell [phone]; call if can't get in touch with home phone      Patient Findings     Positives:  Change in medications   Negatives:  Signs/symptoms of thrombosis, Signs/symptoms of bleeding, Laboratory test error suspected, Change in health, Change in alcohol use, Change in activity, Upcoming invasive procedure, Emergency department visit, Upcoming dental procedure, Missed doses, Extra doses, Change in diet/appetite, Hospital admission, Bruising, Other complaints   Comments:  Tomorrow last dose levaquin.   Ms.  Rodrigo called to report she started an antibiotic yesterday for UTI. Dr. Hodges put her on Levofloxacin 250mg 1 QOD x4 doses and instructed her to cut warfarin dose in half while on this medication.       She did as instructed by Dr. Hodges and took 2mg yesterday versus her regularly scheduled 4mg despite being sub therapeutic on Tuesday (3/10).          Plan:  1. INR is SUBtherapeutic today at 1.4. Instructed Ms. Wallace BOOST dose today to 6mg then take 4mg oral daily  until recheck   2. Repeat INR Thursday 3/19  3. Verbal information provided over the phone. Patient RBV dosing instructions, expresses understanding by teach back, and has no further questions at this time.     Francoise Wu, PharmD.  03/16/20   09:43

## 2020-03-18 ENCOUNTER — TELEPHONE (OUTPATIENT)
Dept: INTERNAL MEDICINE | Facility: CLINIC | Age: 80
End: 2020-03-18

## 2020-03-18 RX ORDER — HYDROXYZINE HYDROCHLORIDE 25 MG/1
12.5 TABLET, FILM COATED ORAL 3 TIMES DAILY PRN
Qty: 30 TABLET | Refills: 1 | Status: SHIPPED | OUTPATIENT
Start: 2020-03-18 | End: 2020-05-11

## 2020-03-18 NOTE — TELEPHONE ENCOUNTER
Pt called and requested a call back from Dr. Walters because the last three days she has been shaking and very weak like sick but not a vomiting sick feeling. Pt stated she also has been off her ALPRAZolam (XANAX) 0.5 MG tablet for days and the insurance will not cover it. Pt wants to discuss what she should do. Please call back at [phone]

## 2020-03-18 NOTE — TELEPHONE ENCOUNTER
Pt called and stated that she coughing uncontrollable.  Pt has been on anti-biotics for uti.  Pt stated that nurse told her that she needs a chest xray.  Pt states that she does not want to go the hospital but would like  CHEST XRAY.    Pt callback: [phone]    Please advise.    I would have her take Mucinex DM.  She is already on antibiotic

## 2020-03-19 ENCOUNTER — ANTICOAGULATION VISIT (OUTPATIENT)
Dept: PHARMACY | Facility: HOSPITAL | Age: 80
End: 2020-03-19

## 2020-03-19 DIAGNOSIS — I48.0 PAROXYSMAL ATRIAL FIBRILLATION (HCC): ICD-10-CM

## 2020-03-19 LAB — INR PPP: 2

## 2020-03-19 NOTE — PROGRESS NOTES
Anticoagulation Clinic - Remote Progress Note  [facility]  Testing Frequency: 7 days    Indication: paroxysmal afib  Referring Provider: Butch Joe (Last seen: 11/5/19  next appt: 6/9/20)  Goal INR: 2.0-3.0  Current Drug Interactions: , levothyroxine; omeprazole, azaTHIOprine, ezetimibe, allopurinol (11/20)  CHADS-VASc: 5 (age, gender, HTN, DM)  HAS-BLED: 3 (HTN, CKD, Age)     Diet: rare GLV, rarely green beans - 8/19/19  Alcohol: none  Tobacco: none   OTC Pain Medication: APAP PRN    1st clinic: 9/14/17  2nd clinic: 6/26/18  3rd clinic: 11/5/19    INR History:  Date 7/23 7/26 8/2 8/9 8/16 8/23 8/29 9/5 9/12 9/19 9/24   Total Weekly Dose 36mg 38mg 36mg 38mg 36mg 32mg 30mg?? 32mg 32 mg 32mg 30mg   INR 1.7 2.3 2.0 3.4 3.8 3.5 2.5 2.6 2.5 3.3 2.0   Notes Keflex boost   1x decr dose 1x decr dose    Keflex Keflex     Date 9/27 10/1 10/8 10/15  10/22 10/29 11/5 11/14 11/21 11/28 12/4   Total Weekly Dose 32mg 32mg 32mg 30mg  32mg 28mg 32mg 32mg 32mg 28mg 26mg   INR 2.3 2.4 3.2 2.5  3.9 2.7 3.0 2.8 2.7 3.7 1.9   Notes Levaquin start 9/26 stopped Levaquin        stop MVI, 1 miss 1 hold     Date 12/10 12/17 12/21 12/31 1/7/19 1/14 1/22 1/24 1/28 1/31 2/7   Total Weekly Dose 30mg 30mg 30mg 26mg 28mg 28mg 28mg 28mg 30mg 30mg 28mg   INR 2.4 3.0 3.6 2.5 2.6 2.6 2.9 1.7 2.7 2.5 2.7   Notes        doxy start 1/22, incr GLV 2x boost; doxy Finish doxy 1/29      Date 2/14 2/20 2/28 3/7 3/14 3/21 3/28 4/4 4/11 4/18 4/25   Total Weekly Dose 28mg 28mg 28mg 30mg 31mg 30mg 30mg 31mg 32mg 31mg 31mg   INR 2.1 2.4 1.8 1.6 2.2 2.5 1.9 1.9 2.4 2.8 2.4   Notes   sick Zetia            Date 5/2 5/6 5/15 REHAB 7/19 7/22 7/26 7/30 8/2 8/5 8/12   Total Weekly Dose 31mg 31mg 31mg  ????? ??? Unable to confirm 34mg? 34mg **30mg ? 30mg   INR 2.0 2.4 2.5  1.2 1.3 2.7 1.2 2.0 2.2 1.6   Notes ampicillin amp       stopped lexapro cefdinir      Date 8/15 8/19 8/26 8/30 9/5 9/9 9/13 9/17 10/1 10/7 10/15 10/21   Total Weekly Dose 32mg 33mg  31mg 32mg 31mg 32mg 28mg 24mg 28mg 28mg 26mg 28mg   INR 2.6 2.2 1.8 1.9 2.7 3.3 4.0 3.2 2.5 3.7 2.0 2.2   Notes 3x boost   cefdinir; 1x boost keflex x2 days sick Sick; dose decrease  1x decr dose  1 dose reduction      Date 10/29 11/1 11/5 11/12 11/18 11/27 12/3 12/10 12/18 12/26 1/2/20 1/7   Total Weekly Dose 28mg 27mg 27mg 28mg 26mg 27mg 28mg 28mg 28mg 28mg 26mg 24mg   INR 2.8 2.7 2.4 3.5 2.9 2.0 2.3 3.3 2.9 3.5 3.2 1.2   Notes keflex start 10/28 1x decr  dose; keflex clinic /  1x decr dose amio last dose 11/5/19 1x decr dose allopurinol started broccoli casserole; 1x boost   1x decr dose  augment use 1x dose decr;  augment   * Amiodarone LD 11/5/2019. Monitor as likely her warfarin needs would increase over the next few months     Date 1/10 1/14 1/23 1/28 1/31 2/4 2/11 2/18 2/24 3/3 3/10 3/16 3/19   Total Weekly Dose 28mg 32mg 30mg 26mg 26mg  24mg 26mg 26mg 24mg 26mg 27mg 25mg 29mg   INR 1.6 2.1 4.1 2.2 3.4 2.7 2.1 3.4 2.3 1.9 1.9 1.4 2.0   Notes augment  2x incr dose 1x decr keflex, pred keflex; pred; sick keflex; sick; dose decr x1 sick;   valtrex keflex   1x incr dose, incr GLV 1x dose decr; levaquin 1x incr dose      Date               Total WeeklyDose               INR               Notes                 Phone Interview:  Verbal Release Authorization signed on 6/26/18 and again on 11/5/2019-- may speak with Alexy Wallace (son), Genesis Becerril (daughter), Sawyer Wallace (son and daughter-in-law), Gerry Wallace (son)  Tablet Strength: 2mg tablets  Patient Contact Info: preferred [phone] - home with carlito Cotto- [place] [phone]; call if can't get in touch with home phone      Patient Findings   Positives:  Change in diet/appetite   Negatives:  Signs/symptoms of thrombosis, Signs/symptoms of bleeding, Laboratory test error suspected, Change in health, Change in alcohol use, Change in activity, Upcoming invasive procedure, Emergency department visit, Upcoming dental procedure, Missed doses, Extra doses,  Change in medications, Hospital admission, Bruising, Other complaints   Comments:  Patient confirms she finished her Levaquin on Tues, 3/17. Reports lower appetite overall but is able to force herself to eat. Otherwise denies findings.     Plan:  1. INR is therapeutic and back WNL today at 2.0, though this is at the lower limit of her INR goal range. Instructed Ms. Wallace to continue warfarin 4mg oral daily except for 3mg on Thursday until recheck   2. Repeat INR on Mon, 3/23  3. Verbal information provided over the phone. Patient RBV dosing instructions, expresses understanding by teach back, and has no further questions at this time.     Sawyer Gamez, Valentin  3/19/2020  11:24     I, Fatemeh Rodríguez, PharmD, have reviewed the note in full and agree with the assessment and plan.  03/19/20  16:41

## 2020-03-23 ENCOUNTER — ANTICOAGULATION VISIT (OUTPATIENT)
Dept: PHARMACY | Facility: HOSPITAL | Age: 80
End: 2020-03-23

## 2020-03-23 DIAGNOSIS — I48.0 PAROXYSMAL ATRIAL FIBRILLATION (HCC): ICD-10-CM

## 2020-03-23 LAB — INR PPP: 1.4

## 2020-03-23 NOTE — PROGRESS NOTES
Anticoagulation Clinic - Remote Progress Note  [facility]  Testing Frequency: 7 days    Indication: paroxysmal afib  Referring Provider: Butch Joe (Last seen: 11/5/19  next appt: 6/9/20)  Goal INR: 2.0-3.0  Current Drug Interactions: , levothyroxine; omeprazole, azaTHIOprine, ezetimibe, allopurinol (11/20)  CHADS-VASc: 5 (age, gender, HTN, DM)  HAS-BLED: 3 (HTN, CKD, Age)     Diet: rare GLV, rarely green beans - 8/19/19  Alcohol: none  Tobacco: none   OTC Pain Medication: APAP PRN    1st clinic: 9/14/17  2nd clinic: 6/26/18  3rd clinic: 11/5/19    INR History:  Date 7/23 7/26 8/2 8/9 8/16 8/23 8/29 9/5 9/12 9/19 9/24   Total Weekly Dose 36mg 38mg 36mg 38mg 36mg 32mg 30mg?? 32mg 32 mg 32mg 30mg   INR 1.7 2.3 2.0 3.4 3.8 3.5 2.5 2.6 2.5 3.3 2.0   Notes Keflex boost   1x decr dose 1x decr dose    Keflex Keflex     Date 9/27 10/1 10/8 10/15  10/22 10/29 11/5 11/14 11/21 11/28 12/4   Total Weekly Dose 32mg 32mg 32mg 30mg  32mg 28mg 32mg 32mg 32mg 28mg 26mg   INR 2.3 2.4 3.2 2.5  3.9 2.7 3.0 2.8 2.7 3.7 1.9   Notes Levaquin start 9/26 stopped Levaquin        stop MVI, 1 miss 1 hold     Date 12/10 12/17 12/21 12/31 1/7/19 1/14 1/22 1/24 1/28 1/31 2/7   Total Weekly Dose 30mg 30mg 30mg 26mg 28mg 28mg 28mg 28mg 30mg 30mg 28mg   INR 2.4 3.0 3.6 2.5 2.6 2.6 2.9 1.7 2.7 2.5 2.7   Notes        doxy start 1/22, incr GLV 2x boost; doxy Finish doxy 1/29      Date 2/14 2/20 2/28 3/7 3/14 3/21 3/28 4/4 4/11 4/18 4/25   Total Weekly Dose 28mg 28mg 28mg 30mg 31mg 30mg 30mg 31mg 32mg 31mg 31mg   INR 2.1 2.4 1.8 1.6 2.2 2.5 1.9 1.9 2.4 2.8 2.4   Notes   sick Zetia            Date 5/2 5/6 5/15 REHAB 7/19 7/22 7/26 7/30 8/2 8/5 8/12   Total Weekly Dose 31mg 31mg 31mg  ????? ??? Unable to confirm 34mg? 34mg **30mg ? 30mg   INR 2.0 2.4 2.5  1.2 1.3 2.7 1.2 2.0 2.2 1.6   Notes ampicillin amp       stopped lexapro cefdinir      Date 8/15 8/19 8/26 8/30 9/5 9/9 9/13 9/17 10/1 10/7 10/15 10/21   Total Weekly Dose 32mg 33mg  31mg 32mg 31mg 32mg 28mg 24mg 28mg 28mg 26mg 28mg   INR 2.6 2.2 1.8 1.9 2.7 3.3 4.0 3.2 2.5 3.7 2.0 2.2   Notes 3x boost   cefdinir; 1x boost keflex x2 days sick Sick; dose decrease  1x decr dose  1 dose reduction      Date 10/29 11/1 11/5 11/12 11/18 11/27 12/3 12/10 12/18 12/26 1/2/20 1/7   Total Weekly Dose 28mg 27mg 27mg 28mg 26mg 27mg 28mg 28mg 28mg 28mg 26mg 24mg   INR 2.8 2.7 2.4 3.5 2.9 2.0 2.3 3.3 2.9 3.5 3.2 1.2   Notes keflex start 10/28 1x decr  dose; keflex clinic /  1x decr dose amio last dose 11/5/19 1x decr dose allopurinol started broccoli casserole; 1x boost   1x decr dose  augment use 1x dose decr;  augment   * Amiodarone LD 11/5/2019. Monitor as likely her warfarin needs would increase over the next few months     Date 1/10 1/14 1/23 1/28 1/31 2/4 2/11 2/18 2/24 3/3 3/10 3/16 3/19   Total Weekly Dose 28mg 32mg 30mg 26mg 26mg  24mg 26mg 26mg 24mg 26mg 27mg 25mg 29mg   INR 1.6 2.1 4.1 2.2 3.4 2.7 2.1 3.4 2.3 1.9 1.9 1.4 2.0   Notes augment  2x incr dose 1x decr keflex, pred keflex; pred; sick keflex; sick; dose decr x1 sick;   valtrex keflex   1x incr dose, incr GLV 1x dose decr; levaquin 1x incr dose      Date 3/23              Total WeeklyDose 29mg              INR 1.4              Notes 1x boost  spinach                Phone Interview:  Verbal Release Authorization signed on 6/26/18 and again on 11/5/2019-- may speak with Alexy Wallace (son), Genesis Becerril (daughter), Sawyer Wallace (son and daughter-in-law), Gerry Wallace (son)  Tablet Strength: 2mg tablets  Patient Contact Info: preferred [phone] - home with son Yadiel- [place] [phone]; call if can't get in touch with home phone      Patient Findings   Positives:  Change in diet/appetite   Negatives:  Signs/symptoms of thrombosis, Signs/symptoms of bleeding, Laboratory test error suspected, Change in health, Change in alcohol use, Change in activity, Upcoming invasive procedure, Emergency department visit, Upcoming dental procedure,  Missed doses, Extra doses, Change in medications, Hospital admission, Bruising, Other complaints   Comments:  Believes her cold has gotten worse and she is not eating much. Over the weekend Ms Wallace had a small spoonful of cooked spinach.        Plan:  1. INR is SUBtherapeutic again despite boosted dose, possibly due to small serving of spinach. Instructed Ms. Wallace to BOOST tonight's dose to 6mg then warfarin 4mg oral daily  until recheck   2. Repeat INR on Thursday 3/26  3. Verbal information provided over the phone. Patient RBV dosing instructions, expresses understanding by teach back, and has no further questions at this time.     Francoise Wu, PharmD.  03/23/20   11:45

## 2020-03-26 ENCOUNTER — ANTICOAGULATION VISIT (OUTPATIENT)
Dept: PHARMACY | Facility: HOSPITAL | Age: 80
End: 2020-03-26

## 2020-03-26 DIAGNOSIS — I48.0 PAROXYSMAL ATRIAL FIBRILLATION (HCC): ICD-10-CM

## 2020-03-26 LAB — INR PPP: 1.5

## 2020-03-26 RX ORDER — WARFARIN SODIUM 2 MG/1
TABLET ORAL
Qty: 192 TABLET | Refills: 0 | Status: SHIPPED | OUTPATIENT
Start: 2020-03-26 | End: 2020-03-26 | Stop reason: SDUPTHER

## 2020-03-26 RX ORDER — WARFARIN SODIUM 2 MG/1
TABLET ORAL
Qty: 200 TABLET | Refills: 0 | Status: SHIPPED | OUTPATIENT
Start: 2020-03-26 | End: 2020-06-19

## 2020-03-26 NOTE — PROGRESS NOTES
Anticoagulation Clinic - Remote Progress Note  [facility]  Testing Frequency: 7 days    Indication: paroxysmal afib  Referring Provider: Butch Joe (Last seen: 11/5/19  next appt: 6/9/20)  Goal INR: 2.0-3.0  Current Drug Interactions: , levothyroxine; omeprazole, azaTHIOprine, ezetimibe, allopurinol (11/20)  CHADS-VASc: 5 (age, gender, HTN, DM)  HAS-BLED: 3 (HTN, CKD, Age)     Diet: hasn't had any GLV this week (03/26/20)  Alcohol: none  Tobacco: none   OTC Pain Medication: APAP PRN; took tylenol last 2 nights for pain from dialysis (03/26/20)    1st clinic: 9/14/17  2nd clinic: 6/26/18  3rd clinic: 11/5/19    INR History:  Date 7/23 7/26 8/2 8/9 8/16 8/23 8/29 9/5 9/12 9/19 9/24   Total Weekly Dose 36mg 38mg 36mg 38mg 36mg 32mg 30mg?? 32mg 32 mg 32mg 30mg   INR 1.7 2.3 2.0 3.4 3.8 3.5 2.5 2.6 2.5 3.3 2.0   Notes Keflex boost   1x decr dose 1x decr dose    Keflex Keflex     Date 9/27 10/1 10/8 10/15  10/22 10/29 11/5 11/14 11/21 11/28 12/4   Total Weekly Dose 32mg 32mg 32mg 30mg  32mg 28mg 32mg 32mg 32mg 28mg 26mg   INR 2.3 2.4 3.2 2.5  3.9 2.7 3.0 2.8 2.7 3.7 1.9   Notes Levaquin start 9/26 stopped Levaquin        stop MVI, 1 miss 1 hold     Date 12/10 12/17 12/21 12/31 1/7/19 1/14 1/22 1/24 1/28 1/31 2/7   Total Weekly Dose 30mg 30mg 30mg 26mg 28mg 28mg 28mg 28mg 30mg 30mg 28mg   INR 2.4 3.0 3.6 2.5 2.6 2.6 2.9 1.7 2.7 2.5 2.7   Notes        doxy start 1/22, incr GLV 2x boost; doxy Finish doxy 1/29      Date 2/14 2/20 2/28 3/7 3/14 3/21 3/28 4/4 4/11 4/18 4/25   Total Weekly Dose 28mg 28mg 28mg 30mg 31mg 30mg 30mg 31mg 32mg 31mg 31mg   INR 2.1 2.4 1.8 1.6 2.2 2.5 1.9 1.9 2.4 2.8 2.4   Notes   sick Zetia            Date 5/2 5/6 5/15 REHAB 7/19 7/22 7/26 7/30 8/2 8/5 8/12   Total Weekly Dose 31mg 31mg 31mg  ????? ??? Unable to confirm 34mg? 34mg **30mg ? 30mg   INR 2.0 2.4 2.5  1.2 1.3 2.7 1.2 2.0 2.2 1.6   Notes ampicillin amp       stopped lexapro cefdinir      Date 8/15 8/19 8/26 8/30 9/5 9/9  9/13 9/17 10/1 10/7 10/15 10/21   Total Weekly Dose 32mg 33mg 31mg 32mg 31mg 32mg 28mg 24mg 28mg 28mg 26mg 28mg   INR 2.6 2.2 1.8 1.9 2.7 3.3 4.0 3.2 2.5 3.7 2.0 2.2   Notes 3x boost   cefdinir; 1x boost keflex x2 days sick Sick; dose decrease  1x decr dose  1 dose reduction      Date 10/29 11/1 11/5 11/12 11/18 11/27 12/3 12/10 12/18 12/26 1/2/20 1/7   Total Weekly Dose 28mg 27mg 27mg 28mg 26mg 27mg 28mg 28mg 28mg 28mg 26mg 24mg   INR 2.8 2.7 2.4 3.5 2.9 2.0 2.3 3.3 2.9 3.5 3.2 1.2   Notes keflex start 10/28 1x decr  dose; keflex clinic /  1x decr dose amio last dose 11/5/19 1x decr dose allopurinol started broccoli casserole; 1x boost   1x decr dose  augment use 1x dose decr;  augment   * Amiodarone LD 11/5/2019. Monitor as likely her warfarin needs would increase over the next few months     Date 1/10 1/14 1/23 1/28 1/31 2/4 2/11 2/18 2/24 3/3 3/10 3/16 3/19   Total Weekly Dose 28mg 32mg 30mg 26mg 26mg  24mg 26mg 26mg 24mg 26mg 27mg 25mg 29mg   INR 1.6 2.1 4.1 2.2 3.4 2.7 2.1 3.4 2.3 1.9 1.9 1.4 2.0   Notes augment  2x incr dose 1x decr keflex, pred keflex; pred; sick keflex; sick; dose decr x1 sick;   valtrex keflex   1x incr dose, incr GLV 1x dose decr; levaquin 1x incr dose      Date 3/23 3/26             Total WeeklyDose 29mg 29mg 32 mg            INR 1.4 1.5             Notes 1x boost  spinach 1x boost, less GLV               Phone Interview:  Verbal Release Authorization signed on 6/26/18 and again on 11/5/2019-- may speak with Alexy Rodrigo (son), Genesis Becerril (daughter), Sawyer Wallace (son and daughter-in-law), Gerry Rodrigo (son)  Tablet Strength: 2mg tablets  Patient Contact Info: preferred [phone] - home with carlito Cotto- cell [phone]; call if can't get in touch with home phone      Patient Findings   Positives:  Change in diet/appetite   Negatives:  Signs/symptoms of thrombosis, Signs/symptoms of bleeding, Laboratory test error suspected, Change in health, Change in alcohol use, Change  in activity, Upcoming invasive procedure, Emergency department visit, Upcoming dental procedure, Missed doses, Extra doses, Change in medications, Hospital admission, Bruising, Other complaints   Comments:  Spoke with Mrs. Wallace today and she reports a continued loss of appetite this week due to stress of COVID-19. She said she mostly ate mashed potatoes and meat this week, with sausage and biscuits for breakfast. She has had no servings of GLV and denies any supplemental drinks. She took 6mg on Monday, then 4mg all other days as instructed.        Plan:  1. INR is SUBtherapeutic again today at 1.5 despite boosted dose on Monday. Instructed Ms. Wallace to BOOST tonight's dose to warfarin 6mg then tomorrow begin warfarin 4mg oral daily except 6 mg Thursdays until recheck   2. Repeat INR on Monday 03/30  3. Verbal information provided over the phone. Patient RBV dosing instructions, expresses understanding by teach back, and has no further questions at this time.       Yelena Aguilar, Pharmacy Intern   03/26/2020   10:38 AM       I, Radha Michaud, PharmD, have reviewed the note in full and agree with the assessment and plan.  03/26/20  15:38

## 2020-04-02 ENCOUNTER — ANTICOAGULATION VISIT (OUTPATIENT)
Dept: PHARMACY | Facility: HOSPITAL | Age: 80
End: 2020-04-02

## 2020-04-02 DIAGNOSIS — I48.0 PAROXYSMAL ATRIAL FIBRILLATION (HCC): ICD-10-CM

## 2020-04-02 LAB — INR PPP: 1.9

## 2020-04-02 NOTE — PROGRESS NOTES
Anticoagulation Clinic - Remote Progress Note  [facility]  Testing Frequency: 7 days    Indication: paroxysmal afib  Referring Provider: Butch Joe (Last seen: 11/5/19  next appt: 6/9/20)  Goal INR: 2.0-3.0  Current Drug Interactions: , levothyroxine; omeprazole, azaTHIOprine, ezetimibe, allopurinol (11/20)  CHADS-VASc: 5 (age, gender, HTN, DM)  HAS-BLED: 3 (HTN, CKD, Age)     Diet: hasn't had any GLV this week (03/26/20)  Alcohol: none  Tobacco: none   OTC Pain Medication: APAP PRN; took tylenol last 2 nights for pain from dialysis (03/26/20)    1st clinic: 9/14/17  2nd clinic: 6/26/18  3rd clinic: 11/5/19    INR History:  Date 7/23 7/26 8/2 8/9 8/16 8/23 8/29 9/5 9/12 9/19 9/24   Total Weekly Dose 36mg 38mg 36mg 38mg 36mg 32mg 30mg?? 32mg 32 mg 32mg 30mg   INR 1.7 2.3 2.0 3.4 3.8 3.5 2.5 2.6 2.5 3.3 2.0   Notes Keflex boost   1x decr dose 1x decr dose    Keflex Keflex     Date 9/27 10/1 10/8 10/15  10/22 10/29 11/5 11/14 11/21 11/28 12/4   Total Weekly Dose 32mg 32mg 32mg 30mg  32mg 28mg 32mg 32mg 32mg 28mg 26mg   INR 2.3 2.4 3.2 2.5  3.9 2.7 3.0 2.8 2.7 3.7 1.9   Notes Levaquin start 9/26 stopped Levaquin        stop MVI, 1 miss 1 hold     Date 12/10 12/17 12/21 12/31 1/7/19 1/14 1/22 1/24 1/28 1/31 2/7   Total Weekly Dose 30mg 30mg 30mg 26mg 28mg 28mg 28mg 28mg 30mg 30mg 28mg   INR 2.4 3.0 3.6 2.5 2.6 2.6 2.9 1.7 2.7 2.5 2.7   Notes        doxy start 1/22, incr GLV 2x boost; doxy Finish doxy 1/29      Date 2/14 2/20 2/28 3/7 3/14 3/21 3/28 4/4 4/11 4/18 4/25   Total Weekly Dose 28mg 28mg 28mg 30mg 31mg 30mg 30mg 31mg 32mg 31mg 31mg   INR 2.1 2.4 1.8 1.6 2.2 2.5 1.9 1.9 2.4 2.8 2.4   Notes   sick Zetia            Date 5/2 5/6 5/15 REHAB 7/19 7/22 7/26 7/30 8/2 8/5 8/12   Total Weekly Dose 31mg 31mg 31mg  ????? ??? Unable to confirm 34mg? 34mg **30mg ? 30mg   INR 2.0 2.4 2.5  1.2 1.3 2.7 1.2 2.0 2.2 1.6   Notes ampicillin amp       stopped lexapro cefdinir      Date 8/15 8/19 8/26 8/30 9/5 9/9  9/13 9/17 10/1 10/7 10/15 10/21   Total Weekly Dose 32mg 33mg 31mg 32mg 31mg 32mg 28mg 24mg 28mg 28mg 26mg 28mg   INR 2.6 2.2 1.8 1.9 2.7 3.3 4.0 3.2 2.5 3.7 2.0 2.2   Notes 3x boost   cefdinir; 1x boost keflex x2 days sick Sick; dose decrease  1x decr dose  1 dose reduction      Date 10/29 11/1 11/5 11/12 11/18 11/27 12/3 12/10 12/18 12/26 1/2/20 1/7   Total Weekly Dose 28mg 27mg 27mg 28mg 26mg 27mg 28mg 28mg 28mg 28mg 26mg 24mg   INR 2.8 2.7 2.4 3.5 2.9 2.0 2.3 3.3 2.9 3.5 3.2 1.2   Notes keflex start 10/28 1x decr  dose; keflex clinic /  1x decr dose amio last dose 11/5/19 1x decr dose allopurinol started broccoli casserole; 1x boost   1x decr dose  augment use 1x dose decr;  augment   * Amiodarone LD 11/5/2019. Monitor as likely her warfarin needs would increase over the next few months     Date 1/10 1/14 1/23 1/28 1/31 2/4 2/11 2/18 2/24 3/3 3/10 3/16 3/19   Total Weekly Dose 28mg 32mg 30mg 26mg 26mg  24mg 26mg 26mg 24mg 26mg 27mg 25mg 29mg   INR 1.6 2.1 4.1 2.2 3.4 2.7 2.1 3.4 2.3 1.9 1.9 1.4 2.0   Notes augment  2x incr dose 1x decr keflex, pred keflex; pred; sick keflex; sick; dose decr x1 sick;   valtrex keflex   1x incr dose, incr GLV 1x dose decr; levaquin 1x incr dose      Date 3/23 3/26 4/2            Total WeeklyDose 29mg 29mg 30 mg            INR 1.4 1.5 1.9            Notes 1x boost  spinach 1x boost, less GLV 1x boost              Phone Interview:  Verbal Release Authorization signed on 6/26/18 and again on 11/5/2019-- may speak with Alexy Rodrigo (son), Genesis Becerril (daughter), Sawyer Wallace (son and daughter-in-law), Gerry Rodrigo (son)  Tablet Strength: 2mg tablets  Patient Contact Info: preferred [phone] - home with carlito Cotto- cell [phone]; call if can't get in touch with home phone      Patient Findings   Positives:  Bruising   Negatives:  Signs/symptoms of thrombosis, Signs/symptoms of bleeding, Laboratory test error suspected, Change in health, Change in alcohol use, Change in  activity, Upcoming invasive procedure, Emergency department visit, Upcoming dental procedure, Missed doses, Extra doses, Change in medications, Change in diet/appetite, Hospital admission, Other complaints   Comments:  Has a bruise on lower left leg. Had a small ice yen salad 1 days ago. Could not verify dosing from last week but says she set it out as directed during encounter. Filled up medbox for the week while on the phone.           Plan:  1. INR is SUBtherapeutic again today at 1.9 but trending upward following increased dose. Instructed Ms. Wallace to continue warfarin 4mg oral daily except 6 mg Thursdays until recheck   2. Repeat INR in one week  3. Verbal information provided over the phone. Patient RBV dosing instructions, expresses understanding by teach back, and has no further questions at this time.       Francoise Wu, PharmD.  04/02/20   09:42

## 2020-04-09 ENCOUNTER — ANTICOAGULATION VISIT (OUTPATIENT)
Dept: PHARMACY | Facility: HOSPITAL | Age: 80
End: 2020-04-09

## 2020-04-09 DIAGNOSIS — I48.0 PAROXYSMAL ATRIAL FIBRILLATION (HCC): ICD-10-CM

## 2020-04-09 LAB — INR PPP: 2.2

## 2020-04-09 NOTE — PROGRESS NOTES
Anticoagulation Clinic - Remote Progress Note  [facility]  Testing Frequency: 7 days    Indication: paroxysmal afib  Referring Provider: Butch Joe (Last seen: 11/5/19  next appt: 6/9/20)  Goal INR: 2.0-3.0  Current Drug Interactions: , levothyroxine; omeprazole, azaTHIOprine, ezetimibe, allopurinol (11/20)  CHADS-VASc: 5 (age, gender, HTN, DM)  HAS-BLED: 3 (HTN, CKD, Age)     Diet: hasn't had any GLV this week (03/26/20)  Alcohol: none  Tobacco: none   OTC Pain Medication: APAP PRN; took tylenol last 2 nights for pain from dialysis (03/26/20)    1st clinic: 9/14/17  2nd clinic: 6/26/18  3rd clinic: 11/5/19    INR History:  Date 7/23 7/26 8/2 8/9 8/16 8/23 8/29 9/5 9/12 9/19 9/24   Total Weekly Dose 36mg 38mg 36mg 38mg 36mg 32mg 30mg?? 32mg 32 mg 32mg 30mg   INR 1.7 2.3 2.0 3.4 3.8 3.5 2.5 2.6 2.5 3.3 2.0   Notes Keflex boost   1x decr dose 1x decr dose    Keflex Keflex     Date 9/27 10/1 10/8 10/15  10/22 10/29 11/5 11/14 11/21 11/28 12/4   Total Weekly Dose 32mg 32mg 32mg 30mg  32mg 28mg 32mg 32mg 32mg 28mg 26mg   INR 2.3 2.4 3.2 2.5  3.9 2.7 3.0 2.8 2.7 3.7 1.9   Notes Levaquin start 9/26 stopped Levaquin        stop MVI, 1 miss 1 hold     Date 12/10 12/17 12/21 12/31 1/7/19 1/14 1/22 1/24 1/28 1/31 2/7   Total Weekly Dose 30mg 30mg 30mg 26mg 28mg 28mg 28mg 28mg 30mg 30mg 28mg   INR 2.4 3.0 3.6 2.5 2.6 2.6 2.9 1.7 2.7 2.5 2.7   Notes        doxy start 1/22, incr GLV 2x boost; doxy Finish doxy 1/29      Date 2/14 2/20 2/28 3/7 3/14 3/21 3/28 4/4 4/11 4/18 4/25   Total Weekly Dose 28mg 28mg 28mg 30mg 31mg 30mg 30mg 31mg 32mg 31mg 31mg   INR 2.1 2.4 1.8 1.6 2.2 2.5 1.9 1.9 2.4 2.8 2.4   Notes   sick Zetia            Date 5/2 5/6 5/15 REHAB 7/19 7/22 7/26 7/30 8/2 8/5 8/12   Total Weekly Dose 31mg 31mg 31mg  ????? ??? Unable to confirm 34mg? 34mg **30mg ? 30mg   INR 2.0 2.4 2.5  1.2 1.3 2.7 1.2 2.0 2.2 1.6   Notes ampicillin amp       stopped lexapro cefdinir      Date 8/15 8/19 8/26 8/30 9/5 9/9  9/13 9/17 10/1 10/7 10/15 10/21   Total Weekly Dose 32mg 33mg 31mg 32mg 31mg 32mg 28mg 24mg 28mg 28mg 26mg 28mg   INR 2.6 2.2 1.8 1.9 2.7 3.3 4.0 3.2 2.5 3.7 2.0 2.2   Notes 3x boost   cefdinir; 1x boost keflex x2 days sick Sick; dose decrease  1x decr dose  1 dose reduction      Date 10/29 11/1 11/5 11/12 11/18 11/27 12/3 12/10 12/18 12/26 1/2/20 1/7   Total Weekly Dose 28mg 27mg 27mg 28mg 26mg 27mg 28mg 28mg 28mg 28mg 26mg 24mg   INR 2.8 2.7 2.4 3.5 2.9 2.0 2.3 3.3 2.9 3.5 3.2 1.2   Notes keflex start 10/28 1x decr  dose; keflex clinic /  1x decr dose amio last dose 11/5/19 1x decr dose allopurinol started broccoli casserole; 1x boost   1x decr dose  augment use 1x dose decr;  augment   * Amiodarone LD 11/5/2019. Monitor as likely her warfarin needs would increase over the next few months     Date 1/10 1/14 1/23 1/28 1/31 2/4 2/11 2/18 2/24 3/3 3/10 3/16 3/19   Total Weekly Dose 28mg 32mg 30mg 26mg 26mg  24mg 26mg 26mg 24mg 26mg 27mg 25mg 29mg   INR 1.6 2.1 4.1 2.2 3.4 2.7 2.1 3.4 2.3 1.9 1.9 1.4 2.0   Notes augment  2x incr dose 1x decr keflex, pred keflex; pred; sick keflex; sick; dose decr x1 sick;   valtrex keflex   1x incr dose, incr GLV 1x dose decr; levaquin 1x incr dose      Date 3/23 3/26 4/2 4/9           Total WeeklyDose 29mg 29mg 30 mg 30 mg           INR 1.4 1.5 1.9 2.2           Notes 1x boost  spinach 1x boost, less GLV 1x boost              Phone Interview:  Verbal Release Authorization signed on 6/26/18 and again on 11/5/2019-- may speak with Alexy Wallace (son), Genesis Becerril (daughter), Sawyer Wallace (son and daughter-in-law), Gerry Wallace (son)  Tablet Strength: 2mg tablets  Patient Contact Info: preferred [phone] - home with carlito Cotto- cell [phone]; call if can't get in touch with home phone      Patient Findings  Negatives:  Signs/symptoms of thrombosis, Signs/symptoms of bleeding, Laboratory test error suspected, Change in health, Change in alcohol use, Change in activity,  Upcoming invasive procedure, Emergency department visit, Upcoming dental procedure, Missed doses, Extra doses, Change in medications, Change in diet/appetite, Hospital admission, Bruising, Other complaints   Comments:  She thought that we were a scam call at first as she has been receiving several since the pandemic started.   She will check with Dr. Walters to ensure that her tetanus is up-to-date as she had scraped her hand on a nail.   She denies any changes.  She filled her med box while we were talking so that she did not forget.     Plan:    1. INR is therapeutic today at 2.2. Instructed Ms. Wallace to continue warfarin 4mg oral daily except 6 mg Thursdays until recheck   2. Repeat INR in one week on 4/17.  She will try to check prior to her MD appointment.  3. Verbal information provided over the phone. Patient RBV dosing instructions, expresses understanding by teach back, and has no further questions at this time.     Radha Michaud, PharmD  04/09/20   10:22

## 2020-04-10 RX ORDER — EZETIMIBE 10 MG/1
TABLET ORAL
Qty: 30 TABLET | Refills: 3 | Status: SHIPPED | OUTPATIENT
Start: 2020-04-10 | End: 2020-07-21

## 2020-04-16 ENCOUNTER — ANTICOAGULATION VISIT (OUTPATIENT)
Dept: PHARMACY | Facility: HOSPITAL | Age: 80
End: 2020-04-16

## 2020-04-16 DIAGNOSIS — I48.0 PAROXYSMAL ATRIAL FIBRILLATION (HCC): ICD-10-CM

## 2020-04-16 LAB — INR PPP: 1.9

## 2020-04-16 NOTE — PROGRESS NOTES
Anticoagulation Clinic - Remote Progress Note  [facility]  Testing Frequency: 7 days    Indication: paroxysmal afib  Referring Provider: Butch Joe (Last seen: 11/5/19  next appt: 6/9/20)  Goal INR: 2.0-3.0  Current Drug Interactions: , levothyroxine; omeprazole, azaTHIOprine, ezetimibe, allopurinol (11/20)  CHADS-VASc: 5 (age, gender, HTN, DM)  HAS-BLED: 3 (HTN, CKD, Age)     Diet: hasn't had any GLV this week (04/16/2020)  Alcohol: none  Tobacco: none   OTC Pain Medication: APAP PRN; took tylenol last 2 nights for pain from dialysis (03/26/20)    1st clinic: 9/14/17  2nd clinic: 6/26/18  3rd clinic: 11/5/19    INR History:  Date 7/23 7/26 8/2 8/9 8/16 8/23 8/29 9/5 9/12 9/19 9/24   Total Weekly Dose 36mg 38mg 36mg 38mg 36mg 32mg 30mg?? 32mg 32 mg 32mg 30mg   INR 1.7 2.3 2.0 3.4 3.8 3.5 2.5 2.6 2.5 3.3 2.0   Notes Keflex boost   1x decr dose 1x decr dose    Keflex Keflex     Date 9/27 10/1 10/8 10/15  10/22 10/29 11/5 11/14 11/21 11/28 12/4   Total Weekly Dose 32mg 32mg 32mg 30mg  32mg 28mg 32mg 32mg 32mg 28mg 26mg   INR 2.3 2.4 3.2 2.5  3.9 2.7 3.0 2.8 2.7 3.7 1.9   Notes Levaquin start 9/26 stopped Levaquin        stop MVI, 1 miss 1 hold     Date 12/10 12/17 12/21 12/31 1/7/19 1/14 1/22 1/24 1/28 1/31 2/7   Total Weekly Dose 30mg 30mg 30mg 26mg 28mg 28mg 28mg 28mg 30mg 30mg 28mg   INR 2.4 3.0 3.6 2.5 2.6 2.6 2.9 1.7 2.7 2.5 2.7   Notes        doxy start 1/22, incr GLV 2x boost; doxy Finish doxy 1/29      Date 2/14 2/20 2/28 3/7 3/14 3/21 3/28 4/4 4/11 4/18 4/25   Total Weekly Dose 28mg 28mg 28mg 30mg 31mg 30mg 30mg 31mg 32mg 31mg 31mg   INR 2.1 2.4 1.8 1.6 2.2 2.5 1.9 1.9 2.4 2.8 2.4   Notes   sick Zetia            Date 5/2 5/6 5/15 REHAB 7/19 7/22 7/26 7/30 8/2 8/5 8/12   Total Weekly Dose 31mg 31mg 31mg  ????? ??? Unable to confirm 34mg? 34mg **30mg ? 30mg   INR 2.0 2.4 2.5  1.2 1.3 2.7 1.2 2.0 2.2 1.6   Notes ampicillin amp       stopped lexapro cefdinir      Date 8/15 8/19 8/26 8/30 9/5 9/9  9/13 9/17 10/1 10/7 10/15 10/21   Total Weekly Dose 32mg 33mg 31mg 32mg 31mg 32mg 28mg 24mg 28mg 28mg 26mg 28mg   INR 2.6 2.2 1.8 1.9 2.7 3.3 4.0 3.2 2.5 3.7 2.0 2.2   Notes 3x boost   cefdinir; 1x boost keflex x2 days sick Sick; dose decrease  1x decr dose  1 dose reduction      Date 10/29 11/1 11/5 11/12 11/18 11/27 12/3 12/10 12/18 12/26 1/2/20 1/7   Total Weekly Dose 28mg 27mg 27mg 28mg 26mg 27mg 28mg 28mg 28mg 28mg 26mg 24mg   INR 2.8 2.7 2.4 3.5 2.9 2.0 2.3 3.3 2.9 3.5 3.2 1.2   Notes keflex start 10/28 1x decr  dose; keflex clinic /  1x decr dose amio last dose 11/5/19 1x decr dose allopurinol started broccoli casserole; 1x boost   1x decr dose  augment use 1x dose decr;  augment   * Amiodarone LD 11/5/2019. Monitor as likely her warfarin needs would increase over the next few months     Date 1/10 1/14 1/23 1/28 1/31 2/4 2/11 2/18 2/24 3/3 3/10 3/16 3/19   Total Weekly Dose 28mg 32mg 30mg 26mg 26mg  24mg 26mg 26mg 24mg 26mg 27mg 25mg 29mg   INR 1.6 2.1 4.1 2.2 3.4 2.7 2.1 3.4 2.3 1.9 1.9 1.4 2.0   Notes augment  2x incr dose 1x decr keflex, pred keflex; pred; sick keflex; sick; dose decr x1 sick;   valtrex keflex   1x incr dose, incr GLV 1x dose decr; levaquin 1x incr dose      Date 3/23 3/26 4/2 4/9 4/16          Total WeeklyDose 29mg 29mg 30 mg 30 mg 30mg          INR 1.4 1.5 1.9 2.2 1.9          Notes 1x boost  spinach 1x boost, less GLV 1x boost              Phone Interview:  Verbal Release Authorization signed on 6/26/18 and again on 11/5/2019-- may speak with Alexy Wallace (son), Genesis Becerril (daughter), Sawyer Wallace (son and daughter-in-law), Gerry Rodrigo (son)  Tablet Strength: 2mg tablets  Patient Contact Info: preferred [phone] - home with son Yadiel- cell [phone]; call if can't get in touch with home phone      Patient Findings   Negatives:  Signs/symptoms of thrombosis, Signs/symptoms of bleeding, Laboratory test error suspected, Change in health, Change in alcohol use, Change in  activity, Upcoming invasive procedure, Emergency department visit, Upcoming dental procedure, Missed doses, Extra doses, Change in medications, Change in diet/appetite, Hospital admission, Bruising, Other complaints   Comments:  She saw Dr. Rene today and didn't change anything.     Plan:  1. INR is slightly SUBtherapeutic today. Instructed Mr. Wallace to increase dose warfarin 4mg oral daily except 6 mg MondayThursdays until recheck   2. Repeat INR in one week.    3. Verbal information provided over the phone. Patient RBV dosing instructions, expresses understanding by teach back, and has no further questions at this time.     Fatemeh Rodríguez, PharmD  04/16/20   10:27

## 2020-04-17 RX ORDER — OMEPRAZOLE 40 MG/1
CAPSULE, DELAYED RELEASE ORAL
Qty: 90 CAPSULE | Refills: 0 | Status: SHIPPED | OUTPATIENT
Start: 2020-04-17 | End: 2020-08-10

## 2020-04-23 ENCOUNTER — ANTICOAGULATION VISIT (OUTPATIENT)
Dept: PHARMACY | Facility: HOSPITAL | Age: 80
End: 2020-04-23

## 2020-04-23 DIAGNOSIS — I48.0 PAROXYSMAL ATRIAL FIBRILLATION (HCC): ICD-10-CM

## 2020-04-23 LAB — INR PPP: 3

## 2020-04-23 NOTE — PROGRESS NOTES
Anticoagulation Clinic - Remote Progress Note  [facility]  Testing Frequency: 7 days    Indication: paroxysmal afib  Referring Provider: Butch Joe (Last seen: 11/5/19  next appt: 6/9/20)  Goal INR: 2.0-3.0  Current Drug Interactions: , levothyroxine; omeprazole, azaTHIOprine, ezetimibe, allopurinol (11/20)  CHADS-VASc: 5 (age, gender, HTN, DM)  HAS-BLED: 3 (HTN, CKD, Age)     Diet: hasn't had any GLV this week (04/16/2020)  Alcohol: none  Tobacco: none   OTC Pain Medication: APAP PRN; took tylenol last 2 nights for pain from dialysis (03/26/20)    1st clinic: 9/14/17  2nd clinic: 6/26/18  3rd clinic: 11/5/19    INR History:  Date 7/23 7/26 8/2 8/9 8/16 8/23 8/29 9/5 9/12 9/19 9/24   Total Weekly Dose 36mg 38mg 36mg 38mg 36mg 32mg 30mg?? 32mg 32 mg 32mg 30mg   INR 1.7 2.3 2.0 3.4 3.8 3.5 2.5 2.6 2.5 3.3 2.0   Notes Keflex boost   1x decr dose 1x decr dose    Keflex Keflex     Date 9/27 10/1 10/8 10/15  10/22 10/29 11/5 11/14 11/21 11/28 12/4   Total Weekly Dose 32mg 32mg 32mg 30mg  32mg 28mg 32mg 32mg 32mg 28mg 26mg   INR 2.3 2.4 3.2 2.5  3.9 2.7 3.0 2.8 2.7 3.7 1.9   Notes Levaquin start 9/26 stopped Levaquin        stop MVI, 1 miss 1 hold     Date 12/10 12/17 12/21 12/31 1/7/19 1/14 1/22 1/24 1/28 1/31 2/7   Total Weekly Dose 30mg 30mg 30mg 26mg 28mg 28mg 28mg 28mg 30mg 30mg 28mg   INR 2.4 3.0 3.6 2.5 2.6 2.6 2.9 1.7 2.7 2.5 2.7   Notes        doxy start 1/22, incr GLV 2x boost; doxy Finish doxy 1/29      Date 2/14 2/20 2/28 3/7 3/14 3/21 3/28 4/4 4/11 4/18 4/25   Total Weekly Dose 28mg 28mg 28mg 30mg 31mg 30mg 30mg 31mg 32mg 31mg 31mg   INR 2.1 2.4 1.8 1.6 2.2 2.5 1.9 1.9 2.4 2.8 2.4   Notes   sick Zetia            Date 5/2 5/6 5/15 REHAB 7/19 7/22 7/26 7/30 8/2 8/5 8/12   Total Weekly Dose 31mg 31mg 31mg  ????? ??? Unable to confirm 34mg? 34mg **30mg ? 30mg   INR 2.0 2.4 2.5  1.2 1.3 2.7 1.2 2.0 2.2 1.6   Notes ampicillin amp       stopped lexapro cefdinir      Date 8/15 8/19 8/26 8/30 9/5 9/9  9/13 9/17 10/1 10/7 10/15 10/21   Total Weekly Dose 32mg 33mg 31mg 32mg 31mg 32mg 28mg 24mg 28mg 28mg 26mg 28mg   INR 2.6 2.2 1.8 1.9 2.7 3.3 4.0 3.2 2.5 3.7 2.0 2.2   Notes 3x boost   cefdinir; 1x boost keflex x2 days sick Sick; dose decrease  1x decr dose  1 dose reduction      Date 10/29 11/1 11/5 11/12 11/18 11/27 12/3 12/10 12/18 12/26 1/2/20 1/7   Total Weekly Dose 28mg 27mg 27mg 28mg 26mg 27mg 28mg 28mg 28mg 28mg 26mg 24mg   INR 2.8 2.7 2.4 3.5 2.9 2.0 2.3 3.3 2.9 3.5 3.2 1.2   Notes keflex start 10/28 1x decr  dose; keflex clinic /  1x decr dose amio last dose 11/5/19 1x decr dose allopurinol started broccoli casserole; 1x boost   1x decr dose  augment use 1x dose decr;  augment   * Amiodarone LD 11/5/2019. Monitor as likely her warfarin needs would increase over the next few months     Date 1/10 1/14 1/23 1/28 1/31 2/4 2/11 2/18 2/24 3/3 3/10 3/16 3/19   Total Weekly Dose 28mg 32mg 30mg 26mg 26mg  24mg 26mg 26mg 24mg 26mg 27mg 25mg 29mg   INR 1.6 2.1 4.1 2.2 3.4 2.7 2.1 3.4 2.3 1.9 1.9 1.4 2.0   Notes augment  2x incr dose 1x decr keflex, pred keflex; pred; sick keflex; sick; dose decr x1 sick;   valtrex keflex   1x incr dose, incr GLV 1x dose decr; levaquin 1x incr dose      Date 3/23 3/26 4/2 4/9 4/16 4/23         Total WeeklyDose 29mg 29mg 30mg 30mg 30mg 32mg 30mg        INR 1.4 1.5 1.9 2.2 1.9 3.0         Notes 1x boost  spinach 1x boost, less GLV 1x boost              Phone Interview:  Verbal Release Authorization signed on 6/26/18 and again on 11/5/2019-- may speak with Alexy Rodrigo (son), Genesis Becerril (daughter), Sawyer Wallace (son and daughter-in-law), Gerry Rodrigo (son)  Tablet Strength: 2mg tablets  Patient Contact Info: preferred [phone] - home with carlito Cotto- [employer] [phone]; call if can't get in touch with home phone      Patient Findings   Negatives:  Signs/symptoms of thrombosis, Signs/symptoms of bleeding, Laboratory test error suspected, Change in health, Change in alcohol  use, Change in activity, Upcoming invasive procedure, Emergency department visit, Upcoming dental procedure, Missed doses, Extra doses, Change in medications, Change in diet/appetite, Hospital admission, Bruising, Other complaints     Plan:  1. INR is therapeutic and back WNL. This is a 1.1 increase from a 6.3% increase in her warfarin dose at last encounter. After speaking with Francoise Wu, PharmD, instructed Mr. Wallace to decrease warfarin 4mg oral daily except 6mg on Monday until recheck   2. Repeat INR in one week.    3. Verbal information provided over the phone. Patient RBV dosing instructions, expresses understanding by teach back, and has no further questions at this time.     Sawyer Gamez CPhT  4/23/2020  10:02    I, Fatemeh Rodríguez, PharmD, have reviewed the note in full and agree with the assessment and plan.  04/23/20  10:37

## 2020-05-01 ENCOUNTER — ANTICOAGULATION VISIT (OUTPATIENT)
Dept: PHARMACY | Facility: HOSPITAL | Age: 80
End: 2020-05-01

## 2020-05-01 DIAGNOSIS — I48.0 PAROXYSMAL ATRIAL FIBRILLATION (HCC): ICD-10-CM

## 2020-05-01 LAB — INR PPP: 2.1

## 2020-05-01 NOTE — PROGRESS NOTES
Anticoagulation Clinic - Remote Progress Note  [facility]  Testing Frequency: 7 days    Indication: paroxysmal afib  Referring Provider: Butch Joe (Last seen: 11/5/19  next appt: 6/9/20)  Goal INR: 2.0-3.0  Current Drug Interactions: , levothyroxine; omeprazole, azaTHIOprine, ezetimibe, allopurinol (11/20)  CHADS-VASc: 5 (age, gender, HTN, DM)  HAS-BLED: 3 (HTN, CKD, Age)     Diet: avoids GLV (5/1/20) no meal replacement drinks   Alcohol: none  Tobacco: none   OTC Pain Medication: APAP PRN; took tylenol last 2 nights for pain from dialysis (03/26/20)    1st clinic: 9/14/17  2nd clinic: 6/26/18  3rd clinic: 11/5/19    INR History:  Date 7/23 7/26 8/2 8/9 8/16 8/23 8/29 9/5 9/12 9/19 9/24   Total Weekly Dose 36mg 38mg 36mg 38mg 36mg 32mg 30mg?? 32mg 32 mg 32mg 30mg   INR 1.7 2.3 2.0 3.4 3.8 3.5 2.5 2.6 2.5 3.3 2.0   Notes Keflex boost   1x decr dose 1x decr dose    Keflex Keflex     Date 9/27 10/1 10/8 10/15  10/22 10/29 11/5 11/14 11/21 11/28 12/4   Total Weekly Dose 32mg 32mg 32mg 30mg  32mg 28mg 32mg 32mg 32mg 28mg 26mg   INR 2.3 2.4 3.2 2.5  3.9 2.7 3.0 2.8 2.7 3.7 1.9   Notes Levaquin start 9/26 stopped Levaquin        stop MVI, 1 miss 1 hold     Date 12/10 12/17 12/21 12/31 1/7/19 1/14 1/22 1/24 1/28 1/31 2/7   Total Weekly Dose 30mg 30mg 30mg 26mg 28mg 28mg 28mg 28mg 30mg 30mg 28mg   INR 2.4 3.0 3.6 2.5 2.6 2.6 2.9 1.7 2.7 2.5 2.7   Notes        doxy start 1/22, incr GLV 2x boost; doxy Finish doxy 1/29      Date 2/14 2/20 2/28 3/7 3/14 3/21 3/28 4/4 4/11 4/18 4/25   Total Weekly Dose 28mg 28mg 28mg 30mg 31mg 30mg 30mg 31mg 32mg 31mg 31mg   INR 2.1 2.4 1.8 1.6 2.2 2.5 1.9 1.9 2.4 2.8 2.4   Notes   sick Zetia            Date 5/2 5/6 5/15 REHAB 7/19 7/22 7/26 7/30 8/2 8/5 8/12   Total Weekly Dose 31mg 31mg 31mg  ????? ??? Unable to confirm 34mg? 34mg **30mg ? 30mg   INR 2.0 2.4 2.5  1.2 1.3 2.7 1.2 2.0 2.2 1.6   Notes ampicillin amp       stopped lexapro cefdinir      Date 8/15 8/19 8/26 8/30  9/5 9/9 9/13 9/17 10/1 10/7 10/15 10/21   Total Weekly Dose 32mg 33mg 31mg 32mg 31mg 32mg 28mg 24mg 28mg 28mg 26mg 28mg   INR 2.6 2.2 1.8 1.9 2.7 3.3 4.0 3.2 2.5 3.7 2.0 2.2   Notes 3x boost   cefdinir; 1x boost keflex x2 days sick Sick; dose decrease  1x decr dose  1 dose reduction      Date 10/29 11/1 11/5 11/12 11/18 11/27 12/3 12/10 12/18 12/26 1/2/20 1/7   Total Weekly Dose 28mg 27mg 27mg 28mg 26mg 27mg 28mg 28mg 28mg 28mg 26mg 24mg   INR 2.8 2.7 2.4 3.5 2.9 2.0 2.3 3.3 2.9 3.5 3.2 1.2   Notes keflex start 10/28 1x decr  dose; keflex clinic /  1x decr dose amio last dose 11/5/19 1x decr dose allopurinol started broccoli casserole; 1x boost   1x decr dose  augment use 1x dose decr;  augment   * Amiodarone LD 11/5/2019. Monitor as likely her warfarin needs would increase over the next few months     Date 1/10 1/14 1/23 1/28 1/31 2/4 2/11 2/18 2/24 3/3 3/10 3/16 3/19   Total Weekly Dose 28mg 32mg 30mg 26mg 26mg  24mg 26mg 26mg 24mg 26mg 27mg 25mg 29mg   INR 1.6 2.1 4.1 2.2 3.4 2.7 2.1 3.4 2.3 1.9 1.9 1.4 2.0   Notes augment  2x incr dose 1x decr keflex, pred keflex; pred; sick keflex; sick; dose decr x1 sick;   valtrex keflex   1x incr dose, incr GLV 1x dose decr; levaquin 1x incr dose      Date 3/23 3/26 4/2 4/9 4/16 4/23 5/1        Total WeeklyDose 29mg 29mg 30mg 30mg 30mg 32mg 30mg        INR 1.4 1.5 1.9 2.2 1.9 3.0 2.1        Notes 1x boost  spinach 1x boost, less GLV 1x boost    1 decrease          Phone Interview:  Verbal Release Authorization signed on 6/26/18 and again on 11/5/2019-- may speak with Alexy Wallace (son), Genesis Becerril (daughter), Sawyer or Gema Wallace (son and daughter-in-law), Gerry Rodrigo (son)  Tablet Strength: 2mg tablets  Patient Contact Info: preferred [phone] - home with son Yadiel- cell [phone]; call if can't get in touch with home phone      Patient Findings:  Positives:  Other complaints   Negatives:  Signs/symptoms of thrombosis, Signs/symptoms of bleeding, Laboratory test  error suspected, Change in health, Change in alcohol use, Change in activity, Upcoming invasive procedure, Emergency department visit, Upcoming dental procedure, Missed doses, Extra doses, Change in medications, Change in diet/appetite, Hospital admission, Bruising   Comments:  Patient was a day late on testing and therefore ended up mis-dosing last night. She took 4mg versus the potentially intended 6mg. She did not write down this past week's dosing and was a little hazy on remembering. Discussed writing down instructions in future so we can dose appropriately. Ms. Wallace verbalized understanding.     Plan:  1. INR is therapeutic following one dose decrease although a significant drop. Instructed Mr. Wallace to take warfarin 4mg daily except 6mg on MonFri this week. (resuming 32/mg week, changed to MonFri)  2. Repeat INR in one week to ensure WNL.    3. Verbal information provided over the phone. Patient RBV dosing instructions, WROTE DOWN INSTRUCTIONS expresses understanding by teach back, and has no further questions at this time. She also filled up her medication planner while on the phone.    Zina Aldrich CPhT  5/1/2020  11:04    I, Francoise Wu [facility], have reviewed the note in full and agree with the assessment and plan.  05/01/20  14:49

## 2020-05-06 ENCOUNTER — TELEPHONE (OUTPATIENT)
Dept: INTERNAL MEDICINE | Facility: CLINIC | Age: 80
End: 2020-05-06

## 2020-05-06 RX ORDER — TRAZODONE HYDROCHLORIDE 50 MG/1
TABLET ORAL
Qty: 30 TABLET | Refills: 2 | Status: SHIPPED | OUTPATIENT
Start: 2020-05-06 | End: 2020-07-21

## 2020-05-06 NOTE — TELEPHONE ENCOUNTER
PT CALLED STATED THAT SHE IS HAVING PROBLEMS SLEEPING AND WOULD LIKE AN RX TO HELP HER SLEEP.    PLEASE ADVISE.  CALL BACK:[phone]       [place] [id number] - [place], [place] - [address] [facility]

## 2020-05-08 ENCOUNTER — ANTICOAGULATION VISIT (OUTPATIENT)
Dept: PHARMACY | Facility: HOSPITAL | Age: 80
End: 2020-05-08

## 2020-05-08 DIAGNOSIS — I48.0 PAROXYSMAL ATRIAL FIBRILLATION (HCC): ICD-10-CM

## 2020-05-08 LAB — INR PPP: 2.7

## 2020-05-08 NOTE — PROGRESS NOTES
Anticoagulation Clinic - Remote Progress Note  [facility]  Testing Frequency: 7 days    Indication: paroxysmal afib  Referring Provider: Butch Joe (Last seen: 11/5/19  next appt: 6/9/20)  Goal INR: 2.0-3.0  Current Drug Interactions: , levothyroxine; omeprazole, azaTHIOprine, ezetimibe, allopurinol (11/20)  CHADS-VASc: 5 (age, gender, HTN, DM)  HAS-BLED: 3 (HTN, CKD, Age)     Diet: avoids GLV (5/1/20) no meal replacement drinks   Alcohol: none  Tobacco: none   OTC Pain Medication: APAP PRN; took tylenol last 2 nights for pain from dialysis (03/26/20)    1st clinic: 9/14/17  2nd clinic: 6/26/18  3rd clinic: 11/5/19    INR History:  Date 7/23 7/26 8/2 8/9 8/16 8/23 8/29 9/5 9/12 9/19 9/24   Total Weekly Dose 36mg 38mg 36mg 38mg 36mg 32mg 30mg?? 32mg 32 mg 32mg 30mg   INR 1.7 2.3 2.0 3.4 3.8 3.5 2.5 2.6 2.5 3.3 2.0   Notes Keflex boost   1x decr dose 1x decr dose    Keflex Keflex     Date 9/27 10/1 10/8 10/15  10/22 10/29 11/5 11/14 11/21 11/28 12/4   Total Weekly Dose 32mg 32mg 32mg 30mg  32mg 28mg 32mg 32mg 32mg 28mg 26mg   INR 2.3 2.4 3.2 2.5  3.9 2.7 3.0 2.8 2.7 3.7 1.9   Notes Levaquin start 9/26 stopped Levaquin        stop MVI, 1 miss 1 hold     Date 12/10 12/17 12/21 12/31 1/7/19 1/14 1/22 1/24 1/28 1/31 2/7   Total Weekly Dose 30mg 30mg 30mg 26mg 28mg 28mg 28mg 28mg 30mg 30mg 28mg   INR 2.4 3.0 3.6 2.5 2.6 2.6 2.9 1.7 2.7 2.5 2.7   Notes        doxy start 1/22, incr GLV 2x boost; doxy Finish doxy 1/29      Date 2/14 2/20 2/28 3/7 3/14 3/21 3/28 4/4 4/11 4/18 4/25   Total Weekly Dose 28mg 28mg 28mg 30mg 31mg 30mg 30mg 31mg 32mg 31mg 31mg   INR 2.1 2.4 1.8 1.6 2.2 2.5 1.9 1.9 2.4 2.8 2.4   Notes   sick Zetia            Date 5/2 5/6 5/15 REHAB 7/19 7/22 7/26 7/30 8/2 8/5 8/12   Total Weekly Dose 31mg 31mg 31mg  ????? ??? Unable to confirm 34mg? 34mg **30mg ? 30mg   INR 2.0 2.4 2.5  1.2 1.3 2.7 1.2 2.0 2.2 1.6   Notes ampicillin amp       stopped lexapro cefdinir      Date 8/15 8/19 8/26 8/30  9/5 9/9 9/13 9/17 10/1 10/7 10/15 10/21   Total Weekly Dose 32mg 33mg 31mg 32mg 31mg 32mg 28mg 24mg 28mg 28mg 26mg 28mg   INR 2.6 2.2 1.8 1.9 2.7 3.3 4.0 3.2 2.5 3.7 2.0 2.2   Notes 3x boost   cefdinir; 1x boost keflex x2 days sick Sick; dose decrease  1x decr dose  1 dose reduction      Date 10/29 11/1 11/5 11/12 11/18 11/27 12/3 12/10 12/18 12/26 1/2/20 1/7   Total Weekly Dose 28mg 27mg 27mg 28mg 26mg 27mg 28mg 28mg 28mg 28mg 26mg 24mg   INR 2.8 2.7 2.4 3.5 2.9 2.0 2.3 3.3 2.9 3.5 3.2 1.2   Notes keflex start 10/28 1x decr  dose; keflex clinic /  1x decr dose amio last dose 11/5/19 1x decr dose allopurinol started broccoli casserole; 1x boost   1x decr dose  augment use 1x dose decr;  augment   * Amiodarone LD 11/5/2019. Monitor as likely her warfarin needs would increase over the next few months     Date 1/10 1/14 1/23 1/28 1/31 2/4 2/11 2/18 2/24 3/3 3/10 3/16 3/19   Total Weekly Dose 28mg 32mg 30mg 26mg 26mg  24mg 26mg 26mg 24mg 26mg 27mg 25mg 29mg   INR 1.6 2.1 4.1 2.2 3.4 2.7 2.1 3.4 2.3 1.9 1.9 1.4 2.0   Notes augment  2x incr dose 1x decr keflex, pred keflex; pred; sick keflex; sick; dose decr x1 sick;   valtrex keflex   1x incr dose, incr GLV 1x dose decr; levaquin 1x incr dose      Date 3/23 3/26 4/2 4/9 4/16 4/23 5/1 5/8       Total WeeklyDose 29mg 29mg 30mg 30mg 30mg 32mg 30mg 32mg       INR 1.4 1.5 1.9 2.2 1.9 3.0 2.1 2.7       Notes 1x boost  spinach 1x boost, less GLV 1x boost    1 decrease          Phone Interview:  Verbal Release Authorization signed on 6/26/18 and again on 11/5/2019-- may speak with Alexy Wallace (son), Genesis Becerril (daughter), Sawyer Wallace (son and daughter-in-law), Gerry Wallace (son)  Tablet Strength: 2mg tablets  Patient Contact Info: preferred [phone] - home with carlito Cotto- cell [phone]; call if can't get in touch with home phone      Patient Findings:  Positives:  Change in medications   Negatives:  Signs/symptoms of thrombosis, Signs/symptoms of bleeding,  Laboratory test error suspected, Change in health, Change in alcohol use, Change in activity, Upcoming invasive procedure, Emergency department visit, Upcoming dental procedure, Missed doses, Extra doses, Change in diet/appetite, Hospital admission, Bruising, Other complaints   Comments:  Ms. Wallace reports she started Trazodone 50mg 1QHS PRN. She has only had to take it a couple nights this past week. She otherwise denies any changes.     Concurrent use of TRAZODONE and ANTIPLATELETS, ANTICOAGULANTS, OR NSAIDS may result in increased risk of bleeding.      Plan:  1. INR is therapeutic again today. Instructed Mr. Wallace to continue maintenance dose of warfarin 4mg daily except 6mg on MonFri this week.   2. Repeat INR in one week to ensure WNL.   3. Verbal information provided over the phone. Patient RBV dosing instructions, WROTE DOWN INSTRUCTIONS expresses understanding by teach back, and has no further questions at this time. She also filled up her medication planner while on the phone.    Zina Aldrich CPhT  5/8/2020  09:16    Monitor Trazodone use next week during patient interview.  Has appointment scheduled with Dr. Joe on 6/9/20.  Annette HENRYD, have reviewed the note in full and agree with the assessment and plan.  05/08/20  11:34 AM

## 2020-05-11 RX ORDER — HYDROXYZINE HYDROCHLORIDE 25 MG/1
TABLET, FILM COATED ORAL
Qty: 30 TABLET | Refills: 5 | Status: SHIPPED | OUTPATIENT
Start: 2020-05-11 | End: 2020-07-21

## 2020-05-14 ENCOUNTER — ANTICOAGULATION VISIT (OUTPATIENT)
Dept: PHARMACY | Facility: HOSPITAL | Age: 80
End: 2020-05-14

## 2020-05-14 DIAGNOSIS — I48.0 PAROXYSMAL ATRIAL FIBRILLATION (HCC): ICD-10-CM

## 2020-05-14 LAB — INR PPP: 2.4

## 2020-05-14 NOTE — PROGRESS NOTES
Anticoagulation Clinic - Remote Progress Note  [facility]  Testing Frequency: 7 days    Indication: paroxysmal afib  Referring Provider: Butch Joe (Last seen: 11/5/19  next appt: 6/9/20)  Goal INR: 2.0-3.0  Current Drug Interactions: , levothyroxine; omeprazole, azaTHIOprine, ezetimibe, allopurinol (11/20)  CHADS-VASc: 5 (age, gender, HTN, DM)  HAS-BLED: 3 (HTN, CKD, Age)     Diet: avoids GLV (5/1/20) no meal replacement drinks   Alcohol: none  Tobacco: none   OTC Pain Medication: APAP PRN; took tylenol last 2 nights for pain from dialysis (03/26/20)    1st clinic: 9/14/17  2nd clinic: 6/26/18  3rd clinic: 11/5/19    INR History:  Date 7/23 7/26 8/2 8/9 8/16 8/23 8/29 9/5 9/12 9/19 9/24   Total Weekly Dose 36mg 38mg 36mg 38mg 36mg 32mg 30mg?? 32mg 32 mg 32mg 30mg   INR 1.7 2.3 2.0 3.4 3.8 3.5 2.5 2.6 2.5 3.3 2.0   Notes Keflex boost   1x decr dose 1x decr dose    Keflex Keflex     Date 9/27 10/1 10/8 10/15  10/22 10/29 11/5 11/14 11/21 11/28 12/4   Total Weekly Dose 32mg 32mg 32mg 30mg  32mg 28mg 32mg 32mg 32mg 28mg 26mg   INR 2.3 2.4 3.2 2.5  3.9 2.7 3.0 2.8 2.7 3.7 1.9   Notes Levaquin start 9/26 stopped Levaquin        stop MVI, 1 miss 1 hold     Date 12/10 12/17 12/21 12/31 1/7/19 1/14 1/22 1/24 1/28 1/31 2/7   Total Weekly Dose 30mg 30mg 30mg 26mg 28mg 28mg 28mg 28mg 30mg 30mg 28mg   INR 2.4 3.0 3.6 2.5 2.6 2.6 2.9 1.7 2.7 2.5 2.7   Notes        doxy start 1/22, incr GLV 2x boost; doxy Finish doxy 1/29      Date 2/14 2/20 2/28 3/7 3/14 3/21 3/28 4/4 4/11 4/18 4/25   Total Weekly Dose 28mg 28mg 28mg 30mg 31mg 30mg 30mg 31mg 32mg 31mg 31mg   INR 2.1 2.4 1.8 1.6 2.2 2.5 1.9 1.9 2.4 2.8 2.4   Notes   sick Zetia            Date 5/2 5/6 5/15 REHAB 7/19 7/22 7/26 7/30 8/2 8/5 8/12   Total Weekly Dose 31mg 31mg 31mg  ????? ??? Unable to confirm 34mg? 34mg **30mg ? 30mg   INR 2.0 2.4 2.5  1.2 1.3 2.7 1.2 2.0 2.2 1.6   Notes ampicillin amp       stopped lexapro cefdinir      Date 8/15 8/19 8/26 8/30  9/5 9/9 9/13 9/17 10/1 10/7 10/15 10/21   Total Weekly Dose 32mg 33mg 31mg 32mg 31mg 32mg 28mg 24mg 28mg 28mg 26mg 28mg   INR 2.6 2.2 1.8 1.9 2.7 3.3 4.0 3.2 2.5 3.7 2.0 2.2   Notes 3x boost   cefdinir; 1x boost keflex x2 days sick Sick; dose decrease  1x decr dose  1 dose reduction      Date 10/29 11/1 11/5 11/12 11/18 11/27 12/3 12/10 12/18 12/26 1/2/20 1/7   Total Weekly Dose 28mg 27mg 27mg 28mg 26mg 27mg 28mg 28mg 28mg 28mg 26mg 24mg   INR 2.8 2.7 2.4 3.5 2.9 2.0 2.3 3.3 2.9 3.5 3.2 1.2   Notes keflex start 10/28 1x decr  dose; keflex clinic /  1x decr dose amio last dose 11/5/19 1x decr dose allopurinol started broccoli casserole; 1x boost   1x decr dose  augment use 1x dose decr;  augment   * Amiodarone LD 11/5/2019. Monitor as likely her warfarin needs would increase over the next few months     Date 1/10 1/14 1/23 1/28 1/31 2/4 2/11 2/18 2/24 3/3 3/10 3/16 3/19   Total Weekly Dose 28mg 32mg 30mg 26mg 26mg  24mg 26mg 26mg 24mg 26mg 27mg 25mg 29mg   INR 1.6 2.1 4.1 2.2 3.4 2.7 2.1 3.4 2.3 1.9 1.9 1.4 2.0   Notes augment  2x incr dose 1x decr keflex, pred keflex; pred; sick keflex; sick; dose decr x1 sick;   valtrex keflex   1x incr dose, incr GLV 1x dose decr; levaquin 1x incr dose      Date 3/23 3/26 4/2 4/9 4/16 4/23 5/1 5/8 5/14      Total WeeklyDose 29mg 29mg 30mg 30mg 30mg 32mg 30mg 32mg 28mg      INR 1.4 1.5 1.9 2.2 1.9 3.0 2.1 2.7 2.4      Notes 1x incr dose;  spinach 1x incr dose,  less GLV 1x boost    1x decr  dose trazodone 1x miss; trazodone 25mg QPM        Phone Interview:  Verbal Release Authorization signed on 6/26/18 and again on 11/5/2019-- may speak with Alexy Wallace (son), Genesis Becerril (daughter), Sawyer Wallace (son and daughter-in-law), Gerry Wallace (son)  Tablet Strength: 2mg tablets  Patient Contact Info: preferred [phone] - home with son Yadiel- cell [phone]; call if can't get in touch with home phone      Patient Findings   Positives:  Missed doses   Negatives:   "Signs/symptoms of thrombosis, Signs/symptoms of bleeding, Laboratory test error suspected, Change in health, Change in alcohol use, Change in activity, Upcoming invasive procedure, Emergency department visit, Upcoming dental procedure, Extra doses, Change in medications, Change in diet/appetite, Hospital admission, Bruising, Other complaints   Comments:  Patient reports she missed all her medications yesterday evening. Per Kwadwo LimonD, instructed patient to IMMEDIATELY take warfarin 2mg (patient did so during our phone call), then tonight take her usual 4mg. Tracker has been updated to reflect.     Initially during our phone call patient believed she had taken her 6mg dose on MonWed and was quite adamant she had. She had written down instructions (and filled pillbox during the encounter), but could not find it. After seeing she had missed her medications last night and discovering there were 2 warfarin tablets in the bin, she remembered/believed she took her 6mg dose on MonFri. Patient is very good to fill med box and read back dosing during the encounter, but may lack some in also writing down dosing that can be recalled at next encounter.     Patient reports she has been taking Trazodone 50mg 1/2 tab QPM since our previous encounter. This may be an increase from her previous statement that said she was taking 50mg QPM \"a few nights during the week\". She is agreeable to try to be as consistent as possible.     Otherwise denies findings.     Plan:  1. INR is therapeutic today at 2.4 despite missing dose of warfarin last night. After consulting with Kwadwo LimonD, instructed Mr. Wallace to IMMEDIATELY take warfarin 2mg (1x tablet, did so during our telephone conversation) and then continue maintenance dose of warfarin 4mg daily except 6mg on MonFri this week.   2. Repeat INR in one week  3. Verbal information provided over the phone. Patient RBV dosing instructions, expresses understanding by teach " back, and has no further questions at this time. She also filled up her medication planner while on the phone.    Sawyer Gamez, ISAEL  5/14/2020  10:26      I, Francoise Wu, [facility], have reviewed the note in full and agree with the assessment and plan.  05/14/20  11:51

## 2020-05-26 ENCOUNTER — ANTICOAGULATION VISIT (OUTPATIENT)
Dept: PHARMACY | Facility: HOSPITAL | Age: 80
End: 2020-05-26

## 2020-05-26 DIAGNOSIS — I48.0 PAROXYSMAL ATRIAL FIBRILLATION (HCC): ICD-10-CM

## 2020-05-26 LAB — INR PPP: 3.2

## 2020-05-26 NOTE — PROGRESS NOTES
Anticoagulation Clinic - Remote Progress Note  [facility]  Testing Frequency: 7 days    Indication: paroxysmal afib  Referring Provider: Butch Joe (Last seen: 11/5/19  next appt: 6/9/20)  Goal INR: 2.0-3.0  Current Drug Interactions: , levothyroxine; omeprazole, azaTHIOprine, ezetimibe, allopurinol (11/20)  CHADS-VASc: 5 (age, gender, HTN, DM)  HAS-BLED: 3 (HTN, CKD, Age)     Diet: avoids GLV (5/26/20) no meal replacement drinks   Alcohol: none  Tobacco: none   OTC Pain Medication: APAP PRN; took tylenol last 2 nights for pain from dialysis (03/26/20)    1st clinic: 9/14/17  2nd clinic: 6/26/18  3rd clinic: 11/5/19    INR History:  Date 7/23 7/26 8/2 8/9 8/16 8/23 8/29 9/5 9/12 9/19 9/24   Total Weekly Dose 36mg 38mg 36mg 38mg 36mg 32mg 30mg?? 32mg 32 mg 32mg 30mg   INR 1.7 2.3 2.0 3.4 3.8 3.5 2.5 2.6 2.5 3.3 2.0   Notes Keflex boost   1x decr dose 1x decr dose    Keflex Keflex     Date 9/27 10/1 10/8 10/15  10/22 10/29 11/5 11/14 11/21 11/28 12/4   Total Weekly Dose 32mg 32mg 32mg 30mg  32mg 28mg 32mg 32mg 32mg 28mg 26mg   INR 2.3 2.4 3.2 2.5  3.9 2.7 3.0 2.8 2.7 3.7 1.9   Notes Levaquin start 9/26 stopped Levaquin        stop MVI, 1 miss 1 hold     Date 12/10 12/17 12/21 12/31 1/7/19 1/14 1/22 1/24 1/28 1/31 2/7   Total Weekly Dose 30mg 30mg 30mg 26mg 28mg 28mg 28mg 28mg 30mg 30mg 28mg   INR 2.4 3.0 3.6 2.5 2.6 2.6 2.9 1.7 2.7 2.5 2.7   Notes        doxy start 1/22, incr GLV 2x boost; doxy Finish doxy 1/29      Date 2/14 2/20 2/28 3/7 3/14 3/21 3/28 4/4 4/11 4/18 4/25   Total Weekly Dose 28mg 28mg 28mg 30mg 31mg 30mg 30mg 31mg 32mg 31mg 31mg   INR 2.1 2.4 1.8 1.6 2.2 2.5 1.9 1.9 2.4 2.8 2.4   Notes   sick Zetia            Date 5/2 5/6 5/15 REHAB 7/19 7/22 7/26 7/30 8/2 8/5 8/12   Total Weekly Dose 31mg 31mg 31mg  ????? ??? Unable to confirm 34mg? 34mg **30mg ? 30mg   INR 2.0 2.4 2.5  1.2 1.3 2.7 1.2 2.0 2.2 1.6   Notes ampicillin amp       stopped lexapro cefdinir      Date 8/15 8/19 8/26 8/30  9/5 9/9 9/13 9/17 10/1 10/7 10/15 10/21   Total Weekly Dose 32mg 33mg 31mg 32mg 31mg 32mg 28mg 24mg 28mg 28mg 26mg 28mg   INR 2.6 2.2 1.8 1.9 2.7 3.3 4.0 3.2 2.5 3.7 2.0 2.2   Notes 3x boost   cefdinir; 1x boost keflex x2 days sick Sick; dose decrease  1x decr dose  1 dose reduction      Date 10/29 11/1 11/5 11/12 11/18 11/27 12/3 12/10 12/18 12/26 1/2/20 1/7   Total Weekly Dose 28mg 27mg 27mg 28mg 26mg 27mg 28mg 28mg 28mg 28mg 26mg 24mg   INR 2.8 2.7 2.4 3.5 2.9 2.0 2.3 3.3 2.9 3.5 3.2 1.2   Notes keflex start 10/28 1x decr  dose; keflex clinic /  1x decr dose amio last dose 11/5/19 1x decr dose allopurinol started broccoli casserole; 1x boost   1x decr dose  augment use 1x dose decr;  augment   * Amiodarone LD 11/5/2019. Monitor as likely her warfarin needs would increase over the next few months     Date 1/10 1/14 1/23 1/28 1/31 2/4 2/11 2/18 2/24 3/3 3/10 3/16 3/19   Total Weekly Dose 28mg 32mg 30mg 26mg 26mg  24mg 26mg 26mg 24mg 26mg 27mg 25mg 29mg   INR 1.6 2.1 4.1 2.2 3.4 2.7 2.1 3.4 2.3 1.9 1.9 1.4 2.0   Notes augment  2x incr dose 1x decr keflex, pred keflex; pred; sick keflex; sick; dose decr x1 sick;   valtrex keflex   1x incr dose, incr GLV 1x dose decr; levaquin 1x incr dose      Date 3/23 3/26 4/2 4/9 4/16 4/23 5/1 5/8 5/14 5/22     Total WeeklyDose 29mg 29mg 30mg 30mg 30mg 32mg 30mg 32mg 28mg 32mg     INR 1.4 1.5 1.9 2.2 1.9 3.0 2.1 2.7 2.4 3.2     Notes 1x incr dose;  spinach 1x incr dose,  less GLV 1x boost    1x decr  dose trazodone 1x miss; trazodone 25mg QPM Rcv/d 5/26       Phone Interview:  Verbal Release Authorization signed on 6/26/18 and again on 11/5/2019-- may speak with Alexy Wallace (son), Genesis Becerril (daughter), Sawyer Wallace (son and daughter-in-law), Gerry Wallace (son)  Tablet Strength: 2mg tablets  Patient Contact Info: preferred [phone] - home with son Yadiel- cell [phone]; call if can't get in touch with home phone      Patient Findings   Negatives:  Signs/symptoms  of thrombosis, Signs/symptoms of bleeding, Laboratory test error suspected, Change in health, Change in alcohol use, Change in activity, Upcoming invasive procedure, Emergency department visit, Upcoming dental procedure, Missed doses, Extra doses, Change in medications, Change in diet/appetite, Hospital admission, Bruising, Other complaints   Comments:  Ms. Wallace reports that she hasn't taken trazodone for the past several nights. She plans to have trazodone again tonight. Ms. Wallace reports that she ate some green beans on Sunday and typically doesn't eat GLV. Discussed that fluctuation in trazodone may cause her INR to fluctuate as well      Plan:  1. INR was SUPRAtherapeutic on 5/22. Pt tested at 7pm and results were received on 5/26. Instructed Ms. Wallace to continue maintenance dose of warfarin 4mg daily except 6mg on MonFri this week.   2. Repeat INR on Friday 5/29.  3. Verbal information provided over the phone. Patient RBV dosing instructions, expresses understanding by teach back, and has no further questions at this time. She also filled up her medication planner while on the phone.    Fatemeh Rodríguez, PharmD  5/26/2020  09:06

## 2020-05-27 ENCOUNTER — ANTICOAGULATION VISIT (OUTPATIENT)
Dept: PHARMACY | Facility: HOSPITAL | Age: 80
End: 2020-05-27

## 2020-05-27 DIAGNOSIS — I48.0 PAROXYSMAL ATRIAL FIBRILLATION (HCC): ICD-10-CM

## 2020-05-27 LAB — INR PPP: 3.5

## 2020-05-27 NOTE — PROGRESS NOTES
Anticoagulation Clinic - Remote Progress Note  [facility]  Testing Frequency: 7 days    Indication: paroxysmal afib  Referring Provider: Butch Joe (Last seen: 11/5/19  next appt: 6/9/20)  Goal INR: 2.0-3.0  Current Drug Interactions: , levothyroxine; omeprazole, azaTHIOprine, ezetimibe, allopurinol (11/20)  CHADS-VASc: 5 (age, gender, HTN, DM)  HAS-BLED: 3 (HTN, CKD, Age)     Diet: avoids GLV (5/26/20) no meal replacement drinks   Alcohol: none  Tobacco: none   OTC Pain Medication: APAP PRN; took tylenol last 2 nights for pain from dialysis (03/26/20)    1st clinic: 9/14/17  2nd clinic: 6/26/18  3rd clinic: 11/5/19    INR History:  Date 7/23 7/26 8/2 8/9 8/16 8/23 8/29 9/5 9/12 9/19 9/24   Total Weekly Dose 36mg 38mg 36mg 38mg 36mg 32mg 30mg?? 32mg 32 mg 32mg 30mg   INR 1.7 2.3 2.0 3.4 3.8 3.5 2.5 2.6 2.5 3.3 2.0   Notes Keflex boost   1x decr dose 1x decr dose    Keflex Keflex     Date 9/27 10/1 10/8 10/15  10/22 10/29 11/5 11/14 11/21 11/28 12/4   Total Weekly Dose 32mg 32mg 32mg 30mg  32mg 28mg 32mg 32mg 32mg 28mg 26mg   INR 2.3 2.4 3.2 2.5  3.9 2.7 3.0 2.8 2.7 3.7 1.9   Notes Levaquin start 9/26 stopped Levaquin        stop MVI, 1 miss 1 hold     Date 12/10 12/17 12/21 12/31 1/7/19 1/14 1/22 1/24 1/28 1/31 2/7   Total Weekly Dose 30mg 30mg 30mg 26mg 28mg 28mg 28mg 28mg 30mg 30mg 28mg   INR 2.4 3.0 3.6 2.5 2.6 2.6 2.9 1.7 2.7 2.5 2.7   Notes        doxy start 1/22, incr GLV 2x boost; doxy Finish doxy 1/29      Date 2/14 2/20 2/28 3/7 3/14 3/21 3/28 4/4 4/11 4/18 4/25   Total Weekly Dose 28mg 28mg 28mg 30mg 31mg 30mg 30mg 31mg 32mg 31mg 31mg   INR 2.1 2.4 1.8 1.6 2.2 2.5 1.9 1.9 2.4 2.8 2.4   Notes   sick Zetia            Date 5/2 5/6 5/15 REHAB 7/19 7/22 7/26 7/30 8/2 8/5 8/12   Total Weekly Dose 31mg 31mg 31mg  ????? ??? Unable to confirm 34mg? 34mg **30mg ? 30mg   INR 2.0 2.4 2.5  1.2 1.3 2.7 1.2 2.0 2.2 1.6   Notes ampicillin amp       stopped lexapro cefdinir      Date 8/15 8/19 8/26 8/30  9/5 9/9 9/13 9/17 10/1 10/7 10/15 10/21   Total Weekly Dose 32mg 33mg 31mg 32mg 31mg 32mg 28mg 24mg 28mg 28mg 26mg 28mg   INR 2.6 2.2 1.8 1.9 2.7 3.3 4.0 3.2 2.5 3.7 2.0 2.2   Notes 3x boost   cefdinir; 1x boost keflex x2 days sick Sick; dose decrease  1x decr dose  1 dose reduction      Date 10/29 11/1 11/5 11/12 11/18 11/27 12/3 12/10 12/18 12/26 1/2/20 1/7   Total Weekly Dose 28mg 27mg 27mg 28mg 26mg 27mg 28mg 28mg 28mg 28mg 26mg 24mg   INR 2.8 2.7 2.4 3.5 2.9 2.0 2.3 3.3 2.9 3.5 3.2 1.2   Notes keflex start 10/28 1x decr  dose; keflex clinic /  1x decr dose amio last dose 11/5/19 1x decr dose allopurinol started broccoli casserole; 1x boost   1x decr dose  augment use 1x dose decr;  augment   * Amiodarone LD 11/5/2019. Monitor as likely her warfarin needs would increase over the next few months     Date 1/10 1/14 1/23 1/28 1/31 2/4 2/11 2/18 2/24 3/3 3/10 3/16 3/19   Total Weekly Dose 28mg 32mg 30mg 26mg 26mg  24mg 26mg 26mg 24mg 26mg 27mg 25mg 29mg   INR 1.6 2.1 4.1 2.2 3.4 2.7 2.1 3.4 2.3 1.9 1.9 1.4 2.0   Notes augment  2x incr dose 1x decr keflex, pred keflex; pred; sick keflex; sick; dose decr x1 sick;   valtrex keflex   1x incr dose, incr GLV 1x dose decr; levaquin 1x incr dose      Date 3/23 3/26 4/2 4/9 4/16 4/23 5/1 5/8 5/14 5/22 5/27    Total WeeklyDose 29mg 29mg 30mg 30mg 30mg 32mg 30mg 32mg 28mg 32mg 32mg    INR 1.4 1.5 1.9 2.2 1.9 3.0 2.1 2.7 2.4 3.2 3.5    Notes 1x incr dose;  spinach 1x incr dose,  less GLV 1x boost    1x decr  dose trazodone 1x miss; trazodone 25mg QPM Rcv/d 5/26       Phone Interview:  Verbal Release Authorization signed on 6/26/18 and again on 11/5/2019-- may speak with Alexy Rodrigo (son), Genesis Becerril (daughter), Sawyer or Gema Wallace (son and daughter-in-law), Gerry Wallace (son)  Tablet Strength: 2mg tablets  Patient Contact Info: preferred [phone] - home with son Yadiel- cell [phone]; call if can't get in touch with home phone      Patient Findings   Negatives:   Signs/symptoms of thrombosis, Signs/symptoms of bleeding, Laboratory test error suspected, Change in health, Change in alcohol use, Change in activity, Upcoming invasive procedure, Emergency department visit, Upcoming dental procedure, Missed doses, Extra doses, Change in medications, Change in diet/appetite, Hospital admission, Bruising, Other complaints   Comments:  Ms. Wallace reports that she hasn't taken trazodone for the past several nights. She plans to have trazodone again tonight. Ms. Wallace reports that she ate some green beans on Sunday and typically doesn't eat GLV. Discussed that fluctuation in trazodone may cause her INR to fluctuate as well.      Plan:  1. INR was SUPRAtherapeutic. Instructed Ms. Wallace to decrease dose 2mg tonight and then decrease maintenance dose of warfarin 4mg daily except 6mg on Mon this week.   2. Repeat INR on Friday 6/5 of when her children are able to help with a finger stick.   3. Verbal information provided over the phone. Patient RBV dosing instructions, expresses understanding by teach back, and has no further questions at this time. She also filled up her medication planner while on the phone.    Fatemeh Rodríguez, PharmD  5/27/2020  16:03

## 2020-06-04 ENCOUNTER — ANTICOAGULATION VISIT (OUTPATIENT)
Dept: PHARMACY | Facility: HOSPITAL | Age: 80
End: 2020-06-04

## 2020-06-04 DIAGNOSIS — I48.0 PAROXYSMAL ATRIAL FIBRILLATION (HCC): ICD-10-CM

## 2020-06-04 LAB — INR PPP: 3.2

## 2020-06-04 NOTE — PROGRESS NOTES
Anticoagulation Clinic - Remote Progress Note  [facility]  Testing Frequency: 7 days    Indication: paroxysmal afib  Referring Provider: Butch Joe (Last seen: 11/5/19  next appt: 6/9/20)  Goal INR: 2.0-3.0  Current Drug Interactions: , levothyroxine; omeprazole, azaTHIOprine, ezetimibe, allopurinol (11/20)  CHADS-VASc: 5 (age, gender, HTN, DM)  HAS-BLED: 3 (HTN, CKD, Age)     Diet: avoids GLV (5/26/20) no meal replacement drinks   Alcohol: none  Tobacco: none   OTC Pain Medication: APAP PRN; took tylenol last 2 nights for pain from dialysis (03/26/20)    1st clinic: 9/14/17  2nd clinic: 6/26/18  3rd clinic: 11/5/19    INR History:  Date 7/23 7/26 8/2 8/9 8/16 8/23 8/29 9/5 9/12 9/19 9/24   Total Weekly Dose 36mg 38mg 36mg 38mg 36mg 32mg 30mg?? 32mg 32 mg 32mg 30mg   INR 1.7 2.3 2.0 3.4 3.8 3.5 2.5 2.6 2.5 3.3 2.0   Notes Keflex boost   1x decr dose 1x decr dose    Keflex Keflex     Date 9/27 10/1 10/8 10/15  10/22 10/29 11/5 11/14 11/21 11/28 12/4   Total Weekly Dose 32mg 32mg 32mg 30mg  32mg 28mg 32mg 32mg 32mg 28mg 26mg   INR 2.3 2.4 3.2 2.5  3.9 2.7 3.0 2.8 2.7 3.7 1.9   Notes Levaquin start 9/26 stopped Levaquin        stop MVI, 1 miss 1 hold     Date 12/10 12/17 12/21 12/31 1/7/19 1/14 1/22 1/24 1/28 1/31 2/7   Total Weekly Dose 30mg 30mg 30mg 26mg 28mg 28mg 28mg 28mg 30mg 30mg 28mg   INR 2.4 3.0 3.6 2.5 2.6 2.6 2.9 1.7 2.7 2.5 2.7   Notes        doxy start 1/22, incr GLV 2x boost; doxy Finish doxy 1/29      Date 2/14 2/20 2/28 3/7 3/14 3/21 3/28 4/4 4/11 4/18 4/25   Total Weekly Dose 28mg 28mg 28mg 30mg 31mg 30mg 30mg 31mg 32mg 31mg 31mg   INR 2.1 2.4 1.8 1.6 2.2 2.5 1.9 1.9 2.4 2.8 2.4   Notes   sick Zetia            Date 5/2 5/6 5/15 REHAB 7/19 7/22 7/26 7/30 8/2 8/5 8/12   Total Weekly Dose 31mg 31mg 31mg  ????? ??? Unable to confirm 34mg? 34mg **30mg ? 30mg   INR 2.0 2.4 2.5  1.2 1.3 2.7 1.2 2.0 2.2 1.6   Notes ampicillin amp       stopped lexapro cefdinir      Date 8/15 8/19 8/26 8/30  9/5 9/9 9/13 9/17 10/1 10/7 10/15 10/21   Total Weekly Dose 32mg 33mg 31mg 32mg 31mg 32mg 28mg 24mg 28mg 28mg 26mg 28mg   INR 2.6 2.2 1.8 1.9 2.7 3.3 4.0 3.2 2.5 3.7 2.0 2.2   Notes 3x boost   cefdinir; 1x boost keflex x2 days sick Sick; dose decrease  1x decr dose  1 dose reduction      Date 10/29 11/1 11/5 11/12 11/18 11/27 12/3 12/10 12/18 12/26 1/2/20 1/7   Total Weekly Dose 28mg 27mg 27mg 28mg 26mg 27mg 28mg 28mg 28mg 28mg 26mg 24mg   INR 2.8 2.7 2.4 3.5 2.9 2.0 2.3 3.3 2.9 3.5 3.2 1.2   Notes keflex start 10/28 1x decr  dose; keflex clinic /  1x decr dose amio last dose 11/5/19 1x decr dose allopurinol started broccoli casserole; 1x boost   1x decr dose  augment use 1x dose decr;  augment   * Amiodarone LD 11/5/2019. Monitor as likely her warfarin needs would increase over the next few months     Date 1/10 1/14 1/23 1/28 1/31 2/4 2/11 2/18 2/24 3/3 3/10 3/16 3/19   Total Weekly Dose 28mg 32mg 30mg 26mg 26mg  24mg 26mg 26mg 24mg 26mg 27mg 25mg 29mg   INR 1.6 2.1 4.1 2.2 3.4 2.7 2.1 3.4 2.3 1.9 1.9 1.4 2.0   Notes augment  2x incr dose 1x decr keflex, pred keflex; pred; sick keflex; sick; dose decr x1 sick;   valtrex keflex   1x incr dose, incr GLV 1x dose decr; levaquin 1x incr dose      Date 3/23 3/26 4/2 4/9 4/16 4/23 5/1 5/8 5/14 5/22 5/27 6/4   Total WeeklyDose 29mg 29mg 30mg 30mg 30mg 32mg 30mg 32mg 28mg 32mg 32mg 30 mg   INR 1.4 1.5 1.9 2.2 1.9 3.0 2.1 2.7 2.4 3.2 3.5 3.2   Notes 1x incr dose;  spinach 1x incr dose,  less GLV 1x boost    1x decr  dose trazodone 1x miss; trazodone 25mg QPM Rcv/d 5/26  Dose redx1     Phone Interview:  Verbal Release Authorization signed on 6/26/18 and again on 11/5/2019-- may speak with Alexy Wallace (son), Genesis Becerril (daughter), Sawyer Wallace (son and daughter-in-law), Gerry Wallace (son)  Tablet Strength: 2mg tablets  Patient Contact Info: preferred [phone] - home with carlito Cotto- cell [phone]; call if can't get in touch with home phone      Patient  Findings   Negatives:  Signs/symptoms of thrombosis, Signs/symptoms of bleeding, Laboratory test error suspected, Change in health, Change in alcohol use, Change in activity, Upcoming invasive procedure, Emergency department visit, Upcoming dental procedure, Missed doses, Extra doses, Change in medications, Change in diet/appetite, Hospital admission, Bruising, Other complaints   Comments:  She is taking trazodone 1/2 tablet each night.  She is short of breath today   Otherwise, above findings negative     Plan:    1. INR is SUPRAtherapeutic today at 3.2. Instructed Ms. Wallace to decrease maintenance dose to warfarin 4mg oral daily until recheck.   2. Repeat INR on Thursday, 6/11, when her children are able to help with a finger stick.   3. Verbal information provided over the phone. Patient RBV dosing instructions, expresses understanding by teach back, and has no further questions at this time. She also filled up her medication planner while on the phone.    Radha Michaud, PharmD  6/4/2020  15:39

## 2020-06-10 ENCOUNTER — ANTICOAGULATION VISIT (OUTPATIENT)
Dept: PHARMACY | Facility: HOSPITAL | Age: 80
End: 2020-06-10

## 2020-06-10 DIAGNOSIS — I48.0 PAROXYSMAL ATRIAL FIBRILLATION (HCC): ICD-10-CM

## 2020-06-10 LAB — INR PPP: 2.5

## 2020-06-10 NOTE — PROGRESS NOTES
Anticoagulation Clinic - Remote Progress Note  [facility]  Testing Frequency: 7 days    Indication: paroxysmal afib  Referring Provider: Butch Joe (Last seen: 11/5/19  next appt: 6/9/20)  Goal INR: 2.0-3.0  Current Drug Interactions: , levothyroxine; omeprazole, azaTHIOprine, ezetimibe, allopurinol (11/20)  CHADS-VASc: 5 (age, gender, HTN, DM)  HAS-BLED: 3 (HTN, CKD, Age)     Diet: avoids GLV (5/26/20) no meal replacement drinks   Alcohol: none  Tobacco: none   OTC Pain Medication: APAP PRN; took tylenol last 2 nights for pain from dialysis (03/26/20)    1st clinic: 9/14/17  2nd clinic: 6/26/18  3rd clinic: 11/5/19    INR History:  Date 7/23 7/26 8/2 8/9 8/16 8/23 8/29 9/5 9/12 9/19 9/24   Total Weekly Dose 36mg 38mg 36mg 38mg 36mg 32mg 30mg?? 32mg 32 mg 32mg 30mg   INR 1.7 2.3 2.0 3.4 3.8 3.5 2.5 2.6 2.5 3.3 2.0   Notes Keflex boost   1x decr dose 1x decr dose    Keflex Keflex     Date 9/27 10/1 10/8 10/15  10/22 10/29 11/5 11/14 11/21 11/28 12/4   Total Weekly Dose 32mg 32mg 32mg 30mg  32mg 28mg 32mg 32mg 32mg 28mg 26mg   INR 2.3 2.4 3.2 2.5  3.9 2.7 3.0 2.8 2.7 3.7 1.9   Notes Levaquin start 9/26 stopped Levaquin        stop MVI, 1 miss 1 hold     Date 12/10 12/17 12/21 12/31 1/7/19 1/14 1/22 1/24 1/28 1/31 2/7   Total Weekly Dose 30mg 30mg 30mg 26mg 28mg 28mg 28mg 28mg 30mg 30mg 28mg   INR 2.4 3.0 3.6 2.5 2.6 2.6 2.9 1.7 2.7 2.5 2.7   Notes        doxy start 1/22, incr GLV 2x boost; doxy Finish doxy 1/29      Date 2/14 2/20 2/28 3/7 3/14 3/21 3/28 4/4 4/11 4/18 4/25   Total Weekly Dose 28mg 28mg 28mg 30mg 31mg 30mg 30mg 31mg 32mg 31mg 31mg   INR 2.1 2.4 1.8 1.6 2.2 2.5 1.9 1.9 2.4 2.8 2.4   Notes   sick Zetia            Date 5/2 5/6 5/15 REHAB 7/19 7/22 7/26 7/30 8/2 8/5 8/12   Total Weekly Dose 31mg 31mg 31mg  ????? ??? Unable to confirm 34mg? 34mg **30mg ? 30mg   INR 2.0 2.4 2.5  1.2 1.3 2.7 1.2 2.0 2.2 1.6   Notes ampicillin amp       stopped lexapro cefdinir      Date 8/15 8/19 8/26 8/30  9/5 9/9 9/13 9/17 10/1 10/7 10/15 10/21   Total Weekly Dose 32mg 33mg 31mg 32mg 31mg 32mg 28mg 24mg 28mg 28mg 26mg 28mg   INR 2.6 2.2 1.8 1.9 2.7 3.3 4.0 3.2 2.5 3.7 2.0 2.2   Notes 3x boost   cefdinir; 1x boost keflex x2 days sick Sick; dose decrease  1x decr dose  1 dose reduction      Date 10/29 11/1 11/5 11/12 11/18 11/27 12/3 12/10 12/18 12/26 1/2/20 1/7   Total Weekly Dose 28mg 27mg 27mg 28mg 26mg 27mg 28mg 28mg 28mg 28mg 26mg 24mg   INR 2.8 2.7 2.4 3.5 2.9 2.0 2.3 3.3 2.9 3.5 3.2 1.2   Notes keflex start 10/28 1x decr  dose; keflex clinic /  1x decr dose amio last dose 11/5/19 1x decr dose allopurinol started broccoli casserole; 1x boost   1x decr dose  augment use 1x dose decr;  augment   * Amiodarone LD 11/5/2019. Monitor as likely her warfarin needs would increase over the next few months     Date 1/10 1/14 1/23 1/28 1/31 2/4 2/11 2/18 2/24 3/3 3/10 3/16 3/19   Total Weekly Dose 28mg 32mg 30mg 26mg 26mg  24mg 26mg 26mg 24mg 26mg 27mg 25mg 29mg   INR 1.6 2.1 4.1 2.2 3.4 2.7 2.1 3.4 2.3 1.9 1.9 1.4 2.0   Notes augment  2x incr dose 1x decr keflex, pred keflex; pred; sick keflex; sick; dose decr x1 sick;   valtrex keflex   1x incr dose, incr GLV 1x dose decr; levaquin 1x incr dose      Date 3/23 3/26 4/2 4/9 4/16 4/23 5/1 5/8 5/14 5/22 5/27 6/4   Total WeeklyDose 29mg 29mg 30mg 30mg 30mg 32mg 30mg 32mg 28mg 32mg 32mg 30 mg   INR 1.4 1.5 1.9 2.2 1.9 3.0 2.1 2.7 2.4 3.2 3.5 3.2   Notes 1x incr dose;  spinach 1x incr dose,  less GLV 1x boost    1x decr  dose trazodone 1x miss; trazodone 25mg QPM Rcv/d 5/26  Dose redx1     Date 6/10              Total WeeklyDose 28mg              INR 2.5              Notes                 Phone Interview:  Verbal Release Authorization signed on 6/26/18 and again on 11/5/2019-- may speak with Alexy Wallace (son), Genesis Becerril (daughter), Sawyer Wallace (son and daughter-in-law), Gerry Wallace (son)  Tablet Strength: 2mg tablets  Patient Contact Info: preferred [phone] -  home with son Yadiel- cell [phone]; call if can't get in touch with home phone      Patient Findings   Negatives:  Signs/symptoms of thrombosis, Signs/symptoms of bleeding, Laboratory test error suspected, Change in health, Change in alcohol use, Change in activity, Upcoming invasive procedure, Emergency department visit, Upcoming dental procedure, Missed doses, Extra doses, Change in medications, Change in diet/appetite, Hospital admission, Bruising, Other complaints     Plan:    1. INR is therapeutic today at 2.5. Will continue with current dose of warfarin 4mg daily.  2. Repeat INR on Thursday, 6/17.  3. Verbal information provided over the phone. Patient RBV dosing instructions, expresses understanding by teach back, and has no further questions at this time. She also filled up her medication planner while on the phone.    Thanks  Paco Renae, PharmD  PGY1 Resident  6/10/2020  15:17

## 2020-06-11 ENCOUNTER — TELEPHONE (OUTPATIENT)
Dept: INTERNAL MEDICINE | Facility: CLINIC | Age: 80
End: 2020-06-11

## 2020-06-11 NOTE — TELEPHONE ENCOUNTER
PT CALLED STATED THAT SHE WAS TOLD BY THE PERSON THAT DOES HER INR THAT THE RX traZODone (DESYREL) 50 MG tablet IS MESSING UP HER INR, PT REQUEST TO BE PUT BACK ON RX ALPRAZolam (XANAX) 0.5 MG tablet.    PLEASE ADVISE.  CALL BACK:[phone]     [facility] [address] [address] [place] [address] ROAD    Called patient and advised would not place her xanax

## 2020-06-17 ENCOUNTER — ANTICOAGULATION VISIT (OUTPATIENT)
Dept: PHARMACY | Facility: HOSPITAL | Age: 80
End: 2020-06-17

## 2020-06-17 DIAGNOSIS — I48.0 PAROXYSMAL ATRIAL FIBRILLATION (HCC): ICD-10-CM

## 2020-06-17 LAB — INR PPP: 2.6

## 2020-06-17 NOTE — PROGRESS NOTES
Anticoagulation Clinic - Remote Progress Note  [facility]  Testing Frequency: 7 days    Indication: paroxysmal afib  Referring Provider: Butch Joe (Last seen: 11/5/19  next appt: 6/9/20)  Goal INR: 2.0-3.0  Current Drug Interactions: , levothyroxine; omeprazole, azaTHIOprine, ezetimibe, allopurinol (11/20)  CHADS-VASc: 5 (age, gender, HTN, DM)  HAS-BLED: 3 (HTN, CKD, Age)     Diet: avoids GLV (5/26/20) no meal replacement drinks   Alcohol: none  Tobacco: none   OTC Pain Medication: APAP PRN; took tylenol last 2 nights for pain from dialysis (03/26/20)    1st clinic: 9/14/17  2nd clinic: 6/26/18  3rd clinic: 11/5/19    INR History:  Date 7/23 7/26 8/2 8/9 8/16 8/23 8/29 9/5 9/12 9/19 9/24   Total Weekly Dose 36mg 38mg 36mg 38mg 36mg 32mg 30mg?? 32mg 32 mg 32mg 30mg   INR 1.7 2.3 2.0 3.4 3.8 3.5 2.5 2.6 2.5 3.3 2.0   Notes Keflex boost   1x decr dose 1x decr dose    Keflex Keflex     Date 9/27 10/1 10/8 10/15  10/22 10/29 11/5 11/14 11/21 11/28 12/4   Total Weekly Dose 32mg 32mg 32mg 30mg  32mg 28mg 32mg 32mg 32mg 28mg 26mg   INR 2.3 2.4 3.2 2.5  3.9 2.7 3.0 2.8 2.7 3.7 1.9   Notes Levaquin start 9/26 stopped Levaquin        stop MVI, 1 miss 1 hold     Date 12/10 12/17 12/21 12/31 1/7/19 1/14 1/22 1/24 1/28 1/31 2/7   Total Weekly Dose 30mg 30mg 30mg 26mg 28mg 28mg 28mg 28mg 30mg 30mg 28mg   INR 2.4 3.0 3.6 2.5 2.6 2.6 2.9 1.7 2.7 2.5 2.7   Notes        doxy start 1/22, incr GLV 2x boost; doxy Finish doxy 1/29      Date 2/14 2/20 2/28 3/7 3/14 3/21 3/28 4/4 4/11 4/18 4/25   Total Weekly Dose 28mg 28mg 28mg 30mg 31mg 30mg 30mg 31mg 32mg 31mg 31mg   INR 2.1 2.4 1.8 1.6 2.2 2.5 1.9 1.9 2.4 2.8 2.4   Notes   sick Zetia            Date 5/2 5/6 5/15 REHAB 7/19 7/22 7/26 7/30 8/2 8/5 8/12   Total Weekly Dose 31mg 31mg 31mg  ????? ??? Unable to confirm 34mg? 34mg **30mg ? 30mg   INR 2.0 2.4 2.5  1.2 1.3 2.7 1.2 2.0 2.2 1.6   Notes ampicillin amp       stopped lexapro cefdinir      Date 8/15 8/19 8/26 8/30  9/5 9/9 9/13 9/17 10/1 10/7 10/15 10/21   Total Weekly Dose 32mg 33mg 31mg 32mg 31mg 32mg 28mg 24mg 28mg 28mg 26mg 28mg   INR 2.6 2.2 1.8 1.9 2.7 3.3 4.0 3.2 2.5 3.7 2.0 2.2   Notes 3x boost   cefdinir; 1x boost keflex x2 days sick Sick; dose decrease  1x decr dose  1 dose reduction      Date 10/29 11/1 11/5 11/12 11/18 11/27 12/3 12/10 12/18 12/26 1/2/20 1/7   Total Weekly Dose 28mg 27mg 27mg 28mg 26mg 27mg 28mg 28mg 28mg 28mg 26mg 24mg   INR 2.8 2.7 2.4 3.5 2.9 2.0 2.3 3.3 2.9 3.5 3.2 1.2   Notes keflex start 10/28 1x decr  dose; keflex clinic /  1x decr dose amio last dose 11/5/19 1x decr dose allopurinol started broccoli casserole; 1x boost   1x decr dose  augment use 1x dose decr;  augment   * Amiodarone LD 11/5/2019. Monitor as likely her warfarin needs would increase over the next few months     Date 1/10 1/14 1/23 1/28 1/31 2/4 2/11 2/18 2/24 3/3 3/10 3/16 3/19   Total Weekly Dose 28mg 32mg 30mg 26mg 26mg  24mg 26mg 26mg 24mg 26mg 27mg 25mg 29mg   INR 1.6 2.1 4.1 2.2 3.4 2.7 2.1 3.4 2.3 1.9 1.9 1.4 2.0   Notes augment  2x incr dose 1x decr keflex, pred keflex; pred; sick keflex; sick; dose decr x1 sick;   valtrex keflex   1x incr dose, incr GLV 1x dose decr; levaquin 1x incr dose      Date 3/23 3/26 4/2 4/9 4/16 4/23 5/1 5/8 5/14 5/22 5/27 6/4   Total WeeklyDose 29mg 29mg 30mg 30mg 30mg 32mg 30mg 32mg 28mg 32mg 32mg 30mg   INR 1.4 1.5 1.9 2.2 1.9 3.0 2.1 2.7 2.4 3.2 3.5 3.2   Notes 1x incr dose;  spinach 1x incr dose,  less GLV 1x boost    1x decr  dose trazodone 1x miss; trazodone 25mg QPM recv'd 5/26  1x incr dose     Date 6/10 6/17             Total WeeklyDose 28mg 28mg             INR 2.5 2.6             Notes                 Phone Interview:  Verbal Release Authorization signed on 6/26/18 and again on 11/5/2019-- may speak with Alexy Wallace (son), Genesis Becerril (daughter), Sawyer Wallace (son and daughter-in-law), Gerry Wallace (son)  Tablet Strength: 2mg tablets  Patient Contact Info: preferred  [phone] - home with son Yadiel- cell [phone]; call if can't get in touch with home phone      Patient Findings   Negatives:  Signs/symptoms of thrombosis, Signs/symptoms of bleeding, Laboratory test error suspected, Change in health, Change in alcohol use, Change in activity, Upcoming invasive procedure, Emergency department visit, Upcoming dental procedure, Missed doses, Extra doses, Change in medications, Change in diet/appetite, Hospital admission, Bruising, Other complaints   Comments:  Patient denies all findings.     Plan:  1. INR is therapeutic today at 2.6. Instructed patient to continue warfarin 4mg daily.  2. Repeat INR in 1 week  3. Verbal information provided over the phone. Patient RBV dosing instructions, expresses understanding by teach back, and has no further questions at this time. She also filled up her medication planner while on the phone.    Sawyer Gamez CPhT  06/17/20  3:25 PM      [facility], have reviewed the note in full and agree with the assessment and plan.  06/17/20  15:44

## 2020-06-19 RX ORDER — WARFARIN SODIUM 2 MG/1
TABLET ORAL
Qty: 180 TABLET | Refills: 0 | Status: SHIPPED | OUTPATIENT
Start: 2020-06-19 | End: 2020-09-11

## 2020-06-24 ENCOUNTER — ANTICOAGULATION VISIT (OUTPATIENT)
Dept: PHARMACY | Facility: HOSPITAL | Age: 80
End: 2020-06-24

## 2020-06-24 DIAGNOSIS — I48.0 PAROXYSMAL ATRIAL FIBRILLATION (HCC): ICD-10-CM

## 2020-06-24 LAB — INR PPP: 2.3

## 2020-06-24 NOTE — PROGRESS NOTES
Anticoagulation Clinic - Remote Progress Note  [facility]  Testing Frequency: 7 days    Indication: paroxysmal afib  Referring Provider: Butch Joe (Last seen: 11/5/19  next appt: 6/9/20)  Goal INR: 2.0-3.0  Current Drug Interactions: , levothyroxine; omeprazole, azaTHIOprine, ezetimibe, allopurinol (11/20)  CHADS-VASc: 5 (age, gender, HTN, DM)  HAS-BLED: 3 (HTN, CKD, Age)     Diet: avoids GLV (6/24/20) no meal replacement drinks   Alcohol: none  Tobacco: none   OTC Pain Medication: APAP PRN; took tylenol last 2 nights for pain from dialysis (03/26/20)    1st clinic: 9/14/17  2nd clinic: 6/26/18  3rd clinic: 11/5/19    INR History:  Date 7/23 7/26 8/2 8/9 8/16 8/23 8/29 9/5 9/12 9/19 9/24   Total Weekly Dose 36mg 38mg 36mg 38mg 36mg 32mg 30mg?? 32mg 32 mg 32mg 30mg   INR 1.7 2.3 2.0 3.4 3.8 3.5 2.5 2.6 2.5 3.3 2.0   Notes Keflex boost   1x decr dose 1x decr dose    Keflex Keflex     Date 9/27 10/1 10/8 10/15  10/22 10/29 11/5 11/14 11/21 11/28 12/4   Total Weekly Dose 32mg 32mg 32mg 30mg  32mg 28mg 32mg 32mg 32mg 28mg 26mg   INR 2.3 2.4 3.2 2.5  3.9 2.7 3.0 2.8 2.7 3.7 1.9   Notes Levaquin start 9/26 stopped Levaquin        stop MVI, 1 miss 1 hold     Date 12/10 12/17 12/21 12/31 1/7/19 1/14 1/22 1/24 1/28 1/31 2/7   Total Weekly Dose 30mg 30mg 30mg 26mg 28mg 28mg 28mg 28mg 30mg 30mg 28mg   INR 2.4 3.0 3.6 2.5 2.6 2.6 2.9 1.7 2.7 2.5 2.7   Notes        doxy start 1/22, incr GLV 2x boost; doxy Finish doxy 1/29      Date 2/14 2/20 2/28 3/7 3/14 3/21 3/28 4/4 4/11 4/18 4/25   Total Weekly Dose 28mg 28mg 28mg 30mg 31mg 30mg 30mg 31mg 32mg 31mg 31mg   INR 2.1 2.4 1.8 1.6 2.2 2.5 1.9 1.9 2.4 2.8 2.4   Notes   sick Zetia            Date 5/2 5/6 5/15 REHAB 7/19 7/22 7/26 7/30 8/2 8/5 8/12   Total Weekly Dose 31mg 31mg 31mg  ????? ??? Unable to confirm 34mg? 34mg **30mg ? 30mg   INR 2.0 2.4 2.5  1.2 1.3 2.7 1.2 2.0 2.2 1.6   Notes ampicillin amp       stopped lexapro cefdinir      Date 8/15 8/19 8/26 8/30  9/5 9/9 9/13 9/17 10/1 10/7 10/15 10/21   Total Weekly Dose 32mg 33mg 31mg 32mg 31mg 32mg 28mg 24mg 28mg 28mg 26mg 28mg   INR 2.6 2.2 1.8 1.9 2.7 3.3 4.0 3.2 2.5 3.7 2.0 2.2   Notes 3x boost   cefdinir; 1x boost keflex x2 days sick Sick; dose decrease  1x decr dose  1 dose reduction      Date 10/29 11/1 11/5 11/12 11/18 11/27 12/3 12/10 12/18 12/26 1/2/20 1/7   Total Weekly Dose 28mg 27mg 27mg 28mg 26mg 27mg 28mg 28mg 28mg 28mg 26mg 24mg   INR 2.8 2.7 2.4 3.5 2.9 2.0 2.3 3.3 2.9 3.5 3.2 1.2   Notes keflex start 10/28 1x decr  dose; keflex clinic /  1x decr dose amio last dose 11/5/19 1x decr dose allopurinol started broccoli casserole; 1x boost   1x decr dose  augment use 1x dose decr;  augment   * Amiodarone LD 11/5/2019. Monitor as likely her warfarin needs would increase over the next few months     Date 1/10 1/14 1/23 1/28 1/31 2/4 2/11 2/18 2/24 3/3 3/10 3/16 3/19   Total Weekly Dose 28mg 32mg 30mg 26mg 26mg  24mg 26mg 26mg 24mg 26mg 27mg 25mg 29mg   INR 1.6 2.1 4.1 2.2 3.4 2.7 2.1 3.4 2.3 1.9 1.9 1.4 2.0   Notes augment  2x incr dose 1x decr keflex, pred keflex; pred; sick keflex; sick; dose decr x1 sick;   valtrex keflex   1x incr dose, incr GLV 1x dose decr; levaquin 1x incr dose      Date 3/23 3/26 4/2 4/9 4/16 4/23 5/1 5/8 5/14 5/22 5/27 6/4   Total WeeklyDose 29mg 29mg 30mg 30mg 30mg 32mg 30mg 32mg 28mg 32mg 32mg 30mg   INR 1.4 1.5 1.9 2.2 1.9 3.0 2.1 2.7 2.4 3.2 3.5 3.2   Notes 1x incr dose;  spinach 1x incr dose,  less GLV 1x boost    1x decr  dose trazodone 1x miss; trazodone 25mg QPM recv'd 5/26  1x incr dose     Date 6/10 6/17 6/24            Total WeeklyDose 28mg 28mg 28mg            INR 2.5 2.6 2.3            Notes                 Phone Interview:  Verbal Release Authorization signed on 6/26/18 and again on 11/5/2019-- may speak with Alexy Wallace (son), Genesis Becerril (daughter), Sawyer Wallace (son and daughter-in-law), Gerry Wallace (son)  Tablet Strength: 2mg tablets  Patient Contact Info:  preferred [phone] - home with son Yadiel- cell [phone]; call if can't get in touch with home phone      Patient Findings:  Positives:  Change in diet/appetite   Negatives:  Signs/symptoms of thrombosis, Signs/symptoms of bleeding, Laboratory test error suspected, Change in health, Change in alcohol use, Change in activity, Upcoming invasive procedure, Emergency department visit, Upcoming dental procedure, Missed doses, Extra doses, Change in medications, Hospital admission, Bruising, Other complaints   Comments:  She ate a tossed salad that had a little bit of chopped broccoli in it. Ms. Wallace denies any other changes. She was outside today and declined to go inside to fill up her medication planner. She states since nothing is changing on her dosing she can remember to fill it up tonight.     Plan:  1. INR is therapeutic again today. Instructed patient to continue warfarin 4mg daily until recheck.  2. Repeat INR in one week.  3. Verbal information provided over the phone. Patient RBV dosing instructions, expresses understanding by teach back, and has no further questions at this time. She declined to fill up medication planner while on the phone today (6/24/20).    Zina Aldrich CPhT  06/24/2020  2:33 PM    [facility], have reviewed the note in full and agree with the assessment and plan.  06/24/20  15:29

## 2020-07-02 ENCOUNTER — TELEPHONE (OUTPATIENT)
Dept: PHARMACY | Facility: HOSPITAL | Age: 80
End: 2020-07-02

## 2020-07-06 ENCOUNTER — ANTICOAGULATION VISIT (OUTPATIENT)
Dept: PHARMACY | Facility: HOSPITAL | Age: 80
End: 2020-07-06

## 2020-07-06 DIAGNOSIS — I48.0 PAROXYSMAL ATRIAL FIBRILLATION (HCC): ICD-10-CM

## 2020-07-06 LAB — INR PPP: 1.8

## 2020-07-06 NOTE — PROGRESS NOTES
Anticoagulation Clinic - Remote Progress Note  [facility]  Testing Frequency: 7 days    Indication: paroxysmal afib  Referring Provider: Butch Joe (Last seen: 11/5/19  next appt: 6/9/20)  Goal INR: 2.0-3.0  Current Drug Interactions: , levothyroxine; omeprazole, azaTHIOprine, ezetimibe, allopurinol (11/20)  CHADS-VASc: 5 (age, gender, HTN, DM)  HAS-BLED: 3 (HTN, CKD, Age)     Diet: avoids GLV (6/24/20) no meal replacement drinks   Alcohol: none  Tobacco: none   OTC Pain Medication: APAP PRN; took tylenol last 2 nights for pain from dialysis (03/26/20)    1st clinic: 9/14/17  2nd clinic: 6/26/18  3rd clinic: 11/5/19    INR History:  Date 7/23 7/26 8/2 8/9 8/16 8/23 8/29 9/5 9/12 9/19 9/24   Total Weekly Dose 36mg 38mg 36mg 38mg 36mg 32mg 30mg?? 32mg 32 mg 32mg 30mg   INR 1.7 2.3 2.0 3.4 3.8 3.5 2.5 2.6 2.5 3.3 2.0   Notes Keflex boost   1x decr dose 1x decr dose    Keflex Keflex     Date 9/27 10/1 10/8 10/15  10/22 10/29 11/5 11/14 11/21 11/28 12/4   Total Weekly Dose 32mg 32mg 32mg 30mg  32mg 28mg 32mg 32mg 32mg 28mg 26mg   INR 2.3 2.4 3.2 2.5  3.9 2.7 3.0 2.8 2.7 3.7 1.9   Notes Levaquin start 9/26 stopped Levaquin        stop MVI, 1 miss 1 hold     Date 12/10 12/17 12/21 12/31 1/7/19 1/14 1/22 1/24 1/28 1/31 2/7   Total Weekly Dose 30mg 30mg 30mg 26mg 28mg 28mg 28mg 28mg 30mg 30mg 28mg   INR 2.4 3.0 3.6 2.5 2.6 2.6 2.9 1.7 2.7 2.5 2.7   Notes        doxy start 1/22, incr GLV 2x boost; doxy Finish doxy 1/29      Date 2/14 2/20 2/28 3/7 3/14 3/21 3/28 4/4 4/11 4/18 4/25   Total Weekly Dose 28mg 28mg 28mg 30mg 31mg 30mg 30mg 31mg 32mg 31mg 31mg   INR 2.1 2.4 1.8 1.6 2.2 2.5 1.9 1.9 2.4 2.8 2.4   Notes   sick Zetia            Date 5/2 5/6 5/15 REHAB 7/19 7/22 7/26 7/30 8/2 8/5 8/12   Total Weekly Dose 31mg 31mg 31mg  ????? ??? Unable to confirm 34mg? 34mg **30mg ? 30mg   INR 2.0 2.4 2.5  1.2 1.3 2.7 1.2 2.0 2.2 1.6   Notes ampicillin amp       stopped lexapro cefdinir      Date 8/15 8/19 8/26 8/30  9/5 9/9 9/13 9/17 10/1 10/7 10/15 10/21   Total Weekly Dose 32mg 33mg 31mg 32mg 31mg 32mg 28mg 24mg 28mg 28mg 26mg 28mg   INR 2.6 2.2 1.8 1.9 2.7 3.3 4.0 3.2 2.5 3.7 2.0 2.2   Notes 3x boost   cefdinir; 1x boost keflex x2 days sick Sick; dose decrease  1x decr dose  1 dose reduction      Date 10/29 11/1 11/5 11/12 11/18 11/27 12/3 12/10 12/18 12/26 1/2/20 1/7   Total Weekly Dose 28mg 27mg 27mg 28mg 26mg 27mg 28mg 28mg 28mg 28mg 26mg 24mg   INR 2.8 2.7 2.4 3.5 2.9 2.0 2.3 3.3 2.9 3.5 3.2 1.2   Notes keflex start 10/28 1x decr  dose; keflex clinic /  1x decr dose amio last dose 11/5/19 1x decr dose allopurinol started broccoli casserole; 1x boost   1x decr dose  augment use 1x dose decr;  augment   * Amiodarone LD 11/5/2019. Monitor as likely her warfarin needs would increase over the next few months     Date 1/10 1/14 1/23 1/28 1/31 2/4 2/11 2/18 2/24 3/3 3/10 3/16 3/19   Total Weekly Dose 28mg 32mg 30mg 26mg 26mg  24mg 26mg 26mg 24mg 26mg 27mg 25mg 29mg   INR 1.6 2.1 4.1 2.2 3.4 2.7 2.1 3.4 2.3 1.9 1.9 1.4 2.0   Notes augment  2x incr dose 1x decr keflex, pred keflex; pred; sick keflex; sick; dose decr x1 sick;   valtrex keflex   1x incr dose, incr GLV 1x dose decr; levaquin 1x incr dose      Date 3/23 3/26 4/2 4/9 4/16 4/23 5/1 5/8 5/14 5/22 5/27 6/4   Total WeeklyDose 29mg 29mg 30mg 30mg 30mg 32mg 30mg 32mg 28mg 32mg 32mg 30mg   INR 1.4 1.5 1.9 2.2 1.9 3.0 2.1 2.7 2.4 3.2 3.5 3.2   Notes 1x incr dose;  spinach 1x incr dose,  less GLV 1x boost    1x decr  dose trazodone 1x miss; trazodone 25mg QPM recv'd 5/26  1x incr dose     Date 6/10 6/17 6/24 7/2           Total WeeklyDose 28mg 28mg 28mg 28 mg           INR 2.5 2.6 2.3 1.8           Notes    Inc GLV             Phone Interview:  Verbal Release Authorization signed on 6/26/18 and again on 11/5/2019-- may speak with Alexy Wallace (son), Genesis Becerril (daughter), Sawyer Wallace (son and daughter-in-law), Gerry Wallace (son)  Tablet Strength: 2mg tablets  Patient  Contact Info: preferred [phone] - home with son Yadiel- [place] [phone]; call if can't get in touch with home phone      Patient Findings:    Positives:  Change in diet/appetite   Negatives:  Signs/symptoms of thrombosis, Signs/symptoms of bleeding, Laboratory test error suspected, Change in health, Change in alcohol use, Change in activity, Upcoming invasive procedure, Emergency department visit, Upcoming dental procedure, Missed doses, Extra doses, Change in medications, Hospital admission, Bruising, Other complaints   Comments:  Patient ate iceburg salad last week which she doesn't typically eat.          Plan:  1. INR was SUBtherapeutic on 7/2 results not received until 7/6. For now,  Instructed patient to continue warfarin 4mg daily until recheck.  2. Repeat INR on 7/8 to determine if boosted dose necessary  3. Verbal information provided over the phone. Patient RBV dosing instructions, expresses understanding by teach back, and has no further questions at this time. She declined to fill up medication planner while on the phone today (6/24/20).    Francoise Wu, KwadwoD.  07/06/20   09:02

## 2020-07-08 ENCOUNTER — ANTICOAGULATION VISIT (OUTPATIENT)
Dept: PHARMACY | Facility: HOSPITAL | Age: 80
End: 2020-07-08

## 2020-07-08 DIAGNOSIS — I48.0 PAROXYSMAL ATRIAL FIBRILLATION (HCC): ICD-10-CM

## 2020-07-08 LAB — INR PPP: 2.2

## 2020-07-08 NOTE — PROGRESS NOTES
Anticoagulation Clinic - Remote Progress Note  [facility]  Testing Frequency: 7 days    Indication: paroxysmal afib  Referring Provider: Butch Joe (Last seen: 11/5/19  next appt: 6/9/20)  Goal INR: 2.0-3.0  Current Drug Interactions: , levothyroxine; omeprazole, azaTHIOprine, ezetimibe, allopurinol (11/20)  CHADS-VASc: 5 (age, gender, HTN, DM)  HAS-BLED: 3 (HTN, CKD, Age)     Diet: salads occasionally (7/8/20) no meal replacement drinks   Alcohol: none  Tobacco: none   OTC Pain Medication: APAP PRN; took tylenol last 2 nights for pain from dialysis (03/26/20)    1st clinic: 9/14/17  2nd clinic: 6/26/18  3rd clinic: 11/5/19    INR History:  Date 7/23 7/26 8/2 8/9 8/16 8/23 8/29 9/5 9/12 9/19 9/24   Total Weekly Dose 36mg 38mg 36mg 38mg 36mg 32mg 30mg?? 32mg 32 mg 32mg 30mg   INR 1.7 2.3 2.0 3.4 3.8 3.5 2.5 2.6 2.5 3.3 2.0   Notes Keflex boost   1x decr dose 1x decr dose    Keflex Keflex     Date 9/27 10/1 10/8 10/15  10/22 10/29 11/5 11/14 11/21 11/28 12/4   Total Weekly Dose 32mg 32mg 32mg 30mg  32mg 28mg 32mg 32mg 32mg 28mg 26mg   INR 2.3 2.4 3.2 2.5  3.9 2.7 3.0 2.8 2.7 3.7 1.9   Notes Levaquin start 9/26 stopped Levaquin        stop MVI, 1 miss 1 hold     Date 12/10 12/17 12/21 12/31 1/7/19 1/14 1/22 1/24 1/28 1/31 2/7   Total Weekly Dose 30mg 30mg 30mg 26mg 28mg 28mg 28mg 28mg 30mg 30mg 28mg   INR 2.4 3.0 3.6 2.5 2.6 2.6 2.9 1.7 2.7 2.5 2.7   Notes        doxy start 1/22, incr GLV 2x boost; doxy Finish doxy 1/29      Date 2/14 2/20 2/28 3/7 3/14 3/21 3/28 4/4 4/11 4/18 4/25   Total Weekly Dose 28mg 28mg 28mg 30mg 31mg 30mg 30mg 31mg 32mg 31mg 31mg   INR 2.1 2.4 1.8 1.6 2.2 2.5 1.9 1.9 2.4 2.8 2.4   Notes   sick Zetia            Date 5/2 5/6 5/15 REHAB 7/19 7/22 7/26 7/30 8/2 8/5 8/12   Total Weekly Dose 31mg 31mg 31mg  ????? ??? Unable to confirm 34mg? 34mg **30mg ? 30mg   INR 2.0 2.4 2.5  1.2 1.3 2.7 1.2 2.0 2.2 1.6   Notes ampicillin amp       stopped lexapro cefdinir      Date 8/15 8/19  8/26 8/30 9/5 9/9 9/13 9/17 10/1 10/7 10/15 10/21   Total Weekly Dose 32mg 33mg 31mg 32mg 31mg 32mg 28mg 24mg 28mg 28mg 26mg 28mg   INR 2.6 2.2 1.8 1.9 2.7 3.3 4.0 3.2 2.5 3.7 2.0 2.2   Notes 3x boost   cefdinir; 1x boost keflex x2 days sick Sick; dose decrease  1x decr dose  1 dose reduction      Date 10/29 11/1 11/5 11/12 11/18 11/27 12/3 12/10 12/18 12/26 1/2/20 1/7   Total Weekly Dose 28mg 27mg 27mg 28mg 26mg 27mg 28mg 28mg 28mg 28mg 26mg 24mg   INR 2.8 2.7 2.4 3.5 2.9 2.0 2.3 3.3 2.9 3.5 3.2 1.2   Notes keflex start 10/28 1x decr  dose; keflex clinic /  1x decr dose amio last dose 11/5/19 1x decr dose allopurinol started broccoli casserole; 1x boost   1x decr dose  augment use 1x dose decr;  augment   * Amiodarone LD 11/5/2019. Monitor as likely her warfarin needs would increase over the next few months     Date 1/10 1/14 1/23 1/28 1/31 2/4 2/11 2/18 2/24 3/3 3/10 3/16 3/19   Total Weekly Dose 28mg 32mg 30mg 26mg 26mg  24mg 26mg 26mg 24mg 26mg 27mg 25mg 29mg   INR 1.6 2.1 4.1 2.2 3.4 2.7 2.1 3.4 2.3 1.9 1.9 1.4 2.0   Notes augment  2x incr dose 1x decr keflex, pred keflex; pred; sick keflex; sick; dose decr x1 sick;   valtrex keflex   1x incr dose, incr GLV 1x dose decr; levaquin 1x incr dose      Date 3/23 3/26 4/2 4/9 4/16 4/23 5/1 5/8 5/14 5/22 5/27 6/4   Total WeeklyDose 29mg 29mg 30mg 30mg 30mg 32mg 30mg 32mg 28mg 32mg 32mg 30mg   INR 1.4 1.5 1.9 2.2 1.9 3.0 2.1 2.7 2.4 3.2 3.5 3.2   Notes 1x incr dose;  spinach 1x incr dose,  less GLV 1x boost    1x decr  dose trazodone 1x miss; trazodone 25mg QPM recv'd 5/26  1x incr dose     Date 6/10 6/17 6/24 7/2 7/8          Total WeeklyDose 28mg 28mg 28mg 28 mg 28mg          INR 2.5 2.6 2.3 1.8 2.2          Notes    Inc GLV             Phone Interview:  Verbal Release Authorization signed on 6/26/18 and again on 11/5/2019-- may speak with Alexy Wallace (son), Genesis Becerril (daughter), Sawyer or Gema Wallace (son and daughter-in-law), Gerry Wallace (son)  Tablet Strength:  "2mg tablets  Patient Contact Info: preferred [phone] - home with son Yadiel- cell [phone]; call if can't get in touch with home phone      Patient Findings:  Positives:  Change in medications, Change in diet/appetite   Negatives:  Signs/symptoms of thrombosis, Signs/symptoms of bleeding, Laboratory test error suspected, Change in health, Change in alcohol use, Change in activity, Upcoming invasive procedure, Emergency department visit, Upcoming dental procedure, Missed doses, Extra doses, Hospital admission, Bruising, Other complaints   Comments:  Ms. Wallace ate a salad for lunch today, she only does this occasionally. She recently stopped her Trazadone to help her sleep because it was \"making her feel bad\".     Concurrent use of TRAZODONE and ANTIPLATELETS, ANTICOAGULANTS, OR NSAIDS may result in increased risk of bleeding.      Plan:  1. INR is back WNL today. Instructed Ms. Wallace to continue warfarin 4mg daily until recheck.  2. Repeat INR in one week.  3. Verbal information provided over the phone. Patient RBV dosing instructions, expresses understanding by teach back, and has no further questions at this time.     Zina Aldrich CPhT  07/08/20   13:36    I, Francoise Wu, [facility], have reviewed the note in full and agree with the assessment and plan.  07/09/20  08:09    "

## 2020-07-15 ENCOUNTER — ANTICOAGULATION VISIT (OUTPATIENT)
Dept: PHARMACY | Facility: HOSPITAL | Age: 80
End: 2020-07-15

## 2020-07-15 DIAGNOSIS — I48.0 PAROXYSMAL ATRIAL FIBRILLATION (HCC): ICD-10-CM

## 2020-07-15 LAB — INR PPP: 1.7

## 2020-07-15 NOTE — PROGRESS NOTES
Anticoagulation Clinic - Remote Progress Note  [facility]  Testing Frequency: 7 days    Indication: paroxysmal afib  Referring Provider: Butch Joe (Last seen: 11/5/19  next appt: 6/9/20)  Goal INR: 2.0-3.0  Current Drug Interactions: , levothyroxine; omeprazole, azaTHIOprine, ezetimibe, allopurinol (11/20)  CHADS-VASc: 5 (age, gender, HTN, DM)  HAS-BLED: 3 (HTN, CKD, Age)     Diet: salads occasionally (7/8/20) no meal replacement drinks   Alcohol: none  Tobacco: none   OTC Pain Medication: APAP PRN; took tylenol last 2 nights for pain from dialysis (03/26/20)    1st clinic: 9/14/17  2nd clinic: 6/26/18  3rd clinic: 11/5/19    INR History:  Date 7/23 7/26 8/2 8/9 8/16 8/23 8/29 9/5 9/12 9/19 9/24   Total Weekly Dose 36mg 38mg 36mg 38mg 36mg 32mg 30mg?? 32mg 32 mg 32mg 30mg   INR 1.7 2.3 2.0 3.4 3.8 3.5 2.5 2.6 2.5 3.3 2.0   Notes Keflex boost   1x decr dose 1x decr dose    Keflex Keflex     Date 9/27 10/1 10/8 10/15  10/22 10/29 11/5 11/14 11/21 11/28 12/4   Total Weekly Dose 32mg 32mg 32mg 30mg  32mg 28mg 32mg 32mg 32mg 28mg 26mg   INR 2.3 2.4 3.2 2.5  3.9 2.7 3.0 2.8 2.7 3.7 1.9   Notes Levaquin start 9/26 stopped Levaquin        stop MVI, 1 miss 1 hold     Date 12/10 12/17 12/21 12/31 1/7/19 1/14 1/22 1/24 1/28 1/31 2/7   Total Weekly Dose 30mg 30mg 30mg 26mg 28mg 28mg 28mg 28mg 30mg 30mg 28mg   INR 2.4 3.0 3.6 2.5 2.6 2.6 2.9 1.7 2.7 2.5 2.7   Notes        doxy start 1/22, incr GLV 2x boost; doxy Finish doxy 1/29      Date 2/14 2/20 2/28 3/7 3/14 3/21 3/28 4/4 4/11 4/18 4/25   Total Weekly Dose 28mg 28mg 28mg 30mg 31mg 30mg 30mg 31mg 32mg 31mg 31mg   INR 2.1 2.4 1.8 1.6 2.2 2.5 1.9 1.9 2.4 2.8 2.4   Notes   sick Zetia            Date 5/2 5/6 5/15 REHAB 7/19 7/22 7/26 7/30 8/2 8/5 8/12   Total Weekly Dose 31mg 31mg 31mg  ????? ??? Unable to confirm 34mg? 34mg **30mg ? 30mg   INR 2.0 2.4 2.5  1.2 1.3 2.7 1.2 2.0 2.2 1.6   Notes ampicillin amp       stopped lexapro cefdinir      Date 8/15 8/19  8/26 8/30 9/5 9/9 9/13 9/17 10/1 10/7 10/15 10/21   Total Weekly Dose 32mg 33mg 31mg 32mg 31mg 32mg 28mg 24mg 28mg 28mg 26mg 28mg   INR 2.6 2.2 1.8 1.9 2.7 3.3 4.0 3.2 2.5 3.7 2.0 2.2   Notes 3x boost   cefdinir; 1x boost keflex x2 days sick Sick; dose decrease  1x decr dose  1 dose reduction      Date 10/29 11/1 11/5 11/12 11/18 11/27 12/3 12/10 12/18 12/26 1/2/20 1/7   Total Weekly Dose 28mg 27mg 27mg 28mg 26mg 27mg 28mg 28mg 28mg 28mg 26mg 24mg   INR 2.8 2.7 2.4 3.5 2.9 2.0 2.3 3.3 2.9 3.5 3.2 1.2   Notes keflex start 10/28 1x decr  dose; keflex clinic /  1x decr dose amio last dose 11/5/19 1x decr dose allopurinol started broccoli casserole; 1x boost   1x decr dose  augment use 1x dose decr;  augment   * Amiodarone LD 11/5/2019. Monitor as likely her warfarin needs would increase over the next few months     Date 1/10 1/14 1/23 1/28 1/31 2/4 2/11 2/18 2/24 3/3 3/10 3/16 3/19   Total Weekly Dose 28mg 32mg 30mg 26mg 26mg  24mg 26mg 26mg 24mg 26mg 27mg 25mg 29mg   INR 1.6 2.1 4.1 2.2 3.4 2.7 2.1 3.4 2.3 1.9 1.9 1.4 2.0   Notes augment  2x incr dose 1x decr keflex, pred keflex; pred; sick keflex; sick; dose decr x1 sick;   valtrex keflex   1x incr dose, incr GLV 1x dose decr; levaquin 1x incr dose      Date 3/23 3/26 4/2 4/9 4/16 4/23 5/1 5/8 5/14 5/22 5/27 6/4   Total WeeklyDose 29mg 29mg 30mg 30mg 30mg 32mg 30mg 32mg 28mg 32mg 32mg 30mg   INR 1.4 1.5 1.9 2.2 1.9 3.0 2.1 2.7 2.4 3.2 3.5 3.2   Notes 1x incr dose;  spinach 1x incr dose,  less GLV 1x boost    1x decr  dose trazodone 1x miss; trazodone 25mg QPM recv'd 5/26  1x incr dose     Date 6/10 6/17 6/24 7/2 7/8 7/15         Total WeeklyDose 28mg 28mg 28mg 28 mg 28mg 28mg 30mg        INR 2.5 2.6 2.3 1.8 2.2 1.7         Notes    Inc GLV  Inc GLV 1x dose incr.          Phone Interview:  Verbal Release Authorization signed on 6/26/18 and again on 11/5/2019-- may speak with Alexy Wallace (son), Genesis Becerril (daughter), Sawyer Wallace (son and daughter-in-law),  Geryr Wallace (son)  Tablet Strength: 2mg tablets  Patient Contact Info: preferred [phone] - home with son Yadiel- chaparrita [phone]; call if can't get in touch with home phone      Patient Findings:  Positives:  Change in medications, Change in diet/appetite   Negatives:  Signs/symptoms of thrombosis, Signs/symptoms of bleeding, Laboratory test error suspected, Change in health, Change in alcohol use, Change in activity, Upcoming invasive procedure, Emergency department visit, Upcoming dental procedure, Missed doses, Extra doses, Hospital admission, Bruising, Other complaints   Comments:  Hydroxyzine 25mg once daily for sleep to replace the trazodone she was on previously. She has had several servings of green beans this week with the last one being today. States that she'll eat more if she can find them.      Plan:  1. INR is SUBtherapeutic today. Instructed Ms. Wallace to take a BOOSTED warfarin 6mg dose (7.1% increase) and then continue warfarin 4mg daily until recheck.  2. Repeat INR in one week.  3. Verbal information provided over the phone. Patient RBV dosing instructions, expresses understanding by teach back, and has no further questions at this time.     Paula Mayers, Pharmacy Intern  07/15/20  14:55        I, Meir Pemberton, [facility], have reviewed the note in full and agree with the assessment and plan.  07/15/20  15:52

## 2020-07-17 NOTE — PROGRESS NOTES
[facility]  Office Visit Note    DATE: 07/21/2020    IDENTIFICATION: Moni Wallace is a 80 y.o. female who resides in [place], [place]    REASON FOR VISIT:  • Atrial fibrillation  • Carotid artery disease  • Valvular heart disease            Moni Wallace returns today for follow-up of her paroxysmal atrial fibrillation, carotid artery disease, aortic stenosis and cardiac risk factors.  At her last visit amiodarone was discontinued.  She has been maintaining normal sinus rhythm.  She is anticoagulated with Coumadin that is managed by the [facility] anticoagulation clinic.  For most of the year her INRs were therapeutic in the 2.0-3.0 range but this past month she has had a couple subtherapeutic at 1.7 and 1.8.  She thinks it is because she has been eating more vegetables.  She denies signs or symptoms of TIA or CVA.  She is end-stage dialysis.  She is currently undergoing peritoneal dialysis.  Her nephrologist started her on lisinopril as well as daily Lasix.  Her blood pressures are controlled.  She denies chest pain, worsening dyspnea, orthopnea, palpitations or syncope.  The last time I saw her she was on pravastatin and Zetia.  She tells me she stopped taking them because she can not tolerate statins as they cause her legs to be weak.      Review of Systems   Constitution: Negative for malaise/fatigue.   Eyes: Negative for vision loss in left eye and vision loss in right eye.   Cardiovascular: Negative for chest pain, dyspnea on exertion, near-syncope, orthopnea, palpitations, paroxysmal nocturnal dyspnea and syncope.   Musculoskeletal: Negative for myalgias.   Neurological: Negative for brief paralysis, excessive daytime sleepiness, focal weakness, numbness, paresthesias and weakness.   All other systems reviewed and are negative.      The patient's past medical, social, family history and ROS reviewed in the patient's electronic medical record.    Allergies   Allergen  Reactions   • Hydrocodone Itching   • Statins Other (See Comments)     myalgia   • Codeine Rash   • Sulfa Antibiotics Rash         Current Outpatient Medications:   •  acetaminophen (TYLENOL) 325 MG tablet, Take 2 tablets by mouth Every 4 (Four) Hours As Needed for Mild Pain ., Disp: , Rfl:   •  albuterol (PROVENTIL) (2.5 MG/3ML) 0.083% nebulizer solution, Take 2.5 mg by nebulization Every 4 (Four) Hours As Needed for Wheezing., Disp: 25 vial, Rfl: 2  •  albuterol sulfate HFA (PROAIR HFA) 108 (90 Base) MCG/ACT inhaler, Inhale 2 puffs Every 4 (Four) Hours As Needed for Wheezing or Shortness of Air., Disp: 1 inhaler, Rfl: 11  •  amLODIPine (NORVASC) 5 MG tablet, Take 1 tablet by mouth Daily., Disp: , Rfl:   •  calcitriol (ROCALTROL) 0.25 MCG capsule, Take 0.25 mcg by mouth Every Other Day., Disp: , Rfl:   •  Cholecalciferol (VITAMIN D) 2000 UNITS tablet, Take 2,000 Units by mouth Daily., Disp: , Rfl:   •  COLCRYS 0.6 MG tablet, Take 0.5 tablets by mouth Daily. Take 1 tab BID x7 days, then 1 tab QD x7 days (patient will stop statins while taking this medication), Disp: , Rfl:   •  furosemide (LASIX) 40 MG tablet, Take 80 mg by mouth Daily., Disp: , Rfl:   •  gentamicin (GARAMYCIN) 0.1 % cream, APPLY TO EXIT SITE DAILY, Disp: , Rfl: 3  •  levothyroxine (SYNTHROID, LEVOTHROID) 75 MCG tablet, Take 75 mcg by mouth Daily., Disp: , Rfl:   •  lisinopril (PRINIVIL,ZESTRIL) 5 MG tablet, Take 5 mg by mouth Daily., Disp: , Rfl:   •  metoprolol tartrate (LOPRESSOR) 50 MG tablet, Take 1 tablet by mouth Every 12 (Twelve) Hours. (Patient taking differently: Take 100 mg by mouth Every 12 (Twelve) Hours.), Disp: , Rfl:   •  Multiple Vitamins-Minerals (ICAPS AREDS 2 PO), Take  by mouth Daily., Disp: , Rfl:   •  omeprazole (priLOSEC) 40 MG capsule, TAKE ONE CAPSULE BY MOUTH DAILY, Disp: 90 capsule, Rfl: 0  •  sevelamer (RENVELA) 800 MG tablet, Take 4,800 mg by mouth 3 (Three) Times a Day., Disp: , Rfl:   •  tacrolimus (PROGRAF) 0.5 MG  capsule, Take 0.5 mg by mouth 2 (Two) Times a Day., Disp: , Rfl:   •  temazepam (RESTORIL) 15 MG capsule, Take 15 mg by mouth At Night As Needed for Sleep., Disp: , Rfl:   •  warfarin (COUMADIN) 2 MG tablet, TAKE ONE TO TWO TABLETS BY MOUTH EVERY DAY OR AS DIRECTED BY ANTICOAGULATION CLINIC, Disp: 180 tablet, Rfl: 0    Past Medical History:   Diagnosis Date   • Adenomatous colon polyp    • Chronic kidney disease    • Clostridium difficile infection 06/2017   • Diabetes mellitus (CMS/HCC)    • Gastric polyps    • Hypothyroidism    • IgA nephropathy, acute    • Macular degeneration    • Paroxysmal atrial fibrillation (CMS/HCC)    • Tubular adenoma     excision    • Ulcer of gastric fundus    • Vitamin D deficiency        Past Surgical History:   Procedure Laterality Date   • BREAST BIOPSY Left 1990'S   • CARPAL TUNNEL RELEASE     • CARPAL TUNNEL RELEASE Right    • CATARACT EXTRACTION     • CATARACT EXTRACTION Bilateral    • DIAGNOSTIC LAPAROSCOPY     • DIALYSIS FISTULA CREATION     • HERNIA REPAIR  85 and 86   • KNEE ARTHROSCOPY INCISION AND DRAINAGE OF KNEE Right 5/18/2019    Procedure: KNEE ARTHROSCOPY INCISION AND DRAINAGE OF KNEE;  Surgeon: Paco Lester MD;  Location:  [facility] OR;  Service: Orthopedics   • LAPAROSCOPIC TUBAL LIGATION  1985   • TOTAL KNEE ARTHROPLASTY     • TOTAL KNEE ARTHROPLASTY      Left knee    • TRANSPLANTATION RENAL  2010   • TRANSPLANTATION RENAL Right    • TRIGGER FINGER RELEASE     • TUBAL ABDOMINAL LIGATION     • UPPER GASTROINTESTINAL ENDOSCOPY  05/20/2013   • VENOUS ACCESS DEVICE (PORT) INSERTION N/A 5/23/2019    Procedure: INSERTION GROSHONG;  Surgeon: Rolando Begum MD;  Location:  [facility] OR;  Service: General       Family History   Problem Relation Age of Onset   • Leukemia Mother    • Stroke Father    • Dementia Sister    • Kidney disease Sister    • Diabetic kidney disease Sister    • Lung cancer Brother    • Breast cancer Neg Hx    • Ovarian cancer Neg Hx    •  "Hypertension Sister    • Dementia Sister        Social History     Tobacco Use   • Smoking status: Never Smoker   • Smokeless tobacco: Never Used   Substance Use Topics   • Alcohol use: No           Blood pressure 128/70, pulse 79, temperature 98.2 °F (36.8 °C), height 162.6 cm (64\"), weight 93 kg (205 lb), SpO2 95 %, not currently breastfeeding.  Body mass index is 35.19 kg/m².  Vitals:    07/21/20 1306   Patient Position: Sitting       Physical Exam   Constitutional: She is oriented to person, place, and time. She appears well-developed and well-nourished.   HENT:   Head: Normocephalic and atraumatic.   Eyes: Conjunctivae are normal. No scleral icterus.   Neck: Normal range of motion. No JVD present. Carotid bruit is not present. No thyromegaly present.   Cardiovascular: Normal rate and regular rhythm. Exam reveals no gallop.   No murmur heard.  Pulmonary/Chest: Effort normal and breath sounds normal.   Abdominal: Soft. She exhibits no distension and no mass. There is no hepatosplenomegaly.   Musculoskeletal: She exhibits no edema.   Neurological: She is alert and oriented to person, place, and time.   Skin: Skin is warm and dry. No rash noted.   Psychiatric: She has a normal mood and affect. Her behavior is normal.       Data Review (reviewed with patient):     Procedures    Lab Results   Component Value Date    GLUCOSE 164 (H) 05/30/2019    BUN 39 (H) 05/30/2019    CREATININE 4.51 (H) 05/30/2019    EGFRIFNONA 9 (L) 05/30/2019    EGFRIFAFRI  05/30/2019      Comment:      <15 Indicative of kidney failure.    BCR 8.6 05/30/2019    K 3.5 05/30/2019    CO2 28.0 05/30/2019    CALCIUM 9.3 05/30/2019    ALBUMIN 2.30 (L) 05/26/2019    ALKPHOS 132 (H) 05/26/2019    AST 24 05/26/2019    ALT 23 05/26/2019      Lab Results   Component Value Date    CHOL 227 (H) 03/04/2019    TRIG 160 (H) 03/04/2019    HDL 52 03/04/2019     (H) 03/04/2019     Lab Results   Component Value Date    HGBA1C 4.90 05/19/2019     Lab Results "   Component Value Date    WBC 12.79 (H) 09/20/2019    HGB 10.7 (L) 09/20/2019    HCT 33.1 (L) 09/20/2019    MCV 94.3 09/20/2019     09/20/2019     Lab Results   Component Value Date    TSH 2.939 03/04/2019            Problem List Items Addressed This Visit        Cardiovascular and Mediastinum    Essential hypertension    Current Assessment & Plan     · Hypertension is controlled  · Continue amlodipine 5 mg  · Continue lisinopril 5 mg daily         Relevant Medications    lisinopril (PRINIVIL,ZESTRIL) 5 MG tablet    High output HF (heart failure) (CMS/[facility])    Overview     · Echo (6/08/2017): LVEF 50%. Grade II Diastolic dysfunction.  · Echo (8/9/17): LVEF 55%.  Grade 2 diastolic dysfunction.  Mild MR.  Mild aortic stenosis  · Limited echo to evaluate high output CHF (8/10/17): Cardiac output decreased from 11.3 L/min to 8 L/min with compression of fistula.  · Fistula ligated 8/2017         Current Assessment & Plan     · Continue Lasix 80 mg daily         Hyperlipidemia LDL goal <100    Overview     · Reports intolerance to statin         Current Assessment & Plan     · Defer statin therapy due to intolerance         Nonrheumatic aortic valve stenosis    Overview     · Echo (06/2017): Mild-to-moderate mitral valve regurgitation is present. Mild aortic valve stenosis is present. Moderate tricuspid valve regurgitation is present.  · Echo (05/23/2019):LVEF = 60%. Mild AS 15 mmHg gradient           Current Assessment & Plan     · Stable and compensated NYHA class I         Paroxysmal atrial fibrillation (CMS/[facility])    Overview     · CHADS-VASc = 5  · Atrial fibrillation initially diagnosed February 2015; spontaneous conversion to normal sinus rhythm.   · Echocardiogram (06/08/2017): LVEF 50%. Grade II Diastolic dysfunction. RVSP 41.6 mmHg. Mild-to-moderate MR. Mild AS.  Moderate TR  · Noted to be in afib with RVR 08/09/2017 in setting of acute bronchitis.  · On Coumadin and amiodarone         Current Assessment &  Plan     · Continue Coumadin dosage adjustment per INR result with goal of 2.0-3.0  · No signs or symptoms of TIA or CVA         Right-sided carotid artery disease (CMS/HCC) - Primary    Overview     · Carotid duplex (9/4/2015): nonobstructive plaque of the proximal SHENA         Current Assessment & Plan     · No signs or symptoms of TIA or CVA  · Patient unable to tolerate statin             Patient is doing well from a cardiovascular standpoint.  Heart failure symptoms are stable she has no signs or symptoms to suggest angina.  She also has no signs or symptoms of TIA or CVA.  We will continue her anticoagulation with Coumadin.  I will defer blood pressure and volume management to nephrology.  Will defer statin therapy due to her reported intolerance.  She will follow-up in 6 months or sooner if needed.       · Continue current medications  · Defer management of fluid balance and hypertension to nephrology  · Defer statin therapy due to reported intolerance  Return in about 6 months (around 1/21/2021), or if symptoms worsen or fail to improve, for 6 months with [facility].    REY Brown, CCRN    7/21/2020

## 2020-07-21 ENCOUNTER — OFFICE VISIT (OUTPATIENT)
Dept: CARDIOLOGY | Facility: CLINIC | Age: 80
End: 2020-07-21

## 2020-07-21 VITALS
DIASTOLIC BLOOD PRESSURE: 70 MMHG | HEIGHT: 64 IN | BODY MASS INDEX: 35 KG/M2 | SYSTOLIC BLOOD PRESSURE: 128 MMHG | OXYGEN SATURATION: 95 % | WEIGHT: 205 LBS | TEMPERATURE: 98.2 F | HEART RATE: 79 BPM

## 2020-07-21 DIAGNOSIS — E78.5 HYPERLIPIDEMIA LDL GOAL <100: ICD-10-CM

## 2020-07-21 DIAGNOSIS — I48.0 PAROXYSMAL ATRIAL FIBRILLATION (HCC): ICD-10-CM

## 2020-07-21 DIAGNOSIS — I10 ESSENTIAL HYPERTENSION: ICD-10-CM

## 2020-07-21 DIAGNOSIS — I77.9 RIGHT-SIDED CAROTID ARTERY DISEASE, UNSPECIFIED TYPE (HCC): Primary | ICD-10-CM

## 2020-07-21 DIAGNOSIS — I35.0 NONRHEUMATIC AORTIC VALVE STENOSIS: ICD-10-CM

## 2020-07-21 DIAGNOSIS — I50.83 HIGH OUTPUT HF (HEART FAILURE) (HCC): ICD-10-CM

## 2020-07-21 PROCEDURE — 99214 OFFICE O/P EST MOD 30 MIN: CPT | Performed by: NURSE PRACTITIONER

## 2020-07-21 RX ORDER — LISINOPRIL 5 MG/1
5 TABLET ORAL DAILY
COMMUNITY
End: 2020-12-08

## 2020-07-21 RX ORDER — TEMAZEPAM 15 MG/1
15 CAPSULE ORAL NIGHTLY PRN
COMMUNITY
End: 2021-01-01

## 2020-07-21 NOTE — ASSESSMENT & PLAN NOTE
· Continue Coumadin dosage adjustment per INR result with goal of 2.0-3.0  · No signs or symptoms of TIA or CVA

## 2020-07-22 ENCOUNTER — ANTICOAGULATION VISIT (OUTPATIENT)
Dept: PHARMACY | Facility: HOSPITAL | Age: 80
End: 2020-07-22

## 2020-07-22 DIAGNOSIS — I48.0 PAROXYSMAL ATRIAL FIBRILLATION (HCC): ICD-10-CM

## 2020-07-22 LAB — INR PPP: 3.5

## 2020-07-22 NOTE — PROGRESS NOTES
Anticoagulation Clinic - Remote Progress Note  [facility]  Testing Frequency: 7 days    Indication: paroxysmal afib  Referring Provider: Butch Joe (Last seen: 11/5/19  next appt: 6/9/20)  Goal INR: 2.0-3.0  Current Drug Interactions: , levothyroxine; omeprazole, azaTHIOprine, ezetimibe, allopurinol (11/20)  CHADS-VASc: 5 (age, gender, HTN, DM)  HAS-BLED: 3 (HTN, CKD, Age)     Diet: salads occasionally (7/8/20) no meal replacement drinks   Alcohol: none  Tobacco: none   OTC Pain Medication: APAP PRN; took tylenol last 2 nights for pain from dialysis (03/26/20)    1st clinic: 9/14/17  2nd clinic: 6/26/18  3rd clinic: 11/5/19    INR History:  Date 7/23 7/26 8/2 8/9 8/16 8/23 8/29 9/5 9/12 9/19 9/24   Total Weekly Dose 36mg 38mg 36mg 38mg 36mg 32mg 30mg?? 32mg 32 mg 32mg 30mg   INR 1.7 2.3 2.0 3.4 3.8 3.5 2.5 2.6 2.5 3.3 2.0   Notes Keflex boost   1x decr dose 1x decr dose    Keflex Keflex     Date 9/27 10/1 10/8 10/15  10/22 10/29 11/5 11/14 11/21 11/28 12/4   Total Weekly Dose 32mg 32mg 32mg 30mg  32mg 28mg 32mg 32mg 32mg 28mg 26mg   INR 2.3 2.4 3.2 2.5  3.9 2.7 3.0 2.8 2.7 3.7 1.9   Notes Levaquin start 9/26 stopped Levaquin        stop MVI, 1 miss 1 hold     Date 12/10 12/17 12/21 12/31 1/7/19 1/14 1/22 1/24 1/28 1/31 2/7   Total Weekly Dose 30mg 30mg 30mg 26mg 28mg 28mg 28mg 28mg 30mg 30mg 28mg   INR 2.4 3.0 3.6 2.5 2.6 2.6 2.9 1.7 2.7 2.5 2.7   Notes        doxy start 1/22, incr GLV 2x boost; doxy Finish doxy 1/29      Date 2/14 2/20 2/28 3/7 3/14 3/21 3/28 4/4 4/11 4/18 4/25   Total Weekly Dose 28mg 28mg 28mg 30mg 31mg 30mg 30mg 31mg 32mg 31mg 31mg   INR 2.1 2.4 1.8 1.6 2.2 2.5 1.9 1.9 2.4 2.8 2.4   Notes   sick Zetia            Date 5/2 5/6 5/15 REHAB 7/19 7/22 7/26 7/30 8/2 8/5 8/12   Total Weekly Dose 31mg 31mg 31mg  ????? ??? Unable to confirm 34mg? 34mg **30mg ? 30mg   INR 2.0 2.4 2.5  1.2 1.3 2.7 1.2 2.0 2.2 1.6   Notes ampicillin amp       stopped lexapro cefdinir      Date 8/15 8/19  8/26 8/30 9/5 9/9 9/13 9/17 10/1 10/7 10/15 10/21   Total Weekly Dose 32mg 33mg 31mg 32mg 31mg 32mg 28mg 24mg 28mg 28mg 26mg 28mg   INR 2.6 2.2 1.8 1.9 2.7 3.3 4.0 3.2 2.5 3.7 2.0 2.2   Notes 3x boost   cefdinir; 1x boost keflex x2 days sick Sick; dose decrease  1x decr dose  1 dose reduction      Date 10/29 11/1 11/5 11/12 11/18 11/27 12/3 12/10 12/18 12/26 1/2/20 1/7   Total Weekly Dose 28mg 27mg 27mg 28mg 26mg 27mg 28mg 28mg 28mg 28mg 26mg 24mg   INR 2.8 2.7 2.4 3.5 2.9 2.0 2.3 3.3 2.9 3.5 3.2 1.2   Notes keflex start 10/28 1x decr  dose; keflex clinic /  1x decr dose amio last dose 11/5/19 1x decr dose allopurinol started broccoli casserole; 1x boost   1x decr dose  augment use 1x dose decr;  augment   * Amiodarone LD 11/5/2019. Monitor as likely her warfarin needs would increase over the next few months     Date 1/10 1/14 1/23 1/28 1/31 2/4 2/11 2/18 2/24 3/3 3/10 3/16 3/19   Total Weekly Dose 28mg 32mg 30mg 26mg 26mg  24mg 26mg 26mg 24mg 26mg 27mg 25mg 29mg   INR 1.6 2.1 4.1 2.2 3.4 2.7 2.1 3.4 2.3 1.9 1.9 1.4 2.0   Notes augment  2x incr dose 1x decr keflex, pred keflex; pred; sick keflex; sick; dose decr x1 sick;   valtrex keflex   1x incr dose, incr GLV 1x dose decr; levaquin 1x incr dose      Date 3/23 3/26 4/2 4/9 4/16 4/23 5/1 5/8 5/14 5/22 5/27 6/4   Total WeeklyDose 29mg 29mg 30mg 30mg 30mg 32mg 30mg 32mg 28mg 32mg 32mg 30mg   INR 1.4 1.5 1.9 2.2 1.9 3.0 2.1 2.7 2.4 3.2 3.5 3.2   Notes 1x incr dose;  spinach 1x incr dose,  less GLV 1x boost    1x decr  dose trazodone 1x miss; trazodone 25mg QPM recv'd 5/26  1x incr dose     Date 6/10 6/17 6/24 7/2 7/8 7/15 7/22        Total WeeklyDose 28mg 28mg 28mg 28 mg 28mg 28mg 30mg        INR 2.5 2.6 2.3 1.8 2.2 1.7 3.5        Notes    Inc GLV  Inc GLV 1x dose incr.          Phone Interview:  Verbal Release Authorization signed on 6/26/18 and again on 11/5/2019-- may speak with Alexy Wallace (son), Genesis Becerril (daughter), Sawyer or Gema Wallace (son and  daughter-in-law), Gerry Wallace (son)  Tablet Strength: 2mg tablets  Patient Contact Info: preferred [phone] - home with son Yadiel- cell [phone]; call if can't get in touch with home phone      Patient Findings:  Negatives:  Signs/symptoms of thrombosis, Signs/symptoms of bleeding, Laboratory test error suspected, Change in health, Change in alcohol use, Change in activity, Upcoming invasive procedure, Emergency department visit, Upcoming dental procedure, Missed doses, Extra doses, Change in medications, Change in diet/appetite, Hospital admission, Bruising, Other complaints   Comments:  Ms Wallace has stopped eating green beans. Has started temazepam for sleep to replace trazodone         Plan:  1. INR is SUPRAtherapeutic today following boosted dose. Instructed Ms. Wallace to decrease today's dose to 2mg then continue warfarin 4mg daily until recheck.  2. Repeat INR in one week.  3. Verbal information provided over the phone. Patient RBV dosing instructions, expresses understanding by teach back, and has no further questions at this time.     Francoise Wu, PharmD.  07/22/20   15:07

## 2020-07-29 ENCOUNTER — ANTICOAGULATION VISIT (OUTPATIENT)
Dept: PHARMACY | Facility: HOSPITAL | Age: 80
End: 2020-07-29

## 2020-07-29 DIAGNOSIS — I48.0 PAROXYSMAL ATRIAL FIBRILLATION (HCC): ICD-10-CM

## 2020-07-29 LAB — INR PPP: 2.4

## 2020-07-29 NOTE — PROGRESS NOTES
Anticoagulation Clinic - Remote Progress Note  [facility]  Testing Frequency: 7 days    Indication: paroxysmal afib  Referring Provider: Butch Joe (Last seen: 11/5/19  next appt: 6/9/20)  Goal INR: 2.0-3.0  Current Drug Interactions: , levothyroxine; omeprazole, azaTHIOprine, ezetimibe, allopurinol (11/20)  CHADS-VASc: 5 (age, gender, HTN, DM)  HAS-BLED: 3 (HTN, CKD, Age)     Diet: salads occasionally (7/31/20) no meal replacement drinks   Alcohol: none  Tobacco: none   OTC Pain Medication: APAP PRN; took tylenol last 2 nights for pain from dialysis (03/26/20)    1st clinic: 9/14/17  2nd clinic: 6/26/18  3rd clinic: 11/5/19    INR History:  Date 7/23 7/26 8/2 8/9 8/16 8/23 8/29 9/5 9/12 9/19 9/24   Total Weekly Dose 36mg 38mg 36mg 38mg 36mg 32mg 30mg?? 32mg 32 mg 32mg 30mg   INR 1.7 2.3 2.0 3.4 3.8 3.5 2.5 2.6 2.5 3.3 2.0   Notes Keflex boost   1x decr dose 1x decr dose    Keflex Keflex     Date 9/27 10/1 10/8 10/15  10/22 10/29 11/5 11/14 11/21 11/28 12/4   Total Weekly Dose 32mg 32mg 32mg 30mg  32mg 28mg 32mg 32mg 32mg 28mg 26mg   INR 2.3 2.4 3.2 2.5  3.9 2.7 3.0 2.8 2.7 3.7 1.9   Notes Levaquin start 9/26 stopped Levaquin        stop MVI, 1 miss 1 hold     Date 12/10 12/17 12/21 12/31 1/7/19 1/14 1/22 1/24 1/28 1/31 2/7   Total Weekly Dose 30mg 30mg 30mg 26mg 28mg 28mg 28mg 28mg 30mg 30mg 28mg   INR 2.4 3.0 3.6 2.5 2.6 2.6 2.9 1.7 2.7 2.5 2.7   Notes        doxy start 1/22, incr GLV 2x boost; doxy Finish doxy 1/29      Date 2/14 2/20 2/28 3/7 3/14 3/21 3/28 4/4 4/11 4/18 4/25   Total Weekly Dose 28mg 28mg 28mg 30mg 31mg 30mg 30mg 31mg 32mg 31mg 31mg   INR 2.1 2.4 1.8 1.6 2.2 2.5 1.9 1.9 2.4 2.8 2.4   Notes   sick Zetia            Date 5/2 5/6 5/15 REHAB 7/19 7/22 7/26 7/30 8/2 8/5 8/12   Total Weekly Dose 31mg 31mg 31mg  ????? ??? Unable to confirm 34mg? 34mg **30mg ? 30mg   INR 2.0 2.4 2.5  1.2 1.3 2.7 1.2 2.0 2.2 1.6   Notes ampicillin amp       stopped lexapro cefdinir      Date 8/15 8/19  8/26 8/30 9/5 9/9 9/13 9/17 10/1 10/7 10/15 10/21   Total Weekly Dose 32mg 33mg 31mg 32mg 31mg 32mg 28mg 24mg 28mg 28mg 26mg 28mg   INR 2.6 2.2 1.8 1.9 2.7 3.3 4.0 3.2 2.5 3.7 2.0 2.2   Notes 3x boost   cefdinir; 1x boost keflex x2 days sick Sick; dose decrease  1x decr dose  1 dose reduction      Date 10/29 11/1 11/5 11/12 11/18 11/27 12/3 12/10 12/18 12/26 1/2/20 1/7   Total Weekly Dose 28mg 27mg 27mg 28mg 26mg 27mg 28mg 28mg 28mg 28mg 26mg 24mg   INR 2.8 2.7 2.4 3.5 2.9 2.0 2.3 3.3 2.9 3.5 3.2 1.2   Notes keflex start 10/28 1x decr  dose; keflex clinic /  1x decr dose amio last dose 11/5/19 1x decr dose allopurinol started broccoli casserole; 1x boost   1x decr dose  augment use 1x dose decr;  augment   * Amiodarone LD 11/5/2019. Monitor as likely her warfarin needs would increase over the next few months     Date 1/10 1/14 1/23 1/28 1/31 2/4 2/11 2/18 2/24 3/3 3/10 3/16 3/19   Total Weekly Dose 28mg 32mg 30mg 26mg 26mg  24mg 26mg 26mg 24mg 26mg 27mg 25mg 29mg   INR 1.6 2.1 4.1 2.2 3.4 2.7 2.1 3.4 2.3 1.9 1.9 1.4 2.0   Notes augment  2x incr dose 1x decr keflex, pred keflex; pred; sick keflex; sick; dose decr x1 sick;   valtrex keflex   1x incr dose, incr GLV 1x dose decr; levaquin 1x incr dose      Date 3/23 3/26 4/2 4/9 4/16 4/23 5/1 5/8 5/14 5/22 5/27 6/4   Total WeeklyDose 29mg 29mg 30mg 30mg 30mg 32mg 30mg 32mg 28mg 32mg 32mg 30mg   INR 1.4 1.5 1.9 2.2 1.9 3.0 2.1 2.7 2.4 3.2 3.5 3.2   Notes 1x incr dose;  spinach 1x incr dose,  less GLV 1x boost    1x decr  dose trazodone 1x miss; trazodone 25mg QPM recv'd 5/26  1x incr dose     Date 6/10 6/17 6/24 7/2 7/8 7/15 7/22 7/29       Total WeeklyDose 28mg 28mg 28mg 28 mg 28mg 28mg 30mg 26mg       INR 2.5 2.6 2.3 1.8 2.2 1.7 3.5 2.4       Notes    Inc GLV  Inc GLV 1x dose incr.          Phone Interview:  Verbal Release Authorization signed on 6/26/18 and again on 11/5/2019-- may speak with Alexy Wallace (son), Genesis Becerril (daughter), Sawyer or Gema Wallace (son and  daughter-in-law), Gerry Wallace (son)  Tablet Strength: 2mg tablets  Patient Contact Info: preferred [phone] - home with son Yadiel- cell [phone]; call if can't get in touch with home phone      Patient Findings:  Positives:  Change in health   Negatives:  Signs/symptoms of thrombosis, Signs/symptoms of bleeding, Laboratory test error suspected, Change in alcohol use, Change in activity, Upcoming invasive procedure, Emergency department visit, Upcoming dental procedure, Missed doses, Extra doses, Change in medications, Change in diet/appetite, Hospital admission, Bruising, Other complaints   Comments:  Ms. Wallace says she has felt very tired and weak the last several days. Her appetite is lower than normal. Strongly advised she call and report her symptoms to cardiology or her PCP. Offered to transfer her to cardiology but she declined. She says she will call her physician today.     Plan:  1. INR was back WNL on Wednesday. Unable to get in contact with patient until today. Instructed Ms. Wallace to continue maintenance dose of warfarin 4mg daily for now.   2. Repeat INR in one week from last test date.  3. Verbal information provided over the phone. Patient RBV dosing instructions, expresses understanding by teach back, and has no further questions at this time.     Zina Aldrich CPhT  7/31/2020  14:21    [facility], have reviewed the note in full and agree with the assessment and plan.  07/31/20  14:32

## 2020-08-05 ENCOUNTER — ANTICOAGULATION VISIT (OUTPATIENT)
Dept: PHARMACY | Facility: HOSPITAL | Age: 80
End: 2020-08-05

## 2020-08-05 DIAGNOSIS — I48.0 PAROXYSMAL ATRIAL FIBRILLATION (HCC): ICD-10-CM

## 2020-08-05 LAB — INR PPP: 2.5

## 2020-08-05 NOTE — PROGRESS NOTES
Anticoagulation Clinic - Remote Progress Note  [facility]  Testing Frequency: 7 days    Indication: paroxysmal afib  Referring Provider: Butch Joe (Last seen: 11/5/19  next appt: 6/9/20)  Goal INR: 2.0-3.0  Current Drug Interactions: , levothyroxine; omeprazole, azaTHIOprine, ezetimibe, allopurinol (11/20)  CHADS-VASc: 5 (age, gender, HTN, DM)  HAS-BLED: 3 (HTN, CKD, Age)     Diet: salads occasionally (7/31/20) no meal replacement drinks   Alcohol: none  Tobacco: none   OTC Pain Medication: APAP PRN; took tylenol last 2 nights for pain from dialysis (03/26/20)    1st clinic: 9/14/17  2nd clinic: 6/26/18  3rd clinic: 11/5/19    INR History:  Date 7/23 7/26 8/2 8/9 8/16 8/23 8/29 9/5 9/12 9/19 9/24   Total Weekly Dose 36mg 38mg 36mg 38mg 36mg 32mg 30mg?? 32mg 32 mg 32mg 30mg   INR 1.7 2.3 2.0 3.4 3.8 3.5 2.5 2.6 2.5 3.3 2.0   Notes Keflex boost   1x decr dose 1x decr dose    Keflex Keflex     Date 9/27 10/1 10/8 10/15  10/22 10/29 11/5 11/14 11/21 11/28 12/4   Total Weekly Dose 32mg 32mg 32mg 30mg  32mg 28mg 32mg 32mg 32mg 28mg 26mg   INR 2.3 2.4 3.2 2.5  3.9 2.7 3.0 2.8 2.7 3.7 1.9   Notes Levaquin start 9/26 stopped Levaquin        stop MVI, 1 miss 1 hold     Date 12/10 12/17 12/21 12/31 1/7/19 1/14 1/22 1/24 1/28 1/31 2/7   Total Weekly Dose 30mg 30mg 30mg 26mg 28mg 28mg 28mg 28mg 30mg 30mg 28mg   INR 2.4 3.0 3.6 2.5 2.6 2.6 2.9 1.7 2.7 2.5 2.7   Notes        doxy start 1/22, incr GLV 2x boost; doxy Finish doxy 1/29      Date 2/14 2/20 2/28 3/7 3/14 3/21 3/28 4/4 4/11 4/18 4/25   Total Weekly Dose 28mg 28mg 28mg 30mg 31mg 30mg 30mg 31mg 32mg 31mg 31mg   INR 2.1 2.4 1.8 1.6 2.2 2.5 1.9 1.9 2.4 2.8 2.4   Notes   sick Zetia            Date 5/2 5/6 5/15 REHAB 7/19 7/22 7/26 7/30 8/2 8/5 8/12   Total Weekly Dose 31mg 31mg 31mg  ????? ??? Unable to confirm 34mg? 34mg **30mg ? 30mg   INR 2.0 2.4 2.5  1.2 1.3 2.7 1.2 2.0 2.2 1.6   Notes ampicillin amp       stopped lexapro cefdinir      Date 8/15 8/19  8/26 8/30 9/5 9/9 9/13 9/17 10/1 10/7 10/15 10/21   Total Weekly Dose 32mg 33mg 31mg 32mg 31mg 32mg 28mg 24mg 28mg 28mg 26mg 28mg   INR 2.6 2.2 1.8 1.9 2.7 3.3 4.0 3.2 2.5 3.7 2.0 2.2   Notes 3x boost   cefdinir; 1x boost keflex x2 days sick Sick; dose decrease  1x decr dose  1 dose reduction      Date 10/29 11/1 11/5 11/12 11/18 11/27 12/3 12/10 12/18 12/26 1/2/20 1/7   Total Weekly Dose 28mg 27mg 27mg 28mg 26mg 27mg 28mg 28mg 28mg 28mg 26mg 24mg   INR 2.8 2.7 2.4 3.5 2.9 2.0 2.3 3.3 2.9 3.5 3.2 1.2   Notes keflex start 10/28 1x decr  dose; keflex clinic /  1x decr dose amio last dose 11/5/19 1x decr dose allopurinol started broccoli casserole; 1x boost   1x decr dose  augment use 1x dose decr;  augment   * Amiodarone LD 11/5/2019. Monitor as likely her warfarin needs would increase over the next few months     Date 1/10 1/14 1/23 1/28 1/31 2/4 2/11 2/18 2/24 3/3 3/10 3/16 3/19   Total Weekly Dose 28mg 32mg 30mg 26mg 26mg  24mg 26mg 26mg 24mg 26mg 27mg 25mg 29mg   INR 1.6 2.1 4.1 2.2 3.4 2.7 2.1 3.4 2.3 1.9 1.9 1.4 2.0   Notes augment  2x incr dose 1x decr keflex, pred keflex; pred; sick keflex; sick; dose decr x1 sick;   valtrex keflex   1x incr dose, incr GLV 1x dose decr; levaquin 1x incr dose      Date 3/23 3/26 4/2 4/9 4/16 4/23 5/1 5/8 5/14 5/22 5/27 6/4   Total WeeklyDose 29mg 29mg 30mg 30mg 30mg 32mg 30mg 32mg 28mg 32mg 32mg 30mg   INR 1.4 1.5 1.9 2.2 1.9 3.0 2.1 2.7 2.4 3.2 3.5 3.2   Notes 1x incr dose;  spinach 1x incr dose,  less GLV 1x boost    1x decr  dose trazodone 1x miss; trazodone 25mg QPM recv'd 5/26  1x incr dose     Date 6/10 6/17 6/24 7/2 7/8 7/15 7/22 7/29 8/5      Total WeeklyDose 28mg 28mg 28mg 28 mg 28mg 28mg 30mg 26mg 28 mg      INR 2.5 2.6 2.3 1.8 2.2 1.7 3.5 2.4 2.5      Notes    Inc GLV  Inc GLV 1x dose incr. x1 dose red         Phone Interview:  Verbal Release Authorization signed on 6/26/18 and again on 11/5/2019-- may speak with Alexy Wallace (son), Genesis Becerril (daughter), Sawyer or  Gema Wallace (son and daughter-in-law), Gerry Wallace (son)  Tablet Strength: 2mg tablets  Patient Contact Info: preferred [phone] - home with son Yadiel- cell [phone]; call if can't get in touch with home phone      Patient Findings   Positives:  Change in diet/appetite   Negatives:  Signs/symptoms of thrombosis, Signs/symptoms of bleeding, Laboratory test error suspected, Change in health, Change in alcohol use, Change in activity, Upcoming invasive procedure, Emergency department visit, Upcoming dental procedure, Missed doses, Extra doses, Change in medications, Hospital admission, Bruising, Other complaints   Comments:  Ms. Wallace says she has felt very tired and weak the last several days. Her appetite is lower than normal. She does not typically eat GLV   She has an appointment with Dr. Hodges after lab work tomorrow.   Otherwise, above findings negative     Plan:    1. INR is therapeutic today at 2.5. Instructed Ms. Wallace to continue maintenance dose of warfarin 4mg oral daily for now.   2. Repeat INR in one week on 8/12.  3. Verbal information provided over the phone. Patient RBV dosing instructions, expresses understanding by teach back, and has no further questions at this time.     Radha Michaud, PharmD  8/5/2020  15:26

## 2020-08-10 RX ORDER — OMEPRAZOLE 40 MG/1
CAPSULE, DELAYED RELEASE ORAL
Qty: 90 CAPSULE | Refills: 0 | Status: SHIPPED | OUTPATIENT
Start: 2020-08-10 | End: 2020-11-17

## 2020-08-12 ENCOUNTER — ANTICOAGULATION VISIT (OUTPATIENT)
Dept: PHARMACY | Facility: HOSPITAL | Age: 80
End: 2020-08-12

## 2020-08-12 DIAGNOSIS — I48.0 PAROXYSMAL ATRIAL FIBRILLATION (HCC): ICD-10-CM

## 2020-08-12 LAB — INR PPP: 1.7

## 2020-08-12 NOTE — PROGRESS NOTES
Anticoagulation Clinic - Remote Progress Note  [facility]  Testing Frequency: 7 days    Indication: paroxysmal afib  Referring Provider: Butch Joe (Last seen: 11/5/19  next appt: 6/9/20)  Goal INR: 2.0-3.0  Current Drug Interactions: , levothyroxine; omeprazole, azaTHIOprine, ezetimibe, allopurinol (11/20)  CHADS-VASc: 5 (age, gender, HTN, DM)  HAS-BLED: 3 (HTN, CKD, Age)     Diet: salads occasionally (7/31/20) no meal replacement drinks   Alcohol: none  Tobacco: none   OTC Pain Medication: APAP PRN; took tylenol last 2 nights for pain from dialysis (03/26/20)    1st clinic: 9/14/17  2nd clinic: 6/26/18  3rd clinic: 11/5/19    INR History:  Date 7/23 7/26 8/2 8/9 8/16 8/23 8/29 9/5 9/12 9/19 9/24   Total Weekly Dose 36mg 38mg 36mg 38mg 36mg 32mg 30mg?? 32mg 32 mg 32mg 30mg   INR 1.7 2.3 2.0 3.4 3.8 3.5 2.5 2.6 2.5 3.3 2.0   Notes Keflex boost   1x decr dose 1x decr dose    Keflex Keflex     Date 9/27 10/1 10/8 10/15  10/22 10/29 11/5 11/14 11/21 11/28 12/4   Total Weekly Dose 32mg 32mg 32mg 30mg  32mg 28mg 32mg 32mg 32mg 28mg 26mg   INR 2.3 2.4 3.2 2.5  3.9 2.7 3.0 2.8 2.7 3.7 1.9   Notes Levaquin start 9/26 stopped Levaquin        stop MVI, 1 miss 1 hold     Date 12/10 12/17 12/21 12/31 1/7/19 1/14 1/22 1/24 1/28 1/31 2/7   Total Weekly Dose 30mg 30mg 30mg 26mg 28mg 28mg 28mg 28mg 30mg 30mg 28mg   INR 2.4 3.0 3.6 2.5 2.6 2.6 2.9 1.7 2.7 2.5 2.7   Notes        doxy start 1/22, incr GLV 2x boost; doxy Finish doxy 1/29      Date 2/14 2/20 2/28 3/7 3/14 3/21 3/28 4/4 4/11 4/18 4/25   Total Weekly Dose 28mg 28mg 28mg 30mg 31mg 30mg 30mg 31mg 32mg 31mg 31mg   INR 2.1 2.4 1.8 1.6 2.2 2.5 1.9 1.9 2.4 2.8 2.4   Notes   sick Zetia            Date 5/2 5/6 5/15 REHAB 7/19 7/22 7/26 7/30 8/2 8/5 8/12   Total Weekly Dose 31mg 31mg 31mg  ????? ??? Unable to confirm 34mg? 34mg **30mg ? 30mg   INR 2.0 2.4 2.5  1.2 1.3 2.7 1.2 2.0 2.2 1.6   Notes ampicillin amp       stopped lexapro cefdinir      Date 8/15 8/19  8/26 8/30 9/5 9/9 9/13 9/17 10/1 10/7 10/15 10/21   Total Weekly Dose 32mg 33mg 31mg 32mg 31mg 32mg 28mg 24mg 28mg 28mg 26mg 28mg   INR 2.6 2.2 1.8 1.9 2.7 3.3 4.0 3.2 2.5 3.7 2.0 2.2   Notes 3x boost   cefdinir; 1x boost keflex x2 days sick Sick; dose decrease  1x decr dose  1 dose reduction      Date 10/29 11/1 11/5 11/12 11/18 11/27 12/3 12/10 12/18 12/26 1/2/20 1/7   Total Weekly Dose 28mg 27mg 27mg 28mg 26mg 27mg 28mg 28mg 28mg 28mg 26mg 24mg   INR 2.8 2.7 2.4 3.5 2.9 2.0 2.3 3.3 2.9 3.5 3.2 1.2   Notes keflex start 10/28 1x decr  dose; keflex clinic /  1x decr dose amio last dose 11/5/19 1x decr dose allopurinol started broccoli casserole; 1x boost   1x decr dose  augment use 1x dose decr;  augment   * Amiodarone LD 11/5/2019. Monitor as likely her warfarin needs would increase over the next few months     Date 1/10 1/14 1/23 1/28 1/31 2/4 2/11 2/18 2/24 3/3 3/10 3/16 3/19   Total Weekly Dose 28mg 32mg 30mg 26mg 26mg  24mg 26mg 26mg 24mg 26mg 27mg 25mg 29mg   INR 1.6 2.1 4.1 2.2 3.4 2.7 2.1 3.4 2.3 1.9 1.9 1.4 2.0   Notes augment  2x incr dose 1x decr keflex, pred keflex; pred; sick keflex; sick; dose decr x1 sick;   valtrex keflex   1x incr dose, incr GLV 1x dose decr; levaquin 1x incr dose      Date 3/23 3/26 4/2 4/9 4/16 4/23 5/1 5/8 5/14 5/22 5/27 6/4   Total WeeklyDose 29mg 29mg 30mg 30mg 30mg 32mg 30mg 32mg 28mg 32mg 32mg 30mg   INR 1.4 1.5 1.9 2.2 1.9 3.0 2.1 2.7 2.4 3.2 3.5 3.2   Notes 1x incr dose;  spinach 1x incr dose,  less GLV 1x boost    1x decr  dose trazodone 1x miss; trazodone 25mg QPM recv'd 5/26  1x incr dose     Date 6/10 6/17 6/24 7/2 7/8 7/15 7/22 7/29 8/5 8/12       Total WeeklyDose 28mg 28mg 28mg 28 mg 28mg 28mg 30mg 26mg 28 mg        INR 2.5 2.6 2.3 1.8 2.2 1.7 3.5 2.4 2.5 1.7       Notes    Inc GLV  Inc GLV 1x dose incr. x1 dose red           Phone Interview:  Verbal Release Authorization signed on 6/26/18 and again on 11/5/2019-- may speak with Alexy Wallace (son), Genesis Becerril  (daughter), Sawyer or Gema Wallace (son and daughter-in-law), Gerry Wallace (son)  Tablet Strength: 2mg tablets  Patient Contact Info: preferred [phone] - home with son Yadiel- cell [phone]; call if can't get in touch with home phone      Patient Findings   Positives:  Change in diet/appetite   Negatives:  Signs/symptoms of thrombosis, Signs/symptoms of bleeding, Laboratory test error suspected, Change in health, Change in alcohol use, Change in activity, Upcoming invasive procedure, Emergency department visit, Upcoming dental procedure, Missed doses, Extra doses, Change in medications, Hospital admission, Bruising, Other complaints   Comments:  She denies any missed doses.   She denied eating GLV.  She had a protein drink (Ensure) on Saturday and today.  She just started drinking this as she stated that her protein is low.   She isn't sure how often she will drink.  She has two more bottles.  She plans to have another on Friday or Saturday.   Discussed the importance of consistency as they contain vitamin K, which will affect her warfarin needs.  Appears that we will need to continue to educate her on that.     Otherwise, above findings negative.     Plan:    1. INR is SUB therapeutic today at 1.7. Instructed Ms. Wallace to increase to warfarin 4mg oral daily except 6 mg today and Sunday for now.   2. Repeat INR on Monday, 8/17, to ensure wnl.  Will need to work on warfarin dose due to starting to drink Ensure drinks.  3. Verbal information provided over the phone. Patient RBV dosing instructions, expresses understanding by teach back, and has no further questions at this time.     Radha Michaud, PharmD  8/12/2020  15:20

## 2020-08-17 ENCOUNTER — ANTICOAGULATION VISIT (OUTPATIENT)
Dept: PHARMACY | Facility: HOSPITAL | Age: 80
End: 2020-08-17

## 2020-08-17 DIAGNOSIS — I48.0 PAROXYSMAL ATRIAL FIBRILLATION (HCC): ICD-10-CM

## 2020-08-17 LAB — INR PPP: 1.8

## 2020-08-17 NOTE — PROGRESS NOTES
Anticoagulation Clinic - Remote Progress Note  [facility]  Testing Frequency: 7 days    Indication: paroxysmal afib  Referring Provider: Butch Joe (Last seen: 11/5/19  next appt: 6/9/20)  Goal INR: 2.0-3.0  Current Drug Interactions: , levothyroxine; omeprazole, azaTHIOprine, ezetimibe, allopurinol (11/20)  CHADS-VASc: 5 (age, gender, HTN, DM)  HAS-BLED: 3 (HTN, CKD, Age)     Diet: salads occasionally (7/31/20) no meal replacement drinks   Alcohol: none  Tobacco: none   OTC Pain Medication: APAP PRN; took tylenol last 2 nights for pain from dialysis (03/26/20)    1st clinic: 9/14/17  2nd clinic: 6/26/18  3rd clinic: 11/5/19    INR History:  Date 7/23 7/26 8/2 8/9 8/16 8/23 8/29 9/5 9/12 9/19 9/24   Total Weekly Dose 36mg 38mg 36mg 38mg 36mg 32mg 30mg?? 32mg 32 mg 32mg 30mg   INR 1.7 2.3 2.0 3.4 3.8 3.5 2.5 2.6 2.5 3.3 2.0   Notes Keflex boost   1x decr dose 1x decr dose    Keflex Keflex     Date 9/27 10/1 10/8 10/15  10/22 10/29 11/5 11/14 11/21 11/28 12/4   Total Weekly Dose 32mg 32mg 32mg 30mg  32mg 28mg 32mg 32mg 32mg 28mg 26mg   INR 2.3 2.4 3.2 2.5  3.9 2.7 3.0 2.8 2.7 3.7 1.9   Notes Levaquin start 9/26 stopped Levaquin        stop MVI, 1 miss 1 hold     Date 12/10 12/17 12/21 12/31 1/7/19 1/14 1/22 1/24 1/28 1/31 2/7   Total Weekly Dose 30mg 30mg 30mg 26mg 28mg 28mg 28mg 28mg 30mg 30mg 28mg   INR 2.4 3.0 3.6 2.5 2.6 2.6 2.9 1.7 2.7 2.5 2.7   Notes        doxy start 1/22, incr GLV 2x boost; doxy Finish doxy 1/29      Date 2/14 2/20 2/28 3/7 3/14 3/21 3/28 4/4 4/11 4/18 4/25   Total Weekly Dose 28mg 28mg 28mg 30mg 31mg 30mg 30mg 31mg 32mg 31mg 31mg   INR 2.1 2.4 1.8 1.6 2.2 2.5 1.9 1.9 2.4 2.8 2.4   Notes   sick Zetia            Date 5/2 5/6 5/15 REHAB 7/19 7/22 7/26 7/30 8/2 8/5 8/12   Total Weekly Dose 31mg 31mg 31mg  ????? ??? Unable to confirm 34mg? 34mg **30mg ? 30mg   INR 2.0 2.4 2.5  1.2 1.3 2.7 1.2 2.0 2.2 1.6   Notes ampicillin amp       stopped lexapro cefdinir      Date 8/15 8/19  8/26 8/30 9/5 9/9 9/13 9/17 10/1 10/7 10/15 10/21   Total Weekly Dose 32mg 33mg 31mg 32mg 31mg 32mg 28mg 24mg 28mg 28mg 26mg 28mg   INR 2.6 2.2 1.8 1.9 2.7 3.3 4.0 3.2 2.5 3.7 2.0 2.2   Notes 3x boost   cefdinir; 1x boost keflex x2 days sick Sick; dose decrease  1x decr dose  1 dose reduction      Date 10/29 11/1 11/5 11/12 11/18 11/27 12/3 12/10 12/18 12/26 1/2/20 1/7   Total Weekly Dose 28mg 27mg 27mg 28mg 26mg 27mg 28mg 28mg 28mg 28mg 26mg 24mg   INR 2.8 2.7 2.4 3.5 2.9 2.0 2.3 3.3 2.9 3.5 3.2 1.2   Notes keflex start 10/28 1x decr  dose; keflex clinic /  1x decr dose amio last dose 11/5/19 1x decr dose allopurinol started broccoli casserole; 1x boost   1x decr dose  augment use 1x dose decr;  augment   * Amiodarone LD 11/5/2019. Monitor as likely her warfarin needs would increase over the next few months     Date 1/10 1/14 1/23 1/28 1/31 2/4 2/11 2/18 2/24 3/3 3/10 3/16 3/19   Total Weekly Dose 28mg 32mg 30mg 26mg 26mg  24mg 26mg 26mg 24mg 26mg 27mg 25mg 29mg   INR 1.6 2.1 4.1 2.2 3.4 2.7 2.1 3.4 2.3 1.9 1.9 1.4 2.0   Notes augment  2x incr dose 1x decr keflex, pred keflex; pred; sick keflex; sick; dose decr x1 sick;   valtrex keflex   1x incr dose, incr GLV 1x dose decr; levaquin 1x incr dose      Date 3/23 3/26 4/2 4/9 4/16 4/23 5/1 5/8 5/14 5/22 5/27 6/4   Total WeeklyDose 29mg 29mg 30mg 30mg 30mg 32mg 30mg 32mg 28mg 32mg 32mg 30mg   INR 1.4 1.5 1.9 2.2 1.9 3.0 2.1 2.7 2.4 3.2 3.5 3.2   Notes 1x incr dose;  spinach 1x incr dose,  less GLV 1x boost    1x decr  dose trazodone 1x miss; trazodone 25mg QPM recv'd 5/26  1x incr dose     Date 6/10 6/17 6/24 7/2 7/8 7/15 7/22 7/29 8/5 8/12 8/17      Total WeeklyDose 28mg 28mg 28mg 28 mg 28mg 28mg 30mg 26mg 28 mg 28mg 32mg      INR 2.5 2.6 2.3 1.8 2.2 1.7 3.5 2.4 2.5 1.7 1.8      Notes    Inc GLV  Inc GLV 1x dose incr. x1 dose red  ensure         Phone Interview:  Verbal Release Authorization signed on 6/26/18 and again on 11/5/2019-- may speak with Alexy Wallace (son),  Genesis Becerril (daughter), Sawyer or Gema Wallace (son and daughter-in-law), Gerry Wallace (son)  Tablet Strength: 2mg tablets  Patient Contact Info: preferred [phone] - home with son Yadiel- cell [phone]; call if can't get in touch with home phone      Patient Findings     Positives:  Change in diet/appetite   Negatives:  Signs/symptoms of thrombosis, Signs/symptoms of bleeding, Laboratory test error suspected, Change in health, Change in alcohol use, Change in activity, Upcoming invasive procedure, Emergency department visit, Upcoming dental procedure, Missed doses, Extra doses, Change in medications, Hospital admission, Bruising, Other complaints   Comments:  She has 2 bottles of ensure remaining but her son bought her a different brand for this week; encouraged consistency. She does not have one daily; she has had 3 over the course of the week. Suggested she continue this intake.            Plan:    1. INR remains SUB therapeutic today at 1.8 despite boosted doses. Instructed Ms. Wallace to increase to warfarin 4mg oral daily except 6 mg SunTues Thurs for now.   2. Repeat INR on Monday, 8/17, to ensure wnl.  Will need to work on warfarin dose due to starting to drink Ensure drinks.  3. Verbal information provided over the phone. Patient RBV dosing instructions, expresses understanding by teach back, and has no further questions at this time.        Francoise Wu, PharmD.  08/17/20   15:28

## 2020-08-24 ENCOUNTER — ANTICOAGULATION VISIT (OUTPATIENT)
Dept: PHARMACY | Facility: HOSPITAL | Age: 80
End: 2020-08-24

## 2020-08-24 DIAGNOSIS — I48.0 PAROXYSMAL ATRIAL FIBRILLATION (HCC): ICD-10-CM

## 2020-08-24 LAB — INR PPP: 2.8

## 2020-08-24 NOTE — PROGRESS NOTES
Anticoagulation Clinic - Remote Progress Note  [facility]  Testing Frequency: 7 days    Indication: paroxysmal afib  Referring Provider: Butch Joe (Last seen: 11/5/19  next appt: 6/9/20)  Goal INR: 2.0-3.0  Current Drug Interactions: , levothyroxine; omeprazole, azaTHIOprine, ezetimibe, allopurinol (11/20)  CHADS-VASc: 5 (age, gender, HTN, DM)  HAS-BLED: 3 (HTN, CKD, Age)     Diet: salads occasionally (7/31/20) ensure 8/2020  Alcohol: none  Tobacco: none   OTC Pain Medication: APAP PRN; took tylenol last 2 nights for pain from dialysis (03/26/20)    1st clinic: 9/14/17  2nd clinic: 6/26/18  3rd clinic: 11/5/19    INR History:  Date 7/23 7/26 8/2 8/9 8/16 8/23 8/29 9/5 9/12 9/19 9/24   Total Weekly Dose 36mg 38mg 36mg 38mg 36mg 32mg 30mg?? 32mg 32 mg 32mg 30mg   INR 1.7 2.3 2.0 3.4 3.8 3.5 2.5 2.6 2.5 3.3 2.0   Notes Keflex boost   1x decr dose 1x decr dose    Keflex Keflex     Date 9/27 10/1 10/8 10/15  10/22 10/29 11/5 11/14 11/21 11/28 12/4   Total Weekly Dose 32mg 32mg 32mg 30mg  32mg 28mg 32mg 32mg 32mg 28mg 26mg   INR 2.3 2.4 3.2 2.5  3.9 2.7 3.0 2.8 2.7 3.7 1.9   Notes Levaquin start 9/26 stopped Levaquin        stop MVI, 1 miss 1 hold     Date 12/10 12/17 12/21 12/31 1/7/19 1/14 1/22 1/24 1/28 1/31 2/7   Total Weekly Dose 30mg 30mg 30mg 26mg 28mg 28mg 28mg 28mg 30mg 30mg 28mg   INR 2.4 3.0 3.6 2.5 2.6 2.6 2.9 1.7 2.7 2.5 2.7   Notes        doxy start 1/22, incr GLV 2x boost; doxy Finish doxy 1/29      Date 2/14 2/20 2/28 3/7 3/14 3/21 3/28 4/4 4/11 4/18 4/25   Total Weekly Dose 28mg 28mg 28mg 30mg 31mg 30mg 30mg 31mg 32mg 31mg 31mg   INR 2.1 2.4 1.8 1.6 2.2 2.5 1.9 1.9 2.4 2.8 2.4   Notes   sick Zetia            Date 5/2 5/6 5/15 REHAB 7/19 7/22 7/26 7/30 8/2 8/5 8/12   Total Weekly Dose 31mg 31mg 31mg  ????? ??? Unable to confirm 34mg? 34mg **30mg ? 30mg   INR 2.0 2.4 2.5  1.2 1.3 2.7 1.2 2.0 2.2 1.6   Notes ampicillin amp       stopped lexapro cefdinir      Date 8/15 8/19 8/26 8/30 9/5  9/9 9/13 9/17 10/1 10/7 10/15 10/21   Total Weekly Dose 32mg 33mg 31mg 32mg 31mg 32mg 28mg 24mg 28mg 28mg 26mg 28mg   INR 2.6 2.2 1.8 1.9 2.7 3.3 4.0 3.2 2.5 3.7 2.0 2.2   Notes 3x boost   cefdinir; 1x boost keflex x2 days sick Sick; dose decrease  1x decr dose  1 dose reduction      Date 10/29 11/1 11/5 11/12 11/18 11/27 12/3 12/10 12/18 12/26 1/2/20 1/7   Total Weekly Dose 28mg 27mg 27mg 28mg 26mg 27mg 28mg 28mg 28mg 28mg 26mg 24mg   INR 2.8 2.7 2.4 3.5 2.9 2.0 2.3 3.3 2.9 3.5 3.2 1.2   Notes keflex start 10/28 1x decr  dose; keflex clinic /  1x decr dose amio last dose 11/5/19 1x decr dose allopurinol started broccoli casserole; 1x boost   1x decr dose  augment use 1x dose decr;  augment   * Amiodarone LD 11/5/2019. Monitor as likely her warfarin needs would increase over the next few months     Date 1/10 1/14 1/23 1/28 1/31 2/4 2/11 2/18 2/24 3/3 3/10 3/16 3/19   Total Weekly Dose 28mg 32mg 30mg 26mg 26mg  24mg 26mg 26mg 24mg 26mg 27mg 25mg 29mg   INR 1.6 2.1 4.1 2.2 3.4 2.7 2.1 3.4 2.3 1.9 1.9 1.4 2.0   Notes augment  2x incr dose 1x decr keflex, pred keflex; pred; sick keflex; sick; dose decr x1 sick;   valtrex keflex   1x incr dose, incr GLV 1x dose decr; levaquin 1x incr dose      Date 3/23 3/26 4/2 4/9 4/16 4/23 5/1 5/8 5/14 5/22 5/27 6/4   Total WeeklyDose 29mg 29mg 30mg 30mg 30mg 32mg 30mg 32mg 28mg 32mg 32mg 30mg   INR 1.4 1.5 1.9 2.2 1.9 3.0 2.1 2.7 2.4 3.2 3.5 3.2   Notes 1x incr dose;  spinach 1x incr dose,  less GLV 1x boost    1x decr  dose trazodone 1x miss; trazodone 25mg QPM recv'd 5/26  1x incr dose     Date 6/10 6/17 6/24 7/2 7/8 7/15 7/22 7/29 8/5 8/12 8/17 8/24     Total WeeklyDose 28mg 28mg 28mg 28 mg 28mg 28mg 30mg 26mg 28 mg 28mg 32mg 34mg     INR 2.5 2.6 2.3 1.8 2.2 1.7 3.5 2.4 2.5 1.7 1.8 2.8     Notes    Inc GLV  Inc GLV 1x dose incr. x1 dose red  ensure         Phone Interview:  Verbal Release Authorization signed on 6/26/18 and again on 11/5/2019-- may speak with Alexy Wallace (son),  Genesis Becerril (daughter), Sawyer or Gema Wallace (son and daughter-in-law), Gerry Wallace (son)  Tablet Strength: 2mg tablets  Patient Contact Info: preferred [phone] - home with son Yadiel- cell [phone]; call if can't get in touch with home phone      Patient Findings   Positives:  Change in diet/appetite   Negatives:  Signs/symptoms of thrombosis, Signs/symptoms of bleeding, Laboratory test error suspected, Change in health, Change in alcohol use, Change in activity, Upcoming invasive procedure, Emergency department visit, Upcoming dental procedure, Missed doses, Extra doses, Change in medications, Hospital admission, Bruising, Other complaints   Comments:  Patient reported doesn't eat much with poor appetite, no GLV intake.   Drink ensure and also another protein drink, just one can every other day (sometimes ensure, sometimes the other brand - patient could not remember the other name and did not check the Vit-K amount in it), she is planning to keep this frequency. Have instructed to keep the nutrition drinks consistent with certain brand and frequency.     Otherwise, above findings negative.       Plan:  1. INR is back to therapeutic today at 2.8, increased a whole point since last week with boosted dose. Instructed Ms. Wallace to take warfarin 4mg oral daily except 6 mg SunThurs  (32mg weekly) for now until recheck.   2. Repeat INR on Monday, 8/31, to ensure wnl.  Will need to work on warfarin dose due to starting to drink Ensure drinks.  3. Verbal information provided over the phone. Patient RBV dosing instructions, expresses understanding by teach back, and has no further questions at this time.     Minnie Panda, Pharmacy Intern.  08/24/20   15:30      IFrancoise, PharmD, have reviewed the note in full and agree with the assessment and plan.  08/25/20  09:44

## 2020-08-31 ENCOUNTER — ANTICOAGULATION VISIT (OUTPATIENT)
Dept: PHARMACY | Facility: HOSPITAL | Age: 80
End: 2020-08-31

## 2020-08-31 DIAGNOSIS — I48.0 PAROXYSMAL ATRIAL FIBRILLATION (HCC): ICD-10-CM

## 2020-08-31 LAB — INR PPP: 2.4

## 2020-09-09 ENCOUNTER — ANTICOAGULATION VISIT (OUTPATIENT)
Dept: PHARMACY | Facility: HOSPITAL | Age: 80
End: 2020-09-09

## 2020-09-09 DIAGNOSIS — I48.0 PAROXYSMAL ATRIAL FIBRILLATION (HCC): ICD-10-CM

## 2020-09-09 LAB — INR PPP: 3.9

## 2020-09-09 NOTE — PROGRESS NOTES
Anticoagulation Clinic - Remote Progress Note  [facility]  Testing Frequency: 7 days    Indication: paroxysmal afib  Referring Provider: Butch Joe (Last seen: 11/5/19  next appt: 6/9/20)  Goal INR: 2.0-3.0  Current Drug Interactions: , levothyroxine; omeprazole, azaTHIOprine, ezetimibe, allopurinol (11/20)  CHADS-VASc: 5 (age, gender, HTN, DM)  HAS-BLED: 3 (HTN, CKD, Age)     Diet: salads occasionally (7/31/20) ensure 8/2020  Alcohol: none  Tobacco: none   OTC Pain Medication: APAP PRN; took tylenol last 2 nights for pain from dialysis (03/26/20)    1st clinic: 9/14/17  2nd clinic: 6/26/18  3rd clinic: 11/5/19    INR History:  Date 7/23 7/26 8/2 8/9 8/16 8/23 8/29 9/5 9/12 9/19 9/24   Total Weekly Dose 36mg 38mg 36mg 38mg 36mg 32mg 30mg?? 32mg 32 mg 32mg 30mg   INR 1.7 2.3 2.0 3.4 3.8 3.5 2.5 2.6 2.5 3.3 2.0   Notes Keflex boost   1x decr dose 1x decr dose    Keflex Keflex     Date 9/27 10/1 10/8 10/15  10/22 10/29 11/5 11/14 11/21 11/28 12/4   Total Weekly Dose 32mg 32mg 32mg 30mg  32mg 28mg 32mg 32mg 32mg 28mg 26mg   INR 2.3 2.4 3.2 2.5  3.9 2.7 3.0 2.8 2.7 3.7 1.9   Notes Levaquin start 9/26 stopped Levaquin        stop MVI, 1 miss 1 hold     Date 12/10 12/17 12/21 12/31 1/7/19 1/14 1/22 1/24 1/28 1/31 2/7   Total Weekly Dose 30mg 30mg 30mg 26mg 28mg 28mg 28mg 28mg 30mg 30mg 28mg   INR 2.4 3.0 3.6 2.5 2.6 2.6 2.9 1.7 2.7 2.5 2.7   Notes        doxy start 1/22, incr GLV 2x boost; doxy Finish doxy 1/29      Date 2/14 2/20 2/28 3/7 3/14 3/21 3/28 4/4 4/11 4/18 4/25   Total Weekly Dose 28mg 28mg 28mg 30mg 31mg 30mg 30mg 31mg 32mg 31mg 31mg   INR 2.1 2.4 1.8 1.6 2.2 2.5 1.9 1.9 2.4 2.8 2.4   Notes   sick Zetia            Date 5/2 5/6 5/15 REHAB 7/19 7/22 7/26 7/30 8/2 8/5 8/12   Total Weekly Dose 31mg 31mg 31mg  ????? ??? Unable to confirm 34mg? 34mg **30mg ? 30mg   INR 2.0 2.4 2.5  1.2 1.3 2.7 1.2 2.0 2.2 1.6   Notes ampicillin amp       stopped lexapro cefdinir      Date 8/15 8/19 8/26 8/30 9/5  9/9 9/13 9/17 10/1 10/7 10/15 10/21   Total Weekly Dose 32mg 33mg 31mg 32mg 31mg 32mg 28mg 24mg 28mg 28mg 26mg 28mg   INR 2.6 2.2 1.8 1.9 2.7 3.3 4.0 3.2 2.5 3.7 2.0 2.2   Notes 3x boost   cefdinir; 1x boost keflex x2 days sick Sick; dose decrease  1x decr dose  1 dose reduction      Date 10/29 11/1 11/5 11/12 11/18 11/27 12/3 12/10 12/18 12/26 1/2/20 1/7   Total Weekly Dose 28mg 27mg 27mg 28mg 26mg 27mg 28mg 28mg 28mg 28mg 26mg 24mg   INR 2.8 2.7 2.4 3.5 2.9 2.0 2.3 3.3 2.9 3.5 3.2 1.2   Notes keflex start 10/28 1x decr  dose; keflex clinic /  1x decr dose amio last dose 11/5/19 1x decr dose allopurinol started broccoli casserole; 1x boost   1x decr dose  augment use 1x dose decr;  augment   * Amiodarone LD 11/5/2019. Monitor as likely her warfarin needs would increase over the next few months     Date 1/10 1/14 1/23 1/28 1/31 2/4 2/11 2/18 2/24 3/3 3/10 3/16 3/19   Total Weekly Dose 28mg 32mg 30mg 26mg 26mg  24mg 26mg 26mg 24mg 26mg 27mg 25mg 29mg   INR 1.6 2.1 4.1 2.2 3.4 2.7 2.1 3.4 2.3 1.9 1.9 1.4 2.0   Notes augment  2x incr dose 1x decr keflex, pred keflex; pred; sick keflex; sick; dose decr x1 sick;   valtrex keflex   1x incr dose, incr GLV 1x dose decr; levaquin 1x incr dose      Date 3/23 3/26 4/2 4/9 4/16 4/23 5/1 5/8 5/14 5/22 5/27 6/4   Total WeeklyDose 29mg 29mg 30mg 30mg 30mg 32mg 30mg 32mg 28mg 32mg 32mg 30mg   INR 1.4 1.5 1.9 2.2 1.9 3.0 2.1 2.7 2.4 3.2 3.5 3.2   Notes 1x incr dose;  spinach 1x incr dose,  less GLV 1x boost    1x decr  dose trazodone 1x miss; trazodone 25mg QPM recv'd 5/26  1x incr dose     Date 6/10 6/17 6/24 7/2 7/8 7/15 7/22 7/29 8/5 8/12 8/17 8/24   Total WeeklyDose 28mg 28mg 28mg 28 mg 28mg 28mg 30mg 26mg 28 mg 28mg 32mg 34mg   INR 2.5 2.6 2.3 1.8 2.2 1.7 3.5 2.4 2.5 1.7 1.8 2.8   Notes    Inc GLV  Inc GLV 1x dose incr. x1 dose red  ensure       Date 8/31 9/9             Total WeeklyDose 32mg 32mg             INR 2.4 3.9             Notes                 Phone  Interview:  Verbal Release Authorization signed on 6/26/18 and again on 11/5/2019-- may speak with Alexy Wallace (son), Genesisrobert Pittss (daughter), Sawyer or Gema Wallace (son and daughter-in-law), Gerry Wallace (son)  Tablet Strength: 2mg tablets  Patient Contact Info: preferred [phone] - home with son Yadiel- cell [phone]; call if can't get in touch with home phone      Patient Findings       Positives:  Change in medications   Negatives:  Signs/symptoms of thrombosis, Signs/symptoms of bleeding, Laboratory test error suspected, Change in health, Change in alcohol use, Change in activity, Upcoming invasive procedure, Emergency department visit, Upcoming dental procedure, Missed doses, Extra doses, Change in diet/appetite, Hospital admission, Bruising, Other complaints   Comments:  Continues ensure qod; discontinued paxil 3-4 days ago. Only took for 3 weeks   Additionally she had cabbage Saturday, rare for her diet           Plan:  1. INR is supratherapeutic today at 3.9. Instructed Ms. Wallace to reduce tonight's dose to 2mg then take warfarin 4mg oral daily except 6 mg Sun until recheck.   2. Repeat INR 9/14  3. Verbal information provided over the phone. Patient RBV dosing instructions, expresses understanding by teach back, and has no further questions at this time.     Francoise Wu, PharmD.  09/09/20   15:08

## 2020-09-11 RX ORDER — WARFARIN SODIUM 2 MG/1
TABLET ORAL
Qty: 180 TABLET | Refills: 0 | Status: SHIPPED | OUTPATIENT
Start: 2020-09-11 | End: 2020-12-14

## 2020-09-14 ENCOUNTER — ANTICOAGULATION VISIT (OUTPATIENT)
Dept: PHARMACY | Facility: HOSPITAL | Age: 80
End: 2020-09-14

## 2020-09-14 DIAGNOSIS — I48.0 PAROXYSMAL ATRIAL FIBRILLATION (HCC): ICD-10-CM

## 2020-09-14 LAB — INR PPP: 2.6

## 2020-09-14 NOTE — PROGRESS NOTES
Anticoagulation Clinic - Remote Progress Note  [facility]  Testing Frequency: 7 days    Indication: paroxysmal afib  Referring Provider: Butch Joe (Last seen: 11/5/19  next appt: 6/9/20)  Goal INR: 2.0-3.0  Current Drug Interactions: , levothyroxine; omeprazole, azaTHIOprine, ezetimibe, allopurinol (11/20)  CHADS-VASc: 5 (age, gender, HTN, DM)  HAS-BLED: 3 (HTN, CKD, Age)     Diet: salads occasionally (7/31/20) ensure 8/2020  Alcohol: none  Tobacco: none   OTC Pain Medication: APAP PRN; took tylenol last 2 nights for pain from dialysis (03/26/20)    1st clinic: 9/14/17  2nd clinic: 6/26/18  3rd clinic: 11/5/19    INR History:  Date 7/23 7/26 8/2 8/9 8/16 8/23 8/29 9/5 9/12 9/19 9/24   Total Weekly Dose 36mg 38mg 36mg 38mg 36mg 32mg 30mg?? 32mg 32 mg 32mg 30mg   INR 1.7 2.3 2.0 3.4 3.8 3.5 2.5 2.6 2.5 3.3 2.0   Notes Keflex boost   1x decr dose 1x decr dose    Keflex Keflex     Date 9/27 10/1 10/8 10/15  10/22 10/29 11/5 11/14 11/21 11/28 12/4   Total Weekly Dose 32mg 32mg 32mg 30mg  32mg 28mg 32mg 32mg 32mg 28mg 26mg   INR 2.3 2.4 3.2 2.5  3.9 2.7 3.0 2.8 2.7 3.7 1.9   Notes Levaquin start 9/26 stopped Levaquin        stop MVI, 1 miss 1 hold     Date 12/10 12/17 12/21 12/31 1/7/19 1/14 1/22 1/24 1/28 1/31 2/7   Total Weekly Dose 30mg 30mg 30mg 26mg 28mg 28mg 28mg 28mg 30mg 30mg 28mg   INR 2.4 3.0 3.6 2.5 2.6 2.6 2.9 1.7 2.7 2.5 2.7   Notes        doxy start 1/22, incr GLV 2x boost; doxy Finish doxy 1/29      Date 2/14 2/20 2/28 3/7 3/14 3/21 3/28 4/4 4/11 4/18 4/25   Total Weekly Dose 28mg 28mg 28mg 30mg 31mg 30mg 30mg 31mg 32mg 31mg 31mg   INR 2.1 2.4 1.8 1.6 2.2 2.5 1.9 1.9 2.4 2.8 2.4   Notes   sick Zetia            Date 5/2 5/6 5/15 REHAB 7/19 7/22 7/26 7/30 8/2 8/5 8/12   Total Weekly Dose 31mg 31mg 31mg  ????? ??? Unable to confirm 34mg? 34mg **30mg ? 30mg   INR 2.0 2.4 2.5  1.2 1.3 2.7 1.2 2.0 2.2 1.6   Notes ampicillin amp       stopped lexapro cefdinir      Date 8/15 8/19 8/26 8/30 9/5  9/9 9/13 9/17 10/1 10/7 10/15 10/21   Total Weekly Dose 32mg 33mg 31mg 32mg 31mg 32mg 28mg 24mg 28mg 28mg 26mg 28mg   INR 2.6 2.2 1.8 1.9 2.7 3.3 4.0 3.2 2.5 3.7 2.0 2.2   Notes 3x boost   cefdinir; 1x boost keflex x2 days sick Sick; dose decrease  1x decr dose  1 dose reduction      Date 10/29 11/1 11/5 11/12 11/18 11/27 12/3 12/10 12/18 12/26 1/2/20 1/7   Total Weekly Dose 28mg 27mg 27mg 28mg 26mg 27mg 28mg 28mg 28mg 28mg 26mg 24mg   INR 2.8 2.7 2.4 3.5 2.9 2.0 2.3 3.3 2.9 3.5 3.2 1.2   Notes keflex start 10/28 1x decr  dose; keflex clinic /  1x decr dose amio last dose 11/5/19 1x decr dose allopurinol started broccoli casserole; 1x boost   1x decr dose  augment use 1x dose decr;  augment   * Amiodarone LD 11/5/2019. Monitor as likely her warfarin needs would increase over the next few months     Date 1/10 1/14 1/23 1/28 1/31 2/4 2/11 2/18 2/24 3/3 3/10 3/16 3/19   Total Weekly Dose 28mg 32mg 30mg 26mg 26mg  24mg 26mg 26mg 24mg 26mg 27mg 25mg 29mg   INR 1.6 2.1 4.1 2.2 3.4 2.7 2.1 3.4 2.3 1.9 1.9 1.4 2.0   Notes augment  2x incr dose 1x decr keflex, pred keflex; pred; sick keflex; sick; dose decr x1 sick;   valtrex keflex   1x incr dose, incr GLV 1x dose decr; levaquin 1x incr dose      Date 3/23 3/26 4/2 4/9 4/16 4/23 5/1 5/8 5/14 5/22 5/27 6/4   Total WeeklyDose 29mg 29mg 30mg 30mg 30mg 32mg 30mg 32mg 28mg 32mg 32mg 30mg   INR 1.4 1.5 1.9 2.2 1.9 3.0 2.1 2.7 2.4 3.2 3.5 3.2   Notes 1x incr dose;  spinach 1x incr dose,  less GLV 1x boost    1x decr  dose trazodone 1x miss; trazodone 25mg QPM recv'd 5/26  1x incr dose     Date 6/10 6/17 6/24 7/2 7/8 7/15 7/22 7/29 8/5 8/12 8/17 8/24   Total WeeklyDose 28mg 28mg 28mg 28 mg 28mg 28mg 30mg 26mg 28 mg 28mg 32mg 34mg   INR 2.5 2.6 2.3 1.8 2.2 1.7 3.5 2.4 2.5 1.7 1.8 2.8   Notes    Inc GLV  Inc GLV 1x dose incr. x1 dose red  ensure       Date 8/31 9/9 9/14            Total WeeklyDose 32mg 32mg 26mg 28mg           INR 2.4 3.9 2.6            Notes                 Phone  Interview:  Verbal Release Authorization signed on 6/26/18 and again on 11/5/2019-- may speak with Alexy Wallace (son), Genesis Becerril (daughter), Sawyer or Gema Wallace (son and daughter-in-law), Gerry Wallace (son)  Tablet Strength: 2mg tablets  Patient Contact Info: preferred [phone] - home with son Yadiel- cell [phone]; call if can't get in touch with home phone      Patient Findings   Negatives:  Signs/symptoms of thrombosis, Signs/symptoms of bleeding, Laboratory test error suspected, Change in health, Change in alcohol use, Change in activity, Upcoming invasive procedure, Emergency department visit, Upcoming dental procedure, Missed doses, Extra doses, Change in medications, Change in diet/appetite, Hospital admission, Bruising, Other complaints   Comments:  Switched from Ensure (~19mcg vit K/drink) to Medasource (19mcg vit-K contained), drink QOD. She took 4mg last night, instead of 6mg as discussed. Otherwise, above findings negative     Plan:  1. INR is back to therapeutic today at 2.6, significant decrease followed by 1 dose reduction. Instructed Ms. Wallace to resume warfarin 4mg oral daily except 6mg on Mondays until recheck.   2. Repeat INR 9/21, to ensure WNL.  3. Verbal information provided over the phone. Patient RBV dosing instructions, expresses understanding by teach back, and has no further questions at this time.     Minnie Panda, Pharmacy Intern.  09/14/20   15:22 EDT    Preferred patient resume warfarin 4mg daily except 6mg on Monday to ensure INR does not decrease too quickly. She may need to decrease dose further to 4mg daily pending INR. Updated note above and called patient.  I, Fatemeh Rodríguez, PharmD, have reviewed the note in full and agree with the assessment and plan.  09/14/20  16:04 EDT

## 2020-09-21 ENCOUNTER — ANTICOAGULATION VISIT (OUTPATIENT)
Dept: PHARMACY | Facility: HOSPITAL | Age: 80
End: 2020-09-21

## 2020-09-21 DIAGNOSIS — I48.0 PAROXYSMAL ATRIAL FIBRILLATION (HCC): ICD-10-CM

## 2020-09-21 LAB — INR PPP: 2.9

## 2020-09-21 NOTE — PROGRESS NOTES
Anticoagulation Clinic - Remote Progress Note  [facility]  Testing Frequency: 7 days    Indication: paroxysmal afib  Referring Provider: Butch Joe (Last seen: 11/5/19  next appt: 6/9/20)  Goal INR: 2.0-3.0  Current Drug Interactions: , levothyroxine; omeprazole, azaTHIOprine, ezetimibe, allopurinol (11/20)  CHADS-VASc: 5 (age, gender, HTN, DM)  HAS-BLED: 3 (HTN, CKD, Age)     Diet: mostly avoids (9/21/20) meal replacement drinks QOD 9/2020  Alcohol: none  Tobacco: none   OTC Pain Medication: APAP PRN; took tylenol last 2 nights for pain from dialysis (03/26/20)    1st clinic: 9/14/17  2nd clinic: 6/26/18  3rd clinic: 11/5/19    INR History:  Date 7/23 7/26 8/2 8/9 8/16 8/23 8/29 9/5 9/12 9/19 9/24   Total Weekly Dose 36mg 38mg 36mg 38mg 36mg 32mg 30mg?? 32mg 32 mg 32mg 30mg   INR 1.7 2.3 2.0 3.4 3.8 3.5 2.5 2.6 2.5 3.3 2.0   Notes Keflex boost   1x decr dose 1x decr dose    Keflex Keflex     Date 9/27 10/1 10/8 10/15  10/22 10/29 11/5 11/14 11/21 11/28 12/4   Total Weekly Dose 32mg 32mg 32mg 30mg  32mg 28mg 32mg 32mg 32mg 28mg 26mg   INR 2.3 2.4 3.2 2.5  3.9 2.7 3.0 2.8 2.7 3.7 1.9   Notes Levaquin start 9/26 stopped Levaquin        stop MVI, 1 miss 1 hold     Date 12/10 12/17 12/21 12/31 1/7/19 1/14 1/22 1/24 1/28 1/31 2/7   Total Weekly Dose 30mg 30mg 30mg 26mg 28mg 28mg 28mg 28mg 30mg 30mg 28mg   INR 2.4 3.0 3.6 2.5 2.6 2.6 2.9 1.7 2.7 2.5 2.7   Notes        doxy start 1/22, incr GLV 2x boost; doxy Finish doxy 1/29      Date 2/14 2/20 2/28 3/7 3/14 3/21 3/28 4/4 4/11 4/18 4/25   Total Weekly Dose 28mg 28mg 28mg 30mg 31mg 30mg 30mg 31mg 32mg 31mg 31mg   INR 2.1 2.4 1.8 1.6 2.2 2.5 1.9 1.9 2.4 2.8 2.4   Notes   sick Zetia            Date 5/2 5/6 5/15 [facility] 7/19 7/22 7/26 7/30 8/2 8/5 8/12   Total Weekly Dose 31mg 31mg 31mg  ????? ??? Unable to confirm 34mg? 34mg **30mg ? 30mg   INR 2.0 2.4 2.5  1.2 1.3 2.7 1.2 2.0 2.2 1.6   Notes ampicillin amp       stopped lexapro cefdinir      Date 8/15 8/19  8/26 8/30 9/5 9/9 9/13 9/17 10/1 10/7 10/15 10/21   Total Weekly Dose 32mg 33mg 31mg 32mg 31mg 32mg 28mg 24mg 28mg 28mg 26mg 28mg   INR 2.6 2.2 1.8 1.9 2.7 3.3 4.0 3.2 2.5 3.7 2.0 2.2   Notes 3x boost   cefdinir; 1x boost keflex x2 days sick Sick; dose decrease  1x decr dose  1 dose reduction      Date 10/29 11/1 11/5 11/12 11/18 11/27 12/3 12/10 12/18 12/26 1/2/20 1/7   Total Weekly Dose 28mg 27mg 27mg 28mg 26mg 27mg 28mg 28mg 28mg 28mg 26mg 24mg   INR 2.8 2.7 2.4 3.5 2.9 2.0 2.3 3.3 2.9 3.5 3.2 1.2   Notes keflex start 10/28 1x decr  dose; keflex clinic /  1x decr dose amio last dose 11/5/19 1x decr dose allopurinol started broccoli casserole; 1x boost   1x decr dose  augment use 1x dose decr;  augment   * Amiodarone LD 11/5/2019. Monitor as likely her warfarin needs would increase over the next few months     Date 1/10 1/14 1/23 1/28 1/31 2/4 2/11 2/18 2/24 3/3 3/10 3/16 3/19   Total Weekly Dose 28mg 32mg 30mg 26mg 26mg  24mg 26mg 26mg 24mg 26mg 27mg 25mg 29mg   INR 1.6 2.1 4.1 2.2 3.4 2.7 2.1 3.4 2.3 1.9 1.9 1.4 2.0   Notes augment  2x incr dose 1x decr keflex, pred keflex; pred; sick keflex; sick; dose decr x1 sick;   valtrex keflex   1x incr dose, incr GLV 1x dose decr; levaquin 1x incr dose      Date 3/23 3/26 4/2 4/9 4/16 4/23 5/1 5/8 5/14 5/22 5/27 6/4   Total WeeklyDose 29mg 29mg 30mg 30mg 30mg 32mg 30mg 32mg 28mg 32mg 32mg 30mg   INR 1.4 1.5 1.9 2.2 1.9 3.0 2.1 2.7 2.4 3.2 3.5 3.2   Notes 1x incr dose;  spinach 1x incr dose,  less GLV 1x boost    1x decr  dose trazodone 1x miss; trazodone 25mg QPM recv'd 5/26  1x incr dose     Date 6/10 6/17 6/24 7/2 7/8 7/15 7/22 7/29 8/5 8/12 8/17 8/24   Total WeeklyDose 28mg 28mg 28mg 28 mg 28mg 28mg 30mg 26mg 28 mg 28mg 32mg 34mg   INR 2.5 2.6 2.3 1.8 2.2 1.7 3.5 2.4 2.5 1.7 1.8 2.8   Notes    Inc GLV  Inc GLV 1x dose incr. x1 dose red  ensure       Date 8/31 9/9 9/14 9/21           Total WeeklyDose 32mg 32mg 26mg 30 mg           INR 2.4 3.9 2.6 2.9           Notes                  Phone Interview:  Verbal Release Authorization signed on 6/26/18 and again on 11/5/2019-- may speak with Alexy Wallace (son), Genesis Becerril (daughter), Sawyer or Gema Wallace (son and daughter-in-law), Gerry Wallace (son)  Tablet Strength: 2mg tablets  Patient Contact Info: preferred [phone] - home with son Yadiel- cell [phone]; call if can't get in touch with home phone      Patient Findings:  Positives:  Change in medications, Change in diet/appetite   Negatives:  Signs/symptoms of thrombosis, Signs/symptoms of bleeding, Laboratory test error suspected, Change in health, Change in alcohol use, Change in activity, Upcoming invasive procedure, Emergency department visit, Upcoming dental procedure, Missed doses, Extra doses, Hospital admission, Bruising, Other complaints   Comments:  Ms. Wallace isn't really eating any GLV right now. She had been eating occasional salads during the summer months. She stopped amlodipine per physician's instructions due to low blood pressure. She also stopped paroxetine a couple weeks ago due to itching. She denies any other changes.     Plan:  1. INR is therapeutic again today at 2.9. Spoke with Fatemeh Rodríguez, PharmD, and instructed Ms. Wallace to take reduced dose of warfarin 4 mg daily until recheck.  2. Repeat INR in one week to ensure WNL.  3. Verbal information provided over the phone. Patient RBV dosing instructions, expresses understanding by teach back, and has no further questions at this time.     Zina Aldrich, Valentin  9/21/2020  15:28 EDT    I, Katarzyna Colon, have reviewed the note in full and agree with the assessment and plan.  09/21/20  16:12 EDT

## 2020-09-28 ENCOUNTER — ANTICOAGULATION VISIT (OUTPATIENT)
Dept: PHARMACY | Facility: HOSPITAL | Age: 80
End: 2020-09-28

## 2020-09-28 DIAGNOSIS — I48.0 PAROXYSMAL ATRIAL FIBRILLATION (HCC): ICD-10-CM

## 2020-09-28 LAB — INR PPP: 2.3

## 2020-10-05 ENCOUNTER — ANTICOAGULATION VISIT (OUTPATIENT)
Dept: PHARMACY | Facility: HOSPITAL | Age: 80
End: 2020-10-05

## 2020-10-05 DIAGNOSIS — I48.0 PAROXYSMAL ATRIAL FIBRILLATION (HCC): ICD-10-CM

## 2020-10-05 LAB — INR PPP: 2.3

## 2020-10-05 NOTE — PROGRESS NOTES
Anticoagulation Clinic - Remote Progress Note  [facility]  Testing Frequency: 7 days    Indication: paroxysmal afib  Referring Provider: Butch Joe (Last seen: 11/5/19  next appt: 6/9/20)  Goal INR: 2.0-3.0  Current Drug Interactions: , levothyroxine; omeprazole, azaTHIOprine, ezetimibe, allopurinol (11/20)  CHADS-VASc: 5 (age, gender, HTN, DM)  HAS-BLED: 3 (HTN, CKD, Age)     Diet: mostly avoids (10/5/20) meal replacement drinks QOD 10/5/20  Alcohol: none  Tobacco: none   OTC Pain Medication: APAP PRN; took tylenol last 2 nights for pain from dialysis (03/26/20)    1st clinic: 9/14/17  2nd clinic: 6/26/18  3rd clinic: 11/5/19    INR History:  Date 1/10 1/14 1/23 1/28 1/31 2/4 2/11 2/18 2/24 3/3 3/10 3/16 3/19   Total Weekly Dose 28mg 32mg 30mg 26mg 26mg  24mg 26mg 26mg 24mg 26mg 27mg 25mg 29mg   INR 1.6 2.1 4.1 2.2 3.4 2.7 2.1 3.4 2.3 1.9 1.9 1.4 2.0   Notes augment  2x incr dose 1x decr keflex, pred keflex; pred; sick keflex; sick; dose decr x1 sick;   valtrex keflex   1x incr dose, incr GLV 1x dose decr; levaquin 1x incr dose      Date 3/23 3/26 4/2 4/9 4/16 4/23 5/1 5/8 5/14 5/22 5/27 6/4   Total WeeklyDose 29mg 29mg 30mg 30mg 30mg 32mg 30mg 32mg 28mg 32mg 32mg 30mg   INR 1.4 1.5 1.9 2.2 1.9 3.0 2.1 2.7 2.4 3.2 3.5 3.2   Notes 1x incr dose;  spinach 1x incr dose,  less GLV 1x boost    1x decr  dose trazodone 1x miss; trazodone 25mg QPM recv'd 5/26  1x incr dose     Date 6/10 6/17 6/24 7/2 7/8 7/15 7/22 7/29 8/5 8/12 8/17 8/24   Total WeeklyDose 28mg 28mg 28mg 28 mg 28mg 28mg 30mg 26mg 28 mg 28mg 32mg 34mg   INR 2.5 2.6 2.3 1.8 2.2 1.7 3.5 2.4 2.5 1.7 1.8 2.8   Notes    Inc GLV  Inc GLV 1x dose incr. x1 dose red  ensure       Date 8/31 9/9 9/14 9/21 9/28 10/5         Total WeeklyDose 32mg 32mg 26mg 30 mg 26-28 mg? 28 mg         INR 2.4 3.9 2.6 2.9 2.3 2.3         Notes                 Phone Interview:  Verbal Release Authorization signed on 6/26/18 and again on 11/5/2019-- may speak with Alexy  Rodrigo (son), Genesis Becerril (daughter), Sawyer or Gema Wallace (son and daughter-in-law), Gerry Wallace (son)  Tablet Strength: 2mg tablets  Patient Contact Info: preferred [phone] - home with son Yadiel- cell [phone]; call if can't get in touch with home phone      Patient Findings:  Negatives:  Signs/symptoms of thrombosis, Signs/symptoms of bleeding, Laboratory test error suspected, Change in health, Change in alcohol use, Change in activity, Upcoming invasive procedure, Emergency department visit, Upcoming dental procedure, Missed doses, Extra doses, Change in medications, Change in diet/appetite, Hospital admission, Bruising, Other complaints   Comments:  Missed her Ensure yesterday, otherwise denies any changes.     Plan:  1. INR is therapeutic again today at 2.3. Instructed Ms. Wallace to continue maintenance dose of warfarin 4 mg daily until recheck.  2. Repeat INR in one week as required by [facility].  3. Verbal information provided over the phone. Patient RBV dosing instructions, expresses understanding by teach back, and has no further questions at this time.     Zina Aldrich CPhT  10/5/2020  14:51 EDT    I, Francoise Wu, PharmD, have reviewed the note in full and agree with the assessment and plan.  10/05/20  16:06 EDT

## 2020-10-13 ENCOUNTER — ANTICOAGULATION VISIT (OUTPATIENT)
Dept: PHARMACY | Facility: HOSPITAL | Age: 80
End: 2020-10-13

## 2020-10-13 DIAGNOSIS — I48.0 PAROXYSMAL ATRIAL FIBRILLATION (HCC): ICD-10-CM

## 2020-10-13 LAB — INR PPP: 2.9

## 2020-10-13 NOTE — PROGRESS NOTES
Anticoagulation Clinic - Remote Progress Note  [facility]  Testing Frequency: 7 days    Indication: paroxysmal afib  Referring Provider: Butch Joe (Last seen: 11/5/19  next appt: 6/9/20)  Goal INR: 2.0-3.0  Current Drug Interactions: , levothyroxine; omeprazole, azaTHIOprine, ezetimibe, allopurinol (11/20)  CHADS-VASc: 5 (age, gender, HTN, DM)  HAS-BLED: 3 (HTN, CKD, Age)     Diet: mostly avoids (10/5/20) meal replacement drinks QOD 10/5/20  Alcohol: none  Tobacco: none   OTC Pain Medication: APAP PRN; took tylenol last 2 nights for pain from dialysis (03/26/20)    1st clinic: 9/14/17  2nd clinic: 6/26/18  3rd clinic: 11/5/19    INR History:  Date 1/10 1/14 1/23 1/28 1/31 2/4 2/11 2/18 2/24 3/3 3/10 3/16 3/19   Total Weekly Dose 28mg 32mg 30mg 26mg 26mg  24mg 26mg 26mg 24mg 26mg 27mg 25mg 29mg   INR 1.6 2.1 4.1 2.2 3.4 2.7 2.1 3.4 2.3 1.9 1.9 1.4 2.0   Notes augment  2x incr dose 1x decr keflex, pred keflex; pred; sick keflex; sick; dose decr x1 sick;   valtrex keflex   1x incr dose, incr GLV 1x dose decr; levaquin 1x incr dose      Date 3/23 3/26 4/2 4/9 4/16 4/23 5/1 5/8 5/14 5/22 5/27 6/4   Total WeeklyDose 29mg 29mg 30mg 30mg 30mg 32mg 30mg 32mg 28mg 32mg 32mg 30mg   INR 1.4 1.5 1.9 2.2 1.9 3.0 2.1 2.7 2.4 3.2 3.5 3.2   Notes 1x incr dose;  spinach 1x incr dose,  less GLV 1x boost    1x decr  dose trazodone 1x miss; trazodone 25mg QPM recv'd 5/26  1x incr dose     Date 6/10 6/17 6/24 7/2 7/8 7/15 7/22 7/29 8/5 8/12 8/17 8/24   Total WeeklyDose 28mg 28mg 28mg 28 mg 28mg 28mg 30mg 26mg 28mg 28mg 32mg 34mg   INR 2.5 2.6 2.3 1.8 2.2 1.7 3.5 2.4 2.5 1.7 1.8 2.8   Notes    Inc GLV  Inc GLV 1x dose incr. x1 dose red  ensure       Date 8/31 9/9 9/14 9/21 9/28 10/5 10/13        Total WeeklyDose 32mg 32mg 26mg 30mg 26-28 mg? 28mg 28mg        INR 2.4 3.9 2.6 2.9 2.3 2.3 2.9        Notes       decr Ensure; allopurinol          Phone Interview:  Verbal Release Authorization signed on 6/26/18 and again on  11/5/2019-- may speak with Alexy Wallace (son), Genesis Becerril (daughter), Sawyer or Gema Wallace (son and daughter-in-law), Gerry Wallace (son)  Tablet Strength: 2mg tablets  Patient Contact Info: preferred [phone] - home with son Yadiel- cell [phone]; call if can't get in touch with home phone      Patient Findings  Positives:  Change in medications, Change in diet/appetite   Negatives:  Signs/symptoms of thrombosis, Signs/symptoms of bleeding, Laboratory test error suspected, Change in health, Change in alcohol use, Change in activity, Upcoming invasive procedure, Emergency department visit, Upcoming dental procedure, Missed doses, Extra doses, Hospital admission, Bruising, Other complaints   Comments:  Patient reports she has been drinking less Ensure recently. Says she tries for at least 3x/week but feels she has missed a few. She is agreeable to try to be as consistent as possible. She reports taking Allopurinol a couple of times for suspected Gout. She has appt tomorrow to determine. Have discussed warfarin-allopurinol interaction and s/sx to be aware of. She is agreeable to call clinic if she is asked to continue allopurinol and will try for at least 4x Ensure this week.     Plan:  1. INR is therapeutic today at 2.9. Instructed Ms. Wallace to be consistent with Ensure intake and then continue maintenance dose of warfarin 4mg daily until recheck.  2. Repeat INR in one week as required by [facility].  3. Verbal information provided over the phone. Moni NIKKO Wallace RBV dosing instructions, expresses understanding by teach back, and has no further questions at this time.    aSwyer Gamez, Valentin  10/13/2020  15:12 EDT     I, Francoise Wu, PharmD, have reviewed the note in full and agree with the assessment and plan.  10/13/20  16:20 EDT

## 2020-10-19 ENCOUNTER — ANTICOAGULATION VISIT (OUTPATIENT)
Dept: PHARMACY | Facility: HOSPITAL | Age: 80
End: 2020-10-19

## 2020-10-19 DIAGNOSIS — I48.0 PAROXYSMAL ATRIAL FIBRILLATION (HCC): ICD-10-CM

## 2020-10-19 LAB — INR PPP: 2.8

## 2020-10-19 NOTE — PROGRESS NOTES
Anticoagulation Clinic - Remote Progress Note  [facility]  Testing Frequency: 7 days    Indication: paroxysmal afib  Referring Provider: Butch Joe (Last seen: 11/5/19  next appt: 6/9/20)  Goal INR: 2.0-3.0  Current Drug Interactions: , levothyroxine; omeprazole, azaTHIOprine, ezetimibe, allopurinol (11/20)  CHADS-VASc: 5 (age, gender, HTN, DM)  HAS-BLED: 3 (HTN, CKD, Age)     Diet: mostly avoids (10/19/20) meal replacement drinks ~3 times weekly 10/19/20  Alcohol: none  Tobacco: none   OTC Pain Medication: APAP PRN; took tylenol last 2 nights for pain from dialysis (03/26/20)    1st clinic: 9/14/17  2nd clinic: 6/26/18  3rd clinic: 11/5/19    INR History:  Date 1/10 1/14 1/23 1/28 1/31 2/4 2/11 2/18 2/24 3/3 3/10 3/16 3/19   Total Weekly Dose 28mg 32mg 30mg 26mg 26mg  24mg 26mg 26mg 24mg 26mg 27mg 25mg 29mg   INR 1.6 2.1 4.1 2.2 3.4 2.7 2.1 3.4 2.3 1.9 1.9 1.4 2.0   Notes augment  2x incr dose 1x decr keflex, pred keflex; pred; sick keflex; sick; dose decr x1 sick;   valtrex keflex   1x incr dose, incr GLV 1x dose decr; levaquin 1x incr dose      Date 3/23 3/26 4/2 4/9 4/16 4/23 5/1 5/8 5/14 5/22 5/27 6/4   Total WeeklyDose 29mg 29mg 30mg 30mg 30mg 32mg 30mg 32mg 28mg 32mg 32mg 30mg   INR 1.4 1.5 1.9 2.2 1.9 3.0 2.1 2.7 2.4 3.2 3.5 3.2   Notes 1x incr dose;  spinach 1x incr dose,  less GLV 1x boost    1x decr  dose trazodone 1x miss; trazodone 25mg QPM recv'd 5/26  1x incr dose     Date 6/10 6/17 6/24 7/2 7/8 7/15 7/22 7/29 8/5 8/12 8/17 8/24   Total WeeklyDose 28mg 28mg 28mg 28 mg 28mg 28mg 30mg 26mg 28mg 28mg 32mg 34mg   INR 2.5 2.6 2.3 1.8 2.2 1.7 3.5 2.4 2.5 1.7 1.8 2.8   Notes    Inc GLV  Inc GLV 1x dose incr. x1 dose red  ensure       Date 8/31 9/9 9/14 9/21 9/28 10/5 10/13 10/19       Total WeeklyDose 32mg 32mg 26mg 30mg 26-28 mg? 28mg 28mg 28 mg       INR 2.4 3.9 2.6 2.9 2.3 2.3 2.9 2.8       Notes       decr Ensure; allopurinol allopurinol; less Ensure         Phone Interview:  Verbal  Release Authorization signed on 6/26/18 and again on 11/5/2019-- may speak with Alexy Wallace (son), Genesis Becerril (daughter), Sawyer or Gema Wallace (son and daughter-in-law), Gerry Wallace (son)  Tablet Strength: 2mg tablets  Patient Contact Info: preferred [phone] - home with son Yadiel- cell [phone]; call if can't get in touch with home phone      Patient Findings:  Positives:  Change in medications, Change in diet/appetite   Negatives:  Signs/symptoms of thrombosis, Signs/symptoms of bleeding, Laboratory test error suspected, Change in health, Change in alcohol use, Change in activity, Upcoming invasive procedure, Emergency department visit, Upcoming dental procedure, Missed doses, Extra doses, Hospital admission, Bruising, Other complaints   Comments:  Ms. Wallace believes she drank less Ensure than she usually would this week. She took an Allopurinol this morning but believes she will stop taking it since her uric acid levels were normal. She has a follow up appointment with her doctor on Wednesday to determine. She will call if she plans to continue.     Plan:  1. INR is therapeutic today at 2.8. Instructed Ms. Wallace to continue maintenance dose of warfarin 4 mg daily until recheck.  2. Repeat INR in one week as required by [facility].  3. Verbal information provided over the phone. Moni NIKKO Wallace RBV dosing instructions, expresses understanding by teach back, and has no further questions at this time.    Zina Aldrich, Valentin  10/19/2020  16:14 EDT    I, Fatemeh Rodríguez, PharmD, have reviewed the note in full and agree with the assessment and plan.  10/19/20  16:33 EDT

## 2020-10-28 ENCOUNTER — ANTICOAGULATION VISIT (OUTPATIENT)
Dept: PHARMACY | Facility: HOSPITAL | Age: 80
End: 2020-10-28

## 2020-10-28 DIAGNOSIS — I48.0 PAROXYSMAL ATRIAL FIBRILLATION (HCC): ICD-10-CM

## 2020-10-28 LAB — INR PPP: 3.7

## 2020-10-28 NOTE — PROGRESS NOTES
Anticoagulation Clinic - Remote Progress Note  [facility]  Testing Frequency: 7 days    Indication: paroxysmal afib  Referring Provider: Butch Joe (Last seen: 11/5/19  next appt: 6/9/20)  Goal INR: 2.0-3.0  Current Drug Interactions: , levothyroxine; omeprazole, azaTHIOprine, ezetimibe, allopurinol (10/20)  CHADS-VASc: 5 (age, gender, HTN, DM)  HAS-BLED: 3 (HTN, CKD, Age)     Diet: mostly avoids (10/19/20) meal replacement drinks ~3 times weekly 10/19/20  Alcohol: none  Tobacco: none   OTC Pain Medication: APAP PRN; took tylenol last 2 nights for pain from dialysis (03/26/20)    1st clinic: 9/14/17  2nd clinic: 6/26/18  3rd clinic: 11/5/19    INR History:  Date 1/10 1/14 1/23 1/28 1/31 2/4 2/11 2/18 2/24 3/3 3/10 3/16 3/19   Total Weekly Dose 28mg 32mg 30mg 26mg 26mg  24mg 26mg 26mg 24mg 26mg 27mg 25mg 29mg   INR 1.6 2.1 4.1 2.2 3.4 2.7 2.1 3.4 2.3 1.9 1.9 1.4 2.0   Notes augment  2x incr dose 1x decr keflex, pred keflex; pred; sick keflex; sick; dose decr x1 sick;   valtrex keflex   1x incr dose, incr GLV 1x dose decr; levaquin 1x incr dose      Date 3/23 3/26 4/2 4/9 4/16 4/23 5/1 5/8 5/14 5/22 5/27 6/4   Total WeeklyDose 29mg 29mg 30mg 30mg 30mg 32mg 30mg 32mg 28mg 32mg 32mg 30mg   INR 1.4 1.5 1.9 2.2 1.9 3.0 2.1 2.7 2.4 3.2 3.5 3.2   Notes 1x incr dose;  spinach 1x incr dose,  less GLV 1x boost    1x decr  dose trazodone 1x miss; trazodone 25mg QPM recv'd 5/26  1x incr dose     Date 6/10 6/17 6/24 7/2 7/8 7/15 7/22 7/29 8/5 8/12 8/17 8/24   Total WeeklyDose 28mg 28mg 28mg 28 mg 28mg 28mg 30mg 26mg 28mg 28mg 32mg 34mg   INR 2.5 2.6 2.3 1.8 2.2 1.7 3.5 2.4 2.5 1.7 1.8 2.8   Notes    Inc GLV  Inc GLV 1x dose incr. x1 dose red  ensure       Date 8/31 9/9 9/14 9/21 9/28 10/5 10/13 10/19 10/27      Total WeeklyDose 32mg 32mg 26mg 30mg 26-28 mg? 28mg 28mg 28 mg 38 mg      INR 2.4 3.9 2.6 2.9 2.3 2.3 2.9 2.8 3.7      Notes       decr Ensure; allopurinol allopurinol; less Ensure         Phone  Interview:  Verbal Release Authorization signed on 6/26/18 and again on 11/5/2019-- may speak with Alexy Wallace (son), Genesis Becerril (daughter), Sawyer or Gema Wallace (son and daughter-in-law), Gerry Wallace (son)  Tablet Strength: 2mg tablets  Patient Contact Info: preferred [phone] - home with son Yadiel- cell [phone]; call if can't get in touch with home phone          Plan:  1. INR is supra therapeutic today at 3.7. Instructed Ms. Wallace to take warfarin 2 mg tonight, then tomorrow resume warfarin 4 mg oral daily until recheck.  She may need a reduction in her maintenance regimen due to DDI with allopurinol.  She plans to have a serving of asparagus tonight and would like to have once weekly.  2. Repeat INR in one week on 11/3 as required by [facility].  3. Verbal information provided over the phone. Moni Wallace RBV dosing instructions, expresses understanding by teach back, and has no further questions at this time.    Radha Michaud, PharmD  10/28/2020  09:41 EDT

## 2020-11-04 ENCOUNTER — ANTICOAGULATION VISIT (OUTPATIENT)
Dept: PHARMACY | Facility: HOSPITAL | Age: 80
End: 2020-11-04

## 2020-11-04 DIAGNOSIS — I48.0 PAROXYSMAL ATRIAL FIBRILLATION (HCC): ICD-10-CM

## 2020-11-04 LAB — INR PPP: 1.5

## 2020-11-04 NOTE — PROGRESS NOTES
Anticoagulation Clinic - Remote Progress Note  [facility]  Testing Frequency: 7 days    Indication: paroxysmal afib  Referring Provider: Butch Joe (Last seen: 11/5/19  next appt: 6/9/20)  Goal INR: 2.0-3.0  Current Drug Interactions: , levothyroxine; omeprazole, azaTHIOprine, ezetimibe, allopurinol (10/20)  CHADS-VASc: 5 (age, gender, HTN, DM)  HAS-BLED: 3 (HTN, CKD, Age)     Diet: mostly avoids (10/19/20) meal replacement drinks ~3 times weekly 10/19/20  Alcohol: none  Tobacco: none   OTC Pain Medication: APAP PRN; took tylenol last 2 nights for pain from dialysis (03/26/20)    1st clinic: 9/14/17  2nd clinic: 6/26/18  3rd clinic: 11/5/19    INR History:  Date 1/10 1/14 1/23 1/28 1/31 2/4 2/11 2/18 2/24 3/3 3/10 3/16 3/19   Total Weekly Dose 28mg 32mg 30mg 26mg 26mg  24mg 26mg 26mg 24mg 26mg 27mg 25mg 29mg   INR 1.6 2.1 4.1 2.2 3.4 2.7 2.1 3.4 2.3 1.9 1.9 1.4 2.0   Notes augment  2x incr dose 1x decr keflex, pred keflex; pred; sick keflex; sick; dose decr x1 sick;   valtrex keflex   1x incr dose, incr GLV 1x dose decr; levaquin 1x incr dose      Date 3/23 3/26 4/2 4/9 4/16 4/23 5/1 5/8 5/14 5/22 5/27 6/4   Total WeeklyDose 29mg 29mg 30mg 30mg 30mg 32mg 30mg 32mg 28mg 32mg 32mg 30mg   INR 1.4 1.5 1.9 2.2 1.9 3.0 2.1 2.7 2.4 3.2 3.5 3.2   Notes 1x incr dose;  spinach 1x incr dose,  less GLV 1x boost    1x decr  dose trazodone 1x miss; trazodone 25mg QPM recv'd 5/26  1x incr dose     Date 6/10 6/17 6/24 7/2 7/8 7/15 7/22 7/29 8/5 8/12 8/17 8/24   Total WeeklyDose 28mg 28mg 28mg 28 mg 28mg 28mg 30mg 26mg 28mg 28mg 32mg 34mg   INR 2.5 2.6 2.3 1.8 2.2 1.7 3.5 2.4 2.5 1.7 1.8 2.8   Notes    Inc GLV  Inc GLV 1x dose incr. x1 dose red  ensure       Date 8/31 9/9 9/14 9/21 9/28 10/5 10/13 10/19 10/27 11/3     Total WeeklyDose 32mg 32mg 26mg 30mg 26-28 mg? 28mg 28mg 28 mg 28 mg 26mg     INR 2.4 3.9 2.6 2.9 2.3 2.3 2.9 2.8 3.7 1.5     Notes       decr Ensure; allopurinol allopurinol; less Ensure  rec 11/4;  inc GLV       Phone Interview:  Verbal Release Authorization signed on 6/26/18 and again on 11/5/2019-- may speak with Alexy Wallace (son), Genesis Becerril (daughter), Sawyer or Gema Wallace (son and daughter-in-law), Gerry Wallace (son)  Tablet Strength: 2mg tablets  Patient Contact Info: preferred [phone] - home with son Yadiel- cell [phone]; call if can't get in touch with home phone      Positives:  Change in diet/appetite   Negatives:  Signs/symptoms of thrombosis, Signs/symptoms of bleeding, Laboratory test error suspected, Change in health, Change in alcohol use, Change in activity, Upcoming invasive procedure, Emergency department visit, Upcoming dental procedure, Missed doses, Extra doses, Change in medications, Hospital admission, Bruising, Other complaints   Comments:  Did had serving of roasted brussels sprouts, not part of her usual diet. Had her usual amount of ensure drinks (3x weekly). Continues allopurinol.        Plan:  1. INR is SUBtherapeutic yesterday at 1.5 Instructed Ms. Wallace to BOOST tonight's dose to 6mg then continue warfarin 4 mg oral daily until recheck.    2. Repeat INR Monday 11/9   3. Verbal information provided over the phone. Moni Wallace RBV dosing instructions, expresses understanding by teach back, and has no further questions at this time.    Francoise Wu, PharmD.  11/04/20   09:06 EST

## 2020-11-09 ENCOUNTER — TELEPHONE (OUTPATIENT)
Dept: INTERNAL MEDICINE | Facility: CLINIC | Age: 80
End: 2020-11-09

## 2020-11-09 NOTE — TELEPHONE ENCOUNTER
Pt said diabetic testing supplies is going to be sending over paperwork for her to be able to get supplies where she doesn't have to stick herself.  Pt said Dr. Walters will have to issue order for it.  Pt requesting callback to let her know office has received form and to let her know the order has been placed.

## 2020-11-10 ENCOUNTER — ANTICOAGULATION VISIT (OUTPATIENT)
Dept: PHARMACY | Facility: HOSPITAL | Age: 80
End: 2020-11-10

## 2020-11-10 DIAGNOSIS — I48.0 PAROXYSMAL ATRIAL FIBRILLATION (HCC): ICD-10-CM

## 2020-11-10 LAB — INR PPP: 2.1

## 2020-11-10 NOTE — PROGRESS NOTES
Anticoagulation Clinic - Remote Progress Note  [facility]  Testing Frequency: 7 days    Indication: paroxysmal afib  Referring Provider: Butch Joe (Last seen: 11/5/19  next appt: 6/9/20)  Goal INR: 2.0-3.0  Current Drug Interactions: , levothyroxine; omeprazole, azaTHIOprine, ezetimibe, allopurinol (10/20)  CHADS-VASc: 5 (age, gender, HTN, DM)  HAS-BLED: 3 (HTN, CKD, Age)     Diet: mostly avoids (10/19/20) meal replacement drinks ~3 times weekly 10/19/20  Alcohol: none  Tobacco: none   OTC Pain Medication: APAP PRN; took tylenol last 2 nights for pain from dialysis (03/26/20)    1st clinic: 9/14/17  2nd clinic: 6/26/18  3rd clinic: 11/5/19    INR History:  Date 1/10 1/14 1/23 1/28 1/31 2/4 2/11 2/18 2/24 3/3 3/10 3/16 3/19   Total Weekly Dose 28mg 32mg 30mg 26mg 26mg  24mg 26mg 26mg 24mg 26mg 27mg 25mg 29mg   INR 1.6 2.1 4.1 2.2 3.4 2.7 2.1 3.4 2.3 1.9 1.9 1.4 2.0   Notes augment  2x incr dose 1x decr keflex, pred keflex; pred; sick keflex; sick; dose decr x1 sick;   valtrex keflex   1x incr dose, incr GLV 1x dose decr; levaquin 1x incr dose      Date 3/23 3/26 4/2 4/9 4/16 4/23 5/1 5/8 5/14 5/22 5/27 6/4   Total WeeklyDose 29mg 29mg 30mg 30mg 30mg 32mg 30mg 32mg 28mg 32mg 32mg 30mg   INR 1.4 1.5 1.9 2.2 1.9 3.0 2.1 2.7 2.4 3.2 3.5 3.2   Notes 1x incr dose;  spinach 1x incr dose,  less GLV 1x boost    1x decr  dose trazodone 1x miss; trazodone 25mg QPM recv'd 5/26  1x incr dose     Date 6/10 6/17 6/24 7/2 7/8 7/15 7/22 7/29 8/5 8/12 8/17 8/24   Total WeeklyDose 28mg 28mg 28mg 28 mg 28mg 28mg 30mg 26mg 28mg 28mg 32mg 34mg   INR 2.5 2.6 2.3 1.8 2.2 1.7 3.5 2.4 2.5 1.7 1.8 2.8   Notes    Inc GLV  Inc GLV 1x dose incr. x1 dose red  ensure       Date 8/31 9/9 9/14 9/21 9/28 10/5 10/13 10/19 10/27 11/3 11/10    Total WeeklyDose 32mg 32mg 26mg 30mg 26-28 mg? 28mg 28mg 28mg 28mg 26mg 30mg 28mg   INR 2.4 3.9 2.6 2.9 2.3 2.3 2.9 2.8 3.7 1.5 2.1    Notes       decr Ensure; allopurinol allopurinol; less  Ensure  recv'd 11/4; incr GLV 1x incr dose      Phone Interview:  Verbal Release Authorization signed on 6/26/18 and again on 11/5/2019-- may speak with Alexy Wallace (son), Genesisrobert Becerril (daughter), Sawyer or Gemapat Wallace (son and daughter-in-law), Gerry Wallace (son)  Tablet Strength: 2mg tablets  Patient Contact Info: preferred [phone] - home with son Yadiel- cell [phone]; call if can't get in touch with home phone      Patient Findings  Negatives:  Signs/symptoms of thrombosis, Signs/symptoms of bleeding, Laboratory test error suspected, Change in health, Change in alcohol use, Change in activity, Upcoming invasive procedure, Emergency department visit, Upcoming dental procedure, Missed doses, Extra doses, Change in medications, Change in diet/appetite, Hospital admission, Bruising, Other complaints   Comments:  Patient feels she has resumed usual GLV intake, continues to drink 3x Ensure/week. Otherwise denies findings.     Plan:  1. INR is therapeutic and back WNL today at 2.1. Instructed Ms. Wallace to continue warfarin 4mg oral daily until recheck.    2. Repeat INR in 1 week  3. Verbal information provided over the phone. [place] NIKKO Wallace RBV dosing instructions, expresses understanding by teach back, and has no further questions at this time.    Sawyer Gamez, Valentin  11/10/2020  15:09 Francoise CONNER, PharmD, have reviewed the note in full and agree with the assessment and plan.  11/10/20  17:03 EST

## 2020-11-11 NOTE — PROGRESS NOTES
Anticoagulation Clinic - Remote Progress Note  [facility]  Testing Frequency: 7 days    Indication: paroxysmal afib  Referring Provider: Heath  Goal INR: 2.0-3.0  Current Drug Interactions: amiodarone, levothyroxine; omeprazole, azaTHIOprine, ezetimibe   CHADS-VASc: 5 (age, gender, HTN, DM)    Diet: rare GLV; green beans   Alcohol: none  Tobacco: none   OTC Pain Medication: APAP PRN    1st clinic visit: 9/14/17  2nd clinic visit: 6/26/18    INR History:  Date 2/9/18 2/16 2/21 3/1 3/9 3/13 3/20 3/27 4/3 4/10   Total Weekly Dose 18mg/3 days 38mg 36mg 36mg 38mg 28mg/5 days 38mg 38mg 38mg 26mg   INR 1.0 1.5 1.8 2.1 1.9        Notes s/p surgery 2/5 and held doses   Keflex finish 2/28 Boost; Keflex init    hold- pre procedure post procedure; boost     Date 4/17 4/20 4/24 5/1 5/8 5/15 5/18 5/22 5/29 6/1   Total Weekly Dose 41mg 36mg 36mg 40mg 38mg 38mg 28mg 28mg 38mg 34mg   INR 3.0 1.9 1.8 2.3 2.3 3.6 2.4 2.3 3.0 2.5   Notes     colchicine hematoma    Zpak, 1 miss     Date 6/4 6/7 6/14 6/19 6/26 6/29 7/3 7/6 7/11 7/16   Total Weekly Dose 34mg 38mg 34mg 30mg 34mg 30mg 30mg 30mg 32mg 38mg   INR 3.0 3.4 4.3 2.5 3.6 2.3 2.9 1.2 1.4 2.0   Notes   Keflex Held x1 Zpak start 6/22, finish 6/25; doxy start 6/25; clinic doxy doxy doxy complete 7/5       Date 7/23 7/26 8/2 8/9 8/16 8/23 8/29 9/5 9/12 9/19 9/24   Total Weekly Dose 36mg 38mg 36mg 38mg 36mg 32mg 30mg?? 32mg 32 mg 32mg 30mg   INR 1.7 2.3 2.0 3.4 3.8 3.5 2.5 2.6 2.5 3.3 2.0   Notes Keflex boost   1x decr dose 1x decr dose    Keflex Keflex     Date 9/27 10/1 10/8 10/15  10/22 10/29 11/5 11/14 11/21 11/28 12/4   Total Weekly Dose 32mg 32mg 32mg 30mg  32mg 28mg 32mg 32mg 32mg 28mg 26mg   INR 2.3 2.4 3.2 2.5  3.9 2.7 3.0 2.8 2.7 3.7 1.9   Notes Levaquin start 9/26 stopped Levaquin        stop MVI, 1 miss 1 hold     Date 12/10 12/17 12/21 12/31 1/7/19 1/14 1/22 1/24 1/28 1/31 2/7   Total Weekly Dose 30mg 30mg 30mg 26mg 28mg 28mg 28mg 28mg 30mg 30mg 28mg   INR 2.4  Yes 3.0 3.6 2.5 2.6 2.6 2.9 1.7 2.7 2.5 2.7   Notes        doxy start 1/22, incr GLV 2x boost; doxy Finish doxy 1/29      Date 2/14 2/20 2/28 3/7 3/14 3/21 3/28 4/4 4/11 4/18 4/25   Total Weekly Dose 28mg 28mg 28 mg 30mg 31mg 30mg 30mg 31mg 32mg 31mg 31mg   INR 2.1 2.4 1.8 1.6 2.2 2.5 1.9 1.9 2.4 2.8 2.4   Notes   sick Zetia            Date 5/2 5/6 5/15 REHAB 7/19         Total Weekly Dose 31mg 31mg 31mg  ?????         INR 2.0 2.4 2.5  1.2         Notes ampicillin amp              Phone Interview:  Verbal Release Authorization signed on 6/26/18 -- may speak with Alexy Wallace (son), Genesis Becerril (daughter), Sawyer or Gemapat Wallace (son and daughter-in-law), Gerry Rodrigo (son)  Tablet Strength: 2mg tablets  Patient Contact Info: [phone] - home with son Yadiel; Mrs. Wallace's cell phone number - [phone]     Patient Findings     Positives:  Change in health, Change in medications, Other complaints   Negatives:  Signs/symptoms of thrombosis, Signs/symptoms of bleeding, Laboratory test error suspected, Change in alcohol use, Change in activity, Upcoming invasive procedure, Emergency department visit, Upcoming dental procedure, Missed doses, Extra doses, Change in diet/appetite, Hospital admission, Bruising   Comments:  Tried several times to get record of warfarin dosing from rehab facility in [facility]. They were having trouble opening her MAR and could not see her warfarin dosing or INR values.  Said she was on warfarin in rehab facility but had it held for 3-5 nights due to removal of PICC line Monday ([facility] in [place]). Was supposed to restart warfarin Monday or Tuesday, but she does not know exactly what they were giving her. Said she still has a supply of purple 2mg tablets.  Patient is very frustrated with the rehab facility as they were supposed to discharge her with a wheelchair and she does not have one still. She has a walker she can use but is nervous about falling and her ability to walk a  she can only take a step or two at a time. She was instructed to reach out to Dr Walters for a wheelchair.     She found a paper that says she got 5mg qhs TuesThursSatSun related to unspecified Afib, unsure when paper was from.     Plan:    1. INR is SUBtherapeutic today at 1.2. Due to inability to get records of dose or INR and patient being at an increased fall risk instructed patient to take 4mg warfarin tonight, 2mg SatSun  2. Repeat INR Monday  3. Verbal information provided over the phone. Moni Wallace RBV dosing instructions and filled her planner with correct doses while on the phone, expresses understanding by teach back, and has no further questions at this time.    Francoise Wu, PharmD.  07/19/19   5:00 PM

## 2020-11-17 ENCOUNTER — ANTICOAGULATION VISIT (OUTPATIENT)
Dept: PHARMACY | Facility: HOSPITAL | Age: 80
End: 2020-11-17

## 2020-11-17 ENCOUNTER — OFFICE VISIT (OUTPATIENT)
Dept: INTERNAL MEDICINE | Facility: CLINIC | Age: 80
End: 2020-11-17

## 2020-11-17 VITALS — HEIGHT: 64 IN | BODY MASS INDEX: 35.17 KG/M2

## 2020-11-17 DIAGNOSIS — I48.0 PAROXYSMAL ATRIAL FIBRILLATION (HCC): ICD-10-CM

## 2020-11-17 DIAGNOSIS — D63.1 ANEMIA OF RENAL DISEASE: ICD-10-CM

## 2020-11-17 DIAGNOSIS — E78.5 HYPERLIPIDEMIA LDL GOAL <100: ICD-10-CM

## 2020-11-17 DIAGNOSIS — I48.0 PAROXYSMAL ATRIAL FIBRILLATION (HCC): Primary | ICD-10-CM

## 2020-11-17 DIAGNOSIS — E03.9 HYPOTHYROIDISM, UNSPECIFIED TYPE: ICD-10-CM

## 2020-11-17 DIAGNOSIS — I10 ESSENTIAL HYPERTENSION: ICD-10-CM

## 2020-11-17 DIAGNOSIS — J45.20 MILD INTERMITTENT ASTHMA, UNSPECIFIED WHETHER COMPLICATED: ICD-10-CM

## 2020-11-17 DIAGNOSIS — D36.9 TUBULAR ADENOMA: ICD-10-CM

## 2020-11-17 DIAGNOSIS — N18.9 ANEMIA OF RENAL DISEASE: ICD-10-CM

## 2020-11-17 DIAGNOSIS — E11.69 TYPE 2 DIABETES MELLITUS WITH OTHER SPECIFIED COMPLICATION, WITHOUT LONG-TERM CURRENT USE OF INSULIN (HCC): ICD-10-CM

## 2020-11-17 LAB — INR PPP: 2.1

## 2020-11-17 PROCEDURE — 99442 PR PHYS/QHP TELEPHONE EVALUATION 11-20 MIN: CPT | Performed by: INTERNAL MEDICINE

## 2020-11-17 RX ORDER — OMEPRAZOLE 40 MG/1
CAPSULE, DELAYED RELEASE ORAL
Qty: 90 CAPSULE | Refills: 3 | Status: SHIPPED | OUTPATIENT
Start: 2020-11-17

## 2020-11-17 NOTE — PROGRESS NOTES
Patient is a 80 y.o. female who is here for a follow up of diabetes.  Chief Complaint   Patient presents with   • Diabetes     You have chosen to receive care through a telephone visit. Do you consent to use a telephone visit for your medical care today? Yes      HPI:    Patient called for evaluation if whether she need CGM.  Only checking once a day with FSBS in mid 100s.  Here heart rate in the 90s.  No CP.  No dizziness or lightheadedness.  Breathing well.  No abdominal pains.  Appetite is good.  No fever or chills.     History:     Patient Active Problem List   Diagnosis   • Paroxysmal atrial fibrillation (CMS/HCC)   • Essential hypertension   • Type 2 diabetes mellitus (CMS/HCC)   • Chronic kidney disease, stage IV (severe) (CMS/HCC)   • Anemia of renal disease   • Hyperparathyroidism (CMS/HCC)   • Asthma   • Right-sided carotid artery disease (CMS/HCC)   • High output HF (heart failure) (CMS/HCC)   • Vitamin D deficiency   • Nonrheumatic aortic valve stenosis   • Ulcer of gastric fundus   • Tubular adenoma   • Macular degeneration   • Hypothyroidism   • Chronic kidney disease   • Screen for colon cancer   • Postoperative anemia due to acute blood loss   • Hyperlipidemia LDL goal <100   • Morbidly obese (CMS/HCC)       Past Medical History:   Diagnosis Date   • Adenomatous colon polyp    • Chronic kidney disease    • Clostridium difficile infection 06/2017   • Diabetes mellitus (CMS/HCC)    • Gastric polyps    • Hypothyroidism    • IgA nephropathy, acute    • Macular degeneration    • Paroxysmal atrial fibrillation (CMS/HCC)    • Tubular adenoma     excision    • Ulcer of gastric fundus    • Vitamin D deficiency        Past Surgical History:   Procedure Laterality Date   • BREAST BIOPSY Left 1990'S   • CARPAL TUNNEL RELEASE     • CARPAL TUNNEL RELEASE Right    • CATARACT EXTRACTION     • CATARACT EXTRACTION Bilateral    • DIAGNOSTIC LAPAROSCOPY     • DIALYSIS FISTULA CREATION     • HERNIA REPAIR  85 and 86   •  KNEE ARTHROSCOPY INCISION AND DRAINAGE OF KNEE Right 5/18/2019    Procedure: KNEE ARTHROSCOPY INCISION AND DRAINAGE OF KNEE;  Surgeon: Paco Lester MD;  Location:  [facility];  Service: Orthopedics   • LAPAROSCOPIC TUBAL LIGATION  1985   • TOTAL KNEE ARTHROPLASTY     • TOTAL KNEE ARTHROPLASTY      Left knee    • TRANSPLANTATION RENAL  2010   • TRANSPLANTATION RENAL Right    • TRIGGER FINGER RELEASE     • TUBAL ABDOMINAL LIGATION     • UPPER GASTROINTESTINAL ENDOSCOPY  05/20/2013   • VENOUS ACCESS DEVICE (PORT) INSERTION N/A 5/23/2019    Procedure: INSERTION GROSHONG;  Surgeon: Rolando Begum MD;  Location:  [facility];  Service: General       Current Outpatient Medications on File Prior to Visit   Medication Sig   • acetaminophen (TYLENOL) 325 MG tablet Take 2 tablets by mouth Every 4 (Four) Hours As Needed for Mild Pain .   • albuterol (PROVENTIL) (2.5 MG/3ML) 0.083% nebulizer solution Take 2.5 mg by nebulization Every 4 (Four) Hours As Needed for Wheezing.   • albuterol sulfate HFA (PROAIR HFA) 108 (90 Base) MCG/ACT inhaler Inhale 2 puffs Every 4 (Four) Hours As Needed for Wheezing or Shortness of Air.   • amLODIPine (NORVASC) 5 MG tablet Take 1 tablet by mouth Daily.   • calcitriol (ROCALTROL) 0.25 MCG capsule Take 0.25 mcg by mouth Every Other Day.   • Cholecalciferol (VITAMIN D) 2000 UNITS tablet Take 2,000 Units by mouth Daily.   • COLCRYS 0.6 MG tablet Take 0.5 tablets by mouth Daily. Take 1 tab BID x7 days, then 1 tab QD x7 days (patient will stop statins while taking this medication)   • furosemide (LASIX) 40 MG tablet Take 80 mg by mouth Daily.   • gentamicin (GARAMYCIN) 0.1 % cream APPLY TO EXIT SITE DAILY   • levothyroxine (SYNTHROID, LEVOTHROID) 75 MCG tablet Take 75 mcg by mouth Daily.   • lisinopril (PRINIVIL,ZESTRIL) 5 MG tablet Take 5 mg by mouth Daily.   • metoprolol tartrate (LOPRESSOR) 50 MG tablet Take 1 tablet by mouth Every 12 (Twelve) Hours. (Patient taking differently: Take  100 mg by mouth Every 12 (Twelve) Hours.)   • Multiple Vitamins-Minerals (ICAPS AREDS 2 PO) Take  by mouth Daily.   • sevelamer (RENVELA) 800 MG tablet Take 4,800 mg by mouth 3 (Three) Times a Day.   • tacrolimus (PROGRAF) 0.5 MG capsule Take 0.5 mg by mouth 2 (Two) Times a Day.   • temazepam (RESTORIL) 15 MG capsule Take 15 mg by mouth At Night As Needed for Sleep.   • warfarin (COUMADIN) 2 MG tablet TAKE ONE TO TWO TABLETS BY MOUTH EVERY DAY OR AS DIRECTED BY ANTICOAGULATION CLINIC   • [DISCONTINUED] omeprazole (priLOSEC) 40 MG capsule TAKE ONE CAPSULE BY MOUTH DAILY     No current facility-administered medications on file prior to visit.        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 Negative for chills and fever.   HENT: Negative for congestion, ear pain, hearing loss, rhinorrhea, sinus pressure, sore throat and trouble swallowing.    Eyes: Negative for discharge and itching.   Respiratory: Negative for chest tightness.    Cardiovascular: Negative for chest pain and palpitations.   Gastrointestinal:  "Negative for abdominal pain, blood in stool, constipation, diarrhea and vomiting.        10/17 by Dr Perez, next 10/20   Endocrine: Negative for polydipsia and polyuria.   Genitourinary: Negative for difficulty urinating, dysuria, enuresis, frequency, hematuria and urgency.        11/18 mammogram   Musculoskeletal: Positive for arthralgias and gait problem. Negative for back pain and joint swelling.   Skin: Negative for rash and wound.   Allergic/Immunologic: Negative for immunocompromised state.   Neurological: Positive for weakness. Negative for dizziness, syncope, light-headedness, numbness and headaches.   Hematological: Bruises/bleeds easily.   Psychiatric/Behavioral: Positive for sleep disturbance. Negative for behavioral problems and dysphoric mood. The patient is not nervous/anxious.        Ht 162.6 cm (64.02\")   LMP  (LMP Unknown)   BMI 35.17 kg/m²       Physical Exam  Pulmonary:      Effort: Pulmonary effort is normal. No respiratory distress.   Neurological:      General: No focal deficit present.      Mental Status: She is alert and oriented to person, place, and time.   Psychiatric:         Mood and Affect: Mood normal.         Behavior: Behavior normal.         Thought Content: Thought content normal.         Judgment: Judgment normal.         Procedure:      Discussion/Summary:    HTN-advised to monitor and goal 130/80  DM-labs noted, counseled on low carb, does not need CGM  Hyperlipidemia-counseled on diet, cont dual tx, recheck on rtc  ESRD-per Renal, cont PD  AOCD-\"  \"  Vit D-labs noted, cont replacement  Asthma-stable  Gout-UA level on rtc, cont allopurinol, stable  afib-rate controlled, stable  hypothroid-labs on rtc, cont replacement  TA-advised need for f/u colonoscopy  High risk meds-INR at home      Labs noted and dw patient     I spent 12 minutes in total including but not limited to the 11 minutes spent in direct conversation with this patient.       Current Outpatient Medications:   •  " acetaminophen (TYLENOL) 325 MG tablet, Take 2 tablets by mouth Every 4 (Four) Hours As Needed for Mild Pain ., Disp: , Rfl:   •  albuterol (PROVENTIL) (2.5 MG/3ML) 0.083% nebulizer solution, Take 2.5 mg by nebulization Every 4 (Four) Hours As Needed for Wheezing., Disp: 25 vial, Rfl: 2  •  albuterol sulfate HFA (PROAIR HFA) 108 (90 Base) MCG/ACT inhaler, Inhale 2 puffs Every 4 (Four) Hours As Needed for Wheezing or Shortness of Air., Disp: 1 inhaler, Rfl: 11  •  amLODIPine (NORVASC) 5 MG tablet, Take 1 tablet by mouth Daily., Disp: , Rfl:   •  calcitriol (ROCALTROL) 0.25 MCG capsule, Take 0.25 mcg by mouth Every Other Day., Disp: , Rfl:   •  Cholecalciferol (VITAMIN D) 2000 UNITS tablet, Take 2,000 Units by mouth Daily., Disp: , Rfl:   •  COLCRYS 0.6 MG tablet, Take 0.5 tablets by mouth Daily. Take 1 tab BID x7 days, then 1 tab QD x7 days (patient will stop statins while taking this medication), Disp: , Rfl:   •  furosemide (LASIX) 40 MG tablet, Take 80 mg by mouth Daily., Disp: , Rfl:   •  gentamicin (GARAMYCIN) 0.1 % cream, APPLY TO EXIT SITE DAILY, Disp: , Rfl: 3  •  levothyroxine (SYNTHROID, LEVOTHROID) 75 MCG tablet, Take 75 mcg by mouth Daily., Disp: , Rfl:   •  lisinopril (PRINIVIL,ZESTRIL) 5 MG tablet, Take 5 mg by mouth Daily., Disp: , Rfl:   •  metoprolol tartrate (LOPRESSOR) 50 MG tablet, Take 1 tablet by mouth Every 12 (Twelve) Hours. (Patient taking differently: Take 100 mg by mouth Every 12 (Twelve) Hours.), Disp: , Rfl:   •  Multiple Vitamins-Minerals (ICAPS AREDS 2 PO), Take  by mouth Daily., Disp: , Rfl:   •  omeprazole (priLOSEC) 40 MG capsule, TAKE ONE CAPSULE BY MOUTH DAILY, Disp: 90 capsule, Rfl: 3  •  sevelamer (RENVELA) 800 MG tablet, Take 4,800 mg by mouth 3 (Three) Times a Day., Disp: , Rfl:   •  tacrolimus (PROGRAF) 0.5 MG capsule, Take 0.5 mg by mouth 2 (Two) Times a Day., Disp: , Rfl:   •  temazepam (RESTORIL) 15 MG capsule, Take 15 mg by mouth At Night As Needed for Sleep., Disp: , Rfl:    •  warfarin (COUMADIN) 2 MG tablet, TAKE ONE TO TWO TABLETS BY MOUTH EVERY DAY OR AS DIRECTED BY ANTICOAGULATION CLINIC, Disp: 180 tablet, Rfl: 0        Diagnoses and all orders for this visit:    1. Paroxysmal atrial fibrillation (CMS/HCC) (Primary)    2. Hyperlipidemia LDL goal <100    3. Essential hypertension    4. Mild intermittent asthma, unspecified whether complicated    5. Type 2 diabetes mellitus with other specified complication, without long-term current use of insulin (CMS/HCC)    6. Hypothyroidism, unspecified type    7. Anemia of renal disease    8. Tubular adenoma

## 2020-11-17 NOTE — PROGRESS NOTES
Anticoagulation Clinic - Remote Progress Note  [facility]  Testing Frequency: 7 days    Indication: paroxysmal afib  Referring Provider: Butch Joe (Last seen: 11/5/19  next appt: 6/9/20)  Goal INR: 2.0-3.0  Current Drug Interactions: , levothyroxine; omeprazole, azaTHIOprine, ezetimibe, allopurinol (10/20)  CHADS-VASc: 5 (age, gender, HTN, DM)  HAS-BLED: 3 (HTN, CKD, Age)     Diet: mostly avoids (10/19/20) meal replacement drinks ~3 times weekly 10/19/20  Alcohol: none  Tobacco: none   OTC Pain Medication: APAP PRN; took tylenol last 2 nights for pain from dialysis (03/26/20)    1st clinic: 9/14/17  2nd clinic: 6/26/18  3rd clinic: 11/5/19    INR History:  Date 1/10 1/14 1/23 1/28 1/31 2/4 2/11 2/18 2/24 3/3 3/10 3/16 3/19   Total Weekly Dose 28mg 32mg 30mg 26mg 26mg  24mg 26mg 26mg 24mg 26mg 27mg 25mg 29mg   INR 1.6 2.1 4.1 2.2 3.4 2.7 2.1 3.4 2.3 1.9 1.9 1.4 2.0   Notes augment  2x incr dose 1x decr keflex, pred keflex; pred; sick keflex; sick; dose decr x1 sick;   valtrex keflex   1x incr dose, incr GLV 1x dose decr; levaquin 1x incr dose      Date 3/23 3/26 4/2 4/9 4/16 4/23 5/1 5/8 5/14 5/22 5/27 6/4   Total WeeklyDose 29mg 29mg 30mg 30mg 30mg 32mg 30mg 32mg 28mg 32mg 32mg 30mg   INR 1.4 1.5 1.9 2.2 1.9 3.0 2.1 2.7 2.4 3.2 3.5 3.2   Notes 1x incr dose;  spinach 1x incr dose,  less GLV 1x boost    1x decr  dose trazodone 1x miss; trazodone 25mg QPM recv'd 5/26  1x incr dose     Date 6/10 6/17 6/24 7/2 7/8 7/15 7/22 7/29 8/5 8/12 8/17 8/24   Total WeeklyDose 28mg 28mg 28mg 28mg 28mg 28mg 30mg 26mg 28mg 28mg 32mg 34mg   INR 2.5 2.6 2.3 1.8 2.2 1.7 3.5 2.4 2.5 1.7 1.8 2.8   Notes    Inc GLV  Inc GLV 1x dose incr. x1 dose red  ensure       Date 8/31 9/9 9/14 9/21 9/28 10/5 10/13 10/19 10/27 11/3 11/10 11/17   Total WeeklyDose 32mg 32mg 26mg 30mg 26-28 mg? 28mg 28mg 28mg 28mg 26mg 30mg 28mg   INR 2.4 3.9 2.6 2.9 2.3 2.3 2.9 2.8 3.7 1.5 2.1 2.1   Notes       decr Ensure; allopurinol allopurinol;  less Ensure  recv'd 11/4; incr GLV 1x incr dose      Date               Total WeeklyDose               INR               Notes                 Phone Interview:  Verbal Release Authorization signed on 6/26/18 and again on 11/5/2019-- may speak with Alexy Wallace (son), Genesis Becerril (daughter), Sawyer or Gema Wallace (son and daughter-in-law), Gerry Wallace (son)  Tablet Strength: 2mg tablets  Patient Contact Info: preferred [phone] - home with carlito Cotto- cell [phone]; call if can't get in touch with home phone      Patient Findings  Negatives:  Signs/symptoms of thrombosis, Signs/symptoms of bleeding, Laboratory test error suspected, Change in health, Change in alcohol use, Change in activity, Upcoming invasive procedure, Emergency department visit, Upcoming dental procedure, Missed doses, Extra doses, Change in medications, Change in diet/appetite, Hospital admission, Bruising, Other complaints   Comments:  Patient had telemedicine visit with Dr. Walters today. Otherwise denies findings     Plan:  1. INR is therapeutic today at 2.1. Instructed Ms. Wallace to continue warfarin 4mg oral daily until recheck.    2. Repeat INR in 1 week  3. Verbal information provided over the phone. Moni Wallace RBV dosing instructions, expresses understanding by teach back, and has no further questions at this time.    Sawyer Gamez, Valentin  11/17/2020  14:47 Francoise CONNER, PharmD, have reviewed the note in full and agree with the assessment and plan.  11/17/20  16:36 EST

## 2020-11-24 ENCOUNTER — ANTICOAGULATION VISIT (OUTPATIENT)
Dept: PHARMACY | Facility: HOSPITAL | Age: 80
End: 2020-11-24

## 2020-11-24 DIAGNOSIS — I48.0 PAROXYSMAL ATRIAL FIBRILLATION (HCC): ICD-10-CM

## 2020-11-24 LAB — INR PPP: 2.1

## 2020-11-24 NOTE — PROGRESS NOTES
Anticoagulation Clinic - Remote Progress Note  [facility]  Testing Frequency: 7 days    Indication: paroxysmal afib  Referring Provider: Butch Joe (Last seen: 11/5/19  next appt: 6/9/20)  Goal INR: 2.0-3.0  Current Drug Interactions: , levothyroxine; omeprazole, azaTHIOprine, ezetimibe, allopurinol (10/20)  CHADS-VASc: 5 (age, gender, HTN, DM)  HAS-BLED: 3 (HTN, CKD, Age)     Diet: mostly avoids (10/19/20) meal replacement drinks ~3 times weekly 10/19/20  Alcohol: none  Tobacco: none   OTC Pain Medication: APAP PRN; took tylenol last 2 nights for pain from dialysis (03/26/20)    1st clinic: 9/14/17  2nd clinic: 6/26/18  3rd clinic: 11/5/19    INR History:  Date 1/10 1/14 1/23 1/28 1/31 2/4 2/11 2/18 2/24 3/3 3/10 3/16 3/19   Total Weekly Dose 28mg 32mg 30mg 26mg 26mg  24mg 26mg 26mg 24mg 26mg 27mg 25mg 29mg   INR 1.6 2.1 4.1 2.2 3.4 2.7 2.1 3.4 2.3 1.9 1.9 1.4 2.0   Notes augment  2x incr dose 1x decr keflex, pred keflex; pred; sick keflex; sick; dose decr x1 sick;   valtrex keflex   1x incr dose, incr GLV 1x dose decr; levaquin 1x incr dose      Date 3/23 3/26 4/2 4/9 4/16 4/23 5/1 5/8 5/14 5/22 5/27 6/4   Total WeeklyDose 29mg 29mg 30mg 30mg 30mg 32mg 30mg 32mg 28mg 32mg 32mg 30mg   INR 1.4 1.5 1.9 2.2 1.9 3.0 2.1 2.7 2.4 3.2 3.5 3.2   Notes 1x incr dose;  spinach 1x incr dose,  less GLV 1x boost    1x decr  dose trazodone 1x miss; trazodone 25mg QPM recv'd 5/26  1x incr dose     Date 6/10 6/17 6/24 7/2 7/8 7/15 7/22 7/29 8/5 8/12 8/17 8/24   Total WeeklyDose 28mg 28mg 28mg 28mg 28mg 28mg 30mg 26mg 28mg 28mg 32mg 34mg   INR 2.5 2.6 2.3 1.8 2.2 1.7 3.5 2.4 2.5 1.7 1.8 2.8   Notes    Inc GLV  Inc GLV 1x dose incr. x1 dose red  ensure       Date 8/31 9/9 9/14 9/21 9/28 10/5 10/13 10/19 10/27 11/3 11/10 11/17   Total WeeklyDose 32mg 32mg 26mg 30mg 26-28 mg? 28mg 28mg 28mg 28mg 26mg 30mg 28mg   INR 2.4 3.9 2.6 2.9 2.3 2.3 2.9 2.8 3.7 1.5 2.1 2.1   Notes       decr Ensure; allopurinol allopurinol;  less Ensure  recv'd 11/4; incr GLV 1x incr dose      Date 11/24              Total WeeklyDose 28mg              INR 2.1              Notes                 Phone Interview:  Verbal Release Authorization signed on 6/26/18 and again on 11/5/2019-- may speak with Alexy Wallace (son), Genesis Becerril (daughter), Sawyer Wallace (son and daughter-in-law), Gerry Wallace (son)  Tablet Strength: 2mg tablets  Patient Contact Info: preferred [phone] - home with son Yadiel- cell [phone]; call if can't get in touch with home phone      Patient Findings  Negatives:  Signs/symptoms of thrombosis, Signs/symptoms of bleeding, Laboratory test error suspected, Change in health, Change in alcohol use, Change in activity, Upcoming invasive procedure, Emergency department visit, Upcoming dental procedure, Missed doses, Extra doses, Change in medications, Change in diet/appetite, Hospital admission, Bruising, Other complaints     Plan:  1. INR remains therapeutic today at 2.1. Instructed Ms. Wallace to continue warfarin 4mg oral daily until recheck.    2. Repeat INR in 1 week  3. Verbal information provided over the phone. Moni Wallace RBV dosing instructions, expresses understanding by teach back, and has no further questions at this time.    Samia Hernandez, PharmD Candidate 2021 11/24/2020  16:03 Francoise CONNER, PharmD, have reviewed the note in full and agree with the assessment and plan.  11/24/20  16:12 EST

## 2020-12-01 ENCOUNTER — ANTICOAGULATION VISIT (OUTPATIENT)
Dept: PHARMACY | Facility: HOSPITAL | Age: 80
End: 2020-12-01

## 2020-12-01 DIAGNOSIS — I48.0 PAROXYSMAL ATRIAL FIBRILLATION (HCC): ICD-10-CM

## 2020-12-01 LAB — INR PPP: 3.8

## 2020-12-01 NOTE — PROGRESS NOTES
Anticoagulation Clinic - Remote Progress Note  [facility]  Testing Frequency: 7 days    Indication: paroxysmal afib  Referring Provider: Butch Joe (Last seen: 11/5/19  next appt: 6/9/20)  Goal INR: 2.0-3.0  Current Drug Interactions: , levothyroxine; omeprazole, azaTHIOprine, ezetimibe, allopurinol (10/20)  CHADS-VASc: 5 (age, gender, HTN, DM)  HAS-BLED: 3 (HTN, CKD, Age)     Diet: mostly avoids (10/19/20) meal replacement drinks ~3 times weekly 10/19/20  Alcohol: none  Tobacco: none   OTC Pain Medication: APAP PRN; took tylenol last 2 nights for pain from dialysis (03/26/20)    1st clinic: 9/14/17  2nd clinic: 6/26/18  3rd clinic: 11/5/19    INR History:  Date 1/10 1/14 1/23 1/28 1/31 2/4 2/11 2/18 2/24 3/3 3/10 3/16 3/19   Total Weekly Dose 28mg 32mg 30mg 26mg 26mg  24mg 26mg 26mg 24mg 26mg 27mg 25mg 29mg   INR 1.6 2.1 4.1 2.2 3.4 2.7 2.1 3.4 2.3 1.9 1.9 1.4 2.0   Notes augment  2x incr dose 1x decr keflex, pred keflex; pred; sick keflex; sick; dose decr x1 sick;   valtrex keflex   1x incr dose, incr GLV 1x dose decr; levaquin 1x incr dose      Date 3/23 3/26 4/2 4/9 4/16 4/23 5/1 5/8 5/14 5/22 5/27 6/4   Total WeeklyDose 29mg 29mg 30mg 30mg 30mg 32mg 30mg 32mg 28mg 32mg 32mg 30mg   INR 1.4 1.5 1.9 2.2 1.9 3.0 2.1 2.7 2.4 3.2 3.5 3.2   Notes 1x incr dose;  spinach 1x incr dose,  less GLV 1x boost    1x decr  dose trazodone 1x miss; trazodone 25mg QPM recv'd 5/26  1x incr dose     Date 6/10 6/17 6/24 7/2 7/8 7/15 7/22 7/29 8/5 8/12 8/17 8/24   Total WeeklyDose 28mg 28mg 28mg 28mg 28mg 28mg 30mg 26mg 28mg 28mg 32mg 34mg   INR 2.5 2.6 2.3 1.8 2.2 1.7 3.5 2.4 2.5 1.7 1.8 2.8   Notes    Inc GLV  Inc GLV 1x dose incr. x1 dose red  ensure       Date 8/31 9/9 9/14 9/21 9/28 10/5 10/13 10/19 10/27 11/3 11/10 11/17   Total WeeklyDose 32mg 32mg 26mg 30mg 26-28 mg? 28mg 28mg 28mg 28mg 26mg 30mg 28mg   INR 2.4 3.9 2.6 2.9 2.3 2.3 2.9 2.8 3.7 1.5 2.1 2.1   Notes       decr Ensure; allopurinol allopurinol;  "less Ensure  recv'd 11/4; incr GLV 1x incr dose      Date 11/24 12/1             Total WeeklyDose 28mg 28mg             INR 2.1 3.8             Notes  Carie EL               Phone Interview:  Verbal Release Authorization signed on 6/26/18 and again on 11/5/2019-- may speak with Alexy Wallace (son), Genesis Becerril (daughter), Sawyer or Gema Wallace (son and daughter-in-law), Gerry Wallace (son)  Tablet Strength: 2mg tablets  Patient Contact Info: preferred [phone] - home with son Yadiel- cell [phone]; call if can't get in touch with home phone      Patient Findings  Positives:  Change in diet/appetite   Negatives:  Signs/symptoms of thrombosis, Signs/symptoms of bleeding, Laboratory test error suspected, Change in health, Change in alcohol use, Change in activity, Upcoming invasive procedure, Emergency department visit, Upcoming dental procedure, Missed doses, Extra doses, Change in medications, Hospital admission, Bruising, Other complaints   Comments:  Patient reports she is fatigued, just \"worn out\" from the holidays. Reports she had green beans this week, about her usual amount .   She had fewer meal replacement drinks this week due to eating more for thanksgiving.  She usually has 3x week but only had one last week. She has canned greens on hand but previous 2mg dose reduction dropped INR to SUBtherapeutic range, prefer patient to have a serving of green beans instead and avoid canned greens         Plan:  1. INR remains supratherapeutic today at 3.8. Instructed Ms. Wallace to reduce tonight's dose to 2mg then continue warfarin 4mg oral daily until recheck.    2. Repeat INR in 1 week  3. Verbal information provided over the phone. Moni Wallace RBV dosing instructions, expresses understanding by teach back, and has no further questions at this time.    Francoise Wu, PharmD.  12/01/20   15:28 EST      "

## 2020-12-03 NOTE — PROGRESS NOTES
[facility]  Office Visit Note    DATE: 12/08/2020    IDENTIFICATION: Moni Wallace is a 80 y.o. female who resides in [place], [place]    REASON FOR VISIT:  • High-output heart failure  • Nonrheumatic aortic stenosis  • Atrial fibrillation  • Carotid artery disease  • Hypertension            Moni Wallace returns today for follow-up of her heart failure, aortic stenosis, atrial fibrillation, carotid artery disease and cardiac risk factors.  Patient has a history of intolerance to statins therefore it is been deferred.  Patient has a left femoral fistula for her hemodialysis which is performed at home.  Her son gets her hooked up to the machine and she unhooks herself.  She continues to see nephrology.  Although on hemodialysis she does still urinate.  She takes Lasix 80 mg a day but does not take any potassium.  Her nephrologist has adjusted some of her medications discontinuing amlodipine and lisinopril as she was getting hypotensive.  She has a history of atrial fibrillation and is chronically anticoagulated with Coumadin.  Her INRs are managed by the [facility] and to coagulation clinic.  INR today was therapeutic at 2.8.  She denies signs or symptoms of TIA or CVA.  She denies chest pain, increased dyspnea, orthopnea, palpitations or syncope.  She has not had any episodes of atrial fibrillation that she is aware of.    Review of Systems   Constitution: Negative for malaise/fatigue.   Eyes: Negative for vision loss in left eye and vision loss in right eye.   Cardiovascular: Positive for dyspnea on exertion and leg swelling. Negative for chest pain, near-syncope, orthopnea, palpitations, paroxysmal nocturnal dyspnea and syncope.   Respiratory: Positive for cough.    Musculoskeletal: Negative for myalgias.   Neurological: Negative for brief paralysis, excessive daytime sleepiness, focal weakness, numbness, paresthesias and weakness.   All other systems reviewed and are  negative.      The patient's past medical, social, family history and ROS reviewed in the patient's electronic medical record.    Allergies   Allergen Reactions   • Hydrocodone Itching   • Statins Other (See Comments)     myalgia   • Codeine Rash   • Sulfa Antibiotics Rash         Current Outpatient Medications:   •  acetaminophen (TYLENOL) 325 MG tablet, Take 2 tablets by mouth Every 4 (Four) Hours As Needed for Mild Pain ., Disp: , Rfl:   •  albuterol (PROVENTIL) (2.5 MG/3ML) 0.083% nebulizer solution, Take 2.5 mg by nebulization Every 4 (Four) Hours As Needed for Wheezing., Disp: 25 vial, Rfl: 2  •  albuterol sulfate HFA (PROAIR HFA) 108 (90 Base) MCG/ACT inhaler, Inhale 2 puffs Every 4 (Four) Hours As Needed for Wheezing or Shortness of Air., Disp: 1 inhaler, Rfl: 11  •  calcitriol (ROCALTROL) 0.25 MCG capsule, Take 0.25 mcg by mouth Every Other Day., Disp: , Rfl:   •  furosemide (LASIX) 40 MG tablet, Take 80 mg by mouth Daily., Disp: , Rfl:   •  gentamicin (GARAMYCIN) 0.1 % cream, APPLY TO EXIT SITE DAILY, Disp: , Rfl: 3  •  levothyroxine (SYNTHROID, LEVOTHROID) 75 MCG tablet, Take 75 mcg by mouth Daily., Disp: , Rfl:   •  metoprolol tartrate (LOPRESSOR) 50 MG tablet, Take 1 tablet by mouth Every 12 (Twelve) Hours. (Patient taking differently: Take 100 mg by mouth Every 12 (Twelve) Hours.), Disp: , Rfl:   •  Multiple Vitamins-Minerals (ICAPS PO), Take 1 capsule by mouth Daily., Disp: , Rfl:   •  omeprazole (priLOSEC) 40 MG capsule, TAKE ONE CAPSULE BY MOUTH DAILY, Disp: 90 capsule, Rfl: 3  •  sevelamer (RENVELA) 800 MG tablet, Take 4,800 mg by mouth 3 (Three) Times a Day., Disp: , Rfl:   •  tacrolimus (PROGRAF) 0.5 MG capsule, Take 0.5 mg by mouth 2 (Two) Times a Day., Disp: , Rfl:   •  temazepam (RESTORIL) 15 MG capsule, Take 15 mg by mouth At Night As Needed for Sleep., Disp: , Rfl:   •  warfarin (COUMADIN) 2 MG tablet, TAKE ONE TO TWO TABLETS BY MOUTH EVERY DAY OR AS DIRECTED BY ANTICOAGULATION CLINIC, Disp:  "180 tablet, Rfl: 0    Past Medical History:   Diagnosis Date   • Adenomatous colon polyp    • Chronic kidney disease    • Clostridium difficile infection 06/2017   • Diabetes mellitus (CMS/HCC)    • Gastric polyps    • Hypothyroidism    • IgA nephropathy, acute    • Macular degeneration    • Paroxysmal atrial fibrillation (CMS/HCC)    • Tubular adenoma     excision    • Ulcer of gastric fundus    • Vitamin D deficiency        Past Surgical History:   Procedure Laterality Date   • BREAST BIOPSY Left 1990'S   • CARPAL TUNNEL RELEASE     • CARPAL TUNNEL RELEASE Right    • CATARACT EXTRACTION     • CATARACT EXTRACTION Bilateral    • DIAGNOSTIC LAPAROSCOPY     • DIALYSIS FISTULA CREATION     • HERNIA REPAIR  85 and 86   • KNEE ARTHROSCOPY INCISION AND DRAINAGE OF KNEE Right 5/18/2019    Procedure: KNEE ARTHROSCOPY INCISION AND DRAINAGE OF KNEE;  Surgeon: Paco Lester MD;  Location:  [facility];  Service: Orthopedics   • LAPAROSCOPIC TUBAL LIGATION  1985   • TOTAL KNEE ARTHROPLASTY     • TOTAL KNEE ARTHROPLASTY      Left knee    • TRANSPLANTATION RENAL  2010   • TRANSPLANTATION RENAL Right    • TRIGGER FINGER RELEASE     • TUBAL ABDOMINAL LIGATION     • UPPER GASTROINTESTINAL ENDOSCOPY  05/20/2013   • VENOUS ACCESS DEVICE (PORT) INSERTION N/A 5/23/2019    Procedure: INSERTION GROSHONG;  Surgeon: Rolando Begum MD;  Location:  [facility];  Service: General       Family History   Problem Relation Age of Onset   • Leukemia Mother    • Stroke Father    • Dementia Sister    • Kidney disease Sister    • Diabetic kidney disease Sister    • Lung cancer Brother    • Breast cancer Neg Hx    • Ovarian cancer Neg Hx    • Hypertension Sister    • Dementia Sister        Social History     Tobacco Use   • Smoking status: Never Smoker   • Smokeless tobacco: Never Used   Substance Use Topics   • Alcohol use: No           Blood pressure 132/74, pulse 80, height 165.1 cm (65\"), weight 90.4 kg (199 lb 6.4 oz), SpO2 97 %, not " currently breastfeeding.  Body mass index is 33.18 kg/m².  Vitals:    12/08/20 1548   Patient Position: Sitting       Constitutional:       Appearance: Healthy appearance. Well-developed.   Eyes:      General: Lids are normal. No scleral icterus.     Conjunctiva/sclera: Conjunctivae normal.   HENT:      Head: Normocephalic and atraumatic.   Neck:      Musculoskeletal: Normal range of motion.      Thyroid: No thyromegaly.      Vascular: No carotid bruit or JVD.   Pulmonary:      Effort: Pulmonary effort is normal.      Breath sounds: Normal breath sounds. No wheezing. No rhonchi. No rales.   Cardiovascular:      Normal rate. Regular rhythm.      Murmurs: There is a systolic murmur.      No gallop. No rub.   Pulses:     Intact distal pulses.   Edema:     Peripheral edema absent.   Abdominal:      General: There is no distension.      Palpations: Abdomen is soft. There is no abdominal mass.   Skin:     General: Skin is warm and dry.      Findings: No rash.   Neurological:      General: No focal deficit present.      Mental Status: Alert and oriented to person, place, and time.      Gait: Gait is intact.   Psychiatric:         Attention and Perception: Attention normal.         Mood and Affect: Mood normal.         Behavior: Behavior normal.         Data Review (reviewed with patient):     Procedures    Lab Results   Component Value Date    GLUCOSE 164 (H) 05/30/2019    BUN 39 (H) 05/30/2019    CREATININE 4.51 (H) 05/30/2019    EGFRIFNONA 9 (L) 05/30/2019    EGFRIFAFRI  05/30/2019      Comment:      <15 Indicative of kidney failure.    BCR 8.6 05/30/2019    K 3.5 05/30/2019    CO2 28.0 05/30/2019    CALCIUM 9.3 05/30/2019    ALBUMIN 2.30 (L) 05/26/2019    ALKPHOS 132 (H) 05/26/2019    AST 24 05/26/2019    ALT 23 05/26/2019      Lab Results   Component Value Date    CHOL 227 (H) 03/04/2019    TRIG 160 (H) 03/04/2019    HDL 52 03/04/2019     (H) 03/04/2019     Lab Results   Component Value Date    HGBA1C 4.90  05/19/2019     Lab Results   Component Value Date    WBC 12.79 (H) 09/20/2019    HGB 10.7 (L) 09/20/2019    HCT 33.1 (L) 09/20/2019    MCV 94.3 09/20/2019     09/20/2019     Lab Results   Component Value Date    TSH 2.939 03/04/2019            Problem List Items Addressed This Visit        Cardiovascular and Mediastinum    Essential hypertension    Current Assessment & Plan     · Defer blood pressure management to nephrologist since end-stage renal on hemodialysis         High output HF (heart failure) (CMS/[facility])    Overview     · Echo (6/08/2017): LVEF 50%. Grade II Diastolic dysfunction.  · Echo (8/9/17): LVEF 55%.  Grade 2 diastolic dysfunction.  Mild MR.  Mild aortic stenosis  · Limited echo to evaluate high output CHF (8/10/17): Cardiac output decreased from 11.3 L/min to 8 L/min with compression of fistula.  · Fistula ligated 8/2017         Current Assessment & Plan     · Stable NYHA class II symptoms  · Continue Lasix 80 mg daily         Hyperlipidemia LDL goal <100    Overview     · Reports intolerance to statin         Current Assessment & Plan     · Defer statin therapy due to intolerance         Nonrheumatic aortic valve stenosis    Overview     · Echo (06/2017): Mild-to-moderate mitral valve regurgitation is present. Mild aortic valve stenosis is present. Moderate tricuspid valve regurgitation is present.  · Echo (05/23/2019):LVEF = 60%. Mild AS 15 mmHg gradient           Current Assessment & Plan     · Stable NYHA class II symptoms  · Continue Lasix 80 mg daily  · Continue metoprolol tartrate 50 mg twice daily         Paroxysmal atrial fibrillation (CMS/[facility]) - Primary    Overview     · CHADS-VASc = 5  · Atrial fibrillation initially diagnosed February 2015; spontaneous conversion to normal sinus rhythm.   · Echocardiogram (06/08/2017): LVEF 50%. Grade II Diastolic dysfunction. RVSP 41.6 mmHg. Mild-to-moderate MR. Mild AS.  Moderate TR  · Noted to be in afib with RVR 08/09/2017 in setting of acute  bronchitis.  · On Coumadin and amiodarone         Current Assessment & Plan     · No episodes of atrial fibrillation that she is aware of  · Continue Coumadin dosage adjustment per INR result with INR goal 2.0-3.0  · No signs or symptoms TIA or CVA             Patient is doing well from a cardiovascular standpoint.  Heart failure symptoms are stable and compensated.  She has not had any further atrial fibrillation episodes.  She should continue Coumadin, and metoprolol.  We will defer aspirin for her carotid artery disease due to chronic anticoagulation with the Coumadin.  We will also defer statin therapy as she reports an intolerance and it worsens her already severe arthritis.  She will follow-up in 12 months or sooner if needed.       · Continue metoprolol and Coumadin  · [facility] coagulation clinic to manage INRs and dosage of Coumadin  · Defer blood pressure management and volume status to nephrology  Return in about 1 year (around 12/8/2021), or if symptoms worsen or fail to improve, for Follow-up with Dr. Joe next visit.      Lily Rae, APRN  12/8/2020

## 2020-12-08 ENCOUNTER — OFFICE VISIT (OUTPATIENT)
Dept: CARDIOLOGY | Facility: CLINIC | Age: 80
End: 2020-12-08

## 2020-12-08 ENCOUNTER — ANTICOAGULATION VISIT (OUTPATIENT)
Dept: PHARMACY | Facility: HOSPITAL | Age: 80
End: 2020-12-08

## 2020-12-08 VITALS
OXYGEN SATURATION: 97 % | WEIGHT: 199.4 LBS | HEIGHT: 65 IN | HEART RATE: 80 BPM | SYSTOLIC BLOOD PRESSURE: 132 MMHG | DIASTOLIC BLOOD PRESSURE: 74 MMHG | BODY MASS INDEX: 33.22 KG/M2

## 2020-12-08 DIAGNOSIS — I35.0 NONRHEUMATIC AORTIC VALVE STENOSIS: ICD-10-CM

## 2020-12-08 DIAGNOSIS — I50.83 HIGH OUTPUT HF (HEART FAILURE) (HCC): ICD-10-CM

## 2020-12-08 DIAGNOSIS — E78.5 HYPERLIPIDEMIA LDL GOAL <100: ICD-10-CM

## 2020-12-08 DIAGNOSIS — I48.0 PAROXYSMAL ATRIAL FIBRILLATION (HCC): ICD-10-CM

## 2020-12-08 DIAGNOSIS — I48.0 PAROXYSMAL ATRIAL FIBRILLATION (HCC): Primary | ICD-10-CM

## 2020-12-08 DIAGNOSIS — I10 ESSENTIAL HYPERTENSION: ICD-10-CM

## 2020-12-08 LAB — INR PPP: 3

## 2020-12-08 PROCEDURE — 99214 OFFICE O/P EST MOD 30 MIN: CPT | Performed by: NURSE PRACTITIONER

## 2020-12-08 NOTE — PROGRESS NOTES
Anticoagulation Clinic - Remote Progress Note  [facility]  Testing Frequency: 7 days    Indication: paroxysmal afib  Referring Provider: Butch Joe (Last seen: 11/5/19  next appt: 6/9/20)  Goal INR: 2.0-3.0  Current Drug Interactions: , levothyroxine; omeprazole, azaTHIOprine, ezetimibe, allopurinol (10/20)  CHADS-VASc: 5 (age, gender, HTN, DM)  HAS-BLED: 3 (HTN, CKD, Age)     Diet: mostly avoids (10/19/20) meal replacement drinks ~3 times weekly 10/19/20  Alcohol: none  Tobacco: none   OTC Pain Medication: APAP PRN; took tylenol last 2 nights for pain from dialysis (03/26/20)    1st clinic: 9/14/17  2nd clinic: 6/26/18  3rd clinic: 11/5/19    INR History:  Date 1/10 1/14 1/23 1/28 1/31 2/4 2/11 2/18 2/24 3/3 3/10 3/16 3/19   Total Weekly Dose 28mg 32mg 30mg 26mg 26mg  24mg 26mg 26mg 24mg 26mg 27mg 25mg 29mg   INR 1.6 2.1 4.1 2.2 3.4 2.7 2.1 3.4 2.3 1.9 1.9 1.4 2.0   Notes augment  2x incr dose 1x decr keflex, pred keflex; pred; sick keflex; sick; dose decr x1 sick;   valtrex keflex   1x incr dose, incr GLV 1x dose decr; levaquin 1x incr dose      Date 3/23 3/26 4/2 4/9 4/16 4/23 5/1 5/8 5/14 5/22 5/27 6/4   Total WeeklyDose 29mg 29mg 30mg 30mg 30mg 32mg 30mg 32mg 28mg 32mg 32mg 30mg   INR 1.4 1.5 1.9 2.2 1.9 3.0 2.1 2.7 2.4 3.2 3.5 3.2   Notes 1x incr dose;  spinach 1x incr dose,  less GLV 1x boost    1x decr  dose trazodone 1x miss; trazodone 25mg QPM recv'd 5/26  1x incr dose     Date 6/10 6/17 6/24 7/2 7/8 7/15 7/22 7/29 8/5 8/12 8/17 8/24   Total WeeklyDose 28mg 28mg 28mg 28mg 28mg 28mg 30mg 26mg 28mg 28mg 32mg 34mg   INR 2.5 2.6 2.3 1.8 2.2 1.7 3.5 2.4 2.5 1.7 1.8 2.8   Notes    Inc GLV  Inc GLV 1x dose incr. x1 dose red  ensure       Date 8/31 9/9 9/14 9/21 9/28 10/5 10/13 10/19 10/27 11/3 11/10 11/17   Total WeeklyDose 32mg 32mg 26mg 30mg 26-28 mg? 28mg 28mg 28mg 28mg 26mg 30mg 28mg   INR 2.4 3.9 2.6 2.9 2.3 2.3 2.9 2.8 3.7 1.5 2.1 2.1   Notes       decr Ensure; allopurinol allopurinol;  less Ensure  recv'd 11/4; incr GLV 1x incr dose      Date 11/24 12/1 12/8            Total WeeklyDose 28mg 28mg 26mg            INR 2.1 3.8 3.0            Notes  decr GLV 1x decr dose              Phone Interview:  Verbal Release Authorization signed on 6/26/18 and again on 11/5/2019-- may speak with Alexy Wallace (son), Genesis Becerril (daughter), Sawyer or Gema Wallace (son and daughter-in-law), Gerry Wallace (son)  Tablet Strength: 2mg tablets  Patient Contact Info: preferred [phone] - home with carlito Cotto- cell [phone]; call if can't get in touch with home phone      Patient Findings  Negatives:  Signs/symptoms of thrombosis, Signs/symptoms of bleeding, Laboratory test error suspected, Change in health, Change in alcohol use, Change in activity, Upcoming invasive procedure, Emergency department visit, Upcoming dental procedure, Missed doses, Extra doses, Change in medications, Change in diet/appetite, Hospital admission, Bruising, Other complaints   Comments:  Alexy denies all findings for patient     Plan:  1. INR is therapeutic and back WNL today at 3.0, though this is ULN. Instructed son, Yadiel, to have Ms. Wallace continue usual GLV intake and then continue warfarin 4mg oral daily until recheck.    2. Repeat INR in 1 week, 12/15  3. Verbal information provided over the phone. Moni Wallace RBV dosing instructions, expresses understanding by teach back, and has no further questions at this time.    Sawyer Gamez, Valentin  12/8/2020  14:59 EST     Francoise HENRY, PharmD, have reviewed the note in full and agree with the assessment and plan.  12/08/20  15:31 EST

## 2020-12-08 NOTE — ASSESSMENT & PLAN NOTE
· No signs or symptoms of TIA or CVA  · Defer statin therapy due to intolerance  · No aspirin due to chronic anticoagulation with Coumadin

## 2020-12-08 NOTE — ASSESSMENT & PLAN NOTE
· No episodes of atrial fibrillation that she is aware of  · Continue Coumadin dosage adjustment per INR result with INR goal 2.0-3.0  · No signs or symptoms TIA or CVA

## 2020-12-08 NOTE — ASSESSMENT & PLAN NOTE
· Stable NYHA class II symptoms  · Continue Lasix 80 mg daily  · Continue metoprolol tartrate 50 mg twice daily

## 2020-12-14 RX ORDER — WARFARIN SODIUM 2 MG/1
TABLET ORAL
Qty: 180 TABLET | Refills: 0 | Status: SHIPPED | OUTPATIENT
Start: 2020-12-14 | End: 2021-01-01

## 2020-12-15 NOTE — PROGRESS NOTES
Anticoagulation Clinic - Remote Progress Note  [facility]  Testing Frequency: 7 days    Indication: paroxysmal afib  Referring Provider: Butch Joe (Last seen: 11/5/19  next appt: 6/9/20)  Goal INR: 2.0-3.0  Current Drug Interactions: , levothyroxine; omeprazole, azaTHIOprine, ezetimibe, allopurinol (10/20)  CHADS-VASc: 5 (age, gender, HTN, DM)  HAS-BLED: 3 (HTN, CKD, Age)     Diet: mostly avoids (10/19/20) meal replacement drinks ~3 times weekly 10/19/20  Alcohol: none  Tobacco: none   OTC Pain Medication: APAP PRN; took tylenol last 2 nights for pain from dialysis (03/26/20)    1st clinic: 9/14/17  2nd clinic: 6/26/18  3rd clinic: 11/5/19    INR History:  Date 1/10 1/14 1/23 1/28 1/31 2/4 2/11 2/18 2/24 3/3 3/10 3/16 3/19   Total Weekly Dose 28mg 32mg 30mg 26mg 26mg  24mg 26mg 26mg 24mg 26mg 27mg 25mg 29mg   INR 1.6 2.1 4.1 2.2 3.4 2.7 2.1 3.4 2.3 1.9 1.9 1.4 2.0   Notes augment  2x incr dose 1x decr keflex, pred keflex; pred; sick keflex; sick; dose decr x1 sick;   valtrex keflex   1x incr dose, incr GLV 1x dose decr; levaquin 1x incr dose      Date 3/23 3/26 4/2 4/9 4/16 4/23 5/1 5/8 5/14 5/22 5/27 6/4   Total WeeklyDose 29mg 29mg 30mg 30mg 30mg 32mg 30mg 32mg 28mg 32mg 32mg 30mg   INR 1.4 1.5 1.9 2.2 1.9 3.0 2.1 2.7 2.4 3.2 3.5 3.2   Notes 1x incr dose;  spinach 1x incr dose,  less GLV 1x boost    1x decr  dose trazodone 1x miss; trazodone 25mg QPM recv'd 5/26  1x incr dose     Date 6/10 6/17 6/24 7/2 7/8 7/15 7/22 7/29 8/5 8/12 8/17 8/24   Total WeeklyDose 28mg 28mg 28mg 28mg 28mg 28mg 30mg 26mg 28mg 28mg 32mg 34mg   INR 2.5 2.6 2.3 1.8 2.2 1.7 3.5 2.4 2.5 1.7 1.8 2.8   Notes    Inc GLV  Inc GLV 1x dose incr. x1 dose red  ensure       Date 8/31 9/9 9/14 9/21 9/28 10/5 10/13 10/19 10/27 11/3 11/10 11/17   Total WeeklyDose 32mg 32mg 26mg 30mg 26-28 mg? 28mg 28mg 28mg 28mg 26mg 30mg 28mg   INR 2.4 3.9 2.6 2.9 2.3 2.3 2.9 2.8 3.7 1.5 2.1 2.1   Notes       decr Ensure; allopurinol allopurinol;  less Ensure  recv'd 11/4; incr GLV 1x incr dose      Date 11/24 12/1 12/8  12/15           Total WeeklyDose 28mg 28mg 26mg 28 mg           INR 2.1 3.8 3.0  2.2           Notes  decr GLV 1x decr dose              Phone Interview:  Verbal Release Authorization signed on 6/26/18 and again on 11/5/2019-- may speak with Alexy Wallace (son), Genesis Becerril (daughter), Sawyer or Gema Wallace (son and daughter-in-law), Gerry Rodrigo (son)  Tablet Strength: 2mg tablets  Patient Contact Info: preferred [phone] - home with son Yadiel- cell [phone]; call if can't get in touch with home phone      Patient Findings  Positives:  Change in medications   Negatives:  Signs/symptoms of thrombosis, Signs/symptoms of bleeding, Laboratory test error suspected, Change in health, Change in alcohol use, Change in activity, Upcoming invasive procedure, Emergency department visit, Upcoming dental procedure, Missed doses, Extra doses, Change in diet/appetite, Hospital admission, Bruising, Other complaints   Comments:  Ms Wallace reported that she has started a new medication yesterday.  Cinacalcet 30 mg daily (no DDI noted)     Otherwise, above findings negative     Plan:    1. INR is therapeutic today at 2.2. Instructed Ms. Wallace to continue usual GLV intake and also continue warfarin 4mg oral daily until recheck.    2. Repeat INR in 1 week, 12/22  3. Verbal information provided over the phone. Moni Wallace RBV dosing instructions, expresses understanding by teach back, and has no further questions at this time.    Radha Michaud, PharmD  12/15/2020  13:52 EST

## 2020-12-23 NOTE — PROGRESS NOTES
Anticoagulation Clinic - Remote Progress Note  [facility]  Testing Frequency: 7 days    Indication: paroxysmal afib  Referring Provider: Butch Joe (Last seen: 11/5/19  next appt: 6/9/20)  Goal INR: 2.0-3.0  Current Drug Interactions: , levothyroxine; omeprazole, azaTHIOprine, ezetimibe, allopurinol (10/20)  CHADS-VASc: 5 (age, gender, HTN, DM)  HAS-BLED: 3 (HTN, CKD, Age)     Diet: mostly avoids (12/23/20) meal replacement drinks ~3 times weekly 12/23/20  Alcohol: none  Tobacco: none   OTC Pain Medication: APAP PRN; took tylenol last 2 nights for pain from dialysis (03/26/20)    1st clinic: 9/14/17  2nd clinic: 6/26/18  3rd clinic: 11/5/19    INR History:  Date 1/10 1/14 1/23 1/28 1/31 2/4 2/11 2/18 2/24 3/3 3/10 3/16 3/19   Total Weekly Dose 28mg 32mg 30mg 26mg 26mg  24mg 26mg 26mg 24mg 26mg 27mg 25mg 29mg   INR 1.6 2.1 4.1 2.2 3.4 2.7 2.1 3.4 2.3 1.9 1.9 1.4 2.0   Notes augment  2x incr dose 1x decr keflex, pred keflex; pred; sick keflex; sick; dose decr x1 sick;   valtrex keflex   1x incr dose, incr GLV 1x dose decr; levaquin 1x incr dose      Date 3/23 3/26 4/2 4/9 4/16 4/23 5/1 5/8 5/14 5/22 5/27 6/4   Total WeeklyDose 29mg 29mg 30mg 30mg 30mg 32mg 30mg 32mg 28mg 32mg 32mg 30mg   INR 1.4 1.5 1.9 2.2 1.9 3.0 2.1 2.7 2.4 3.2 3.5 3.2   Notes 1x incr dose;  spinach 1x incr dose,  less GLV 1x boost    1x decr  dose trazodone 1x miss; trazodone 25mg QPM recv'd 5/26  1x incr dose     Date 6/10 6/17 6/24 7/2 7/8 7/15 7/22 7/29 8/5 8/12 8/17 8/24   Total WeeklyDose 28mg 28mg 28mg 28mg 28mg 28mg 30mg 26mg 28mg 28mg 32mg 34mg   INR 2.5 2.6 2.3 1.8 2.2 1.7 3.5 2.4 2.5 1.7 1.8 2.8   Notes    Inc GLV  Inc GLV 1x dose incr. x1 dose red  ensure       Date 8/31 9/9 9/14 9/21 9/28 10/5 10/13 10/19 10/27 11/3 11/10 11/17   Total WeeklyDose 32mg 32mg 26mg 30mg 26-28 mg? 28mg 28mg 28mg 28mg 26mg 30mg 28mg   INR 2.4 3.9 2.6 2.9 2.3 2.3 2.9 2.8 3.7 1.5 2.1 2.1   Notes       decr Ensure; allopurinol allopurinol;  less Ensure  recv'd 11/4; incr GLV 1x incr dose      Date 11/24 12/1 12/8  12/15 12/22          Total WeeklyDose 28mg 28mg 26mg 28 mg 28 mg          INR 2.1 3.8 3.0  2.2 2.0          Notes  decr GLV 1x decr dose              Phone Interview:  Verbal Release Authorization signed on 6/26/18 and again on 11/5/2019-- may speak with Alexy Wallace (son), Genesis Becerril (daughter), Sawyer Wallace (son and daughter-in-law), Gerry Rodrigo (son)  Tablet Strength: 2mg tablets  Patient Contact Info: preferred [phone] - home with son Yadiel- cell [phone]; call if can't get in touch with home phone      Patient Findings:  Negatives:  Signs/symptoms of thrombosis, Signs/symptoms of bleeding, Laboratory test error suspected, Change in health, Change in alcohol use, Change in activity, Upcoming invasive procedure, Emergency department visit, Upcoming dental procedure, Missed doses, Extra doses, Change in medications, Change in diet/appetite, Hospital admission, Bruising, Other complaints     Plan:  1. INR is therapeutic today at [facility]. Instructed Ms. Wallace to continue maintenance dose of warfarin 4 mg daily until recheck.    2. Repeat INR in one week as required by [facility].  3. Verbal information provided over the phone. Moni Wallace RBV dosing instructions, expresses understanding by teach back, and has no further questions at this time.    Zina Aldrich CPhT  12/23/2020  08:12 Francoise CONNER, PharmD, have reviewed the note in full and agree with the assessment and plan.  12/23/20  16:12 EST

## 2020-12-31 NOTE — PROGRESS NOTES
Anticoagulation Clinic - Remote Progress Note  [facility]  Testing Frequency: 7 days    Indication: paroxysmal afib  Referring Provider: Butch Joe (Last seen: 11/5/19  next appt: 6/9/20)  Goal INR: 2.0-3.0  Current Drug Interactions: , levothyroxine; omeprazole, azaTHIOprine, ezetimibe, allopurinol (10/20)  CHADS-VASc: 5 (age, gender, HTN, DM)  HAS-BLED: 3 (HTN, CKD, Age)     Diet: mostly avoids (12/23/20) meal replacement drinks ~3 times weekly 12/23/20  Alcohol: none  Tobacco: none   OTC Pain Medication: APAP PRN; took tylenol last 2 nights for pain from dialysis (03/26/20)    1st clinic: 9/14/17  2nd clinic: 6/26/18  3rd clinic: 11/5/19    INR History:  Date 1/10 1/14 1/23 1/28 1/31 2/4 2/11 2/18 2/24 3/3 3/10 3/16 3/19   Total Weekly Dose 28mg 32mg 30mg 26mg 26mg  24mg 26mg 26mg 24mg 26mg 27mg 25mg 29mg   INR 1.6 2.1 4.1 2.2 3.4 2.7 2.1 3.4 2.3 1.9 1.9 1.4 2.0   Notes augment  2x incr dose 1x decr keflex, pred keflex; pred; sick keflex; sick; dose decr x1 sick;   valtrex keflex   1x incr dose, incr GLV 1x dose decr; levaquin 1x incr dose      Date 3/23 3/26 4/2 4/9 4/16 4/23 5/1 5/8 5/14 5/22 5/27 6/4   Total WeeklyDose 29mg 29mg 30mg 30mg 30mg 32mg 30mg 32mg 28mg 32mg 32mg 30mg   INR 1.4 1.5 1.9 2.2 1.9 3.0 2.1 2.7 2.4 3.2 3.5 3.2   Notes 1x incr dose;  spinach 1x incr dose,  less GLV 1x boost    1x decr  dose trazodone 1x miss; trazodone 25mg QPM recv'd 5/26  1x incr dose     Date 6/10 6/17 6/24 7/2 7/8 7/15 7/22 7/29 8/5 8/12 8/17 8/24   Total WeeklyDose 28mg 28mg 28mg 28mg 28mg 28mg 30mg 26mg 28mg 28mg 32mg 34mg   INR 2.5 2.6 2.3 1.8 2.2 1.7 3.5 2.4 2.5 1.7 1.8 2.8   Notes    Inc GLV  Inc GLV 1x dose incr. x1 dose red  ensure       Date 8/31 9/9 9/14 9/21 9/28 10/5 10/13 10/19 10/27 11/3 11/10 11/17   Total WeeklyDose 32mg 32mg 26mg 30mg 26-28 mg? 28mg 28mg 28mg 28mg 26mg 30mg 28mg   INR 2.4 3.9 2.6 2.9 2.3 2.3 2.9 2.8 3.7 1.5 2.1 2.1   Notes       decr Ensure; allopurinol allopurinol;  less Ensure  recv'd 11/4; incr GLV 1x incr dose      Date 11/24 12/1 12/8  12/15 12/22 12/31         Total WeeklyDose 28mg 28mg 26mg 28 mg 28 mg 24mg         INR 2.1 3.8 3.0  2.2 2.0 1.5         Notes  decr GLV 1x decr dose   1x miss           Phone Interview:  Verbal Release Authorization signed on 6/26/18 and again on 11/5/2019-- may speak with Alexy Wallace (son), Genesis Becerril (daughter), Sawyer or Gema Wallace (son and daughter-in-law), Gerry Rodrigo (son)  Tablet Strength: 2mg tablets  Patient Contact Info: preferred [phone] - home with son Yadiel- cell [phone]; call if can't get in touch with home phone      Patient Findings:  Positives:  Missed doses   Negatives:  Signs/symptoms of thrombosis, Signs/symptoms of bleeding, Laboratory test error suspected, Change in health, Change in alcohol use, Change in activity, Upcoming invasive procedure, Emergency department visit, Upcoming dental procedure, Extra doses, Change in medications, Change in diet/appetite, Hospital admission, Bruising, Other complaints   Comments:  Report she missed all her medications one night this past week; unsure which day         Plan:  1. INR is SUBtherapeutic today at 1.5 following missed dose. Instructed Ms. Wallace to BOOST tonight's dose to 6mg then continue maintenance dose of warfarin 4 mg daily until recheck.    2. Repeat INR in one week as required by [facility].  3. Verbal information provided over the phone. Moni Wallace RBV dosing instructions, expresses understanding by teach back, and has no further questions at this time.    Francoise Wu, KwadwoD.  12/31/20   10:39 EST

## 2021-01-01 ENCOUNTER — ANTICOAGULATION VISIT (OUTPATIENT)
Dept: PHARMACY | Facility: HOSPITAL | Age: 81
End: 2021-01-01

## 2021-01-01 ENCOUNTER — TELEPHONE (OUTPATIENT)
Dept: INTERNAL MEDICINE | Facility: CLINIC | Age: 81
End: 2021-01-01

## 2021-01-01 ENCOUNTER — OFFICE VISIT (OUTPATIENT)
Dept: INTERNAL MEDICINE | Facility: CLINIC | Age: 81
End: 2021-01-01

## 2021-01-01 ENCOUNTER — APPOINTMENT (OUTPATIENT)
Dept: GENERAL RADIOLOGY | Facility: HOSPITAL | Age: 81
End: 2021-01-01

## 2021-01-01 ENCOUNTER — HOSPITAL ENCOUNTER (INPATIENT)
Facility: HOSPITAL | Age: 81
LOS: 4 days | Discharge: HOME | End: 2021-01-15
Attending: EMERGENCY MEDICINE | Admitting: INTERNAL MEDICINE

## 2021-01-01 ENCOUNTER — HOSPITAL ENCOUNTER (INPATIENT)
Facility: HOSPITAL | Age: 81
LOS: 1 days | End: 2021-12-15
Attending: INTERNAL MEDICINE | Admitting: INTERNAL MEDICINE

## 2021-01-01 ENCOUNTER — CLINICAL SUPPORT (OUTPATIENT)
Dept: INTERNAL MEDICINE | Facility: CLINIC | Age: 81
End: 2021-01-01

## 2021-01-01 ENCOUNTER — APPOINTMENT (OUTPATIENT)
Dept: CARDIOLOGY | Facility: HOSPITAL | Age: 81
End: 2021-01-01

## 2021-01-01 ENCOUNTER — READMISSION MANAGEMENT (OUTPATIENT)
Dept: CALL CENTER | Facility: HOSPITAL | Age: 81
End: 2021-01-01

## 2021-01-01 ENCOUNTER — TRANSITIONAL CARE MANAGEMENT TELEPHONE ENCOUNTER (OUTPATIENT)
Dept: CALL CENTER | Facility: HOSPITAL | Age: 81
End: 2021-01-01

## 2021-01-01 ENCOUNTER — OFFICE VISIT (OUTPATIENT)
Dept: CARDIOLOGY | Facility: HOSPITAL | Age: 81
End: 2021-01-01

## 2021-01-01 ENCOUNTER — ANESTHESIA (OUTPATIENT)
Dept: PERIOP | Facility: HOSPITAL | Age: 81
End: 2021-01-01

## 2021-01-01 ENCOUNTER — ANESTHESIA EVENT CONVERTED (OUTPATIENT)
Dept: ANESTHESIOLOGY | Facility: HOSPITAL | Age: 81
End: 2021-01-01

## 2021-01-01 ENCOUNTER — APPOINTMENT (OUTPATIENT)
Dept: CT IMAGING | Facility: HOSPITAL | Age: 81
End: 2021-01-01

## 2021-01-01 ENCOUNTER — LAB (OUTPATIENT)
Dept: LAB | Facility: HOSPITAL | Age: 81
End: 2021-01-01

## 2021-01-01 ENCOUNTER — TELEPHONE (OUTPATIENT)
Dept: PHARMACY | Facility: HOSPITAL | Age: 81
End: 2021-01-01

## 2021-01-01 ENCOUNTER — HOSPITAL ENCOUNTER (OUTPATIENT)
Dept: CARDIOLOGY | Facility: HOSPITAL | Age: 81
Discharge: HOME | End: 2021-10-11

## 2021-01-01 ENCOUNTER — PATIENT OUTREACH (OUTPATIENT)
Dept: CASE MANAGEMENT | Facility: OTHER | Age: 81
End: 2021-01-01

## 2021-01-01 ENCOUNTER — HOSPITAL ENCOUNTER (EMERGENCY)
Facility: HOSPITAL | Age: 81
Discharge: HOME | End: 2021-09-19
Attending: EMERGENCY MEDICINE | Admitting: EMERGENCY MEDICINE

## 2021-01-01 ENCOUNTER — HOSPITAL ENCOUNTER (INPATIENT)
Facility: HOSPITAL | Age: 81
LOS: 16 days | End: 2021-12-14
Attending: EMERGENCY MEDICINE | Admitting: INTERNAL MEDICINE

## 2021-01-01 ENCOUNTER — ANESTHESIA EVENT (OUTPATIENT)
Dept: PERIOP | Facility: HOSPITAL | Age: 81
End: 2021-01-01

## 2021-01-01 ENCOUNTER — APPOINTMENT (OUTPATIENT)
Dept: NEPHROLOGY | Facility: HOSPITAL | Age: 81
End: 2021-01-01

## 2021-01-01 ENCOUNTER — PRIOR AUTHORIZATION (OUTPATIENT)
Dept: INTERNAL MEDICINE | Facility: CLINIC | Age: 81
End: 2021-01-01

## 2021-01-01 ENCOUNTER — HOSPITAL ENCOUNTER (EMERGENCY)
Facility: HOSPITAL | Age: 81
Discharge: HOME | End: 2021-09-17
Attending: EMERGENCY MEDICINE | Admitting: EMERGENCY MEDICINE

## 2021-01-01 ENCOUNTER — HOSPITAL ENCOUNTER (INPATIENT)
Facility: HOSPITAL | Age: 81
LOS: 5 days | Discharge: HOME | End: 2021-07-12
Attending: EMERGENCY MEDICINE | Admitting: INTERNAL MEDICINE

## 2021-01-01 VITALS
SYSTOLIC BLOOD PRESSURE: 126 MMHG | DIASTOLIC BLOOD PRESSURE: 64 MMHG | BODY MASS INDEX: 34.95 KG/M2 | TEMPERATURE: 96.8 F | HEART RATE: 72 BPM | HEIGHT: 65 IN

## 2021-01-01 VITALS
TEMPERATURE: 96.3 F | WEIGHT: 166.19 LBS | OXYGEN SATURATION: 98 % | HEIGHT: 64 IN | RESPIRATION RATE: 20 BRPM | DIASTOLIC BLOOD PRESSURE: 52 MMHG | BODY MASS INDEX: 28.37 KG/M2 | HEART RATE: 84 BPM | SYSTOLIC BLOOD PRESSURE: 99 MMHG

## 2021-01-01 VITALS
OXYGEN SATURATION: 96 % | BODY MASS INDEX: 31.58 KG/M2 | TEMPERATURE: 98.1 F | WEIGHT: 185 LBS | SYSTOLIC BLOOD PRESSURE: 158 MMHG | DIASTOLIC BLOOD PRESSURE: 85 MMHG | HEART RATE: 76 BPM | RESPIRATION RATE: 18 BRPM | HEIGHT: 64 IN

## 2021-01-01 VITALS
DIASTOLIC BLOOD PRESSURE: 58 MMHG | HEIGHT: 64 IN | HEART RATE: 97 BPM | TEMPERATURE: 98.9 F | OXYGEN SATURATION: 99 % | RESPIRATION RATE: 16 BRPM | SYSTOLIC BLOOD PRESSURE: 88 MMHG | WEIGHT: 200.2 LBS | BODY MASS INDEX: 34.18 KG/M2

## 2021-01-01 VITALS
HEIGHT: 64 IN | DIASTOLIC BLOOD PRESSURE: 61 MMHG | RESPIRATION RATE: 18 BRPM | SYSTOLIC BLOOD PRESSURE: 135 MMHG | OXYGEN SATURATION: 94 % | TEMPERATURE: 98 F | BODY MASS INDEX: 34.15 KG/M2 | HEART RATE: 65 BPM | WEIGHT: 200 LBS

## 2021-01-01 VITALS
DIASTOLIC BLOOD PRESSURE: 59 MMHG | HEIGHT: 64 IN | WEIGHT: 210 LBS | BODY MASS INDEX: 35.85 KG/M2 | HEART RATE: 64 BPM | OXYGEN SATURATION: 93 % | RESPIRATION RATE: 18 BRPM | SYSTOLIC BLOOD PRESSURE: 122 MMHG | TEMPERATURE: 98.6 F

## 2021-01-01 VITALS
DIASTOLIC BLOOD PRESSURE: 72 MMHG | HEART RATE: 86 BPM | BODY MASS INDEX: 33.18 KG/M2 | SYSTOLIC BLOOD PRESSURE: 124 MMHG | OXYGEN SATURATION: 98 % | HEIGHT: 65 IN | TEMPERATURE: 96.8 F

## 2021-01-01 VITALS
SYSTOLIC BLOOD PRESSURE: 164 MMHG | DIASTOLIC BLOOD PRESSURE: 78 MMHG | BODY MASS INDEX: 31.58 KG/M2 | OXYGEN SATURATION: 97 % | HEIGHT: 64 IN | HEART RATE: 59 BPM | WEIGHT: 185 LBS | RESPIRATION RATE: 18 BRPM | TEMPERATURE: 97.7 F

## 2021-01-01 VITALS
HEART RATE: 57 BPM | OXYGEN SATURATION: 98 % | HEIGHT: 64 IN | TEMPERATURE: 97.9 F | BODY MASS INDEX: 34.33 KG/M2 | DIASTOLIC BLOOD PRESSURE: 70 MMHG | SYSTOLIC BLOOD PRESSURE: 120 MMHG

## 2021-01-01 VITALS — HEART RATE: 107 BPM

## 2021-01-01 DIAGNOSIS — I48.0 PAROXYSMAL ATRIAL FIBRILLATION (HCC): Primary | ICD-10-CM

## 2021-01-01 DIAGNOSIS — E87.6 HYPOKALEMIA: ICD-10-CM

## 2021-01-01 DIAGNOSIS — E11.69 TYPE 2 DIABETES MELLITUS WITH OTHER SPECIFIED COMPLICATION, WITHOUT LONG-TERM CURRENT USE OF INSULIN (HCC): ICD-10-CM

## 2021-01-01 DIAGNOSIS — I48.0 PAF (PAROXYSMAL ATRIAL FIBRILLATION) (HCC): ICD-10-CM

## 2021-01-01 DIAGNOSIS — K86.2 PANCREATIC CYST: ICD-10-CM

## 2021-01-01 DIAGNOSIS — E03.9 HYPOTHYROIDISM, UNSPECIFIED TYPE: ICD-10-CM

## 2021-01-01 DIAGNOSIS — Z99.2 PERITONEAL DIALYSIS STATUS (HCC): ICD-10-CM

## 2021-01-01 DIAGNOSIS — K59.9 BOWEL DYSFUNCTION: ICD-10-CM

## 2021-01-01 DIAGNOSIS — I10 ESSENTIAL HYPERTENSION: ICD-10-CM

## 2021-01-01 DIAGNOSIS — E55.9 VITAMIN D DEFICIENCY: ICD-10-CM

## 2021-01-01 DIAGNOSIS — A41.9 SEPSIS, DUE TO UNSPECIFIED ORGANISM, UNSPECIFIED WHETHER ACUTE ORGAN DYSFUNCTION PRESENT (HCC): ICD-10-CM

## 2021-01-01 DIAGNOSIS — I48.0 PAROXYSMAL ATRIAL FIBRILLATION (HCC): ICD-10-CM

## 2021-01-01 DIAGNOSIS — R10.9 ACUTE ABDOMINAL PAIN: Primary | ICD-10-CM

## 2021-01-01 DIAGNOSIS — N30.00 ACUTE CYSTITIS WITHOUT HEMATURIA: ICD-10-CM

## 2021-01-01 DIAGNOSIS — I48.91 ATRIAL FIBRILLATION, UNSPECIFIED TYPE (HCC): Primary | ICD-10-CM

## 2021-01-01 DIAGNOSIS — M10.9 ACUTE GOUT OF LEFT WRIST, UNSPECIFIED CAUSE: Primary | ICD-10-CM

## 2021-01-01 DIAGNOSIS — E78.5 HYPERLIPIDEMIA LDL GOAL <100: Primary | ICD-10-CM

## 2021-01-01 DIAGNOSIS — Z99.2 STAGE 5 CHRONIC KIDNEY DISEASE ON CHRONIC DIALYSIS (HCC): ICD-10-CM

## 2021-01-01 DIAGNOSIS — N18.6 ESRD ON PERITONEAL DIALYSIS (HCC): ICD-10-CM

## 2021-01-01 DIAGNOSIS — R53.83 FATIGUE, UNSPECIFIED TYPE: ICD-10-CM

## 2021-01-01 DIAGNOSIS — Z09 HOSPITAL DISCHARGE FOLLOW-UP: Primary | ICD-10-CM

## 2021-01-01 DIAGNOSIS — N18.9 CHRONIC RENAL FAILURE, UNSPECIFIED CKD STAGE: ICD-10-CM

## 2021-01-01 DIAGNOSIS — A41.9 SEPSIS WITHOUT ACUTE ORGAN DYSFUNCTION, DUE TO UNSPECIFIED ORGANISM (HCC): ICD-10-CM

## 2021-01-01 DIAGNOSIS — D63.1 ANEMIA OF RENAL DISEASE: ICD-10-CM

## 2021-01-01 DIAGNOSIS — J18.9 PNEUMONIA OF BOTH LOWER LOBES DUE TO INFECTIOUS ORGANISM: Primary | ICD-10-CM

## 2021-01-01 DIAGNOSIS — R11.0 NAUSEA: ICD-10-CM

## 2021-01-01 DIAGNOSIS — I50.83 HIGH OUTPUT HF (HEART FAILURE) (HCC): ICD-10-CM

## 2021-01-01 DIAGNOSIS — T50.905A ADVERSE EFFECT OF DRUG, INITIAL ENCOUNTER: ICD-10-CM

## 2021-01-01 DIAGNOSIS — Z79.01 CHRONIC ANTICOAGULATION: ICD-10-CM

## 2021-01-01 DIAGNOSIS — Z87.39 HISTORY OF GOUT: ICD-10-CM

## 2021-01-01 DIAGNOSIS — N18.4 CHRONIC KIDNEY DISEASE, STAGE IV (SEVERE) (HCC): ICD-10-CM

## 2021-01-01 DIAGNOSIS — N39.0 URINARY TRACT INFECTION WITHOUT HEMATURIA, SITE UNSPECIFIED: ICD-10-CM

## 2021-01-01 DIAGNOSIS — Z99.2 ESRD ON PERITONEAL DIALYSIS (HCC): ICD-10-CM

## 2021-01-01 DIAGNOSIS — D36.9 TUBULAR ADENOMA: ICD-10-CM

## 2021-01-01 DIAGNOSIS — T85.71XA PERITONITIS ASSOCIATED WITH PERITONEAL DIALYSIS, INITIAL ENCOUNTER (HCC): ICD-10-CM

## 2021-01-01 DIAGNOSIS — N18.6 STAGE 5 CHRONIC KIDNEY DISEASE ON CHRONIC DIALYSIS (HCC): ICD-10-CM

## 2021-01-01 DIAGNOSIS — D72.829 LEUKOCYTOSIS, UNSPECIFIED TYPE: ICD-10-CM

## 2021-01-01 DIAGNOSIS — F43.21 SITUATIONAL DEPRESSION: ICD-10-CM

## 2021-01-01 DIAGNOSIS — N18.9 ANEMIA OF RENAL DISEASE: ICD-10-CM

## 2021-01-01 DIAGNOSIS — N30.01 ACUTE CYSTITIS WITH HEMATURIA: ICD-10-CM

## 2021-01-01 DIAGNOSIS — N30.01 ACUTE CYSTITIS WITH HEMATURIA: Primary | ICD-10-CM

## 2021-01-01 DIAGNOSIS — R00.2 PALPITATIONS: Primary | ICD-10-CM

## 2021-01-01 LAB
25(OH)D3 SERPL-MCNC: 17.8 NG/ML (ref 30–100)
ABO GROUP BLD: NORMAL
ABO GROUP BLD: NORMAL
ALBUMIN SERPL-MCNC: 2.3 G/DL (ref 3.5–5.2)
ALBUMIN SERPL-MCNC: 2.4 G/DL (ref 3.5–5.2)
ALBUMIN SERPL-MCNC: 2.4 G/DL (ref 3.5–5.2)
ALBUMIN SERPL-MCNC: 2.5 G/DL (ref 3.5–5.2)
ALBUMIN SERPL-MCNC: 2.5 G/DL (ref 3.5–5.2)
ALBUMIN SERPL-MCNC: 2.6 G/DL (ref 3.5–5.2)
ALBUMIN SERPL-MCNC: 2.8 G/DL (ref 3.5–5.2)
ALBUMIN SERPL-MCNC: 2.9 G/DL (ref 3.5–5.2)
ALBUMIN SERPL-MCNC: 3 G/DL (ref 3.5–5.2)
ALBUMIN SERPL-MCNC: 3 G/DL (ref 3.5–5.2)
ALBUMIN SERPL-MCNC: 3.2 G/DL (ref 3.5–5.2)
ALBUMIN SERPL-MCNC: 3.3 G/DL (ref 3.5–5.2)
ALBUMIN SERPL-MCNC: 3.4 G/DL (ref 3.5–5.2)
ALBUMIN SERPL-MCNC: 3.7 G/DL (ref 3.5–5.2)
ALBUMIN SERPL-MCNC: 3.8 G/DL (ref 3.5–5.2)
ALBUMIN SERPL-MCNC: 4.2 G/DL (ref 3.5–5.2)
ALBUMIN/GLOB SERPL: 0.6 G/DL
ALBUMIN/GLOB SERPL: 0.7 G/DL
ALBUMIN/GLOB SERPL: 0.8 G/DL
ALBUMIN/GLOB SERPL: 1.2 G/DL
ALBUMIN/GLOB SERPL: 1.3 G/DL
ALBUMIN/GLOB SERPL: 1.4 G/DL
ALBUMIN/GLOB SERPL: 1.7 G/DL
ALBUMIN/GLOB SERPL: 1.7 G/DL
ALP SERPL-CCNC: 108 U/L (ref 39–117)
ALP SERPL-CCNC: 109 U/L (ref 39–117)
ALP SERPL-CCNC: 117 U/L (ref 39–117)
ALP SERPL-CCNC: 135 U/L (ref 39–117)
ALP SERPL-CCNC: 136 U/L (ref 39–117)
ALP SERPL-CCNC: 136 U/L (ref 39–117)
ALP SERPL-CCNC: 144 U/L (ref 39–117)
ALP SERPL-CCNC: 167 U/L (ref 39–117)
ALP SERPL-CCNC: 172 U/L (ref 39–117)
ALP SERPL-CCNC: 173 U/L (ref 39–117)
ALP SERPL-CCNC: 201 U/L (ref 39–117)
ALP SERPL-CCNC: 204 U/L (ref 39–117)
ALP SERPL-CCNC: 214 U/L (ref 39–117)
ALP SERPL-CCNC: 223 U/L (ref 39–117)
ALT SERPL W P-5'-P-CCNC: 10 U/L (ref 1–33)
ALT SERPL W P-5'-P-CCNC: 11 U/L (ref 1–33)
ALT SERPL W P-5'-P-CCNC: 11 U/L (ref 1–33)
ALT SERPL W P-5'-P-CCNC: 12 U/L (ref 1–33)
ALT SERPL W P-5'-P-CCNC: 13 U/L (ref 1–33)
ALT SERPL W P-5'-P-CCNC: 17 U/L (ref 1–33)
ALT SERPL W P-5'-P-CCNC: 18 U/L (ref 1–33)
ALT SERPL W P-5'-P-CCNC: 30 U/L (ref 1–33)
ALT SERPL W P-5'-P-CCNC: 7 U/L (ref 1–33)
ALT SERPL W P-5'-P-CCNC: <5 U/L (ref 1–33)
ANION GAP SERPL CALCULATED.3IONS-SCNC: 10 MMOL/L (ref 5–15)
ANION GAP SERPL CALCULATED.3IONS-SCNC: 12 MMOL/L (ref 5–15)
ANION GAP SERPL CALCULATED.3IONS-SCNC: 13 MMOL/L (ref 5–15)
ANION GAP SERPL CALCULATED.3IONS-SCNC: 14 MMOL/L (ref 5–15)
ANION GAP SERPL CALCULATED.3IONS-SCNC: 16 MMOL/L (ref 5–15)
ANION GAP SERPL CALCULATED.3IONS-SCNC: 17 MMOL/L (ref 5–15)
ANION GAP SERPL CALCULATED.3IONS-SCNC: 18 MMOL/L (ref 5–15)
ANION GAP SERPL CALCULATED.3IONS-SCNC: 19 MMOL/L (ref 5–15)
ANION GAP SERPL CALCULATED.3IONS-SCNC: 21 MMOL/L (ref 5–15)
ANION GAP SERPL CALCULATED.3IONS-SCNC: 22 MMOL/L (ref 5–15)
ANION GAP SERPL CALCULATED.3IONS-SCNC: 22 MMOL/L (ref 5–15)
ANION GAP SERPL CALCULATED.3IONS-SCNC: 23 MMOL/L (ref 5–15)
ANION GAP SERPL CALCULATED.3IONS-SCNC: 25 MMOL/L (ref 5–15)
APPEARANCE FLD: ABNORMAL
APPEARANCE FLD: ABNORMAL
APPEARANCE FLD: CLEAR
APPEARANCE FLD: CLEAR
ASCENDING AORTA: 3.5 CM
AST SERPL-CCNC: 13 U/L (ref 1–32)
AST SERPL-CCNC: 13 U/L (ref 1–32)
AST SERPL-CCNC: 17 U/L (ref 1–32)
AST SERPL-CCNC: 18 U/L (ref 1–32)
AST SERPL-CCNC: 19 U/L (ref 1–32)
AST SERPL-CCNC: 20 U/L (ref 1–32)
AST SERPL-CCNC: 20 U/L (ref 1–32)
AST SERPL-CCNC: 21 U/L (ref 1–32)
AST SERPL-CCNC: 21 U/L (ref 1–32)
AST SERPL-CCNC: 23 U/L (ref 1–32)
AST SERPL-CCNC: 23 U/L (ref 1–32)
AST SERPL-CCNC: 25 U/L (ref 1–32)
AST SERPL-CCNC: 30 U/L (ref 1–32)
AST SERPL-CCNC: 50 U/L (ref 1–32)
AV VENA CONTRACTA: 0.6 CM
B PARAPERT DNA SPEC QL NAA+PROBE: NOT DETECTED
B PERT DNA SPEC QL NAA+PROBE: NOT DETECTED
BACTERIA BLD CULT: ABNORMAL
BACTERIA FLD CULT: NORMAL
BACTERIA SPEC AEROBE CULT: ABNORMAL
BACTERIA SPEC AEROBE CULT: NO GROWTH
BACTERIA SPEC AEROBE CULT: NO GROWTH
BACTERIA SPEC AEROBE CULT: NORMAL
BACTERIA UR QL AUTO: ABNORMAL /HPF
BASOPHILS # BLD AUTO: 0.03 10*3/MM3 (ref 0–0.2)
BASOPHILS # BLD AUTO: 0.04 10*3/MM3 (ref 0–0.2)
BASOPHILS # BLD AUTO: 0.04 10*3/MM3 (ref 0–0.2)
BASOPHILS # BLD AUTO: 0.05 10*3/MM3 (ref 0–0.2)
BASOPHILS # BLD AUTO: 0.06 10*3/MM3 (ref 0–0.2)
BASOPHILS # BLD AUTO: 0.06 10*3/MM3 (ref 0–0.2)
BASOPHILS # BLD AUTO: 0.07 10*3/MM3 (ref 0–0.2)
BASOPHILS # BLD AUTO: 0.08 10*3/MM3 (ref 0–0.2)
BASOPHILS # BLD MANUAL: 0 10*3/MM3 (ref 0–0.2)
BASOPHILS NFR BLD AUTO: 0.2 % (ref 0–1.5)
BASOPHILS NFR BLD AUTO: 0.3 % (ref 0–1.5)
BASOPHILS NFR BLD AUTO: 0.4 % (ref 0–1.5)
BASOPHILS NFR BLD AUTO: 0.4 % (ref 0–1.5)
BASOPHILS NFR BLD AUTO: 0.5 % (ref 0–1.5)
BASOPHILS NFR BLD AUTO: 0.5 % (ref 0–1.5)
BASOPHILS NFR BLD MANUAL: 0 % (ref 0–1.5)
BH BB BLOOD EXPIRATION DATE: NORMAL
BH BB BLOOD TYPE BARCODE: 5100
BH BB BLOOD TYPE BARCODE: 5100
BH BB BLOOD TYPE BARCODE: 9500
BH BB DISPENSE STATUS: NORMAL
BH BB PRODUCT CODE: NORMAL
BH BB UNIT NUMBER: NORMAL
BH CV ECHO MEAS - AI DEC SLOPE: 332.2 CM/SEC^2
BH CV ECHO MEAS - AI MAX PG: 64.3 MMHG
BH CV ECHO MEAS - AI MAX VEL: 401 CM/SEC
BH CV ECHO MEAS - AI P1/2T: 353.5 MSEC
BH CV ECHO MEAS - AO MAX PG (FULL): 27.4 MMHG
BH CV ECHO MEAS - AO MAX PG: 30 MMHG
BH CV ECHO MEAS - AO MEAN PG (FULL): 15.6 MMHG
BH CV ECHO MEAS - AO MEAN PG: 17.2 MMHG
BH CV ECHO MEAS - AO ROOT AREA (BSA CORRECTED): 1.6
BH CV ECHO MEAS - AO ROOT AREA: 8.2 CM^2
BH CV ECHO MEAS - AO ROOT DIAM: 3.2 CM
BH CV ECHO MEAS - AO V2 MAX: 272.8 CM/SEC
BH CV ECHO MEAS - AO V2 MEAN: 196.1 CM/SEC
BH CV ECHO MEAS - AO V2 VTI: 68.4 CM
BH CV ECHO MEAS - ASC AORTA: 3.5 CM
BH CV ECHO MEAS - AVA(I,A): 1 CM^2
BH CV ECHO MEAS - AVA(I,D): 1 CM^2
BH CV ECHO MEAS - AVA(V,A): 0.96 CM^2
BH CV ECHO MEAS - AVA(V,D): 0.96 CM^2
BH CV ECHO MEAS - BSA(HAYCOCK): 2.1 M^2
BH CV ECHO MEAS - BSA: 2 M^2
BH CV ECHO MEAS - BZI_BMI: 35.7 KILOGRAMS/M^2
BH CV ECHO MEAS - BZI_METRIC_HEIGHT: 162.6 CM
BH CV ECHO MEAS - BZI_METRIC_WEIGHT: 94.3 KG
BH CV ECHO MEAS - EDV(CUBED): 183.9 ML
BH CV ECHO MEAS - EDV(MOD-SP2): 59.4 ML
BH CV ECHO MEAS - EDV(MOD-SP4): 61.3 ML
BH CV ECHO MEAS - EDV(TEICH): 159.2 ML
BH CV ECHO MEAS - EF(CUBED): 71.3 %
BH CV ECHO MEAS - EF(MOD-BP): 58.4 %
BH CV ECHO MEAS - EF(MOD-SP2): 54.5 %
BH CV ECHO MEAS - EF(MOD-SP4): 65.1 %
BH CV ECHO MEAS - EF(TEICH): 62.2 %
BH CV ECHO MEAS - ESV(CUBED): 52.8 ML
BH CV ECHO MEAS - ESV(MOD-SP2): 27 ML
BH CV ECHO MEAS - ESV(MOD-SP4): 21.4 ML
BH CV ECHO MEAS - ESV(TEICH): 60.1 ML
BH CV ECHO MEAS - FS: 34 %
BH CV ECHO MEAS - IVS/LVPW: 0.7
BH CV ECHO MEAS - IVSD: 0.91 CM
BH CV ECHO MEAS - LA DIMENSION: 3 CM
BH CV ECHO MEAS - LA/AO: 0.91
BH CV ECHO MEAS - LAD MAJOR: 5.3 CM
BH CV ECHO MEAS - LAT PEAK E' VEL: 6.4 CM/SEC
BH CV ECHO MEAS - LATERAL E/E' RATIO: 15.9
BH CV ECHO MEAS - LV DIASTOLIC VOL/BSA (35-75): 30.8 ML/M^2
BH CV ECHO MEAS - LV IVRT: 0.08 SEC
BH CV ECHO MEAS - LV MASS(C)D: 258 GRAMS
BH CV ECHO MEAS - LV MASS(C)DI: 129.7 GRAMS/M^2
BH CV ECHO MEAS - LV MAX PG: 2.6 MMHG
BH CV ECHO MEAS - LV MEAN PG: 1.6 MMHG
BH CV ECHO MEAS - LV SYSTOLIC VOL/BSA (12-30): 10.8 ML/M^2
BH CV ECHO MEAS - LV V1 MAX: 80.6 CM/SEC
BH CV ECHO MEAS - LV V1 MEAN: 59.6 CM/SEC
BH CV ECHO MEAS - LV V1 VTI: 21.2 CM
BH CV ECHO MEAS - LVIDD: 5.7 CM
BH CV ECHO MEAS - LVIDS: 3.8 CM
BH CV ECHO MEAS - LVLD AP2: 6.8 CM
BH CV ECHO MEAS - LVLD AP4: 6.6 CM
BH CV ECHO MEAS - LVLS AP2: 6.5 CM
BH CV ECHO MEAS - LVLS AP4: 5.6 CM
BH CV ECHO MEAS - LVOT AREA (M): 3.1 CM^2
BH CV ECHO MEAS - LVOT AREA: 3.2 CM^2
BH CV ECHO MEAS - LVOT DIAM: 2 CM
BH CV ECHO MEAS - LVPWD: 1.3 CM
BH CV ECHO MEAS - MED PEAK E' VEL: 6 CM/SEC
BH CV ECHO MEAS - MEDIAL E/E' RATIO: 17.1
BH CV ECHO MEAS - MV A MAX VEL: 128.5 CM/SEC
BH CV ECHO MEAS - MV DEC SLOPE: 446.9 CM/SEC^2
BH CV ECHO MEAS - MV DEC TIME: 0.19 SEC
BH CV ECHO MEAS - MV E MAX VEL: 102 CM/SEC
BH CV ECHO MEAS - MV E/A: 0.79
BH CV ECHO MEAS - MV MAX PG: 6.8 MMHG
BH CV ECHO MEAS - MV MEAN PG: 2.9 MMHG
BH CV ECHO MEAS - MV P1/2T MAX VEL: 105.5 CM/SEC
BH CV ECHO MEAS - MV P1/2T: 69.2 MSEC
BH CV ECHO MEAS - MV V2 MAX: 130.7 CM/SEC
BH CV ECHO MEAS - MV V2 MEAN: 80.2 CM/SEC
BH CV ECHO MEAS - MV V2 VTI: 31.6 CM
BH CV ECHO MEAS - MVA P1/2T LCG: 2.1 CM^2
BH CV ECHO MEAS - MVA(P1/2T): 3.2 CM^2
BH CV ECHO MEAS - MVA(VTI): 2.2 CM^2
BH CV ECHO MEAS - PA ACC TIME: 0.15 SEC
BH CV ECHO MEAS - PA MAX PG: 4 MMHG
BH CV ECHO MEAS - PA PR(ACCEL): 9.3 MMHG
BH CV ECHO MEAS - PA V2 MAX: 100.2 CM/SEC
BH CV ECHO MEAS - RAP SYSTOLE: 3 MMHG
BH CV ECHO MEAS - RVSP: 30.5 MMHG
BH CV ECHO MEAS - SI(AO): 282.8 ML/M^2
BH CV ECHO MEAS - SI(CUBED): 65.9 ML/M^2
BH CV ECHO MEAS - SI(LVOT): 34.5 ML/M^2
BH CV ECHO MEAS - SI(MOD-SP2): 16.3 ML/M^2
BH CV ECHO MEAS - SI(MOD-SP4): 20.1 ML/M^2
BH CV ECHO MEAS - SI(TEICH): 49.8 ML/M^2
BH CV ECHO MEAS - SV(AO): 562.4 ML
BH CV ECHO MEAS - SV(CUBED): 131 ML
BH CV ECHO MEAS - SV(LVOT): 68.6 ML
BH CV ECHO MEAS - SV(MOD-SP2): 32.4 ML
BH CV ECHO MEAS - SV(MOD-SP4): 39.9 ML
BH CV ECHO MEAS - SV(TEICH): 99.1 ML
BH CV ECHO MEAS - TAPSE (>1.6): 1.6 CM
BH CV ECHO MEAS - TR MAX PG: 27.5 MMHG
BH CV ECHO MEAS - TR MAX VEL: 262 CM/SEC
BH CV ECHO MEASUREMENTS AVERAGE E/E' RATIO: 16.45
BH CV UPPER VENOUS LEFT AXILLARY AUGMENT: NORMAL
BH CV UPPER VENOUS LEFT AXILLARY COMPETENT: NORMAL
BH CV UPPER VENOUS LEFT AXILLARY COMPRESS: NORMAL
BH CV UPPER VENOUS LEFT AXILLARY PHASIC: NORMAL
BH CV UPPER VENOUS LEFT AXILLARY SPONT: NORMAL
BH CV UPPER VENOUS LEFT BASILIC UPPER COMPRESS: NORMAL
BH CV UPPER VENOUS LEFT BRACHIAL AUGMENT: NORMAL
BH CV UPPER VENOUS LEFT BRACHIAL COMPRESS: NORMAL
BH CV UPPER VENOUS LEFT CEPHALIC FOREARM COMPRESS: NORMAL
BH CV UPPER VENOUS LEFT CEPHALIC UPPER COMPRESS: NORMAL
BH CV UPPER VENOUS LEFT INTERNAL JUGULAR AUGMENT: NORMAL
BH CV UPPER VENOUS LEFT INTERNAL JUGULAR COMPETENT: NORMAL
BH CV UPPER VENOUS LEFT INTERNAL JUGULAR COMPRESS: NORMAL
BH CV UPPER VENOUS LEFT INTERNAL JUGULAR PHASIC: NORMAL
BH CV UPPER VENOUS LEFT INTERNAL JUGULAR SPONT: NORMAL
BH CV UPPER VENOUS LEFT RADIAL AUGMENT: NORMAL
BH CV UPPER VENOUS LEFT RADIAL COMPRESS: NORMAL
BH CV UPPER VENOUS LEFT SUBCLAVIAN AUGMENT: NORMAL
BH CV UPPER VENOUS LEFT SUBCLAVIAN COMPETENT: NORMAL
BH CV UPPER VENOUS LEFT SUBCLAVIAN COMPRESS: NORMAL
BH CV UPPER VENOUS LEFT SUBCLAVIAN PHASIC: NORMAL
BH CV UPPER VENOUS LEFT SUBCLAVIAN SPONT: NORMAL
BH CV UPPER VENOUS LEFT ULNAR AUGMENT: NORMAL
BH CV UPPER VENOUS LEFT ULNAR COMPRESS: NORMAL
BH CV UPPER VENOUS RIGHT SUBCLAVIAN AUGMENT: NORMAL
BH CV UPPER VENOUS RIGHT SUBCLAVIAN COMPETENT: NORMAL
BH CV UPPER VENOUS RIGHT SUBCLAVIAN COMPRESS: NORMAL
BH CV UPPER VENOUS RIGHT SUBCLAVIAN PHASIC: NORMAL
BH CV UPPER VENOUS RIGHT SUBCLAVIAN SPONT: NORMAL
BH CV VAS BP LEFT ARM: NORMAL MMHG
BH CV XLRA - RV BASE: 3.2 CM
BH CV XLRA - RV LENGTH: 5.3 CM
BH CV XLRA - RV MID: 2.5 CM
BH CV XLRA - TDI S': 10.1 CM/SEC
BILIRUB BLD-MCNC: NEGATIVE MG/DL
BILIRUB BLD-MCNC: NEGATIVE MG/DL
BILIRUB SERPL-MCNC: 0.2 MG/DL (ref 0–1.2)
BILIRUB SERPL-MCNC: 0.3 MG/DL (ref 0–1.2)
BILIRUB SERPL-MCNC: 0.3 MG/DL (ref 0–1.2)
BILIRUB SERPL-MCNC: 0.5 MG/DL (ref 0–1.2)
BILIRUB SERPL-MCNC: 0.5 MG/DL (ref 0–1.2)
BILIRUB SERPL-MCNC: 0.7 MG/DL (ref 0–1.2)
BILIRUB SERPL-MCNC: <0.2 MG/DL (ref 0–1.2)
BILIRUB UR QL STRIP: NEGATIVE
BLD GP AB SCN SERPL QL: NEGATIVE
BLD GP AB SCN SERPL QL: NEGATIVE
BOTTLE TYPE: ABNORMAL
BUN SERPL-MCNC: 27 MG/DL (ref 8–23)
BUN SERPL-MCNC: 29 MG/DL (ref 8–23)
BUN SERPL-MCNC: 33 MG/DL (ref 8–23)
BUN SERPL-MCNC: 33 MG/DL (ref 8–23)
BUN SERPL-MCNC: 34 MG/DL (ref 8–23)
BUN SERPL-MCNC: 35 MG/DL (ref 8–23)
BUN SERPL-MCNC: 36 MG/DL (ref 8–23)
BUN SERPL-MCNC: 37 MG/DL (ref 8–23)
BUN SERPL-MCNC: 38 MG/DL (ref 8–23)
BUN SERPL-MCNC: 39 MG/DL (ref 8–23)
BUN SERPL-MCNC: 39 MG/DL (ref 8–23)
BUN SERPL-MCNC: 40 MG/DL (ref 8–23)
BUN SERPL-MCNC: 41 MG/DL (ref 8–23)
BUN SERPL-MCNC: 41 MG/DL (ref 8–23)
BUN SERPL-MCNC: 44 MG/DL (ref 8–23)
BUN SERPL-MCNC: 46 MG/DL (ref 8–23)
BUN SERPL-MCNC: 48 MG/DL (ref 8–23)
BUN SERPL-MCNC: 49 MG/DL (ref 8–23)
BUN SERPL-MCNC: 50 MG/DL (ref 8–23)
BUN SERPL-MCNC: 52 MG/DL (ref 8–23)
BUN SERPL-MCNC: 54 MG/DL (ref 8–23)
BUN SERPL-MCNC: 55 MG/DL (ref 8–23)
BUN SERPL-MCNC: 55 MG/DL (ref 8–23)
BUN SERPL-MCNC: 57 MG/DL (ref 8–23)
BUN SERPL-MCNC: 60 MG/DL (ref 8–23)
BUN SERPL-MCNC: 61 MG/DL (ref 8–23)
BUN SERPL-MCNC: 65 MG/DL (ref 8–23)
BUN SERPL-MCNC: 65 MG/DL (ref 8–23)
BUN SERPL-MCNC: 66 MG/DL (ref 8–23)
BUN SERPL-MCNC: 73 MG/DL (ref 8–23)
BUN SERPL-MCNC: 81 MG/DL (ref 8–23)
BUN/CREAT SERPL: 10.4 (ref 7–25)
BUN/CREAT SERPL: 10.9 (ref 7–25)
BUN/CREAT SERPL: 12.7 (ref 7–25)
BUN/CREAT SERPL: 13.9 (ref 7–25)
BUN/CREAT SERPL: 15.9 (ref 7–25)
BUN/CREAT SERPL: 16 (ref 7–25)
BUN/CREAT SERPL: 4.2 (ref 7–25)
BUN/CREAT SERPL: 4.6 (ref 7–25)
BUN/CREAT SERPL: 4.8 (ref 7–25)
BUN/CREAT SERPL: 5 (ref 7–25)
BUN/CREAT SERPL: 5.2 (ref 7–25)
BUN/CREAT SERPL: 5.2 (ref 7–25)
BUN/CREAT SERPL: 5.3 (ref 7–25)
BUN/CREAT SERPL: 5.3 (ref 7–25)
BUN/CREAT SERPL: 5.4 (ref 7–25)
BUN/CREAT SERPL: 5.6 (ref 7–25)
BUN/CREAT SERPL: 5.9 (ref 7–25)
BUN/CREAT SERPL: 6 (ref 7–25)
BUN/CREAT SERPL: 6 (ref 7–25)
BUN/CREAT SERPL: 6.2 (ref 7–25)
BUN/CREAT SERPL: 6.2 (ref 7–25)
BUN/CREAT SERPL: 6.3 (ref 7–25)
BUN/CREAT SERPL: 6.4 (ref 7–25)
BUN/CREAT SERPL: 6.5 (ref 7–25)
BUN/CREAT SERPL: 6.9 (ref 7–25)
BUN/CREAT SERPL: 7 (ref 7–25)
BUN/CREAT SERPL: 7.1 (ref 7–25)
BUN/CREAT SERPL: 7.1 (ref 7–25)
BUN/CREAT SERPL: 9.2 (ref 7–25)
C PNEUM DNA NPH QL NAA+NON-PROBE: NOT DETECTED
CA-I SERPL ISE-MCNC: 0.98 MMOL/L (ref 1.12–1.32)
CA-I SERPL ISE-MCNC: 1.06 MMOL/L (ref 1.12–1.32)
CA-I SERPL ISE-MCNC: 1.06 MMOL/L (ref 1.12–1.32)
CA-I SERPL ISE-MCNC: 1.1 MMOL/L (ref 1.12–1.32)
CALCIUM SPEC-SCNC: 6.6 MG/DL (ref 8.6–10.5)
CALCIUM SPEC-SCNC: 6.8 MG/DL (ref 8.6–10.5)
CALCIUM SPEC-SCNC: 6.8 MG/DL (ref 8.6–10.5)
CALCIUM SPEC-SCNC: 7 MG/DL (ref 8.6–10.5)
CALCIUM SPEC-SCNC: 7.1 MG/DL (ref 8.6–10.5)
CALCIUM SPEC-SCNC: 7.4 MG/DL (ref 8.6–10.5)
CALCIUM SPEC-SCNC: 7.4 MG/DL (ref 8.6–10.5)
CALCIUM SPEC-SCNC: 7.5 MG/DL (ref 8.6–10.5)
CALCIUM SPEC-SCNC: 7.5 MG/DL (ref 8.6–10.5)
CALCIUM SPEC-SCNC: 7.6 MG/DL (ref 8.6–10.5)
CALCIUM SPEC-SCNC: 7.6 MG/DL (ref 8.6–10.5)
CALCIUM SPEC-SCNC: 7.8 MG/DL (ref 8.6–10.5)
CALCIUM SPEC-SCNC: 7.8 MG/DL (ref 8.6–10.5)
CALCIUM SPEC-SCNC: 8 MG/DL (ref 8.6–10.5)
CALCIUM SPEC-SCNC: 8.1 MG/DL (ref 8.6–10.5)
CALCIUM SPEC-SCNC: 8.1 MG/DL (ref 8.6–10.5)
CALCIUM SPEC-SCNC: 8.3 MG/DL (ref 8.6–10.5)
CALCIUM SPEC-SCNC: 8.3 MG/DL (ref 8.6–10.5)
CALCIUM SPEC-SCNC: 8.4 MG/DL (ref 8.6–10.5)
CALCIUM SPEC-SCNC: 8.4 MG/DL (ref 8.6–10.5)
CALCIUM SPEC-SCNC: 8.5 MG/DL (ref 8.6–10.5)
CALCIUM SPEC-SCNC: 8.6 MG/DL (ref 8.6–10.5)
CALCIUM SPEC-SCNC: 8.6 MG/DL (ref 8.6–10.5)
CALCIUM SPEC-SCNC: 8.7 MG/DL (ref 8.6–10.5)
CALCIUM SPEC-SCNC: 8.8 MG/DL (ref 8.6–10.5)
CALCIUM SPEC-SCNC: 8.9 MG/DL (ref 8.6–10.5)
CALCIUM SPEC-SCNC: 9 MG/DL (ref 8.6–10.5)
CALCIUM SPEC-SCNC: 9 MG/DL (ref 8.6–10.5)
CALCIUM SPEC-SCNC: 9.1 MG/DL (ref 8.6–10.5)
CALCIUM SPEC-SCNC: 9.5 MG/DL (ref 8.6–10.5)
CHLORIDE SERPL-SCNC: 102 MMOL/L (ref 98–107)
CHLORIDE SERPL-SCNC: 83 MMOL/L (ref 98–107)
CHLORIDE SERPL-SCNC: 86 MMOL/L (ref 98–107)
CHLORIDE SERPL-SCNC: 86 MMOL/L (ref 98–107)
CHLORIDE SERPL-SCNC: 87 MMOL/L (ref 98–107)
CHLORIDE SERPL-SCNC: 88 MMOL/L (ref 98–107)
CHLORIDE SERPL-SCNC: 89 MMOL/L (ref 98–107)
CHLORIDE SERPL-SCNC: 91 MMOL/L (ref 98–107)
CHLORIDE SERPL-SCNC: 93 MMOL/L (ref 98–107)
CHLORIDE SERPL-SCNC: 93 MMOL/L (ref 98–107)
CHLORIDE SERPL-SCNC: 94 MMOL/L (ref 98–107)
CHLORIDE SERPL-SCNC: 94 MMOL/L (ref 98–107)
CHLORIDE SERPL-SCNC: 95 MMOL/L (ref 98–107)
CHLORIDE SERPL-SCNC: 95 MMOL/L (ref 98–107)
CHLORIDE SERPL-SCNC: 97 MMOL/L (ref 98–107)
CHLORIDE SERPL-SCNC: 98 MMOL/L (ref 98–107)
CHLORIDE SERPL-SCNC: 99 MMOL/L (ref 98–107)
CHOLEST SERPL-MCNC: 100 MG/DL (ref 0–200)
CLARITY UR: ABNORMAL
CLARITY, POC: ABNORMAL
CLARITY, POC: ABNORMAL
CLUMPED PLATELETS: PRESENT
CO2 SERPL-SCNC: 15 MMOL/L (ref 22–29)
CO2 SERPL-SCNC: 18 MMOL/L (ref 22–29)
CO2 SERPL-SCNC: 18 MMOL/L (ref 22–29)
CO2 SERPL-SCNC: 19 MMOL/L (ref 22–29)
CO2 SERPL-SCNC: 19 MMOL/L (ref 22–29)
CO2 SERPL-SCNC: 20 MMOL/L (ref 22–29)
CO2 SERPL-SCNC: 21 MMOL/L (ref 22–29)
CO2 SERPL-SCNC: 22 MMOL/L (ref 22–29)
CO2 SERPL-SCNC: 22 MMOL/L (ref 22–29)
CO2 SERPL-SCNC: 23 MMOL/L (ref 22–29)
CO2 SERPL-SCNC: 24 MMOL/L (ref 22–29)
CO2 SERPL-SCNC: 25 MMOL/L (ref 22–29)
CO2 SERPL-SCNC: 26 MMOL/L (ref 22–29)
COLOR FLD: ABNORMAL
COLOR FLD: COLORLESS
COLOR FLD: COLORLESS
COLOR FLD: YELLOW
COLOR UR: YELLOW
CORTIS SERPL-MCNC: 23.06 MCG/DL
CREAT SERPL-MCNC: 10.29 MG/DL (ref 0.57–1)
CREAT SERPL-MCNC: 3.46 MG/DL (ref 0.57–1)
CREAT SERPL-MCNC: 3.66 MG/DL (ref 0.57–1)
CREAT SERPL-MCNC: 4.13 MG/DL (ref 0.57–1)
CREAT SERPL-MCNC: 4.32 MG/DL (ref 0.57–1)
CREAT SERPL-MCNC: 5.02 MG/DL (ref 0.57–1)
CREAT SERPL-MCNC: 5.11 MG/DL (ref 0.57–1)
CREAT SERPL-MCNC: 5.97 MG/DL (ref 0.57–1)
CREAT SERPL-MCNC: 6 MG/DL (ref 0.57–1)
CREAT SERPL-MCNC: 6.14 MG/DL (ref 0.57–1)
CREAT SERPL-MCNC: 6.3 MG/DL (ref 0.57–1)
CREAT SERPL-MCNC: 6.37 MG/DL (ref 0.57–1)
CREAT SERPL-MCNC: 6.45 MG/DL (ref 0.57–1)
CREAT SERPL-MCNC: 6.59 MG/DL (ref 0.57–1)
CREAT SERPL-MCNC: 6.62 MG/DL (ref 0.57–1)
CREAT SERPL-MCNC: 6.98 MG/DL (ref 0.57–1)
CREAT SERPL-MCNC: 7.02 MG/DL (ref 0.57–1)
CREAT SERPL-MCNC: 7.07 MG/DL (ref 0.57–1)
CREAT SERPL-MCNC: 7.23 MG/DL (ref 0.57–1)
CREAT SERPL-MCNC: 7.27 MG/DL (ref 0.57–1)
CREAT SERPL-MCNC: 7.5 MG/DL (ref 0.57–1)
CREAT SERPL-MCNC: 7.74 MG/DL (ref 0.57–1)
CREAT SERPL-MCNC: 7.94 MG/DL (ref 0.57–1)
CREAT SERPL-MCNC: 8.52 MG/DL (ref 0.57–1)
CREAT SERPL-MCNC: 8.74 MG/DL (ref 0.57–1)
CREAT SERPL-MCNC: 8.76 MG/DL (ref 0.57–1)
CREAT SERPL-MCNC: 8.96 MG/DL (ref 0.57–1)
CREAT SERPL-MCNC: 9.01 MG/DL (ref 0.57–1)
CREAT SERPL-MCNC: 9.11 MG/DL (ref 0.57–1)
CREAT SERPL-MCNC: 9.17 MG/DL (ref 0.57–1)
CREAT SERPL-MCNC: 9.19 MG/DL (ref 0.57–1)
CROSSMATCH INTERPRETATION: NORMAL
CRP SERPL-MCNC: 23.82 MG/DL (ref 0–0.5)
CYTO UR: NORMAL
D-LACTATE SERPL-SCNC: 1.7 MMOL/L (ref 0.5–2)
D-LACTATE SERPL-SCNC: 1.8 MMOL/L (ref 0.5–2)
D-LACTATE SERPL-SCNC: 1.8 MMOL/L (ref 0.5–2)
D-LACTATE SERPL-SCNC: 1.9 MMOL/L (ref 0.5–2)
D-LACTATE SERPL-SCNC: 2.1 MMOL/L (ref 0.5–2)
D-LACTATE SERPL-SCNC: 2.3 MMOL/L (ref 0.5–2)
DEPRECATED RDW RBC AUTO: 49.5 FL (ref 37–54)
DEPRECATED RDW RBC AUTO: 50.3 FL (ref 37–54)
DEPRECATED RDW RBC AUTO: 50.4 FL (ref 37–54)
DEPRECATED RDW RBC AUTO: 50.5 FL (ref 37–54)
DEPRECATED RDW RBC AUTO: 50.6 FL (ref 37–54)
DEPRECATED RDW RBC AUTO: 50.7 FL (ref 37–54)
DEPRECATED RDW RBC AUTO: 50.7 FL (ref 37–54)
DEPRECATED RDW RBC AUTO: 51.1 FL (ref 37–54)
DEPRECATED RDW RBC AUTO: 51.2 FL (ref 37–54)
DEPRECATED RDW RBC AUTO: 51.3 FL (ref 37–54)
DEPRECATED RDW RBC AUTO: 51.5 FL (ref 37–54)
DEPRECATED RDW RBC AUTO: 51.7 FL (ref 37–54)
DEPRECATED RDW RBC AUTO: 52.1 FL (ref 37–54)
DEPRECATED RDW RBC AUTO: 52.1 FL (ref 37–54)
DEPRECATED RDW RBC AUTO: 52.4 FL (ref 37–54)
DEPRECATED RDW RBC AUTO: 52.5 FL (ref 37–54)
DEPRECATED RDW RBC AUTO: 52.9 FL (ref 37–54)
DEPRECATED RDW RBC AUTO: 53 FL (ref 37–54)
DEPRECATED RDW RBC AUTO: 53.1 FL (ref 37–54)
DEPRECATED RDW RBC AUTO: 53.3 FL (ref 37–54)
DEPRECATED RDW RBC AUTO: 53.5 FL (ref 37–54)
DEPRECATED RDW RBC AUTO: 53.9 FL (ref 37–54)
DEPRECATED RDW RBC AUTO: 54 FL (ref 37–54)
DEPRECATED RDW RBC AUTO: 54.1 FL (ref 37–54)
DEPRECATED RDW RBC AUTO: 54.4 FL (ref 37–54)
DEPRECATED RDW RBC AUTO: 54.5 FL (ref 37–54)
DEPRECATED RDW RBC AUTO: 56 FL (ref 37–54)
EOSINOPHIL # BLD AUTO: 0 10*3/MM3 (ref 0–0.4)
EOSINOPHIL # BLD AUTO: 0.01 10*3/MM3 (ref 0–0.4)
EOSINOPHIL # BLD AUTO: 0.03 10*3/MM3 (ref 0–0.4)
EOSINOPHIL # BLD AUTO: 0.09 10*3/MM3 (ref 0–0.4)
EOSINOPHIL # BLD AUTO: 0.13 10*3/MM3 (ref 0–0.4)
EOSINOPHIL # BLD AUTO: 0.13 10*3/MM3 (ref 0–0.4)
EOSINOPHIL # BLD AUTO: 0.28 10*3/MM3 (ref 0–0.4)
EOSINOPHIL # BLD AUTO: 0.3 10*3/MM3 (ref 0–0.4)
EOSINOPHIL # BLD AUTO: 0.31 10*3/MM3 (ref 0–0.4)
EOSINOPHIL # BLD AUTO: 0.36 10*3/MM3 (ref 0–0.4)
EOSINOPHIL # BLD AUTO: 0.39 10*3/MM3 (ref 0–0.4)
EOSINOPHIL # BLD AUTO: 0.42 10*3/MM3 (ref 0–0.4)
EOSINOPHIL # BLD AUTO: 0.54 10*3/MM3 (ref 0–0.4)
EOSINOPHIL # BLD MANUAL: 0.19 10*3/MM3 (ref 0–0.4)
EOSINOPHIL NFR BLD AUTO: 0 % (ref 0.3–6.2)
EOSINOPHIL NFR BLD AUTO: 0.1 % (ref 0.3–6.2)
EOSINOPHIL NFR BLD AUTO: 0.1 % (ref 0.3–6.2)
EOSINOPHIL NFR BLD AUTO: 0.6 % (ref 0.3–6.2)
EOSINOPHIL NFR BLD AUTO: 0.7 % (ref 0.3–6.2)
EOSINOPHIL NFR BLD AUTO: 0.8 % (ref 0.3–6.2)
EOSINOPHIL NFR BLD AUTO: 1.5 % (ref 0.3–6.2)
EOSINOPHIL NFR BLD AUTO: 2 % (ref 0.3–6.2)
EOSINOPHIL NFR BLD AUTO: 2.6 % (ref 0.3–6.2)
EOSINOPHIL NFR BLD AUTO: 2.9 % (ref 0.3–6.2)
EOSINOPHIL NFR BLD AUTO: 2.9 % (ref 0.3–6.2)
EOSINOPHIL NFR BLD AUTO: 3 % (ref 0.3–6.2)
EOSINOPHIL NFR BLD AUTO: 3.7 % (ref 0.3–6.2)
EOSINOPHIL NFR BLD MANUAL: 1 % (ref 0.3–6.2)
EOSINOPHIL NFR FLD MANUAL: 1 %
EOSINOPHIL NFR FLD MANUAL: 1 %
EOSINOPHIL NFR FLD MANUAL: 3 %
ERYTHROCYTE [DISTWIDTH] IN BLOOD BY AUTOMATED COUNT: 13.2 % (ref 12.3–15.4)
ERYTHROCYTE [DISTWIDTH] IN BLOOD BY AUTOMATED COUNT: 13.2 % (ref 12.3–15.4)
ERYTHROCYTE [DISTWIDTH] IN BLOOD BY AUTOMATED COUNT: 13.3 % (ref 12.3–15.4)
ERYTHROCYTE [DISTWIDTH] IN BLOOD BY AUTOMATED COUNT: 13.3 % (ref 12.3–15.4)
ERYTHROCYTE [DISTWIDTH] IN BLOOD BY AUTOMATED COUNT: 14.1 % (ref 12.3–15.4)
ERYTHROCYTE [DISTWIDTH] IN BLOOD BY AUTOMATED COUNT: 14.2 % (ref 12.3–15.4)
ERYTHROCYTE [DISTWIDTH] IN BLOOD BY AUTOMATED COUNT: 14.2 % (ref 12.3–15.4)
ERYTHROCYTE [DISTWIDTH] IN BLOOD BY AUTOMATED COUNT: 14.3 % (ref 12.3–15.4)
ERYTHROCYTE [DISTWIDTH] IN BLOOD BY AUTOMATED COUNT: 14.5 % (ref 12.3–15.4)
ERYTHROCYTE [DISTWIDTH] IN BLOOD BY AUTOMATED COUNT: 14.6 % (ref 12.3–15.4)
ERYTHROCYTE [DISTWIDTH] IN BLOOD BY AUTOMATED COUNT: 14.7 % (ref 12.3–15.4)
ERYTHROCYTE [DISTWIDTH] IN BLOOD BY AUTOMATED COUNT: 14.8 % (ref 12.3–15.4)
ERYTHROCYTE [DISTWIDTH] IN BLOOD BY AUTOMATED COUNT: 14.9 % (ref 12.3–15.4)
ERYTHROCYTE [DISTWIDTH] IN BLOOD BY AUTOMATED COUNT: 15.3 % (ref 12.3–15.4)
ERYTHROCYTE [DISTWIDTH] IN BLOOD BY AUTOMATED COUNT: 16.4 % (ref 12.3–15.4)
ERYTHROCYTE [DISTWIDTH] IN BLOOD BY AUTOMATED COUNT: 17.2 % (ref 12.3–15.4)
ERYTHROCYTE [DISTWIDTH] IN BLOOD BY AUTOMATED COUNT: 20.2 % (ref 12.3–15.4)
FLUAV RNA RESP QL NAA+PROBE: NOT DETECTED
FLUAV SUBTYP SPEC NAA+PROBE: NOT DETECTED
FLUBV RNA ISLT QL NAA+PROBE: NOT DETECTED
FLUBV RNA RESP QL NAA+PROBE: NOT DETECTED
GFR SERPL CREATININE-BSD FRML MDRD: 10 ML/MIN/1.73
GFR SERPL CREATININE-BSD FRML MDRD: 10 ML/MIN/1.73
GFR SERPL CREATININE-BSD FRML MDRD: 12 ML/MIN/1.73
GFR SERPL CREATININE-BSD FRML MDRD: 13 ML/MIN/1.73
GFR SERPL CREATININE-BSD FRML MDRD: 4 ML/MIN/1.73
GFR SERPL CREATININE-BSD FRML MDRD: 5 ML/MIN/1.73
GFR SERPL CREATININE-BSD FRML MDRD: 6 ML/MIN/1.73
GFR SERPL CREATININE-BSD FRML MDRD: 7 ML/MIN/1.73
GFR SERPL CREATININE-BSD FRML MDRD: 8 ML/MIN/1.73
GFR SERPL CREATININE-BSD FRML MDRD: 8 ML/MIN/1.73
GFR SERPL CREATININE-BSD FRML MDRD: ABNORMAL ML/MIN/{1.73_M2}
GIE STN SPEC: NORMAL
GLOBULIN UR ELPH-MCNC: 2 GM/DL
GLOBULIN UR ELPH-MCNC: 2.3 GM/DL
GLOBULIN UR ELPH-MCNC: 2.5 GM/DL
GLOBULIN UR ELPH-MCNC: 3 GM/DL
GLOBULIN UR ELPH-MCNC: 3 GM/DL
GLOBULIN UR ELPH-MCNC: 3.5 GM/DL
GLOBULIN UR ELPH-MCNC: 3.6 GM/DL
GLOBULIN UR ELPH-MCNC: 3.9 GM/DL
GLOBULIN UR ELPH-MCNC: 4.1 GM/DL
GLOBULIN UR ELPH-MCNC: 4.1 GM/DL
GLOBULIN UR ELPH-MCNC: 4.2 GM/DL
GLOBULIN UR ELPH-MCNC: 4.3 GM/DL
GLOBULIN UR ELPH-MCNC: 4.4 GM/DL
GLOBULIN UR ELPH-MCNC: 4.4 GM/DL
GLUCOSE BLDC GLUCOMTR-MCNC: 105 MG/DL (ref 70–130)
GLUCOSE BLDC GLUCOMTR-MCNC: 111 MG/DL (ref 70–130)
GLUCOSE BLDC GLUCOMTR-MCNC: 111 MG/DL (ref 70–130)
GLUCOSE BLDC GLUCOMTR-MCNC: 115 MG/DL (ref 70–130)
GLUCOSE BLDC GLUCOMTR-MCNC: 115 MG/DL (ref 70–130)
GLUCOSE BLDC GLUCOMTR-MCNC: 116 MG/DL (ref 70–130)
GLUCOSE BLDC GLUCOMTR-MCNC: 118 MG/DL (ref 70–130)
GLUCOSE BLDC GLUCOMTR-MCNC: 118 MG/DL (ref 70–130)
GLUCOSE BLDC GLUCOMTR-MCNC: 124 MG/DL (ref 70–130)
GLUCOSE BLDC GLUCOMTR-MCNC: 125 MG/DL (ref 70–130)
GLUCOSE BLDC GLUCOMTR-MCNC: 127 MG/DL (ref 70–130)
GLUCOSE BLDC GLUCOMTR-MCNC: 128 MG/DL (ref 70–130)
GLUCOSE BLDC GLUCOMTR-MCNC: 128 MG/DL (ref 70–130)
GLUCOSE BLDC GLUCOMTR-MCNC: 129 MG/DL (ref 70–130)
GLUCOSE BLDC GLUCOMTR-MCNC: 129 MG/DL (ref 70–130)
GLUCOSE BLDC GLUCOMTR-MCNC: 142 MG/DL (ref 70–130)
GLUCOSE BLDC GLUCOMTR-MCNC: 143 MG/DL (ref 70–130)
GLUCOSE BLDC GLUCOMTR-MCNC: 145 MG/DL (ref 70–130)
GLUCOSE BLDC GLUCOMTR-MCNC: 149 MG/DL (ref 70–130)
GLUCOSE BLDC GLUCOMTR-MCNC: 155 MG/DL (ref 70–130)
GLUCOSE BLDC GLUCOMTR-MCNC: 156 MG/DL (ref 70–130)
GLUCOSE BLDC GLUCOMTR-MCNC: 158 MG/DL (ref 70–130)
GLUCOSE BLDC GLUCOMTR-MCNC: 162 MG/DL (ref 70–130)
GLUCOSE BLDC GLUCOMTR-MCNC: 162 MG/DL (ref 70–130)
GLUCOSE BLDC GLUCOMTR-MCNC: 163 MG/DL (ref 70–130)
GLUCOSE BLDC GLUCOMTR-MCNC: 168 MG/DL (ref 70–130)
GLUCOSE BLDC GLUCOMTR-MCNC: 171 MG/DL (ref 70–130)
GLUCOSE BLDC GLUCOMTR-MCNC: 172 MG/DL (ref 70–130)
GLUCOSE BLDC GLUCOMTR-MCNC: 176 MG/DL (ref 70–130)
GLUCOSE BLDC GLUCOMTR-MCNC: 178 MG/DL (ref 70–130)
GLUCOSE BLDC GLUCOMTR-MCNC: 181 MG/DL (ref 70–130)
GLUCOSE BLDC GLUCOMTR-MCNC: 183 MG/DL (ref 70–130)
GLUCOSE BLDC GLUCOMTR-MCNC: 184 MG/DL (ref 70–130)
GLUCOSE BLDC GLUCOMTR-MCNC: 186 MG/DL (ref 70–130)
GLUCOSE BLDC GLUCOMTR-MCNC: 186 MG/DL (ref 70–130)
GLUCOSE BLDC GLUCOMTR-MCNC: 190 MG/DL (ref 70–130)
GLUCOSE BLDC GLUCOMTR-MCNC: 192 MG/DL (ref 70–130)
GLUCOSE BLDC GLUCOMTR-MCNC: 194 MG/DL (ref 70–130)
GLUCOSE BLDC GLUCOMTR-MCNC: 195 MG/DL (ref 70–130)
GLUCOSE BLDC GLUCOMTR-MCNC: 201 MG/DL (ref 70–130)
GLUCOSE BLDC GLUCOMTR-MCNC: 203 MG/DL (ref 70–130)
GLUCOSE BLDC GLUCOMTR-MCNC: 209 MG/DL (ref 70–130)
GLUCOSE BLDC GLUCOMTR-MCNC: 210 MG/DL (ref 70–130)
GLUCOSE BLDC GLUCOMTR-MCNC: 211 MG/DL (ref 70–130)
GLUCOSE BLDC GLUCOMTR-MCNC: 212 MG/DL (ref 70–130)
GLUCOSE BLDC GLUCOMTR-MCNC: 213 MG/DL (ref 70–130)
GLUCOSE BLDC GLUCOMTR-MCNC: 215 MG/DL (ref 70–130)
GLUCOSE BLDC GLUCOMTR-MCNC: 216 MG/DL (ref 70–130)
GLUCOSE BLDC GLUCOMTR-MCNC: 216 MG/DL (ref 70–130)
GLUCOSE BLDC GLUCOMTR-MCNC: 218 MG/DL (ref 70–130)
GLUCOSE BLDC GLUCOMTR-MCNC: 220 MG/DL (ref 70–130)
GLUCOSE BLDC GLUCOMTR-MCNC: 224 MG/DL (ref 70–130)
GLUCOSE BLDC GLUCOMTR-MCNC: 226 MG/DL (ref 70–130)
GLUCOSE BLDC GLUCOMTR-MCNC: 227 MG/DL (ref 70–130)
GLUCOSE BLDC GLUCOMTR-MCNC: 237 MG/DL (ref 70–130)
GLUCOSE BLDC GLUCOMTR-MCNC: 247 MG/DL (ref 70–130)
GLUCOSE BLDC GLUCOMTR-MCNC: 252 MG/DL (ref 70–130)
GLUCOSE BLDC GLUCOMTR-MCNC: 291 MG/DL (ref 70–130)
GLUCOSE BLDC GLUCOMTR-MCNC: 297 MG/DL (ref 70–130)
GLUCOSE BLDC GLUCOMTR-MCNC: 332 MG/DL (ref 70–130)
GLUCOSE BLDC GLUCOMTR-MCNC: 89 MG/DL (ref 70–130)
GLUCOSE BLDC GLUCOMTR-MCNC: 99 MG/DL (ref 70–130)
GLUCOSE SERPL-MCNC: 100 MG/DL (ref 65–99)
GLUCOSE SERPL-MCNC: 103 MG/DL (ref 65–99)
GLUCOSE SERPL-MCNC: 121 MG/DL (ref 65–99)
GLUCOSE SERPL-MCNC: 123 MG/DL (ref 65–99)
GLUCOSE SERPL-MCNC: 123 MG/DL (ref 65–99)
GLUCOSE SERPL-MCNC: 124 MG/DL (ref 65–99)
GLUCOSE SERPL-MCNC: 126 MG/DL (ref 65–99)
GLUCOSE SERPL-MCNC: 130 MG/DL (ref 65–99)
GLUCOSE SERPL-MCNC: 134 MG/DL (ref 65–99)
GLUCOSE SERPL-MCNC: 143 MG/DL (ref 65–99)
GLUCOSE SERPL-MCNC: 144 MG/DL (ref 65–99)
GLUCOSE SERPL-MCNC: 147 MG/DL (ref 65–99)
GLUCOSE SERPL-MCNC: 151 MG/DL (ref 65–99)
GLUCOSE SERPL-MCNC: 155 MG/DL (ref 65–99)
GLUCOSE SERPL-MCNC: 156 MG/DL (ref 65–99)
GLUCOSE SERPL-MCNC: 163 MG/DL (ref 65–99)
GLUCOSE SERPL-MCNC: 165 MG/DL (ref 65–99)
GLUCOSE SERPL-MCNC: 166 MG/DL (ref 65–99)
GLUCOSE SERPL-MCNC: 172 MG/DL (ref 65–99)
GLUCOSE SERPL-MCNC: 174 MG/DL (ref 65–99)
GLUCOSE SERPL-MCNC: 179 MG/DL (ref 65–99)
GLUCOSE SERPL-MCNC: 187 MG/DL (ref 65–99)
GLUCOSE SERPL-MCNC: 188 MG/DL (ref 65–99)
GLUCOSE SERPL-MCNC: 191 MG/DL (ref 65–99)
GLUCOSE SERPL-MCNC: 226 MG/DL (ref 65–99)
GLUCOSE SERPL-MCNC: 286 MG/DL (ref 65–99)
GLUCOSE SERPL-MCNC: 294 MG/DL (ref 65–99)
GLUCOSE SERPL-MCNC: 90 MG/DL (ref 65–99)
GLUCOSE SERPL-MCNC: 98 MG/DL (ref 65–99)
GLUCOSE UR STRIP-MCNC: NEGATIVE MG/DL
GRAM STN SPEC: ABNORMAL
GRAM STN SPEC: NORMAL
HADV DNA SPEC NAA+PROBE: NOT DETECTED
HAV IGM SERPL QL IA: NORMAL
HBV CORE IGM SERPL QL IA: NORMAL
HBV SURFACE AG SERPL QL IA: NORMAL
HCOV 229E RNA SPEC QL NAA+PROBE: NOT DETECTED
HCOV HKU1 RNA SPEC QL NAA+PROBE: NOT DETECTED
HCOV NL63 RNA SPEC QL NAA+PROBE: NOT DETECTED
HCOV OC43 RNA SPEC QL NAA+PROBE: NOT DETECTED
HCT VFR BLD AUTO: 22.8 % (ref 34–46.6)
HCT VFR BLD AUTO: 23.8 % (ref 34–46.6)
HCT VFR BLD AUTO: 23.9 % (ref 34–46.6)
HCT VFR BLD AUTO: 24 % (ref 34–46.6)
HCT VFR BLD AUTO: 24.1 % (ref 34–46.6)
HCT VFR BLD AUTO: 24.9 % (ref 34–46.6)
HCT VFR BLD AUTO: 25.7 % (ref 34–46.6)
HCT VFR BLD AUTO: 25.9 % (ref 34–46.6)
HCT VFR BLD AUTO: 28.5 % (ref 34–46.6)
HCT VFR BLD AUTO: 28.6 % (ref 34–46.6)
HCT VFR BLD AUTO: 28.7 % (ref 34–46.6)
HCT VFR BLD AUTO: 29.5 % (ref 34–46.6)
HCT VFR BLD AUTO: 29.6 % (ref 34–46.6)
HCT VFR BLD AUTO: 29.7 % (ref 34–46.6)
HCT VFR BLD AUTO: 29.8 % (ref 34–46.6)
HCT VFR BLD AUTO: 30.3 % (ref 34–46.6)
HCT VFR BLD AUTO: 30.7 % (ref 34–46.6)
HCT VFR BLD AUTO: 30.8 % (ref 34–46.6)
HCT VFR BLD AUTO: 31 % (ref 34–46.6)
HCT VFR BLD AUTO: 31.4 % (ref 34–46.6)
HCT VFR BLD AUTO: 32.6 % (ref 34–46.6)
HCT VFR BLD AUTO: 33.5 % (ref 34–46.6)
HCT VFR BLD AUTO: 33.6 % (ref 34–46.6)
HCT VFR BLD AUTO: 34 % (ref 34–46.6)
HCT VFR BLD AUTO: 34.1 % (ref 34–46.6)
HCT VFR BLD AUTO: 34.6 % (ref 34–46.6)
HCT VFR BLD AUTO: 34.9 % (ref 34–46.6)
HCV AB SER DONR QL: NORMAL
HGB BLD-MCNC: 10.1 G/DL (ref 12–15.9)
HGB BLD-MCNC: 10.4 G/DL (ref 12–15.9)
HGB BLD-MCNC: 10.5 G/DL (ref 12–15.9)
HGB BLD-MCNC: 10.5 G/DL (ref 12–15.9)
HGB BLD-MCNC: 10.6 G/DL (ref 12–15.9)
HGB BLD-MCNC: 10.9 G/DL (ref 12–15.9)
HGB BLD-MCNC: 11.1 G/DL (ref 12–15.9)
HGB BLD-MCNC: 7.3 G/DL (ref 12–15.9)
HGB BLD-MCNC: 8.1 G/DL (ref 12–15.9)
HGB BLD-MCNC: 8.2 G/DL (ref 12–15.9)
HGB BLD-MCNC: 8.2 G/DL (ref 12–15.9)
HGB BLD-MCNC: 8.3 G/DL (ref 12–15.9)
HGB BLD-MCNC: 8.3 G/DL (ref 12–15.9)
HGB BLD-MCNC: 8.4 G/DL (ref 12–15.9)
HGB BLD-MCNC: 8.6 G/DL (ref 12–15.9)
HGB BLD-MCNC: 8.7 G/DL (ref 12–15.9)
HGB BLD-MCNC: 8.8 G/DL (ref 12–15.9)
HGB BLD-MCNC: 8.8 G/DL (ref 12–15.9)
HGB BLD-MCNC: 9.1 G/DL (ref 12–15.9)
HGB BLD-MCNC: 9.2 G/DL (ref 12–15.9)
HGB BLD-MCNC: 9.2 G/DL (ref 12–15.9)
HGB BLD-MCNC: 9.3 G/DL (ref 12–15.9)
HGB BLD-MCNC: 9.4 G/DL (ref 12–15.9)
HGB BLD-MCNC: 9.4 G/DL (ref 12–15.9)
HGB BLD-MCNC: 9.7 G/DL (ref 12–15.9)
HGB BLD-MCNC: 9.8 G/DL (ref 12–15.9)
HGB BLD-MCNC: 9.8 G/DL (ref 12–15.9)
HGB UR QL STRIP.AUTO: ABNORMAL
HMPV RNA NPH QL NAA+NON-PROBE: NOT DETECTED
HOLD SPECIMEN: NORMAL
HPIV1 RNA SPEC QL NAA+PROBE: NOT DETECTED
HPIV2 RNA SPEC QL NAA+PROBE: NOT DETECTED
HPIV3 RNA NPH QL NAA+PROBE: NOT DETECTED
HPIV4 P GENE NPH QL NAA+PROBE: NOT DETECTED
HYALINE CASTS UR QL AUTO: ABNORMAL /LPF
IMM GRANULOCYTES # BLD AUTO: 0.13 10*3/MM3 (ref 0–0.05)
IMM GRANULOCYTES # BLD AUTO: 0.16 10*3/MM3 (ref 0–0.05)
IMM GRANULOCYTES # BLD AUTO: 0.16 10*3/MM3 (ref 0–0.05)
IMM GRANULOCYTES # BLD AUTO: 0.17 10*3/MM3 (ref 0–0.05)
IMM GRANULOCYTES # BLD AUTO: 0.17 10*3/MM3 (ref 0–0.05)
IMM GRANULOCYTES # BLD AUTO: 0.2 10*3/MM3 (ref 0–0.05)
IMM GRANULOCYTES # BLD AUTO: 0.21 10*3/MM3 (ref 0–0.05)
IMM GRANULOCYTES # BLD AUTO: 0.24 10*3/MM3 (ref 0–0.05)
IMM GRANULOCYTES # BLD AUTO: 0.29 10*3/MM3 (ref 0–0.05)
IMM GRANULOCYTES # BLD AUTO: 1.03 10*3/MM3 (ref 0–0.05)
IMM GRANULOCYTES # BLD AUTO: 1.25 10*3/MM3 (ref 0–0.05)
IMM GRANULOCYTES NFR BLD AUTO: 0.9 % (ref 0–0.5)
IMM GRANULOCYTES NFR BLD AUTO: 1 % (ref 0–0.5)
IMM GRANULOCYTES NFR BLD AUTO: 1.1 % (ref 0–0.5)
IMM GRANULOCYTES NFR BLD AUTO: 1.2 % (ref 0–0.5)
IMM GRANULOCYTES NFR BLD AUTO: 1.2 % (ref 0–0.5)
IMM GRANULOCYTES NFR BLD AUTO: 1.3 % (ref 0–0.5)
IMM GRANULOCYTES NFR BLD AUTO: 1.4 % (ref 0–0.5)
IMM GRANULOCYTES NFR BLD AUTO: 2 % (ref 0–0.5)
IMM GRANULOCYTES NFR BLD AUTO: 2 % (ref 0–0.5)
IMM GRANULOCYTES NFR BLD AUTO: 5.3 % (ref 0–0.5)
IMM GRANULOCYTES NFR BLD AUTO: 5.4 % (ref 0–0.5)
INR PPP: 1.11 (ref 0.85–1.16)
INR PPP: 1.24 (ref 0.85–1.16)
INR PPP: 1.29 (ref 0.85–1.16)
INR PPP: 1.4
INR PPP: 1.5
INR PPP: 1.5
INR PPP: 1.51 (ref 0.85–1.16)
INR PPP: 1.6
INR PPP: 1.6
INR PPP: 1.65 (ref 0.85–1.16)
INR PPP: 1.7
INR PPP: 1.74 (ref 0.85–1.16)
INR PPP: 1.8
INR PPP: 1.9
INR PPP: 1.94 (ref 0.85–1.16)
INR PPP: 2
INR PPP: 2.1
INR PPP: 2.1 (ref 0.85–1.16)
INR PPP: 2.12 (ref 0.85–1.16)
INR PPP: 2.13 (ref 0.85–1.16)
INR PPP: 2.16 (ref 0.85–1.16)
INR PPP: 2.2
INR PPP: 2.23 (ref 0.85–1.16)
INR PPP: 2.3
INR PPP: 2.3 (ref 0.85–1.16)
INR PPP: 2.34 (ref 0.85–1.16)
INR PPP: 2.4
INR PPP: 2.41 (ref 0.85–1.16)
INR PPP: 2.46 (ref 0.85–1.16)
INR PPP: 2.5
INR PPP: 2.6
INR PPP: 2.7
INR PPP: 2.81 (ref 0.85–1.16)
INR PPP: 2.84 (ref 0.85–1.16)
INR PPP: 2.88 (ref 0.85–1.16)
INR PPP: 2.9
INR PPP: 2.9 (ref 0.9–1.1)
INR PPP: 3
INR PPP: 3.04 (ref 0.85–1.16)
INR PPP: 3.1
INR PPP: 3.1
INR PPP: 3.22 (ref 0.85–1.16)
INR PPP: 3.39 (ref 0.85–1.16)
INR PPP: 3.4
INR PPP: 3.54 (ref 0.85–1.16)
INR PPP: 3.6
INR PPP: 3.83 (ref 0.85–1.16)
INR PPP: 3.9 (ref 2–3)
INR PPP: 4.1 (ref 0.85–1.16)
INR PPP: 4.34 (ref 0.85–1.16)
INR PPP: 4.4
INR PPP: 4.96 (ref 0.85–1.16)
INTERPRETATION: NORMAL
IRON 24H UR-MRATE: 43 MCG/DL (ref 37–145)
IRON 24H UR-MRATE: 45 MCG/DL (ref 37–145)
IRON SATN MFR SERPL: 25 % (ref 20–50)
IRON SATN MFR SERPL: 35 % (ref 20–50)
ISOLATED FROM: ABNORMAL
IVRT: 82 MSEC
KETONES UR QL STRIP: NEGATIVE
KETONES UR QL: NEGATIVE
KETONES UR QL: NEGATIVE
LAB AP CASE REPORT: NORMAL
LAB AP CLINICAL INFORMATION: NORMAL
LAB AP DIAGNOSIS COMMENT: NORMAL
LAB AP SPECIAL STAINS: NORMAL
LEFT ATRIUM VOLUME INDEX: 32 ML/M^2
LEFT ATRIUM VOLUME: 63.6 ML
LEUKOCYTE EST, POC: ABNORMAL
LEUKOCYTE EST, POC: ABNORMAL
LEUKOCYTE ESTERASE UR QL STRIP.AUTO: ABNORMAL
LIPASE SERPL-CCNC: 138 U/L (ref 13–60)
LIPASE SERPL-CCNC: 19 U/L (ref 13–60)
LV EF 2D ECHO EST: 65 %
LYMPHOCYTES # BLD AUTO: 0.81 10*3/MM3 (ref 0.7–3.1)
LYMPHOCYTES # BLD AUTO: 1.01 10*3/MM3 (ref 0.7–3.1)
LYMPHOCYTES # BLD AUTO: 1.39 10*3/MM3 (ref 0.7–3.1)
LYMPHOCYTES # BLD AUTO: 1.63 10*3/MM3 (ref 0.7–3.1)
LYMPHOCYTES # BLD AUTO: 1.92 10*3/MM3 (ref 0.7–3.1)
LYMPHOCYTES # BLD AUTO: 2.09 10*3/MM3 (ref 0.7–3.1)
LYMPHOCYTES # BLD AUTO: 2.16 10*3/MM3 (ref 0.7–3.1)
LYMPHOCYTES # BLD AUTO: 2.22 10*3/MM3 (ref 0.7–3.1)
LYMPHOCYTES # BLD AUTO: 2.28 10*3/MM3 (ref 0.7–3.1)
LYMPHOCYTES # BLD AUTO: 2.36 10*3/MM3 (ref 0.7–3.1)
LYMPHOCYTES # BLD AUTO: 2.42 10*3/MM3 (ref 0.7–3.1)
LYMPHOCYTES # BLD AUTO: 2.51 10*3/MM3 (ref 0.7–3.1)
LYMPHOCYTES # BLD AUTO: 2.66 10*3/MM3 (ref 0.7–3.1)
LYMPHOCYTES # BLD MANUAL: 1.35 10*3/MM3 (ref 0.7–3.1)
LYMPHOCYTES NFR BLD AUTO: 10.9 % (ref 19.6–45.3)
LYMPHOCYTES NFR BLD AUTO: 13.8 % (ref 19.6–45.3)
LYMPHOCYTES NFR BLD AUTO: 14.4 % (ref 19.6–45.3)
LYMPHOCYTES NFR BLD AUTO: 15.9 % (ref 19.6–45.3)
LYMPHOCYTES NFR BLD AUTO: 16.5 % (ref 19.6–45.3)
LYMPHOCYTES NFR BLD AUTO: 17.1 % (ref 19.6–45.3)
LYMPHOCYTES NFR BLD AUTO: 17.9 % (ref 19.6–45.3)
LYMPHOCYTES NFR BLD AUTO: 21.1 % (ref 19.6–45.3)
LYMPHOCYTES NFR BLD AUTO: 21.8 % (ref 19.6–45.3)
LYMPHOCYTES NFR BLD AUTO: 3.4 % (ref 19.6–45.3)
LYMPHOCYTES NFR BLD AUTO: 5.3 % (ref 19.6–45.3)
LYMPHOCYTES NFR BLD AUTO: 8.3 % (ref 19.6–45.3)
LYMPHOCYTES NFR BLD AUTO: 8.8 % (ref 19.6–45.3)
LYMPHOCYTES NFR BLD MANUAL: 4 % (ref 5–12)
LYMPHOCYTES NFR FLD MANUAL: 2 %
LYMPHOCYTES NFR FLD MANUAL: 2 %
LYMPHOCYTES NFR FLD MANUAL: 3 %
LYMPHOCYTES NFR FLD MANUAL: 9 %
M PNEUMO IGG SER IA-ACNC: NOT DETECTED
MAGNESIUM SERPL-MCNC: 1.5 MG/DL (ref 1.6–2.4)
MAGNESIUM SERPL-MCNC: 1.5 MG/DL (ref 1.6–2.4)
MAGNESIUM SERPL-MCNC: 1.6 MG/DL (ref 1.6–2.4)
MAGNESIUM SERPL-MCNC: 1.7 MG/DL (ref 1.6–2.4)
MAGNESIUM SERPL-MCNC: 1.9 MG/DL (ref 1.6–2.4)
MAGNESIUM SERPL-MCNC: 2 MG/DL (ref 1.6–2.4)
MAGNESIUM SERPL-MCNC: 2 MG/DL (ref 1.6–2.4)
MAGNESIUM SERPL-MCNC: 2.3 MG/DL (ref 1.6–2.4)
MAGNESIUM SERPL-MCNC: 2.4 MG/DL (ref 1.6–2.4)
MAGNESIUM SERPL-MCNC: 2.4 MG/DL (ref 1.6–2.4)
MAGNESIUM SERPL-MCNC: 2.6 MG/DL (ref 1.6–2.4)
MAXIMAL PREDICTED HEART RATE: 140 BPM
MCH RBC QN AUTO: 30.7 PG (ref 26.6–33)
MCH RBC QN AUTO: 30.9 PG (ref 26.6–33)
MCH RBC QN AUTO: 30.9 PG (ref 26.6–33)
MCH RBC QN AUTO: 31 PG (ref 26.6–33)
MCH RBC QN AUTO: 31.1 PG (ref 26.6–33)
MCH RBC QN AUTO: 31.3 PG (ref 26.6–33)
MCH RBC QN AUTO: 31.3 PG (ref 26.6–33)
MCH RBC QN AUTO: 31.4 PG (ref 26.6–33)
MCH RBC QN AUTO: 31.5 PG (ref 26.6–33)
MCH RBC QN AUTO: 31.6 PG (ref 26.6–33)
MCH RBC QN AUTO: 31.7 PG (ref 26.6–33)
MCH RBC QN AUTO: 31.7 PG (ref 26.6–33)
MCH RBC QN AUTO: 31.9 PG (ref 26.6–33)
MCH RBC QN AUTO: 32 PG (ref 26.6–33)
MCH RBC QN AUTO: 32.1 PG (ref 26.6–33)
MCH RBC QN AUTO: 32.2 PG (ref 26.6–33)
MCH RBC QN AUTO: 32.2 PG (ref 26.6–33)
MCH RBC QN AUTO: 32.4 PG (ref 26.6–33)
MCHC RBC AUTO-ENTMCNC: 29.7 G/DL (ref 31.5–35.7)
MCHC RBC AUTO-ENTMCNC: 30 G/DL (ref 31.5–35.7)
MCHC RBC AUTO-ENTMCNC: 30.1 G/DL (ref 31.5–35.7)
MCHC RBC AUTO-ENTMCNC: 30.3 G/DL (ref 31.5–35.7)
MCHC RBC AUTO-ENTMCNC: 30.4 G/DL (ref 31.5–35.7)
MCHC RBC AUTO-ENTMCNC: 30.9 G/DL (ref 31.5–35.7)
MCHC RBC AUTO-ENTMCNC: 30.9 G/DL (ref 31.5–35.7)
MCHC RBC AUTO-ENTMCNC: 31 G/DL (ref 31.5–35.7)
MCHC RBC AUTO-ENTMCNC: 31 G/DL (ref 31.5–35.7)
MCHC RBC AUTO-ENTMCNC: 31.5 G/DL (ref 31.5–35.7)
MCHC RBC AUTO-ENTMCNC: 31.6 G/DL (ref 31.5–35.7)
MCHC RBC AUTO-ENTMCNC: 31.8 G/DL (ref 31.5–35.7)
MCHC RBC AUTO-ENTMCNC: 31.8 G/DL (ref 31.5–35.7)
MCHC RBC AUTO-ENTMCNC: 32 G/DL (ref 31.5–35.7)
MCHC RBC AUTO-ENTMCNC: 32 G/DL (ref 31.5–35.7)
MCHC RBC AUTO-ENTMCNC: 32.2 G/DL (ref 31.5–35.7)
MCHC RBC AUTO-ENTMCNC: 32.3 G/DL (ref 31.5–35.7)
MCHC RBC AUTO-ENTMCNC: 32.8 G/DL (ref 31.5–35.7)
MCHC RBC AUTO-ENTMCNC: 32.9 G/DL (ref 31.5–35.7)
MCHC RBC AUTO-ENTMCNC: 32.9 G/DL (ref 31.5–35.7)
MCHC RBC AUTO-ENTMCNC: 33.2 G/DL (ref 31.5–35.7)
MCHC RBC AUTO-ENTMCNC: 33.6 G/DL (ref 31.5–35.7)
MCHC RBC AUTO-ENTMCNC: 34.5 G/DL (ref 31.5–35.7)
MCHC RBC AUTO-ENTMCNC: 34.6 G/DL (ref 31.5–35.7)
MCHC RBC AUTO-ENTMCNC: 35.1 G/DL (ref 31.5–35.7)
MCV RBC AUTO: 100 FL (ref 79–97)
MCV RBC AUTO: 100 FL (ref 79–97)
MCV RBC AUTO: 100.9 FL (ref 79–97)
MCV RBC AUTO: 101.8 FL (ref 79–97)
MCV RBC AUTO: 102.4 FL (ref 79–97)
MCV RBC AUTO: 103 FL (ref 79–97)
MCV RBC AUTO: 103.9 FL (ref 79–97)
MCV RBC AUTO: 104 FL (ref 79–97)
MCV RBC AUTO: 104.4 FL (ref 79–97)
MCV RBC AUTO: 104.4 FL (ref 79–97)
MCV RBC AUTO: 105.5 FL (ref 79–97)
MCV RBC AUTO: 89.5 FL (ref 79–97)
MCV RBC AUTO: 90.8 FL (ref 79–97)
MCV RBC AUTO: 92.3 FL (ref 79–97)
MCV RBC AUTO: 96.3 FL (ref 79–97)
MCV RBC AUTO: 96.4 FL (ref 79–97)
MCV RBC AUTO: 96.6 FL (ref 79–97)
MCV RBC AUTO: 96.9 FL (ref 79–97)
MCV RBC AUTO: 96.9 FL (ref 79–97)
MCV RBC AUTO: 97.7 FL (ref 79–97)
MCV RBC AUTO: 97.8 FL (ref 79–97)
MCV RBC AUTO: 98.7 FL (ref 79–97)
MCV RBC AUTO: 98.8 FL (ref 79–97)
MCV RBC AUTO: 99.1 FL (ref 79–97)
MCV RBC AUTO: 99.6 FL (ref 79–97)
MESOTHL CELL NFR FLD MANUAL: 2 %
METAMYELOCYTES NFR BLD MANUAL: 1 % (ref 0–0)
MONOCYTES # BLD AUTO: 0.65 10*3/MM3 (ref 0.1–0.9)
MONOCYTES # BLD AUTO: 0.66 10*3/MM3 (ref 0.1–0.9)
MONOCYTES # BLD AUTO: 0.66 10*3/MM3 (ref 0.1–0.9)
MONOCYTES # BLD AUTO: 0.67 10*3/MM3 (ref 0.1–0.9)
MONOCYTES # BLD AUTO: 0.7 10*3/MM3 (ref 0.1–0.9)
MONOCYTES # BLD AUTO: 0.74 10*3/MM3 (ref 0.1–0.9)
MONOCYTES # BLD AUTO: 0.75 10*3/MM3 (ref 0.1–0.9)
MONOCYTES # BLD AUTO: 0.78 10*3/MM3 (ref 0.1–0.9)
MONOCYTES # BLD AUTO: 0.78 10*3/MM3 (ref 0.1–0.9)
MONOCYTES # BLD AUTO: 0.8 10*3/MM3 (ref 0.1–0.9)
MONOCYTES # BLD AUTO: 0.81 10*3/MM3 (ref 0.1–0.9)
MONOCYTES # BLD AUTO: 0.83 10*3/MM3 (ref 0.1–0.9)
MONOCYTES # BLD AUTO: 0.86 10*3/MM3 (ref 0.1–0.9)
MONOCYTES # BLD: 0.77 10*3/MM3 (ref 0.1–0.9)
MONOCYTES NFR BLD AUTO: 2.8 % (ref 5–12)
MONOCYTES NFR BLD AUTO: 3.6 % (ref 5–12)
MONOCYTES NFR BLD AUTO: 3.7 % (ref 5–12)
MONOCYTES NFR BLD AUTO: 3.9 % (ref 5–12)
MONOCYTES NFR BLD AUTO: 3.9 % (ref 5–12)
MONOCYTES NFR BLD AUTO: 4.1 % (ref 5–12)
MONOCYTES NFR BLD AUTO: 5.1 % (ref 5–12)
MONOCYTES NFR BLD AUTO: 5.5 % (ref 5–12)
MONOCYTES NFR BLD AUTO: 5.6 % (ref 5–12)
MONOCYTES NFR BLD AUTO: 6.4 % (ref 5–12)
MONOCYTES NFR BLD AUTO: 6.4 % (ref 5–12)
MONOCYTES NFR BLD AUTO: 6.6 % (ref 5–12)
MONOCYTES NFR BLD AUTO: 7.2 % (ref 5–12)
MONOCYTES NFR FLD: 11 %
MONOCYTES NFR FLD: 3 %
MONOCYTES NFR FLD: 6 %
MONOCYTES NFR FLD: 7 %
MRSA DNA SPEC QL NAA+PROBE: NEGATIVE
MYELOCYTES NFR BLD MANUAL: 2 % (ref 0–0)
NEUTROPHILS # BLD AUTO: 16.06 10*3/MM3 (ref 1.7–7)
NEUTROPHILS NFR BLD AUTO: 10.55 10*3/MM3 (ref 1.7–7)
NEUTROPHILS NFR BLD AUTO: 10.62 10*3/MM3 (ref 1.7–7)
NEUTROPHILS NFR BLD AUTO: 11.51 10*3/MM3 (ref 1.7–7)
NEUTROPHILS NFR BLD AUTO: 12.62 10*3/MM3 (ref 1.7–7)
NEUTROPHILS NFR BLD AUTO: 14.37 10*3/MM3 (ref 1.7–7)
NEUTROPHILS NFR BLD AUTO: 15.83 10*3/MM3 (ref 1.7–7)
NEUTROPHILS NFR BLD AUTO: 15.88 10*3/MM3 (ref 1.7–7)
NEUTROPHILS NFR BLD AUTO: 16.37 10*3/MM3 (ref 1.7–7)
NEUTROPHILS NFR BLD AUTO: 20.68 10*3/MM3 (ref 1.7–7)
NEUTROPHILS NFR BLD AUTO: 66.4 % (ref 42.7–76)
NEUTROPHILS NFR BLD AUTO: 69.9 % (ref 42.7–76)
NEUTROPHILS NFR BLD AUTO: 7.83 10*3/MM3 (ref 1.7–7)
NEUTROPHILS NFR BLD AUTO: 71.3 % (ref 42.7–76)
NEUTROPHILS NFR BLD AUTO: 71.6 % (ref 42.7–76)
NEUTROPHILS NFR BLD AUTO: 72.5 % (ref 42.7–76)
NEUTROPHILS NFR BLD AUTO: 75.5 % (ref 42.7–76)
NEUTROPHILS NFR BLD AUTO: 76 % (ref 42.7–76)
NEUTROPHILS NFR BLD AUTO: 79.4 % (ref 42.7–76)
NEUTROPHILS NFR BLD AUTO: 8.11 10*3/MM3 (ref 1.7–7)
NEUTROPHILS NFR BLD AUTO: 8.61 10*3/MM3 (ref 1.7–7)
NEUTROPHILS NFR BLD AUTO: 82.6 % (ref 42.7–76)
NEUTROPHILS NFR BLD AUTO: 85.1 % (ref 42.7–76)
NEUTROPHILS NFR BLD AUTO: 85.5 % (ref 42.7–76)
NEUTROPHILS NFR BLD AUTO: 85.6 % (ref 42.7–76)
NEUTROPHILS NFR BLD AUTO: 88.1 % (ref 42.7–76)
NEUTROPHILS NFR BLD AUTO: 9.79 10*3/MM3 (ref 1.7–7)
NEUTROPHILS NFR BLD MANUAL: 83 % (ref 42.7–76)
NEUTROPHILS NFR FLD MANUAL: 77 %
NEUTROPHILS NFR FLD MANUAL: 90 %
NEUTROPHILS NFR FLD MANUAL: 91 %
NEUTROPHILS NFR FLD MANUAL: 92 %
NITRITE UR QL STRIP: NEGATIVE
NITRITE UR-MCNC: NEGATIVE MG/ML
NITRITE UR-MCNC: NEGATIVE MG/ML
NRBC BLD AUTO-RTO: 0 /100 WBC (ref 0–0.2)
NRBC BLD AUTO-RTO: 0.1 /100 WBC (ref 0–0.2)
NRBC BLD AUTO-RTO: 0.1 /100 WBC (ref 0–0.2)
NRBC BLD AUTO-RTO: 0.2 /100 WBC (ref 0–0.2)
NRBC BLD AUTO-RTO: 0.2 /100 WBC (ref 0–0.2)
NRBC BLD AUTO-RTO: 0.4 /100 WBC (ref 0–0.2)
NRBC SPEC MANUAL: 2 /100 WBC (ref 0–0.2)
NT-PROBNP SERPL-MCNC: ABNORMAL PG/ML (ref 0–1800)
PATH REPORT.FINAL DX SPEC: NORMAL
PATH REPORT.GROSS SPEC: NORMAL
PH UR STRIP.AUTO: 6 [PH] (ref 5–8)
PH UR STRIP.AUTO: 6.5 [PH] (ref 5–8)
PH UR STRIP.AUTO: 7.5 [PH] (ref 5–8)
PH UR: 7.5 [PH] (ref 5–8)
PH UR: 8 [PH] (ref 5–8)
PHOSPHATE SERPL-MCNC: 0.3 MG/DL (ref 2.5–4.5)
PHOSPHATE SERPL-MCNC: 1 MG/DL (ref 2.5–4.5)
PHOSPHATE SERPL-MCNC: 1.1 MG/DL (ref 2.5–4.5)
PHOSPHATE SERPL-MCNC: 1.1 MG/DL (ref 2.5–4.5)
PHOSPHATE SERPL-MCNC: 1.2 MG/DL (ref 2.5–4.5)
PHOSPHATE SERPL-MCNC: 1.3 MG/DL (ref 2.5–4.5)
PHOSPHATE SERPL-MCNC: 1.5 MG/DL (ref 2.5–4.5)
PHOSPHATE SERPL-MCNC: 1.7 MG/DL (ref 2.5–4.5)
PHOSPHATE SERPL-MCNC: 2.2 MG/DL (ref 2.5–4.5)
PHOSPHATE SERPL-MCNC: 2.9 MG/DL (ref 2.5–4.5)
PHOSPHATE SERPL-MCNC: 3.3 MG/DL (ref 2.5–4.5)
PHOSPHATE SERPL-MCNC: 3.6 MG/DL (ref 2.5–4.5)
PHOSPHATE SERPL-MCNC: 4.3 MG/DL (ref 2.5–4.5)
PHOSPHATE SERPL-MCNC: 4.4 MG/DL (ref 2.5–4.5)
PHOSPHATE SERPL-MCNC: 4.8 MG/DL (ref 2.5–4.5)
PHOSPHATE SERPL-MCNC: 5.5 MG/DL (ref 2.5–4.5)
PHOSPHATE SERPL-MCNC: 5.7 MG/DL (ref 2.5–4.5)
PHOSPHATE SERPL-MCNC: 6.8 MG/DL (ref 2.5–4.5)
PHOSPHATE SERPL-MCNC: 7.1 MG/DL (ref 2.5–4.5)
PLAT MORPH BLD: NORMAL
PLATELET # BLD AUTO: 160 10*3/MM3 (ref 140–450)
PLATELET # BLD AUTO: 163 10*3/MM3 (ref 140–450)
PLATELET # BLD AUTO: 177 10*3/MM3 (ref 140–450)
PLATELET # BLD AUTO: 181 10*3/MM3 (ref 140–450)
PLATELET # BLD AUTO: 197 10*3/MM3 (ref 140–450)
PLATELET # BLD AUTO: 212 10*3/MM3 (ref 140–450)
PLATELET # BLD AUTO: 213 10*3/MM3 (ref 140–450)
PLATELET # BLD AUTO: 216 10*3/MM3 (ref 140–450)
PLATELET # BLD AUTO: 218 10*3/MM3 (ref 140–450)
PLATELET # BLD AUTO: 222 10*3/MM3 (ref 140–450)
PLATELET # BLD AUTO: 227 10*3/MM3 (ref 140–450)
PLATELET # BLD AUTO: 232 10*3/MM3 (ref 140–450)
PLATELET # BLD AUTO: 235 10*3/MM3 (ref 140–450)
PLATELET # BLD AUTO: 242 10*3/MM3 (ref 140–450)
PLATELET # BLD AUTO: 242 10*3/MM3 (ref 140–450)
PLATELET # BLD AUTO: 246 10*3/MM3 (ref 140–450)
PLATELET # BLD AUTO: 255 10*3/MM3 (ref 140–450)
PLATELET # BLD AUTO: 267 10*3/MM3 (ref 140–450)
PLATELET # BLD AUTO: 272 10*3/MM3 (ref 140–450)
PLATELET # BLD AUTO: 280 10*3/MM3 (ref 140–450)
PLATELET # BLD AUTO: 283 10*3/MM3 (ref 140–450)
PLATELET # BLD AUTO: 294 10*3/MM3 (ref 140–450)
PLATELET # BLD AUTO: 299 10*3/MM3 (ref 140–450)
PLATELET # BLD AUTO: 313 10*3/MM3 (ref 140–450)
PLATELET # BLD AUTO: 316 10*3/MM3 (ref 140–450)
PLATELET # BLD AUTO: 322 10*3/MM3 (ref 140–450)
PLATELET # BLD AUTO: 326 10*3/MM3 (ref 140–450)
PMV BLD AUTO: 10 FL (ref 6–12)
PMV BLD AUTO: 10.1 FL (ref 6–12)
PMV BLD AUTO: 10.2 FL (ref 6–12)
PMV BLD AUTO: 10.3 FL (ref 6–12)
PMV BLD AUTO: 10.3 FL (ref 6–12)
PMV BLD AUTO: 10.4 FL (ref 6–12)
PMV BLD AUTO: 10.6 FL (ref 6–12)
PMV BLD AUTO: 10.9 FL (ref 6–12)
PMV BLD AUTO: 11.2 FL (ref 6–12)
PMV BLD AUTO: 9.5 FL (ref 6–12)
PMV BLD AUTO: 9.6 FL (ref 6–12)
PMV BLD AUTO: 9.7 FL (ref 6–12)
PMV BLD AUTO: 9.8 FL (ref 6–12)
PMV BLD AUTO: 9.9 FL (ref 6–12)
PMV BLD AUTO: 9.9 FL (ref 6–12)
POTASSIUM SERPL-SCNC: 2.9 MMOL/L (ref 3.5–5.2)
POTASSIUM SERPL-SCNC: 2.9 MMOL/L (ref 3.5–5.2)
POTASSIUM SERPL-SCNC: 3 MMOL/L (ref 3.5–5.2)
POTASSIUM SERPL-SCNC: 3.1 MMOL/L (ref 3.5–5.2)
POTASSIUM SERPL-SCNC: 3.1 MMOL/L (ref 3.5–5.2)
POTASSIUM SERPL-SCNC: 3.2 MMOL/L (ref 3.5–5.2)
POTASSIUM SERPL-SCNC: 3.2 MMOL/L (ref 3.5–5.2)
POTASSIUM SERPL-SCNC: 3.4 MMOL/L (ref 3.5–5.2)
POTASSIUM SERPL-SCNC: 3.5 MMOL/L (ref 3.5–5.2)
POTASSIUM SERPL-SCNC: 3.6 MMOL/L (ref 3.5–5.2)
POTASSIUM SERPL-SCNC: 3.7 MMOL/L (ref 3.5–5.2)
POTASSIUM SERPL-SCNC: 3.8 MMOL/L (ref 3.5–5.2)
POTASSIUM SERPL-SCNC: 3.9 MMOL/L (ref 3.5–5.2)
POTASSIUM SERPL-SCNC: 4.1 MMOL/L (ref 3.5–5.2)
POTASSIUM SERPL-SCNC: 4.2 MMOL/L (ref 3.5–5.2)
POTASSIUM SERPL-SCNC: 4.2 MMOL/L (ref 3.5–5.2)
POTASSIUM SERPL-SCNC: 4.3 MMOL/L (ref 3.5–5.2)
POTASSIUM SERPL-SCNC: 4.3 MMOL/L (ref 3.5–5.2)
PREALB SERPL-MCNC: 13.6 MG/DL (ref 20–40)
PROCALCITONIN SERPL-MCNC: 0.22 NG/ML (ref 0–0.25)
PROCALCITONIN SERPL-MCNC: 0.24 NG/ML (ref 0–0.25)
PROCALCITONIN SERPL-MCNC: 0.43 NG/ML (ref 0–0.25)
PROMYELOCYTES NFR BLD MANUAL: 2 % (ref 0–0)
PROT SERPL-MCNC: 5.4 G/DL (ref 6–8.5)
PROT SERPL-MCNC: 5.6 G/DL (ref 6–8.5)
PROT SERPL-MCNC: 6.1 G/DL (ref 6–8.5)
PROT SERPL-MCNC: 6.3 G/DL (ref 6–8.5)
PROT SERPL-MCNC: 6.3 G/DL (ref 6–8.5)
PROT SERPL-MCNC: 6.5 G/DL (ref 6–8.5)
PROT SERPL-MCNC: 6.7 G/DL (ref 6–8.5)
PROT SERPL-MCNC: 6.8 G/DL (ref 6–8.5)
PROT SERPL-MCNC: 7.1 G/DL (ref 6–8.5)
PROT SERPL-MCNC: 7.3 G/DL (ref 6–8.5)
PROT SERPL-MCNC: 7.4 G/DL (ref 6–8.5)
PROT SERPL-MCNC: 7.6 G/DL (ref 6–8.5)
PROT UR QL STRIP: ABNORMAL
PROT UR STRIP-MCNC: ABNORMAL MG/DL
PROT UR STRIP-MCNC: ABNORMAL MG/DL
PROTHROMBIN TIME: 14 SECONDS (ref 11.4–14.4)
PROTHROMBIN TIME: 15.2 SECONDS (ref 11.4–14.4)
PROTHROMBIN TIME: 15.6 SECONDS (ref 11.4–14.4)
PROTHROMBIN TIME: 17.7 SECONDS (ref 11.4–14.4)
PROTHROMBIN TIME: 18.9 SECONDS (ref 11.4–14.4)
PROTHROMBIN TIME: 19.7 SECONDS (ref 11.4–14.4)
PROTHROMBIN TIME: 21.4 SECONDS (ref 11.4–14.4)
PROTHROMBIN TIME: 22.7 SECONDS (ref 11.4–14.4)
PROTHROMBIN TIME: 22.9 SECONDS (ref 11.4–14.4)
PROTHROMBIN TIME: 23.2 SECONDS (ref 11.4–14.4)
PROTHROMBIN TIME: 23.5 SECONDS (ref 11.5–14)
PROTHROMBIN TIME: 24.4 SECONDS (ref 11.4–14.4)
PROTHROMBIN TIME: 24.4 SECONDS (ref 11.5–14)
PROTHROMBIN TIME: 24.7 SECONDS (ref 11.4–14.4)
PROTHROMBIN TIME: 25.9 SECONDS (ref 11.5–14)
PROTHROMBIN TIME: 26.3 SECONDS (ref 11.5–14)
PROTHROMBIN TIME: 28.6 SECONDS (ref 11.4–14.4)
PROTHROMBIN TIME: 28.9 SECONDS (ref 11.4–14.4)
PROTHROMBIN TIME: 29.3 SECONDS (ref 11.5–14)
PROTHROMBIN TIME: 30.1 SECONDS (ref 11.4–14.4)
PROTHROMBIN TIME: 31.5 SECONDS (ref 11.4–14.4)
PROTHROMBIN TIME: 32.7 SECONDS (ref 11.4–14.4)
PROTHROMBIN TIME: 33.8 SECONDS (ref 11.4–14.4)
PROTHROMBIN TIME: 35.4 SECONDS (ref 11.4–14.4)
PROTHROMBIN TIME: 37.9 SECONDS (ref 11.4–14.4)
PROTHROMBIN TIME: 39.6 SECONDS (ref 11.4–14.4)
PROTHROMBIN TIME: 43.8 SECONDS (ref 11.4–14.4)
PTH-INTACT SERPL-MCNC: 400 PG/ML (ref 15–65)
PTH-INTACT SERPL-MCNC: 515.1 PG/ML (ref 15–65)
QT INTERVAL: 336 MS
QT INTERVAL: 392 MS
QT INTERVAL: 392 MS
QT INTERVAL: 410 MS
QT INTERVAL: 426 MS
QT INTERVAL: 428 MS
QT INTERVAL: 436 MS
QT INTERVAL: 464 MS
QT INTERVAL: 466 MS
QT INTERVAL: 470 MS
QTC INTERVAL: 475 MS
QTC INTERVAL: 480 MS
QTC INTERVAL: 480 MS
QTC INTERVAL: 493 MS
QTC INTERVAL: 496 MS
QTC INTERVAL: 498 MS
QTC INTERVAL: 500 MS
QTC INTERVAL: 505 MS
QTC INTERVAL: 505 MS
QTC INTERVAL: 561 MS
RBC # BLD AUTO: 2.29 10*6/MM3 (ref 3.77–5.28)
RBC # BLD AUTO: 2.5 10*6/MM3 (ref 3.77–5.28)
RBC # BLD AUTO: 2.57 10*6/MM3 (ref 3.77–5.28)
RBC # BLD AUTO: 2.6 10*6/MM3 (ref 3.77–5.28)
RBC # BLD AUTO: 2.6 10*6/MM3 (ref 3.77–5.28)
RBC # BLD AUTO: 2.62 10*6/MM3 (ref 3.77–5.28)
RBC # BLD AUTO: 2.67 10*6/MM3 (ref 3.77–5.28)
RBC # BLD AUTO: 2.69 10*6/MM3 (ref 3.77–5.28)
RBC # BLD AUTO: 2.73 10*6/MM3 (ref 3.77–5.28)
RBC # BLD AUTO: 2.81 10*6/MM3 (ref 3.77–5.28)
RBC # BLD AUTO: 2.85 10*6/MM3 (ref 3.77–5.28)
RBC # BLD AUTO: 2.9 10*6/MM3 (ref 3.77–5.28)
RBC # BLD AUTO: 2.94 10*6/MM3 (ref 3.77–5.28)
RBC # BLD AUTO: 2.96 10*6/MM3 (ref 3.77–5.28)
RBC # BLD AUTO: 2.97 10*6/MM3 (ref 3.77–5.28)
RBC # BLD AUTO: 3.01 10*6/MM3 (ref 3.77–5.28)
RBC # BLD AUTO: 3.02 10*6/MM3 (ref 3.77–5.28)
RBC # BLD AUTO: 3.02 10*6/MM3 (ref 3.77–5.28)
RBC # BLD AUTO: 3.09 10*6/MM3 (ref 3.77–5.28)
RBC # BLD AUTO: 3.14 10*6/MM3 (ref 3.77–5.28)
RBC # BLD AUTO: 3.15 10*6/MM3 (ref 3.77–5.28)
RBC # BLD AUTO: 3.27 10*6/MM3 (ref 3.77–5.28)
RBC # BLD AUTO: 3.36 10*6/MM3 (ref 3.77–5.28)
RBC # BLD AUTO: 3.38 10*6/MM3 (ref 3.77–5.28)
RBC # BLD AUTO: 3.38 10*6/MM3 (ref 3.77–5.28)
RBC # BLD AUTO: 3.46 10*6/MM3 (ref 3.77–5.28)
RBC # BLD AUTO: 3.52 10*6/MM3 (ref 3.77–5.28)
RBC # FLD AUTO: 2000 /MM3
RBC # FLD AUTO: <2000 /MM3
RBC # UR STRIP: ABNORMAL /HPF
RBC # UR STRIP: ABNORMAL /UL
RBC # UR STRIP: ABNORMAL /UL
RBC # UR: ABNORMAL /HPF
RBC # UR: ABNORMAL /HPF
RBC MORPH BLD: NORMAL
RBC MORPH BLD: NORMAL
REF LAB TEST METHOD: ABNORMAL
RH BLD: NEGATIVE
RH BLD: NEGATIVE
RHINOVIRUS RNA SPEC NAA+PROBE: NOT DETECTED
RSV RNA NPH QL NAA+NON-PROBE: NOT DETECTED
SARS-COV-2 RNA NPH QL NAA+NON-PROBE: NOT DETECTED
SARS-COV-2 RNA PNL SPEC NAA+PROBE: NOT DETECTED
SARS-COV-2 RNA RESP QL NAA+PROBE: NOT DETECTED
SODIUM SERPL-SCNC: 124 MMOL/L (ref 136–145)
SODIUM SERPL-SCNC: 124 MMOL/L (ref 136–145)
SODIUM SERPL-SCNC: 125 MMOL/L (ref 136–145)
SODIUM SERPL-SCNC: 126 MMOL/L (ref 136–145)
SODIUM SERPL-SCNC: 127 MMOL/L (ref 136–145)
SODIUM SERPL-SCNC: 127 MMOL/L (ref 136–145)
SODIUM SERPL-SCNC: 128 MMOL/L (ref 136–145)
SODIUM SERPL-SCNC: 128 MMOL/L (ref 136–145)
SODIUM SERPL-SCNC: 129 MMOL/L (ref 136–145)
SODIUM SERPL-SCNC: 131 MMOL/L (ref 136–145)
SODIUM SERPL-SCNC: 133 MMOL/L (ref 136–145)
SODIUM SERPL-SCNC: 134 MMOL/L (ref 136–145)
SODIUM SERPL-SCNC: 134 MMOL/L (ref 136–145)
SODIUM SERPL-SCNC: 135 MMOL/L (ref 136–145)
SODIUM SERPL-SCNC: 136 MMOL/L (ref 136–145)
SODIUM SERPL-SCNC: 137 MMOL/L (ref 136–145)
SODIUM SERPL-SCNC: 138 MMOL/L (ref 136–145)
SODIUM SERPL-SCNC: 138 MMOL/L (ref 136–145)
SODIUM SERPL-SCNC: 139 MMOL/L (ref 136–145)
SODIUM SERPL-SCNC: 141 MMOL/L (ref 136–145)
SODIUM SERPL-SCNC: 141 MMOL/L (ref 136–145)
SP GR UR STRIP: 1.01 (ref 1–1.03)
SP GR UR: 1.01 (ref 1–1.03)
SP GR UR: 1.01 (ref 1–1.03)
SQUAMOUS #/AREA URNS HPF: ABNORMAL /HPF
STRESS TARGET HR: 119 BPM
T&S EXPIRATION DATE: NORMAL
T&S EXPIRATION DATE: NORMAL
T4 FREE SERPL-MCNC: 0.92 NG/DL (ref 0.93–1.7)
TACROLIMUS BLD LC/MS/MS-MCNC: <1 NG/ML (ref 2–20)
TIBC SERPL-MCNC: 130 MCG/DL (ref 298–536)
TIBC SERPL-MCNC: 171 MCG/DL (ref 298–536)
TRANSFERRIN SERPL-MCNC: 115 MG/DL (ref 200–360)
TRANSFERRIN SERPL-MCNC: 87 MG/DL (ref 200–360)
TRIGL SERPL-MCNC: 104 MG/DL (ref 0–150)
TROPONIN T SERPL-MCNC: 0.04 NG/ML (ref 0–0.03)
TROPONIN T SERPL-MCNC: 0.05 NG/ML (ref 0–0.03)
TROPONIN T SERPL-MCNC: 0.06 NG/ML (ref 0–0.03)
TSH SERPL DL<=0.05 MIU/L-ACNC: 2.58 UIU/ML (ref 0.27–4.2)
TSH SERPL DL<=0.05 MIU/L-ACNC: 6.09 UIU/ML (ref 0.27–4.2)
UNIT  ABO: NORMAL
UNIT  RH: NORMAL
UROBILINOGEN UR QL STRIP: ABNORMAL
UROBILINOGEN UR QL: NORMAL
UROBILINOGEN UR QL: NORMAL
VANCOMYCIN SERPL-MCNC: 14.5 MCG/ML (ref 5–40)
VANCOMYCIN SERPL-MCNC: 16.2 MCG/ML (ref 5–40)
VANCOMYCIN SERPL-MCNC: 17.1 MCG/ML (ref 5–40)
VANCOMYCIN SERPL-MCNC: 17.5 MCG/ML (ref 5–40)
VANCOMYCIN SERPL-MCNC: 17.6 MCG/ML (ref 5–40)
VANCOMYCIN SERPL-MCNC: 17.8 MCG/ML (ref 5–40)
VANCOMYCIN SERPL-MCNC: 18.5 MCG/ML (ref 5–40)
VANCOMYCIN SERPL-MCNC: 19.2 MCG/ML (ref 5–40)
VARIANT LYMPHS NFR BLD MANUAL: 7 % (ref 19.6–45.3)
WBC # BLD AUTO: 11.2 10*3/MM3 (ref 3.4–10.8)
WBC # BLD AUTO: 12.08 10*3/MM3 (ref 3.4–10.8)
WBC # BLD AUTO: 12.21 10*3/MM3 (ref 3.4–10.8)
WBC # BLD AUTO: 13.48 10*3/MM3 (ref 3.4–10.8)
WBC # BLD AUTO: 13.62 10*3/MM3 (ref 3.4–10.8)
WBC # BLD AUTO: 13.96 10*3/MM3 (ref 3.4–10.8)
WBC # BLD AUTO: 14.52 10*3/MM3 (ref 3.4–10.8)
WBC # BLD AUTO: 15.22 10*3/MM3 (ref 3.4–10.8)
WBC # BLD AUTO: 15.6 10*3/MM3 (ref 3.4–10.8)
WBC # BLD AUTO: 15.87 10*3/MM3 (ref 3.4–10.8)
WBC # BLD AUTO: 19.16 10*3/MM3 (ref 3.4–10.8)
WBC # FLD AUTO: 166 /MM3
WBC # FLD AUTO: 1748 /MM3
WBC # FLD AUTO: 198 /MM3
WBC # FLD AUTO: 372 /MM3
WBC # UR STRIP: ABNORMAL /HPF
WBC MORPH BLD: NORMAL
WBC MORPH BLD: NORMAL
WBC NRBC COR # BLD: 12.22 10*3/MM3 (ref 3.4–10.8)
WBC NRBC COR # BLD: 13.05 10*3/MM3 (ref 3.4–10.8)
WBC NRBC COR # BLD: 14.16 10*3/MM3 (ref 3.4–10.8)
WBC NRBC COR # BLD: 14.56 10*3/MM3 (ref 3.4–10.8)
WBC NRBC COR # BLD: 14.71 10*3/MM3 (ref 3.4–10.8)
WBC NRBC COR # BLD: 14.72 10*3/MM3 (ref 3.4–10.8)
WBC NRBC COR # BLD: 16.8 10*3/MM3 (ref 3.4–10.8)
WBC NRBC COR # BLD: 17.56 10*3/MM3 (ref 3.4–10.8)
WBC NRBC COR # BLD: 18.56 10*3/MM3 (ref 3.4–10.8)
WBC NRBC COR # BLD: 18.8 10*3/MM3 (ref 3.4–10.8)
WBC NRBC COR # BLD: 19.2 10*3/MM3 (ref 3.4–10.8)
WBC NRBC COR # BLD: 19.35 10*3/MM3 (ref 3.4–10.8)
WBC NRBC COR # BLD: 19.74 10*3/MM3 (ref 3.4–10.8)
WBC NRBC COR # BLD: 20.44 10*3/MM3 (ref 3.4–10.8)
WBC NRBC COR # BLD: 21.79 10*3/MM3 (ref 3.4–10.8)
WBC NRBC COR # BLD: 23.51 10*3/MM3 (ref 3.4–10.8)
WBC UR QL AUTO: ABNORMAL /HPF
WBC UR QL AUTO: ABNORMAL /HPF
WHOLE BLOOD HOLD SPECIMEN: NORMAL

## 2021-01-01 PROCEDURE — 25010000002 CEFTAZIDIME PER 500 MG: Performed by: INTERNAL MEDICINE

## 2021-01-01 PROCEDURE — 99285 EMERGENCY DEPT VISIT HI MDM: CPT

## 2021-01-01 PROCEDURE — 25010000002 ONDANSETRON PER 1 MG: Performed by: INTERNAL MEDICINE

## 2021-01-01 PROCEDURE — 25010000002 HYDROMORPHONE PER 4 MG: Performed by: NURSE PRACTITIONER

## 2021-01-01 PROCEDURE — 36558 INSERT TUNNELED CV CATH: CPT

## 2021-01-01 PROCEDURE — 80202 ASSAY OF VANCOMYCIN: CPT

## 2021-01-01 PROCEDURE — 80048 BASIC METABOLIC PNL TOTAL CA: CPT

## 2021-01-01 PROCEDURE — 25010000002 HYDROMORPHONE PER 4 MG: Performed by: INTERNAL MEDICINE

## 2021-01-01 PROCEDURE — 25010000002 ALBUMIN HUMAN 25% PER 50 ML: Performed by: INTERNAL MEDICINE

## 2021-01-01 PROCEDURE — 85027 COMPLETE CBC AUTOMATED: CPT | Performed by: INTERNAL MEDICINE

## 2021-01-01 PROCEDURE — 99239 HOSP IP/OBS DSCHRG MGMT >30: CPT | Performed by: INTERNAL MEDICINE

## 2021-01-01 PROCEDURE — 93010 ELECTROCARDIOGRAM REPORT: CPT | Performed by: INTERNAL MEDICINE

## 2021-01-01 PROCEDURE — 25010000002 LORAZEPAM PER 2 MG: Performed by: NURSE PRACTITIONER

## 2021-01-01 PROCEDURE — 88307 TISSUE EXAM BY PATHOLOGIST: CPT | Performed by: SURGERY

## 2021-01-01 PROCEDURE — 85610 PROTHROMBIN TIME: CPT | Performed by: EMERGENCY MEDICINE

## 2021-01-01 PROCEDURE — 25010000002 EPOETIN ALFA-EPBX 10000 UNIT/ML SOLUTION: Performed by: INTERNAL MEDICINE

## 2021-01-01 PROCEDURE — 85027 COMPLETE CBC AUTOMATED: CPT | Performed by: HOSPITALIST

## 2021-01-01 PROCEDURE — 25010000002 MAGNESIUM SULFATE PER 500 MG OF MAGNESIUM

## 2021-01-01 PROCEDURE — 99233 SBSQ HOSP IP/OBS HIGH 50: CPT | Performed by: INTERNAL MEDICINE

## 2021-01-01 PROCEDURE — 80053 COMPREHEN METABOLIC PANEL: CPT | Performed by: EMERGENCY MEDICINE

## 2021-01-01 PROCEDURE — 87636 SARSCOV2 & INF A&B AMP PRB: CPT | Performed by: EMERGENCY MEDICINE

## 2021-01-01 PROCEDURE — 25010000002 FENTANYL CITRATE (PF) 50 MCG/ML SOLUTION: Performed by: ANESTHESIOLOGY

## 2021-01-01 PROCEDURE — 0WPG03Z REMOVAL OF INFUSION DEVICE FROM PERITONEAL CAVITY, OPEN APPROACH: ICD-10-PCS | Performed by: SURGERY

## 2021-01-01 PROCEDURE — 63710000001 PREDNISONE PER 1 MG: Performed by: NURSE PRACTITIONER

## 2021-01-01 PROCEDURE — 63710000001 TACROLIMUS PER 1 MG: Performed by: NURSE PRACTITIONER

## 2021-01-01 PROCEDURE — 63710000001 INSULIN LISPRO (HUMAN) PER 5 UNITS

## 2021-01-01 PROCEDURE — 84484 ASSAY OF TROPONIN QUANT: CPT | Performed by: EMERGENCY MEDICINE

## 2021-01-01 PROCEDURE — 93005 ELECTROCARDIOGRAM TRACING: CPT | Performed by: NURSE PRACTITIONER

## 2021-01-01 PROCEDURE — 80053 COMPREHEN METABOLIC PANEL: CPT | Performed by: SURGERY

## 2021-01-01 PROCEDURE — 25010000002 FUROSEMIDE PER 20 MG

## 2021-01-01 PROCEDURE — 80053 COMPREHEN METABOLIC PANEL: CPT | Performed by: HOSPITALIST

## 2021-01-01 PROCEDURE — 83605 ASSAY OF LACTIC ACID: CPT | Performed by: EMERGENCY MEDICINE

## 2021-01-01 PROCEDURE — 74176 CT ABD & PELVIS W/O CONTRAST: CPT

## 2021-01-01 PROCEDURE — 87040 BLOOD CULTURE FOR BACTERIA: CPT | Performed by: EMERGENCY MEDICINE

## 2021-01-01 PROCEDURE — 3E0336Z INTRODUCTION OF NUTRITIONAL SUBSTANCE INTO PERIPHERAL VEIN, PERCUTANEOUS APPROACH: ICD-10-PCS | Performed by: SURGERY

## 2021-01-01 PROCEDURE — 63710000001 INSULIN LISPRO (HUMAN) PER 5 UNITS: Performed by: HOSPITALIST

## 2021-01-01 PROCEDURE — B548ZZA ULTRASONOGRAPHY OF SUPERIOR VENA CAVA, GUIDANCE: ICD-10-PCS | Performed by: SURGERY

## 2021-01-01 PROCEDURE — 82962 GLUCOSE BLOOD TEST: CPT

## 2021-01-01 PROCEDURE — 85610 PROTHROMBIN TIME: CPT | Performed by: INTERNAL MEDICINE

## 2021-01-01 PROCEDURE — 89051 BODY FLUID CELL COUNT: CPT | Performed by: STUDENT IN AN ORGANIZED HEALTH CARE EDUCATION/TRAINING PROGRAM

## 2021-01-01 PROCEDURE — 84484 ASSAY OF TROPONIN QUANT: CPT | Performed by: PHYSICIAN ASSISTANT

## 2021-01-01 PROCEDURE — 25010000002 DEXAMETHASONE SODIUM PHOSPHATE 10 MG/ML SOLUTION: Performed by: ANESTHESIOLOGY

## 2021-01-01 PROCEDURE — 80053 COMPREHEN METABOLIC PANEL: CPT | Performed by: INTERNAL MEDICINE

## 2021-01-01 PROCEDURE — 25010000002 MICAFUNGIN SODIUM 100 MG RECONSTITUTED SOLUTION: Performed by: SURGERY

## 2021-01-01 PROCEDURE — 25010000002 HEPARIN (PORCINE) PER 1000 UNITS: Performed by: INTERNAL MEDICINE

## 2021-01-01 PROCEDURE — 86900 BLOOD TYPING SEROLOGIC ABO: CPT | Performed by: SURGERY

## 2021-01-01 PROCEDURE — 25010000002 MICAFUNGIN SODIUM 100 MG RECONSTITUTED SOLUTION: Performed by: INTERNAL MEDICINE

## 2021-01-01 PROCEDURE — 25010000002 LORAZEPAM PER 2 MG: Performed by: INTERNAL MEDICINE

## 2021-01-01 PROCEDURE — 85007 BL SMEAR W/DIFF WBC COUNT: CPT | Performed by: SURGERY

## 2021-01-01 PROCEDURE — 25010000002 HYDROMORPHONE 1 MG/ML SOLUTION: Performed by: INTERNAL MEDICINE

## 2021-01-01 PROCEDURE — 25010000002 POTASSIUM CHLORIDE PER 2 MEQ OF POTASSIUM

## 2021-01-01 PROCEDURE — 83735 ASSAY OF MAGNESIUM: CPT | Performed by: SURGERY

## 2021-01-01 PROCEDURE — 25010000002 PIPERACILLIN SOD-TAZOBACTAM PER 1 G: Performed by: HOSPITALIST

## 2021-01-01 PROCEDURE — P9017 PLASMA 1 DONOR FRZ W/IN 8 HR: HCPCS

## 2021-01-01 PROCEDURE — 93005 ELECTROCARDIOGRAM TRACING: CPT | Performed by: HOSPITALIST

## 2021-01-01 PROCEDURE — 97530 THERAPEUTIC ACTIVITIES: CPT | Performed by: PHYSICAL THERAPIST

## 2021-01-01 PROCEDURE — C1889 IMPLANT/INSERT DEVICE, NOC: HCPCS | Performed by: SURGERY

## 2021-01-01 PROCEDURE — 0202U NFCT DS 22 TRGT SARS-COV-2: CPT | Performed by: HOSPITALIST

## 2021-01-01 PROCEDURE — 99232 SBSQ HOSP IP/OBS MODERATE 35: CPT | Performed by: INTERNAL MEDICINE

## 2021-01-01 PROCEDURE — 97161 PT EVAL LOW COMPLEX 20 MIN: CPT | Performed by: PHYSICAL THERAPIST

## 2021-01-01 PROCEDURE — 86927 PLASMA FRESH FROZEN: CPT

## 2021-01-01 PROCEDURE — 83735 ASSAY OF MAGNESIUM: CPT

## 2021-01-01 PROCEDURE — 83690 ASSAY OF LIPASE: CPT | Performed by: EMERGENCY MEDICINE

## 2021-01-01 PROCEDURE — 88341 IMHCHEM/IMCYTCHM EA ADD ANTB: CPT | Performed by: SURGERY

## 2021-01-01 PROCEDURE — 85025 COMPLETE CBC W/AUTO DIFF WBC: CPT | Performed by: EMERGENCY MEDICINE

## 2021-01-01 PROCEDURE — 85025 COMPLETE CBC W/AUTO DIFF WBC: CPT | Performed by: FAMILY MEDICINE

## 2021-01-01 PROCEDURE — 25010000002 CALCIUM GLUCONATE PER 10 ML

## 2021-01-01 PROCEDURE — 85025 COMPLETE CBC W/AUTO DIFF WBC: CPT | Performed by: SURGERY

## 2021-01-01 PROCEDURE — 85610 PROTHROMBIN TIME: CPT | Performed by: SURGERY

## 2021-01-01 PROCEDURE — 97530 THERAPEUTIC ACTIVITIES: CPT

## 2021-01-01 PROCEDURE — 84100 ASSAY OF PHOSPHORUS: CPT

## 2021-01-01 PROCEDURE — 80197 ASSAY OF TACROLIMUS: CPT | Performed by: INTERNAL MEDICINE

## 2021-01-01 PROCEDURE — 93971 EXTREMITY STUDY: CPT

## 2021-01-01 PROCEDURE — 80053 COMPREHEN METABOLIC PANEL: CPT

## 2021-01-01 PROCEDURE — 63710000001 TACROLIMUS PER 1 MG: Performed by: INTERNAL MEDICINE

## 2021-01-01 PROCEDURE — 99231 SBSQ HOSP IP/OBS SF/LOW 25: CPT | Performed by: NURSE PRACTITIONER

## 2021-01-01 PROCEDURE — 99233 SBSQ HOSP IP/OBS HIGH 50: CPT | Performed by: HOSPITALIST

## 2021-01-01 PROCEDURE — 25010000002 NEOSTIGMINE 10 MG/10ML SOLUTION: Performed by: NURSE ANESTHETIST, CERTIFIED REGISTERED

## 2021-01-01 PROCEDURE — 82533 TOTAL CORTISOL: CPT | Performed by: HOSPITALIST

## 2021-01-01 PROCEDURE — 84145 PROCALCITONIN (PCT): CPT | Performed by: PHYSICIAN ASSISTANT

## 2021-01-01 PROCEDURE — 99232 SBSQ HOSP IP/OBS MODERATE 35: CPT | Performed by: FAMILY MEDICINE

## 2021-01-01 PROCEDURE — 25010000002 MAGNESIUM SULFATE IN D5W 1G/100ML (PREMIX) 1-5 GM/100ML-% SOLUTION: Performed by: INTERNAL MEDICINE

## 2021-01-01 PROCEDURE — P9612 CATHETERIZE FOR URINE SPEC: HCPCS

## 2021-01-01 PROCEDURE — 1111F DSCHRG MED/CURRENT MED MERGE: CPT | Performed by: INTERNAL MEDICINE

## 2021-01-01 PROCEDURE — 84145 PROCALCITONIN (PCT): CPT | Performed by: INTERNAL MEDICINE

## 2021-01-01 PROCEDURE — 25010000002 ALBUMIN HUMAN 25% PER 50 ML: Performed by: NURSE PRACTITIONER

## 2021-01-01 PROCEDURE — 25010000002 DEXAMETHASONE PER 1 MG: Performed by: NURSE ANESTHETIST, CERTIFIED REGISTERED

## 2021-01-01 PROCEDURE — 85610 PROTHROMBIN TIME: CPT | Performed by: NURSE PRACTITIONER

## 2021-01-01 PROCEDURE — 97162 PT EVAL MOD COMPLEX 30 MIN: CPT

## 2021-01-01 PROCEDURE — 93005 ELECTROCARDIOGRAM TRACING: CPT | Performed by: INTERNAL MEDICINE

## 2021-01-01 PROCEDURE — 87186 SC STD MICRODIL/AGAR DIL: CPT | Performed by: INTERNAL MEDICINE

## 2021-01-01 PROCEDURE — 85610 PROTHROMBIN TIME: CPT | Performed by: HOSPITALIST

## 2021-01-01 PROCEDURE — 99232 SBSQ HOSP IP/OBS MODERATE 35: CPT | Performed by: NURSE PRACTITIONER

## 2021-01-01 PROCEDURE — 25010000002 FUROSEMIDE PER 20 MG: Performed by: INTERNAL MEDICINE

## 2021-01-01 PROCEDURE — 25010000002 HYDROMORPHONE HCL-NACL 30-0.9 MG/30ML-% SOLUTION PREFILLED SYRINGE: Performed by: INTERNAL MEDICINE

## 2021-01-01 PROCEDURE — 25010000002 VANCOMYCIN 1 G RECONSTITUTED SOLUTION 1 EACH VIAL: Performed by: INTERNAL MEDICINE

## 2021-01-01 PROCEDURE — 80069 RENAL FUNCTION PANEL: CPT | Performed by: NURSE PRACTITIONER

## 2021-01-01 PROCEDURE — 87206 SMEAR FLUORESCENT/ACID STAI: CPT | Performed by: INTERNAL MEDICINE

## 2021-01-01 PROCEDURE — 36415 COLL VENOUS BLD VENIPUNCTURE: CPT

## 2021-01-01 PROCEDURE — 93005 ELECTROCARDIOGRAM TRACING: CPT | Performed by: EMERGENCY MEDICINE

## 2021-01-01 PROCEDURE — 63710000001 ONDANSETRON PER 8 MG: Performed by: HOSPITALIST

## 2021-01-01 PROCEDURE — 74018 RADEX ABDOMEN 1 VIEW: CPT

## 2021-01-01 PROCEDURE — 87116 MYCOBACTERIA CULTURE: CPT | Performed by: INTERNAL MEDICINE

## 2021-01-01 PROCEDURE — 25010000002 VANCOMYCIN PER 500 MG

## 2021-01-01 PROCEDURE — 25010000002 ONDANSETRON PER 1 MG: Performed by: NURSE PRACTITIONER

## 2021-01-01 PROCEDURE — 86923 COMPATIBILITY TEST ELECTRIC: CPT

## 2021-01-01 PROCEDURE — 25010000002 PIPERACILLIN SOD-TAZOBACTAM PER 1 G: Performed by: INTERNAL MEDICINE

## 2021-01-01 PROCEDURE — 25010000002 AMIODARONE IN DEXTROSE 5% 150-4.21 MG/100ML-% SOLUTION: Performed by: NURSE PRACTITIONER

## 2021-01-01 PROCEDURE — P9047 ALBUMIN (HUMAN), 25%, 50ML: HCPCS | Performed by: INTERNAL MEDICINE

## 2021-01-01 PROCEDURE — 87015 SPECIMEN INFECT AGNT CONCNTJ: CPT | Performed by: INTERNAL MEDICINE

## 2021-01-01 PROCEDURE — 83540 ASSAY OF IRON: CPT | Performed by: INTERNAL MEDICINE

## 2021-01-01 PROCEDURE — 82330 ASSAY OF CALCIUM: CPT | Performed by: PHYSICIAN ASSISTANT

## 2021-01-01 PROCEDURE — G0378 HOSPITAL OBSERVATION PER HR: HCPCS

## 2021-01-01 PROCEDURE — 71045 X-RAY EXAM CHEST 1 VIEW: CPT

## 2021-01-01 PROCEDURE — 86901 BLOOD TYPING SEROLOGIC RH(D): CPT | Performed by: INTERNAL MEDICINE

## 2021-01-01 PROCEDURE — 97166 OT EVAL MOD COMPLEX 45 MIN: CPT

## 2021-01-01 PROCEDURE — 99214 OFFICE O/P EST MOD 30 MIN: CPT | Performed by: NURSE PRACTITIONER

## 2021-01-01 PROCEDURE — U0005 INFEC AGEN DETEC AMPLI PROBE: HCPCS | Performed by: INTERNAL MEDICINE

## 2021-01-01 PROCEDURE — 85007 BL SMEAR W/DIFF WBC COUNT: CPT | Performed by: FAMILY MEDICINE

## 2021-01-01 PROCEDURE — 88342 IMHCHEM/IMCYTCHM 1ST ANTB: CPT | Performed by: SURGERY

## 2021-01-01 PROCEDURE — 97161 PT EVAL LOW COMPLEX 20 MIN: CPT

## 2021-01-01 PROCEDURE — 25010000002 ONDANSETRON PER 1 MG: Performed by: SURGERY

## 2021-01-01 PROCEDURE — 87077 CULTURE AEROBIC IDENTIFY: CPT | Performed by: INTERNAL MEDICINE

## 2021-01-01 PROCEDURE — 25010000002 AMIODARONE IN DEXTROSE 5% 360-4.14 MG/200ML-% SOLUTION: Performed by: NURSE PRACTITIONER

## 2021-01-01 PROCEDURE — 87040 BLOOD CULTURE FOR BACTERIA: CPT | Performed by: INTERNAL MEDICINE

## 2021-01-01 PROCEDURE — P9047 ALBUMIN (HUMAN), 25%, 50ML: HCPCS

## 2021-01-01 PROCEDURE — 85025 COMPLETE CBC W/AUTO DIFF WBC: CPT | Performed by: INTERNAL MEDICINE

## 2021-01-01 PROCEDURE — 84466 ASSAY OF TRANSFERRIN: CPT | Performed by: INTERNAL MEDICINE

## 2021-01-01 PROCEDURE — 99223 1ST HOSP IP/OBS HIGH 75: CPT | Performed by: HOSPITALIST

## 2021-01-01 PROCEDURE — 85025 COMPLETE CBC W/AUTO DIFF WBC: CPT | Performed by: NURSE PRACTITIONER

## 2021-01-01 PROCEDURE — 87086 URINE CULTURE/COLONY COUNT: CPT

## 2021-01-01 PROCEDURE — 87641 MR-STAPH DNA AMP PROBE: CPT

## 2021-01-01 PROCEDURE — 85025 COMPLETE CBC W/AUTO DIFF WBC: CPT | Performed by: PHYSICIAN ASSISTANT

## 2021-01-01 PROCEDURE — 25010000002 DIPHENHYDRAMINE PER 50 MG: Performed by: INTERNAL MEDICINE

## 2021-01-01 PROCEDURE — 94640 AIRWAY INHALATION TREATMENT: CPT

## 2021-01-01 PROCEDURE — 99223 1ST HOSP IP/OBS HIGH 75: CPT | Performed by: INTERNAL MEDICINE

## 2021-01-01 PROCEDURE — 83970 ASSAY OF PARATHORMONE: CPT | Performed by: INTERNAL MEDICINE

## 2021-01-01 PROCEDURE — 87086 URINE CULTURE/COLONY COUNT: CPT | Performed by: INTERNAL MEDICINE

## 2021-01-01 PROCEDURE — 83605 ASSAY OF LACTIC ACID: CPT | Performed by: PHYSICIAN ASSISTANT

## 2021-01-01 PROCEDURE — P9041 ALBUMIN (HUMAN),5%, 50ML: HCPCS | Performed by: NURSE ANESTHETIST, CERTIFIED REGISTERED

## 2021-01-01 PROCEDURE — 71250 CT THORAX DX C-: CPT

## 2021-01-01 PROCEDURE — 25010000002 CEFTRIAXONE PER 250 MG: Performed by: HOSPITALIST

## 2021-01-01 PROCEDURE — 86850 RBC ANTIBODY SCREEN: CPT | Performed by: INTERNAL MEDICINE

## 2021-01-01 PROCEDURE — 5A1D70Z PERFORMANCE OF URINARY FILTRATION, INTERMITTENT, LESS THAN 6 HOURS PER DAY: ICD-10-PCS | Performed by: INTERNAL MEDICINE

## 2021-01-01 PROCEDURE — 83735 ASSAY OF MAGNESIUM: CPT | Performed by: NURSE PRACTITIONER

## 2021-01-01 PROCEDURE — 84145 PROCALCITONIN (PCT): CPT | Performed by: FAMILY MEDICINE

## 2021-01-01 PROCEDURE — 25010000002 METOCLOPRAMIDE PER 10 MG: Performed by: SURGERY

## 2021-01-01 PROCEDURE — 85610 PROTHROMBIN TIME: CPT

## 2021-01-01 PROCEDURE — 25010000002 AMIODARONE IN DEXTROSE 5% 360-4.14 MG/200ML-% SOLUTION: Performed by: INTERNAL MEDICINE

## 2021-01-01 PROCEDURE — 84443 ASSAY THYROID STIM HORMONE: CPT | Performed by: EMERGENCY MEDICINE

## 2021-01-01 PROCEDURE — 89051 BODY FLUID CELL COUNT: CPT | Performed by: EMERGENCY MEDICINE

## 2021-01-01 PROCEDURE — 97165 OT EVAL LOW COMPLEX 30 MIN: CPT

## 2021-01-01 PROCEDURE — C1750 CATH, HEMODIALYSIS,LONG-TERM: HCPCS | Performed by: SURGERY

## 2021-01-01 PROCEDURE — 87086 URINE CULTURE/COLONY COUNT: CPT | Performed by: NURSE PRACTITIONER

## 2021-01-01 PROCEDURE — 0 CEFAZOLIN PER 500 MG: Performed by: STUDENT IN AN ORGANIZED HEALTH CARE EDUCATION/TRAINING PROGRAM

## 2021-01-01 PROCEDURE — 83605 ASSAY OF LACTIC ACID: CPT | Performed by: INTERNAL MEDICINE

## 2021-01-01 PROCEDURE — 25010000002 VANCOMYCIN PER 500 MG: Performed by: INTERNAL MEDICINE

## 2021-01-01 PROCEDURE — 84132 ASSAY OF SERUM POTASSIUM: CPT | Performed by: SURGERY

## 2021-01-01 PROCEDURE — 85027 COMPLETE CBC AUTOMATED: CPT | Performed by: FAMILY MEDICINE

## 2021-01-01 PROCEDURE — 63710000001 INSULIN LISPRO (HUMAN) PER 5 UNITS: Performed by: SURGERY

## 2021-01-01 PROCEDURE — 63710000001 TACROLIMUS PER 1 MG: Performed by: HOSPITALIST

## 2021-01-01 PROCEDURE — 85060 BLOOD SMEAR INTERPRETATION: CPT | Performed by: STUDENT IN AN ORGANIZED HEALTH CARE EDUCATION/TRAINING PROGRAM

## 2021-01-01 PROCEDURE — 25010000002 VANCOMYCIN 10 G RECONSTITUTED SOLUTION: Performed by: EMERGENCY MEDICINE

## 2021-01-01 PROCEDURE — 25010000002 CEFTRIAXONE PER 250 MG: Performed by: INTERNAL MEDICINE

## 2021-01-01 PROCEDURE — 80074 ACUTE HEPATITIS PANEL: CPT | Performed by: INTERNAL MEDICINE

## 2021-01-01 PROCEDURE — 99222 1ST HOSP IP/OBS MODERATE 55: CPT | Performed by: INTERNAL MEDICINE

## 2021-01-01 PROCEDURE — 97110 THERAPEUTIC EXERCISES: CPT | Performed by: PHYSICAL THERAPIST

## 2021-01-01 PROCEDURE — 25010000002 PROPOFOL 10 MG/ML EMULSION: Performed by: NURSE ANESTHETIST, CERTIFIED REGISTERED

## 2021-01-01 PROCEDURE — 86901 BLOOD TYPING SEROLOGIC RH(D): CPT | Performed by: SURGERY

## 2021-01-01 PROCEDURE — 83880 ASSAY OF NATRIURETIC PEPTIDE: CPT | Performed by: EMERGENCY MEDICINE

## 2021-01-01 PROCEDURE — 0 PHYTONADIONE 10 MG/ML SOLUTION 1 ML AMPULE: Performed by: SURGERY

## 2021-01-01 PROCEDURE — 82306 VITAMIN D 25 HYDROXY: CPT | Performed by: INTERNAL MEDICINE

## 2021-01-01 PROCEDURE — 0JH63XZ INSERTION OF TUNNELED VASCULAR ACCESS DEVICE INTO CHEST SUBCUTANEOUS TISSUE AND FASCIA, PERCUTANEOUS APPROACH: ICD-10-PCS | Performed by: SURGERY

## 2021-01-01 PROCEDURE — 25010000002 ONDANSETRON PER 1 MG: Performed by: EMERGENCY MEDICINE

## 2021-01-01 PROCEDURE — 25010000002 VANCOMYCIN 10 G RECONSTITUTED SOLUTION: Performed by: PHYSICIAN ASSISTANT

## 2021-01-01 PROCEDURE — 87147 CULTURE TYPE IMMUNOLOGIC: CPT | Performed by: HOSPITALIST

## 2021-01-01 PROCEDURE — 80069 RENAL FUNCTION PANEL: CPT | Performed by: INTERNAL MEDICINE

## 2021-01-01 PROCEDURE — 99221 1ST HOSP IP/OBS SF/LOW 40: CPT | Performed by: INTERNAL MEDICINE

## 2021-01-01 PROCEDURE — 87205 SMEAR GRAM STAIN: CPT | Performed by: EMERGENCY MEDICINE

## 2021-01-01 PROCEDURE — 88313 SPECIAL STAINS GROUP 2: CPT | Performed by: SURGERY

## 2021-01-01 PROCEDURE — 25010000002 ONDANSETRON PER 1 MG: Performed by: HOSPITALIST

## 2021-01-01 PROCEDURE — 97110 THERAPEUTIC EXERCISES: CPT

## 2021-01-01 PROCEDURE — 82330 ASSAY OF CALCIUM: CPT

## 2021-01-01 PROCEDURE — 84484 ASSAY OF TROPONIN QUANT: CPT | Performed by: HOSPITALIST

## 2021-01-01 PROCEDURE — P9047 ALBUMIN (HUMAN), 25%, 50ML: HCPCS | Performed by: NURSE PRACTITIONER

## 2021-01-01 PROCEDURE — 0D9670Z DRAINAGE OF STOMACH WITH DRAINAGE DEVICE, VIA NATURAL OR ARTIFICIAL OPENING: ICD-10-PCS | Performed by: SURGERY

## 2021-01-01 PROCEDURE — 81003 URINALYSIS AUTO W/O SCOPE: CPT | Performed by: INTERNAL MEDICINE

## 2021-01-01 PROCEDURE — 84100 ASSAY OF PHOSPHORUS: CPT | Performed by: FAMILY MEDICINE

## 2021-01-01 PROCEDURE — 82465 ASSAY BLD/SERUM CHOLESTEROL: CPT

## 2021-01-01 PROCEDURE — 0 POTASSIUM CHLORIDE 10 MEQ/100ML SOLUTION: Performed by: INTERNAL MEDICINE

## 2021-01-01 PROCEDURE — 0DBB0ZZ EXCISION OF ILEUM, OPEN APPROACH: ICD-10-PCS | Performed by: SURGERY

## 2021-01-01 PROCEDURE — 25010000002 PIPERACILLIN SOD-TAZOBACTAM PER 1 G: Performed by: EMERGENCY MEDICINE

## 2021-01-01 PROCEDURE — 87102 FUNGUS ISOLATION CULTURE: CPT | Performed by: INTERNAL MEDICINE

## 2021-01-01 PROCEDURE — 85610 PROTHROMBIN TIME: CPT | Performed by: FAMILY MEDICINE

## 2021-01-01 PROCEDURE — 84478 ASSAY OF TRIGLYCERIDES: CPT

## 2021-01-01 PROCEDURE — 99283 EMERGENCY DEPT VISIT LOW MDM: CPT

## 2021-01-01 PROCEDURE — 97535 SELF CARE MNGMENT TRAINING: CPT

## 2021-01-01 PROCEDURE — 97164 PT RE-EVAL EST PLAN CARE: CPT | Performed by: PHYSICAL THERAPIST

## 2021-01-01 PROCEDURE — 87150 DNA/RNA AMPLIFIED PROBE: CPT | Performed by: HOSPITALIST

## 2021-01-01 PROCEDURE — 84100 ASSAY OF PHOSPHORUS: CPT | Performed by: INTERNAL MEDICINE

## 2021-01-01 PROCEDURE — 80048 BASIC METABOLIC PNL TOTAL CA: CPT | Performed by: FAMILY MEDICINE

## 2021-01-01 PROCEDURE — 93005 ELECTROCARDIOGRAM TRACING: CPT

## 2021-01-01 PROCEDURE — 83735 ASSAY OF MAGNESIUM: CPT | Performed by: EMERGENCY MEDICINE

## 2021-01-01 PROCEDURE — 93306 TTE W/DOPPLER COMPLETE: CPT

## 2021-01-01 PROCEDURE — 84100 ASSAY OF PHOSPHORUS: CPT | Performed by: SURGERY

## 2021-01-01 PROCEDURE — 74250 X-RAY XM SM INT 1CNTRST STD: CPT

## 2021-01-01 PROCEDURE — 99231 SBSQ HOSP IP/OBS SF/LOW 25: CPT | Performed by: INTERNAL MEDICINE

## 2021-01-01 PROCEDURE — 93306 TTE W/DOPPLER COMPLETE: CPT | Performed by: INTERNAL MEDICINE

## 2021-01-01 PROCEDURE — 99214 OFFICE O/P EST MOD 30 MIN: CPT | Performed by: INTERNAL MEDICINE

## 2021-01-01 PROCEDURE — 25010000002 PIPERACILLIN SOD-TAZOBACTAM PER 1 G: Performed by: SURGERY

## 2021-01-01 PROCEDURE — 86850 RBC ANTIBODY SCREEN: CPT | Performed by: SURGERY

## 2021-01-01 PROCEDURE — 88360 TUMOR IMMUNOHISTOCHEM/MANUAL: CPT | Performed by: SURGERY

## 2021-01-01 PROCEDURE — 87070 CULTURE OTHR SPECIMN AEROBIC: CPT | Performed by: HOSPITALIST

## 2021-01-01 PROCEDURE — 02HV33Z INSERTION OF INFUSION DEVICE INTO SUPERIOR VENA CAVA, PERCUTANEOUS APPROACH: ICD-10-PCS | Performed by: SURGERY

## 2021-01-01 PROCEDURE — 81003 URINALYSIS AUTO W/O SCOPE: CPT | Performed by: NURSE PRACTITIONER

## 2021-01-01 PROCEDURE — 84132 ASSAY OF SERUM POTASSIUM: CPT | Performed by: FAMILY MEDICINE

## 2021-01-01 PROCEDURE — 80202 ASSAY OF VANCOMYCIN: CPT | Performed by: INTERNAL MEDICINE

## 2021-01-01 PROCEDURE — P9016 RBC LEUKOCYTES REDUCED: HCPCS

## 2021-01-01 PROCEDURE — U0004 COV-19 TEST NON-CDC HGH THRU: HCPCS | Performed by: INTERNAL MEDICINE

## 2021-01-01 PROCEDURE — 81001 URINALYSIS AUTO W/SCOPE: CPT | Performed by: EMERGENCY MEDICINE

## 2021-01-01 PROCEDURE — 86900 BLOOD TYPING SEROLOGIC ABO: CPT | Performed by: INTERNAL MEDICINE

## 2021-01-01 PROCEDURE — 87015 SPECIMEN INFECT AGNT CONCNTJ: CPT | Performed by: HOSPITALIST

## 2021-01-01 PROCEDURE — 80048 BASIC METABOLIC PNL TOTAL CA: CPT | Performed by: INTERNAL MEDICINE

## 2021-01-01 PROCEDURE — 84100 ASSAY OF PHOSPHORUS: CPT | Performed by: PHYSICIAN ASSISTANT

## 2021-01-01 PROCEDURE — 36430 TRANSFUSION BLD/BLD COMPNT: CPT

## 2021-01-01 PROCEDURE — B5181ZA FLUOROSCOPY OF SUPERIOR VENA CAVA USING LOW OSMOLAR CONTRAST, GUIDANCE: ICD-10-PCS | Performed by: SURGERY

## 2021-01-01 PROCEDURE — 85610 PROTHROMBIN TIME: CPT | Performed by: PHYSICIAN ASSISTANT

## 2021-01-01 PROCEDURE — 84484 ASSAY OF TROPONIN QUANT: CPT | Performed by: INTERNAL MEDICINE

## 2021-01-01 PROCEDURE — 76000 FLUOROSCOPY <1 HR PHYS/QHP: CPT

## 2021-01-01 PROCEDURE — 25010000002 ALBUMIN HUMAN 5% PER 50 ML: Performed by: NURSE ANESTHETIST, CERTIFIED REGISTERED

## 2021-01-01 PROCEDURE — 99239 HOSP IP/OBS DSCHRG MGMT >30: CPT | Performed by: NURSE PRACTITIONER

## 2021-01-01 PROCEDURE — 86900 BLOOD TYPING SEROLOGIC ABO: CPT

## 2021-01-01 PROCEDURE — 3E1M39Z IRRIGATION OF PERITONEAL CAVITY USING DIALYSATE, PERCUTANEOUS APPROACH: ICD-10-PCS | Performed by: INTERNAL MEDICINE

## 2021-01-01 PROCEDURE — 93005 ELECTROCARDIOGRAM TRACING: CPT | Performed by: PHYSICIAN ASSISTANT

## 2021-01-01 PROCEDURE — 82310 ASSAY OF CALCIUM: CPT | Performed by: INTERNAL MEDICINE

## 2021-01-01 PROCEDURE — 25010000002 ALBUMIN HUMAN 25% PER 50 ML

## 2021-01-01 PROCEDURE — 87070 CULTURE OTHR SPECIMN AEROBIC: CPT | Performed by: EMERGENCY MEDICINE

## 2021-01-01 PROCEDURE — 99213 OFFICE O/P EST LOW 20 MIN: CPT | Performed by: NURSE PRACTITIONER

## 2021-01-01 PROCEDURE — 93971 EXTREMITY STUDY: CPT | Performed by: INTERNAL MEDICINE

## 2021-01-01 PROCEDURE — 86140 C-REACTIVE PROTEIN: CPT

## 2021-01-01 PROCEDURE — 82330 ASSAY OF CALCIUM: CPT | Performed by: SURGERY

## 2021-01-01 PROCEDURE — C1751 CATH, INF, PER/CENT/MIDLINE: HCPCS

## 2021-01-01 PROCEDURE — 84134 ASSAY OF PREALBUMIN: CPT

## 2021-01-01 PROCEDURE — 99284 EMERGENCY DEPT VISIT MOD MDM: CPT

## 2021-01-01 PROCEDURE — 99233 SBSQ HOSP IP/OBS HIGH 50: CPT | Performed by: FAMILY MEDICINE

## 2021-01-01 PROCEDURE — 02H633Z INSERTION OF INFUSION DEVICE INTO RIGHT ATRIUM, PERCUTANEOUS APPROACH: ICD-10-PCS | Performed by: SURGERY

## 2021-01-01 PROCEDURE — 25010000002 ONDANSETRON PER 1 MG: Performed by: NURSE ANESTHETIST, CERTIFIED REGISTERED

## 2021-01-01 PROCEDURE — 25010000002 FENTANYL CITRATE (PF) 50 MCG/ML SOLUTION: Performed by: NURSE ANESTHETIST, CERTIFIED REGISTERED

## 2021-01-01 PROCEDURE — 0T9B70Z DRAINAGE OF BLADDER WITH DRAINAGE DEVICE, VIA NATURAL OR ARTIFICIAL OPENING: ICD-10-PCS | Performed by: SURGERY

## 2021-01-01 PROCEDURE — 87205 SMEAR GRAM STAIN: CPT | Performed by: INTERNAL MEDICINE

## 2021-01-01 PROCEDURE — 25010000002 FENTANYL CITRATE (PF) 50 MCG/ML SOLUTION

## 2021-01-01 PROCEDURE — 25010000002 CEFTRIAXONE PER 250 MG: Performed by: NURSE PRACTITIONER

## 2021-01-01 PROCEDURE — 87040 BLOOD CULTURE FOR BACTERIA: CPT | Performed by: PHYSICIAN ASSISTANT

## 2021-01-01 PROCEDURE — 87205 SMEAR GRAM STAIN: CPT | Performed by: HOSPITALIST

## 2021-01-01 PROCEDURE — 25010000002 HYDROMORPHONE HCL-NACL 30-0.9 MG/30ML-% SOLUTION PREFILLED SYRINGE: Performed by: SURGERY

## 2021-01-01 PROCEDURE — 85025 COMPLETE CBC W/AUTO DIFF WBC: CPT

## 2021-01-01 PROCEDURE — 84439 ASSAY OF FREE THYROXINE: CPT | Performed by: NURSE PRACTITIONER

## 2021-01-01 PROCEDURE — 25010000002 CEFTRIAXONE PER 250 MG: Performed by: EMERGENCY MEDICINE

## 2021-01-01 PROCEDURE — 99495 TRANSJ CARE MGMT MOD F2F 14D: CPT | Performed by: INTERNAL MEDICINE

## 2021-01-01 PROCEDURE — 87070 CULTURE OTHR SPECIMN AEROBIC: CPT | Performed by: INTERNAL MEDICINE

## 2021-01-01 PROCEDURE — 0DBH0ZZ EXCISION OF CECUM, OPEN APPROACH: ICD-10-PCS | Performed by: SURGERY

## 2021-01-01 PROCEDURE — 87040 BLOOD CULTURE FOR BACTERIA: CPT | Performed by: HOSPITALIST

## 2021-01-01 DEVICE — PROXIMATE LINEAR CUTTER RELOAD, BLUE, 75MM
Type: IMPLANTABLE DEVICE | Site: ABDOMEN | Status: FUNCTIONAL
Brand: PROXIMATE

## 2021-01-01 DEVICE — PROXIMATE RELOADABLE LINEAR CUTTER WITH SAFETY LOCK-OUT, 75MM
Type: IMPLANTABLE DEVICE | Site: ABDOMEN | Status: FUNCTIONAL
Brand: PROXIMATE

## 2021-01-01 DEVICE — ABSORBABLE HEMOSTAT (OXIDIZED REGENERATED CELLULOSE, U.S.P.)
Type: IMPLANTABLE DEVICE | Site: ABDOMEN | Status: FUNCTIONAL
Brand: SURGICEL

## 2021-01-01 DEVICE — PROXIMATE RELOADABLE LINEAR STAPLER
Type: IMPLANTABLE DEVICE | Site: ABDOMEN | Status: FUNCTIONAL
Brand: PROXIMATE

## 2021-01-01 RX ORDER — GLYCOPYRROLATE 0.2 MG/ML
0.2 INJECTION INTRAMUSCULAR; INTRAVENOUS EVERY 6 HOURS PRN
Status: DISCONTINUED | OUTPATIENT
Start: 2021-01-01 | End: 2021-01-01

## 2021-01-01 RX ORDER — SODIUM CHLORIDE 0.9 % (FLUSH) 0.9 %
10 SYRINGE (ML) INJECTION AS NEEDED
Status: DISCONTINUED | OUTPATIENT
Start: 2021-01-01 | End: 2021-01-01 | Stop reason: HOSPADM

## 2021-01-01 RX ORDER — TEMAZEPAM 15 MG/1
15 CAPSULE ORAL NIGHTLY PRN
Status: CANCELLED | OUTPATIENT
Start: 2021-01-01

## 2021-01-01 RX ORDER — ALBUMIN (HUMAN) 12.5 G/50ML
SOLUTION INTRAVENOUS
Status: DISCONTINUED
Start: 2021-01-01 | End: 2021-01-01 | Stop reason: HOSPADM

## 2021-01-01 RX ORDER — LORAZEPAM 2 MG/ML
0.5 INJECTION INTRAMUSCULAR
Status: DISCONTINUED | OUTPATIENT
Start: 2021-01-01 | End: 2021-01-01 | Stop reason: HOSPADM

## 2021-01-01 RX ORDER — ALBUMIN (HUMAN) 12.5 G/50ML
25 SOLUTION INTRAVENOUS 3 TIMES DAILY
Status: COMPLETED | OUTPATIENT
Start: 2021-01-01 | End: 2021-01-01

## 2021-01-01 RX ORDER — ONDANSETRON 2 MG/ML
4 INJECTION INTRAMUSCULAR; INTRAVENOUS EVERY 6 HOURS PRN
Status: DISCONTINUED | OUTPATIENT
Start: 2021-01-01 | End: 2021-01-01 | Stop reason: HOSPADM

## 2021-01-01 RX ORDER — AMOXICILLIN 250 MG
2 CAPSULE ORAL 2 TIMES DAILY
Status: DISCONTINUED | OUTPATIENT
Start: 2021-01-01 | End: 2021-01-01 | Stop reason: HOSPADM

## 2021-01-01 RX ORDER — NICOTINE POLACRILEX 4 MG
15 LOZENGE BUCCAL
Status: DISCONTINUED | OUTPATIENT
Start: 2021-01-01 | End: 2021-01-01 | Stop reason: HOSPADM

## 2021-01-01 RX ORDER — CINACALCET 30 MG/1
60 TABLET, FILM COATED ORAL NIGHTLY
Status: DISCONTINUED | OUTPATIENT
Start: 2021-01-01 | End: 2021-01-01

## 2021-01-01 RX ORDER — CEFTRIAXONE SODIUM 1 G/50ML
1 INJECTION, SOLUTION INTRAVENOUS
Status: DISCONTINUED | OUTPATIENT
Start: 2021-01-01 | End: 2021-01-01

## 2021-01-01 RX ORDER — DEXAMETHASONE SODIUM PHOSPHATE 4 MG/ML
INJECTION, SOLUTION INTRA-ARTICULAR; INTRALESIONAL; INTRAMUSCULAR; INTRAVENOUS; SOFT TISSUE AS NEEDED
Status: DISCONTINUED | OUTPATIENT
Start: 2021-01-01 | End: 2021-01-01 | Stop reason: SURG

## 2021-01-01 RX ORDER — METOPROLOL TARTRATE 100 MG/1
100 TABLET ORAL EVERY 12 HOURS SCHEDULED
Status: DISCONTINUED | OUTPATIENT
Start: 2021-01-01 | End: 2021-01-01 | Stop reason: HOSPADM

## 2021-01-01 RX ORDER — MIDODRINE HYDROCHLORIDE 5 MG/1
5 TABLET ORAL
Status: DISCONTINUED | OUTPATIENT
Start: 2021-01-01 | End: 2021-01-01

## 2021-01-01 RX ORDER — SODIUM CHLORIDE 0.9 % (FLUSH) 0.9 %
10 SYRINGE (ML) INJECTION EVERY 12 HOURS SCHEDULED
Status: CANCELLED | OUTPATIENT
Start: 2021-01-01

## 2021-01-01 RX ORDER — WARFARIN SODIUM 2 MG/1
2 TABLET ORAL
Status: DISCONTINUED | OUTPATIENT
Start: 2021-01-01 | End: 2021-01-01

## 2021-01-01 RX ORDER — ACETAMINOPHEN 325 MG/1
650 TABLET ORAL EVERY 6 HOURS PRN
Status: CANCELLED | OUTPATIENT
Start: 2021-01-01

## 2021-01-01 RX ORDER — POTASSIUM CHLORIDE 7.45 MG/ML
10 INJECTION INTRAVENOUS
Status: COMPLETED | OUTPATIENT
Start: 2021-01-01 | End: 2021-01-01

## 2021-01-01 RX ORDER — SODIUM CHLORIDE, SODIUM LACTATE, CALCIUM CHLORIDE, MAGNESIUM CHLORIDE AND DEXTROSE 1.5; 538; 448; 25.7; 5.08 G/100ML; MG/100ML; MG/100ML; MG/100ML; MG/100ML
2000 INJECTION, SOLUTION INTRAPERITONEAL ONCE
Status: DISCONTINUED | OUTPATIENT
Start: 2021-01-01 | End: 2021-01-01

## 2021-01-01 RX ORDER — HYDROXYZINE HYDROCHLORIDE 25 MG/1
25 TABLET, FILM COATED ORAL 3 TIMES DAILY PRN
Status: DISCONTINUED | OUTPATIENT
Start: 2021-01-01 | End: 2021-01-01

## 2021-01-01 RX ORDER — ONDANSETRON 2 MG/ML
4 INJECTION INTRAMUSCULAR; INTRAVENOUS EVERY 6 HOURS PRN
Status: CANCELLED | OUTPATIENT
Start: 2021-01-01

## 2021-01-01 RX ORDER — SODIUM CHLORIDE, SODIUM LACTATE, CALCIUM CHLORIDE, MAGNESIUM CHLORIDE AND DEXTROSE 1.5; 538; 448; 25.7; 5.08 G/100ML; MG/100ML; MG/100ML; MG/100ML; MG/100ML
2000 INJECTION, SOLUTION INTRAPERITONEAL
Status: DISCONTINUED | OUTPATIENT
Start: 2021-01-01 | End: 2021-01-01

## 2021-01-01 RX ORDER — TACROLIMUS 0.5 MG/1
0.5 CAPSULE ORAL EVERY 12 HOURS SCHEDULED
Status: DISCONTINUED | OUTPATIENT
Start: 2021-01-01 | End: 2021-01-01

## 2021-01-01 RX ORDER — AMOXICILLIN AND CLAVULANATE POTASSIUM 500; 125 MG/1; MG/1
TABLET, FILM COATED ORAL
Qty: 6 TABLET | Refills: 0 | Status: SHIPPED | OUTPATIENT
Start: 2021-01-01 | End: 2021-01-01

## 2021-01-01 RX ORDER — CEFDINIR 300 MG/1
300 CAPSULE ORAL NIGHTLY
Status: DISCONTINUED | OUTPATIENT
Start: 2021-01-01 | End: 2021-01-01 | Stop reason: HOSPADM

## 2021-01-01 RX ORDER — LIDOCAINE 50 MG/G
1 PATCH TOPICAL
Status: DISCONTINUED | OUTPATIENT
Start: 2021-01-01 | End: 2021-01-01

## 2021-01-01 RX ORDER — ONDANSETRON 4 MG/1
4 TABLET, FILM COATED ORAL EVERY 6 HOURS PRN
Status: DISCONTINUED | OUTPATIENT
Start: 2021-01-01 | End: 2021-01-01 | Stop reason: HOSPADM

## 2021-01-01 RX ORDER — WARFARIN SODIUM 3 MG/1
6 TABLET ORAL
Status: DISCONTINUED | OUTPATIENT
Start: 2021-01-01 | End: 2021-01-01

## 2021-01-01 RX ORDER — TEMAZEPAM 15 MG/1
15 CAPSULE ORAL NIGHTLY PRN
Status: DISCONTINUED | OUTPATIENT
Start: 2021-01-01 | End: 2021-01-01 | Stop reason: HOSPADM

## 2021-01-01 RX ORDER — GLYCOPYRROLATE 0.2 MG/ML
0.1 INJECTION INTRAMUSCULAR; INTRAVENOUS ONCE
Status: DISCONTINUED | OUTPATIENT
Start: 2021-01-01 | End: 2021-01-01 | Stop reason: HOSPADM

## 2021-01-01 RX ORDER — WARFARIN SODIUM 4 MG/1
4 TABLET ORAL
Status: DISCONTINUED | OUTPATIENT
Start: 2021-01-01 | End: 2021-01-01

## 2021-01-01 RX ORDER — LORAZEPAM 2 MG/ML
0.5 INJECTION INTRAMUSCULAR EVERY 4 HOURS PRN
Status: DISCONTINUED | OUTPATIENT
Start: 2021-01-01 | End: 2021-01-01

## 2021-01-01 RX ORDER — WARFARIN SODIUM 2 MG/1
TABLET ORAL
Qty: 90 TABLET | Refills: 0 | Status: SHIPPED | OUTPATIENT
Start: 2021-01-01 | End: 2021-01-01

## 2021-01-01 RX ORDER — ALLOPURINOL 100 MG/1
100 TABLET ORAL AS NEEDED
COMMUNITY
Start: 2021-01-01

## 2021-01-01 RX ORDER — CALCITRIOL 0.25 UG/1
0.25 CAPSULE, LIQUID FILLED ORAL 3 TIMES WEEKLY
Status: DISCONTINUED | OUTPATIENT
Start: 2021-01-01 | End: 2021-01-01

## 2021-01-01 RX ORDER — DIPHENHYDRAMINE HYDROCHLORIDE 50 MG/ML
12.5 INJECTION INTRAMUSCULAR; INTRAVENOUS EVERY 6 HOURS PRN
Status: DISCONTINUED | OUTPATIENT
Start: 2021-01-01 | End: 2021-01-01 | Stop reason: HOSPADM

## 2021-01-01 RX ORDER — NALOXONE HCL 0.4 MG/ML
0.1 VIAL (ML) INJECTION
Status: DISCONTINUED | OUTPATIENT
Start: 2021-01-01 | End: 2021-01-01 | Stop reason: HOSPADM

## 2021-01-01 RX ORDER — CALCITRIOL 0.25 UG/1
0.5 CAPSULE, LIQUID FILLED ORAL 3 TIMES WEEKLY
Status: CANCELLED | OUTPATIENT
Start: 2021-01-01

## 2021-01-01 RX ORDER — SODIUM CHLORIDE 0.9 % (FLUSH) 0.9 %
10 SYRINGE (ML) INJECTION EVERY 12 HOURS SCHEDULED
Status: DISCONTINUED | OUTPATIENT
Start: 2021-01-01 | End: 2021-01-01

## 2021-01-01 RX ORDER — ONDANSETRON 4 MG/1
4 TABLET, FILM COATED ORAL EVERY 6 HOURS PRN
Status: DISCONTINUED | OUTPATIENT
Start: 2021-01-01 | End: 2021-01-01

## 2021-01-01 RX ORDER — POTASSIUM CHLORIDE 20 MEQ/1
20 TABLET, EXTENDED RELEASE ORAL 2 TIMES DAILY
Qty: 4 TABLET | Refills: 0 | Status: SHIPPED | OUTPATIENT
Start: 2021-01-01 | End: 2021-01-01

## 2021-01-01 RX ORDER — ALBUMIN (HUMAN) 12.5 G/50ML
25 SOLUTION INTRAVENOUS ONCE
Status: COMPLETED | OUTPATIENT
Start: 2021-01-01 | End: 2021-01-01

## 2021-01-01 RX ORDER — SODIUM CHLORIDE, SODIUM LACTATE, CALCIUM CHLORIDE, MAGNESIUM CHLORIDE AND DEXTROSE 1.5; 538; 448; 25.7; 5.08 G/100ML; MG/100ML; MG/100ML; MG/100ML; MG/100ML
2000 INJECTION, SOLUTION INTRAPERITONEAL ONCE
Status: COMPLETED | OUTPATIENT
Start: 2021-01-01 | End: 2021-01-01

## 2021-01-01 RX ORDER — METOCLOPRAMIDE HYDROCHLORIDE 5 MG/ML
5 INJECTION INTRAMUSCULAR; INTRAVENOUS EVERY 6 HOURS SCHEDULED
Status: DISCONTINUED | OUTPATIENT
Start: 2021-01-01 | End: 2021-01-01

## 2021-01-01 RX ORDER — WARFARIN SODIUM 2 MG/1
TABLET ORAL
Qty: 200 TABLET | Refills: 0 | Status: SHIPPED | OUTPATIENT
Start: 2021-01-01

## 2021-01-01 RX ORDER — WARFARIN SODIUM 3 MG/1
3 TABLET ORAL
Status: DISCONTINUED | OUTPATIENT
Start: 2021-01-01 | End: 2021-01-01 | Stop reason: HOSPADM

## 2021-01-01 RX ORDER — SODIUM CHLORIDE 0.9 % (FLUSH) 0.9 %
10 SYRINGE (ML) INJECTION AS NEEDED
Status: CANCELLED | OUTPATIENT
Start: 2021-01-01

## 2021-01-01 RX ORDER — SODIUM CHLORIDE 0.9 % (FLUSH) 0.9 %
10 SYRINGE (ML) INJECTION EVERY 12 HOURS SCHEDULED
Status: DISCONTINUED | OUTPATIENT
Start: 2021-01-01 | End: 2021-01-01 | Stop reason: HOSPADM

## 2021-01-01 RX ORDER — LORAZEPAM 2 MG/ML
0.5 INJECTION INTRAMUSCULAR EVERY 6 HOURS
Status: DISCONTINUED | OUTPATIENT
Start: 2021-01-01 | End: 2021-01-01

## 2021-01-01 RX ORDER — POTASSIUM CHLORIDE 1.5 G/1.77G
40 POWDER, FOR SOLUTION ORAL DAILY
Status: DISCONTINUED | OUTPATIENT
Start: 2021-01-01 | End: 2021-01-01

## 2021-01-01 RX ORDER — CINACALCET 60 MG/1
60 TABLET, FILM COATED ORAL DAILY
Status: DISCONTINUED | OUTPATIENT
Start: 2021-01-01 | End: 2021-01-01

## 2021-01-01 RX ORDER — LIDOCAINE 50 MG/G
1 PATCH TOPICAL EVERY 24 HOURS
Qty: 30 EACH | Refills: 1 | Status: SHIPPED | OUTPATIENT
Start: 2021-01-01

## 2021-01-01 RX ORDER — CINACALCET 60 MG/1
60 TABLET, FILM COATED ORAL DAILY
COMMUNITY
Start: 2021-01-01

## 2021-01-01 RX ORDER — AMIODARONE HYDROCHLORIDE 200 MG/1
200 TABLET ORAL DAILY
Status: DISCONTINUED | OUTPATIENT
Start: 2021-12-25 | End: 2021-01-01

## 2021-01-01 RX ORDER — POTASSIUM CHLORIDE 750 MG/1
40 CAPSULE, EXTENDED RELEASE ORAL DAILY
Status: DISCONTINUED | OUTPATIENT
Start: 2021-01-01 | End: 2021-01-01 | Stop reason: ALTCHOICE

## 2021-01-01 RX ORDER — NALOXONE HCL 0.4 MG/ML
0.1 VIAL (ML) INJECTION
Status: CANCELLED | OUTPATIENT
Start: 2021-01-01

## 2021-01-01 RX ORDER — FUROSEMIDE 10 MG/ML
40 INJECTION INTRAMUSCULAR; INTRAVENOUS ONCE
Status: COMPLETED | OUTPATIENT
Start: 2021-01-01 | End: 2021-01-01

## 2021-01-01 RX ORDER — SODIUM CHLORIDE, SODIUM LACTATE, CALCIUM CHLORIDE, MAGNESIUM CHLORIDE AND DEXTROSE 1.5; 538; 448; 25.7; 5.08 G/100ML; MG/100ML; MG/100ML; MG/100ML; MG/100ML
2000 INJECTION, SOLUTION INTRAPERITONEAL AS NEEDED
Status: DISCONTINUED | OUTPATIENT
Start: 2021-01-01 | End: 2021-01-01 | Stop reason: HOSPADM

## 2021-01-01 RX ORDER — FENTANYL 12 UG/H
1 PATCH TRANSDERMAL
Status: CANCELLED | OUTPATIENT
Start: 2021-12-16 | End: 2021-12-22

## 2021-01-01 RX ORDER — ACETAMINOPHEN 325 MG/1
650 TABLET ORAL EVERY 6 HOURS PRN
Status: DISCONTINUED | OUTPATIENT
Start: 2021-01-01 | End: 2021-01-01

## 2021-01-01 RX ORDER — HYDROMORPHONE HYDROCHLORIDE 1 MG/ML
0.25 INJECTION, SOLUTION INTRAMUSCULAR; INTRAVENOUS; SUBCUTANEOUS
Status: DISPENSED | OUTPATIENT
Start: 2021-01-01 | End: 2021-01-01

## 2021-01-01 RX ORDER — MIDODRINE HYDROCHLORIDE 5 MG/1
5 TABLET ORAL ONCE
Status: DISCONTINUED | OUTPATIENT
Start: 2021-01-01 | End: 2021-01-01

## 2021-01-01 RX ORDER — SODIUM CHLORIDE 9 MG/ML
10 INJECTION INTRAVENOUS AS NEEDED
Status: DISCONTINUED | OUTPATIENT
Start: 2021-01-01 | End: 2021-01-01 | Stop reason: HOSPADM

## 2021-01-01 RX ORDER — POTASSIUM CHLORIDE 750 MG/1
40 CAPSULE, EXTENDED RELEASE ORAL ONCE
Status: DISCONTINUED | OUTPATIENT
Start: 2021-01-01 | End: 2021-01-01

## 2021-01-01 RX ORDER — CEFAZOLIN SODIUM 2 G/100ML
2 INJECTION, SOLUTION INTRAVENOUS ONCE
Status: DISCONTINUED | OUTPATIENT
Start: 2021-01-01 | End: 2021-01-01

## 2021-01-01 RX ORDER — SEVELAMER CARBONATE 800 MG/1
1600 TABLET, FILM COATED ORAL
Status: DISCONTINUED | OUTPATIENT
Start: 2021-01-01 | End: 2021-01-01

## 2021-01-01 RX ORDER — FUROSEMIDE 40 MG/1
80 TABLET ORAL DAILY
Status: DISCONTINUED | OUTPATIENT
Start: 2021-01-01 | End: 2021-01-01 | Stop reason: HOSPADM

## 2021-01-01 RX ORDER — PROCHLORPERAZINE EDISYLATE 5 MG/ML
5 INJECTION INTRAMUSCULAR; INTRAVENOUS EVERY 6 HOURS PRN
Status: CANCELLED | OUTPATIENT
Start: 2021-01-01

## 2021-01-01 RX ORDER — BUPIVACAINE HYDROCHLORIDE 2.5 MG/ML
INJECTION, SOLUTION EPIDURAL; INFILTRATION; INTRACAUDAL
Status: COMPLETED | OUTPATIENT
Start: 2021-01-01 | End: 2021-01-01

## 2021-01-01 RX ORDER — SEVELAMER HYDROCHLORIDE 800 MG/1
1600 TABLET, FILM COATED ORAL
Status: DISCONTINUED | OUTPATIENT
Start: 2021-01-01 | End: 2021-01-01 | Stop reason: HOSPADM

## 2021-01-01 RX ORDER — CEFTRIAXONE SODIUM 1 G/50ML
1 INJECTION, SOLUTION INTRAVENOUS EVERY 24 HOURS
Status: DISCONTINUED | OUTPATIENT
Start: 2021-01-01 | End: 2021-01-01

## 2021-01-01 RX ORDER — NEOSTIGMINE METHYLSULFATE 1 MG/ML
INJECTION, SOLUTION INTRAVENOUS AS NEEDED
Status: DISCONTINUED | OUTPATIENT
Start: 2021-01-01 | End: 2021-01-01 | Stop reason: SURG

## 2021-01-01 RX ORDER — ACETAMINOPHEN 325 MG/1
650 TABLET ORAL EVERY 6 HOURS
Status: DISCONTINUED | OUTPATIENT
Start: 2021-01-01 | End: 2021-01-01 | Stop reason: HOSPADM

## 2021-01-01 RX ORDER — ALBUTEROL SULFATE 2.5 MG/3ML
2.5 SOLUTION RESPIRATORY (INHALATION) EVERY 4 HOURS PRN
Status: CANCELLED | OUTPATIENT
Start: 2021-01-01

## 2021-01-01 RX ORDER — SEVELAMER CARBONATE FOR ORAL SUSPENSION 800 MG/1
1600 POWDER, FOR SUSPENSION ORAL
Status: DISCONTINUED | OUTPATIENT
Start: 2021-01-01 | End: 2021-01-01

## 2021-01-01 RX ORDER — SCOLOPAMINE TRANSDERMAL SYSTEM 1 MG/1
1 PATCH, EXTENDED RELEASE TRANSDERMAL
Status: DISCONTINUED | OUTPATIENT
Start: 2021-01-01 | End: 2021-01-01 | Stop reason: HOSPADM

## 2021-01-01 RX ORDER — FENTANYL CITRATE 50 UG/ML
INJECTION, SOLUTION INTRAMUSCULAR; INTRAVENOUS AS NEEDED
Status: DISCONTINUED | OUTPATIENT
Start: 2021-01-01 | End: 2021-01-01 | Stop reason: SURG

## 2021-01-01 RX ORDER — PROCHLORPERAZINE EDISYLATE 5 MG/ML
5 INJECTION INTRAMUSCULAR; INTRAVENOUS EVERY 6 HOURS PRN
Status: DISCONTINUED | OUTPATIENT
Start: 2021-01-01 | End: 2021-01-01 | Stop reason: HOSPADM

## 2021-01-01 RX ORDER — SEVELAMER CARBONATE FOR ORAL SUSPENSION 800 MG/1
1600 POWDER, FOR SUSPENSION ORAL
Status: DISCONTINUED | OUTPATIENT
Start: 2021-01-01 | End: 2021-01-01 | Stop reason: HOSPADM

## 2021-01-01 RX ORDER — SODIUM CHLORIDE, SODIUM LACTATE, CALCIUM CHLORIDE, MAGNESIUM CHLORIDE AND DEXTROSE 1.5; 538; 448; 25.7; 5.08 G/100ML; MG/100ML; MG/100ML; MG/100ML; MG/100ML
2000 INJECTION, SOLUTION INTRAPERITONEAL 3 TIMES DAILY
Status: DISCONTINUED | OUTPATIENT
Start: 2021-01-01 | End: 2021-01-01 | Stop reason: ALTCHOICE

## 2021-01-01 RX ORDER — SODIUM CHLORIDE, SODIUM LACTATE, POTASSIUM CHLORIDE, CALCIUM CHLORIDE 600; 310; 30; 20 MG/100ML; MG/100ML; MG/100ML; MG/100ML
125 INJECTION, SOLUTION INTRAVENOUS CONTINUOUS
Status: DISCONTINUED | OUTPATIENT
Start: 2021-01-01 | End: 2021-01-01

## 2021-01-01 RX ORDER — FENTANYL CITRATE 50 UG/ML
50 INJECTION, SOLUTION INTRAMUSCULAR; INTRAVENOUS
Status: COMPLETED | OUTPATIENT
Start: 2021-01-01 | End: 2021-01-01

## 2021-01-01 RX ORDER — CALCIUM ACETATE 667 MG/1
667 CAPSULE ORAL
Status: DISCONTINUED | OUTPATIENT
Start: 2021-01-01 | End: 2021-01-01

## 2021-01-01 RX ORDER — SODIUM CHLORIDE, SODIUM LACTATE, CALCIUM CHLORIDE, MAGNESIUM CHLORIDE AND DEXTROSE 1.5; 538; 448; 25.7; 5.08 G/100ML; MG/100ML; MG/100ML; MG/100ML; MG/100ML
2000 INJECTION, SOLUTION INTRAPERITONEAL
Status: DISCONTINUED | OUTPATIENT
Start: 2021-01-01 | End: 2021-01-01 | Stop reason: HOSPADM

## 2021-01-01 RX ORDER — LIDOCAINE HYDROCHLORIDE 10 MG/ML
INJECTION, SOLUTION EPIDURAL; INFILTRATION; INTRACAUDAL; PERINEURAL AS NEEDED
Status: DISCONTINUED | OUTPATIENT
Start: 2021-01-01 | End: 2021-01-01 | Stop reason: SURG

## 2021-01-01 RX ORDER — POTASSIUM CHLORIDE 750 MG/1
40 CAPSULE, EXTENDED RELEASE ORAL ONCE
Status: COMPLETED | OUTPATIENT
Start: 2021-01-01 | End: 2021-01-01

## 2021-01-01 RX ORDER — FENTANYL 12 UG/H
1 PATCH TRANSDERMAL
Status: DISCONTINUED | OUTPATIENT
Start: 2021-01-01 | End: 2021-01-01 | Stop reason: HOSPADM

## 2021-01-01 RX ORDER — CEFDINIR 300 MG/1
300 CAPSULE ORAL NIGHTLY
Qty: 4 CAPSULE | Refills: 0 | Status: SHIPPED | OUTPATIENT
Start: 2021-01-01 | End: 2021-01-01

## 2021-01-01 RX ORDER — METOPROLOL TARTRATE 5 MG/5ML
5 INJECTION INTRAVENOUS EVERY 6 HOURS
Status: DISCONTINUED | OUTPATIENT
Start: 2021-01-01 | End: 2021-01-01

## 2021-01-01 RX ORDER — ALBUTEROL SULFATE 2.5 MG/3ML
2.5 SOLUTION RESPIRATORY (INHALATION) EVERY 4 HOURS PRN
Status: DISCONTINUED | OUTPATIENT
Start: 2021-01-01 | End: 2021-01-01 | Stop reason: HOSPADM

## 2021-01-01 RX ORDER — WARFARIN SODIUM 2 MG/1
TABLET ORAL
Qty: 180 TABLET | Refills: 0 | Status: SHIPPED | OUTPATIENT
Start: 2021-01-01 | End: 2021-01-01

## 2021-01-01 RX ORDER — CINACALCET 30 MG/1
30 TABLET, FILM COATED ORAL NIGHTLY
Status: DISCONTINUED | OUTPATIENT
Start: 2021-01-01 | End: 2021-01-01 | Stop reason: HOSPADM

## 2021-01-01 RX ORDER — DOXEPIN HYDROCHLORIDE 10 MG/1
10 CAPSULE ORAL ONCE
Status: COMPLETED | OUTPATIENT
Start: 2021-01-01 | End: 2021-01-01

## 2021-01-01 RX ORDER — FENTANYL 12 UG/H
1 PATCH TRANSDERMAL
Status: DISCONTINUED | OUTPATIENT
Start: 2021-12-16 | End: 2021-01-01 | Stop reason: HOSPADM

## 2021-01-01 RX ORDER — DOXYCYCLINE HYCLATE 100 MG/1
100 CAPSULE ORAL 2 TIMES DAILY
Qty: 10 CAPSULE | Refills: 0 | Status: SHIPPED | OUTPATIENT
Start: 2021-01-01 | End: 2021-01-01

## 2021-01-01 RX ORDER — LORAZEPAM 2 MG/ML
0.5 INJECTION INTRAMUSCULAR EVERY 6 HOURS SCHEDULED
Status: DISCONTINUED | OUTPATIENT
Start: 2021-01-01 | End: 2021-01-01 | Stop reason: HOSPADM

## 2021-01-01 RX ORDER — AMIODARONE HYDROCHLORIDE 200 MG/1
200 TABLET ORAL EVERY 12 HOURS
Status: DISCONTINUED | OUTPATIENT
Start: 2021-01-01 | End: 2021-01-01

## 2021-01-01 RX ORDER — AMLODIPINE BESYLATE 5 MG/1
5 TABLET ORAL
Qty: 30 TABLET | Refills: 1 | Status: SHIPPED | OUTPATIENT
Start: 2021-01-01 | End: 2021-01-01

## 2021-01-01 RX ORDER — BISACODYL 10 MG
10 SUPPOSITORY, RECTAL RECTAL DAILY PRN
Status: DISCONTINUED | OUTPATIENT
Start: 2021-01-01 | End: 2021-01-01 | Stop reason: HOSPADM

## 2021-01-01 RX ORDER — ACETAMINOPHEN 650 MG/1
650 SUPPOSITORY RECTAL EVERY 4 HOURS PRN
Status: DISCONTINUED | OUTPATIENT
Start: 2021-01-01 | End: 2021-01-01 | Stop reason: HOSPADM

## 2021-01-01 RX ORDER — TACROLIMUS 1 MG/1
1 CAPSULE ORAL EVERY 12 HOURS
Status: DISCONTINUED | OUTPATIENT
Start: 2021-01-01 | End: 2021-01-01

## 2021-01-01 RX ORDER — HALOPERIDOL 1 MG/1
0.5 TABLET ORAL EVERY 12 HOURS PRN
Status: DISCONTINUED | OUTPATIENT
Start: 2021-01-01 | End: 2021-01-01 | Stop reason: HOSPADM

## 2021-01-01 RX ORDER — SEVELAMER CARBONATE 800 MG/1
1600 TABLET, FILM COATED ORAL
Status: DISCONTINUED | OUTPATIENT
Start: 2021-01-01 | End: 2021-01-01 | Stop reason: ALTCHOICE

## 2021-01-01 RX ORDER — LORAZEPAM 2 MG/ML
0.5 INJECTION INTRAMUSCULAR ONCE
Status: COMPLETED | OUTPATIENT
Start: 2021-01-01 | End: 2021-01-01

## 2021-01-01 RX ORDER — BUPIVACAINE HYDROCHLORIDE AND EPINEPHRINE 2.5; 5 MG/ML; UG/ML
INJECTION, SOLUTION EPIDURAL; INFILTRATION; INTRACAUDAL; PERINEURAL AS NEEDED
Status: DISCONTINUED | OUTPATIENT
Start: 2021-01-01 | End: 2021-01-01 | Stop reason: HOSPADM

## 2021-01-01 RX ORDER — MAGNESIUM SULFATE 1 G/100ML
1 INJECTION INTRAVENOUS ONCE
Status: COMPLETED | OUTPATIENT
Start: 2021-01-01 | End: 2021-01-01

## 2021-01-01 RX ORDER — POTASSIUM CHLORIDE 1.5 G/1.77G
40 POWDER, FOR SOLUTION ORAL
Status: DISCONTINUED | OUTPATIENT
Start: 2021-01-01 | End: 2021-01-01

## 2021-01-01 RX ORDER — AMIODARONE HYDROCHLORIDE 200 MG/1
200 TABLET ORAL ONCE
Status: COMPLETED | OUTPATIENT
Start: 2021-01-01 | End: 2021-01-01

## 2021-01-01 RX ORDER — CHOLECALCIFEROL (VITAMIN D3) 125 MCG
5 CAPSULE ORAL NIGHTLY PRN
Status: DISCONTINUED | OUTPATIENT
Start: 2021-01-01 | End: 2021-01-01 | Stop reason: HOSPADM

## 2021-01-01 RX ORDER — SODIUM CHLORIDE, SODIUM LACTATE, CALCIUM CHLORIDE, MAGNESIUM CHLORIDE AND DEXTROSE 1.5; 538; 448; 25.7; 5.08 G/100ML; MG/100ML; MG/100ML; MG/100ML; MG/100ML
2000 INJECTION, SOLUTION INTRAPERITONEAL 4 TIMES DAILY
Status: DISCONTINUED | OUTPATIENT
Start: 2021-01-01 | End: 2021-01-01 | Stop reason: DRUGHIGH

## 2021-01-01 RX ORDER — DOXYCYCLINE 100 MG/1
100 CAPSULE ORAL EVERY 12 HOURS SCHEDULED
Status: DISCONTINUED | OUTPATIENT
Start: 2021-01-01 | End: 2021-01-01 | Stop reason: HOSPADM

## 2021-01-01 RX ORDER — GENTAMICIN SULFATE 1 MG/G
OINTMENT TOPICAL DAILY
Status: DISCONTINUED | OUTPATIENT
Start: 2021-01-01 | End: 2021-01-01 | Stop reason: HOSPADM

## 2021-01-01 RX ORDER — DILTIAZEM HCL IN NACL,ISO-OSM 125 MG/125
5-15 PLASTIC BAG, INJECTION (ML) INTRAVENOUS
Status: DISCONTINUED | OUTPATIENT
Start: 2021-01-01 | End: 2021-01-01

## 2021-01-01 RX ORDER — ACETAMINOPHEN 325 MG/1
650 TABLET ORAL EVERY 6 HOURS
Status: DISCONTINUED | OUTPATIENT
Start: 2021-01-01 | End: 2021-01-01

## 2021-01-01 RX ORDER — BISACODYL 10 MG
10 SUPPOSITORY, RECTAL RECTAL ONCE
Status: COMPLETED | OUTPATIENT
Start: 2021-01-01 | End: 2021-01-01

## 2021-01-01 RX ORDER — METOCLOPRAMIDE 5 MG/1
5 TABLET ORAL
Status: DISCONTINUED | OUTPATIENT
Start: 2021-01-01 | End: 2021-01-01 | Stop reason: HOSPADM

## 2021-01-01 RX ORDER — ONDANSETRON 4 MG/1
4 TABLET, FILM COATED ORAL EVERY 6 HOURS PRN
Status: CANCELLED | OUTPATIENT
Start: 2021-01-01

## 2021-01-01 RX ORDER — LIDOCAINE 50 MG/G
1 PATCH TOPICAL NIGHTLY
Status: DISCONTINUED | OUTPATIENT
Start: 2021-01-01 | End: 2021-01-01 | Stop reason: HOSPADM

## 2021-01-01 RX ORDER — LANSOPRAZOLE
30 KIT
Status: DISCONTINUED | OUTPATIENT
Start: 2021-01-01 | End: 2021-01-01 | Stop reason: HOSPADM

## 2021-01-01 RX ORDER — ALBUMIN (HUMAN) 12.5 G/50ML
12.5 SOLUTION INTRAVENOUS AS NEEDED
Status: ACTIVE | OUTPATIENT
Start: 2021-01-01 | End: 2021-01-01

## 2021-01-01 RX ORDER — FENTANYL CITRATE 50 UG/ML
INJECTION, SOLUTION INTRAMUSCULAR; INTRAVENOUS
Status: COMPLETED
Start: 2021-01-01 | End: 2021-01-01

## 2021-01-01 RX ORDER — SEVELAMER CARBONATE 800 MG/1
1600 TABLET, FILM COATED ORAL 3 TIMES DAILY
Status: DISCONTINUED | OUTPATIENT
Start: 2021-01-01 | End: 2021-01-01

## 2021-01-01 RX ORDER — ONDANSETRON 2 MG/ML
INJECTION INTRAMUSCULAR; INTRAVENOUS AS NEEDED
Status: DISCONTINUED | OUTPATIENT
Start: 2021-01-01 | End: 2021-01-01 | Stop reason: SURG

## 2021-01-01 RX ORDER — BUPIVACAINE HCL/0.9 % NACL/PF 0.125 %
PLASTIC BAG, INJECTION (ML) EPIDURAL AS NEEDED
Status: DISCONTINUED | OUTPATIENT
Start: 2021-01-01 | End: 2021-01-01 | Stop reason: SURG

## 2021-01-01 RX ORDER — CEFTRIAXONE SODIUM 1 G/50ML
1 INJECTION, SOLUTION INTRAVENOUS EVERY 24 HOURS
Status: COMPLETED | OUTPATIENT
Start: 2021-01-01 | End: 2021-01-01

## 2021-01-01 RX ORDER — AMIODARONE HYDROCHLORIDE 200 MG/1
200 TABLET ORAL EVERY 8 HOURS
Status: DISCONTINUED | OUTPATIENT
Start: 2021-01-01 | End: 2021-01-01

## 2021-01-01 RX ORDER — SEVELAMER CARBONATE FOR ORAL SUSPENSION 800 MG/1
4800 POWDER, FOR SUSPENSION ORAL
Status: DISCONTINUED | OUTPATIENT
Start: 2021-01-01 | End: 2021-01-01

## 2021-01-01 RX ORDER — WARFARIN SODIUM 4 MG/1
4 TABLET ORAL
Status: DISCONTINUED | OUTPATIENT
Start: 2021-01-01 | End: 2021-01-01 | Stop reason: HOSPADM

## 2021-01-01 RX ORDER — FUROSEMIDE 10 MG/ML
60 INJECTION INTRAMUSCULAR; INTRAVENOUS ONCE
Status: COMPLETED | OUTPATIENT
Start: 2021-01-01 | End: 2021-01-01

## 2021-01-01 RX ORDER — SODIUM CHLORIDE 9 MG/ML
10 INJECTION INTRAVENOUS AS NEEDED
Status: CANCELLED | OUTPATIENT
Start: 2021-01-01

## 2021-01-01 RX ORDER — PANTOPRAZOLE SODIUM 40 MG/10ML
40 INJECTION, POWDER, LYOPHILIZED, FOR SOLUTION INTRAVENOUS
Status: DISCONTINUED | OUTPATIENT
Start: 2021-01-01 | End: 2021-01-01 | Stop reason: SDUPTHER

## 2021-01-01 RX ORDER — ESCITALOPRAM OXALATE 5 MG/1
5 TABLET ORAL EVERY MORNING
Qty: 30 TABLET | Refills: 5 | Status: SHIPPED | OUTPATIENT
Start: 2021-01-01

## 2021-01-01 RX ORDER — ACETAMINOPHEN 325 MG/1
650 TABLET ORAL EVERY 4 HOURS PRN
Status: DISCONTINUED | OUTPATIENT
Start: 2021-01-01 | End: 2021-01-01 | Stop reason: HOSPADM

## 2021-01-01 RX ORDER — MAGNESIUM SULFATE 1 G/100ML
1 INJECTION INTRAVENOUS ONCE
Status: CANCELLED | OUTPATIENT
Start: 2021-01-01

## 2021-01-01 RX ORDER — POTASSIUM CHLORIDE 750 MG/1
20 CAPSULE, EXTENDED RELEASE ORAL DAILY
Status: DISCONTINUED | OUTPATIENT
Start: 2021-01-01 | End: 2021-01-01 | Stop reason: HOSPADM

## 2021-01-01 RX ORDER — SODIUM CHLORIDE 0.9 % (FLUSH) 0.9 %
10 SYRINGE (ML) INJECTION AS NEEDED
Status: DISCONTINUED | OUTPATIENT
Start: 2021-01-01 | End: 2021-01-01

## 2021-01-01 RX ORDER — FUROSEMIDE 10 MG/ML
INJECTION INTRAMUSCULAR; INTRAVENOUS
Status: COMPLETED
Start: 2021-01-01 | End: 2021-01-01

## 2021-01-01 RX ORDER — PANTOPRAZOLE SODIUM 40 MG/1
40 TABLET, DELAYED RELEASE ORAL EVERY MORNING
Status: DISCONTINUED | OUTPATIENT
Start: 2021-01-01 | End: 2021-01-01

## 2021-01-01 RX ORDER — HALOPERIDOL 5 MG/ML
1 INJECTION INTRAMUSCULAR EVERY 4 HOURS PRN
Status: DISCONTINUED | OUTPATIENT
Start: 2021-01-01 | End: 2021-01-01 | Stop reason: HOSPADM

## 2021-01-01 RX ORDER — GLYCOPYRROLATE 0.2 MG/ML
0.2 INJECTION INTRAMUSCULAR; INTRAVENOUS EVERY 4 HOURS PRN
Status: DISCONTINUED | OUTPATIENT
Start: 2021-01-01 | End: 2021-01-01 | Stop reason: HOSPADM

## 2021-01-01 RX ORDER — PREDNISONE 20 MG/1
20 TABLET ORAL ONCE
Status: COMPLETED | OUTPATIENT
Start: 2021-01-01 | End: 2021-01-01

## 2021-01-01 RX ORDER — SODIUM CHLORIDE, SODIUM LACTATE, CALCIUM CHLORIDE, MAGNESIUM CHLORIDE AND DEXTROSE 1.5; 538; 448; 25.7; 5.08 G/100ML; MG/100ML; MG/100ML; MG/100ML; MG/100ML
500 INJECTION, SOLUTION INTRAPERITONEAL ONCE
Status: COMPLETED | OUTPATIENT
Start: 2021-01-01 | End: 2021-01-01

## 2021-01-01 RX ORDER — LORAZEPAM 2 MG/ML
1 INJECTION INTRAMUSCULAR EVERY 6 HOURS
Status: DISCONTINUED | OUTPATIENT
Start: 2021-01-01 | End: 2021-01-01 | Stop reason: HOSPADM

## 2021-01-01 RX ORDER — LORAZEPAM 2 MG/ML
0.5 INJECTION INTRAMUSCULAR
Status: DISCONTINUED | OUTPATIENT
Start: 2021-01-01 | End: 2021-01-01

## 2021-01-01 RX ORDER — HYDROMORPHONE HYDROCHLORIDE 1 MG/ML
0.2 INJECTION, SOLUTION INTRAMUSCULAR; INTRAVENOUS; SUBCUTANEOUS
Status: DISCONTINUED | OUTPATIENT
Start: 2021-01-01 | End: 2021-01-01

## 2021-01-01 RX ORDER — SODIUM BICARBONATE 650 MG/1
650 TABLET ORAL 3 TIMES DAILY
Status: CANCELLED | OUTPATIENT
Start: 2021-01-01

## 2021-01-01 RX ORDER — SODIUM CHLORIDE 9 MG/ML
INJECTION, SOLUTION INTRAVENOUS CONTINUOUS PRN
Status: DISCONTINUED | OUTPATIENT
Start: 2021-01-01 | End: 2021-01-01 | Stop reason: SURG

## 2021-01-01 RX ORDER — PANTOPRAZOLE SODIUM 40 MG/1
40 TABLET, DELAYED RELEASE ORAL
Status: DISCONTINUED | OUTPATIENT
Start: 2021-01-01 | End: 2021-01-01

## 2021-01-01 RX ORDER — DEXAMETHASONE SODIUM PHOSPHATE 10 MG/ML
INJECTION, SOLUTION INTRAMUSCULAR; INTRAVENOUS
Status: COMPLETED | OUTPATIENT
Start: 2021-01-01 | End: 2021-01-01

## 2021-01-01 RX ORDER — DEXTROSE MONOHYDRATE 25 G/50ML
25 INJECTION, SOLUTION INTRAVENOUS
Status: DISCONTINUED | OUTPATIENT
Start: 2021-01-01 | End: 2021-01-01 | Stop reason: HOSPADM

## 2021-01-01 RX ORDER — ACETAMINOPHEN 160 MG/5ML
650 SOLUTION ORAL EVERY 4 HOURS PRN
Status: DISCONTINUED | OUTPATIENT
Start: 2021-01-01 | End: 2021-01-01 | Stop reason: HOSPADM

## 2021-01-01 RX ORDER — PANTOPRAZOLE SODIUM 40 MG/1
40 TABLET, DELAYED RELEASE ORAL EVERY MORNING
Status: DISCONTINUED | OUTPATIENT
Start: 2021-01-01 | End: 2021-01-01 | Stop reason: HOSPADM

## 2021-01-01 RX ORDER — TACROLIMUS 1 MG/1
1 CAPSULE ORAL EVERY 12 HOURS SCHEDULED
Status: DISCONTINUED | OUTPATIENT
Start: 2021-01-01 | End: 2021-01-01 | Stop reason: HOSPADM

## 2021-01-01 RX ORDER — LORAZEPAM 2 MG/ML
1 INJECTION INTRAMUSCULAR
Status: DISCONTINUED | OUTPATIENT
Start: 2021-01-01 | End: 2021-01-01 | Stop reason: HOSPADM

## 2021-01-01 RX ORDER — PREDNISONE 20 MG/1
20 TABLET ORAL DAILY
Qty: 4 TABLET | Refills: 0 | Status: SHIPPED | OUTPATIENT
Start: 2021-01-01

## 2021-01-01 RX ORDER — LIDOCAINE HYDROCHLORIDE 10 MG/ML
0.5 INJECTION, SOLUTION EPIDURAL; INFILTRATION; INTRACAUDAL; PERINEURAL ONCE AS NEEDED
Status: DISCONTINUED | OUTPATIENT
Start: 2021-01-01 | End: 2021-01-01

## 2021-01-01 RX ORDER — POTASSIUM CHLORIDE 7.45 MG/ML
10 INJECTION INTRAVENOUS
Status: DISCONTINUED | OUTPATIENT
Start: 2021-01-01 | End: 2021-01-01

## 2021-01-01 RX ORDER — GLYCOPYRROLATE 0.2 MG/ML
INJECTION INTRAMUSCULAR; INTRAVENOUS AS NEEDED
Status: DISCONTINUED | OUTPATIENT
Start: 2021-01-01 | End: 2021-01-01 | Stop reason: SURG

## 2021-01-01 RX ORDER — DOXEPIN HYDROCHLORIDE 10 MG/1
10 CAPSULE ORAL EVERY 8 HOURS PRN
Status: DISPENSED | OUTPATIENT
Start: 2021-01-01 | End: 2021-01-01

## 2021-01-01 RX ORDER — DOXYCYCLINE 100 MG/1
100 CAPSULE ORAL EVERY 12 HOURS SCHEDULED
Qty: 7 CAPSULE | Refills: 0 | Status: SHIPPED | OUTPATIENT
Start: 2021-01-01 | End: 2021-01-01

## 2021-01-01 RX ORDER — ESCITALOPRAM OXALATE 10 MG/1
5 TABLET ORAL EVERY MORNING
Status: DISCONTINUED | OUTPATIENT
Start: 2021-01-01 | End: 2021-01-01 | Stop reason: HOSPADM

## 2021-01-01 RX ORDER — ONDANSETRON 2 MG/ML
4 INJECTION INTRAMUSCULAR; INTRAVENOUS
Status: DISCONTINUED | OUTPATIENT
Start: 2021-01-01 | End: 2021-01-01

## 2021-01-01 RX ORDER — WARFARIN SODIUM 3 MG/1
3 TABLET ORAL
Status: DISCONTINUED | OUTPATIENT
Start: 2021-01-01 | End: 2021-01-01

## 2021-01-01 RX ORDER — ALBUMIN (HUMAN) 12.5 G/50ML
SOLUTION INTRAVENOUS
Status: COMPLETED
Start: 2021-01-01 | End: 2021-01-01

## 2021-01-01 RX ORDER — PANTOPRAZOLE SODIUM 40 MG/1
40 TABLET, DELAYED RELEASE ORAL DAILY
Status: DISCONTINUED | OUTPATIENT
Start: 2021-01-01 | End: 2021-01-01 | Stop reason: HOSPADM

## 2021-01-01 RX ORDER — LANSOPRAZOLE
30 KIT
Status: CANCELLED | OUTPATIENT
Start: 2021-01-01

## 2021-01-01 RX ORDER — ROCURONIUM BROMIDE 10 MG/ML
INJECTION, SOLUTION INTRAVENOUS AS NEEDED
Status: DISCONTINUED | OUTPATIENT
Start: 2021-01-01 | End: 2021-01-01 | Stop reason: SURG

## 2021-01-01 RX ORDER — POLYVINYL ALCOHOL 14 MG/ML
2 SOLUTION/ DROPS OPHTHALMIC
Status: DISCONTINUED | OUTPATIENT
Start: 2021-01-01 | End: 2021-01-01 | Stop reason: HOSPADM

## 2021-01-01 RX ORDER — POTASSIUM CHLORIDE 750 MG/1
30 CAPSULE, EXTENDED RELEASE ORAL ONCE
Status: COMPLETED | OUTPATIENT
Start: 2021-01-01 | End: 2021-01-01

## 2021-01-01 RX ORDER — PROPOFOL 10 MG/ML
VIAL (ML) INTRAVENOUS AS NEEDED
Status: DISCONTINUED | OUTPATIENT
Start: 2021-01-01 | End: 2021-01-01 | Stop reason: SURG

## 2021-01-01 RX ORDER — HYDROMORPHONE HYDROCHLORIDE 1 MG/ML
0.2 INJECTION, SOLUTION INTRAMUSCULAR; INTRAVENOUS; SUBCUTANEOUS
Status: CANCELLED | OUTPATIENT
Start: 2021-01-01 | End: 2022-01-02

## 2021-01-01 RX ORDER — COLCHICINE 0.6 MG/1
0.6 TABLET ORAL 2 TIMES DAILY
Qty: 28 TABLET | Refills: 0 | Status: SHIPPED | OUTPATIENT
Start: 2021-01-01

## 2021-01-01 RX ORDER — NITROGLYCERIN 10 MG/100ML
5-200 INJECTION INTRAVENOUS
Status: DISCONTINUED | OUTPATIENT
Start: 2021-01-01 | End: 2021-01-01

## 2021-01-01 RX ORDER — SODIUM BICARBONATE 650 MG/1
650 TABLET ORAL 3 TIMES DAILY
Status: DISCONTINUED | OUTPATIENT
Start: 2021-01-01 | End: 2021-01-01 | Stop reason: HOSPADM

## 2021-01-01 RX ORDER — METOPROLOL TARTRATE 5 MG/5ML
2.5 INJECTION INTRAVENOUS ONCE
Status: COMPLETED | OUTPATIENT
Start: 2021-01-01 | End: 2021-01-01

## 2021-01-01 RX ORDER — HYDROMORPHONE HYDROCHLORIDE 1 MG/ML
0.5 INJECTION, SOLUTION INTRAMUSCULAR; INTRAVENOUS; SUBCUTANEOUS
Status: DISCONTINUED | OUTPATIENT
Start: 2021-01-01 | End: 2021-01-01

## 2021-01-01 RX ORDER — MAGNESIUM HYDROXIDE 1200 MG/15ML
LIQUID ORAL AS NEEDED
Status: DISCONTINUED | OUTPATIENT
Start: 2021-01-01 | End: 2021-01-01 | Stop reason: HOSPADM

## 2021-01-01 RX ORDER — DIPHENHYDRAMINE HYDROCHLORIDE 50 MG/ML
12.5 INJECTION INTRAMUSCULAR; INTRAVENOUS EVERY 6 HOURS PRN
Status: CANCELLED | OUTPATIENT
Start: 2021-01-01

## 2021-01-01 RX ORDER — AMLODIPINE BESYLATE 5 MG/1
5 TABLET ORAL
Status: DISCONTINUED | OUTPATIENT
Start: 2021-01-01 | End: 2021-01-01 | Stop reason: HOSPADM

## 2021-01-01 RX ORDER — SODIUM CHLORIDE, SODIUM LACTATE, POTASSIUM CHLORIDE, CALCIUM CHLORIDE 600; 310; 30; 20 MG/100ML; MG/100ML; MG/100ML; MG/100ML
9 INJECTION, SOLUTION INTRAVENOUS CONTINUOUS PRN
Status: DISCONTINUED | OUTPATIENT
Start: 2021-01-01 | End: 2021-01-01

## 2021-01-01 RX ORDER — ONDANSETRON 2 MG/ML
4 INJECTION INTRAMUSCULAR; INTRAVENOUS EVERY 6 HOURS PRN
Status: DISCONTINUED | OUTPATIENT
Start: 2021-01-01 | End: 2021-01-01

## 2021-01-01 RX ORDER — FENTANYL 12 UG/H
1 PATCH TRANSDERMAL
Status: DISCONTINUED | OUTPATIENT
Start: 2021-01-01 | End: 2021-01-01

## 2021-01-01 RX ORDER — DIPHENHYDRAMINE HYDROCHLORIDE 50 MG/ML
25 INJECTION INTRAMUSCULAR; INTRAVENOUS EVERY 6 HOURS PRN
Status: DISCONTINUED | OUTPATIENT
Start: 2021-01-01 | End: 2021-01-01

## 2021-01-01 RX ORDER — LORAZEPAM 2 MG/ML
0.5 INJECTION INTRAMUSCULAR
Status: CANCELLED | OUTPATIENT
Start: 2021-01-01

## 2021-01-01 RX ORDER — WARFARIN SODIUM 5 MG/1
5 TABLET ORAL
Status: DISCONTINUED | OUTPATIENT
Start: 2021-01-01 | End: 2021-01-01

## 2021-01-01 RX ORDER — ACETAMINOPHEN 325 MG/1
650 TABLET ORAL EVERY 6 HOURS
Status: CANCELLED | OUTPATIENT
Start: 2021-01-01

## 2021-01-01 RX ORDER — LORAZEPAM 2 MG/ML
0.25 INJECTION INTRAMUSCULAR ONCE
Status: COMPLETED | OUTPATIENT
Start: 2021-01-01 | End: 2021-01-01

## 2021-01-01 RX ORDER — CALCITRIOL 0.25 UG/1
0.5 CAPSULE, LIQUID FILLED ORAL 3 TIMES WEEKLY
Status: DISCONTINUED | OUTPATIENT
Start: 2021-01-01 | End: 2021-01-01 | Stop reason: HOSPADM

## 2021-01-01 RX ORDER — HYDROMORPHONE HYDROCHLORIDE 1 MG/ML
0.2 INJECTION, SOLUTION INTRAMUSCULAR; INTRAVENOUS; SUBCUTANEOUS
Status: DISCONTINUED | OUTPATIENT
Start: 2021-01-01 | End: 2021-01-01 | Stop reason: HOSPADM

## 2021-01-01 RX ORDER — METOCLOPRAMIDE 5 MG/1
5 TABLET ORAL
Qty: 90 TABLET | Refills: 0 | Status: SHIPPED | OUTPATIENT
Start: 2021-01-01 | End: 2021-01-01

## 2021-01-01 RX ORDER — FAMOTIDINE 20 MG/1
20 TABLET, FILM COATED ORAL
Status: CANCELLED | OUTPATIENT
Start: 2021-01-01

## 2021-01-01 RX ORDER — BISACODYL 5 MG/1
5 TABLET, DELAYED RELEASE ORAL DAILY PRN
Status: DISCONTINUED | OUTPATIENT
Start: 2021-01-01 | End: 2021-01-01 | Stop reason: HOSPADM

## 2021-01-01 RX ORDER — SACCHAROMYCES BOULARDII 250 MG
250 CAPSULE ORAL 2 TIMES DAILY
Status: DISCONTINUED | OUTPATIENT
Start: 2021-01-01 | End: 2021-01-01 | Stop reason: HOSPADM

## 2021-01-01 RX ORDER — SODIUM CHLORIDE 0.9 % (FLUSH) 0.9 %
20 SYRINGE (ML) INJECTION AS NEEDED
Status: DISCONTINUED | OUTPATIENT
Start: 2021-01-01 | End: 2021-01-01 | Stop reason: HOSPADM

## 2021-01-01 RX ORDER — ALBUMIN, HUMAN INJ 5% 5 %
SOLUTION INTRAVENOUS CONTINUOUS PRN
Status: DISCONTINUED | OUTPATIENT
Start: 2021-01-01 | End: 2021-01-01 | Stop reason: SURG

## 2021-01-01 RX ORDER — TEMAZEPAM 15 MG/1
15 CAPSULE ORAL NIGHTLY PRN
Status: DISCONTINUED | OUTPATIENT
Start: 2021-01-01 | End: 2021-01-01

## 2021-01-01 RX ORDER — ONDANSETRON 2 MG/ML
4 INJECTION INTRAMUSCULAR; INTRAVENOUS EVERY 6 HOURS PRN
Status: DISCONTINUED | OUTPATIENT
Start: 2021-01-01 | End: 2021-01-01 | Stop reason: SDUPTHER

## 2021-01-01 RX ORDER — ACETAMINOPHEN 325 MG/1
650 TABLET ORAL EVERY 6 HOURS PRN
Status: DISCONTINUED | OUTPATIENT
Start: 2021-01-01 | End: 2021-01-01 | Stop reason: HOSPADM

## 2021-01-01 RX ORDER — AMLODIPINE BESYLATE 5 MG/1
5 TABLET ORAL DAILY
COMMUNITY
Start: 2021-01-01

## 2021-01-01 RX ORDER — MIDAZOLAM HYDROCHLORIDE 1 MG/ML
0.5 INJECTION INTRAMUSCULAR; INTRAVENOUS
Status: DISCONTINUED | OUTPATIENT
Start: 2021-01-01 | End: 2021-01-01

## 2021-01-01 RX ORDER — LORAZEPAM 2 MG/ML
0.5 INJECTION INTRAMUSCULAR EVERY 6 HOURS SCHEDULED
Status: CANCELLED | OUTPATIENT
Start: 2021-01-01 | End: 2021-12-23

## 2021-01-01 RX ORDER — METOPROLOL TARTRATE 100 MG/1
100 TABLET ORAL EVERY 12 HOURS SCHEDULED
Qty: 60 TABLET | Refills: 0 | Status: SHIPPED | OUTPATIENT
Start: 2021-01-01 | End: 2021-01-01

## 2021-01-01 RX ORDER — TEMAZEPAM 15 MG/1
15 CAPSULE ORAL NIGHTLY PRN
COMMUNITY

## 2021-01-01 RX ORDER — ESCITALOPRAM OXALATE 10 MG/1
5 TABLET ORAL DAILY
Status: DISCONTINUED | OUTPATIENT
Start: 2021-01-01 | End: 2021-01-01 | Stop reason: HOSPADM

## 2021-01-01 RX ORDER — ASPIRIN 325 MG
325 TABLET, DELAYED RELEASE (ENTERIC COATED) ORAL ONCE
Status: COMPLETED | OUTPATIENT
Start: 2021-01-01 | End: 2021-01-01

## 2021-01-01 RX ORDER — WARFARIN SODIUM 3 MG/1
6 TABLET ORAL
Status: DISCONTINUED | OUTPATIENT
Start: 2021-01-01 | End: 2021-01-01 | Stop reason: HOSPADM

## 2021-01-01 RX ORDER — SODIUM CHLORIDE 0.9 % (FLUSH) 0.9 %
20 SYRINGE (ML) INJECTION AS NEEDED
Status: CANCELLED | OUTPATIENT
Start: 2021-01-01

## 2021-01-01 RX ADMIN — ONDANSETRON 4 MG: 2 INJECTION INTRAMUSCULAR; INTRAVENOUS at 09:02

## 2021-01-01 RX ADMIN — DOXYCYCLINE 100 MG: 100 CAPSULE ORAL at 12:28

## 2021-01-01 RX ADMIN — HYDROMORPHONE HYDROCHLORIDE 0.25 MG: 1 INJECTION, SOLUTION INTRAMUSCULAR; INTRAVENOUS; SUBCUTANEOUS at 23:02

## 2021-01-01 RX ADMIN — LORAZEPAM 0.5 MG: 2 INJECTION INTRAMUSCULAR; INTRAVENOUS at 15:41

## 2021-01-01 RX ADMIN — CALCIUM ACETATE 667 MG: 667 CAPSULE ORAL at 08:15

## 2021-01-01 RX ADMIN — CEFTRIAXONE SODIUM 1 G: 1 INJECTION, SOLUTION INTRAVENOUS at 13:47

## 2021-01-01 RX ADMIN — POTASSIUM & SODIUM PHOSPHATES POWDER PACK 280-160-250 MG 2 PACKET: 280-160-250 PACK at 17:34

## 2021-01-01 RX ADMIN — ALBUMIN HUMAN 25 G: 0.25 SOLUTION INTRAVENOUS at 08:07

## 2021-01-01 RX ADMIN — SEVELAMER CARBONATE 1600 MG: 800 TABLET, FILM COATED ORAL at 08:18

## 2021-01-01 RX ADMIN — WARFARIN SODIUM 4 MG: 4 TABLET ORAL at 17:33

## 2021-01-01 RX ADMIN — POTASSIUM & SODIUM PHOSPHATES POWDER PACK 280-160-250 MG 1 PACKET: 280-160-250 PACK at 17:34

## 2021-01-01 RX ADMIN — HEPARIN SODIUM: 1000 INJECTION INTRAVENOUS; SUBCUTANEOUS at 18:17

## 2021-01-01 RX ADMIN — POTASSIUM CHLORIDE 20 MEQ: 750 CAPSULE, EXTENDED RELEASE ORAL at 08:19

## 2021-01-01 RX ADMIN — METOPROLOL TARTRATE 25 MG: 25 TABLET, FILM COATED ORAL at 08:53

## 2021-01-01 RX ADMIN — FENTANYL 1 PATCH: 12 PATCH, EXTENDED RELEASE TRANSDERMAL at 11:24

## 2021-01-01 RX ADMIN — AMIODARONE HYDROCHLORIDE 200 MG: 200 TABLET ORAL at 05:13

## 2021-01-01 RX ADMIN — SEVELAMER CARBONATE 1.6 G: 800 POWDER, FOR SUSPENSION ORAL at 17:24

## 2021-01-01 RX ADMIN — NYSTATIN 500000 UNITS: 100000 SUSPENSION ORAL at 23:33

## 2021-01-01 RX ADMIN — TAZOBACTAM SODIUM AND PIPERACILLIN SODIUM 3.38 G: 375; 3 INJECTION, SOLUTION INTRAVENOUS at 11:44

## 2021-01-01 RX ADMIN — ONDANSETRON 4 MG: 2 INJECTION INTRAMUSCULAR; INTRAVENOUS at 04:04

## 2021-01-01 RX ADMIN — SODIUM BICARBONATE 650 MG TABLET 650 MG: at 20:58

## 2021-01-01 RX ADMIN — ROCURONIUM BROMIDE 50 MG: 10 INJECTION, SOLUTION INTRAVENOUS at 14:33

## 2021-01-01 RX ADMIN — SODIUM CHLORIDE, PRESERVATIVE FREE 10 ML: 5 INJECTION INTRAVENOUS at 20:25

## 2021-01-01 RX ADMIN — TAZOBACTAM SODIUM AND PIPERACILLIN SODIUM 3.38 G: 375; 3 INJECTION, SOLUTION INTRAVENOUS at 05:30

## 2021-01-01 RX ADMIN — SEVELAMER CARBONATE 1.6 G: 800 POWDER, FOR SUSPENSION ORAL at 17:51

## 2021-01-01 RX ADMIN — Medication 250 MG: at 08:20

## 2021-01-01 RX ADMIN — ACETAMINOPHEN 650 MG: 325 TABLET, FILM COATED ORAL at 18:20

## 2021-01-01 RX ADMIN — SODIUM CHLORIDE, SODIUM LACTATE, CALCIUM CHLORIDE, MAGNESIUM CHLORIDE AND DEXTROSE 2000 ML: 1.5; 538; 448; 25.7; 5.08 INJECTION, SOLUTION INTRAPERITONEAL at 05:27

## 2021-01-01 RX ADMIN — SODIUM CHLORIDE, SODIUM LACTATE, CALCIUM CHLORIDE, MAGNESIUM CHLORIDE AND DEXTROSE 2000 ML: 1.5; 538; 448; 25.7; 5.08 INJECTION, SOLUTION INTRAPERITONEAL at 22:03

## 2021-01-01 RX ADMIN — POTASSIUM & SODIUM PHOSPHATES POWDER PACK 280-160-250 MG 1 PACKET: 280-160-250 PACK at 20:04

## 2021-01-01 RX ADMIN — INSULIN LISPRO 3 UNITS: 100 INJECTION, SOLUTION INTRAVENOUS; SUBCUTANEOUS at 17:41

## 2021-01-01 RX ADMIN — CALCIUM GLUCONATE: 98 INJECTION, SOLUTION INTRAVENOUS at 17:02

## 2021-01-01 RX ADMIN — AMIODARONE HYDROCHLORIDE 200 MG: 200 TABLET ORAL at 11:47

## 2021-01-01 RX ADMIN — SODIUM CHLORIDE, SODIUM LACTATE, CALCIUM CHLORIDE, MAGNESIUM CHLORIDE AND DEXTROSE 2000 ML: 1.5; 538; 448; 25.7; 5.08 INJECTION, SOLUTION INTRAPERITONEAL at 14:45

## 2021-01-01 RX ADMIN — TEMAZEPAM 15 MG: 15 CAPSULE ORAL at 23:26

## 2021-01-01 RX ADMIN — POTASSIUM & SODIUM PHOSPHATES POWDER PACK 280-160-250 MG 1 PACKET: 280-160-250 PACK at 17:40

## 2021-01-01 RX ADMIN — STANDARDIZED SENNA CONCENTRATE AND DOCUSATE SODIUM 2 TABLET: 8.6; 5 TABLET, FILM COATED ORAL at 08:06

## 2021-01-01 RX ADMIN — PHENOL 2 SPRAY: 1.5 LIQUID ORAL at 18:29

## 2021-01-01 RX ADMIN — HYDROMORPHONE HYDROCHLORIDE 0.25 MG: 1 INJECTION, SOLUTION INTRAMUSCULAR; INTRAVENOUS; SUBCUTANEOUS at 08:35

## 2021-01-01 RX ADMIN — METOPROLOL TARTRATE 25 MG: 25 TABLET, FILM COATED ORAL at 21:16

## 2021-01-01 RX ADMIN — MICAFUNGIN SODIUM 100 MG: 100 INJECTION, POWDER, LYOPHILIZED, FOR SOLUTION INTRAVENOUS at 11:37

## 2021-01-01 RX ADMIN — FENTANYL CITRATE 50 MCG: 50 INJECTION, SOLUTION INTRAMUSCULAR; INTRAVENOUS at 14:40

## 2021-01-01 RX ADMIN — SODIUM CHLORIDE, SODIUM LACTATE, CALCIUM CHLORIDE, MAGNESIUM CHLORIDE AND DEXTROSE 2000 ML: 1.5; 538; 448; 25.7; 5.08 INJECTION, SOLUTION INTRAPERITONEAL at 12:30

## 2021-01-01 RX ADMIN — ACETAMINOPHEN 650 MG: 325 TABLET, FILM COATED ORAL at 00:21

## 2021-01-01 RX ADMIN — SODIUM CHLORIDE, PRESERVATIVE FREE 10 ML: 5 INJECTION INTRAVENOUS at 08:05

## 2021-01-01 RX ADMIN — SODIUM CHLORIDE, PRESERVATIVE FREE 10 ML: 5 INJECTION INTRAVENOUS at 08:20

## 2021-01-01 RX ADMIN — SODIUM CHLORIDE, SODIUM LACTATE, CALCIUM CHLORIDE, MAGNESIUM CHLORIDE AND DEXTROSE 2000 ML: 1.5; 538; 448; 25.7; 5.08 INJECTION, SOLUTION INTRAPERITONEAL at 16:06

## 2021-01-01 RX ADMIN — METOPROLOL TARTRATE 100 MG: 100 TABLET, FILM COATED ORAL at 08:12

## 2021-01-01 RX ADMIN — METOCLOPRAMIDE 5 MG: 5 TABLET ORAL at 08:06

## 2021-01-01 RX ADMIN — ACETAMINOPHEN 650 MG: 325 TABLET, FILM COATED ORAL at 23:38

## 2021-01-01 RX ADMIN — PANTOPRAZOLE SODIUM 40 MG: 40 TABLET, DELAYED RELEASE ORAL at 06:02

## 2021-01-01 RX ADMIN — SODIUM CHLORIDE, SODIUM LACTATE, CALCIUM CHLORIDE, MAGNESIUM CHLORIDE AND DEXTROSE 2000 ML: 1.5; 538; 448; 25.7; 5.08 INJECTION, SOLUTION INTRAPERITONEAL at 18:05

## 2021-01-01 RX ADMIN — HYDROMORPHONE HYDROCHLORIDE 0.25 MG: 1 INJECTION, SOLUTION INTRAMUSCULAR; INTRAVENOUS; SUBCUTANEOUS at 12:30

## 2021-01-01 RX ADMIN — METOCLOPRAMIDE 5 MG: 5 INJECTION, SOLUTION INTRAMUSCULAR; INTRAVENOUS at 23:42

## 2021-01-01 RX ADMIN — POTASSIUM & SODIUM PHOSPHATES POWDER PACK 280-160-250 MG 1 PACKET: 280-160-250 PACK at 07:50

## 2021-01-01 RX ADMIN — HYDROMORPHONE HYDROCHLORIDE 0.25 MG: 1 INJECTION, SOLUTION INTRAMUSCULAR; INTRAVENOUS; SUBCUTANEOUS at 02:23

## 2021-01-01 RX ADMIN — INSULIN LISPRO 2 UNITS: 100 INJECTION, SOLUTION INTRAVENOUS; SUBCUTANEOUS at 00:48

## 2021-01-01 RX ADMIN — ONDANSETRON HYDROCHLORIDE 4 MG: 4 TABLET, FILM COATED ORAL at 14:42

## 2021-01-01 RX ADMIN — TAZOBACTAM SODIUM AND PIPERACILLIN SODIUM 3.38 G: 375; 3 INJECTION, SOLUTION INTRAVENOUS at 05:13

## 2021-01-01 RX ADMIN — VANCOMYCIN HYDROCHLORIDE 1750 MG: 100 INJECTION, POWDER, LYOPHILIZED, FOR SOLUTION INTRAVENOUS at 00:02

## 2021-01-01 RX ADMIN — SEVELAMER CARBONATE 1.6 G: 800 POWDER, FOR SUSPENSION ORAL at 16:53

## 2021-01-01 RX ADMIN — SODIUM CHLORIDE, PRESERVATIVE FREE 10 ML: 5 INJECTION INTRAVENOUS at 21:33

## 2021-01-01 RX ADMIN — GLYCOPYRROLATE 0.4 MG: 0.2 INJECTION INTRAMUSCULAR; INTRAVENOUS at 17:10

## 2021-01-01 RX ADMIN — SODIUM CHLORIDE, PRESERVATIVE FREE 10 ML: 5 INJECTION INTRAVENOUS at 20:10

## 2021-01-01 RX ADMIN — SEVELAMER HYDROCHLORIDE 1600 MG: 800 TABLET, FILM COATED ORAL at 12:28

## 2021-01-01 RX ADMIN — SODIUM BICARBONATE 650 MG TABLET 650 MG: at 21:25

## 2021-01-01 RX ADMIN — METOPROLOL TARTRATE 5 MG: 5 INJECTION INTRAVENOUS at 21:43

## 2021-01-01 RX ADMIN — SODIUM CHLORIDE, SODIUM LACTATE, CALCIUM CHLORIDE, MAGNESIUM CHLORIDE AND DEXTROSE 2000 ML: 1.5; 538; 448; 25.7; 5.08 INJECTION, SOLUTION INTRAPERITONEAL at 17:43

## 2021-01-01 RX ADMIN — TAZOBACTAM SODIUM AND PIPERACILLIN SODIUM 3.38 G: 375; 3 INJECTION, SOLUTION INTRAVENOUS at 05:33

## 2021-01-01 RX ADMIN — ACETAMINOPHEN 650 MG: 325 TABLET, FILM COATED ORAL at 23:48

## 2021-01-01 RX ADMIN — FENTANYL CITRATE 100 MCG: 50 INJECTION, SOLUTION INTRAMUSCULAR; INTRAVENOUS at 17:16

## 2021-01-01 RX ADMIN — TAZOBACTAM SODIUM AND PIPERACILLIN SODIUM 3.38 G: 375; 3 INJECTION, SOLUTION INTRAVENOUS at 05:22

## 2021-01-01 RX ADMIN — SODIUM CHLORIDE, SODIUM LACTATE, CALCIUM CHLORIDE, MAGNESIUM CHLORIDE AND DEXTROSE 2000 ML: 1.5; 538; 448; 25.7; 5.08 INJECTION, SOLUTION INTRAPERITONEAL at 06:25

## 2021-01-01 RX ADMIN — SODIUM CHLORIDE, PRESERVATIVE FREE 10 ML: 5 INJECTION INTRAVENOUS at 10:24

## 2021-01-01 RX ADMIN — ONDANSETRON 4 MG: 2 INJECTION INTRAMUSCULAR; INTRAVENOUS at 22:21

## 2021-01-01 RX ADMIN — SODIUM CHLORIDE, SODIUM LACTATE, CALCIUM CHLORIDE, MAGNESIUM CHLORIDE AND DEXTROSE 2000 ML: 1.5; 538; 448; 25.7; 5.08 INJECTION, SOLUTION INTRAPERITONEAL at 06:19

## 2021-01-01 RX ADMIN — ESCITALOPRAM OXALATE 5 MG: 10 TABLET ORAL at 06:18

## 2021-01-01 RX ADMIN — AMIODARONE HYDROCHLORIDE 0.5 MG/MIN: 1.8 INJECTION, SOLUTION INTRAVENOUS at 17:02

## 2021-01-01 RX ADMIN — POTASSIUM & SODIUM PHOSPHATES POWDER PACK 280-160-250 MG 1 PACKET: 280-160-250 PACK at 13:14

## 2021-01-01 RX ADMIN — SODIUM CHLORIDE, SODIUM LACTATE, CALCIUM CHLORIDE, MAGNESIUM CHLORIDE AND DEXTROSE 2000 ML: 1.5; 538; 448; 25.7; 5.08 INJECTION, SOLUTION INTRAPERITONEAL at 17:55

## 2021-01-01 RX ADMIN — SODIUM CHLORIDE, PRESERVATIVE FREE 10 ML: 5 INJECTION INTRAVENOUS at 10:02

## 2021-01-01 RX ADMIN — POTASSIUM & SODIUM PHOSPHATES POWDER PACK 280-160-250 MG 1 PACKET: 280-160-250 PACK at 12:54

## 2021-01-01 RX ADMIN — MICAFUNGIN SODIUM 100 MG: 100 INJECTION, POWDER, LYOPHILIZED, FOR SOLUTION INTRAVENOUS at 10:59

## 2021-01-01 RX ADMIN — LORAZEPAM 1 MG: 2 INJECTION INTRAMUSCULAR; INTRAVENOUS at 10:34

## 2021-01-01 RX ADMIN — METOPROLOL TARTRATE 25 MG: 25 TABLET, FILM COATED ORAL at 20:58

## 2021-01-01 RX ADMIN — POTASSIUM & SODIUM PHOSPHATES POWDER PACK 280-160-250 MG 1 PACKET: 280-160-250 PACK at 11:38

## 2021-01-01 RX ADMIN — TAZOBACTAM SODIUM AND PIPERACILLIN SODIUM 3.38 G: 375; 3 INJECTION, SOLUTION INTRAVENOUS at 18:26

## 2021-01-01 RX ADMIN — ALBUMIN HUMAN 25 G: 0.25 SOLUTION INTRAVENOUS at 15:06

## 2021-01-01 RX ADMIN — ALBUMIN HUMAN 25 G: 0.25 SOLUTION INTRAVENOUS at 13:13

## 2021-01-01 RX ADMIN — SODIUM CHLORIDE, PRESERVATIVE FREE 10 ML: 5 INJECTION INTRAVENOUS at 20:42

## 2021-01-01 RX ADMIN — DOXYCYCLINE 100 MG: 100 CAPSULE ORAL at 20:12

## 2021-01-01 RX ADMIN — LORAZEPAM 0.5 MG: 2 INJECTION INTRAMUSCULAR; INTRAVENOUS at 16:53

## 2021-01-01 RX ADMIN — AMLODIPINE BESYLATE 5 MG: 5 TABLET ORAL at 08:19

## 2021-01-01 RX ADMIN — NYSTATIN 500000 UNITS: 100000 SUSPENSION ORAL at 08:03

## 2021-01-01 RX ADMIN — CINACALCET 30 MG: 30 TABLET, FILM COATED ORAL at 20:58

## 2021-01-01 RX ADMIN — HALOPERIDOL 0.5 MG: 1 TABLET ORAL at 14:41

## 2021-01-01 RX ADMIN — LANSOPRAZOLE 30 MG: KIT at 05:37

## 2021-01-01 RX ADMIN — ONDANSETRON 4 MG: 2 INJECTION INTRAMUSCULAR; INTRAVENOUS at 14:15

## 2021-01-01 RX ADMIN — DEXAMETHASONE SODIUM PHOSPHATE 4 MG: 10 INJECTION, SOLUTION INTRAMUSCULAR; INTRAVENOUS at 17:50

## 2021-01-01 RX ADMIN — TAZOBACTAM SODIUM AND PIPERACILLIN SODIUM 3.38 G: 375; 3 INJECTION, SOLUTION INTRAVENOUS at 01:33

## 2021-01-01 RX ADMIN — Medication: at 21:26

## 2021-01-01 RX ADMIN — ACETAMINOPHEN 650 MG: 325 TABLET, FILM COATED ORAL at 17:01

## 2021-01-01 RX ADMIN — HYDROMORPHONE HYDROCHLORIDE 0.25 MG: 1 INJECTION, SOLUTION INTRAMUSCULAR; INTRAVENOUS; SUBCUTANEOUS at 13:12

## 2021-01-01 RX ADMIN — STANDARDIZED SENNA CONCENTRATE AND DOCUSATE SODIUM 2 TABLET: 8.6; 5 TABLET, FILM COATED ORAL at 20:12

## 2021-01-01 RX ADMIN — ONDANSETRON 4 MG: 2 INJECTION INTRAMUSCULAR; INTRAVENOUS at 18:36

## 2021-01-01 RX ADMIN — CEFTRIAXONE SODIUM 1 G: 1 INJECTION, SOLUTION INTRAVENOUS at 17:51

## 2021-01-01 RX ADMIN — INSULIN LISPRO 2 UNITS: 100 INJECTION, SOLUTION INTRAVENOUS; SUBCUTANEOUS at 11:46

## 2021-01-01 RX ADMIN — SMOFLIPID 250 ML: 6; 6; 5; 3 INJECTION, EMULSION INTRAVENOUS at 18:20

## 2021-01-01 RX ADMIN — HYDROMORPHONE HYDROCHLORIDE 0.25 MG: 1 INJECTION, SOLUTION INTRAMUSCULAR; INTRAVENOUS; SUBCUTANEOUS at 05:17

## 2021-01-01 RX ADMIN — CINACALCET HYDROCHLORIDE 60 MG: 30 TABLET, COATED ORAL at 20:07

## 2021-01-01 RX ADMIN — HYDROMORPHONE HYDROCHLORIDE 0.25 MG: 1 INJECTION, SOLUTION INTRAMUSCULAR; INTRAVENOUS; SUBCUTANEOUS at 05:19

## 2021-01-01 RX ADMIN — ESCITALOPRAM OXALATE 5 MG: 10 TABLET ORAL at 06:02

## 2021-01-01 RX ADMIN — CAMPHOR AND MENTHOL: 5; 5 LOTION TOPICAL at 10:08

## 2021-01-01 RX ADMIN — INSULIN LISPRO 3 UNITS: 100 INJECTION, SOLUTION INTRAVENOUS; SUBCUTANEOUS at 17:35

## 2021-01-01 RX ADMIN — SODIUM CHLORIDE, SODIUM LACTATE, CALCIUM CHLORIDE, MAGNESIUM CHLORIDE AND DEXTROSE 2000 ML: 1.5; 538; 448; 25.7; 5.08 INJECTION, SOLUTION INTRAPERITONEAL at 23:49

## 2021-01-01 RX ADMIN — SODIUM CHLORIDE, PRESERVATIVE FREE 10 ML: 5 INJECTION INTRAVENOUS at 20:06

## 2021-01-01 RX ADMIN — SEVELAMER CARBONATE 1600 MG: 800 TABLET, FILM COATED ORAL at 17:42

## 2021-01-01 RX ADMIN — SODIUM CHLORIDE, SODIUM LACTATE, CALCIUM CHLORIDE, MAGNESIUM CHLORIDE AND DEXTROSE 500 ML: 1.5; 538; 448; 25.7; 5.08 INJECTION, SOLUTION INTRAPERITONEAL at 21:39

## 2021-01-01 RX ADMIN — METOPROLOL TARTRATE 25 MG: 25 TABLET, FILM COATED ORAL at 08:35

## 2021-01-01 RX ADMIN — HYDROMORPHONE HYDROCHLORIDE 0.25 MG: 1 INJECTION, SOLUTION INTRAMUSCULAR; INTRAVENOUS; SUBCUTANEOUS at 03:41

## 2021-01-01 RX ADMIN — FENTANYL CITRATE 50 MCG: 50 INJECTION, SOLUTION INTRAMUSCULAR; INTRAVENOUS at 18:45

## 2021-01-01 RX ADMIN — MICAFUNGIN SODIUM 100 MG: 100 INJECTION, POWDER, LYOPHILIZED, FOR SOLUTION INTRAVENOUS at 10:06

## 2021-01-01 RX ADMIN — PANTOPRAZOLE SODIUM 40 MG: 40 TABLET, DELAYED RELEASE ORAL at 05:13

## 2021-01-01 RX ADMIN — HYDROMORPHONE HYDROCHLORIDE 0.5 MG: 1 INJECTION, SOLUTION INTRAMUSCULAR; INTRAVENOUS; SUBCUTANEOUS at 08:14

## 2021-01-01 RX ADMIN — PANTOPRAZOLE SODIUM 40 MG: 40 TABLET, DELAYED RELEASE ORAL at 05:32

## 2021-01-01 RX ADMIN — ACETAMINOPHEN 650 MG: 325 TABLET, FILM COATED ORAL at 23:37

## 2021-01-01 RX ADMIN — TAZOBACTAM SODIUM AND PIPERACILLIN SODIUM 3.38 G: 375; 3 INJECTION, SOLUTION INTRAVENOUS at 17:04

## 2021-01-01 RX ADMIN — AMIODARONE HYDROCHLORIDE 200 MG: 200 TABLET ORAL at 20:38

## 2021-01-01 RX ADMIN — METOPROLOL TARTRATE 5 MG: 5 INJECTION INTRAVENOUS at 16:00

## 2021-01-01 RX ADMIN — SODIUM CHLORIDE, SODIUM LACTATE, CALCIUM CHLORIDE, MAGNESIUM CHLORIDE AND DEXTROSE 2000 ML: 1.5; 538; 448; 25.7; 5.08 INJECTION, SOLUTION INTRAPERITONEAL at 10:47

## 2021-01-01 RX ADMIN — METOPROLOL TARTRATE 5 MG: 5 INJECTION INTRAVENOUS at 10:10

## 2021-01-01 RX ADMIN — METOPROLOL TARTRATE 25 MG: 25 TABLET, FILM COATED ORAL at 09:13

## 2021-01-01 RX ADMIN — SODIUM CHLORIDE, SODIUM LACTATE, CALCIUM CHLORIDE, MAGNESIUM CHLORIDE AND DEXTROSE 2000 ML: 1.5; 538; 448; 25.7; 5.08 INJECTION, SOLUTION INTRAPERITONEAL at 10:33

## 2021-01-01 RX ADMIN — SODIUM CHLORIDE, PRESERVATIVE FREE 10 ML: 5 INJECTION INTRAVENOUS at 21:29

## 2021-01-01 RX ADMIN — ALBUMIN HUMAN 25 G: 0.25 SOLUTION INTRAVENOUS at 04:24

## 2021-01-01 RX ADMIN — POTASSIUM & SODIUM PHOSPHATES POWDER PACK 280-160-250 MG 1 PACKET: 280-160-250 PACK at 07:58

## 2021-01-01 RX ADMIN — ACETAMINOPHEN 650 MG: 325 TABLET, FILM COATED ORAL at 01:35

## 2021-01-01 RX ADMIN — ACETAMINOPHEN 650 MG: 325 TABLET, FILM COATED ORAL at 05:24

## 2021-01-01 RX ADMIN — TAZOBACTAM SODIUM AND PIPERACILLIN SODIUM 3.38 G: 375; 3 INJECTION, SOLUTION INTRAVENOUS at 17:02

## 2021-01-01 RX ADMIN — ACETAMINOPHEN 650 MG: 325 TABLET, FILM COATED ORAL at 20:29

## 2021-01-01 RX ADMIN — EPOETIN ALFA-EPBX 10000 UNITS: 10000 INJECTION, SOLUTION INTRAVENOUS; SUBCUTANEOUS at 10:08

## 2021-01-01 RX ADMIN — ACETAMINOPHEN 650 MG: 325 TABLET, FILM COATED ORAL at 10:18

## 2021-01-01 RX ADMIN — ACETAMINOPHEN 650 MG: 325 TABLET, FILM COATED ORAL at 16:23

## 2021-01-01 RX ADMIN — HYDROMORPHONE HYDROCHLORIDE 0.25 MG: 1 INJECTION, SOLUTION INTRAMUSCULAR; INTRAVENOUS; SUBCUTANEOUS at 14:10

## 2021-01-01 RX ADMIN — SODIUM CHLORIDE, SODIUM LACTATE, CALCIUM CHLORIDE, MAGNESIUM CHLORIDE AND DEXTROSE 2000 ML: 1.5; 538; 448; 25.7; 5.08 INJECTION, SOLUTION INTRAPERITONEAL at 22:13

## 2021-01-01 RX ADMIN — AMIODARONE HYDROCHLORIDE 1 MG/MIN: 1.8 INJECTION, SOLUTION INTRAVENOUS at 13:27

## 2021-01-01 RX ADMIN — SODIUM CHLORIDE, PRESERVATIVE FREE 10 ML: 5 INJECTION INTRAVENOUS at 20:22

## 2021-01-01 RX ADMIN — SODIUM CHLORIDE, SODIUM LACTATE, CALCIUM CHLORIDE, MAGNESIUM CHLORIDE AND DEXTROSE 2000 ML: 1.5; 538; 448; 25.7; 5.08 INJECTION, SOLUTION INTRAPERITONEAL at 22:22

## 2021-01-01 RX ADMIN — SEVELAMER HYDROCHLORIDE 1600 MG: 800 TABLET, FILM COATED ORAL at 08:26

## 2021-01-01 RX ADMIN — STANDARDIZED SENNA CONCENTRATE AND DOCUSATE SODIUM 2 TABLET: 8.6; 5 TABLET, FILM COATED ORAL at 12:29

## 2021-01-01 RX ADMIN — ONDANSETRON 4 MG: 2 INJECTION INTRAMUSCULAR; INTRAVENOUS at 06:20

## 2021-01-01 RX ADMIN — SODIUM CHLORIDE, SODIUM LACTATE, CALCIUM CHLORIDE, MAGNESIUM CHLORIDE AND DEXTROSE 2000 ML: 1.5; 538; 448; 25.7; 5.08 INJECTION, SOLUTION INTRAPERITONEAL at 06:11

## 2021-01-01 RX ADMIN — NYSTATIN 500000 UNITS: 100000 SUSPENSION ORAL at 21:43

## 2021-01-01 RX ADMIN — SODIUM CHLORIDE, SODIUM LACTATE, CALCIUM CHLORIDE, MAGNESIUM CHLORIDE AND DEXTROSE 2000 ML: 1.5; 538; 448; 25.7; 5.08 INJECTION, SOLUTION INTRAPERITONEAL at 22:56

## 2021-01-01 RX ADMIN — ACETAMINOPHEN 650 MG: 325 TABLET, FILM COATED ORAL at 17:53

## 2021-01-01 RX ADMIN — TAZOBACTAM SODIUM AND PIPERACILLIN SODIUM 3.38 G: 375; 3 INJECTION, SOLUTION INTRAVENOUS at 05:27

## 2021-01-01 RX ADMIN — LORAZEPAM 0.25 MG: 2 INJECTION INTRAMUSCULAR; INTRAVENOUS at 01:02

## 2021-01-01 RX ADMIN — SODIUM CHLORIDE, PRESERVATIVE FREE 10 ML: 5 INJECTION INTRAVENOUS at 11:40

## 2021-01-01 RX ADMIN — INSULIN LISPRO 3 UNITS: 100 INJECTION, SOLUTION INTRAVENOUS; SUBCUTANEOUS at 08:53

## 2021-01-01 RX ADMIN — TACROLIMUS 0.5 MG: 0.5 CAPSULE ORAL at 03:05

## 2021-01-01 RX ADMIN — NYSTATIN 500000 UNITS: 100000 SUSPENSION ORAL at 20:10

## 2021-01-01 RX ADMIN — SODIUM CHLORIDE, PRESERVATIVE FREE 10 ML: 5 INJECTION INTRAVENOUS at 09:07

## 2021-01-01 RX ADMIN — VANCOMYCIN HYDROCHLORIDE 750 MG: 750 INJECTION, SOLUTION INTRAVENOUS at 13:14

## 2021-01-01 RX ADMIN — TACROLIMUS 1 MG: 1 CAPSULE ORAL at 20:07

## 2021-01-01 RX ADMIN — SODIUM CHLORIDE, SODIUM LACTATE, CALCIUM CHLORIDE, MAGNESIUM CHLORIDE AND DEXTROSE 2000 ML: 1.5; 538; 448; 25.7; 5.08 INJECTION, SOLUTION INTRAPERITONEAL at 18:18

## 2021-01-01 RX ADMIN — ACETAMINOPHEN 650 MG: 325 TABLET, FILM COATED ORAL at 12:51

## 2021-01-01 RX ADMIN — TACROLIMUS 1 MG: 1 CAPSULE ORAL at 21:27

## 2021-01-01 RX ADMIN — SODIUM CHLORIDE, SODIUM LACTATE, CALCIUM CHLORIDE, MAGNESIUM CHLORIDE AND DEXTROSE 2000 ML: 1.5; 538; 448; 25.7; 5.08 INJECTION, SOLUTION INTRAPERITONEAL at 18:04

## 2021-01-01 RX ADMIN — PREDNISONE 20 MG: 20 TABLET ORAL at 14:38

## 2021-01-01 RX ADMIN — SODIUM CHLORIDE, PRESERVATIVE FREE 10 ML: 5 INJECTION INTRAVENOUS at 21:18

## 2021-01-01 RX ADMIN — SEVELAMER HYDROCHLORIDE 1600 MG: 800 TABLET, FILM COATED ORAL at 12:08

## 2021-01-01 RX ADMIN — ACETAMINOPHEN 650 MG: 325 TABLET, FILM COATED ORAL at 18:16

## 2021-01-01 RX ADMIN — AMIODARONE HYDROCHLORIDE 150 MG: 1.5 INJECTION, SOLUTION INTRAVENOUS at 11:26

## 2021-01-01 RX ADMIN — Medication 100 MCG: at 14:46

## 2021-01-01 RX ADMIN — SODIUM CHLORIDE, SODIUM LACTATE, CALCIUM CHLORIDE, MAGNESIUM CHLORIDE AND DEXTROSE 2000 ML: 1.5; 538; 448; 25.7; 5.08 INJECTION, SOLUTION INTRAPERITONEAL at 10:14

## 2021-01-01 RX ADMIN — HEPARIN SODIUM: 1000 INJECTION INTRAVENOUS; SUBCUTANEOUS at 21:46

## 2021-01-01 RX ADMIN — PHYTONADIONE 10 MG: 10 INJECTION, EMULSION INTRAMUSCULAR; INTRAVENOUS; SUBCUTANEOUS at 21:42

## 2021-01-01 RX ADMIN — ALBUTEROL SULFATE 2.5 MG: 2.5 SOLUTION RESPIRATORY (INHALATION) at 23:50

## 2021-01-01 RX ADMIN — ONDANSETRON 4 MG: 2 INJECTION INTRAMUSCULAR; INTRAVENOUS at 12:40

## 2021-01-01 RX ADMIN — SODIUM CHLORIDE, PRESERVATIVE FREE 10 ML: 5 INJECTION INTRAVENOUS at 20:11

## 2021-01-01 RX ADMIN — ONDANSETRON 4 MG: 2 INJECTION INTRAMUSCULAR; INTRAVENOUS at 19:59

## 2021-01-01 RX ADMIN — METOPROLOL TARTRATE 100 MG: 100 TABLET, FILM COATED ORAL at 10:36

## 2021-01-01 RX ADMIN — ONDANSETRON 4 MG: 2 INJECTION INTRAMUSCULAR; INTRAVENOUS at 15:15

## 2021-01-01 RX ADMIN — LORAZEPAM 0.5 MG: 2 INJECTION INTRAMUSCULAR; INTRAVENOUS at 15:51

## 2021-01-01 RX ADMIN — POTASSIUM CHLORIDE 10 MEQ: 7.46 INJECTION, SOLUTION INTRAVENOUS at 17:15

## 2021-01-01 RX ADMIN — ESCITALOPRAM OXALATE 5 MG: 10 TABLET ORAL at 08:06

## 2021-01-01 RX ADMIN — METOPROLOL TARTRATE 25 MG: 25 TABLET, FILM COATED ORAL at 21:42

## 2021-01-01 RX ADMIN — PANTOPRAZOLE SODIUM 40 MG: 40 TABLET, DELAYED RELEASE ORAL at 06:18

## 2021-01-01 RX ADMIN — METOCLOPRAMIDE 5 MG: 5 TABLET ORAL at 11:50

## 2021-01-01 RX ADMIN — CALCIUM GLUCONATE: 98 INJECTION, SOLUTION INTRAVENOUS at 18:18

## 2021-01-01 RX ADMIN — INSULIN LISPRO 2 UNITS: 100 INJECTION, SOLUTION INTRAVENOUS; SUBCUTANEOUS at 18:00

## 2021-01-01 RX ADMIN — SEVELAMER HYDROCHLORIDE 1600 MG: 800 TABLET, FILM COATED ORAL at 17:00

## 2021-01-01 RX ADMIN — CEFTRIAXONE SODIUM 1 G: 1 INJECTION, POWDER, FOR SOLUTION INTRAMUSCULAR; INTRAVENOUS at 21:54

## 2021-01-01 RX ADMIN — NYSTATIN 500000 UNITS: 100000 SUSPENSION ORAL at 20:22

## 2021-01-01 RX ADMIN — PANTOPRAZOLE SODIUM 40 MG: 40 INJECTION, POWDER, FOR SOLUTION INTRAVENOUS at 05:21

## 2021-01-01 RX ADMIN — GENTAMICIN SULFATE 1 APPLICATION: 1 OINTMENT TOPICAL at 09:49

## 2021-01-01 RX ADMIN — AMIODARONE HYDROCHLORIDE 0.5 MG/MIN: 1.8 INJECTION, SOLUTION INTRAVENOUS at 04:54

## 2021-01-01 RX ADMIN — AMIODARONE HYDROCHLORIDE 200 MG: 200 TABLET ORAL at 13:13

## 2021-01-01 RX ADMIN — POTASSIUM CHLORIDE 40 MEQ: 1.5 POWDER, FOR SOLUTION ORAL at 08:35

## 2021-01-01 RX ADMIN — TAZOBACTAM SODIUM AND PIPERACILLIN SODIUM 3.38 G: 375; 3 INJECTION, SOLUTION INTRAVENOUS at 16:19

## 2021-01-01 RX ADMIN — HYDROMORPHONE HYDROCHLORIDE 0.25 MG: 1 INJECTION, SOLUTION INTRAMUSCULAR; INTRAVENOUS; SUBCUTANEOUS at 08:19

## 2021-01-01 RX ADMIN — SODIUM CHLORIDE, SODIUM LACTATE, CALCIUM CHLORIDE, MAGNESIUM CHLORIDE AND DEXTROSE 2000 ML: 1.5; 538; 448; 25.7; 5.08 INJECTION, SOLUTION INTRAPERITONEAL at 14:30

## 2021-01-01 RX ADMIN — CALCIUM ACETATE 667 MG: 667 CAPSULE ORAL at 11:31

## 2021-01-01 RX ADMIN — SEVELAMER CARBONATE 1600 MG: 800 TABLET, FILM COATED ORAL at 11:31

## 2021-01-01 RX ADMIN — SODIUM BICARBONATE 650 MG TABLET 650 MG: at 17:41

## 2021-01-01 RX ADMIN — LORAZEPAM 0.5 MG: 2 INJECTION INTRAMUSCULAR; INTRAVENOUS at 00:59

## 2021-01-01 RX ADMIN — SODIUM CHLORIDE, PRESERVATIVE FREE 10 ML: 5 INJECTION INTRAVENOUS at 20:08

## 2021-01-01 RX ADMIN — INSULIN LISPRO 2 UNITS: 100 INJECTION, SOLUTION INTRAVENOUS; SUBCUTANEOUS at 04:58

## 2021-01-01 RX ADMIN — SODIUM CHLORIDE, PRESERVATIVE FREE 10 ML: 5 INJECTION INTRAVENOUS at 21:43

## 2021-01-01 RX ADMIN — ACETAMINOPHEN 650 MG: 325 TABLET, FILM COATED ORAL at 23:11

## 2021-01-01 RX ADMIN — ESCITALOPRAM OXALATE 5 MG: 10 TABLET ORAL at 06:05

## 2021-01-01 RX ADMIN — EPOETIN ALFA-EPBX 10000 UNITS: 10000 INJECTION, SOLUTION INTRAVENOUS; SUBCUTANEOUS at 17:15

## 2021-01-01 RX ADMIN — ONDANSETRON 4 MG: 2 INJECTION INTRAMUSCULAR; INTRAVENOUS at 21:58

## 2021-01-01 RX ADMIN — GENTAMICIN SULFATE: 1 OINTMENT TOPICAL at 10:02

## 2021-01-01 RX ADMIN — WARFARIN SODIUM 3 MG: 3 TABLET ORAL at 17:35

## 2021-01-01 RX ADMIN — CEFTRIAXONE SODIUM 1 G: 1 INJECTION, SOLUTION INTRAVENOUS at 13:34

## 2021-01-01 RX ADMIN — AMIODARONE HYDROCHLORIDE 1 MG/MIN: 1.8 INJECTION, SOLUTION INTRAVENOUS at 23:10

## 2021-01-01 RX ADMIN — METOPROLOL TARTRATE 100 MG: 100 TABLET, FILM COATED ORAL at 21:27

## 2021-01-01 RX ADMIN — STANDARDIZED SENNA CONCENTRATE AND DOCUSATE SODIUM 2 TABLET: 8.6; 5 TABLET, FILM COATED ORAL at 08:25

## 2021-01-01 RX ADMIN — INSULIN LISPRO 5 UNITS: 100 INJECTION, SOLUTION INTRAVENOUS; SUBCUTANEOUS at 01:35

## 2021-01-01 RX ADMIN — METOPROLOL TARTRATE 100 MG: 100 TABLET, FILM COATED ORAL at 08:20

## 2021-01-01 RX ADMIN — AMIODARONE HYDROCHLORIDE 200 MG: 200 TABLET ORAL at 05:32

## 2021-01-01 RX ADMIN — HYDROMORPHONE HYDROCHLORIDE 0.25 MG: 1 INJECTION, SOLUTION INTRAMUSCULAR; INTRAVENOUS; SUBCUTANEOUS at 15:23

## 2021-01-01 RX ADMIN — METOPROLOL TARTRATE 12.5 MG: 25 TABLET ORAL at 10:12

## 2021-01-01 RX ADMIN — SODIUM CHLORIDE, PRESERVATIVE FREE 10 ML: 5 INJECTION INTRAVENOUS at 08:36

## 2021-01-01 RX ADMIN — ONDANSETRON 4 MG: 2 INJECTION INTRAMUSCULAR; INTRAVENOUS at 16:49

## 2021-01-01 RX ADMIN — ACETAMINOPHEN 650 MG: 325 TABLET, FILM COATED ORAL at 11:37

## 2021-01-01 RX ADMIN — HYDROMORPHONE HYDROCHLORIDE 0.5 MG: 1 INJECTION, SOLUTION INTRAMUSCULAR; INTRAVENOUS; SUBCUTANEOUS at 15:51

## 2021-01-01 RX ADMIN — POTASSIUM PHOSPHATE, MONOBASIC POTASSIUM PHOSPHATE, DIBASIC 30 MMOL: 224; 236 INJECTION, SOLUTION, CONCENTRATE INTRAVENOUS at 12:05

## 2021-01-01 RX ADMIN — SODIUM CHLORIDE, SODIUM LACTATE, CALCIUM CHLORIDE, MAGNESIUM CHLORIDE AND DEXTROSE 2000 ML: 1.5; 538; 448; 25.7; 5.08 INJECTION, SOLUTION INTRAPERITONEAL at 05:17

## 2021-01-01 RX ADMIN — SMOFLIPID 250 ML: 6; 6; 5; 3 INJECTION, EMULSION INTRAVENOUS at 18:07

## 2021-01-01 RX ADMIN — BISACODYL 10 MG: 10 SUPPOSITORY RECTAL at 15:00

## 2021-01-01 RX ADMIN — SODIUM CHLORIDE, SODIUM LACTATE, CALCIUM CHLORIDE, MAGNESIUM CHLORIDE AND DEXTROSE 2000 ML: 1.5; 538; 448; 25.7; 5.08 INJECTION, SOLUTION INTRAPERITONEAL at 16:11

## 2021-01-01 RX ADMIN — TACROLIMUS 1 MG: 1 CAPSULE ORAL at 08:25

## 2021-01-01 RX ADMIN — METOPROLOL TARTRATE 5 MG: 5 INJECTION INTRAVENOUS at 05:31

## 2021-01-01 RX ADMIN — TACROLIMUS 1 MG: 1 CAPSULE ORAL at 20:25

## 2021-01-01 RX ADMIN — PANTOPRAZOLE SODIUM 40 MG: 40 INJECTION, POWDER, FOR SOLUTION INTRAVENOUS at 05:19

## 2021-01-01 RX ADMIN — METOPROLOL TARTRATE 25 MG: 25 TABLET, FILM COATED ORAL at 08:30

## 2021-01-01 RX ADMIN — TEMAZEPAM 15 MG: 15 CAPSULE ORAL at 22:20

## 2021-01-01 RX ADMIN — MICAFUNGIN SODIUM 100 MG: 100 INJECTION, POWDER, LYOPHILIZED, FOR SOLUTION INTRAVENOUS at 13:14

## 2021-01-01 RX ADMIN — STANDARDIZED SENNA CONCENTRATE AND DOCUSATE SODIUM 2 TABLET: 8.6; 5 TABLET, FILM COATED ORAL at 22:17

## 2021-01-01 RX ADMIN — PANTOPRAZOLE SODIUM 40 MG: 40 INJECTION, POWDER, FOR SOLUTION INTRAVENOUS at 06:21

## 2021-01-01 RX ADMIN — SODIUM CHLORIDE, SODIUM LACTATE, CALCIUM CHLORIDE, MAGNESIUM CHLORIDE AND DEXTROSE 2000 ML: 1.5; 538; 448; 25.7; 5.08 INJECTION, SOLUTION INTRAPERITONEAL at 06:10

## 2021-01-01 RX ADMIN — WARFARIN SODIUM 2 MG: 2 TABLET ORAL at 17:42

## 2021-01-01 RX ADMIN — CALCIUM GLUCONATE: 98 INJECTION, SOLUTION INTRAVENOUS at 21:17

## 2021-01-01 RX ADMIN — ACETAMINOPHEN 650 MG: 325 TABLET, FILM COATED ORAL at 00:29

## 2021-01-01 RX ADMIN — ESCITALOPRAM OXALATE 5 MG: 10 TABLET ORAL at 08:20

## 2021-01-01 RX ADMIN — WARFARIN SODIUM 4 MG: 4 TABLET ORAL at 17:43

## 2021-01-01 RX ADMIN — AMIODARONE HYDROCHLORIDE 200 MG: 200 TABLET ORAL at 05:10

## 2021-01-01 RX ADMIN — METOPROLOL TARTRATE 100 MG: 100 TABLET, FILM COATED ORAL at 21:23

## 2021-01-01 RX ADMIN — SODIUM CHLORIDE, SODIUM LACTATE, CALCIUM CHLORIDE, MAGNESIUM CHLORIDE AND DEXTROSE 2000 ML: 1.5; 538; 448; 25.7; 5.08 INJECTION, SOLUTION INTRAPERITONEAL at 10:25

## 2021-01-01 RX ADMIN — SODIUM CHLORIDE 250 ML: 9 INJECTION, SOLUTION INTRAVENOUS at 07:25

## 2021-01-01 RX ADMIN — PANTOPRAZOLE SODIUM 40 MG: 40 TABLET, DELAYED RELEASE ORAL at 06:09

## 2021-01-01 RX ADMIN — POTASSIUM PHOSPHATE, MONOBASIC POTASSIUM PHOSPHATE, DIBASIC 21 MMOL: 224; 236 INJECTION, SOLUTION, CONCENTRATE INTRAVENOUS at 10:15

## 2021-01-01 RX ADMIN — DOXYCYCLINE 100 MG: 100 CAPSULE ORAL at 20:07

## 2021-01-01 RX ADMIN — NEOSTIGMINE 4 MG: 1 INJECTION INTRAVENOUS at 17:10

## 2021-01-01 RX ADMIN — SODIUM CHLORIDE, PRESERVATIVE FREE 10 ML: 5 INJECTION INTRAVENOUS at 20:38

## 2021-01-01 RX ADMIN — Medication 250 MG: at 20:02

## 2021-01-01 RX ADMIN — AMIODARONE HYDROCHLORIDE 200 MG: 200 TABLET ORAL at 11:31

## 2021-01-01 RX ADMIN — BISACODYL 10 MG: 10 SUPPOSITORY RECTAL at 14:47

## 2021-01-01 RX ADMIN — SODIUM CHLORIDE, PRESERVATIVE FREE 10 ML: 5 INJECTION INTRAVENOUS at 20:47

## 2021-01-01 RX ADMIN — SODIUM CHLORIDE, SODIUM LACTATE, CALCIUM CHLORIDE, MAGNESIUM CHLORIDE AND DEXTROSE 2000 ML: 1.5; 538; 448; 25.7; 5.08 INJECTION, SOLUTION INTRAPERITONEAL at 10:13

## 2021-01-01 RX ADMIN — HYDROMORPHONE HYDROCHLORIDE 0.25 MG: 1 INJECTION, SOLUTION INTRAMUSCULAR; INTRAVENOUS; SUBCUTANEOUS at 09:13

## 2021-01-01 RX ADMIN — SMOFLIPID 250 ML: 6; 6; 5; 3 INJECTION, EMULSION INTRAVENOUS at 17:42

## 2021-01-01 RX ADMIN — SODIUM CHLORIDE, PRESERVATIVE FREE 10 ML: 5 INJECTION INTRAVENOUS at 20:16

## 2021-01-01 RX ADMIN — WARFARIN SODIUM 4 MG: 4 TABLET ORAL at 16:53

## 2021-01-01 RX ADMIN — SODIUM BICARBONATE 650 MG TABLET 650 MG: at 08:53

## 2021-01-01 RX ADMIN — LIDOCAINE 1 PATCH: 50 PATCH CUTANEOUS at 21:54

## 2021-01-01 RX ADMIN — INSULIN LISPRO 3 UNITS: 100 INJECTION, SOLUTION INTRAVENOUS; SUBCUTANEOUS at 01:15

## 2021-01-01 RX ADMIN — POTASSIUM CHLORIDE 10 MEQ: 7.46 INJECTION, SOLUTION INTRAVENOUS at 18:50

## 2021-01-01 RX ADMIN — CINACALCET HYDROCHLORIDE 60 MG: 30 TABLET, COATED ORAL at 20:22

## 2021-01-01 RX ADMIN — ACETAMINOPHEN 650 MG: 325 TABLET, FILM COATED ORAL at 08:15

## 2021-01-01 RX ADMIN — DOXEPIN HYDROCHLORIDE 10 MG: 10 CAPSULE ORAL at 05:24

## 2021-01-01 RX ADMIN — ESCITALOPRAM OXALATE 5 MG: 10 TABLET ORAL at 06:09

## 2021-01-01 RX ADMIN — BISACODYL 5 MG: 5 TABLET, COATED ORAL at 17:00

## 2021-01-01 RX ADMIN — CINACALCET HYDROCHLORIDE 60 MG: 30 TABLET, COATED ORAL at 20:11

## 2021-01-01 RX ADMIN — SEVELAMER CARBONATE 1.6 G: 800 POWDER, FOR SUSPENSION ORAL at 17:54

## 2021-01-01 RX ADMIN — INSULIN LISPRO 3 UNITS: 100 INJECTION, SOLUTION INTRAVENOUS; SUBCUTANEOUS at 00:30

## 2021-01-01 RX ADMIN — METOPROLOL TARTRATE 100 MG: 100 TABLET, FILM COATED ORAL at 08:29

## 2021-01-01 RX ADMIN — CEFTAZIDIME: 1 INJECTION, POWDER, FOR SOLUTION INTRAMUSCULAR; INTRAVENOUS at 22:54

## 2021-01-01 RX ADMIN — METOPROLOL TARTRATE 100 MG: 100 TABLET, FILM COATED ORAL at 20:24

## 2021-01-01 RX ADMIN — CEFDINIR 300 MG: 300 CAPSULE ORAL at 20:08

## 2021-01-01 RX ADMIN — METOPROLOL TARTRATE 2.5 MG: 1 INJECTION, SOLUTION INTRAVENOUS at 00:23

## 2021-01-01 RX ADMIN — SEVELAMER CARBONATE 1600 MG: 800 TABLET, FILM COATED ORAL at 11:48

## 2021-01-01 RX ADMIN — SODIUM CHLORIDE, SODIUM LACTATE, CALCIUM CHLORIDE, MAGNESIUM CHLORIDE AND DEXTROSE 2000 ML: 1.5; 538; 448; 25.7; 5.08 INJECTION, SOLUTION INTRAPERITONEAL at 18:10

## 2021-01-01 RX ADMIN — SEVELAMER CARBONATE 4.8 G: 0.8 POWDER, FOR SUSPENSION ORAL at 09:49

## 2021-01-01 RX ADMIN — SODIUM CHLORIDE, SODIUM LACTATE, CALCIUM CHLORIDE, MAGNESIUM CHLORIDE AND DEXTROSE 2000 ML: 1.5; 538; 448; 25.7; 5.08 INJECTION, SOLUTION INTRAPERITONEAL at 10:20

## 2021-01-01 RX ADMIN — SODIUM BICARBONATE 650 MG TABLET 650 MG: at 16:50

## 2021-01-01 RX ADMIN — SODIUM CHLORIDE, SODIUM LACTATE, CALCIUM CHLORIDE, MAGNESIUM CHLORIDE AND DEXTROSE 2000 ML: 1.5; 538; 448; 25.7; 5.08 INJECTION, SOLUTION INTRAPERITONEAL at 12:04

## 2021-01-01 RX ADMIN — AMIODARONE HYDROCHLORIDE 0.5 MG/MIN: 1.8 INJECTION, SOLUTION INTRAVENOUS at 05:44

## 2021-01-01 RX ADMIN — SEVELAMER CARBONATE 1600 MG: 800 TABLET, FILM COATED ORAL at 20:22

## 2021-01-01 RX ADMIN — METOPROLOL TARTRATE 25 MG: 25 TABLET, FILM COATED ORAL at 20:04

## 2021-01-01 RX ADMIN — CALCITRIOL CAPSULES 0.25 MCG 0.5 MCG: 0.25 CAPSULE ORAL at 08:53

## 2021-01-01 RX ADMIN — CALCIUM GLUCONATE: 98 INJECTION, SOLUTION INTRAVENOUS at 17:01

## 2021-01-01 RX ADMIN — TEMAZEPAM 15 MG: 15 CAPSULE ORAL at 20:30

## 2021-01-01 RX ADMIN — FENTANYL CITRATE 50 MCG: 50 INJECTION, SOLUTION INTRAMUSCULAR; INTRAVENOUS at 19:30

## 2021-01-01 RX ADMIN — AMIODARONE HYDROCHLORIDE 1 MG/MIN: 1.8 INJECTION, SOLUTION INTRAVENOUS at 11:43

## 2021-01-01 RX ADMIN — AMIODARONE HYDROCHLORIDE 1 MG/MIN: 1.8 INJECTION, SOLUTION INTRAVENOUS at 19:53

## 2021-01-01 RX ADMIN — ONDANSETRON 4 MG: 2 INJECTION INTRAMUSCULAR; INTRAVENOUS at 09:05

## 2021-01-01 RX ADMIN — METOPROLOL TARTRATE 100 MG: 100 TABLET, FILM COATED ORAL at 10:18

## 2021-01-01 RX ADMIN — HYDROMORPHONE HYDROCHLORIDE 0.25 MG: 1 INJECTION, SOLUTION INTRAMUSCULAR; INTRAVENOUS; SUBCUTANEOUS at 06:58

## 2021-01-01 RX ADMIN — SODIUM CHLORIDE, SODIUM LACTATE, CALCIUM CHLORIDE, MAGNESIUM CHLORIDE AND DEXTROSE 2000 ML: 1.5; 538; 448; 25.7; 5.08 INJECTION, SOLUTION INTRAPERITONEAL at 00:06

## 2021-01-01 RX ADMIN — HYDROMORPHONE HYDROCHLORIDE 0.5 MG: 1 INJECTION, SOLUTION INTRAMUSCULAR; INTRAVENOUS; SUBCUTANEOUS at 14:34

## 2021-01-01 RX ADMIN — CEFAZOLIN: 1 INJECTION, POWDER, FOR SOLUTION INTRAMUSCULAR; INTRAVENOUS at 00:04

## 2021-01-01 RX ADMIN — SEVELAMER CARBONATE 1.6 G: 800 POWDER, FOR SUSPENSION ORAL at 08:36

## 2021-01-01 RX ADMIN — STANDARDIZED SENNA CONCENTRATE AND DOCUSATE SODIUM 2 TABLET: 8.6; 5 TABLET, FILM COATED ORAL at 20:08

## 2021-01-01 RX ADMIN — EPOETIN ALFA-EPBX 20000 UNITS: 10000 INJECTION, SOLUTION INTRAVENOUS; SUBCUTANEOUS at 21:59

## 2021-01-01 RX ADMIN — ACETAMINOPHEN 650 MG: 325 TABLET, FILM COATED ORAL at 05:13

## 2021-01-01 RX ADMIN — AMLODIPINE BESYLATE 5 MG: 5 TABLET ORAL at 09:43

## 2021-01-01 RX ADMIN — SEVELAMER CARBONATE 1.6 G: 800 POWDER, FOR SUSPENSION ORAL at 12:24

## 2021-01-01 RX ADMIN — PANTOPRAZOLE SODIUM 40 MG: 40 TABLET, DELAYED RELEASE ORAL at 08:26

## 2021-01-01 RX ADMIN — ACETAMINOPHEN 650 MG: 325 TABLET, FILM COATED ORAL at 17:41

## 2021-01-01 RX ADMIN — HYDROMORPHONE HYDROCHLORIDE 0.25 MG: 1 INJECTION, SOLUTION INTRAMUSCULAR; INTRAVENOUS; SUBCUTANEOUS at 22:12

## 2021-01-01 RX ADMIN — VANCOMYCIN HYDROCHLORIDE 500 MG: 500 INJECTION, POWDER, LYOPHILIZED, FOR SOLUTION INTRAVENOUS at 15:31

## 2021-01-01 RX ADMIN — AMIODARONE HYDROCHLORIDE 200 MG: 200 TABLET ORAL at 20:25

## 2021-01-01 RX ADMIN — ACETAMINOPHEN 650 MG: 325 TABLET, FILM COATED ORAL at 01:15

## 2021-01-01 RX ADMIN — TAZOBACTAM SODIUM AND PIPERACILLIN SODIUM 3.38 G: 375; 3 INJECTION, SOLUTION INTRAVENOUS at 12:44

## 2021-01-01 RX ADMIN — ACETAMINOPHEN 650 MG: 325 TABLET, FILM COATED ORAL at 12:24

## 2021-01-01 RX ADMIN — BISACODYL 5 MG: 5 TABLET, COATED ORAL at 20:09

## 2021-01-01 RX ADMIN — SODIUM CHLORIDE, PRESERVATIVE FREE 10 ML: 5 INJECTION INTRAVENOUS at 08:26

## 2021-01-01 RX ADMIN — MICAFUNGIN SODIUM 100 MG: 100 INJECTION, POWDER, LYOPHILIZED, FOR SOLUTION INTRAVENOUS at 10:15

## 2021-01-01 RX ADMIN — METOPROLOL TARTRATE 100 MG: 100 TABLET, FILM COATED ORAL at 20:25

## 2021-01-01 RX ADMIN — ACETAMINOPHEN 650 MG: 325 TABLET, FILM COATED ORAL at 16:29

## 2021-01-01 RX ADMIN — Medication 250 MG: at 09:43

## 2021-01-01 RX ADMIN — SODIUM CHLORIDE, PRESERVATIVE FREE 10 ML: 5 INJECTION INTRAVENOUS at 08:14

## 2021-01-01 RX ADMIN — AMIODARONE HYDROCHLORIDE 0.5 MG/MIN: 1.8 INJECTION, SOLUTION INTRAVENOUS at 05:45

## 2021-01-01 RX ADMIN — FUROSEMIDE 80 MG: 40 TABLET ORAL at 08:06

## 2021-01-01 RX ADMIN — TAZOBACTAM SODIUM AND PIPERACILLIN SODIUM 3.38 G: 375; 3 INJECTION, SOLUTION INTRAVENOUS at 18:02

## 2021-01-01 RX ADMIN — SODIUM CHLORIDE, SODIUM LACTATE, CALCIUM CHLORIDE, MAGNESIUM CHLORIDE AND DEXTROSE 2000 ML: 1.5; 538; 448; 25.7; 5.08 INJECTION, SOLUTION INTRAPERITONEAL at 12:08

## 2021-01-01 RX ADMIN — TEMAZEPAM 15 MG: 15 CAPSULE ORAL at 23:10

## 2021-01-01 RX ADMIN — INSULIN LISPRO 3 UNITS: 100 INJECTION, SOLUTION INTRAVENOUS; SUBCUTANEOUS at 23:37

## 2021-01-01 RX ADMIN — TAZOBACTAM SODIUM AND PIPERACILLIN SODIUM 3.38 G: 375; 3 INJECTION, SOLUTION INTRAVENOUS at 20:47

## 2021-01-01 RX ADMIN — INSULIN LISPRO 3 UNITS: 100 INJECTION, SOLUTION INTRAVENOUS; SUBCUTANEOUS at 12:43

## 2021-01-01 RX ADMIN — CALCIUM GLUCONATE 75 ML/HR: 98 INJECTION, SOLUTION INTRAVENOUS at 14:45

## 2021-01-01 RX ADMIN — TACROLIMUS 1 MG: 1 CAPSULE ORAL at 20:24

## 2021-01-01 RX ADMIN — FENTANYL CITRATE 50 MCG: 50 INJECTION, SOLUTION INTRAMUSCULAR; INTRAVENOUS at 18:34

## 2021-01-01 RX ADMIN — ACETAMINOPHEN 650 MG: 325 TABLET, FILM COATED ORAL at 00:05

## 2021-01-01 RX ADMIN — Medication 250 MG: at 14:41

## 2021-01-01 RX ADMIN — METOPROLOL TARTRATE 25 MG: 25 TABLET, FILM COATED ORAL at 20:37

## 2021-01-01 RX ADMIN — SEVELAMER CARBONATE 1600 MG: 800 TABLET, FILM COATED ORAL at 08:30

## 2021-01-01 RX ADMIN — SODIUM CHLORIDE, SODIUM LACTATE, CALCIUM CHLORIDE, MAGNESIUM CHLORIDE AND DEXTROSE 2000 ML: 1.5; 538; 448; 25.7; 5.08 INJECTION, SOLUTION INTRAPERITONEAL at 11:45

## 2021-01-01 RX ADMIN — METOPROLOL TARTRATE 100 MG: 100 TABLET, FILM COATED ORAL at 08:06

## 2021-01-01 RX ADMIN — SEVELAMER CARBONATE 1600 MG: 800 TABLET, FILM COATED ORAL at 21:43

## 2021-01-01 RX ADMIN — TAZOBACTAM SODIUM AND PIPERACILLIN SODIUM 3.38 G: 375; 3 INJECTION, SOLUTION INTRAVENOUS at 05:56

## 2021-01-01 RX ADMIN — SODIUM BICARBONATE 650 MG TABLET 650 MG: at 20:54

## 2021-01-01 RX ADMIN — SODIUM CHLORIDE: 9 INJECTION, SOLUTION INTRAVENOUS at 14:20

## 2021-01-01 RX ADMIN — PANTOPRAZOLE SODIUM 40 MG: 40 INJECTION, POWDER, FOR SOLUTION INTRAVENOUS at 05:30

## 2021-01-01 RX ADMIN — HYDROMORPHONE HYDROCHLORIDE 0.25 MG: 1 INJECTION, SOLUTION INTRAMUSCULAR; INTRAVENOUS; SUBCUTANEOUS at 00:33

## 2021-01-01 RX ADMIN — WARFARIN SODIUM 6 MG: 3 TABLET ORAL at 17:24

## 2021-01-01 RX ADMIN — SODIUM CHLORIDE, PRESERVATIVE FREE 10 ML: 5 INJECTION INTRAVENOUS at 10:18

## 2021-01-01 RX ADMIN — SMOFLIPID 250 ML: 6; 6; 5; 3 INJECTION, EMULSION INTRAVENOUS at 18:23

## 2021-01-01 RX ADMIN — SODIUM CHLORIDE, PRESERVATIVE FREE 10 ML: 5 INJECTION INTRAVENOUS at 21:23

## 2021-01-01 RX ADMIN — WARFARIN SODIUM 5 MG: 5 TABLET ORAL at 17:51

## 2021-01-01 RX ADMIN — AMIODARONE HYDROCHLORIDE 200 MG: 200 TABLET ORAL at 21:16

## 2021-01-01 RX ADMIN — PHENOL 2 SPRAY: 1.5 LIQUID ORAL at 02:23

## 2021-01-01 RX ADMIN — SEVELAMER HYDROCHLORIDE 1600 MG: 800 TABLET, FILM COATED ORAL at 12:29

## 2021-01-01 RX ADMIN — SEVELAMER CARBONATE 1600 MG: 800 TABLET, FILM COATED ORAL at 08:13

## 2021-01-01 RX ADMIN — INSULIN LISPRO 2 UNITS: 100 INJECTION, SOLUTION INTRAVENOUS; SUBCUTANEOUS at 12:52

## 2021-01-01 RX ADMIN — ACETAMINOPHEN 650 MG: 325 TABLET, FILM COATED ORAL at 12:49

## 2021-01-01 RX ADMIN — METOPROLOL TARTRATE 100 MG: 100 TABLET, FILM COATED ORAL at 20:07

## 2021-01-01 RX ADMIN — LORAZEPAM 0.5 MG: 2 INJECTION INTRAMUSCULAR; INTRAVENOUS at 08:15

## 2021-01-01 RX ADMIN — SEVELAMER CARBONATE 1600 MG: 800 TABLET, FILM COATED ORAL at 08:53

## 2021-01-01 RX ADMIN — SODIUM CHLORIDE, PRESERVATIVE FREE 10 ML: 5 INJECTION INTRAVENOUS at 08:15

## 2021-01-01 RX ADMIN — INSULIN LISPRO 4 UNITS: 100 INJECTION, SOLUTION INTRAVENOUS; SUBCUTANEOUS at 06:21

## 2021-01-01 RX ADMIN — SEVELAMER CARBONATE 1600 MG: 800 TABLET, FILM COATED ORAL at 18:16

## 2021-01-01 RX ADMIN — METOCLOPRAMIDE 5 MG: 5 INJECTION, SOLUTION INTRAMUSCULAR; INTRAVENOUS at 05:10

## 2021-01-01 RX ADMIN — SODIUM CHLORIDE, PRESERVATIVE FREE 10 ML: 5 INJECTION INTRAVENOUS at 20:26

## 2021-01-01 RX ADMIN — CEFTRIAXONE SODIUM 1 G: 1 INJECTION, POWDER, FOR SOLUTION INTRAMUSCULAR; INTRAVENOUS at 20:12

## 2021-01-01 RX ADMIN — AMIODARONE HYDROCHLORIDE 1 MG/MIN: 1.8 INJECTION, SOLUTION INTRAVENOUS at 06:21

## 2021-01-01 RX ADMIN — SODIUM CHLORIDE, SODIUM LACTATE, CALCIUM CHLORIDE, MAGNESIUM CHLORIDE AND DEXTROSE 2000 ML: 1.5; 538; 448; 25.7; 5.08 INJECTION, SOLUTION INTRAPERITONEAL at 16:00

## 2021-01-01 RX ADMIN — POTASSIUM CHLORIDE 20 MEQ: 750 CAPSULE, EXTENDED RELEASE ORAL at 17:25

## 2021-01-01 RX ADMIN — FENTANYL CITRATE 50 MCG: 50 INJECTION, SOLUTION INTRAMUSCULAR; INTRAVENOUS at 18:55

## 2021-01-01 RX ADMIN — TEMAZEPAM 15 MG: 15 CAPSULE ORAL at 01:46

## 2021-01-01 RX ADMIN — SEVELAMER CARBONATE 1.6 G: 800 POWDER, FOR SUSPENSION ORAL at 11:46

## 2021-01-01 RX ADMIN — VANCOMYCIN HYDROCHLORIDE: 500 INJECTION, POWDER, LYOPHILIZED, FOR SOLUTION INTRAVENOUS at 22:00

## 2021-01-01 RX ADMIN — SEVELAMER CARBONATE 1600 MG: 800 TABLET, FILM COATED ORAL at 17:15

## 2021-01-01 RX ADMIN — ALBUMIN HUMAN: 0.05 INJECTION, SOLUTION INTRAVENOUS at 15:05

## 2021-01-01 RX ADMIN — SODIUM CHLORIDE 500 ML: 9 INJECTION, SOLUTION INTRAVENOUS at 08:52

## 2021-01-01 RX ADMIN — SODIUM BICARBONATE 650 MG TABLET 650 MG: at 16:10

## 2021-01-01 RX ADMIN — MICAFUNGIN SODIUM 100 MG: 100 INJECTION, POWDER, LYOPHILIZED, FOR SOLUTION INTRAVENOUS at 09:01

## 2021-01-01 RX ADMIN — ALBUMIN HUMAN 25 G: 0.25 SOLUTION INTRAVENOUS at 08:56

## 2021-01-01 RX ADMIN — TAZOBACTAM SODIUM AND PIPERACILLIN SODIUM 3.38 G: 375; 3 INJECTION, SOLUTION INTRAVENOUS at 17:59

## 2021-01-01 RX ADMIN — SMOFLIPID 250 ML: 6; 6; 5; 3 INJECTION, EMULSION INTRAVENOUS at 22:15

## 2021-01-01 RX ADMIN — AMIODARONE HYDROCHLORIDE 0.5 MG/MIN: 1.8 INJECTION, SOLUTION INTRAVENOUS at 17:53

## 2021-01-01 RX ADMIN — INSULIN LISPRO 4 UNITS: 100 INJECTION, SOLUTION INTRAVENOUS; SUBCUTANEOUS at 19:35

## 2021-01-01 RX ADMIN — ONDANSETRON 4 MG: 2 INJECTION INTRAMUSCULAR; INTRAVENOUS at 01:21

## 2021-01-01 RX ADMIN — INSULIN LISPRO 2 UNITS: 100 INJECTION, SOLUTION INTRAVENOUS; SUBCUTANEOUS at 06:31

## 2021-01-01 RX ADMIN — SODIUM CHLORIDE 500 ML: 9 INJECTION, SOLUTION INTRAVENOUS at 09:28

## 2021-01-01 RX ADMIN — AMIODARONE HYDROCHLORIDE 0.5 MG/MIN: 1.8 INJECTION, SOLUTION INTRAVENOUS at 17:43

## 2021-01-01 RX ADMIN — CALCIUM ACETATE 667 MG: 667 CAPSULE ORAL at 17:53

## 2021-01-01 RX ADMIN — PANTOPRAZOLE SODIUM 40 MG: 40 TABLET, DELAYED RELEASE ORAL at 08:20

## 2021-01-01 RX ADMIN — SODIUM CHLORIDE, SODIUM LACTATE, CALCIUM CHLORIDE, MAGNESIUM CHLORIDE AND DEXTROSE 2000 ML: 1.5; 538; 448; 25.7; 5.08 INJECTION, SOLUTION INTRAPERITONEAL at 23:53

## 2021-01-01 RX ADMIN — ACETAMINOPHEN 650 MG: 325 TABLET, FILM COATED ORAL at 05:37

## 2021-01-01 RX ADMIN — LANSOPRAZOLE 30 MG: KIT at 05:23

## 2021-01-01 RX ADMIN — ONDANSETRON 4 MG: 2 INJECTION INTRAMUSCULAR; INTRAVENOUS at 09:17

## 2021-01-01 RX ADMIN — VANCOMYCIN HYDROCHLORIDE 1500 MG: 100 INJECTION, POWDER, LYOPHILIZED, FOR SOLUTION INTRAVENOUS at 14:17

## 2021-01-01 RX ADMIN — PANTOPRAZOLE SODIUM 40 MG: 40 TABLET, DELAYED RELEASE ORAL at 06:05

## 2021-01-01 RX ADMIN — METOPROLOL TARTRATE 25 MG: 25 TABLET, FILM COATED ORAL at 08:02

## 2021-01-01 RX ADMIN — SEVELAMER CARBONATE 1.6 G: 800 POWDER, FOR SUSPENSION ORAL at 09:06

## 2021-01-01 RX ADMIN — METOPROLOL TARTRATE 100 MG: 100 TABLET, FILM COATED ORAL at 20:12

## 2021-01-01 RX ADMIN — SEVELAMER HYDROCHLORIDE 1600 MG: 800 TABLET, FILM COATED ORAL at 17:33

## 2021-01-01 RX ADMIN — PANTOPRAZOLE SODIUM 40 MG: 40 TABLET, DELAYED RELEASE ORAL at 06:26

## 2021-01-01 RX ADMIN — CALCIUM ACETATE 667 MG: 667 CAPSULE ORAL at 08:36

## 2021-01-01 RX ADMIN — INSULIN LISPRO 3 UNITS: 100 INJECTION, SOLUTION INTRAVENOUS; SUBCUTANEOUS at 23:36

## 2021-01-01 RX ADMIN — METOPROLOL TARTRATE 100 MG: 100 TABLET, FILM COATED ORAL at 21:31

## 2021-01-01 RX ADMIN — METOPROLOL TARTRATE 25 MG: 25 TABLET, FILM COATED ORAL at 20:54

## 2021-01-01 RX ADMIN — METOPROLOL TARTRATE 25 MG: 25 TABLET, FILM COATED ORAL at 21:26

## 2021-01-01 RX ADMIN — ONDANSETRON 4 MG: 2 INJECTION INTRAMUSCULAR; INTRAVENOUS at 00:22

## 2021-01-01 RX ADMIN — CINACALCET HYDROCHLORIDE 60 MG: 30 TABLET, COATED ORAL at 20:16

## 2021-01-01 RX ADMIN — AMIODARONE HYDROCHLORIDE 200 MG: 200 TABLET ORAL at 12:23

## 2021-01-01 RX ADMIN — TAZOBACTAM SODIUM AND PIPERACILLIN SODIUM 3.38 G: 375; 3 INJECTION, SOLUTION INTRAVENOUS at 06:01

## 2021-01-01 RX ADMIN — WARFARIN SODIUM 6 MG: 3 TABLET ORAL at 18:16

## 2021-01-01 RX ADMIN — SODIUM CHLORIDE, SODIUM LACTATE, CALCIUM CHLORIDE, MAGNESIUM CHLORIDE AND DEXTROSE 2000 ML: 1.5; 538; 448; 25.7; 5.08 INJECTION, SOLUTION INTRAPERITONEAL at 21:55

## 2021-01-01 RX ADMIN — ONDANSETRON 4 MG: 2 INJECTION INTRAMUSCULAR; INTRAVENOUS at 16:09

## 2021-01-01 RX ADMIN — POTASSIUM & SODIUM PHOSPHATES POWDER PACK 280-160-250 MG 1 PACKET: 280-160-250 PACK at 12:57

## 2021-01-01 RX ADMIN — ACETAMINOPHEN 650 MG: 325 TABLET, FILM COATED ORAL at 14:02

## 2021-01-01 RX ADMIN — METOPROLOL TARTRATE 25 MG: 25 TABLET, FILM COATED ORAL at 20:25

## 2021-01-01 RX ADMIN — SODIUM CHLORIDE, SODIUM LACTATE, CALCIUM CHLORIDE, MAGNESIUM CHLORIDE AND DEXTROSE 2000 ML: 1.5; 538; 448; 25.7; 5.08 INJECTION, SOLUTION INTRAPERITONEAL at 18:45

## 2021-01-01 RX ADMIN — BENZOCAINE AND MENTHOL 1 LOZENGE: 15; 3.6 LOZENGE ORAL at 18:29

## 2021-01-01 RX ADMIN — HYDROMORPHONE HYDROCHLORIDE 0.25 MG: 1 INJECTION, SOLUTION INTRAMUSCULAR; INTRAVENOUS; SUBCUTANEOUS at 18:15

## 2021-01-01 RX ADMIN — TACROLIMUS 1 MG: 1 CAPSULE ORAL at 20:29

## 2021-01-01 RX ADMIN — LORAZEPAM 0.5 MG: 2 INJECTION INTRAMUSCULAR; INTRAVENOUS at 00:55

## 2021-01-01 RX ADMIN — TAZOBACTAM SODIUM AND PIPERACILLIN SODIUM 3.38 G: 375; 3 INJECTION, SOLUTION INTRAVENOUS at 04:56

## 2021-01-01 RX ADMIN — MINERAL OIL: 1000 LIQUID ORAL at 04:32

## 2021-01-01 RX ADMIN — SODIUM CHLORIDE, SODIUM LACTATE, CALCIUM CHLORIDE, MAGNESIUM CHLORIDE AND DEXTROSE 2000 ML: 1.5; 538; 448; 25.7; 5.08 INJECTION, SOLUTION INTRAPERITONEAL at 13:07

## 2021-01-01 RX ADMIN — CEFTRIAXONE SODIUM 1 G: 1 INJECTION, SOLUTION INTRAVENOUS at 15:26

## 2021-01-01 RX ADMIN — CALCIUM ACETATE 667 MG: 667 CAPSULE ORAL at 12:24

## 2021-01-01 RX ADMIN — TAZOBACTAM SODIUM AND PIPERACILLIN SODIUM 3.38 G: 375; 3 INJECTION, SOLUTION INTRAVENOUS at 17:42

## 2021-01-01 RX ADMIN — DOXYCYCLINE 100 MG: 100 CAPSULE ORAL at 08:06

## 2021-01-01 RX ADMIN — PANTOPRAZOLE SODIUM 40 MG: 40 INJECTION, POWDER, FOR SOLUTION INTRAVENOUS at 05:42

## 2021-01-01 RX ADMIN — SODIUM CHLORIDE, PRESERVATIVE FREE 10 ML: 5 INJECTION INTRAVENOUS at 10:06

## 2021-01-01 RX ADMIN — CINACALCET HYDROCHLORIDE 60 MG: 30 TABLET, COATED ORAL at 20:46

## 2021-01-01 RX ADMIN — SODIUM CHLORIDE, SODIUM LACTATE, CALCIUM CHLORIDE, MAGNESIUM CHLORIDE AND DEXTROSE 2000 ML: 1.5; 538; 448; 25.7; 5.08 INJECTION, SOLUTION INTRAPERITONEAL at 06:00

## 2021-01-01 RX ADMIN — TAZOBACTAM SODIUM AND PIPERACILLIN SODIUM 3.38 G: 375; 3 INJECTION, SOLUTION INTRAVENOUS at 18:20

## 2021-01-01 RX ADMIN — HYDROMORPHONE HYDROCHLORIDE 0.25 MG: 1 INJECTION, SOLUTION INTRAMUSCULAR; INTRAVENOUS; SUBCUTANEOUS at 17:49

## 2021-01-01 RX ADMIN — ACETAMINOPHEN 650 MG: 325 TABLET, FILM COATED ORAL at 06:07

## 2021-01-01 RX ADMIN — SODIUM CHLORIDE, PRESERVATIVE FREE 10 ML: 5 INJECTION INTRAVENOUS at 20:02

## 2021-01-01 RX ADMIN — SODIUM CHLORIDE, SODIUM LACTATE, CALCIUM CHLORIDE, MAGNESIUM CHLORIDE AND DEXTROSE 2000 ML: 1.5; 538; 448; 25.7; 5.08 INJECTION, SOLUTION INTRAPERITONEAL at 01:26

## 2021-01-01 RX ADMIN — HYDROMORPHONE HYDROCHLORIDE 0.25 MG: 1 INJECTION, SOLUTION INTRAMUSCULAR; INTRAVENOUS; SUBCUTANEOUS at 08:12

## 2021-01-01 RX ADMIN — SCOPALAMINE 1 PATCH: 1 PATCH, EXTENDED RELEASE TRANSDERMAL at 10:34

## 2021-01-01 RX ADMIN — SODIUM CHLORIDE, SODIUM LACTATE, CALCIUM CHLORIDE, MAGNESIUM CHLORIDE AND DEXTROSE 2000 ML: 1.5; 538; 448; 25.7; 5.08 INJECTION, SOLUTION INTRAPERITONEAL at 13:47

## 2021-01-01 RX ADMIN — METOCLOPRAMIDE 5 MG: 5 INJECTION, SOLUTION INTRAMUSCULAR; INTRAVENOUS at 17:14

## 2021-01-01 RX ADMIN — INSULIN LISPRO 4 UNITS: 100 INJECTION, SOLUTION INTRAVENOUS; SUBCUTANEOUS at 05:37

## 2021-01-01 RX ADMIN — SEVELAMER CARBONATE 1600 MG: 800 TABLET, FILM COATED ORAL at 17:59

## 2021-01-01 RX ADMIN — ALBUMIN HUMAN 25 G: 0.25 SOLUTION INTRAVENOUS at 12:15

## 2021-01-01 RX ADMIN — INSULIN LISPRO 2 UNITS: 100 INJECTION, SOLUTION INTRAVENOUS; SUBCUTANEOUS at 05:40

## 2021-01-01 RX ADMIN — Medication 5 MG: at 03:06

## 2021-01-01 RX ADMIN — ACETAMINOPHEN 650 MG: 325 TABLET, FILM COATED ORAL at 05:40

## 2021-01-01 RX ADMIN — CALCIUM ACETATE 667 MG: 667 CAPSULE ORAL at 11:50

## 2021-01-01 RX ADMIN — POTASSIUM CHLORIDE 40 MEQ: 1.5 POWDER, FOR SOLUTION ORAL at 08:56

## 2021-01-01 RX ADMIN — DEXAMETHASONE SODIUM PHOSPHATE 4 MG: 4 INJECTION, SOLUTION INTRA-ARTICULAR; INTRALESIONAL; INTRAMUSCULAR; INTRAVENOUS; SOFT TISSUE at 14:35

## 2021-01-01 RX ADMIN — FUROSEMIDE 40 MG: 10 INJECTION, SOLUTION INTRAMUSCULAR; INTRAVENOUS at 04:32

## 2021-01-01 RX ADMIN — POTASSIUM CHLORIDE 20 MEQ: 750 CAPSULE, EXTENDED RELEASE ORAL at 09:44

## 2021-01-01 RX ADMIN — MAGNESIUM SULFATE HEPTAHYDRATE 1 G: 1 INJECTION, SOLUTION INTRAVENOUS at 12:29

## 2021-01-01 RX ADMIN — INSULIN LISPRO 2 UNITS: 100 INJECTION, SOLUTION INTRAVENOUS; SUBCUTANEOUS at 12:41

## 2021-01-01 RX ADMIN — TAZOBACTAM SODIUM AND PIPERACILLIN SODIUM 3.38 G: 375; 3 INJECTION, SOLUTION INTRAVENOUS at 05:19

## 2021-01-01 RX ADMIN — POTASSIUM PHOSPHATE, MONOBASIC POTASSIUM PHOSPHATE, DIBASIC 15 MMOL: 224; 236 INJECTION, SOLUTION, CONCENTRATE INTRAVENOUS at 13:08

## 2021-01-01 RX ADMIN — WARFARIN SODIUM 4 MG: 4 TABLET ORAL at 17:25

## 2021-01-01 RX ADMIN — ONDANSETRON 4 MG: 2 INJECTION INTRAMUSCULAR; INTRAVENOUS at 18:05

## 2021-01-01 RX ADMIN — FUROSEMIDE 40 MG: 10 INJECTION INTRAMUSCULAR; INTRAVENOUS at 04:32

## 2021-01-01 RX ADMIN — FENTANYL CITRATE 50 MCG: 50 INJECTION, SOLUTION INTRAMUSCULAR; INTRAVENOUS at 18:17

## 2021-01-01 RX ADMIN — ONDANSETRON 4 MG: 2 INJECTION INTRAMUSCULAR; INTRAVENOUS at 01:46

## 2021-01-01 RX ADMIN — SODIUM CHLORIDE, SODIUM LACTATE, CALCIUM CHLORIDE, MAGNESIUM CHLORIDE AND DEXTROSE 2000 ML: 1.5; 538; 448; 25.7; 5.08 INJECTION, SOLUTION INTRAPERITONEAL at 05:53

## 2021-01-01 RX ADMIN — SMOFLIPID 250 ML: 6; 6; 5; 3 INJECTION, EMULSION INTRAVENOUS at 17:02

## 2021-01-01 RX ADMIN — POTASSIUM CHLORIDE 30 MEQ: 750 CAPSULE, EXTENDED RELEASE ORAL at 18:16

## 2021-01-01 RX ADMIN — SODIUM CHLORIDE, SODIUM LACTATE, CALCIUM CHLORIDE, MAGNESIUM CHLORIDE AND DEXTROSE 2000 ML: 1.5; 538; 448; 25.7; 5.08 INJECTION, SOLUTION INTRAPERITONEAL at 09:46

## 2021-01-01 RX ADMIN — INSULIN LISPRO 2 UNITS: 100 INJECTION, SOLUTION INTRAVENOUS; SUBCUTANEOUS at 17:24

## 2021-01-01 RX ADMIN — AMLODIPINE BESYLATE 5 MG: 5 TABLET ORAL at 17:25

## 2021-01-01 RX ADMIN — HYDROMORPHONE HYDROCHLORIDE 0.25 MG: 1 INJECTION, SOLUTION INTRAMUSCULAR; INTRAVENOUS; SUBCUTANEOUS at 22:31

## 2021-01-01 RX ADMIN — SODIUM CHLORIDE, PRESERVATIVE FREE 10 ML: 5 INJECTION INTRAVENOUS at 08:03

## 2021-01-01 RX ADMIN — SEVELAMER CARBONATE 1.6 G: 800 POWDER, FOR SUSPENSION ORAL at 08:19

## 2021-01-01 RX ADMIN — ACETAMINOPHEN 650 MG: 325 TABLET, FILM COATED ORAL at 04:58

## 2021-01-01 RX ADMIN — ACETAMINOPHEN 650 MG: 325 TABLET, FILM COATED ORAL at 05:23

## 2021-01-01 RX ADMIN — LANSOPRAZOLE 30 MG: KIT at 04:58

## 2021-01-01 RX ADMIN — PROPOFOL 150 MG: 10 INJECTION, EMULSION INTRAVENOUS at 14:33

## 2021-01-01 RX ADMIN — POTASSIUM & SODIUM PHOSPHATES POWDER PACK 280-160-250 MG 2 PACKET: 280-160-250 PACK at 08:53

## 2021-01-01 RX ADMIN — SODIUM CHLORIDE, SODIUM LACTATE, CALCIUM CHLORIDE, MAGNESIUM CHLORIDE AND DEXTROSE 2000 ML: 1.5; 538; 448; 25.7; 5.08 INJECTION, SOLUTION INTRAPERITONEAL at 06:05

## 2021-01-01 RX ADMIN — TACROLIMUS 1 MG: 1 CAPSULE ORAL at 08:06

## 2021-01-01 RX ADMIN — METOPROLOL TARTRATE 25 MG: 25 TABLET, FILM COATED ORAL at 10:26

## 2021-01-01 RX ADMIN — PANTOPRAZOLE SODIUM 40 MG: 40 INJECTION, POWDER, FOR SOLUTION INTRAVENOUS at 05:11

## 2021-01-01 RX ADMIN — ACETAMINOPHEN 650 MG: 325 TABLET, FILM COATED ORAL at 03:05

## 2021-01-01 RX ADMIN — METOCLOPRAMIDE 5 MG: 5 INJECTION, SOLUTION INTRAMUSCULAR; INTRAVENOUS at 05:30

## 2021-01-01 RX ADMIN — ALBUMIN (HUMAN) 25 G: 12.5 SOLUTION INTRAVENOUS at 08:08

## 2021-01-01 RX ADMIN — METOPROLOL TARTRATE 5 MG: 5 INJECTION INTRAVENOUS at 16:01

## 2021-01-01 RX ADMIN — HEPARIN SODIUM: 1000 INJECTION INTRAVENOUS; SUBCUTANEOUS at 08:25

## 2021-01-01 RX ADMIN — DOXEPIN HYDROCHLORIDE 10 MG: 10 CAPSULE ORAL at 15:20

## 2021-01-01 RX ADMIN — SEVELAMER CARBONATE 1600 MG: 800 TABLET, FILM COATED ORAL at 08:15

## 2021-01-01 RX ADMIN — SODIUM BICARBONATE 650 MG TABLET 650 MG: at 12:28

## 2021-01-01 RX ADMIN — METOPROLOL TARTRATE 25 MG: 25 TABLET, FILM COATED ORAL at 07:58

## 2021-01-01 RX ADMIN — ACETAMINOPHEN 650 MG: 325 TABLET, FILM COATED ORAL at 23:31

## 2021-01-01 RX ADMIN — SODIUM CHLORIDE, PRESERVATIVE FREE 10 ML: 5 INJECTION INTRAVENOUS at 20:24

## 2021-01-01 RX ADMIN — POTASSIUM PHOSPHATE, MONOBASIC POTASSIUM PHOSPHATE, DIBASIC 21 MMOL: 224; 236 INJECTION, SOLUTION, CONCENTRATE INTRAVENOUS at 07:48

## 2021-01-01 RX ADMIN — Medication 250 MG: at 21:31

## 2021-01-01 RX ADMIN — SODIUM CHLORIDE, SODIUM LACTATE, CALCIUM CHLORIDE, MAGNESIUM CHLORIDE AND DEXTROSE 2000 ML: 1.5; 538; 448; 25.7; 5.08 INJECTION, SOLUTION INTRAPERITONEAL at 13:54

## 2021-01-01 RX ADMIN — METOPROLOL TARTRATE 25 MG: 25 TABLET, FILM COATED ORAL at 20:10

## 2021-01-01 RX ADMIN — CALCIUM ACETATE 667 MG: 667 CAPSULE ORAL at 17:58

## 2021-01-01 RX ADMIN — ACETAMINOPHEN 650 MG: 325 TABLET, FILM COATED ORAL at 06:55

## 2021-01-01 RX ADMIN — ONDANSETRON 4 MG: 2 INJECTION INTRAMUSCULAR; INTRAVENOUS at 17:41

## 2021-01-01 RX ADMIN — SEVELAMER CARBONATE 1.6 G: 800 POWDER, FOR SUSPENSION ORAL at 12:20

## 2021-01-01 RX ADMIN — SMOFLIPID 250 ML: 6; 6; 5; 3 INJECTION, EMULSION INTRAVENOUS at 17:53

## 2021-01-01 RX ADMIN — ONDANSETRON 4 MG: 2 INJECTION INTRAMUSCULAR; INTRAVENOUS at 12:29

## 2021-01-01 RX ADMIN — TAZOBACTAM SODIUM AND PIPERACILLIN SODIUM 3.38 G: 375; 3 INJECTION, SOLUTION INTRAVENOUS at 05:38

## 2021-01-01 RX ADMIN — HYDROMORPHONE HYDROCHLORIDE 0.25 MG: 1 INJECTION, SOLUTION INTRAMUSCULAR; INTRAVENOUS; SUBCUTANEOUS at 21:19

## 2021-01-01 RX ADMIN — SMOFLIPID 250 ML: 6; 6; 5; 3 INJECTION, EMULSION INTRAVENOUS at 17:03

## 2021-01-01 RX ADMIN — ONDANSETRON 4 MG: 2 INJECTION INTRAMUSCULAR; INTRAVENOUS at 14:51

## 2021-01-01 RX ADMIN — SODIUM CHLORIDE, SODIUM LACTATE, CALCIUM CHLORIDE, MAGNESIUM CHLORIDE AND DEXTROSE 2000 ML: 1.5; 538; 448; 25.7; 5.08 INJECTION, SOLUTION INTRAPERITONEAL at 22:27

## 2021-01-01 RX ADMIN — ONDANSETRON 4 MG: 2 INJECTION INTRAMUSCULAR; INTRAVENOUS at 06:21

## 2021-01-01 RX ADMIN — CEFTAZIDIME: 1 INJECTION, POWDER, FOR SOLUTION INTRAMUSCULAR; INTRAVENOUS at 22:26

## 2021-01-01 RX ADMIN — Medication: at 16:04

## 2021-01-01 RX ADMIN — ACETAMINOPHEN 650 MG: 325 TABLET, FILM COATED ORAL at 17:42

## 2021-01-01 RX ADMIN — SODIUM CHLORIDE: 234 INJECTION, SOLUTION INTRAVENOUS at 18:07

## 2021-01-01 RX ADMIN — POTASSIUM CHLORIDE 40 MEQ: 750 CAPSULE, EXTENDED RELEASE ORAL at 04:12

## 2021-01-01 RX ADMIN — HYDROMORPHONE HYDROCHLORIDE 0.25 MG: 1 INJECTION, SOLUTION INTRAMUSCULAR; INTRAVENOUS; SUBCUTANEOUS at 08:03

## 2021-01-01 RX ADMIN — PANTOPRAZOLE SODIUM 40 MG: 40 INJECTION, POWDER, FOR SOLUTION INTRAVENOUS at 05:27

## 2021-01-01 RX ADMIN — ESCITALOPRAM OXALATE 5 MG: 10 TABLET ORAL at 08:12

## 2021-01-01 RX ADMIN — TAZOBACTAM SODIUM AND PIPERACILLIN SODIUM 3.38 G: 375; 3 INJECTION, SOLUTION INTRAVENOUS at 17:53

## 2021-01-01 RX ADMIN — LORAZEPAM 0.5 MG: 2 INJECTION INTRAMUSCULAR; INTRAVENOUS at 06:11

## 2021-01-01 RX ADMIN — WARFARIN SODIUM 4 MG: 4 TABLET ORAL at 17:18

## 2021-01-01 RX ADMIN — WARFARIN SODIUM 4 MG: 4 TABLET ORAL at 17:01

## 2021-01-01 RX ADMIN — SODIUM CHLORIDE, SODIUM LACTATE, CALCIUM CHLORIDE, MAGNESIUM CHLORIDE AND DEXTROSE 2000 ML: 1.5; 538; 448; 25.7; 5.08 INJECTION, SOLUTION INTRAPERITONEAL at 15:58

## 2021-01-01 RX ADMIN — INSULIN LISPRO 3 UNITS: 100 INJECTION, SOLUTION INTRAVENOUS; SUBCUTANEOUS at 12:05

## 2021-01-01 RX ADMIN — CEFTRIAXONE SODIUM 1 G: 1 INJECTION, POWDER, FOR SOLUTION INTRAMUSCULAR; INTRAVENOUS at 23:38

## 2021-01-01 RX ADMIN — AMIODARONE HYDROCHLORIDE 200 MG: 200 TABLET ORAL at 04:04

## 2021-01-01 RX ADMIN — SODIUM CHLORIDE, SODIUM LACTATE, CALCIUM CHLORIDE, MAGNESIUM CHLORIDE AND DEXTROSE 2000 ML: 1.5; 538; 448; 25.7; 5.08 INJECTION, SOLUTION INTRAPERITONEAL at 07:35

## 2021-01-01 RX ADMIN — SODIUM CHLORIDE, SODIUM LACTATE, CALCIUM CHLORIDE, MAGNESIUM CHLORIDE AND DEXTROSE 2000 ML: 1.5; 538; 448; 25.7; 5.08 INJECTION, SOLUTION INTRAPERITONEAL at 08:53

## 2021-01-01 RX ADMIN — POTASSIUM CHLORIDE 10 MEQ: 7.46 INJECTION, SOLUTION INTRAVENOUS at 12:53

## 2021-01-01 RX ADMIN — AMIODARONE HYDROCHLORIDE 200 MG: 200 TABLET ORAL at 20:05

## 2021-01-01 RX ADMIN — POTASSIUM CHLORIDE 40 MEQ: 1.5 POWDER, FOR SOLUTION ORAL at 08:25

## 2021-01-01 RX ADMIN — LORAZEPAM 0.5 MG: 2 INJECTION INTRAMUSCULAR; INTRAVENOUS at 19:39

## 2021-01-01 RX ADMIN — DIPHENHYDRAMINE HYDROCHLORIDE 12.5 MG: 50 INJECTION INTRAMUSCULAR; INTRAVENOUS at 16:10

## 2021-01-01 RX ADMIN — FUROSEMIDE 60 MG: 10 INJECTION, SOLUTION INTRAVENOUS at 15:41

## 2021-01-01 RX ADMIN — ESCITALOPRAM OXALATE 5 MG: 10 TABLET ORAL at 08:26

## 2021-01-01 RX ADMIN — POTASSIUM CHLORIDE 40 MEQ: 1.5 POWDER, FOR SOLUTION ORAL at 08:15

## 2021-01-01 RX ADMIN — AMIODARONE HYDROCHLORIDE 0.5 MG/MIN: 1.8 INJECTION, SOLUTION INTRAVENOUS at 17:41

## 2021-01-01 RX ADMIN — HYDROXYZINE HYDROCHLORIDE 25 MG: 25 TABLET, FILM COATED ORAL at 15:56

## 2021-01-01 RX ADMIN — FUROSEMIDE 80 MG: 40 TABLET ORAL at 08:13

## 2021-01-01 RX ADMIN — ESCITALOPRAM OXALATE 5 MG: 10 TABLET ORAL at 06:26

## 2021-01-01 RX ADMIN — CALCIUM GLUCONATE: 98 INJECTION, SOLUTION INTRAVENOUS at 17:53

## 2021-01-01 RX ADMIN — SEVELAMER CARBONATE 1600 MG: 800 TABLET, FILM COATED ORAL at 12:15

## 2021-01-01 RX ADMIN — AMIODARONE HYDROCHLORIDE 200 MG: 200 TABLET ORAL at 05:24

## 2021-01-01 RX ADMIN — ONDANSETRON 4 MG: 2 INJECTION INTRAMUSCULAR; INTRAVENOUS at 09:06

## 2021-01-01 RX ADMIN — ONDANSETRON 4 MG: 2 INJECTION INTRAMUSCULAR; INTRAVENOUS at 08:22

## 2021-01-01 RX ADMIN — ONDANSETRON 4 MG: 2 INJECTION INTRAMUSCULAR; INTRAVENOUS at 16:19

## 2021-01-01 RX ADMIN — METOCLOPRAMIDE 5 MG: 5 INJECTION, SOLUTION INTRAMUSCULAR; INTRAVENOUS at 11:31

## 2021-01-01 RX ADMIN — CINACALCET 30 MG: 30 TABLET, FILM COATED ORAL at 23:10

## 2021-01-01 RX ADMIN — MICAFUNGIN SODIUM 100 MG: 100 INJECTION, POWDER, LYOPHILIZED, FOR SOLUTION INTRAVENOUS at 10:20

## 2021-01-01 RX ADMIN — ALBUMIN HUMAN 25 G: 0.25 SOLUTION INTRAVENOUS at 17:25

## 2021-01-01 RX ADMIN — SODIUM CHLORIDE 500 ML: 9 INJECTION, SOLUTION INTRAVENOUS at 13:14

## 2021-01-01 RX ADMIN — SODIUM BICARBONATE 650 MG TABLET 650 MG: at 08:02

## 2021-01-01 RX ADMIN — HYDROMORPHONE HYDROCHLORIDE 0.25 MG: 1 INJECTION, SOLUTION INTRAMUSCULAR; INTRAVENOUS; SUBCUTANEOUS at 16:10

## 2021-01-01 RX ADMIN — METOPROLOL TARTRATE 25 MG: 25 TABLET, FILM COATED ORAL at 08:15

## 2021-01-01 RX ADMIN — HYDROMORPHONE HYDROCHLORIDE 0.25 MG: 1 INJECTION, SOLUTION INTRAMUSCULAR; INTRAVENOUS; SUBCUTANEOUS at 13:15

## 2021-01-01 RX ADMIN — SEVELAMER CARBONATE 1600 MG: 800 TABLET, FILM COATED ORAL at 08:56

## 2021-01-01 RX ADMIN — AMIODARONE HYDROCHLORIDE 200 MG: 200 TABLET ORAL at 21:41

## 2021-01-01 RX ADMIN — ALBUMIN (HUMAN) 25 G: 12.5 SOLUTION INTRAVENOUS at 08:07

## 2021-01-01 RX ADMIN — HYDROMORPHONE HYDROCHLORIDE 0.25 MG: 1 INJECTION, SOLUTION INTRAMUSCULAR; INTRAVENOUS; SUBCUTANEOUS at 01:26

## 2021-01-01 RX ADMIN — MICAFUNGIN SODIUM 100 MG: 100 INJECTION, POWDER, LYOPHILIZED, FOR SOLUTION INTRAVENOUS at 10:09

## 2021-01-01 RX ADMIN — LANSOPRAZOLE 30 MG: KIT at 05:56

## 2021-01-01 RX ADMIN — METOCLOPRAMIDE 5 MG: 5 INJECTION, SOLUTION INTRAMUSCULAR; INTRAVENOUS at 17:58

## 2021-01-01 RX ADMIN — SEVELAMER HYDROCHLORIDE 1600 MG: 800 TABLET, FILM COATED ORAL at 08:07

## 2021-01-01 RX ADMIN — GLYCOPYRROLATE 0.2 MG: 0.2 INJECTION INTRAMUSCULAR; INTRAVENOUS at 08:14

## 2021-01-01 RX ADMIN — PANTOPRAZOLE SODIUM 40 MG: 40 TABLET, DELAYED RELEASE ORAL at 08:13

## 2021-01-01 RX ADMIN — Medication 5 MG: at 20:07

## 2021-01-01 RX ADMIN — METOPROLOL TARTRATE 25 MG: 25 TABLET, FILM COATED ORAL at 20:05

## 2021-01-01 RX ADMIN — EPOETIN ALFA-EPBX 10000 UNITS: 10000 INJECTION, SOLUTION INTRAVENOUS; SUBCUTANEOUS at 12:29

## 2021-01-01 RX ADMIN — SEVELAMER HYDROCHLORIDE 1600 MG: 800 TABLET, FILM COATED ORAL at 17:18

## 2021-01-01 RX ADMIN — METOCLOPRAMIDE 5 MG: 5 INJECTION, SOLUTION INTRAMUSCULAR; INTRAVENOUS at 11:48

## 2021-01-01 RX ADMIN — HYDROMORPHONE HYDROCHLORIDE 0.25 MG: 1 INJECTION, SOLUTION INTRAMUSCULAR; INTRAVENOUS; SUBCUTANEOUS at 21:48

## 2021-01-01 RX ADMIN — CINACALCET 60 MG: 30 TABLET, FILM COATED ORAL at 23:26

## 2021-01-01 RX ADMIN — SODIUM BICARBONATE 650 MG TABLET 650 MG: at 11:40

## 2021-01-01 RX ADMIN — LIDOCAINE 1 PATCH: 50 PATCH CUTANEOUS at 20:08

## 2021-01-01 RX ADMIN — SEVELAMER CARBONATE 1.6 G: 800 POWDER, FOR SUSPENSION ORAL at 09:44

## 2021-01-01 RX ADMIN — TEMAZEPAM 15 MG: 15 CAPSULE ORAL at 21:46

## 2021-01-01 RX ADMIN — ALBUMIN HUMAN 25 G: 0.25 SOLUTION INTRAVENOUS at 21:42

## 2021-01-01 RX ADMIN — LORAZEPAM 1 MG: 2 INJECTION INTRAMUSCULAR; INTRAVENOUS at 06:39

## 2021-01-01 RX ADMIN — HYDROMORPHONE HYDROCHLORIDE 1 MG: 1 INJECTION, SOLUTION INTRAMUSCULAR; INTRAVENOUS; SUBCUTANEOUS at 11:26

## 2021-01-01 RX ADMIN — PANTOPRAZOLE SODIUM 40 MG: 40 INJECTION, POWDER, FOR SOLUTION INTRAVENOUS at 05:10

## 2021-01-01 RX ADMIN — SODIUM CHLORIDE 500 ML: 9 INJECTION, SOLUTION INTRAVENOUS at 12:12

## 2021-01-01 RX ADMIN — SODIUM CHLORIDE 500 ML: 9 INJECTION, SOLUTION INTRAVENOUS at 20:47

## 2021-01-01 RX ADMIN — CALCIUM GLUCONATE: 98 INJECTION, SOLUTION INTRAVENOUS at 18:20

## 2021-01-01 RX ADMIN — SEVELAMER HYDROCHLORIDE 1600 MG: 800 TABLET, FILM COATED ORAL at 11:51

## 2021-01-01 RX ADMIN — LORAZEPAM 0.5 MG: 2 INJECTION INTRAMUSCULAR; INTRAVENOUS at 12:14

## 2021-01-01 RX ADMIN — INSULIN LISPRO 3 UNITS: 100 INJECTION, SOLUTION INTRAVENOUS; SUBCUTANEOUS at 23:48

## 2021-01-01 RX ADMIN — PANTOPRAZOLE SODIUM 40 MG: 40 TABLET, DELAYED RELEASE ORAL at 09:48

## 2021-01-01 RX ADMIN — METOPROLOL TARTRATE 5 MG: 5 INJECTION INTRAVENOUS at 10:26

## 2021-01-01 RX ADMIN — CINACALCET HYDROCHLORIDE 60 MG: 30 TABLET, COATED ORAL at 20:09

## 2021-01-01 RX ADMIN — GLYCOPYRROLATE 0.2 MG: 0.2 INJECTION INTRAMUSCULAR; INTRAVENOUS at 00:58

## 2021-01-01 RX ADMIN — ACETAMINOPHEN 650 MG: 325 TABLET, FILM COATED ORAL at 15:00

## 2021-01-01 RX ADMIN — HYDROMORPHONE HYDROCHLORIDE 0.25 MG: 1 INJECTION, SOLUTION INTRAMUSCULAR; INTRAVENOUS; SUBCUTANEOUS at 18:54

## 2021-01-01 RX ADMIN — SODIUM CHLORIDE, SODIUM LACTATE, CALCIUM CHLORIDE, MAGNESIUM CHLORIDE AND DEXTROSE 2000 ML: 1.5; 538; 448; 25.7; 5.08 INJECTION, SOLUTION INTRAPERITONEAL at 15:26

## 2021-01-01 RX ADMIN — ACETAMINOPHEN 650 MG: 325 TABLET, FILM COATED ORAL at 06:18

## 2021-01-01 RX ADMIN — CALCITRIOL CAPSULES 0.25 MCG 0.25 MCG: 0.25 CAPSULE ORAL at 07:58

## 2021-01-01 RX ADMIN — SODIUM CHLORIDE, SODIUM LACTATE, CALCIUM CHLORIDE, MAGNESIUM CHLORIDE AND DEXTROSE 2000 ML: 1.5; 538; 448; 25.7; 5.08 INJECTION, SOLUTION INTRAPERITONEAL at 18:30

## 2021-01-01 RX ADMIN — INSULIN LISPRO 2 UNITS: 100 INJECTION, SOLUTION INTRAVENOUS; SUBCUTANEOUS at 17:41

## 2021-01-01 RX ADMIN — METOCLOPRAMIDE 5 MG: 5 TABLET ORAL at 17:18

## 2021-01-01 RX ADMIN — ALBUMIN HUMAN 25 G: 0.25 SOLUTION INTRAVENOUS at 16:19

## 2021-01-01 RX ADMIN — BUPIVACAINE HYDROCHLORIDE 60 ML: 2.5 INJECTION, SOLUTION EPIDURAL; INFILTRATION; INTRACAUDAL; PERINEURAL at 17:50

## 2021-01-01 RX ADMIN — SODIUM CHLORIDE 250 ML: 9 INJECTION, SOLUTION INTRAVENOUS at 03:31

## 2021-01-01 RX ADMIN — ACETAMINOPHEN 650 MG: 325 TABLET, FILM COATED ORAL at 12:28

## 2021-01-01 RX ADMIN — BISACODYL 10 MG: 10 SUPPOSITORY RECTAL at 12:15

## 2021-01-01 RX ADMIN — FENTANYL 1 PATCH: 12 PATCH TRANSDERMAL at 12:49

## 2021-01-01 RX ADMIN — ACETAMINOPHEN 650 MG: 325 TABLET, FILM COATED ORAL at 13:13

## 2021-01-01 RX ADMIN — POTASSIUM & SODIUM PHOSPHATES POWDER PACK 280-160-250 MG 1 PACKET: 280-160-250 PACK at 18:20

## 2021-01-01 RX ADMIN — MINERAL OIL: 1000 LIQUID ORAL at 23:20

## 2021-01-01 RX ADMIN — VANCOMYCIN HYDROCHLORIDE 1500 MG: 10 INJECTION, POWDER, LYOPHILIZED, FOR SOLUTION INTRAVENOUS at 14:00

## 2021-01-01 RX ADMIN — SODIUM CHLORIDE, SODIUM LACTATE, CALCIUM CHLORIDE, MAGNESIUM CHLORIDE AND DEXTROSE 2000 ML: 1.5; 538; 448; 25.7; 5.08 INJECTION, SOLUTION INTRAPERITONEAL at 07:05

## 2021-01-01 RX ADMIN — HYDROMORPHONE HYDROCHLORIDE 0.25 MG: 1 INJECTION, SOLUTION INTRAMUSCULAR; INTRAVENOUS; SUBCUTANEOUS at 10:12

## 2021-01-01 RX ADMIN — SODIUM CHLORIDE, SODIUM LACTATE, CALCIUM CHLORIDE, MAGNESIUM CHLORIDE AND DEXTROSE 2000 ML: 1.5; 538; 448; 25.7; 5.08 INJECTION, SOLUTION INTRAPERITONEAL at 06:17

## 2021-01-01 RX ADMIN — LORAZEPAM 0.5 MG: 2 INJECTION INTRAMUSCULAR; INTRAVENOUS at 17:56

## 2021-01-01 RX ADMIN — POTASSIUM & SODIUM PHOSPHATES POWDER PACK 280-160-250 MG 2 PACKET: 280-160-250 PACK at 12:06

## 2021-01-01 RX ADMIN — CINACALCET HYDROCHLORIDE 60 MG: 30 TABLET, COATED ORAL at 21:18

## 2021-01-01 RX ADMIN — SODIUM CHLORIDE, SODIUM LACTATE, CALCIUM CHLORIDE, MAGNESIUM CHLORIDE AND DEXTROSE 2000 ML: 1.5; 538; 448; 25.7; 5.08 INJECTION, SOLUTION INTRAPERITONEAL at 18:28

## 2021-01-01 RX ADMIN — SODIUM CHLORIDE, PRESERVATIVE FREE 10 ML: 5 INJECTION INTRAVENOUS at 08:21

## 2021-01-01 RX ADMIN — ONDANSETRON 4 MG: 2 INJECTION INTRAMUSCULAR; INTRAVENOUS at 08:20

## 2021-01-01 RX ADMIN — HYDROMORPHONE HYDROCHLORIDE 0.5 MG: 1 INJECTION, SOLUTION INTRAMUSCULAR; INTRAVENOUS; SUBCUTANEOUS at 12:14

## 2021-01-01 RX ADMIN — SEVELAMER CARBONATE 1600 MG: 800 TABLET, FILM COATED ORAL at 08:03

## 2021-01-01 RX ADMIN — TAZOBACTAM SODIUM AND PIPERACILLIN SODIUM 3.38 G: 375; 3 INJECTION, SOLUTION INTRAVENOUS at 18:23

## 2021-01-01 RX ADMIN — INSULIN LISPRO 3 UNITS: 100 INJECTION, SOLUTION INTRAVENOUS; SUBCUTANEOUS at 12:28

## 2021-01-01 RX ADMIN — FUROSEMIDE 80 MG: 40 TABLET ORAL at 08:26

## 2021-01-01 RX ADMIN — ASPIRIN 325 MG: 325 TABLET, COATED ORAL at 20:25

## 2021-01-01 RX ADMIN — TEMAZEPAM 15 MG: 15 CAPSULE ORAL at 21:27

## 2021-01-01 RX ADMIN — STANDARDIZED SENNA CONCENTRATE AND DOCUSATE SODIUM 2 TABLET: 8.6; 5 TABLET, FILM COATED ORAL at 08:12

## 2021-01-01 RX ADMIN — Medication: at 21:25

## 2021-01-01 RX ADMIN — ESCITALOPRAM OXALATE 5 MG: 10 TABLET ORAL at 09:49

## 2021-01-01 RX ADMIN — AMIODARONE HYDROCHLORIDE 150 MG: 1.5 INJECTION, SOLUTION INTRAVENOUS at 13:58

## 2021-01-01 RX ADMIN — ONDANSETRON 4 MG: 2 INJECTION INTRAMUSCULAR; INTRAVENOUS at 00:33

## 2021-01-01 RX ADMIN — Medication 5 MG: at 21:53

## 2021-01-01 RX ADMIN — NYSTATIN 500000 UNITS: 100000 SUSPENSION ORAL at 17:42

## 2021-01-01 RX ADMIN — Medication 5 MG: at 20:12

## 2021-01-01 RX ADMIN — INSULIN LISPRO 2 UNITS: 100 INJECTION, SOLUTION INTRAVENOUS; SUBCUTANEOUS at 12:20

## 2021-01-01 RX ADMIN — TAZOBACTAM SODIUM AND PIPERACILLIN SODIUM 3.38 G: 375; 3 INJECTION, SOLUTION INTRAVENOUS at 05:10

## 2021-01-01 RX ADMIN — METOPROLOL TARTRATE 100 MG: 100 TABLET, FILM COATED ORAL at 09:44

## 2021-01-01 RX ADMIN — FUROSEMIDE 80 MG: 40 TABLET ORAL at 09:50

## 2021-01-01 RX ADMIN — METOPROLOL TARTRATE 100 MG: 100 TABLET, FILM COATED ORAL at 09:48

## 2021-01-01 RX ADMIN — ONDANSETRON 4 MG: 2 INJECTION INTRAMUSCULAR; INTRAVENOUS at 22:35

## 2021-01-01 RX ADMIN — Medication 5 MG/HR: at 13:47

## 2021-01-01 RX ADMIN — HYDROMORPHONE HYDROCHLORIDE 0.25 MG: 1 INJECTION, SOLUTION INTRAMUSCULAR; INTRAVENOUS; SUBCUTANEOUS at 12:09

## 2021-01-01 RX ADMIN — METOPROLOL TARTRATE 100 MG: 100 TABLET, FILM COATED ORAL at 20:02

## 2021-01-01 RX ADMIN — SODIUM CHLORIDE, SODIUM LACTATE, CALCIUM CHLORIDE, MAGNESIUM CHLORIDE AND DEXTROSE 2000 ML: 1.5; 538; 448; 25.7; 5.08 INJECTION, SOLUTION INTRAPERITONEAL at 17:27

## 2021-01-01 RX ADMIN — HYDROMORPHONE HYDROCHLORIDE 0.25 MG: 1 INJECTION, SOLUTION INTRAMUSCULAR; INTRAVENOUS; SUBCUTANEOUS at 19:44

## 2021-01-01 RX ADMIN — POTASSIUM & SODIUM PHOSPHATES POWDER PACK 280-160-250 MG 1 PACKET: 280-160-250 PACK at 06:42

## 2021-01-01 RX ADMIN — AMIODARONE HYDROCHLORIDE 1 MG/MIN: 1.8 INJECTION, SOLUTION INTRAVENOUS at 14:10

## 2021-01-01 RX ADMIN — ONDANSETRON 4 MG: 2 INJECTION INTRAMUSCULAR; INTRAVENOUS at 08:27

## 2021-01-01 RX ADMIN — CINACALCET HYDROCHLORIDE 60 MG: 30 TABLET, COATED ORAL at 20:26

## 2021-01-01 RX ADMIN — HYDROMORPHONE HYDROCHLORIDE 0.25 MG: 1 INJECTION, SOLUTION INTRAMUSCULAR; INTRAVENOUS; SUBCUTANEOUS at 18:05

## 2021-01-01 RX ADMIN — LANSOPRAZOLE 30 MG: KIT at 05:24

## 2021-01-01 RX ADMIN — TEMAZEPAM 15 MG: 15 CAPSULE ORAL at 20:58

## 2021-01-01 RX ADMIN — ONDANSETRON 4 MG: 2 INJECTION INTRAMUSCULAR; INTRAVENOUS at 02:28

## 2021-01-01 RX ADMIN — MICAFUNGIN SODIUM 100 MG: 100 INJECTION, POWDER, LYOPHILIZED, FOR SOLUTION INTRAVENOUS at 11:41

## 2021-01-01 RX ADMIN — INSULIN LISPRO 2 UNITS: 100 INJECTION, SOLUTION INTRAVENOUS; SUBCUTANEOUS at 12:51

## 2021-01-01 RX ADMIN — SODIUM CHLORIDE, SODIUM LACTATE, CALCIUM CHLORIDE, MAGNESIUM CHLORIDE AND DEXTROSE 2000 ML: 1.5; 538; 448; 25.7; 5.08 INJECTION, SOLUTION INTRAPERITONEAL at 23:59

## 2021-01-01 RX ADMIN — HYDROXYZINE HYDROCHLORIDE 25 MG: 25 TABLET, FILM COATED ORAL at 01:46

## 2021-01-01 RX ADMIN — CINACALCET HYDROCHLORIDE 60 MG: 30 TABLET, COATED ORAL at 21:42

## 2021-01-01 RX ADMIN — METOPROLOL TARTRATE 100 MG: 100 TABLET, FILM COATED ORAL at 20:29

## 2021-01-01 RX ADMIN — METOPROLOL TARTRATE 25 MG: 25 TABLET, FILM COATED ORAL at 08:25

## 2021-01-01 RX ADMIN — ONDANSETRON 4 MG: 2 INJECTION INTRAMUSCULAR; INTRAVENOUS at 08:12

## 2021-01-01 RX ADMIN — POTASSIUM CHLORIDE 10 MEQ: 7.46 INJECTION, SOLUTION INTRAVENOUS at 14:10

## 2021-01-01 RX ADMIN — SODIUM CHLORIDE, SODIUM LACTATE, CALCIUM CHLORIDE, MAGNESIUM CHLORIDE AND DEXTROSE 2000 ML: 1.5; 538; 448; 25.7; 5.08 INJECTION, SOLUTION INTRAPERITONEAL at 22:34

## 2021-01-01 RX ADMIN — ONDANSETRON 4 MG: 2 INJECTION INTRAMUSCULAR; INTRAVENOUS at 18:28

## 2021-01-01 RX ADMIN — CINACALCET 60 MG: 60 TABLET, FILM COATED ORAL at 20:21

## 2021-01-01 RX ADMIN — DOXYCYCLINE 100 MG: 100 CAPSULE ORAL at 08:25

## 2021-01-01 RX ADMIN — LORAZEPAM 0.5 MG: 2 INJECTION INTRAMUSCULAR; INTRAVENOUS at 23:20

## 2021-01-01 RX ADMIN — SODIUM CHLORIDE, SODIUM LACTATE, CALCIUM CHLORIDE, MAGNESIUM CHLORIDE AND DEXTROSE 2000 ML: 1.5; 538; 448; 25.7; 5.08 INJECTION, SOLUTION INTRAPERITONEAL at 21:45

## 2021-01-01 RX ADMIN — METOPROLOL TARTRATE 100 MG: 100 TABLET, FILM COATED ORAL at 09:06

## 2021-01-01 RX ADMIN — GENTAMICIN SULFATE: 1 OINTMENT TOPICAL at 09:00

## 2021-01-01 RX ADMIN — LIDOCAINE 1 PATCH: 50 PATCH CUTANEOUS at 20:13

## 2021-01-01 RX ADMIN — CEFTRIAXONE SODIUM 1 G: 1 INJECTION, SOLUTION INTRAVENOUS at 14:41

## 2021-01-01 RX ADMIN — CALCIUM GLUCONATE: 98 INJECTION, SOLUTION INTRAVENOUS at 17:41

## 2021-01-01 RX ADMIN — INSULIN LISPRO 3 UNITS: 100 INJECTION, SOLUTION INTRAVENOUS; SUBCUTANEOUS at 18:40

## 2021-01-01 RX ADMIN — TAZOBACTAM SODIUM AND PIPERACILLIN SODIUM 3.38 G: 375; 3 INJECTION, SOLUTION INTRAVENOUS at 06:21

## 2021-01-01 RX ADMIN — HYDROMORPHONE HYDROCHLORIDE 0.25 MG: 1 INJECTION, SOLUTION INTRAMUSCULAR; INTRAVENOUS; SUBCUTANEOUS at 10:26

## 2021-01-01 RX ADMIN — TAZOBACTAM SODIUM AND PIPERACILLIN SODIUM 3.38 G: 375; 3 INJECTION, SOLUTION INTRAVENOUS at 20:24

## 2021-01-01 RX ADMIN — ALBUMIN HUMAN 25 G: 0.25 SOLUTION INTRAVENOUS at 15:56

## 2021-01-01 RX ADMIN — NYSTATIN 500000 UNITS: 100000 SUSPENSION ORAL at 08:18

## 2021-01-01 RX ADMIN — SODIUM CHLORIDE, SODIUM LACTATE, CALCIUM CHLORIDE, MAGNESIUM CHLORIDE AND DEXTROSE 2000 ML: 1.5; 538; 448; 25.7; 5.08 INJECTION, SOLUTION INTRAPERITONEAL at 18:15

## 2021-01-01 RX ADMIN — MICAFUNGIN SODIUM 100 MG: 100 INJECTION, POWDER, LYOPHILIZED, FOR SOLUTION INTRAVENOUS at 09:22

## 2021-01-01 RX ADMIN — TACROLIMUS 1 MG: 1 CAPSULE ORAL at 08:13

## 2021-01-01 RX ADMIN — CINACALCET 60 MG: 60 TABLET, FILM COATED ORAL at 18:29

## 2021-01-01 RX ADMIN — NYSTATIN 500000 UNITS: 100000 SUSPENSION ORAL at 08:53

## 2021-01-01 RX ADMIN — ACETAMINOPHEN 650 MG: 325 TABLET, FILM COATED ORAL at 18:18

## 2021-01-01 RX ADMIN — FENTANYL CITRATE 50 MCG: 50 INJECTION, SOLUTION INTRAMUSCULAR; INTRAVENOUS at 14:55

## 2021-01-01 RX ADMIN — LIDOCAINE HYDROCHLORIDE 50 MG: 10 INJECTION, SOLUTION EPIDURAL; INFILTRATION; INTRACAUDAL; PERINEURAL at 14:33

## 2021-01-01 RX ADMIN — ROCURONIUM BROMIDE 10 MG: 10 INJECTION, SOLUTION INTRAVENOUS at 15:30

## 2021-01-01 RX ADMIN — METOCLOPRAMIDE 5 MG: 5 INJECTION, SOLUTION INTRAMUSCULAR; INTRAVENOUS at 00:12

## 2021-01-01 RX ADMIN — SEVELAMER HYDROCHLORIDE 1600 MG: 800 TABLET, FILM COATED ORAL at 08:12

## 2021-01-01 RX ADMIN — PANTOPRAZOLE SODIUM 40 MG: 40 TABLET, DELAYED RELEASE ORAL at 08:06

## 2021-01-01 RX ADMIN — INSULIN LISPRO 3 UNITS: 100 INJECTION, SOLUTION INTRAVENOUS; SUBCUTANEOUS at 06:27

## 2021-01-01 RX ADMIN — HYDROMORPHONE HYDROCHLORIDE 0.25 MG: 1 INJECTION, SOLUTION INTRAMUSCULAR; INTRAVENOUS; SUBCUTANEOUS at 00:25

## 2021-01-01 RX ADMIN — ONDANSETRON 4 MG: 2 INJECTION INTRAMUSCULAR; INTRAVENOUS at 05:43

## 2021-01-01 RX ADMIN — ONDANSETRON 4 MG: 2 INJECTION INTRAMUSCULAR; INTRAVENOUS at 18:02

## 2021-01-05 NOTE — PROGRESS NOTES
Anticoagulation Clinic - Remote Progress Note  [facility]  Testing Frequency: 7 days    Indication: paroxysmal afib  Referring Provider: Butch Joe (Last seen: 11/5/19  next appt: 6/9/20)  Goal INR: 2.0-3.0  Current Drug Interactions: , levothyroxine; omeprazole, azaTHIOprine, ezetimibe, allopurinol (10/20)  CHADS-VASc: 5 (age, gender, HTN, DM)  HAS-BLED: 3 (HTN, CKD, Age)     Diet: mostly avoids (12/23/20) meal replacement drinks ~3 times weekly 12/23/20  Alcohol: none  Tobacco: none   OTC Pain Medication: APAP PRN; took tylenol last 2 nights for pain from dialysis (03/26/20)    1st clinic: 9/14/17  2nd clinic: 6/26/18  3rd clinic: 11/5/19    INR History:  Date 1/10 1/14 1/23 1/28 1/31 2/4 2/11 2/18 2/24 3/3 3/10 3/16 3/19   Total Weekly Dose 28mg 32mg 30mg 26mg 26mg  24mg 26mg 26mg 24mg 26mg 27mg 25mg 29mg   INR 1.6 2.1 4.1 2.2 3.4 2.7 2.1 3.4 2.3 1.9 1.9 1.4 2.0   Notes augment  2x incr dose 1x decr keflex, pred keflex; pred; sick keflex; sick; dose decr x1 sick;   valtrex keflex   1x incr dose, incr GLV 1x dose decr; levaquin 1x incr dose      Date 3/23 3/26 4/2 4/9 4/16 4/23 5/1 5/8 5/14 5/22 5/27 6/4   Total WeeklyDose 29mg 29mg 30mg 30mg 30mg 32mg 30mg 32mg 28mg 32mg 32mg 30mg   INR 1.4 1.5 1.9 2.2 1.9 3.0 2.1 2.7 2.4 3.2 3.5 3.2   Notes 1x incr dose;  spinach 1x incr dose,  less GLV 1x boost    1x decr  dose trazodone 1x miss; trazodone 25mg QPM recv'd 5/26  1x incr dose     Date 6/10 6/17 6/24 7/2 7/8 7/15 7/22 7/29 8/5 8/12 8/17 8/24   Total WeeklyDose 28mg 28mg 28mg 28mg 28mg 28mg 30mg 26mg 28mg 28mg 32mg 34mg   INR 2.5 2.6 2.3 1.8 2.2 1.7 3.5 2.4 2.5 1.7 1.8 2.8   Notes    Inc GLV  Inc GLV 1x dose incr. x1 dose red  ensure       Date 8/31 9/9 9/14 9/21 9/28 10/5 10/13 10/19 10/27 11/3 11/10 11/17   Total WeeklyDose 32mg 32mg 26mg 30mg 26-28 mg? 28mg 28mg 28mg 28mg 26mg 30mg 28mg   INR 2.4 3.9 2.6 2.9 2.3 2.3 2.9 2.8 3.7 1.5 2.1 2.1   Notes       decr Ensure; allopurinol allopurinol;  less Ensure  recv'd 11/4; incr GLV 1x incr dose      Date 11/24 12/1 12/8  12/15 12/22 12/31 1/5        Total WeeklyDose 28mg 28mg 26mg 28 mg 28 mg 24mg 28mg        INR 2.1 3.8 3.0  2.2 2.0 1.5         Notes  decr GLV 1x decr dose   1x miss           Phone Interview:  Verbal Release Authorization signed on 6/26/18 and again on 11/5/2019-- may speak with Alexy Wallace (son), Genesis Becerril (daughter), Sawyer or Gema Wallace (son and daughter-in-law), Gerry Wallace (son)  Tablet Strength: 2mg tablets  Patient Contact Info: preferred [phone] - home with son Yadiel- cell [phone]; call if can't get in touch with home phone      Patient Findings    Negatives:  Signs/symptoms of thrombosis, Signs/symptoms of bleeding, Laboratory test error suspected, Change in health, Change in alcohol use, Change in activity, Upcoming invasive procedure, Emergency department visit, Upcoming dental procedure, Missed doses, Extra doses, Change in medications, Change in diet/appetite, Hospital admission, Bruising, Other complaints   Comments:  Patient reports no changes that would explain lack INR increase. She continued to avoid GLV and is drinking Novasource renal protein shake twice weekly. She decreased her Novasource intake on christmas.              Plan:  1. INR is SUBtherapeutic today at 1.5 following boosted dose. Instructed Ms. Wallace to BOOST tonight's dose to 6mg then take warfarin 4 mg daily except 6mg thursday until recheck.    2. Repeat INR in one week as required by [facility]. She may test earlier depending on the availability of her family to help.  3. Verbal information provided over the phone. Moni Wallace RBV dosing instructions, expresses understanding by teach back, and has no further questions at this time.    Thank you,    Joce BurkettD, PEDRO  1/5/2021  15:06 EST

## 2021-01-08 NOTE — PROGRESS NOTES
Patient is a 80 y.o. female who is here for a follow up of hypothyroidism,diabetes and hyperlipidemia.  Chief Complaint   Patient presents with   • Hypothyroidism   • Diabetes   • Hyperlipidemia         HPI:    Here for mgmt of HTN and DM and hypothyroid.  Feels blah.  Has no energy.  Onset weeks.  Not sleeping well at night.  Cat naps during the day.  No SOB.  Some head congestion and nasal congestion.  Fatigued.  Continues on home dialysis.  Appetite is not good.     History:     Patient Active Problem List   Diagnosis   • Paroxysmal atrial fibrillation (CMS/[facility])   • Essential hypertension   • Type 2 diabetes mellitus (CMS/HCC)   • Chronic kidney disease, stage IV (severe) (CMS/[facility])   • Anemia of renal disease   • Hyperparathyroidism (CMS/HCC)   • Asthma   • Right-sided carotid artery disease (CMS/[facility])   • High output HF (heart failure) (CMS/[facility])   • Vitamin D deficiency   • Nonrheumatic aortic valve stenosis   • Ulcer of gastric fundus   • Tubular adenoma   • Macular degeneration   • Hypothyroidism   • Chronic kidney disease   • Screen for colon cancer   • Postoperative anemia due to acute blood loss   • Hyperlipidemia LDL goal <100   • Morbidly obese (CMS/[facility])       Past Medical History:   Diagnosis Date   • Adenomatous colon polyp    • Chronic kidney disease    • Clostridium difficile infection 06/2017   • Diabetes mellitus (CMS/HCC)    • Gastric polyps    • Hypothyroidism    • IgA nephropathy, acute    • Macular degeneration    • Paroxysmal atrial fibrillation (CMS/HCC)    • Tubular adenoma     excision    • Ulcer of gastric fundus    • Vitamin D deficiency        Past Surgical History:   Procedure Laterality Date   • BREAST BIOPSY Left 1990'S   • CARPAL TUNNEL RELEASE     • CARPAL TUNNEL RELEASE Right    • CATARACT EXTRACTION     • CATARACT EXTRACTION Bilateral    • DIAGNOSTIC LAPAROSCOPY     • DIALYSIS FISTULA CREATION     • HERNIA REPAIR  85 and 86   • KNEE ARTHROSCOPY INCISION AND DRAINAGE OF KNEE Right  5/18/2019    Procedure: KNEE ARTHROSCOPY INCISION AND DRAINAGE OF KNEE;  Surgeon: Paco Lester MD;  Location:  [facility];  Service: Orthopedics   • LAPAROSCOPIC TUBAL LIGATION  1985   • TOTAL KNEE ARTHROPLASTY     • TOTAL KNEE ARTHROPLASTY      Left knee    • TRANSPLANTATION RENAL  2010   • TRANSPLANTATION RENAL Right    • TRIGGER FINGER RELEASE     • TUBAL ABDOMINAL LIGATION     • UPPER GASTROINTESTINAL ENDOSCOPY  05/20/2013   • VENOUS ACCESS DEVICE (PORT) INSERTION N/A 5/23/2019    Procedure: INSERTION GROSHONG;  Surgeon: Rolando Begum MD;  Location:  [facility];  Service: General       Current Outpatient Medications on File Prior to Visit   Medication Sig   • acetaminophen (TYLENOL) 325 MG tablet Take 2 tablets by mouth Every 4 (Four) Hours As Needed for Mild Pain .   • albuterol (PROVENTIL) (2.5 MG/3ML) 0.083% nebulizer solution Take 2.5 mg by nebulization Every 4 (Four) Hours As Needed for Wheezing.   • albuterol sulfate HFA (PROAIR HFA) 108 (90 Base) MCG/ACT inhaler Inhale 2 puffs Every 4 (Four) Hours As Needed for Wheezing or Shortness of Air.   • calcitriol (ROCALTROL) 0.25 MCG capsule Take 0.25 mcg by mouth Every Other Day.   • furosemide (LASIX) 40 MG tablet Take 80 mg by mouth Daily.   • gentamicin (GARAMYCIN) 0.1 % cream APPLY TO EXIT SITE DAILY   • levothyroxine (SYNTHROID, LEVOTHROID) 75 MCG tablet Take 75 mcg by mouth Daily.   • metoprolol tartrate (LOPRESSOR) 50 MG tablet Take 1 tablet by mouth Every 12 (Twelve) Hours. (Patient taking differently: Take 100 mg by mouth Every 12 (Twelve) Hours.)   • Multiple Vitamins-Minerals (ICAPS PO) Take 1 capsule by mouth Daily.   • omeprazole (priLOSEC) 40 MG capsule TAKE ONE CAPSULE BY MOUTH DAILY   • sevelamer (RENVELA) 800 MG tablet Take 4,800 mg by mouth 3 (Three) Times a Day.   • tacrolimus (PROGRAF) 0.5 MG capsule Take 0.5 mg by mouth 2 (Two) Times a Day.   • temazepam (RESTORIL) 15 MG capsule Take 15 mg by mouth At Night As Needed for  Sleep.   • warfarin (COUMADIN) 2 MG tablet TAKE ONE TO TWO TABLETS BY MOUTH EVERY DAY OR AS DIRECTED BY ANTICOAGULATION CLINIC     No current facility-administered medications on file prior to visit.        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 Negative for chills and fever.   HENT: Negative for congestion, ear pain, hearing loss, rhinorrhea, sinus pressure, sore throat and trouble swallowing.    Eyes: Negative for discharge and itching.   Respiratory: Negative for chest tightness.    Cardiovascular: Negative for chest pain and palpitations.   Gastrointestinal: Negative for abdominal pain, blood in stool, constipation, diarrhea and vomiting.        10/17 by Dr Perez, andres 10/20   Endocrine: Negative for polydipsia and polyuria.   Genitourinary: Negative for difficulty urinating, dysuria, enuresis, frequency, hematuria and urgency.        11/18 mammogram   Musculoskeletal: Positive for arthralgias and gait problem. Negative for back pain and joint swelling.   Skin: Negative for rash and wound.  "  Allergic/Immunologic: Negative for immunocompromised state.   Neurological: Positive for weakness. Negative for dizziness, syncope, light-headedness, numbness and headaches.   Hematological: Bruises/bleeds easily.   Psychiatric/Behavioral: Positive for dysphoric mood and sleep disturbance. Negative for behavioral problems. The patient is not nervous/anxious.        /72 (BP Location: Left arm, Patient Position: Sitting)   Pulse 86   Temp 96.8 °F (36 °C) (Infrared)   Ht 165.1 cm (65\")   LMP  (LMP Unknown)   SpO2 98%   BMI 33.18 kg/m²       Physical Exam  Constitutional:       Appearance: Normal appearance. She is well-developed.   HENT:      Head: Normocephalic and atraumatic.      Right Ear: External ear normal.      Left Ear: External ear normal.      Nose: Nose normal.      Mouth/Throat:      Mouth: Mucous membranes are moist.      Pharynx: Oropharynx is clear.   Eyes:      Extraocular Movements: Extraocular movements intact.      Conjunctiva/sclera: Conjunctivae normal.      Pupils: Pupils are equal, round, and reactive to light.   Neck:      Musculoskeletal: Normal range of motion and neck supple.   Cardiovascular:      Rate and Rhythm: Normal rate and regular rhythm.      Heart sounds: Murmur (1/6) present.   Pulmonary:      Effort: Pulmonary effort is normal.      Breath sounds: Normal breath sounds.   Abdominal:      General: Bowel sounds are normal.      Palpations: Abdomen is soft.   Musculoskeletal:      Comments: In a wheelchair   Lymphadenopathy:      Cervical: No cervical adenopathy.   Skin:     General: Skin is warm and dry.   Neurological:      General: No focal deficit present.      Mental Status: She is alert and oriented to person, place, and time.   Psychiatric:         Mood and Affect: Mood normal.         Behavior: Behavior normal.         Thought Content: Thought content normal.         Procedure:      Discussion/Summary:    HTN-advised to monitor and goal 130/80, " "stable  DM-labs soon, counseled on low carb  Hyperlipidemia-counseled on diet, cont dual tx, recheck on rtc  ESRD-per Renal, cont PD  AOCD-\"  \"  Vit D-labs soon, cont replacement  Asthma-stable  Gout-UA level on rtc, cont allopurinol, stable  afib-rate controlled, stable  hypothroid-labs on rtc, cont replacement, recent med change  TA-advised need for f/u colonoscopy  High risk meds-INR at home      Labs noted and dw patient       Current Outpatient Medications:   •  acetaminophen (TYLENOL) 325 MG tablet, Take 2 tablets by mouth Every 4 (Four) Hours As Needed for Mild Pain ., Disp: , Rfl:   •  albuterol (PROVENTIL) (2.5 MG/3ML) 0.083% nebulizer solution, Take 2.5 mg by nebulization Every 4 (Four) Hours As Needed for Wheezing., Disp: 25 vial, Rfl: 2  •  albuterol sulfate HFA (PROAIR HFA) 108 (90 Base) MCG/ACT inhaler, Inhale 2 puffs Every 4 (Four) Hours As Needed for Wheezing or Shortness of Air., Disp: 1 inhaler, Rfl: 11  •  calcitriol (ROCALTROL) 0.25 MCG capsule, Take 0.25 mcg by mouth Every Other Day., Disp: , Rfl:   •  furosemide (LASIX) 40 MG tablet, Take 80 mg by mouth Daily., Disp: , Rfl:   •  gentamicin (GARAMYCIN) 0.1 % cream, APPLY TO EXIT SITE DAILY, Disp: , Rfl: 3  •  levothyroxine (SYNTHROID, LEVOTHROID) 75 MCG tablet, Take 75 mcg by mouth Daily., Disp: , Rfl:   •  metoprolol tartrate (LOPRESSOR) 50 MG tablet, Take 1 tablet by mouth Every 12 (Twelve) Hours. (Patient taking differently: Take 100 mg by mouth Every 12 (Twelve) Hours.), Disp: , Rfl:   •  Multiple Vitamins-Minerals (ICAPS PO), Take 1 capsule by mouth Daily., Disp: , Rfl:   •  omeprazole (priLOSEC) 40 MG capsule, TAKE ONE CAPSULE BY MOUTH DAILY, Disp: 90 capsule, Rfl: 3  •  sevelamer (RENVELA) 800 MG tablet, Take 4,800 mg by mouth 3 (Three) Times a Day., Disp: , Rfl:   •  tacrolimus (PROGRAF) 0.5 MG capsule, Take 0.5 mg by mouth 2 (Two) Times a Day., Disp: , Rfl:   •  temazepam (RESTORIL) 15 MG capsule, Take 15 mg by mouth At Night As Needed " for Sleep., Disp: , Rfl:   •  warfarin (COUMADIN) 2 MG tablet, TAKE ONE TO TWO TABLETS BY MOUTH EVERY DAY OR AS DIRECTED BY ANTICOAGULATION CLINIC, Disp: 180 tablet, Rfl: 0  •  escitalopram (Lexapro) 5 MG tablet, Take 1 tablet by mouth Every Morning., Disp: 30 tablet, Rfl: 5        Diagnoses and all orders for this visit:    1. Hyperlipidemia LDL goal <100 (Primary)    2. Essential hypertension    3. Type 2 diabetes mellitus with other specified complication, without long-term current use of insulin (CMS/[facility])    4. Vitamin D deficiency    5. Hypothyroidism, unspecified type    6. Chronic kidney disease, stage IV (severe) (CMS/HCC)    7. Tubular adenoma    8. Situational depression  -     escitalopram (Lexapro) 5 MG tablet; Take 1 tablet by mouth Every Morning.  Dispense: 30 tablet; Refill: 5

## 2021-01-11 NOTE — PROGRESS NOTES
Anticoagulation Clinic - Remote Progress Note  [facility]  Testing Frequency: 7 days    Indication: paroxysmal afib  Referring Provider: Butch Joe (Last seen: 11/5/19  next appt: 6/9/20)  Goal INR: 2.0-3.0  Current Drug Interactions: , levothyroxine; omeprazole, azaTHIOprine, ezetimibe, allopurinol (10/20)  CHADS-VASc: 5 (age, gender, HTN, DM)  HAS-BLED: 3 (HTN, CKD, Age)     Diet: mostly avoids (1/11/21) meal replacement drinks ~3 times weekly 1/11/21  Alcohol: none  Tobacco: none   OTC Pain Medication: APAP PRN; took tylenol last 2 nights for pain from dialysis (03/26/20)    1st clinic: 9/14/17  2nd clinic: 6/26/18  3rd clinic: 11/5/19    INR History:  Date 1/10 1/14 1/23 1/28 1/31 2/4 2/11 2/18 2/24 3/3 3/10 3/16 3/19   Total Weekly Dose 28mg 32mg 30mg 26mg 26mg  24mg 26mg 26mg 24mg 26mg 27mg 25mg 29mg   INR 1.6 2.1 4.1 2.2 3.4 2.7 2.1 3.4 2.3 1.9 1.9 1.4 2.0   Notes augment  2x incr dose 1x decr keflex, pred keflex; pred; sick keflex; sick; dose decr x1 sick;   valtrex keflex   1x incr dose, incr GLV 1x dose decr; levaquin 1x incr dose      Date 3/23 3/26 4/2 4/9 4/16 4/23 5/1 5/8 5/14 5/22 5/27 6/4   Total WeeklyDose 29mg 29mg 30mg 30mg 30mg 32mg 30mg 32mg 28mg 32mg 32mg 30mg   INR 1.4 1.5 1.9 2.2 1.9 3.0 2.1 2.7 2.4 3.2 3.5 3.2   Notes 1x incr dose;  spinach 1x incr dose,  less GLV 1x boost    1x decr  dose trazodone 1x miss; trazodone 25mg QPM recv'd 5/26  1x incr dose     Date 6/10 6/17 6/24 7/2 7/8 7/15 7/22 7/29 8/5 8/12 8/17 8/24   Total WeeklyDose 28mg 28mg 28mg 28mg 28mg 28mg 30mg 26mg 28mg 28mg 32mg 34mg   INR 2.5 2.6 2.3 1.8 2.2 1.7 3.5 2.4 2.5 1.7 1.8 2.8   Notes    Inc GLV  Inc GLV 1x dose incr. x1 dose red  ensure       Date 8/31 9/9 9/14 9/21 9/28 10/5 10/13 10/19 10/27 11/3 11/10 11/17   Total WeeklyDose 32mg 32mg 26mg 30mg 26-28 mg? 28mg 28mg 28mg 28mg 26mg 30mg 28mg   INR 2.4 3.9 2.6 2.9 2.3 2.3 2.9 2.8 3.7 1.5 2.1 2.1   Notes       decr Ensure; allopurinol allopurinol; less  Ensure  recv'd 11/4; incr GLV 1x incr dose      Date 11/24 12/1 12/8  12/15 12/22 12/31 1/5/21 1/11       Total WeeklyDose 28mg 28mg 26mg 28 mg 28 mg 24mg 28mg 32 mg       INR 2.1 3.8 3.0  2.2 2.0 1.5 1.5 2.3       Notes  decr GLV 1x decr dose   1x miss           Phone Interview:  Verbal Release Authorization signed on 6/26/18 and again on 11/5/2019-- may speak with Alexy Wallace (son), Genesis Becerril (daughter), Sawyer or Gema Wallace (son and daughter-in-law), Gerry Wallace (son)  Tablet Strength: 2mg tablets  Patient Contact Info: preferred [phone] - home with son Yadiel- cell [phone]; call if can't get in touch with home phone      Patient Findings:  Positives:  Change in medications   Negatives:  Signs/symptoms of thrombosis, Signs/symptoms of bleeding, Laboratory test error suspected, Change in health, Change in alcohol use, Change in activity, Upcoming invasive procedure, Emergency department visit, Upcoming dental procedure, Missed doses, Extra doses, Change in diet/appetite, Hospital admission, Bruising, Other complaints   Comments:  Started escitalopram 5 mg QD on Saturday. Per [employer], DDI with warfarin.     Concurrent use of ESCITALOPRAM and ANTICOAGULANTS may result in an increased risk of bleeding.     Low appetite still. She ate a few pieces of asparagus yesterday even though she usually avoids GLV. Says she continues meal replacement drinks 3x per week. Denies any other changes.     Plan:  1. INR is back WNL today following two boosted doses. Significant increase in one week. Spoke with Fatemeh Rodríguez, PharmD, and instructed Ms. Wallace to resume prior maintenance dose of warfarin 4 mg daily until recheck.  2. Repeat INR on Friday.  3. Verbal information provided over the phone. Moni Wallace RBV dosing instructions, expresses understanding by teach back, and has no further questions at this time.    Zina Aldrich, [facility]  1/11/2021  15:16 EST    I, Fatemeh Rodríguez, PharmD, have reviewed the note  in full and agree with the assessment and plan.  01/11/21  16:33 EST

## 2021-01-12 PROBLEM — J18.9 ATYPICAL PNEUMONIA: Status: ACTIVE | Noted: 2021-01-01

## 2021-01-12 PROBLEM — N39.0 ACUTE UTI (URINARY TRACT INFECTION): Status: ACTIVE | Noted: 2021-01-01

## 2021-01-12 NOTE — ED PROVIDER NOTES
EMERGENCY DEPARTMENT ENCOUNTER      Pt Name: Moni Wallace  MRN: [id number]  YOB: 1940  Date of evaluation: 1/11/2021  Provider: Kyle Quintero DO    CHIEF COMPLAINT       Chief Complaint   Patient presents with   • Vomiting         HISTORY OF PRESENT ILLNESS  (Location/Symptom, Timing/Onset, Context/Setting, Quality, Duration, Modifying Factors, Severity.)   Moni Wallace is a 80 y.o. female who presents to the emergency department for evaluation of overall not feeling well for the last few weeks, did have an episode of vomiting prior to arrival.  She states she was seen by her PCP few days ago with these complaints, will have any blood work or anything completed I felt she may have had increasing depression, anxiety.  The patient denies any fever chills, states she has not had any diarrhea.  She does not have any chest pain or difficulty breathing.  She notes generalized aches, intermittent chills.  She been compliant with her peritoneal dialysis, denies any abdominal distention or significant pain.  She does make small amount of urine, denies any dysuria.  No known sick contacts, no recent travel.  Denies any other acute systemic complaints at this time.  She is been compliant with her medications.      Nursing notes were reviewed.    REVIEW OF SYSTEMS    (2-9 systems for level 4, 10 or more for level 5)   ROS:  General:  No fevers, + chills, + generalized weakness  Cardiovascular:  No chest pain, no palpitations  Respiratory:  No shortness of breath, no cough, no wheezing  Gastrointestinal:  No pain, + nausea, vomiting, no diarrhea  Musculoskeletal:  No muscle pain  Skin:  No rash, no easy bruising  Neurologic:  No speech problems, no headache, no extremity numbness, no extremity tingling, no extremity weakness  Psychiatric:  No anxiety  Genitourinary:  No dysuria, no hematuria    Except as noted above the remainder of the review of systems was reviewed and negative.       PAST MEDICAL  HISTORY     Past Medical History:   Diagnosis Date   • Adenomatous colon polyp    • Chronic kidney disease    • Clostridium difficile infection 06/2017   • Diabetes mellitus (CMS/HCC)    • Gastric polyps    • Hypothyroidism    • IgA nephropathy, acute    • Kidney transplanted    • Macular degeneration    • Paroxysmal atrial fibrillation (CMS/HCC)    • Tubular adenoma     excision    • Ulcer of gastric fundus    • Vitamin D deficiency          SURGICAL HISTORY       Past Surgical History:   Procedure Laterality Date   • BREAST BIOPSY Left 1990'S   • CARPAL TUNNEL RELEASE     • CARPAL TUNNEL RELEASE Right    • CATARACT EXTRACTION     • CATARACT EXTRACTION Bilateral    • DIAGNOSTIC LAPAROSCOPY     • DIALYSIS FISTULA CREATION     • HERNIA REPAIR  85 and 86   • KNEE ARTHROSCOPY INCISION AND DRAINAGE OF KNEE Right 5/18/2019    Procedure: KNEE ARTHROSCOPY INCISION AND DRAINAGE OF KNEE;  Surgeon: Paco Lester MD;  Location: [facility] OR;  Service: Orthopedics   • LAPAROSCOPIC TUBAL LIGATION  1985   • TOTAL KNEE ARTHROPLASTY     • TOTAL KNEE ARTHROPLASTY      Left knee    • TRANSPLANTATION RENAL  2010   • TRANSPLANTATION RENAL Right    • TRIGGER FINGER RELEASE     • TUBAL ABDOMINAL LIGATION     • UPPER GASTROINTESTINAL ENDOSCOPY  05/20/2013   • VENOUS ACCESS DEVICE (PORT) INSERTION N/A 5/23/2019    Procedure: INSERTION GROSHONG;  Surgeon: Rolando Begum MD;  Location:  [facility] OR;  Service: General         CURRENT MEDICATIONS       Current Facility-Administered Medications:   •  cefTRIAXone (ROCEPHIN) 1 g/100 mL 0.9% NS (MBP), 1 g, Intravenous, Once, Kyle Quintero DO  •  ondansetron (ZOFRAN) injection 4 mg, 4 mg, Intravenous, Q30 Min PRN, Kyle Quintero DO, 4 mg at 01/11/21 2221  •  Sodium Chloride (PF) 0.9 % 10 mL, 10 mL, Intravenous, PRN, Kyle Quintero DO  •  vancomycin 1750 mg/500 mL 0.9% NS IVPB ([facility]), 20 mg/kg, Intravenous, Once, Kyle Quintero DO    Current Outpatient  Medications:   •  acetaminophen (TYLENOL) 325 MG tablet, Take 2 tablets by mouth Every 4 (Four) Hours As Needed for Mild Pain ., Disp: , Rfl:   •  albuterol (PROVENTIL) (2.5 MG/3ML) 0.083% nebulizer solution, Take 2.5 mg by nebulization Every 4 (Four) Hours As Needed for Wheezing., Disp: 25 vial, Rfl: 2  •  albuterol sulfate HFA (PROAIR HFA) 108 (90 Base) MCG/ACT inhaler, Inhale 2 puffs Every 4 (Four) Hours As Needed for Wheezing or Shortness of Air., Disp: 1 inhaler, Rfl: 11  •  escitalopram (Lexapro) 5 MG tablet, Take 1 tablet by mouth Every Morning., Disp: 30 tablet, Rfl: 5  •  furosemide (LASIX) 40 MG tablet, Take 80 mg by mouth Daily., Disp: , Rfl:   •  gentamicin (GARAMYCIN) 0.1 % cream, APPLY TO EXIT SITE DAILY, Disp: , Rfl: 3  •  metoprolol tartrate (LOPRESSOR) 50 MG tablet, Take 1 tablet by mouth Every 12 (Twelve) Hours. (Patient taking differently: Take 100 mg by mouth Every 12 (Twelve) Hours.), Disp: , Rfl:   •  omeprazole (priLOSEC) 40 MG capsule, TAKE ONE CAPSULE BY MOUTH DAILY, Disp: 90 capsule, Rfl: 3  •  sevelamer (RENVELA) 800 MG tablet, Take 4,800 mg by mouth 3 (Three) Times a Day., Disp: , Rfl:   •  tacrolimus (PROGRAF) 0.5 MG capsule, Take 0.5 mg by mouth 2 (Two) Times a Day., Disp: , Rfl:   •  temazepam (RESTORIL) 15 MG capsule, Take 15 mg by mouth At Night As Needed for Sleep., Disp: , Rfl:   •  warfarin (COUMADIN) 2 MG tablet, TAKE ONE TO TWO TABLETS BY MOUTH EVERY DAY OR AS DIRECTED BY ANTICOAGULATION CLINIC, Disp: 180 tablet, Rfl: 0    ALLERGIES     Hydrocodone, Statins, Codeine, and Sulfa antibiotics    FAMILY HISTORY       Family History   Problem Relation Age of Onset   • Leukemia Mother    • Stroke Father    • Dementia Sister    • Kidney disease Sister    • Diabetic kidney disease Sister    • Lung cancer Brother    • Breast cancer Neg Hx    • Ovarian cancer Neg Hx    • Hypertension Sister    • Dementia Sister           SOCIAL HISTORY       Social History     Socioeconomic History   •  "Marital status:      Spouse name: Not on file   • Number of children: Not on file   • Years of education: Not on file   • Highest education level: Not on file   Occupational History   • 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   Tobacco Use   • Smoking status: Never Smoker   • Smokeless tobacco: Never Used   Substance and Sexual Activity   • Alcohol use: No   • Drug use: No   • Sexual activity: Defer   Social History Narrative    ** Merged History Encounter **         Lives at home, 1 son lives with her  Not current with HH  No assistive devices used         PHYSICAL EXAM    (up to 7 for level 4, 8 or more for level 5)     Vitals:    01/11/21 2047 01/11/21 2113 01/11/21 2223   BP:  176/74 141/73   BP Location:  Left arm    Patient Position:  Sitting    Pulse:  72 73   Resp:  18 18   Temp:  98.4 °F (36.9 °C)    TempSrc:  Oral    SpO2:  98% 97%   Weight: 86.2 kg (190 lb)     Height: 162.6 cm (64\")         Physical Exam  General : Patient is awake, alert, oriented, in no acute distress, nontoxic appearing  HEENT: Pupils are equally round and reactive to light, EOMI, conjunctivae clear, sclerae white, there is no injection no icterus.  Oral mucosa is moist, no exudate. Uvula is midline  Neck: Neck is supple, full range of motion, trachea midline  Cardiac: Heart regular rate, rhythm, no murmurs, rubs, or gallops  Lungs: Lungs are clear to auscultation, there is no wheezing, rhonchi, or rales. There is no use of accessory muscles  Chest wall: There is no tenderness to palpation over the chest wall or over ribs  Abdomen: Abdomen is soft, nondistended, no tenderness to palpation throughout the abdomen.  Peritoneal dialysis catheter left abdomen is with clear fluid, nontender to palpation at insertion site.  Musculoskeletal: 5 out of 5 strength in all 4 extremities.  No focal muscle deficits are appreciated  Neuro: Motor intact, sensory intact, level of consciousness is normal  Dermatology: Skin is warm and dry  Psych: Mentation " is grossly normal, cognition is grossly normal. Affect is appropriate.      DIAGNOSTIC RESULTS     EKG: All EKG's are interpreted by the Emergency Department Physician who either signs or Co-signs this chart in the absence of a cardiologist.    No orders to display       RADIOLOGY:   Non-plain film images such as CT, Ultrasound and MRI are read by the radiologist. Plain radiographic images are visualized and preliminarily interpreted by the emergency physician with the below findings:      [] Radiologist's Report Reviewed:  CT Abdomen Pelvis Without Contrast   Final Result   1. No definite acute intra-abdominal abnormality appreciated. Findings within the left lung may represent bronchiolitis. There may be potential underlying atypical infection. Consider clinical correlation and follow-up.   2. There is prominence to the body of the patient's pancreatitis as described above. It is uncertain if this may represent an underlying lesion or represent prominence of the pancreatic duct. Consider follow-up nonemergent MRI.   3. Prominent left renal cyst may be septated/complex. Consider follow-up ultrasound or MRI.               Signer Name: Sivan Buitrago MD    Signed: 1/11/2021 10:25 PM    Workstation Name: [id number]     Radiology [facility]      CT CHEST WITHOUT CONTRAST DIAGNOSTIC   Final Result      1. Mild bilateral multifocal patchy pneumonia.   2. Atherosclerotic disease and coronary artery disease.   3. Two 6 mm noncalcified right lung nodules. Consider chest CT follow-up in 6-12 months.      Recommend consideration for referral to [facility]. For questions or to make appointment call [phone].      Signer Name: Kemar Oliva MD    Signed: 1/11/2021 10:18 PM    Workstation Name: [id number]     [facility]            ED BEDSIDE ULTRASOUND:   Performed by ED Physician - none    LABS:    I have reviewed and interpreted all of the currently available  lab results from this visit (if applicable):  Results for orders placed or performed during the hospital encounter of 01/11/21   Comprehensive Metabolic Panel    Specimen: Blood   Result Value Ref Range    Glucose 188 (H) 65 - 99 mg/dL    BUN 65 (H) 8 - 23 mg/dL    Creatinine 9.19 (H) 0.57 - 1.00 mg/dL    Sodium 138 136 - 145 mmol/L    Potassium 4.2 3.5 - 5.2 mmol/L    Chloride 98 98 - 107 mmol/L    CO2 21.0 (L) 22.0 - 29.0 mmol/L    Calcium 8.8 8.6 - 10.5 mg/dL    Total Protein 7.3 6.0 - 8.5 g/dL    Albumin 3.00 (L) 3.50 - 5.20 g/dL    ALT (SGPT) 13 1 - 33 U/L    AST (SGOT) 21 1 - 32 U/L    Alkaline Phosphatase 172 (H) 39 - 117 U/L    Total Bilirubin 0.2 0.0 - 1.2 mg/dL    eGFR Non African Amer 4 (L) >60 mL/min/1.73    eGFR  African Amer      Globulin 4.3 gm/dL    A/G Ratio 0.7 g/dL    BUN/Creatinine Ratio 7.1 7.0 - 25.0    Anion Gap 19.0 (H) 5.0 - 15.0 mmol/L   Lipase    Specimen: Blood   Result Value Ref Range    Lipase 138 (H) 13 - 60 U/L   Lactic Acid, Plasma    Specimen: Blood   Result Value Ref Range    Lactate 1.7 0.5 - 2.0 mmol/L   CBC Auto Differential    Specimen: Blood   Result Value Ref Range    WBC 19.16 (H) 3.40 - 10.80 10*3/mm3    RBC 3.27 (L) 3.77 - 5.28 10*6/mm3    Hemoglobin 10.5 (L) 12.0 - 15.9 g/dL    Hematocrit 34.0 34.0 - 46.6 %    .0 (H) 79.0 - 97.0 fL    MCH 32.1 26.6 - 33.0 pg    MCHC 30.9 (L) 31.5 - 35.7 g/dL    RDW 13.3 12.3 - 15.4 %    RDW-SD 51.1 37.0 - 54.0 fl    MPV 9.6 6.0 - 12.0 fL    Platelets 313 140 - 450 10*3/mm3    Neutrophil % 82.6 (H) 42.7 - 76.0 %    Lymphocyte % 10.9 (L) 19.6 - 45.3 %    Monocyte % 3.7 (L) 5.0 - 12.0 %    Eosinophil % 1.5 0.3 - 6.2 %    Basophil % 0.3 0.0 - 1.5 %    Immature Grans % 1.0 (H) 0.0 - 0.5 %    Neutrophils, Absolute 15.83 (H) 1.70 - 7.00 10*3/mm3    Lymphocytes, Absolute 2.09 0.70 - 3.10 10*3/mm3    Monocytes, Absolute 0.70 0.10 - 0.90 10*3/mm3    Eosinophils, Absolute 0.28 0.00 - 0.40 10*3/mm3    Basophils, Absolute 0.06 0.00 - 0.20  10*3/mm3    Immature Grans, Absolute 0.20 (H) 0.00 - 0.05 10*3/mm3    nRBC 0.0 0.0 - 0.2 /100 WBC   Protime-INR    Specimen: Blood   Result Value Ref Range    Protime 24.4 (H) 11.5 - 14.0 Seconds    INR 2.23 (H) 0.85 - 1.16   TSH    Specimen: Blood   Result Value Ref Range    TSH 6.090 (H) 0.270 - 4.200 uIU/mL   Urinalysis With Microscopic If Indicated (No Culture) - Urine, Catheter    Specimen: Urine, Catheter   Result Value Ref Range    Color, UA Yellow Yellow, Straw    Appearance, UA Turbid (A) Clear    pH, UA 6.0 5.0 - 8.0    Specific Gravity, UA 1.013 1.001 - 1.030    Glucose, UA Negative Negative    Ketones, UA Negative Negative    Bilirubin, UA Negative Negative    Blood, UA Small (1+) (A) Negative    Protein,  mg/dL (2+) (A) Negative    Leuk Esterase, UA Large (3+) (A) Negative    Nitrite, UA Negative Negative    Urobilinogen, UA 0.2 E.U./dL 0.2 - 1.0 E.U./dL   Urinalysis, Microscopic Only - Urine, Catheter    Specimen: Urine, Catheter   Result Value Ref Range    RBC, UA 3-6 (A) None Seen, 0-2 /HPF    WBC, UA Too Numerous to Count (A) None Seen, 0-2 /HPF    Bacteria, UA None Seen None Seen, Trace /HPF    Squamous Epithelial Cells, UA None Seen None Seen, 0-2 /HPF    Hyaline Casts, UA 0-6 0 - 6 /LPF    Methodology Manual Light Microscopy    Light Blue Top   Result Value Ref Range    Extra Tube hold for add-on    Green Top (Gel)   Result Value Ref Range    Extra Tube Hold for add-ons.    Lavender Top   Result Value Ref Range    Extra Tube hold for add-on    Gold Top - SST   Result Value Ref Range    Extra Tube Hold for add-ons.         All other labs were within normal range or not returned as of this dictation.      EMERGENCY DEPARTMENT COURSE and DIFFERENTIAL DIAGNOSIS/MDM:   Vitals:    Vitals:    01/11/21 2047 01/11/21 2113 01/11/21 2223   BP:  176/74 141/73   BP Location:  Left arm    Patient Position:  Sitting    Pulse:  72 73   Resp:  18 18   Temp:  98.4 °F (36.9 °C)    TempSrc:  Oral    SpO2:  98%  "97%   Weight: 86.2 kg (190 lb)     Height: 162.6 cm (64\")              Patient with generalized weakness, overall feeling well for the last few weeks.  She is nontoxic-appearing, vitals are stable upon arrival.  She does undergo peritoneal dialysis nightly and has been compliant with this.  We discussed obtaining IV, labs, imaging.  Her abdomen is soft, no peritoneal signs on examination.  Blood work and imaging reveals multiple abnormalities including multifocal pneumonia, leukocytosis with left shift, urinary tract infection, elevated lipase.  Patient was started on broad-spectrum antibiotics, blood cultures were initiated, Covid test obtained.  We will plan for admission to the hospital for further work-up treatment and evaluation.  Case discussed with the hospitalist team for admission.          MEDICATIONS ADMINISTERED IN ED:  Medications   Sodium Chloride (PF) 0.9 % 10 mL (has no administration in time range)   ondansetron (ZOFRAN) injection 4 mg (4 mg Intravenous Given 1/11/21 2221)   vancomycin 1750 mg/500 mL 0.9% NS IVPB ([facility]) (has no administration in time range)   cefTRIAXone (ROCEPHIN) 1 g/100 mL 0.9% NS (MBP) (has no administration in time range)       PROCEDURES:  Procedures    CRITICAL CARE TIME    Total Critical Care time was 0 minutes, excluding separately reportable procedures.   There was a high probability of clinically significant/life threatening deterioration in the patient's condition which required my urgent intervention.      FINAL IMPRESSION      1. Pneumonia of both lower lobes due to infectious organism    2. Sepsis, due to unspecified organism, unspecified whether acute organ dysfunction present (CMS/[facility])    3. Chronic renal failure, unspecified CKD stage    4. Urinary tract infection without hematuria, site unspecified    5. Peritoneal dialysis status (CMS/[facility])          DISPOSITION/PLAN     ED Disposition     ED Disposition Condition Comment    Decision to Admit  Level of Care: " Telemetry [5]   Diagnosis: Pneumonia of both lower lobes due to infectious organism [[id number]]   Admitting Physician: OBI FRANZ [[id number]]   Attending Physician: OBI FRANZ [[id number]]   Bed Request Comments: pending COVID (has pna on cct)   Certification: I Certify That Inpatient Hospital Services Are Medically Necessary For Greater Than 2 Midnights            PATIENT REFERRED TO:  No follow-up provider specified.    DISCHARGE MEDICATIONS:     Medication List      ASK your doctor about these medications    acetaminophen 325 MG tablet  Commonly known as: TYLENOL  Take 2 tablets by mouth Every 4 (Four) Hours As Needed for Mild Pain .     * albuterol sulfate  (90 Base) MCG/ACT inhaler  Commonly known as: ProAir HFA  Inhale 2 puffs Every 4 (Four) Hours As Needed for Wheezing or Shortness of Air.     * albuterol (2.5 MG/3ML) 0.083% nebulizer solution  Commonly known as: PROVENTIL  Take 2.5 mg by nebulization Every 4 (Four) Hours As Needed for Wheezing.     escitalopram 5 MG tablet  Commonly known as: Lexapro  Take 1 tablet by mouth Every Morning.     furosemide 40 MG tablet  Commonly known as: LASIX     gentamicin 0.1 % cream  Commonly known as: GARAMYCIN     metoprolol tartrate 50 MG tablet  Commonly known as: LOPRESSOR  Take 1 tablet by mouth Every 12 (Twelve) Hours.     omeprazole 40 MG capsule  Commonly known as: priLOSEC  TAKE ONE CAPSULE BY MOUTH DAILY     sevelamer 800 MG tablet  Commonly known as: RENVELA     tacrolimus 0.5 MG capsule  Commonly known as: PROGRAF     temazepam 15 MG capsule  Commonly known as: RESTORIL  Ask about: Which instructions should I use?     warfarin 2 MG tablet  Commonly known as: COUMADIN  TAKE ONE TO TWO TABLETS BY MOUTH EVERY DAY OR AS DIRECTED BY ANTICOAGULATION CLINIC         * This list has 2 medication(s) that are the same as other medications prescribed for you. Read the directions carefully, and ask your doctor or other care provider to review them with you.                     Comment: Please note this report has been produced using speech recognition software.      Kyle Quintero DO  Attending Emergency Physician               Kyle Quintero DO  01/11/21 9062

## 2021-01-12 NOTE — PROGRESS NOTES
"Pharmacy Consult - Vancomycin Dosing    Moni Wallace is a 80 y.o. female receiving vancomycin therapy.     Indication: Pneumonia  Consulting Provider: Hospitalist  ID Consult: No    Goal Trough:    Current Antimicrobial Therapy  Ceftriaxone 1/11-now  Vancomycin 1/12-now    Allergies  Allergies as of 01/11/2021 - Reviewed 01/11/2021   Allergen Reaction Noted    Hydrocodone Itching 05/19/2019    Statins Other (See Comments) 07/21/2020    Codeine Rash 05/07/2018    Sulfa antibiotics Rash 05/07/2018     Labs  Results from last 7 days   Lab Units 01/12/21  0704 01/11/21  2210   BUN mg/dL 65* 65*   CREATININE mg/dL 9.17* 9.19*     Results from last 7 days   Lab Units 01/11/21  2210   WBC 10*3/mm3 19.16*     Evaluation of Dosing  Last Dose Received in the ED/Outside Facility: Vancomycin 1750mg IV x1 in ED 1/12 at 0000  Is Patient on Dialysis or Renal Replacement: Yes - peritoneal dialysis    Ht - 162.6 cm (64\")  Wt - 94.4 kg (208 lb 1.6 oz)    Estimated Creatinine Clearance: 5.5 mL/min (A) (by C-G formula based on SCr of 9.17 mg/dL (H)).    Intake & Output (last 3 days)         01/09 0701 - 01/10 0700 01/10 0701 - 01/11 0700 01/11 0701 - 01/12 0700 01/12 0701 - 01/13 0700    P.O.   100 120    Total Intake(mL/kg)   100 (1.1) 120 (1.3)    Urine (mL/kg/hr)    200 (0.3)    Total Output    200    Net   +100 -80            Urine Unmeasured Occurrence   1 x           Microbiology and Radiology  Microbiology Results (last 10 days)       Procedure Component Value - Date/Time    COVID-19 and FLU A/B PCR - Swab, Nasopharynx [[id number]]  (Normal) Collected: 01/11/21 2327    Lab Status: Final result Specimen: Swab from Nasopharynx Updated: 01/12/21 0017     COVID19 Not Detected     Influenza A PCR Not Detected     Influenza B PCR Not Detected    Narrative:      Fact sheet for providers: https://www.fda.gov/media/911571/download    Fact sheet for patients: https://www.fda.gov/media/836118/download    Test performed by PCR.      "     Vancomycin Levels:                Assessment/Plan:   1. Pharmacy dosing vancomycin for pneumonia, goal trough 15-20.  2. Patient received loading dose of vancomycin 1750mg IV x1 in the ED around midnight. Patient is ESRD on peritoneal dialysis which minimally removes vancomycin. Will order random vancomycin level for 1/14 AM to determine further dosing.  3. Pharmacy will continue to monitor and adjust vancomycin dose as necessary based on renal function, cultures, labs, and clinical status.     Thank you,  Jensen Ramirez, PharmD, BCPS  1/12/2021  14:18 EST

## 2021-01-12 NOTE — H&P
"    [facility] Medicine Services  HISTORY AND PHYSICAL    Patient Name: Moni Wallace  : 1940  MRN: [id number]  Primary Care Physician: Alex Walters MD  Date of admission: 2021      Subjective   Subjective     Chief Complaint:  weakness    HPI:  Moni Wallace is a 80 y.o. female w/ ESRD on PD, heart failure, AS, afib, CAD w/ statin intolerance who c/o not feeling well for past several weeks.  Notes generalized aches, chills.  Has had some n/v.  Pt was seen by Dr. Walters her pcp for general follow up on 2021 and noted \"feeling blah\" w/ no energy for past weeks, insomnia, head and nasal congestion, poor appetite.  Pt states Dr. Walters felt she may be depressed so prescribed anti-depressant.  Pt believes she prob is depressed but does not think this accounts for all of her sx.  Pt also notes thyroid meds increased around 3 wks ago.  Pt denies any fevers, abd pain, dysuria (though states she drinks cranberry juice preventatively).  No diarrhea or constipation.  No problems w/ PD.  No known COVID exposure.  No focal weakness.  Lives at home w/ her son.  No shortness of breath          Current COVID Risks are:  [] Fever []  Cough [] Shortness of breath [] Fatigue [] Change in taste or smell    [] Exposure to COVID positive patient  [] High risk facility   [x]  NONE    Review of Systems     All other systems reviewed and are negative.     Personal History     Past Medical History:   Diagnosis Date   • Adenomatous colon polyp    • Chronic kidney disease    • Clostridium difficile infection 2017   • Diabetes mellitus (CMS/HCC)    • Gastric polyps    • Hypothyroidism    • IgA nephropathy, acute    • Kidney transplanted    • Macular degeneration    • Paroxysmal atrial fibrillation (CMS/HCC)    • Tubular adenoma     excision    • Ulcer of gastric fundus    • Vitamin D deficiency        Past Surgical History:   Procedure Laterality Date   • BREAST BIOPSY Left    • CARPAL " TUNNEL RELEASE     • CARPAL TUNNEL RELEASE Right    • CATARACT EXTRACTION     • CATARACT EXTRACTION Bilateral    • DIAGNOSTIC LAPAROSCOPY     • DIALYSIS FISTULA CREATION     • HERNIA REPAIR  85 and 86   • KNEE ARTHROSCOPY INCISION AND DRAINAGE OF KNEE Right 5/18/2019    Procedure: KNEE ARTHROSCOPY INCISION AND DRAINAGE OF KNEE;  Surgeon: Paco Lester MD;  Location:  [facility];  Service: Orthopedics   • LAPAROSCOPIC TUBAL LIGATION  1985   • TOTAL KNEE ARTHROPLASTY     • TOTAL KNEE ARTHROPLASTY      Left knee    • TRANSPLANTATION RENAL  2010   • TRANSPLANTATION RENAL Right    • TRIGGER FINGER RELEASE     • TUBAL ABDOMINAL LIGATION     • UPPER GASTROINTESTINAL ENDOSCOPY  05/20/2013   • VENOUS ACCESS DEVICE (PORT) INSERTION N/A 5/23/2019    Procedure: INSERTION GROSHONG;  Surgeon: Rolando Begum MD;  Location:  [facility];  Service: General       Family History: family history includes Dementia in her sister and sister; Diabetic kidney disease in her sister; Hypertension in her sister; Kidney disease in her sister; Leukemia in her mother; Lung cancer in her brother; Stroke in her father. Otherwise pertinent FHx was reviewed and unremarkable.     Social History:  reports that she has never smoked. She has never used smokeless tobacco. She reports that she does not drink alcohol or use drugs.  Social History     Social History Narrative    ** Merged History Encounter **         Lives at home, 1 son lives with her  Not current with   No assistive devices used       Medications:  Available home medication information reviewed.  (Not in a hospital admission)      Allergies   Allergen Reactions   • Hydrocodone Itching   • Statins Other (See Comments)     myalgia   • Codeine Rash   • Sulfa Antibiotics Rash       Objective   Objective     Vital Signs:   Temp:  [98.4 °F (36.9 °C)] 98.4 °F (36.9 °C)  Heart Rate:  [72-73] 73  Resp:  [18] 18  BP: (141-176)/(73-74) 141/73       Physical Exam   Gen; alert,  oriented,nad  Heent; perrla, eomi  Cv; rrr, no mrg  L; diminished bs's bll  Abd; soft, +bs, ntnd  Ext; 1+ pitting edema, no cc  Skin; cdi, warm  Neuro; grossly intact  Psych; flat affect      Results Reviewed:  I have personally reviewed most recent indicated data and agree with findings including:  [x]  Laboratory  [x]  Radiology  []  EKG/Telemetry  []  Pathology  []  Cardiac/Vascular Studies  [x]  Old records  []  Other:  Most pertinent findings include:  glc 188, ag19, cr 9.2, alkphos 172, rwx386, inr 2.23, wbc 19.2, hgb 10.5, ua w/ ?uti, CT w/ panc prominence, left renal cyst, mild B pna, 2x6mm lung nodules        LAB RESULTS:      Lab 01/11/21 2210   WBC 19.16*   HEMOGLOBIN 10.5*   HEMATOCRIT 34.0   PLATELETS 313   NEUTROS ABS 15.83*   IMMATURE GRANS (ABS) 0.20*   LYMPHS ABS 2.09   MONOS ABS 0.70   EOS ABS 0.28   .0*   LACTATE 1.7   PROTIME 24.4*         Lab 01/11/21 2210 01/11/21 2113   SODIUM 138  --    POTASSIUM 4.2  --    CHLORIDE 98  --    CO2 21.0*  --    ANION GAP 19.0*  --    BUN 65*  --    CREATININE 9.19*  --    GLUCOSE 188*  --    CALCIUM 8.8  --    TSH  --  6.090*         Lab 01/11/21 2210   TOTAL PROTEIN 7.3   ALBUMIN 3.00*   GLOBULIN 4.3   ALT (SGPT) 13   AST (SGOT) 21   BILIRUBIN 0.2   ALK PHOS 172*   LIPASE 138*         Lab 01/11/21 2210 01/11/21   PROTIME 24.4*  --    INR 2.23* 2.30                 Brief Urine Lab Results  (Last result in the past 365 days)      Color   Clarity   Blood   Leuk Est   Nitrite   Protein   CREAT   Urine HCG        01/11/21 2221 Yellow Turbid Small (1+) Large (3+) Negative 100 mg/dL (2+)             Microbiology Results (last 10 days)     Procedure Component Value - Date/Time    COVID-19 and FLU A/B PCR - Swab, Nasopharynx [[id number]]  (Normal) Collected: 01/11/21 2327    Lab Status: Final result Specimen: Swab from Nasopharynx Updated: 01/12/21 0017     COVID19 Not Detected     Influenza A PCR Not Detected     Influenza B PCR Not Detected    Narrative:       Fact sheet for providers: https://www.fda.gov/media/308690/download    Fact sheet for patients: https://www.fda.gov/media/827446/download    Test performed by PCR.        Imaging Results (Last 24 Hours)     Procedure Component Value Units Date/Time    CT Abdomen Pelvis Without Contrast [[id number]] Collected: 01/11/21 2225     Updated: 01/11/21 2227    Narrative:      CT Abdomen Pelvis WO    INDICATION:   Weakness and nausea    TECHNIQUE:   CT of the abdomen and pelvis without IV contrast. Coronal and sagittal reconstructions were obtained.  Radiation dose reduction techniques included automated exposure control or exposure modulation based on body size. Count of known CT and cardiac nuc  med studies performed in previous 12 months: 0.     COMPARISON:   CT of the abdomen and pelvis from 2/13/2015    FINDINGS:  Abdomen: Small pleural based nodule is seen in the right posterior lung field measuring about 4 mm. There are nonspecific tree-in-bud opacity is seen involving the left posterior inferior lung field. There is diffuse coronary artery calcification. There  is a diverticulum off of the posterior wall of the stomach. The kidneys are grossly atrophic. There is a probable left adrenal adenoma. There is prominence to the body of the pancreas best identified on axial series 2 images 53 through 57. This measures  up to 1.2 cm. This appears to be new since the prior CT. There is no evidence of intra-abdominal lymphadenopathy. There is a probable septated large cyst on the left is incompletely evaluated.    Pelvis: Transplant kidney in the right renal pelvis appears atrophic. Mild compression deformity seen involving the superior endplate of T11. There is a left peritoneal dialysis catheter. There is some ascites. There is a small herniation in the right  anterior pelvic abdominal wall that contains fat and fluid.      Impression:      1. No definite acute intra-abdominal abnormality appreciated. Findings within the  left lung may represent bronchiolitis. There may be potential underlying atypical infection. Consider clinical correlation and follow-up.  2. There is prominence to the body of the patient's pancreatitis as described above. It is uncertain if this may represent an underlying lesion or represent prominence of the pancreatic duct. Consider follow-up nonemergent MRI.  3. Prominent left renal cyst may be septated/complex. Consider follow-up ultrasound or MRI.          Signer Name: Sivan Buitrago MD   Signed: 1/11/2021 10:25 PM   Workstation Name: [id number]    [facility]    CT CHEST WITHOUT CONTRAST DIAGNOSTIC [[id number]] Collected: 01/11/21 2218     Updated: 01/11/21 2221    Narrative:      CT Chest WO    INDICATION:   Generalized weakness with nausea for 8 days.    TECHNIQUE:   CT of the thorax without IV contrast. Coronal and sagittal reconstructions were obtained.  Radiation dose reduction techniques included automated exposure control or exposure modulation based on body size. Count of known CT and cardiac nuc med studies  performed in previous 12 months: 0.     COMPARISON:   2/16/2015     FINDINGS:  There is atherosclerotic disease and coronary artery disease. No pleural or pericardial effusion is identified. There is no adenopathy. There is a left lower lobe calcified granuloma. There is a minimal amount of tree-in-bud and groundglass opacity in a  patchy distribution both lungs compatible with a very mild multifocal pneumonia. Imaging features are atypical or uncommonly reported for COVID-19 pneumonia. Alternative diagnoses should be considered. There is a 6 mm pleural-based right upper lobe  nodule. There is also a nodule along the major fissure in the right lung measuring 6 mm. Consider chest CT follow-up in 6-12 months. Lungs are otherwise clear. Diffuse degenerative disease is noted in the thoracic spine. Please see abdomen pelvis CT  report dictated separately.      Impression:         1. Mild bilateral multifocal patchy pneumonia.  2. Atherosclerotic disease and coronary artery disease.  3. Two 6 mm noncalcified right lung nodules. Consider chest CT follow-up in 6-12 months.    Recommend consideration for referral to [facility]. For questions or to make appointment call [phone].    Signer Name: Kemar Oliva MD   Signed: 1/11/2021 10:18 PM   Workstation Name: [email or web address]    [facility]        Results for orders placed during the hospital encounter of 05/18/19   Adult Transthoracic Echo Complete W/ Cont if Necessary Per Protocol    Narrative · Left ventricular systolic function is normal. Estimated EF = 60%.  · Left atrial cavity size is moderately dilated.  · Mild aortic valve stenosis is present (mean gradient 15 mmHg.          Assessment/Plan   Assessment & Plan     Active Hospital Problems    Diagnosis POA   • Atypical pneumonia [J18.9] Yes   • Acute UTI (urinary tract infection) [N39.0] Yes   • Hypothyroidism [E03.9] Yes   • Leukocytosis [D72.829] Yes   • Type 2 diabetes mellitus (CMS/HCC) [E11.9] Yes   • Chronic kidney disease, stage IV (severe) (CMS/HCC) [N18.4] Yes     · IgA nephropathy with history of renal transplant, 2010.   · Patient on hemodialysis Monday, Wednesday, and Friday started July 2015.  · Hemodialysis discontinued 2/2017.     • Anemia of renal disease [N18.9, D63.1] Yes       79 y/o female here w/ c/o generalized weakness w/ multiple chronic medical problems;  1. Generalized weakness/fatigue;  -pt notes being recently started on anti-depressant for these sx  -also notes recent adjustment in thyroid meds as tsh not in range (TSH still elevated here)  -pt also w/ ckd on PD  -?if has UTI   -?also if has pna as not symptomatic but cct w/ findings c/w pna  2. Leukocytosis w/ ?uti/?pna;  -cont vanc/rocephin and de-escalate as needed  -cultures  3. Hypothyroidism w/ elev tsh;  -pt notes meds recently adjusted  -check ft4  4.  ckd w/ PD;  -nephrology consult  5. afib on chronic anticoagulation;  -inr therapeutic on coumadin  -rate controlled    DVT prophylaxis:  coumadin      CODE STATUS:    Code Status and Medical Interventions:   Ordered at: 01/12/21 0014     Code Status:    CPR     Medical Interventions (Level of Support Prior to Arrest):    Full       Admission Status:  I believe this patient meets INPATIENT status due to ?uti/?pna.  I feel patient’s risk for adverse outcomes and need for care warrant INPATIENT evaluation and I predict the patient’s care encounter to likely last beyond 2 midnights.      Elivra Schrader MD  01/12/21  Electronically signed by Elvira Schrader MD, 01/12/21, 1:54 AM EST.

## 2021-01-12 NOTE — PLAN OF CARE
Goal Outcome Evaluation:  Plan of Care Reviewed With: patient  Progress: improving  Outcome Summary: OT evaluation completed. Pt presents with signficant weakness and decreased activity tolerance limiting ADL's. Pt completed grooming with VELASQUEZ sitting up in bed, c/o extreme nausea and fatigue, declined transfer to EOB or to chair. Pt Dep for LBD and toileting, IND with self-care and tx's at baseline using w/c for household mobility. OT to follow to progress ADL's to PLOF. Recommend short term rehab at discharge, pending progress.

## 2021-01-12 NOTE — PROGRESS NOTES
Discharge Planning Assessment  [facility]     Patient Name: Moni Wallace  MRN: [id number]  Today's Date: 1/12/2021    Admit Date: 1/11/2021    Discharge Needs Assessment     Row Name 01/12/21 1310       Living Environment    Lives With  child(chavo), adult    Name(s) of Who Lives With Patient  Yadiel Wallace(son) lives with pt    Current Living Arrangements  home/apartment/condo    Primary Care Provided by  self    Provides Primary Care For  no one, unable/limited ability to care for self    Family Caregiver if Needed  child(chavo), adult;grandchild(chavo), adult    Quality of Family Relationships  supportive;involved    Able to Return to Prior Arrangements  yes    Living Arrangement Comments  Spoke with pt in room with permission regarding discharge plan. Pt resides in [facility] and her son lives with her.       Resource/Environmental Concerns    Resource/Environmental Concerns  none       Transition Planning    Patient/Family Anticipates Transition to  home with family    Patient/Family Anticipated Services at Transition      Transportation Anticipated  family or friend will provide       Discharge Needs Assessment    Readmission Within the Last 30 Days  no previous admission in last 30 days    Equipment Currently Used at Home  glucometer;grab bar;nebulizer;wheelchair;shower chair, supples for PD    Concerns to be Addressed  discharge planning    Equipment Needed After Discharge  grab bar, toilet;grab bar, tub/shower;glucometer;wheelchair, manual;nebulizer;shower chair    Discharge Coordination/Progress  Pt reports she has Medicare and [employer] with no recent changes in insurance. Pt has prescription coverage with [employer] and uses mail order pharmacy or [facility] on [facility] [address] [facility]. Plan is home at discharge with assistance from family.  She reports her son or granddaughter provides transporation for her. Pt currently does not anticipate any discharge needs. CM will cont to follow.         Discharge Plan     Row Name 01/12/21 5395       Plan    Plan  discharge plan    Plan Comments  Plan is home at discharge with assistance from family.  She reports her son or granddaughter provides transporation for her. Pt currently does not anticipate any discharge needs. CM will cont to follow.    Final Discharge Disposition Code  01 - home or self-care        Continued Care and Services - Admitted Since 1/11/2021    Coordination has not been started for this encounter.       Expected Discharge Date and Time     Expected Discharge Date Expected Discharge Time    Jan 14, 2021         Demographic Summary     Row Name 01/12/21 1307       General Information    General Information Comments  Pt's PCP is Alex Walters       Contact Information    Permission Granted to Share Info With      Contact Information Obtained for      Contact Information Comments  Yadiel Wallace(son)[phone] or Genesis Becerril(daughter): [phone]        Functional Status    No documentation.       Psychosocial    No documentation.       Abuse/Neglect    No documentation.       Legal    No documentation.       Substance Abuse    No documentation.       Patient Forms    No documentation.           Cyndy Hedrick RN

## 2021-01-12 NOTE — PLAN OF CARE
Problem: Adult Inpatient Plan of Care  Goal: Plan of Care Review  Recent Flowsheet Documentation  Taken 1/12/2021 1315 by Marlyn Reid, PT  Progress: improving  Plan of Care Reviewed With: patient  Outcome Summary: PT eval completed. Presents w/ PNA/hypox & UTI sepsis, ESRD req. perit dial, h/o w/c dep. d/t non-amb s/p failed R knee inject., decr LE strength/ endurance & impaired funct mobil. Performed BLE ther exer x 10 in sup., + rolled L/R to place bedpan & pad, but decl. EOB or SPT to chair d/t nausea, R thigh cramping pain, low BP (DBP low 50's), & pending perit dial. Noted HR to 79, desat to 96% & fatigue. May benefit from S.T. rehab at d/c to achieve mobil goals.   Goal Outcome Evaluation:  Plan of Care Reviewed With: patient  Progress: improving  Outcome Summary: PT eval completed. Presents w/ PNA/hypox & UTI sepsis, ESRD req. perit dial, h/o w/c dep. d/t non-amb s/p failed R knee inject., decr LE strength/ endurance & impaired funct mobil. Performed BLE ther exer x 10 in sup., + rolled L/R to place bedpan & pad, but decl. EOB or SPT to chair d/t nausea, R thigh cramping pain, low BP (DBP low 50's), & pending perit dial. Noted HR to 79, desat to 96% & fatigue. May benefit from S.T. rehab at d/c to achieve mobil goals.

## 2021-01-12 NOTE — CONSULTS
Patient Care Team:  Alex Walters MD as PCP - General (Internal Medicine)  Jeff Joe IV, MD as Consulting Physician (Cardiology)  Donny Hodges MD as Consulting Physician (Nephrology)  Cory Castillo MD as Surgeon (General Surgery)  Slim Wick MD    Chief complaint: ESRD on PD     Subjective     History of Present Illness: Moni Wallace is a 80 y.o. female w/ ESRD on PD, heart failure, AS, afib, CAD w/ statin intolerance who c/o not feeling well for past several weeks.  Notes generalized aches, chills.  Has had some n/v. Patient has hx of failed kidney txp. She has been on HD previously but doing PD for 3 yrs. Her PD prescription 8hr CCPD alternating btw 1.5% 2.5% dextrose 4 exchange 2 liter 1.5hr. Elevated white count on this admission. Per the patient she is not taking any prograf however review of records from [facility] suggest she is taking 1 mg twice a day     Review of Systems   Constitutional: Positive for fatigue. Negative for fever.   HENT: Negative.    Respiratory: Negative.    Cardiovascular: Negative.    Gastrointestinal: Positive for nausea and vomiting.   Genitourinary: Negative.    Musculoskeletal: Negative.    Skin: Negative.    Neurological: Positive for weakness. Negative for seizures, syncope, facial asymmetry, speech difficulty, light-headedness, numbness and headaches.   Psychiatric/Behavioral: Negative.         Past Medical History:   Diagnosis Date   • Adenomatous colon polyp    • Chronic kidney disease    • Clostridium difficile infection 06/2017   • Diabetes mellitus (CMS/HCC)    • Gastric polyps    • Hypothyroidism    • IgA nephropathy, acute    • Kidney transplanted    • Macular degeneration    • Paroxysmal atrial fibrillation (CMS/HCC)    • Tubular adenoma     excision    • Ulcer of gastric fundus    • Vitamin D deficiency    ,   Past Surgical History:   Procedure Laterality Date   • BREAST BIOPSY Left 1990'S   • CARPAL TUNNEL RELEASE     • CARPAL  TUNNEL RELEASE Right    • CATARACT EXTRACTION     • CATARACT EXTRACTION Bilateral    • DIAGNOSTIC LAPAROSCOPY     • DIALYSIS FISTULA CREATION     • HERNIA REPAIR  85 and 86   • KNEE ARTHROSCOPY INCISION AND DRAINAGE OF KNEE Right 5/18/2019    Procedure: KNEE ARTHROSCOPY INCISION AND DRAINAGE OF KNEE;  Surgeon: Paco Lester MD;  Location:  [facility];  Service: Orthopedics   • LAPAROSCOPIC TUBAL LIGATION  1985   • TOTAL KNEE ARTHROPLASTY     • TOTAL KNEE ARTHROPLASTY      Left knee    • TRANSPLANTATION RENAL  2010   • TRANSPLANTATION RENAL Right    • TRIGGER FINGER RELEASE     • TUBAL ABDOMINAL LIGATION     • UPPER GASTROINTESTINAL ENDOSCOPY  05/20/2013   • VENOUS ACCESS DEVICE (PORT) INSERTION N/A 5/23/2019    Procedure: INSERTION GROSHONG;  Surgeon: Rolando Begum MD;  Location:  [facility];  Service: General   ,   Family History   Problem Relation Age of Onset   • Leukemia Mother    • Stroke Father    • Dementia Sister    • Kidney disease Sister    • Diabetic kidney disease Sister    • Lung cancer Brother    • Breast cancer Neg Hx    • Ovarian cancer Neg Hx    • Hypertension Sister    • Dementia Sister    ,   Social History     Tobacco Use   • Smoking status: Never Smoker   • Smokeless tobacco: Never Used   Substance Use Topics   • Alcohol use: No   • Drug use: No     E-cigarette/Vaping   • E-cigarette/Vaping Use Never User      E-cigarette/Vaping Substances     E-cigarette/Vaping Devices       ,   Medications Prior to Admission   Medication Sig Dispense Refill Last Dose   • acetaminophen (TYLENOL) 325 MG tablet Take 2 tablets by mouth Every 4 (Four) Hours As Needed for Mild Pain .      • albuterol (PROVENTIL) (2.5 MG/3ML) 0.083% nebulizer solution Take 2.5 mg by nebulization Every 4 (Four) Hours As Needed for Wheezing. 25 vial 2    • albuterol sulfate HFA (PROAIR HFA) 108 (90 Base) MCG/ACT inhaler Inhale 2 puffs Every 4 (Four) Hours As Needed for Wheezing or Shortness of Air. 1 inhaler 11    •  escitalopram (Lexapro) 5 MG tablet Take 1 tablet by mouth Every Morning. 30 tablet 5    • furosemide (LASIX) 40 MG tablet Take 80 mg by mouth Daily.      • gentamicin (GARAMYCIN) 0.1 % cream APPLY TO EXIT SITE DAILY  3    • metoprolol tartrate (LOPRESSOR) 50 MG tablet Take 1 tablet by mouth Every 12 (Twelve) Hours. (Patient taking differently: Take 100 mg by mouth Every 12 (Twelve) Hours.)      • omeprazole (priLOSEC) 40 MG capsule TAKE ONE CAPSULE BY MOUTH DAILY 90 capsule 3    • sevelamer (RENVELA) 800 MG tablet Take 1,600 mg by mouth 3 (Three) Times a Day.      • tacrolimus (PROGRAF) 0.5 MG capsule Take 0.5 mg by mouth 2 (Two) Times a Day.      • temazepam (RESTORIL) 15 MG capsule Take 15 mg by mouth At Night As Needed for Sleep.      • warfarin (COUMADIN) 2 MG tablet TAKE ONE TO TWO TABLETS BY MOUTH EVERY DAY OR AS DIRECTED BY ANTICOAGULATION CLINIC 180 tablet 0    , Scheduled Meds:  cefTRIAXone, 1 g, Intravenous, Q24H  escitalopram, 5 mg, Oral, Daily  furosemide, 80 mg, Oral, Daily  gentamicin, , Topical, Daily  metoprolol tartrate, 100 mg, Oral, Q12H  pantoprazole, 40 mg, Oral, Daily  senna-docusate sodium, 2 tablet, Oral, BID  sevelamer, 1,600 mg, Oral, TID With Meals  sodium chloride, 10 mL, Intravenous, Q12H  tacrolimus, 0.5 mg, Oral, Q12H  vancomycin (dosing per levels), , Does not apply, Daily  warfarin, 4 mg, Oral, Daily     and Continuous Infusions:  Pharmacy to dose vancomycin,   Pharmacy to dose warfarin,         Objective     Vital Signs  Temp:  [97.5 °F (36.4 °C)-98.4 °F (36.9 °C)] 97.5 °F (36.4 °C)  Heart Rate:  [70-85] 74  Resp:  [16-18] 18  BP: (108-176)/(50-74) 108/50    I/O this shift:  In: 120 [P.O.:120]  Out: 300 [Urine:300]  I/O last 3 completed shifts:  In: 100 [P.O.:100]  Out: -     Physical Exam  Constitutional:       Appearance: Normal appearance.   HENT:      Head: Normocephalic and atraumatic.      Nose: Nose normal. No congestion.      Mouth/Throat:      Mouth: Mucous membranes are  moist.   Eyes:      Extraocular Movements: Extraocular movements intact.      Pupils: Pupils are equal, round, and reactive to light.   Neck:      Musculoskeletal: Normal range of motion.   Cardiovascular:      Rate and Rhythm: Normal rate and regular rhythm.      Pulses: Normal pulses.      Heart sounds: No murmur.   Pulmonary:      Effort: Pulmonary effort is normal.      Breath sounds: Normal breath sounds. No rales.   Abdominal:      General: Abdomen is flat. Bowel sounds are normal.      Palpations: Abdomen is soft.   Musculoskeletal:         General: No swelling or signs of injury.      Right lower leg: No edema.      Left lower leg: No edema.   Skin:     General: Skin is warm and dry.   Neurological:      General: No focal deficit present.      Mental Status: She is alert and oriented to person, place, and time. Mental status is at baseline.         Results Review:    I reviewed the patient's new clinical results.    WBC WBC   Date Value Ref Range Status   01/11/2021 19.16 (H) 3.40 - 10.80 10*3/mm3 Final      HGB Hemoglobin   Date Value Ref Range Status   01/11/2021 10.5 (L) 12.0 - 15.9 g/dL Final      HCT Hematocrit   Date Value Ref Range Status   01/11/2021 34.0 34.0 - 46.6 % Final      Platlets No results found for: LABPLAT   MCV MCV   Date Value Ref Range Status   01/11/2021 104.0 (H) 79.0 - 97.0 fL Final          Sodium Sodium   Date Value Ref Range Status   01/12/2021 138 136 - 145 mmol/L Final   01/11/2021 138 136 - 145 mmol/L Final      Potassium Potassium   Date Value Ref Range Status   01/12/2021 4.2 3.5 - 5.2 mmol/L Final   01/11/2021 4.2 3.5 - 5.2 mmol/L Final     Comment:     Slight hemolysis detected by analyzer. Results may be affected.      Chloride Chloride   Date Value Ref Range Status   01/12/2021 99 98 - 107 mmol/L Final   01/11/2021 98 98 - 107 mmol/L Final      CO2 CO2   Date Value Ref Range Status   01/12/2021 23.0 22.0 - 29.0 mmol/L Final   01/11/2021 21.0 (L) 22.0 - 29.0 mmol/L Final       BUN BUN   Date Value Ref Range Status   01/12/2021 65 (H) 8 - 23 mg/dL Final   01/11/2021 65 (H) 8 - 23 mg/dL Final      Creatinine Creatinine   Date Value Ref Range Status   01/12/2021 9.17 (H) 0.57 - 1.00 mg/dL Final   01/11/2021 9.19 (H) 0.57 - 1.00 mg/dL Final      Calcium Calcium   Date Value Ref Range Status   01/12/2021 8.4 (L) 8.6 - 10.5 mg/dL Final   01/11/2021 8.8 8.6 - 10.5 mg/dL Final      PO4 No results found for: CAPO4   Albumin Albumin   Date Value Ref Range Status   01/12/2021 2.60 (L) 3.50 - 5.20 g/dL Final   01/11/2021 3.00 (L) 3.50 - 5.20 g/dL Final      Magnesium Magnesium   Date Value Ref Range Status   01/12/2021 2.6 (H) 1.6 - 2.4 mg/dL Final      Uric Acid No results found for: URICACID         Assessment/Plan       Type 2 diabetes mellitus (CMS/[facility])    Chronic kidney disease, stage IV (severe) (CMS/[facility])    Anemia of renal disease    Leukocytosis    Hypothyroidism    Atypical pneumonia    Acute UTI (urinary tract infection)      Assessment & Plan    ESRD on PD. Hx of failed txp.   Confirmed with [facility] she is suppose to be taking 1mg BID prograf     Met acidosis: Management with PD    Volumes status: Stable    Weakness: Etiology unspecified. Does have elevated white count. No fever, Juhi pain at the allograft site. If she was not taking prograf there is possibility of clinical rejection however less likely on presentation       Recs  - PD manual exchanges during this admission 4 exchange 2 liter fill volume 1.5 hr 1.5% dextrose  - Resume prograf 1mg BID. If develop fever and pain the graft site consider short term steroid course for clinical rejection.     I discussed the patients findings and my recommendations with patient    uDc Barahona MD  01/12/21  15:35 EST

## 2021-01-12 NOTE — CONSULTS
"Pharmacy Consult  -  Warfarin    Moni Wallace is a  80 y.o. female   Height - 162.6 cm (64\")  Weight - 94.3 kg (208 lb)    Consulting Provider: - Hospitalist  Indication: - Atrial Fibrillation  Goal INR: - 2-3  Home Regimen:   - 4 mg daily per Anticoagulation Clinic    Bridge Therapy: No      Drug-Drug Interactions with current regimen:   N/A    Warfarin Dosing During Admission:    Date  1/12 1/13          INR  2.23           Dose  -- (4 mg)               Education Provided:    Discharge Follow up:   Following Provider -   Follow up time range or appointment -      Labs:    Results from last 7 days   Lab Units 01/11/21 2210 01/11/21   INR  2.23* 2.30   HEMOGLOBIN g/dL 10.5*  --    HEMATOCRIT % 34.0  --      Results from last 7 days   Lab Units 01/11/21 2210   SODIUM mmol/L 138   POTASSIUM mmol/L 4.2   CHLORIDE mmol/L 98   CO2 mmol/L 21.0*   BUN mg/dL 65*   CREATININE mg/dL 9.19*   CALCIUM mg/dL 8.8   BILIRUBIN mg/dL 0.2   ALK PHOS U/L 172*   ALT (SGPT) U/L 13   AST (SGOT) U/L 21   GLUCOSE mg/dL 188*       Current dietary intake: N/A      Assessment/Plan:      Therapeutic INR - unclear if patient took home dose prior to admission but given time of day will not order any to be given at this time.     Resume 4 mg daily home regimen (per last Anticoagulation Clinic note) starting at 1800.  Defer further dose adjustments to rounding clinical pharmacist pending reassessment of daily PT/INR.    Thank you  Adryan Ramirez IV, PharmD, BCPS  1/12/2021  02:48 EST      "

## 2021-01-12 NOTE — NURSING NOTE
In to round on patient regarding Peritoneal dialysis. Patient states she is on a cycler at home and her son helps her with this. She generally runs 8 hours at night and alternates 2.5 and 1.5 Dialysate through the night.   The catheter is intact and dressing CDI around site. I have attaached a Christensen connector to the T[place] in preparation for manual PD exchanges throughout her hospitalization.  Abdomen soft and non tender to touch. Patient did use BP while I was with her and voided approx. 500 ml of clear yellow urine.   Allowed patient time to ask questions and spoke with nursing staff in regards to treatment plan.   LINDA Keller Nursing Director/Dialysis

## 2021-01-12 NOTE — THERAPY EVALUATION
Patient Name: Moni Wallace  : 1940    MRN: [id number]                              Today's Date: 2021       Admit Date: 2021    Visit Dx:     ICD-10-CM ICD-9-CM   1. Pneumonia of both lower lobes due to infectious organism  J18.9 486   2. Sepsis, due to unspecified organism, unspecified whether acute organ dysfunction present (CMS/HCC)  A41.9 038.9     995.91   3. Chronic renal failure, unspecified CKD stage  N18.9 585.9   4. Urinary tract infection without hematuria, site unspecified  N39.0 599.0   5. Peritoneal dialysis status (CMS/HCC)  Z99.2 V45.11     Patient Active Problem List   Diagnosis   • Paroxysmal atrial fibrillation (CMS/[facility])   • Essential hypertension   • Type 2 diabetes mellitus (CMS/HCC)   • Chronic kidney disease, stage IV (severe) (CMS/HCC)   • Anemia of renal disease   • Hyperparathyroidism (CMS/HCC)   • Asthma   • Right-sided carotid artery disease (CMS/HCC)   • High output HF (heart failure) (CMS/HCC)   • Vitamin D deficiency   • Leukocytosis   • Nonrheumatic aortic valve stenosis   • Ulcer of gastric fundus   • Tubular adenoma   • Macular degeneration   • Hypothyroidism   • Chronic kidney disease   • Screen for colon cancer   • Postoperative anemia due to acute blood loss   • Hyperlipidemia LDL goal <100   • Morbidly obese (CMS/HCC)   • Atypical pneumonia   • Acute UTI (urinary tract infection)     Past Medical History:   Diagnosis Date   • Adenomatous colon polyp    • Chronic kidney disease    • Clostridium difficile infection 2017   • Diabetes mellitus (CMS/[facility])    • Gastric polyps    • Hypothyroidism    • IgA nephropathy, acute    • Kidney transplanted    • Macular degeneration    • Paroxysmal atrial fibrillation (CMS/[facility])    • Tubular adenoma     excision    • Ulcer of gastric fundus    • Vitamin D deficiency      Past Surgical History:   Procedure Laterality Date   • BREAST BIOPSY Left    • CARPAL TUNNEL RELEASE     • CARPAL TUNNEL RELEASE Right    • CATARACT  EXTRACTION     • CATARACT EXTRACTION Bilateral    • DIAGNOSTIC LAPAROSCOPY     • DIALYSIS FISTULA CREATION     • HERNIA REPAIR  85 and 86   • KNEE ARTHROSCOPY INCISION AND DRAINAGE OF KNEE Right 5/18/2019    Procedure: KNEE ARTHROSCOPY INCISION AND DRAINAGE OF KNEE;  Surgeon: Paco Lester MD;  Location:  [facility];  Service: Orthopedics   • LAPAROSCOPIC TUBAL LIGATION  1985   • TOTAL KNEE ARTHROPLASTY     • TOTAL KNEE ARTHROPLASTY      Left knee    • TRANSPLANTATION RENAL  2010   • TRANSPLANTATION RENAL Right    • TRIGGER FINGER RELEASE     • TUBAL ABDOMINAL LIGATION     • UPPER GASTROINTESTINAL ENDOSCOPY  05/20/2013   • VENOUS ACCESS DEVICE (PORT) INSERTION N/A 5/23/2019    Procedure: INSERTION GROSHONG;  Surgeon: Rolando Begum MD;  Location:  [facility];  Service: General     General Information     Row Name 01/12/21 1430          OT Time and Intention    Document Type  evaluation  -     Mode of Treatment  occupational therapy  -     Row Name 01/12/21 1430          General Information    Patient Profile Reviewed  yes  -CLEMENTINE     Prior Level of Function  independent:;ADL's;bed mobility;transfer;dependent:;all household mobility Uses w/c for mobility; assists with small home management tasks; does not drive  -CLEMENTINE     Existing Precautions/Restrictions  fall;oxygen therapy device and L/min;other (see comments) Macular degeneration  -CLEMENTINE     Barriers to Rehab  medically complex;previous functional deficit;visual deficit  -CLEMENTINE     Row Name 01/12/21 1430          Occupational Profile    Environmental Supports and Barriers (Occupational Profile)  Uses [employer]  -CLEMENTINE     Row Name 01/12/21 1430          Living Environment    Lives With  child(chavo), adult  -CLEMENTINE     Row Name 01/12/21 1430          Home Main Entrance    Number of Stairs, Main Entrance  none;other (see comments) Ramp to enter home from back  -CLEMENTINE     Row Name 01/12/21 1430          Stairs Within Home, Primary    Number of Stairs, Within Home, Primary   none  -CLEMENTINE     Row Name 01/12/21 1430          Cognition    Orientation Status (Cognition)  oriented x 3  -CLEMENTINE     Row Name 01/12/21 1430          Safety Issues, Functional Mobility    Safety Issues Affecting Function (Mobility)  insight into deficits/self-awareness;judgment;problem-solving;safety precaution awareness;safety precautions follow-through/compliance  -CLEMENTINE     Impairments Affecting Function (Mobility)  endurance/activity tolerance;pain;strength;visual/perceptual  -CLEMENTINE       User Key  (r) = Recorded By, (t) = Taken By, (c) = Cosigned By    Initials Name Provider Type    Yaneth Galicia OT Occupational Therapist          Mobility/ADL's     Row Name 01/12/21 1434          Bed Mobility    Bed Mobility  rolling left;rolling right;scooting/bridging  -CLEMENTINE     Rolling Left [place] (Bed Mobility)  moderate assist (50% patient effort);verbal cues  -CLEMENTINE     Rolling Right [place] (Bed Mobility)  moderate assist (50% patient effort);verbal cues  -CLEMENTINE     Scooting/Bridging [place] (Bed Mobility)  dependent (less than 25% patient effort);2 person assist  -CLEMENTINE     Assistive Device (Bed Mobility)  draw sheet  -CLEMENTINE     Comment (Bed Mobility)  Bed mobility completed for repositioning following bedpan use. Pt declined transfer to EOB from supine due to nausea and fatigue.  -CLEMENTINE     Row Name 01/12/21 1434          Transfers    Comment (Transfers)  Pt declined transfer to [facility], bedpan used  -     Row Name 01/12/21 1434          Activities of Daily Living    BADL Assessment/Intervention  grooming;toileting;lower body dressing  -CLEMENTINE     Row Name 01/12/21 1434          Lower Body Dressing Assessment/Training    [place] Level (Lower Body Dressing)  don;socks;dependent (less than 25% patient effort)  -CLEMENTINE     Position (Lower Body Dressing)  supine  -CLEMENTINE     Comment (Lower Body Dressing)  Pt is IND with LBD at baseline.  -     Row Name 01/12/21 1434          Grooming Assessment/Training    [place] Level (Grooming)   wash face, hands;set up  -CLEMENTINE     Position (Grooming)  sitting up in bed  -CLEMENTINE     Comment (Grooming)  Pt declined oral care this date  -CLEMENTINE     Row Name 01/12/21 1434          Toileting Assessment/Training    [place] Level (Toileting)  adjust/manage clothing;perform perineal hygiene;dependent (less than 25% patient effort)  -CLEMENTINE     Assistive Devices (Toileting)  bedpan  -CLEMENTINE     Position (Toileting)  supine  -CLEMENTINE       User Key  (r) = Recorded By, (t) = Taken By, (c) = Cosigned By    Initials Name Provider Type    Yaneth Galicia OT Occupational Therapist        Obj/Interventions     Row Name 01/12/21 1438          Sensory Assessment (Somatosensory)    Sensory Assessment (Somatosensory)  UE sensation intact  -     Row Name 01/12/21 1438          Vision Assessment/Intervention    Visual Impairment/Limitations  unable/difficult to assess;other (see comments) To be further assessed  -CLEMENTINE     Visual Field Deficit  central vision impaired  -CLEMENTINE     Row Name 01/12/21 1438          Range of Motion Comprehensive    General Range of Motion  bilateral upper extremity ROM WFL  -CLEMENTINE     Row Name 01/12/21 1438          Strength Comprehensive (MMT)    Comment, General Manual Muscle Testing (MMT) Assessment  BUE's grossly 3+/5  -CLEMENTINE       User Key  (r) = Recorded By, (t) = Taken By, (c) = Cosigned By    Initials Name Provider Type    Yaneth Galicia OT Occupational Therapist        Goals/Plan     Row Name 01/12/21 1443          Transfer Goal 1 (OT)    Activity/Assistive Device (Transfer Goal 1, OT)  sit-to-stand/stand-to-sit;bed-to-chair/chair-to-bed;toilet  -CLEMENTINE     [place] Level/Cues Needed (Transfer Goal 1, OT)  minimum assist (75% or more patient effort)  -CLEMENTINE     Time Frame (Transfer Goal 1, OT)  long term goal (LTG);by discharge  -CLEMENTINE     Progress/Outcome (Transfer Goal 1, OT)  goal ongoing  -CLEMENTINE     Row Name 01/12/21 1448          Dressing Goal 1 (OT)    Activity/Device (Dressing Goal 1, OT)  lower body dressing  -CLEMENTINE      [place]/Cues Needed (Dressing Goal 1, OT)  supervision required  -CLEMENTINE     Time Frame (Dressing Goal 1, OT)  long term goal (LTG);by discharge  -CLEMENTINE     Progress/Outcome (Dressing Goal 1, OT)  goal ongoing  -CLEMENTINE     Row Name 01/12/21 1448          Toileting Goal 1 (OT)    Activity/Device (Toileting Goal 1, OT)  adjust/manage clothing;perform perineal hygiene  -CLEMENTINE     [place] Level/Cues Needed (Toileting Goal 1, OT)  minimum assist (75% or more patient effort)  -CLEMENTINE     Time Frame (Toileting Goal 1, OT)  long term goal (LTG);by discharge  -CLEMENTINE     Progress/Outcome (Toileting Goal 1, OT)  goal ongoing  -CLEMENTINE     Row Name 01/12/21 1443          Therapy Assessment/Plan (OT)    Planned Therapy Interventions (OT)  activity tolerance training;BADL retraining;functional balance retraining;ROM/therapeutic exercise;strengthening exercise;transfer/mobility retraining  -       User Key  (r) = Recorded By, (t) = Taken By, (c) = Cosigned By    Initials Name Provider Type    Yaneth Galicia, OT Occupational Therapist        Clinical Impression     Row Name 01/12/21 5978          Pain Assessment    Additional Documentation  Pain Scale: FACES Pre/Post-Treatment (Group)  -[facility] Name 01/12/21 3319          Pain Scale: Numbers Pre/Post-Treatment    Pain Intervention(s)  Repositioned  -[facility] Name 01/12/21 1436          Pain Scale: FACES Pre/Post-Treatment    Pain: FACES Scale, Pretreatment  2-->hurts little bit  -CLEMENTINE     Posttreatment Pain Rating  4-->hurts little more  -CLEMENTINE     Pain Location - Side  Right  -CLEMENTINE     Pain Location  shoulder  -     Row Name 01/12/21 8430          Plan of Care Review    Plan of Care Reviewed With  patient  -CLEMENTINE     Outcome Summary  OT evaluation completed. Pt presents with signficant weakness and decreased activity tolerance limiting ADL's. Pt completed grooming with VELASQUEZ sitting up in bed, c/o extreme nausea and fatigue, declined transfer to EOB or to chair. Pt Dep for LBD and toileting, IND  with self-care and tx's at baseline using w/c for household mobility. OT to follow to progress ADL's to PLOF. Recommend short term rehab at discharge, pending progress.  -     Row Name 01/12/21 1431          Therapy Assessment/Plan (OT)    Patient/Family Therapy Goal Statement (OT)  To be able to take care of myself.  -CLEMENTINE     Rehab Potential (OT)  good, to achieve stated therapy goals  -CLEMENTINE     Criteria for Skilled Therapeutic Interventions Met (OT)  yes;skilled treatment is necessary  -CLEMENTINE     Therapy Frequency (OT)  daily  -CLEMENTINE     Row Name 01/12/21 1439          Vital Signs    Pre Systolic BP Rehab  109  -CLEMENTINE     Pre Treatment Diastolic BP  54  -CLEMENTINE     Pretreatment Heart Rate (beats/min)  68  -CLEMENTINE     Pre SpO2 (%)  98  -CLEMENTINE     O2 Delivery Pre Treatment  nasal cannula  -CLEMENTINE     O2 Delivery Intra Treatment  nasal cannula  -CLEMENTINE     O2 Delivery Post Treatment  nasal cannula  -CLEMENTINE     Pre Patient Position  Supine  -CLEMENTINE     Intra Patient Position  Side Lying  -CLEMENTINE     Post Patient Position  Supine  -CLEMENTINE     Row Name 01/12/21 1439          Positioning and Restraints    Pre-Treatment Position  in bed  -CLEMENTINE     Post Treatment Position  bed  -CLEMENTINE     In Bed  fowlers;call light within reach;with nsg  -CLEMENTINE       User Key  (r) = Recorded By, (t) = Taken By, (c) = Cosigned By    Initials Name Provider Type    Yaneth Galicia, OT Occupational Therapist        Outcome Measures     Row Name 01/12/21 4409          How much help from another is currently needed...    Putting on and taking off regular lower body clothing?  1  -CLEMENTINE     Bathing (including washing, rinsing, and drying)  1  -CLEMENTINE     Toileting (which includes using toilet bed pan or urinal)  1  -CLEMENTINE     Putting on and taking off regular upper body clothing  2  -CLEMENTINE     Taking care of personal grooming (such as brushing teeth)  3  -CLEMENTINE     Eating meals  3  -CLEMENTINE     AM-PAC 6 Clicks Score (OT)  11  -CLEMENTINE     Row Name 01/12/21 1446          Functional Assessment    Outcome Measure Options   AM-PAC 6 Clicks Daily Activity (OT)  -CLEMENTINE       User Key  (r) = Recorded By, (t) = Taken By, (c) = Cosigned By    Initials Name Provider Type    Yaneth Galicia OT Occupational Therapist        Occupational Therapy Education                 Title: PT OT SLP Therapies (In Progress)     Topic: Occupational Therapy (In Progress)     Point: ADL training (Done)     Description:   Instruct learner(s) on proper safety adaptation and remediation techniques during self care or transfers.   Instruct in proper use of assistive devices.              Learning Progress Summary           Patient Acceptance, E, VU by CLEMENTINE at 1/12/2021 1450    Comment: Role of OT                   Point: Home exercise program (Not Started)     Description:   Instruct learner(s) on appropriate technique for monitoring, assisting and/or progressing therapeutic exercises/activities.              Learner Progress:  Not documented in this visit.          Point: Precautions (Done)     Description:   Instruct learner(s) on prescribed precautions during self-care and functional transfers.              Learning Progress Summary           Patient Acceptance, E, VU by CLEMENTINE at 1/12/2021 1450    Comment: Role of OT                   Point: Body mechanics (Not Started)     Description:   Instruct learner(s) on proper positioning and spine alignment during self-care, functional mobility activities and/or exercises.              Learner Progress:  Not documented in this visit.                      User Key     Initials Effective Dates Name Provider Type Discipline    CLEMENTINE 02/14/20 -  Yaneth Beyer OT Occupational Therapist OT              OT Recommendation and Plan  Planned Therapy Interventions (OT): activity tolerance training, BADL retraining, functional balance retraining, ROM/therapeutic exercise, strengthening exercise, transfer/mobility retraining  Therapy Frequency (OT): daily  Plan of Care Review  Plan of Care Reviewed With: patient  Outcome Summary: OT  evaluation completed. Pt presents with signficant weakness and decreased activity tolerance limiting ADL's. Pt completed grooming with VELASQUEZ sitting up in bed, c/o extreme nausea and fatigue, declined transfer to EOB or to chair. Pt Dep for LBD and toileting, IND with self-care and tx's at baseline using w/c for household mobility. OT to follow to progress ADL's to PLOF. Recommend short term rehab at discharge, pending progress.     Time Calculation:   Time Calculation- OT     Row Name 01/12/21 1471             Time Calculation- OT    OT Start Time  1345  -CLEMENTINE      OT Received On  01/12/21  -CLEMENTINE      OT Goal Re-Cert Due Date  01/22/21  -CLEMENTINE        User Key  (r) = Recorded By, (t) = Taken By, (c) = Cosigned By    Initials Name Provider Type    Yaneth Galicia OT Occupational Therapist        Therapy Charges for Today     Code Description Service Date Service Provider Modifiers Qty    [id number] HC OT EVAL MOD COMPLEXITY 4 1/12/2021 Yaneth Beyer OT GO 1               Yaneth Beyer OT  1/12/2021

## 2021-01-12 NOTE — NURSING NOTE
Dialysis called to notify of peritoneal dialysis patient admission. [facility] has notified them as well.

## 2021-01-12 NOTE — PROGRESS NOTES
[facility] Medicine Services  PROGRESS NOTE    Patient Name: Moni Wallace  : 1940  MRN: [id number]    Date of Admission: 2021  Primary Care Physician: Alex Walters MD    Subjective   Subjective     CC:  F/U Weakness     HPI:  Patient is currently without pain. She continues to feel weak. Her son is bedside.     ROS:  Gen- No fevers, chills  CV- No chest pain, palpitations  Resp- No cough, dyspnea  GI- No N/V/D, abd pain        Objective   Objective     Vital Signs:   Temp:  [97.5 °F (36.4 °C)-98.4 °F (36.9 °C)] 97.5 °F (36.4 °C)  Heart Rate:  [63-85] 63  Resp:  [16-18] 18  BP: (108-176)/(50-74) 108/50        Physical Exam:  Constitutional: No acute distress, awake, alert, obese female resting in bed   HENT: NCAT, mucous membranes moist  Respiratory: Diminished at the bases, respiratory effort normal   Cardiovascular: RRR, no murmurs, rubs, or gallops  Gastrointestinal: Positive bowel sounds, soft, nontender, nondistended  Musculoskeletal: trace  bilateral ankle edema  Psychiatric: Appropriate affect, cooperative  Neurologic: Oriented x 3, strength symmetric in all extremities, speech clear  Skin: No rashes      Results Reviewed:  Results from last 7 days   Lab Units 21  0704 210 21   WBC 10*3/mm3  --  19.16*  --    HEMOGLOBIN g/dL  --  10.5*  --    HEMATOCRIT %  --  34.0  --    PLATELETS 10*3/mm3  --  313  --    INR  2.41* 2.23* 2.30   PROCALCITONIN ng/mL 0.43*  --   --      Results from last 7 days   Lab Units 21  0704 21  2210   SODIUM mmol/L 138 138   POTASSIUM mmol/L 4.2 4.2   CHLORIDE mmol/L 99 98   CO2 mmol/L 23.0 21.0*   BUN mg/dL 65* 65*   CREATININE mg/dL 9.17* 9.19*   GLUCOSE mg/dL 121* 188*   CALCIUM mg/dL 8.4* 8.8   ALT (SGPT) U/L  --  13   AST (SGOT) U/L  --  21     Estimated Creatinine Clearance: 5.5 mL/min (A) (by C-G formula based on SCr of 9.17 mg/dL (H)).    Microbiology Results Abnormal     Procedure Component Value -  Date/Time    MRSA Screen, PCR (Inpatient) - Swab, Nares [[id number]]  (Normal) Collected: 01/12/21 1451    Lab Status: Final result Specimen: Swab from Nares Updated: 01/12/21 1604     MRSA PCR Negative    Narrative:      MRSA Negative    COVID-19 and FLU A/B PCR - Swab, Nasopharynx [[id number]]  (Normal) Collected: 01/11/21 2327    Lab Status: Final result Specimen: Swab from Nasopharynx Updated: 01/12/21 0017     COVID19 Not Detected     Influenza A PCR Not Detected     Influenza B PCR Not Detected    Narrative:      Fact sheet for providers: https://www.fda.gov/media/271121/download    Fact sheet for patients: https://www.fda.gov/media/032628/download    Test performed by PCR.          Imaging Results (Last 24 Hours)     Procedure Component Value Units Date/Time    CT Abdomen Pelvis Without Contrast [[id number]] Collected: 01/11/21 2225     Updated: 01/11/21 2227    Narrative:      CT Abdomen Pelvis WO    INDICATION:   Weakness and nausea    TECHNIQUE:   CT of the abdomen and pelvis without IV contrast. Coronal and sagittal reconstructions were obtained.  Radiation dose reduction techniques included automated exposure control or exposure modulation based on body size. Count of known CT and cardiac nuc  med studies performed in previous 12 months: 0.     COMPARISON:   CT of the abdomen and pelvis from 2/13/2015    FINDINGS:  Abdomen: Small pleural based nodule is seen in the right posterior lung field measuring about 4 mm. There are nonspecific tree-in-bud opacity is seen involving the left posterior inferior lung field. There is diffuse coronary artery calcification. There  is a diverticulum off of the posterior wall of the stomach. The kidneys are grossly atrophic. There is a probable left adrenal adenoma. There is prominence to the body of the pancreas best identified on axial series 2 images 53 through 57. This measures  up to 1.2 cm. This appears to be new since the prior CT. There is no evidence of  intra-abdominal lymphadenopathy. There is a probable septated large cyst on the left is incompletely evaluated.    Pelvis: Transplant kidney in the right renal pelvis appears atrophic. Mild compression deformity seen involving the superior endplate of T11. There is a left peritoneal dialysis catheter. There is some ascites. There is a small herniation in the right  anterior pelvic abdominal wall that contains fat and fluid.      Impression:      1. No definite acute intra-abdominal abnormality appreciated. Findings within the left lung may represent bronchiolitis. There may be potential underlying atypical infection. Consider clinical correlation and follow-up.  2. There is prominence to the body of the patient's pancreatitis as described above. It is uncertain if this may represent an underlying lesion or represent prominence of the pancreatic duct. Consider follow-up nonemergent MRI.  3. Prominent left renal cyst may be septated/complex. Consider follow-up ultrasound or MRI.          Signer Name: Sivan Buitrago MD   Signed: 1/11/2021 10:25 PM   Workstation Name: [id number]    [facility]    CT CHEST WITHOUT CONTRAST DIAGNOSTIC [[id number]] Collected: 01/11/21 2218     Updated: 01/11/21 2221    Narrative:      CT Chest WO    INDICATION:   Generalized weakness with nausea for 8 days.    TECHNIQUE:   CT of the thorax without IV contrast. Coronal and sagittal reconstructions were obtained.  Radiation dose reduction techniques included automated exposure control or exposure modulation based on body size. Count of known CT and cardiac nuc med studies  performed in previous 12 months: 0.     COMPARISON:   2/16/2015     FINDINGS:  There is atherosclerotic disease and coronary artery disease. No pleural or pericardial effusion is identified. There is no adenopathy. There is a left lower lobe calcified granuloma. There is a minimal amount of tree-in-bud and groundglass opacity in a  patchy distribution  both lungs compatible with a very mild multifocal pneumonia. Imaging features are atypical or uncommonly reported for COVID-19 pneumonia. Alternative diagnoses should be considered. There is a 6 mm pleural-based right upper lobe  nodule. There is also a nodule along the major fissure in the right lung measuring 6 mm. Consider chest CT follow-up in 6-12 months. Lungs are otherwise clear. Diffuse degenerative disease is noted in the thoracic spine. Please see abdomen pelvis CT  report dictated separately.      Impression:        1. Mild bilateral multifocal patchy pneumonia.  2. Atherosclerotic disease and coronary artery disease.  3. Two 6 mm noncalcified right lung nodules. Consider chest CT follow-up in 6-12 months.    Recommend consideration for referral to [facility]. For questions or to make appointment call [phone].    Signer Name: Kemar Oliva MD   Signed: 1/11/2021 10:18 PM   Workstation Name: [facility]    [facility]          Results for orders placed during the hospital encounter of 05/18/19   Adult Transthoracic Echo Complete W/ Cont if Necessary Per Protocol    Narrative · Left ventricular systolic function is normal. Estimated EF = 60%.  · Left atrial cavity size is moderately dilated.  · Mild aortic valve stenosis is present (mean gradient 15 mmHg.          I have reviewed the medications:  Scheduled Meds:cefTRIAXone, 1 g, Intravenous, Q24H  escitalopram, 5 mg, Oral, Daily  furosemide, 80 mg, Oral, Daily  gentamicin, , Topical, Daily  metoprolol tartrate, 100 mg, Oral, Q12H  pantoprazole, 40 mg, Oral, Daily  senna-docusate sodium, 2 tablet, Oral, BID  sevelamer, 1,600 mg, Oral, TID With Meals  sodium chloride, 10 mL, Intravenous, Q12H  tacrolimus, 1 mg, Oral, Q12H  vancomycin (dosing per levels), , Does not apply, Daily  warfarin, 4 mg, Oral, Daily      Continuous Infusions:Pharmacy to dose vancomycin,   Pharmacy to dose warfarin,       PRN  Meds:.•  acetaminophen **OR** acetaminophen **OR** acetaminophen  •  albuterol  •  bisacodyl  •  bisacodyl  •  melatonin  •  ondansetron **OR** ondansetron  •  Pharmacy to dose vancomycin  •  Pharmacy to dose warfarin  •  Sodium Chloride (PF)  •  sodium chloride  •  UltraBag/Dianeal PD-2/1.5% Dex (pappas #[id number])    Assessment/Plan   Assessment & Plan     Active Hospital Problems    Diagnosis  POA   • Atypical pneumonia [J18.9]  Yes   • Acute UTI (urinary tract infection) [N39.0]  Yes   • Hypothyroidism [E03.9]  Yes   • Leukocytosis [D72.829]  Yes   • Type 2 diabetes mellitus (CMS/HCC) [E11.9]  Yes   • Chronic kidney disease, stage IV (severe) (CMS/HCC) [N18.4]  Yes   • Anemia of renal disease [N18.9, D63.1]  Yes      Resolved Hospital Problems   No resolved problems to display.        Brief Hospital Course to date:  Moni Wallace is a 80 y.o. female with ESRD on PD, Afib, heart failure, AS, CAD, who complains of weeks of fatigue. In the ED she was found to have leukocytosis.     Leukocytosis  Elevated Procal   -CT chest shows multifocal patchy pneumonia   -CT abd no definite finding-prominence of pancreas that will need follow up outpatient   Weakness  -Pt/OT  Hypothyroidism  -recent adjustment to meds  CKD with PD  -Nephrology managing  AFib  -coumadin  -rate controlled   Possible Clinical Rejection  -pt has been off her meds for a month or so  -per nephrology may need short term steroid course if she developes fever and pain at her graft site     DVT Prophylaxis:  Coumadin       Disposition: I expect the patient to be discharged TBD     CODE STATUS:   Code Status and Medical Interventions:   Ordered at: 01/12/21 0014     Code Status:    CPR     Medical Interventions (Level of Support Prior to Arrest):    Full       Vicky Rivera,   01/12/21

## 2021-01-12 NOTE — PLAN OF CARE
Goal Outcome Evaluation:  Plan of Care Reviewed With: patient  Progress: no change  VSS, 1L NC while asleep, controlled a.fib on tele, pt is AxOx4, but forgetful at times. Pt c/o mild pain and nausea 1x this AM, PRN tylenol and zofran administered. Pt had a medium incontinent UOP. No new concerns at this time, will continue to monitor.

## 2021-01-12 NOTE — THERAPY EVALUATION
Patient Name: Moni Wallace  : 1940    MRN: [id number]                              Today's Date: 2021       Admit Date: 2021    Visit Dx:     ICD-10-CM ICD-9-CM   1. Pneumonia of both lower lobes due to infectious organism  J18.9 486   2. Sepsis, due to unspecified organism, unspecified whether acute organ dysfunction present (CMS/HCC)  A41.9 038.9     995.91   3. Chronic renal failure, unspecified CKD stage  N18.9 585.9   4. Urinary tract infection without hematuria, site unspecified  N39.0 599.0   5. Peritoneal dialysis status (CMS/HCC)  Z99.2 V45.11     Patient Active Problem List   Diagnosis   • Paroxysmal atrial fibrillation (CMS/[facility])   • Essential hypertension   • Type 2 diabetes mellitus (CMS/HCC)   • Chronic kidney disease, stage IV (severe) (CMS/HCC)   • Anemia of renal disease   • Hyperparathyroidism (CMS/HCC)   • Asthma   • Right-sided carotid artery disease (CMS/HCC)   • High output HF (heart failure) (CMS/HCC)   • Vitamin D deficiency   • Leukocytosis   • Nonrheumatic aortic valve stenosis   • Ulcer of gastric fundus   • Tubular adenoma   • Macular degeneration   • Hypothyroidism   • Chronic kidney disease   • Screen for colon cancer   • Postoperative anemia due to acute blood loss   • Hyperlipidemia LDL goal <100   • Morbidly obese (CMS/HCC)   • Atypical pneumonia   • Acute UTI (urinary tract infection)     Past Medical History:   Diagnosis Date   • Adenomatous colon polyp    • Chronic kidney disease    • Clostridium difficile infection 2017   • Diabetes mellitus (CMS/[facility])    • Gastric polyps    • Hypothyroidism    • IgA nephropathy, acute    • Kidney transplanted    • Macular degeneration    • Paroxysmal atrial fibrillation (CMS/[facility])    • Tubular adenoma     excision    • Ulcer of gastric fundus    • Vitamin D deficiency      Past Surgical History:   Procedure Laterality Date   • BREAST BIOPSY Left    • CARPAL TUNNEL RELEASE     • CARPAL TUNNEL RELEASE Right    • CATARACT  EXTRACTION     • CATARACT EXTRACTION Bilateral    • DIAGNOSTIC LAPAROSCOPY     • DIALYSIS FISTULA CREATION     • HERNIA REPAIR  85 and 86   • KNEE ARTHROSCOPY INCISION AND DRAINAGE OF KNEE Right 5/18/2019    Procedure: KNEE ARTHROSCOPY INCISION AND DRAINAGE OF KNEE;  Surgeon: Paco Lester MD;  Location:  [facility];  Service: Orthopedics   • LAPAROSCOPIC TUBAL LIGATION  1985   • TOTAL KNEE ARTHROPLASTY     • TOTAL KNEE ARTHROPLASTY      Left knee    • TRANSPLANTATION RENAL  2010   • TRANSPLANTATION RENAL Right    • TRIGGER FINGER RELEASE     • TUBAL ABDOMINAL LIGATION     • UPPER GASTROINTESTINAL ENDOSCOPY  05/20/2013   • VENOUS ACCESS DEVICE (PORT) INSERTION N/A 5/23/2019    Procedure: INSERTION GROSHONG;  Surgeon: Rolando Begum MD;  Location:  [facility];  Service: General     General Information     Row Name 01/12/21 1315          Physical Therapy Time and Intention    Document Type  evaluation  -DM     Mode of Treatment  physical therapy  -DM     Row Name 01/12/21 1315          General Information    Patient Profile Reviewed  yes  -DM     Prior Level of Function  independent:;transfer;w/c or scooter;bed mobility;dependent:;all household mobility;gait;cooking;cleaning;driving;shopping non-amb x2 yrs(s/p I & D R knee);indep SPT toW/C (propels w/feet),stands@washer/adds deterg,folds towels; son does HM,driv,shop,cook,clean;has show.ch,gr.bar,gluc.,Neb.  -DM     Existing Precautions/Restrictions  fall;other (see comments) Covid(rule out);ESRD(order to init.PD);prev renal xplt;failed rooster comb inj.R knee (I & D;non-amb since);prev L TKR,mac.deg.  -DM     Barriers to Rehab  medically complex;previous functional deficit;visual deficit  -DM     Row Name 01/12/21 1315          Living Environment    Lives With  child(chavo), adult son lives w/ pt  -DM     Row Name 01/12/21 1315          Home Main Entrance    Number of Stairs, Main Entrance  other (see comments) 1-st.ranch w/ ramp @back ent.  -DM     Row  "Name 01/12/21 1315          Cognition    Orientation Status (Cognition)  oriented x 3  -DM     Row Name 01/12/21 1315          Safety Issues, Functional Mobility    Safety Issues Affecting Function (Mobility)  insight into deficits/self-awareness;safety precaution awareness;safety precautions follow-through/compliance exit al.,non-amb (w/c dep.d/t \"bad R knee inject\")  -DM     Impairments Affecting Function (Mobility)  endurance/activity tolerance;pain;strength;visual/perceptual  -DM       User Key  (r) = Recorded By, (t) = Taken By, (c) = Cosigned By    Initials Name Provider Type    Marlyn Hernandez, PT Physical Therapist        Mobility     Row Name 01/12/21 1315          Bed Mobility    Bed Mobility  rolling left;rolling right  -DM     Rolling Left [place] (Bed Mobility)  verbal cues;moderate assist (50% patient effort) pan & pad reposn.  -DM     Rolling Right [place] (Bed Mobility)  verbal cues;moderate assist (50% patient effort) placed on  bedpan;extra dry flow pad placed  -DM     Assistive Device (Bed Mobility)  bed rails;draw sheet  -DM     Comment (Bed Mobility)  init decl. Bed mob d/t nausea (nsg gave med & emesis basin), \"bad R knee\" & R thigh cramping w/ movement, & low BP; s/p ther exer,sudden urge to void;bedpan placed( cues for utilizing bedrail to roll);nsg dir.of dialysis in to discuss pending perit.dial.set-up  -DM     Row Name 01/12/21 1315          Transfers    Comment (Transfers)  decl.  -DM     Row Name 01/12/21 1315          Gait/Stairs (Locomotion)    [place] Level (Gait)  unable to assess non-amb  -DM     Comment (Gait/Stairs)  non-amb  -DM       User Key  (r) = Recorded By, (t) = Taken By, (c) = Cosigned By    Initials Name Provider Type    Marlyn Hernandez, PT Physical Therapist        Obj/Interventions     Row Name 01/12/21 1315          Range of Motion Comprehensive    General Range of Motion  neck/trunk range of motion deficits identified;lower extremity range of " "motion deficits identified  -DM     Comment, General Range of Motion  L hip & knee yeung. 30% d/t abdom discomf & LE swelling; R hip & knee yeung. 50-75 % d/t \"bad R knee, cramping R thigh, & incr abdom discomf\"  -DM     Row Name 01/12/21 1315          Strength Comprehensive (MMT)    General Manual Muscle Testing (MMT) Assessment  lower extremity strength deficits identified  -DM     Comment, General Manual Muscle Testing (MMT) Assessment  L hip/knee grossly 3- to 3+/5; R hip & knee grossly 2- to 2+/5  -DM     Row Name 01/12/21 1315          Motor Skills    Therapeutic Exercise  hip;knee;ankle  -DM     Row Name 01/12/21 1315          Hip (Therapeutic Exercise)    Hip (Therapeutic Exercise)  AROM (active range of motion);AAROM (active assistive range of motion);isometric exercises  -DM     Hip AROM (Therapeutic Exercise)  left;extension;aDduction;external rotation;internal rotation;10 repetitions  -DM     Hip AAROM (Therapeutic Exercise)  bilateral;flexion;aBduction;left;external rotation;internal rotation;supine;10 repetitions mod A for L Hip flex & abd (max A for R, + Hip abd/add); cradled RLE w/ pillow to facil. A/A mvmt  -DM     Hip Isometrics (Therapeutic Exercise)  bilateral;gluteal sets;supine;10 repetitions & abdom sets  -DM     Row Name 01/12/21 1315          Knee (Therapeutic Exercise)    Knee (Therapeutic Exercise)  AROM (active range of motion);AAROM (active assistive range of motion);isometric exercises  -DM     Knee AROM (Therapeutic Exercise)  left;flexion;extension;SAQ (short arc quad);SLR (straight leg raise);heel slides;10 repetitions;supine min A to init SLR & h.slides  -DM     Knee AAROM (Therapeutic Exercise)  right;flexion;extension;supine;10 repetitions max A for R knee Heel slides; def. SAQ & SLR d/t pain,thigh cramping  -DM     Knee Isometrics (Therapeutic Exercise)  bilateral;quad sets;left;hamstring sets;supine;10 repetitions def.HS set w/ RLE d/t thigh cramping  -DM     Row Name 01/12/21 1315 "          Ankle (Therapeutic Exercise)    Ankle (Therapeutic Exercise)  AROM (active range of motion)  -DM     Ankle AROM (Therapeutic Exercise)  bilateral;dorsiflexion;plantarflexion;10 repetitions & AC, TC's  -DM       User Key  (r) = Recorded By, (t) = Taken By, (c) = Cosigned By    Initials Name Provider Type    Marlyn Hernandez, PT Physical Therapist        Goals/Plan     Row Name 01/12/21 1315          Bed Mobility Goal 1 (PT)    Activity/Assistive Device (Bed Mobility Goal 1, PT)  bed mobility activities, all  -DM     [place] Level/Cues Needed (Bed Mobility Goal 1, PT)  contact guard assist  -DM     Time Frame (Bed Mobility Goal 1, PT)  long term goal (LTG);10 days  -DM     Row Name 01/12/21 1315          Transfer Goal 1 (PT)    Activity/Assistive Device (Transfer Goal 1, PT)  bed-to-chair/chair-to-bed;other (see comments) gt. belt; SPT bed to w/c & recliner; w/c mobs w/ supv  -DM     [place] Level/Cues Needed (Transfer Goal 1, PT)  minimum assist (75% or more patient effort)  -DM     Time Frame (Transfer Goal 1, PT)  long term goal (LTG);10 days  -DM     Row Name 01/12/21 1315          Patient Education Goal (PT)    Activity (Patient Education Goal, PT)  HEP exer  -DM     [place]/Cues/Accuracy (Memory Goal 2, PT)  demonstrates adequately;verbalizes understanding  -DM     Time Frame (Patient Education Goal, PT)  long term goal (LTG);10 days  -DM       User Key  (r) = Recorded By, (t) = Taken By, (c) = Cosigned By    Initials Name Provider Type    Marlyn Hernandez, PT Physical Therapist        Clinical Impression     Row Name 01/12/21 1315          Pain    Additional Documentation  Pain Scale: FACES Pre/Post-Treatment (Group)  -DM     Row Name 01/12/21 1315          Pain Scale: Numbers Pre/Post-Treatment    Pain Location - Side  Right  -DM     Pain Location - Orientation  lower  -DM     Pain Location  extremity  -DM     Pre/Posttreatment Pain Comment  nsg gave nausea med prior;notified of R  thigh cramping pain  -DM     Pain Intervention(s)  Repositioned;Rest;Elevated;Medication (See MAR)  -DM     Row Name 01/12/21 1315          Pain Scale: FACES Pre/Post-Treatment    Posttreatment Pain Rating  6-->hurts even more  -DM     Row Name 01/12/21 1315          Plan of Care Review    Plan of Care Reviewed With  patient  -DM     Progress  improving  -DM     Outcome Summary  PT eval completed. Presents w/ PNA/hypox & UTI sepsis, ESRD req. perit dial, h/o w/c dep. d/t non-amb s/p failed R knee inject., decr LE strength/ endurance & impaired funct mobil. Performed BLE ther exer x 10 in sup., + rolled L/R to place bedpan & pad, but decl. EOB or SPT to chair d/t nausea, R thigh cramping pain, low BP (DBP low 50's), & pending perit dial. Noted HR to 79, desat to 96% & fatigue. May benefit from S.T. rehab at d/c to achieve mobil goals.  -DM     Row Name 01/12/21 1310          Therapy Assessment/Plan (PT)    Patient/Family Therapy Goals Statement (PT)  improved funct mobil  -DM     Rehab Potential (PT)  good, to achieve stated therapy goals  -DM     Criteria for Skilled Interventions Met (PT)  yes;meets criteria;skilled treatment is necessary  -DM     Row Name 01/12/21 1315          Vital Signs    Pre Systolic BP Rehab  108  -DM     Pre Treatment Diastolic BP  50  -DM     Post Systolic BP Rehab  109  -DM     Post Treatment Diastolic BP  54  -DM     Pretreatment Heart Rate (beats/min)  70  -DM     Intratreatment Heart Rate (beats/min)  79  -DM     Posttreatment Heart Rate (beats/min)  63  -DM     Pre SpO2 (%)  98  -DM     O2 Delivery Pre Treatment  room air  -DM     Intra SpO2 (%)  96  -DM     O2 Delivery Intra Treatment  room air  -DM     Post SpO2 (%)  98  -DM     O2 Delivery Post Treatment  room air  -DM     Pre Patient Position  Supine  -DM     Intra Patient Position  Side Lying  -DM     Post Patient Position  Supine  -DM     Row Name 01/12/21 131          Positioning and Restraints    Pre-Treatment Position  in  bed  -DM     Post Treatment Position  bed  -DM     In Bed  notified nsg;supine;call light within reach;encouraged to call for assist;exit alarm on;with other staff;heels elevated  -DM       User Key  (r) = Recorded By, (t) = Taken By, (c) = Cosigned By    Initials Name Provider Type    Marlyn Hernandez, PT Physical Therapist        Outcome Measures     Row Name 01/12/21 1315          How much help from another person do you currently need...    Turning from your back to your side while in flat bed without using bedrails?  2  -DM     Moving from lying on back to sitting on the side of a flat bed without bedrails?  2  -DM     Moving to and from a bed to a chair (including a wheelchair)?  1  -DM     Standing up from a chair using your arms (e.g., wheelchair, bedside chair)?  1  -DM     Climbing 3-5 steps with a railing?  1  -DM     To walk in hospital room?  1  -DM     AM-PAC 6 Clicks Score (PT)  8  -DM     Row Name 01/12/21 1315          Functional Assessment    Outcome Measure Options  AM-PAC 6 Clicks Basic Mobility (PT)  -DM       User Key  (r) = Recorded By, (t) = Taken By, (c) = Cosigned By    Initials Name Provider Type    Marlyn Hernandez, PT Physical Therapist        Physical Therapy Education                 Title: PT OT SLP Therapies (In Progress)     Topic: Physical Therapy (In Progress)     Point: Mobility training (In Progress)     Learning Progress Summary           Patient Acceptance, E,D, NR by DM at 1/12/2021 1434                   Point: Home exercise program (In Progress)     Learning Progress Summary           Patient Acceptance, E,D, NR by DM at 1/12/2021 1434                   Point: Body mechanics (In Progress)     Learning Progress Summary           Patient Acceptance, E,D, NR by DM at 1/12/2021 1434                   Point: Precautions (In Progress)     Learning Progress Summary           Patient Acceptance, E,D, NR by DM at 1/12/2021 1434                               User Key      Initials Effective Dates Name Provider Type Discipline    DM 06/19/15 -  Marlyn Reid, PT Physical Therapist PT              PT Recommendation and Plan  Planned Therapy Interventions (PT): balance training, bed mobility training, home exercise program, patient/family education, strengthening, transfer training, wheelchair management/propulsion training  Plan of Care Reviewed With: patient  Progress: improving  Outcome Summary: PT eval completed. Presents w/ PNA/hypox & UTI sepsis, ESRD req. perit dial, h/o w/c dep. d/t non-amb s/p failed R knee inject., decr LE strength/ endurance & impaired funct mobil. Performed BLE ther exer x 10 in sup., + rolled L/R to place bedpan & pad, but decl. EOB or SPT to chair d/t nausea, R thigh cramping pain, low BP (DBP low 50's), & pending perit dial. Noted HR to 79, desat to 96% & fatigue. May benefit from S.T. rehab at d/c to achieve mobil goals.     Time Calculation:   PT Charges     Row Name 01/12/21 1435             Time Calculation    Start Time  1315  -DM      PT Received On  01/12/21  -DM      PT Goal Re-Cert Due Date  01/22/21  -DM         Time Calculation- PT    Total Timed Code Minutes- PT  35 minute(s)  -DM         Timed Charges    59973 - PT Therapeutic Exercise Minutes  18  -DM      93107 - PT Therapeutic Activity Minutes  17  -DM        User Key  (r) = Recorded By, (t) = Taken By, (c) = Cosigned By    Initials Name Provider Type    DM Marlyn Reid, PT Physical Therapist        Therapy Charges for Today     Code Description Service Date Service Provider Modifiers Qty    77047756489 HC PT THER PROC EA 15 MIN 1/12/2021 Marlyn Reid, PT GP 1    98278807032 HC PT THERAPEUTIC ACT EA 15 MIN 1/12/2021 Marlyn Reid, PT GP 1    67832537412 HC PT EVAL MOD COMPLEXITY 3 1/12/2021 Marlyn Reid, PT GP 1          PT G-Codes  Outcome Measure Options: AM-PAC 6 Clicks Basic Mobility (PT)  AM-PAC 6 Clicks Score (PT): 8    Marlyn Reid, PT  1/12/2021

## 2021-01-12 NOTE — PROGRESS NOTES
"Pharmacy Consult - Warfarin    Moni Wallace is a 80 y.o. female on warfarin therapy.    Height - 162.6 cm (64\")  Weight - 94.4 kg (208 lb 1.6 oz)    Consulting Provider: Hospitalist  Indication: atrial fibrillation  Goal INR: 2-3  Home Regimen: warfarin 4mg daily    Bridge Therapy: no    Drug-Drug Interactions with current regimen:  Escitalopram (home med) - increased bleeding risk  Ceftriaxone - may increase INR    Warfarin Dosing During Admission:    Date  1/11 1/12          INR  2.23 2.41          Dose  -- (4mg)             Education Provided: Patient is on warfarin prior to admission, admitted with therapeutic INR. Follows with [facility] regularly. Pharmacist available for questions as needed.    Discharge Follow up:   Following Provider - [facility]   Follow up time range or appointment - recommend repeat INR 2-3 days after discharge (has home monitor)    Labs:  Results from last 7 days   Lab Units 01/12/21  0704 01/11/21  2210 01/11/21   INR  2.41* 2.23* 2.30   HEMOGLOBIN g/dL  --  10.5*  --    HEMATOCRIT %  --  34.0  --      Results from last 7 days   Lab Units 01/12/21  0704 01/11/21  2210   SODIUM mmol/L 138 138   POTASSIUM mmol/L 4.2 4.2   CHLORIDE mmol/L 99 98   CO2 mmol/L 23.0 21.0*   BUN mg/dL 65* 65*   CREATININE mg/dL 9.17* 9.19*   CALCIUM mg/dL 8.4* 8.8   BILIRUBIN mg/dL  --  0.2   ALK PHOS U/L  --  172*   ALT (SGPT) U/L  --  13   AST (SGOT) U/L  --  21   GLUCOSE mg/dL 121* 188*     Current dietary intake: ~25% of documented meals  Diet Order   Procedures    Diet Regular; Cardiac, Consistent Carbohydrate, Renal     Assessment/Plan:  1. Pharmacy dosing warfarin for atrial fibrillation, goal INR 2-3.  2. Patient's INR is therapeutic today at 2.41. Will continue patient's home regimen of warfarin 4mg daily for now.  3. Daily PT/INR ordered.  4. Pharmacy will continue to follow closely and adjust warfarin as needed based on drug interactions, daily INR trend, and clinical " status.    Thank you,  Jensen Ramirez, PharmD, BCPS  1/12/2021  14:28 EST

## 2021-01-13 NOTE — PROGRESS NOTES
"Pharmacy Consult - Warfarin    Moni Wallace is a 80 y.o. female on warfarin therapy.    Height - 162.6 cm (64\")  Weight - 94.8 kg (209 lb)    Consulting Provider: Hospitalist  Indication: atrial fibrillation  Goal INR: 2-3  Home Regimen: warfarin 4mg daily    Bridge Therapy: no    Drug-Drug Interactions with current regimen:  Escitalopram (home med) - increased bleeding risk  Ceftriaxone/doxycycline - may increase INR    Warfarin Dosing During Admission:    Date  1/11 1/12 1/13         INR  2.23 2.41 2.46         Dose  -- 4mg (4mg)            Education Provided: Patient is on warfarin prior to admission, admitted with therapeutic INR. Follows with [facility] regularly. Pharmacist available for questions as needed.    Discharge Follow up:   Following Provider - [facility]   Follow up time range or appointment - recommend repeat INR 2-3 days after discharge (has home monitor)    Labs:  Results from last 7 days   Lab Units 01/13/21  0814 01/12/21  0704 01/11/21  2210 01/11/21   INR  2.46* 2.41* 2.23* 2.30   HEMOGLOBIN g/dL 9.2*  --  10.5*  --    HEMATOCRIT % 30.7*  --  34.0  --      Results from last 7 days   Lab Units 01/13/21  0814 01/12/21  0704 01/11/21  2210   SODIUM mmol/L 134* 138 138   POTASSIUM mmol/L 4.1 4.2 4.2   CHLORIDE mmol/L 97* 99 98   CO2 mmol/L 21.0* 23.0 21.0*   BUN mg/dL 61* 65* 65*   CREATININE mg/dL 8.76* 9.17* 9.19*   CALCIUM mg/dL 8.6 8.4* 8.8   BILIRUBIN mg/dL  --   --  0.2   ALK PHOS U/L  --   --  172*   ALT (SGPT) U/L  --   --  13   AST (SGOT) U/L  --   --  21   GLUCOSE mg/dL 151* 121* 188*     Current dietary intake: ~25% of documented meals  Diet Order   Procedures    Diet Regular; Cardiac, Consistent Carbohydrate, Renal     Assessment/Plan:  1. Pharmacy dosing warfarin for atrial fibrillation, goal INR 2-3.  2. Patient's INR is therapeutic today at 2.46. Will continue patient's home regimen of warfarin 4mg daily.  3. Daily PT/INR ordered.  4. Pharmacy will " continue to follow closely and adjust warfarin as needed based on drug interactions, daily INR trend, and clinical status.    Thank you,  Jensen Ramirez, PharmD, BCPS  1/13/2021  13:14 EST

## 2021-01-13 NOTE — PROGRESS NOTES
"   LOS: 2 days    Patient Care Team:  Alex Walters MD as PCP - General (Internal Medicine)  Jeff Joe IV, MD as Consulting Physician (Cardiology)  Donny Hodges MD as Consulting Physician (Nephrology)  Cory Castillo MD as Surgeon (General Surgery)  Slim Wick MD    Reason For Visit:  F/U ESRD ON PD  Subjective           Review of Systems:    Pulm: No soa   CV:  No CP      Objective     cefTRIAXone, 1 g, Intravenous, Q24H  doxycycline, 100 mg, Oral, Q12H  escitalopram, 5 mg, Oral, Daily  furosemide, 80 mg, Oral, Daily  gentamicin, , Topical, Daily  lidocaine, 1 patch, Transdermal, Nightly  metoprolol tartrate, 100 mg, Oral, Q12H  pantoprazole, 40 mg, Oral, Daily  senna-docusate sodium, 2 tablet, Oral, BID  sevelamer, 1,600 mg, Oral, TID With Meals  sodium chloride, 10 mL, Intravenous, Q12H  tacrolimus, 1 mg, Oral, Q12H  warfarin, 4 mg, Oral, Daily      Pharmacy to dose warfarin,           Vital Signs:  Blood pressure 126/60, pulse 72, temperature 98.1 °F (36.7 °C), temperature source Oral, resp. rate 18, height 162.6 cm (64\"), weight 94.8 kg (209 lb), SpO2 97 %, not currently breastfeeding.    Flowsheet Rows      First Filed Value   Admission Height  162.6 cm (64\") Documented at 01/11/2021 2047   Admission Weight  86.2 kg (190 lb) Documented at 01/11/2021 2047 01/12 0701 - 01/13 0700  In: 680 [P.O.:680]  Out: 4700 [Urine:300]    Physical Exam:    General Appearance: NAD, alert and cooperative, Ox3  Eyes: PER, conjunctivae and sclerae normal, no icterus  Lungs: respirations regular and unlabored, no crepitus, clear to auscultation  Heart/CV: regular rhythm & normal rate, no murmur, no gallop, no rub and no edema  Abdomen: not distended, soft, non-tender, no masses,  bowel sounds present. PD CATH  Skin: No rash, Warm and dry    Radiology:            Labs:  Results from last 7 days   Lab Units 01/13/21  0814 01/11/21  2210   WBC 10*3/mm3 13.48* 19.16*   HEMOGLOBIN " g/dL 9.2* 10.5*   HEMATOCRIT % 30.7* 34.0   PLATELETS 10*3/mm3 299 313     Results from last 7 days   Lab Units 01/13/21  0814 01/12/21  0704 01/11/21  2210   SODIUM mmol/L 134* 138 138   POTASSIUM mmol/L 4.1 4.2 4.2   CHLORIDE mmol/L 97* 99 98   CO2 mmol/L 21.0* 23.0 21.0*   BUN mg/dL 61* 65* 65*   CREATININE mg/dL 8.76* 9.17* 9.19*   CALCIUM mg/dL 8.6 8.4* 8.8   PHOSPHORUS mg/dL  --  6.8*  --    MAGNESIUM mg/dL  --  2.6*  --    ALBUMIN g/dL  --  2.60* 3.00*     Results from last 7 days   Lab Units 01/13/21  0814   GLUCOSE mg/dL 151*       Results from last 7 days   Lab Units 01/11/21  2210   ALK PHOS U/L 172*   BILIRUBIN mg/dL 0.2   ALT (SGPT) U/L 13   AST (SGOT) U/L 21           Results from last 7 days   Lab Units 01/11/21  2221   COLOR UA  Yellow   CLARITY UA  Turbid*   PH, URINE  6.0   SPECIFIC GRAVITY, URINE  1.013   GLUCOSE UA  Negative   KETONES UA  Negative   BILIRUBIN UA  Negative   PROTEIN UA  100 mg/dL (2+)*   BLOOD UA  Small (1+)*   LEUKOCYTES UA  Large (3+)*   NITRITE UA  Negative       Estimated Creatinine Clearance: 5.8 mL/min (A) (by C-G formula based on SCr of 8.76 mg/dL (H)).      Assessment       Type 2 diabetes mellitus (CMS/[facility])    Chronic kidney disease, stage IV (severe) (CMS/[facility])    Anemia of renal disease    Leukocytosis    Hypothyroidism    Atypical pneumonia    Acute UTI (urinary tract infection)            Impression: ESRD ON CAPD. ANEMIA.             Recommendations: PRESENT PD. HOME SOON OK WITH RENAL.       Adryan Eden MD  01/13/21  13:53 EST

## 2021-01-13 NOTE — PROGRESS NOTES
[facility] Medicine Services  PROGRESS NOTE    Patient Name: Moni Wallace  : 1940  MRN: [id number]    Date of Admission: 2021  Primary Care Physician: Alex Walters MD    Subjective   Subjective     CC:  F/U Weakness     HPI:  Patient denies pain, feels stronger today.     ROS:  Gen- No fevers, chills  CV- No chest pain, palpitations  Resp- No cough, dyspnea  GI- No N/V/D, abd pain          Objective   Objective     Vital Signs:   Temp:  [97.7 °F (36.5 °C)-98.1 °F (36.7 °C)] 98.1 °F (36.7 °C)  Heart Rate:  [63-81] 72  Resp:  [18] 18  BP: (112-142)/(58-72) 126/60        Physical Exam:  Constitutional - no acute distress, somewhat frail, in bed  HEENT-NCAT, mucous membranes moist  CV-RRR  Resp-CTAB  Abd-soft, nontender, nondistended, normoactive bowel sounds, PD catheter  Ext-No lower extremity cyanosis, clubbing or edema bilaterally  Neuro-alert and oriented, speech clear, moves all extremities   Psych-normal affect   Skin- some bruising on arms and legs. Fistulas without thrill in both arms.      Results Reviewed:  Results from last 7 days   Lab Units 21  0821  0721  2210   WBC 10*3/mm3 13.48*  --  19.16*   HEMOGLOBIN g/dL 9.2*  --  10.5*   HEMATOCRIT % 30.7*  --  34.0   PLATELETS 10*3/mm3 299  --  313   INR  2.46* 2.41* 2.23*   PROCALCITONIN ng/mL  --  0.43*  --      Results from last 7 days   Lab Units 21  0814 21  0704 21  2210   SODIUM mmol/L 134* 138 138   POTASSIUM mmol/L 4.1 4.2 4.2   CHLORIDE mmol/L 97* 99 98   CO2 mmol/L 21.0* 23.0 21.0*   BUN mg/dL 61* 65* 65*   CREATININE mg/dL 8.76* 9.17* 9.19*   GLUCOSE mg/dL 151* 121* 188*   CALCIUM mg/dL 8.6 8.4* 8.8   ALT (SGPT) U/L  --   --  13   AST (SGOT) U/L  --   --  21     Estimated Creatinine Clearance: 5.8 mL/min (A) (by C-G formula based on SCr of 8.76 mg/dL (H)).    Microbiology Results Abnormal     Procedure Component Value - Date/Time    Urine Culture - Urine, Urine,  Catheter [[id number]]  (Normal) Collected: 01/11/21 2221    Lab Status: Final result Specimen: Urine, Catheter Updated: 01/13/21 1138     Urine Culture No growth    Blood Culture - Blood, Arm, Right [[id number]] Collected: 01/11/21 2335    Lab Status: Preliminary result Specimen: Blood from Arm, Right Updated: 01/13/21 0001     Blood Culture No growth at 24 hours    Blood Culture - Blood, Wrist, Right [[id number]] Collected: 01/11/21 2325    Lab Status: Preliminary result Specimen: Blood from Wrist, Right Updated: 01/13/21 0001     Blood Culture No growth at 24 hours    MRSA Screen, PCR (Inpatient) - Swab, Nares [[id number]]  (Normal) Collected: 01/12/21 1451    Lab Status: Final result Specimen: Swab from Nares Updated: 01/12/21 1604     MRSA PCR Negative    Narrative:      MRSA Negative    COVID-19 and FLU A/B PCR - Swab, Nasopharynx [[id number]]  (Normal) Collected: 01/11/21 2327    Lab Status: Final result Specimen: Swab from Nasopharynx Updated: 01/12/21 0017     COVID19 Not Detected     Influenza A PCR Not Detected     Influenza B PCR Not Detected    Narrative:      Fact sheet for providers: https://www.fda.gov/media/216272/download    Fact sheet for patients: https://www.fda.gov/media/611212/download    Test performed by PCR.          Imaging Results (Last 24 Hours)     ** No results found for the last 24 hours. **          Results for orders placed during the hospital encounter of 01/11/21   Transthoracic Echo Complete With Contrast if Necessary Per Protocol    Narrative · Left ventricular systolic function is normal. Estimated left ventricular   EF = 65%  · Left ventricular wall thickness is consistent with hypertrophy.  · Left atrial volume is mildly increased.  · The aortic valve is calcified. There is mild aortic stenosis present.   There is moderate aortic regurgitation present.  · Estimated right ventricular systolic pressure from tricuspid   regurgitation is normal (<35 mmHg).          I have reviewed the  medications:  Scheduled Meds:cefTRIAXone, 1 g, Intravenous, Q24H  doxycycline, 100 mg, Oral, Q12H  escitalopram, 5 mg, Oral, Daily  furosemide, 80 mg, Oral, Daily  gentamicin, , Topical, Daily  lidocaine, 1 patch, Transdermal, Nightly  metoprolol tartrate, 100 mg, Oral, Q12H  pantoprazole, 40 mg, Oral, Daily  senna-docusate sodium, 2 tablet, Oral, BID  sevelamer, 1,600 mg, Oral, TID With Meals  sodium chloride, 10 mL, Intravenous, Q12H  tacrolimus, 1 mg, Oral, Q12H  warfarin, 4 mg, Oral, Daily      Continuous Infusions:Pharmacy to dose warfarin,       PRN Meds:.•  acetaminophen **OR** acetaminophen **OR** acetaminophen  •  albuterol  •  bisacodyl  •  bisacodyl  •  melatonin  •  ondansetron **OR** ondansetron  •  Pharmacy to dose warfarin  •  Sodium Chloride (PF)  •  sodium chloride  •  UltraBag/Dianeal PD-2/1.5% Dex (pappas #[id number])    Assessment/Plan   Assessment & Plan     Active Hospital Problems    Diagnosis  POA   • Atypical pneumonia [J18.9]  Yes   • Acute UTI (urinary tract infection) [N39.0]  Yes   • Hypothyroidism [E03.9]  Yes   • Leukocytosis [D72.829]  Yes   • Type 2 diabetes mellitus (CMS/HCC) [E11.9]  Yes   • Chronic kidney disease, stage IV (severe) (CMS/HCC) [N18.4]  Yes   • Anemia of renal disease [N18.9, D63.1]  Yes      Resolved Hospital Problems   No resolved problems to display.        Brief Hospital Course to date:  Moni Wallace is a 80 y.o. female with ESRD on PD, Afib, heart failure, AS, CAD, who complains of weeks of fatigue. In the ED she was found to have leukocytosis.   Pneumonia  Leukocytosis  Elevated Procal   -CT chest shows multifocal patchy pneumonia   -continue rocephin and doxy  -MRSA PCR negative  - check cbc and CXR in am  Pancreatic fullness  -CT abd no definite finding-prominence of pancreas that will need follow up outpatient with possible MRI to rule out a cancerous process. Discussed with patient.  Lung nodule  - needs followup CT chest on 6-12 months, also discussed with  patient.  Renal nodule  - consider US to further evaluate, IP vs OP, discussed with patient as well.  Weakness  -Pt/OT  Hypothyroidism  -recent adjustment to meds  CKD with PD  -Nephrology managing   AFib  -coumadin - INR 2.4  -rate controlled   Possible Clinical Rejection  -pt has been off her meds for a month or so  -per nephrology may need short term steroid course if she developes fever and pain at her graft site     DVT Prophylaxis:  Coumadin     Disposition: I expect the patient to be discharged TBD     CODE STATUS:   Code Status and Medical Interventions:   Ordered at: 01/12/21 0014     Code Status:    CPR     Medical Interventions (Level of Support Prior to Arrest):    Full       Tyson Mcdonald MD  01/13/21

## 2021-01-13 NOTE — PLAN OF CARE
Goal Outcome Evaluation:  Plan of Care Reviewed With: patient  Progress: improving  VSS, 1L NC, NSR with PACs on tele, pt c/o Left shoulder pain, lidocaine patch and PRN tyelnol administered, intervention effective per pt. Pt c/o nausea 1x, zofran administered. IV infiltrated, new ultrasound guided IV 20' Lt AC. Peritoneal Dialysis continuous per order. Pt had multiple UOP through the night. No new concerns at this time, will continue to monitor.

## 2021-01-14 NOTE — THERAPY TREATMENT NOTE
Patient Name: Moni Wallace  : 1940    MRN: [id number]                              Today's Date: 2021       Admit Date: 2021    Visit Dx:     ICD-10-CM ICD-9-CM   1. Pneumonia of both lower lobes due to infectious organism  J18.9 486   2. Sepsis, due to unspecified organism, unspecified whether acute organ dysfunction present (CMS/HCC)  A41.9 038.9     995.91   3. Chronic renal failure, unspecified CKD stage  N18.9 585.9   4. Urinary tract infection without hematuria, site unspecified  N39.0 599.0   5. Peritoneal dialysis status (CMS/HCC)  Z99.2 V45.11     Patient Active Problem List   Diagnosis   • Paroxysmal atrial fibrillation (CMS/[facility])   • Essential hypertension   • Type 2 diabetes mellitus (CMS/HCC)   • Chronic kidney disease, stage IV (severe) (CMS/HCC)   • Anemia of renal disease   • Hyperparathyroidism (CMS/HCC)   • Asthma   • Right-sided carotid artery disease (CMS/HCC)   • High output HF (heart failure) (CMS/HCC)   • Vitamin D deficiency   • Leukocytosis   • Nonrheumatic aortic valve stenosis   • Ulcer of gastric fundus   • Tubular adenoma   • Macular degeneration   • Hypothyroidism   • Chronic kidney disease   • Screen for colon cancer   • Postoperative anemia due to acute blood loss   • Hyperlipidemia LDL goal <100   • Morbidly obese (CMS/HCC)   • Atypical pneumonia   • Acute UTI (urinary tract infection)     Past Medical History:   Diagnosis Date   • Adenomatous colon polyp    • Chronic kidney disease    • Clostridium difficile infection 2017   • Diabetes mellitus (CMS/[facility])    • Gastric polyps    • Hypothyroidism    • IgA nephropathy, acute    • Kidney transplanted    • Macular degeneration    • Paroxysmal atrial fibrillation (CMS/[facility])    • Tubular adenoma     excision    • Ulcer of gastric fundus    • Vitamin D deficiency      Past Surgical History:   Procedure Laterality Date   • BREAST BIOPSY Left    • CARPAL TUNNEL RELEASE     • CARPAL TUNNEL RELEASE Right    • CATARACT  EXTRACTION     • CATARACT EXTRACTION Bilateral    • DIAGNOSTIC LAPAROSCOPY     • DIALYSIS FISTULA CREATION     • HERNIA REPAIR  86 and 87   • KNEE ARTHROSCOPY INCISION AND DRAINAGE OF KNEE Right 5/18/2019    Procedure: KNEE ARTHROSCOPY INCISION AND DRAINAGE OF KNEE;  Surgeon: Paco Lester MD;  Location: [facility] OR;  Service: Orthopedics   • LAPAROSCOPIC TUBAL LIGATION  1985   • TOTAL KNEE ARTHROPLASTY     • TOTAL KNEE ARTHROPLASTY      Left knee    • TRANSPLANTATION RENAL  2010   • TRANSPLANTATION RENAL Right    • TRIGGER FINGER RELEASE     • TUBAL ABDOMINAL LIGATION     • UPPER GASTROINTESTINAL ENDOSCOPY  05/20/2013   • VENOUS ACCESS DEVICE (PORT) INSERTION N/A 5/23/2019    Procedure: INSERTION GROSHONG;  Surgeon: Rolando Begum MD;  Location:  [facility] OR;  Service: General     General Information     Row Name 01/14/21 1101          Physical Therapy Time and Intention    Document Type  therapy note (daily note)  -AS     Mode of Treatment  physical therapy  -AS     Row Name 01/14/21 1101          General Information    Patient Profile Reviewed  yes  -AS     Existing Precautions/Restrictions  fall;oxygen therapy device and L/min;other (see comments) macular degeneration  -AS     Barriers to Rehab  medically complex;previous functional deficit W/C for mobility  -AS     Row Name 01/14/21 1101          Cognition    Orientation Status (Cognition)  oriented x 3  -AS     Row Name 01/14/21 1101          Safety Issues, Functional Mobility    Safety Issues Affecting Function (Mobility)  awareness of need for assistance;insight into deficits/self-awareness;safety precaution awareness;safety precautions follow-through/compliance  -AS     Impairments Affecting Function (Mobility)  endurance/activity tolerance;pain;strength;visual/perceptual  -AS     Comment, Safety Issues/Impairments (Mobility)  alert and following commands, c/o nausea but agreed to therapy.  -AS       User Key  (r) = Recorded By, (t) = Taken By,  (c) = Cosigned By    Initials Name Provider Type    AS Funmilayo Joshi PTA Physical Therapy Assistant        Mobility     Row Name 01/14/21 1102          Bed Mobility    Supine-Sit [place] (Bed Mobility)  verbal cues;minimum assist (75% patient effort)  -AS     Assistive Device (Bed Mobility)  bed rails;head of bed elevated  -AS     Comment (Bed Mobility)  verbal cues for technique  -AS     Row Name 01/14/21 1102          Transfers    Comment (Transfers)  stand pivot transfer to recliner, min assist x1 verbal cues for hand placement.  -AS     Row Name 01/14/21 1102          Bed-Chair Transfer    Bed-Chair [place] (Transfers)  verbal cues;minimum assist (75% patient effort);2 person assist  -AS     Assistive Device (Bed-Chair Transfers)  other (see comments) patient refused AD  -AS     Row Name 01/14/21 1102          Gait/Stairs (Locomotion)    [place] Level (Gait)  unable to assess  -AS     Comment (Gait/Stairs)  patient reports being non-ambulatory and uses W/C for mobility  -AS       User Key  (r) = Recorded By, (t) = Taken By, (c) = Cosigned By    Initials Name Provider Type    AS Funmilayo Joshi PTA Physical Therapy Assistant        Obj/Interventions     Row Name 01/14/21 1104          Motor Skills    Therapeutic Exercise  shoulder;ankle;knee  -AS     Row Name 01/14/21 1104          Shoulder (Therapeutic Exercise)    Shoulder (Therapeutic Exercise)  AROM (active range of motion)  -AS     Row Name 01/14/21 1104          Hip (Therapeutic Exercise)    Hip AROM (Therapeutic Exercise)  bilateral;flexion;extension;aBduction;aDduction;sitting;10 repetitions  -AS     Row Name 01/14/21 1104          Knee (Therapeutic Exercise)    Knee (Therapeutic Exercise)  strengthening exercise  -AS     Knee Strengthening (Therapeutic Exercise)  marching while seated;LAQ (long arc quad);sitting;10 repetitions  -AS     Row Name 01/14/21 1104          Ankle (Therapeutic Exercise)    Ankle (Therapeutic Exercise)   strengthening exercise  -AS     Ankle AROM (Therapeutic Exercise)  bilateral;dorsiflexion;plantarflexion;sitting;10 repetitions  -AS       User Key  (r) = Recorded By, (t) = Taken By, (c) = Cosigned By    Initials Name Provider Type    AS Funmilayo Joshi PTA Physical Therapy Assistant        Goals/Plan    No documentation.       Clinical Impression     Row Name 01/14/21 1105          Pain    Additional Documentation  Pain Scale: Numbers Pre/Post-Treatment (Group)  -AS     Row Name 01/14/21 1105          Pain Scale: Numbers Pre/Post-Treatment    Pretreatment Pain Rating  0/10 - no pain  -AS     Posttreatment Pain Rating  0/10 - no pain  -AS     [facility] Name 01/14/21 1105          Plan of Care Review    Plan of Care Reviewed With  patient  -AS     Progress  improving  -AS     Outcome Summary  SPS transfer to recliner, constant verbal cues for NWB status L LE. Patient unable to maintain NWB status, may need offloading shoe for transfers. Completeed B UE/LE exercises in seated position.  -AS     Row Name 01/14/21 1105          Positioning and Restraints    Pre-Treatment Position  in bed  -AS     Post Treatment Position  chair  -AS     In Chair  reclined;call light within reach;encouraged to call for assist;exit alarm on  -AS       User Key  (r) = Recorded By, (t) = Taken By, (c) = Cosigned By    Initials Name Provider Type    AS Funmilayo Joshi PTA Physical Therapy Assistant        Outcome Measures     Row Name 01/14/21 1105          How much help from another person do you currently need...    Turning from your back to your side while in flat bed without using bedrails?  3  -AS     Moving from lying on back to sitting on the side of a flat bed without bedrails?  3  -AS     Moving to and from a bed to a chair (including a wheelchair)?  2  -AS     Standing up from a chair using your arms (e.g., wheelchair, bedside chair)?  2  -AS     Climbing 3-5 steps with a railing?  1  -AS     To walk in hospital room?  1  -AS      AM-PAC 6 Clicks Score (PT)  12  -AS     Row Name 01/14/21 1105          Functional Assessment    Outcome Measure Options  AM-PAC 6 Clicks Basic Mobility (PT)  -AS       User Key  (r) = Recorded By, (t) = Taken By, (c) = Cosigned By    Initials Name Provider Type    AS Funmilayo Joshi, [place] Physical Therapy Assistant        Physical Therapy Education                 Title: PT OT SLP Therapies (In Progress)     Topic: Physical Therapy (In Progress)     Point: Mobility training (In Progress)     Learning Progress Summary           Patient Acceptance, E, NR by AS at 1/14/2021 1106    Acceptance, E,D, NR by DM at 1/12/2021 1434                   Point: Home exercise program (In Progress)     Learning Progress Summary           Patient Acceptance, E, NR by AS at 1/14/2021 1106    Acceptance, E,D, NR by DM at 1/12/2021 1434                   Point: Body mechanics (In Progress)     Learning Progress Summary           Patient Acceptance, E, NR by AS at 1/14/2021 1106    Acceptance, E,D, NR by DM at 1/12/2021 1434                   Point: Precautions (In Progress)     Learning Progress Summary           Patient Acceptance, E, NR by AS at 1/14/2021 1106    Acceptance, E,D, NR by DM at 1/12/2021 1434                               User Key     Initials Effective Dates Name Provider Type Discipline    DM 06/19/15 -  Marlyn Reid, PT Physical Therapist PT    AS 06/22/15 -  Funmilayo Joshi, [place] Physical Therapy Assistant PT              PT Recommendation and Plan     Plan of Care Reviewed With: patient  Progress: improving  Outcome Summary: SPS transfer to recliner, constant verbal cues for NWB status L LE. Patient unable to maintain NWB status, may need offloading shoe for transfers. Completeed B UE/LE exercises in seated position.     Time Calculation:   PT Charges     Row Name 01/14/21 1106             Time Calculation    Start Time  0936  -AS      PT Received On  01/14/21  -AS      PT Goal Re-Cert Due Date   01/22/21  -AS         Timed Charges    19972 - PT Therapeutic Exercise Minutes  23  -AS        User Key  (r) = Recorded By, (t) = Taken By, (c) = Cosigned By    Initials Name Provider Type    AS Funmilayo Joshi PTA Physical Therapy Assistant        Therapy Charges for Today     Code Description Service Date Service Provider Modifiers Qty    27676079239 HC PT THER PROC EA 15 MIN 1/14/2021 Funmilayo Joshi PTA GP 2    10626361463 HC PT THER SUPP EA 15 MIN 1/14/2021 Funmilayo Joshi PTA GP 2          PT G-Codes  Outcome Measure Options: AM-PAC 6 Clicks Basic Mobility (PT)  AM-PAC 6 Clicks Score (PT): 12  AM-PAC 6 Clicks Score (OT): 11    Funmilayo Joshi PTA  1/14/2021

## 2021-01-14 NOTE — PLAN OF CARE
VSS, c/o headache x1 prn given with improvement, slept between care, PD completed as ordered, SR/SB on monitor, RA with o2 sats in 90s, no other issues/concerns, will monitor        Problem: Adult Inpatient Plan of Care  Goal: Plan of Care Review  1/14/2021 0341 by Estelle Hughes RN  Outcome: Ongoing, Progressing  Flowsheets (Taken 1/13/2021 0626 by Nela Quijano, RN)  Progress: improving  Plan of Care Reviewed With: patient  1/14/2021 0341 by Estelle Hughes RN  Outcome: Ongoing, Progressing  Goal: Patient-Specific Goal (Individualized)  1/14/2021 0341 by Estelle Hughes RN  Outcome: Ongoing, Progressing  1/14/2021 0341 by Estelle Hughes RN  Outcome: Ongoing, Progressing  Goal: Absence of Hospital-Acquired Illness or Injury  1/14/2021 0341 by Estelle Hughes RN  Outcome: Ongoing, Progressing  1/14/2021 0341 by Estelle Hughes RN  Outcome: Ongoing, Progressing  Intervention: Identify and Manage Fall Risk  Recent Flowsheet Documentation  Taken 1/14/2021 0200 by Estelle Hughes RN  Safety Promotion/Fall Prevention:   activity supervised   assistive device/personal items within reach   clutter free environment maintained   fall prevention program maintained   gait belt   lighting adjusted   nonskid shoes/slippers when out of bed   room organization consistent   safety round/check completed   toileting scheduled  Taken 1/13/2021 2348 by Estelle Hughes RN  Safety Promotion/Fall Prevention:   activity supervised   assistive device/personal items within reach   clutter free environment maintained   fall prevention program maintained   gait belt   lighting adjusted   nonskid shoes/slippers when out of bed   room organization consistent   safety round/check completed   toileting scheduled  Taken 1/13/2021 2200 by Estelle Hughes RN  Safety Promotion/Fall Prevention:   activity supervised   assistive device/personal items within reach   clutter free environment maintained   fall prevention  program maintained   gait belt   lighting adjusted   nonskid shoes/slippers when out of bed   room organization consistent   safety round/check completed   toileting scheduled  Taken 1/13/2021 2012 by Estelle Hughes RN  Safety Promotion/Fall Prevention:   activity supervised   assistive device/personal items within reach   clutter free environment maintained   fall prevention program maintained   gait belt   lighting adjusted   nonskid shoes/slippers when out of bed   room organization consistent   safety round/check completed   toileting scheduled  Intervention: Prevent Skin Injury  Recent Flowsheet Documentation  Taken 1/14/2021 0200 by Estelle Hughes RN  Body Position: position changed independently  Taken 1/13/2021 2348 by Estelle Hughes RN  Body Position: position changed independently  Taken 1/13/2021 2200 by Estelle Hughes RN  Body Position: position changed independently  Taken 1/13/2021 2012 by Estelle Hughes RN  Body Position: weight shift assistance provided  Intervention: Prevent and Manage VTE (venous thromboembolism) Risk  Recent Flowsheet Documentation  Taken 1/13/2021 2012 by Estelle Hughes RN  VTE Prevention/Management: (coumadin)   bilateral   dorsiflexion/plantar flexion performed   bleeding risk factor(s) identified  Goal: Optimal Comfort and Wellbeing  1/14/2021 0341 by Estelle Hughes RN  Outcome: Ongoing, Progressing  1/14/2021 0341 by Estelle Hughes RN  Outcome: Ongoing, Progressing  Intervention: Provide Person-Centered Care  Recent Flowsheet Documentation  Taken 1/13/2021 2012 by Estelle Hughes RN  Trust Relationship/Rapport:   care explained   choices provided   emotional support provided   empathic listening provided   questions answered   questions encouraged   reassurance provided   thoughts/feelings acknowledged  Goal: Readiness for Transition of Care  1/14/2021 0341 by Estelle Hughes RN  Outcome: Ongoing, Progressing  1/14/2021 0341 by Ellen  Estelle VO RN  Outcome: Ongoing, Progressing     Problem: Skin Injury Risk Increased  Goal: Skin Health and Integrity  1/14/2021 0341 by Estelle Hughes RN  Outcome: Ongoing, Progressing  1/14/2021 0341 by Estelle Hughes RN  Outcome: Ongoing, Progressing  Intervention: Optimize Skin Protection  Recent Flowsheet Documentation  Taken 1/14/2021 0200 by Estelle Hughes RN  Pressure Reduction Techniques:   frequent weight shift encouraged   heels elevated off bed   weight shift assistance provided  Pressure Reduction Devices:   pressure-redistributing mattress utilized   positioning supports utilized   heel offloading device utilized  Skin Protection: (refused heel boots)   adhesive use limited   incontinence pads utilized   tubing/devices free from skin contact  Taken 1/13/2021 2348 by Estelle Hughes RN  Pressure Reduction Techniques:   frequent weight shift encouraged   pressure points protected   weight shift assistance provided  Pressure Reduction Devices:   pressure-redistributing mattress utilized   positioning supports utilized   heel offloading device utilized  Skin Protection: (refused heel boots)   adhesive use limited   incontinence pads utilized   tubing/devices free from skin contact  Taken 1/13/2021 2200 by Estelle Hughes RN  Pressure Reduction Techniques:   frequent weight shift encouraged   pressure points protected  Pressure Reduction Devices:   pressure-redistributing mattress utilized   positioning supports utilized  Skin Protection: (refused heel boots)   adhesive use limited   incontinence pads utilized   tubing/devices free from skin contact  Taken 1/13/2021 2012 by Estelle Hughes RN  Pressure Reduction Techniques:   frequent weight shift encouraged   heels elevated off bed   pressure points protected  Head of Bed (HOB): HOB elevated  Pressure Reduction Devices:   pressure-redistributing mattress utilized   positioning supports utilized  Skin Protection: (refuses heel  protectors)   adhesive use limited   incontinence pads utilized   tubing/devices free from skin contact     Problem: Adjustment to Illness (Chronic Kidney Disease)  Goal: Optimal Coping with Chronic Illness  1/14/2021 0341 by Estelle Hughes RN  Outcome: Ongoing, Progressing  1/14/2021 0341 by Estelle Hughes RN  Outcome: Ongoing, Progressing  Intervention: Support and Optimize Psychosocial Response to Chronic Illness  Recent Flowsheet Documentation  Taken 1/13/2021 2012 by Estelle Hughes RN  Family/Support System Care: self-care encouraged     Problem: Electrolyte Imbalance (Chronic Kidney Disease)  Goal: Electrolyte Balance  1/14/2021 0341 by Estelle Hughes RN  Outcome: Ongoing, Progressing  1/14/2021 0341 by Estelle Hughes RN  Outcome: Ongoing, Progressing     Problem: Fluid Volume Excess (Chronic Kidney Disease)  Goal: Fluid Balance  1/14/2021 0341 by Estelle Hughes RN  Outcome: Ongoing, Progressing  1/14/2021 0341 by Estelle Hughes RN  Outcome: Ongoing, Progressing  Intervention: Monitor and Manage Hypervolemia  Recent Flowsheet Documentation  Taken 1/14/2021 0200 by sEtelle Hughes RN  Skin Protection: (refused heel boots)   adhesive use limited   incontinence pads utilized   tubing/devices free from skin contact  Taken 1/13/2021 2348 by Estelle Hughes RN  Skin Protection: (refused heel boots)   adhesive use limited   incontinence pads utilized   tubing/devices free from skin contact  Taken 1/13/2021 2200 by Estelle Hughes RN  Skin Protection: (refused heel boots)   adhesive use limited   incontinence pads utilized   tubing/devices free from skin contact  Taken 1/13/2021 2012 by Estelle Hughes RN  Skin Protection: (refuses heel protectors)   adhesive use limited   incontinence pads utilized   tubing/devices free from skin contact     Problem: Functional Decline (Chronic Kidney Disease)  Goal: Optimal Functional Ability  1/14/2021 0341 by Estelle Hughes RN  Outcome:  Ongoing, Progressing  1/14/2021 0341 by Estelle Hughes RN  Outcome: Ongoing, Progressing  Intervention: Optimize Functional Ability  Recent Flowsheet Documentation  Taken 1/14/2021 0200 by Estelle Hughes RN  Activity Management: activity adjusted per tolerance  Taken 1/13/2021 2348 by Estelle Hughes RN  Activity Management: activity adjusted per tolerance  Taken 1/13/2021 2200 by Estelle Hughes RN  Activity Management: activity adjusted per tolerance  Taken 1/13/2021 2012 by Estelle Hughes RN  Activity Management: activity adjusted per tolerance     Problem: Hematologic Alteration (Chronic Kidney Disease)  Goal: Absence of Anemia Signs and Symptoms  1/14/2021 0341 by Estelle Hughes RN  Outcome: Ongoing, Progressing  1/14/2021 0341 by Estelle Hughes RN  Outcome: Ongoing, Progressing     Problem: Oral Intake Inadequate (Chronic Kidney Disease)  Goal: Optimal Oral Intake  1/14/2021 0341 by Estelle Hughes RN  Outcome: Ongoing, Progressing  1/14/2021 0341 by Estelle Hughes RN  Outcome: Ongoing, Progressing     Problem: Renal Function Impairment (Chronic Kidney Disease)  Goal: Laboratory Values and Blood Pressure Within Desired Range  1/14/2021 0341 by Estelle Hughes RN  Outcome: Ongoing, Progressing  1/14/2021 0341 by Estelle Hughes RN  Outcome: Ongoing, Progressing  Intervention: Monitor and Support Renal Function  Recent Flowsheet Documentation  Taken 1/13/2021 2012 by Estelle Hughes RN  Medication Review/Management:   medications reviewed   high-risk medications identified     Problem: Fall Injury Risk  Goal: Absence of Fall and Fall-Related Injury  1/14/2021 0341 by Estelle Hughes RN  Outcome: Ongoing, Progressing  1/14/2021 0341 by Estelle Hughes RN  Outcome: Ongoing, Progressing  Intervention: Identify and Manage Contributors to Fall Injury Risk  Recent Flowsheet Documentation  Taken 1/13/2021 2012 by Estelle Hughes RN  Medication Review/Management:    medications reviewed   high-risk medications identified  Intervention: Promote Injury-Free Environment  Recent Flowsheet Documentation  Taken 1/14/2021 0200 by Estelle Hughes RN  Safety Promotion/Fall Prevention:   activity supervised   assistive device/personal items within reach   clutter free environment maintained   fall prevention program maintained   gait belt   lighting adjusted   nonskid shoes/slippers when out of bed   room organization consistent   safety round/check completed   toileting scheduled  Taken 1/13/2021 2348 by Estelle Hughes RN  Safety Promotion/Fall Prevention:   activity supervised   assistive device/personal items within reach   clutter free environment maintained   fall prevention program maintained   gait belt   lighting adjusted   nonskid shoes/slippers when out of bed   room organization consistent   safety round/check completed   toileting scheduled  Taken 1/13/2021 2200 by Estelle Hughes RN  Safety Promotion/Fall Prevention:   activity supervised   assistive device/personal items within reach   clutter free environment maintained   fall prevention program maintained   gait belt   lighting adjusted   nonskid shoes/slippers when out of bed   room organization consistent   safety round/check completed   toileting scheduled  Taken 1/13/2021 2012 by Estelle Hughes RN  Safety Promotion/Fall Prevention:   activity supervised   assistive device/personal items within reach   clutter free environment maintained   fall prevention program maintained   gait belt   lighting adjusted   nonskid shoes/slippers when out of bed   room organization consistent   safety round/check completed   toileting scheduled   Goal Outcome Evaluation:  Plan of Care Reviewed With: patient  Progress: improving

## 2021-01-14 NOTE — PLAN OF CARE
Goal Outcome Evaluation:  Plan of Care Reviewed With: patient  Progress: improving  Outcome Summary: SPS transfer to recliner, constant verbal cues for NWB status L LE. Patient unable to maintain NWB status, may need offloading shoe for transfers. Completeed B UE/LE exercises in seated position.

## 2021-01-14 NOTE — PROGRESS NOTES
"Pharmacy Consult - Warfarin    Moni Wallace is a 80 y.o. female on warfarin therapy.    Height - 162.6 cm (64\")  Weight - 96.3 kg (212 lb 3.2 oz)    Consulting Provider: Hospitalist  Indication: atrial fibrillation  Goal INR: 2-3  Home Regimen: warfarin 4mg daily    Bridge Therapy: no    Drug-Drug Interactions with current regimen:  Escitalopram (home med) - increased bleeding risk  Cefdinir/doxycycline - may increase INR    Warfarin Dosing During Admission:    Date  1/11 1/12 1/13 1/14        INR  2.23 2.41 2.46 2.13        Dose  -- 4mg 4mg (4mg)           Education Provided: Patient is on warfarin prior to admission, admitted with therapeutic INR. Follows with [facility] regularly. Pharmacist available for questions as needed.    Discharge Follow up:   Following Provider - [facility]   Follow up time range or appointment - recommend repeat INR 2-3 days after discharge (has home monitor)    Labs:  Results from last 7 days   Lab Units 01/14/21  0734 01/13/21  0814 01/12/21  0704 01/11/21  2210 01/11/21   INR  2.13* 2.46* 2.41* 2.23* 2.30   HEMOGLOBIN g/dL 8.8* 9.2*  --  10.5*  --    HEMATOCRIT % 28.5* 30.7*  --  34.0  --      Results from last 7 days   Lab Units 01/14/21  0734 01/13/21  0814 01/12/21  0704 01/11/21  2210   SODIUM mmol/L 133* 134* 138 138   POTASSIUM mmol/L 4.1 4.1 4.2 4.2   CHLORIDE mmol/L 98 97* 99 98   CO2 mmol/L 21.0* 21.0* 23.0 21.0*   BUN mg/dL 57* 61* 65* 65*   CREATININE mg/dL 9.01* 8.76* 9.17* 9.19*   CALCIUM mg/dL 8.5* 8.6 8.4* 8.8   BILIRUBIN mg/dL  --   --   --  0.2   ALK PHOS U/L  --   --   --  172*   ALT (SGPT) U/L  --   --   --  13   AST (SGOT) U/L  --   --   --  21   GLUCOSE mg/dL 144* 151* 121* 188*     Current dietary intake: ~50-75% of documented meals  Diet Order   Procedures    Diet Regular; Cardiac, Consistent Carbohydrate, Renal     Assessment/Plan:  1. Pharmacy dosing warfarin for atrial fibrillation, goal INR 2-3.  2. Patient's INR is " therapeutic today at 2.13. Will continue patient's home regimen of warfarin 4mg daily.  3. Daily PT/INR ordered.  4. Pharmacy will continue to follow closely and adjust warfarin as needed based on drug interactions, daily INR trend, and clinical status.    Thank you,  Jensen Ramirez, PharmD, BCPS  1/14/2021  13:33 EST

## 2021-01-14 NOTE — NURSING NOTE
Dialysis RN called for difficulty instilling dialysate. PD catheter flushed with 20 mL of dialysate using sterile technique. Instilled without difficulty following flush.     PD rounds complete. Policy and procedure reviewed with AVILA Ta. Orders and documentation reviewed. Supplies adequate. Denies further need at this time. Encouraged to call [phone] for assistance or for questions.

## 2021-01-14 NOTE — PROGRESS NOTES
"   LOS: 3 days    Patient Care Team:  Alex Walters MD as PCP - General (Internal Medicine)  Jeff Joe IV, MD as Consulting Physician (Cardiology)  Donny Hodges MD as Consulting Physician (Nephrology)  Cory Castillo MD as Surgeon (General Surgery)  Slim Wick MD    Reason For Visit:  F/U ESRD ON PD.  Subjective           Review of Systems:    Pulm: No soa   CV:  No CP. GI: + N/V      Objective     cefTRIAXone, 1 g, Intravenous, Q24H  doxycycline, 100 mg, Oral, Q12H  escitalopram, 5 mg, Oral, Daily  furosemide, 80 mg, Oral, Daily  gentamicin, , Topical, Daily  lidocaine, 1 patch, Transdermal, Nightly  metoclopramide, 5 mg, Oral, TID AC  metoprolol tartrate, 100 mg, Oral, Q12H  pantoprazole, 40 mg, Oral, Daily  senna-docusate sodium, 2 tablet, Oral, BID  sevelamer, 1,600 mg, Oral, TID With Meals  sodium chloride, 10 mL, Intravenous, Q12H  tacrolimus, 1 mg, Oral, Q12H  warfarin, 4 mg, Oral, Daily      Pharmacy to dose warfarin,           Vital Signs:  Blood pressure 130/79, pulse 70, temperature 97.6 °F (36.4 °C), temperature source Oral, resp. rate 16, height 162.6 cm (64\"), weight 96.3 kg (212 lb 3.2 oz), SpO2 90 %, not currently breastfeeding.    Flowsheet Rows      First Filed Value   Admission Height  162.6 cm (64\") Documented at 01/11/2021 2047   Admission Weight  86.2 kg (190 lb) Documented at 01/11/2021 2047 01/13 0701 - 01/14 0700  In: 4660 [P.O.:560]  Out: 9575 [Urine:300]    Physical Exam:    General Appearance: NAD, alert and cooperative, Ox3  Eyes: PER, conjunctivae and sclerae normal, no icterus  Lungs: respirations regular and unlabored, no crepitus, clear to auscultation  Heart/CV: regular rhythm & normal rate, no murmur, no gallop, no rub and no edema  Abdomen: not distended, soft, non-tender, no masses,  bowel sounds present. PD CATH.  Skin: No rash, Warm and dry    Radiology:            Labs:  Results from last 7 days   Lab Units 01/14/21  0734 " 01/13/21  0814 01/11/21  2210   WBC 10*3/mm3 13.96* 13.48* 19.16*   HEMOGLOBIN g/dL 8.8* 9.2* 10.5*   HEMATOCRIT % 28.5* 30.7* 34.0   PLATELETS 10*3/mm3 272 299 313     Results from last 7 days   Lab Units 01/14/21  0734 01/13/21  0814 01/12/21  0704 01/11/21  2210   SODIUM mmol/L 133* 134* 138 138   POTASSIUM mmol/L 4.1 4.1 4.2 4.2   CHLORIDE mmol/L 98 97* 99 98   CO2 mmol/L 21.0* 21.0* 23.0 21.0*   BUN mg/dL 57* 61* 65* 65*   CREATININE mg/dL 9.01* 8.76* 9.17* 9.19*   CALCIUM mg/dL 8.5* 8.6 8.4* 8.8   PHOSPHORUS mg/dL 7.1*  --  6.8*  --    MAGNESIUM mg/dL  --   --  2.6*  --    ALBUMIN g/dL 2.30*  --  2.60* 3.00*     Results from last 7 days   Lab Units 01/14/21  0734   GLUCOSE mg/dL 144*       Results from last 7 days   Lab Units 01/11/21  2210   ALK PHOS U/L 172*   BILIRUBIN mg/dL 0.2   ALT (SGPT) U/L 13   AST (SGOT) U/L 21           Results from last 7 days   Lab Units 01/11/21  2221   COLOR UA  Yellow   CLARITY UA  Turbid*   PH, URINE  6.0   SPECIFIC GRAVITY, URINE  1.013   GLUCOSE UA  Negative   KETONES UA  Negative   BILIRUBIN UA  Negative   PROTEIN UA  100 mg/dL (2+)*   BLOOD UA  Small (1+)*   LEUKOCYTES UA  Large (3+)*   NITRITE UA  Negative       Estimated Creatinine Clearance: 5.7 mL/min (A) (by C-G formula based on SCr of 9.01 mg/dL (H)).      Assessment       Type 2 diabetes mellitus (CMS/HCC)    Chronic kidney disease, stage IV (severe) (CMS/HCC)    Anemia of renal disease    Leukocytosis    Hypothyroidism    Atypical pneumonia    Acute UTI (urinary tract infection)            Impression: ESRD ON PD. ANEMIA. N/V. ? DIABETIC GASTROPARESIS.          Recommendations: PRESENT PD. TRY KRIS Eden MD  01/14/21  13:05 EST

## 2021-01-14 NOTE — PLAN OF CARE
Goal Outcome Evaluation:  Plan of Care Reviewed With: patient  Progress: no change  Outcome Summary: Pt has made several complaints of aches and constipation throughout the day. Pt has had adequate coverage with PRN medications. CAPD continued and maintained. VSS. RA. NSR on the monitor. Pt frequently calls and seeks out staff. Daily weight. No new concerns at this time, will continue to reassess patient needs throughout the shift.

## 2021-01-14 NOTE — PROGRESS NOTES
[facility]  PROGRESS NOTE    Patient Name: Moni Wallace  : 1940  MRN: [id number]    Date of Admission: 2021  Primary Care Physician: Alex Walters MD    Subjective   Subjective     CC:  F/U Weakness     HPI:  Pt doing really well today, states that she is stronger everyday.     ROS:  Gen- No fevers, chills  CV- No chest pain, palpitations  Resp- No cough, dyspnea  GI- No N/V/D, abd pain          Objective   Objective     Vital Signs:   Temp:  [97.4 °F (36.3 °C)-97.6 °F (36.4 °C)] 97.6 °F (36.4 °C)  Heart Rate:  [61-77] 70  Resp:  [16-18] 16  BP: (130-140)/(68-79) 130/79        Physical Exam:  Constitutional - no acute distress, frail appearing WF, up in chair at bedside  HEENT-NCAT, mucous membranes moist  CV-RRR  Resp-CTAB  Abd-soft, nontender, nondistended, normoactive bowel sounds, PD catheter  Ext-No lower extremity cyanosis, clubbing or edema bilaterally  Neuro-alert and oriented, speech clear, moves all extremities   Psych-normal affect   Skin- some bruising on arms and legs. Fistulas without thrill in both arms.      Results Reviewed:  Results from last 7 days   Lab Units 21  0821  0721  2210   WBC 10*3/mm3 13.48*  --  19.16*   HEMOGLOBIN g/dL 9.2*  --  10.5*   HEMATOCRIT % 30.7*  --  34.0   PLATELETS 10*3/mm3 299  --  313   INR  2.46* 2.41* 2.23*   PROCALCITONIN ng/mL  --  0.43*  --      Results from last 7 days   Lab Units 21  0821  0704 21  2210   SODIUM mmol/L 134* 138 138   POTASSIUM mmol/L 4.1 4.2 4.2   CHLORIDE mmol/L 97* 99 98   CO2 mmol/L 21.0* 23.0 21.0*   BUN mg/dL 61* 65* 65*   CREATININE mg/dL 8.76* 9.17* 9.19*   GLUCOSE mg/dL 151* 121* 188*   CALCIUM mg/dL 8.6 8.4* 8.8   ALT (SGPT) U/L  --   --  13   AST (SGOT) U/L  --   --  21     Estimated Creatinine Clearance: 5.9 mL/min (A) (by C-G formula based on SCr of 8.76 mg/dL (H)).    Microbiology Results Abnormal     Procedure Component Value -  Date/Time    Blood Culture - Blood, Arm, Right [[id number]] Collected: 01/11/21 2335    Lab Status: Preliminary result Specimen: Blood from Arm, Right Updated: 01/14/21 0000     Blood Culture No growth at 2 days    Blood Culture - Blood, Wrist, Right [[id number]] Collected: 01/11/21 2325    Lab Status: Preliminary result Specimen: Blood from Wrist, Right Updated: 01/14/21 0000     Blood Culture No growth at 2 days    Urine Culture - Urine, Urine, Catheter [[id number]]  (Normal) Collected: 01/11/21 2221    Lab Status: Final result Specimen: Urine, Catheter Updated: 01/13/21 1138     Urine Culture No growth    MRSA Screen, PCR (Inpatient) - Swab, Nares [[id number]]  (Normal) Collected: 01/12/21 1451    Lab Status: Final result Specimen: Swab from Nares Updated: 01/12/21 1604     MRSA PCR Negative    Narrative:      MRSA Negative    COVID-19 and FLU A/B PCR - Swab, Nasopharynx [[id number]]  (Normal) Collected: 01/11/21 2327    Lab Status: Final result Specimen: Swab from Nasopharynx Updated: 01/12/21 0017     COVID19 Not Detected     Influenza A PCR Not Detected     Influenza B PCR Not Detected    Narrative:      Fact sheet for providers: https://www.fda.gov/media/829750/download    Fact sheet for patients: https://www.fda.gov/media/496514/download    Test performed by PCR.          Imaging Results (Last 24 Hours)     Procedure Component Value Units Date/Time    XR Chest 1 View [[id number]] Resulted: 01/14/21 0410     Updated: 01/14/21 0430          Results for orders placed during the hospital encounter of 01/11/21   Transthoracic Echo Complete With Contrast if Necessary Per Protocol    Narrative · Left ventricular systolic function is normal. Estimated left ventricular   EF = 65%  · Left ventricular wall thickness is consistent with hypertrophy.  · Left atrial volume is mildly increased.  · The aortic valve is calcified. There is mild aortic stenosis present.   There is moderate aortic regurgitation present.  · Estimated  right ventricular systolic pressure from tricuspid   regurgitation is normal (<35 mmHg).          I have reviewed the medications:  Scheduled Meds:cefTRIAXone, 1 g, Intravenous, Q24H  doxycycline, 100 mg, Oral, Q12H  escitalopram, 5 mg, Oral, Daily  furosemide, 80 mg, Oral, Daily  gentamicin, , Topical, Daily  lidocaine, 1 patch, Transdermal, Nightly  metoprolol tartrate, 100 mg, Oral, Q12H  pantoprazole, 40 mg, Oral, Daily  senna-docusate sodium, 2 tablet, Oral, BID  sevelamer, 1,600 mg, Oral, TID With Meals  sodium chloride, 10 mL, Intravenous, Q12H  tacrolimus, 1 mg, Oral, Q12H  warfarin, 4 mg, Oral, Daily      Continuous Infusions:Pharmacy to dose warfarin,       PRN Meds:.•  acetaminophen **OR** acetaminophen **OR** acetaminophen  •  albuterol  •  bisacodyl  •  bisacodyl  •  melatonin  •  ondansetron **OR** ondansetron  •  Pharmacy to dose warfarin  •  Sodium Chloride (PF)  •  sodium chloride  •  UltraBag/Dianeal PD-2/1.5% Dex (pappas #[id number])    Assessment/Plan   Assessment & Plan     Active Hospital Problems    Diagnosis  POA   • Atypical pneumonia [J18.9]  Yes   • Acute UTI (urinary tract infection) [N39.0]  Yes   • Hypothyroidism [E03.9]  Yes   • Leukocytosis [D72.829]  Yes   • Type 2 diabetes mellitus (CMS/HCC) [E11.9]  Yes   • Chronic kidney disease, stage IV (severe) (CMS/HCC) [N18.4]  Yes   • Anemia of renal disease [N18.9, D63.1]  Yes      Resolved Hospital Problems   No resolved problems to display.        Brief Hospital Course to date:  Moni Wallace is a 80 y.o. female with h/o kidney transplant,  ESRD on PD, Afib, heart failure, AS, CAD, who presented with complaints of weeks of fatigue. In the ED she was found to have leukocytosis.    This patient's problems and plans were partially entered by my partner and updated as appropriate by me 01/14/21.      Plan:    Pneumonia  Leukocytosis  Elevated Procal   --CT chest shows multifocal patchy pneumonia   -- continue doxy, transition to PO Omnicef today,  stop Rocephin  -- MRSA PCR negative  -- repeat CBC with slight increase in WBC and CXR unremarkable    Pancreatic fullness  -CT abd no definite finding-prominence of pancreas that will need follow up outpatient with possible MRI to rule out a cancerous process. Discussed with patient.    Lung nodules   -- two 6mm nodule on R lung seen on CT chest on admission  -- needs followup CT chest on 6-12 months, also discussed with patient.    Renal nodule  -- consider US to further evaluate, IP vs OP, discussed with patient as   well.  Weakness  --Pt/OT    Hypothyroidism  --recent adjustment to meds, will need repeat TSH in 4-6 weeks from time of recent adjustment    CKD with PD  -Nephrology managing     AFib  --coumadin - INR therapeutic  --rate controlled     H/o kidney transplant with Possible Clinical Rejection  -- pt has been off her meds for a month or so  -- per nephrology may need short term steroid course if she developes fever and pain at her graft site     DVT Prophylaxis:  Coumadin     Disposition: I expect the patient to be discharged home tomorrow on PO ABX if she does well    CODE STATUS:   Code Status and Medical Interventions:   Ordered at: 01/12/21 0014     Code Status:    CPR     Medical Interventions (Level of Support Prior to Arrest):    Full       Cammy Davila MD  01/14/21

## 2021-01-15 NOTE — PROGRESS NOTES
Case Management Discharge Note      Final Note: Plan is home with son. Pt denies discharge needs. CM spoke with pt's son and she will transport pt home.         Selected Continued Care - Admitted Since 1/11/2021     Destination    No services have been selected for the patient.              Durable Medical Equipment    No services have been selected for the patient.              Dialysis/Infusion    No services have been selected for the patient.              Home Medical Care    No services have been selected for the patient.              Therapy    No services have been selected for the patient.              Community Resources    No services have been selected for the patient.                       Final Discharge Disposition Code: 01 - home or self-care

## 2021-01-15 NOTE — DISCHARGE SUMMARY
[facility] Medicine Services  DISCHARGE SUMMARY    Patient Name: Moni Wallace  : 1940  MRN: [id number]    Date of Admission: 2021  8:41 PM  Date of Discharge:  1/15/21  Primary Care Physician: Alex Walters MD    Consults     Date and Time Order Name Status Description    2021 0239 Inpatient Nephrology Consult            Hospital Course     Presenting Problem:   Pneumonia of both lower lobes due to infectious organism [J18.9]    Active Hospital Problems    Diagnosis  POA   • Atypical pneumonia [J18.9]  Yes   • Acute UTI (urinary tract infection) [N39.0]  Yes   • Hypothyroidism [E03.9]  Yes   • Leukocytosis [D72.829]  Yes   • Type 2 diabetes mellitus (CMS/HCC) [E11.9]  Yes   • Chronic kidney disease, stage IV (severe) (CMS/HCC) [N18.4]  Yes   • Anemia of renal disease [N18.9, D63.1]  Yes      Resolved Hospital Problems   No resolved problems to display.          Hospital Course:  Moni Wallace is a 80 y.o. female with h/o kidney transplant,  ESRD on PD, Afib, heart failure, AS, CAD, who presented with complaints of weeks of fatigue. In the ED she was found to have leukocytosis.         Pneumonia  Leukocytosis  Elevated Procal   --CT chest showed multifocal patchy pneumonia   -- continue doxy, transitioned to PO Omnicef  and stopped Rocephin. Pt to complete a 7 day course of ABX upon d/c  -- MRSA PCR negative  -- repeat CBC shows improvement in WBC and repeat CXR  unremarkable     Pancreatic fullness  -CT abd no definite finding-prominence of pancreas that will need follow up outpatient with possible MRI to rule out a cancerous process. Discussed with patient.     Lung nodules   -- two 6mm nodule on R lung seen on CT chest on admission  -- needs followup CT chest on 6-12 months, also discussed with patient.     Renal nodule  -- consider US to further evaluate, IP vs OP, discussed with patient as   well.    Weakness  --Pt/OT recommend home with  family     Hypothyroidism  --recent adjustment to meds, will need repeat TSH in 4-6 weeks from time of recent adjustment     CKD with PD  -Nephrology managed while inpatient  --NAL started pt on Reglan for possible gastroparesis. Will continue this upon d/c     AFib  --coumadin - INR therapeutic  --rate controlled      H/o kidney transplant with Possible Clinical Rejection  -- pt has been off her meds for a month or so  -- per nephrology may need short term steroid course if she developes fever and pain at her graft site       Discharge Follow Up Recommendations for outpatient labs/diagnostics:   1. Follow up with PCP within one week         Day of Discharge     HPI:   Doing really well this am, son is at bedside. Pt denies any issues overnight other than not being able to sleep well due to the noise.     Review of Systems  Gen- No fevers, chills  CV- No chest pain, palpitations  Resp- No cough, dyspnea  GI- No N/V/D, abd pain        Vital Signs:   Temp:  [97.6 °F (36.4 °C)-98.6 °F (37 °C)] 98.6 °F (37 °C)  Heart Rate:  [58-69] 68  Resp:  [18] 18  BP: (141-163)/(62-73) 141/62     Physical Exam:  Constitutional - no acute distress, frail appearing WF, up in chair at bedside  HEENT-NCAT, mucous membranes moist  CV-RRR  Resp-CTAB  Abd-soft, nontender, nondistended, normoactive bowel sounds, PD catheter  Ext-No lower extremity cyanosis, clubbing or edema bilaterally  Neuro-alert and oriented, speech clear, moves all extremities   Psych-normal affect   Skin- some bruising on arms and legs. Fistulas without thrill in both arms.    Pertinent  and/or Most Recent Results     LAB RESULTS:      Lab 01/15/21  0540 01/14/21  0734 01/13/21  0814 01/12/21  0704 01/11/21  2210   WBC 12.21* 13.96* 13.48*  --  19.16*   HEMOGLOBIN 9.1* 8.8* 9.2*  --  10.5*   HEMATOCRIT 30.3* 28.5* 30.7*  --  34.0   PLATELETS 280 272 299  --  313   NEUTROS ABS 8.11* 10.62* 9.79*  --  15.83*   IMMATURE GRANS (ABS) 0.24* 0.17* 0.17*  --  0.20*   LYMPHS  ABS 2.66 1.92 2.22  --  2.09   MONOS ABS 0.78 0.78 0.86  --  0.70   EOS ABS 0.36 0.42* 0.39  --  0.28   .4* 104.4* 104.4*  --  104.0*   PROCALCITONIN  --   --   --  0.43*  --    LACTATE  --   --   --   --  1.7   PROTIME 29.3* 23.5* 26.3* 25.9* 24.4*         Lab 01/15/21  0540 01/14/21 0734 01/13/21 0814 01/12/21 0704 01/11/21 2210 01/11/21 2113   SODIUM 135* 133* 134* 138 138  --    POTASSIUM 3.8 4.1 4.1 4.2 4.2  --    CHLORIDE 98 98 97* 99 98  --    CO2 21.0* 21.0* 21.0* 23.0 21.0*  --    ANION GAP 16.0* 14.0 16.0* 16.0* 19.0*  --    BUN 55* 57* 61* 65* 65*  --    CREATININE 8.52* 9.01* 8.76* 9.17* 9.19*  --    GLUCOSE 103* 144* 151* 121* 188*  --    CALCIUM 8.6 8.5* 8.6 8.4* 8.8  --    MAGNESIUM  --   --   --  2.6*  --   --    PHOSPHORUS  --  7.1*  --  6.8*  --   --    TSH  --   --   --   --   --  6.090*         Lab 01/14/21 0734 01/12/21  0704 01/11/21 2210   TOTAL PROTEIN  --   --  7.3   ALBUMIN 2.30* 2.60* 3.00*   GLOBULIN  --   --  4.3   ALT (SGPT)  --   --  13   AST (SGOT)  --   --  21   BILIRUBIN  --   --  0.2   ALK PHOS  --   --  172*   LIPASE  --   --  138*         Lab 01/15/21  0540 01/14/21 0734 01/13/21  0814 01/12/21  0704 01/11/21 2210   PROTIME 29.3* 23.5* 26.3* 25.9* 24.4*   INR 2.81* 2.13* 2.46* 2.41* 2.23*                 Brief Urine Lab Results  (Last result in the past 365 days)      Color   Clarity   Blood   Leuk Est   Nitrite   Protein   CREAT   Urine HCG        01/11/21 2221 Yellow Turbid Small (1+) Large (3+) Negative 100 mg/dL (2+)             Microbiology Results (last 10 days)     Procedure Component Value - Date/Time    MRSA Screen, PCR (Inpatient) - Swab, Nares [[id number]]  (Normal) Collected: 01/12/21 1451    Lab Status: Final result Specimen: Swab from Nares Updated: 01/12/21 1604     MRSA PCR Negative    Narrative:      MRSA Negative    Blood Culture - Blood, Arm, Right [[id number]] Collected: 01/11/21 2335    Lab Status: Preliminary result Specimen: Blood from Arm,  Right Updated: 01/15/21 0000     Blood Culture No growth at 3 days    COVID-19 and FLU A/B PCR - Swab, Nasopharynx [[id number]]  (Normal) Collected: 01/11/21 2327    Lab Status: Final result Specimen: Swab from Nasopharynx Updated: 01/12/21 0017     COVID19 Not Detected     Influenza A PCR Not Detected     Influenza B PCR Not Detected    Narrative:      Fact sheet for providers: https://www.fda.gov/media/953034/download    Fact sheet for patients: https://www.fda.gov/media/610158/download    Test performed by PCR.    Blood Culture - Blood, Wrist, Right [[id number]] Collected: 01/11/21 2325    Lab Status: Preliminary result Specimen: Blood from Wrist, Right Updated: 01/15/21 0000     Blood Culture No growth at 3 days    Urine Culture - Urine, Urine, Catheter [[id number]]  (Normal) Collected: 01/11/21 2221    Lab Status: Final result Specimen: Urine, Catheter Updated: 01/13/21 1138     Urine Culture No growth          Ct Abdomen Pelvis Without Contrast    Result Date: 1/11/2021  CT Abdomen Pelvis WO INDICATION: Weakness and nausea TECHNIQUE: CT of the abdomen and pelvis without IV contrast. Coronal and sagittal reconstructions were obtained.  Radiation dose reduction techniques included automated exposure control or exposure modulation based on body size. Count of known CT and cardiac nuc med studies performed in previous 12 months: 0. COMPARISON: CT of the abdomen and pelvis from 2/13/2015 FINDINGS: Abdomen: Small pleural based nodule is seen in the right posterior lung field measuring about 4 mm. There are nonspecific tree-in-bud opacity is seen involving the left posterior inferior lung field. There is diffuse coronary artery calcification. There is a diverticulum off of the posterior wall of the stomach. The kidneys are grossly atrophic. There is a probable left adrenal adenoma. There is prominence to the body of the pancreas best identified on axial series 2 images 53 through 57. This measures up to 1.2 cm. This  appears to be new since the prior CT. There is no evidence of intra-abdominal lymphadenopathy. There is a probable septated large cyst on the left is incompletely evaluated. Pelvis: Transplant kidney in the right renal pelvis appears atrophic. Mild compression deformity seen involving the superior endplate of T11. There is a left peritoneal dialysis catheter. There is some ascites. There is a small herniation in the right anterior pelvic abdominal wall that contains fat and fluid.     1. No definite acute intra-abdominal abnormality appreciated. Findings within the left lung may represent bronchiolitis. There may be potential underlying atypical infection. Consider clinical correlation and follow-up. 2. There is prominence to the body of the patient's pancreatitis as described above. It is uncertain if this may represent an underlying lesion or represent prominence of the pancreatic duct. Consider follow-up nonemergent MRI. 3. Prominent left renal cyst may be septated/complex. Consider follow-up ultrasound or MRI. Signer Name: Sivan Buitrago MD  Signed: 1/11/2021 10:25 PM  Workstation Name: [id number]  [facility]    Ct Chest Without Contrast Diagnostic    Result Date: 1/11/2021  CT Chest WO INDICATION: Generalized weakness with nausea for 8 days. TECHNIQUE: CT of the thorax without IV contrast. Coronal and sagittal reconstructions were obtained.  Radiation dose reduction techniques included automated exposure control or exposure modulation based on body size. Count of known CT and cardiac nuc med studies performed in previous 12 months: 0. COMPARISON: 2/16/2015 FINDINGS: There is atherosclerotic disease and coronary artery disease. No pleural or pericardial effusion is identified. There is no adenopathy. There is a left lower lobe calcified granuloma. There is a minimal amount of tree-in-bud and groundglass opacity in a patchy distribution both lungs compatible with a very mild multifocal  pneumonia. Imaging features are atypical or uncommonly reported for COVID-19 pneumonia. Alternative diagnoses should be considered. There is a 6 mm pleural-based right upper lobe nodule. There is also a nodule along the major fissure in the right lung measuring 6 mm. Consider chest CT follow-up in 6-12 months. Lungs are otherwise clear. Diffuse degenerative disease is noted in the thoracic spine. Please see abdomen pelvis CT report dictated separately.     1. Mild bilateral multifocal patchy pneumonia. 2. Atherosclerotic disease and coronary artery disease. 3. Two 6 mm noncalcified right lung nodules. Consider chest CT follow-up in 6-12 months. Recommend consideration for referral to [facility]. For questions or to make appointment call [phone]. Signer Name: Kemar Oliva MD  Signed: 1/11/2021 10:18 PM  Workstation Name: [email or web address]  [facility]    Xr Chest 1 View    Result Date: 1/14/2021  EXAMINATION: XR CHEST 1 VW-  INDICATION: pneumonia; J18.9-Pneumonia, unspecified organism; A41.9-Sepsis, unspecified organism; N18.9-Chronic kidney disease, unspecified; N39.0-Urinary tract infection, site not specified; Z99.2-Dependence on renal dialysis  COMPARISON: 5/24/2019  FINDINGS: Minimal scattered interstitial changes are noted, without focal lobar consolidation. There is no significant pleural effusion or distinct pneumothorax. Heart and mediastinal contours are unchanged.      Stable mild chronic changes of the lung fields without evidence of acute disease in the chest otherwise.  This report was finalized on 1/14/2021 9:04 AM by Adryan Wilson.        Results for orders placed during the hospital encounter of 05/09/17   Duplex Carotid Ultrasound CAR    Narrative · Proximal right internal carotid artery plaque without significant   stenosis.  · Right internal carotid artery stenosis of 0-49%.  · Proximal left internal carotid artery plaque without significant    stenosis.  · Left internal carotid artery stenosis of 0-49%.          Results for orders placed during the hospital encounter of 05/09/17   Duplex Carotid Ultrasound CAR    Narrative · Proximal right internal carotid artery plaque without significant   stenosis.  · Right internal carotid artery stenosis of 0-49%.  · Proximal left internal carotid artery plaque without significant   stenosis.  · Left internal carotid artery stenosis of 0-49%.          Results for orders placed during the hospital encounter of 01/11/21   Transthoracic Echo Complete With Contrast if Necessary Per Protocol    Narrative · Left ventricular systolic function is normal. Estimated left ventricular   EF = 65%  · Left ventricular wall thickness is consistent with hypertrophy.  · Left atrial volume is mildly increased.  · The aortic valve is calcified. There is mild aortic stenosis present.   There is moderate aortic regurgitation present.  · Estimated right ventricular systolic pressure from tricuspid   regurgitation is normal (<35 mmHg).          Plan for Follow-up of Pending Labs/Results:  Pending Labs     Order Current Status    Blood Culture - Blood, Arm, Right Preliminary result    Blood Culture - Blood, Wrist, Right Preliminary result        Discharge Details        Discharge Medications      New Medications      Instructions Start Date   cefdinir 300 MG capsule  Commonly known as: OMNICEF   300 mg, Oral, Nightly      doxycycline 100 MG capsule  Commonly known as: MONODOX   100 mg, Oral, Every 12 Hours Scheduled      metoclopramide 5 MG tablet  Commonly known as: REGLAN   5 mg, Oral, 3 Times Daily Before Meals         Changes to Medications      Instructions Start Date   metoprolol tartrate 50 MG tablet  Commonly known as: LOPRESSOR  What changed: how much to take   50 mg, Oral, Every 12 Hours Scheduled         Continue These Medications      Instructions Start Date   acetaminophen 325 MG tablet  Commonly known as: TYLENOL   650 mg,  Oral, Every 4 Hours PRN      albuterol sulfate  (90 Base) MCG/ACT inhaler  Commonly known as: ProAir HFA   2 puffs, Inhalation, Every 4 Hours PRN      albuterol (2.5 MG/3ML) 0.083% nebulizer solution  Commonly known as: PROVENTIL   2.5 mg, Nebulization, Every 4 Hours PRN      escitalopram 5 MG tablet  Commonly known as: Lexapro   5 mg, Oral, Every Morning      furosemide 40 MG tablet  Commonly known as: LASIX   80 mg, Oral, Daily      gentamicin 0.1 % cream  Commonly known as: GARAMYCIN   APPLY TO EXIT SITE DAILY      omeprazole 40 MG capsule  Commonly known as: priLOSEC   TAKE ONE CAPSULE BY MOUTH DAILY      sevelamer 800 MG tablet  Commonly known as: RENVELA   1,600 mg, Oral, 3 Times Daily      tacrolimus 1 MG capsule  Commonly known as: PROGRAF   1 mg, Oral, 2 Times Daily      temazepam 15 MG capsule  Commonly known as: RESTORIL   15 mg, Oral, Nightly PRN      warfarin 2 MG tablet  Commonly known as: COUMADIN   TAKE ONE TO TWO TABLETS BY MOUTH EVERY DAY OR AS DIRECTED BY ANTICOAGULATION CLINIC             Allergies   Allergen Reactions   • Hydrocodone Itching   • Statins Other (See Comments)     myalgia   • Codeine Rash   • Sulfa Antibiotics Rash         Discharge Disposition:      Diet:  Hospital:  Diet Order   Procedures   • Diet Regular; Cardiac, Consistent Carbohydrate, Renal       Activity:      Restrictions or Other Recommendations:         CODE STATUS:    Code Status and Medical Interventions:   Ordered at: 01/12/21 0014     Code Status:    CPR     Medical Interventions (Level of Support Prior to Arrest):    Full       Future Appointments   Date Time Provider Department Center   5/14/2021 11:00 AM Alex Walters MD [facility]   12/14/2021 11:30 AM Jeff Joe IV, MD [facility] None                 Cammy Davila MD  01/15/21      Time Spent on Discharge:  I spent  35  minutes on this discharge activity which included: face-to-face encounter with the patient, reviewing  the data in the system, coordination of the care with the nursing staff as well as consultants, documentation, and entering orders.

## 2021-01-15 NOTE — PROGRESS NOTES
"   LOS: 4 days    Patient Care Team:  Alex Walters MD as PCP - General (Internal Medicine)  Jeff Joe IV, MD as Consulting Physician (Cardiology)  Donny Hodges MD as Consulting Physician (Nephrology)  Cory Castillo MD as Surgeon (General Surgery)  Slim Wick MD    Reason For Visit:  F/U ESRD ON PD.  Subjective           Review of Systems:    Pulm: No soa   CV:  No CP      Objective     cefdinir, 300 mg, Oral, Nightly  doxycycline, 100 mg, Oral, Q12H  escitalopram, 5 mg, Oral, Daily  furosemide, 80 mg, Oral, Daily  gentamicin, , Topical, Daily  lidocaine, 1 patch, Transdermal, Nightly  metoclopramide, 5 mg, Oral, TID AC  metoprolol tartrate, 100 mg, Oral, Q12H  pantoprazole, 40 mg, Oral, Daily  senna-docusate sodium, 2 tablet, Oral, BID  sevelamer, 1,600 mg, Oral, TID With Meals  sodium chloride, 10 mL, Intravenous, Q12H  tacrolimus, 1 mg, Oral, Q12H  warfarin, 3 mg, Oral, Once  [START ON 1/16/2021] warfarin, 4 mg, Oral, Daily      Pharmacy to dose warfarin,           Vital Signs:  Blood pressure 122/59, pulse 64, temperature 98.6 °F (37 °C), temperature source Oral, resp. rate 18, height 162.6 cm (64\"), weight 95.3 kg (210 lb), SpO2 93 %, not currently breastfeeding.    Flowsheet Rows      First Filed Value   Admission Height  162.6 cm (64\") Documented at 01/11/2021 2047   Admission Weight  86.2 kg (190 lb) Documented at 01/11/2021 2047 01/14 0701 - 01/15 0700  In: 6240 [P.O.:240]  Out: 9000 [Urine:100]    Physical Exam:    General Appearance: NAD, alert and cooperative, Ox3  Eyes: PER, conjunctivae and sclerae normal, no icterus  Lungs: respirations regular and unlabored, no crepitus, clear to auscultation  Heart/CV: regular rhythm & normal rate, no murmur, no gallop, no rub and no edema  Abdomen: not distended, soft, non-tender, no masses,  bowel sounds present. PD CATH.  Skin: No rash, Warm and dry    Radiology:            Labs:  Results from last 7 days "   Lab Units 01/15/21  0540 01/14/21  0734 01/13/21  0814   WBC 10*3/mm3 12.21* 13.96* 13.48*   HEMOGLOBIN g/dL 9.1* 8.8* 9.2*   HEMATOCRIT % 30.3* 28.5* 30.7*   PLATELETS 10*3/mm3 280 272 299     Results from last 7 days   Lab Units 01/15/21  0540 01/14/21  0734 01/13/21  0814 01/12/21  0704 01/11/21  2210   SODIUM mmol/L 135* 133* 134* 138 138   POTASSIUM mmol/L 3.8 4.1 4.1 4.2 4.2   CHLORIDE mmol/L 98 98 97* 99 98   CO2 mmol/L 21.0* 21.0* 21.0* 23.0 21.0*   BUN mg/dL 55* 57* 61* 65* 65*   CREATININE mg/dL 8.52* 9.01* 8.76* 9.17* 9.19*   CALCIUM mg/dL 8.6 8.5* 8.6 8.4* 8.8   PHOSPHORUS mg/dL  --  7.1*  --  6.8*  --    MAGNESIUM mg/dL  --   --   --  2.6*  --    ALBUMIN g/dL  --  2.30*  --  2.60* 3.00*     Results from last 7 days   Lab Units 01/15/21  0540   GLUCOSE mg/dL 103*       Results from last 7 days   Lab Units 01/11/21  2210   ALK PHOS U/L 172*   BILIRUBIN mg/dL 0.2   ALT (SGPT) U/L 13   AST (SGOT) U/L 21           Results from last 7 days   Lab Units 01/11/21  2221   COLOR UA  Yellow   CLARITY UA  Turbid*   PH, URINE  6.0   SPECIFIC GRAVITY, URINE  1.013   GLUCOSE UA  Negative   KETONES UA  Negative   BILIRUBIN UA  Negative   PROTEIN UA  100 mg/dL (2+)*   BLOOD UA  Small (1+)*   LEUKOCYTES UA  Large (3+)*   NITRITE UA  Negative       Estimated Creatinine Clearance: 6 mL/min (A) (by C-G formula based on SCr of 8.52 mg/dL (H)).      Assessment       Type 2 diabetes mellitus (CMS/[facility])    Chronic kidney disease, stage IV (severe) (CMS/[facility])    Anemia of renal disease    Leukocytosis    Hypothyroidism    Atypical pneumonia    Acute UTI (urinary tract infection)            Impression: ESRD ON PD. ANEMIA.            Recommendations: HOME OK WITH RENAL. RESUME CCPD AT HOME AND F/U WITH [facility].      Adryan Eden MD  01/15/21  13:04 EST

## 2021-01-15 NOTE — PLAN OF CARE
VSS, rested on/off, pt c/o headache prn given, uop sufficient, continues on PD @ 6,12,18,00, SR, RA, prn laxative given with no results, no other issues/concerns, will monitor        Problem: Adult Inpatient Plan of Care  Goal: Plan of Care Review  Outcome: Ongoing, Progressing  Flowsheets  Taken 1/15/2021 0337 by Estelle Hughes RN  Progress: no change  Taken 1/14/2021 1519 by Cely Sorenson RN  Plan of Care Reviewed With: patient  Goal: Patient-Specific Goal (Individualized)  Outcome: Ongoing, Progressing  Goal: Absence of Hospital-Acquired Illness or Injury  Outcome: Ongoing, Progressing  Intervention: Identify and Manage Fall Risk  Recent Flowsheet Documentation  Taken 1/15/2021 0200 by Estelle Hughes RN  Safety Promotion/Fall Prevention:   activity supervised   assistive device/personal items within reach   clutter free environment maintained   fall prevention program maintained   gait belt   lighting adjusted   nonskid shoes/slippers when out of bed   room organization consistent   safety round/check completed   toileting scheduled  Taken 1/15/2021 0000 by Estelle Hughes RN  Safety Promotion/Fall Prevention:   activity supervised   assistive device/personal items within reach   clutter free environment maintained   fall prevention program maintained   gait belt   lighting adjusted   nonskid shoes/slippers when out of bed   room organization consistent   safety round/check completed   toileting scheduled  Taken 1/14/2021 2200 by Estelle Hughes RN  Safety Promotion/Fall Prevention:   activity supervised   assistive device/personal items within reach   clutter free environment maintained   fall prevention program maintained   gait belt   lighting adjusted   nonskid shoes/slippers when out of bed   room organization consistent   safety round/check completed   toileting scheduled  Taken 1/14/2021 1945 by Estelle Hugehs RN  Safety Promotion/Fall Prevention:   activity supervised   assistive  device/personal items within reach   clutter free environment maintained   fall prevention program maintained   gait belt   lighting adjusted   nonskid shoes/slippers when out of bed   room organization consistent   safety round/check completed   toileting scheduled  Intervention: Prevent Skin Injury  Recent Flowsheet Documentation  Taken 1/15/2021 0200 by Estelle Hughes RN  Body Position: position changed independently  Taken 1/15/2021 0000 by Estelle Hughes RN  Body Position: position changed independently  Taken 1/14/2021 2200 by Estelle Hughes RN  Body Position: position changed independently  Taken 1/14/2021 1945 by Estelle Hughes RN  Body Position: position changed independently  Intervention: Prevent and Manage VTE (venous thromboembolism) Risk  Recent Flowsheet Documentation  Taken 1/14/2021 1945 by Estelle Hughes RN  VTE Prevention/Management: (coumadin)   bilateral   dorsiflexion/plantar flexion performed   sequential compression devices off   bleeding risk factor(s) identified  Goal: Optimal Comfort and Wellbeing  Outcome: Ongoing, Progressing  Intervention: Provide Person-Centered Care  Recent Flowsheet Documentation  Taken 1/14/2021 1945 by Estelle Hughes RN  Trust Relationship/Rapport:   care explained   choices provided   emotional support provided   empathic listening provided   questions answered   questions encouraged   reassurance provided   thoughts/feelings acknowledged  Goal: Readiness for Transition of Care  Outcome: Ongoing, Progressing     Problem: Skin Injury Risk Increased  Goal: Skin Health and Integrity  Outcome: Ongoing, Progressing  Intervention: Optimize Skin Protection  Recent Flowsheet Documentation  Taken 1/15/2021 0200 by Estelle Hughes RN  Pressure Reduction Techniques:   frequent weight shift encouraged   heels elevated off bed   pressure points protected   weight shift assistance provided  Pressure Reduction Devices:   pressure-redistributing  mattress utilized   positioning supports utilized   heel offloading device utilized  Skin Protection: (refused heel boots)   adhesive use limited   incontinence pads utilized   tubing/devices free from skin contact  Taken 1/15/2021 0000 by Estelle Hughes RN  Pressure Reduction Techniques:   frequent weight shift encouraged   heels elevated off bed   weight shift assistance provided   pressure points protected  Pressure Reduction Devices:   pressure-redistributing mattress utilized   positioning supports utilized   heel offloading device utilized  Skin Protection: (refused heel boots)   adhesive use limited   incontinence pads utilized   tubing/devices free from skin contact  Taken 1/14/2021 2200 by Estelle Hughes RN  Pressure Reduction Techniques:   frequent weight shift encouraged   heels elevated off bed   pressure points protected   weight shift assistance provided  Pressure Reduction Devices:   pressure-redistributing mattress utilized   positioning supports utilized   heel offloading device utilized  Skin Protection: (refused heel boots)   adhesive use limited   incontinence pads utilized   tubing/devices free from skin contact  Taken 1/14/2021 1945 by Estelle Hughes RN  Pressure Reduction Techniques:   frequent weight shift encouraged   pressure points protected   weight shift assistance provided   heels elevated off bed  Pressure Reduction Devices:   pressure-redistributing mattress utilized   positioning supports utilized  Skin Protection: (refused heel boots)   adhesive use limited   incontinence pads utilized   tubing/devices free from skin contact     Problem: Adjustment to Illness (Chronic Kidney Disease)  Goal: Optimal Coping with Chronic Illness  Outcome: Ongoing, Progressing  Intervention: Support and Optimize Psychosocial Response to Chronic Illness  Recent Flowsheet Documentation  Taken 1/14/2021 1945 by Estelle Hughes RN  Family/Support System Care: self-care encouraged     Problem:  Electrolyte Imbalance (Chronic Kidney Disease)  Goal: Electrolyte Balance  Outcome: Ongoing, Progressing     Problem: Fluid Volume Excess (Chronic Kidney Disease)  Goal: Fluid Balance  Outcome: Ongoing, Progressing  Intervention: Monitor and Manage Hypervolemia  Recent Flowsheet Documentation  Taken 1/15/2021 0200 by Estelle Hughes RN  Skin Protection: (refused heel boots)   adhesive use limited   incontinence pads utilized   tubing/devices free from skin contact  Taken 1/15/2021 0000 by Estelle Hughes RN  Skin Protection: (refused heel boots)   adhesive use limited   incontinence pads utilized   tubing/devices free from skin contact  Taken 1/14/2021 2200 by Estelle Hughes RN  Skin Protection: (refused heel boots)   adhesive use limited   incontinence pads utilized   tubing/devices free from skin contact  Taken 1/14/2021 1945 by Estelle Hughes RN  Skin Protection: (refused heel boots)   adhesive use limited   incontinence pads utilized   tubing/devices free from skin contact     Problem: Functional Decline (Chronic Kidney Disease)  Goal: Optimal Functional Ability  Outcome: Ongoing, Progressing  Intervention: Optimize Functional Ability  Recent Flowsheet Documentation  Taken 1/15/2021 0200 by Estelle Hughes RN  Activity Management: activity adjusted per tolerance  Taken 1/15/2021 0000 by Estelle Hughes RN  Activity Management: activity adjusted per tolerance  Taken 1/14/2021 2200 by Estelle Hughes RN  Activity Management: activity adjusted per tolerance  Taken 1/14/2021 1945 by Estelle Hughes RN  Activity Management: activity adjusted per tolerance     Problem: Hematologic Alteration (Chronic Kidney Disease)  Goal: Absence of Anemia Signs and Symptoms  Outcome: Ongoing, Progressing     Problem: Oral Intake Inadequate (Chronic Kidney Disease)  Goal: Optimal Oral Intake  Outcome: Ongoing, Progressing     Problem: Renal Function Impairment (Chronic Kidney Disease)  Goal: Laboratory  Values and Blood Pressure Within Desired Range  Outcome: Ongoing, Progressing  Intervention: Monitor and Support Renal Function  Recent Flowsheet Documentation  Taken 1/14/2021 1945 by Estelle Hughes RN  Medication Review/Management:   medications reviewed   high-risk medications identified     Problem: Fall Injury Risk  Goal: Absence of Fall and Fall-Related Injury  Outcome: Ongoing, Progressing  Intervention: Identify and Manage Contributors to Fall Injury Risk  Recent Flowsheet Documentation  Taken 1/14/2021 1945 by Estelle Hughes RN  Medication Review/Management:   medications reviewed   high-risk medications identified  Intervention: Promote Injury-Free Environment  Recent Flowsheet Documentation  Taken 1/15/2021 0200 by Estelle Hughes RN  Safety Promotion/Fall Prevention:   activity supervised   assistive device/personal items within reach   clutter free environment maintained   fall prevention program maintained   gait belt   lighting adjusted   nonskid shoes/slippers when out of bed   room organization consistent   safety round/check completed   toileting scheduled  Taken 1/15/2021 0000 by Estelle Hughes RN  Safety Promotion/Fall Prevention:   activity supervised   assistive device/personal items within reach   clutter free environment maintained   fall prevention program maintained   gait belt   lighting adjusted   nonskid shoes/slippers when out of bed   room organization consistent   safety round/check completed   toileting scheduled  Taken 1/14/2021 2200 by Estelle Hughes RN  Safety Promotion/Fall Prevention:   activity supervised   assistive device/personal items within reach   clutter free environment maintained   fall prevention program maintained   gait belt   lighting adjusted   nonskid shoes/slippers when out of bed   room organization consistent   safety round/check completed   toileting scheduled  Taken 1/14/2021 1945 by Estelle Hughes RN  Safety Promotion/Fall  Prevention:   activity supervised   assistive device/personal items within reach   clutter free environment maintained   fall prevention program maintained   gait belt   lighting adjusted   nonskid shoes/slippers when out of bed   room organization consistent   safety round/check completed   toileting scheduled   Goal Outcome Evaluation:  Plan of Care Reviewed With: patient  Progress: no change

## 2021-01-15 NOTE — TELEPHONE ENCOUNTER
Patient d/c from [facility] today. Given rxs for Cefdinir 300 mg and Doxycycline 100 mg. Have called and spoke with patient to request she check INR on  this Monday, 1/18. She is agreeable.     Of note, post discharge per her report she has been instructed to resume  home regimen of warfarin 4 mg daily.    Inpatient dosing and INRs:    Warfarin Dosing During Admission:     Date  1/11 1/12 1/13 1/14 1/15   INR  2.23 2.41 2.46 2.13 2.81   Dose  -- 4mg 4mg 4mg (3mg)

## 2021-01-15 NOTE — PLAN OF CARE
Problem: Adult Inpatient Plan of Care  Goal: Plan of Care Review  Recent Flowsheet Documentation  Taken 1/15/2021 0841 by Marlyn Reid, PT  Progress: improving  Plan of Care Reviewed With: patient  Outcome Summary: Transf to sitting EOB w/ min A, STS & SPT to chair via pivoting sidesteps L to recliner w/ min HHA (decl,. AD), + performed ther exer all extrem x 10 w/ rests d/t desat to 93% on RA (encouraged PLB).   Goal Outcome Evaluation:  Plan of Care Reviewed With: patient  Progress: improving  Outcome Summary: Transf to sitting EOB w/ min A, STS & SPT to chair via pivoting sidesteps L to recliner w/ min HHA (decl,. AD), + performed ther exer all extrem x 10 w/ rests d/t desat to 93% on RA (encouraged PLB).

## 2021-01-15 NOTE — NURSING NOTE
PD cath:  Instructed family and pt on removal of hospital [facility] transfer set when returning home so pt can use cycler pd machine.

## 2021-01-15 NOTE — THERAPY TREATMENT NOTE
Patient Name: Moni Wallace  : 1940    MRN: [id number]                              Today's Date: 1/15/2021       Admit Date: 2021    Visit Dx:     ICD-10-CM ICD-9-CM   1. Pneumonia of both lower lobes due to infectious organism  J18.9 486   2. Sepsis, due to unspecified organism, unspecified whether acute organ dysfunction present (CMS/HCC)  A41.9 038.9     995.91   3. Chronic renal failure, unspecified CKD stage  N18.9 585.9   4. Urinary tract infection without hematuria, site unspecified  N39.0 599.0   5. Peritoneal dialysis status (CMS/HCC)  Z99.2 V45.11     Patient Active Problem List   Diagnosis   • Paroxysmal atrial fibrillation (CMS/[facility])   • Essential hypertension   • Type 2 diabetes mellitus (CMS/HCC)   • Chronic kidney disease, stage IV (severe) (CMS/HCC)   • Anemia of renal disease   • Hyperparathyroidism (CMS/HCC)   • Asthma   • Right-sided carotid artery disease (CMS/HCC)   • High output HF (heart failure) (CMS/HCC)   • Vitamin D deficiency   • Leukocytosis   • Nonrheumatic aortic valve stenosis   • Ulcer of gastric fundus   • Tubular adenoma   • Macular degeneration   • Hypothyroidism   • Chronic kidney disease   • Screen for colon cancer   • Postoperative anemia due to acute blood loss   • Hyperlipidemia LDL goal <100   • Morbidly obese (CMS/HCC)   • Atypical pneumonia   • Acute UTI (urinary tract infection)     Past Medical History:   Diagnosis Date   • Adenomatous colon polyp    • Chronic kidney disease    • Clostridium difficile infection 2017   • Diabetes mellitus (CMS/[facility])    • Gastric polyps    • Hypothyroidism    • IgA nephropathy, acute    • Kidney transplanted    • Macular degeneration    • Paroxysmal atrial fibrillation (CMS/[facility])    • Tubular adenoma     excision    • Ulcer of gastric fundus    • Vitamin D deficiency      Past Surgical History:   Procedure Laterality Date   • BREAST BIOPSY Left    • CARPAL TUNNEL RELEASE     • CARPAL TUNNEL RELEASE Right    • CATARACT  EXTRACTION     • CATARACT EXTRACTION Bilateral    • DIAGNOSTIC LAPAROSCOPY     • DIALYSIS FISTULA CREATION     • HERNIA REPAIR  85 and 86   • KNEE ARTHROSCOPY INCISION AND DRAINAGE OF KNEE Right 5/18/2019    Procedure: KNEE ARTHROSCOPY INCISION AND DRAINAGE OF KNEE;  Surgeon: Paco Lester MD;  Location: [facility] OR;  Service: Orthopedics   • LAPAROSCOPIC TUBAL LIGATION  1985   • TOTAL KNEE ARTHROPLASTY     • TOTAL KNEE ARTHROPLASTY      Left knee    • TRANSPLANTATION RENAL  2010   • TRANSPLANTATION RENAL Right    • TRIGGER FINGER RELEASE     • TUBAL ABDOMINAL LIGATION     • UPPER GASTROINTESTINAL ENDOSCOPY  05/20/2013   • VENOUS ACCESS DEVICE (PORT) INSERTION N/A 5/23/2019    Procedure: INSERTION GROSHONG;  Surgeon: Rolando Begum MD;  Location:  [facility] OR;  Service: General     General Information     Row Name 01/15/21 0841          Physical Therapy Time and Intention    Document Type  therapy note (daily note)  -DM     Mode of Treatment  physical therapy  -DM     Row Name 01/15/21 0841          General Information    Existing Precautions/Restrictions  fall;other (see comments) perit.dial;PAF; Failed R knee inject.(non-amb; w/c mob.);yeung.L shldr mvmt (arthr.); mac deg.  -DM       User Key  (r) = Recorded By, (t) = Taken By, (c) = Cosigned By    Initials Name Provider Type    DM Marlyn Reid, PT Physical Therapist        Mobility     Row Name 01/15/21 0841          Bed Mobility    Bed Mobility  rolling right;scooting/bridging;supine-sit  -DM     Rolling Right [place] (Bed Mobility)  verbal cues;minimum assist (75% patient effort)  -DM     Scooting/Bridging [place] (Bed Mobility)  verbal cues;minimum assist (75% patient effort)  -DM     Supine-Sit [place] (Bed Mobility)  verbal cues;moderate assist (50% patient effort) mod A w/ draw sheet(slow transit mvmt; crepitus noted)  -DM     Assistive Device (Bed Mobility)  bed rails;head of bed elevated;draw sheet  -DM     Comment (Bed  Mobility)  HA pain intermitt(tylenol);tele. roseanna. changed; PCT had spilled h20 (residual on floor; cleaned);soiled chair pad changed;will have P.D.@ noon  -DM     Row Name 01/15/21 0841          Transfers    Comment (Transfers)  init. decl. gt belt (coaxed to allow plcmt); Cues for HP, seq.  -DM     Row Name 01/15/21 0841          Bed-Chair Transfer    Bed-Chair [place] (Transfers)  verbal cues;minimum assist (75% patient effort) piv.sidesteeps to chair@FOB  -DM     Assistive Device (Bed-Chair Transfers)  other (see comments) gt belt; decl AD  -DM     Row Name 01/15/21 0841          Sit-Stand Transfer    Sit-Stand [place] (Transfers)  verbal cues;minimum assist (75% patient effort) R bedrail, L EOB;partial stance p/t init SPT to recliner  -DM     Assistive Device (Sit-Stand Transfers)  other (see comments) gt belt;decl AD  -DM     Row Name 01/15/21 0841          Gait/Stairs (Locomotion)    [place] Level (Gait)  unable to assess Non-amb.  -DM       User Key  (r) = Recorded By, (t) = Taken By, (c) = Cosigned By    Initials Name Provider Type    DM Marlyn Reid, PT Physical Therapist        Obj/Interventions     Row Name 01/15/21 0841          Motor Skills    Therapeutic Exercise  hip;knee;ankle;shoulder  -DM     Row Name 01/15/21 0841          Shoulder (Therapeutic Exercise)    Shoulder (Therapeutic Exercise)  AROM (active range of motion)  -DM     Shoulder AROM (Therapeutic Exercise)  right;flexion;extension;aBduction;aDduction;horizontal aBduction/aDduction;10 repetitions;sitting also B biceps curls;Decl. L Shldr d/t arthr.  -DM     Row Name 01/15/21 0841          Hip (Therapeutic Exercise)    Hip (Therapeutic Exercise)  AROM (active range of motion);isometric exercises  -DM     Hip AROM (Therapeutic Exercise)  bilateral;flexion;extension;aBduction;aDduction;external rotation;internal rotation;sitting;10 repetitions bridging x 1; sitting marches  -DM     Hip Isometrics (Therapeutic Exercise)   bilateral;gluteal sets;sitting;10 repetitions & abdom sets  -DM     Row Name 01/15/21 0841          Knee (Therapeutic Exercise)    Knee (Therapeutic Exercise)  AROM (active range of motion);isometric exercises  -DM     Knee AROM (Therapeutic Exercise)  bilateral;flexion;extension;SAQ (short arc quad);LAQ (long arc quad);heel slides;sitting;10 repetitions  -DM     Knee Isometrics (Therapeutic Exercise)  bilateral;hamstring sets;quad sets;sitting;10 repetitions  -DM     Row Name 01/15/21 0841          Ankle (Therapeutic Exercise)    Ankle (Therapeutic Exercise)  AROM (active range of motion)  -DM     Ankle AROM (Therapeutic Exercise)  bilateral;dorsiflexion;plantarflexion;sitting;10 repetitions & AC,TC  -DM     Row Name 01/15/21 0841          Balance    Balance Assessment  sitting static balance;sitting dynamic balance;standing dynamic balance;standing static balance  -DM     Static Sitting Balance  WNL;unsupported;sitting, edge of bed LE exer; PLB  -DM     Dynamic Sitting Balance  WNL;unsupported;sitting, edge of bed;sitting in chair recip scooting EOB,UIC  -DM     Static Standing Balance  mild impairment;standing;supported trunk ext (using R bedrail,Min HHA on L  -DM     Dynamic Standing Balance  mild impairment;supported;standing WS to init pivot sidesteps L  -DM       User Key  (r) = Recorded By, (t) = Taken By, (c) = Cosigned By    Initials Name Provider Type    DM Marlyn Reid, PT Physical Therapist        Goals/Plan    No documentation.       Clinical Impression     Row Name 01/15/21 0841          Pain    Additional Documentation  Pain Scale: FACES Pre/Post-Treatment (Group)  -DM     Row Name 01/15/21 0841          Pain Scale: Numbers Pre/Post-Treatment    Pain Location  head  -DM     Pain Intervention(s)  Medication (See MAR);Repositioned;Rest;Elevated  -DM     Row Name 01/15/21 0841          Pain Scale: FACES Pre/Post-Treatment    Pain: FACES Scale, Pretreatment  0-->no hurt  -DM     Posttreatment Pain  Rating  2-->hurts little bit  -DM     Row Name 01/15/21 0841          Plan of Care Review    Plan of Care Reviewed With  patient  -DM     Progress  improving  -DM     Outcome Summary  Transf to sitting EOB w/ min A, STS & SPT to chair via pivoting sidesteps L to recliner w/ min HHA (decl,. AD), + performed ther exer all extrem x 10 w/ rests d/t desat to 93% on RA (encouraged PLB).  -DM     Row Name 01/15/21 0841          Vital Signs    Pre Systolic BP Rehab  141  -DM     Pre Treatment Diastolic BP  62  -DM     Post Systolic BP Rehab  125  -DM     Post Treatment Diastolic BP  71  -DM     Pretreatment Heart Rate (beats/min)  62  -DM     Intratreatment Heart Rate (beats/min)  68  -DM     Posttreatment Heart Rate (beats/min)  63  -DM     Pre SpO2 (%)  94  -DM     O2 Delivery Pre Treatment  room air  -DM     Intra SpO2 (%)  93  -DM     O2 Delivery Intra Treatment  room air  -DM     Post SpO2 (%)  94  -DM     O2 Delivery Post Treatment  room air  -DM     Pre Patient Position  Supine  -DM     Intra Patient Position  Standing  -DM     Post Patient Position  Sitting  -DM     Rest Breaks   1  -DM     Row Name 01/15/21 0841          Positioning and Restraints    Pre-Treatment Position  in bed  -DM     Post Treatment Position  chair  -DM     In Chair  notified nsg;reclined;call light within reach;encouraged to call for assist;exit alarm on;waffle cushion;legs elevated  -DM       User Key  (r) = Recorded By, (t) = Taken By, (c) = Cosigned By    Initials Name Provider Type    DM Marlyn Reid, PT Physical Therapist        Outcome Measures     Row Name 01/15/21 0841          How much help from another person do you currently need...    Turning from your back to your side while in flat bed without using bedrails?  3  -DM     Moving from lying on back to sitting on the side of a flat bed without bedrails?  3  -DM     Moving to and from a bed to a chair (including a wheelchair)?  3  -DM     Standing up from a chair using your arms  (e.g., wheelchair, bedside chair)?  3  -DM     Climbing 3-5 steps with a railing?  1  -DM     To walk in hospital room?  1  -DM     AM-PAC 6 Clicks Score (PT)  14  -DM     Row Name 01/15/21 0841          Functional Assessment    Outcome Measure Options  AM-PAC 6 Clicks Basic Mobility (PT)  -DM       User Key  (r) = Recorded By, (t) = Taken By, (c) = Cosigned By    Initials Name Provider Type    DM Marlyn Reid, PT Physical Therapist        Physical Therapy Education                 Title: PT OT SLP Therapies (In Progress)     Topic: Physical Therapy (In Progress)     Point: Mobility training (In Progress)     Learning Progress Summary           Patient Eager, E,D, NR by DM at 1/15/2021 1007    Acceptance, E, NR by AS at 1/14/2021 1106    Acceptance, E,D, NR by DM at 1/12/2021 1434   Family Eager, E,D, NR by DM at 1/15/2021 1007                   Point: Home exercise program (In Progress)     Learning Progress Summary           Patient Eager, E,D, NR by DM at 1/15/2021 1007    Acceptance, E, NR by AS at 1/14/2021 1106    Acceptance, E,D, NR by DM at 1/12/2021 1434   Family Eager, E,D, NR by DM at 1/15/2021 1007                   Point: Body mechanics (In Progress)     Learning Progress Summary           Patient Eager, E,D, NR by DM at 1/15/2021 1007    Acceptance, E, NR by AS at 1/14/2021 1106    Acceptance, E,D, NR by DM at 1/12/2021 1434   Family Eager, E,D, NR by DM at 1/15/2021 1007                   Point: Precautions (In Progress)     Learning Progress Summary           Patient Eager, E,D, NR by DM at 1/15/2021 1007    Acceptance, E, NR by AS at 1/14/2021 1106    Acceptance, E,D, NR by DM at 1/12/2021 1434   Family Eager, E,D, NR by DM at 1/15/2021 1007                               User Key     Initials Effective Dates Name Provider Type Discipline    DM 06/19/15 -  Marlyn Reid, PT Physical Therapist PT    AS 06/22/15 -  Funmilayo Joshi, PTA Physical Therapy Assistant PT              PT  Recommendation and Plan  Planned Therapy Interventions (PT): balance training, bed mobility training, home exercise program, patient/family education, strengthening, transfer training, wheelchair management/propulsion training  Plan of Care Reviewed With: patient  Progress: improving  Outcome Summary: Transf to sitting EOB w/ min A, STS & SPT to chair via pivoting sidesteps L to recliner w/ min HHA (decl,. AD), + performed ther exer all extrem x 10 w/ rests d/t desat to 93% on RA (encouraged PLB).     Time Calculation:   PT Charges     Row Name 01/15/21 1008             Time Calculation    Start Time  0841  -DM      PT Received On  01/15/21  -DM      PT Goal Re-Cert Due Date  01/25/21  -DM         Time Calculation- PT    Total Timed Code Minutes- PT  39 minute(s)  -DM         Timed Charges    18753 - PT Therapeutic Exercise Minutes  24  -DM      13202 - PT Therapeutic Activity Minutes  15  -DM        User Key  (r) = Recorded By, (t) = Taken By, (c) = Cosigned By    Initials Name Provider Type    DM Marlyn Reid, PT Physical Therapist        Therapy Charges for Today     Code Description Service Date Service Provider Modifiers Qty    38054390248 HC PT THER PROC EA 15 MIN 1/15/2021 Marlyn Reid, PT GP 2    60320305870 HC PT THERAPEUTIC ACT EA 15 MIN 1/15/2021 Marlyn Reid, PT GP 1          PT G-Codes  Outcome Measure Options: AM-PAC 6 Clicks Basic Mobility (PT)  AM-PAC 6 Clicks Score (PT): 14  AM-PAC 6 Clicks Score (OT): 11    Marlyn Reid, PT  1/15/2021

## 2021-01-15 NOTE — OUTREACH NOTE
Prep Survey      Responses   [facility] patient discharged from?  [place]   Is LACE score < 7 ?  No   Emergency Room discharge w/ pulse ox?  No   Eligibility  [facility]   Date of Admission  01/11/21   Date of Discharge  01/15/21   Discharge Disposition  Home or Self Care   Discharge diagnosis  Atypical pneumonia Leukocytosis   Does the patient have one of the following disease processes/diagnoses(primary or secondary)?  COPD/Pneumonia   Does the patient have Home health ordered?  No   Is there a DME ordered?  No   Prep survey completed?  Yes          Pily Dos Santos RN

## 2021-01-15 NOTE — TELEPHONE ENCOUNTER
Spoke with Fatemeh Rodríguez, PharmD, and called back Ms. Wallace with updated warfarin instructions.    She is to take 3 mg tonight (Friday), 3 mg tomorrow (Saturday) and 4 mg on Sunday. Ms. Wallace filled up med planner while on the phone. She will repeat INR on Monday as originally planned.

## 2021-01-15 NOTE — PROGRESS NOTES
"Pharmacy Consult - Warfarin    Moni Wallace is a 80 y.o. female on warfarin therapy.    Height - 162.6 cm (64\")  Weight - 96.3 kg (212 lb 3.2 oz)    Consulting Provider: Hospitalist  Indication: atrial fibrillation  Goal INR: 2-3  Home Regimen: warfarin 4mg daily    Bridge Therapy: no    Drug-Drug Interactions with current regimen:  Escitalopram (home med) - increased bleeding risk  Cefdinir/doxycycline - may increase INR    Warfarin Dosing During Admission:    Date  1/11 1/12 1/13 1/14 1/15       INR  2.23 2.41 2.46 2.13 2.81       Dose  -- 4mg 4mg 4mg (3mg)          Education Provided: Patient is on warfarin prior to admission, admitted with therapeutic INR. Follows with [facility] regularly. Pharmacist available for questions as needed.    Discharge Follow up:   Following Provider - [facility]   Follow up time range or appointment - recommend repeat INR 2-3 days after discharge (has home monitor)    Labs:  Results from last 7 days   Lab Units 01/15/21  0540 01/14/21  0734 01/13/21  0814 01/12/21  0704 01/11/21  2210 01/11/21   INR  2.81* 2.13* 2.46* 2.41* 2.23* 2.30   HEMOGLOBIN g/dL 9.1* 8.8* 9.2*  --  10.5*  --    HEMATOCRIT % 30.3* 28.5* 30.7*  --  34.0  --      Results from last 7 days   Lab Units 01/15/21  0540 01/14/21  0734 01/13/21  0814 01/11/21  2210   SODIUM mmol/L 135* 133* 134*   < > 138   POTASSIUM mmol/L 3.8 4.1 4.1   < > 4.2   CHLORIDE mmol/L 98 98 97*   < > 98   CO2 mmol/L 21.0* 21.0* 21.0*   < > 21.0*   BUN mg/dL 55* 57* 61*   < > 65*   CREATININE mg/dL 8.52* 9.01* 8.76*   < > 9.19*   CALCIUM mg/dL 8.6 8.5* 8.6   < > 8.8   BILIRUBIN mg/dL  --   --   --   --  0.2   ALK PHOS U/L  --   --   --   --  172*   ALT (SGPT) U/L  --   --   --   --  13   AST (SGOT) U/L  --   --   --   --  21   GLUCOSE mg/dL 103* 144* 151*   < > 188*    < > = values in this interval not displayed.     Current dietary intake: ~50% of breakfast eaten, all other meals poor appetite and no " intake  Diet Order   Procedures    Diet Regular; Cardiac, Consistent Carbohydrate, Renal     Assessment/Plan:  1. Pharmacy dosing warfarin for atrial fibrillation, goal INR 2-3.  2. Patient's INR is therapeutic today at 2.81. Will give a 3mg dose today due to rate of increase in INR from previous day. Patient's diet is very poor at this time which may be contributing to increased INR.  3. Daily PT/INR ordered.  4. Pharmacy will continue to follow closely and adjust warfarin as needed based on drug interactions, daily INR trend, and clinical status.    Thank you,  Slim Villanueva, PharmD  1/15/2021  07:44 EST

## 2021-01-18 NOTE — OUTREACH NOTE
Call Center TCM Note      Responses   [facility] patient discharged from?  [place]   Does the patient have one of the following disease processes/diagnoses(primary or secondary)?  COPD/Pneumonia   Was the primary reason for admission:  Pneumonia   TCM attempt successful?  Yes   Call start time  1659   Call end time  1700   Discharge diagnosis  Atypical pneumonia Leukocytosis   Meds reviewed with patient/caregiver?  Yes   Is the patient having any side effects they believe may be caused by any medication additions or changes?  No   Does the patient have all medications ordered at discharge?  Yes   Is the patient taking all medications as directed (includes completed medication regime)?  Yes   Does the patient have a primary care provider?   Yes   Does the patient have an appointment with their PCP or specialist within 7 days of discharge?  Yes   Comments regarding PCP  f/u with Dr. Walters on 1/25/21   Has the patient kept scheduled appointments due by today?  N/A   Psychosocial issues?  No   Did the patient receive a copy of their discharge instructions?  Yes   Nursing interventions  Reviewed instructions with patient   What is the patient's perception of their health status since discharge?  Improving   Nursing Interventions  Nurse provided patient education   Is the patient/caregiver able to teach back the hierarchy of who to call/visit for symptoms/problems? PCP, Specialist, Home health nurse, Urgent Care, ED, 911  Yes   Is the patient/caregiver able to teach back signs and symptoms of worsening condition:  Fever/chills, Shortness of breath, Chest pain   Is the patient/caregiver able to teach back importance of completing antibiotic course of treatment?  Yes   TCM call completed?  Yes   Wrap up additional comments  States she is doing good, no questions or concerns at this time.          Susie Barragan RN    1/18/2021, 17:00 EST

## 2021-01-18 NOTE — PROGRESS NOTES
Anticoagulation Clinic - Remote Progress Note  [facility] HOME MONITOR  Testing Frequency: 7 days    Indication: paroxysmal afib  Referring Provider: Butch Joe (Last seen: 11/5/19  next appt: 6/9/20)  Goal INR: 2.0-3.0  Current Drug Interactions: , levothyroxine; omeprazole, azaTHIOprine, ezetimibe, allopurinol (10/20)  CHADS-VASc: 5 (age, gender, HTN, DM)  HAS-BLED: 3 (HTN, CKD, Age)     Diet: mostly avoids (1/11/21) meal replacement drinks ~3 times weekly 1/11/21  Alcohol: none  Tobacco: none   OTC Pain Medication: APAP PRN; took tylenol last 2 nights for pain from dialysis (03/26/20)    1st clinic: 9/14/17  2nd clinic: 6/26/18  3rd clinic: 11/5/19    INR History:  Date 1/10 1/14 1/23 1/28 1/31 2/4 2/11 2/18 2/24 3/3 3/10 3/16 3/19   Total Weekly Dose 28mg 32mg 30mg 26mg 26mg  24mg 26mg 26mg 24mg 26mg 27mg 25mg 29mg   INR 1.6 2.1 4.1 2.2 3.4 2.7 2.1 3.4 2.3 1.9 1.9 1.4 2.0   Notes augment  2x incr dose 1x decr keflex, pred keflex; pred; sick keflex; sick; dose decr x1 sick;   valtrex keflex   1x incr dose, incr GLV 1x dose decr; levaquin 1x incr dose      Date 3/23 3/26 4/2 4/9 4/16 4/23 5/1 5/8 5/14 5/22 5/27 6/4   Total WeeklyDose 29mg 29mg 30mg 30mg 30mg 32mg 30mg 32mg 28mg 32mg 32mg 30mg   INR 1.4 1.5 1.9 2.2 1.9 3.0 2.1 2.7 2.4 3.2 3.5 3.2   Notes 1x incr dose;  spinach 1x incr dose,  less GLV 1x boost    1x decr  dose trazodone 1x miss; trazodone 25mg QPM recv'd 5/26  1x incr dose     Date 6/10 6/17 6/24 7/2 7/8 7/15 7/22 7/29 8/5 8/12 8/17 8/24   Total WeeklyDose 28mg 28mg 28mg 28mg 28mg 28mg 30mg 26mg 28mg 28mg 32mg 34mg   INR 2.5 2.6 2.3 1.8 2.2 1.7 3.5 2.4 2.5 1.7 1.8 2.8   Notes    Inc GLV  Inc GLV 1x dose incr. x1 dose red  ensure       Date 8/31 9/9 9/14 9/21 9/28 10/5 10/13 10/19 10/27 11/3 11/10 11/17   Total WeeklyDose 32mg 32mg 26mg 30mg 26-28 mg? 28mg 28mg 28mg 28mg 26mg 30mg 28mg   INR 2.4 3.9 2.6 2.9 2.3 2.3 2.9 2.8 3.7 1.5 2.1 2.1   Notes       decr Ensure; allopurinol allopurinol; less  Ensure  recv'd 11/4; incr GLV 1x incr dose      Date 11/24 12/1 12/8  12/15 12/22 12/31 1/5/21 1/11 1/11-15 1/17     Total WeeklyDose 28mg 28mg 26mg 28 mg 28 mg 24mg 28mg 32 mg [facility] admission 26mg     INR 2.1 3.8 3.0  2.2 2.0 1.5 1.5 2.3  2.0     Notes  decr GLV 1x decr dose   1x miss   pneumonia doxy, cefdinir, rec 1/18       Phone Interview:  Verbal Release Authorization signed on 6/26/18 and again on 11/5/2019-- may speak with Alexy Wallace (son), Genesis Becerril (daughter), Sawyer or Gema Wallace (son and daughter-in-law), Gerry Rodrigo (son)  Tablet Strength: 2mg tablets  Patient Contact Info: preferred [phone] - home with son Yadiel- cell [phone]; call if can't get in touch with home phone      Patient Findings:  Positives:  Change in health, Change in medications, Change in diet/appetite, Hospital admission   Negatives:  Signs/symptoms of thrombosis, Signs/symptoms of bleeding, Laboratory test error suspected, Change in alcohol use, Change in activity, Upcoming invasive procedure, Emergency department visit, Upcoming dental procedure, Missed doses, Extra doses, Bruising, Other complaints   Comments:  Admitted to [facility] 1/11[facility]15 for pneumonia, on rocephin and transitioned to cefdinit. Reports she is eating better and feeling better each day.Has not resumed meal replacement drinks yet due to low appetite, but plans to resume now that she is eating better. Discharged on doxy and cefdinir, should complete 1/22.        Plan:  1. INR is WNL (LLN) following dose reductions, concurrent abx, and discharge from [facility]  Significant decrease from discharge 1/15. Instructed Ms. Wallace to resume prior maintenance dose of warfarin 4 mg daily until recheck.  2. Repeat INR on Thursday  3. Verbal information provided over the phone. Moni Wallace RBV dosing instructions, expresses understanding by teach back, and has no further questions at this time.    Francoise Wu, PharmD.  01/18/21   08:44 EST

## 2021-01-22 NOTE — OUTREACH NOTE
COPD/PN Week 2 Survey      Responses   [facility] patient discharged from?  [place]   Does the patient have one of the following disease processes/diagnoses(primary or secondary)?  COPD/Pneumonia   Was the primary reason for admission:  COPD exacerbation   Week 2 attempt successful?  No   Unsuccessful attempts  Attempt 1          Марина Torrez RN

## 2021-01-25 NOTE — OUTREACH NOTE
COPD/PN Week 2 Survey      Responses   [facility] patient discharged from?  [place]   Does the patient have one of the following disease processes/diagnoses(primary or secondary)?  COPD/Pneumonia   Was the primary reason for admission:  COPD exacerbation   Week 2 attempt successful?  Yes   Call start time  1408   Call end time  1412   Discharge diagnosis  Atypical pneumonia Leukocytosis   Meds reviewed with patient/caregiver?  Yes   Is the patient taking all medications as directed (includes completed medication regime)?  Yes   Has the patient kept scheduled appointments due by today?  N/A   Comments  PCP 01/27/2021, rescheduled   Pulse Ox monitoring  None   What is the patient's perception of their health status since discharge?  Improving   Are the patient's immunizations up to date?   Yes [Has not taken COVID shot]   Week 2 call completed?  Yes          Rhonda Rodriguez RN

## 2021-01-26 NOTE — PROGRESS NOTES
Anticoagulation Clinic - Remote Progress Note  [facility]  Testing Frequency: 7 days    Indication: paroxysmal afib  Referring Provider: Butch Joe (Last seen: 11/5/19  next appt: 6/9/20)  Goal INR: 2.0-3.0  Current Drug Interactions: , levothyroxine; omeprazole, azaTHIOprine, ezetimibe, allopurinol (10/20)  CHADS-VASc: 5 (age, gender, HTN, DM)  HAS-BLED: 3 (HTN, CKD, Age)     Diet: GLV 1-2x weekly and meal replacement drinks ~3 times weekly (1/26/21)  Alcohol: none  Tobacco: none   OTC Pain Medication: APAP PRN; took tylenol last 2 nights for pain from dialysis (03/26/20)    1st clinic: 9/14/17  2nd clinic: 6/26/18  3rd clinic: 11/5/19    INR History:  Date 1/10 1/14 1/23 1/28 1/31 2/4 2/11 2/18 2/24 3/3 3/10 3/16 3/19   Total Weekly Dose 28mg 32mg 30mg 26mg 26mg  24mg 26mg 26mg 24mg 26mg 27mg 25mg 29mg   INR 1.6 2.1 4.1 2.2 3.4 2.7 2.1 3.4 2.3 1.9 1.9 1.4 2.0   Notes augment  2x incr dose 1x decr keflex, pred keflex; pred; sick keflex; sick; dose decr x1 sick;   valtrex keflex   1x incr dose, incr GLV 1x dose decr; levaquin 1x incr dose      Date 3/23 3/26 4/2 4/9 4/16 4/23 5/1 5/8 5/14 5/22 5/27 6/4   Total WeeklyDose 29mg 29mg 30mg 30mg 30mg 32mg 30mg 32mg 28mg 32mg 32mg 30mg   INR 1.4 1.5 1.9 2.2 1.9 3.0 2.1 2.7 2.4 3.2 3.5 3.2   Notes 1x incr dose;  spinach 1x incr dose,  less GLV 1x boost    1x decr  dose trazodone 1x miss; trazodone 25mg QPM recv'd 5/26  1x incr dose     Date 6/10 6/17 6/24 7/2 7/8 7/15 7/22 7/29 8/5 8/12 8/17 8/24   Total WeeklyDose 28mg 28mg 28mg 28mg 28mg 28mg 30mg 26mg 28mg 28mg 32mg 34mg   INR 2.5 2.6 2.3 1.8 2.2 1.7 3.5 2.4 2.5 1.7 1.8 2.8   Notes    Inc GLV  Inc GLV 1x dose incr. x1 dose red  ensure       Date 8/31 9/9 9/14 9/21 9/28 10/5 10/13 10/19 10/27 11/3 11/10 11/17   Total WeeklyDose 32mg 32mg 26mg 30mg 26-28 mg? 28mg 28mg 28mg 28mg 26mg 30mg 28mg   INR 2.4 3.9 2.6 2.9 2.3 2.3 2.9 2.8 3.7 1.5 2.1 2.1   Notes       decr Ensure; allopurinol allopurinol; less  Ensure  recv'd 11/4; incr GLV 1x incr dose      Date 11/24 12/1 12/8  12/15 12/22 12/31 1/5/21 1/11 1/11-15 1/17 1/25    Total WeeklyDose 28mg 28mg 26mg 28 mg 28 mg 24mg 28mg 32 mg [facility] admission 26mg 28mg     INR 2.1 3.8 3.0  2.2 2.0 1.5 1.5 2.3  2.0 2.0    Notes  decr GLV 1x decr dose   1x miss   pneumonia doxy, cefdinir, rec 1/18       Phone Interview:  Verbal Release Authorization signed on 6/26/18 and again on 11/5/2019-- may speak with Alexy Rodrigo (son), Genesis Becerril (daughter), Sawyer Wallace (son and daughter-in-law), Gerry Rodrigo (son)  Tablet Strength: 2mg tablets  Patient Contact Info: preferred [phone] - home with son Yadiel- cell [phone]; call if can't get in touch with home phone      Patient Findings  Negatives:  Signs/symptoms of thrombosis, Signs/symptoms of bleeding, Laboratory test error suspected, Change in health, Change in alcohol use, Change in activity, Upcoming invasive procedure, Emergency department visit, Upcoming dental procedure, Missed doses, Extra doses, Change in medications, Change in diet/appetite, Hospital admission, Bruising, Other complaints   Comments:  Patient finished course of antibiotics and her appetite has increased back to normal. She has now resumed drinking her 3 meal replacement drinks weekly along with eating her normal amount of GLV which is ~1-2x/week. Patient denies any signs/symptoms of bleeding or changes in medications.         Plan:  1. INR is therapeutic (LLN) again yesterday 1/25. Instructed Ms. Wallace to continue current maintenance dose of warfarin 4 mg daily until recheck.  2. Repeat INR on 2/1 to ensure that WNL.  3. Verbal information provided over the phone. Moni Wallace RBV dosing instructions, expresses understanding by teach back, and has no further questions at this time.    Lily Domingo, PharmD  Pharmacy Resident   1/26/2021 09:16 EST

## 2021-01-27 NOTE — PROGRESS NOTES
Transitional Care Follow Up Visit  Subjective     Moni Wallace is a 81 y.o. female who presents for a transitional care management visit.    Within 48 business hours after discharge our office contacted her via telephone to coordinate her care and needs.      I reviewed and discussed the details of that call along with the discharge summary, hospital problems, inpatient lab results, inpatient diagnostic studies, and consultation reports with Moni.     Current outpatient and discharge medications have been reconciled for the patient.  Reviewed by: Alex Walters MD      Date of TCM Phone Call 1/15/2021   [facility]   Date of Admission 1/11/2021   Date of Discharge 1/15/2021   Discharge Disposition Home or Self Care     Risk for Readmission (LACE) Score: 13 (1/15/2021  6:01 AM)      History of Present Illness   Course During Hospital Stay:  Here for f/u after recent hospital admission for PNA.  Was sent home on omnicef and doxy.  Appetite is back.  No SOB.  Has a mild cough.  No hemoptysis or discolored expectoration.  Some loose BMs from antibiotics.  Overall feels some better.  Energy level is not back to normal.     The following portions of the patient's history were reviewed and updated as appropriate: allergies, current medications, past family history, past medical history, past social history, past surgical history and problem list.    Review of Systems   Constitutional: Positive for fatigue. Negative for chills and fever.   HENT: Negative for congestion, ear pain, hearing loss, rhinorrhea, sinus pressure, sore throat and trouble swallowing.    Eyes: Negative for discharge and itching.   Respiratory: Negative for chest tightness.    Cardiovascular: Negative for chest pain and palpitations.   Gastrointestinal: Negative for abdominal pain, blood in stool, constipation, diarrhea and vomiting.        10/17 by Dr Perez, next 10/20   Endocrine: Negative for polydipsia and polyuria.    Genitourinary: Negative for difficulty urinating, dysuria, enuresis, frequency, hematuria and urgency.        11/18 mammogram   Musculoskeletal: Positive for arthralgias and gait problem. Negative for back pain and joint swelling.   Skin: Negative for rash and wound.   Allergic/Immunologic: Negative for immunocompromised state.   Neurological: Positive for weakness. Negative for dizziness, syncope, light-headedness, numbness and headaches.   Hematological: Bruises/bleeds easily.   Psychiatric/Behavioral: Positive for dysphoric mood and sleep disturbance. Negative for behavioral problems. The patient is not nervous/anxious.        Objective   Physical Exam  Constitutional:       Appearance: Normal appearance. She is well-developed.   HENT:      Head: Normocephalic and atraumatic.      Right Ear: External ear normal.      Left Ear: External ear normal.      Nose: Nose normal.      Mouth/Throat:      Mouth: Mucous membranes are moist.      Pharynx: Oropharynx is clear.   Eyes:      Extraocular Movements: Extraocular movements intact.      Conjunctiva/sclera: Conjunctivae normal.      Pupils: Pupils are equal, round, and reactive to light.   Neck:      Musculoskeletal: Normal range of motion and neck supple.   Cardiovascular:      Rate and Rhythm: Normal rate and regular rhythm.      Heart sounds: Murmur (1/6) present.   Pulmonary:      Effort: Pulmonary effort is normal.      Breath sounds: Normal breath sounds.   Abdominal:      General: Bowel sounds are normal.      Palpations: Abdomen is soft.   Musculoskeletal:      Comments: In a wheelchair   Lymphadenopathy:      Cervical: No cervical adenopathy.   Skin:     General: Skin is warm and dry.   Neurological:      General: No focal deficit present.      Mental Status: She is alert and oriented to person, place, and time.   Psychiatric:         Mood and Affect: Mood normal.         Behavior: Behavior normal.         Thought Content: Thought content normal.  "        Assessment/Plan   Diagnoses and all orders for this visit:    1. Hyperlipidemia LDL goal <100 (Primary)    2. Vitamin D deficiency    3. Type 2 diabetes mellitus with other specified complication, without long-term current use of insulin (CMS/[facility])    4. Hypothyroidism, unspecified type    5. Chronic kidney disease, stage IV (severe) (CMS/[facility])    6. Tubular adenoma    7. Anemia of renal disease             HTN-advised to monitor and goal 130/80, stable  DM-labs soon, counseled on low carb  Hyperlipidemia-counseled on diet, cont dual tx, recheck on rtc  ESRD-per Renal, cont PD  AOCD-\"  \"  Vit D-labs soon, cont replacement  Asthma-stable  Gout-UA level on rtc, cont allopurinol, stable  afib-rate controlled, stable  hypothroid-labs on rtc, cont replacement, recent med change  TA-advised need for f/u colonoscopy  High risk meds-INR at home   Pancreatic irregularity-she will ask if MRI is ok  Lung nodules-no hx of tob abuse, schedule CT in 6-12 months  PNA-s/p dual antibiotics     Hospital Labs noted and dw patient       "

## 2021-02-01 NOTE — PROGRESS NOTES
Anticoagulation Clinic - Remote Progress Note  [facility]  Testing Frequency: 7 days    Indication: paroxysmal afib  Referring Provider: Butch Joe (Last seen: 11/5/19  next appt: 6/9/20)  Goal INR: 2.0-3.0  Current Drug Interactions: , levothyroxine; omeprazole, azaTHIOprine, ezetimibe, allopurinol (10/20)  CHADS-VASc: 5 (age, gender, HTN, DM)  HAS-BLED: 3 (HTN, CKD, Age)     Diet: GLV 1-2x weekly and meal replacement drinks ~3 times weekly (1/26/21)  Alcohol: none  Tobacco: none   OTC Pain Medication: APAP PRN; took tylenol last 2 nights for pain from dialysis (03/26/20)    1st clinic: 9/14/17  2nd clinic: 6/26/18  3rd clinic: 11/5/19    INR History:  Date 1/10 1/14 1/23 1/28 1/31 2/4 2/11 2/18 2/24 3/3 3/10 3/16 3/19   Total Weekly Dose 28mg 32mg 30mg 26mg 26mg  24mg 26mg 26mg 24mg 26mg 27mg 25mg 29mg   INR 1.6 2.1 4.1 2.2 3.4 2.7 2.1 3.4 2.3 1.9 1.9 1.4 2.0   Notes augment  2x incr dose 1x decr keflex, pred keflex; pred; sick keflex; sick; dose decr x1 sick;   valtrex keflex   1x incr dose, incr GLV 1x dose decr; levaquin 1x incr dose      Date 3/23 3/26 4/2 4/9 4/16 4/23 5/1 5/8 5/14 5/22 5/27 6/4   Total WeeklyDose 29mg 29mg 30mg 30mg 30mg 32mg 30mg 32mg 28mg 32mg 32mg 30mg   INR 1.4 1.5 1.9 2.2 1.9 3.0 2.1 2.7 2.4 3.2 3.5 3.2   Notes 1x incr dose;  spinach 1x incr dose,  less GLV 1x boost    1x decr  dose trazodone 1x miss; trazodone 25mg QPM recv'd 5/26  1x incr dose     Date 6/10 6/17 6/24 7/2 7/8 7/15 7/22 7/29 8/5 8/12 8/17 8/24   Total WeeklyDose 28mg 28mg 28mg 28mg 28mg 28mg 30mg 26mg 28mg 28mg 32mg 34mg   INR 2.5 2.6 2.3 1.8 2.2 1.7 3.5 2.4 2.5 1.7 1.8 2.8   Notes    Inc GLV  Inc GLV 1x dose incr. x1 dose red  ensure       Date 8/31 9/9 9/14 9/21 9/28 10/5 10/13 10/19 10/27 11/3 11/10 11/17   Total WeeklyDose 32mg 32mg 26mg 30mg 26-28 mg? 28mg 28mg 28mg 28mg 26mg 30mg 28mg   INR 2.4 3.9 2.6 2.9 2.3 2.3 2.9 2.8 3.7 1.5 2.1 2.1   Notes       decr Ensure; allopurinol allopurinol; less  Ensure  recv'd 11/4; incr GLV 1x incr dose      Date 11/24 12/1 12/8  12/15 12/22 12/31 1/5/21 1/11 1/11-15 1/17 1/25 2/1   Total WeeklyDose 28mg 28mg 26mg 28 mg 28 mg 24mg 28mg 32 mg [facility] admission 26mg 28mg  24mg   INR 2.1 3.8 3.0  2.2 2.0 1.5 1.5 2.3  2.0 2.0 1.8   Notes  decr GLV 1x decr dose   1x miss   pneumonia doxy, cefdinir, rec 1/18  Missed x1     Phone Interview:  Verbal Release Authorization signed on 6/26/18 and again on 11/5/2019-- may speak with Alexy Wallace (son), Genesis Becerril (daughter), Sawyer or Gema Wallace (son and daughter-in-law), Grery Rodrigo (son)  Tablet Strength: 2mg tablets  Patient Contact Info: preferred [phone] - home with carlito Cotto- [employer] [phone]; call if can't get in touch with home phone      Patient Findings  Positives:  Missed doses   Negatives:  Signs/symptoms of thrombosis, Signs/symptoms of bleeding, Laboratory test error suspected, Change in health, Change in alcohol use, Change in activity, Upcoming invasive procedure, Emergency department visit, Upcoming dental procedure, Extra doses, Change in medications, Change in diet/appetite, Hospital admission, Bruising, Other complaints   Comments:  Patients reports that isn't feeling great today due to cold weather and arthritis.She reports that she is drinking 2 meal replacement drinks weekly along with eating her normal amount of GLV which is ~1-2x/week. She reports that she missed all of her medication 3-4 nights ago.     Plan:  1. INR is therapeutic (LLN) again yesterday 1/25. Instructed Ms. Wallace to boost dose to 6mg today and then continue current maintenance dose of warfarin 4 mg daily until recheck.  2. Repeat INR on 2/8 to ensure that WNL.  3. Verbal information provided over the phone. Moni Wallace RBV dosing instructions, expresses understanding by teach back, and has no further questions at this time.    Fatemeh Rodríguez, PharmD  2/1/2021   14:00 EST

## 2021-02-03 NOTE — OUTREACH NOTE
COPD/PN Week 3 Survey      Responses   [facility] patient discharged from?  [place]   Does the patient have one of the following disease processes/diagnoses(primary or secondary)?  COPD/Pneumonia   Week 3 attempt successful?  Yes   Call start time  1116   Call end time  1119   Meds reviewed with patient/caregiver?  Yes   Is the patient taking all medications as directed (includes completed medication regime)?  Yes   Comments regarding appointments  Has seen Dr. Walters and Dr. Hodges  and labs drawn   Has the patient kept scheduled appointments due by today?  Yes   Has home health visited the patient within 72 hours of discharge?  N/A   Pulse Ox monitoring  None   Comments  poor appetite at times, feeling weak, wheel chair, lives with son   What is the patient's perception of their health status since discharge?  Same   Week 3 call completed?  Yes   Wrap up additional comments  She states she is not feeling good, just same as last week feels weak, and same not much improvement, no worse, no questions.          Shira Peck RN

## 2021-02-08 NOTE — PROGRESS NOTES
Anticoagulation Clinic - Remote Progress Note  [facility]  Testing Frequency: 7 days    Indication: paroxysmal afib  Referring Provider: Butch Joe (Last seen: 11/5/19  next appt: 6/9/20)  Goal INR: 2.0-3.0  Current Drug Interactions: , levothyroxine; omeprazole, azaTHIOprine, ezetimibe, allopurinol (10/20)  CHADS-VASc: 5 (age, gender, HTN, DM)  HAS-BLED: 3 (HTN, CKD, Age)     Diet: GLV 1-2x weekly and meal replacement drinks ~3 times weekly (1/26/21)  Alcohol: none  Tobacco: none   OTC Pain Medication: APAP PRN; took tylenol last 2 nights for pain from dialysis (03/26/20)    1st clinic: 9/14/17  2nd clinic: 6/26/18  3rd clinic: 11/5/19    INR History:  Date 1/10 1/14 1/23 1/28 1/31 2/4 2/11 2/18 2/24 3/3 3/10 3/16 3/19   Total Weekly Dose 28mg 32mg 30mg 26mg 26mg  24mg 26mg 26mg 24mg 26mg 27mg 25mg 29mg   INR 1.6 2.1 4.1 2.2 3.4 2.7 2.1 3.4 2.3 1.9 1.9 1.4 2.0   Notes augment  2x incr dose 1x decr keflex, pred keflex; pred; sick keflex; sick; dose decr x1 sick;   valtrex keflex   1x incr dose, incr GLV 1x dose decr; levaquin 1x incr dose      Date 3/23 3/26 4/2 4/9 4/16 4/23 5/1 5/8 5/14 5/22 5/27 6/4   Total WeeklyDose 29mg 29mg 30mg 30mg 30mg 32mg 30mg 32mg 28mg 32mg 32mg 30mg   INR 1.4 1.5 1.9 2.2 1.9 3.0 2.1 2.7 2.4 3.2 3.5 3.2   Notes 1x incr dose;  spinach 1x incr dose,  less GLV 1x boost    1x decr  dose trazodone 1x miss; trazodone 25mg QPM recv'd 5/26  1x incr dose     Date 6/10 6/17 6/24 7/2 7/8 7/15 7/22 7/29 8/5 8/12 8/17 8/24   Total WeeklyDose 28mg 28mg 28mg 28mg 28mg 28mg 30mg 26mg 28mg 28mg 32mg 34mg   INR 2.5 2.6 2.3 1.8 2.2 1.7 3.5 2.4 2.5 1.7 1.8 2.8   Notes    Inc GLV  Inc GLV 1x dose incr. x1 dose red  ensure       Date 8/31 9/9 9/14 9/21 9/28 10/5 10/13 10/19 10/27 11/3 11/10 11/17   Total WeeklyDose 32mg 32mg 26mg 30mg 26-28 mg? 28mg 28mg 28mg 28mg 26mg 30mg 28mg   INR 2.4 3.9 2.6 2.9 2.3 2.3 2.9 2.8 3.7 1.5 2.1 2.1   Notes       decr Ensure; allopurinol allopurinol; less  Ensure  recv'd 11/4; incr GLV 1x incr dose      Date 11/24 12/1 12/8  12/15 12/22 12/31 1/5/21 1/11 1/11-15 1/17 1/25 2/1   Total WeeklyDose 28mg 28mg 26mg 28 mg 28 mg 24mg 28mg 32 mg [facility] admission 26mg 28mg  24mg   INR 2.1 3.8 3.0  2.2 2.0 1.5 1.5 2.3  2.0 2.0 1.8   Notes  decr GLV 1x decr dose   1x miss   pneumonia doxy, cefdinir, rec 1/18  Missed x1     Date 2/8              Total WeeklyDose 30mg              INR 1.8              Notes 1x boost                    Phone Interview:  Verbal Release Authorization signed on 6/26/18 and again on 11/5/2019-- may speak with Alexy Wallace (son), Genesis Becerril (daughter), Sawyer or Gema Wallace (son and daughter-in-law), Gerry Wallace (son)  Tablet Strength: 2mg tablets  Patient Contact Info: preferred [phone] - home with carlito Cotto- [employer] [phone]; call if can't get in touch with home phone      Patient Findings    Positives:  Change in medications, Change in diet/appetite   Negatives:  Signs/symptoms of thrombosis, Signs/symptoms of bleeding, Laboratory test error suspected, Change in health, Change in alcohol use, Change in activity, Upcoming invasive procedure, Emergency department visit, Upcoming dental procedure, Missed doses, Extra doses, Hospital admission, Bruising, Other complaints   Comments:  Patient has been taking increased APAP + using salon pas patches for neck pain. Had zero meal replacement drinks this week, usually has 3/week. Plans to begin lidocaine patches. Unsure if she will resume drinks as she has a poor appetite       Plan:  1. INR is subtherapeutic again today at 1.8 despite boosted dose and zero ensure drinks. Instructed Ms. Wallace to again  boost dose to 6mg today and then continue current maintenance dose of warfarin 4 mg daily until recheck.  2. Repeat INR in 1 week  3. Verbal information provided over the phone. Moni Wallace RBV dosing instructions, expresses understanding by teach back, and has no further questions at this  time.    Francoise Wu, PharmD.  02/08/21   15:24 EST

## 2021-02-08 NOTE — TELEPHONE ENCOUNTER
PATIENT WOULD LIKE TO KNOW IF THERE IS ANYTHING SHE CAN DO TO RELIEVE HER NECK PAIN. SHE HAS BEEN USING SALON PAS PATCHES AND TAKING TYLENOL EVERY 2 HOURS.  PATIENT CANT TAKE ARTHRITIS MEDS DUE TO HER KIDNEYS, SHE ALSO LIKE TO KNOW SHOULD SHE USE HEAT ON IT? PLEASE ADVISE  [phone]

## 2021-02-12 NOTE — OUTREACH NOTE
COPD/PN Week 4 Survey      Responses   [facility] patient discharged from?  [place]   Does the patient have one of the following disease processes/diagnoses(primary or secondary)?  COPD/Pneumonia   Was the primary reason for admission:  COPD exacerbation   Week 4 attempt successful?  Yes   Call start time  1635   Call end time  1640   Discharge diagnosis  Atypical pneumonia Leukocytosis   Meds reviewed with patient/caregiver?  Yes   Is the patient having any side effects they believe may be caused by any medication additions or changes?  No   Is the patient taking all medications as directed (includes completed medication regime)?  Yes   Has the patient kept scheduled appointments due by today?  Yes   Pulse Ox monitoring  None   Psychosocial issues?  No   What is the patient's perception of their health status since discharge?  Improving   Nursing Interventions  Nurse provided patient education   Are the patient's immunizations up to date?   Yes   Nursing interventions  Educated on importance of maintaining up to date immunizations as advised by provider   If the patient is a current smoker, are they able to teach back resources for cessation?  Not a smoker   Is the patient/caregiver able to teach back the hierarchy of who to call/visit for symptoms/problems? PCP, Specialist, Home health nurse, Urgent Care, ED, 911  Yes   Is the patient able to teach back COPD zones?  Yes   Nursing interventions  Education provided on various zones   Patient reports what zone on this call?  Green Zone   Green Zone  Reports doing well, Breathing without shortness of breath, Usual activity and exercise level, Usual amount of phlegm/mucus without difficulty coughing up, Sleeping well, Appetite is good   Green Zone interventions:  Take daily medications, Avoid indoor/outdoor triggers, Continue regular exercise/diet plan   Is the patient/caregiver able to teach back signs and symptoms of worsening condition:  Fever/chills,  Shortness of breath, Chest pain   Is the patient/caregiver able to teach back importance of completing antibiotic course of treatment?  Yes   Week 4 call completed?  Yes   Would the patient like one additional call?  No   Graduated  Yes   Is the patient interested in additional calls from an ambulatory ?  NOTE:  applies to high risk patients requiring additional follow-up.  No   Did the patient feel the follow up calls were helpful during their recovery period?  Yes   Was the number of calls appropriate?  Yes          Waldemar Lam RN

## 2021-02-16 NOTE — PROGRESS NOTES
Anticoagulation Clinic - Remote Progress Note  [facility]  Testing Frequency: 7 days    Indication: paroxysmal afib  Referring Provider: Butch Joe (Last seen: 11/5/19  next appt: 6/9/20)  Goal INR: 2.0-3.0  Current Drug Interactions: , levothyroxine; omeprazole, azaTHIOprine, ezetimibe, allopurinol (10/20)  CHADS-VASc: 5 (age, gender, HTN, DM)  HAS-BLED: 3 (HTN, CKD, Age)     Diet: GLV 1-2x weekly  (2/16/21)  Alcohol: none  Tobacco: none   OTC Pain Medication: APAP PRN; took tylenol last 2 nights for pain from dialysis (03/26/20)    1st clinic: 9/14/17  2nd clinic: 6/26/18  3rd clinic: 11/5/19    INR History:  Date 1/10 1/14 1/23 1/28 1/31 2/4 2/11 2/18 2/24 3/3 3/10 3/16 3/19   Total Weekly Dose 28mg 32mg 30mg 26mg 26mg  24mg 26mg 26mg 24mg 26mg 27mg 25mg 29mg   INR 1.6 2.1 4.1 2.2 3.4 2.7 2.1 3.4 2.3 1.9 1.9 1.4 2.0   Notes augment  2x incr dose 1x decr keflex, pred keflex; pred; sick keflex; sick; dose decr x1 sick;   valtrex keflex   1x incr dose, incr GLV 1x dose decr; levaquin 1x incr dose      Date 3/23 3/26 4/2 4/9 4/16 4/23 5/1 5/8 5/14 5/22 5/27 6/4   Total WeeklyDose 29mg 29mg 30mg 30mg 30mg 32mg 30mg 32mg 28mg 32mg 32mg 30mg   INR 1.4 1.5 1.9 2.2 1.9 3.0 2.1 2.7 2.4 3.2 3.5 3.2   Notes 1x incr dose;  spinach 1x incr dose,  less GLV 1x boost    1x decr  dose trazodone 1x miss; trazodone 25mg QPM recv'd 5/26  1x incr dose     Date 6/10 6/17 6/24 7/2 7/8 7/15 7/22 7/29 8/5 8/12 8/17 8/24   Total WeeklyDose 28mg 28mg 28mg 28mg 28mg 28mg 30mg 26mg 28mg 28mg 32mg 34mg   INR 2.5 2.6 2.3 1.8 2.2 1.7 3.5 2.4 2.5 1.7 1.8 2.8   Notes    Inc GLV  Inc GLV 1x dose incr. x1 dose red  ensure       Date 8/31 9/9 9/14 9/21 9/28 10/5 10/13 10/19 10/27 11/3 11/10 11/17   Total WeeklyDose 32mg 32mg 26mg 30mg 26-28 mg? 28mg 28mg 28mg 28mg 26mg 30mg 28mg   INR 2.4 3.9 2.6 2.9 2.3 2.3 2.9 2.8 3.7 1.5 2.1 2.1   Notes       decr Ensure; allopurinol allopurinol; less Ensure  recv'd 11/4; incr GLV 1x incr dose       Date 11/24 12/1 12/8  12/15 12/22 12/31 1/5/21 1/11 1/11-15 1/17 1/25 2/1   Total WeeklyDose 28mg 28mg 26mg 28 mg 28 mg 24mg 28mg 32 mg [facility] admission 26mg 28mg  24mg   INR 2.1 3.8 3.0  2.2 2.0 1.5 1.5 2.3  2.0 2.0 1.8   Notes  decr GLV 1x decr dose   1x miss   pneumonia doxy, cefdinir, rec 1/18  Missed x1     Date 2/8 2/15             Total WeeklyDose 30mg 28 mg             INR 1.8 2.1             Notes 1x boost                Phone Interview:  Verbal Release Authorization signed on 6/26/18 and again on 11/5/2019-- may speak with Alexy Wallace (son), Genesis Becerril (daughter), Sawyer or Gema Wallace (son and daughter-in-law), Gerry Rodrigo (son)  Tablet Strength: 2mg tablets  Patient Contact Info: preferred [phone] - home with son Yadiel- [place] [phone]; call if can't get in touch with home phone      Patient Findings:  Positives:  Change in diet/appetite   Negatives:  Signs/symptoms of thrombosis, Signs/symptoms of bleeding, Laboratory test error suspected, Change in health, Change in alcohol use, Change in activity, Upcoming invasive procedure, Emergency department visit, Upcoming dental procedure, Missed doses, Extra doses, Change in medications, Hospital admission, Bruising, Other complaints   Comments:  Confirmed she took 6 mg last Monday. She has not been drinking meal replacement shakes and does not plan on restarting for the time being. She had some asparagus over the weekend. Denies any other changes.     Plan:  1. INR was back WNL yesterday evening. Instructed Ms. Wallace to take 6mg of warfarin tonight and then continue current maintenance dose of warfarin 4 mg daily until recheck.  2. Repeat INR in one week as required by [facility].  3. Verbal information provided over the phone. Moni EL Rodrigo RBV dosing instructions, expresses understanding by teach back, and has no further questions at this time.    Zina Aldrich, ISAEL  2/16/2021  10:01 DAVE     I, Joce Bush, PharmD, have reviewed the note in full and  agree with the assessment and plan.  02/16/21  10:42 EST

## 2021-02-17 NOTE — TELEPHONE ENCOUNTER
Prior Authorization for Lidocaine 5%patches completed and sent to plan via Covermymeds. Status pending;   [id number]  PA Case ID: [id number]

## 2021-02-22 NOTE — PROGRESS NOTES
Anticoagulation Clinic - Remote Progress Note  [facility]  Testing Frequency: 7 days    Indication: paroxysmal afib  Referring Provider: Butch Joe (Last seen: 11/5/19  next appt: 6/9/20)  Goal INR: 2.0-3.0  Current Drug Interactions: , levothyroxine; omeprazole, azaTHIOprine, ezetimibe, allopurinol (10/20)  CHADS-VASc: 5 (age, gender, HTN, DM)  HAS-BLED: 3 (HTN, CKD, Age)     Diet: GLV 1-2x weekly iceberg or ryan lettuce  (2/23/21)  Alcohol: none  Tobacco: none   OTC Pain Medication: APAP PRN; took tylenol last 2 nights for pain from dialysis (03/26/20)    1st clinic: 9/14/17  2nd clinic: 6/26/18  3rd clinic: 11/5/19    INR History:  Date 1/10 1/14 1/23 1/28 1/31 2/4 2/11 2/18 2/24 3/3 3/10 3/16 3/19   Total Weekly Dose 28mg 32mg 30mg 26mg 26mg  24mg 26mg 26mg 24mg 26mg 27mg 25mg 29mg   INR 1.6 2.1 4.1 2.2 3.4 2.7 2.1 3.4 2.3 1.9 1.9 1.4 2.0   Notes augment  2x incr dose 1x decr keflex, pred keflex; pred; sick keflex; sick; dose decr x1 sick;   valtrex keflex   1x incr dose, incr GLV 1x dose decr; levaquin 1x incr dose      Date 3/23 3/26 4/2 4/9 4/16 4/23 5/1 5/8 5/14 5/22 5/27 6/4   Total WeeklyDose 29mg 29mg 30mg 30mg 30mg 32mg 30mg 32mg 28mg 32mg 32mg 30mg   INR 1.4 1.5 1.9 2.2 1.9 3.0 2.1 2.7 2.4 3.2 3.5 3.2   Notes 1x incr dose;  spinach 1x incr dose,  less GLV 1x boost    1x decr  dose trazodone 1x miss; trazodone 25mg QPM recv'd 5/26  1x incr dose     Date 6/10 6/17 6/24 7/2 7/8 7/15 7/22 7/29 8/5 8/12 8/17 8/24   Total WeeklyDose 28mg 28mg 28mg 28mg 28mg 28mg 30mg 26mg 28mg 28mg 32mg 34mg   INR 2.5 2.6 2.3 1.8 2.2 1.7 3.5 2.4 2.5 1.7 1.8 2.8   Notes    Inc GLV  Inc GLV 1x dose incr. x1 dose red  ensure       Date 8/31 9/9 9/14 9/21 9/28 10/5 10/13 10/19 10/27 11/3 11/10 11/17   Total WeeklyDose 32mg 32mg 26mg 30mg 26-28 mg? 28mg 28mg 28mg 28mg 26mg 30mg 28mg   INR 2.4 3.9 2.6 2.9 2.3 2.3 2.9 2.8 3.7 1.5 2.1 2.1   Notes       decr Ensure; allopurinol allopurinol; less Ensure  recv'd  11/4; incr GLV 1x incr dose      Date 11/24 12/1 12/8  12/15 12/22 12/31 1/5/21 1/11 1/11-15 1/17 1/25 2/1   Total WeeklyDose 28mg 28mg 26mg 28 mg 28 mg 24mg 28mg 32 mg [facility] admission 26mg 28mg  24mg   INR 2.1 3.8 3.0  2.2 2.0 1.5 1.5 2.3  2.0 2.0 1.8   Notes  decr GLV 1x decr dose   1x miss   pneumonia doxy, cefdinir, rec 1/18  Missed x1     Date 2/8 2/15 2/22            Total WeeklyDose 30mg 28 mg 30 mg            INR 1.8 2.1 2.9            Notes 1x boost                Phone Interview:  Verbal Release Authorization signed on 6/26/18 and again on 11/5/2019-- may speak with Alexy Wallace (son), Genesis Becerril (daughter), Sawyer or Gema Wallace (son and daughter-in-law), Gerry Rodrigo (son)  Tablet Strength: 2mg tablets  Patient Contact Info: preferred [phone] - home with son Yadiel- cell [phone]; call if can't get in touch with home phone      Patient Findings:  Positives:  Upcoming invasive procedure   Negatives:  Signs/symptoms of thrombosis, Signs/symptoms of bleeding, Laboratory test error suspected, Change in health, Change in alcohol use, Change in activity, Emergency department visit, Upcoming dental procedure, Missed doses, Extra doses, Change in medications, Change in diet/appetite, Hospital admission, Bruising, Other complaints   Comments:  May have to have a transplant kidney removed at . She is awaiting a phone call back for further information. Stressed importance of calling the clinic if any procedures or surgeries are scheduled.     Plan:  1. INR was WNL yesterday evening. Instructed Ms. Wallace to resume current maintenance dose of warfarin 4 mg daily until recheck.  2. Repeat INR in one week as required by [facility].  3. Verbal information provided over the phone. Moni NIKKO Wallace RBV dosing instructions, expresses understanding by teach back, and has no further questions at this time.    Zina Aldrich, ISAEL  2/22/2021  16:25 EST       I, Fatemeh Rodríguez, PharmD, have reviewed the note in full and  agree with the assessment and plan.  02/24/21  09:26 EST

## 2021-03-01 NOTE — PROGRESS NOTES
Anticoagulation Clinic - Remote Progress Note  [facility]  Testing Frequency: 7 days    Indication: paroxysmal afib  Referring Provider: Butch Joe (Last seen: 11/5/19  next appt: 6/9/20)  Goal INR: 2.0-3.0  Current Drug Interactions: , levothyroxine; omeprazole, azaTHIOprine, ezetimibe, allopurinol (10/20)  CHADS-VASc: 5 (age, gender, HTN, DM)  HAS-BLED: 3 (HTN, CKD, Age)     Diet: GLV 1-2x weekly iceberg or ryan lettuce  (3/1/21)  Alcohol: none  Tobacco: none   OTC Pain Medication: APAP PRN; took tylenol last 2 nights for pain from dialysis (03/26/20)    1st clinic: 9/14/17  2nd clinic: 6/26/18  3rd clinic: 11/5/19    INR History:  Date 1/10 1/14 1/23 1/28 1/31 2/4 2/11 2/18 2/24 3/3 3/10 3/16 3/19   Total Weekly Dose 28mg 32mg 30mg 26mg 26mg  24mg 26mg 26mg 24mg 26mg 27mg 25mg 29mg   INR 1.6 2.1 4.1 2.2 3.4 2.7 2.1 3.4 2.3 1.9 1.9 1.4 2.0   Notes augment  2x incr dose 1x decr keflex, pred keflex; pred; sick keflex; sick; dose decr x1 sick;   valtrex keflex   1x incr dose, incr GLV 1x dose decr; levaquin 1x incr dose      Date 3/23 3/26 4/2 4/9 4/16 4/23 5/1 5/8 5/14 5/22 5/27 6/4   Total WeeklyDose 29mg 29mg 30mg 30mg 30mg 32mg 30mg 32mg 28mg 32mg 32mg 30mg   INR 1.4 1.5 1.9 2.2 1.9 3.0 2.1 2.7 2.4 3.2 3.5 3.2   Notes 1x incr dose;  spinach 1x incr dose,  less GLV 1x boost    1x decr  dose trazodone 1x miss; trazodone 25mg QPM recv'd 5/26  1x incr dose     Date 6/10 6/17 6/24 7/2 7/8 7/15 7/22 7/29 8/5 8/12 8/17 8/24   Total WeeklyDose 28mg 28mg 28mg 28mg 28mg 28mg 30mg 26mg 28mg 28mg 32mg 34mg   INR 2.5 2.6 2.3 1.8 2.2 1.7 3.5 2.4 2.5 1.7 1.8 2.8   Notes    Inc GLV  Inc GLV 1x dose incr. x1 dose red  ensure       Date 8/31 9/9 9/14 9/21 9/28 10/5 10/13 10/19 10/27 11/3 11/10 11/17   Total WeeklyDose 32mg 32mg 26mg 30mg 26-28 mg? 28mg 28mg 28mg 28mg 26mg 30mg 28mg   INR 2.4 3.9 2.6 2.9 2.3 2.3 2.9 2.8 3.7 1.5 2.1 2.1   Notes       decr Ensure; allopurinol allopurinol; less Ensure  recv'd  "11/4; incr GLV 1x incr dose      Date 11/24 12/1 12/8  12/15 12/22 12/31 1/5/21 1/11 1/11-15 1/17 1/25 2/1   Total WeeklyDose 28mg 28mg 26mg 28 mg 28 mg 24mg 28mg 32 mg [facility] admission 26mg 28mg  24mg   INR 2.1 3.8 3.0  2.2 2.0 1.5 1.5 2.3  2.0 2.0 1.8   Notes  decr GLV 1x decr dose   1x miss   pneumonia doxy, cefdinir, rec 1/18  Missed x1     Date 2/8 2/15 2/22 3/1           Total WeeklyDose 30mg 28 mg 30 mg 28 mg           INR 1.8 2.1 2.9 2.6           Notes 1x boost   keflex             Phone Interview:  Verbal Release Authorization signed on 6/26/18 and again on 11/5/2019-- may speak with Alexy Wallace (son), Genesis Becerril (daughter), Sawyer or Gema Wallace (son and daughter-in-law), Gerry Wallace (son)  Tablet Strength: 2mg tablets  Patient Contact Info: preferred [phone] - home with son Yadiel- [place] [phone]; call if can't get in touch with home phone      Patient Findings:  Positives:  Signs/symptoms of bleeding, Change in medications   Negatives:  Signs/symptoms of thrombosis, Laboratory test error suspected, Change in health, Change in alcohol use, Change in activity, Upcoming invasive procedure, Emergency department visit, Upcoming dental procedure, Missed doses, Extra doses, Change in diet/appetite, Hospital admission, Bruising, Other complaints   Comments:  Ms. Wallace cut her leg closing her car door last Wednesday. Says it bled pretty severely. The next day she went to her MD and was put on Keflex. She says originaly the \"dose was too high\" due to her kidneys. They have changed to Keflex 250 mg TID x 7. Her children check her INR and will check either Thursday or Friday.     Plan:  1. INR is therapeutic again today. Instructed Ms. Wallace to continue current maintenance dose of warfarin 4 mg daily until recheck.  2. Repeat INR on Friday due to antibiotic.  3. Verbal information provided over the phone. Moni Wallace RBV dosing instructions, expresses understanding by teach back, and has no further questions " at this time.    Zina Aldrich, ISAEL  3/1/2021  14:56 EST     I, Francoise Wu, PharmD, have reviewed the note in full and agree with the assessment and plan.  03/02/21  08:40 EST

## 2021-03-05 NOTE — PROGRESS NOTES
Anticoagulation Clinic - Remote Progress Note  [facility]  Testing Frequency: 7 days    Indication: paroxysmal afib  Referring Provider: Butch Joe (Last seen: 11/5/19  next appt: 6/9/20)  Goal INR: 2.0-3.0  Current Drug Interactions: , levothyroxine; omeprazole, azaTHIOprine, ezetimibe, allopurinol (10/20)  CHADS-VASc: 5 (age, gender, HTN, DM)  HAS-BLED: 3 (HTN, CKD, Age)     Diet: GLV 1-2x weekly iceberg or ryan lettuce  (3/5/21)  Alcohol: none  Tobacco: none   OTC Pain Medication: APAP PRN; took tylenol last 2 nights for pain from dialysis (03/26/20)    1st clinic: 9/14/17  2nd clinic: 6/26/18  3rd clinic: 11/5/19    INR History:  Date 1/10 1/14 1/23 1/28 1/31 2/4 2/11 2/18 2/24 3/3 3/10 3/16 3/19   Total Weekly Dose 28mg 32mg 30mg 26mg 26mg  24mg 26mg 26mg 24mg 26mg 27mg 25mg 29mg   INR 1.6 2.1 4.1 2.2 3.4 2.7 2.1 3.4 2.3 1.9 1.9 1.4 2.0   Notes augment  2x incr dose 1x decr keflex, pred keflex; pred; sick keflex; sick; dose decr x1 sick;   valtrex keflex   1x incr dose, incr GLV 1x dose decr; levaquin 1x incr dose      Date 3/23 3/26 4/2 4/9 4/16 4/23 5/1 5/8 5/14 5/22 5/27 6/4   Total WeeklyDose 29mg 29mg 30mg 30mg 30mg 32mg 30mg 32mg 28mg 32mg 32mg 30mg   INR 1.4 1.5 1.9 2.2 1.9 3.0 2.1 2.7 2.4 3.2 3.5 3.2   Notes 1x incr dose;  spinach 1x incr dose,  less GLV 1x boost    1x decr  dose trazodone 1x miss; trazodone 25mg QPM recv'd 5/26  1x incr dose     Date 6/10 6/17 6/24 7/2 7/8 7/15 7/22 7/29 8/5 8/12 8/17 8/24   Total WeeklyDose 28mg 28mg 28mg 28mg 28mg 28mg 30mg 26mg 28mg 28mg 32mg 34mg   INR 2.5 2.6 2.3 1.8 2.2 1.7 3.5 2.4 2.5 1.7 1.8 2.8   Notes    Inc GLV  Inc GLV 1x dose incr. x1 dose red  ensure       Date 8/31 9/9 9/14 9/21 9/28 10/5 10/13 10/19 10/27 11/3 11/10 11/17   Total WeeklyDose 32mg 32mg 26mg 30mg 26-28 mg? 28mg 28mg 28mg 28mg 26mg 30mg 28mg   INR 2.4 3.9 2.6 2.9 2.3 2.3 2.9 2.8 3.7 1.5 2.1 2.1   Notes       decr Ensure; allopurinol allopurinol; less Ensure  recv'd  11/4; incr GLV 1x incr dose      Date 11/24 12/1 12/8  12/15 12/22 12/31 1/5/21 1/11 1/11-15 1/17 1/25 2/1   Total WeeklyDose 28mg 28mg 26mg 28 mg 28 mg 24mg 28mg 32 mg [facility] admission 26mg 28mg  24mg   INR 2.1 3.8 3.0  2.2 2.0 1.5 1.5 2.3  2.0 2.0 1.8   Notes  decr GLV 1x decr dose   1x miss   pneumonia doxy, cefdinir, rec 1/18  Missed x1     Date 2/8 2/15 2/22 3/1 3/4          Total WeeklyDose 30mg 28 mg 30 mg 28 mg 28 mg          INR 1.8 2.1 2.9 2.6 2.5          Notes 1x boost   keflex keflex            Phone Interview:  Verbal Release Authorization signed on 6/26/18 and again on 11/5/2019-- may speak with Alexy Wallace (son), Genesis Becerril (daughter), Sawyer or Gema Wallace (son and daughter-in-law), Gerry Wallace (son)  Tablet Strength: 2mg tablets  Patient Contact Info: preferred [phone] - home with son Yaidel- [place] [phone]; call if can't get in touch with home phone      Patient Findings:  Positives:  Change in medications   Negatives:  Signs/symptoms of thrombosis, Signs/symptoms of bleeding, Laboratory test error suspected, Change in health, Change in alcohol use, Change in activity, Upcoming invasive procedure, Emergency department visit, Upcoming dental procedure, Missed doses, Extra doses, Change in diet/appetite, Hospital admission, Bruising, Other complaints   Comments:  Has three pills left of her Keflex. She believes there is a possibility she may go on a second round of antibiotics for her leg. Stressed the importance of calling the clinic if new medications are prescribed. She verbalized understanding and was agreeable. Denies any other changes.     Plan:  1. INR was therapeutic again yesterday evening. Instructed Ms. Wallace to continue current maintenance dose of warfarin 4 mg daily until recheck.  2. Repeat INR on Monday.  3. Verbal information provided over the phone. Moni Wallace RBV dosing instructions, expresses understanding by teach back, and has no further questions at this  time.    Zina Aldrich, [facility]  3/5/2021  08:09 EST     I, Joce Bush, PharmD, have reviewed the note in full and agree with the assessment and plan.  03/05/21  15:32 EST

## 2021-03-08 NOTE — TELEPHONE ENCOUNTER
PATIENT SAID THAT HER BLOOD SUGAR HAS BEEN HIGHER THAN NORMAL THE LAST COUPLE DAYS. SHE IS WANTING TO KNOW IF SHE NEEDS TO BE SEEN TO GET THIS CHECKED OUT. SHE WOULD LIKE TO HAVE A CALLBACK TO DISCUSS THIS.    CONTACT: [phone]

## 2021-03-09 NOTE — PROGRESS NOTES
Anticoagulation Clinic - Remote Progress Note  [facility]  Testing Frequency: 7 days    Indication: paroxysmal afib  Referring Provider: Butch Joe (Last seen: 11/5/19  next appt: 6/9/20)  Goal INR: 2.0-3.0  Current Drug Interactions: , levothyroxine; omeprazole, azaTHIOprine, ezetimibe, allopurinol (10/20)  CHADS-VASc: 5 (age, gender, HTN, DM)  HAS-BLED: 3 (HTN, CKD, Age)     Diet: GLV 1-2x weekly iceberg or ryan lettuce  (3/5/21)  Alcohol: none  Tobacco: none   OTC Pain Medication: APAP PRN; took tylenol last 2 nights for pain from dialysis (03/26/20)    1st clinic: 9/14/17  2nd clinic: 6/26/18  3rd clinic: 11/5/19    INR History:  Date 1/10 1/14 1/23 1/28 1/31 2/4 2/11 2/18 2/24 3/3 3/10 3/16 3/19   Total Weekly Dose 28mg 32mg 30mg 26mg 26mg  24mg 26mg 26mg 24mg 26mg 27mg 25mg 29mg   INR 1.6 2.1 4.1 2.2 3.4 2.7 2.1 3.4 2.3 1.9 1.9 1.4 2.0   Notes augment  2x incr dose 1x decr keflex, pred keflex; pred; sick keflex; sick; dose decr x1 sick;   valtrex keflex   1x incr dose, incr GLV 1x dose decr; levaquin 1x incr dose      Date 3/23 3/26 4/2 4/9 4/16 4/23 5/1 5/8 5/14 5/22 5/27 6/4   Total WeeklyDose 29mg 29mg 30mg 30mg 30mg 32mg 30mg 32mg 28mg 32mg 32mg 30mg   INR 1.4 1.5 1.9 2.2 1.9 3.0 2.1 2.7 2.4 3.2 3.5 3.2   Notes 1x incr dose;  spinach 1x incr dose,  less GLV 1x boost    1x decr  dose trazodone 1x miss; trazodone 25mg QPM recv'd 5/26  1x incr dose     Date 6/10 6/17 6/24 7/2 7/8 7/15 7/22 7/29 8/5 8/12 8/17 8/24   Total WeeklyDose 28mg 28mg 28mg 28mg 28mg 28mg 30mg 26mg 28mg 28mg 32mg 34mg   INR 2.5 2.6 2.3 1.8 2.2 1.7 3.5 2.4 2.5 1.7 1.8 2.8   Notes    Inc GLV  Inc GLV 1x dose incr. x1 dose red  ensure       Date 8/31 9/9 9/14 9/21 9/28 10/5 10/13 10/19 10/27 11/3 11/10 11/17   Total WeeklyDose 32mg 32mg 26mg 30mg 26-28 mg? 28mg 28mg 28mg 28mg 26mg 30mg 28mg   INR 2.4 3.9 2.6 2.9 2.3 2.3 2.9 2.8 3.7 1.5 2.1 2.1   Notes       decr Ensure; allopurinol allopurinol; less Ensure  recv'd  11/4; incr GLV 1x incr dose      Date 11/24 12/1 12/8  12/15 12/22 12/31 1/5/21 1/11 1/11-15 1/17 1/25 2/1   Total WeeklyDose 28mg 28mg 26mg 28 mg 28 mg 24mg 28mg 32 mg [facility] admission 26mg 28mg  24mg   INR 2.1 3.8 3.0  2.2 2.0 1.5 1.5 2.3  2.0 2.0 1.8   Notes  decr GLV 1x decr dose   1x miss   pneumonia doxy, cefdinir, rec 1/18  Missed x1     Date 2/8 2/15 2/22 3/1 3/4 3/9         Total WeeklyDose 30mg 28 mg 30 mg 28 mg 28 mg 28mg         INR 1.8 2.1 2.9 2.6 2.5 1.9         Notes 1x boost   keflex keflex            Phone Interview:  Verbal Release Authorization signed on 6/26/18 and again on 11/5/2019-- may speak with Alexy Wallace (son), Genesis Becerril (daughter), Sawyer or Gema Wallace (son and daughter-in-law), Gerry Rodrigo (son)  Tablet Strength: 2mg tablets  Patient Contact Info: preferred [phone] - home with son Yadiel- [employer] [phone]; call if can't get in touch with home phone      Patient Findings  Negatives:  Signs/symptoms of thrombosis, Signs/symptoms of bleeding, Laboratory test error suspected, Change in health, Change in alcohol use, Change in activity, Upcoming invasive procedure, Emergency department visit, Upcoming dental procedure, Missed doses, Extra doses, Change in medications, Change in diet/appetite, Hospital admission, Bruising, Other complaints   Comments:  She confirms she stopped abx on Friday and didn't start any new ones. Ms. Wallace had stitches in her leg taken out yesterday from cut two weeks ago.     Plan:  1. INR was therapeutic again yesterday evening. Instructed Ms. Wallace to BOOST 6mg tonight and then continue current maintenance dose of warfarin 4 mg daily until recheck.  2. Repeat INR on Tuesday.  3. Verbal information provided over the phone. Moni Wallace RBV dosing instructions, expresses understanding by teach back, and has no further questions at this time.    Fatemeh Rodríguez, PharmD  3/9/2021  15:26 EST

## 2021-03-15 NOTE — PROGRESS NOTES
Anticoagulation Clinic - Remote Progress Note  [facility]  Testing Frequency: 7 days    Indication: paroxysmal afib  Referring Provider: Butch Joe (Last seen: 11/5/19  next appt: 6/9/20)  Goal INR: 2.0-3.0  Current Drug Interactions: , levothyroxine; omeprazole, azaTHIOprine, ezetimibe, allopurinol (10/20)  CHADS-VASc: 5 (age, gender, HTN, DM)  HAS-BLED: 3 (HTN, CKD, Age)     Diet: GLV 1-2x weekly iceberg or ryan lettuce  (3/15/21)  Alcohol: none  Tobacco: none   OTC Pain Medication: APAP PRN; took tylenol last 2 nights for pain from dialysis (3/26/20)    1st clinic: 9/14/17  2nd clinic: 6/26/18  3rd clinic: 11/5/19    INR History:  Date 1/10 1/14 1/23 1/28 1/31 2/4 2/11 2/18 2/24 3/3 3/10 3/16 3/19   Total Weekly Dose 28mg 32mg 30mg 26mg 26mg  24mg 26mg 26mg 24mg 26mg 27mg 25mg 29mg   INR 1.6 2.1 4.1 2.2 3.4 2.7 2.1 3.4 2.3 1.9 1.9 1.4 2.0   Notes augment  2x incr dose 1x decr keflex, pred keflex; pred; sick keflex; sick; dose decr x1 sick;   valtrex keflex   1x incr dose, incr GLV 1x dose decr; levaquin 1x incr dose      Date 3/23 3/26 4/2 4/9 4/16 4/23 5/1 5/8 5/14 5/22 5/27 6/4   Total WeeklyDose 29mg 29mg 30mg 30mg 30mg 32mg 30mg 32mg 28mg 32mg 32mg 30mg   INR 1.4 1.5 1.9 2.2 1.9 3.0 2.1 2.7 2.4 3.2 3.5 3.2   Notes 1x incr dose;  spinach 1x incr dose,  less GLV 1x boost    1x decr  dose trazodone 1x miss; trazodone 25mg QPM recv'd 5/26  1x incr dose     Date 6/10 6/17 6/24 7/2 7/8 7/15 7/22 7/29 8/5 8/12 8/17 8/24   Total WeeklyDose 28mg 28mg 28mg 28mg 28mg 28mg 30mg 26mg 28mg 28mg 32mg 34mg   INR 2.5 2.6 2.3 1.8 2.2 1.7 3.5 2.4 2.5 1.7 1.8 2.8   Notes    Inc GLV  Inc GLV 1x dose incr. x1 dose red  ensure       Date 8/31 9/9 9/14 9/21 9/28 10/5 10/13 10/19 10/27 11/3 11/10 11/17   Total WeeklyDose 32mg 32mg 26mg 30mg 26-28 mg? 28mg 28mg 28mg 28mg 26mg 30mg 28mg   INR 2.4 3.9 2.6 2.9 2.3 2.3 2.9 2.8 3.7 1.5 2.1 2.1   Notes       decr Ensure; allopurinol allopurinol; less Ensure  recv'd  11/4; incr GLV 1x incr dose      Date 11/24 12/1 12/8  12/15 12/22 12/31 1/5/21 1/11 1/11-15 1/17 1/25 2/1   Total WeeklyDose 28mg 28mg 26mg 28 mg 28 mg 24mg 28mg 32 mg [facility] admission 26mg 28mg  24mg   INR 2.1 3.8 3.0  2.2 2.0 1.5 1.5 2.3  2.0 2.0 1.8   Notes  decr GLV 1x decr dose   1x miss   pneumonia doxy, cefdinir, rec 1/18  Missed x1     Date 2/8 2/15 2/22 3/1 3/4 3/9 3/15        Total WeeklyDose 30mg 28 mg 30 mg 28 mg 28 mg 28mg 30 mg        INR 1.8 2.1 2.9 2.6 2.5 1.9 2.1        Notes 1x boost   keflex keflex            Phone Interview:  Verbal Release Authorization signed on 6/26/18 and again on 11/5/2019-- may speak with Alexy Wallace (son), Genesis Becerril (daughter), Sawyer or Gema Wallace (son and daughter-in-law), Gerry Rodrigo (son)  Tablet Strength: 2mg tablets  Patient Contact Info: preferred [phone] - home with carlito Cotto- [employer] [phone]; call if can't get in touch with home phone      Patient Findings:  Negatives:  Signs/symptoms of thrombosis, Signs/symptoms of bleeding, Laboratory test error suspected, Change in health, Change in alcohol use, Change in activity, Upcoming invasive procedure, Emergency department visit, Upcoming dental procedure, Missed doses, Extra doses, Change in medications, Change in diet/appetite, Hospital admission, Bruising, Other complaints   Comments:  Confirmed Ms. Wallace took a total of 6 mg of warfarin last Tuesday night then returned to 4 mg daily. She denies any changes since last encounter.     Plan:  1. INR was therapeutic again yesterday evening. Spoke Francoise Wu, PharmD, and instructed Ms. Wallace to continue recently increased dose of warfarin 4 mg daily except 6 mg on Tuesday until recheck.  2. Repeat INR in one week.  3. Verbal information provided over the phone. Moni Wallace RBV dosing instructions, expresses understanding by teach back, and has no further questions at this time.    Zina Aldrich, [facility]  3/15/2021  15:05 EDT     I, Fatemeh Rodríguez,  PharmD, have reviewed the note in full and agree with the assessment and plan.  03/16/21  08:19 EDT

## 2021-03-22 NOTE — PROGRESS NOTES
Anticoagulation Clinic - Remote Progress Note  [facility]  Testing Frequency: 7 days    Indication: paroxysmal afib  Referring Provider: Butch Joe (Last seen: 11/5/19  next appt: 6/9/20)  Goal INR: 2.0-3.0  Current Drug Interactions: , levothyroxine; omeprazole, azaTHIOprine, ezetimibe, allopurinol (10/20)  CHADS-VASc: 5 (age, gender, HTN, DM)  HAS-BLED: 3 (HTN, CKD, Age)     Diet: GLV 1-2x weekly iceberg or ryan lettuce  (3/22/21)  Alcohol: none  Tobacco: none   OTC Pain Medication: APAP PRN; took tylenol last 2 nights for pain from dialysis (3/26/20)    1st clinic: 9/14/17  2nd clinic: 6/26/18  3rd clinic: 11/5/19    INR History:  Date 1/10 1/14 1/23 1/28 1/31 2/4 2/11 2/18 2/24 3/3 3/10 3/16 3/19   Total Weekly Dose 28mg 32mg 30mg 26mg 26mg  24mg 26mg 26mg 24mg 26mg 27mg 25mg 29mg   INR 1.6 2.1 4.1 2.2 3.4 2.7 2.1 3.4 2.3 1.9 1.9 1.4 2.0   Notes augment  2x incr dose 1x decr keflex, pred keflex; pred; sick keflex; sick; dose decr x1 sick;   valtrex keflex   1x incr dose, incr GLV 1x dose decr; levaquin 1x incr dose      Date 3/23 3/26 4/2 4/9 4/16 4/23 5/1 5/8 5/14 5/22 5/27 6/4   Total WeeklyDose 29mg 29mg 30mg 30mg 30mg 32mg 30mg 32mg 28mg 32mg 32mg 30mg   INR 1.4 1.5 1.9 2.2 1.9 3.0 2.1 2.7 2.4 3.2 3.5 3.2   Notes 1x incr dose;  spinach 1x incr dose,  less GLV 1x boost    1x decr  dose trazodone 1x miss; trazodone 25mg QPM recv'd 5/26  1x incr dose     Date 6/10 6/17 6/24 7/2 7/8 7/15 7/22 7/29 8/5 8/12 8/17 8/24   Total WeeklyDose 28mg 28mg 28mg 28mg 28mg 28mg 30mg 26mg 28mg 28mg 32mg 34mg   INR 2.5 2.6 2.3 1.8 2.2 1.7 3.5 2.4 2.5 1.7 1.8 2.8   Notes    Inc GLV  Inc GLV 1x dose incr. x1 dose red  ensure       Date 8/31 9/9 9/14 9/21 9/28 10/5 10/13 10/19 10/27 11/3 11/10 11/17   Total WeeklyDose 32mg 32mg 26mg 30mg 26-28 mg? 28mg 28mg 28mg 28mg 26mg 30mg 28mg   INR 2.4 3.9 2.6 2.9 2.3 2.3 2.9 2.8 3.7 1.5 2.1 2.1   Notes       decr Ensure; allopurinol allopurinol; less Ensure  recv'd  11/4; incr GLV 1x incr dose      Date 11/24 12/1 12/8  12/15 12/22 12/31 1/5/21 1/11 1/11-15 1/17 1/25 2/1   Total WeeklyDose 28mg 28mg 26mg 28 mg 28 mg 24mg 28mg 32 mg [facility] admission 26mg 28mg  24mg   INR 2.1 3.8 3.0  2.2 2.0 1.5 1.5 2.3  2.0 2.0 1.8   Notes  decr GLV 1x decr dose   1x miss   pneumonia doxy, cefdinir, rec 1/18  Missed x1     Date 2/8 2/15 2/22 3/1 3/4 3/9 3/15 3/22       Total WeeklyDose 30mg 28 mg 30 mg 28 mg 28 mg 28mg 30 mg 30 mg       INR 1.8 2.1 2.9 2.6 2.5 1.9 2.1 2.7       Notes 1x boost   keflex keflex            Phone Interview:  Verbal Release Authorization signed on 6/26/18 and again on 11/5/2019-- may speak with Alexy Wallace (son), Genesis Becerril (daughter), Sawyer or Gema Wallace (son and daughter-in-law), Gerry Rodrigo (son)  Tablet Strength: 2mg tablets  Patient Contact Info: preferred [phone] - home with carlito Cotto- cell [phone]; call if can't get in touch with home phone      Patient Findings:  Negatives:  Signs/symptoms of thrombosis, Signs/symptoms of bleeding, Laboratory test error suspected, Change in health, Change in alcohol use, Change in activity, Upcoming invasive procedure, Emergency department visit, Upcoming dental procedure, Missed doses, Extra doses, Change in medications, Change in diet/appetite, Hospital admission, Bruising, Other complaints   Comments:  Her appetite is low, her grandson passed away last night but has been sick since last Monday. Confirmed dosing since last encounter. Denies any other changes.     Plan:  1. INR is therapeutic again today. Instructed Ms. Wallace to take reduced dose of warfarin 4 mg daily until recheck.  2. Repeat INR in one week.  3. Verbal information provided over the phone. Moni Wallace RBV dosing instructions, expresses understanding by teach back, and has no further questions at this time.    Zina Aldrich, [facility]  3/22/2021  14:37 EDT     I, Francoise Wu, PharmD, have reviewed the note in full and agree with the  assessment and plan.  03/22/21  15:46 EDT

## 2021-03-29 NOTE — PROGRESS NOTES
Anticoagulation Clinic - Remote Progress Note  [facility]  Testing Frequency: 7 days    Indication: paroxysmal afib  Referring Provider: Butch Joe (Last seen: 11/5/19  next appt: 6/9/20)  Goal INR: 2.0-3.0  Current Drug Interactions: , levothyroxine; omeprazole, azaTHIOprine, ezetimibe, allopurinol (10/20)  CHADS-VASc: 5 (age, gender, HTN, DM)  HAS-BLED: 3 (HTN, CKD, Age)     Diet: GLV 1-2x weekly iceberg or ryan lettuce  (3/29/21)  Alcohol: none  Tobacco: none   OTC Pain Medication: APAP PRN; took tylenol last 2 nights for pain from dialysis (3/26/20)    1st clinic: 9/14/17  2nd clinic: 6/26/18  3rd clinic: 11/5/19    INR History:  Date 1/10 1/14 1/23 1/28 1/31 2/4 2/11 2/18 2/24 3/3 3/10 3/16 3/19   Total Weekly Dose 28mg 32mg 30mg 26mg 26mg  24mg 26mg 26mg 24mg 26mg 27mg 25mg 29mg   INR 1.6 2.1 4.1 2.2 3.4 2.7 2.1 3.4 2.3 1.9 1.9 1.4 2.0   Notes augment  2x incr dose 1x decr keflex, pred keflex; pred; sick keflex; sick; dose decr x1 sick;   valtrex keflex   1x incr dose, incr GLV 1x dose decr; levaquin 1x incr dose      Date 3/23 3/26 4/2 4/9 4/16 4/23 5/1 5/8 5/14 5/22 5/27 6/4   Total WeeklyDose 29mg 29mg 30mg 30mg 30mg 32mg 30mg 32mg 28mg 32mg 32mg 30mg   INR 1.4 1.5 1.9 2.2 1.9 3.0 2.1 2.7 2.4 3.2 3.5 3.2   Notes 1x incr dose;  spinach 1x incr dose,  less GLV 1x boost    1x decr  dose trazodone 1x miss; trazodone 25mg QPM recv'd 5/26  1x incr dose     Date 6/10 6/17 6/24 7/2 7/8 7/15 7/22 7/29 8/5 8/12 8/17 8/24   Total WeeklyDose 28mg 28mg 28mg 28mg 28mg 28mg 30mg 26mg 28mg 28mg 32mg 34mg   INR 2.5 2.6 2.3 1.8 2.2 1.7 3.5 2.4 2.5 1.7 1.8 2.8   Notes    Inc GLV  Inc GLV 1x dose incr. x1 dose red  ensure       Date 8/31 9/9 9/14 9/21 9/28 10/5 10/13 10/19 10/27 11/3 11/10 11/17   Total WeeklyDose 32mg 32mg 26mg 30mg 26-28 mg? 28mg 28mg 28mg 28mg 26mg 30mg 28mg   INR 2.4 3.9 2.6 2.9 2.3 2.3 2.9 2.8 3.7 1.5 2.1 2.1   Notes       decr Ensure; allopurinol allopurinol; less Ensure  recv'd  11/4; incr GLV 1x incr dose      Date 11/24 12/1 12/8  12/15 12/22 12/31 1/5/21 1/11 1/11-15 1/17 1/25 2/1   Total WeeklyDose 28mg 28mg 26mg 28 mg 28 mg 24mg 28mg 32 mg [facility] admission 26mg 28mg  24mg   INR 2.1 3.8 3.0  2.2 2.0 1.5 1.5 2.3  2.0 2.0 1.8   Notes  decr GLV 1x decr dose   1x miss   pneumonia doxy, cefdinir, rec 1/18  Missed x1     Date 2/8 2/15 2/22 3/1 3/4 3/9 3/15 3/22 3/29      Total WeeklyDose 30mg 28 mg 30 mg 28 mg 28 mg 28mg 30 mg 30 mg 28 mg      INR 1.8 2.1 2.9 2.6 2.5 1.9 2.1 2.7 2.9      Notes 1x boost   keflex keflex            Phone Interview:  Verbal Release Authorization signed on 6/26/18 and again on 11/5/2019-- may speak with Alexy Wallace (son), Genesis Becerril (daughter), Sawyer or Gema Wallace (son and daughter-in-law), Gerry Wallace (son)  Tablet Strength: 2mg tablets  Patient Contact Info: preferred [phone] - home with carlito Cotto- cell [phone]; call if can't get in touch with home phone      Patient Findings:  Negatives:  Signs/symptoms of thrombosis, Signs/symptoms of bleeding, Laboratory test error suspected, Change in health, Change in alcohol use, Change in activity, Upcoming invasive procedure, Emergency department visit, Upcoming dental procedure, Missed doses, Extra doses, Change in medications, Change in diet/appetite, Hospital admission, Bruising, Other complaints     Plan:  1. INR is therapeutic again today, trending upward. Instructed Ms. Wallace to take 2 mg tonight then resume maintenance dose of warfarin 4 mg daily until recheck.  2. Repeat INR in one week.  3. Verbal information provided over the phone. Moni Wallace RBV dosing instructions, expresses understanding by teach back, and has no further questions at this time.    Zina Aldrich, [facility]  3/29/2021  11:49 EDT     I, Francoise Wu, PharmD, have reviewed the note in full and agree with the assessment and plan.  03/30/21  08:30 EDT

## 2021-04-05 NOTE — PROGRESS NOTES
Anticoagulation Clinic - Remote Progress Note  [facility]  Testing Frequency: 7 days    Indication: paroxysmal afib  Referring Provider: Butch Joe (Last seen: 11/5/19  next appt: 6/9/20)  Goal INR: 2.0-3.0  Current Drug Interactions: , levothyroxine; omeprazole, azaTHIOprine, ezetimibe, allopurinol (10/20)  CHADS-VASc: 5 (age, gender, HTN, DM)  HAS-BLED: 3 (HTN, CKD, Age)     Diet: hasn't been eating any GLV 4/5/21   (4/5/21)  Alcohol: none  Tobacco: none   OTC Pain Medication: APAP PRN; took tylenol last 2 nights for pain from dialysis (3/26/20)    1st clinic: 9/14/17  2nd clinic: 6/26/18  3rd clinic: 11/5/19    INR History:  Date 1/10 1/14 1/23 1/28 1/31 2/4 2/11 2/18 2/24 3/3 3/10 3/16 3/19   Total Weekly Dose 28mg 32mg 30mg 26mg 26mg  24mg 26mg 26mg 24mg 26mg 27mg 25mg 29mg   INR 1.6 2.1 4.1 2.2 3.4 2.7 2.1 3.4 2.3 1.9 1.9 1.4 2.0   Notes augment  2x incr dose 1x decr keflex, pred keflex; pred; sick keflex; sick; dose decr x1 sick;   valtrex keflex   1x incr dose, incr GLV 1x dose decr; levaquin 1x incr dose      Date 3/23 3/26 4/2 4/9 4/16 4/23 5/1 5/8 5/14 5/22 5/27 6/4   Total WeeklyDose 29mg 29mg 30mg 30mg 30mg 32mg 30mg 32mg 28mg 32mg 32mg 30mg   INR 1.4 1.5 1.9 2.2 1.9 3.0 2.1 2.7 2.4 3.2 3.5 3.2   Notes 1x incr dose;  spinach 1x incr dose,  less GLV 1x boost    1x decr  dose trazodone 1x miss; trazodone 25mg QPM recv'd 5/26  1x incr dose     Date 6/10 6/17 6/24 7/2 7/8 7/15 7/22 7/29 8/5 8/12 8/17 8/24   Total WeeklyDose 28mg 28mg 28mg 28mg 28mg 28mg 30mg 26mg 28mg 28mg 32mg 34mg   INR 2.5 2.6 2.3 1.8 2.2 1.7 3.5 2.4 2.5 1.7 1.8 2.8   Notes    Inc GLV  Inc GLV 1x dose incr. x1 dose red  ensure       Date 8/31 9/9 9/14 9/21 9/28 10/5 10/13 10/19 10/27 11/3 11/10 11/17   Total WeeklyDose 32mg 32mg 26mg 30mg 26-28 mg? 28mg 28mg 28mg 28mg 26mg 30mg 28mg   INR 2.4 3.9 2.6 2.9 2.3 2.3 2.9 2.8 3.7 1.5 2.1 2.1   Notes       decr Ensure; allopurinol allopurinol; less Ensure  recv'd 11/4; incr  GLV 1x incr dose      Date 11/24 12/1 12/8  12/15 12/22 12/31 1/5/21 1/11 1/11-15 1/17 1/25 2/1   Total WeeklyDose 28mg 28mg 26mg 28 mg 28 mg 24mg 28mg 32 mg [facility] admission 26mg 28mg  24mg   INR 2.1 3.8 3.0  2.2 2.0 1.5 1.5 2.3  2.0 2.0 1.8   Notes  decr GLV 1x decr dose   1x miss   pneumonia doxy, cefdinir, rec 1/18  Missed x1     Date 2/8 2/15 2/22 3/1 3/4 3/9 3/15 3/22 3/29 4/5     Total WeeklyDose 30mg 28 mg 30 mg 28 mg 28 mg 28mg 30 mg 30 mg 28 mg 26 mg     INR 1.8 2.1 2.9 2.6 2.5 1.9 2.1 2.7 2.9 2.3     Notes 1x boost   keflex keflex            Phone Interview:  Verbal Release Authorization signed on 6/26/18 and again on 11/5/2019-- may speak with Alexy Wallace (son), Genesis Becerril (daughter), Sawyer or Gema Wallace (son and daughter-in-law), Gerry Wallace (son)  Tablet Strength: 2mg tablets  Patient Contact Info: preferred [phone] - home with carlito Cotto- cell [phone]; call if can't get in touch with home phone      Patient Findings:  Negatives:  Signs/symptoms of thrombosis, Signs/symptoms of bleeding, Laboratory test error suspected, Change in health, Change in alcohol use, Change in activity, Upcoming invasive procedure, Emergency department visit, Upcoming dental procedure, Missed doses, Extra doses, Change in medications, Change in diet/appetite, Hospital admission, Bruising, Other complaints     Plan:  1. INR is therapeutic today. Instructed Ms. Wallace to resume maintenance dose of warfarin 4 mg daily until recheck.  2. Repeat INR in one week.  3. Verbal information provided over the phone. Moni Wallace RBV dosing instructions, expresses understanding by teach back, and has no further questions at this time.    Zina Aldrich, [facility]  4/5/2021  15:24 EDT     I, Fatemeh Rodríguez, PharmD, have reviewed the note in full and agree with the assessment and plan.  04/05/21  16:16 EDT

## 2021-04-15 NOTE — PROGRESS NOTES
Anticoagulation Clinic - Remote Progress Note  [facility]  Testing Frequency: 7 days    Indication: paroxysmal afib  Referring Provider: Butch Joe (Last seen: 11/5/19  next appt: 6/9/20)  Goal INR: 2.0-3.0  Current Drug Interactions: , levothyroxine; omeprazole, azaTHIOprine, ezetimibe, allopurinol (10/20)  CHADS-VASc: 5 (age, gender, HTN, DM)  HAS-BLED: 3 (HTN, CKD, Age)     Diet: hasn't been eating any GLV 4/5/21   (4/5/21)  Alcohol: none  Tobacco: none   OTC Pain Medication: APAP PRN; took tylenol last 2 nights for pain from dialysis (3/26/20)    1st clinic: 9/14/17  2nd clinic: 6/26/18  3rd clinic: 11/5/19    INR History:  Date 1/10 1/14 1/23 1/28 1/31 2/4 2/11 2/18 2/24 3/3 3/10 3/16 3/19   Total Weekly Dose 28mg 32mg 30mg 26mg 26mg  24mg 26mg 26mg 24mg 26mg 27mg 25mg 29mg   INR 1.6 2.1 4.1 2.2 3.4 2.7 2.1 3.4 2.3 1.9 1.9 1.4 2.0   Notes augment  2x incr dose 1x decr keflex, pred keflex; pred; sick keflex; sick; dose decr x1 sick;   valtrex keflex   1x incr dose, incr GLV 1x dose decr; levaquin 1x incr dose      Date 3/23 3/26 4/2 4/9 4/16 4/23 5/1 5/8 5/14 5/22 5/27 6/4   Total WeeklyDose 29mg 29mg 30mg 30mg 30mg 32mg 30mg 32mg 28mg 32mg 32mg 30mg   INR 1.4 1.5 1.9 2.2 1.9 3.0 2.1 2.7 2.4 3.2 3.5 3.2   Notes 1x incr dose;  spinach 1x incr dose,  less GLV 1x boost    1x decr  dose trazodone 1x miss; trazodone 25mg QPM recv'd 5/26  1x incr dose     Date 6/10 6/17 6/24 7/2 7/8 7/15 7/22 7/29 8/5 8/12 8/17 8/24   Total WeeklyDose 28mg 28mg 28mg 28mg 28mg 28mg 30mg 26mg 28mg 28mg 32mg 34mg   INR 2.5 2.6 2.3 1.8 2.2 1.7 3.5 2.4 2.5 1.7 1.8 2.8   Notes    Inc GLV  Inc GLV 1x dose incr. x1 dose red  ensure       Date 8/31 9/9 9/14 9/21 9/28 10/5 10/13 10/19 10/27 11/3 11/10 11/17   Total WeeklyDose 32mg 32mg 26mg 30mg 26-28 mg? 28mg 28mg 28mg 28mg 26mg 30mg 28mg   INR 2.4 3.9 2.6 2.9 2.3 2.3 2.9 2.8 3.7 1.5 2.1 2.1   Notes       decr Ensure; allopurinol allopurinol; less Ensure  recv'd 11/4; incr  GLV 1x incr dose      Date 11/24 12/1 12/8  12/15 12/22 12/31 1/5/21 1/11 1/11-15 1/17 1/25 2/1   Total WeeklyDose 28mg 28mg 26mg 28 mg 28 mg 24mg 28mg 32 mg [facility] admission 26mg 28mg  24mg   INR 2.1 3.8 3.0  2.2 2.0 1.5 1.5 2.3  2.0 2.0 1.8   Notes  decr GLV 1x decr dose   1x miss   pneumonia doxy, cefdinir, rec 1/18  Missed x1     Date 2/8 2/15 2/22 3/1 3/4 3/9 3/15 3/22 3/29 4/5 4/12 4/15   Total WeeklyDose 30mg 28 mg 30 mg 28 mg 28 mg 28mg 30 mg 30 mg 28 mg 26 mg 28mg 25mg   INR 1.8 2.1 2.9 2.6 2.5 1.9 2.1 2.7 2.9 2.3 4.4 3.6   Notes 1x boost   keflex keflex       LVQ         Phone Interview:  Verbal Release Authorization signed on 6/26/18 and again on 11/5/2019-- may speak with Alexy Wallace (son), Genesis Becerril (daughter), Sawyer or Gema Wallace (son and daughter-in-law), Gerry Wallace (son)  Tablet Strength: 2mg tablets  Patient Contact Info: preferred [phone] - home with carlito Cotto- cell [phone]; call if can't get in touch with home phone      Patient Findings:    Positives:  Change in medications, Change in diet/appetite   Negatives:  Signs/symptoms of thrombosis, Signs/symptoms of bleeding, Laboratory test error suspected, Change in health, Change in alcohol use, Change in activity, Upcoming invasive procedure, Emergency department visit, Upcoming dental procedure, Missed doses, Extra doses, Hospital admission, Bruising, Other complaints   Comments:  Has started levofloxacin 500mg 1 QOD x5 doses. Has 3 pills remaining. First dose was 4/13, last expected 4/20. Has complaints of nausea, decreased appetite.      Plan:  1. INR remains SUPRAtherapeutic today at 3.6 with starting new abx. Instructed Ms. Wallace to again reduce tonight's dose to 1mg then resume maintenance dose of warfarin 4 mg daily until recheck.  2. Repeat INR Monday  3. Verbal information provided over the phone. Moni Wallace RBV dosing instructions, expresses understanding by teach back, and has no further questions at this  time.    Francoise Wu, [facility]  04/15/21   11:44 EDT

## 2021-04-19 NOTE — PROGRESS NOTES
Anticoagulation Clinic - Remote Progress Note  [facility]  Testing Frequency: 7 days    Indication: paroxysmal afib  Referring Provider: Butch Joe (Last seen: 11/5/19  next appt: 6/9/20)  Goal INR: 2.0-3.0  Current Drug Interactions: , levothyroxine; omeprazole, azaTHIOprine, ezetimibe, allopurinol (10/20)  CHADS-VASc: 5 (age, gender, HTN, DM)  HAS-BLED: 3 (HTN, CKD, Age)     Diet: hasn't been eating any GLV 4/5/21   (4/19/21)  Alcohol: none  Tobacco: none   OTC Pain Medication: APAP PRN; took tylenol last 2 nights for pain from dialysis (3/26/20)    1st clinic: 9/14/17  2nd clinic: 6/26/18  3rd clinic: 11/5/19    INR History:  Date 1/10 1/14 1/23 1/28 1/31 2/4 2/11 2/18 2/24 3/3 3/10 3/16 3/19   Total Weekly Dose 28mg 32mg 30mg 26mg 26mg  24mg 26mg 26mg 24mg 26mg 27mg 25mg 29mg   INR 1.6 2.1 4.1 2.2 3.4 2.7 2.1 3.4 2.3 1.9 1.9 1.4 2.0   Notes augment  2x incr dose 1x decr keflex, pred keflex; pred; sick keflex; sick; dose decr x1 sick;   valtrex keflex   1x incr dose, incr GLV 1x dose decr; levaquin 1x incr dose      Date 3/23 3/26 4/2 4/9 4/16 4/23 5/1 5/8 5/14 5/22 5/27 6/4   Total WeeklyDose 29mg 29mg 30mg 30mg 30mg 32mg 30mg 32mg 28mg 32mg 32mg 30mg   INR 1.4 1.5 1.9 2.2 1.9 3.0 2.1 2.7 2.4 3.2 3.5 3.2   Notes 1x incr dose;  spinach 1x incr dose,  less GLV 1x boost    1x decr  dose trazodone 1x miss; trazodone 25mg QPM recv'd 5/26  1x incr dose     Date 6/10 6/17 6/24 7/2 7/8 7/15 7/22 7/29 8/5 8/12 8/17 8/24   Total WeeklyDose 28mg 28mg 28mg 28mg 28mg 28mg 30mg 26mg 28mg 28mg 32mg 34mg   INR 2.5 2.6 2.3 1.8 2.2 1.7 3.5 2.4 2.5 1.7 1.8 2.8   Notes    Inc GLV  Inc GLV 1x dose incr. x1 dose red  ensure       Date 8/31 9/9 9/14 9/21 9/28 10/5 10/13 10/19 10/27 11/3 11/10 11/17   Total WeeklyDose 32mg 32mg 26mg 30mg 26-28 mg? 28mg 28mg 28mg 28mg 26mg 30mg 28mg   INR 2.4 3.9 2.6 2.9 2.3 2.3 2.9 2.8 3.7 1.5 2.1 2.1   Notes       decr Ensure; allopurinol allopurinol; less Ensure  recv'd 11/4; incr  GLV 1x incr dose      Date 11/24 12/1 12/8  12/15 12/22 12/31 1/5/21 1/11 1/11-15 1/17 1/25 2/1   Total WeeklyDose 28mg 28mg 26mg 28 mg 28 mg 24mg 28mg 32 mg [facility] admission 26mg 28mg  24mg   INR 2.1 3.8 3.0  2.2 2.0 1.5 1.5 2.3  2.0 2.0 1.8   Notes  decr GLV 1x decr dose   1x miss   pneumonia doxy, cefdinir, rec 1/18  Missed x1     Date 2/8 2/15 2/22 3/1 3/4 3/9 3/15 3/22 3/29 4/5 4/12 4/15   Total WeeklyDose 30mg 28 mg 30 mg 28 mg 28 mg 28mg 30 mg 30 mg 28 mg 26 mg 28mg 25mg   INR 1.8 2.1 2.9 2.6 2.5 1.9 2.1 2.7 2.9 2.3 4.4 3.6   Notes 1x boost   keflex keflex       LVQ     Date 4/19       Total Weekly Dose 22 mg       INR 3.0       Notes          Phone Interview:  Verbal Release Authorization signed on 6/26/18 and again on 11/5/2019-- may speak with Alexy Wallace (son), Genesis Becerril (daughter), Sawyer Wallace (son and daughter-in-law), Gerry Rodrigo (son)  Tablet Strength: 2mg tablets  Patient Contact Info: preferred [phone] - home with son Yaidel- cell [phone]; call if can't get in touch with home phone      Patient Findings:  Positives:  Change in medications   Negatives:  Signs/symptoms of thrombosis, Signs/symptoms of bleeding, Laboratory test error suspected, Change in health, Change in alcohol use, Change in activity, Upcoming invasive procedure, Emergency department visit, Upcoming dental procedure, Missed doses, Extra doses, Change in diet/appetite, Hospital admission, Bruising, Other complaints   Comments:  Continues Levaquin QOD. Will take last tablet on Wednesday. She is feeling much better and her appetite had improved. Denies any other changes.     Plan:  1. INR is back [facility] at [facility] following dose decrease. Instructed Ms. Wallace to take warfarin 4 mg daily except 2 mg on Tuesday until recheck (23 mg/week).  2. Repeat INR on Thursday.  3. Verbal information provided over the phone. Moni Wallace RBV dosing instructions, expresses understanding by teach back, and has no further questions at this  time.    Zina Aldrich, ISAEL  4/19/2021  11:43 EDT      I, Francoise Wu, PharmD, have reviewed the note in full and agree with the assessment and plan.  04/20/21  09:04 EDT

## 2021-04-23 NOTE — PROGRESS NOTES
Anticoagulation Clinic - Remote Progress Note  [facility]  Testing Frequency: 7 days    Indication: paroxysmal afib  Referring Provider: Butch Joe (Last seen: 11/5/19  next appt: 6/9/20)  Goal INR: 2.0-3.0  Current Drug Interactions: , levothyroxine; omeprazole, azaTHIOprine, ezetimibe, allopurinol (10/20)  CHADS-VASc: 5 (age, gender, HTN, DM)  HAS-BLED: 3 (HTN, CKD, Age)     Diet: hasn't been eating any GLV 4/5/21   (4/19/21)  Alcohol: none  Tobacco: none   OTC Pain Medication: APAP PRN; took tylenol last 2 nights for pain from dialysis (3/26/20)    1st clinic: 9/14/17  2nd clinic: 6/26/18  3rd clinic: 11/5/19    INR History:  Date 1/10 1/14 1/23 1/28 1/31 2/4 2/11 2/18 2/24 3/3 3/10 3/16 3/19   Total Weekly Dose 28mg 32mg 30mg 26mg 26mg  24mg 26mg 26mg 24mg 26mg 27mg 25mg 29mg   INR 1.6 2.1 4.1 2.2 3.4 2.7 2.1 3.4 2.3 1.9 1.9 1.4 2.0   Notes augment  2x incr dose 1x decr keflex, pred keflex; pred; sick keflex; sick; dose decr x1 sick;   valtrex keflex   1x incr dose, incr GLV 1x dose decr; levaquin 1x incr dose      Date 3/23 3/26 4/2 4/9 4/16 4/23 5/1 5/8 5/14 5/22 5/27 6/4   Total WeeklyDose 29mg 29mg 30mg 30mg 30mg 32mg 30mg 32mg 28mg 32mg 32mg 30mg   INR 1.4 1.5 1.9 2.2 1.9 3.0 2.1 2.7 2.4 3.2 3.5 3.2   Notes 1x incr dose;  spinach 1x incr dose,  less GLV 1x boost    1x decr  dose trazodone 1x miss; trazodone 25mg QPM recv'd 5/26  1x incr dose     Date 6/10 6/17 6/24 7/2 7/8 7/15 7/22 7/29 8/5 8/12 8/17 8/24   Total WeeklyDose 28mg 28mg 28mg 28mg 28mg 28mg 30mg 26mg 28mg 28mg 32mg 34mg   INR 2.5 2.6 2.3 1.8 2.2 1.7 3.5 2.4 2.5 1.7 1.8 2.8   Notes    Inc GLV  Inc GLV 1x dose incr. x1 dose red  ensure       Date 8/31 9/9 9/14 9/21 9/28 10/5 10/13 10/19 10/27 11/3 11/10 11/17   Total WeeklyDose 32mg 32mg 26mg 30mg 26-28 mg? 28mg 28mg 28mg 28mg 26mg 30mg 28mg   INR 2.4 3.9 2.6 2.9 2.3 2.3 2.9 2.8 3.7 1.5 2.1 2.1   Notes       decr Ensure; allopurinol allopurinol; less Ensure  recv'd 11/4; incr  GLV 1x incr dose      Date 11/24 12/1 12/8  12/15 12/22 12/31 1/5/21 1/11 1/11-15 1/17 1/25 2/1   Total WeeklyDose 28mg 28mg 26mg 28 mg 28 mg 24mg 28mg 32 mg [facility] admission 26mg 28mg  24mg   INR 2.1 3.8 3.0  2.2 2.0 1.5 1.5 2.3  2.0 2.0 1.8   Notes  decr GLV 1x decr dose   1x miss   pneumonia doxy, cefdinir, rec 1/18  Missed x1     Date 2/8 2/15 2/22 3/1 3/4 3/9 3/15 3/22 3/29 4/5 4/12 4/15   Total WeeklyDose 30mg 28 mg 30 mg 28 mg 28 mg 28mg 30 mg 30 mg 28 mg 26 mg 28mg 25mg   INR 1.8 2.1 2.9 2.6 2.5 1.9 2.1 2.7 2.9 2.3 4.4 3.6   Notes 1x boost   keflex keflex       LVQ     Date 4/19 4/23      Total Weekly Dose 22 mg 26 mg      INR 3.0 2.1      Notes LVQ         Phone Interview:  Verbal Release Authorization signed on 6/26/18 and again on 11/5/2019-- may speak with Alexy Wallace (son), Genesis Becerril (daughter), Sawyer figueroa Gema Rodrigo (son and daughter-in-law), Gerry Wallace (son)  Tablet Strength: 2mg tablets  Patient Contact Info: preferred [phone] - home with carlito Cotto- [place] [phone]; call if can't get in touch with home phone      Patient Findings    Positives:  Change in health, Change in medications, Change in diet/appetite   Negatives:  Signs/symptoms of thrombosis, Signs/symptoms of bleeding, Laboratory test error suspected, Change in alcohol use, Change in activity, Upcoming invasive procedure, Emergency department visit, Upcoming dental procedure, Missed doses, Extra doses, Hospital admission, Bruising, Other complaints   Comments:  Ms. Wallace finished the Levaquin Wed 4/21. She has recently lost a grandson to death and is not eating as much . She reported a fall of last Sat 4/17 but has no bruising.          Plan:  1. INR is therapeutic today at 2.1.  Instructed Ms. Wallace to resume maintenance dose of warfarin 4 mg daily until recheck per Francoise Wu Pharm D.  2. Repeat INR Tues 4/27 .  3. Verbal information provided over the phone. Moni Wallace RBV dosing instructions, expresses understanding by  teach back, and has no further questions at this time.    Bobbi Rivera CPhT  4/23/2021  10:02 EDT      May require 1x weekly dose of 2mg  I, Francoise Wu, PharmD, have reviewed the note in full and agree with the assessment and plan.  04/23/21  11:36 EDT

## 2021-04-27 NOTE — TELEPHONE ENCOUNTER
REGGIE WITH [facility] IS NEEDING AN ORDER FAXED FOR PT AND OT ALONG WITH LAST OFFICE NOTES -[phone].

## 2021-04-27 NOTE — PROGRESS NOTES
Anticoagulation Clinic - Remote Progress Note  [facility]  Testing Frequency: 7 days    Indication: paroxysmal afib  Referring Provider: Butch Joe (Last seen: 11/5/19  next appt: 6/9/20)  Goal INR: 2.0-3.0  Current Drug Interactions: , levothyroxine; omeprazole, azaTHIOprine, ezetimibe, allopurinol (10/20)  CHADS-VASc: 5 (age, gender, HTN, DM)  HAS-BLED: 3 (HTN, CKD, Age)     Diet: hasn't been eating any GLV 4/5/21   (4/19/21)  Alcohol: none  Tobacco: none   OTC Pain Medication: APAP PRN; took tylenol last 2 nights for pain from dialysis (3/26/20)    1st clinic: 9/14/17  2nd clinic: 6/26/18  3rd clinic: 11/5/19    INR History:  Date 1/10 1/14 1/23 1/28 1/31 2/4 2/11 2/18 2/24 3/3 3/10 3/16 3/19   Total Weekly Dose 28mg 32mg 30mg 26mg 26mg  24mg 26mg 26mg 24mg 26mg 27mg 25mg 29mg   INR 1.6 2.1 4.1 2.2 3.4 2.7 2.1 3.4 2.3 1.9 1.9 1.4 2.0   Notes augment  2x incr dose 1x decr keflex, pred keflex; pred; sick keflex; sick; dose decr x1 sick;   valtrex keflex   1x incr dose, incr GLV 1x dose decr; levaquin 1x incr dose      Date 3/23 3/26 4/2 4/9 4/16 4/23 5/1 5/8 5/14 5/22 5/27 6/4   Total WeeklyDose 29mg 29mg 30mg 30mg 30mg 32mg 30mg 32mg 28mg 32mg 32mg 30mg   INR 1.4 1.5 1.9 2.2 1.9 3.0 2.1 2.7 2.4 3.2 3.5 3.2   Notes 1x incr dose;  spinach 1x incr dose,  less GLV 1x boost    1x decr  dose trazodone 1x miss; trazodone 25mg QPM recv'd 5/26  1x incr dose     Date 6/10 6/17 6/24 7/2 7/8 7/15 7/22 7/29 8/5 8/12 8/17 8/24   Total WeeklyDose 28mg 28mg 28mg 28mg 28mg 28mg 30mg 26mg 28mg 28mg 32mg 34mg   INR 2.5 2.6 2.3 1.8 2.2 1.7 3.5 2.4 2.5 1.7 1.8 2.8   Notes    Inc GLV  Inc GLV 1x dose incr. x1 dose red  ensure       Date 8/31 9/9 9/14 9/21 9/28 10/5 10/13 10/19 10/27 11/3 11/10 11/17   Total WeeklyDose 32mg 32mg 26mg 30mg 26-28 mg? 28mg 28mg 28mg 28mg 26mg 30mg 28mg   INR 2.4 3.9 2.6 2.9 2.3 2.3 2.9 2.8 3.7 1.5 2.1 2.1   Notes       decr Ensure; allopurinol allopurinol; less Ensure  recv'd 11/4; incr  GLV 1x incr dose      Date 11/24 12/1 12/8  12/15 12/22 12/31 1/5/21 1/11 1/11-15 1/17 1/25 2/1   Total WeeklyDose 28mg 28mg 26mg 28 mg 28 mg 24mg 28mg 32 mg [facility] admission 26mg 28mg  24mg   INR 2.1 3.8 3.0  2.2 2.0 1.5 1.5 2.3  2.0 2.0 1.8   Notes  decr GLV 1x decr dose   1x miss   pneumonia doxy, cefdinir, rec 1/18  Missed x1     Date 2/8 2/15 2/22 3/1 3/4 3/9 3/15 3/22 3/29 4/5 4/12 4/15   Total WeeklyDose 30mg 28 mg 30 mg 28 mg 28 mg 28mg 30 mg 30 mg 28 mg 26 mg 28mg 25mg   INR 1.8 2.1 2.9 2.6 2.5 1.9 2.1 2.7 2.9 2.3 4.4 3.6   Notes 1x boost   keflex keflex       LVQ     Date 4/19 4/23 4/27     Total Weekly Dose 22 mg 26 mg 28mg     INR 3.0 2.1 1.7     Notes LVQ         Phone Interview:  Verbal Release Authorization signed on 6/26/18 and again on 11/5/2019-- may speak with Alexy Wallace (son), Genesis Becerril (daughter), Sawyer Wallace (son and daughter-in-law), Gerry Rodrigo (son)  Tablet Strength: 2mg tablets  Patient Contact Info: preferred [phone] - home with carlito Cotto- cell [phone]; call if can't get in touch with home phone      Patient Findings      Negatives:  Signs/symptoms of thrombosis, Signs/symptoms of bleeding, Laboratory test error suspected, Change in health, Change in alcohol use, Change in activity, Upcoming invasive procedure, Emergency department visit, Upcoming dental procedure, Missed doses, Extra doses, Change in medications, Change in diet/appetite, Hospital admission, Bruising, Other complaints         Plan:  1. INR is subtherapeutic today at 1.7  Instructed Ms. Wallace to boost today's dos to 6mg then resume maintenance dose of warfarin 4 mg daily until recheck  2. Repeat INR Tues 5/4  3. Verbal information provided over the phone. Moni Wallace RBV dosing instructions, expresses understanding by teach back, and has no further questions at this time.    Kwadwo JungD.  04/27/21   15:07 EDT

## 2021-04-27 NOTE — TELEPHONE ENCOUNTER
Spoke with [facility]. They said she has a pending apt on May 14th and they will be able to use that note once she has the apt. Just asking for the order and her face sheet to be sent to them

## 2021-04-30 NOTE — TELEPHONE ENCOUNTER
Caller: TALHA    Relationship to patient: Home Health    Best call back number: [phone]    Patient is needing: TALHA FROM [facility] CALLED AND STATED THAT PATIENT WILL BE GETTING OCCUPATIONAL THERAPY AND PHYSICAL THERAPY FOR  STRENGTHEN EXERCISE AND BALANCE AND TRANSFER ACTIVITIES

## 2021-05-04 NOTE — TELEPHONE ENCOUNTER
Caller: MADYSON    Relationship:     Best call back number:[phone]       What orders are you requesting (i.e. lab or imaging): VERBAL ORDERS FOR  OCCUPATIONAL THERAPY 1 TIME A WEEK FOR 3 WEEKS    In what timeframe would the patient need to come in: STARTING NEXT WEEK     Where will you receive your lab/imaging services: IN HOME    Additional notes: NA

## 2021-05-04 NOTE — PROGRESS NOTES
Anticoagulation Clinic - Remote Progress Note  [facility]  Testing Frequency: 7 days    Indication: paroxysmal afib  Referring Provider: Butch Joe (Last seen: 11/5/19  next appt: 6/9/20)  Goal INR: 2.0-3.0  Current Drug Interactions: , levothyroxine; omeprazole, azaTHIOprine, ezetimibe, allopurinol (10/20)  CHADS-VASc: 5 (age, gender, HTN, DM)  HAS-BLED: 3 (HTN, CKD, Age)     Diet: hasn't been eating any GLV 4/5/21   (4/19/21)  Alcohol: none  Tobacco: none   OTC Pain Medication: APAP PRN; took tylenol last 2 nights for pain from dialysis (3/26/20)    1st clinic: 9/14/17  2nd clinic: 6/26/18  3rd clinic: 11/5/19    INR History:    Date 6/10 6/17 6/24 7/2 7/8 7/15 7/22 7/29 8/5 8/12 8/17 8/24   Total WeeklyDose 28mg 28mg 28mg 28mg 28mg 28mg 30mg 26mg 28mg 28mg 32mg 34mg   INR 2.5 2.6 2.3 1.8 2.2 1.7 3.5 2.4 2.5 1.7 1.8 2.8   Notes    Inc GLV  Inc GLV 1x dose incr. x1 dose red  ensure       Date 8/31 9/9 9/14 9/21 9/28 10/5 10/13 10/19 10/27 11/3 11/10 11/17   Total WeeklyDose 32mg 32mg 26mg 30mg 26-28 mg? 28mg 28mg 28mg 28mg 26mg 30mg 28mg   INR 2.4 3.9 2.6 2.9 2.3 2.3 2.9 2.8 3.7 1.5 2.1 2.1   Notes       decr Ensure; allopurinol allopurinol; less Ensure  recv'd 11/4; incr GLV 1x incr dose      Date 11/24 12/1 12/8  12/15 12/22 12/31 1/5/21 1/11 1/11-15 1/17 1/25 2/1   Total WeeklyDose 28mg 28mg 26mg 28 mg 28 mg 24mg 28mg 32 mg [facility] admission 26mg 28mg  24mg   INR 2.1 3.8 3.0  2.2 2.0 1.5 1.5 2.3  2.0 2.0 1.8   Notes  decr GLV 1x decr dose   1x miss   pneumonia doxy, cefdinir, rec 1/18  Missed x1     Date 2/8 2/15 2/22 3/1 3/4 3/9 3/15 3/22 3/29 4/5 4/12 4/15   Total WeeklyDose 30mg 28 mg 30 mg 28 mg 28 mg 28mg 30 mg 30 mg 28 mg 26 mg 28mg 25mg   INR 1.8 2.1 2.9 2.6 2.5 1.9 2.1 2.7 2.9 2.3 4.4 3.6   Notes 1x boost   keflex keflex       LVQ     Date 4/19 4/23 4/27 5/4    Total Weekly Dose 22 mg 26 mg 28mg 22-24?    INR 3.0 2.1 1.7 1.4    Notes LVQ   1x miss      Phone Interview:  Verbal Release  Authorization signed on 6/26/18 and again on 11/5/2019-- may speak with Alexy Wallace (son), Genesis Jone (daughter), Sawyer or Gema Wallace (son and daughter-in-law), Gerry Wallace (son)  Tablet Strength: 2mg tablets  Patient Contact Info: preferred [phone] - home with carlito Cotto- cell [phone]; call if can't get in touch with home phone      Patient Findings  Positives:  Missed doses   Negatives:  Signs/symptoms of thrombosis, Signs/symptoms of bleeding, Laboratory test error suspected, Change in health, Change in alcohol use, Change in activity, Upcoming invasive procedure, Emergency department visit, Upcoming dental procedure, Extra doses, Change in medications, Change in diet/appetite, Hospital admission, Bruising, Other complaints   Comments:  Possibly missed a dose on Tues or Wed       Plan:  1. INR is subtherapeutic again today at 1.4 following missed dose.   Instructed Ms. Wallace to boost today's dose to 6mg then resume maintenance dose of warfarin 4 mg daily until recheck  2. Repeat INR Friday to see if additional boost needed  3. Verbal information provided over the phone. Moni Wallace RBV dosing instructions, expresses understanding by teach back, and has no further questions at this time.    Francoise Wu, PharmD.  05/04/21   15:29 EDT

## 2021-05-07 NOTE — PROGRESS NOTES
Anticoagulation Clinic - Remote Progress Note  [facility]  Testing Frequency: 7 days    Indication: paroxysmal afib  Referring Provider: Butch (last seen 12/20) / Heath (Last seen: 11/5/19  next appt: 6/9/20)  Goal INR: 2.0-3.0  Current Drug Interactions: , levothyroxine; omeprazole, azaTHIOprine, ezetimibe, allopurinol (10/20)  CHADS-VASc: 5 (age, gender, HTN, DM)  HAS-BLED: 3 (HTN, CKD, Age)     Diet: hasn't been eating any GLV 4/5/21   (4/19/21)  Alcohol: none  Tobacco: none   OTC Pain Medication: APAP PRN; took tylenol last 2 nights for pain from dialysis (3/26/20)    1st clinic: 9/14/17  2nd clinic: 6/26/18  3rd clinic: 11/5/19    INR History:    Date 6/10 6/17 6/24 7/2 7/8 7/15 7/22 7/29 8/5 8/12 8/17 8/24   Total WeeklyDose 28mg 28mg 28mg 28mg 28mg 28mg 30mg 26mg 28mg 28mg 32mg 34mg   INR 2.5 2.6 2.3 1.8 2.2 1.7 3.5 2.4 2.5 1.7 1.8 2.8   Notes    Inc GLV  Inc GLV 1x dose incr. x1 dose red  ensure       Date 8/31 9/9 9/14 9/21 9/28 10/5 10/13 10/19 10/27 11/3 11/10 11/17   Total WeeklyDose 32mg 32mg 26mg 30mg 26-28 mg? 28mg 28mg 28mg 28mg 26mg 30mg 28mg   INR 2.4 3.9 2.6 2.9 2.3 2.3 2.9 2.8 3.7 1.5 2.1 2.1   Notes       decr Ensure; allopurinol allopurinol; less Ensure  recv'd 11/4; incr GLV 1x incr dose      Date 11/24 12/1 12/8  12/15 12/22 12/31 1/5/21 1/11 1/11-15 1/17 1/25 2/1   Total WeeklyDose 28mg 28mg 26mg 28 mg 28 mg 24mg 28mg 32 mg [facility] admission 26mg 28mg  24mg   INR 2.1 3.8 3.0  2.2 2.0 1.5 1.5 2.3  2.0 2.0 1.8   Notes  decr GLV 1x decr dose   1x miss   pneumonia doxy, cefdinir, rec 1/18  Missed x1     Date 2/8 2/15 2/22 3/1 3/4 3/9 3/15 3/22 3/29 4/5 4/12 4/15   Total WeeklyDose 30mg 28 mg 30 mg 28 mg 28 mg 28mg 30 mg 30 mg 28 mg 26 mg 28mg 25mg   INR 1.8 2.1 2.9 2.6 2.5 1.9 2.1 2.7 2.9 2.3 4.4 3.6   Notes 1x boost   keflex keflex       LVQ     Date 4/19 4/23 4/27 5/4 5/7   Total Weekly Dose 22 mg 26 mg 28mg 22-24?    INR 3.0 2.1 1.7 1.4 1.8   Notes LVQ   1x miss      Phone  Interview:  Verbal Release Authorization signed on 6/26/18 and again on 11/5/2019-- may speak with Alexy Wallace (son), Genesis Becerril (daughter), Sawyer or Gemapat Wallace (son and daughter-in-law), Gerry Wallace (son)  Tablet Strength: 2mg tablets  Patient Contact Info: preferred [phone] - home with son Yadiel- cell [phone]; call if can't get in touch with home phone      Patient Findings  Negatives:  Signs/symptoms of thrombosis, Signs/symptoms of bleeding, Laboratory test error suspected, Change in health, Change in alcohol use, Change in activity, Upcoming invasive procedure, Emergency department visit, Upcoming dental procedure, Missed doses, Extra doses, Change in medications, Change in diet/appetite, Hospital admission, Bruising, Other complaints   Comments:  She has been eating some peas the past couple of days. She was mildly confused about the effects of green vegetables. She seemed to think that they would help increase her INR.       Plan:  1. INR is subtherapeutic again today at 1.8. Her INR is increased since Tuesday's 1.4, Tuesday's value likely as a result of missed dose the previous week. Will not recommend another boost dose at this time. Instructed Ms. Wallace to resume maintenance dose of warfarin 4 mg daily until recheck.  2. Repeat INR Friday.   3. Verbal information provided over the phone. Moni Wallace RBV dosing instructions, expresses understanding by teach back, and has no further questions at this time.    Sawyer Jackman, PharmD.  05/07/21   08:58 EDT

## 2021-05-11 NOTE — PROGRESS NOTES
Anticoagulation Clinic - Remote Progress Note  [facility]  Testing Frequency: 7 days    Indication: paroxysmal afib  Referring Provider: Butch (last seen 12/20) / Heath (Last seen: 11/5/19  next appt: 6/9/20)  Goal INR: 2.0-3.0  Current Drug Interactions: , levothyroxine; omeprazole, azaTHIOprine, ezetimibe, allopurinol (10/20)  CHADS-VASc: 5 (age, gender, HTN, DM)  HAS-BLED: 3 (HTN, CKD, Age)     Diet: hasn't been eating any GLV 4/5/21   (4/19/21)  Alcohol: none  Tobacco: none   OTC Pain Medication: APAP PRN; took tylenol last 2 nights for pain from dialysis (3/26/20)    1st clinic: 9/14/17  2nd clinic: 6/26/18  3rd clinic: 11/5/19    INR History:    Date 6/10 6/17 6/24 7/2 7/8 7/15 7/22 7/29 8/5 8/12 8/17 8/24   Total WeeklyDose 28mg 28mg 28mg 28mg 28mg 28mg 30mg 26mg 28mg 28mg 32mg 34mg   INR 2.5 2.6 2.3 1.8 2.2 1.7 3.5 2.4 2.5 1.7 1.8 2.8   Notes    Inc GLV  Inc GLV 1x dose incr. x1 dose red  ensure       Date 8/31 9/9 9/14 9/21 9/28 10/5 10/13 10/19 10/27 11/3 11/10 11/17   Total WeeklyDose 32mg 32mg 26mg 30mg 26-28 mg? 28mg 28mg 28mg 28mg 26mg 30mg 28mg   INR 2.4 3.9 2.6 2.9 2.3 2.3 2.9 2.8 3.7 1.5 2.1 2.1   Notes       decr Ensure; allopurinol allopurinol; less Ensure  recv'd 11/4; incr GLV 1x incr dose      Date 11/24 12/1 12/8  12/15 12/22 12/31 1/5/21 1/11 1/11-15 1/17 1/25 2/1   Total WeeklyDose 28mg 28mg 26mg 28 mg 28 mg 24mg 28mg 32 mg [facility] admission 26mg 28mg  24mg   INR 2.1 3.8 3.0  2.2 2.0 1.5 1.5 2.3  2.0 2.0 1.8   Notes  decr GLV 1x decr dose   1x miss   pneumonia doxy, cefdinir, rec 1/18  1x miss     Date 2/8 2/15 2/22 3/1 3/4 3/9 3/15 3/22 3/29 4/5 4/12 4/15   Total WeeklyDose 30mg 28mg 30mg 28mg 28mg 28mg 30mg 30mg 28mg 26mg 28mg 25mg   INR 1.8 2.1 2.9 2.6 2.5 1.9 2.1 2.7 2.9 2.3 4.4 3.6   Notes 1x incr dose   keflex keflex       LVQ     Date 4/19 4/23 4/27 5/4 5/7 5/11    Total Weekly Dose 22mg 26mg 28mg 22mg-  24mg  ?? 30mg 30mg 28mg   INR 3.0 2.1 1.7 1.4 1.8 2.6    Notes LVQ    1x miss 1x incr dose 1x incr dose      Phone Interview:  Verbal Release Authorization signed on 6/26/18 and again on 11/5/2019-- may speak with Alexy Wallace (son), Genesis Becerril (daughter), Sawyer or Gema Wallace (son and daughter-in-law), Gerry Wallace (son)  Tablet Strength: 2mg tablets  Patient Contact Info: preferred [phone] - home with carlito Cotto- cell [phone]; call if can't get in touch with home phone      Patient Findings  Negatives:  Signs/symptoms of thrombosis, Signs/symptoms of bleeding, Laboratory test error suspected, Change in health, Change in alcohol use, Change in activity, Upcoming invasive procedure, Emergency department visit, Upcoming dental procedure, Missed doses, Extra doses, Change in medications, Change in diet/appetite, Hospital admission, Bruising, Other complaints     Plan:  1. INR is therapeutic and back WNL today at 2.6. Instructed Ms. Wallace continue warfarin 4mg oral daily until recheck.  2. Repeat INR on Fri, 5/14, to ensure WNL  3. Verbal information provided over the phone. Moni Wallace RBV dosing instructions, expresses understanding by teach back, and has no further questions at this time.    Sawyer Gamez CPhT  5/11/2021  15:21 EDT    I, Francoise Wu, PharmD, have reviewed the note in full and agree with the assessment and plan.  05/12/21  08:24 EDT

## 2021-05-17 NOTE — PROGRESS NOTES
Anticoagulation Clinic - Remote Progress Note  [facility]  Testing Frequency: 7 days    Indication: paroxysmal afib  Referring Provider: Butch (last seen 12/20) / Heath (Last seen: 11/5/19  next appt: 6/9/20)  Goal INR: 2.0-3.0  Current Drug Interactions: , levothyroxine; omeprazole, azaTHIOprine, ezetimibe, allopurinol (10/20)  CHADS-VASc: 5 (age, gender, HTN, DM)  HAS-BLED: 3 (HTN, CKD, Age)     Diet: hasn't been eating any GLV 4/5/21   (4/19/21)  Alcohol: none  Tobacco: none   OTC Pain Medication: APAP PRN; took tylenol last 2 nights for pain from dialysis (3/26/20)    1st clinic: 9/14/17  2nd clinic: 6/26/18  3rd clinic: 11/5/19    INR History:    Date 6/10 6/17 6/24 7/2 7/8 7/15 7/22 7/29 8/5 8/12 8/17 8/24   Total WeeklyDose 28mg 28mg 28mg 28mg 28mg 28mg 30mg 26mg 28mg 28mg 32mg 34mg   INR 2.5 2.6 2.3 1.8 2.2 1.7 3.5 2.4 2.5 1.7 1.8 2.8   Notes    Inc GLV  Inc GLV 1x dose incr. x1 dose red  ensure       Date 8/31 9/9 9/14 9/21 9/28 10/5 10/13 10/19 10/27 11/3 11/10 11/17   Total WeeklyDose 32mg 32mg 26mg 30mg 26-28 mg? 28mg 28mg 28mg 28mg 26mg 30mg 28mg   INR 2.4 3.9 2.6 2.9 2.3 2.3 2.9 2.8 3.7 1.5 2.1 2.1   Notes       decr Ensure; allopurinol allopurinol; less Ensure  recv'd 11/4; incr GLV 1x incr dose      Date 11/24 12/1 12/8  12/15 12/22 12/31 1/5/21 1/11 1/11-15 1/17 1/25 2/1   Total WeeklyDose 28mg 28mg 26mg 28 mg 28 mg 24mg 28mg 32 mg [facility] admission 26mg 28mg  24mg   INR 2.1 3.8 3.0  2.2 2.0 1.5 1.5 2.3  2.0 2.0 1.8   Notes  decr GLV 1x decr dose   1x miss   pneumonia doxy, cefdinir, rec 1/18  1x miss     Date 2/8 2/15 2/22 3/1 3/4 3/9 3/15 3/22 3/29 4/5 4/12 4/15   Total WeeklyDose 30mg 28mg 30mg 28mg 28mg 28mg 30mg 30mg 28mg 26mg 28mg 25mg   INR 1.8 2.1 2.9 2.6 2.5 1.9 2.1 2.7 2.9 2.3 4.4 3.6   Notes 1x incr dose   keflex keflex       LVQ     Date 4/19 4/23 4/27 5/4 5/7 5/11 5/17        Total Weekly Dose 22mg 26mg 28mg 22mg-  24mg  ?? 30mg 30mg 28mg 28mg       INR 3.0 2.1 1.7 1.4 1.8  2.6 2.3        Notes LVQ   1x miss 1x incr dose 1x incr dose           Phone Interview:  Verbal Release Authorization signed on 6/26/18 and again on 11/5/2019-- may speak with Alexy Wallace (son), Genesis Becerril (daughter), Sawyer or Gema Wallace (son and daughter-in-law), Gerry Wallace (son)  Tablet Strength: 2mg tablets  Patient Contact Info: preferred [phone] - home with carlito Cotto- cell [phone]; call if can't get in touch with home phone      Patient Findings  Negatives:  Signs/symptoms of thrombosis, Signs/symptoms of bleeding, Laboratory test error suspected, Change in health, Change in alcohol use, Change in activity, Upcoming invasive procedure, Emergency department visit, Upcoming dental procedure, Missed doses, Extra doses, Change in medications, Change in diet/appetite, Hospital admission, Bruising, Other complaints     Plan:  1. INR is therapeutic today at 2.3. Instructed Ms. Wallace continue warfarin 4mg oral daily until recheck.  2. Repeat INR in 1 week.  3. Verbal information provided over the phone. Moni Wallace RBV dosing instructions, expresses understanding by teach back, and has no further questions at this time.    Sawyer Gamez CPhT  5/17/2021  11:19 EDT      I, Fatemeh Rodríguez, PharmD, have reviewed the note in full and agree with the assessment and plan.  05/18/21  11:28 EDT

## 2021-05-18 NOTE — TELEPHONE ENCOUNTER
Caller: Wallace Daijaa K    Relationship: Self    Best call back number: [phone]    What medications are you currently taking:   Current Outpatient Medications on File Prior to Visit   Medication Sig Dispense Refill   • acetaminophen (TYLENOL) 325 MG tablet Take 2 tablets by mouth Every 4 (Four) Hours As Needed for Mild Pain .     • albuterol (PROVENTIL) (2.5 MG/3ML) 0.083% nebulizer solution Take 2.5 mg by nebulization Every 4 (Four) Hours As Needed for Wheezing. 25 vial 2   • albuterol sulfate HFA (PROAIR HFA) 108 (90 Base) MCG/ACT inhaler Inhale 2 puffs Every 4 (Four) Hours As Needed for Wheezing or Shortness of Air. 1 inhaler 11   • escitalopram (Lexapro) 5 MG tablet Take 1 tablet by mouth Every Morning. 30 tablet 5   • furosemide (LASIX) 40 MG tablet Take 80 mg by mouth Daily.     • gentamicin (GARAMYCIN) 0.1 % cream APPLY TO EXIT SITE DAILY  3   • lidocaine (Lidoderm) 5 % Place 1 patch on the skin as directed by provider Daily. Remove & Discard patch within 12 hours or as directed by MD 30 each 1   • metoclopramide (REGLAN) 5 MG tablet Take 1 tablet by mouth 3 (Three) Times a Day Before Meals. 90 tablet 0   • metoprolol tartrate (LOPRESSOR) 50 MG tablet Take 1 tablet by mouth Every 12 (Twelve) Hours. (Patient taking differently: Take 100 mg by mouth Every 12 (Twelve) Hours.)     • omeprazole (priLOSEC) 40 MG capsule TAKE ONE CAPSULE BY MOUTH DAILY 90 capsule 3   • sevelamer (RENVELA) 800 MG tablet Take 1,600 mg by mouth 3 (Three) Times a Day.     • tacrolimus (PROGRAF) 1 MG capsule Take 1 mg by mouth 2 (Two) Times a Day.     • temazepam (RESTORIL) 15 MG capsule Take 15 mg by mouth At Night As Needed for Sleep.     • warfarin (COUMADIN) 2 MG tablet TAKE ONE TO TWO TABLETS BY MOUTH EVERY DAY OR AS DIRECTED BY ANTICOAGULATION CLINIC 180 tablet 0     No current facility-administered medications on file prior to visit.        When did you start taking these medications: N/A    Which medication are you concerned  about: ANTIDEPRESSANT     Who prescribed you this medication: PATRICIA SCHAEFER     What are your concerns: MEDICATION MAKES PATIENT FEEL NAUSEOUS     How long have you been taking these medications: 2 MONTHS     How long have you had these concerns: 3 WEEKS. THE PATIENT STATES SHE HAD NOT BEEN FEELING WELL THE WHOLE 2 MONTHS SHE WAS ON THE MEDICATION BUT SHE THOUGHT THAT WAS DUE TO LOSING HER GRANDCHILD.     Advised weaning schedule for going off med

## 2021-05-24 NOTE — PROGRESS NOTES
Anticoagulation Clinic - Remote Progress Note  [facility]  Testing Frequency: 7 days    Indication: paroxysmal afib  Referring Provider: Butch (last seen 12/20) / Heath (Last seen: 11/5/19  next appt: 6/9/20)  Goal INR: 2.0-3.0  Current Drug Interactions: , levothyroxine; omeprazole, azaTHIOprine, ezetimibe, allopurinol (10/20)  CHADS-VASc: 5 (age, gender, HTN, DM)  HAS-BLED: 3 (HTN, CKD, Age)     Diet: hasn't been eating any GLV 4/5/21   (4/19/21)  Alcohol: none  Tobacco: none   OTC Pain Medication: APAP PRN; took tylenol last 2 nights for pain from dialysis (3/26/20)    1st clinic: 9/14/17  2nd clinic: 6/26/18  3rd clinic: 11/5/19    INR History:    Date 6/10 6/17 6/24 7/2 7/8 7/15 7/22 7/29 8/5 8/12 8/17 8/24   Total WeeklyDose 28mg 28mg 28mg 28mg 28mg 28mg 30mg 26mg 28mg 28mg 32mg 34mg   INR 2.5 2.6 2.3 1.8 2.2 1.7 3.5 2.4 2.5 1.7 1.8 2.8   Notes    Inc GLV  Inc GLV 1x dose incr. x1 dose red  ensure       Date 8/31 9/9 9/14 9/21 9/28 10/5 10/13 10/19 10/27 11/3 11/10 11/17   Total WeeklyDose 32mg 32mg 26mg 30mg 26-28 mg? 28mg 28mg 28mg 28mg 26mg 30mg 28mg   INR 2.4 3.9 2.6 2.9 2.3 2.3 2.9 2.8 3.7 1.5 2.1 2.1   Notes       decr Ensure; allopurinol allopurinol; less Ensure  recv'd 11/4; incr GLV 1x incr dose      Date 11/24 12/1 12/8  12/15 12/22 12/31 1/5/21 1/11 1/11-15 1/17 1/25 2/1   Total WeeklyDose 28mg 28mg 26mg 28 mg 28 mg 24mg 28mg 32 mg [facility] admission 26mg 28mg  24mg   INR 2.1 3.8 3.0  2.2 2.0 1.5 1.5 2.3  2.0 2.0 1.8   Notes  decr GLV 1x decr dose   1x miss   pneumonia doxy, cefdinir, rec 1/18  1x miss     Date 2/8 2/15 2/22 3/1 3/4 3/9 3/15 3/22 3/29 4/5 4/12 4/15   Total WeeklyDose 30mg 28mg 30mg 28mg 28mg 28mg 30mg 30mg 28mg 26mg 28mg 25mg   INR 1.8 2.1 2.9 2.6 2.5 1.9 2.1 2.7 2.9 2.3 4.4 3.6   Notes 1x incr dose   keflex keflex       LVQ     Date 4/19 4/23 4/27 5/4 5/7 5/11 5/17 5/24       Total Weekly Dose 22mg 26mg 28mg 22mg-  24mg  ?? 30mg 30mg 28mg 28mg       INR 3.0 2.1 1.7 1.4  1.8 2.6 2.3 1.4       Notes LVQ   1x miss 1x incr dose 1x incr dose           Phone Interview:  Verbal Release Authorization signed on 6/26/18 and again on 11/5/2019-- may speak with Alexy Wallace (son), Genesis Becerril (daughter), Sawyer or Gema Wallace (son and daughter-in-law), Gerry Wallace (son)  Tablet Strength: 2mg tablets  Patient Contact Info: preferred [phone] - home with carlito Cotto- cell [phone]; call if can't get in touch with home phone      Patient Findings  Negatives:  Signs/symptoms of thrombosis, Signs/symptoms of bleeding, Laboratory test error suspected, Change in health, Change in alcohol use, Change in activity, Upcoming invasive procedure, Emergency department visit, Upcoming dental procedure, Missed doses, Extra doses, Change in medications, Change in diet/appetite, Hospital admission, Bruising, Other complaints   Comments:  No missed doses or any leafy green vegetables.       Plan:  1. INR is subtherapeutic today at 1.4. Instructed Ms. Wallace BOOST today's dose to warfarin 6 mg and 6 mg on Wednesday 5/26 otherwise continue warfarin 4mg oral daily until recheck.  2. Repeat INR in 1 week. Tuesday 6/1  3. Verbal information provided over the phone. Moni Wallace RBV dosing instructions, expresses understanding by teach back, and has no further questions at this time.    Sawyer Jackman, [facility]  5/24/2021  14:54 EDT

## 2021-06-02 NOTE — PROGRESS NOTES
Anticoagulation Clinic - Remote Progress Note  [facility]  Testing Frequency: 7 days    Indication: paroxysmal afib  Referring Provider: Butch (last seen 12/20) / Heath (Last seen: 11/5/19  next appt: 6/9/20)  Goal INR: 2.0-3.0  Current Drug Interactions: , levothyroxine; omeprazole, azaTHIOprine, ezetimibe, allopurinol (10/20)  CHADS-VASc: 5 (age, gender, HTN, DM)  HAS-BLED: 3 (HTN, CKD, Age)     Diet: hasn't been eating any GLV 4/5/21   (4/19/21)  Alcohol: none  Tobacco: none   OTC Pain Medication: APAP PRN; took tylenol last 2 nights for pain from dialysis (3/26/20)    1st clinic: 9/14/17  2nd clinic: 6/26/18  3rd clinic: 11/5/19    INR History:    Date 6/10 6/17 6/24 7/2 7/8 7/15 7/22 7/29 8/5 8/12 8/17 8/24   Total WeeklyDose 28mg 28mg 28mg 28mg 28mg 28mg 30mg 26mg 28mg 28mg 32mg 34mg   INR 2.5 2.6 2.3 1.8 2.2 1.7 3.5 2.4 2.5 1.7 1.8 2.8   Notes    Inc GLV  Inc GLV 1x dose incr. x1 dose red  ensure       Date 8/31 9/9 9/14 9/21 9/28 10/5 10/13 10/19 10/27 11/3 11/10 11/17   Total WeeklyDose 32mg 32mg 26mg 30mg 26-28 mg? 28mg 28mg 28mg 28mg 26mg 30mg 28mg   INR 2.4 3.9 2.6 2.9 2.3 2.3 2.9 2.8 3.7 1.5 2.1 2.1   Notes       decr Ensure; allopurinol allopurinol; less Ensure  recv'd 11/4; incr GLV 1x incr dose      Date 11/24 12/1 12/8  12/15 12/22 12/31 1/5/21 1/11 1/11-15 1/17 1/25 2/1   Total WeeklyDose 28mg 28mg 26mg 28 mg 28 mg 24mg 28mg 32 mg [facility] admission 26mg 28mg  24mg   INR 2.1 3.8 3.0  2.2 2.0 1.5 1.5 2.3  2.0 2.0 1.8   Notes  decr GLV 1x decr dose   1x miss   pneumonia doxy, cefdinir, rec 1/18  1x miss     Date 2/8 2/15 2/22 3/1 3/4 3/9 3/15 3/22 3/29 4/5 4/12 4/15   Total WeeklyDose 30mg 28mg 30mg 28mg 28mg 28mg 30mg 30mg 28mg 26mg 28mg 25mg   INR 1.8 2.1 2.9 2.6 2.5 1.9 2.1 2.7 2.9 2.3 4.4 3.6   Notes 1x incr dose   keflex keflex       LVQ     Date 4/19 4/23 4/27 5/4 5/7 5/11 5/17 5/24 6/1      Total Weekly Dose 22mg 26mg 28mg 22mg-  24mg  ?? 30mg 30mg 28mg 28mg 30mg 32 mg     INR 3.0  2.1 1.7 1.4 1.8 2.6 2.3 1.4 1.6      Notes LVQ   1x miss 1x incr dose 1x incr dose   1x incr dose  rcvd 6/2        Phone Interview:  Verbal Release Authorization signed on 6/26/18 and again on 11/5/2019-- may speak with Alexy Wallace (son), Genesis Becerril (daughter), Sawyer or Gema Wallace (son and daughter-in-law), Gerry Wallace (son)  Tablet Strength: 2mg tablets  Patient Contact Info: preferred [phone] - home with son Yadiel- [place] [phone]; call if can't get in touch with home phone      Patient Findings  Positives:  Change in health, Change in diet/appetite   Negatives:  Signs/symptoms of thrombosis, Signs/symptoms of bleeding, Laboratory test error suspected, Change in alcohol use, Change in activity, Upcoming invasive procedure, Emergency department visit, Upcoming dental procedure, Missed doses, Extra doses, Change in medications, Hospital admission, Bruising, Other complaints   Comments:  Visited her doctor for SOB yesterday and got a chest X ray to check for pneumonia. She took 2 tablets of Azithromycin yesterday, but stopped because her doctor called and told her that they didn't think she has pneumonia.   She ate a little broccoli last night. Otherwise no changes to her diet or medications        Plan:  1. INR was subtherapeutic yesterday at 1.6. Instructed Ms. Wallace to take warfarin 6 mg on Wednesday and Fri, and warfarin 4mg oral each other day until recheck.  2. Repeat INR in ~1 week, Monday 6/7.  3. Verbal information provided over the phone. Moni Wallace RBV dosing instructions, expresses understanding by teach back, and has no further questions at this time.    Katarzyna Colon, PharmD.  Pharmacy Resident  6/2/2021 08:56 EDT

## 2021-06-07 NOTE — PROGRESS NOTES
Anticoagulation Clinic - Remote Progress Note  [facility]  Testing Frequency: 7 days    Indication: paroxysmal afib  Referring Provider: Butch (last seen 12/20) / Heath (Last seen: 11/5/19  next appt: 6/9/20)  Goal INR: 2.0-3.0  Current Drug Interactions: , levothyroxine; omeprazole, azaTHIOprine, ezetimibe, allopurinol (10/20)  CHADS-VASc: 5 (age, gender, HTN, DM)  HAS-BLED: 3 (HTN, CKD, Age)     Diet: hasn't been eating any GLV 4/5/21   (4/19/21)  Alcohol: none  Tobacco: none   OTC Pain Medication: APAP PRN; took tylenol last 2 nights for pain from dialysis (3/26/20)    1st clinic: 9/14/17  2nd clinic: 6/26/18  3rd clinic: 11/5/19    INR History:    Date 6/10 6/17 6/24 7/2 7/8 7/15 7/22 7/29 8/5 8/12 8/17 8/24   Total WeeklyDose 28mg 28mg 28mg 28mg 28mg 28mg 30mg 26mg 28mg 28mg 32mg 34mg   INR 2.5 2.6 2.3 1.8 2.2 1.7 3.5 2.4 2.5 1.7 1.8 2.8   Notes    Inc GLV  Inc GLV 1x dose incr. x1 dose red  ensure       Date 8/31 9/9 9/14 9/21 9/28 10/5 10/13 10/19 10/27 11/3 11/10 11/17   Total WeeklyDose 32mg 32mg 26mg 30mg 26-28 mg? 28mg 28mg 28mg 28mg 26mg 30mg 28mg   INR 2.4 3.9 2.6 2.9 2.3 2.3 2.9 2.8 3.7 1.5 2.1 2.1   Notes       decr Ensure; allopurinol allopurinol; less Ensure  recv'd 11/4; incr GLV 1x incr dose      Date 11/24 12/1 12/8  12/15 12/22 12/31 1/5/21 1/11 1/11-15 1/17 1/25 2/1   Total WeeklyDose 28mg 28mg 26mg 28 mg 28 mg 24mg 28mg 32 mg [facility] admission 26mg 28mg  24mg   INR 2.1 3.8 3.0  2.2 2.0 1.5 1.5 2.3  2.0 2.0 1.8   Notes  decr GLV 1x decr dose   1x miss   pneumonia doxy, cefdinir, rec 1/18  1x miss     Date 2/8 2/15 2/22 3/1 3/4 3/9 3/15 3/22 3/29 4/5 4/12 4/15   Total WeeklyDose 30mg 28mg 30mg 28mg 28mg 28mg 30mg 30mg 28mg 26mg 28mg 25mg   INR 1.8 2.1 2.9 2.6 2.5 1.9 2.1 2.7 2.9 2.3 4.4 3.6   Notes 1x incr dose   keflex keflex       LVQ     Date 4/19 4/23 4/27 5/4 5/7 5/11 5/17 5/24 6/1 6/7     Total Weekly Dose 22mg 26mg 28mg 22mg-  24mg  ?? 30mg 30mg 28mg 28mg 30mg 32 mg     INR  3.0 2.1 1.7 1.4 1.8 2.6 2.3 1.4 1.6 2.0     Notes LVQ   1x miss 1x incr dose 1x incr dose   1x incr dose  rcvd 6/2        Phone Interview:  Verbal Release Authorization signed on 6/26/18 and again on 11/5/2019-- may speak with Alexy Wallace (son), Genesis Becerril (daughter), Sawyer or Gema Wallace (son and daughter-in-law), Gerry Wallace (son)  Tablet Strength: 2mg tablets  Patient Contact Info: preferred [phone] - home with son Yadiel- cell [phone]; call if can't get in touch with home phone      Patient Findings    Negatives:  Signs/symptoms of thrombosis, Signs/symptoms of bleeding, Laboratory test error suspected, Change in health, Change in alcohol use, Change in activity, Upcoming invasive procedure, Emergency department visit, Upcoming dental procedure, Missed doses, Extra doses, Change in medications, Change in diet/appetite, Hospital admission, Bruising, Other complaints          Plan:  1. INR was therapeutic today at 2.0. Instructed Ms. Wallace to take warfarin 6 mg on Tuesday and Fri, and warfarin 4mg oral each other day until recheck. Per Katarzyna Colon Pharm Resident  2. Repeat INR in ~1 week, Monday 6/14.  3. Verbal information provided over the phone. Moni Wallace RBV dosing instructions, expresses understanding by teach back, and has no further questions at this time.    Bobbi Rivera CPhT  6/7/2021 08:56 EDT     I, Joce Bush, PharmD, have reviewed the note in full and agree with the assessment and plan.  06/08/21  16:45 EDT

## 2021-06-14 NOTE — PROGRESS NOTES
Anticoagulation Clinic - Remote Progress Note  [facility]  Testing Frequency: 7 days    Indication: paroxysmal afib  Referring Provider: Butch (last seen 12/20) / Heath (Last seen: 11/5/19  next appt: 6/9/20)  Goal INR: 2.0-3.0  Current Drug Interactions: , levothyroxine; omeprazole, azaTHIOprine, ezetimibe, allopurinol (10/20)  CHADS-VASc: 5 (age, gender, HTN, DM)  HAS-BLED: 3 (HTN, CKD, Age)     Diet: hasn't been eating any GLV 4/5/21   (4/19/21)  Alcohol: none  Tobacco: none   OTC Pain Medication: APAP PRN; took tylenol last 2 nights for pain from dialysis (3/26/20)    1st clinic: 9/14/17  2nd clinic: 6/26/18  3rd clinic: 11/5/19    INR History:    Date 6/10 6/17 6/24 7/2 7/8 7/15 7/22 7/29 8/5 8/12 8/17 8/24   Total WeeklyDose 28mg 28mg 28mg 28mg 28mg 28mg 30mg 26mg 28mg 28mg 32mg 34mg   INR 2.5 2.6 2.3 1.8 2.2 1.7 3.5 2.4 2.5 1.7 1.8 2.8   Notes    Inc GLV  Inc GLV 1x dose incr. x1 dose red  ensure       Date 8/31 9/9 9/14 9/21 9/28 10/5 10/13 10/19 10/27 11/3 11/10 11/17   Total WeeklyDose 32mg 32mg 26mg 30mg 26-28 mg? 28mg 28mg 28mg 28mg 26mg 30mg 28mg   INR 2.4 3.9 2.6 2.9 2.3 2.3 2.9 2.8 3.7 1.5 2.1 2.1   Notes       decr Ensure; allopurinol allopurinol; less Ensure  recv'd 11/4; incr GLV 1x incr dose      Date 11/24 12/1 12/8  12/15 12/22 12/31 1/5/21 1/11 1/11-15 1/17 1/25 2/1   Total WeeklyDose 28mg 28mg 26mg 28 mg 28 mg 24mg 28mg 32 mg [facility] admission 26mg 28mg  24mg   INR 2.1 3.8 3.0  2.2 2.0 1.5 1.5 2.3  2.0 2.0 1.8   Notes  decr GLV 1x decr dose   1x miss   pneumonia doxy, cefdinir, rec 1/18  1x miss     Date 2/8 2/15 2/22 3/1 3/4 3/9 3/15 3/22 3/29 4/5 4/12 4/15   Total WeeklyDose 30mg 28mg 30mg 28mg 28mg 28mg 30mg 30mg 28mg 26mg 28mg 25mg   INR 1.8 2.1 2.9 2.6 2.5 1.9 2.1 2.7 2.9 2.3 4.4 3.6   Notes 1x incr dose   keflex keflex       LVQ     Date 4/19 4/23 4/27 5/4 5/7 5/11 5/17 5/24 6/1 6/7 6/14    Total Weekly Dose 22mg 26mg 28mg 22mg-  24mg  ?? 30mg 30mg 28mg 28mg 30mg 32 mg 32 mg     INR 3.0 2.1 1.7 1.4 1.8 2.6 2.3 1.4 1.6 2.0 2.0    Notes LVQ   1x miss 1x incr dose 1x incr dose   1x incr dose  rcvd 6/2        Phone Interview:  Verbal Release Authorization signed on 6/26/18 and again on 11/5/2019-- may speak with Alexy Wallace (son), Genesis Becerril (daughter), Sawyer or Gema Wallace (son and daughter-in-law), Gerry Wallace (son)  Tablet Strength: 2mg tablets  Patient Contact Info: preferred [phone] - home with son Yadiel- cell [phone]; call if can't get in touch with home phone      Patient Findings:  Negatives:  Signs/symptoms of thrombosis, Signs/symptoms of bleeding, Laboratory test error suspected, Change in health, Change in alcohol use, Change in activity, Upcoming invasive procedure, Emergency department visit, Upcoming dental procedure, Missed doses, Extra doses, Change in medications, Change in diet/appetite, Hospital admission, Bruising, Other complaints     Plan:  1. INR was therapeutic again today at [facility]. Instructed Ms. Wallace to continue recently increased dose of warfarin 4 mg daily except 6 mg on TuesFri until recheck.  2. Repeat INR in one week as required by [facility].  3. Verbal information provided over the phone. Moni Wallace RBV dosing instructions, expresses understanding by teach back, and has no further questions at this time.    Zina Aldrich, Valentin  6/14/2021  10:22 EDT    I, Sawyer Jackman, PharmD, have reviewed the note in full and agree with the assessment and plan.  06/15/21  08:37 EDT

## 2021-06-21 NOTE — PROGRESS NOTES
Anticoagulation Clinic - Remote Progress Note  [facility]  Testing Frequency: 7 days    Indication: paroxysmal afib  Referring Provider: Butch (last seen 12/20) / Heath (Last seen: 11/5/19  next appt: 6/9/20)  Goal INR: 2.0-3.0  Current Drug Interactions: , levothyroxine; omeprazole, azaTHIOprine, ezetimibe, allopurinol (10/20)  CHADS-VASc: 5 (age, gender, HTN, DM)  HAS-BLED: 3 (HTN, CKD, Age)     Diet: hasn't been eating any GLV 4/5/21   (4/19/21)  Alcohol: none  Tobacco: none   OTC Pain Medication: APAP PRN; took tylenol last 2 nights for pain from dialysis (3/26/20)    1st clinic: 9/14/17  2nd clinic: 6/26/18  3rd clinic: 11/5/19    INR History:    Date 6/10 6/17 6/24 7/2 7/8 7/15 7/22 7/29 8/5 8/12 8/17 8/24   Total WeeklyDose 28mg 28mg 28mg 28mg 28mg 28mg 30mg 26mg 28mg 28mg 32mg 34mg   INR 2.5 2.6 2.3 1.8 2.2 1.7 3.5 2.4 2.5 1.7 1.8 2.8   Notes    Inc GLV  Inc GLV 1x dose incr. x1 dose red  ensure       Date 8/31 9/9 9/14 9/21 9/28 10/5 10/13 10/19 10/27 11/3 11/10 11/17   Total WeeklyDose 32mg 32mg 26mg 30mg 26-28 mg? 28mg 28mg 28mg 28mg 26mg 30mg 28mg   INR 2.4 3.9 2.6 2.9 2.3 2.3 2.9 2.8 3.7 1.5 2.1 2.1   Notes       decr Ensure; allopurinol allopurinol; less Ensure  recv'd 11/4; incr GLV 1x incr dose      Date 11/24 12/1 12/8  12/15 12/22 12/31 1/5/21 1/11 1/11-15 1/17 1/25 2/1   Total WeeklyDose 28mg 28mg 26mg 28 mg 28 mg 24mg 28mg 32 mg [facility] admission 26mg 28mg  24mg   INR 2.1 3.8 3.0  2.2 2.0 1.5 1.5 2.3  2.0 2.0 1.8   Notes  decr GLV 1x decr dose   1x miss   pneumonia doxy, cefdinir, rec 1/18  1x miss     Date 2/8 2/15 2/22 3/1 3/4 3/9 3/15 3/22 3/29 4/5 4/12 4/15   Total WeeklyDose 30mg 28mg 30mg 28mg 28mg 28mg 30mg 30mg 28mg 26mg 28mg 25mg   INR 1.8 2.1 2.9 2.6 2.5 1.9 2.1 2.7 2.9 2.3 4.4 3.6   Notes 1x incr dose   keflex keflex       LVQ     Date 4/19 4/23 4/27 5/4 5/7 5/11 5/17 5/24 6/1 6/7 6/14 6/21    Total Weekly Dose 22mg 26mg 28mg 22mg-  24mg  ?? 30mg 30mg 28mg 28mg 30mg 32 mg  32 mg  32 mg    INR 3.0 2.1 1.7 1.4 1.8 2.6 2.3 1.4 1.6 2.0 2.0  1.9    Notes LVQ   1x miss 1x incr dose 1x incr dose   1x incr dose  rcvd 6/2         Phone Interview:  Verbal Release Authorization signed on 6/26/18 and again on 11/5/2019-- may speak with Alexy Wallace (son), Genesis Becerril (daughter), Sawyer or Gema Wallace (son and daughter-in-law), Gerry Wallace (son)  Tablet Strength: 2mg tablets  Patient Contact Info: preferred [phone] - home with son Yadiel- cell [phone]; call if can't get in touch with home phone      Patient Findings  Negatives:  Signs/symptoms of thrombosis, Signs/symptoms of bleeding, Laboratory test error suspected, Change in health, Change in alcohol use, Change in activity, Upcoming invasive procedure, Emergency department visit, Upcoming dental procedure, Missed doses, Extra doses, Change in medications, Change in diet/appetite, Hospital admission, Bruising, Other complaints   Comments:  She denies any missed doses or increase in GLV.  She has not been able to get fresh green beans. She does not plan on increasing her intake of GLV  Above findings negative     Plan:  1. INR is slightly SUB therapeutic today at 1.9.  Instructed Ms. Wallace to continue warfarin 4 mg oral daily except 6 mg on TuesFri until recheck.  2. Repeat INR in one week on 6/28 as required by [facility].  3. Verbal information provided over the phone. Moni Wallace RBV dosing instructions, expresses understanding by teach back, and has no further questions at this time.    Radha Michaud, PharmD  6/21/2021  15:35 EDT

## 2021-06-28 NOTE — PROGRESS NOTES
Anticoagulation Clinic - Remote Progress Note  [facility]  Testing Frequency: 7 days    Indication: paroxysmal afib  Referring Provider: Butch (last seen 12/20) / Heath (Last seen: 11/5/19  next appt: 6/9/20)  Goal INR: 2.0-3.0  Current Drug Interactions: , levothyroxine; omeprazole, azaTHIOprine, ezetimibe, allopurinol (10/20)  CHADS-VASc: 5 (age, gender, HTN, DM)  HAS-BLED: 3 (HTN, CKD, Age)     Diet: hasn't been eating any GLV 4/5/21   (4/19/21)  Alcohol: none  Tobacco: none   OTC Pain Medication: APAP PRN; took tylenol last 2 nights for pain from dialysis (3/26/20)    1st clinic: 9/14/17  2nd clinic: 6/26/18  3rd clinic: 11/5/19    INR History:    Date 6/10 6/17 6/24 7/2 7/8 7/15 7/22 7/29 8/5 8/12 8/17 8/24   Total WeeklyDose 28mg 28mg 28mg 28mg 28mg 28mg 30mg 26mg 28mg 28mg 32mg 34mg   INR 2.5 2.6 2.3 1.8 2.2 1.7 3.5 2.4 2.5 1.7 1.8 2.8   Notes    Inc GLV  Inc GLV 1x dose incr. x1 dose red  ensure       Date 8/31 9/9 9/14 9/21 9/28 10/5 10/13 10/19 10/27 11/3 11/10 11/17   Total WeeklyDose 32mg 32mg 26mg 30mg 26-28 mg? 28mg 28mg 28mg 28mg 26mg 30mg 28mg   INR 2.4 3.9 2.6 2.9 2.3 2.3 2.9 2.8 3.7 1.5 2.1 2.1   Notes       decr Ensure; allopurinol allopurinol; less Ensure  recv'd 11/4; incr GLV 1x incr dose      Date 11/24 12/1 12/8  12/15 12/22 12/31 1/5/21 1/11 1/11-15 1/17 1/25 2/1   Total WeeklyDose 28mg 28mg 26mg 28 mg 28 mg 24mg 28mg 32 mg [facility] admission 26mg 28mg  24mg   INR 2.1 3.8 3.0  2.2 2.0 1.5 1.5 2.3  2.0 2.0 1.8   Notes  decr GLV 1x decr dose   1x miss   pneumonia doxy, cefdinir, rec 1/18  1x miss     Date 2/8 2/15 2/22 3/1 3/4 3/9 3/15 3/22 3/29 4/5 4/12 4/15   Total WeeklyDose 30mg 28mg 30mg 28mg 28mg 28mg 30mg 30mg 28mg 26mg 28mg 25mg   INR 1.8 2.1 2.9 2.6 2.5 1.9 2.1 2.7 2.9 2.3 4.4 3.6   Notes 1x incr dose   keflex keflex       LVQ     Date 4/19 4/23 4/27 5/4 5/7 5/11 5/17 5/24 6/1 6/7 6/14 6/21    Total Weekly Dose 22mg 26mg 28mg 22mg-  24mg  ?? 30mg 30mg 28mg 28mg 30mg 32 mg  32 mg  32 mg    INR 3.0 2.1 1.7 1.4 1.8 2.6 2.3 1.4 1.6 2.0 2.0  1.9    Notes LVQ   1x miss 1x incr dose 1x incr dose   1x incr dose  rcvd 6/2         Phone Interview:  Verbal Release Authorization signed on 6/26/18 and again on 11/5/2019-- may speak with Alexy Wallace (son), Genesis Becerril (daughter), Sawyer or Gema Wallace (son and daughter-in-law), Gerry Wallace (son)  Tablet Strength: 2mg tablets  Patient Contact Info: preferred [phone] - home with son Yadiel- cell [phone]; call if can't get in touch with home phone      Patient Findings  Negatives:  Signs/symptoms of thrombosis, Signs/symptoms of bleeding, Laboratory test error suspected, Change in health, Change in alcohol use, Change in activity, Upcoming invasive procedure, Emergency department visit, Upcoming dental procedure, Missed doses, Extra doses, Change in medications, Change in diet/appetite, Hospital admission, Bruising, Other complaints   Comments Denies any missed doses or new medications and has not eaten any GLV        Plan:  1. INR is slightly SUB therapeutic today at 1.7.  Instructed Ms. Wallace to boost today's dose to warfarin 6mg and increase maintenance dose to warfarin 4 mg oral daily except 6 mg on MonTuesFri until recheck.  2. Repeat INR on 7/6 (will have received 34mg)  3. Verbal information provided over the phone. Moni Wallace RBV dosing instructions, expresses understanding by teach back, and has no further questions at this time.    Lily Domingo, PharmD  Pharmacy Resident   6/28/2021 15:18 EDT

## 2021-07-06 PROBLEM — N39.0 URINARY TRACT INFECTION, SITE NOT SPECIFIED: Status: ACTIVE | Noted: 2021-01-01

## 2021-07-06 PROBLEM — N39.0 UTI (URINARY TRACT INFECTION): Status: ACTIVE | Noted: 2021-01-01

## 2021-07-06 NOTE — CASE MANAGEMENT/SOCIAL WORK
sapna cm  Discharge Planning Assessment  [facility]     Patient Name: Moni Wallace  MRN: [id number]  Today's Date: 7/6/2021    Admit Date: 7/6/2021    Discharge Needs Assessment     Row Name 07/06/21 1528       Living Environment    Lives With  child(chavo), adult    Name(s) of Who Lives With Patient  Yadiel Wallace (Son) [phone] (Home Phone) POA    Current Living Arrangements  home/apartment/condo    Potentially Unsafe Housing Conditions  unable to assess    Primary Care Provided by  self    Provides Primary Care For  no one, unable/limited ability to care for self    Family Caregiver if Needed  child(chavo), adult;grandchild(chavo), adult    Family Caregiver Names  granddaughter helps her with bathing    Quality of Family Relationships  unable to assess    Able to Return to Prior Arrangements  yes       Resource/Environmental Concerns    Transportation Concerns  car, none       Transition Planning    Patient/Family Anticipates Transition to  home    Patient/Family Anticipated Services at Transition  none       Discharge Needs Assessment    Readmission Within the Last 30 Days  no previous admission in last 30 days    Equipment Currently Used at Home  wheelchair    Concerns to be Addressed  denies needs/concerns at this time    Anticipated Changes Related to Illness  none    Equipment Needed After Discharge  none    Provided Post Acute Provider List?  N/A    N/A Provider List Comment  denies need for outpt services    Provided Post Acute Provider Quality & Resource List?  N/A        Discharge Plan     Row Name 07/06/21 3727       Plan    Plan  initial    Plan Comments  Pt lives in [facility]; her son live with her and he and granddaughter assist where needed. She is WC-bound, AxO with hx of Afib, HTN, DM II, CKD IV, and Asthma. Plan is home with family. PCP is Alex Walters    Final Discharge Disposition Code  30 - still a patient        Continued Care and Services - Admitted Since 7/6/2021    Coordination has  not been started for this encounter.         Demographic Summary    No documentation.       Functional Status     Row Name 07/06/21 1528       Functional Status    Usual Activity Tolerance  good       Functional Status, IADL    Medications  independent    Meal Preparation  assistive person    Housekeeping  assistive person    Laundry  assistive person    Shopping  assistive person       Mental Status    General Appearance WDL  WDL       Mental Status Summary    Recent Changes in Mental Status/Cognitive Functioning  no changes       Employment/    Employment Status  retired        Psychosocial    No documentation.       Abuse/Neglect    No documentation.       Legal    No documentation.       Substance Abuse    No documentation.       Patient Forms    No documentation.           Aimee Arita RN

## 2021-07-06 NOTE — CONSULTS
Referring Provider: Dr. Haim Mejía  Reason for Consultation: Management of ESRD and its complications including required dialysis    Subjective     Chief complaint: Fatigue    History of present illness:    Jose Luis Wallace is a 81-year-old  female with a history of ESRD on peritoneal dialysis, primary disease IgA nephropathy, previously has kidney transplant, patient also have a right upper extremity AV fistula, history of PAF on Coumadin, history of septic knee arthritis,.  Patient has not been feeling fine with mild dyspnea on exertion, generalized weakness and fatigue, denies any fever or chills or nausea vomiting abdominal pain.  Patient has been admitted for questionable UTI.  She denied any diarrhea no recent rashes, headache.  She denies any gross hematuria.  She also complains of decreased appetite.    History  Past Medical History:   Diagnosis Date   • Adenomatous colon polyp    • Chronic kidney disease    • Clostridium difficile infection 06/2017   • Diabetes mellitus (CMS/HCC)    • Gastric polyps    • Hypothyroidism    • IgA nephropathy, acute    • Kidney transplanted    • Macular degeneration    • Paroxysmal atrial fibrillation (CMS/HCC)    • Tubular adenoma     excision    • Ulcer of gastric fundus    • Vitamin D deficiency    ,   Past Surgical History:   Procedure Laterality Date   • BREAST BIOPSY Left 1990'S   • CARPAL TUNNEL RELEASE     • CARPAL TUNNEL RELEASE Right    • CATARACT EXTRACTION     • CATARACT EXTRACTION Bilateral    • DIAGNOSTIC LAPAROSCOPY     • DIALYSIS FISTULA CREATION     • HERNIA REPAIR  85 and 86   • KNEE ARTHROSCOPY INCISION AND DRAINAGE OF KNEE Right 5/18/2019    Procedure: KNEE ARTHROSCOPY INCISION AND DRAINAGE OF KNEE;  Surgeon: Paco Lester MD;  Location: [facility];  Service: Orthopedics   • LAPAROSCOPIC TUBAL LIGATION  1985   • TOTAL KNEE ARTHROPLASTY     • TOTAL KNEE ARTHROPLASTY      Left knee    • TRANSPLANTATION RENAL  2010   • TRANSPLANTATION RENAL Right     • TRIGGER FINGER RELEASE     • TUBAL ABDOMINAL LIGATION     • UPPER GASTROINTESTINAL ENDOSCOPY  05/20/2013   • VENOUS ACCESS DEVICE (PORT) INSERTION N/A 5/23/2019    Procedure: INSERTION GROSHONG;  Surgeon: Rolando Begum MD;  Location: [facility];  Service: General   ,   Family History   Problem Relation Age of Onset   • Leukemia Mother    • Stroke Father    • Dementia Sister    • Kidney disease Sister    • Diabetic kidney disease Sister    • Lung cancer Brother    • Breast cancer Neg Hx    • Ovarian cancer Neg Hx    • Hypertension Sister    • Dementia Sister    ,   Social History     Socioeconomic History   • Marital status:      Spouse name: Not on file   • Number of children: Not on file   • Years of education: Not on file   • Highest education level: Not on file   Tobacco Use   • Smoking status: Never Smoker   • Smokeless tobacco: Never Used   Vaping Use   • Vaping Use: Never used   Substance and Sexual Activity   • Alcohol use: No   • Drug use: No   • Sexual activity: Defer     E-cigarette/Vaping   • E-cigarette/Vaping Use Never User      E-cigarette/Vaping Substances     E-cigarette/Vaping Devices       ,   Medications Prior to Admission   Medication Sig Dispense Refill Last Dose   • acetaminophen (TYLENOL) 325 MG tablet Take 2 tablets by mouth Every 4 (Four) Hours As Needed for Mild Pain .   7/5/2021 at Unknown time   • furosemide (LASIX) 40 MG tablet Take 80 mg by mouth Daily.   7/6/2021 at Unknown time   • gentamicin (GARAMYCIN) 0.1 % cream Apply 1 application topically to the appropriate area as directed Daily. - Apply to tube site-  3 7/6/2021 at Unknown time   • lidocaine (Lidoderm) 5 % Place 1 patch on the skin as directed by provider Daily. Remove & Discard patch within 12 hours or as directed by MD 30 each 1 Past Month at Unknown time   • metoprolol tartrate (LOPRESSOR) 50 MG tablet Take 1 tablet by mouth Every 12 (Twelve) Hours. (Patient taking differently: Take 100 mg by mouth  Every 12 (Twelve) Hours.)   7/6/2021 at Unknown time   • omeprazole (priLOSEC) 40 MG capsule TAKE ONE CAPSULE BY MOUTH DAILY 90 capsule 3 7/6/2021 at Unknown time   • sevelamer (RENVELA) 800 MG tablet Take 1,600 mg by mouth 3 (Three) Times a Day.   7/6/2021 at Unknown time   • temazepam (RESTORIL) 15 MG capsule Take 15 mg by mouth At Night As Needed for Sleep.   7/5/2021 at Unknown time   • warfarin (COUMADIN) 2 MG tablet TAKE ONE TO TWO TABLETS BY MOUTH EVERY DAY OR AS DIRECTED BY ANTICOAGULATION CLINIC (Patient taking differently: 4mg- Sun-Wed- Thurs- Sat  6mg- Mon-Tues- Fri) 90 tablet 0 7/6/2021 at Unknown time   • albuterol (PROVENTIL) (2.5 MG/3ML) 0.083% nebulizer solution Take 2.5 mg by nebulization Every 4 (Four) Hours As Needed for Wheezing. 25 vial 2 More than a month at Unknown time   • albuterol sulfate HFA (PROAIR HFA) 108 (90 Base) MCG/ACT inhaler Inhale 2 puffs Every 4 (Four) Hours As Needed for Wheezing or Shortness of Air. 1 inhaler 11 More than a month at Unknown time   • escitalopram (Lexapro) 5 MG tablet Take 1 tablet by mouth Every Morning. 30 tablet 5 More than a month at Unknown time   • tacrolimus (PROGRAF) 1 MG capsule Take 1 mg by mouth 2 (Two) Times a Day.   Unknown at Unknown time   , Scheduled Meds:  cefTRIAXone, 1 g, Intravenous, Q24H  [START ON 7/7/2021] escitalopram, 5 mg, Oral, QAM  insulin lispro, 0-7 Units, Subcutaneous, TID AC  metoprolol tartrate, 100 mg, Oral, Q12H  [START ON 7/7/2021] pantoprazole, 40 mg, Oral, QAM  piperacillin-tazobactam, 3.375 g, Intravenous, Once  sevelamer, 1,600 mg, Oral, TID With Meals  sodium chloride, 10 mL, Intravenous, Q12H  tacrolimus, 1 mg, Oral, Q12H  warfarin, 5 mg, Oral, Daily    , Continuous Infusions:  Pharmacy to dose warfarin,     , PRN Meds:  dextrose  •  dextrose  •  glucagon (human recombinant)  •  ondansetron **OR** ondansetron  •  Pharmacy to dose warfarin  •  sodium chloride  •  sodium chloride  •  temazepam and Allergies:  Hydrocodone,  Statins, Codeine, and Sulfa antibiotics    Review of Systems  Pertinent items are noted in HPI, all other systems reviewed and negative    Objective     Vital Signs  Temp:  [98.1 °F (36.7 °C)-98.4 °F (36.9 °C)] 98.1 °F (36.7 °C)  Heart Rate:  [65-73] 73  Resp:  [18-24] 18  BP: (181-198)/(81-99) 181/94    No intake/output data recorded.  No intake/output data recorded.    Physical Exam:  General Appearance: Alert, oriented, no obvious distress.  Morbidly obese  female comfortable in bed.   HEENT: Atraumatic normocephalic head, eyes pupils are reactive, extraocular muscles intact, nose no bleed, oral mucosa dry, nose no bleed, neck is supple, no JVD or thyromegaly, no lymph node enlargement.  Trachea is midline.  Lungs: Clear auscultation, no rales rhonchi's, equal chest movement, nonlabored.  Heart: No gallop, murmur, rub, RRR.  Abdomen: PD catheter in place.  Morbid obesity noted.  Soft, nontender, positive bowel sounds, no organomegaly.  Extremities: No edema, no cyanosis.  Neuro: No focal deficit, moving all extremities, alert oriented X 3  Psych: Mood and affect are normal.  : No suprapubic fullness or tenderness.    Results Review:   I reviewed the patient's new clinical results.    WBC WBC   Date Value Ref Range Status   07/06/2021 15.22 (H) 3.40 - 10.80 10*3/mm3 Final      HGB Hemoglobin   Date Value Ref Range Status   07/06/2021 10.4 (L) 12.0 - 15.9 g/dL Final      HCT Hematocrit   Date Value Ref Range Status   07/06/2021 33.6 (L) 34.0 - 46.6 % Final      Platlets No results found for: LABPLAT   MCV MCV   Date Value Ref Range Status   07/06/2021 100.0 (H) 79.0 - 97.0 fL Final          Sodium Sodium   Date Value Ref Range Status   07/06/2021 141 136 - 145 mmol/L Final      Potassium Potassium   Date Value Ref Range Status   07/06/2021 3.6 3.5 - 5.2 mmol/L Final     Comment:     Slight hemolysis detected by analyzer. Results may be affected.      Chloride Chloride   Date Value Ref Range Status    07/06/2021 102 98 - 107 mmol/L Final      CO2 CO2   Date Value Ref Range Status   07/06/2021 26.0 22.0 - 29.0 mmol/L Final      BUN BUN   Date Value Ref Range Status   07/06/2021 38 (H) 8 - 23 mg/dL Final      Creatinine Creatinine   Date Value Ref Range Status   07/06/2021 6.37 (H) 0.57 - 1.00 mg/dL Final      Calcium Calcium   Date Value Ref Range Status   07/06/2021 7.5 (L) 8.6 - 10.5 mg/dL Final      PO4 No results found for: CAPO4   Albumin Albumin   Date Value Ref Range Status   07/06/2021 3.00 (L) 3.50 - 5.20 g/dL Final      Magnesium Magnesium   Date Value Ref Range Status   07/06/2021 2.3 1.6 - 2.4 mg/dL Final      Uric Acid No results found for: URICACID         cefTRIAXone, 1 g, Intravenous, Q24H  [START ON 7/7/2021] escitalopram, 5 mg, Oral, QAM  insulin lispro, 0-7 Units, Subcutaneous, TID AC  metoprolol tartrate, 100 mg, Oral, Q12H  [START ON 7/7/2021] pantoprazole, 40 mg, Oral, QAM  piperacillin-tazobactam, 3.375 g, Intravenous, Once  sevelamer, 1,600 mg, Oral, TID With Meals  sodium chloride, 10 mL, Intravenous, Q12H  tacrolimus, 1 mg, Oral, Q12H  warfarin, 5 mg, Oral, Daily      Pharmacy to dose warfarin,         Assessment/Plan       UTI (urinary tract infection)    Paroxysmal atrial fibrillation (CMS/[facility])    Essential hypertension    Type 2 diabetes mellitus (CMS/[facility])    Chronic kidney disease, stage IV (severe) (CMS/[facility])    Anemia of renal disease    Hypothyroidism    Hyperlipidemia LDL goal <100    Morbidly obese (CMS/[facility])    Urinary tract infection, site not specified    1.  Generalized fatigue and weakness accompanied with leukocytosis possible UTI patient has been started on Rocephin at this time.  2.  ESRD on peritoneal dialysis.  Primary disease IgA nephropathy, failed renal transplant.  Patient dialysis every night.  Examination she is somewhat dry at this time.  3.  Atrial fibrillation on Coumadin.  4.  Morbid obesity.  5.  Diabetes type 2.  6.  Failed renal transplant: On tacrolimus at  this time.  Continue with tacrolimus for now.  7.  Anemia of chronic kidney disease.  Plan:  Continue immunosuppressive medication.  Continue with antibiotic.  Hypocalcemia will check ionized calcium  Anemia: Start LIDIA.  Check iron studies  PD orders written.  Will use 1.5% solution as patient is not volume overloaded in fact he may be volume depleted due to decreased oral intake examination has been negative.  High risk patient with multiple medical problems.  High risk medication  We will follow the patient with resident physician  I discussed the patients findings and my recommendations with patient    Allen Chambers MD  07/06/21  @NOW

## 2021-07-06 NOTE — H&P
"    [facility] Medicine Services  HISTORY AND PHYSICAL    Patient Name: Moni Wallace  : 1940  MRN: [id number]  Primary Care Physician: Alex Walters MD  Date of admission: 2021      Subjective   Subjective     Chief Complaint:  Fatigue    HPI:  Moni Wallace is a 81 y.o. female with history of IgA nephropathy s/p transplant, history of RUE AVF, PAF on coumadin history of septic knee arthritis s/p I&D, now on PD, with 5-6 days of \"not feeling\". She notes mild dyspnea worse with exertion. Denies chest pain or pressure. Coughing only today. Decreased PO intake. Notes chills. Denies n/v/abd pain. Denies dysuria. Denies diarrhea. No skin changes/ranges. No new joint pain.        COVID Details:    Symptoms:    [] NONE [] Fever []  Cough [x] Shortness of breath [] Change in taste/smell      The patient qualifies to receive the vaccine, but they have not yet received it.      Review of Systems   Constitutional: Positive for activity change, appetite change, chills and fatigue. Negative for diaphoresis and fever.   HENT: Negative.    Respiratory: Positive for cough and shortness of breath. Negative for chest tightness.    Cardiovascular: Negative for chest pain, palpitations and leg swelling.   Gastrointestinal: Negative.    Endocrine: Positive for cold intolerance.   Genitourinary: Negative for dysuria.   Musculoskeletal: Negative.    Neurological: Negative.    Hematological: Negative.    Psychiatric/Behavioral: Negative.       All other systems reviewed and are negative.     Personal History     Past Medical History:   Diagnosis Date   • Adenomatous colon polyp    • Chronic kidney disease    • Clostridium difficile infection 2017   • Diabetes mellitus (CMS/HCC)    • Gastric polyps    • Hypothyroidism    • IgA nephropathy, acute    • Kidney transplanted    • Macular degeneration    • Paroxysmal atrial fibrillation (CMS/HCC)    • Tubular adenoma     excision    • Ulcer of gastric " fundus    • Vitamin D deficiency        Past Surgical History:   Procedure Laterality Date   • BREAST BIOPSY Left 1990'S   • CARPAL TUNNEL RELEASE     • CARPAL TUNNEL RELEASE Right    • CATARACT EXTRACTION     • CATARACT EXTRACTION Bilateral    • DIAGNOSTIC LAPAROSCOPY     • DIALYSIS FISTULA CREATION     • HERNIA REPAIR  85 and 86   • KNEE ARTHROSCOPY INCISION AND DRAINAGE OF KNEE Right 5/18/2019    Procedure: KNEE ARTHROSCOPY INCISION AND DRAINAGE OF KNEE;  Surgeon: Paco Lester MD;  Location: [facility] OR;  Service: Orthopedics   • LAPAROSCOPIC TUBAL LIGATION  1985   • TOTAL KNEE ARTHROPLASTY     • TOTAL KNEE ARTHROPLASTY      Left knee    • TRANSPLANTATION RENAL  2010   • TRANSPLANTATION RENAL Right    • TRIGGER FINGER RELEASE     • TUBAL ABDOMINAL LIGATION     • UPPER GASTROINTESTINAL ENDOSCOPY  05/20/2013   • VENOUS ACCESS DEVICE (PORT) INSERTION N/A 5/23/2019    Procedure: INSERTION GROSHONG;  Surgeon: Rolando Begum MD;  Location:  [facility] OR;  Service: General       Family History: family history includes Dementia in her sister and sister; Diabetic kidney disease in her sister; Hypertension in her sister; Kidney disease in her sister; Leukemia in her mother; Lung cancer in her brother; Stroke in her father. Otherwise pertinent FHx was reviewed and unremarkable.     Social History:  reports that she has never smoked. She has never used smokeless tobacco. She reports that she does not drink alcohol and does not use drugs.  Social History     Social History Narrative    ** Merged History Encounter **         Lives at home, 1 son lives with her  Not current with   No assistive devices used       Medications:  Available home medication information reviewed.  (Not in a hospital admission)      Allergies   Allergen Reactions   • Hydrocodone Itching   • Statins Other (See Comments)     myalgia   • Codeine Rash   • Sulfa Antibiotics Rash       Objective   Objective     Vital Signs:   Temp:  [98.4 °F  (36.9 °C)] 98.4 °F (36.9 °C)  Heart Rate:  [65-73] 67  Resp:  [24] 24  BP: (183-187)/(81-92) 183/92       Physical Exam   NAD, alert and oriented x 3  OP clear, MMM  Neck supple  No LAD  RRR  Decreased at bases, otherwise clear  +BS, ND, NT, soft, L PD catheter without redness/tenderness around catheter site  No c/c, TR LE edema  MOONEY  No knee effusions/redness/tenderness around knees    Result Review:  I have personally reviewed the results from the time of this admission to 7/6/2021 15:26 EDT and agree with these findings:  [x]  Laboratory  []  Microbiology  [x]  Radiology  []  EKG/Telemetry   []  Cardiology/Vascular   []  Pathology  []  Old records  []  Other:  Most notable findings include: CXR negative, UA noted      LAB RESULTS:      Lab 07/06/21  1356 07/06/21  1201   WBC  --  15.22*   HEMOGLOBIN  --  10.4*   HEMATOCRIT  --  33.6*   PLATELETS  --  242   NEUTROS ABS  --  11.51*   IMMATURE GRANS (ABS)  --  0.13*   LYMPHS ABS  --  2.42   MONOS ABS  --  0.83   EOS ABS  --  0.30   MCV  --  100.0*   PROCALCITONIN  --  0.24   LACTATE 1.8  --    PROTIME  --  18.9*   INR  --  1.65*         Lab 07/06/21  1411 07/06/21  1201   SODIUM  --  141   POTASSIUM  --  3.6   CHLORIDE  --  102   CO2  --  26.0   ANION GAP  --  13.0   BUN  --  38*   CREATININE  --  6.37*   GLUCOSE  --  147*   CALCIUM  --  7.5*   IONIZED CALCIUM 0.98*  --    MAGNESIUM  --  2.3         Lab 07/06/21  1201   TOTAL PROTEIN 7.4   ALBUMIN 3.00*   GLOBULIN 4.4   ALT (SGPT) 10   AST (SGOT) 20   BILIRUBIN <0.2   ALK PHOS 204*         Lab 07/06/21  1201   TROPONIN T 0.055*                 UA    Urinalysis 1/11/21 1/11/21 7/6/21 7/6/21    2221 2221 1240 1240   Squamous Epithelial Cells, UA  None Seen  13-20 (A)   Specific [place], UA 1.013  1.012    Ketones, UA Negative  Negative    Blood, UA Small (1+) (A)  Small (1+) (A)    Leukocytes, UA Large (3+) (A)  Large (3+) (A)    Nitrite, UA Negative  Negative    RBC, UA  3-6 (A)  3-6 (A)   WBC, UA  Too Numerous to  Count (A)  Too Numerous to Count (A)   Bacteria, UA  None Seen  3+ (A)   (A) Abnormal value              Microbiology Results (last 10 days)     ** No results found for the last 240 hours. **          CT Abdomen Pelvis Without Contrast    Result Date: 7/6/2021  EXAMINATION: CT ABDOMEN PELVIS WO CONTRAST-  INDICATION: gen weak, WBC 15K  TECHNIQUE: Axial noncontrast CT of the abdomen and pelvis with multiplanar reconstruction  The radiation dose reduction device was turned on for each scan per the ALARA (As Low as Reasonably Achievable) protocol.  COMPARISON: 1/11/2021  FINDINGS: The lung bases are grossly clear. The body wall soft tissues demonstrate mild anasarca and a presumed dialysis catheter projects unchanged in the pelvis, with moderate surrounding ascites extending up into the paracolic gutters and around the liver and spleen. There is no evidence of acute fracture or aggressive osseous lesion. The liver, spleen and bilateral adrenal glands demonstrate no evidence of new suspicious focal lesion, with a redemonstrated low density, likely benign left adrenal cyst noted. There has been interval enlargement of a previously noted cystic density finding along the pancreatic body, today measuring 21 x 37 mm, previously 17 x 24 mm, not well characterized. Small and large bowel loops are otherwise nondilated. There is no suspicious focal bowel wall thickening. The appendix is normal. The pelvic viscera are otherwise unremarkable. Severely atrophic bilateral kidneys with a stable exophytic cyst on the left, with also unremarkable appearance of the right pelvic kidney. A small ventral body wall hernia in the right lower quadrant is again seen containing a nonobstructed segment of bowel, fluid and fat.       Impression: Nonspecific and potentially incidental interval involvement of a previously noted incompletely characterized cystic finding along the pancreatic body. 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 Consider nonemergent MRI to further evaluate.  No acute additional acute findings are present. There is increased ascites, likely related to peritoneal dialysis, with redemonstrated severe renal atrophy.   This report was finalized on 7/6/2021 1:25 PM by Adryan Wilson.      XR Chest 1 View    Result Date: 7/6/2021  EXAMINATION: XR CHEST 1 VW-  INDICATION: Weak/Dizzy/AMS triage protocol  COMPARISON: 1/14/2021  FINDINGS: Unchanged aeration with no new focal opacity. No significant effusion or distinct pneumothorax. Unchanged mild degree of cardiac enlargement.      Impression: Stable chronic changes as above without evidence of acute cardiopulmonary abnormality.  This report was finalized on 7/6/2021 12:26 PM by Adryan Wilson.        Results for orders placed during the hospital encounter of 01/11/21    Transthoracic Echo Complete With Contrast if Necessary Per Protocol    Interpretation Summary  · Left ventricular systolic function is normal. Estimated left ventricular EF = 65%  · Left ventricular wall thickness is consistent with hypertrophy.  · Left atrial volume is mildly increased.  · The aortic valve is calcified. There is mild aortic stenosis present. There is moderate aortic regurgitation present.  · Estimated right ventricular systolic pressure from tricuspid regurgitation is normal (<35 mmHg).      Assessment/Plan   Assessment & Plan     Active Hospital Problems    Diagnosis  POA   • **UTI (urinary tract infection) [N39.0]  Yes   • Morbidly obese (CMS/HCC) [E66.01]  Yes   • Hyperlipidemia LDL goal <100 [E78.5]  Yes     · Reports intolerance to statin     • Hypothyroidism [E03.9]  Yes   • Essential hypertension [I10]  Yes   • Anemia of renal disease [N18.9, D63.1]  Yes   • Chronic kidney disease, stage IV (severe) (CMS/HCC) [N18.4]  Yes     · IgA nephropathy with history of renal transplant, 2010.   · Patient on  hemodialysis Monday, Wednesday, and Friday started July 2015.  · Hemodialysis discontinued 2/2017.     • Paroxysmal atrial fibrillation (CMS/[facility]) [I48.0]  Yes     · CHADS-VASc = 5  · Atrial fibrillation initially diagnosed February 2015; spontaneous conversion to normal sinus rhythm.   · Echocardiogram (06/08/2017): LVEF 50%. Grade II Diastolic dysfunction. RVSP 41.6 mmHg. Mild-to-moderate MR. Mild AS.  Moderate TR  · Noted to be in afib with RVR 08/09/2017 in setting of acute bronchitis.  · On Coumadin and amiodarone     • Type 2 diabetes mellitus (CMS/HCC) [E11.9]  Yes       Weakness/fatigue  Leukocytosis  Possible UTI  --Rocephin  --check COVID  --consider CT pending results    CKD, on PD, hx of IgA nephropathy, s/p renal transplant, chronic rejection  --consult [facility] for PD    Afib  --on coumadin, continue BB    Elevated troponin  --trend levels, although unclear significance in ESRD  --unremarkable EKG, and no chest pain    Obesity    DM  --SSI    Hx of HTN  --on BB    Pancreatic cystic mass, again noted on CT  --needs follow up/surveillance    DVT prophylaxis:  On Coumadin      CODE STATUS:  CPR  Code Status and Medical Interventions:   Ordered at: 07/06/21 1525     Code Status:    CPR     Medical Interventions (Level of Support Prior to Arrest):    Full       Admission Status:  I believe this patient meets inpatient criteria    Xavier Mejía MD  07/06/21

## 2021-07-06 NOTE — ED PROVIDER NOTES
" EMERGENCY DEPARTMENT ENCOUNTER    Pt Name: Moni Wallace  MRN: [id number]  Pt :   1940  Room Number:  S321/1  Date of encounter:  2021  PCP: Alex Walters MD  ED Provider: Bill Lechuga PA-C    Historian: Patient and her son      HPI:  Chief Complaint: Generalized weakness fatigue \"feel bad\"        Context: Moni Wallace is a 81 y.o. female who presents to the ED c/o not feeling well for the past several days.  She has a history of IgA nephropathy with a failing transplant, she undergoes peritoneal dialysis nightly.  She has a history of PAF and is anticoagulated with Coumadin.  She has a history of recurrent urinary tract infections, she states she still makes urine although that her rate of urine output is very unpredictable.  She has had some chills and sweats for the past few nights.  Slight cough that began today.  She denies headache vision changes or photophobia.  She denies shortness of breath or chest pain.  No palpitations or irregular heartbeat.  She denies abdominal pain nausea vomiting diarrhea flank pain dysuria hematuria or polyuria.      PAST MEDICAL HISTORY  Past Medical History:   Diagnosis Date   • Adenomatous colon polyp    • Chronic kidney disease    • Clostridium difficile infection 2017   • Diabetes mellitus (CMS/HCC)    • Gastric polyps    • Hypothyroidism    • IgA nephropathy, acute    • Kidney transplanted    • Macular degeneration    • Paroxysmal atrial fibrillation (CMS/HCC)    • Tubular adenoma     excision    • Ulcer of gastric fundus    • Vitamin D deficiency          PAST SURGICAL HISTORY  Past Surgical History:   Procedure Laterality Date   • BREAST BIOPSY Left    • CARPAL TUNNEL RELEASE     • CARPAL TUNNEL RELEASE Right    • CATARACT EXTRACTION     • CATARACT EXTRACTION Bilateral    • DIAGNOSTIC LAPAROSCOPY     • DIALYSIS FISTULA CREATION     • HERNIA REPAIR  85 and 86   • KNEE ARTHROSCOPY INCISION AND DRAINAGE OF KNEE Right 2019    Procedure: " KNEE ARTHROSCOPY INCISION AND DRAINAGE OF KNEE;  Surgeon: Paco Lester MD;  Location:  [facility];  Service: Orthopedics   • LAPAROSCOPIC TUBAL LIGATION  1985   • TOTAL KNEE ARTHROPLASTY     • TOTAL KNEE ARTHROPLASTY      Left knee    • TRANSPLANTATION RENAL  2010   • TRANSPLANTATION RENAL Right    • TRIGGER FINGER RELEASE     • TUBAL ABDOMINAL LIGATION     • UPPER GASTROINTESTINAL ENDOSCOPY  05/20/2013   • VENOUS ACCESS DEVICE (PORT) INSERTION N/A 5/23/2019    Procedure: INSERTION GROSHONG;  Surgeon: Rolando Begum MD;  Location:  [facility];  Service: General         FAMILY HISTORY  Family History   Problem Relation Age of Onset   • Leukemia Mother    • Stroke Father    • Dementia Sister    • Kidney disease Sister    • Diabetic kidney disease Sister    • Lung cancer Brother    • Breast cancer Neg Hx    • Ovarian cancer Neg Hx    • Hypertension Sister    • Dementia Sister          SOCIAL HISTORY  Social History     Socioeconomic History   • Marital status:      Spouse name: Not on file   • Number of children: Not on file   • Years of education: Not on file   • Highest education level: Not on file   Tobacco Use   • Smoking status: Never Smoker   • Smokeless tobacco: Never Used   Vaping Use   • Vaping Use: Never used   Substance and Sexual Activity   • Alcohol use: No   • Drug use: No   • Sexual activity: Defer         ALLERGIES  Hydrocodone, Statins, Codeine, and Sulfa antibiotics        REVIEW OF SYSTEMS  Review of Systems   Constitutional: Positive for activity change, chills, fatigue and fever. Negative for appetite change.   HENT: Negative for congestion, rhinorrhea and sore throat.    Respiratory: Positive for cough. Negative for shortness of breath and wheezing.    Cardiovascular: Negative for chest pain, palpitations and leg swelling.   Gastrointestinal: Negative for abdominal pain, nausea and vomiting.   Genitourinary: Negative for difficulty urinating, dysuria, flank pain and  hematuria.   Musculoskeletal: Positive for myalgias. Negative for arthralgias and back pain.   Neurological: Negative for dizziness, syncope and light-headedness.   Hematological: Bruises/bleeds easily.          All systems reviewed and negative except for those discussed in HPI.       PHYSICAL EXAM    I have reviewed the triage vital signs and nursing notes.    ED Triage Vitals [07/06/21 1154]   Temp Heart Rate Resp BP SpO2   98.4 °F (36.9 °C) 73 24 -- 96 %      Temp src Heart Rate Source Patient Position BP Location FiO2 (%)   Oral Monitor -- -- --       Physical Exam  GENERAL:   Pleasant awake alert and oriented nontoxic-appearing afebrile hemodynamically stable.  HENT: Nares patent.  EYES: No scleral icterus.  CV: Regular rhythm, regular rate.  RESPIRATORY: Normal effort.  No audible wheezes, rales or rhonchi.  ABDOMEN: Soft, nontender.  Bowel sounds are normal.  No guarding or rebound tenderness.  Peritoneal dialysis catheter site without evidence of secondary infection.  MUSCULOSKELETAL: No deformities.   NEURO: Alert, moves all extremities, follows commands.  SKIN: Warm, dry, no rash visualized.        LAB RESULTS  Recent Results (from the past 24 hour(s))   Comprehensive Metabolic Panel    Collection Time: 07/06/21 12:01 PM    Specimen: Blood   Result Value Ref Range    Glucose 147 (H) 65 - 99 mg/dL    BUN 38 (H) 8 - 23 mg/dL    Creatinine 6.37 (H) 0.57 - 1.00 mg/dL    Sodium 141 136 - 145 mmol/L    Potassium 3.6 3.5 - 5.2 mmol/L    Chloride 102 98 - 107 mmol/L    CO2 26.0 22.0 - 29.0 mmol/L    Calcium 7.5 (L) 8.6 - 10.5 mg/dL    Total Protein 7.4 6.0 - 8.5 g/dL    Albumin 3.00 (L) 3.50 - 5.20 g/dL    ALT (SGPT) 10 1 - 33 U/L    AST (SGOT) 20 1 - 32 U/L    Alkaline Phosphatase 204 (H) 39 - 117 U/L    Total Bilirubin <0.2 0.0 - 1.2 mg/dL    eGFR Non African Amer 6 (L) >60 mL/min/1.73    eGFR  African Amer      Globulin 4.4 gm/dL    A/G Ratio 0.7 g/dL    BUN/Creatinine Ratio 6.0 (L) 7.0 - 25.0    Anion Gap  13.0 5.0 - 15.0 mmol/L   Troponin    Collection Time: 07/06/21 12:01 PM    Specimen: Blood   Result Value Ref Range    Troponin T 0.055 (C) 0.000 - 0.030 ng/mL   Magnesium    Collection Time: 07/06/21 12:01 PM    Specimen: Blood   Result Value Ref Range    Magnesium 2.3 1.6 - 2.4 mg/dL   Protime-INR    Collection Time: 07/06/21 12:01 PM    Specimen: Blood   Result Value Ref Range    Protime 18.9 (H) 11.4 - 14.4 Seconds    INR 1.65 (H) 0.85 - 1.16   Green Top (Gel)    Collection Time: 07/06/21 12:01 PM   Result Value Ref Range    Extra Tube Hold for add-ons.    Lavender Top    Collection Time: 07/06/21 12:01 PM   Result Value Ref Range    Extra Tube hold for add-on    Gold Top - SST    Collection Time: 07/06/21 12:01 PM   Result Value Ref Range    Extra Tube Hold for add-ons.    CBC Auto Differential    Collection Time: 07/06/21 12:01 PM    Specimen: Blood   Result Value Ref Range    WBC 15.22 (H) 3.40 - 10.80 10*3/mm3    RBC 3.36 (L) 3.77 - 5.28 10*6/mm3    Hemoglobin 10.4 (L) 12.0 - 15.9 g/dL    Hematocrit 33.6 (L) 34.0 - 46.6 %    .0 (H) 79.0 - 97.0 fL    MCH 31.0 26.6 - 33.0 pg    MCHC 31.0 (L) 31.5 - 35.7 g/dL    RDW 14.1 12.3 - 15.4 %    RDW-SD 51.3 37.0 - 54.0 fl    MPV 9.9 6.0 - 12.0 fL    Platelets 242 140 - 450 10*3/mm3    Neutrophil % 75.5 42.7 - 76.0 %    Lymphocyte % 15.9 (L) 19.6 - 45.3 %    Monocyte % 5.5 5.0 - 12.0 %    Eosinophil % 2.0 0.3 - 6.2 %    Basophil % 0.2 0.0 - 1.5 %    Immature Grans % 0.9 (H) 0.0 - 0.5 %    Neutrophils, Absolute 11.51 (H) 1.70 - 7.00 10*3/mm3    Lymphocytes, Absolute 2.42 0.70 - 3.10 10*3/mm3    Monocytes, Absolute 0.83 0.10 - 0.90 10*3/mm3    Eosinophils, Absolute 0.30 0.00 - 0.40 10*3/mm3    Basophils, Absolute 0.03 0.00 - 0.20 10*3/mm3    Immature Grans, Absolute 0.13 (H) 0.00 - 0.05 10*3/mm3    nRBC 0.0 0.0 - 0.2 /100 WBC   Procalcitonin    Collection Time: 07/06/21 12:01 PM    Specimen: Blood   Result Value Ref Range    Procalcitonin 0.24 0.00 - 0.25 ng/mL    Urinalysis With Microscopic If Indicated (No Culture) - Urine, Catheter    Collection Time: 07/06/21 12:40 PM    Specimen: Urine, Catheter   Result Value Ref Range    Color, UA Yellow Yellow, Straw    Appearance, UA Cloudy (A) Clear    pH, UA 7.5 5.0 - 8.0    Specific Gravity, UA 1.012 1.001 - 1.030    Glucose, UA Negative Negative    Ketones, UA Negative Negative    Bilirubin, UA Negative Negative    Blood, UA Small (1+) (A) Negative    Protein,  mg/dL (2+) (A) Negative    Leuk Esterase, UA Large (3+) (A) Negative    Nitrite, UA Negative Negative    Urobilinogen, UA 0.2 E.U./dL 0.2 - 1.0 E.U./dL   Urinalysis, Microscopic Only - Urine, Catheter    Collection Time: 07/06/21 12:40 PM    Specimen: Urine, Catheter   Result Value Ref Range    RBC, UA 3-6 (A) None Seen, 0-2 /HPF    WBC, UA Too Numerous to Count (A) None Seen, 0-2 /HPF    Bacteria, UA 3+ (A) None Seen, Trace /HPF    Squamous Epithelial Cells, UA 13-20 (A) None Seen, 0-2 /HPF    Hyaline Casts, UA Unable to determine due to loaded field 0 - 6 /LPF    Methodology Manual Light Microscopy    Lactic Acid, Plasma    Collection Time: 07/06/21  1:56 PM    Specimen: Blood   Result Value Ref Range    Lactate 1.8 0.5 - 2.0 mmol/L   Calcium, Ionized    Collection Time: 07/06/21  2:11 PM    Specimen: Blood   Result Value Ref Range    Ionized Calcium 0.98 (L) 1.12 - 1.32 mmol/L       If labs were ordered, I independently reviewed the results.        RADIOLOGY  CT Abdomen Pelvis Without Contrast    Result Date: 7/6/2021  EXAMINATION: CT ABDOMEN PELVIS WO CONTRAST-  INDICATION: gen weak, WBC 15K  TECHNIQUE: Axial noncontrast CT of the abdomen and pelvis with multiplanar reconstruction  The radiation dose reduction device was turned on for each scan per the ALARA (As Low as Reasonably Achievable) protocol.  COMPARISON: 1/11/2021  FINDINGS: The lung bases are grossly clear. The body wall soft tissues demonstrate mild anasarca and a presumed dialysis catheter projects  unchanged in the pelvis, with moderate surrounding ascites extending up into the paracolic gutters and around the liver and spleen. There is no evidence of acute fracture or aggressive osseous lesion. The liver, spleen and bilateral adrenal glands demonstrate no evidence of new suspicious focal lesion, with a redemonstrated low density, likely benign left adrenal cyst noted. There has been interval enlargement of a previously noted cystic density finding along the pancreatic body, today measuring 21 x 37 mm, previously 17 x 24 mm, not well characterized. Small and large bowel loops are otherwise nondilated. There is no suspicious focal bowel wall thickening. The appendix is normal. The pelvic viscera are otherwise unremarkable. Severely atrophic bilateral kidneys with a stable exophytic cyst on the left, with also unremarkable appearance of the right pelvic kidney. A small ventral body wall hernia in the right lower quadrant is again seen containing a nonobstructed segment of bowel, fluid and fat.       Nonspecific and potentially incidental interval involvement of a previously noted incompletely characterized cystic finding along the pancreatic body. This could represent an enlarging pancreatic pseudocyst from prior pancreatitis, with cystic pancreatic mass or neoplasm not excluded. Consider nonemergent MRI to further evaluate.  No acute additional acute findings are present. There is increased ascites, likely related to peritoneal dialysis, with redemonstrated severe renal atrophy.   This report was finalized on 7/6/2021 1:25 PM by Adryan Wilson.      XR Chest 1 View    Result Date: 7/6/2021  EXAMINATION: XR CHEST 1 VW-  INDICATION: Weak/Dizzy/AMS triage protocol  COMPARISON: 1/14/2021  FINDINGS: Unchanged aeration with no new focal opacity. No significant effusion or distinct pneumothorax. Unchanged mild degree of cardiac enlargement.      Stable chronic changes as above without evidence of acute  cardiopulmonary abnormality.  This report was finalized on 7/6/2021 12:26 PM by Adryan Wilson.            PROCEDURES    Procedures    ECG 12 Lead             MEDICATIONS GIVEN IN ER    Medications   sodium chloride 0.9 % flush 10 mL (has no administration in time range)   piperacillin-tazobactam (ZOSYN) 3.375 g in iso-osmotic dextrose 50 ml (premix) (has no administration in time range)   escitalopram (LEXAPRO) tablet 5 mg (has no administration in time range)   metoprolol tartrate (LOPRESSOR) tablet 100 mg (has no administration in time range)   pantoprazole (PROTONIX) EC tablet 40 mg (has no administration in time range)   sevelamer (RENVELA) packet 1.6 g (has no administration in time range)   tacrolimus (PROGRAF) capsule 1 mg (has no administration in time range)   temazepam (RESTORIL) capsule 15 mg (has no administration in time range)   sodium chloride 0.9 % flush 10 mL (has no administration in time range)   sodium chloride 0.9 % flush 10 mL (has no administration in time range)   dextrose (GLUTOSE) oral gel 15 g (has no administration in time range)   dextrose (D50W) 25 g/ 50mL Intravenous Solution 25 g (has no administration in time range)   glucagon (human recombinant) (GLUCAGEN DIAGNOSTIC) injection 1 mg (has no administration in time range)   insulin lispro (humaLOG) injection 0-7 Units (has no administration in time range)   ondansetron (ZOFRAN) tablet 4 mg (has no administration in time range)     Or   ondansetron (ZOFRAN) injection 4 mg (has no administration in time range)   cefTRIAXone (ROCEPHIN) IVPB 1 g (has no administration in time range)   warfarin (COUMADIN) tablet 5 mg (has no administration in time range)   Pharmacy to dose warfarin (has no administration in time range)   vancomycin 1500 mg/500 mL 0.9% NS IVPB ([facility]) (1,500 mg Intravenous New Bag 7/6/21 1417)         PROGRESS, DATA ANALYSIS, CONSULTS, AND MEDICAL DECISION MAKING    All labs have been independently reviewed by me.  All  radiology studies have been reviewed by me and the radiologist dictating the report.   EKG's have been independently viewed and interpreted by me.      Differential diagnoses: UTI, peritonitis, sepsis, pneumonia      ED Course as of Jul 06 1709   Tue Jul 06, 2021   1301 BUN(!): 38 [TG]   1301 Creatinine(!): 6.37 [TG]   1301 Troponin T(!!): 0.055 [TG]   1336 Calcium(!): 7.5 [TG]   1415 Leukocytes, UA(!): Large (3+) [TG]   1458 Bacteria, UA(!): 3+ [TG]   1458 WBC, UA(!): Too Numerous to Count [TG]   1500 Ionized Calcium(!): 0.98 [TG]      ED Course User Index  [TG] Bill Lechuga PA-C       She has findings consistent with urinary tract infection.  She was treated here in the emergency department with vancomycin and Zosyn.  Will seek admission to hospitalist service for further evaluation and treatment.    Discussed with Dr. Mejía who accepts patient to hospitalist service.  AS OF 17:09 EDT VITALS:    BP - (!) 181/94  HR - 73  TEMP - 98.1 °F (36.7 °C) (Oral)  O2 SATS - 94%        DIAGNOSIS  Final diagnoses:   Urinary tract infection without hematuria, site unspecified   Leukocytosis, unspecified type   Fatigue, unspecified type         DISPOSITION  Admit to hospitalist service           Bill Lechuga PA-C  07/06/21 1352

## 2021-07-07 PROBLEM — I24.89 DEMAND ISCHEMIA: Status: ACTIVE | Noted: 2021-01-01

## 2021-07-07 PROBLEM — I24.8 DEMAND ISCHEMIA (HCC): Status: ACTIVE | Noted: 2021-01-01

## 2021-07-07 NOTE — PROGRESS NOTES
"   LOS: 1 day    Patient Care Team:  Alex Walters MD as PCP - General (Internal Medicine)  Jeff Joe IV, MD as Consulting Physician (Cardiology)  Donny Hodges MD as Consulting Physician (Nephrology)  Cory Castillo MD as Surgeon (General Surgery)  Slim Wick MD    Chief Complaint:      Subjective     Interval History:     No acute events overnight. No new complaints.     Review of Systems:   No CP or SOA    Objective     Vital Sign Min/Max for last 24 hours  Temp  Min: 98 °F (36.7 °C)  Max: 98.5 °F (36.9 °C)   BP  Min: 107/59  Max: 201/91   Pulse  Min: 65  Max: 138   Resp  Min: 18  Max: 24   SpO2  Min: 84 %  Max: 100 %   Flow (L/min)  Min: 2  Max: 2   Weight  Min: 77.1 kg (170 lb)  Max: 77.1 kg (170 lb)     Flowsheet Rows      First Filed Value   Admission Height  162.6 cm (64\") Documented at 07/06/2021 1154   Admission Weight  77.1 kg (170 lb) Documented at 07/06/2021 1154          I/O this shift:  In: 15.5 [I.V.:15.5]  Out: -   I/O last 3 completed shifts:  In: 2264 [I.V.:214; IV Piggyback:2050]  Out: 3250 [Urine:200; Other:3050]    Physical Exam:    Gen: Alert, NAD   HENT: NC, AT, EOMI   NECK: Supple, no JVD, Trachea midline   LUNGS: CTA bilaterally, non labored respirtation   CVS: S1/S2 audible, RRR, no murmur   Abd: Soft, NT, ND, BS+   Ext: No pedal edema, no cyanosis   CNS: Alert, No focal deficit noted grossly  Psy: Cooperative  Skin: Warm, dry and intact      WBC WBC   Date Value Ref Range Status   07/07/2021 14.52 (H) 3.40 - 10.80 10*3/mm3 Final   07/06/2021 15.22 (H) 3.40 - 10.80 10*3/mm3 Final      HGB Hemoglobin   Date Value Ref Range Status   07/07/2021 9.2 (L) 12.0 - 15.9 g/dL Final   07/06/2021 10.4 (L) 12.0 - 15.9 g/dL Final      HCT Hematocrit   Date Value Ref Range Status   07/07/2021 29.7 (L) 34.0 - 46.6 % Final   07/06/2021 33.6 (L) 34.0 - 46.6 % Final      Platlets No results found for: LABPLAT   MCV MCV   Date Value Ref Range Status   07/07/2021 " 102.4 (H) 79.0 - 97.0 fL Final   07/06/2021 100.0 (H) 79.0 - 97.0 fL Final          Sodium Sodium   Date Value Ref Range Status   07/07/2021 134 (L) 136 - 145 mmol/L Final   07/06/2021 141 136 - 145 mmol/L Final      Potassium Potassium   Date Value Ref Range Status   07/07/2021 3.7 3.5 - 5.2 mmol/L Final   07/06/2021 3.6 3.5 - 5.2 mmol/L Final     Comment:     Slight hemolysis detected by analyzer. Results may be affected.      Chloride Chloride   Date Value Ref Range Status   07/07/2021 97 (L) 98 - 107 mmol/L Final   07/06/2021 102 98 - 107 mmol/L Final      CO2 CO2   Date Value Ref Range Status   07/07/2021 24.0 22.0 - 29.0 mmol/L Final   07/06/2021 26.0 22.0 - 29.0 mmol/L Final      BUN BUN   Date Value Ref Range Status   07/07/2021 41 (H) 8 - 23 mg/dL Final   07/06/2021 38 (H) 8 - 23 mg/dL Final      Creatinine Creatinine   Date Value Ref Range Status   07/07/2021 6.62 (H) 0.57 - 1.00 mg/dL Final   07/06/2021 6.37 (H) 0.57 - 1.00 mg/dL Final      Calcium Calcium   Date Value Ref Range Status   07/07/2021 7.4 (L) 8.6 - 10.5 mg/dL Final   07/06/2021 7.5 (L) 8.6 - 10.5 mg/dL Final      PO4 No results found for: CAPO4   Albumin Albumin   Date Value Ref Range Status   07/07/2021 2.60 (L) 3.50 - 5.20 g/dL Final   07/06/2021 3.00 (L) 3.50 - 5.20 g/dL Final      Magnesium Magnesium   Date Value Ref Range Status   07/06/2021 2.3 1.6 - 2.4 mg/dL Final      Uric Acid No results found for: URICACID        Results Review:     I reviewed the patient's new clinical results.    epoetin blanca/blanca-epbx, 20,000 Units, Subcutaneous, Weekly  escitalopram, 5 mg, Oral, QAM  insulin lispro, 0-7 Units, Subcutaneous, TID AC  metoprolol tartrate, 100 mg, Oral, Q12H  pantoprazole, 40 mg, Oral, QAM  piperacillin-tazobactam, 3.375 g, Intravenous, Q12H  sevelamer, 1,600 mg, Oral, TID With Meals  sodium chloride, 10 mL, Intravenous, Q12H  tacrolimus, 1 mg, Oral, Q12H  UltraBag/Dianeal PD-2/1.5% Dex (pappas #[id number]), 2,000 mL, Intraperitoneal,  5x Daily  vancomycin (dosing per levels), , Does not apply, Daily  warfarin, 5 mg, Oral, Daily      Pharmacy to dose vancomycin,   Pharmacy to dose warfarin,         Medication Review:     Assessment/Plan       UTI (urinary tract infection)    Paroxysmal atrial fibrillation (CMS/HCC)    Essential hypertension    Type 2 diabetes mellitus (CMS/HCC)    Chronic kidney disease, stage IV (severe) (CMS/[facility])    Anemia of renal disease    Hypothyroidism    Hyperlipidemia LDL goal <100    Morbidly obese (CMS/[facility])    Urinary tract infection, site not specified    1.  Generalized fatigue and weakness accompanied with leukocytosis possible UTI patient has been started on Rocephin at this time.  2.  ESRD on peritoneal dialysis.  Primary disease IgA nephropathy, failed renal transplant.  .  3.  Atrial fibrillation on Coumadin.  4.  Morbid obesity.  5.  Diabetes type 2.  6.  Failed renal transplant: On tacrolimus at this time.  Continue with tacrolimus for now.  7.  Anemia of chronic kidney disease.      Plan:  Continue immunosuppressive medication.  Continue with antibiotic.  LIDIA weekly. Will check iron studies.   Continue with PD    I discussed the patients findings and my recommendations with patient    Albina Will MD  07/07/21  10:00 EDT

## 2021-07-07 NOTE — NURSING NOTE
PD rounds complete. Policy and procedure reviewed with AVILA Diaz. Orders and documentation reviewed. Supplies adequate. Denies further need at this time. Encouraged to call [phone] for assistance or for questions.

## 2021-07-07 NOTE — CONSULTS
Inpatient Cardiology Consult  Consult performed by: Jeff Joe IV, MD  Consult ordered by: Nathalie Clement DO  Reason for consult: Atrial fibrillation          [facility]  Cardiovascular Consultation Note    Active Hospital Problems    Diagnosis    • **UTI (urinary tract infection)    • Chronic kidney disease, stage IV (severe) (CMS/HCC)      · IgA nephropathy with history of renal transplant, 2010.   · Patient on hemodialysis Monday, Wednesday, and Friday started July 2015.  · Hemodialysis discontinued 2/2017.     • Paroxysmal atrial fibrillation (CMS/HCC)      · Diagnosed 2/2015  · CHADS-VASc = 5  · On Coumadin   · Echo (1/12/2021): LVEF 65%.  LVH.  Moderate AI.     • Demand ischemia (CMS/HCC)      · Troponins x2 are elevated but flat in the setting of end-stage renal on peritoneal dialysis 7/6/2021     • Morbidly obese (CMS/HCC)    • Hyperlipidemia LDL goal <100      · Reports intolerance to statin     • Hypothyroidism    • Essential hypertension    • Anemia of renal disease    • Type 2 diabetes mellitus (CMS/HCC)              Ms. Wallace  is an 81-year-old female with a history of ESRD requiring peritoneal dialysis, paroxysmal atrial fibrillation, and cardiovascular risk factors including hypertension, hyperlipidemia, type 2 diabetes.  The patient was admitted to the hospitalist service on 7/6/2021 for UTI and general malaise.  She initially presented in atrial fibrillation but converted to atrial fibrillation with relatively controlled rate well on telemetry.  The patient denies palpitation symptoms.  She states that most mornings her heart rate is within normal limits but there are mornings where she checks her heart rate and its in the 100s at rest.  She states that she cannot tell a difference between those mornings in terms of symptoms.  She is on warfarin for anticoagulation and her INR is <2.      Cardiac risk factors: Advanced age, type 2 diabetes  mellitus, hyperlipidemia, hypertension    Past medical and surgical history, social and family history reviewed in EMR.    REVIEW OF SYSTEMS:   H&P ROS reviewed and pertinent CV ROS as noted in HPI.         Vital Sign Min/Max for last 24 hours  Temp  Min: 97.5 °F (36.4 °C)  Max: 98.5 °F (36.9 °C)   BP  Min: 107/59  Max: 201/91   Pulse  Min: 66  Max: 138   Resp  Min: 18  Max: 22   SpO2  Min: 84 %  Max: 99 %   No data recorded      Intake/Output Summary (Last 24 hours) at 7/7/2021 1800  Last data filed at 7/7/2021 1615  Gross per 24 hour   Intake 8929.5 ml   Output 49608 ml   Net -1320.5 ml           Pulmonary:      Effort: Pulmonary effort is normal.      Breath sounds: Decreased breath sounds present.   Cardiovascular:      Tachycardia present. Irregularly irregular rhythm.   Edema:     Peripheral edema present.  Skin:     General: Skin is dry.   Neurological:      Mental Status: Alert and oriented to person, place and time.         EKG: Atrial fibrillation at 100 bpm 7/7/2021    Lab Review:   Labs reviewed in the electronic medical record.  Pertinent findings include:  Lab Results   Component Value Date    GLUCOSE 130 (H) 07/07/2021    BUN 41 (H) 07/07/2021    CREATININE 6.62 (H) 07/07/2021    EGFRIFNONA 6 (L) 07/07/2021    EGFRIFAFRI  07/07/2021      Comment:      <15 Indicative of kidney failure.    BCR 6.2 (L) 07/07/2021    K 3.7 07/07/2021    CO2 24.0 07/07/2021    CALCIUM 7.4 (L) 07/07/2021    CALCIUM 7.0 (L) 07/07/2021    PROTENTOTREF 5.9 (L) 02/13/2015    ALBUMIN 2.60 (L) 07/07/2021    LABIL2 1.0 02/13/2015    AST 20 07/06/2021    ALT 10 07/06/2021     Lab Results   Component Value Date    WBC 14.52 (H) 07/07/2021    HGB 9.2 (L) 07/07/2021    HCT 29.7 (L) 07/07/2021    .4 (H) 07/07/2021     07/07/2021     Lab Results   Component Value Date    CHOL 227 (H) 03/04/2019    TRIG 160 (H) 03/04/2019    HDL 52 03/04/2019     (H) 03/04/2019                  Paroxysmal atrial  fibrillation  · Asymptomatic atrial fibrillation with relatively good rate control  · Pursue rate control strategy with metoprolol  · Pharmacy to adjust warfarin to reach INR goal 2-3    Demand ischemia  · Flat troponin elevation in setting of ESRD without ACS symptom    Hypertension  · Consider addition of amlodipine for hypertension.  Defer to nephrology    Hyperlipidemia  · Defer statin therapy due to reported intolerance      Type 2 diabetes mellitus  · Management per hospitalist           · Continue rate control strategy for atrial fibrillation using metoprolol  · Pharmacy to titrate warfarin for goal INR 2-3  · Defer antihypertensive treatment to nephrology    Electronically signed by Jeff Joe IV, MD, 07/07/21, 5:56 PM EDT.

## 2021-07-07 NOTE — PROGRESS NOTES
"Pharmacy Consult-Vancomycin Dosing  Moni Wallace is a  81 y.o. female receiving vancomycin therapy.     Indication: Bacteremia (GPCs)  Consulting Provider: Dr. Clement  ID Consult: No    Goal Trough: 15-20 mcg/mL    Current Antimicrobial Therapy  Anti-Infectives (From admission, onward)      Ordered     Dose/Rate Route Frequency Start Stop    07/07/21 1129  cefTRIAXone (ROCEPHIN) IVPB 1 g     Ordering Provider: Nathalie Clement DO    1 g  100 mL/hr over 30 Minutes Intravenous Every 24 Hours 07/07/21 1400 07/12/21 1359    07/07/21 0924  vancomycin (dosing per levels)     Ordering Provider: Katherin Tierney, PharmD     Does not apply Daily 07/07/21 1015 07/10/21 0859    07/07/21 0907  Pharmacy to dose vancomycin     Ordering Provider: Nathalie Clement DO     Does not apply Continuous PRN 07/07/21 0905 07/10/21 0904    07/06/21 1903  piperacillin-tazobactam (ZOSYN) 3.375 g in iso-osmotic dextrose 50 ml (premix)     Ordering Provider: Xavier Mejía MD    3.375 g  over 30 Minutes Intravenous Once 07/06/21 2000 07/06/21 2054    07/06/21 1302  vancomycin 1500 mg/500 mL 0.9% NS IVPB ([facility])     Ordering Provider: Bill Lechuga PA-C    20 mg/kg × 77.1 kg Intravenous Once 07/06/21 1315 07/06/21 1417          Allergies  Allergies as of 07/06/2021 - Reviewed 07/06/2021   Allergen Reaction Noted    Hydrocodone Itching 05/19/2019    Statins Other (See Comments) 07/21/2020    Codeine Rash 05/07/2018    Sulfa antibiotics Rash 05/07/2018     Labs  Results from last 7 days   Lab Units 07/07/21  0741 07/06/21  1201   BUN mg/dL 41* 38*   CREATININE mg/dL 6.62* 6.37*     Results from last 7 days   Lab Units 07/07/21  0741 07/06/21  1201   WBC 10*3/mm3 14.52* 15.22*     Evaluation of Dosing     Last Dose Received in the ED/Outside Facility: Vancomycin 1500mg IV 7/6 @ 1417    Is Patient on Dialysis or Renal Replacement: Yes, PD    Ht - 162.6 cm (64\")  Wt - 77.1 kg (170 lb)    Estimated Creatinine Clearance: 6.8 mL/min (A) (by C-G " formula based on SCr of 6.62 mg/dL (H)).    Intake & Output (last 3 days)         07/04 0701 - 07/05 0700 07/05 0701 - 07/06 0700 07/06 0701 - 07/07 0700 07/07 0701 - 07/08 0700    P.O.    240    I.V. (mL/kg)   214 (2.8) 2015.5 (26.1)    IV Piggyback   2050     Total Intake(mL/kg)   2264 (29.4) 2255.5 (29.3)    Urine (mL/kg/hr)   200     Other   3050 2300    Stool   0 0    Total Output   3250 2300    Net   -986 -44.5            Stool Unmeasured Occurrence   1 x 1 x          Microbiology and Radiology  Microbiology Results (last 10 days)       Procedure Component Value - Date/Time    Body Fluid Culture - Body Fluid, Peritoneum [[id number]] Collected: 07/06/21 2159    Lab Status: Preliminary result Specimen: Body Fluid from Peritoneum Updated: 07/07/21 0644     Gram Stain No WBCs seen      No organisms seen    COVID PRE-OP / PRE-PROCEDURE SCREENING ORDER (NO ISOLATION) - Swab, Nasopharynx [[id number]]  (Normal) Collected: 07/06/21 2159    Lab Status: Final result Specimen: Swab from Nasopharynx Updated: 07/06/21 2311    Narrative:      The following orders were created for panel order COVID PRE-OP / PRE-PROCEDURE SCREENING ORDER (NO ISOLATION) - Swab, Nasopharynx.  Procedure                               Abnormality         Status                     ---------                               -----------         ------                     Respiratory Panel PCR w/...[[id number]]  Normal              Final result                 Please view results for these tests on the individual orders.    Respiratory Panel PCR w/COVID-19(SARS-CoV-2) FAHAD/NEDRA/ANDREI/PAD/COR/MAD/ILA In-House, NP Swab in UTM/VTM, 3-4 HR TAT - Swab, Nasopharynx [[id number]]  (Normal) Collected: 07/06/21 2159    Lab Status: Final result Specimen: Swab from Nasopharynx Updated: 07/06/21 2311     ADENOVIRUS, PCR Not Detected     Coronavirus 229E Not Detected     Coronavirus HKU1 Not Detected     Coronavirus NL63 Not Detected     Coronavirus OC43 Not Detected      COVID19 Not Detected     Human Metapneumovirus Not Detected     Human Rhinovirus/Enterovirus Not Detected     Influenza A PCR Not Detected     Influenza B PCR Not Detected     Parainfluenza Virus 1 Not Detected     Parainfluenza Virus 2 Not Detected     Parainfluenza Virus 3 Not Detected     Parainfluenza Virus 4 Not Detected     RSV, PCR Not Detected     Bordetella pertussis pcr Not Detected     Bordetella parapertussis PCR Not Detected     Chlamydophila pneumoniae PCR Not Detected     Mycoplasma pneumo by PCR Not Detected    Narrative:      In the setting of a positive respiratory panel with a viral infection PLUS a negative procalcitonin without other underlying concern for bacterial infection, consider observing off antibiotics or discontinuation of antibiotics and continue supportive care. If the respiratory panel is positive for atypical bacterial infection (Bordetella pertussis, Chlamydophila pneumoniae, or Mycoplasma pneumoniae), consider antibiotic de-escalation to target atypical bacterial infection.    Blood Culture - Blood, Arm, Right [[id number]]  (Abnormal) Collected: 07/06/21 1412    Lab Status: Preliminary result Specimen: Blood from Arm, Right Updated: 07/07/21 0905     Blood Culture Abnormal Stain     Gram Stain Anaerobic Bottle Gram positive cocci in pairs and clusters    Blood Culture ID, PCR - Blood, Arm, Right [[id number]]  (Abnormal) Collected: 07/06/21 1412    Lab Status: Final result Specimen: Blood from Arm, Right Updated: 07/07/21 1024     BCID, PCR Staphylococcus spp, not aureus. Identification by BCID PCR.     BOTTLE TYPE Anaerobic Bottle          Vancomycin Levels:  Results from last 7 days   Lab Units 07/07/21  0741   VANCOMYCIN RM mcg/mL 16.20           Assessment/Plan:    Pharmacy to dose vancomycin for bacteremia.  Patient received a loading dose of vancomycin 1500 mg IV x 1. Due to peritoneal dialysis status further dosing to be determined by levels.   Vancomycin random 7/7 = 16.2  mcg/mL, will not give any further vancomycin doses today. Recheck AM random 7/8.   Monitor renal function, cultures and sensitivities, and clinical status, and adjust regimen as necessary.  Pharmacy will continue to follow.    Katherin Tireney, PharmD, BCPS  7/7/2021  13:10 EDT

## 2021-07-07 NOTE — PLAN OF CARE
Goal Outcome Evaluation:  Plan of Care Reviewed With: patient        Progress: no change  Outcome Summary: Patient admitted from ED yesterday for UTI. Antibiotics given. Patient receiving PD 5times per day per nephrology. Patient has history of  afib, and coverted around midnight last night. Patient has history of afib, but was SR on admission. Called NP, got EKG, and will continue to monitor for <120. Patient now on 2L NC. Voided 300ml of UOP, and had a small BM. Patient alert and oriented. Resting in. Pending Labs this am.

## 2021-07-07 NOTE — PROGRESS NOTES
"Pharmacy Consult  -  Warfarin    Moni Wallace is a  81 y.o. female   Height - 162.6 cm (64\")  Weight - 77.1 kg (170 lb)    Consulting Provider: Hospitalist  Indication: A fib  Goal INR: 2-3  Home Regimen:   - Warfarin 4 mg on Sunday, Wednesday, Thursday, Saturday   - Warfarin 6 mg on Monday, Tuesday, Friday    Bridge Therapy: None    Drug-Drug Interactions with current regimen:     No major drug-drug interactions noted    Warfarin Dosing During Admission:    Date  7/6 7/7          INR  1.65 1.74          Dose  5 mg (6 mg)             Education Provided: Patient follows with [facility], education previously provided by pharmacist. Will address any questions/concerns as needed.    Discharge Follow up:     Following Provider - [facility]     Follow up time range or appointment - Recommend repeat INR within 2-3 days of discharge    Labs:    Results from last 7 days   Lab Units 07/07/21  0741 07/06/21  1201   INR  1.74* 1.65*   HEMOGLOBIN g/dL 9.2* 10.4*   HEMATOCRIT % 29.7* 33.6*     Results from last 7 days   Lab Units 07/07/21  0741 07/06/21  1201   SODIUM mmol/L 134* 141   POTASSIUM mmol/L 3.7 3.6   CHLORIDE mmol/L 97* 102   CO2 mmol/L 24.0 26.0   BUN mg/dL 41* 38*   CREATININE mg/dL 6.62* 6.37*   CALCIUM mg/dL 7.0*  7.4* 7.5*   BILIRUBIN mg/dL  --  <0.2   ALK PHOS U/L  --  204*   ALT (SGPT) U/L  --  10   AST (SGOT) U/L  --  20   GLUCOSE mg/dL 130* 147*     Current dietary intake: Patient just admitted, 25% of meals documented thus far 7/7    Diet Order   Procedures   • Diet Regular; Consistent Carbohydrate     Assessment/Plan:     Patient's INR is subtherapeutic at 1.74 today.  Will boost dose to 6 mg today.  Daily PT/INR ordered.  Monitor signs/symptoms of bleeding, dietary intake, and drug-drug interactions. Make dose adjustments as necessary.  Pharmacy will continue to follow.    Katherin Tierney, Jose Luis, BCPS  7/7/2021  14:13 EDT   "

## 2021-07-07 NOTE — PROGRESS NOTES
Clinical Nutrition   Reason For Visit: Identified at risk by screening criteria: reduced PO intake    Patient Name: Moni Wallace  YOB: 1940  MRN: [id number]  Date of Encounter: 07/07/21 10:33 EDT  Admission date: 7/6/2021    Nutrition Assessment     Admission Problem List:    UTI (urinary tract infection)    Paroxysmal atrial fibrillation (CMS/HCC)    Essential hypertension    Type 2 diabetes mellitus (CMS/HCC)    Chronic kidney disease, stage IV (severe) (CMS/HCC)    Anemia of renal disease    Hypothyroidism    Hyperlipidemia LDL goal <100    Morbidly obese (CMS/HCC)    Urinary tract infection, site not specified     Applicable Nutrition-Related Information:  - CKD on PD  - S/p kidney transplant (on immunosuppressant medication)    Applicable medical tests/procedures since admission:  7/6: Nephrology following; PD orders written.    PMH: She  has a past medical history of Adenomatous colon polyp, Chronic kidney disease, Clostridium difficile infection (06/2017), Diabetes mellitus (CMS/HCC), Gastric polyps, Hypothyroidism, IgA nephropathy, acute, Kidney transplanted, Macular degeneration, Paroxysmal atrial fibrillation (CMS/HCC), Tubular adenoma, Ulcer of gastric fundus, and Vitamin D deficiency.   PSxH: She  has a past surgical history that includes Laparoscopic tubal ligation (1985); Transplantation renal (2010); Trigger finger release; Carpal tunnel release; Cataract extraction; Dialysis fistula creation; Upper gastrointestinal endoscopy (05/20/2013); Total knee arthroplasty; Carpal tunnel release (Right); Cataract extraction (Bilateral); Transplantation renal (Right); Diagnostic laparoscopy; Tubal ligation; Hernia repair (85 and 86); Total knee arthroplasty; Breast biopsy (Left, 1990'S); knee arthroscopy incision and drainage of knee (Right, 5/18/2019); and Venous Access Device (Port) (N/A, 5/23/2019).     Reported/Observed/Food/Nutrition Related History     Visited pt this morning with family  "member at bedside. Pt endorsed poor PO intake several weeks PTA but unable to give clear timeline and estimate of % of usual intake. Family member reported pt has good days and bad days of PO intake. Pt drinks Premier Protein \"once in awhile\". Pt reported UBW of 186# and reported a few lbs of unintentional wt loss but unsure of amount. Pt reported symptoms of nausea today. Pt not interested in ONS at this time. Pt reported rice food allergy with symptoms of mouth and tongue swelling. Pt does not have any food preferences.    Anthropometrics   Height: 64 in  Weight: 170 lbs (stated wt on 7/6)  BMI: 29.18  BMI classification: Overweight: 25.0-29.9kg/m2    IBW: 120 lbs  UBW: Per pt ~186 lbs. Per EMR, appears to be ~200 lbs from 1165-8147.  Weight change: 40 lbs wt loss (19%) in 6 months (?); however, wt from 7/6 was stated and possibly inaccurate. RD will request bedscale wt for pt.    Weight Weight (kg) Weight (lbs) Weight Method   7/6/2021 77.111 kg 170 lb Stated   1/15/2021 95.255 kg 210 lb Bed scale   1/14/2021 96.253 kg 212 lb 3.2 oz Bed scale   1/13/2021 94.802 kg 209 lb Bed scale   1/12/2021 94.348 kg 208 lb -   1/12/2021 94.394 kg 208 lb 1.6 oz Bed scale   1/12/2021 94.348 kg 208 lb Bed scale   1/11/2021 86.183 kg 190 lb Stated   12/8/2020 90.447 kg 199 lb 6.4 oz -   7/21/2020 92.987 kg 205 lb -   1/27/2020 89.903 kg 198 lb 3.2 oz -   11/22/2019 87.091 kg 192 lb -   11/5/2019 87.998 kg 194 lb -   7/22/2019 84.369 kg 186 lb -   5/29/2019 90.402 kg 199 lb 4.8 oz -       Nutrition Focused Physical Exam     No subcutaneous fat or muscle depletion observed.    Labs reviewed   Labs reviewed: Yes    Results from last 7 days   Lab Units 07/07/21  0741 07/06/21  1201   SODIUM mmol/L 134* 141   POTASSIUM mmol/L 3.7 3.6   CHLORIDE mmol/L 97* 102   CO2 mmol/L 24.0 26.0   BUN mg/dL 41* 38*   CREATININE mg/dL 6.62* 6.37*   GLUCOSE mg/dL 130* 147*   CALCIUM mg/dL 7.4* 7.5*   PHOSPHORUS mg/dL 4.3  --    MAGNESIUM mg/dL  --  " 2.3     Results from last 7 days   Lab Units 07/07/21  0741 07/06/21  1201   WBC 10*3/mm3 14.52* 15.22*   ALBUMIN g/dL 2.60* 3.00*     Results from last 7 days   Lab Units 07/07/21  0721 07/06/21 2020 07/06/21  1715   GLUCOSE mg/dL 124 129 105     Lab Results   Lab Value Date/Time    HGBA1C 4.90 05/19/2019 0543    HGBA1C 5.10 03/04/2019 1020     Medications reviewed   Medications reviewed: Yes  Pertinent: insulin, abx, lopressor, protonix, warfarin, sevelamer, tacrolimus    Current Nutrition Prescription   PO: Diet Regular; Consistent Carbohydrate    Average PO intake: insufficient data    Nutrition Diagnosis     7/7:  Problem Inadequate oral intake   Etiology Poor appetite; nausea   Signs/Symptoms Pt report of poor intake for several days PTA     Intervention   Intervention: Follow treatment progress, Care plan reviewed, Interview for preferences, Supplement offered/refused     - Will monitor need for ONS    Goal:   General: Nutrition support treatment  PO: Establish PO, Increase intake     Additional goals: No further weight loss  - Elytes WNL    Monitoring/Evaluation:   Monitoring/Evaluation: Per protocol, I&O, PO intake, Pertinent labs, Weight, Skin status, GI status, Symptoms    Oz Miranda RD  Time Spent: 30 min

## 2021-07-07 NOTE — PLAN OF CARE
Goal Outcome Evaluation:  Plan of Care Reviewed With: patient        Progress: no change  Outcome Summary: PT with c/o headache, improved after Tylenol x 1. C/O nausea x 2, emesis x 1, improved after Zofran. Tolerating PD well, dwelling x 2.5 hours 5 x per day. Still in a-fib, otherwise VSS. Up to chair with assist x 1.

## 2021-07-07 NOTE — CASE MANAGEMENT/SOCIAL WORK
Continued Stay Note  [facility]     Patient Name: Moni Wallace  MRN: [id number]  Today's Date: 7/7/2021    Admit Date: 7/6/2021    Discharge Plan     Row Name 07/07/21 0957       Plan    Plan Comments  I met with Ms. Wallace and her son Adriano at the bedside to discuss discharge disposition. I anticipate Ms. Wallace to be medically ready for discharge later in the week. Ms. Wallace can stand and pivot from bed to chair, she cannot ambulate and has a wheelchair at home. Her sons and her daughter in law provide her with transportation, housekeeping, laundry, cooking, and bathing. Ms. Wallace is agreeable to home health if recommended at the time of discharge. Case management will continue to follow Ms. Wallace's progress this hospitalization and provide for her any anticipated discharge needs based on physical therapy and occupational therapy recommendations.      Final Discharge Disposition Code  30 - still a patient               Expected Discharge Date and Time     Expected Discharge Date Expected Discharge Time    Jul 9, 2021             Gema Middleton RN

## 2021-07-07 NOTE — PROGRESS NOTES
[facility]  PROGRESS NOTE    Patient Name: Moni Wallace  : 1940  MRN: [id number]    Date of Admission: 2021  Primary Care Physician: Alex Walters MD    Subjective   Subjective     CC:  Weakness     HPI:  States she doesn't feel better but not worse. General weakness. Denies dysuria. Denies abd pain. Denies SOA today. Denies CP/palpitations. Does not think that she has been in afib recently. Wheelchair bound at home due to knee pain.     ROS:  Gen- No fevers, chills  CV- No chest pain, palpitations  Resp- No cough, + dyspnea  GI- No N/V/D, abd pain     Objective   Objective     Vital Signs:   Temp:  [98 °F (36.7 °C)-98.5 °F (36.9 °C)] 98 °F (36.7 °C)  Heart Rate:  [] 121  Resp:  [18-24] 18  BP: (107-201)/() 139/105        Physical Exam:  Constitutional: No acute distress, awake, alert   HENT: NCAT, mucous membranes moist  Respiratory: Clear to auscultation bilaterally, respiratory effort normal   Cardiovascular: irregular; II/VI murmur   Gastrointestinal: Positive bowel sounds, soft, nontender, nondistended  Musculoskeletal: No bilateral ankle edema  Psychiatric: Appropriate affect, cooperative  Neurologic: Oriented x 3, strength symmetric in all extremities, Cranial Nerves grossly intact to confrontation, speech clear  Skin: No rashes      Results Reviewed:  Results from last 7 days   Lab Units 21  0741 21  1201   WBC 10*3/mm3 14.52* 15.22*   HEMOGLOBIN g/dL 9.2* 10.4*   HEMATOCRIT % 29.7* 33.6*   PLATELETS 10*3/mm3 218 242   INR  1.74* 1.65*   PROCALCITONIN ng/mL  --  0.24     Results from last 7 days   Lab Units 21  1738 21  1201   SODIUM mmol/L  --  141   POTASSIUM mmol/L  --  3.6   CHLORIDE mmol/L  --  102   CO2 mmol/L  --  26.0   BUN mg/dL  --  38*   CREATININE mg/dL  --  6.37*   GLUCOSE mg/dL  --  147*   CALCIUM mg/dL  --  7.5*   ALT (SGPT) U/L  --  10   AST (SGOT) U/L  --  20   TROPONIN T ng/mL 0.048* 0.055*          Estimated Creatinine Clearance: 7.1 mL/min (A) (by C-G formula based on SCr of 6.37 mg/dL (H)).    Microbiology Results Abnormal     Procedure Component Value - Date/Time    Body Fluid Culture - Body Fluid, Peritoneum [[id number]] Collected: 07/06/21 2159    Lab Status: Preliminary result Specimen: Body Fluid from Peritoneum Updated: 07/07/21 0644     Gram Stain No WBCs seen      No organisms seen    COVID PRE-OP / PRE-PROCEDURE SCREENING ORDER (NO ISOLATION) - Swab, Nasopharynx [[id number]]  (Normal) Collected: 07/06/21 2159    Lab Status: Final result Specimen: Swab from Nasopharynx Updated: 07/06/21 2311    Narrative:      The following orders were created for panel order COVID PRE-OP / PRE-PROCEDURE SCREENING ORDER (NO ISOLATION) - Swab, Nasopharynx.  Procedure                               Abnormality         Status                     ---------                               -----------         ------                     Respiratory Panel PCR w/...[[id number]]  Normal              Final result                 Please view results for these tests on the individual orders.    Respiratory Panel PCR w/COVID-19(SARS-CoV-2) FAHAD/NEDRA/ANDREI/PAD/COR/MAD/ILA In-House, NP Swab in UTM/VTM, 3-4 HR TAT - Swab, Nasopharynx [[id number]]  (Normal) Collected: 07/06/21 2159    Lab Status: Final result Specimen: Swab from Nasopharynx Updated: 07/06/21 2311     ADENOVIRUS, PCR Not Detected     Coronavirus 229E Not Detected     Coronavirus HKU1 Not Detected     Coronavirus NL63 Not Detected     Coronavirus OC43 Not Detected     COVID19 Not Detected     Human Metapneumovirus Not Detected     Human Rhinovirus/Enterovirus Not Detected     Influenza A PCR Not Detected     Influenza B PCR Not Detected     Parainfluenza Virus 1 Not Detected     Parainfluenza Virus 2 Not Detected     Parainfluenza Virus 3 Not Detected     Parainfluenza Virus 4 Not Detected     RSV, PCR Not Detected     Bordetella pertussis pcr Not Detected     Bordetella  parapertussis PCR Not Detected     Chlamydophila pneumoniae PCR Not Detected     Mycoplasma pneumo by PCR Not Detected    Narrative:      In the setting of a positive respiratory panel with a viral infection PLUS a negative procalcitonin without other underlying concern for bacterial infection, consider observing off antibiotics or discontinuation of antibiotics and continue supportive care. If the respiratory panel is positive for atypical bacterial infection (Bordetella pertussis, Chlamydophila pneumoniae, or Mycoplasma pneumoniae), consider antibiotic de-escalation to target atypical bacterial infection.          Imaging Results (Last 24 Hours)     Procedure Component Value Units Date/Time    CT Abdomen Pelvis Without Contrast [[id number]] Collected: 07/06/21 1319     Updated: 07/06/21 1328    Narrative:      EXAMINATION: CT ABDOMEN PELVIS WO CONTRAST-      INDICATION: gen weak, WBC 15K     TECHNIQUE: Axial noncontrast CT of the abdomen and pelvis with  multiplanar reconstruction     The radiation dose reduction device was turned on for each scan per the  ALARA (As Low as Reasonably Achievable) protocol.     COMPARISON: 1/11/2021      FINDINGS: The lung bases are grossly clear. The body wall soft tissues  demonstrate mild anasarca and a presumed dialysis catheter projects  unchanged in the pelvis, with moderate surrounding ascites extending up  into the paracolic gutters and around the liver and spleen. There is no  evidence of acute fracture or aggressive osseous lesion. The liver,  spleen and bilateral adrenal glands demonstrate no evidence of new  suspicious focal lesion, with a redemonstrated low density, likely  benign left adrenal cyst noted. There has been interval enlargement of a  previously noted cystic density finding along the pancreatic body, today  measuring 21 x 37 mm, previously 17 x 24 mm, not well characterized.  Small and large bowel loops are otherwise nondilated. There is no  suspicious focal  bowel wall thickening. The appendix is normal. The  pelvic viscera are otherwise unremarkable. Severely atrophic bilateral  kidneys with a stable exophytic cyst on the left, with also unremarkable  appearance of the right pelvic kidney. A small ventral body wall hernia  in the right lower quadrant is again seen containing a nonobstructed  segment of bowel, fluid and fat.          Impression:      Nonspecific and potentially incidental interval involvement  of a previously noted incompletely characterized cystic finding along  the pancreatic body. 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 Consider nonemergent MRI to further evaluate.     No acute additional acute findings are present. There is increased  ascites, likely related to peritoneal dialysis, with redemonstrated  severe renal atrophy.         This report was finalized on 7/6/2021 1:25 PM by Adryan Wilson.             Results for orders placed during the hospital encounter of 01/11/21    Transthoracic Echo Complete With Contrast if Necessary Per Protocol    Interpretation Summary  · Left ventricular systolic function is normal. Estimated left ventricular EF = 65%  · Left ventricular wall thickness is consistent with hypertrophy.  · Left atrial volume is mildly increased.  · The aortic valve is calcified. There is mild aortic stenosis present. There is moderate aortic regurgitation present.  · Estimated right ventricular systolic pressure from tricuspid regurgitation is normal (<35 mmHg).      I have reviewed the medications:  Scheduled Meds:epoetin blanca/blanca-epbx, 20,000 Units, Subcutaneous, Weekly  escitalopram, 5 mg, Oral, QAM  insulin lispro, 0-7 Units, Subcutaneous, TID AC  metoprolol tartrate, 100 mg, Oral, Q12H  pantoprazole, 40 mg, Oral, QAM  piperacillin-tazobactam, 3.375 g, Intravenous, Q12H  sevelamer, 1,600 mg, Oral, TID With Meals  sodium chloride, 10  "mL, Intravenous, Q12H  tacrolimus, 1 mg, Oral, Q12H  UltraBag/Dianeal PD-2/1.5% Dex (pappas #[id number]), 2,000 mL, Intraperitoneal, 5x Daily  warfarin, 5 mg, Oral, Daily      Continuous Infusions:Pharmacy to dose warfarin,       PRN Meds:.•  dextrose  •  dextrose  •  glucagon (human recombinant)  •  ondansetron **OR** ondansetron  •  Pharmacy to dose warfarin  •  sodium chloride  •  sodium chloride  •  temazepam    Assessment/Plan   Assessment & Plan     Active Hospital Problems    Diagnosis  POA   • **UTI (urinary tract infection) [N39.0]  Yes   • Urinary tract infection, site not specified [N39.0]  Yes   • Morbidly obese (CMS/HCC) [E66.01]  Yes   • Hyperlipidemia LDL goal <100 [E78.5]  Yes   • Hypothyroidism [E03.9]  Yes   • Essential hypertension [I10]  Yes   • Anemia of renal disease [N18.9, D63.1]  Yes   • Chronic kidney disease, stage IV (severe) (CMS/HCC) [N18.4]  Yes   • Paroxysmal atrial fibrillation (CMS/[facility]) [I48.0]  Yes   • Type 2 diabetes mellitus (CMS/HCC) [E11.9]  Yes      Resolved Hospital Problems   No resolved problems to display.        Brief Hospital Course to date:  IgA nephropathy s/p transplant, history of RUE AVF, PAF on coumadin history of septic knee arthritis s/p I&D, now on PD, with 5-6 days of \"not feeling\". She notes mild dyspnea worse with exertion.    Weakness/fatigue  Leukocytosis  Acute UTI  - CT abd/pel - nonspecific potentially incidental finding of cystic structure along pancreatic body; ascites related to PD; no other acute findings  - fluid culture with no WBC or organisms seen; COVID and resp PCR negative   - UCx pending   - Continue rocephin (7/6)    + blood culture  - gram positive cocci in pairs and clusters  - Repeat cultures ordered  - Vanc      CKD, on PD, hx of IgA nephropathy, s/p renal transplant, chronic rejection  --consult [facility] for PD     Paroxysmal Afib  - Follows with Dr. 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     CODE STATUS:   Code Status and Medical Interventions:   Ordered at: 07/06/21 1525     Code Status:    CPR     Medical Interventions (Level of Support Prior to Arrest):    Full       Nathalie Clement,   07/07/21

## 2021-07-08 NOTE — PLAN OF CARE
"Goal Outcome Evaluation:         Pt tolerating PD, pt w/emesis after breakfast, refused some PO meds, pt did not eat lunch and unable to eat dinner -c/o nausea -medicated PRN, pt has c/o \"not feeling good\" yet unable to pinpoint area of concern, pt has slept off and on -rested comfortably, asking if she has anything to help her sleep tonight. Abefrile, NAD noted.        "

## 2021-07-08 NOTE — PROGRESS NOTES
"Pharmacy Consult  -  Warfarin    Moni Wallace is a  81 y.o. female   Height - 162.6 cm (64\")  Weight - 90.7 kg (200 lb)    Consulting Provider: Hospitalist  Indication: A fib  Goal INR: 2-3  Home Regimen:   - Warfarin 4 mg on Sunday, Wednesday, Thursday, Saturday   - Warfarin 6 mg on Monday, Tuesday, Friday    Bridge Therapy: None     Drug-Drug Interactions with current regimen:     No major drug-drug interactions noted              Warfarin Dosing During Admission:    Date  7/6 7/7 7/8         INR  1.65 1.74 2.12         Dose  5 mg 6 mg (4 mg)            Education Provided: Patient follows with [facility], education previously provided by pharmacist. Will address any questions/concerns as needed.    Discharge Follow up:     Following Provider - [facility]     Follow up time range or appointment - Recommend repeat INR within 2-3 days of discharge    Labs:    Results from last 7 days   Lab Units 07/08/21  0622 07/07/21  0741 07/06/21  1201   INR  2.12* 1.74* 1.65*   HEMOGLOBIN g/dL 8.8* 9.2* 10.4*   HEMATOCRIT % 29.6* 29.7* 33.6*     Results from last 7 days   Lab Units 07/08/21  0622 07/07/21  0741 07/06/21  1201   SODIUM mmol/L 133* 134* 141   POTASSIUM mmol/L 3.9 3.7 3.6   CHLORIDE mmol/L 97* 97* 102   CO2 mmol/L 24.0 24.0 26.0   BUN mg/dL 37* 41* 38*   CREATININE mg/dL 5.97* 6.62* 6.37*   CALCIUM mg/dL 7.6* 7.0*  7.4* 7.5*   BILIRUBIN mg/dL  --   --  <0.2   ALK PHOS U/L  --   --  204*   ALT (SGPT) U/L  --   --  10   AST (SGOT) U/L  --   --  20   GLUCOSE mg/dL 98 130* 147*     Current dietary intake: Patient just admitted, 25% of meals documented 7/7    Diet Order   Procedures    Diet Regular; Consistent Carbohydrate     Assessment/Plan:    Patient's INR is therapeutic at 2.12 today, up from 1.74 yesterday.  Will resume patient's home dose and give warfarin 4 mg tonight.   Daily PT/INR ordered.  Monitor signs/symptoms of bleeding, dietary intake, and drug-drug interactions. Make " dose adjustments as necessary.  Pharmacy will continue to follow.    Katherin Tierney, PharmD, BCPS  7/8/2021  13:34 EDT

## 2021-07-08 NOTE — PROGRESS NOTES
Cardiology Progress Note      Reason for visit:    · Paroxysmal atrial fibrillation    IDENTIFICATION: 81-year-old female who resides in [place], [place]    Active Hospital Problems    Diagnosis  POA   • **UTI (urinary tract infection) [N39.0]  Yes     Priority: High   • Demand ischemia (CMS/HCC) [I24.8]  Yes     Priority: High     · Troponins x2 are elevated but flat in the setting of end-stage renal on peritoneal dialysis 7/6/2021     • Chronic kidney disease, stage IV (severe) (CMS/HCC) [N18.4]  Yes     Priority: High     · IgA nephropathy with history of renal transplant, 2010.   · Patient on hemodialysis Monday, Wednesday, and Friday started July 2015.  · Hemodialysis discontinued 2/2017.     • Paroxysmal atrial fibrillation (CMS/[facility]) [I48.0]  Yes     Priority: High     · Diagnosed 2/2015  · CHADS-VASc = 5  · On Coumadin   · Echo (1/12/2021): LVEF 65%.  LVH.  Moderate AI.     • Type 2 diabetes mellitus (CMS/[facility]) [E11.9]  Yes     Priority: High   • Hyperlipidemia LDL goal <100 [E78.5]  Yes     Priority: Medium     · Reports intolerance to statin     • Anemia of renal disease [N18.9, D63.1]  Yes     Priority: Medium   • Morbidly obese (CMS/[facility]) [E66.01]  Yes     Priority: Low   • Hypothyroidism [E03.9]  Yes     Priority: Low   • Essential hypertension [I10]  Yes     Priority: Low          Patient sitting up in bed breathing easy on room air.  She denies chest pain.  She is converted to normal sinus rhythm spontaneously.             Vital Sign Min/Max for last 24 hours  Temp  Min: 97.3 °F (36.3 °C)  Max: 98 °F (36.7 °C)   BP  Min: 132/108  Max: 153/78   Pulse  Min: 61  Max: 111   Resp  Min: 16  Max: 18   SpO2  Min: 96 %  Max: 100 %   Flow (L/min)  Min: 2  Max: 2      Intake/Output Summary (Last 24 hours) at 7/8/2021 0825  Last data filed at 7/7/2021 2145  Gross per 24 hour   Intake 8665.5 ml   Output 7050 ml   Net 1615.5 ml           Physical Exam  Cardiovascular:      Rate and Rhythm: Normal rate and regular  rhythm.      Heart sounds: Murmur heard.     Pulmonary:      Effort: Pulmonary effort is normal.      Breath sounds: Normal breath sounds.   Skin:     General: Skin is warm and dry.   Neurological:      Mental Status: She is alert.   Psychiatric:         Behavior: Behavior is cooperative.         Tele: Atrial fibrillation    Results Review (reviewed the patient's recent labs in the electronic medical record):     EKG (7/7/2021): Atrial fibrillation at 100 bpm    CXR (7/6/2021): Stable chronic changes without acute cardiopulmonary abnormality    ECHO (1/12/2021): LVEF 65%.  Mild AS and moderate AI.    Result Review:  I have personally reviewed the results from the time of this admission to 7/8/2021 08:25 EDT and agree with these findings:  [x]  Laboratory  []  Microbiology  [x]  Radiology  [x]  EKG/Telemetry   [x]  Cardiology/Vascular   []  Pathology  []  Old records  []  Other:  Most notable findings include: WBC 15.6, hemoglobin 8.8, hematocrit 29.6, sodium 133, creatinine 5.97, BUN 37, GFR 7    Results from last 7 days   Lab Units 07/08/21  0622 07/07/21  0741 07/06/21  1201   SODIUM mmol/L 133* 134* 141   POTASSIUM mmol/L 3.9 3.7 3.6   CHLORIDE mmol/L 97* 97* 102   BUN mg/dL 37* 41* 38*   CREATININE mg/dL 5.97* 6.62* 6.37*   MAGNESIUM mg/dL  --   --  2.3     Results from last 7 days   Lab Units 07/06/21  1738 07/06/21  1201   TROPONIN T ng/mL 0.048* 0.055*     Results from last 7 days   Lab Units 07/08/21  0622 07/07/21  0741 07/06/21  1201   WBC 10*3/mm3 15.60* 14.52* 15.22*   HEMOGLOBIN g/dL 8.8* 9.2* 10.4*   HEMATOCRIT % 29.6* 29.7* 33.6*   PLATELETS 10*3/mm3 216 218 242       Lab Results   Component Value Date    HGBA1C 4.90 05/19/2019       Lab Results   Component Value Date    CHOL 227 (H) 03/04/2019    TRIG 160 (H) 03/04/2019    HDL 52 03/04/2019     (H) 03/04/2019                 Paroxysmal atrial fibrillation  · Asymptomatic atrial fibrillation with relatively good rate control  · Pursue rate  control strategy with metoprolol  · Pharmacy to adjust warfarin to reach INR goal 2-3     Demand ischemia  · Flat troponin elevation in setting of ESRD without ACS symptom     Hypertension  · Consider addition of amlodipine for hypertension.  Defer to nephrology     Hyperlipidemia  · Defer statin therapy due to reported intolerance        Type 2 diabetes mellitus  · Management per hospitalist                · Rate control strategy for atrial fibrillation using metoprolol but currently in NSR  · Pharmacy to dose Coumadin with INR goal 2-3  · Please contact cardiology with any further questions.      Electronically signed by REY Galeana, 07/08/21, 8:25 AM EDT.

## 2021-07-08 NOTE — PROGRESS NOTES
[facility] Medicine Services  PROGRESS NOTE    Patient Name: Moni Wallace  : 1940  MRN: [id number]    Date of Admission: 2021  Primary Care Physician: Alex Walters MD    Subjective   Subjective     CC:  Weakness     HPI:  States she doesn't feel well but unable to tell me what feels bad. Some nausea. Denies pain. Denies SOA or CP. Denies abd pain. Thinks her SOA is improved.     ROS:  Gen- No fevers, chills  CV- No chest pain, palpitations  Resp- No cough, dyspnea  GI- No N/V/D, abd pain     Objective   Objective     Vital Signs:   Temp:  [97.3 °F (36.3 °C)-98 °F (36.7 °C)] 97.7 °F (36.5 °C)  Heart Rate:  [] 59  Resp:  [16-18] 18  BP: (132-174)/() 174/91        Physical Exam:  Constitutional: No acute distress, awake, alert; frail   HENT: NCAT, mucous membranes moist  Respiratory: Clear to auscultation bilaterally, respiratory effort normal   Cardiovascular: RRR   Gastrointestinal: Positive bowel sounds, soft, nontender, nondistended  Musculoskeletal: No bilateral ankle edema  Psychiatric: Appropriate affect, cooperative  Neurologic: Oriented x 3, strength symmetric in all extremities, Cranial Nerves grossly intact to confrontation, speech clear  Skin: No rashes    Results Reviewed:  Results from last 7 days   Lab Units 21  0621  0741 21  1201   WBC 10*3/mm3 15.60* 14.52* 15.22*   HEMOGLOBIN g/dL 8.8* 9.2* 10.4*   HEMATOCRIT % 29.6* 29.7* 33.6*   PLATELETS 10*3/mm3 216 218 242   INR  2.12* 1.74* 1.65*   PROCALCITONIN ng/mL  --  0.22 0.24     Results from last 7 days   Lab Units 21  0622 21  0741 21  1738 21  1201   SODIUM mmol/L 133* 134*  --  141   POTASSIUM mmol/L 3.9 3.7  --  3.6   CHLORIDE mmol/L 97* 97*  --  102   CO2 mmol/L 24.0 24.0  --  26.0   BUN mg/dL 37* 41*  --  38*   CREATININE mg/dL 5.97* 6.62*  --  6.37*   GLUCOSE mg/dL 98 130*  --  147*   CALCIUM mg/dL 7.6* 7.0*  7.4*  --  7.5*   ALT (SGPT) U/L  --    --   --  10   AST (SGOT) U/L  --   --   --  20   TROPONIN T ng/mL  --   --  0.048* 0.055*     Estimated Creatinine Clearance: 8.2 mL/min (A) (by C-G formula based on SCr of 5.97 mg/dL (H)).    Microbiology Results Abnormal     Procedure Component Value - Date/Time    Urine Culture - Urine, Urine, Catheter [[id number]]  (Normal) Collected: 07/06/21 1240    Lab Status: Preliminary result Specimen: Urine, Catheter Updated: 07/08/21 0920     Urine Culture Growth present, too young to evaluate    Body Fluid Culture - Body Fluid, Peritoneum [[id number]] Collected: 07/06/21 2159    Lab Status: Preliminary result Specimen: Body Fluid from Peritoneum Updated: 07/08/21 0642     Body Fluid Culture No growth     Gram Stain No WBCs seen      No organisms seen    Blood Culture - Blood, Arm, Right [[id number]]  (Abnormal) Collected: 07/06/21 1412    Lab Status: Final result Specimen: Blood from Arm, Right Updated: 07/08/21 0641     Blood Culture Staphylococcus, coagulase negative     Comment: Probable contaminant requires clinical correlation, susceptibility not performed unless requested by physician.          Isolated from Anaerobic Bottle     Gram Stain Anaerobic Bottle Gram positive cocci in pairs and clusters    Blood Culture - Blood, Wrist, Left [[id number]] Collected: 07/07/21 1335    Lab Status: Preliminary result Specimen: Blood from Wrist, Left Updated: 07/07/21 1432     Blood Culture Aerobic bottle only    Blood Culture - Blood, Arm, Left [[id number]] Collected: 07/07/21 1333    Lab Status: Preliminary result Specimen: Blood from Arm, Left Updated: 07/07/21 1432     Blood Culture Aerobic bottle only    Blood Culture - Blood, Arm, Left [[id number]] Collected: 07/06/21 1340    Lab Status: Preliminary result Specimen: Blood from Arm, Left Updated: 07/07/21 1430     Blood Culture No growth at 24 hours    Blood Culture ID, PCR - Blood, Arm, Right [[id number]]  (Abnormal) Collected: 07/06/21 1412    Lab Status: Final result  Specimen: Blood from Arm, Right Updated: 07/07/21 1024     BCID, PCR Staphylococcus spp, not aureus. Identification by BCID PCR.     BOTTLE TYPE Anaerobic Bottle    COVID PRE-OP / PRE-PROCEDURE SCREENING ORDER (NO ISOLATION) - Swab, Nasopharynx [[id number]]  (Normal) Collected: 07/06/21 2159    Lab Status: Final result Specimen: Swab from Nasopharynx Updated: 07/06/21 2311    Narrative:      The following orders were created for panel order COVID PRE-OP / PRE-PROCEDURE SCREENING ORDER (NO ISOLATION) - Swab, Nasopharynx.  Procedure                               Abnormality         Status                     ---------                               -----------         ------                     Respiratory Panel PCR w/...[[id number]]  Normal              Final result                 Please view results for these tests on the individual orders.    Respiratory Panel PCR w/COVID-19(SARS-CoV-2) FAHAD/NEDRA/ANDREI/PAD/COR/MAD/ILA In-House, NP Swab in UTM/VTM, 3-4 HR TAT - Swab, Nasopharynx [[id number]]  (Normal) Collected: 07/06/21 2159    Lab Status: Final result Specimen: Swab from Nasopharynx Updated: 07/06/21 2311     ADENOVIRUS, PCR Not Detected     Coronavirus 229E Not Detected     Coronavirus HKU1 Not Detected     Coronavirus NL63 Not Detected     Coronavirus OC43 Not Detected     COVID19 Not Detected     Human Metapneumovirus Not Detected     Human Rhinovirus/Enterovirus Not Detected     Influenza A PCR Not Detected     Influenza B PCR Not Detected     Parainfluenza Virus 1 Not Detected     Parainfluenza Virus 2 Not Detected     Parainfluenza Virus 3 Not Detected     Parainfluenza Virus 4 Not Detected     RSV, PCR Not Detected     Bordetella pertussis pcr Not Detected     Bordetella parapertussis PCR Not Detected     Chlamydophila pneumoniae PCR Not Detected     Mycoplasma pneumo by PCR Not Detected    Narrative:      In the setting of a positive respiratory panel with a viral infection PLUS a negative procalcitonin without  other underlying concern for bacterial infection, consider observing off antibiotics or discontinuation of antibiotics and continue supportive care. If the respiratory panel is positive for atypical bacterial infection (Bordetella pertussis, Chlamydophila pneumoniae, or Mycoplasma pneumoniae), consider antibiotic de-escalation to target atypical bacterial infection.          Imaging Results (Last 24 Hours)     ** No results found for the last 24 hours. **          Results for orders placed during the hospital encounter of 01/11/21    Transthoracic Echo Complete With Contrast if Necessary Per Protocol    Interpretation Summary  · Left ventricular systolic function is normal. Estimated left ventricular EF = 65%  · Left ventricular wall thickness is consistent with hypertrophy.  · Left atrial volume is mildly increased.  · The aortic valve is calcified. There is mild aortic stenosis present. There is moderate aortic regurgitation present.  · Estimated right ventricular systolic pressure from tricuspid regurgitation is normal (<35 mmHg).      I have reviewed the medications:  Scheduled Meds:cefTRIAXone, 1 g, Intravenous, Q24H  epoetin blanca/blanca-epbx, 20,000 Units, Subcutaneous, Weekly  escitalopram, 5 mg, Oral, QAM  insulin lispro, 0-7 Units, Subcutaneous, TID AC  metoprolol tartrate, 100 mg, Oral, Q12H  pantoprazole, 40 mg, Oral, QAM  sevelamer, 1,600 mg, Oral, TID With Meals  sodium chloride, 10 mL, Intravenous, Q12H  tacrolimus, 1 mg, Oral, Q12H  UltraBag/Dianeal PD-2/1.5% Dex (pappas #[id number]), 2,000 mL, Intraperitoneal, 5x Daily  warfarin, 4 mg, Oral, Daily      Continuous Infusions:Pharmacy to dose warfarin,       PRN Meds:.•  acetaminophen  •  dextrose  •  dextrose  •  glucagon (human recombinant)  •  ondansetron **OR** ondansetron  •  Pharmacy to dose warfarin  •  sodium chloride  •  sodium chloride  •  temazepam  •  UltraBag/Dianeal PD-2/1.5% Dex (pappas #[id number])    Assessment/Plan   Assessment & Plan     Active  "Hospital Problems    Diagnosis  POA   • **UTI (urinary tract infection) [N39.0]  Yes   • Demand ischemia (CMS/[facility]) [I24.8]  Yes   • Urinary tract infection, site not specified [N39.0]  Yes   • Morbidly obese (CMS/[facility]) [E66.01]  Yes   • Hyperlipidemia LDL goal <100 [E78.5]  Yes   • Hypothyroidism [E03.9]  Yes   • Essential hypertension [I10]  Yes   • Anemia of renal disease [N18.9, D63.1]  Yes   • Chronic kidney disease, stage IV (severe) (CMS/[facility]) [N18.4]  Yes   • Paroxysmal atrial fibrillation (CMS/[facility]) [I48.0]  Yes   • Type 2 diabetes mellitus (CMS/[facility]) [E11.9]  Yes      Resolved Hospital Problems   No resolved problems to display.        Brief Hospital Course to date:  IgA nephropathy s/p transplant, history of RUE AVF, PAF on coumadin history of septic knee arthritis s/p I&D, now on PD, with 5-6 days of \"not feeling\". She notes mild dyspnea worse with exertion.     Weakness/fatigue  Leukocytosis - chronically elevated over the last year   Acute UTI  - CT abd/pel - nonspecific potentially incidental finding of cystic structure along pancreatic body; ascites related to PD; no other acute findings  - fluid culture with no WBC or organisms seen; COVID and resp PCR negative   - UCx pending   - Continue rocephin (7/6)     + blood culture  - gram positive cocci in pairs and clusters  - Repeat cultures NGTD  - Vanc x 1 and will not discontinue      CKD, on PD, hx of IgA nephropathy, s/p renal transplant, chronic rejection  --consult [facility] for PD     Paroxysmal Afib  - Follows with Dr. Joe   - on coumadin - pharmacy to dose  - Unsure if vague symptoms and SOA related to being in afib. Cardiology consult; recommended rate control; patient has now converted to sinus      Elevated troponin - trending down   -- Although unclear significance in ESRD  --unremarkable EKG, and no chest pain     Obesity     DM  --SSI     Hx of HTN - stable   --on BB; continue      Pancreatic cystic mass, again noted on CT  --needs follow up " with MRI; patient doesn't think she can tolerate it without medication for anxiety   - Reviewed with patient and sons     DVT prophylaxis:  On Coumadin     DVT prophylaxis:  Medical and mechanical DVT prophylaxis orders are present.     CODE STATUS:   Code Status and Medical Interventions:   Ordered at: 07/06/21 1525     Code Status:    CPR     Medical Interventions (Level of Support Prior to Arrest):    Full       Nathalie Clement,   07/08/21

## 2021-07-08 NOTE — NURSING NOTE
PD rounds complete. Policy and procedure reviewed with AVILA Cooley.     Dialysate very slow to instill and drain. Fibrin noted. Dr. Will notified. May consider adding heparin to dialysate.     Orders and documentation reviewed. Supplies adequate. Denies further need at this time. Encouraged to call [phone] for assistance or for questions.

## 2021-07-08 NOTE — PROGRESS NOTES
"   LOS: 1 day    Patient Care Team:  Alex Walters MD as PCP - General (Internal Medicine)  Jeff Joe IV, MD as Consulting Physician (Cardiology)  Donny Hodges MD as Consulting Physician (Nephrology)  Cory Castillo MD as Surgeon (General Surgery)  Slim Wick MD    Chief Complaint:      Subjective     Interval History:     No acute events overnight.   Weakness and not feeling well.     Review of Systems:   No CP or SOA    Objective     Vital Sign Min/Max for last 24 hours  Temp  Min: 97.3 °F (36.3 °C)  Max: 98 °F (36.7 °C)   BP  Min: 132/108  Max: 174/91   Pulse  Min: 56  Max: 102   Resp  Min: 16  Max: 18   SpO2  Min: 92 %  Max: 100 %   Flow (L/min)  Min: 2  Max: 2   No data recorded     Flowsheet Rows      First Filed Value   Admission Height  162.6 cm (64\") Documented at 07/06/2021 1154   Admission Weight  77.1 kg (170 lb) Documented at 07/06/2021 1154          No intake/output data recorded.  I/O last 3 completed shifts:  In: 10869.5 [P.O.:600; I.V.:51690.5; IV Piggyback:2100]  Out: 23337 [Urine:250; Other:35786]    Physical Exam:    Gen: Alert,NAD   HENT: NC, AT, EOMI   NECK: Supple, no JVD, Trachea midline   LUNGS: CTA bilaterally, non labored respirtation   CVS: S1/S2 audible, RRR, no murmur   Abd: Soft, NT, ND, BS+   Ext: No pedal edema, no cyanosis   CNS: Alert, No focal deficit noted grossly  Psy: Cooperative        WBC WBC   Date Value Ref Range Status   07/08/2021 15.60 (H) 3.40 - 10.80 10*3/mm3 Final   07/07/2021 14.52 (H) 3.40 - 10.80 10*3/mm3 Final   07/06/2021 15.22 (H) 3.40 - 10.80 10*3/mm3 Final      HGB Hemoglobin   Date Value Ref Range Status   07/08/2021 8.8 (L) 12.0 - 15.9 g/dL Final   07/07/2021 9.2 (L) 12.0 - 15.9 g/dL Final   07/06/2021 10.4 (L) 12.0 - 15.9 g/dL Final      HCT Hematocrit   Date Value Ref Range Status   07/08/2021 29.6 (L) 34.0 - 46.6 % Final   07/07/2021 29.7 (L) 34.0 - 46.6 % Final   07/06/2021 33.6 (L) 34.0 - 46.6 % Final    "   Platlets No results found for: LABPLAT   MCV MCV   Date Value Ref Range Status   07/08/2021 103.9 (H) 79.0 - 97.0 fL Final   07/07/2021 102.4 (H) 79.0 - 97.0 fL Final   07/06/2021 100.0 (H) 79.0 - 97.0 fL Final          Sodium Sodium   Date Value Ref Range Status   07/08/2021 133 (L) 136 - 145 mmol/L Final   07/07/2021 134 (L) 136 - 145 mmol/L Final   07/06/2021 141 136 - 145 mmol/L Final      Potassium Potassium   Date Value Ref Range Status   07/08/2021 3.9 3.5 - 5.2 mmol/L Final   07/07/2021 3.7 3.5 - 5.2 mmol/L Final   07/06/2021 3.6 3.5 - 5.2 mmol/L Final     Comment:     Slight hemolysis detected by analyzer. Results may be affected.      Chloride Chloride   Date Value Ref Range Status   07/08/2021 97 (L) 98 - 107 mmol/L Final   07/07/2021 97 (L) 98 - 107 mmol/L Final   07/06/2021 102 98 - 107 mmol/L Final      CO2 CO2   Date Value Ref Range Status   07/08/2021 24.0 22.0 - 29.0 mmol/L Final   07/07/2021 24.0 22.0 - 29.0 mmol/L Final   07/06/2021 26.0 22.0 - 29.0 mmol/L Final      BUN BUN   Date Value Ref Range Status   07/08/2021 37 (H) 8 - 23 mg/dL Final   07/07/2021 41 (H) 8 - 23 mg/dL Final   07/06/2021 38 (H) 8 - 23 mg/dL Final      Creatinine Creatinine   Date Value Ref Range Status   07/08/2021 5.97 (H) 0.57 - 1.00 mg/dL Final   07/07/2021 6.62 (H) 0.57 - 1.00 mg/dL Final   07/06/2021 6.37 (H) 0.57 - 1.00 mg/dL Final      Calcium Calcium   Date Value Ref Range Status   07/08/2021 7.6 (L) 8.6 - 10.5 mg/dL Final   07/07/2021 7.4 (L) 8.6 - 10.5 mg/dL Final   07/07/2021 7.0 (L) 8.6 - 10.5 mg/dL Final   07/06/2021 7.5 (L) 8.6 - 10.5 mg/dL Final      PO4 No results found for: CAPO4   Albumin Albumin   Date Value Ref Range Status   07/07/2021 2.60 (L) 3.50 - 5.20 g/dL Final   07/06/2021 3.00 (L) 3.50 - 5.20 g/dL Final      Magnesium Magnesium   Date Value Ref Range Status   07/06/2021 2.3 1.6 - 2.4 mg/dL Final      Uric Acid No results found for: URICACID        Results Review:     I reviewed the patient's  new clinical results.    cefTRIAXone, 1 g, Intravenous, Q24H  epoetin blanca/blanca-epbx, 20,000 Units, Subcutaneous, Weekly  escitalopram, 5 mg, Oral, QAM  insulin lispro, 0-7 Units, Subcutaneous, TID AC  metoprolol tartrate, 100 mg, Oral, Q12H  pantoprazole, 40 mg, Oral, QAM  sevelamer, 1,600 mg, Oral, TID With Meals  sodium chloride, 10 mL, Intravenous, Q12H  tacrolimus, 1 mg, Oral, Q12H  UltraBag/Dianeal PD-2/1.5% Dex ([employer] #[id number]), 2,000 mL, Intraperitoneal, 5x Daily  warfarin, 4 mg, Oral, Daily      Pharmacy to dose warfarin,         Medication Review:     Assessment/Plan       UTI (urinary tract infection)    Paroxysmal atrial fibrillation (CMS/[facility])    Essential hypertension    Type 2 diabetes mellitus (CMS/[facility])    Chronic kidney disease, stage IV (severe) (CMS/[facility])    Anemia of renal disease    Hypothyroidism    Hyperlipidemia LDL goal <100    Morbidly obese (CMS/[facility])    Urinary tract infection, site not specified    Demand ischemia (CMS/[facility])    1.  Generalized fatigue and weakness accompanied with leukocytosis possible UTI patient has been started on Rocephin at this time.  2.  ESRD on peritoneal dialysis.  Primary disease IgA nephropathy, failed renal transplant.  .  3.  Atrial fibrillation on Coumadin.  4.  Morbid obesity.  5.  Diabetes type 2.  6.  Failed renal transplant: On tacrolimus at this time.  Continue with tacrolimus for now. Level pending.   7.  Anemia of chronic kidney disease.      Plan:  Continue immunosuppressive medication.  Continue with antibiotic.  LIDIA weekly.    Continue with PD    I discussed the patients findings and my recommendations with patient    Albina Will MD  07/08/21  16:37 EDT

## 2021-07-09 NOTE — SIGNIFICANT NOTE
Patient complained of new onset chest pain during rounds at shift change. Stated pain was in her left anterior chest, rated pain a 2/10, described it as dull, and stated it was intermittent. Stated she had no history of chest pain. RRT for CP called. Dr. Groves and SOLOMON Mathews at bedside. Please see new orders. STAT EKG and chest x-ray done along cardiac markers and with labs drawn. Ordered one time dose of Aspirin and Nitro gtt since patient's BP was elevated also. However after giving scheduled Metoprolol patient's SBP dropped to low 100s and chest pain had resolved so did not administer Nitro gtt. Called and spoke with SOLOMON Jimenez on-call for Dr. Joe who is following the patient and updated her on episode of chest pain and new orders. She did not give any further new orders, just stated monitor patient and call back with any changes. Patient denied any chest pain for the rest of the night. VSS.

## 2021-07-09 NOTE — SIGNIFICANT NOTE
Patient had a total of two critical troponins called from the lab overnight after chest pain episode. First troponin was 0.043, second troponin was 0.048. Reported both to SOLOMON Mathews no new orders given, pt asymptomatic, VSS.. Please check flowsheet charting.

## 2021-07-09 NOTE — NURSING NOTE
PD rounds complete.Spoke with AVILA Yost. Orders and documentation reviewed. Supplies adequate. Denies further need at this time. Encouraged to call [phone] for assistance or for questions.

## 2021-07-09 NOTE — PROGRESS NOTES
[facility] Medicine Services  PROGRESS NOTE    Patient Name: Moni Wallace  : 1940  MRN: [id number]    Date of Admission: 2021  Primary Care Physician: Alex Walters MD    Subjective   Subjective     CC:  Weakness     HPI:  States she feels better today. Some CP overnight which has resolved. Weakness is improved. Nausea is improved.     ROS:  Gen- No fevers, chills  CV- No chest pain, palpitations  Resp- No cough, dyspnea  GI- No N/V/D, abd pain    Objective   Objective     Vital Signs:   Temp:  [97.6 °F (36.4 °C)-97.8 °F (36.6 °C)] 97.8 °F (36.6 °C)  Heart Rate:  [55-72] 61  Resp:  [16-18] 16  BP: ()/(44-91) 142/67        Physical Exam:  Constitutional: No acute distress, awake, alert; frail   HENT: NCAT, mucous membranes moist  Respiratory: Clear to auscultation bilaterally, respiratory effort normal   Cardiovascular: RRR, no murmurs, rubs, or gallops  Gastrointestinal: Positive bowel sounds, soft, nontender, nondistended  Musculoskeletal: No bilateral ankle edema  Psychiatric: Appropriate affect, cooperative  Neurologic: Oriented x 3, strength symmetric in all extremities, Cranial Nerves grossly intact to confrontation, speech clear  Skin: No rashes    Results Reviewed:  Results from last 7 days   Lab Units 21  0741 21  1201   WBC 10*3/mm3 13.62* 15.87* 15.60* 14.52* 15.22*   HEMOGLOBIN g/dL 8.7* 9.3* 8.8* 9.2* 10.4*   HEMATOCRIT % 28.6* 31.0* 29.6* 29.7* 33.6*   PLATELETS 10*3/mm3 212 235 216 218 242   INR  2.34*  --  2.12* 1.74* 1.65*   PROCALCITONIN ng/mL  --   --   --  0.22 0.24     Results from last 7 days   Lab Units 21  0710 21  0008 2121  1738 21  1201 21  1201   SODIUM mmol/L 133*  --  128* 133*  --    < > 141   POTASSIUM mmol/L 3.5  --  3.7 3.9  --    < > 3.6   CHLORIDE mmol/L 95*  --  91* 97*  --    < > 102   CO2 mmol/L 26.0  --  25.0 24.0   --    < > 26.0   BUN mg/dL 34*  --  33* 37*  --    < > 38*   CREATININE mg/dL 6.30*  --  6.14* 5.97*  --    < > 6.37*   GLUCOSE mg/dL 90  --  143* 98  --    < > 147*   CALCIUM mg/dL 7.4*  --  8.0* 7.6*  --    < > 7.5*   ALT (SGPT) U/L  --   --  11  --   --   --  10   AST (SGOT) U/L  --   --  19  --   --   --  20   TROPONIN T ng/mL  --  0.048* 0.043*  --  0.048*  --  0.055*    < > = values in this interval not displayed.     Estimated Creatinine Clearance: 7.8 mL/min (A) (by C-G formula based on SCr of 6.3 mg/dL (H)).    Microbiology Results Abnormal     Procedure Component Value - Date/Time    Blood Culture - Blood, Wrist, Left [[id number]] Collected: 07/07/21 1335    Lab Status: Preliminary result Specimen: Blood from Wrist, Left Updated: 07/08/21 1430     Blood Culture No growth at 24 hours      Aerobic bottle only    Blood Culture - Blood, Arm, Left [[id number]] Collected: 07/07/21 1333    Lab Status: Preliminary result Specimen: Blood from Arm, Left Updated: 07/08/21 1430     Blood Culture No growth at 24 hours      Aerobic bottle only    Blood Culture - Blood, Arm, Left [[id number]] Collected: 07/06/21 1340    Lab Status: Preliminary result Specimen: Blood from Arm, Left Updated: 07/08/21 1430     Blood Culture No growth at 2 days    Urine Culture - Urine, Urine, Catheter [[id number]]  (Normal) Collected: 07/06/21 1240    Lab Status: Preliminary result Specimen: Urine, Catheter Updated: 07/08/21 0920     Urine Culture Growth present, too young to evaluate    Body Fluid Culture - Body Fluid, Peritoneum [[id number]] Collected: 07/06/21 2159    Lab Status: Preliminary result Specimen: Body Fluid from Peritoneum Updated: 07/08/21 0642     Body Fluid Culture No growth     Gram Stain No WBCs seen      No organisms seen    Blood Culture - Blood, Arm, Right [[id number]]  (Abnormal) Collected: 07/06/21 1412    Lab Status: Final result Specimen: Blood from Arm, Right Updated: 07/08/21 0641     Blood Culture Staphylococcus,  coagulase negative     Comment: Probable contaminant requires clinical correlation, susceptibility not performed unless requested by physician.          Isolated from Anaerobic Bottle     Gram Stain Anaerobic Bottle Gram positive cocci in pairs and clusters    Blood Culture ID, PCR - Blood, Arm, Right [[id number]]  (Abnormal) Collected: 07/06/21 1412    Lab Status: Final result Specimen: Blood from Arm, Right Updated: 07/07/21 1024     BCID, PCR Staphylococcus spp, not aureus. Identification by BCID PCR.     BOTTLE TYPE Anaerobic Bottle    COVID PRE-OP / PRE-PROCEDURE SCREENING ORDER (NO ISOLATION) - Swab, Nasopharynx [[id number]]  (Normal) Collected: 07/06/21 2159    Lab Status: Final result Specimen: Swab from Nasopharynx Updated: 07/06/21 2311    Narrative:      The following orders were created for panel order COVID PRE-OP / PRE-PROCEDURE SCREENING ORDER (NO ISOLATION) - Swab, Nasopharynx.  Procedure                               Abnormality         Status                     ---------                               -----------         ------                     Respiratory Panel PCR w/...[[id number]]  Normal              Final result                 Please view results for these tests on the individual orders.    Respiratory Panel PCR w/COVID-19(SARS-CoV-2) FAHAD/NEDRA/ANDREI/PAD/COR/MAD/ILA In-House, NP Swab in UTM/VTM, 3-4 HR TAT - Swab, Nasopharynx [[id number]]  (Normal) Collected: 07/06/21 2159    Lab Status: Final result Specimen: Swab from Nasopharynx Updated: 07/06/21 2311     ADENOVIRUS, PCR Not Detected     Coronavirus 229E Not Detected     Coronavirus HKU1 Not Detected     Coronavirus NL63 Not Detected     Coronavirus OC43 Not Detected     COVID19 Not Detected     Human Metapneumovirus Not Detected     Human Rhinovirus/Enterovirus Not Detected     Influenza A PCR Not Detected     Influenza B PCR Not Detected     Parainfluenza Virus 1 Not Detected     Parainfluenza Virus 2 Not Detected     Parainfluenza Virus 3  Not Detected     Parainfluenza Virus 4 Not Detected     RSV, PCR Not Detected     Bordetella pertussis pcr Not Detected     Bordetella parapertussis PCR Not Detected     Chlamydophila pneumoniae PCR Not Detected     Mycoplasma pneumo by PCR Not Detected    Narrative:      In the setting of a positive respiratory panel with a viral infection PLUS a negative procalcitonin without other underlying concern for bacterial infection, consider observing off antibiotics or discontinuation of antibiotics and continue supportive care. If the respiratory panel is positive for atypical bacterial infection (Bordetella pertussis, Chlamydophila pneumoniae, or Mycoplasma pneumoniae), consider antibiotic de-escalation to target atypical bacterial infection.          Imaging Results (Last 24 Hours)     Procedure Component Value Units Date/Time    XR Chest 1 View [[id number]] Collected: 07/08/21 2024     Updated: 07/08/21 2026    Narrative:      PROCEDURE: CR Chest 1 Vw    COMPARISON: 7/6/2021    INDICATIONS: new onset chest pain, started today; Urinary tract infection, site not specified; Elevated white blood cell count, unspecified; Other fatigue    TECHNIQUE: Single AP  view of the chest    FINDINGS:  Cardiomediastinal silhouette is enlarged. There is mild tortuosity of the aorta to the right. Mild pulmonary vascular congestion. No acute infiltrate. No pleural effusion or overt pulmonary edema. Osseous structures are intact.      Impression:      Mild pulmonary vascular congestion, correlate with fluid status.         Signer Name: Pippa Enriquez MD   Signed: 7/8/2021 8:24 PM   Workstation Name: [facility]    [facility]          Results for orders placed during the hospital encounter of 01/11/21    Transthoracic Echo Complete With Contrast if Necessary Per Protocol    Interpretation Summary  · Left ventricular systolic function is normal. Estimated left ventricular EF = 65%  · Left ventricular wall thickness  is consistent with hypertrophy.  · Left atrial volume is mildly increased.  · The aortic valve is calcified. There is mild aortic stenosis present. There is moderate aortic regurgitation present.  · Estimated right ventricular systolic pressure from tricuspid regurgitation is normal (<35 mmHg).      I have reviewed the medications:  Scheduled Meds:cefTRIAXone, 1 g, Intravenous, Q24H  epoetin blanca/blanca-epbx, 20,000 Units, Subcutaneous, Weekly  escitalopram, 5 mg, Oral, QAM  insulin lispro, 0-7 Units, Subcutaneous, TID AC  metoprolol tartrate, 100 mg, Oral, Q12H  pantoprazole, 40 mg, Oral, QAM  sevelamer, 1,600 mg, Oral, TID With Meals  sodium chloride, 10 mL, Intravenous, Q12H  tacrolimus, 1 mg, Oral, Q12H  UltraBag/Dianeal PD-2/1.5% Dex (pappas #[id number]), 2,000 mL, Intraperitoneal, 5x Daily  warfarin, 4 mg, Oral, Daily      Continuous Infusions:nitroglycerin, 5-200 mcg/min  Pharmacy to dose warfarin,       PRN Meds:.•  acetaminophen  •  dextrose  •  dextrose  •  glucagon (human recombinant)  •  ondansetron **OR** ondansetron  •  Pharmacy to dose warfarin  •  sodium chloride  •  sodium chloride  •  temazepam  •  UltraBag/Dianeal PD-2/1.5% Dex (pappas #[id number])    Assessment/Plan   Assessment & Plan     Active Hospital Problems    Diagnosis  POA   • **UTI (urinary tract infection) [N39.0]  Yes   • Demand ischemia (CMS/HCC) [I24.8]  Yes   • Urinary tract infection, site not specified [N39.0]  Yes   • Morbidly obese (CMS/HCC) [E66.01]  Yes   • Hyperlipidemia LDL goal <100 [E78.5]  Yes   • Hypothyroidism [E03.9]  Yes   • Essential hypertension [I10]  Yes   • Anemia of renal disease [N18.9, D63.1]  Yes   • Chronic kidney disease, stage IV (severe) (CMS/HCC) [N18.4]  Yes   • Paroxysmal atrial fibrillation (CMS/HCC) [I48.0]  Yes   • Type 2 diabetes mellitus (CMS/HCC) [E11.9]  Yes      Resolved Hospital Problems   No resolved problems to display.        Brief Hospital Course to date:  IgA nephropathy s/p transplant, history of  "EUGENE JESUS, PAF on coumadin history of septic knee arthritis s/p I&D, now on PD, with 5-6 days of \"not feeling well\". She notes mild dyspnea worse with exertion.     Weakness/fatigue  Leukocytosis - chronically elevated over the last year   Acute UTI  - CT abd/pel - nonspecific potentially incidental finding of cystic structure along pancreatic body; ascites related to PD; no other acute findings  - fluid culture with no WBC or organisms seen; COVID and resp PCR negative   - UCx growth present but too young to evaluate   - Continue rocephin (7/6)     + blood culture  - gram positive cocci in pairs and clusters  - Repeat cultures NGTD  - Vanc x 1 and will now discontinue      CKD, on PD, hx of IgA nephropathy, s/p renal transplant, chronic rejection  --consult [facility] for PD     Paroxysmal Afib  - Follows with Dr. Joe   - on coumadin - pharmacy to dose  - Unsure if vague symptoms and SOA related to being in afib. Cardiology consulted; recommended rate control; patient has now converted to sinus      Elevated troponin - trending down   Chest pain   -- Although unclear significance in ESRD  -- episode of CP 7/8; troponin flat; EKG reviewed with no acute changed; CXR with mild pulm congestion      Obesity     DM  --SSI     Hx of HTN - stable   --on BB; continue      Pancreatic cystic mass, again noted on CT  --needs follow up with MRI; patient doesn't think she can tolerate it without medication for anxiety   - Reviewed with patient and sons     DVT prophylaxis:  On Coumadin     Dispo: likely home in 1-2 days; PT/OT pending     CODE STATUS:   Code Status and Medical Interventions:   Ordered at: 07/06/21 1525     Code Status:    CPR     Medical Interventions (Level of Support Prior to Arrest):    Full       Nathalie Clement,   07/09/21              "

## 2021-07-09 NOTE — PROGRESS NOTES
"Pharmacy Consult  -  Warfarin    Moni Wallace is a  81 y.o. female   Height - 162.6 cm (64\")  Weight - 90.7 kg (200 lb)    Consulting Provider: Hospitalist  Indication: A fib  Goal INR: 2-3  Home Regimen:   - Warfarin 4 mg on Sunday, Wednesday, Thursday, Saturday   - Warfarin 6 mg on Monday, Tuesday, Friday    Bridge Therapy: None     Drug-Drug Interactions with current regimen:     No major drug-drug interactions noted              Warfarin Dosing During Admission:    Date  7/6 7/7 7/8 7/9        INR  1.65 1.74 2.12 2.34        Dose  5 mg 6 mg 4 mg (6 mg)           Education Provided: Patient follows with [facility], education previously provided by pharmacist. Will address any questions/concerns as needed.    Discharge Follow up:     Following Provider - [facility]     Follow up time range or appointment - Recommend repeat INR within 2-3 days of discharge    Labs:    Results from last 7 days   Lab Units 07/09/21 0710 07/08/21 1953 07/08/21 0622 07/07/21  0741 07/06/21  1201   INR  2.34*  --  2.12* 1.74* 1.65*   HEMOGLOBIN g/dL 8.7* 9.3* 8.8* 9.2* 10.4*   HEMATOCRIT % 28.6* 31.0* 29.6* 29.7* 33.6*     Results from last 7 days   Lab Units 07/09/21  0710 07/08/21  1953 07/08/21  0622 07/06/21  1201   SODIUM mmol/L 133* 128* 133* 141   POTASSIUM mmol/L 3.5 3.7 3.9 3.6   CHLORIDE mmol/L 95* 91* 97* 102   CO2 mmol/L 26.0 25.0 24.0 26.0   BUN mg/dL 34* 33* 37* 38*   CREATININE mg/dL 6.30* 6.14* 5.97* 6.37*   CALCIUM mg/dL 7.4* 8.0* 7.6* 7.5*   BILIRUBIN mg/dL  --  <0.2  --  <0.2   ALK PHOS U/L  --  167*  --  204*   ALT (SGPT) U/L  --  11  --  10   AST (SGOT) U/L  --  19  --  20   GLUCOSE mg/dL 90 143* 98 147*     Current dietary intake: 25% of meals documented 7/7    Diet Order   Procedures    Diet Regular; Consistent Carbohydrate     Assessment/Plan:    Patient's INR is therapeutic at 2.34 today.  Will continue patient's home dose and give warfarin 6 mg tonight.   Daily PT/INR " ordered.  Monitor signs/symptoms of bleeding, dietary intake, and drug-drug interactions. Make dose adjustments as necessary.  Pharmacy will continue to follow.    Katherin Tierney PharmD, BCPS  7/9/2021  13:41 EDT

## 2021-07-09 NOTE — CASE MANAGEMENT/SOCIAL WORK
Case Management Discharge Note      Final Note:    I met with Ms. Wallace today at the bedside to discuss discharge disposition. Ms. Wallace will be medically ready for discharge tomorrow. She denies the need for further DME in the home, nor is she anticipating the need for home health. Physical therapy and Occupational therapy recommend Ms. Wallace is safe for discharge home. Ms. Wallace is agreeable with this discharge plan. Her son will transport her home by car at the time of discharge tomorrow.            Transportation Services  Private: Car    Final Discharge Disposition Code: 01 - home or self-care

## 2021-07-09 NOTE — PROGRESS NOTES
Clinical Nutrition   Reason For Visit: Follow-up protocol    Patient Name: Moni Wallace  YOB: 1940  MRN: [id number]  Date of Encounter: 07/09/21 11:31 EDT  Admission date: 7/6/2021    Nutrition Assessment     Admission Problem List:    UTI (urinary tract infection)    Paroxysmal atrial fibrillation (CMS/HCC)    Essential hypertension    Type 2 diabetes mellitus (CMS/HCC)    Chronic kidney disease, stage IV (severe) (CMS/[facility])    Anemia of renal disease    Hypothyroidism    Hyperlipidemia LDL goal <100    Morbidly obese (CMS/[facility])    Urinary tract infection, site not specified    Demand ischemia (CMS/[facility])     Applicable Nutrition-Related Information:  - CKD on PD  - S/p kidney transplant (on immunosuppressant medication)    Applicable medical tests/procedures since admission:  7/6: Nephrology following; PD orders written.    PMH: She  has a past medical history of Adenomatous colon polyp, Chronic kidney disease, Clostridium difficile infection (06/2017), Diabetes mellitus (CMS/HCC), Gastric polyps, Hypothyroidism, IgA nephropathy, acute, Kidney transplanted, Macular degeneration, Paroxysmal atrial fibrillation (CMS/[facility]), Tubular adenoma, Ulcer of gastric fundus, and Vitamin D deficiency.   PSxH: She  has a past surgical history that includes Laparoscopic tubal ligation (1985); Transplantation renal (2010); Trigger finger release; Carpal tunnel release; Cataract extraction; Dialysis fistula creation; Upper gastrointestinal endoscopy (05/20/2013); Total knee arthroplasty; Carpal tunnel release (Right); Cataract extraction (Bilateral); Transplantation renal (Right); Diagnostic laparoscopy; Tubal ligation; Hernia repair (85 and 86); Total knee arthroplasty; Breast biopsy (Left, 1990'S); knee arthroscopy incision and drainage of knee (Right, 5/18/2019); and Venous Access Device (Port) (N/A, 5/23/2019).     Reported/Observed/Food/Nutrition Related History     7/9: RD spoke with pt this morning. Pt endorsed  "poor appetite and reported symptoms of N/V yesterday. Pt reported one episode of emesis yesterday. Pt stated appetite and symptoms are improved today and she ate some of breakfast. Pt not interested in ONS. RD encouraged intake and discussed always available menu options.    7/7: Visited pt this morning with family member at bedside. Pt endorsed poor PO intake several weeks PTA but unable to give clear timeline and estimate of % of usual intake. Family member reported pt has good days and bad days of PO intake. Pt drinks Premier Protein \"once in awhile\". Pt reported UBW of 186# and reported a few lbs of unintentional wt loss but unsure of amount. Pt reported symptoms of nausea today. Pt not interested in ONS at this time. Pt reported rice food allergy with symptoms of mouth and tongue swelling. Pt does not have any food preferences.    Anthropometrics   Height: 64 in  Weight: 170 lbs (stated wt on 7/6)  BMI: 29.18  BMI classification: Overweight: 25.0-29.9kg/m2    IBW: 120 lbs  UBW: Per pt ~186 lbs. Per EMR, appears to be ~200 lbs from 3194-5774.  Weight change: Wt 200-212 lbs over last 6 months - relatively stable.    Weight Weight (kg) Weight (lbs) Weight Method   7/7/2021 90.719 kg 200 lb Bed scale   7/6/2021 77.111 kg 170 lb Stated   1/15/2021 95.255 kg 210 lb Bed scale   1/14/2021 96.253 kg 212 lb 3.2 oz Bed scale   1/13/2021 94.802 kg 209 lb Bed scale   1/12/2021 94.348 kg 208 lb -   1/12/2021 94.394 kg 208 lb 1.6 oz Bed scale   1/12/2021 94.348 kg 208 lb Bed scale   1/11/2021 86.183 kg 190 lb Stated   12/8/2020 90.447 kg 199 lb 6.4 oz -   7/21/2020 92.987 kg 205 lb -   1/27/2020 89.903 kg 198 lb 3.2 oz -   11/22/2019 87.091 kg 192 lb -   11/5/2019 87.998 kg 194 lb -   7/22/2019 84.369 kg 186 lb -     Nutrition Focused Physical Exam     No subcutaneous fat or muscle depletion observed.    Labs reviewed   Labs reviewed: Yes    Results from last 7 days   Lab Units 07/09/21  0710 07/08/21  1953 07/08/21  0622 " 07/07/21  0741 07/06/21  1201   SODIUM mmol/L 133* 128* 133* 134* 141   POTASSIUM mmol/L 3.5 3.7 3.9 3.7 3.6   CHLORIDE mmol/L 95* 91* 97* 97* 102   CO2 mmol/L 26.0 25.0 24.0 24.0 26.0   BUN mg/dL 34* 33* 37* 41* 38*   CREATININE mg/dL 6.30* 6.14* 5.97* 6.62* 6.37*   GLUCOSE mg/dL 90 143* 98 130* 147*   CALCIUM mg/dL 7.4* 8.0* 7.6* 7.0*  7.4* 7.5*   PHOSPHORUS mg/dL  --   --   --  4.3  --    MAGNESIUM mg/dL  --   --   --   --  2.3     Results from last 7 days   Lab Units 07/09/21  0710 07/08/21  1953 07/08/21  0622 07/07/21  0741 07/06/21  1201   WBC 10*3/mm3 13.62* 15.87* 15.60* 14.52* 15.22*   ALBUMIN g/dL  --  2.60*  --  2.60* 3.00*     Results from last 7 days   Lab Units 07/09/21  0728 07/08/21  1943 07/08/21  1615 07/08/21  1141 07/08/21  0719 07/07/21  1938   GLUCOSE mg/dL 89 143* 172* 162* 118 168*     Lab Results   Lab Value Date/Time    HGBA1C 4.90 05/19/2019 0543    HGBA1C 5.10 03/04/2019 1020     Medications reviewed   Medications reviewed: Yes  Pertinent: insulin, lopressor, protonix, warfarin, sevelamer, tacrolimus    Current Nutrition Prescription   PO: Diet Regular; Consistent Carbohydrate    Average PO intake: 17% / 3 meals documented    Nutrition Diagnosis     7/7: Updated: 7/9  Problem Inadequate oral intake   Etiology Poor appetite; nausea   Signs/Symptoms 17% average PO intake x 3 meals documented   Status: ongoing    Intervention   Intervention: Follow treatment progress, Care plan reviewed, Encourage intake, Supplement offered/refused     Goal:   General: Nutrition support treatment  PO: Increase intake     Additional goals:   - Elytes WNL - not met, continue    Monitoring/Evaluation:   Monitoring/Evaluation: Per protocol, I&O, PO intake, Pertinent labs, Weight, Skin status, GI status, Symptoms    Oz Miranda, FRANCISCO  Time Spent: 20 min

## 2021-07-09 NOTE — PLAN OF CARE
Goal Outcome Evaluation:  Plan of Care Reviewed With: patient, son           Outcome Summary: OT evaluation only this date.  Pt demonstrates ability to stand at walker during static tasks and is able to take a few steps in order to transfer to chair.  She is supervision to cga for mobility and required dependent care for toileting with incontinent bowel movement.  She completed lbd with setup as well as grooming.  Pt states she is at baseline and declined HHOT services.  No further OT services indicated at this time.

## 2021-07-09 NOTE — PLAN OF CARE
Goal Outcome Evaluation:  Plan of Care Reviewed With: patient, son           Outcome Summary: PT PRESENTS AT BASELINE WITH FUNCTIONAL MOBILITY THEREFORE GOALS WERE NOT ESTABLISHED. PT WAS INDEPENDENT WITH SUPINE TO SIT EOB AND SBA/SUPERVISION FOR TRANSFERS DUE TO PD LINES. PT IS NON-AMBULATORY AT BASELINE DUE TO CHRONIC R KNEE PAIN AND USES A W/C.  PT IS A &O, FOLLOWING ALL COMMANDS. RECOMMEND D/C HOME WITH FAMILY ASSIST AS BEFORE. PT/SON ARE IN AGREEMENT. D/C P.T.

## 2021-07-09 NOTE — PROGRESS NOTES
"   LOS: 2 days    Patient Care Team:  Alex Walters MD as PCP - General (Internal Medicine)  Jeff Joe IV, MD as Consulting Physician (Cardiology)  Donny Hodges MD as Consulting Physician (Nephrology)  Cory Castillo MD as Surgeon (General Surgery)  Slim Wick MD    Chief Complaint:    ESRD on peritoneal dialysis  Subjective     Interval History:   No new events no added distress feeling much better.    Review of Systems:   Patient denies shortness of breath, chest pain, dysuria, hematuria, nausea, vomiting.      Objective     Vital Sign Min/Max for last 24 hours  Temp  Min: 97.7 °F (36.5 °C)  Max: 98.1 °F (36.7 °C)   BP  Min: 94/44  Max: 180/77   Pulse  Min: 55  Max: 72   Resp  Min: 16  Max: 18   SpO2  Min: 93 %  Max: 100 %   Flow (L/min)  Min: 2  Max: 2   No data recorded     Flowsheet Rows      First Filed Value   Admission Height  162.6 cm (64\") Documented at 07/06/2021 1154   Admission Weight  77.1 kg (170 lb) Documented at 07/06/2021 1154          I/O this shift:  In: 2000 [Other:2000]  Out: 2500 [Other:2500]  I/O last 3 completed shifts:  In: 44893 [I.V.:8900; Other:4000]  Out: 21114 [Urine:250; Other:21755]    Physical Exam:  General Appearance: Alert, oriented, no obvious distress.  Eyes: PER, EOMI.  Neck: Supple no JVD.  Lungs: Clear auscultation, no rales rhonchi's, equal chest movement, nonlabored.  Heart: No gallop, murmur, rub, RRR.  Abdomen: Tenckhoff catheter in place soft, nontender, positive bowel sounds, no organomegaly.  Extremities: No edema, no cyanosis.  Neuro: No focal deficit, moving all extremities, alert oriented X 3  Skin is warm and dry        WBC WBC   Date Value Ref Range Status   07/09/2021 13.62 (H) 3.40 - 10.80 10*3/mm3 Final   07/08/2021 15.87 (H) 3.40 - 10.80 10*3/mm3 Final   07/08/2021 15.60 (H) 3.40 - 10.80 10*3/mm3 Final   07/07/2021 14.52 (H) 3.40 - 10.80 10*3/mm3 Final      HGB Hemoglobin   Date Value Ref Range Status   07/09/2021 " 8.7 (L) 12.0 - 15.9 g/dL Final   07/08/2021 9.3 (L) 12.0 - 15.9 g/dL Final   07/08/2021 8.8 (L) 12.0 - 15.9 g/dL Final   07/07/2021 9.2 (L) 12.0 - 15.9 g/dL Final      HCT Hematocrit   Date Value Ref Range Status   07/09/2021 28.6 (L) 34.0 - 46.6 % Final   07/08/2021 31.0 (L) 34.0 - 46.6 % Final   07/08/2021 29.6 (L) 34.0 - 46.6 % Final   07/07/2021 29.7 (L) 34.0 - 46.6 % Final      Platlets No results found for: LABPLAT   MCV MCV   Date Value Ref Range Status   07/09/2021 101.8 (H) 79.0 - 97.0 fL Final   07/08/2021 103.0 (H) 79.0 - 97.0 fL Final   07/08/2021 103.9 (H) 79.0 - 97.0 fL Final   07/07/2021 102.4 (H) 79.0 - 97.0 fL Final          Sodium Sodium   Date Value Ref Range Status   07/09/2021 133 (L) 136 - 145 mmol/L Final   07/08/2021 128 (L) 136 - 145 mmol/L Final   07/08/2021 133 (L) 136 - 145 mmol/L Final   07/07/2021 134 (L) 136 - 145 mmol/L Final      Potassium Potassium   Date Value Ref Range Status   07/09/2021 3.5 3.5 - 5.2 mmol/L Final   07/08/2021 3.7 3.5 - 5.2 mmol/L Final   07/08/2021 3.9 3.5 - 5.2 mmol/L Final   07/07/2021 3.7 3.5 - 5.2 mmol/L Final      Chloride Chloride   Date Value Ref Range Status   07/09/2021 95 (L) 98 - 107 mmol/L Final   07/08/2021 91 (L) 98 - 107 mmol/L Final   07/08/2021 97 (L) 98 - 107 mmol/L Final   07/07/2021 97 (L) 98 - 107 mmol/L Final      CO2 CO2   Date Value Ref Range Status   07/09/2021 26.0 22.0 - 29.0 mmol/L Final   07/08/2021 25.0 22.0 - 29.0 mmol/L Final   07/08/2021 24.0 22.0 - 29.0 mmol/L Final   07/07/2021 24.0 22.0 - 29.0 mmol/L Final      BUN BUN   Date Value Ref Range Status   07/09/2021 34 (H) 8 - 23 mg/dL Final   07/08/2021 33 (H) 8 - 23 mg/dL Final   07/08/2021 37 (H) 8 - 23 mg/dL Final   07/07/2021 41 (H) 8 - 23 mg/dL Final      Creatinine Creatinine   Date Value Ref Range Status   07/09/2021 6.30 (H) 0.57 - 1.00 mg/dL Final   07/08/2021 6.14 (H) 0.57 - 1.00 mg/dL Final   07/08/2021 5.97 (H) 0.57 - 1.00 mg/dL Final   07/07/2021 6.62 (H) 0.57 - 1.00  mg/dL Final      Calcium Calcium   Date Value Ref Range Status   07/09/2021 7.4 (L) 8.6 - 10.5 mg/dL Final   07/08/2021 8.0 (L) 8.6 - 10.5 mg/dL Final   07/08/2021 7.6 (L) 8.6 - 10.5 mg/dL Final   07/07/2021 7.4 (L) 8.6 - 10.5 mg/dL Final   07/07/2021 7.0 (L) 8.6 - 10.5 mg/dL Final      PO4 No results found for: CAPO4   Albumin Albumin   Date Value Ref Range Status   07/08/2021 2.60 (L) 3.50 - 5.20 g/dL Final   07/07/2021 2.60 (L) 3.50 - 5.20 g/dL Final      Magnesium No results found for: MG   Uric Acid No results found for: URICACID        Results Review:     I reviewed the patient's new clinical results.    cefTRIAXone, 1 g, Intravenous, Q24H  epoetin blanca/blanca-epbx, 20,000 Units, Subcutaneous, Weekly  escitalopram, 5 mg, Oral, QAM  insulin lispro, 0-7 Units, Subcutaneous, TID AC  metoprolol tartrate, 100 mg, Oral, Q12H  pantoprazole, 40 mg, Oral, QAM  sevelamer, 1,600 mg, Oral, TID With Meals  sodium chloride, 10 mL, Intravenous, Q12H  UltraBag/Dianeal PD-2/1.5% Dex (pappas #[id number]), 2,000 mL, Intraperitoneal, 5x Daily  warfarin, 6 mg, Oral, Daily      nitroglycerin, 5-200 mcg/min  Pharmacy to dose warfarin,         Medication Review:     Assessment/Plan       UTI (urinary tract infection)    Paroxysmal atrial fibrillation (CMS/HCC)    Essential hypertension    Type 2 diabetes mellitus (CMS/HCC)    Chronic kidney disease, stage IV (severe) (CMS/HCC)    Anemia of renal disease    Hypothyroidism    Hyperlipidemia LDL goal <100    Morbidly obese (CMS/HCC)    Urinary tract infection, site not specified    Demand ischemia (CMS/HCC)    1.  Generalized fatigue and weakness accompanied with leukocytosis possible UTI patient has been started on Rocephin at this time.  2.  ESRD on peritoneal dialysis.  Primary disease IgA nephropathy, failed renal transplant.  .  3.  Atrial fibrillation on Coumadin.  4.  Morbid obesity.  5.  Diabetes type 2.  6.  Failed renal transplant: On tacrolimus at this time.  Continue with  tacrolimus for now. Level pending.   7.  Anemia of chronic kidney disease.      Plan:  1 dose of potassium ordered for today check labs in the morning.  Continue immunosuppressive medication.  Continue with antibiotic.  LIDIA weekly.    Continue with PD    I discussed the patients findings and my recommendations with patient and the RN    Allen Chambers MD  07/09/21  15:29 EDT

## 2021-07-09 NOTE — THERAPY DISCHARGE NOTE
Acute Care - Occupational Therapy Discharge  [facility]    Patient Name: Moni Wallace  : 1940    MRN: [id number]                              Today's Date: 2021       Admit Date: 2021    Visit Dx:     ICD-10-CM ICD-9-CM   1. Urinary tract infection without hematuria, site unspecified  N39.0 599.0   2. Leukocytosis, unspecified type  D72.829 288.60   3. Fatigue, unspecified type  R53.83 780.79     Patient Active Problem List   Diagnosis   • Paroxysmal atrial fibrillation (CMS/HCC)   • Essential hypertension   • Type 2 diabetes mellitus (CMS/HCC)   • Chronic kidney disease, stage IV (severe) (CMS/HCC)   • Anemia of renal disease   • Hyperparathyroidism (CMS/HCC)   • Asthma   • Right-sided carotid artery disease (CMS/HCC)   • High output HF (heart failure) (CMS/HCC)   • Vitamin D deficiency   • Leukocytosis   • Nonrheumatic aortic valve stenosis   • Ulcer of gastric fundus   • Tubular adenoma   • Macular degeneration   • Hypothyroidism   • Chronic kidney disease   • Screen for colon cancer   • Postoperative anemia due to acute blood loss   • Hyperlipidemia LDL goal <100   • Morbidly obese (CMS/HCC)   • Atypical pneumonia   • Acute UTI (urinary tract infection)   • UTI (urinary tract infection)   • Urinary tract infection, site not specified   • Demand ischemia (CMS/HCC)     Past Medical History:   Diagnosis Date   • Adenomatous colon polyp    • Chronic kidney disease    • Clostridium difficile infection 2017   • Diabetes mellitus (CMS/HCC)    • Gastric polyps    • Hypothyroidism    • IgA nephropathy, acute    • Kidney transplanted    • Macular degeneration    • Paroxysmal atrial fibrillation (CMS/HCC)    • Tubular adenoma     excision    • Ulcer of gastric fundus    • Vitamin D deficiency      Past Surgical History:   Procedure Laterality Date   • BREAST BIOPSY Left    • CARPAL TUNNEL RELEASE     • CARPAL TUNNEL RELEASE Right    • CATARACT EXTRACTION     • CATARACT EXTRACTION Bilateral    •  DIAGNOSTIC LAPAROSCOPY     • DIALYSIS FISTULA CREATION     • HERNIA REPAIR  85 and 86   • KNEE ARTHROSCOPY INCISION AND DRAINAGE OF KNEE Right 5/18/2019    Procedure: KNEE ARTHROSCOPY INCISION AND DRAINAGE OF KNEE;  Surgeon: Paco Lester MD;  Location:  [facility];  Service: Orthopedics   • LAPAROSCOPIC TUBAL LIGATION  1985   • TOTAL KNEE ARTHROPLASTY     • TOTAL KNEE ARTHROPLASTY      Left knee    • TRANSPLANTATION RENAL  2010   • TRANSPLANTATION RENAL Right    • TRIGGER FINGER RELEASE     • TUBAL ABDOMINAL LIGATION     • UPPER GASTROINTESTINAL ENDOSCOPY  05/20/2013   • VENOUS ACCESS DEVICE (PORT) INSERTION N/A 5/23/2019    Procedure: INSERTION GROSHONG;  Surgeon: Rolando Begum MD;  Location:  [facility];  Service: General     General Information     [facility] Name 07/09/21 1136          OT Time and Intention    Document Type  discharge evaluation/summary  -     Mode of Treatment  occupational therapy  -[facility] Name 07/09/21 1136          General Information    Patient Profile Reviewed  yes  -     Prior Level of Function  independent:;transfer;bed mobility;w/c or scooter;min assist:;ADL's;mod assist:;bathing;dependent:;home management  -     Existing Precautions/Restrictions  other (see comments) PD, PAINFUL R KNEE.  -     Barriers to Rehab  medically complex;previous functional deficit  -     Row Name 07/09/21 1136          Occupational Profile    Environmental Supports and Barriers (Occupational Profile)  Pt has a shower bench and granddaughter assists with showers.  -     Row Name 07/09/21 1136          Living Environment    Lives With  child(chavo), adult  -     Row Name 07/09/21 1136          Home Main Entrance    Number of Stairs, Main Entrance  none  -     Row Name 07/09/21 1136          Stairs Within Home, Primary    Stair Railings, Within Home, Primary  none  -     Row Name 07/09/21 1136          Cognition    Orientation Status (Cognition)  oriented x 4  -     Row Name  "07/09/21 1136          Safety Issues, Functional Mobility    Impairments Affecting Function (Mobility)  endurance/activity tolerance;pain;strength NON-AMBULATORY AT BASELINE DYE TI  R KNEE \"BONE ON BONE\" AND PAINFUL, ON PD.  -       User Key  (r) = Recorded By, (t) = Taken By, (c) = Cosigned By    Initials Name Provider Type     Jaky Millan OT Occupational Therapist        Mobility/ADL's     Row Name 07/09/21 1136          Bed Mobility    Bed Mobility  supine-sit  -SW     Supine-Sit [place] (Bed Mobility)  independent  -     Assistive Device (Bed Mobility)  head of bed elevated  -     Row Name 07/09/21 1136          Transfers    Transfers  bed-chair transfer  -     Bed-Chair [place] (Transfers)  standby assist  -     Assistive Device (Bed-Chair Transfers)  walker, front-wheeled  -     Sit-Stand [place] (Transfers)  supervision  -     Row Name 07/09/21 1136          Functional Mobility    Functional Mobility- Ind. Level  contact guard assist  -     Functional Mobility-Distance (Feet)  4  -     Row Name 07/09/21 1136          Activities of Daily Living    BADL Assessment/Intervention  toileting;grooming;lower body dressing  -     Row Name 07/09/21 1136          Toileting Assessment/Training    [place] Level (Toileting)  toileting skills;change pad/brief;perform perineal hygiene;dependent (less than 25% patient effort)  -SW     Position (Toileting)  supported standing  -SW     Comment (Toileting)  bowel incontinence  -     Row Name 07/09/21 1136          Grooming Assessment/Training    [place] Level (Grooming)  grooming skills;wash face, hands;set up  -SW     Position (Grooming)  supported sitting  -     Row Name 07/09/21 1136          Lower Body Dressing Assessment/Training    [place] Level (Lower Body Dressing)  lower body dressing skills;socks;set up  -SW     Position (Lower Body Dressing)  supported sitting  -       User Key  (r) = Recorded By, (t) = Taken " By, (c) = Cosigned By    Initials Name Provider Type    Jaky Rausch OT Occupational Therapist        Obj/Interventions     Row Name 07/09/21 1136          Sensory Assessment (Somatosensory)    Sensory Assessment (Somatosensory)  UE sensation intact  -SW     Row Name 07/09/21 1136          Vision Assessment/Intervention    Visual Impairment/Limitations  WFL;corrective lenses full-time  -SW     Row Name 07/09/21 1136          Range of Motion Comprehensive    General Range of Motion  upper extremity range of motion deficits identified  -     Comment, General Range of Motion  RUE WFL, LUE limited shoulder flexion d/t arthritis.  -[facility] Name 07/09/21 1136          Strength Comprehensive (MMT)    Comment, General Manual Muscle Testing (MMT) Assessment  RUE 4+/5  -SW     Row Name 07/09/21 1136          Balance    Balance Assessment  sitting static balance;sitting dynamic balance;sit to stand dynamic balance;standing static balance;standing dynamic balance  -     Static Sitting Balance  WNL;unsupported;sitting, edge of bed  -SW     Dynamic Sitting Balance  WNL;unsupported;sitting, edge of bed  -SW     Sit to Stand Dynamic Balance  WFL  -SW     Static Standing Balance  WFL;supported;standing  -SW     Dynamic Standing Balance  WFL;supported;standing  -SW     Balance Interventions  sitting;standing;sit to stand;supported;static;dynamic;minimal challenge;occupation based/functional task  -       User Key  (r) = Recorded By, (t) = Taken By, (c) = Cosigned By    Initials Name Provider Type    Jaky Rausch OT Occupational Therapist        Goals/Plan     Row Name 07/09/21 1136          Bed Mobility Goal 1 (OT)    Activity/Assistive Device (Bed Mobility Goal 1, OT)  bed mobility activities, all  -SW     [place] Level/Cues Needed (Bed Mobility Goal 1, OT)  independent  -     Time Frame (Bed Mobility Goal 1, OT)  long term goal (LTG);by discharge  -     Progress/Outcomes (Bed Mobility Goal 1, OT)  goal met   -SW     Row Name 07/09/21 1136          Transfer Goal 1 (OT)    Activity/Assistive Device (Transfer Goal 1, OT)  transfers, all  -     [place] Level/Cues Needed (Transfer Goal 1, OT)  contact guard assist  -     Time Frame (Transfer Goal 1, OT)  long term goal (LTG);by discharge  -     Progress/Outcome (Transfer Goal 1, OT)  goal met  -SW     Row Name 07/09/21 1136          Grooming Goal 1 (OT)    Activity/Device (Grooming Goal 1, OT)  grooming skills, all;wash face, hands  -     [place] (Grooming Goal 1, OT)  set-up required  -     Time Frame (Grooming Goal 1, OT)  long term goal (LTG);by discharge  -     Progress/Outcome (Grooming Goal 1, OT)  goal met  -SW     Row Name 07/09/21 1136          Therapy Assessment/Plan (OT)    Planned Therapy Interventions (OT)  activity tolerance training;patient/caregiver education/training  -       User Key  (r) = Recorded By, (t) = Taken By, (c) = Cosigned By    Initials Name Provider Type    Jaky Rausch, OT Occupational Therapist        Clinical Impression     Row Name 07/09/21 1136          Pain Assessment    Additional Documentation  Pain Scale: Numbers Pre/Post-Treatment (Group)  -SW     Row Name 07/09/21 1136          Pain Scale: Numbers Pre/Post-Treatment    Pretreatment Pain Rating  0/10 - no pain  -     Posttreatment Pain Rating  0/10 - no pain  -SW     Row Name 07/09/21 1136          Plan of Care Review    Plan of Care Reviewed With  patient;son  -     Outcome Summary  OT evaluation only this date.  Pt demonstrates ability to stand at walker during static tasks and is able to take a few steps in order to transfer to chair.  She is supervision to [facility] for mobility and required dependent care for toileting with incontinent bowel movement.  She completed lbd with setup as well as grooming.  Pt states she is at baseline and declined HHOT services.  No further OT services indicated at this time.  -SW     Row Name 07/09/21 1136          Therapy  Assessment/Plan (OT)    Criteria for Skilled Therapeutic Interventions Met (OT)  no problems identified which require skilled intervention  -     Therapy Frequency (OT)  evaluation only  -[facility] Name 07/09/21 1136          Therapy Plan Review/Discharge Plan (OT)    Anticipated Discharge Disposition (OT)  home with assist  -[facility] Name 07/09/21 1136          Vital Signs    Pre Systolic BP Rehab  142  -SW     Pre Treatment Diastolic BP  67  -SW     Pretreatment Heart Rate (beats/min)  61  -SW     Pre SpO2 (%)  91  -SW     O2 Delivery Pre Treatment  room air  -SW     O2 Delivery Intra Treatment  room air  -SW     O2 Delivery Post Treatment  room air  -SW     Intra Patient Position  Standing  -SW     Post Patient Position  Sitting  -[facility] Name 07/09/21 1136          Positioning and Restraints    Pre-Treatment Position  in bed  -SW     Post Treatment Position  chair  -SW     In Chair  notified nsg;reclined;call light within reach;encouraged to call for assist;sitting;exit alarm on;with family/caregiver;waffle cushion;legs elevated  -SW       User Key  (r) = Recorded By, (t) = Taken By, (c) = Cosigned By    Initials Name Provider Type    Jaky Rausch, OT Occupational Therapist        Outcome Measures     [facility] Name 07/09/21 1354          How much help from another is currently needed...    Putting on and taking off regular lower body clothing?  3  -SW     Bathing (including washing, rinsing, and drying)  3  -SW     Toileting (which includes using toilet bed pan or urinal)  2  -SW     Putting on and taking off regular upper body clothing  3  -SW     Taking care of personal grooming (such as brushing teeth)  4  -SW     Eating meals  4  -SW     AM-PAC 6 Clicks Score (OT)  19  -SW     [facility] Name 07/09/21 1314          How much help from another person do you currently need...    Turning from your back to your side while in flat bed without using bedrails?  4  -CD     Moving from lying on back to sitting on the  side of a flat bed without bedrails?  4  -CD     Moving to and from a bed to a chair (including a wheelchair)?  4  -CD     Standing up from a chair using your arms (e.g., wheelchair, bedside chair)?  4  -CD     Climbing 3-5 steps with a railing?  1  -CD     To walk in hospital room?  2  -CD     AM-PAC 6 Clicks Score (PT)  19  -CD     Row Name 07/09/21 1354          Functional Assessment    Outcome Measure Options  AM-PAC 6 Clicks Daily Activity (OT)  -       User Key  (r) = Recorded By, (t) = Taken By, (c) = Cosigned By    Initials Name Provider Type    CD Tonya Palencia, PT Physical Therapist    Jaky Rausch OT Occupational Therapist        Occupational Therapy Education                 Title: PT OT SLP Therapies (In Progress)     Topic: Occupational Therapy (In Progress)     Point: ADL training (Done)     Description:   Instruct learner(s) on proper safety adaptation and remediation techniques during self care or transfers.   Instruct in proper use of assistive devices.              Learning Progress Summary           Patient Acceptance, E, VU by  at 7/9/2021 1356                   Point: Home exercise program (Not Started)     Description:   Instruct learner(s) on appropriate technique for monitoring, assisting and/or progressing therapeutic exercises/activities.              Learner Progress:  Not documented in this visit.          Point: Precautions (Not Started)     Description:   Instruct learner(s) on prescribed precautions during self-care and functional transfers.              Learner Progress:  Not documented in this visit.          Point: Body mechanics (Not Started)     Description:   Instruct learner(s) on proper positioning and spine alignment during self-care, functional mobility activities and/or exercises.              Learner Progress:  Not documented in this visit.                      User Key     Initials Effective Dates Name Provider Type Discipline     06/16/21 -  Jaky Millan OT  Occupational Therapist OT              OT Recommendation and Plan  Retired Outcome Summary/Treatment Plan (OT)  Anticipated Discharge Disposition (OT): home with assist  Planned Therapy Interventions (OT): activity tolerance training, patient/caregiver education/training  Therapy Frequency (OT): evaluation only  Plan of Care Review  Plan of Care Reviewed With: patient, son  Outcome Summary: OT evaluation only this date.  Pt demonstrates ability to stand at walker during static tasks and is able to take a few steps in order to transfer to chair.  She is supervision to [facility] for mobility and required dependent care for toileting with incontinent bowel movement.  She completed lbd with setup as well as grooming.  Pt states she is at baseline and declined HHOT services.  No further OT services indicated at this time.  Plan of Care Reviewed With: patient, son  Outcome Summary: OT evaluation only this date.  Pt demonstrates ability to stand at walker during static tasks and is able to take a few steps in order to transfer to chair.  She is supervision to [facility] for mobility and required dependent care for toileting with incontinent bowel movement.  She completed lbd with setup as well as grooming.  Pt states she is at baseline and declined HHOT services.  No further OT services indicated at this time.     Time Calculation:   Time Calculation- OT     Row Name 07/09/21 1136             Time Calculation- OT    OT Start Time  1136  -SW      OT Received On  07/09/21  -SW      OT Goal Re-Cert Due Date  07/09/21  -SW         Timed Charges    16829 - OT Self Care/Mgmt Minutes  18  -SW         Untimed Charges    OT Eval/Re-eval Minutes  36  -SW         Total Minutes    Timed Charges Total Minutes  18  -SW      Untimed Charges Total Minutes  36  -SW       Total Minutes  54  -SW        User Key  (r) = Recorded By, (t) = Taken By, (c) = Cosigned By    Initials Name Provider Type    Jaky Rausch OT Occupational Therapist        Therapy  Charges for Today     Code Description Service Date Service Provider Modifiers Qty    [id number] HC OT SELF CARE/MGMT/TRAIN EA 15 MIN 7/9/2021 Jaky Millan OT GO 1    [id number]  OT EVAL LOW COMPLEXITY 3 7/9/2021 Jaky Millan OT GO 1               Jaky Millan OT  7/9/2021

## 2021-07-09 NOTE — THERAPY DISCHARGE NOTE
Patient Name: Moni Wallace  : 1940    MRN: [id number]                              Today's Date: 2021       Admit Date: 2021    Visit Dx:     ICD-10-CM ICD-9-CM   1. Urinary tract infection without hematuria, site unspecified  N39.0 599.0   2. Leukocytosis, unspecified type  D72.829 288.60   3. Fatigue, unspecified type  R53.83 780.79     Patient Active Problem List   Diagnosis   • Paroxysmal atrial fibrillation (CMS/HCC)   • Essential hypertension   • Type 2 diabetes mellitus (CMS/HCC)   • Chronic kidney disease, stage IV (severe) (CMS/HCC)   • Anemia of renal disease   • Hyperparathyroidism (CMS/HCC)   • Asthma   • Right-sided carotid artery disease (CMS/HCC)   • High output HF (heart failure) (CMS/HCC)   • Vitamin D deficiency   • Leukocytosis   • Nonrheumatic aortic valve stenosis   • Ulcer of gastric fundus   • Tubular adenoma   • Macular degeneration   • Hypothyroidism   • Chronic kidney disease   • Screen for colon cancer   • Postoperative anemia due to acute blood loss   • Hyperlipidemia LDL goal <100   • Morbidly obese (CMS/HCC)   • Atypical pneumonia   • Acute UTI (urinary tract infection)   • UTI (urinary tract infection)   • Urinary tract infection, site not specified   • Demand ischemia (CMS/HCC)     Past Medical History:   Diagnosis Date   • Adenomatous colon polyp    • Chronic kidney disease    • Clostridium difficile infection 2017   • Diabetes mellitus (CMS/HCC)    • Gastric polyps    • Hypothyroidism    • IgA nephropathy, acute    • Kidney transplanted    • Macular degeneration    • Paroxysmal atrial fibrillation (CMS/HCC)    • Tubular adenoma     excision    • Ulcer of gastric fundus    • Vitamin D deficiency      Past Surgical History:   Procedure Laterality Date   • BREAST BIOPSY Left    • CARPAL TUNNEL RELEASE     • CARPAL TUNNEL RELEASE Right    • CATARACT EXTRACTION     • CATARACT EXTRACTION Bilateral    • DIAGNOSTIC LAPAROSCOPY     • DIALYSIS FISTULA CREATION     •  HERNIA REPAIR  85 and 86   • KNEE ARTHROSCOPY INCISION AND DRAINAGE OF KNEE Right 5/18/2019    Procedure: KNEE ARTHROSCOPY INCISION AND DRAINAGE OF KNEE;  Surgeon: Paco Lester MD;  Location:  [facility];  Service: Orthopedics   • LAPAROSCOPIC TUBAL LIGATION  1985   • TOTAL KNEE ARTHROPLASTY     • TOTAL KNEE ARTHROPLASTY      Left knee    • TRANSPLANTATION RENAL  2010   • TRANSPLANTATION RENAL Right    • TRIGGER FINGER RELEASE     • TUBAL ABDOMINAL LIGATION     • UPPER GASTROINTESTINAL ENDOSCOPY  05/20/2013   • VENOUS ACCESS DEVICE (PORT) INSERTION N/A 5/23/2019    Procedure: INSERTION GROSHONG;  Surgeon: Rolando Begum MD;  Location:  [facility];  Service: General     General Information     Row Name 07/09/21 1156          Physical Therapy Time and Intention    Document Type  discharge evaluation/summary  -CD     Mode of Treatment  physical therapy  -CD     Row Name 07/09/21 1156          General Information    Patient Profile Reviewed  yes  -CD     Prior Level of Function  independent:;transfer;w/c or scooter;bed mobility;min assist:;ADL's;mod assist:;bathing;dependent:;home management PT HAS W/C, BSC, SHOWER BENCH WITH HAND HELD FAUCET, AND RAMP AT  -CD     Existing Precautions/Restrictions  other (see comments) PD, PAINFUL R KNEE.  -CD     Barriers to Rehab  medically complex;previous functional deficit  -CD     Row Name 07/09/21 1156          Living Environment    Lives With  child(chavo), dependent PT HAS GOOD FAMILY SUPPORT AT HOME. LIVES WITH SON WHO WORKS DURING THE DAY AND HAS OTHER CHILDREN AVAILABLE THAT CAN ASSIST PRN.  -CD     Row Name 07/09/21 1156          Home Main Entrance    Number of Stairs, Main Entrance  none  -CD     Row Name 07/09/21 1156          Stairs Within Home, Primary    Stair Railings, Within Home, Primary  none  -CD     Row Name 07/09/21 1156          Cognition    Orientation Status (Cognition)  oriented x 4  -CD     Row Name 07/09/21 1156          Safety Issues,  "Functional Mobility    Impairments Affecting Function (Mobility)  endurance/activity tolerance;pain;strength NON-AMBULATORY AT BASELINE DYE TI  R KNEE \"BONE ON BONE\" AND PAINFUL, ON PD.  -CD       User Key  (r) = Recorded By, (t) = Taken By, (c) = Cosigned By    Initials Name Provider Type    Tonya Rocha PT Physical Therapist        Mobility     Row Name 07/09/21 1206          Bed Mobility    Bed Mobility  supine-sit  -CD     Supine-Sit [place] (Bed Mobility)  independent  -CD     Assistive Device (Bed Mobility)  head of bed elevated  -CD     Row Name 07/09/21 1206          Bed-Chair Transfer    Bed-Chair [place] (Transfers)  standby assist FOR PD LINES.  -CD     Row Name 07/09/21 1206          Sit-Stand Transfer    Sit-Stand [place] (Transfers)  supervision  -     Row Name 07/09/21 1206          Gait/Stairs (Locomotion)    Comment (Gait/Stairs)  NON-AMBULATORY AT BASELINE  -CD       User Key  (r) = Recorded By, (t) = Taken By, (c) = Cosigned By    Initials Name Provider Type     Tonya Palencia PT Physical Therapist        Obj/Interventions     Row Name 07/09/21 1308          Range of Motion Comprehensive    General Range of Motion  bilateral lower extremity ROM WNL  -CD     Row Name 07/09/21 1308          Strength Comprehensive (MMT)    General Manual Muscle Testing (MMT) Assessment  lower extremity strength deficits identified  -     Comment, General Manual Muscle Testing (MMT) Assessment  GROSSLY 3+ TO 4/5 BLE'S.  -CD     Row Name 07/09/21 1308          Balance    Balance Assessment  sitting static balance;standing static balance;sitting dynamic balance;standing dynamic balance  -CD     Static Sitting Balance  WNL  -CD     Dynamic Sitting Balance  WNL  -CD     Static Standing Balance  WFL;supported;standing  -CD     Dynamic Standing Balance  WFL;supported;standing  -CD     Comment, Balance  BALANCE ADEQUATE WITH B UE SUPPORT FOR TRANSFERS BED TO CHAIR WITH SUPERVISION. ABLE TO STAND X " 5 MINUTES FOR HYGIENE AFTER TOILETING. (PT INCONTINENT OF BM - ATTRIBUTES TO ANTIBIOTICS. NSG NOTIFIED)  -CD       User Key  (r) = Recorded By, (t) = Taken By, (c) = Cosigned By    Initials Name Provider Type    CD Tonya Palencia, PT Physical Therapist        Goals/Plan    No documentation.       Clinical Impression     Row Name 07/09/21 1311          Plan of Care Review    Plan of Care Reviewed With  patient;son  -CD     Outcome Summary  PT PRESENTS AT BASELINE WITH FUNCTIONAL MOBILITY THEREFORE GOALS WERE NOT ESTABLISHED. PT WAS INDEPENDENT WITH [place]PINE TO SIT EOB AND SBA/SUPERVISION FOR TRANSFERS DUE TO PD LINES. PT IS NON-AMBULATORY AT BASELINE DUE TO CHRONIC R KNEE PAIN AND USES A W/C.  PT IS A &O, FOLLOWING ALL COMMANDS. RECOMMEND D/C HOME WITH FAMILY ASSIST AS BEFORE. PT/SON ARE IN AGREEMENT. D/C P.T.  -CD     Row Name 07/09/21 1311          Therapy Assessment/Plan (PT)    Patient/Family Therapy Goals Statement (PT)  TO GO HOME.  -CD     Criteria for Skilled Interventions Met (PT)  no;no problems identified which require skilled intervention  -CD     Row Name 07/09/21 1311          Vital Signs    Pre Systolic BP Rehab  -- VSS. NSG CLEARED FOR TREATMENT.  -CD     Pre Patient Position  Supine  -CD     Intra Patient Position  Standing  -CD     Post Patient Position  Sitting  -CD     Row Name 07/09/21 1311          Positioning and Restraints    Pre-Treatment Position  in bed  -CD     Post Treatment Position  chair  -CD     In Chair  reclined;call light within reach;exit alarm on;legs elevated;with OT;notified nsg  -CD       User Key  (r) = Recorded By, (t) = Taken By, (c) = Cosigned By    Initials Name Provider Type    CD Tonya Palencia PT Physical Therapist        Outcome Measures     Row Name 07/09/21 1314          How much help from another person do you currently need...    Turning from your back to your side while in flat bed without using bedrails?  4  -CD     Moving from lying on back to sitting on the  side of a flat bed without bedrails?  4  -CD     Moving to and from a bed to a chair (including a wheelchair)?  4  -CD     Standing up from a chair using your arms (e.g., wheelchair, bedside chair)?  4  -CD     Climbing 3-5 steps with a railing?  1  -CD     To walk in hospital room?  2  -CD     AM-PAC 6 Clicks Score (PT)  19  -CD       User Key  (r) = Recorded By, (t) = Taken By, (c) = Cosigned By    Initials Name Provider Type    CD Tonya Palencia, PT Physical Therapist        Physical Therapy Education                 Title: PT OT SLP Therapies (In Progress)     Topic: Physical Therapy (Done)     Point: Mobility training (Done)     Learning Progress Summary           Patient Acceptance, E, VU by CD at 7/9/2021 1315    Comment: BENEFITS OF OOB ACTIVITY, SAFETY WITH MOBILITY, D/C PLANNING,                   Point: Home exercise program (Done)     Learning Progress Summary           Patient Acceptance, E, VU by CD at 7/9/2021 1315    Comment: BENEFITS OF OOB ACTIVITY, SAFETY WITH MOBILITY, D/C PLANNING,                   Point: Body mechanics (Done)     Learning Progress Summary           Patient Acceptance, E, VU by CD at 7/9/2021 1315    Comment: BENEFITS OF OOB ACTIVITY, SAFETY WITH MOBILITY, D/C PLANNING,                   Point: Precautions (Done)     Learning Progress Summary           Patient Acceptance, E, VU by CD at 7/9/2021 1315    Comment: BENEFITS OF OOB ACTIVITY, SAFETY WITH MOBILITY, D/C PLANNING,                               User Key     Initials Effective Dates Name Provider Type Discipline     06/16/21 -  Tonya Palencia, PT Physical Therapist PT              PT Recommendation and Plan     Plan of Care Reviewed With: patient, son  Outcome Summary: PT PRESENTS AT BASELINE WITH FUNCTIONAL MOBILITY THEREFORE GOALS WERE NOT ESTABLISHED. PT WAS INDEPENDENT WITH SUPINE TO SIT EOB AND SBA/SUPERVISION FOR TRANSFERS DUE TO PD LINES. PT IS NON-AMBULATORY AT BASELINE DUE TO CHRONIC R KNEE PAIN AND USES A  W/C.  PT IS A &O, FOLLOWING ALL COMMANDS. RECOMMEND D/C HOME WITH FAMILY ASSIST AS BEFORE. PT/SON ARE IN AGREEMENT. D/C P.T.     Time Calculation:   PT Charges     Row Name 07/09/21 1316             Time Calculation    Start Time  1117  -CD      PT Received On  07/09/21  -CD        User Key  (r) = Recorded By, (t) = Taken By, (c) = Cosigned By    Initials Name Provider Type     Tonya Palencia, PT Physical Therapist        Therapy Charges for Today     Code Description Service Date Service Provider Modifiers Qty    [id number] HC PT EVAL LOW COMPLEXITY 4 7/9/2021 Tonya Palencia, PT GP 1          PT G-Codes  AM-PAC 6 Clicks Score (PT): 19    PT Discharge Summary  Anticipated Discharge Disposition (PT): home with assist    Tonya Palencia, PT  7/9/2021

## 2021-07-10 NOTE — PLAN OF CARE
Problem: Adult Inpatient Plan of Care  Goal: Plan of Care Review  Flowsheets (Taken 7/10/2021 0537)  Progress: improving  Plan of Care Reviewed With: patient  Outcome Summary: Pt alert and oriented. VSS. 2L NC. Denied pain, discomfort. PD performed per order. Plan of care discussed with pt. Verbalized understanding.  Goal: Absence of Hospital-Acquired Illness or Injury  Intervention: Identify and Manage Fall Risk  Recent Flowsheet Documentation  Taken 7/10/2021 0400 by Ad Tello RN  Safety Promotion/Fall Prevention:   activity supervised   assistive device/personal items within reach   clutter free environment maintained   gait belt   lighting adjusted   mobility aid in reach   nonskid shoes/slippers when out of bed   room organization consistent   safety round/check completed   toileting scheduled  Taken 7/10/2021 0200 by Ad Tello RN  Safety Promotion/Fall Prevention:   activity supervised   assistive device/personal items within reach   clutter free environment maintained   gait belt   lighting adjusted   mobility aid in reach   nonskid shoes/slippers when out of bed   room organization consistent   safety round/check completed   toileting scheduled  Taken 7/10/2021 0030 by Ad Tello RN  Safety Promotion/Fall Prevention:   activity supervised   assistive device/personal items within reach   clutter free environment maintained   gait belt   mobility aid in reach   lighting adjusted   nonskid shoes/slippers when out of bed   room organization consistent   safety round/check completed   toileting scheduled  Taken 7/9/2021 2230 by Ad Tello RN  Safety Promotion/Fall Prevention:   activity supervised   assistive device/personal items within reach   clutter free environment maintained   gait belt   mobility aid in reach   lighting adjusted   nonskid shoes/slippers when out of bed   room organization consistent   safety round/check completed   toileting scheduled  Taken 7/9/2021  2123 by Ad Tello RN  Safety Promotion/Fall Prevention:   activity supervised   assistive device/personal items within reach   clutter free environment maintained   gait belt   lighting adjusted   nonskid shoes/slippers when out of bed   room organization consistent   safety round/check completed   toileting scheduled  Taken 7/9/2021 2000 by Ad Tello RN  Safety Promotion/Fall Prevention:   activity supervised   assistive device/personal items within reach   clutter free environment maintained   gait belt   nonskid shoes/slippers when out of bed   safety round/check completed   room organization consistent   toileting scheduled  Intervention: Prevent Skin Injury  Recent Flowsheet Documentation  Taken 7/10/2021 0400 by Ad Tello RN  Body Position: side-lying, left  Skin Protection:   adhesive use limited   incontinence pads utilized   tubing/devices free from skin contact  Taken 7/10/2021 0200 by Ad Tello RN  Body Position:   position changed independently   neutral body alignment  Taken 7/10/2021 0030 by Ad Tello RN  Body Position:   position changed independently   side-lying, left  Skin Protection:   adhesive use limited   incontinence pads utilized   tubing/devices free from skin contact  Taken 7/9/2021 2230 by Ad Tello RN  Body Position: neutral body alignment  Skin Protection:   adhesive use limited   incontinence pads utilized   tubing/devices free from skin contact  Taken 7/9/2021 2123 by Ad Tello RN  Body Position:   position changed independently   tilted, left  Skin Protection:   adhesive use limited   incontinence pads utilized   tubing/devices free from skin contact  Taken 7/9/2021 2000 by Ad Tello RN  Body Position:   position changed independently   side-lying, left  Skin Protection:   adhesive use limited   incontinence pads utilized   tubing/devices free from skin contact  Intervention: Prevent and Manage VTE (venous  thromboembolism) Risk  Recent Flowsheet Documentation  Taken 7/9/2021 2123 by Ad Tello RN  VTE Prevention/Management: (see mar)   bilateral   dorsiflexion/plantar flexion performed  Intervention: Prevent Infection  Recent Flowsheet Documentation  Taken 7/10/2021 0400 by Ad Tello RN  Infection Prevention:   environmental surveillance performed   rest/sleep promoted   single patient room provided  Taken 7/10/2021 0200 by Ad Tello RN  Infection Prevention:   environmental surveillance performed   rest/sleep promoted   single patient room provided  Taken 7/10/2021 0030 by Ad Tello RN  Infection Prevention:   environmental surveillance performed   rest/sleep promoted   single patient room provided  Taken 7/9/2021 2230 by Ad Tello RN  Infection Prevention:   environmental surveillance performed   rest/sleep promoted   single patient room provided  Taken 7/9/2021 2123 by Ad Tello RN  Infection Prevention:   environmental surveillance performed   rest/sleep promoted   single patient room provided  Taken 7/9/2021 2000 by Ad Tello RN  Infection Prevention:   environmental surveillance performed   rest/sleep promoted   single patient room provided  Goal: Optimal Comfort and Wellbeing  Intervention: Provide Person-Centered Care  Recent Flowsheet Documentation  Taken 7/9/2021 2123 by Ad Tello RN  Trust Relationship/Rapport:   choices provided   care explained   questions answered   questions encouraged     Problem: Fluid Volume Excess (Chronic Kidney Disease)  Goal: Fluid Balance  Intervention: Monitor and Manage Hypervolemia  Recent Flowsheet Documentation  Taken 7/10/2021 0400 by Ad Tello RN  Skin Protection:   adhesive use limited   incontinence pads utilized   tubing/devices free from skin contact  Taken 7/10/2021 0030 by Ad Tello RN  Skin Protection:   adhesive use limited   incontinence pads utilized   tubing/devices free  from skin contact  Taken 7/9/2021 2230 by Ad Tello RN  Skin Protection:   adhesive use limited   incontinence pads utilized   tubing/devices free from skin contact  Taken 7/9/2021 2123 by Ad Tello RN  Skin Protection:   adhesive use limited   incontinence pads utilized   tubing/devices free from skin contact  Taken 7/9/2021 2000 by Ad Tello RN  Skin Protection:   adhesive use limited   incontinence pads utilized   tubing/devices free from skin contact     Problem: Cardiac Output Decreased  Goal: Effective Cardiac Output  Intervention: Optimize Cardiac Output  Recent Flowsheet Documentation  Taken 7/10/2021 0400 by Ad Tello RN  Head of Bed (HOB): HOB at 20-30 degrees  Taken 7/10/2021 0200 by Ad Tello RN  Head of Bed (HOB): HOB at 20-30 degrees  Taken 7/10/2021 0030 by Ad Tello RN  Head of Bed (HOB): HOB at 20-30 degrees  Taken 7/9/2021 2230 by Ad Tello RN  Head of Bed (HOB): HOB at 20-30 degrees  Taken 7/9/2021 2123 by Ad Tello RN  Head of Bed (HOB): HOB at 20-30 degrees  Environmental Support: calm environment promoted  VTE Prevention/Management: (see mar)   bilateral   dorsiflexion/plantar flexion performed  Taken 7/9/2021 2000 by Ad Tello RN  Head of Bed (HOB): HOB at 20-30 degrees     Problem: Fall Injury Risk  Goal: Absence of Fall and Fall-Related Injury  Intervention: Promote Injury-Free Environment  Recent Flowsheet Documentation  Taken 7/10/2021 0400 by Ad Tello RN  Safety Promotion/Fall Prevention:   activity supervised   assistive device/personal items within reach   clutter free environment maintained   gait belt   lighting adjusted   mobility aid in reach   nonskid shoes/slippers when out of bed   room organization consistent   safety round/check completed   toileting scheduled  Taken 7/10/2021 0200 by Ad Tello RN  Safety Promotion/Fall Prevention:   activity supervised   assistive  device/personal items within reach   clutter free environment maintained   gait belt   lighting adjusted   mobility aid in reach   nonskid shoes/slippers when out of bed   room organization consistent   safety round/check completed   toileting scheduled  Taken 7/10/2021 0030 by Ad Tello RN  Safety Promotion/Fall Prevention:   activity supervised   assistive device/personal items within reach   clutter free environment maintained   gait belt   mobility aid in reach   lighting adjusted   nonskid shoes/slippers when out of bed   room organization consistent   safety round/check completed   toileting scheduled  Taken 7/9/2021 2230 by Ad Tello RN  Safety Promotion/Fall Prevention:   activity supervised   assistive device/personal items within reach   clutter free environment maintained   gait belt   mobility aid in reach   lighting adjusted   nonskid shoes/slippers when out of bed   room organization consistent   safety round/check completed   toileting scheduled  Taken 7/9/2021 2123 by Ad Tello RN  Safety Promotion/Fall Prevention:   activity supervised   assistive device/personal items within reach   clutter free environment maintained   gait belt   lighting adjusted   nonskid shoes/slippers when out of bed   room organization consistent   safety round/check completed   toileting scheduled  Taken 7/9/2021 2000 by Ad Tello RN  Safety Promotion/Fall Prevention:   activity supervised   assistive device/personal items within reach   clutter free environment maintained   gait belt   nonskid shoes/slippers when out of bed   safety round/check completed   room organization consistent   toileting scheduled     Problem: Skin Injury Risk Increased  Goal: Skin Health and Integrity  Intervention: Optimize Skin Protection  Recent Flowsheet Documentation  Taken 7/10/2021 0400 by Ad Tello RN  Pressure Reduction Techniques:   frequent weight shift encouraged   pressure points  protected   rest period provided between sit times  Head of Bed (HOB): HOB at 20-30 degrees  Pressure Reduction Devices:   positioning supports utilized   pressure-redistributing mattress utilized  Skin Protection:   adhesive use limited   incontinence pads utilized   tubing/devices free from skin contact  Taken 7/10/2021 0200 by Ad Tello RN  Head of Bed (HOB): HOB at 20-30 degrees  Taken 7/10/2021 0030 by Ad Tello RN  Pressure Reduction Techniques:   frequent weight shift encouraged   pressure points protected   rest period provided between sit times  Head of Bed (HOB): HOB at 20-30 degrees  Pressure Reduction Devices:   pressure-redistributing mattress utilized   positioning supports utilized   specialty bed utilized  Skin Protection:   adhesive use limited   incontinence pads utilized   tubing/devices free from skin contact  Taken 7/9/2021 2230 by Ad Tello RN  Pressure Reduction Techniques:   frequent weight shift encouraged   rest period provided between sit times   pressure points protected  Head of Bed (HOB): HOB at 20-30 degrees  Pressure Reduction Devices:   pressure-redistributing mattress utilized   positioning supports utilized  Skin Protection:   adhesive use limited   incontinence pads utilized   tubing/devices free from skin contact  Taken 7/9/2021 2123 by Ad Tello RN  Pressure Reduction Techniques:   frequent weight shift encouraged   pressure points protected   rest period provided between sit times   weight shift assistance provided  Head of Bed (HOB): HOB at 20-30 degrees  Pressure Reduction Devices:   pressure-redistributing mattress utilized   positioning supports utilized   heel offloading device utilized  Skin Protection:   adhesive use limited   incontinence pads utilized   tubing/devices free from skin contact  Taken 7/9/2021 2000 by Ad Tello RN  Pressure Reduction Techniques:   frequent weight shift encouraged   pressure points protected    rest period provided between sit times  Head of Bed (HOB): HOB at 20-30 degrees  Pressure Reduction Devices:   pressure-redistributing mattress utilized   specialty bed utilized  Skin Protection:   adhesive use limited   incontinence pads utilized   tubing/devices free from skin contact   Goal Outcome Evaluation:  Plan of Care Reviewed With: patient        Progress: improving  Outcome Summary: Pt alert and oriented. VSS. 2L NC. Denied pain, discomfort. PD performed per order. Plan of care discussed with pt. Verbalized understanding.

## 2021-07-10 NOTE — PROGRESS NOTES
[facility] Medicine Services  PROGRESS NOTE    Patient Name: Moni Wallace  : 1940  MRN: [id number]    Date of Admission: 2021  Primary Care Physician: Alex Walters MD    Subjective   Subjective     CC:  Weakness     HPI:  Patient having nausea this am and last night. Was unable to eat anything. Has had some small loose stools. Denies any abd pain.     ROS:  Gen- No fevers, chills  CV- No chest pain, palpitations  Resp- No cough, dyspnea  GI- No, abd pain, + nausea          Objective   Objective     Vital Signs:   Temp:  [97.8 °F (36.6 °C)-98.2 °F (36.8 °C)] 97.8 °F (36.6 °C)  Heart Rate:  [58-69] 69  Resp:  [16-18] 18  BP: (147-172)/(67-91) 161/72        Physical Exam:  Constitutional: No acute distress, awake, alert, frail   HENT: NCAT, mucous membranes moist  Respiratory: Clear to auscultation bilaterally, respiratory effort normal 98% on 2L  Cardiovascular: RRR, no murmurs, rubs, or gallops  Gastrointestinal: Positive bowel sounds, soft, nontender, nondistended  Musculoskeletal: No bilateral ankle edema  Psychiatric: Appropriate affect, cooperative  Neurologic: Oriented x 3, strength symmetric in all extremities, Cranial Nerves grossly intact to confrontation, speech clear  Skin: No rashes, pale       Results Reviewed:  Results from last 7 days   Lab Units 07/10/21  0846 21  0710 21  1953 21  0622 21  0741 21  1201   WBC 10*3/mm3  --  13.62* 15.87* 15.60* 14.52* 15.22*   HEMOGLOBIN g/dL  --  8.7* 9.3* 8.8* 9.2* 10.4*   HEMATOCRIT %  --  28.6* 31.0* 29.6* 29.7* 33.6*   PLATELETS 10*3/mm3  --  212 235 216 218 242   INR  2.16* 2.34*  --  2.12* 1.74* 1.65*   PROCALCITONIN ng/mL  --   --   --   --  0.22 0.24     Results from last 7 days   Lab Units 07/10/21  0826 21  0710 21  0008 21  1953 21  1738 21  1201 21  1201   SODIUM mmol/L 133* 133*  --  128*  --    < > 141   POTASSIUM mmol/L 3.7 3.5  --  3.7  --     < > 3.6   CHLORIDE mmol/L 98 95*  --  91*  --    < > 102   CO2 mmol/L 25.0 26.0  --  25.0  --    < > 26.0   BUN mg/dL 29* 34*  --  33*  --    < > 38*   CREATININE mg/dL 6.00* 6.30*  --  6.14*  --    < > 6.37*   GLUCOSE mg/dL 126* 90  --  143*  --    < > 147*   CALCIUM mg/dL 7.6* 7.4*  --  8.0*  --    < > 7.5*   ALT (SGPT) U/L  --   --   --  11  --   --  10   AST (SGOT) U/L  --   --   --  19  --   --  20   TROPONIN T ng/mL  --   --  0.048* 0.043* 0.048*  --  0.055*    < > = values in this interval not displayed.     Estimated Creatinine Clearance: 8.2 mL/min (A) (by C-G formula based on SCr of 6 mg/dL (H)).    Microbiology Results Abnormal     Procedure Component Value - Date/Time    Body Fluid Culture - Body Fluid, Peritoneum [[id number]] Collected: 07/06/21 2159    Lab Status: Final result Specimen: Body Fluid from Peritoneum Updated: 07/10/21 0741     Body Fluid Culture No growth at 3 days     Gram Stain No WBCs seen      No organisms seen    Blood Culture - Blood, Arm, Left [[id number]] Collected: 07/06/21 1340    Lab Status: Preliminary result Specimen: Blood from Arm, Left Updated: 07/09/21 1430     Blood Culture No growth at 3 days    Blood Culture - Blood, Arm, Left [[id number]] Collected: 07/07/21 1333    Lab Status: Preliminary result Specimen: Blood from Arm, Left Updated: 07/09/21 1430     Blood Culture No growth at 2 days      Aerobic bottle only    Blood Culture - Blood, Wrist, Left [[id number]] Collected: 07/07/21 1335    Lab Status: Preliminary result Specimen: Blood from Wrist, Left Updated: 07/09/21 1430     Blood Culture No growth at 2 days      Aerobic bottle only    Urine Culture - Urine, Urine, Catheter [[id number]]  (Abnormal) Collected: 07/06/21 1240    Lab Status: Final result Specimen: Urine, Catheter Updated: 07/09/21 1024     Urine Culture >100,000 CFU/mL Lactobacillus species     Comment: Clindamycin, penicillin, and ampicillin are generally the most active agents.  Resistance to vancomycin  and aminoglycosides is commonly reported.         Blood Culture - Blood, Arm, Right [[id number]]  (Abnormal) Collected: 07/06/21 1412    Lab Status: Final result Specimen: Blood from Arm, Right Updated: 07/08/21 0641     Blood Culture Staphylococcus, coagulase negative     Comment: Probable contaminant requires clinical correlation, susceptibility not performed unless requested by physician.          Isolated from Anaerobic Bottle     Gram Stain Anaerobic Bottle Gram positive cocci in pairs and clusters    Blood Culture ID, PCR - Blood, Arm, Right [[id number]]  (Abnormal) Collected: 07/06/21 1412    Lab Status: Final result Specimen: Blood from Arm, Right Updated: 07/07/21 1024     BCID, PCR Staphylococcus spp, not aureus. Identification by BCID PCR.     BOTTLE TYPE Anaerobic Bottle    COVID PRE-OP / PRE-PROCEDURE SCREENING ORDER (NO ISOLATION) - Swab, Nasopharynx [[id number]]  (Normal) Collected: 07/06/21 2159    Lab Status: Final result Specimen: Swab from Nasopharynx Updated: 07/06/21 2311    Narrative:      The following orders were created for panel order COVID PRE-OP / PRE-PROCEDURE SCREENING ORDER (NO ISOLATION) - Swab, Nasopharynx.  Procedure                               Abnormality         Status                     ---------                               -----------         ------                     Respiratory Panel PCR w/...[[id number]]  Normal              Final result                 Please view results for these tests on the individual orders.    Respiratory Panel PCR w/COVID-19(SARS-CoV-2) FAHAD/NEDRA/ANDREI/PAD/COR/MAD/ILA In-House, NP Swab in UTM/VTM, 3-4 HR TAT - Swab, Nasopharynx [[id number]]  (Normal) Collected: 07/06/21 2159    Lab Status: Final result Specimen: Swab from Nasopharynx Updated: 07/06/21 2311     ADENOVIRUS, PCR Not Detected     Coronavirus 229E Not Detected     Coronavirus HKU1 Not Detected     Coronavirus NL63 Not Detected     Coronavirus OC43 Not Detected     COVID19 Not Detected      Human Metapneumovirus Not Detected     Human Rhinovirus/Enterovirus Not Detected     Influenza A PCR Not Detected     Influenza B PCR Not Detected     Parainfluenza Virus 1 Not Detected     Parainfluenza Virus 2 Not Detected     Parainfluenza Virus 3 Not Detected     Parainfluenza Virus 4 Not Detected     RSV, PCR Not Detected     Bordetella pertussis pcr Not Detected     Bordetella parapertussis PCR Not Detected     Chlamydophila pneumoniae PCR Not Detected     Mycoplasma pneumo by PCR Not Detected    Narrative:      In the setting of a positive respiratory panel with a viral infection PLUS a negative procalcitonin without other underlying concern for bacterial infection, consider observing off antibiotics or discontinuation of antibiotics and continue supportive care. If the respiratory panel is positive for atypical bacterial infection (Bordetella pertussis, Chlamydophila pneumoniae, or Mycoplasma pneumoniae), consider antibiotic de-escalation to target atypical bacterial infection.          Imaging Results (Last 24 Hours)     ** No results found for the last 24 hours. **          Results for orders placed during the hospital encounter of 01/11/21    Transthoracic Echo Complete With Contrast if Necessary Per Protocol    Interpretation Summary  · Left ventricular systolic function is normal. Estimated left ventricular EF = 65%  · Left ventricular wall thickness is consistent with hypertrophy.  · Left atrial volume is mildly increased.  · The aortic valve is calcified. There is mild aortic stenosis present. There is moderate aortic regurgitation present.  · Estimated right ventricular systolic pressure from tricuspid regurgitation is normal (<35 mmHg).      I have reviewed the medications:  Scheduled Meds:cefTRIAXone, 1 g, Intravenous, Q24H  epoetin blanca/blanca-epbx, 20,000 Units, Subcutaneous, Weekly  escitalopram, 5 mg, Oral, QAM  insulin lispro, 0-7 Units, Subcutaneous, TID AC  metoprolol tartrate, 100 mg, Oral,  "Q12H  pantoprazole, 40 mg, Oral, QAM  saccharomyces boulardii, 250 mg, Oral, BID  sevelamer, 1,600 mg, Oral, TID With Meals  sodium chloride, 10 mL, Intravenous, Q12H  UltraBag/Dianeal PD-2/1.5% Dex (pappas #[id number]), 2,000 mL, Intraperitoneal, 5x Daily  warfarin, 4 mg, Oral, Daily      Continuous Infusions:nitroglycerin, 5-200 mcg/min  Pharmacy to dose warfarin,       PRN Meds:.•  acetaminophen  •  dextrose  •  dextrose  •  glucagon (human recombinant)  •  ondansetron **OR** ondansetron  •  Pharmacy to dose warfarin  •  sodium chloride  •  sodium chloride  •  temazepam  •  UltraBag/Dianeal PD-2/1.5% Dex (pappas #[id number])    Assessment/Plan   Assessment & Plan     Active Hospital Problems    Diagnosis  POA   • **UTI (urinary tract infection) [N39.0]  Yes   • Demand ischemia (CMS/[facility]) [I24.8]  Yes   • Urinary tract infection, site not specified [N39.0]  Yes   • Morbidly obese (CMS/HCC) [E66.01]  Yes   • Hyperlipidemia LDL goal <100 [E78.5]  Yes   • Hypothyroidism [E03.9]  Yes   • Essential hypertension [I10]  Yes   • Anemia of renal disease [N18.9, D63.1]  Yes   • Chronic kidney disease, stage IV (severe) (CMS/HCC) [N18.4]  Yes   • Paroxysmal atrial fibrillation (CMS/HCC) [I48.0]  Yes   • Type 2 diabetes mellitus (CMS/HCC) [E11.9]  Yes      Resolved Hospital Problems   No resolved problems to display.        Brief Hospital Course to date:  Moni Wallace is a 81 y.o. female IgA nephropathy s/p transplant, history of RUE AVF, PAF on coumadin history of septic knee arthritis s/p I&D, now on PD, with 5-6 days of \"not feeling well\". She notes mild dyspnea worse with exertion.     Weakness/fatigue  Leukocytosis - chronically elevated over the last year   Acute UTI  - CT abd/pel - nonspecific potentially incidental finding of cystic structure along pancreatic body; ascites related to PD; no other acute findings  - fluid culture with no WBC or organisms seen; COVID and resp PCR negative   - UCx growth >100,000 Lactobacillus " species   - Continue rocephin (7/6)    Nausea  Loose stools  -- hx of cdiff  -- if stools increase in freq or amount will check for cdiff  -- start probiotic      + blood culture  - gram positive cocci in pairs and clusters  - Repeat cultures NGTD  - Vanc x 1 and will now discontinue      CKD, on PD, hx of IgA nephropathy, s/p renal transplant, chronic rejection  --consult [facility] for PD     Paroxysmal Afib  - Follows with Dr. Joe   - on coumadin - pharmacy to dose  - Unsure if vague symptoms and SOA related to being in afib. 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       Disposition: I expect the patient to be discharged 1-2 days     CODE STATUS:   Code Status and Medical Interventions:   Ordered at: 07/06/21 1525     Code Status:    CPR     Medical Interventions (Level of Support Prior to Arrest):    Full       Ritika Grewal, APRN  07/10/21

## 2021-07-10 NOTE — PROGRESS NOTES
"Pharmacy Consult  -  Warfarin    Moni Wallace is a  81 y.o. female   Height - 162.6 cm (64\")  Weight - 90.7 kg (200 lb)    Consulting Provider: Hospitalist  Indication: A fib  Goal INR: 2-3  Home Regimen:   - Warfarin 4 mg on Sunday, Wednesday, Thursday, Saturday   - Warfarin 6 mg on Monday, Tuesday, Friday    Bridge Therapy: None     Drug-Drug Interactions with current regimen:     aspirin - increased bleed risk              Warfarin Dosing During Admission:    Date  7/6 7/7 7/8 7/9 7/10       INR  1.65 1.74 2.12 2.34 2.16       Dose  5 mg 6 mg 4 mg 6 mg  (4 mg)         Education Provided: Patient follows with [facility], education previously provided by pharmacist. Will address any questions/concerns as needed.    Discharge Follow up:     Following Provider - [facility]     Follow up time range or appointment - Recommend repeat INR within 2-3 days of discharge    Labs:    Results from last 7 days   Lab Units 07/09/21 0710 07/08/21 1953 07/08/21 0622 07/07/21  0741 07/06/21  1201   INR  2.34*  --  2.12* 1.74* 1.65*   HEMOGLOBIN g/dL 8.7* 9.3* 8.8* 9.2* 10.4*   HEMATOCRIT % 28.6* 31.0* 29.6* 29.7* 33.6*     Results from last 7 days   Lab Units 07/09/21  0710 07/08/21  1953 07/08/21  0622 07/06/21  1201   SODIUM mmol/L 133* 128* 133* 141   POTASSIUM mmol/L 3.5 3.7 3.9 3.6   CHLORIDE mmol/L 95* 91* 97* 102   CO2 mmol/L 26.0 25.0 24.0 26.0   BUN mg/dL 34* 33* 37* 38*   CREATININE mg/dL 6.30* 6.14* 5.97* 6.37*   CALCIUM mg/dL 7.4* 8.0* 7.6* 7.5*   BILIRUBIN mg/dL  --  <0.2  --  <0.2   ALK PHOS U/L  --  167*  --  204*   ALT (SGPT) U/L  --  11  --  10   AST (SGOT) U/L  --  19  --  20   GLUCOSE mg/dL 90 143* 98 147*     Current dietary intake: 10% of meals per Ritika-RN, limited intake 2/2 nausea    Diet Order   Procedures    Diet Regular; Consistent Carbohydrate     Assessment/Plan:    Patient's INR is therapeutic at 2.16 today.  Will continue patient's home dose and give warfarin 4 mg " tonight.   Daily PT/INR ordered.  Monitor signs/symptoms of bleeding, dietary intake, and drug-drug interactions. Make dose adjustments as necessary.  Pharmacy will continue to follow.    Jacob Dean PharmD  7/10/2021  09:41 EDT

## 2021-07-10 NOTE — NURSING NOTE
PD rounds complete. Policy and procedure reviewed with AVILA Yost. Orders and documentation reviewed. Supplies adequate. Denies further need at this time. Encouraged to call [phone] for assistance or for questions.     Staff having trouble draining pt.  Pt bed position raised in highest position and tubing stripped.  Drained extra liter and fluid clear

## 2021-07-10 NOTE — PROGRESS NOTES
"   LOS: 3 days    Patient Care Team:  Alex Walters MD as PCP - General (Internal Medicine)  Jeff Joe IV, MD as Consulting Physician (Cardiology)  Donny Hodges MD as Consulting Physician (Nephrology)  Cory Castillo MD as Surgeon (General Surgery)  Slim Wick MD    Chief Complaint:    ESRD on peritoneal dialysis  Subjective     Interval History:   According to the nurses patient has been sick, complains of nausea  To me she is doing fine she is in good mood she stated that she is feeling much better now than early morning  Review of Systems:   Complains of nausea decreased appetite earlier today, no fever or chills no shortness of breath or chest pain      Objective     Vital Sign Min/Max for last 24 hours  Temp  Min: 97.8 °F (36.6 °C)  Max: 98.2 °F (36.8 °C)   BP  Min: 147/67  Max: 178/101   Pulse  Min: 58  Max: 69   Resp  Min: 16  Max: 18   SpO2  Min: 97 %  Max: 100 %   Flow (L/min)  Min: 2  Max: 2   No data recorded     Flowsheet Rows      First Filed Value   Admission Height  162.6 cm (64\") Documented at 07/06/2021 1154   Admission Weight  77.1 kg (170 lb) Documented at 07/06/2021 1154          I/O this shift:  In: 180 [P.O.:180]  Out: -   I/O last 3 completed shifts:  In: 78108 [I.V.:4000; Other:19823]  Out: 78202 [Urine:500; Other:75731]    Physical Exam:  General Appearance: A awake alert oriented no obvious distress laying comfortably in bed.  Eyes: PER, EOMI.  Neck: Supple no JVD.  Lungs: Clear auscultation, no rales rhonchi's, equal chest movement, nonlabored.  Heart: No gallop, murmur, rub, RRR.  Abdomen: Tenckhoff catheter in place soft, nontender, positive bowel sounds, no organomegaly.  Extremities: No edema, no cyanosis.  Neuro: No focal deficit, moving all extremities, alert oriented X 3  Skin is warm and dry        WBC WBC   Date Value Ref Range Status   07/09/2021 13.62 (H) 3.40 - 10.80 10*3/mm3 Final   07/08/2021 15.87 (H) 3.40 - 10.80 10*3/mm3 Final "   07/08/2021 15.60 (H) 3.40 - 10.80 10*3/mm3 Final      HGB Hemoglobin   Date Value Ref Range Status   07/09/2021 8.7 (L) 12.0 - 15.9 g/dL Final   07/08/2021 9.3 (L) 12.0 - 15.9 g/dL Final   07/08/2021 8.8 (L) 12.0 - 15.9 g/dL Final      HCT Hematocrit   Date Value Ref Range Status   07/09/2021 28.6 (L) 34.0 - 46.6 % Final   07/08/2021 31.0 (L) 34.0 - 46.6 % Final   07/08/2021 29.6 (L) 34.0 - 46.6 % Final      Platlets No results found for: LABPLAT   MCV MCV   Date Value Ref Range Status   07/09/2021 101.8 (H) 79.0 - 97.0 fL Final   07/08/2021 103.0 (H) 79.0 - 97.0 fL Final   07/08/2021 103.9 (H) 79.0 - 97.0 fL Final          Sodium Sodium   Date Value Ref Range Status   07/10/2021 133 (L) 136 - 145 mmol/L Final   07/09/2021 133 (L) 136 - 145 mmol/L Final   07/08/2021 128 (L) 136 - 145 mmol/L Final   07/08/2021 133 (L) 136 - 145 mmol/L Final      Potassium Potassium   Date Value Ref Range Status   07/10/2021 3.7 3.5 - 5.2 mmol/L Final   07/09/2021 3.5 3.5 - 5.2 mmol/L Final   07/08/2021 3.7 3.5 - 5.2 mmol/L Final   07/08/2021 3.9 3.5 - 5.2 mmol/L Final      Chloride Chloride   Date Value Ref Range Status   07/10/2021 98 98 - 107 mmol/L Final   07/09/2021 95 (L) 98 - 107 mmol/L Final   07/08/2021 91 (L) 98 - 107 mmol/L Final   07/08/2021 97 (L) 98 - 107 mmol/L Final      CO2 CO2   Date Value Ref Range Status   07/10/2021 25.0 22.0 - 29.0 mmol/L Final   07/09/2021 26.0 22.0 - 29.0 mmol/L Final   07/08/2021 25.0 22.0 - 29.0 mmol/L Final   07/08/2021 24.0 22.0 - 29.0 mmol/L Final      BUN BUN   Date Value Ref Range Status   07/10/2021 29 (H) 8 - 23 mg/dL Final   07/09/2021 34 (H) 8 - 23 mg/dL Final   07/08/2021 33 (H) 8 - 23 mg/dL Final   07/08/2021 37 (H) 8 - 23 mg/dL Final      Creatinine Creatinine   Date Value Ref Range Status   07/10/2021 6.00 (H) 0.57 - 1.00 mg/dL Final   07/09/2021 6.30 (H) 0.57 - 1.00 mg/dL Final   07/08/2021 6.14 (H) 0.57 - 1.00 mg/dL Final   07/08/2021 5.97 (H) 0.57 - 1.00 mg/dL Final       Calcium Calcium   Date Value Ref Range Status   07/10/2021 7.6 (L) 8.6 - 10.5 mg/dL Final   07/09/2021 7.4 (L) 8.6 - 10.5 mg/dL Final   07/08/2021 8.0 (L) 8.6 - 10.5 mg/dL Final   07/08/2021 7.6 (L) 8.6 - 10.5 mg/dL Final      PO4 No results found for: CAPO4   Albumin Albumin   Date Value Ref Range Status   07/10/2021 2.50 (L) 3.50 - 5.20 g/dL Final   07/08/2021 2.60 (L) 3.50 - 5.20 g/dL Final      Magnesium No results found for: MG   Uric Acid No results found for: URICACID        Results Review:     I reviewed the patient's new clinical results.    epoetin blanca/blanca-epbx, 20,000 Units, Subcutaneous, Weekly  escitalopram, 5 mg, Oral, QAM  insulin lispro, 0-7 Units, Subcutaneous, TID AC  metoprolol tartrate, 100 mg, Oral, Q12H  pantoprazole, 40 mg, Oral, QAM  saccharomyces boulardii, 250 mg, Oral, BID  sevelamer, 1,600 mg, Oral, TID With Meals  sodium chloride, 10 mL, Intravenous, Q12H  UltraBag/Dianeal PD-2/1.5% Dex (pappas #[id number]), 2,000 mL, Intraperitoneal, 5x Daily  warfarin, 4 mg, Oral, Daily      Pharmacy to dose warfarin,         Medication Review:     Assessment/Plan       UTI (urinary tract infection)    Paroxysmal atrial fibrillation (CMS/[facility])    Essential hypertension    Type 2 diabetes mellitus (CMS/[facility])    Chronic kidney disease, stage IV (severe) (CMS/[facility])    Anemia of renal disease    Hypothyroidism    Hyperlipidemia LDL goal <100    Morbidly obese (CMS/[facility])    Urinary tract infection, site not specified    Demand ischemia (CMS/[facility])    1.  Generalized fatigue and weakness accompanied with leukocytosis possible UTI patient has been started on Rocephin at this time.  2.  ESRD on peritoneal dialysis.  Primary disease IgA nephropathy, failed renal transplant.  .  3.  Atrial fibrillation on Coumadin.  4.  Morbid obesity.  5.  Diabetes type 2.  6.  Failed renal transplant: On tacrolimus at this time.  Continue with tacrolimus for now. Level pending.   7.  Anemia of chronic kidney  disease.      Plan:  Blood pressure is running a little high, add Norvasc 5 mg daily  We will start oral potassium daily.  Continue immunosuppressive medication.  Continue with antibiotic.  LIDIA weekly.    Continue with PD, PD fluid is clear.    I discussed the patients findings and my recommendations with patient and the RN    Allen Chambers MD  07/10/21  15:36 EDT

## 2021-07-11 NOTE — PROGRESS NOTES
"Pharmacy Consult  -  Warfarin    Moni Wallace is a  81 y.o. female   Height - 162.6 cm (64\")  Weight - 90.7 kg (200 lb)    Consulting Provider: Hospitalist  Indication: A fib  Goal INR: 2-3  Home Regimen:   - Warfarin 4 mg on Sunday, Wednesday, Thursday, Saturday   - Warfarin 6 mg on Monday, Tuesday, Friday    Bridge Therapy: None     Drug-Drug Interactions with current regimen:     aspirin - increased bleed risk              Warfarin Dosing During Admission:    Date  7/6 7/7 7/8 7/9 7/10 7/11      INR  1.65 1.74 2.12 2.34 2.16 2.3      Dose  5 mg 6 mg 4 mg 6 mg  4 mg (4 mg)        Education Provided: Patient follows with [facility], education previously provided by pharmacist. Will address any questions/concerns as needed.    Discharge Follow up:     Following Provider - [facility]     Follow up time range or appointment - Recommend repeat INR within 2-3 days of discharge    Labs:    Results from last 7 days   Lab Units 07/09/21 0710 07/08/21 1953 07/08/21 0622 07/07/21  0741 07/06/21  1201   INR  2.34*  --  2.12* 1.74* 1.65*   HEMOGLOBIN g/dL 8.7* 9.3* 8.8* 9.2* 10.4*   HEMATOCRIT % 28.6* 31.0* 29.6* 29.7* 33.6*     Results from last 7 days   Lab Units 07/09/21  0710 07/08/21  1953 07/08/21  0622 07/06/21  1201   SODIUM mmol/L 133* 128* 133* 141   POTASSIUM mmol/L 3.5 3.7 3.9 3.6   CHLORIDE mmol/L 95* 91* 97* 102   CO2 mmol/L 26.0 25.0 24.0 26.0   BUN mg/dL 34* 33* 37* 38*   CREATININE mg/dL 6.30* 6.14* 5.97* 6.37*   CALCIUM mg/dL 7.4* 8.0* 7.6* 7.5*   BILIRUBIN mg/dL  --  <0.2  --  <0.2   ALK PHOS U/L  --  167*  --  204*   ALT (SGPT) U/L  --  11  --  10   AST (SGOT) U/L  --  19  --  20   GLUCOSE mg/dL 90 143* 98 147*     Current dietary intake: 10% of meals per Ritika-RN, limited intake 2/2 nausea    Diet Order   Procedures    Diet Regular; Consistent Carbohydrate     Assessment/Plan:    Patient's INR is therapeutic at 2.3 today.  Will continue patient's home dose and give " warfarin 4 mg tonight.   Daily PT/INR ordered.  Monitor signs/symptoms of bleeding, dietary intake, and drug-drug interactions. Make dose adjustments as necessary.  Pharmacy will continue to follow.    Jacob Dean PharmD  7/10/2021  09:41 EDT

## 2021-07-11 NOTE — PLAN OF CARE
Goal Outcome Evaluation:  Plan of Care Reviewed With: patient        Progress: improving  Outcome Summary: a/o x 4; VSS, 2L n.c.; denies pain, mild nausea, pt did not require haldol or zofran overnight; PD performed per order; no acute episodes to report overnight; pleasant, rested comfortably; will continue to monitor

## 2021-07-11 NOTE — PROGRESS NOTES
"   LOS: 4 days    Patient Care Team:  Alex Walters MD as PCP - General (Internal Medicine)  Jeff Joe IV, MD as Consulting Physician (Cardiology)  Donny Hodges MD as Consulting Physician (Nephrology)  Cory Castillo MD as Surgeon (General Surgery)  Slim Wick MD    Chief Complaint:    ESRD on peritoneal dialysis  Subjective     Interval History:   Much better today.  No new complaints.  Atrial fibrillation is noted  Review of Systems:   Patient denies shortness of breath, chest pain, dysuria, hematuria, nausea, vomiting.        Objective     Vital Sign Min/Max for last 24 hours  Temp  Min: 97.7 °F (36.5 °C)  Max: 98.4 °F (36.9 °C)   BP  Min: 132/73  Max: 178/101   Pulse  Min: 56  Max: 113   Resp  Min: 16  Max: 20   SpO2  Min: 92 %  Max: 97 %   Flow (L/min)  Min: 1  Max: 2   No data recorded     Flowsheet Rows      First Filed Value   Admission Height  162.6 cm (64\") Documented at 07/06/2021 1154   Admission Weight  77.1 kg (170 lb) Documented at 07/06/2021 1154          I/O this shift:  In: 2118 [P.O.:118; Other:2000]  Out: 2600 [Other:2600]  I/O last 3 completed shifts:  In: 05256 [P.O.:705; Other:23268]  Out: 05719 [Urine:300; Other:50772]    Physical Exam:  General Appearance: Awake, alert, oriented x3 no obvious distress.  Comfortably in bed.  Eyes: PER, EOMI.  Neck: Supple no JVD.  Lungs: Clear auscultation, no rales rhonchi's, equal chest movement, nonlabored.  Heart: No gallop, murmur, rub, irregular  Abdomen: Tenckhoff catheter in place soft, nontender, positive bowel sounds, no organomegaly.  Extremities: No edema, no cyanosis.  Neuro: No focal deficit, moving all extremities, alert oriented X 3  Skin is warm and dry        WBC WBC   Date Value Ref Range Status   07/09/2021 13.62 (H) 3.40 - 10.80 10*3/mm3 Final   07/08/2021 15.87 (H) 3.40 - 10.80 10*3/mm3 Final      HGB Hemoglobin   Date Value Ref Range Status   07/09/2021 8.7 (L) 12.0 - 15.9 g/dL Final "   07/08/2021 9.3 (L) 12.0 - 15.9 g/dL Final      HCT Hematocrit   Date Value Ref Range Status   07/09/2021 28.6 (L) 34.0 - 46.6 % Final   07/08/2021 31.0 (L) 34.0 - 46.6 % Final      Platlets No results found for: LABPLAT   MCV MCV   Date Value Ref Range Status   07/09/2021 101.8 (H) 79.0 - 97.0 fL Final   07/08/2021 103.0 (H) 79.0 - 97.0 fL Final          Sodium Sodium   Date Value Ref Range Status   07/10/2021 133 (L) 136 - 145 mmol/L Final   07/09/2021 133 (L) 136 - 145 mmol/L Final   07/08/2021 128 (L) 136 - 145 mmol/L Final      Potassium Potassium   Date Value Ref Range Status   07/10/2021 3.7 3.5 - 5.2 mmol/L Final   07/09/2021 3.5 3.5 - 5.2 mmol/L Final   07/08/2021 3.7 3.5 - 5.2 mmol/L Final      Chloride Chloride   Date Value Ref Range Status   07/10/2021 98 98 - 107 mmol/L Final   07/09/2021 95 (L) 98 - 107 mmol/L Final   07/08/2021 91 (L) 98 - 107 mmol/L Final      CO2 CO2   Date Value Ref Range Status   07/10/2021 25.0 22.0 - 29.0 mmol/L Final   07/09/2021 26.0 22.0 - 29.0 mmol/L Final   07/08/2021 25.0 22.0 - 29.0 mmol/L Final      BUN BUN   Date Value Ref Range Status   07/10/2021 29 (H) 8 - 23 mg/dL Final   07/09/2021 34 (H) 8 - 23 mg/dL Final   07/08/2021 33 (H) 8 - 23 mg/dL Final      Creatinine Creatinine   Date Value Ref Range Status   07/10/2021 6.00 (H) 0.57 - 1.00 mg/dL Final   07/09/2021 6.30 (H) 0.57 - 1.00 mg/dL Final   07/08/2021 6.14 (H) 0.57 - 1.00 mg/dL Final      Calcium Calcium   Date Value Ref Range Status   07/10/2021 7.6 (L) 8.6 - 10.5 mg/dL Final   07/09/2021 7.4 (L) 8.6 - 10.5 mg/dL Final   07/08/2021 8.0 (L) 8.6 - 10.5 mg/dL Final      PO4 No results found for: CAPO4   Albumin Albumin   Date Value Ref Range Status   07/10/2021 2.50 (L) 3.50 - 5.20 g/dL Final   07/08/2021 2.60 (L) 3.50 - 5.20 g/dL Final      Magnesium No results found for: MG   Uric Acid No results found for: URICACID        Results Review:     I reviewed the patient's new clinical results.    amLODIPine, 5 mg,  Oral, Q24H  epoetin blanca/blanca-epbx, 20,000 Units, Subcutaneous, Weekly  escitalopram, 5 mg, Oral, QAM  insulin lispro, 0-7 Units, Subcutaneous, TID AC  metoprolol tartrate, 100 mg, Oral, Q12H  pantoprazole, 40 mg, Oral, QAM  potassium chloride, 20 mEq, Oral, Daily  saccharomyces boulardii, 250 mg, Oral, BID  sevelamer, 1,600 mg, Oral, TID With Meals  sodium chloride, 10 mL, Intravenous, Q12H  UltraBag/Dianeal PD-2/1.5% Dex ([employer] #[id number]), 2,000 mL, Intraperitoneal, 5x Daily  warfarin, 4 mg, Oral, Daily      Pharmacy to dose warfarin,         Medication Review:     Assessment/Plan       UTI (urinary tract infection)    Paroxysmal atrial fibrillation (CMS/[facility])    Essential hypertension    Type 2 diabetes mellitus (CMS/[facility])    Chronic kidney disease, stage IV (severe) (CMS/[facility])    Anemia of renal disease    Hypothyroidism    Hyperlipidemia LDL goal <100    Morbidly obese (CMS/[facility])    Urinary tract infection, site not specified    Demand ischemia (CMS/[facility])    1.  Generalized fatigue and weakness accompanied with leukocytosis possible UTI patient has been started on Rocephin at this time.  2.  ESRD on peritoneal dialysis.  Primary disease IgA nephropathy, failed renal transplant.  .  3.  Atrial fibrillation on Coumadin.  4.  Morbid obesity.  5.  Diabetes type 2.  6.  Failed renal transplant: On tacrolimus at this time.  Continue with tacrolimus for now. Level pending.   7.  Anemia of chronic kidney disease.  8.  Gram-positive cocci on blood cultures treated by vancomycin    Plan:  Blood pressure improved with addition of Norvasc 5 mg daily  Continue oral potassium daily.  Continue immunosuppressive medication.  Continue with antibiotic.  LIDIA weekly.    Continue with PD, PD fluid is clear.    I discussed the patients findings and my recommendations with patient and the RN    Allen Chambers MD  07/11/21  11:38 EDT

## 2021-07-11 NOTE — PLAN OF CARE
Goal Outcome Evaluation:  Patient struggling with severe nausea today. PRN meds given.  Drained off 3,350 ml of PD and patient reports that she feels a little better. Patient having loose stools. Sludge like consistency. Patient reports feeling worse today but was very nonspecific.  Patient pleasant. Patient attempted to eat a couple of bites from each meal tray. Patient remains in NSR. Titrated oxygen down to 1L/min NC.

## 2021-07-11 NOTE — PLAN OF CARE
Goal Outcome Evaluation:  Plan of Care Reviewed With: patient        Progress: improving  Outcome Summary: A&Ox4, VSS, on RA. PD per orders. Denies any c/o pain or N/V. D/C plans are home tomorrow if medically stable, and nausea and HR have been stable. Denies further needs at this time.

## 2021-07-11 NOTE — PROGRESS NOTES
[facility] Medicine Services  PROGRESS NOTE    Patient Name: Moni Wallace  : 1940  MRN: [id number]    Date of Admission: 2021  Primary Care Physician: Alex Walters MD    Subjective   Subjective     CC:  Weakness     HPI:  Feels mostly better but still having some nausea. Diarrhea improved. Feels closer to discharge- maybe tomorrow. No family present    ROS:  Gen- No fevers, chills  CV- No chest pain, palpitations  Resp- No cough, dyspnea  GI- No, abd pain, + nausea          Objective   Objective     Vital Signs:   Temp:  [97.7 °F (36.5 °C)-98.4 °F (36.9 °C)] 97.8 °F (36.6 °C)  Heart Rate:  [] 90  Resp:  [16-20] 18  BP: (132-178)/() 134/88        Physical Exam:  Constitutional: No acute distress, awake, alert, frail   HENT: NCAT, mucous membranes moist  Respiratory: Clear to auscultation bilaterally, respiratory effort normal on 2L  Cardiovascular: RRR, no murmurs, rubs, or gallops  Gastrointestinal: Positive bowel sounds, soft, nontender, nondistended  Musculoskeletal: No bilateral ankle edema  Psychiatric: Appropriate affect, cooperative  Neurologic: Oriented x 3, strength symmetric in all extremities, Cranial Nerves grossly intact to confrontation, speech clear  Skin: No rashes, pale       Results Reviewed:  Results from last 7 days   Lab Units 21  1020 07/10/21  0846 21  0710 21  1953 21  0622 21  0741 21  1201   WBC 10*3/mm3  --   --  13.62* 15.87* 15.60* 14.52* 15.22*   HEMOGLOBIN g/dL  --   --  8.7* 9.3* 8.8* 9.2* 10.4*   HEMATOCRIT %  --   --  28.6* 31.0* 29.6* 29.7* 33.6*   PLATELETS 10*3/mm3  --   --  212 235 216 218 242   INR  2.30* 2.16* 2.34*  --  2.12* 1.74* 1.65*   PROCALCITONIN ng/mL  --   --   --   --   --  0.22 0.24     Results from last 7 days   Lab Units 07/10/21  0826 21  0710 21  0008 21  1953 21  1738 21  1201 21  1201   SODIUM mmol/L 133* 133*  --  128*  --    < >  141   POTASSIUM mmol/L 3.7 3.5  --  3.7  --    < > 3.6   CHLORIDE mmol/L 98 95*  --  91*  --    < > 102   CO2 mmol/L 25.0 26.0  --  25.0  --    < > 26.0   BUN mg/dL 29* 34*  --  33*  --    < > 38*   CREATININE mg/dL 6.00* 6.30*  --  6.14*  --    < > 6.37*   GLUCOSE mg/dL 126* 90  --  143*  --    < > 147*   CALCIUM mg/dL 7.6* 7.4*  --  8.0*  --    < > 7.5*   ALT (SGPT) U/L  --   --   --  11  --   --  10   AST (SGOT) U/L  --   --   --  19  --   --  20   TROPONIN T ng/mL  --   --  0.048* 0.043* 0.048*  --  0.055*    < > = values in this interval not displayed.     Estimated Creatinine Clearance: 8.2 mL/min (A) (by C-G formula based on SCr of 6 mg/dL (H)).    Microbiology Results Abnormal     Procedure Component Value - Date/Time    Blood Culture - Blood, Arm, Left [[id number]] Collected: 07/06/21 1340    Lab Status: Preliminary result Specimen: Blood from Arm, Left Updated: 07/10/21 1430     Blood Culture No growth at 4 days    Blood Culture - Blood, Arm, Left [[id number]] Collected: 07/07/21 1333    Lab Status: Preliminary result Specimen: Blood from Arm, Left Updated: 07/10/21 1430     Blood Culture No growth at 3 days      Aerobic bottle only    Blood Culture - Blood, Wrist, Left [[id number]] Collected: 07/07/21 1335    Lab Status: Preliminary result Specimen: Blood from Wrist, Left Updated: 07/10/21 1430     Blood Culture No growth at 3 days      Aerobic bottle only    Body Fluid Culture - Body Fluid, Peritoneum [[id number]] Collected: 07/06/21 2159    Lab Status: Final result Specimen: Body Fluid from Peritoneum Updated: 07/10/21 0741     Body Fluid Culture No growth at 3 days     Gram Stain No WBCs seen      No organisms seen    Urine Culture - Urine, Urine, Catheter [[id number]]  (Abnormal) Collected: 07/06/21 1240    Lab Status: Final result Specimen: Urine, Catheter Updated: 07/09/21 1024     Urine Culture >100,000 CFU/mL Lactobacillus species     Comment: Clindamycin, penicillin, and ampicillin are generally the  most active agents.  Resistance to vancomycin and aminoglycosides is commonly reported.         Blood Culture - Blood, Arm, Right [[id number]]  (Abnormal) Collected: 07/06/21 1412    Lab Status: Final result Specimen: Blood from Arm, Right Updated: 07/08/21 0641     Blood Culture Staphylococcus, coagulase negative     Comment: Probable contaminant requires clinical correlation, susceptibility not performed unless requested by physician.          Isolated from Anaerobic Bottle     Gram Stain Anaerobic Bottle Gram positive cocci in pairs and clusters    Blood Culture ID, PCR - Blood, Arm, Right [[id number]]  (Abnormal) Collected: 07/06/21 1412    Lab Status: Final result Specimen: Blood from Arm, Right Updated: 07/07/21 1024     BCID, PCR Staphylococcus spp, not aureus. Identification by BCID PCR.     BOTTLE TYPE Anaerobic Bottle    COVID PRE-OP / PRE-PROCEDURE SCREENING ORDER (NO ISOLATION) - Swab, Nasopharynx [[id number]]  (Normal) Collected: 07/06/21 2159    Lab Status: Final result Specimen: Swab from Nasopharynx Updated: 07/06/21 2311    Narrative:      The following orders were created for panel order COVID PRE-OP / PRE-PROCEDURE SCREENING ORDER (NO ISOLATION) - Swab, Nasopharynx.  Procedure                               Abnormality         Status                     ---------                               -----------         ------                     Respiratory Panel PCR w/...[[id number]]  Normal              Final result                 Please view results for these tests on the individual orders.    Respiratory Panel PCR w/COVID-19(SARS-CoV-2) FAHAD/NEDRA/ANDREI/PAD/COR/MAD/ILA In-House, NP Swab in [facility], 3-4 HR TAT - Swab, Nasopharynx [[id number]]  (Normal) Collected: 07/06/21 2159    Lab Status: Final result Specimen: Swab from Nasopharynx Updated: 07/06/21 2311     ADENOVIRUS, PCR Not Detected     Coronavirus 229E Not Detected     Coronavirus HKU1 Not Detected     Coronavirus NL63 Not Detected     Coronavirus OC43  Not Detected     COVID19 Not Detected     Human Metapneumovirus Not Detected     Human Rhinovirus/Enterovirus Not Detected     Influenza A PCR Not Detected     Influenza B PCR Not Detected     Parainfluenza Virus 1 Not Detected     Parainfluenza Virus 2 Not Detected     Parainfluenza Virus 3 Not Detected     Parainfluenza Virus 4 Not Detected     RSV, PCR Not Detected     Bordetella pertussis pcr Not Detected     Bordetella parapertussis PCR Not Detected     Chlamydophila pneumoniae PCR Not Detected     Mycoplasma pneumo by PCR Not Detected    Narrative:      In the setting of a positive respiratory panel with a viral infection PLUS a negative procalcitonin without other underlying concern for bacterial infection, consider observing off antibiotics or discontinuation of antibiotics and continue supportive care. If the respiratory panel is positive for atypical bacterial infection (Bordetella pertussis, Chlamydophila pneumoniae, or Mycoplasma pneumoniae), consider antibiotic de-escalation to target atypical bacterial infection.          Imaging Results (Last 24 Hours)     ** No results found for the last 24 hours. **          Results for orders placed during the hospital encounter of 01/11/21    Transthoracic Echo Complete With Contrast if Necessary Per Protocol    Interpretation Summary  · Left ventricular systolic function is normal. Estimated left ventricular EF = 65%  · Left ventricular wall thickness is consistent with hypertrophy.  · Left atrial volume is mildly increased.  · The aortic valve is calcified. There is mild aortic stenosis present. There is moderate aortic regurgitation present.  · Estimated right ventricular systolic pressure from tricuspid regurgitation is normal (<35 mmHg).      I have reviewed the medications:  Scheduled Meds:amLODIPine, 5 mg, Oral, Q24H  epoetin blanca/blanca-epbx, 20,000 Units, Subcutaneous, Weekly  escitalopram, 5 mg, Oral, QAM  insulin lispro, 0-7 Units, Subcutaneous, TID  "AC  metoprolol tartrate, 100 mg, Oral, Q12H  pantoprazole, 40 mg, Oral, QAM  potassium chloride, 20 mEq, Oral, Daily  saccharomyces boulardii, 250 mg, Oral, BID  sevelamer, 1,600 mg, Oral, TID With Meals  sodium chloride, 10 mL, Intravenous, Q12H  UltraBag/Dianeal PD-2/1.5% Dex ([employer] #[id number]), 2,000 mL, Intraperitoneal, 5x Daily  warfarin, 4 mg, Oral, Daily      Continuous Infusions:Pharmacy to dose warfarin,       PRN Meds:.•  acetaminophen  •  dextrose  •  dextrose  •  glucagon (human recombinant)  •  haloperidol  •  ondansetron **OR** ondansetron  •  Pharmacy to dose warfarin  •  sodium chloride  •  sodium chloride  •  temazepam  •  UltraBag/Dianeal PD-2/1.5% Dex ([employer] #[id number])    Assessment/Plan   Assessment & Plan     Active Hospital Problems    Diagnosis  POA   • **UTI (urinary tract infection) [N39.0]  Yes   • Demand ischemia (CMS/HCC) [I24.8]  Yes   • Urinary tract infection, site not specified [N39.0]  Yes   • Morbidly obese (CMS/HCC) [E66.01]  Yes   • Hyperlipidemia LDL goal <100 [E78.5]  Yes   • Hypothyroidism [E03.9]  Yes   • Essential hypertension [I10]  Yes   • Anemia of renal disease [N18.9, D63.1]  Yes   • Chronic kidney disease, stage IV (severe) (CMS/HCC) [N18.4]  Yes   • Paroxysmal atrial fibrillation (CMS/HCC) [I48.0]  Yes   • Type 2 diabetes mellitus (CMS/HCC) [E11.9]  Yes      Resolved Hospital Problems   No resolved problems to display.        Brief Hospital Course to date:  Moni Wallace is a 81 y.o. female IgA nephropathy s/p transplant, history of RUE AVF, PAF on coumadin history of septic knee arthritis s/p I&D, now on PD, with 5-6 days of \"not feeling well\". She notes mild dyspnea worse with exertion.     Weakness/fatigue  Leukocytosis - chronically elevated over the last year   Acute UTI  - CT abd/pel - nonspecific potentially incidental finding of cystic structure along pancreatic body; ascites related to PD; no other acute findings  - fluid culture with no WBC or organisms seen; " COVID and resp PCR negative   - UCx growth >100,000 Lactobacillus species   - Completed course of rocephin    Nausea  Loose stools  -- hx of cdiff  -- if stools increase in freq or amount will check for cdiff- better today   -- start probiotic      + blood culture  - gram positive cocci in pairs and clusters  - Repeat cultures NGTD  - Vanc x 1 and will now discontinue      CKD, on PD, hx of IgA nephropathy, s/p renal transplant, chronic rejection  --consult [facility] for PD     Paroxysmal Afib  - Follows with Dr. Joe   - on coumadin - pharmacy to dose  - Unsure if vague symptoms and SOA related to being in afib. Cardiology consulted; recommended rate control; patient was in sinus but now back in afib- rates controlled at this time      Elevated troponin - trending down   Chest pain   -- Although unclear significance in ESRD  -- episode of CP 7/8; troponin flat; EKG reviewed with no acute changed; CXR with mild pulm congestion      Obesity     DM  --SSI     Hx of HTN - stable   --on BB; continue      Pancreatic cystic mass, again noted on CT  --needs follow up with MRI; patient doesn't think she can tolerate it without medication for anxiety   - prior provider Reviewed with patient and sons       DVT prophylaxis:  Medical and mechanical DVT prophylaxis orders are present.       Disposition: I expect the patient to be discharged home tomorrow if rates stable and nausea improved    CODE STATUS:   Code Status and Medical Interventions:   Ordered at: 07/06/21 1525     Code Status:    CPR     Medical Interventions (Level of Support Prior to Arrest):    Full       Vicky Tsai MD  07/11/21

## 2021-07-12 NOTE — CASE MANAGEMENT/SOCIAL WORK
Case Management Discharge Note    Final Note:     I met with Ms. Wallace at the bedside today to discuss discharge planning. She is medically ready for discharge today and she is agreeable to this. Ms. Wallace denies the need for any further DME in the home, she has returned to her baseline function. Ms. Wallace's son will transport her home safely today by car. No futher discharge needs identified.        Provided Post Acute Provider List?: N/A  N/A Provider List Comment: denies need for outpt services  Provided Post Acute Provider Quality & Resource List?: N/A            Transportation Services  Private: Car    Final Discharge Disposition Code: 01 - home or self-care

## 2021-07-12 NOTE — PROGRESS NOTES
"   LOS: 5 days    Patient Care Team:  Alex Walters MD as PCP - General (Internal Medicine)  Jeff Joe IV, MD as Consulting Physician (Cardiology)  Donny Hodges MD as Consulting Physician (Nephrology)  Cory Castillo MD as Surgeon (General Surgery)  Slim Wick MD    Chief Complaint:    ESRD on peritoneal dialysis  Subjective     Interval History:   Much better today.  No new complaints.  Atrial fibrillation is noted  Review of Systems:   Patient denies shortness of breath, chest pain, dysuria, hematuria, nausea, vomiting.        Objective     Vital Sign Min/Max for last 24 hours  Temp  Min: 97.5 °F (36.4 °C)  Max: 98.5 °F (36.9 °C)   BP  Min: 123/86  Max: 139/59   Pulse  Min: 64  Max: 103   Resp  Min: 16  Max: 18   SpO2  Min: 92 %  Max: 96 %   Flow (L/min)  Min: 2  Max: 2   No data recorded     Flowsheet Rows      First Filed Value   Admission Height  162.6 cm (64\") Documented at 07/06/2021 1154   Admission Weight  77.1 kg (170 lb) Documented at 07/06/2021 1154          I/O this shift:  In: 240 [P.O.:240]  Out: 2535 [Other:2535]  I/O last 3 completed shifts:  In: 11087 [P.O.:883; Other:73150]  Out: 97406 [Urine:50; Other:19749]    Physical Exam:  General Appearance: Awake, alert, oriented x3 no obvious distress.  Comfortably in bed.  Eyes: PER, EOMI.  Neck: Supple no JVD.  Lungs: Clear auscultation, no rales rhonchi's, equal chest movement, nonlabored.  Heart: No gallop, murmur, rub, irregular  Abdomen: Tenckhoff catheter in place soft, nontender, positive bowel sounds, no organomegaly.  Extremities: No edema, no cyanosis.  Neuro: No focal deficit, moving all extremities, alert oriented X 3  Skin is warm and dry        WBC WBC   Date Value Ref Range Status   07/12/2021 12.08 (H) 3.40 - 10.80 10*3/mm3 Final      HGB Hemoglobin   Date Value Ref Range Status   07/12/2021 9.8 (L) 12.0 - 15.9 g/dL Final      HCT Hematocrit   Date Value Ref Range Status   07/12/2021 32.6 (L) " 34.0 - 46.6 % Final      Platlets No results found for: LABPLAT   MCV MCV   Date Value Ref Range Status   07/12/2021 105.5 (H) 79.0 - 97.0 fL Final          Sodium Sodium   Date Value Ref Range Status   07/12/2021 135 (L) 136 - 145 mmol/L Final   07/10/2021 133 (L) 136 - 145 mmol/L Final      Potassium Potassium   Date Value Ref Range Status   07/12/2021 3.8 3.5 - 5.2 mmol/L Final   07/10/2021 3.7 3.5 - 5.2 mmol/L Final      Chloride Chloride   Date Value Ref Range Status   07/12/2021 97 (L) 98 - 107 mmol/L Final   07/10/2021 98 98 - 107 mmol/L Final      CO2 CO2   Date Value Ref Range Status   07/12/2021 25.0 22.0 - 29.0 mmol/L Final   07/10/2021 25.0 22.0 - 29.0 mmol/L Final      BUN BUN   Date Value Ref Range Status   07/12/2021 27 (H) 8 - 23 mg/dL Final   07/10/2021 29 (H) 8 - 23 mg/dL Final      Creatinine Creatinine   Date Value Ref Range Status   07/12/2021 6.45 (H) 0.57 - 1.00 mg/dL Final   07/10/2021 6.00 (H) 0.57 - 1.00 mg/dL Final      Calcium Calcium   Date Value Ref Range Status   07/12/2021 7.8 (L) 8.6 - 10.5 mg/dL Final   07/10/2021 7.6 (L) 8.6 - 10.5 mg/dL Final      PO4 No results found for: CAPO4   Albumin Albumin   Date Value Ref Range Status   07/12/2021 2.50 (L) 3.50 - 5.20 g/dL Final   07/10/2021 2.50 (L) 3.50 - 5.20 g/dL Final      Magnesium No results found for: MG   Uric Acid No results found for: URICACID        Results Review:     I reviewed the patient's new clinical results.    amLODIPine, 5 mg, Oral, Q24H  epoetin blanca/blanca-epbx, 20,000 Units, Subcutaneous, Weekly  escitalopram, 5 mg, Oral, QAM  insulin lispro, 0-7 Units, Subcutaneous, TID AC  metoprolol tartrate, 100 mg, Oral, Q12H  pantoprazole, 40 mg, Oral, QAM  potassium chloride, 20 mEq, Oral, Daily  saccharomyces boulardii, 250 mg, Oral, BID  sevelamer, 1,600 mg, Oral, TID With Meals  sodium chloride, 10 mL, Intravenous, Q12H  UltraBag/Dianeal PD-2/1.5% Dex (pappas #[id number]), 2,000 mL, Intraperitoneal, 5x Daily  [START ON  7/13/2021] warfarin, 4 mg, Oral, Once per day on Sun Tue Thu Sat  warfarin, 6 mg, Oral, Once per day on Mon Wed Fri      Pharmacy to dose warfarin,         Medication Review:     Assessment/Plan       UTI (urinary tract infection)    Paroxysmal atrial fibrillation (CMS/[facility])    Essential hypertension    Type 2 diabetes mellitus (CMS/[facility])    Chronic kidney disease, stage IV (severe) (CMS/[facility])    Anemia of renal disease    Hypothyroidism    Hyperlipidemia LDL goal <100    Morbidly obese (CMS/[facility])    Urinary tract infection, site not specified    Demand ischemia (CMS/[facility])    1.  Generalized fatigue and weakness accompanied with leukocytosis possible UTI patient has been started on Rocephin at this time.  2.  ESRD on peritoneal dialysis.  Primary disease IgA nephropathy, failed renal transplant.  .  3.  Atrial fibrillation on Coumadin.  4.  Morbid obesity.  5.  Diabetes type 2.  6.  Failed renal transplant: On tacrolimus at this time.  Continue with tacrolimus for now. Level pending.   7.  Anemia of chronic kidney disease.  8.  Gram-positive cocci on blood cultures treated by vancomycin    Plan:  Continue oral potassium daily.  Continue immunosuppressive medication.  LIDIA weekly.    Continue with PD    I discussed the patients findings and my recommendations with patient and the RN    Albina Will MD  07/12/21  13:13 EDT

## 2021-07-12 NOTE — OUTREACH NOTE
Prep Survey      Responses   [facility] patient discharged from?  [place]   Is LACE score < 7 ?  No   Emergency Room discharge w/ pulse ox?  No   Eligibility  [facility]   Date of Admission  07/06/21   Date of Discharge  07/12/21   Discharge Disposition  Home or Self Care   Discharge diagnosis  UTI, hx- kidney transplant,   Does the patient have one of the following disease processes/diagnoses(primary or secondary)?  Other   Does the patient have Home health ordered?  No   Is there a DME ordered?  No   Medication alerts for this patient  Meds to Beds   Prep survey completed?  Yes          Mya Gillespie RN

## 2021-07-12 NOTE — PROGRESS NOTES
"Pharmacy Consult  -  Warfarin    Moni Wallace is a  81 y.o. female   Height - 162.6 cm (64\")  Weight - 90.7 kg (200 lb)    Consulting Provider: Hospitalist  Indication: A fib  Goal INR: 2-3  Home Regimen:   - Warfarin 4 mg on Sunday, Wednesday, Thursday, Saturday   - Warfarin 6 mg on Monday, Tuesday, Friday    Bridge Therapy: None     Drug-Drug Interactions with current regimen:     aspirin - increased bleed risk              Warfarin Dosing During Admission:    Date  7/6 7/7 7/8 7/9 7/10 7/11 7/12     INR  1.65 1.74 2.12 2.34 2.16 2.3 1.94     Dose  5 mg 6 mg 4 mg 6 mg  4 mg 4 mg 6 mg       Education Provided: [facility], education previously provided by pharmacist. Will address any questions/concerns as needed.    Discharge Follow up:     Following Provider [facility]     Follow up time range or appointment - Recommend repeat INR within 2-3 days of discharge    Labs:    Results from last 7 days   Lab Units 07/12/21  0909 07/11/21  1020 07/10/21  0846 07/09/21  0710 07/08/21 1953 07/08/21  0622 07/07/21  0741 07/06/21  1201   INR  1.94* 2.30* 2.16* 2.34*  --  2.12* 1.74* 1.65*   HEMOGLOBIN g/dL 9.8*  --   --  8.7* 9.3* 8.8* 9.2* 10.4*   HEMATOCRIT % 32.6*  --   --  28.6* 31.0* 29.6* 29.7* 33.6*     Results from last 7 days   Lab Units 07/12/21  0909 07/10/21  0826 07/09/21  0710 07/08/21 1953 07/06/21  1201   SODIUM mmol/L 135* 133* 133* 128* 141   POTASSIUM mmol/L 3.8 3.7 3.5 3.7 3.6   CHLORIDE mmol/L 97* 98 95* 91* 102   CO2 mmol/L 25.0 25.0 26.0 25.0 26.0   BUN mg/dL 27* 29* 34* 33* 38*   CREATININE mg/dL 6.45* 6.00* 6.30* 6.14* 6.37*   CALCIUM mg/dL 7.8* 7.6* 7.4* 8.0* 7.5*   BILIRUBIN mg/dL <0.2  --   --  <0.2 <0.2   ALK PHOS U/L 173*  --   --  167* 204*   ALT (SGPT) U/L 11  --   --  11 10   AST (SGOT) U/L 18  --   --  19 20   GLUCOSE mg/dL 166* 126* 90 143* 147*     Current dietary intake: 100% of meals documented on 6/11.      Diet Order "   Procedures    Diet Regular; Consistent Carbohydrate     Assessment/Plan:    Patient's INR is slightly SUB therapeutic today at 1.94.  She is due to larger dose tonight.  Will continue patient's home dose and give warfarin 6 mg tonight.   Recommend to change home regimen (days of week) to warfarin 4 mg oral daily except 6 mg MonWedFri  Daily PT/INR ordered.  Monitor signs/symptoms of bleeding, dietary intake, and drug-drug interactions. Make dose adjustments as necessary.  Pharmacy will continue to follow.    Radha Michaud, PharmD  7/12/2021  11:29 EDT

## 2021-07-12 NOTE — DISCHARGE SUMMARY
"    [facility] Medicine Services  DISCHARGE SUMMARY    Patient Name: Moni Wallace  : 1940  MRN: [id number]    Date of Admission: 2021 11:49 AM  Date of Discharge:   Primary Care Physician: Alex Walters MD    Consults     Date and Time Order Name Status Description    2021 10:00 AM Inpatient Cardiology Consult Completed     2021  4:34 PM Inpatient Nephrology Consult Completed           Hospital Course     Presenting Problem:   UTI (urinary tract infection) [N39.0]  Urinary tract infection, site not specified [N39.0]  Urinary tract infection without hematuria, site unspecified [N39.0]    Active Hospital Problems    Diagnosis  POA   • **UTI (urinary tract infection) [N39.0]  Yes   • Demand ischemia (CMS/HCC) [I24.8]  Yes   • Urinary tract infection, site not specified [N39.0]  Yes   • Morbidly obese (CMS/HCC) [E66.01]  Yes   • Hyperlipidemia LDL goal <100 [E78.5]  Yes   • Hypothyroidism [E03.9]  Yes   • Essential hypertension [I10]  Yes   • Anemia of renal disease [N18.9, D63.1]  Yes   • Chronic kidney disease, stage IV (severe) (CMS/HCC) [N18.4]  Yes   • Paroxysmal atrial fibrillation (CMS/HCC) [I48.0]  Yes   • Type 2 diabetes mellitus (CMS/HCC) [E11.9]  Yes      Resolved Hospital Problems   No resolved problems to display.        Hospital Course:  Moni Wallace is a 81 y.o. female IgA nephropathy s/p transplant, history of RUE AVF, PAF on coumadin history of septic knee arthritis s/p I&D, now on PD, with 5-6 days of \"not feeling well\". She notes mild dyspnea worse with exertion.     Weakness/fatigue  Leukocytosis - chronically elevated over the last year   Acute UTI  - CT abd/pel - nonspecific potentially incidental finding of cystic structure along pancreatic body; ascites related to PD; no other acute findings  - fluid culture with no WBC or organisms seen; COVID and resp PCR negative   - UCx growth >100,000 Lactobacillus species   - Completed course of " rocephin     Nausea  Loose stools  -- hx of cdiff, diarrhea and nausea have all improved, no samples were collected, patient placed on probiotics.    + blood culture  - gram positive cocci in pairs and clusters, speciated to coagulase-negative staph, likely contaminant repeat cultures NGTD, no need for ongoing antibiotics, she is status post vancomycin x1.     CKD, on PD, hx of IgA nephropathy, s/p renal transplant, chronic rejection  --NAL was consulted for PD  --Continue immunosuppressive therapy     Paroxysmal Afib  - Unsure if vague symptoms and SOA were related to being in afib. Cardiology consulted; recommended rate control; patient was in sinus but now back in afib- rates controlled at this time   - Follows with Dr. 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    Review of Systems  Gen- No fevers, chills  CV- No chest pain, palpitations  Resp- No cough, dyspnea  GI- No N/V/D, abd pain    All other systems reviewed and are negative    Vital Signs:   Temp:  [97.5 °F (36.4 °C)-98.5 °F (36.9 °C)] 98.5 °F (36.9 °C)  Heart Rate:  []  68  Resp:  [16-18] 18  BP: (123-139)/(59-88) 139/59     Physical Exam:  Constitutional: Elderly lady, in no acute distress, awake, alert  HENT: NCAT, mucous membranes moist  Respiratory: Clear to auscultation bilaterally, respiratory effort normal   Cardiovascular: RRR, no murmurs, rubs, or gallops  Gastrointestinal: Positive bowel sounds, soft, nontender, nondistended, PD cath in place  Musculoskeletal: No bilateral ankle edema  Psychiatric: Appropriate affect, cooperative  Neurologic: Oriented x 3 nonfocal  Skin: No rashes    Pertinent  and/or Most Recent Results     LAB RESULTS:      Lab 07/12/21  0909 07/11/21  1020 07/10/21  0846 07/09/21  0710 07/08/21  1953 07/08/21  0622 07/07/21  1335 07/07/21  0741 07/06/21  1356 07/06/21  1201   WBC 12.08*  --   --  13.62* 15.87* 15.60*  --  14.52*  --  15.22*   HEMOGLOBIN 9.8*  --   --  8.7* 9.3* 8.8*  --  9.2*  --  10.4*   HEMATOCRIT 32.6*  --   --  28.6* 31.0* 29.6*  --  29.7*  --  33.6*   PLATELETS 246  --   --  212 235 216  --  218  --  242   NEUTROS ABS 8.61*  --   --   --  12.62*  --   --   --   --  11.51*   IMMATURE GRANS (ABS) 0.16*  --   --   --  0.20*  --   --   --   --  0.13*   LYMPHS ABS 2.16  --   --   --  2.28  --   --   --   --  2.42   MONOS ABS 0.80  --   --   --  0.65  --   --   --   --  0.83   EOS ABS 0.31  --   --   --  0.09  --   --   --   --  0.30   .5*  --   --  101.8* 103.0* 103.9*  --  102.4*  --  100.0*   PROCALCITONIN  --   --   --   --   --   --   --  0.22  --  0.24   LACTATE  --   --   --   --  1.9  --  1.8  --  1.8  --    PROTIME 21.4* 24.4* 23.2* 24.7*  --  22.9*  --  19.7*  --  18.9*         Lab 07/10/21  0826 07/09/21  0710 07/08/21  1953 07/08/21  0622 07/07/21  0741 07/06/21  1411 07/06/21  1201   SODIUM 133* 133* 128* 133* 134*  --  141   POTASSIUM 3.7 3.5 3.7 3.9 3.7  --  3.6   CHLORIDE 98 95* 91* 97* 97*  --  102   CO2 25.0 26.0 25.0 24.0 24.0  --  26.0   ANION GAP 10.0 12.0 12.0 12.0 13.0  --  13.0   BUN 29* 34* 33* 37* 41*   --  38*   CREATININE 6.00* 6.30* 6.14* 5.97* 6.62*  --  6.37*   GLUCOSE 126* 90 143* 98 130*  --  147*   CALCIUM 7.6* 7.4* 8.0* 7.6* 7.0*  7.4*  --  7.5*   IONIZED CALCIUM  --   --   --   --   --  0.98*  --    MAGNESIUM  --   --   --   --   --   --  2.3   PHOSPHORUS 4.8*  --   --   --  4.3  --   --          Lab 07/10/21  0826 07/08/21 1953 07/07/21  0741 07/06/21  1201   TOTAL PROTEIN  --  6.7  --  7.4   ALBUMIN 2.50* 2.60* 2.60* 3.00*   GLOBULIN  --  4.1  --  4.4   ALT (SGPT)  --  11  --  10   AST (SGOT)  --  19  --  20   BILIRUBIN  --  <0.2  --  <0.2   ALK PHOS  --  167*  --  204*         Lab 07/12/21  0909 07/11/21  1020 07/10/21  0846 07/09/21  0710 07/09/21  0008 07/08/21 1953 07/08/21  0622 07/06/21  1738 07/06/21  1201   TROPONIN T  --   --   --   --  0.048* 0.043*  --  0.048* 0.055*   PROTIME 21.4* 24.4* 23.2* 24.7*  --   --  22.9*  --  18.9*   INR 1.94* 2.30* 2.16* 2.34*  --   --  2.12*  --  1.65*             Lab 07/07/21  0741   IRON 43   IRON SATURATION 25   TIBC 171*   TRANSFERRIN 115*         Brief Urine Lab Results  (Last result in the past 365 days)      Color   Clarity   Blood   Leuk Est   Nitrite   Protein   CREAT   Urine HCG        07/06/21 1240 Yellow Cloudy Small (1+) Large (3+) Negative 100 mg/dL (2+)             Microbiology Results (last 10 days)     Procedure Component Value - Date/Time    Blood Culture - Blood, Wrist, Left [[id number]] Collected: 07/07/21 1335    Lab Status: Preliminary result Specimen: Blood from Wrist, Left Updated: 07/11/21 1430     Blood Culture No growth at 4 days      Aerobic bottle only    Blood Culture - Blood, Arm, Left [[id number]] Collected: 07/07/21 1333    Lab Status: Preliminary result Specimen: Blood from Arm, Left Updated: 07/11/21 1430     Blood Culture No growth at 4 days      Aerobic bottle only    Body Fluid Culture - Body Fluid, Peritoneum [[id number]] Collected: 07/06/21 2159    Lab Status: Final result Specimen: Body Fluid from Peritoneum Updated:  07/10/21 0741     Body Fluid Culture No growth at 3 days     Gram Stain No WBCs seen      No organisms seen    COVID PRE-OP / PRE-PROCEDURE SCREENING ORDER (NO ISOLATION) - Swab, Nasopharynx [[id number]]  (Normal) Collected: 07/06/21 2159    Lab Status: Final result Specimen: Swab from Nasopharynx Updated: 07/06/21 2311    Narrative:      The following orders were created for panel order COVID PRE-OP / PRE-PROCEDURE SCREENING ORDER (NO ISOLATION) - Swab, Nasopharynx.  Procedure                               Abnormality         Status                     ---------                               -----------         ------                     Respiratory Panel PCR w/...[[id number]]  Normal              Final result                 Please view results for these tests on the individual orders.    Respiratory Panel PCR w/COVID-19(SARS-CoV-2) FAHAD/NEDRA/ANDREI/PAD/COR/MAD/ILA In-House, NP Swab in UTM/VTM, 3-4 HR TAT - Swab, Nasopharynx [[id number]]  (Normal) Collected: 07/06/21 2159    Lab Status: Final result Specimen: Swab from Nasopharynx Updated: 07/06/21 2311     ADENOVIRUS, PCR Not Detected     Coronavirus 229E Not Detected     Coronavirus HKU1 Not Detected     Coronavirus NL63 Not Detected     Coronavirus OC43 Not Detected     COVID19 Not Detected     Human Metapneumovirus Not Detected     Human Rhinovirus/Enterovirus Not Detected     Influenza A PCR Not Detected     Influenza B PCR Not Detected     Parainfluenza Virus 1 Not Detected     Parainfluenza Virus 2 Not Detected     Parainfluenza Virus 3 Not Detected     Parainfluenza Virus 4 Not Detected     RSV, PCR Not Detected     Bordetella pertussis pcr Not Detected     Bordetella parapertussis PCR Not Detected     Chlamydophila pneumoniae PCR Not Detected     Mycoplasma pneumo by PCR Not Detected    Narrative:      In the setting of a positive respiratory panel with a viral infection PLUS a negative procalcitonin without other underlying concern for bacterial infection,  consider observing off antibiotics or discontinuation of antibiotics and continue supportive care. If the respiratory panel is positive for atypical bacterial infection (Bordetella pertussis, Chlamydophila pneumoniae, or Mycoplasma pneumoniae), consider antibiotic de-escalation to target atypical bacterial infection.    Blood Culture - Blood, Arm, Right [[id number]]  (Abnormal) Collected: 07/06/21 1412    Lab Status: Final result Specimen: Blood from Arm, Right Updated: 07/08/21 0641     Blood Culture Staphylococcus, coagulase negative     Comment: Probable contaminant requires clinical correlation, susceptibility not performed unless requested by physician.          Isolated from Anaerobic Bottle     Gram Stain Anaerobic Bottle Gram positive cocci in pairs and clusters    Blood Culture ID, PCR - Blood, Arm, Right [[id number]]  (Abnormal) Collected: 07/06/21 1412    Lab Status: Final result Specimen: Blood from Arm, Right Updated: 07/07/21 1024     BCID, PCR Staphylococcus spp, not aureus. Identification by BCID PCR.     BOTTLE TYPE Anaerobic Bottle    Blood Culture - Blood, Arm, Left [[id number]] Collected: 07/06/21 1340    Lab Status: Final result Specimen: Blood from Arm, Left Updated: 07/11/21 1430     Blood Culture No growth at 5 days    Urine Culture - Urine, Urine, Catheter [[id number]]  (Abnormal) Collected: 07/06/21 1240    Lab Status: Final result Specimen: Urine, Catheter Updated: 07/09/21 1024     Urine Culture >100,000 CFU/mL Lactobacillus species     Comment: Clindamycin, penicillin, and ampicillin are generally the most active agents.  Resistance to vancomycin and aminoglycosides is commonly reported.               CT Abdomen Pelvis Without Contrast    Result Date: 7/6/2021  EXAMINATION: CT ABDOMEN PELVIS WO CONTRAST-  INDICATION: gen weak, WBC 15K  TECHNIQUE: Axial noncontrast CT of the abdomen and pelvis with multiplanar reconstruction  The radiation dose reduction device was turned on for each scan per  the ALARA (As Low as Reasonably Achievable) protocol.  COMPARISON: 1/11/2021  FINDINGS: The lung bases are grossly clear. The body wall soft tissues demonstrate mild anasarca and a presumed dialysis catheter projects unchanged in the pelvis, with moderate surrounding ascites extending up into the paracolic gutters and around the liver and spleen. There is no evidence of acute fracture or aggressive osseous lesion. The liver, spleen and bilateral adrenal glands demonstrate no evidence of new suspicious focal lesion, with a redemonstrated low density, likely benign left adrenal cyst noted. There has been interval enlargement of a previously noted cystic density finding along the pancreatic body, today measuring 21 x 37 mm, previously 17 x 24 mm, not well characterized. Small and large bowel loops are otherwise nondilated. There is no suspicious focal bowel wall thickening. The appendix is normal. The pelvic viscera are otherwise unremarkable. Severely atrophic bilateral kidneys with a stable exophytic cyst on the left, with also unremarkable appearance of the right pelvic kidney. A small ventral body wall hernia in the right lower quadrant is again seen containing a nonobstructed segment of bowel, fluid and fat.       Nonspecific and potentially incidental interval involvement of a previously noted incompletely characterized cystic finding along the pancreatic body. This could represent an enlarging pancreatic pseudocyst from prior pancreatitis, with cystic pancreatic mass or neoplasm not excluded. Consider nonemergent MRI to further evaluate.  No acute additional acute findings are present. There is increased ascites, likely related to peritoneal dialysis, with redemonstrated severe renal atrophy.   This report was finalized on 7/6/2021 1:25 PM by Adryan Wilson.      XR Chest 1 View    Result Date: 7/8/2021  PROCEDURE: CR Chest 1 Vw COMPARISON: 7/6/2021 INDICATIONS: new onset chest pain, started today; Urinary  tract infection, site not specified; Elevated white blood cell count, unspecified; Other fatigue TECHNIQUE: Single AP  view of the chest FINDINGS:  Cardiomediastinal silhouette is enlarged. There is mild tortuosity of the aorta to the right. Mild pulmonary vascular congestion. No acute infiltrate. No pleural effusion or overt pulmonary edema. Osseous structures are intact.     Mild pulmonary vascular congestion, correlate with fluid status.  Signer Name: Pippa Enriquez MD  Signed: 7/8/2021 8:24 PM  Workstation Name: [facility]-  [facility]    XR Chest 1 View    Result Date: 7/6/2021  EXAMINATION: XR CHEST 1 VW-  INDICATION: Weak/Dizzy/AMS triage protocol  COMPARISON: 1/14/2021  FINDINGS: Unchanged aeration with no new focal opacity. No significant effusion or distinct pneumothorax. Unchanged mild degree of cardiac enlargement.      Stable chronic changes as above without evidence of acute cardiopulmonary abnormality.  This report was finalized on 7/6/2021 12:26 PM by Adryan Wilson.        Results for orders placed during the hospital encounter of 05/09/17    Duplex Carotid Ultrasound CAR    Interpretation Summary  · Proximal right internal carotid artery plaque without significant stenosis.  · Right internal carotid artery stenosis of 0-49%.  · Proximal left internal carotid artery plaque without significant stenosis.  · Left internal carotid artery stenosis of 0-49%.      Results for orders placed during the hospital encounter of 05/09/17    Duplex Carotid Ultrasound CAR    Interpretation Summary  · Proximal right internal carotid artery plaque without significant stenosis.  · Right internal carotid artery stenosis of 0-49%.  · Proximal left internal carotid artery plaque without significant stenosis.  · Left internal carotid artery stenosis of 0-49%.      Results for orders placed during the hospital encounter of 01/11/21    Transthoracic Echo Complete With Contrast if Necessary Per  Protocol    Interpretation Summary  · Left ventricular systolic function is normal. Estimated left ventricular EF = 65%  · Left ventricular wall thickness is consistent with hypertrophy.  · Left atrial volume is mildly increased.  · The aortic valve is calcified. There is mild aortic stenosis present. There is moderate aortic regurgitation present.  · Estimated right ventricular systolic pressure from tricuspid regurgitation is normal (<35 mmHg).      Plan for Follow-up of Pending Labs/Results: PCP  Pending Labs     Order Current Status    Comprehensive Metabolic Panel In process    Tacrolimus Level In process    Blood Culture - Blood, Arm, Left Preliminary result    Blood Culture - Blood, Wrist, Left Preliminary result        Discharge Details        Discharge Medications      New Medications      Instructions Start Date   amLODIPine 5 MG tablet  Commonly known as: NORVASC   5 mg, Oral, Every 24 Hours Scheduled         Changes to Medications      Instructions Start Date   warfarin 2 MG tablet  Commonly known as: COUMADIN  What changed: See the new instructions.   TAKE ONE TO TWO TABLETS BY MOUTH EVERY DAY OR AS DIRECTED BY ANTICOAGULATION CLINIC         Continue These Medications      Instructions Start Date   acetaminophen 325 MG tablet  Commonly known as: TYLENOL   650 mg, Oral, Every 4 Hours PRN      albuterol sulfate  (90 Base) MCG/ACT inhaler  Commonly known as: ProAir HFA   2 puffs, Inhalation, Every 4 Hours PRN      albuterol (2.5 MG/3ML) 0.083% nebulizer solution  Commonly known as: PROVENTIL   2.5 mg, Nebulization, Every 4 Hours PRN      escitalopram 5 MG tablet  Commonly known as: Lexapro   5 mg, Oral, Every Morning      furosemide 40 MG tablet  Commonly known as: LASIX   80 mg, Oral, Daily      gentamicin 0.1 % cream  Commonly known as: GARAMYCIN   1 application, Topical, Daily, - Apply to tube site-      lidocaine 5 %  Commonly known as: Lidoderm   1 patch, Transdermal, Every 24 Hours, Remove &  Discard patch within 12 hours or as directed by MD      omeprazole 40 MG capsule  Commonly known as: priLOSEC   TAKE ONE CAPSULE BY MOUTH DAILY      sevelamer 800 MG tablet  Commonly known as: RENVELA   1,600 mg, Oral, 3 Times Daily      temazepam 15 MG capsule  Commonly known as: RESTORIL   15 mg, Oral, Nightly PRN         ASK your doctor about these medications      Instructions Start Date   metoprolol tartrate 100 MG tablet  Commonly known as: LOPRESSOR  Ask about: Which instructions should I use?   100 mg, Oral, 2 Times Daily      metoprolol tartrate 100 MG tablet  Commonly known as: LOPRESSOR  Ask about: Which instructions should I use?   100 mg, Oral, Every 12 Hours Scheduled             Allergies   Allergen Reactions   • Hydrocodone Itching   • Rice Swelling   • Statins Other (See Comments)     myalgia   • Codeine Rash   • Sulfa Antibiotics Rash         Discharge Disposition: Home  Home or Self Care    Diet:  Hospital:  Diet Order   Procedures   • Diet Regular; Consistent Carbohydrate       Activity: As tolerated      Restrictions or Other Recommendations:  None       CODE STATUS:    Code Status and Medical Interventions:   Ordered at: 07/06/21 1525     Code Status:    CPR     Medical Interventions (Level of Support Prior to Arrest):    Full       Future Appointments   Date Time Provider Department Center   12/14/2021 11:30 AM Jeff Joe IV, MD [facility] Phan MD  07/12/21      Time Spent on Discharge:  I spent  35  minutes on this discharge activity which included: face-to-face encounter with the patient, reviewing the data in the system, coordination of the care with the nursing staff as well as consultants, documentation, and entering orders.

## 2021-07-13 NOTE — TELEPHONE ENCOUNTER
Caller: Moni Wallace    Relationship: Self    Best call back number: [phone]    What is the best time to reach you: ANYTIME OTHER THAN MORNINGS     Who are you requesting to speak with (clinical staff, provider,  specific staff member): CLINICAL STAFF       What was the call regarding: PATIENT STATES STATE SHE WAS RELEASED FOR [facility] ON 07/12/2012. THE PATIENT STATES THAT SHE IS VERY SICK AND SHE IS VERY NAUSEOUS. THE PATIENT STATES THAT SHE WORSE THAN SHE DID AT THE HOSPITAL. PLEASE CALL PATIENT TO LET HER KNOW WHAT TO DO. PATIENT HAS A HOSPITAL FOLLOW UP SCHEDULED FOR 07/21/2021    Do you require a callback: YES

## 2021-07-13 NOTE — TELEPHONE ENCOUNTER
PATIENT'S SON BRIE CALLED DUE TO PATIENT GETTING SICK. PATIENT WAS SEEN IN THE ER LAST Tuesday AND HAD A UTI WITH HIGH BLOOD PRESSURE. PATIENT WAS RELEASED YESTERDAY, BUT HE DOESN'T BELIEVE THEY EVER FOUNF OUT WHAT WAS WRONG WITH HER. BRIE STATED THEY WERE SUPPOSED TO SCHEDULE AN MRI BUT IT NEVER GO SCHEDULED. BRIE WOULD LIKE A CALL BACK FROM DR. SCHAEFER TO DISCUSS WHAT IS GOING ON AND TO BE GIVEN OPTIONS. PLEASE RETURN CALL AS SOON AS POSSIBLE.     Called and spoke to patient

## 2021-07-13 NOTE — TELEPHONE ENCOUNTER
Spoke with patient post-[facility] discharge. She reports she is still not feeling well and may need to seek medical assistance again. She is agreeable to test INR if she does get feeling better and does not return.

## 2021-07-13 NOTE — OUTREACH NOTE
Call Center TCM Note      Responses   [facility] patient discharged from?  [place]   Does the patient have one of the following disease processes/diagnoses(primary or secondary)?  Other   TCM attempt successful?  Yes   Discharge diagnosis  UTI, hx- kidney transplant,   Meds reviewed with patient/caregiver?  Yes   Is the patient having any side effects they believe may be caused by any medication additions or changes?  No   Does the patient have all medications ordered at discharge?  Yes   Is the patient taking all medications as directed (includes completed medication regime)?  Yes   Does the patient have an appointment with their PCP within 7 days of discharge?  Yes   Comments regarding PCP  Pt is sched for TCM FWP with PCP Dr Walters on 07/19/2021.    Has the patient kept scheduled appointments due by today?  Yes   Has home health visited the patient within 72 hours of discharge?  N/A   Psychosocial issues?  No   Did the patient receive a copy of their discharge instructions?  Yes   Nursing interventions  Reviewed instructions with patient   What is the patient's perception of their health status since discharge?  Worsening   Is the patient/caregiver able to teach back signs and symptoms related to disease process for when to call PCP?  Yes   Is the patient/caregiver able to teach back signs and symptoms related to disease process for when to call 911?  Yes   Is the patient/caregiver able to teach back the hierarchy of who to call/visit for symptoms/problems? PCP, Specialist, Home health nurse, Urgent Care, ED, 911  Yes   If the patient is a current smoker, are they able to teach back resources for cessation?  Not a smoker   TCM call completed?  Yes   Wrap up additional comments  This very nice lady is unfortunately feeling worse tahn before she was d/c. No emesis today, but extreme nausea and weakness. Her son is on his way to her, and she plans to call EMS or have him drive her back to ED. Pt is sched  for TCM FWP with PCP Dr Walters on 07/19/2021..          Lexus Carpio MA    7/13/2021, 10:52 EDT

## 2021-07-14 NOTE — PROGRESS NOTES
Anticoagulation Clinic - Remote Progress Note  [facility]  Testing Frequency: 7 days    Indication: paroxysmal afib  Referring Provider: Butch (last seen 12/20) / Heath (Last seen: 11/5/19  next appt: 6/9/20)  Goal INR: 2.0-3.0  Current Drug Interactions: , levothyroxine; omeprazole, azaTHIOprine, ezetimibe, allopurinol (10/20)  CHADS-VASc: 5 (age, gender, HTN, DM)  HAS-BLED: 3 (HTN, CKD, Age)     Diet: hasn't been eating any GLV 4/5/21   (4/19/21)  Alcohol: none  Tobacco: none   OTC Pain Medication: APAP PRN; took tylenol last 2 nights for pain from dialysis (3/26/20)    1st clinic: 9/14/17  2nd clinic: 6/26/18  3rd clinic: 11/5/19    INR History:    Date 6/10 6/17 6/24 7/2 7/8 7/15 7/22 7/29 8/5 8/12 8/17 8/24   Total WeeklyDose 28mg 28mg 28mg 28mg 28mg 28mg 30mg 26mg 28mg 28mg 32mg 34mg   INR 2.5 2.6 2.3 1.8 2.2 1.7 3.5 2.4 2.5 1.7 1.8 2.8   Notes    Inc GLV  Inc GLV 1x dose incr. x1 dose red  ensure       Date 8/31 9/9 9/14 9/21 9/28 10/5 10/13 10/19 10/27 11/3 11/10 11/17   Total WeeklyDose 32mg 32mg 26mg 30mg 26-28 mg? 28mg 28mg 28mg 28mg 26mg 30mg 28mg   INR 2.4 3.9 2.6 2.9 2.3 2.3 2.9 2.8 3.7 1.5 2.1 2.1   Notes       decr Ensure; allopurinol allopurinol; less Ensure  recv'd 11/4; incr GLV 1x incr dose      Date 11/24 12/1 12/8  12/15 12/22 12/31 1/5/21 1/11 1/11-15 1/17 1/25 2/1   Total WeeklyDose 28mg 28mg 26mg 28 mg 28 mg 24mg 28mg 32 mg [facility] admission 26mg 28mg  24mg   INR 2.1 3.8 3.0  2.2 2.0 1.5 1.5 2.3  2.0 2.0 1.8   Notes  decr GLV 1x decr dose   1x miss   pneumonia doxy, cefdinir, rec 1/18  1x miss     Date 2/8 2/15 2/22 3/1 3/4 3/9 3/15 3/22 3/29 4/5 4/12 4/15   Total WeeklyDose 30mg 28mg 30mg 28mg 28mg 28mg 30mg 30mg 28mg 26mg 28mg 25mg   INR 1.8 2.1 2.9 2.6 2.5 1.9 2.1 2.7 2.9 2.3 4.4 3.6   Notes 1x incr dose   keflex keflex       LVQ     Date 4/19 4/23 4/27 5/4 5/7 5/11 5/17 5/24 6/1 6/7 6/14 6/21   Total Weekly Dose 22mg 26mg 28mg 22mg-  24mg  ?? 30mg 30mg 28mg 28mg 30mg 32 mg  32 mg  32 mg   INR 3.0 2.1 1.7 1.4 1.8 2.6 2.3 1.4 1.6 2.0 2.0  1.9   Notes LVQ   1x miss 1x incr dose 1x incr dose   1x incr dose  rcvd 6/2                      Inpatient Warfarin Schedule  Date  7/6 7/7 7/8 7/9 7/10 7/11 7/12       INR  1.65 1.74 2.12 2.34 2.16 2.3 1.94       Dose  5 mg 6 mg 4 mg 6 mg  4 mg 4 mg 6 mg         Date 7/6-7/12 7/14              Total Weekly Dose admitted 34 mg              INR  1.7              Notes UTI ceftriax.                Phone Interview:  Verbal Release Authorization signed on 6/26/18 and again on 11/5/2019-- may speak with Alexy Wallace (son), Genesis Becerril (daughter), Sawyer or Gema Wallace (son and daughter-in-law), Gerry Rodrigo (son)  Tablet Strength: 2mg tablets  Patient Contact Info: preferred [phone] - home with son Yadiel- [employer] [phone]; call if can't get in touch with home phone      Patient Findings:  Positives:  Change in health, Missed doses, Extra doses, Change in medications, Change in diet/appetite   Negatives:  Signs/symptoms of thrombosis, Signs/symptoms of bleeding, Laboratory test error suspected, Change in alcohol use, Change in activity, Upcoming invasive procedure, Emergency department visit, Upcoming dental procedure, Hospital admission, Bruising, Other complaints   Comments:  Patient was hospitalized from 7/6 to 7/12 for a UTI.  She completed a treatment of ceftriaxone while in hospital and was discharged with amlodipine 5 mg PO Q24H. Since her admission, she has not eaten any greens.      Plan:  1. INR is SUB therapeutic today at 1.7.  Instructed Ms. Wallace to boost today's dose for a TDD of warfarin 6mg; then continue warfarin 4 mg oral daily except 6 mg on TuesFri until recheck. If therapeutic at next check consider 4 mg every day except 6 mg on MonWedFri.  2. Repeat INR on Mon, 7/19   3. Verbal information provided over the phone. Moni Wallace RBV dosing instructions, expresses understanding by teach back, and has no further questions at this  time.    Kris Mckeon, Pharmacy Intern  7/14/2021 15:36 EDT        I, Joce Bush, PharmD, have reviewed the note in full and agree with the assessment and plan.  07/14/21  16:25 EDT

## 2021-07-21 NOTE — PROGRESS NOTES
Anticoagulation Clinic - Remote Progress Note  [facility]  Testing Frequency: 7 days    Indication: paroxysmal afib  Referring Provider: Butch (last seen 12/20) / Heath (Last seen: 11/5/19  next appt: 6/9/20)  Goal INR: 2.0-3.0  Current Drug Interactions: , levothyroxine; omeprazole, azaTHIOprine, ezetimibe, allopurinol (10/20)  CHADS-VASc: 5 (age, gender, HTN, DM)  HAS-BLED: 3 (HTN, CKD, Age)     Diet: hasn't been eating any GLV 4/5/21   (7/21/21)  Alcohol: none  Tobacco: none   OTC Pain Medication: APAP PRN; took tylenol last 2 nights for pain from dialysis (3/26/20)    1st clinic: 9/14/17  2nd clinic: 6/26/18  3rd clinic: 11/5/19    INR History:    Date 6/10 6/17 6/24 7/2 7/8 7/15 7/22 7/29 8/5 8/12 8/17 8/24   Total WeeklyDose 28mg 28mg 28mg 28mg 28mg 28mg 30mg 26mg 28mg 28mg 32mg 34mg   INR 2.5 2.6 2.3 1.8 2.2 1.7 3.5 2.4 2.5 1.7 1.8 2.8   Notes    Inc GLV  Inc GLV 1x dose incr. x1 dose red  ensure       Date 8/31 9/9 9/14 9/21 9/28 10/5 10/13 10/19 10/27 11/3 11/10 11/17   Total WeeklyDose 32mg 32mg 26mg 30mg 26-28 mg? 28mg 28mg 28mg 28mg 26mg 30mg 28mg   INR 2.4 3.9 2.6 2.9 2.3 2.3 2.9 2.8 3.7 1.5 2.1 2.1   Notes       decr Ensure; allopurinol allopurinol; less Ensure  recv'd 11/4; incr GLV 1x incr dose      Date 11/24 12/1 12/8  12/15 12/22 12/31 1/5/21 1/11 1/11-15 1/17 1/25 2/1   Total WeeklyDose 28mg 28mg 26mg 28 mg 28 mg 24mg 28mg 32 mg [facility] admission 26mg 28mg  24mg   INR 2.1 3.8 3.0  2.2 2.0 1.5 1.5 2.3  2.0 2.0 1.8   Notes  decr GLV 1x decr dose   1x miss   pneumonia doxy, cefdinir, rec 1/18  1x miss     Date 2/8 2/15 2/22 3/1 3/4 3/9 3/15 3/22 3/29 4/5 4/12 4/15   Total WeeklyDose 30mg 28mg 30mg 28mg 28mg 28mg 30mg 30mg 28mg 26mg 28mg 25mg   INR 1.8 2.1 2.9 2.6 2.5 1.9 2.1 2.7 2.9 2.3 4.4 3.6   Notes 1x incr dose   keflex keflex       LVQ     Date 4/19 4/23 4/27 5/4 5/7 5/11 5/17 5/24 6/1 6/7 6/14 6/21   Total Weekly Dose 22mg 26mg 28mg 22mg-  24mg  ?? 30mg 30mg 28mg 28mg 30mg 32 mg  32 mg  32 mg   INR 3.0 2.1 1.7 1.4 1.8 2.6 2.3 1.4 1.6 2.0 2.0  1.9   Notes LVQ   1x miss 1x incr dose 1x incr dose   1x incr dose  rcvd 6/2                      Inpatient Warfarin Schedule  Date  7/6 7/7 7/8 7/9 7/10 7/11 7/12       INR  1.65 1.74 2.12 2.34 2.16 2.3 1.94       Dose  5 mg 6 mg 4 mg 6 mg  4 mg 4 mg 6 mg         Date 7/6-7/12 7/14 7/20             Total Weekly Dose admitted 34 mg 34 mg             INR  1.7 2.0             Notes UTI ceftriax. rec'd 7/21               Phone Interview:  Verbal Release Authorization signed on 6/26/18 and again on 11/5/2019-- may speak with Alexy Wallace (son), Genesis Becerril (daughter), Sawyer or Gemapat Wallace (son and daughter-in-law), Gerry Rodrigo (son)  Tablet Strength: 2mg tablets  Patient Contact Info: preferred [phone] - home with son Yadiel- cell [phone]; call if can't get in touch with home phone      Patient Findings:  Negatives:  Signs/symptoms of thrombosis, Signs/symptoms of bleeding, Laboratory test error suspected, Change in health, Change in alcohol use, Change in activity, Upcoming invasive procedure, Emergency department visit, Upcoming dental procedure, Missed doses, Extra doses, Change in medications, Change in diet/appetite, Hospital admission, Bruising, Other complaints     Plan:  1. INR was back WNL yesterday at [facility]. Instructed Ms. Wallace to take warfarin 4 mg daily except 6 mg on WedSat this week.  2. Repeat INR on Tuesday, 7/27.  3. Verbal information provided over the phone. Moni NIKKO Wallace RBV dosing instructions, expresses understanding by teach back, and has no further questions at this time.    Zina Aldrich, ISAEL  7/21/2021  08:01 EDT     I, Joce Bush, PharmD, have reviewed the note in full and agree with the assessment and plan.  07/21/21  09:10 EDT

## 2021-07-21 NOTE — OUTREACH NOTE
Medical Week 2 Survey      Responses   [facility] patient discharged from?  [place]   Does the patient have one of the following disease processes/diagnoses(primary or secondary)?  Other   Week 2 attempt successful?  No   Unsuccessful attempts  Attempt 1          Rhonda Rodriguez RN

## 2021-07-21 NOTE — TELEPHONE ENCOUNTER
Medication Refill Request    Medication: warfarin (COUMADIN) 2 MG tablet as directed    Pertinent Labs:  Lab Results   Component Value Date    GLUCOSE 166 (H) 07/12/2021    BUN 27 (H) 07/12/2021    CREATININE 6.45 (H) 07/12/2021    EGFRIFNONA 6 (L) 07/12/2021    EGFRIFAFRI  07/12/2021      Comment:      <15 Indicative of kidney failure.    BCR 4.2 (L) 07/12/2021    K 3.8 07/12/2021    CO2 25.0 07/12/2021    CALCIUM 7.8 (L) 07/12/2021    ALBUMIN 2.50 (L) 07/12/2021    ALKPHOS 173 (H) 07/12/2021    AST 18 07/12/2021    ALT 11 07/12/2021      Lab Results   Component Value Date    CHOL 227 (H) 03/04/2019    TRIG 160 (H) 03/04/2019    HDL 52 03/04/2019     (H) 03/04/2019     Lab Results   Component Value Date    HGBA1C 4.90 05/19/2019     Lab Results   Component Value Date    WBC 12.08 (H) 07/12/2021    HGB 9.8 (L) 07/12/2021    HCT 32.6 (L) 07/12/2021    .5 (H) 07/12/2021     07/12/2021     Lab Results   Component Value Date    TSH 6.090 (H) 01/11/2021

## 2021-07-22 NOTE — PROGRESS NOTES
"Chief Complaint   Patient presents with   • Hospital Follow Up Visit       HPI  Moni Wallace is a 81 y.o. female presents for a hospital follow-up.  Pt was admitted to [facility] 7/2-7/6/21 for a UTI.  Pt was treated with Rocephin and Vancomycin.  Pt is a renal transplant patient on peritoneal dialysis (every night at home).  Pt states that she still has nausea.  She's worried that she may still have a urine tract infection.   She denies any abdominal pain or back pain.  Pt is followed by Dr Hodges for her kidneys.  Pt had right kidney transplant in 2010.       The following portions of the patient's history were reviewed and updated as appropriate: allergies, current medications, past family history, past medical history, past social history, past surgical history and problem list.    Subjective  Review of Systems   Constitutional: Negative for activity change, appetite change and fatigue.   HENT: Negative for congestion.    Respiratory: Negative for cough and shortness of breath.    Cardiovascular: Negative for chest pain and leg swelling.   Gastrointestinal: Positive for nausea. Negative for abdominal pain.   Neurological: Negative for dizziness, weakness and confusion.   Psychiatric/Behavioral: Negative for behavioral problems and decreased concentration.       Objective  Visit Vitals  /70   Pulse 57   Temp 97.9 °F (36.6 °C) (Temporal)   Ht 162.6 cm (64\")   LMP  (LMP Unknown)   SpO2 98%   BMI 34.33 kg/m²        Physical Exam  Vitals and nursing note reviewed.   HENT:      Head: Normocephalic.   Eyes:      Pupils: Pupils are equal, round, and reactive to light.   Cardiovascular:      Rate and Rhythm: Normal rate and regular rhythm.      Pulses: Normal pulses.      Heart sounds: Murmur heard.   Systolic murmur is present with a grade of 1/6.     Pulmonary:      Effort: Pulmonary effort is normal.      Breath sounds: Normal breath sounds.   Musculoskeletal:      Comments: In a wheelchair   Skin:     " General: Skin is warm and dry.      Capillary Refill: Capillary refill takes less than 2 seconds.   Neurological:      General: No focal deficit present.      Mental Status: She is alert and oriented to person, place, and time.      Gait: Gait is intact.   Psychiatric:         Attention and Perception: Attention normal.         Mood and Affect: Mood normal.         Behavior: Behavior normal.       Results for orders placed or performed in visit on 07/22/21   POC Urinalysis Dipstick, Automated    Specimen: Urine   Result Value Ref Range    Color Yellow Yellow, Straw, Dark Yellow, Ujdy    Clarity, UA Cloudy (A) Clear    Specific Gravity  1.010 1.005 - 1.030    pH, Urine 7.5 5.0 - 8.0    Leukocytes 500 Sean/ul (A) Negative    Nitrite, UA Negative Negative    Protein, POC 2+ (A) Negative mg/dL    Glucose, UA Negative Negative, 1000 mg/dL (3+) mg/dL    Ketones, UA Negative Negative    Urobilinogen, UA Normal Normal    Bilirubin Negative Negative    Blood, UA 1+ (A) Negative       Procedures     Assessment and Plan  Diagnoses and all orders for this visit:    1. Hospital discharge follow-up (Primary)    2. Stage 5 chronic kidney disease on chronic dialysis (CMS/[facility])    3. Pancreatic cyst    4. Nausea    5. Acute cystitis with hematuria  -     POC Urinalysis Dipstick, Automated  -     Urine Culture - Urine, Urine, Clean Catch; Future  -     amoxicillin-clavulanate (Augmentin) 500-125 MG per tablet; Take 1 tablet daily by mouth AFTER dialysis for 6 days  Dispense: 6 tablet; Refill: 0    D/C summary reviewed  >14 day discharge  Pt wants to think about having MRI of abd to eval pancreatic cyst/mass, she will notify Dr Walters if she changes her mind  Bacteria noted in urine today - based on last culture PCNs best treatment for pt  Return to ER for worsening symptoms  Send new urine culture    No follow-ups on file.        REY Trevino

## 2021-07-23 NOTE — OUTREACH NOTE
Medical Week 2 Survey      Responses   [facility] [place] [facility]?  [place]   Does the patient have one of the following disease processes/diagnoses(primary or secondary)?  Other   Week 2 attempt successful?  Yes   Call start time  1350   Discharge diagnosis  UTI, hx- kidney transplant,   Call end time  1354   Meds reviewed with patient/caregiver?  Yes   Is the patient taking all medications as directed (includes completed medication regime)?  Yes   Has the patient kept scheduled appointments due by today?  Yes   What is the patient's perception of their health status since discharge?  Improving   Week 2 Call Completed?  Yes   Wrap up additional comments  Went to her PCP yesterday and had a worsening UTI and was started on antibiotics.  She was nauseated last night and this morning but is feeling better this afternoon.          Jaky Betancourt, RN

## 2021-07-27 NOTE — PROGRESS NOTES
Anticoagulation Clinic - Remote Progress Note  [facility]  Testing Frequency: 7 days    Indication: paroxysmal afib  Referring Provider: Butch (last seen 12/20) / Heath (Last seen: 11/5/19  next appt: 6/9/20)  Goal INR: 2.0-3.0  Current Drug Interactions: , levothyroxine; omeprazole, azaTHIOprine, ezetimibe, allopurinol (10/20)  CHADS-VASc: 5 (age, gender, HTN, DM)  HAS-BLED: 3 (HTN, CKD, Age)     Diet: hasn't been eating any GLV 4/5/21   (7/21/21)  Alcohol: none  Tobacco: none   OTC Pain Medication: APAP PRN; took tylenol last 2 nights for pain from dialysis (3/26/20)    1st clinic: 9/14/17  2nd clinic: 6/26/18  3rd clinic: 11/5/19    INR History:    Date 6/10 6/17 6/24 7/2 7/8 7/15 7/22 7/29 8/5 8/12 8/17 8/24   Total WeeklyDose 28mg 28mg 28mg 28mg 28mg 28mg 30mg 26mg 28mg 28mg 32mg 34mg   INR 2.5 2.6 2.3 1.8 2.2 1.7 3.5 2.4 2.5 1.7 1.8 2.8   Notes    Inc GLV  Inc GLV 1x dose incr. x1 dose red  ensure       Date 8/31 9/9 9/14 9/21 9/28 10/5 10/13 10/19 10/27 11/3 11/10 11/17   Total WeeklyDose 32mg 32mg 26mg 30mg 26-28 mg? 28mg 28mg 28mg 28mg 26mg 30mg 28mg   INR 2.4 3.9 2.6 2.9 2.3 2.3 2.9 2.8 3.7 1.5 2.1 2.1   Notes       decr Ensure; allopurinol allopurinol; less Ensure  recv'd 11/4; incr GLV 1x incr dose      Date 11/24 12/1 12/8  12/15 12/22 12/31 1/5/21 1/11 1/11-15 1/17 1/25 2/1   Total WeeklyDose 28mg 28mg 26mg 28 mg 28 mg 24mg 28mg 32 mg [facility] admission 26mg 28mg  24mg   INR 2.1 3.8 3.0  2.2 2.0 1.5 1.5 2.3  2.0 2.0 1.8   Notes  decr GLV 1x decr dose   1x miss   pneumonia doxy, cefdinir, rec 1/18  1x miss     Date 2/8 2/15 2/22 3/1 3/4 3/9 3/15 3/22 3/29 4/5 4/12 4/15   Total WeeklyDose 30mg 28mg 30mg 28mg 28mg 28mg 30mg 30mg 28mg 26mg 28mg 25mg   INR 1.8 2.1 2.9 2.6 2.5 1.9 2.1 2.7 2.9 2.3 4.4 3.6   Notes 1x incr dose   keflex keflex       LVQ     Date 4/19 4/23 4/27 5/4 5/7 5/11 5/17 5/24 6/1 6/7 6/14 6/21   Total Weekly Dose 22mg 26mg 28mg 22mg-  24mg  ?? 30mg 30mg 28mg 28mg 30mg 32 mg  32 mg  32 mg   INR 3.0 2.1 1.7 1.4 1.8 2.6 2.3 1.4 1.6 2.0 2.0  1.9   Notes LVQ   1x miss 1x incr dose 1x incr dose   1x incr dose  rcvd 6/2                      Inpatient Warfarin Schedule  Date  7/6 7/7 7/8 7/9 7/10 7/11 7/12       INR  1.65 1.74 2.12 2.34 2.16 2.3 1.94       Dose  5 mg 6 mg 4 mg 6 mg  4 mg 4 mg 6 mg         Date 7/6-7/12 7/14 7/20 7/27            Total Weekly Dose admitted 34 mg 34 mg 32 mg             INR  1.7 2.0 1.9            Notes UTI ceftriax. rec'd 7/21               Phone Interview:  Verbal Release Authorization signed on 6/26/18 and again on 11/5/2019-- may speak with Alexy Wallace (son), Genesis Becerril (daughter), Sawyer or Gema Wallace (son and daughter-in-law), Gerry Wallace (son)  Tablet Strength: 2mg tablets  Patient Contact Info: preferred [phone] - home with son Yadiel- cell [phone]; call if can't get in touch with home phone      Patient Findings:  Positives:  Change in diet/appetite   Negatives:  Signs/symptoms of thrombosis, Signs/symptoms of bleeding, Laboratory test error suspected, Change in health, Change in alcohol use, Change in activity, Upcoming invasive procedure, Emergency department visit, Upcoming dental procedure, Missed doses, Extra doses, Change in medications, Hospital admission, Bruising, Other complaints   Comments:  Patient has starting eating a few servings of green beans per week.     Plan:  1. INR was SUBtherapeutic today at 1.9. Instructed Ms. Wallace to BOOST dose tonight to 6mg and then increase dose to warfarin 4 mg daily except 6 mg on MonWedFri this week.    2. Repeat INR on Tuesday, 8/3.  3. Verbal information provided over the phone. Moni EL Rodrigo RBV dosing instructions, expresses understanding by teach back, and has no further questions at this time.    Kris Mckeon, Pharmacy Intern  7/27/2021  14:52 EDT     I, Fatemeh Rodríguez, PharmD, have reviewed the note in full and agree with the assessment and plan.  07/27/21  16:52 EDT

## 2021-08-03 NOTE — OUTREACH NOTE
Medical Week 3 Survey      Responses   [facility] patient discharged from?  [place]   Does the patient have one of the following disease processes/diagnoses(primary or secondary)?  Other   Week 3 attempt successful?  Yes   Call start time  0809   Call end time  0834   Discharge diagnosis  UTI, hx- kidney transplant,   Meds reviewed with patient/caregiver?  Yes   Is the patient taking all medications as directed (includes completed medication regime)?  Yes   Has the patient kept scheduled appointments due by today?  Yes   What is the patient's perception of their health status since discharge?  Improving   Additional teach back comments  Advised to call MD for any returning s/s UTI   Week 3 Call Completed?  Yes   Wrap up additional comments  Ritika on the 3rd floor was so sweet and compassionate. She was a special nurse to me          Rhonda Rodriguez RN

## 2021-08-03 NOTE — PROGRESS NOTES
Anticoagulation Clinic - Remote Progress Note  [facility]  Testing Frequency: 7 days    Indication: Paroxysmal Afib  Referring Provider: Dr. Joe/REY Rae (Last seen: 12/8/20  next appt: 12/14/21)  Goal INR: 2.0-3.0  Current Drug Interactions: , levothyroxine; omeprazole, azaTHIOprine, ezetimibe, allopurinol (10/20)  CHADS-VASc: 5 (age, gender, HTN, DM)  HAS-BLED: 3 (HTN, CKD, Age)     Diet: hasn't been eating any GLV 4/5/21   (7/21/21)  Alcohol: none  Tobacco: none   OTC Pain Medication: APAP PRN; took tylenol last 2 nights for pain from dialysis (3/26/20)    1st clinic: 9/14/17  2nd clinic: 6/26/18  3rd clinic: 11/5/19    INR History:    Date 6/10 6/17 6/24 7/2 7/8 7/15 7/22 7/29 8/5 8/12 8/17 8/24   Total WeeklyDose 28mg 28mg 28mg 28mg 28mg 28mg 30mg 26mg 28mg 28mg 32mg 34mg   INR 2.5 2.6 2.3 1.8 2.2 1.7 3.5 2.4 2.5 1.7 1.8 2.8   Notes    Inc GLV  Inc GLV 1x dose incr. x1 dose red  ensure       Date 8/31 9/9 9/14 9/21 9/28 10/5 10/13 10/19 10/27 11/3 11/10 11/17   Total WeeklyDose 32mg 32mg 26mg 30mg 26-28 mg? 28mg 28mg 28mg 28mg 26mg 30mg 28mg   INR 2.4 3.9 2.6 2.9 2.3 2.3 2.9 2.8 3.7 1.5 2.1 2.1   Notes       decr Ensure; allopurinol allopurinol; less Ensure  recv'd 11/4; incr GLV 1x incr dose      Date 11/24 12/1 12/8  12/15 12/22 12/31 1/5/21 1/11 1/11-15 1/17 1/25 2/1   Total WeeklyDose 28mg 28mg 26mg 28 mg 28 mg 24mg 28mg 32 mg [facility] admission 26mg 28mg  24mg   INR 2.1 3.8 3.0  2.2 2.0 1.5 1.5 2.3  2.0 2.0 1.8   Notes  decr GLV 1x decr dose   1x miss   pneumonia doxy, cefdinir, rec 1/18  1x miss     Date 2/8 2/15 2/22 3/1 3/4 3/9 3/15 3/22 3/29 4/5 4/12 4/15   Total WeeklyDose 30mg 28mg 30mg 28mg 28mg 28mg 30mg 30mg 28mg 26mg 28mg 25mg   INR 1.8 2.1 2.9 2.6 2.5 1.9 2.1 2.7 2.9 2.3 4.4 3.6   Notes 1x incr dose   keflex keflex       LVQ     Date 4/19 4/23 4/27 5/4 5/7 5/11 5/17 5/24 6/1 6/7 6/14 6/21   Total Weekly Dose 22mg 26mg 28mg 22mg-  24mg  ?? 30mg 30mg 28mg 28mg 30mg 32mg 32mg  32mg    INR 3.0 2.1 1.7 1.4 1.8 2.6 2.3 1.4 1.6 2.0 2.0  1.9   Notes LVQ   1x miss 1x incr dose 1x incr dose   1x incr dose  rcvd 6/2                      Inpatient Warfarin Schedule  Date  7/6 7/7 7/8 7/9 7/10 7/11 7/12       INR  1.65 1.74 2.12 2.34 2.16 2.3 1.94       Dose  5 mg 6 mg 4 mg 6 mg  4 mg 4 mg 6 mg         Date 7/6-7/12 7/14 7/20 7/27 8/3           Total Weekly Dose admitted 34mg 34mg 32mg  36mg 34mg          INR  1.7 2.0 1.9 2.2           Notes UTI ceftriax. rec'd 7/21  1x dose inc             Phone Interview:  Verbal Release Authorization signed on 6/26/18 and again on 11/5/2019-- may speak with Alexy Wallace (son), Genesis Becerril (daughter), Sawyer or Gema Wallace (son and daughter-in-law), Gerry Rodrigo (son)  Tablet Strength: 2mg tablets  Patient Contact Info: preferred [phone] - home with carlito Cotto- [employer] [phone]; call if can't get in touch with home phone      Patient Findings  Negatives:  Signs/symptoms of thrombosis, Signs/symptoms of bleeding, Laboratory test error suspected, Change in health, Change in alcohol use, Change in activity, Upcoming invasive procedure, Emergency department visit, Upcoming dental procedure, Missed doses, Extra doses, Change in medications, Change in diet/appetite, Hospital admission, Bruising, Other complaints     Plan:  1. INR is therapeutic and back WNL today at 2.2. Instructed Ms. Wallace to continue recently increased dose of warfarin 4mg oral daily except 6mg on MonWedFri (34mg/week) until recheck.   2. Repeat INR in 1 week, 8/10, to ensure WNL.  3. Verbal information provided over the phone. Moni Wallace RBV dosing instructions, expresses understanding by teach back, and has no further questions at this time.    Sawyer Gamez, Valentin  8/3/2021  15:35 EDT     I, Taylor Riedel, [facility], have reviewed the note in full and agree with the assessment and plan.  08/03/21  15:56 EDT

## 2021-08-10 NOTE — PROGRESS NOTES
Anticoagulation Clinic - Remote Progress Note  [facility]  Testing Frequency: 7 days    Indication: Paroxysmal Afib  Referring Provider: Dr. Joe/REY Rae (Last seen: 12/8/20  next appt: 12/14/21)  Goal INR: 2.0-3.0  Current Drug Interactions: , levothyroxine; omeprazole, azaTHIOprine, ezetimibe, allopurinol (10/20)  CHADS-VASc: 5 (age, gender, HTN, DM)  HAS-BLED: 3 (HTN, CKD, Age)     Diet: hasn't been eating any GLV 4/5/21   (7/21/21)  Alcohol: none  Tobacco: none   OTC Pain Medication: APAP PRN; took tylenol last 2 nights for pain from dialysis (3/26/20)    1st clinic: 9/14/17  2nd clinic: 6/26/18  3rd clinic: 11/5/19    INR History:    Date 6/10 6/17 6/24 7/2 7/8 7/15 7/22 7/29 8/5 8/12 8/17 8/24   Total WeeklyDose 28mg 28mg 28mg 28mg 28mg 28mg 30mg 26mg 28mg 28mg 32mg 34mg   INR 2.5 2.6 2.3 1.8 2.2 1.7 3.5 2.4 2.5 1.7 1.8 2.8   Notes    Inc GLV  Inc GLV 1x dose incr. x1 dose red  ensure       Date 8/31 9/9 9/14 9/21 9/28 10/5 10/13 10/19 10/27 11/3 11/10 11/17   Total WeeklyDose 32mg 32mg 26mg 30mg 26-28 mg? 28mg 28mg 28mg 28mg 26mg 30mg 28mg   INR 2.4 3.9 2.6 2.9 2.3 2.3 2.9 2.8 3.7 1.5 2.1 2.1   Notes       decr Ensure; allopurinol allopurinol; less Ensure  recv'd 11/4; incr GLV 1x incr dose      Date 11/24 12/1 12/8  12/15 12/22 12/31 1/5/21 1/11 1/11-15 1/17 1/25 2/1   Total WeeklyDose 28mg 28mg 26mg 28 mg 28 mg 24mg 28mg 32 mg [facility] admission 26mg 28mg  24mg   INR 2.1 3.8 3.0  2.2 2.0 1.5 1.5 2.3  2.0 2.0 1.8   Notes  decr GLV 1x decr dose   1x miss   pneumonia doxy, cefdinir, rec 1/18  1x miss     Date 2/8 2/15 2/22 3/1 3/4 3/9 3/15 3/22 3/29 4/5 4/12 4/15   Total WeeklyDose 30mg 28mg 30mg 28mg 28mg 28mg 30mg 30mg 28mg 26mg 28mg 25mg   INR 1.8 2.1 2.9 2.6 2.5 1.9 2.1 2.7 2.9 2.3 4.4 3.6   Notes 1x incr dose   keflex keflex       LVQ     Date 4/19 4/23 4/27 5/4 5/7 5/11 5/17 5/24 6/1 6/7 6/14 6/21   Total Weekly Dose 22mg 26mg 28mg 22mg-  24mg  ?? 30mg 30mg 28mg 28mg 30mg 32mg 32mg  32mg    INR 3.0 2.1 1.7 1.4 1.8 2.6 2.3 1.4 1.6 2.0 2.0  1.9   Notes LVQ   1x miss 1x incr dose 1x incr dose   1x incr dose  rcvd 6/2                      Inpatient Warfarin Schedule  Date  7/6 7/7 7/8 7/9 7/10 7/11 7/12       INR  1.65 1.74 2.12 2.34 2.16 2.3 1.94       Dose  5 mg 6 mg 4 mg 6 mg  4 mg 4 mg 6 mg         Date 7/6-7/12 7/14 7/20 7/27 8/3 8/10          Total Weekly Dose admitted 34mg 34mg 32mg  36mg 34mg          INR  1.7 2.0 1.9 2.2 2.6          Notes UTI ceftriax. recv'd 7/21  1x incr dose             Phone Interview:  Verbal Release Authorization signed on 6/26/18 and again on 11/5/2019-- may speak with Alexy Wallace (son), Genesis Becerril (daughter), Sawyer or Gema Wallace (son and daughter-in-law), Gerry Wallace (son)  Tablet Strength: 2mg tablets  Patient Contact Info: preferred [phone] - home with son Yadiel- [employer] [phone]; call if can't get in touch with home phone      Patient Findings  Negatives:  Signs/symptoms of thrombosis, Signs/symptoms of bleeding, Laboratory test error suspected, Change in health, Change in alcohol use, Change in activity, Upcoming invasive procedure, Emergency department visit, Upcoming dental procedure, Missed doses, Extra doses, Change in medications, Change in diet/appetite, Hospital admission, Bruising, Other complaints     Plan:  1. INR is therapeutic today at 2.6 (goal 2-3). Instructed Ms. Wallace to continue warfarin 4mg oral daily except 6mg on MonWedFri until recheck (34mg/week).  2. Repeat INR in 1 week, 8/17  3. Verbal information provided over the phone. Moni Wallace RBV dosing instructions, expresses understanding by teach back, and has no further questions at this time.    Sawyer Gamez CPhT  8/10/2021  15:46 EDT      I, Sawyer Jackman, PharmD, have reviewed the note in full and agree with the assessment and plan.  08/10/21  16:27 EDT

## 2021-08-13 NOTE — OUTREACH NOTE
Medical Week 4 Survey      Responses   [facility] patient discharged from?  [facility]   Does the patient have one of the following disease processes/diagnoses(primary or secondary)?  Other   Week 4 attempt successful?  Yes   Call start time  1311   Call end time  1316   General alerts for this patient  hx kidney transplant   Discharge diagnosis  UTI, hx- kidney transplant,   Meds reviewed with patient/caregiver?  Yes   Is the patient having any side effects they believe may be caused by any medication additions or changes?  No   Is the patient taking all medications as directed (includes completed medication regime)?  Yes   Has the patient kept scheduled appointments due by today?  Yes   Is the patient still receiving Home Health Services?  N/A   Psychosocial issues?  No   What is the patient's perception of their health status since discharge?  Improving   Is the patient/caregiver able to teach back signs and symptoms related to disease process for when to call PCP?  Yes   Is the patient/caregiver able to teach back signs and symptoms related to disease process for when to call 911?  Yes   Is the patient/caregiver able to teach back the hierarchy of who to call/visit for symptoms/problems? PCP, Specialist, Home health nurse, Urgent Care, ED, 911  Yes   Additional teach back comments  states has U/A sent in, waiting for results, has symptoms of UTI, states has FR due to kidney transplant, so drinking large quantities of water to reduce chances of UTI, is limiting   Week 4 Call Completed?  Yes   Would the patient like one additional call?  No   Graduated  Yes   Is the patient interested in additional calls from an ambulatory ?  NOTE:  applies to high risk patients requiring additional follow-up.  No   Did the patient feel the follow up calls were helpful during their recovery period?  Yes   Was the number of calls appropriate?  Yes          Ashlee Padilla RN

## 2021-08-17 NOTE — PROGRESS NOTES
Anticoagulation Clinic - Remote Progress Note  [facility]  Testing Frequency: 7 days    Indication: Paroxysmal Afib  Referring Provider: Dr. Joe/REY Rae (Last seen: 12/8/20  next appt: 12/14/21)  Goal INR: 2.0-3.0  Current Drug Interactions: , levothyroxine; omeprazole, azaTHIOprine, ezetimibe, allopurinol (10/20)  CHADS-VASc: 5 (age, gender, HTN, DM)  HAS-BLED: 3 (HTN, CKD, Age)     Diet: hasn't been eating any GLV 4/5/21   (7/21/21)  Alcohol: none  Tobacco: none   OTC Pain Medication: APAP PRN; took tylenol last 2 nights for pain from dialysis (3/26/20)    1st clinic: 9/14/17  2nd clinic: 6/26/18  3rd clinic: 11/5/19    INR History:    Date 6/10 6/17 6/24 7/2 7/8 7/15 7/22 7/29 8/5 8/12 8/17 8/24   Total WeeklyDose 28mg 28mg 28mg 28mg 28mg 28mg 30mg 26mg 28mg 28mg 32mg 34mg   INR 2.5 2.6 2.3 1.8 2.2 1.7 3.5 2.4 2.5 1.7 1.8 2.8   Notes    Inc GLV  Inc GLV 1x dose incr. x1 dose red  ensure       Date 8/31 9/9 9/14 9/21 9/28 10/5 10/13 10/19 10/27 11/3 11/10 11/17   Total WeeklyDose 32mg 32mg 26mg 30mg 26-28 mg? 28mg 28mg 28mg 28mg 26mg 30mg 28mg   INR 2.4 3.9 2.6 2.9 2.3 2.3 2.9 2.8 3.7 1.5 2.1 2.1   Notes       decr Ensure; allopurinol allopurinol; less Ensure  recv'd 11/4; incr GLV 1x incr dose      Date 11/24 12/1 12/8  12/15 12/22 12/31 1/5/21 1/11 1/11-15 1/17 1/25 2/1   Total WeeklyDose 28mg 28mg 26mg 28 mg 28 mg 24mg 28mg 32 mg [facility] admission 26mg 28mg  24mg   INR 2.1 3.8 3.0  2.2 2.0 1.5 1.5 2.3  2.0 2.0 1.8   Notes  decr GLV 1x decr dose   1x miss   pneumonia doxy, cefdinir, rec 1/18  1x miss     Date 2/8 2/15 2/22 3/1 3/4 3/9 3/15 3/22 3/29 4/5 4/12 4/15   Total WeeklyDose 30mg 28mg 30mg 28mg 28mg 28mg 30mg 30mg 28mg 26mg 28mg 25mg   INR 1.8 2.1 2.9 2.6 2.5 1.9 2.1 2.7 2.9 2.3 4.4 3.6   Notes 1x incr dose   keflex keflex       LVQ     Date 4/19 4/23 4/27 5/4 5/7 5/11 5/17 5/24 6/1 6/7 6/14 6/21   Total Weekly Dose 22mg 26mg 28mg 22mg-  24mg  ?? 30mg 30mg 28mg 28mg 30mg 32mg 32mg  32mg    INR 3.0 2.1 1.7 1.4 1.8 2.6 2.3 1.4 1.6 2.0 2.0  1.9   Notes LVQ   1x miss 1x incr dose 1x incr dose   1x incr dose  rcvd 6/2                      Inpatient Warfarin Schedule  Date  7/6 7/7 7/8 7/9 7/10 7/11 7/12       INR  1.65 1.74 2.12 2.34 2.16 2.3 1.94       Dose  5 mg 6 mg 4 mg 6 mg  4 mg 4 mg 6 mg         Date 7/6-7/12 7/14 7/20 7/27 8/3 8/10 8/17         Total Weekly Dose admitted 34mg 34mg 32mg  36mg 34mg 34mg         INR  1.7 2.0 1.9 2.2 2.6 2.9         Notes UTI ceftriax. recv'd 7/21  1x incr dose  doxy           Phone Interview:  Verbal Release Authorization signed on 6/26/18 and again on 11/5/2019-- may speak with Alexy Wallace (son), Genesis Becerril (daughter), Sawyer or Gema Wallace (son and daughter-in-law), Gerry Rodrigo (son)  Tablet Strength: 2mg tablets  Patient Contact Info: preferred [phone] - home with son Yadiel- [employer] [phone]; call if can't get in touch with home phone      Patient Findings  Positives:  Change in medications   Negatives:  Signs/symptoms of thrombosis, Signs/symptoms of bleeding, Laboratory test error suspected, Change in health, Change in alcohol use, Change in activity, Upcoming invasive procedure, Emergency department visit, Upcoming dental procedure, Missed doses, Extra doses, Change in diet/appetite, Hospital admission, Bruising, Other complaints   Comments:  Patient reports she started taking Doxycycline 100mg BID x5 days on Sun, 8/15. Last dose on Thurs 8/19. Have discussed warfarin-doxycycline interaction and s/sx to be aware of. Otherwise denies findings.      Plan:  1. INR is therapeutic today at 2.9 (goal 2.-3.0). Instructed Ms. Wallace to have 1x extra serving of GLV this week and then continue warfarin 4mg oral daily except 6mg on MonWedFri until recheck (34mg/week).  2. Repeat INR on Fri, 8/20, to ensure WNL  3. Verbal information provided over the phone. Moni Wallace RBV dosing instructions, expresses understanding by teach back, and has no further questions  at this time.    Sawyer Gamez, Valentin  8/17/2021  15:03 EDT     I, Joce Bush, KwadwoD, have reviewed the note in full and agree with the assessment and plan.  08/17/21  15:39 EDT

## 2021-08-17 NOTE — PROGRESS NOTES
I have reviewed the notes, assessments, and/or procedures performed by Moise, I concur with her/his documentation of Moni Wallace.    Thank you,    Joce Bush PharmD, PEDRO, BCPS  8/17/2021  15:38 EDT

## 2021-08-20 NOTE — PROGRESS NOTES
Anticoagulation Clinic - Remote Progress Note  [facility]  Testing Frequency: 7 days    Indication: Paroxysmal Afib  Referring Provider: Dr. Joe/REY Rae (Last seen: 12/8/20  next appt: 12/14/21)  Goal INR: 2.0-3.0  Current Drug Interactions: , levothyroxine; omeprazole, azaTHIOprine, ezetimibe, allopurinol (10/20)  CHADS-VASc: 5 (age, gender, HTN, DM)  HAS-BLED: 3 (HTN, CKD, Age)     Diet: hasn't been eating any GLV 4/5/21   (7/21/21)  Alcohol: none  Tobacco: none   OTC Pain Medication: APAP PRN; took tylenol last 2 nights for pain from dialysis (3/26/20)    1st clinic: 9/14/17  2nd clinic: 6/26/18  3rd clinic: 11/5/19    INR History:    Date 6/10 6/17 6/24 7/2 7/8 7/15 7/22 7/29 8/5 8/12 8/17 8/24   Total WeeklyDose 28mg 28mg 28mg 28mg 28mg 28mg 30mg 26mg 28mg 28mg 32mg 34mg   INR 2.5 2.6 2.3 1.8 2.2 1.7 3.5 2.4 2.5 1.7 1.8 2.8   Notes    Inc GLV  Inc GLV 1x dose incr. x1 dose red  ensure       Date 8/31 9/9 9/14 9/21 9/28 10/5 10/13 10/19 10/27 11/3 11/10 11/17   Total WeeklyDose 32mg 32mg 26mg 30mg 26-28 mg? 28mg 28mg 28mg 28mg 26mg 30mg 28mg   INR 2.4 3.9 2.6 2.9 2.3 2.3 2.9 2.8 3.7 1.5 2.1 2.1   Notes       decr Ensure; allopurinol allopurinol; less Ensure  recv'd 11/4; incr GLV 1x incr dose      Date 11/24 12/1 12/8  12/15 12/22 12/31 1/5/21 1/11 1/11-15 1/17 1/25 2/1   Total WeeklyDose 28mg 28mg 26mg 28 mg 28 mg 24mg 28mg 32 mg [facility] admission 26mg 28mg  24mg   INR 2.1 3.8 3.0  2.2 2.0 1.5 1.5 2.3  2.0 2.0 1.8   Notes  decr GLV 1x decr dose   1x miss   pneumonia doxy, cefdinir, rec 1/18  1x miss     Date 2/8 2/15 2/22 3/1 3/4 3/9 3/15 3/22 3/29 4/5 4/12 4/15   Total WeeklyDose 30mg 28mg 30mg 28mg 28mg 28mg 30mg 30mg 28mg 26mg 28mg 25mg   INR 1.8 2.1 2.9 2.6 2.5 1.9 2.1 2.7 2.9 2.3 4.4 3.6   Notes 1x incr dose   keflex keflex       LVQ     Date 4/19 4/23 4/27 5/4 5/7 5/11 5/17 5/24 6/1 6/7 6/14 6/21   Total Weekly Dose 22mg 26mg 28mg 22mg-  24mg  ?? 30mg 30mg 28mg 28mg 30mg 32mg 32mg  32mg  "  INR 3.0 2.1 1.7 1.4 1.8 2.6 2.3 1.4 1.6 2.0 2.0  1.9   Notes LVQ   1x miss 1x incr dose 1x incr dose   1x incr dose  rcvd 6/2                      Inpatient Warfarin Schedule  Date  7/6 7/7 7/8 7/9 7/10 7/11 7/12       INR  1.65 1.74 2.12 2.34 2.16 2.3 1.94       Dose  5 mg 6 mg 4 mg 6 mg  4 mg 4 mg 6 mg         Date 7/6-7/12 7/14 7/20 7/27 8/3 8/10 8/17 8/20        Total Weekly Dose admitted 34mg 34mg 32mg  36mg 34mg 34mg 34mg        INR  1.7 2.0 1.9 2.2 2.6 2.9 2.4        Notes UTI ceftriax. recv'd 7/21  1x incr dose  doxy           Phone Interview:  Verbal Release Authorization signed on 6/26/18 and again on 11/5/2019-- may speak with Alexy Wallace (son), Genesis Becerril (daughter), Sawyer or Gema Wallace (son and daughter-in-law), Gerryjuan david Wallace (son)  Tablet Strength: 2mg tablets  Patient Contact Info: preferred [phone] - home with carlito Cotto- [employer] [phone]; call if can't get in touch with home phone    Patient Findings    Positives:  Change in medications, Change in diet/appetite   Negatives:  Signs/symptoms of thrombosis, Signs/symptoms of bleeding, Laboratory test error suspected, Change in health, Change in alcohol use, Change in activity, Upcoming invasive procedure, Emergency department visit, Upcoming dental procedure, Missed doses, Extra doses, Hospital admission, Bruising, Other complaints   Comments:  Ms Wallace has completed her doxycycline. She is still concerned she may have an infection because \"the water is cloudy\" although she has no other symptoms. She said she might have to be on a low dose prophylacticaly . Asked her to let us know if that happened going forward. She had a salad yesterday and a serving of green beans last encounter.          Plan:  1. INR is therapeutic today at 2.4 (goal 2.-3.0). Instructed Ms. Wallace to have 1x extra serving of GLV this week and then continue warfarin 4mg oral daily except 6mg on MonWedFri until recheck (34mg/week).  2. Repeat INR 8/31, to ensure WNL  3. " Verbal information provided over the phone. Moni Wallace RBV dosing instructions, expresses understanding by teach back, and has no further questions at this time.    Thank you,    Joce BurkettD, PEDRO, BCPS  8/20/2021  15:47 EDT

## 2021-08-27 NOTE — PROGRESS NOTES
Anticoagulation Clinic - Remote Progress Note  [facility]  Testing Frequency: 7 days    Indication: Paroxysmal Afib  Referring Provider: Dr. Jeo/REY Rae (Last seen: 12/8/20  next appt: 12/14/21)  Goal INR: 2.0-3.0  Current Drug Interactions: , levothyroxine; omeprazole, azaTHIOprine, ezetimibe, allopurinol (10/20)  CHADS-VASc: 5 (age, gender, HTN, DM)  HAS-BLED: 3 (HTN, CKD, Age)     Diet: hasn't been eating any GLV 4/5/21   (7/21/21)  Alcohol: none  Tobacco: none   OTC Pain Medication: APAP PRN; took tylenol last 2 nights for pain from dialysis (3/26/20)    1st clinic: 9/14/17  2nd clinic: 6/26/18  3rd clinic: 11/5/19    INR History:    Date 6/10 6/17 6/24 7/2 7/8 7/15 7/22 7/29 8/5 8/12 8/17 8/24   Total WeeklyDose 28mg 28mg 28mg 28mg 28mg 28mg 30mg 26mg 28mg 28mg 32mg 34mg   INR 2.5 2.6 2.3 1.8 2.2 1.7 3.5 2.4 2.5 1.7 1.8 2.8   Notes    Inc GLV  Inc GLV 1x dose incr. x1 dose red  ensure       Date 8/31 9/9 9/14 9/21 9/28 10/5 10/13 10/19 10/27 11/3 11/10 11/17   Total WeeklyDose 32mg 32mg 26mg 30mg 26-28 mg? 28mg 28mg 28mg 28mg 26mg 30mg 28mg   INR 2.4 3.9 2.6 2.9 2.3 2.3 2.9 2.8 3.7 1.5 2.1 2.1   Notes       decr Ensure; allopurinol allopurinol; less Ensure  recv'd 11/4; incr GLV 1x incr dose      Date 11/24 12/1 12/8  12/15 12/22 12/31 1/5/21 1/11 1/11-15 1/17 1/25 2/1   Total WeeklyDose 28mg 28mg 26mg 28 mg 28 mg 24mg 28mg 32 mg [facility] admission 26mg 28mg  24mg   INR 2.1 3.8 3.0  2.2 2.0 1.5 1.5 2.3  2.0 2.0 1.8   Notes  decr GLV 1x decr dose   1x miss   pneumonia doxy, cefdinir, rec 1/18  1x miss     Date 2/8 2/15 2/22 3/1 3/4 3/9 3/15 3/22 3/29 4/5 4/12 4/15   Total WeeklyDose 30mg 28mg 30mg 28mg 28mg 28mg 30mg 30mg 28mg 26mg 28mg 25mg   INR 1.8 2.1 2.9 2.6 2.5 1.9 2.1 2.7 2.9 2.3 4.4 3.6   Notes 1x incr dose   keflex keflex       LVQ     Date 4/19 4/23 4/27 5/4 5/7 5/11 5/17 5/24 6/1 6/7 6/14 6/21   Total Weekly Dose 22mg 26mg 28mg 22mg-  24mg  ?? 30mg 30mg 28mg 28mg 30mg 32mg 32mg  32mg    INR 3.0 2.1 1.7 1.4 1.8 2.6 2.3 1.4 1.6 2.0 2.0  1.9   Notes LVQ   1x miss 1x incr dose 1x incr dose   1x incr dose  rcvd 6/2                      Inpatient Warfarin Schedule  Date  7/6 7/7 7/8 7/9 7/10 7/11 7/12       INR  1.65 1.74 2.12 2.34 2.16 2.3 1.94       Dose  5 mg 6 mg 4 mg 6 mg  4 mg 4 mg 6 mg         Date 7/6-7/12 7/14 7/20 7/27 8/3 8/10 8/17 8/20 8/27       Total Weekly Dose admitted 34mg 34mg 32mg  36mg 34mg 34mg 34mg 30mg       INR  1.7 2.0 1.9 2.2 2.6 2.9 2.4 1.5       Notes UTI ceftriax. recv'd 7/21  1x incr dose  doxy  Inc GLV, 1x missed dose, ceftaz  1x dose inc        Phone Interview:  Verbal Release Authorization signed on 6/26/18 and again on 11/5/2019-- may speak with Alexy Wallace (son), Genesis Becerril (daughter), Sawyer or Gema Wallace (son and daughter-in-law), Gerry Wallace (son)  Tablet Strength: 2mg tablets  Patient Contact Info: preferred [phone] - home with son Yadiel- [employer] [phone]; call if can't get in touch with home phone    Patient Findings:  Positives:  Missed doses, Change in medications, Change in diet/appetite   Comments:  Ms. Wallace states she was given ceftazidime x5 days starting on Monday with her last dose being today. She states she had a salad today before she realized her INR was low, she also had green beans 2-3 times this week. She reports she missed one dose on Tuesday.      Plan:  1. INR is SUBtherapeutic today at 1.5 (goal 2.-3.0). Instructed Ms. Wallace to boost tonight's dose of warfarin to 8mg then continue warfarin 4mg oral daily except 6mg on MonWedFri until recheck (34mg/week).  2. Repeat INR 8/31, to ensure WNL.  3. Verbal information provided over the phone. Moni Wallace RBV dosing instructions, expresses understanding by teach back, and has no further questions at this time.    Lashae Munguia, Pharmacy Intern  8/27/2021  14:47 EDT      I, Joce Bush, PharmD, have reviewed the note in full and agree with the assessment and plan.  08/27/21  15:58 EDT

## 2021-08-31 NOTE — PROGRESS NOTES
Anticoagulation Clinic - Remote Progress Note  [facility]  Testing Frequency: 7 days    Indication: Paroxysmal Afib  Referring Provider: Dr. Joe/REY Rae (Last seen: 12/8/20  next appt: 12/14/21)  Goal INR: 2.0-3.0  Current Drug Interactions: , levothyroxine; omeprazole, azaTHIOprine, ezetimibe, allopurinol (10/20)  CHADS-VASc: 5 (age, gender, HTN, DM)  HAS-BLED: 3 (HTN, CKD, Age)     Diet: hasn't been eating any GLV 4/5/21   (7/21/21)  Alcohol: none  Tobacco: none   OTC Pain Medication: APAP PRN; took tylenol last 2 nights for pain from dialysis (3/26/20)    1st clinic: 9/14/17  2nd clinic: 6/26/18  3rd clinic: 11/5/19    INR History:    Date 6/10 6/17 6/24 7/2 7/8 7/15 7/22 7/29 8/5 8/12 8/17 8/24   Total WeeklyDose 28mg 28mg 28mg 28mg 28mg 28mg 30mg 26mg 28mg 28mg 32mg 34mg   INR 2.5 2.6 2.3 1.8 2.2 1.7 3.5 2.4 2.5 1.7 1.8 2.8   Notes    Inc GLV  Inc GLV 1x dose incr. x1 dose red  ensure       Date 8/31 9/9 9/14 9/21 9/28 10/5 10/13 10/19 10/27 11/3 11/10 11/17   Total WeeklyDose 32mg 32mg 26mg 30mg 26-28 mg? 28mg 28mg 28mg 28mg 26mg 30mg 28mg   INR 2.4 3.9 2.6 2.9 2.3 2.3 2.9 2.8 3.7 1.5 2.1 2.1   Notes       decr Ensure; allopurinol allopurinol; less Ensure  recv'd 11/4; incr GLV 1x incr dose      Date 11/24 12/1 12/8  12/15 12/22 12/31 1/5/21 1/11 1/11-15 1/17 1/25 2/1   Total WeeklyDose 28mg 28mg 26mg 28 mg 28 mg 24mg 28mg 32 mg [facility] admission 26mg 28mg  24mg   INR 2.1 3.8 3.0  2.2 2.0 1.5 1.5 2.3  2.0 2.0 1.8   Notes  decr GLV 1x decr dose   1x miss   pneumonia doxy, cefdinir, rec 1/18  1x miss     Date 2/8 2/15 2/22 3/1 3/4 3/9 3/15 3/22 3/29 4/5 4/12 4/15   Total WeeklyDose 30mg 28mg 30mg 28mg 28mg 28mg 30mg 30mg 28mg 26mg 28mg 25mg   INR 1.8 2.1 2.9 2.6 2.5 1.9 2.1 2.7 2.9 2.3 4.4 3.6   Notes 1x incr dose   keflex keflex       LVQ     Date 4/19 4/23 4/27 5/4 5/7 5/11 5/17 5/24 6/1 6/7 6/14 6/21   Total Weekly Dose 22mg 26mg 28mg 22mg-  24mg  ?? 30mg 30mg 28mg 28mg 30mg 32mg 32mg  32mg    INR 3.0 2.1 1.7 1.4 1.8 2.6 2.3 1.4 1.6 2.0 2.0  1.9   Notes LVQ   1x miss 1x incr dose 1x incr dose   1x incr dose  rcvd 6/2                  Inpatient Warfarin dosing  Date  7/6 7/7 7/8 7/9 7/10 7/11 7/12   INR  1.65 1.74 2.12 2.34 2.16 2.3 1.94   Dose  5 mg 6 mg 4 mg 6 mg  4 mg 4 mg 6 mg     Date 7/6-7/12 7/14 7/20 7/27 8/3 8/10 8/17 8/20 8/27  8/31      Total Weekly Dose admitted 34mg 34mg 32mg  36mg 34mg 34mg 34mg 30mg  32 mg 36 mg     INR  1.7 2.0 1.9 2.2 2.6 2.9 2.4 1.5  1.4      Notes UTI ceftriax. recv'd 7/21  1x incr dose  doxy  Inc GLV, 1x missed dose, ceftaz  1x boost  missx1        Phone Interview:  Verbal Release Authorization signed on 6/26/18 and again on 11/5/2019-- may speak with Alexy Wallace (son), Genesis Becerril (daughter), Sawyer or Gema Wallace (son and daughter-in-law), Gerry Rodrigo (son)  Tablet Strength: 2mg tablets  Patient Contact Info: preferred [phone] - home with carlito Cotto- cell [phone]; call if can't get in touch with home phone      Patient Findings  Negatives:  Signs/symptoms of thrombosis, Signs/symptoms of bleeding, Laboratory test error suspected, Change in health, Change in alcohol use, Change in activity, Upcoming invasive procedure, Emergency department visit, Upcoming dental procedure, Missed doses, Extra doses, Change in medications, Change in diet/appetite, Hospital admission, Bruising, Other complaints   Comments:  She states she had a salad on Friday before she realized her INR was low.   She has not had any GLV since then  Otherwise, denies changes     Plan:  1. INR is SUBtherapeutic today at 1.4 despite boosted dose (goal 2.-3.0). Instructed Ms. Wallace to boost tonight's dose of warfarin to 6mg then tomorrow resume warfarin 4mg oral daily except 6mg on MonWedFri until recheck (36mg/week).  2. Repeat INR 9/7, to ensure WNL.  She is aware that the clinic is closed on Labor Day.  3. Verbal information provided over the phone. Moni Wallace RBV dosing instructions,  expresses understanding by teach back, and has no further questions at this time.    Radha Michaud, PharmD  8/31/2021  15:48 EDT

## 2021-09-07 NOTE — PROGRESS NOTES
Anticoagulation Clinic - Remote Progress Note  [facility]  Testing Frequency: 7 days    Indication: Paroxysmal Afib  Referring Provider: Dr. Joe/REY Rae (Last seen: 12/8/20  next appt: 12/14/21)  Goal INR: 2.0-3.0  Current Drug Interactions: , levothyroxine; omeprazole, azaTHIOprine, ezetimibe, allopurinol (10/20)  CHADS-VASc: 5 (age, gender, HTN, DM)  HAS-BLED: 3 (HTN, CKD, Age)     Diet: hasn't been eating any GLV 4/5/21   (7/21/21)  Alcohol: none  Tobacco: none   OTC Pain Medication: APAP PRN; took tylenol last 2 nights for pain from dialysis (3/26/20)    1st clinic: 9/14/17  2nd clinic: 6/26/18  3rd clinic: 11/5/19    INR History:    Date 8/31 9/9 9/14 9/21 9/28 10/5 10/13 10/19 10/27 11/3 11/10 11/17   Total WeeklyDose 32mg 32mg 26mg 30mg 26-28 mg? 28mg 28mg 28mg 28mg 26mg 30mg 28mg   INR 2.4 3.9 2.6 2.9 2.3 2.3 2.9 2.8 3.7 1.5 2.1 2.1   Notes       decr Ensure; allopurinol allopurinol; less Ensure  recv'd 11/4; incr GLV 1x incr dose      Date 11/24 12/1 12/8  12/15 12/22 12/31 1/5/21 1/11 1/11-15 1/17 1/25 2/1   Total WeeklyDose 28mg 28mg 26mg 28 mg 28 mg 24mg 28mg 32 mg [facility] admission 26mg 28mg  24mg   INR 2.1 3.8 3.0  2.2 2.0 1.5 1.5 2.3  2.0 2.0 1.8   Notes  decr GLV 1x decr dose   1x miss   pneumonia doxy, cefdinir, rec 1/18  1x miss     Date 2/8 2/15 2/22 3/1 3/4 3/9 3/15 3/22 3/29 4/5 4/12 4/15   Total WeeklyDose 30mg 28mg 30mg 28mg 28mg 28mg 30mg 30mg 28mg 26mg 28mg 25mg   INR 1.8 2.1 2.9 2.6 2.5 1.9 2.1 2.7 2.9 2.3 4.4 3.6   Notes 1x incr dose   keflex keflex       LVQ     Date 4/19 4/23 4/27 5/4 5/7 5/11 5/17 5/24 6/1 6/7 6/14 6/21   Total Weekly Dose 22mg 26mg 28mg 22mg-  24mg  ?? 30mg 30mg 28mg 28mg 30mg 32mg 32mg  32mg   INR 3.0 2.1 1.7 1.4 1.8 2.6 2.3 1.4 1.6 2.0 2.0  1.9   Notes LVQ   1x miss 1x incr dose 1x incr dose   1x incr dose  rcvd 6/2                  Inpatient Warfarin dosing  Date  7/6 7/7 7/8 7/9 7/10 7/11 7/12   INR  1.65 1.74 2.12 2.34 2.16 2.3 1.94   Dose  5 mg  6 mg 4 mg 6 mg  4 mg 4 mg 6 mg     Date 7/6-7/12 7/14 7/20 7/27 8/3 8/10 8/17 8/20 8/27  8/31 9/7     Total Weekly Dose admitted 34mg 34mg 32mg  36mg 34mg 34mg 34mg 30mg  32 mg 38mg 40mg    INR  1.7 2.0 1.9 2.2 2.6 2.9 2.4 1.5  1.4 1.6     Notes UTI ceftriax. recv'd 7/21  1x incr dose  doxy  Inc GLV, 1x missed dose, ceftaz  1x boost  missx1 1x dose inc, 1x misdose       Phone Interview:  Verbal Release Authorization signed on 6/26/18 and again on 11/5/2019-- may speak with Alexy Wallace (son), Genesis Becerril (daughter), Sawyer or Gema Wallace (son and daughter-in-law), Gerry Wallace (son)  Tablet Strength: 2mg tablets  Patient Contact Info: preferred [phone] - home with son Yadiel- [employer] [phone]; call if can't get in touch with home phone      Patient Findings:  Positives:  Extra doses   Negatives:  Signs/symptoms of thrombosis, Signs/symptoms of bleeding, Laboratory test error suspected, Change in health, Change in alcohol use, Change in activity, Upcoming invasive procedure, Emergency department visit, Upcoming dental procedure, Missed doses, Change in medications, Change in diet/appetite, Hospital admission, Bruising, Other complaints   Comments:  States she hasn't had any GLV. Reports she took 4 tablets last Tuesday instead of the 3 she was instructed to take. Denies changes to medications and missed doses.     Plan:  1. INR remains SUBtherapeutic today at 1.6 despite boosted dose (goal 2.-3.0). Instructed Ms. Wallace to take warfarin 6mg daily except 4mg on Sunday until recheck (40mg/week).  2. Repeat INR 9/14, to ensure WNL.  3. Verbal information provided over the phone. Moni Wallace RBV dosing instructions, expresses understanding by teach back, and has no further questions at this time.    Lashae Munguia, Pharmacy Intern  9/7/2021  14:16 EDT    I, Francoise Wu, PharmD, have reviewed the note in full and agree with the assessment and plan.  09/08/21  10:28 EDT

## 2021-09-15 NOTE — PROGRESS NOTES
Anticoagulation Clinic - Remote Progress Note  [facility]  Testing Frequency: 7 days    Indication: Paroxysmal Afib  Referring Provider: Dr. Joe/REY Rae (Last seen: 12/8/20  next appt: 12/14/21)  Goal INR: 2.0-3.0  Current Drug Interactions: , levothyroxine; omeprazole, azaTHIOprine, ezetimibe, allopurinol (10/20)  CHADS-VASc: 5 (age, gender, HTN, DM)  HAS-BLED: 3 (HTN, CKD, Age)     Diet: hasn't been eating any GLV 9/15/21   (7/21/21)  Alcohol: none  Tobacco: none   OTC Pain Medication: APAP PRN; took tylenol last 2 nights for pain from dialysis (3/26/20)    1st clinic: 9/14/17  2nd clinic: 6/26/18  3rd clinic: 11/5/19    INR History:    Date 8/31 9/9 9/14 9/21 9/28 10/5 10/13 10/19 10/27 11/3 11/10 11/17   Total WeeklyDose 32mg 32mg 26mg 30mg 26-28 mg? 28mg 28mg 28mg 28mg 26mg 30mg 28mg   INR 2.4 3.9 2.6 2.9 2.3 2.3 2.9 2.8 3.7 1.5 2.1 2.1   Notes       decr Ensure; allopurinol allopurinol; less Ensure  recv'd 11/4; incr GLV 1x incr dose      Date 11/24 12/1 12/8  12/15 12/22 12/31 1/5/21 1/11 1/11-15 1/17 1/25 2/1   Total WeeklyDose 28mg 28mg 26mg 28 mg 28 mg 24mg 28mg 32 mg [facility] admission 26mg 28mg  24mg   INR 2.1 3.8 3.0  2.2 2.0 1.5 1.5 2.3  2.0 2.0 1.8   Notes  decr GLV 1x decr dose   1x miss   pneumonia doxy, cefdinir, rec 1/18  1x miss     Date 2/8 2/15 2/22 3/1 3/4 3/9 3/15 3/22 3/29 4/5 4/12 4/15   Total WeeklyDose 30mg 28mg 30mg 28mg 28mg 28mg 30mg 30mg 28mg 26mg 28mg 25mg   INR 1.8 2.1 2.9 2.6 2.5 1.9 2.1 2.7 2.9 2.3 4.4 3.6   Notes 1x incr dose   keflex keflex       LVQ     Date 4/19 4/23 4/27 5/4 5/7 5/11 5/17 5/24 6/1 6/7 6/14 6/21   Total Weekly Dose 22mg 26mg 28mg 22mg-  24mg  ?? 30mg 30mg 28mg 28mg 30mg 32mg 32mg  32mg   INR 3.0 2.1 1.7 1.4 1.8 2.6 2.3 1.4 1.6 2.0 2.0  1.9   Notes LVQ   1x miss 1x incr dose 1x incr dose   1x incr dose  rcvd 6/2           Date 7/6-7/12 7/14 7/20 7/27 8/3 8/10 8/17 8/20 8/27  8/31 9/7 9/14    Total Weekly Dose admitted 34mg 34mg 32mg  36mg 34mg  "34mg 34mg 30mg  32 mg 38mg 40 mg    INR  1.7 2.0 1.9 2.2 2.6 2.9 2.4 1.5  1.4 1.6 2.1    Notes UTI ceftriax. recv'd 7/21  1x incr dose  doxy  Inc GLV, 1x missed dose, ceftaz  1x boost  missx1 1x dose inc, 1x misdose       Phone Interview:  Verbal Release Authorization signed on 6/26/18 and again on 11/5/2019-- may speak with Alexy Wallace (son), Genesis Becerril (daughter), Sawyer or Gema Wallace (son and daughter-in-law), Gerry Wallace (son)  Tablet Strength: 2mg tablets  Patient Contact Info: preferred [phone] - home with son Yadiel- cell [phone]; call if can't get in touch with home phone      Patient Findings:  Positives:  Change in medications   Negatives:  Signs/symptoms of thrombosis, Signs/symptoms of bleeding, Laboratory test error suspected, Change in health, Change in alcohol use, Change in activity, Upcoming invasive procedure, Emergency department visit, Upcoming dental procedure, Missed doses, Extra doses, Change in diet/appetite, Hospital admission, Bruising, Other complaints   Comments:  Her potassium levels \"got too high\" and she is taking a powder you mix in liquid to help lower it. She does not know the name of medication. She was unable to confirm last week's dosing.     Plan:  1. INR was back WNL yesterday evening at 2.1. Instructed Ms. Wallace to continue warfarin 6 mg daily except 4 mg on Sunday until recheck.  2. Repeat INR in one week, 9/21.  3. Verbal information provided over the phone. Moni EL Rodrigo RBV dosing instructions, expresses understanding by teach back, and has no further questions at this time.    Zina Aldrich, [facility]  9/15/2021  08:27 EDT     this seems like the highest dosing she has been on recently. May need to ease back to lower maintenance dose if she can help us by keeping track of her dosing.  I, Sawyer Jackman, PharmD, have reviewed the note in full and agree with the assessment and plan.  09/15/21  09:42 EDT    "

## 2021-09-17 NOTE — ED PROVIDER NOTES
Subjective   Moni Wallace is an 81 yr old female that presents emergency department with complaints of left wrist pain.  Patient reports that the pain started 2 days ago.  The pain is radiating up into her arm.  Patient complains of swelling to her left upper extremity.  Pain starts at the wrist and radiates up the arm.  Patient denies any injury.  Patient reports a history of gout.  Patient advises that is typical of her gout pain.        History provided by:  Patient   used: No    Wrist Pain  Location:  Lt arm pain/ Lt wrist pain  Quality:  Throbbing  Severity:  Moderate  Duration:  2 days  Timing:  Constant  Worsened by:  Palpation  Associated symptoms: myalgias    Associated symptoms: no chest pain, no fever and no shortness of breath        Review of Systems   Constitutional: Negative for chills and fever.   Respiratory: Negative for shortness of breath.    Cardiovascular: Negative for chest pain.   Musculoskeletal: Positive for myalgias.   Neurological: Negative for weakness and numbness.       Past Medical History:   Diagnosis Date   • Adenomatous colon polyp    • Chronic kidney disease    • Clostridium difficile infection 06/2017   • Diabetes mellitus (CMS/HCC)    • Gastric polyps    • Hypothyroidism    • IgA nephropathy, acute    • Kidney transplanted    • Macular degeneration    • Paroxysmal atrial fibrillation (CMS/HCC)    • Tubular adenoma     excision    • Ulcer of gastric fundus    • Vitamin D deficiency        Allergies   Allergen Reactions   • Hydrocodone Itching   • Rice Swelling   • Statins Other (See Comments)     myalgia   • Codeine Rash   • Sulfa Antibiotics Rash       Past Surgical History:   Procedure Laterality Date   • BREAST BIOPSY Left 1990'S   • CARPAL TUNNEL RELEASE     • CARPAL TUNNEL RELEASE Right    • CATARACT EXTRACTION     • CATARACT EXTRACTION Bilateral    • DIAGNOSTIC LAPAROSCOPY     • DIALYSIS FISTULA CREATION     • HERNIA REPAIR  86 and 86   • KNEE  ARTHROSCOPY INCISION AND DRAINAGE OF KNEE Right 5/18/2019    Procedure: KNEE ARTHROSCOPY INCISION AND DRAINAGE OF KNEE;  Surgeon: Paco Lester MD;  Location:  [facility];  Service: Orthopedics   • LAPAROSCOPIC TUBAL LIGATION  1985   • TOTAL KNEE ARTHROPLASTY     • TOTAL KNEE ARTHROPLASTY      Left knee    • TRANSPLANTATION RENAL  2010   • TRANSPLANTATION RENAL Right    • TRIGGER FINGER RELEASE     • TUBAL ABDOMINAL LIGATION     • UPPER GASTROINTESTINAL ENDOSCOPY  05/20/2013   • VENOUS ACCESS DEVICE (PORT) INSERTION N/A 5/23/2019    Procedure: INSERTION GROSHONG;  Surgeon: Rolando Begum MD;  Location:  [facility];  Service: General       Family History   Problem Relation Age of Onset   • Leukemia Mother    • Stroke Father    • Dementia Sister    • Kidney disease Sister    • Diabetic kidney disease Sister    • Lung cancer Brother    • Breast cancer Neg Hx    • Ovarian cancer Neg Hx    • Hypertension Sister    • Dementia Sister        Social History     Socioeconomic History   • Marital status:      Spouse name: Not on file   • Number of children: Not on file   • Years of education: Not on file   • Highest education level: Not on file   Tobacco Use   • Smoking status: Never Smoker   • Smokeless tobacco: Never Used   Vaping Use   • Vaping Use: Never used   Substance and Sexual Activity   • Alcohol use: No   • Drug use: No   • Sexual activity: Defer           Objective   Physical Exam  Vitals and nursing note reviewed.   Constitutional:       General: She is not in acute distress.     Appearance: Normal appearance. She is not ill-appearing.   HENT:      Head: Normocephalic and atraumatic.      Nose: Nose normal.      Mouth/Throat:      Mouth: Mucous membranes are moist.   Eyes:      Extraocular Movements: Extraocular movements intact.      Pupils: Pupils are equal, round, and reactive to light.   Cardiovascular:      Rate and Rhythm: Normal rate.      Pulses: Normal pulses.      Heart sounds:  "Normal heart sounds.   Pulmonary:      Effort: Pulmonary effort is normal.      Breath sounds: Normal breath sounds.   Musculoskeletal:      Cervical back: Normal range of motion.      Comments: Lt wrist:  Erythema, edema.  Warmth to palpation.  Palpable pulses.    Neurological:      Mental Status: She is alert and oriented to person, place, and time.   Psychiatric:         Mood and Affect: Mood normal.         Procedures           ED Course  ED Course as of Sep 26 0436   Fri Sep 17, 2021   1416 Patient will be discharged home.  Patient to take meds as ordered.  Patient to follow-up with her PCP.  Patient agrees with treatment plan and verbalized understanding.    [KG]      ED Course User Index  [KG] Kendal Michaud, APRN           No results found for this or any previous visit (from the past 24 hour(s)).  Note: In addition to lab results from this visit, the labs listed above may include labs taken at another facility or during a different encounter within the last 24 hours. Please correlate lab times with ED admission and discharge times for further clarification of the services performed during this visit.    No orders to display     Vitals:    09/17/21 1057   BP: 158/85   BP Location: Left arm   Patient Position: Sitting   Pulse: 76   Resp: 18   Temp: 98.1 °F (36.7 °C)   TempSrc: Oral   SpO2: 96%   Weight: 83.9 kg (185 lb)   Height: 162.6 cm (64\")     Medications   predniSONE (DELTASONE) tablet 20 mg (20 mg Oral Given 9/17/21 1438)     ECG/EMG Results (last 24 hours)     ** No results found for the last 24 hours. **        No orders to display                                       MDM    Final diagnoses:   Acute gout of left wrist, unspecified cause       ED Disposition  ED Disposition     ED Disposition Condition Comment    Discharge Stable           Alex Walters MD  [address]  [facility] [address] [phone]               Medication List      New Prescriptions    colchicine 0.6 MG " tablet  Take 1 tablet by mouth 2 (Two) Times a Day.     predniSONE 20 MG tablet  Commonly known as: DELTASONE  Take 1 tablet by mouth Daily.           Where to Get Your Medications      These medications were sent to [facility] - [place], [place] - [address] [place] [address] ROAD - [phone]  - [phone]   [address] [address]    Phone: [phone]   · colchicine 0.6 MG tablet  · predniSONE 20 MG tablet          Kendal Michaud, APRN  09/26/21 0436

## 2021-09-19 NOTE — DISCHARGE INSTRUCTIONS
ER evaluation revealed stable telemetry.  Patient has history of paroxysmal atrial fibrillation and is on chronic anticoagulation of Coumadin.  INR is therapeutic.  Suspect that transient palpitations were related to recent steroid use.  Patient has also had some diarrhea related to colchicine initiated for gout of the left wrist.  Strongly recommend patient discontinue colchicine and prednisone at this time due to adverse side effects.  Patient's potassium was also slightly decreased, most likely due to diarrhea.  We gave her oral potassium here in the ER and will prescribe KCl 20 mEq by mouth twice daily x2 days.  We will refer patient closely to the A. fib clinic and recommend close recheck with Dr. Joe, her routine cardiologist.  Recommend close PCP follow-up for recheck early next week to recheck potassium levels and recheck gout of the left wrist.  Continue with all other current medical management, including nightly peritoneal dialysis.  Return to the ER if worsening symptoms.

## 2021-09-19 NOTE — ED PROVIDER NOTES
Subjective   This is an 81-year-old female that presents the ER with onset of palpitations that started around 8 PM.  Patient has history of paroxysmal atrial fibrillation.  She is on chronic anticoagulation of Coumadin.  She follows with Dr. Velázquez, cardiologist.  Last SWATI was 1/12/2021 and EF was 65%.  Patient was just seen in our ER on 9/17/2021 for pain to the left wrist.  She had ultrasound which was negative for DVT of the left upper extremity and she was initiated on prednisone and colchicine.  Patient has had 3 doses of colchicine.  She had sudden onset of palpitations but she denied any chest pain, shortness of breath, dizziness, near-syncope, or syncope.  She denies any lower extremity pedal edema.  She denies fever or chills.  She also initial course of doxycycline on 8/15/2021 for urinary tract infection.  She has past medical history significant for renal transplant and she continues with peritoneal dialysis every night, type 2 diabetes mellitus, history of peptic ulcer disease, macular degeneration, IgA nephropathy, hypothyroidism, and C. difficile infection in the past.      History provided by:  Patient  Palpitations  Onset quality:  Sudden  Timing:  Constant  Chronicity:  Recurrent (History of PAF)  Context comment:  Pt has h/o PAF.  On Coumadin.  Pt follows with Dr. Joe, Cards.  She was seen in our ER on 9/17/21 for left wrist pain.  U/S negative for DVT.  Dx with gout and rx for Prednisone and Colchicine.  Palpitations 3 hrs prior to arrival.  Relieved by:  Nothing  Worsened by:  Nothing  Ineffective treatments:  None tried  Associated symptoms: no back pain, no chest pain, no chest pressure, no cough, no dizziness, no leg pain, no lower extremity edema, no nausea, no near-syncope, no shortness of breath and no vomiting    Associated symptoms comment:  Left wrist pain; dx with Gout 9/17/21.  Diarrhea since starting meds.  Loose stools x 3 today.  Risk factors: diabetes mellitus and hx of  atrial fibrillation        Review of Systems   Constitutional: Negative.    HENT: Negative.    Respiratory: Negative.  Negative for cough and shortness of breath.    Cardiovascular: Positive for palpitations. Negative for chest pain, leg swelling and near-syncope.        History of PAF; on Coumadin.   Gastrointestinal: Positive for abdominal distention. Negative for abdominal pain, constipation, diarrhea, nausea and vomiting.   Genitourinary: Negative.    Musculoskeletal: Positive for arthralgias (Left wrist pain; recent dx of gout.  U/S negative for DVT to left arm 9/17/21.). Negative for back pain.   Skin: Negative.  Negative for color change and wound.   Neurological: Negative.  Negative for dizziness.   All other systems reviewed and are negative.      Past Medical History:   Diagnosis Date   • Adenomatous colon polyp    • Chronic kidney disease    • Clostridium difficile infection 06/2017   • Diabetes mellitus (CMS/HCC)    • Gastric polyps    • Hypothyroidism    • IgA nephropathy, acute    • Kidney transplanted    • Macular degeneration    • Paroxysmal atrial fibrillation (CMS/HCC)    • Tubular adenoma     excision    • Ulcer of gastric fundus    • Vitamin D deficiency        Allergies   Allergen Reactions   • Hydrocodone Itching   • Rice Swelling   • Statins Other (See Comments)     myalgia   • Codeine Rash   • Sulfa Antibiotics Rash       Past Surgical History:   Procedure Laterality Date   • BREAST BIOPSY Left 1990'S   • CARPAL TUNNEL RELEASE     • CARPAL TUNNEL RELEASE Right    • CATARACT EXTRACTION     • CATARACT EXTRACTION Bilateral    • DIAGNOSTIC LAPAROSCOPY     • DIALYSIS FISTULA CREATION     • HERNIA REPAIR  86 and 87   • KNEE ARTHROSCOPY INCISION AND DRAINAGE OF KNEE Right 5/18/2019    Procedure: KNEE ARTHROSCOPY INCISION AND DRAINAGE OF KNEE;  Surgeon: Paco Lester MD;  Location: [facility];  Service: Orthopedics   • LAPAROSCOPIC TUBAL LIGATION  1985   • TOTAL KNEE ARTHROPLASTY     • TOTAL  KNEE ARTHROPLASTY      Left knee    • TRANSPLANTATION RENAL  2010   • TRANSPLANTATION RENAL Right    • TRIGGER FINGER RELEASE     • TUBAL ABDOMINAL LIGATION     • UPPER GASTROINTESTINAL ENDOSCOPY  05/20/2013   • VENOUS ACCESS DEVICE (PORT) INSERTION N/A 5/23/2019    Procedure: INSERTION GROSHONG;  Surgeon: Rolando Begum MD;  Location: [facility];  Service: General       Family History   Problem Relation Age of Onset   • Leukemia Mother    • Stroke Father    • Dementia Sister    • Kidney disease Sister    • Diabetic kidney disease Sister    • Lung cancer Brother    • Breast cancer Neg Hx    • Ovarian cancer Neg Hx    • Hypertension Sister    • Dementia Sister        Social History     Socioeconomic History   • Marital status:      Spouse name: Not on file   • Number of children: Not on file   • Years of education: Not on file   • Highest education level: Not on file   Tobacco Use   • Smoking status: Never Smoker   • Smokeless tobacco: Never Used   Vaping Use   • Vaping Use: Never used   Substance and Sexual Activity   • Alcohol use: No   • Drug use: No   • Sexual activity: Defer           Objective   Physical Exam  Vitals and nursing note reviewed.   Constitutional:       Appearance: Normal appearance.   HENT:      Head: Normocephalic and atraumatic.      Right Ear: Tympanic membrane normal.      Left Ear: Tympanic membrane normal.      Nose: Nose normal.      Mouth/Throat:      Mouth: Mucous membranes are moist.      Pharynx: Oropharynx is clear.   Eyes:      Extraocular Movements: Extraocular movements intact.      Conjunctiva/sclera: Conjunctivae normal.      Pupils: Pupils are equal, round, and reactive to light.   Cardiovascular:      Rate and Rhythm: Normal rate and regular rhythm.  Extrasystoles are present.     Pulses: Normal pulses.      Heart sounds: Murmur heard.   Systolic murmur is present with a grade of 3/6.        Comments: Regular rate and rhythm.  Occasional ectopy.  No pedal edema  to lower extremities.  Grade 3 out of 6 systolic murmur.  Pulmonary:      Effort: Pulmonary effort is normal.      Breath sounds: Normal breath sounds.      Comments: Lungs are clear to auscultation bilaterally.  Abdominal:      General: Bowel sounds are normal. There is distension.      Palpations: Abdomen is soft.      Tenderness: There is no abdominal tenderness. There is no right CVA tenderness, left CVA tenderness, guarding or rebound.      Comments: Mild distention.  Nontender to palpation.  Active bowel sounds in all 4 quadrants.  No flank or CVA tenderness.   Musculoskeletal:         General: Normal range of motion.      Cervical back: Normal range of motion and neck supple.      Right lower leg: No edema.      Left lower leg: No edema.      Comments: Velcro splint in place to the left wrist for comfort secondary to recent gout.  No significant warmth, erythema, or swelling.  Mild tenderness.   Skin:     General: Skin is warm and dry.   Neurological:      General: No focal deficit present.      Mental Status: She is alert.      Cranial Nerves: Cranial nerves are intact.      Sensory: Sensation is intact.      Motor: Motor function is intact.      Coordination: Coordination is intact.      Comments: Neuro intact and nonfocal.         Procedures           ED Course  ED Course as of Sep 19 0346   Sun Sep 19, 2021   0343 EKGs x2 showed atrial fibrillation which was rate controlled.  Patient has history of PAF and is on chronic anticoagulation of Coumadin.  INR is therapeutic at 2.88.  CBC was essentially normal.  White blood cell count was 11.20 and patient has been taking steroids for the last 2 days.  Chemistries show end-stage renal disease with BUN and creatinine of 48 and 7.50.  Potassium was 3.1, most likely secondary to recent diarrhea from taking colchicine.  Discussed the case in detail with Dr. Tello, ER attending physician.  Colchicine is contraindicated in severe renal impairment, so I strongly  recommended that patient discontinue the use of colchicine and prednisone.  I also feel that prednisone could be attributing to patient's palpitations.  Troponin was 0.048 which is what it has been in epic for the last 2 months, most likely secondary to end-stage renal disease.  BNP is 23, 206.  Chest x-ray reveals no evidence of heart failure.  Lungs are clear to auscultation bilaterally and patient has no pedal edema.  She denies any chest pain.  Vital signs and exam are stable.  We will refer patient closely to our A. fib clinic and recommend she follow-up closely with Dr. Joe, her routine cardiologist.  Discontinue use of prednisone and colchicine.  Recommend close PCP follow-up with Dr. Walters for repeat chemistries to ensure that potassium has stabilized.  Rx for KCl 20 mEq by mouth twice daily x2 days.  Discussed this dosage with the hospital pharmacist and he is agreeable.  I updated patient and she is very appreciative.  Return to the ER if worsening symptoms.  Continue with peritoneal dialysis as scheduled nightly.    [FC]      ED Course User Index  [FC] Brianna Lawrence PA-C                Recent Results (from the past 24 hour(s))   Comprehensive Metabolic Panel    Collection Time: 09/19/21 12:17 AM    Specimen: Blood   Result Value Ref Range    Glucose 144 (H) 65 - 99 mg/dL    BUN 48 (H) 8 - 23 mg/dL    Creatinine 7.50 (H) 0.57 - 1.00 mg/dL    Sodium 141 136 - 145 mmol/L    Potassium 3.1 (L) 3.5 - 5.2 mmol/L    Chloride 98 98 - 107 mmol/L    CO2 24.0 22.0 - 29.0 mmol/L    Calcium 7.8 (L) 8.6 - 10.5 mg/dL    Total Protein 7.6 6.0 - 8.5 g/dL    Albumin 3.20 (L) 3.50 - 5.20 g/dL    ALT (SGPT) 18 1 - 33 U/L    AST (SGOT) 23 1 - 32 U/L    Alkaline Phosphatase 223 (H) 39 - 117 U/L    Total Bilirubin 0.2 0.0 - 1.2 mg/dL    eGFR Non African Amer 5 (L) >60 mL/min/1.73    eGFR  African Amer      Globulin 4.4 gm/dL    A/G Ratio 0.7 g/dL    BUN/Creatinine Ratio 6.4 (L) 7.0 - 25.0    Anion Gap 19.0 (H) 5.0 -  15.0 mmol/L   Magnesium    Collection Time: 09/19/21 12:17 AM    Specimen: Blood   Result Value Ref Range    Magnesium 2.4 1.6 - 2.4 mg/dL   Troponin    Collection Time: 09/19/21 12:17 AM    Specimen: Blood   Result Value Ref Range    Troponin T 0.048 (C) 0.000 - 0.030 ng/mL   TSH    Collection Time: 09/19/21 12:17 AM    Specimen: Blood   Result Value Ref Range    TSH 2.580 0.270 - 4.200 uIU/mL   BNP    Collection Time: 09/19/21 12:17 AM    Specimen: Blood   Result Value Ref Range    proBNP 23,206.0 (H) 0.0-1,800.0 pg/mL   Green Top (Gel)    Collection Time: 09/19/21 12:17 AM   Result Value Ref Range    Extra Tube Hold for add-ons.    Lavender Top    Collection Time: 09/19/21 12:17 AM   Result Value Ref Range    Extra Tube hold for add-on    Gold Top - SST    Collection Time: 09/19/21 12:17 AM   Result Value Ref Range    Extra Tube Hold for add-ons.    Light Blue Top    Collection Time: 09/19/21 12:17 AM   Result Value Ref Range    Extra Tube hold for add-on    CBC Auto Differential    Collection Time: 09/19/21 12:17 AM    Specimen: Blood   Result Value Ref Range    WBC 11.20 (H) 3.40 - 10.80 10*3/mm3    RBC 3.52 (L) 3.77 - 5.28 10*6/mm3    Hemoglobin 10.9 (L) 12.0 - 15.9 g/dL    Hematocrit 34.1 34.0 - 46.6 %    MCV 96.9 79.0 - 97.0 fL    MCH 31.0 26.6 - 33.0 pg    MCHC 32.0 31.5 - 35.7 g/dL    RDW 14.5 12.3 - 15.4 %    RDW-SD 51.5 37.0 - 54.0 fl    MPV 10.0 6.0 - 12.0 fL    Platelets 242 140 - 450 10*3/mm3    Neutrophil % 69.9 42.7 - 76.0 %    Lymphocyte % 21.1 19.6 - 45.3 %    Monocyte % 7.2 5.0 - 12.0 %    Eosinophil % 0.1 (L) 0.3 - 6.2 %    Basophil % 0.3 0.0 - 1.5 %    Immature Grans % 1.4 (H) 0.0 - 0.5 %    Neutrophils, Absolute 7.83 (H) 1.70 - 7.00 10*3/mm3    Lymphocytes, Absolute 2.36 0.70 - 3.10 10*3/mm3    Monocytes, Absolute 0.81 0.10 - 0.90 10*3/mm3    Eosinophils, Absolute 0.01 0.00 - 0.40 10*3/mm3    Basophils, Absolute 0.03 0.00 - 0.20 10*3/mm3    Immature Grans, Absolute 0.16 (H) 0.00 - 0.05  "10*3/mm3    nRBC 0.0 0.0 - 0.2 /100 WBC   Protime-INR    Collection Time: 09/19/21 12:17 AM    Specimen: Blood   Result Value Ref Range    Protime 28.9 (H) 11.4 - 14.4 Seconds    INR 2.88 (H) 0.85 - 1.16     Note: In addition to lab results from this visit, the labs listed above may include labs taken at another facility or during a different encounter within the last 24 hours. Please correlate lab times with ED admission and discharge times for further clarification of the services performed during this visit.    XR Chest 1 View   Final Result   No acute cardiopulmonary findings.      Signer Name: Kemar Oliva MD    Signed: 9/19/2021 12:47 AM    Workstation Name: [email or web address]     [facility]        Vitals:    09/19/21 0013 09/19/21 0018 09/19/21 0217 09/19/21 0338   BP:  164/78     BP Location:  Left arm     Patient Position:  Lying     Pulse: 67  61 65   Resp: 18      Temp:  97.7 °F (36.5 °C)     TempSrc:  Oral     SpO2: 98%  98% 97%   Weight: 83.9 kg (185 lb)      Height: 162.6 cm (64\")        Medications   sodium chloride 0.9 % flush 10 mL (has no administration in time range)   potassium chloride (MICRO-K) CR capsule 40 mEq (has no administration in time range)     ECG/EMG Results (last 24 hours)     ** No results found for the last 24 hours. **        ECG 12 Lead         ECG 12 Lead    (Results Pending)                                [id number] Score (for atrial fibrillation stroke risk) reviewed and/or performed as part of the patient evaluation and treatment planning process.  The result associated with this review/performance is: 4       MDM    Final diagnoses:   Palpitations   PAF (paroxysmal atrial fibrillation) (CMS/[facility])   History of gout   Adverse effect of drug, initial encounter   Hypokalemia   ESRD on peritoneal dialysis (CMS/[facility])   Chronic anticoagulation       ED Disposition  ED Disposition     ED Disposition Condition Comment    Discharge Stable           [facility] " [facility]  [address]  [address]  [facility] [address] [phone]  Call in 2 days  Close follow-up early next week at the [facility]    Alex Walters MD  [address]  [facility] [address]  [place] [id number] [phone]    Schedule an appointment as soon as possible for a visit in 2 days  Close PCP follow-up for recheck    [facility] Emergency Department  [address] [address] [phone]    If symptoms worsen         Medication List      New Prescriptions    potassium chloride 20 MEQ CR tablet  Commonly known as: K-DUR,KLOR-CON  Take 1 tablet by mouth 2 (Two) Times a Day for 4 doses.           Where to Get Your Medications      [facility] [place] [place] - [address] - [phone]  - [phone] FX  [address], [facility] [address]    Phone: [phone]   · potassium chloride 20 MEQ CR tablet          Brianna Lawrence PA-C  09/19/21 0346

## 2021-09-20 NOTE — OUTREACH NOTE
"Ambulatory Case Management Note    Patient Outreach    Pt contacted regarding [facility] ED visit on 9/1721 and 9/19/21.  States she does \"not feel too good right now.  I did not get any sleep last night.\"   She does state her palpitation have resolved. Offered to assist in scheduling f/u visits with her cardiologist and her PCP, however she declines at present.  States she will call herself once she checks her granddaughter's schedule.  She does not drive her self, but states her family, mostly her granddaughter is her transportation.  Asked if she obtained the potassium that was prescribed by ED, which she has not.  States she was given 4 in the ED and at one point her potassium was too high, so wants to talk to her nephrologist first.  She does home peritoneal dialysis, however she has routine visits with [facility] and her nephrologist is Dr. Hodges.  She reports she is w/c bound following \"knee surgery that I never recovered from.\" She is able to transfer herself to chair, bed and to chair seat.  Her granddaughter assist with her shower.  She states she is compliant with her medicines and uses [facility].  She monitors her bp and bs at home.  Care gaps reviewed and knows she is not utd. States she will work on these at later date.  She states she does have living will.  Role of  explained and contact number given.  [facility] 24/7 Nurse line explained and contact number provided.  She denies any needs at present and voiced her appreciation for the call.  Reminded again at end of call to make sure she schedules her f/u visits with her PCP and cardiologist, which she states she will do.      SDOH updated and reviewed with the patient during this program:     Financial Resource Strain: Low Risk    • Difficulty of Paying Living Expenses: Not very hard       Social Connections:    • Frequency of Communication with Friends and Family:    • Frequency of Social Gatherings with Friends and Family:    • Attends [employer] " Services:    • Active Member of Clubs or Organizations:    • Attends Club or Organization Meetings:    • Marital Status:        Transportation Needs: No Transportation Needs   • Lack of Transportation (Medical): No   • Lack of Transportation (Non-Medical): No       Housing Stability: Low Risk    • Unable to Pay for Housing in the Last Year: No   • Number of Places Lived in the Last Year: 1   • Unstable Housing in the Last Year: No     General & Health Literacy Assessment    Questions/Answers      Most Recent Value   Assessment Completed With  Patient   Living Arrangement  Family Members   Type of Residence  Private Residence   Equiptment Used at Home  Wheelchair, Tub Seat [glucometer    bp monitor]   Bed or Wheelchair Confined  Yes   Difficulty Keeping Appointments  Yes [somewhat hard due to depending on family members schedule ]   Health Literacy  Good        Care Evaluation    Questions/Answers      Most Recent Value   Annual Wellness Visit:   Patient Refuses at This Time   Care Gaps Addressed  Mammogram, Pneumonia Vaccine   Mammogram Status  Patient will Complete   Pneumonia Vaccine Status  Up to Date   Other Patient Education/Resources   24/7 [facility] Nurse Call Line   24/7 Nurse Call Line Education Method  Verbal   Advanced Directives:  Patient Has   Medication Adherence  Medications understood   Healthy Lifestyle (Self-Efficacy)  self-monitors vital signs appropriately, recognizes when to contact medical assistance        Lily Traore RN  Ambulatory Case Management    9/20/2021, 13:01 EDT

## 2021-09-21 NOTE — PROGRESS NOTES
Anticoagulation Clinic - Remote Progress Note  [facility]  Testing Frequency: 7 days    Indication: Paroxysmal Afib  Referring Provider: Dr. Joe/REY Rae (Last seen: 12/8/20  next appt: 12/14/21)  Goal INR: 2.0-3.0  Current Drug Interactions: , levothyroxine; omeprazole, azaTHIOprine, ezetimibe, allopurinol (10/20)  CHADS-VASc: 5 (age, gender, HTN, DM)  HAS-BLED: 3 (HTN, CKD, Age)     Diet: hasn't been eating any GLV 9/15/21   (7/21/21)  Alcohol: none  Tobacco: none   OTC Pain Medication: APAP PRN; took tylenol last 2 nights for pain from dialysis (3/26/20)    1st clinic: 9/14/17  2nd clinic: 6/26/18  3rd clinic: 11/5/19    INR History:    Date 8/31 9/9 9/14 9/21 9/28 10/5 10/13 10/19 10/27 11/3 11/10 11/17   Total WeeklyDose 32mg 32mg 26mg 30mg 26-28 mg? 28mg 28mg 28mg 28mg 26mg 30mg 28mg   INR 2.4 3.9 2.6 2.9 2.3 2.3 2.9 2.8 3.7 1.5 2.1 2.1   Notes       decr Ensure; allopurinol allopurinol; less Ensure  recv'd 11/4; incr GLV 1x incr dose      Date 11/24 12/1 12/8  12/15 12/22 12/31 1/5/21 1/11 1/11-15 1/17 1/25 2/1   Total WeeklyDose 28mg 28mg 26mg 28 mg 28 mg 24mg 28mg 32 mg [facility] admission 26mg 28mg  24mg   INR 2.1 3.8 3.0  2.2 2.0 1.5 1.5 2.3  2.0 2.0 1.8   Notes  decr GLV 1x decr dose   1x miss   pneumonia doxy, cefdinir, rec 1/18  1x miss     Date 2/8 2/15 2/22 3/1 3/4 3/9 3/15 3/22 3/29 4/5 4/12 4/15   Total WeeklyDose 30mg 28mg 30mg 28mg 28mg 28mg 30mg 30mg 28mg 26mg 28mg 25mg   INR 1.8 2.1 2.9 2.6 2.5 1.9 2.1 2.7 2.9 2.3 4.4 3.6   Notes 1x incr dose   keflex keflex       LVQ     Date 4/19 4/23 4/27 5/4 5/7 5/11 5/17 5/24 6/1 6/7 6/14 6/21   Total Weekly Dose 22mg 26mg 28mg 22mg-  24mg  ?? 30mg 30mg 28mg 28mg 30mg 32mg 32mg  32mg   INR 3.0 2.1 1.7 1.4 1.8 2.6 2.3 1.4 1.6 2.0 2.0  1.9   Notes LVQ   1x miss 1x incr dose 1x incr dose   1x incr dose  rcvd 6/2           Date 7/6-7/12 7/14 7/20 7/27 8/3 8/10 8/17 8/20 8/27  8/31 9/7 9/14 9/19   Total Weekly Dose admitted 34mg 34mg 32mg  36mg  34mg 34mg 34mg 30mg  32 mg 38mg 40 mg 40mg   INR  1.7 2.0 1.9 2.2 2.6 2.9 2.4 1.5  1.4 1.6 2.1 2.88   Notes UTI ceftriax. recv'd 7/21  1x incr dose  doxy  Inc GLV, 1x missed dose, ceftaz  1x boost  missx1 1x dose inc, 1x misdose  ED- prednisone     Date 9/21              Total WeeklyDose 40mg              INR 3.1              Notes                     Phone Interview:  Verbal Release Authorization signed on 6/26/18 and again on 11/5/2019-- may speak with Alexy Wallace (son), Genesis Becerril (daughter), Sawyer or Gema Wallace (son and daughter-in-law), Gerry Rodrigo (son)  Tablet Strength: 2mg tablets  Patient Contact Info: preferred [phone] - home with carlito Cotto- cell [phone]; call if can't get in touch with home phone      Patient Findings:  Positives:  Emergency department visit, Change in medications   Negatives:  Signs/symptoms of thrombosis, Signs/symptoms of bleeding, Laboratory test error suspected, Change in health, Change in alcohol use, Change in activity, Upcoming invasive procedure, Upcoming dental procedure, Missed doses, Extra doses, Change in diet/appetite, Hospital admission, Bruising, Other complaints   Comments:  ED visit 9/17 and 9/19: given RX for prednisone + colchicine, which were discontinued at second visit due to ESRD   First ED visit due to hand swelling, checked for thrombus due to JJ vaccine   Self adjusted dose to 4mg last night    INR increased 1 in one week, has c/o pain, diarrhea   Was previously directed to take 6mg qd except 4mg Sunday however patient said she already has her pillbox filled for the week with 4mg qd except 6mg MOnWedFri, will proceed with this dosing for now to reduce confusion and this was patient previous maintenance dose           Plan:  1. INR was slightly SUPRAtherapeutic yesterday evening at 3.1, large increase in one week. Instructed Ms. Wallace to resume previous dose of warfarin 4 mg daily except 6mg MonWedFri until recheck.  2. Repeat INR in one week,  9/28  3. Verbal information provided over the phone. Moni Wallace RBV dosing instructions, expresses understanding by teach back, and has no further questions at this time.    Kwadwo JungD.  09/21/21   08:37 EDT

## 2021-10-05 NOTE — PROGRESS NOTES
Anticoagulation Clinic - Remote Progress Note  [facility]  Testing Frequency: 7 days    Indication: Paroxysmal Afib  Referring Provider: Dr. Joe/REY Rae (Last seen: 12/8/20  next appt: 12/14/21)  Goal INR: 2.0-3.0  Current Drug Interactions: , levothyroxine; omeprazole, azaTHIOprine, ezetimibe, allopurinol (10/20)  CHADS-VASc: 5 (age, gender, HTN, DM)  HAS-BLED: 3 (HTN, CKD, Age)     Diet: hasn't been eating any GLV 9/15/21   (7/21/21)  Alcohol: none  Tobacco: none   OTC Pain Medication: APAP PRN; took tylenol last 2 nights for pain from dialysis (3/26/20)    1st clinic: 9/14/17  2nd clinic: 6/26/18  3rd clinic: 11/5/19    INR History:    Date 8/31 9/9 9/14 9/21 9/28 10/5 10/13 10/19 10/27 11/3 11/10 11/17   Total Weekly  Dose 32mg 32mg 26mg 30mg 26-28 mg? 28mg 28mg 28mg 28mg 26mg 30mg 28mg   INR 2.4 3.9 2.6 2.9 2.3 2.3 2.9 2.8 3.7 1.5 2.1 2.1   Notes       decr Ensure; allopurinol allopurinol; less Ensure  recv'd 11/4; incr GLV 1x incr dose      Date 11/24 12/1 12/8  12/15 12/22 12/31 1/5/21 1/11 1/11-15 1/17 1/25 2/1   Total WeeklyDose 28mg 28mg 26mg 28 mg 28 mg 24mg 28mg 32 mg [facility] admission 26mg 28mg  24mg   INR 2.1 3.8 3.0  2.2 2.0 1.5 1.5 2.3  2.0 2.0 1.8   Notes  decr GLV 1x decr dose   1x miss   pneumonia doxy, cefdinir, rec 1/18  1x miss     Date 2/8 2/15 2/22 3/1 3/4 3/9 3/15 3/22 3/29 4/5 4/12 4/15   Total WeeklyDose 30mg 28mg 30mg 28mg 28mg 28mg 30mg 30mg 28mg 26mg 28mg 25mg   INR 1.8 2.1 2.9 2.6 2.5 1.9 2.1 2.7 2.9 2.3 4.4 3.6   Notes 1x incr dose   keflex keflex       LVQ     Date 4/19 4/23 4/27 5/4 5/7 5/11 5/17 5/24 6/1 6/7 6/14 6/21   Total Weekly Dose 22mg 26mg 28mg 22mg-  24mg  ?? 30mg 30mg 28mg 28mg 30mg 32mg 32mg  32mg   INR 3.0 2.1 1.7 1.4 1.8 2.6 2.3 1.4 1.6 2.0 2.0  1.9   Notes LVQ   1x miss 1x incr dose 1x incr dose   1x incr dose  rcvd 6/2           Date 7/6-7/12 7/14 7/20 7/27 8/3 8/10 8/17 8/20 8/27  8/31 9/7 9/14 9/19   Total Weekly Dose admitted 34mg 34mg 32mg   36mg 34mg 34mg 34mg 30mg  32 mg 38mg 40 mg 40mg   INR  1.7 2.0 1.9 2.2 2.6 2.9 2.4 1.5  1.4 1.6 2.1 2.88   Notes UTI ceftriax. recv'd 7/21  1x incr dose  doxy  Inc GLV, 1x missed dose, ceftaz  1x boost  missx1 1x dose inc, 1x misdose  ED- prednisone     Date 9/21 9/28 10/5            Total WeeklyDose 40mg 34 mg 36 mg            INR 3.1 1.8 2.5            Notes  Ceftaz                   Phone Interview:  Verbal Release Authorization signed on 6/26/18 and again on 11/5/2019-- may speak with Alexy Wallace (son), Genesis Becerril (daughter), Sawyer or Gema Wallace (son and daughter-in-law), Gerry Wallace (son)  Tablet Strength: 2mg tablets  Patient Contact Info: preferred [phone] - home with carlito Cotto- cell [phone]; call if can't get in touch with home phone     Patient Findings    Negatives:  Signs/symptoms of thrombosis, Signs/symptoms of bleeding, Laboratory test error suspected, Change in health, Change in alcohol use, Change in activity, Upcoming invasive procedure, Emergency department visit, Upcoming dental procedure, Missed doses, Extra doses, Change in medications, Change in diet/appetite, Hospital admission, Bruising, Other complaints   Comments:  No new updates since last encounter. Confirmed that her last dose of Ceftazidime was last Sunday a week ago.     Plan:  1. INR was therapeutic today at 2.5. Instructed patient to continue previous dose of warfarin 6 mg daily MonWedFri and warfarin 4 mg daily on all other days.  2. Repeat INR in one week, 10/12.  3. Verbal information provided over the phone. Moni Wallace RBV dosing instructions, expresses understanding by teach back, and has no further questions at this time.    Melva Grayson, PharmD  10/5/2021  13:40 EDT

## 2021-10-11 NOTE — PROGRESS NOTES
"Chief Complaint  Palpitations, Atrial Fibrillation, and Establish Care    Subjective    History of Present Illness {CC  Problem List  Visit  Diagnosis   Encounters  Notes  Medications  Labs  Result Review Imaging  Media :23}       History of Present Illness   81-year-old female presents to the office today for ongoing evaluation of her PAF.  Patient presented to [facility] ED on 9/19/2021 with complaints of palpitations and she was noted to be in atrial fibrillation.  She has a history of chronic kidney disease stage IV continuing on peritoneal dialysis nightly, type 2 diabetes, peptic ulcer disease, macular degeneration, IgA nephropathy, hypothyroidism and history of C. difficile in the past.  She is anticoagulated with Coumadin and denies any signs and symptoms of bleeding.  Followed by Coumadin clinic.  She reports she quickly returned to normal sinus rhythm after ED visit.  Notes overall she is feeling well.  Only concern today is blood pressure is slightly lower than it has been.  She is scheduled to follow-up with Dr. Hodges later this week and will discuss that with him.  Objective     Vital Signs:   Vitals:    10/11/21 1324 10/11/21 1325   BP: 108/56 99/52   BP Location: Left arm Left arm   Patient Position: Sitting Standing   Cuff Size: Adult Adult   Pulse: 72 84   Resp:  20   Temp:  96.3 °F (35.7 °C)   TempSrc:  Temporal   SpO2: 97% 98%   Weight:  75.4 kg (166 lb 3 oz)   Height:  162.6 cm (64\")     Body mass index is 28.53 kg/m².  Physical Exam  Vitals and nursing note reviewed.   Constitutional:       Appearance: Normal appearance.   HENT:      Head: Normocephalic.   Eyes:      Pupils: Pupils are equal, round, and reactive to light.   Cardiovascular:      Rate and Rhythm: Normal rate and regular rhythm.      Pulses: Normal pulses.      Heart sounds: Murmur (3/6) heard.       Pulmonary:      Effort: Pulmonary effort is normal.      Breath sounds: Normal breath sounds.   Abdominal: "      General: Bowel sounds are normal.      Palpations: Abdomen is soft.   Musculoskeletal:         General: Normal range of motion.      Cervical back: Normal range of motion.      Right lower leg: No edema.      Left lower leg: No edema.   Skin:     General: Skin is warm and dry.      Capillary Refill: Capillary refill takes less than 2 seconds.   Neurological:      Mental Status: She is alert and oriented to person, place, and time.   Psychiatric:         Mood and Affect: Mood normal.         Thought Content: Thought content normal.              Result Review  Data Reviewed:{ Labs  Result Review  Imaging  Med Tab  Media :23}     Lab Results   Component Value Date    GLUCOSE 144 (H) 09/19/2021    CALCIUM 7.8 (L) 09/19/2021     09/19/2021    K 3.1 (L) 09/19/2021    CO2 24.0 09/19/2021    CL 98 09/19/2021    BUN 48 (H) 09/19/2021    CREATININE 7.50 (H) 09/19/2021    EGFRIFAFRI  09/19/2021      Comment:      <15 Indicative of kidney failure.    EGFRIFNONA 5 (L) 09/19/2021    BCR 6.4 (L) 09/19/2021    ANIONGAP 19.0 (H) 09/19/2021     EKG today normal sinus rhythm at 73 bpm           Assessment and Plan {CC Problem List  Visit Diagnosis  ROS  Review (Popup)  Health Maintenance  Quality  BestPractice  Medications  SmartSets  SnapShot Encounters  Media :23}   1. Paroxysmal atrial fibrillation (HCC)  Has converted back to nsr  CHADS-VASc Risk Assessment            5 Total Score    1 Hypertension    2 Age >/= 75    1 DM    1 Sex: Female        Criteria that do not apply:    CHF    PRIOR STROKE/TIA/THROMBO    Vascular Disease    Age 65-74        Anticoagulated with coumadin and denies any s/s of bleeding   - ECG 12 Lead; Future    2. High output HF (heart failure) (HCC)  Currently euvolemic     3. Essential hypertension  bp slightly lower than normal  Patient currently denies dizziness and lightheadedness  Patient prefers to discuss with Dr Hodges at upcoming appt later this week     4. Chronic  kidney disease, stage IV (severe) (HCC)  Currently on peritoneal dialysis nightly       Follow Up {Instructions Charge Capture  Follow-up Communications :23}   Return if symptoms worsen or fail to improve.    Patient was given instructions and counseling regarding her condition or for health maintenance advice. Please see specific information pulled into the AVS if appropriate.  Patient was instructed to call the [facility] with any questions, concerns, or worsening symptoms.    *Please note that portions of this note were completed with a voice recognition program. Efforts were made to edit the dictations, but occasionally words are mistranscribed.

## 2021-10-12 NOTE — PROGRESS NOTES
Anticoagulation Clinic - Remote Progress Note  [facility]  Testing Frequency: 7 days    Indication: Paroxysmal Afib  Referring Provider: Dr. Joe/REY Rae (Last seen: 12/8/20  next appt: 12/14/21)  Goal INR: 2.0-3.0  Current Drug Interactions: , levothyroxine; omeprazole, azaTHIOprine, ezetimibe, allopurinol (10/20)  CHADS-VASc: 5 (age, gender, HTN, DM)  HAS-BLED: 3 (HTN, CKD, Age)     Diet: hasn't been eating any GLV 9/15/21   (7/21/21)  Alcohol: none  Tobacco: none   OTC Pain Medication: APAP PRN; took tylenol last 2 nights for pain from dialysis (3/26/20)    1st clinic: 9/14/17  2nd clinic: 6/26/18  3rd clinic: 11/5/19    INR History:    Date 8/31 9/9 9/14 9/21 9/28 10/5 10/13 10/19 10/27 11/3 11/10 11/17   Total Weekly  Dose 32mg 32mg 26mg 30mg 26-28 mg? 28mg 28mg 28mg 28mg 26mg 30mg 28mg   INR 2.4 3.9 2.6 2.9 2.3 2.3 2.9 2.8 3.7 1.5 2.1 2.1   Notes       decr Ensure; allopurinol allopurinol; less Ensure  recv'd 11/4; incr GLV 1x incr dose      Date 11/24 12/1 12/8  12/15 12/22 12/31 1/5/21 1/11 1/11-15 1/17 1/25 2/1   Total WeeklyDose 28mg 28mg 26mg 28 mg 28 mg 24mg 28mg 32 mg [facility] admission 26mg 28mg  24mg   INR 2.1 3.8 3.0  2.2 2.0 1.5 1.5 2.3  2.0 2.0 1.8   Notes  decr GLV 1x decr dose   1x miss   pneumonia doxy, cefdinir, rec 1/18  1x miss     Date 2/8 2/15 2/22 3/1 3/4 3/9 3/15 3/22 3/29 4/5 4/12 4/15   Total WeeklyDose 30mg 28mg 30mg 28mg 28mg 28mg 30mg 30mg 28mg 26mg 28mg 25mg   INR 1.8 2.1 2.9 2.6 2.5 1.9 2.1 2.7 2.9 2.3 4.4 3.6   Notes 1x incr dose   keflex keflex       LVQ     Date 4/19 4/23 4/27 5/4 5/7 5/11 5/17 5/24 6/1 6/7 6/14 6/21   Total Weekly Dose 22mg 26mg 28mg 22mg-  24mg  ?? 30mg 30mg 28mg 28mg 30mg 32mg 32mg  32mg   INR 3.0 2.1 1.7 1.4 1.8 2.6 2.3 1.4 1.6 2.0 2.0  1.9   Notes LVQ   1x miss 1x incr dose 1x incr dose   1x incr dose  rcvd 6/2           Date 7/6-7/12 7/14 7/20 7/27 8/3 8/10 8/17 8/20 8/27 8/31 9/7 9/14 9/19   Total Weekly Dose admitted 34mg 34mg 32mg  36mg  34mg 34mg 34mg 30mg 32mg 38mg 40mg 40mg   INR  1.7 2.0 1.9 2.2 2.6 2.9 2.4 1.5 1.4 1.6 2.1 2.88   Notes UTI ceftriax. recv'd 7/21  1x incr dose  doxy  Inc GLV, 1x missed dose, ceftaz  1x miss; 1x incr dose 1x incr dose;   1x mis- dose  ED / pred     Date 9/21 9/28 10/5 10/12            Total WeeklyDose 40mg 34mg 36mg             INR 3.1 1.8 2.5 2.5            Notes  ceftaz                Phone Interview:  Verbal Release Authorization signed on 6/26/18 and again on 11/5/2019-- may speak with Alexy Wallace (son), Genesis Becerril (daughter), Sawyer or Gema Wallace (son and daughter-in-law), Gerry Wallace (son)  Tablet Strength: 2mg tablets  Patient Contact Info: preferred [phone] - home with carlito Cotto- [employer] [phone]; call if can't get in touch with home phone       Negatives:  Signs/symptoms of thrombosis, Signs/symptoms of bleeding, Laboratory test error suspected, Change in health, Change in alcohol use, Change in activity, Upcoming invasive procedure, Emergency department visit, Upcoming dental procedure, Missed doses, Extra doses, Change in medications, Change in diet/appetite, Hospital admission, Bruising, Other complaints       Plan:  1. INR was therapeutic today, again at 2.5. Instructed patient to continue warfarin 4mg oral daily except for 6mg on MonWedFri until recheck  2. Repeat INR in one week, 10/19  3. Verbal information provided over the phone. Moni Wallace RBV dosing instructions, expresses understanding by teach back, and has no further questions at this time.    Francoise Wu, KwadwoD.  10/12/21   13:29 EDT

## 2021-10-19 NOTE — PROGRESS NOTES
Anticoagulation Clinic - Remote Progress Note  [facility]  Testing Frequency: 7 days    Indication: Paroxysmal Afib  Referring Provider: Dr. Joe/REY Rae (Last seen: 12/8/20  next appt: 12/14/21)  Goal INR: 2.0-3.0  Current Drug Interactions: , levothyroxine; omeprazole, azaTHIOprine, ezetimibe, allopurinol (10/20)  CHADS-VASc: 5 (age, gender, HTN, DM)  HAS-BLED: 3 (HTN, CKD, Age)     Diet: hasn't been eating any GLV 9/15/21   (7/21/21)  Alcohol: none  Tobacco: none   OTC Pain Medication: APAP PRN; took tylenol last 2 nights for pain from dialysis (3/26/20)    1st clinic: 9/14/17  2nd clinic: 6/26/18  3rd clinic: 11/5/19    INR History:    Date 8/31 9/9 9/14 9/21 9/28 10/5 10/13 10/19 10/27 11/3 11/10 11/17   Total Weekly  Dose 32mg 32mg 26mg 30mg 26-28 mg? 28mg 28mg 28mg 28mg 26mg 30mg 28mg   INR 2.4 3.9 2.6 2.9 2.3 2.3 2.9 2.8 3.7 1.5 2.1 2.1   Notes       decr Ensure; allopurinol allopurinol; less Ensure  recv'd 11/4; incr GLV 1x incr dose      Date 11/24 12/1 12/8 12/15 12/22 12/31 1/5/21 1/11 1/11-15 1/17 1/25 2/1   Total WeeklyDose 28mg 28mg 26mg 28mg 28mg 24mg 28mg 32 mg [facility] admit 26mg 28mg  24mg   INR 2.1 3.8 3.0  2.2 2.0 1.5 1.5 2.3  2.0 2.0 1.8   Notes  decr GLV 1x decr dose   1x miss   pneumonia doxy, cefdinir, rec 1/18  1x miss     Date 2/8 2/15 2/22 3/1 3/4 3/9 3/15 3/22 3/29 4/5 4/12 4/15   Total WeeklyDose 30mg 28mg 30mg 28mg 28mg 28mg 30mg 30mg 28mg 26mg 28mg 25mg   INR 1.8 2.1 2.9 2.6 2.5 1.9 2.1 2.7 2.9 2.3 4.4 3.6   Notes 1x incr dose   keflex keflex       LVQ     Date 4/19 4/23 4/27 5/4 5/7 5/11 5/17 5/24 6/1 6/7 6/14 6/21   Total Weekly Dose 22mg 26mg 28mg 22mg-  24mg  ?? 30mg 30mg 28mg 28mg 30mg 32mg 32mg  32mg   INR 3.0 2.1 1.7 1.4 1.8 2.6 2.3 1.4 1.6 2.0 2.0  1.9   Notes LVQ   1x miss 1x incr dose 1x incr dose   1x incr dose  rcvd 6/2           Date 7/6-7/12 7/14 7/20 7/27 8/3 8/10 8/17 8/20 8/27 8/31 9/7 9/14 9/19   Total Weekly Dose admitted 34mg 34mg 32mg  36mg 34mg  34mg 34mg 30mg 32mg 38mg 40mg 40mg   INR  1.7 2.0 1.9 2.2 2.6 2.9 2.4 1.5 1.4 1.6 2.1 2.88   Notes UTI ceftriax. recv'd 7/21  1x incr dose  doxy  Inc GLV, 1x missed dose, ceftaz  1x miss; 1x incr dose 1x incr dose;   1x mis- dose  ED / pred     Date 9/21 9/28 10/5 10/12 10/19           Total WeeklyDose 40mg 34mg 36mg 34mg 34mg           INR 3.1 1.8 2.5 2.5 2.0           Notes  ceftaz                Phone Interview:  Verbal Release Authorization signed on 6/26/18 and again on 11/5/2019-- may speak with Alexy Rodrigo (son), Genesis Becerril (daughter), Sawyer or Gema Wallace (son and daughter-in-law), Gerry Wallace (son)  Tablet Strength: 2mg tablets  Patient Contact Info: preferred [phone] - home with carlito Cotto- [employer] [phone]; call if can't get in touch with home phone     Patient Findings  Negatives:  Signs/symptoms of thrombosis, Signs/symptoms of bleeding, Laboratory test error suspected, Change in health, Change in alcohol use, Change in activity, Upcoming invasive procedure, Emergency department visit, Upcoming dental procedure, Missed doses, Extra doses, Change in medications, Change in diet/appetite, Hospital admission, Bruising, Other complaints     Plan:  1. INR was therapeutic today at 2.0, though this is LLN. Instructed patient to continue warfarin 4mg oral daily except for 6mg on MonWedFri until recheck  2. Repeat INR in one week, 10/26  3. Verbal information provided over the phone. Moni Wallace RBV dosing instructions, expresses understanding by teach back, and has no further questions at this time.    Sawyer Gamez, Valentin  10/19/2021  13:44 EDT    I, Joce Bush, PharmD, have reviewed the note in full and agree with the assessment and plan.  10/19/21  14:38 EDT

## 2021-10-26 NOTE — PROGRESS NOTES
Anticoagulation Clinic - Remote Progress Note  [facility]  Testing Frequency: 7 days    Indication: Paroxysmal Afib  Referring Provider: Dr. Joe/REY Rae (Last seen: 12/8/20  next appt: 12/14/21)  Goal INR: 2.0-3.0  Current Drug Interactions: , levothyroxine; omeprazole, azaTHIOprine, ezetimibe, allopurinol (10/20)  CHADS-VASc: 5 (age, gender, HTN, DM)  HAS-BLED: 3 (HTN, CKD, Age)     Diet: hasn't been eating any GLV 9/15/21   (7/21/21)  Alcohol: none  Tobacco: none   OTC Pain Medication: APAP PRN; took tylenol last 2 nights for pain from dialysis (3/26/20)    1st clinic: 9/14/17  2nd clinic: 6/26/18  3rd clinic: 11/5/19    INR History:    Date 8/31 9/9 9/14 9/21 9/28 10/5 10/13 10/19 10/27 11/3 11/10 11/17   Total Weekly  Dose 32mg 32mg 26mg 30mg 26-28 mg? 28mg 28mg 28mg 28mg 26mg 30mg 28mg   INR 2.4 3.9 2.6 2.9 2.3 2.3 2.9 2.8 3.7 1.5 2.1 2.1   Notes       decr Ensure; allopurinol allopurinol; less Ensure  recv'd 11/4; incr GLV 1x incr dose      Date 11/24 12/1 12/8 12/15 12/22 12/31 1/5/21 1/11 1/11-15 1/17 1/25 2/1   Total WeeklyDose 28mg 28mg 26mg 28mg 28mg 24mg 28mg 32 mg [facility] admit 26mg 28mg  24mg   INR 2.1 3.8 3.0  2.2 2.0 1.5 1.5 2.3  2.0 2.0 1.8   Notes  decr GLV 1x decr dose   1x miss   pneumonia doxy, cefdinir, rec 1/18  1x miss     Date 2/8 2/15 2/22 3/1 3/4 3/9 3/15 3/22 3/29 4/5 4/12 4/15   Total WeeklyDose 30mg 28mg 30mg 28mg 28mg 28mg 30mg 30mg 28mg 26mg 28mg 25mg   INR 1.8 2.1 2.9 2.6 2.5 1.9 2.1 2.7 2.9 2.3 4.4 3.6   Notes 1x incr dose   keflex keflex       LVQ     Date 4/19 4/23 4/27 5/4 5/7 5/11 5/17 5/24 6/1 6/7 6/14 6/21   Total Weekly Dose 22mg 26mg 28mg 22mg-  24mg  ?? 30mg 30mg 28mg 28mg 30mg 32mg 32mg  32mg   INR 3.0 2.1 1.7 1.4 1.8 2.6 2.3 1.4 1.6 2.0 2.0  1.9   Notes LVQ   1x miss 1x incr dose 1x incr dose   1x incr dose  rcvd 6/2           Date 7/6-7/12 7/14 7/20 7/27 8/3 8/10 8/17 8/20 8/27 8/31 9/7 9/14 9/19   Total Weekly Dose admitted 34mg 34mg 32mg  36mg 34mg  34mg 34mg 30mg 32mg 38mg 40mg 40mg   INR  1.7 2.0 1.9 2.2 2.6 2.9 2.4 1.5 1.4 1.6 2.1 2.88   Notes UTI ceftriax. recv'd 7/21  1x incr dose  doxy  Inc GLV, 1x missed dose, ceftaz  1x miss; 1x incr dose 1x incr dose;   1x mis- dose  ED / pred     Date 9/21 9/28 10/5 10/12 10/19 10/26          Total WeeklyDose 40mg 34mg 36mg 34mg 34mg 36mg          INR 3.1 1.8 2.5 2.5 2.0 2.3          Notes  ceftaz                Phone Interview:  Verbal Release Authorization signed on 6/26/18 and again on 11/5/2019-- may speak with Alexy Rodrigo (son), Genesis Becerril (daughter), Sawyer or Gema Wallace (son and daughter-in-law), Gerry Rodrigo (son)  Tablet Strength: 2mg tablets  Patient Contact Info: preferred [phone] - home with son Yadiel- cell [phone]; call if can't get in touch with home phone     Patient Findings  Negatives:  Signs/symptoms of thrombosis, Signs/symptoms of bleeding, Laboratory test error suspected, Change in health, Change in alcohol use, Change in activity, Upcoming invasive procedure, Emergency department visit, Upcoming dental procedure, Missed doses, Extra doses, Change in medications, Change in diet/appetite, Hospital admission, Bruising, Other complaints     Plan:  1. INR is therapeutic today at 2.3. Instructed patient to continue warfarin 4mg oral daily except for 6mg on MonWedFri until recheck  2. Repeat INR in one week, 11/2.  3. Verbal information provided over the phone. Moni Wallace RBV dosing instructions, expresses understanding by teach back, and has no further questions at this time.    Sawyer Gamez CPhT  10/26/2021  14:09 EDT    I, Johnson Villarreal, PharmD, have reviewed the note in full and agree with the assessment and plan.  10/27/21  15:31 EDT

## 2021-11-03 NOTE — PROGRESS NOTES
Anticoagulation Clinic - Remote Progress Note  [facility]  Testing Frequency: 7 days    Indication: Paroxysmal Afib  Referring Provider: Dr. Joe/REY Rae (Last seen: 12/8/20  next appt: 12/14/21)  Goal INR: 2.0-3.0  Current Drug Interactions: , levothyroxine; omeprazole, azaTHIOprine, ezetimibe, allopurinol (10/20)  CHADS-VASc: 5 (age, gender, HTN, DM)  HAS-BLED: 3 (HTN, CKD, Age)     Diet: hasn't been eating any GLV 9/15/21   (7/21/21)  Alcohol: none  Tobacco: none   OTC Pain Medication: APAP PRN; took tylenol last 2 nights for pain from dialysis (3/26/20)    1st clinic: 9/14/17  2nd clinic: 6/26/18  3rd clinic: 11/5/19    INR History:    Date 8/31 9/9 9/14 9/21 9/28 10/5 10/13 10/19 10/27 11/3 11/10 11/17   Total Weekly  Dose 32mg 32mg 26mg 30mg 26-28 mg? 28mg 28mg 28mg 28mg 26mg 30mg 28mg   INR 2.4 3.9 2.6 2.9 2.3 2.3 2.9 2.8 3.7 1.5 2.1 2.1   Notes       decr Ensure; allopurinol allopurinol; less Ensure  recv'd 11/4; incr GLV 1x incr dose      Date 11/24 12/1 12/8 12/15 12/22 12/31 1/5/21 1/11 1/11-15 1/17 1/25 2/1   Total WeeklyDose 28mg 28mg 26mg 28mg 28mg 24mg 28mg 32 mg [facility] admit 26mg 28mg  24mg   INR 2.1 3.8 3.0  2.2 2.0 1.5 1.5 2.3  2.0 2.0 1.8   Notes  decr GLV 1x decr dose   1x miss   pneumonia doxy, cefdinir, rec 1/18  1x miss     Date 2/8 2/15 2/22 3/1 3/4 3/9 3/15 3/22 3/29 4/5 4/12 4/15   Total WeeklyDose 30mg 28mg 30mg 28mg 28mg 28mg 30mg 30mg 28mg 26mg 28mg 25mg   INR 1.8 2.1 2.9 2.6 2.5 1.9 2.1 2.7 2.9 2.3 4.4 3.6   Notes 1x incr dose   keflex keflex       LVQ     Date 4/19 4/23 4/27 5/4 5/7 5/11 5/17 5/24 6/1 6/7 6/14 6/21   Total Weekly Dose 22mg 26mg 28mg 22mg-  24mg  ?? 30mg 30mg 28mg 28mg 30mg 32mg 32mg  32mg   INR 3.0 2.1 1.7 1.4 1.8 2.6 2.3 1.4 1.6 2.0 2.0  1.9   Notes LVQ   1x miss 1x incr dose 1x incr dose   1x incr dose  rcvd 6/2           Date 7/6-7/12 7/14 7/20 7/27 8/3 8/10 8/17 8/20 8/27 8/31 9/7 9/14 9/19   Total Weekly Dose admitted 34mg 34mg 32mg  36mg 34mg  34mg 34mg 30mg 32mg 38mg 40mg 40mg   INR  1.7 2.0 1.9 2.2 2.6 2.9 2.4 1.5 1.4 1.6 2.1 2.88   Notes UTI ceftriax. recv'd 7/21  1x incr dose  doxy  Inc GLV, 1x missed dose, ceftaz  1x miss; 1x incr dose 1x incr dose;   1x mis- dose  ED / pred     Date 9/21 9/28 10/5 10/12 10/19 10/26 11/2         Total WeeklyDose 40mg 34mg 36mg 34mg 34mg 36mg 34mg          INR 3.1 1.8 2.5 2.5 2.0 2.3 3.9         Notes  ceftaz     Rec 11/3           Phone Interview:  Verbal Release Authorization signed on 6/26/18 and again on 11/5/2019-- may speak with Alexy Wallace (son), Genesis Becerril (daughter), Sawyer or Gema Wallace (son and daughter-in-law), Gerry Wallace (son)  Tablet Strength: 2mg tablets  Patient Contact Info: preferred [phone] - home with son Yadiel- [employer] [phone]; call if can't get in touch with home phone     Patient Findings  Positives:  Change in health, Change in diet/appetite   Negatives:  Signs/symptoms of thrombosis, Signs/symptoms of bleeding, Laboratory test error suspected, Change in alcohol use, Change in activity, Upcoming invasive procedure, Emergency department visit, Upcoming dental procedure, Missed doses, Extra doses, Change in medications, Hospital admission, Bruising, Other complaints   Comments:  Ms Wallace has been taking warfarin as directed. She notes she has not been feeling well over the past couple of days. She has also had less appetite than usual over this period. She notes her intake of GLV is lower than normal. She denies medication changes or s/s of bleeding.        Plan:  1. INR is [facility] today at 3.9. Instructed patient to reduce to warfarin 4mg oral daily until recheck. She will eat an extra serving of GLV as well.   2. Repeat INR 11/8.  3. Verbal information provided over the phone. Moni Wallace RBV dosing instructions, expresses understanding by teach back, and has no further questions at this time.    Dilan Rea, PharmD  11/3/2021  08:29 EDT

## 2021-11-08 NOTE — PROGRESS NOTES
Anticoagulation Clinic - Remote Progress Note  [facility]  Testing Frequency: 7 days    Indication: Paroxysmal Afib  Referring Provider: Dr. Joe/REY Rae (Last seen: 12/8/20  next appt: 12/14/21)  Goal INR: 2.0-3.0  Current Drug Interactions: , levothyroxine; omeprazole, azaTHIOprine, ezetimibe, allopurinol (10/20)  CHADS-VASc: 5 (age, gender, HTN, DM)  HAS-BLED: 3 (HTN, CKD, Age)     Diet: hasn't been eating any GLV 9/15/21   (7/21/21)  Alcohol: none  Tobacco: none   OTC Pain Medication: APAP PRN; took tylenol last 2 nights for pain from dialysis (3/26/20)    1st clinic: 9/14/17  2nd clinic: 6/26/18  3rd clinic: 11/5/19    INR History:    Date 8/31 9/9 9/14 9/21 9/28 10/5 10/13 10/19 10/27 11/3 11/10 11/17   Total Weekly  Dose 32mg 32mg 26mg 30mg 26-28 mg? 28mg 28mg 28mg 28mg 26mg 30mg 28mg   INR 2.4 3.9 2.6 2.9 2.3 2.3 2.9 2.8 3.7 1.5 2.1 2.1   Notes       decr Ensure; allopurinol allopurinol; less Ensure  recv'd 11/4; incr GLV 1x incr dose      Date 11/24 12/1 12/8 12/15 12/22 12/31 1/5/21 1/11 1/11-15 1/17 1/25 2/1   Total WeeklyDose 28mg 28mg 26mg 28mg 28mg 24mg 28mg 32 mg [facility] admit 26mg 28mg  24mg   INR 2.1 3.8 3.0  2.2 2.0 1.5 1.5 2.3  2.0 2.0 1.8   Notes  decr GLV 1x decr dose   1x miss   pneumonia doxy, cefdinir, rec 1/18  1x miss     Date 2/8 2/15 2/22 3/1 3/4 3/9 3/15 3/22 3/29 4/5 4/12 4/15   Total WeeklyDose 30mg 28mg 30mg 28mg 28mg 28mg 30mg 30mg 28mg 26mg 28mg 25mg   INR 1.8 2.1 2.9 2.6 2.5 1.9 2.1 2.7 2.9 2.3 4.4 3.6   Notes 1x incr dose   keflex keflex       LVQ     Date 4/19 4/23 4/27 5/4 5/7 5/11 5/17 5/24 6/1 6/7 6/14 6/21   Total Weekly Dose 22mg 26mg 28mg 22mg-  24mg  ?? 30mg 30mg 28mg 28mg 30mg 32mg 32mg  32mg   INR 3.0 2.1 1.7 1.4 1.8 2.6 2.3 1.4 1.6 2.0 2.0  1.9   Notes LVQ   1x miss 1x incr dose 1x incr dose   1x incr dose  rcvd 6/2           Date 7/6-7/12 7/14 7/20 7/27 8/3 8/10 8/17 8/20 8/27 8/31 9/7 9/14 9/19   Total Weekly Dose admitted 34mg 34mg 32mg  36mg 34mg  34mg 34mg 30mg 32mg 38mg 40mg 40mg   INR  1.7 2.0 1.9 2.2 2.6 2.9 2.4 1.5 1.4 1.6 2.1 2.88   Notes UTI ceftriax. recv'd 7/21  1x incr dose  doxy  Inc GLV, 1x missed dose, ceftaz  1x miss; 1x incr dose 1x incr dose;   1x mis- dose  ED / pred     Date 9/21 9/28 10/5 10/12 10/19 10/26 11/2 11/8        Total WeeklyDose 40mg 34mg 36mg 34mg 34mg 36mg 34mg  30mg        INR 3.1 1.8 2.5 2.5 2.0 2.3 3.9 2.9        Notes  ceftaz     Rec 11/3           Phone Interview:  Verbal Release Authorization signed on 6/26/18 and again on 11/5/2019-- may speak with Alexy Wallace (son), Genesis Becerril (daughter), Sawyer Wallace (son and daughter-in-law), Gerry Rodrigo (son)  Tablet Strength: 2mg tablets  Patient Contact Info: preferred [phone] - home with son Yadiel- cell [phone]; call if can't get in touch with home phone     Patient Findings    Positives:  Change in health, Change in diet/appetite   Negatives:  Signs/symptoms of thrombosis, Signs/symptoms of bleeding, Laboratory test error suspected, Change in alcohol use, Change in activity, Upcoming invasive procedure, Emergency department visit, Upcoming dental procedure, Missed doses, Extra doses, Change in medications, Hospital admission, Bruising, Other complaints   Comments:  Mrs. Wallace reports that she is still not feeling well and her diet has not improved.       Plan:  1. INR is therapeutic today at 2.9, but at higher end of goal range (2-3). Due to her general health and not eating I instructed patient to continue reduced dose of warfarin 4mg oral daily until recheck.    2. Repeat INR 11/15.  3. Verbal information provided over the phone. Moni Wallace RBV dosing instructions, expresses understanding by teach back, and has no further questions at this time.    Funmilayo Spears, PharmD  11/8/2021  09:43 EST

## 2021-11-15 NOTE — PROGRESS NOTES
Anticoagulation Clinic - Remote Progress Note  [facility]  Testing Frequency: 7 days    Indication: Paroxysmal Afib  Referring Provider: Dr. Joe/REY Rae (Last seen: 12/8/20  next appt: 12/14/21)  Goal INR: 2.0-3.0  Current Drug Interactions: , levothyroxine; omeprazole, azaTHIOprine, ezetimibe, allopurinol (10/20)  CHADS-VASc: 5 (age, gender, HTN, DM)  HAS-BLED: 3 (HTN, CKD, Age)     Diet: hasn't been eating any GLV 9/15/21   (7/21/21)  Alcohol: none  Tobacco: none   OTC Pain Medication: APAP PRN; took tylenol last 2 nights for pain from dialysis (3/26/20)    1st clinic: 9/14/17  2nd clinic: 6/26/18  3rd clinic: 11/5/19    INR History:    Date 8/31 9/9 9/14 9/21 9/28 10/5 10/13 10/19 10/27 11/3 11/10 11/17   Total Weekly  Dose 32mg 32mg 26mg 30mg 26-28 mg? 28mg 28mg 28mg 28mg 26mg 30mg 28mg   INR 2.4 3.9 2.6 2.9 2.3 2.3 2.9 2.8 3.7 1.5 2.1 2.1   Notes       decr Ensure; allopurinol allopurinol; less Ensure  recv'd 11/4; incr GLV 1x incr dose      Date 11/24 12/1 12/8 12/15 12/22 12/31 1/5/21 1/11 1/11-15 1/17 1/25 2/1   Total WeeklyDose 28mg 28mg 26mg 28mg 28mg 24mg 28mg 32 mg [facility] admit 26mg 28mg  24mg   INR 2.1 3.8 3.0  2.2 2.0 1.5 1.5 2.3  2.0 2.0 1.8   Notes  decr GLV 1x decr dose   1x miss   pneumonia doxy, cefdinir, rec 1/18  1x miss     Date 2/8 2/15 2/22 3/1 3/4 3/9 3/15 3/22 3/29 4/5 4/12 4/15   Total WeeklyDose 30mg 28mg 30mg 28mg 28mg 28mg 30mg 30mg 28mg 26mg 28mg 25mg   INR 1.8 2.1 2.9 2.6 2.5 1.9 2.1 2.7 2.9 2.3 4.4 3.6   Notes 1x incr dose   keflex keflex       LVQ     Date 4/19 4/23 4/27 5/4 5/7 5/11 5/17 5/24 6/1 6/7 6/14 6/21   Total Weekly Dose 22mg 26mg 28mg 22mg-  24mg  ?? 30mg 30mg 28mg 28mg 30mg 32mg 32mg  32mg   INR 3.0 2.1 1.7 1.4 1.8 2.6 2.3 1.4 1.6 2.0 2.0  1.9   Notes LVQ   1x miss 1x incr dose 1x incr dose   1x incr dose  rcvd 6/2           Date 7/6-7/12 7/14 7/20 7/27 8/3 8/10 8/17 8/20 8/27 8/31 9/7 9/14 9/19   Total Weekly Dose admitted 34mg 34mg 32mg  36mg 34mg  34mg 34mg 30mg 32mg 38mg 40mg 40mg   INR  1.7 2.0 1.9 2.2 2.6 2.9 2.4 1.5 1.4 1.6 2.1 2.88   Notes UTI ceftriax. recv'd 7/21  1x incr dose  doxy  Inc GLV, 1x missed dose, ceftaz  1x miss; 1x incr dose 1x incr dose;   1x mis- dose  ED / pred     Date 9/21 9/28 10/5 10/12 10/19 10/26 11/2 11/8 11/15       Total WeeklyDose 40mg 34mg 36mg 34mg 34mg 36mg 34mg  30mg 28mg       INR 3.1 1.8 2.5 2.5 2.0 2.3 3.9 2.9 3.4       Notes  ceftaz     Rec 11/3           Phone Interview:  Verbal Release Authorization signed on 6/26/18 and again on 11/5/2019-- may speak with Alexy Rodrigo (son), Genesis Becerril (daughter), Sawyer or Gema Wallace (son and daughter-in-law), Gerry Wallace (son)  Tablet Strength: 2mg tablets  Patient Contact Info: preferred [phone] - home with son Yadiel- [employer] [phone]; call if can't get in touch with home phone     Patient Findings  Negatives:  Signs/symptoms of thrombosis, Signs/symptoms of bleeding, Laboratory test error suspected, Change in health, Change in alcohol use, Change in activity, Upcoming invasive procedure, Emergency department visit, Upcoming dental procedure, Missed doses, Extra doses, Change in medications, Change in diet/appetite, Hospital admission, Bruising, Other complaints   Comments:  Continued low appetite         Plan:  1. INR is SUPRAtherapeutic today at 3.4 (2-3). Due to her general health and not eating I instructed patient to reduce tonight's dose to 2mg then continue warfarin 4mg oral daily until recheck.    2. Repeat INR 11/22.   3. Verbal information provided over the phone. Moni Wallace RBV dosing instructions, expresses understanding by teach back, and has no further questions at this time.    Francoise Wu, KwadwoD.  11/15/21   12:12 EST

## 2021-11-22 NOTE — PROGRESS NOTES
Anticoagulation Clinic - Remote Progress Note  [facility]  Testing Frequency: 7 days    Indication: Paroxysmal Afib  Referring Provider: Dr. Joe/REY Rae (Last seen: 12/8/20  next appt: 12/14/21)  Goal INR: 2.0-3.0  Current Drug Interactions: , levothyroxine; omeprazole, azaTHIOprine, ezetimibe, allopurinol (10/20)  CHADS-VASc: 5 (age, gender, HTN, DM)  HAS-BLED: 3 (HTN, CKD, Age)     Diet: hasn't been eating any GLV 9/15/21   (7/21/21)  Alcohol: none  Tobacco: none   OTC Pain Medication: APAP PRN; took tylenol last 2 nights for pain from dialysis (3/26/20)    1st clinic: 9/14/17  2nd clinic: 6/26/18  3rd clinic: 11/5/19    INR History:    Date 8/31 9/9 9/14 9/21 9/28 10/5 10/13 10/19 10/27 11/3 11/10 11/17   Total Weekly  Dose 32mg 32mg 26mg 30mg 26-28 mg? 28mg 28mg 28mg 28mg 26mg 30mg 28mg   INR 2.4 3.9 2.6 2.9 2.3 2.3 2.9 2.8 3.7 1.5 2.1 2.1   Notes       decr Ensure; allopurinol allopurinol; less Ensure  recv'd 11/4; incr GLV 1x incr dose      Date 11/24 12/1 12/8 12/15 12/22 12/31 1/5/21 1/11 1/11-15 1/17 1/25 2/1   Total WeeklyDose 28mg 28mg 26mg 28mg 28mg 24mg 28mg 32 mg [facility] admit 26mg 28mg  24mg   INR 2.1 3.8 3.0  2.2 2.0 1.5 1.5 2.3  2.0 2.0 1.8   Notes  decr GLV 1x decr dose   1x miss   pneumonia doxy, cefdinir, rec 1/18  1x miss     Date 2/8 2/15 2/22 3/1 3/4 3/9 3/15 3/22 3/29 4/5 4/12 4/15   Total WeeklyDose 30mg 28mg 30mg 28mg 28mg 28mg 30mg 30mg 28mg 26mg 28mg 25mg   INR 1.8 2.1 2.9 2.6 2.5 1.9 2.1 2.7 2.9 2.3 4.4 3.6   Notes 1x incr dose   keflex keflex       LVQ     Date 4/19 4/23 4/27 5/4 5/7 5/11 5/17 5/24 6/1 6/7 6/14 6/21   Total Weekly Dose 22mg 26mg 28mg 22mg-  24mg  ?? 30mg 30mg 28mg 28mg 30mg 32mg 32mg  32mg   INR 3.0 2.1 1.7 1.4 1.8 2.6 2.3 1.4 1.6 2.0 2.0  1.9   Notes LVQ   1x miss 1x incr dose 1x incr dose   1x incr dose  rcvd 6/2           Date 7/6-7/12 7/14 7/20 7/27 8/3 8/10 8/17 8/20 8/27 8/31 9/7 9/14 9/19   Total Weekly Dose admitted 34mg 34mg 32mg  36mg 34mg  34mg 34mg 30mg 32mg 38mg 40mg 40mg   INR  1.7 2.0 1.9 2.2 2.6 2.9 2.4 1.5 1.4 1.6 2.1 2.88   Notes UTI ceftriax. recv'd 7/21  1x incr dose  doxy  Inc GLV, 1x missed dose, ceftaz  1x miss; 1x incr dose 1x incr dose;   1x mis- dose  ED / pred     Date 9/21 9/28 10/5 10/12 10/19 10/26 11/2 11/8 11/15 11/22      Total WeeklyDose 40mg 34mg 36mg 34mg 34mg 36mg 34mg  30mg 28mg 26 mg      INR 3.1 1.8 2.5 2.5 2.0 2.3 3.9 2.9 3.4 3.1      Notes  ceftaz     Rec 11/3           Phone Interview:  Verbal Release Authorization signed on 6/26/18 and again on 11/5/2019-- may speak with Alexy Rodrigo (son), Genesis Becerril (daughter), Sawyer or Gema Wallace (son and daughter-in-law), Gerry Wallace (son)  Tablet Strength: 2mg tablets  Patient Contact Info: preferred [phone] - home with son Yadiel- [employer] [phone]; call if can't get in touch with home phone     Patient Findings  Positives:  Change in medications, Change in diet/appetite   Negatives:  Signs/symptoms of thrombosis, Signs/symptoms of bleeding, Laboratory test error suspected, Change in health, Change in alcohol use, Change in activity, Upcoming invasive procedure, Emergency department visit, Upcoming dental procedure, Missed doses, Extra doses, Hospital admission, Bruising, Other complaints   Comments:  She has started a new medication mirtazapine 15 mg nightly to help with the weight loss, she says. She has lost 20 lbs in 17 day period. There is a major drug drug interaction with the mirtazapine and warfarin. She continues to feel poorly, and family members want her to go to hospital but she prefers to stay with family and have Thanksgiving at this time.     Plan:  1. INR is slightly SUPRAtherapeutic today at 3.1 (2-3) down from 3.4 last week. Due to her general health and not eating instructed patient to reduce tonight's dose to 2mg then continue warfarin 4mg oral daily until recheck as she did last week (26mg/week).  She may need an additional dose reduction for  mirtazapine drug interaction and continued general health decline.    2. Repeat INR 11/29   3. Verbal information provided over the phone. Moni Wallace RBV dosing instructions, expresses understanding by teach back, and has no further questions at this time.    Sawyer Jackman, PharmD  11/22/21   11:25 EST

## 2021-11-28 PROBLEM — K65.9 PERITONITIS (HCC): Status: ACTIVE | Noted: 2021-01-01

## 2021-11-28 PROBLEM — K56.7 ILEUS: Status: ACTIVE | Noted: 2021-01-01

## 2021-11-28 PROBLEM — A41.9 SEPSIS ASSOCIATED HYPOTENSION (HCC): Status: ACTIVE | Noted: 2021-01-01

## 2021-11-28 PROBLEM — I95.9 SEPSIS ASSOCIATED HYPOTENSION: Status: ACTIVE | Noted: 2021-01-01

## 2021-12-01 PROBLEM — I95.9 HYPOTENSION: Status: ACTIVE | Noted: 2021-01-01

## 2021-12-01 NOTE — THERAPY EVALUATION
Patient Name: Moni Wallace  : 1940    MRN: [id number]                              Today's Date: 2021       Admit Date: 2021    Visit Dx:     ICD-10-CM ICD-9-CM   1. Acute abdominal pain  R10.9 789.00     338.19   2. Peritonitis associated with peritoneal dialysis, initial encounter ([facility])  T85.71XA 996.68     567.9   3. Sepsis without acute organ dysfunction, due to unspecified organism ([facility])  A41.9 038.9     995.91     Patient Active Problem List   Diagnosis   • Paroxysmal atrial fibrillation (HCC)   • Essential hypertension   • Type 2 diabetes mellitus (HCC)   • Chronic kidney disease, stage IV (severe) ([facility])   • Anemia of renal disease   • Hyperparathyroidism (HCC)   • Asthma   • Right-sided carotid artery disease (HCC)   • High output HF (heart failure) (CMS/HCC)   • Vitamin D deficiency   • Leukocytosis   • Nonrheumatic aortic valve stenosis   • Ulcer of gastric fundus   • Tubular adenoma   • Macular degeneration   • Hypothyroidism   • Chronic kidney disease   • Screen for colon cancer   • Postoperative anemia due to acute blood loss   • Hyperlipidemia LDL goal <100   • Morbidly obese (HCC)   • Atypical pneumonia   • Acute UTI (urinary tract infection)   • UTI (urinary tract infection)   • Urinary tract infection, site not specified   • Demand ischemia (HCC)   • Peritonitis (HCC)   • Sepsis associated hypotension (HCC)   • Ileus (HCC)   • Hypotension     Past Medical History:   Diagnosis Date   • Adenomatous colon polyp    • Chronic kidney disease    • Clostridium difficile infection 2017   • Diabetes mellitus (HCC)    • Gastric polyps    • Hypothyroidism    • IgA nephropathy, acute    • Kidney transplanted    • Macular degeneration    • Paroxysmal atrial fibrillation (HCC)    • Tubular adenoma     excision    • Ulcer of gastric fundus    • Vitamin D deficiency      Past Surgical History:   Procedure Laterality Date   • BREAST BIOPSY Left    • CARPAL TUNNEL RELEASE     • CARPAL  TUNNEL RELEASE Right    • CATARACT EXTRACTION     • CATARACT EXTRACTION Bilateral    • DIAGNOSTIC LAPAROSCOPY     • DIALYSIS FISTULA CREATION     • HERNIA REPAIR  85 and 86   • KNEE ARTHROSCOPY INCISION AND DRAINAGE OF KNEE Right 5/18/2019    Procedure: KNEE ARTHROSCOPY INCISION AND DRAINAGE OF KNEE;  Surgeon: Paco Lester MD;  Location: [facility] OR;  Service: Orthopedics   • LAPAROSCOPIC TUBAL LIGATION  1985   • TOTAL KNEE ARTHROPLASTY     • TOTAL KNEE ARTHROPLASTY      Left knee    • TRANSPLANTATION RENAL  2010   • TRANSPLANTATION RENAL Right    • TRIGGER FINGER RELEASE     • TUBAL ABDOMINAL LIGATION     • UPPER GASTROINTESTINAL ENDOSCOPY  05/20/2013   • VENOUS ACCESS DEVICE (PORT) INSERTION N/A 5/23/2019    Procedure: INSERTION GROSHONG;  Surgeon: Rolando Begum MD;  Location:  [facility] OR;  Service: General      General Information     Row Name 12/01/21 1505          Physical Therapy Time and Intention    Document Type evaluation  -     Mode of Treatment individual therapy; physical therapy  -     Row Name 12/01/21 1505          General Information    Patient Profile Reviewed yes  -LM     Prior Level of Function independent:; transfer; w/c or scooter; feeding; grooming; mod assist:; dressing; bathing  Independent with w/c propulsion; Has not ambulated in years  -LM     Existing Precautions/Restrictions fall; other (see comments)  NG Tube; Peritoneal Dialysis  -LM     Barriers to Rehab none identified  -LM     Row Name 12/01/21 1505          Living Environment    Lives With child(chavo), adult  Son  -LM     Row Name 12/01/21 1505          Home Main Entrance    Number of Stairs, Main Entrance --  Entry Ramp  -LM     Row Name 12/01/21 1505          Stairs Within Home, Primary    Number of Stairs, Within Home, Primary none  -LM     Row Name 12/01/21 1505          Cognition    Orientation Status (Cognition) oriented x 4  -LM     Row Name 12/01/21 1505          Safety Issues, Functional Mobility     Impairments Affecting Function (Mobility) balance; endurance/activity tolerance; pain; strength  -LM           User Key  (r) = Recorded By, (t) = Taken By, (c) = Cosigned By    Initials Name Provider Type    Gema Larry PT Physical Therapist               Mobility     Row Name 12/01/21 1505          Bed Mobility    Bed Mobility supine-sit  -LM     Supine-Sit [place] (Bed Mobility) minimum assist (75% patient effort); 1 person assist; verbal cues  -LM     Assistive Device (Bed Mobility) bed rails; head of bed elevated  -LM     Comment (Bed Mobility) Vc's for sequencing.  -LM     Row Name 12/01/21 1505          Bed-Chair Transfer    Bed-Chair [place] (Transfers) minimum assist (75% patient effort); 1 person assist; verbal cues  -LM     Assistive Device (Bed-Chair Transfers) other (see comments)  HHA  -LM     Row Name 12/01/21 1505          Sit-Stand Transfer    Sit-Stand [place] (Transfers) minimum assist (75% patient effort); 1 person assist; verbal cues  -LM     Assistive Device (Sit-Stand Transfers) other (see comments)  HHA  -LM     Row Name 12/01/21 1505          Gait/Stairs (Locomotion)    [place] Level (Gait) unable to assess  -LM     Comment (Gait/Stairs) Non-ambulatory at baseline  -LM           User Key  (r) = Recorded By, (t) = Taken By, (c) = Cosigned By    Initials Name Provider Type    Gema Larry PT Physical Therapist               Obj/Interventions     Row Name 12/01/21 1505          Range of Motion Comprehensive    General Range of Motion bilateral lower extremity ROM WFL  -LM     Row Name 12/01/21 1505          Strength Comprehensive (MMT)    General Manual Muscle Testing (MMT) Assessment lower extremity strength deficits identified  -LM     Comment, General Manual Muscle Testing (MMT) Assessment BLEs - Hip flex - 4/5; Knee flex/ext - 4+/5; Ankle DF - 4+/5  -LM     Row Name 12/01/21 1505          Balance    Balance Assessment sitting static balance; standing static  balance; standing dynamic balance  -LM     Static Sitting Balance WFL; unsupported; sitting, edge of bed  -LM     Static Standing Balance mild impairment; supported  -LM     Dynamic Standing Balance mild impairment; supported  -LM     Row Name 12/01/21 150          Sensory Assessment (Somatosensory)    Sensory Assessment (Somatosensory) LE sensation intact  -LM           User Key  (r) = Recorded By, (t) = Taken By, (c) = Cosigned By    Initials Name Provider Type    Gema Larry, GARO Physical Therapist               Goals/Plan     Row Name 12/01/21 1504          Bed Mobility Goal 1 (PT)    Activity/Assistive Device (Bed Mobility Goal 1, PT) sit to supine/supine to sit  -LM     [place] Level/Cues Needed (Bed Mobility Goal 1, PT) independent  -LM     Time Frame (Bed Mobility Goal 1, PT) long term goal (LTG); 2 weeks  -LM     Row Name 12/01/21 4185          Transfer Goal 1 (PT)    Activity/Assistive Device (Transfer Goal 1, PT) bed-to-chair/chair-to-bed  -LM     [place] Level/Cues Needed (Transfer Goal 1, PT) independent; modified independence  -LM     Time Frame (Transfer Goal 1, PT) long term goal (LTG); 2 weeks  -LM           User Key  (r) = Recorded By, (t) = Taken By, (c) = Cosigned By    Initials Name Provider Type    Gema Larry PT Physical Therapist               Clinical Impression     Row Name 12/01/21 1504          Pain    Additional Documentation Pain Scale: Numbers Pre/Post-Treatment (Group)  -LM     Row Name 12/01/21 1509          Pain Scale: Numbers Pre/Post-Treatment    Pretreatment Pain Rating 4/10  -LM     Posttreatment Pain Rating 4/10  -LM     Pain Location abdomen  -LM     Pain Intervention(s) Repositioned; Ambulation/increased activity  -LM     Row Name 12/01/21 1507          Plan of Care Review    Plan of Care Reviewed With patient; son  -LM     Outcome Summary PT evaluation completed.  Pt transferred supine-->sit with MinAx1, stood and took a step to recliner with MinAx1 and  HHA.  Skilled PT services warranted to improve mobility and safety.  Recommend home with assist at d/c.  -LM     Row Name 12/01/21 1505          Therapy Assessment/Plan (PT)    Rehab Potential (PT) good, to achieve stated therapy goals  -LM     Criteria for Skilled Interventions Met (PT) yes; meets criteria; skilled treatment is necessary  -LM     Row Name 12/01/21 1505          Vital Signs    Pre Systolic BP Rehab 137  -LM     Pre Treatment Diastolic BP 74  -LM     Pretreatment Heart Rate (beats/min) 80  -LM     Posttreatment Heart Rate (beats/min) 85  -LM     Pre SpO2 (%) 91  -LM     O2 Delivery Pre Treatment room air  -LM     Post SpO2 (%) 92  -LM     O2 Delivery Post Treatment room air  -LM     Pre Patient Position Supine  -LM     Post Patient Position Sitting  -LM     Row Name 12/01/21 1505          Positioning and Restraints    Pre-Treatment Position in bed  -LM     Post Treatment Position chair  -LM     In Chair reclined; call light within reach; encouraged to call for assist; notified nsg  -LM           User Key  (r) = Recorded By, (t) = Taken By, (c) = Cosigned By    Initials Name Provider Type    LM Gema Nieves, PT Physical Therapist               Outcome Measures     Row Name 12/01/21 1505 12/01/21 0800       How much help from another person do you currently need...    Turning from your back to your side while in flat bed without using bedrails? 4  -LM 4  -CS    Moving from lying on back to sitting on the side of a flat bed without bedrails? 3  -LM 3  -CS    Moving to and from a bed to a chair (including a wheelchair)? 3  -LM 3  -CS    Standing up from a chair using your arms (e.g., wheelchair, bedside chair)? 3  -LM 3  -CS    Climbing 3-5 steps with a railing? 2  -LM 2  -CS    To walk in hospital room? 2  -LM 2  -CS    AM-PAC 6 Clicks Score (PT) 17  -LM 17  -CS    Row Name 12/01/21 1505          Functional Assessment    Outcome Measure Options AM-PAC 6 Clicks Basic Mobility (PT)  -LM           User  Key  (r) = Recorded By, (t) = Taken By, (c) = Cosigned By    Initials Name Provider Type     Gema Nieves, GARO Physical Therapist    Damaris Morales RN Registered Nurse                             Physical Therapy Education                 Title: PT OT SLP Therapies (In Progress)     Topic: Physical Therapy (In Progress)     Point: Mobility training (Done)     Learning Progress Summary           Patient Acceptance, E, VU,DU by  at 12/1/2021 1538                   Point: Home exercise program (Not Started)     Learner Progress:  Not documented in this visit.          Point: Body mechanics (Not Started)     Learner Progress:  Not documented in this visit.          Point: Precautions (Done)     Learning Progress Summary           Patient Acceptance, E, VU,DU by  at 12/1/2021 1538                               User Key     Initials Effective Dates Name Provider Type Discipline     06/16/21 -  Gema Nieves PT Physical Therapist PT              PT Recommendation and Plan  Planned Therapy Interventions (PT): balance training, bed mobility training, gait training, home exercise program, motor coordination training, neuromuscular re-education, patient/family education, postural re-education, ROM (range of motion), strengthening, stretching, transfer training, wheelchair management/propulsion training  Plan of Care Reviewed With: patient, son  Outcome Summary: PT evaluation completed.  Pt transferred supine-->sit with MinAx1, stood and took a step to recliner with MinAx1 and HHA.  Skilled PT services warranted to improve mobility and safety.  Recommend home with assist at d/c.     Time Calculation:    PT Charges     Row Name 12/01/21 1505             Time Calculation    Start Time 1505  -LM      PT Received On 12/01/21  -LM      PT Goal Re-Cert Due Date 12/11/21  -LM              Untimed Charges    PT Eval/Re-eval Minutes 35  -LM              Total Minutes    Untimed Charges Total Minutes 35  -LM       Total  Minutes 35  -LM            User Key  (r) = Recorded By, (t) = Taken By, (c) = Cosigned By    Initials Name Provider Type    LM Gema Nieves, PT Physical Therapist              Therapy Charges for Today     Code Description Service Date Service Provider Modifiers Qty    [id number] HC PT EVAL LOW COMPLEXITY 3 12/1/2021 Gema Nieves, PT GP 1          PT G-Codes  Outcome Measure Options: AM-PAC 6 Clicks Basic Mobility (PT)  AM-PAC 6 Clicks Score (PT): 17    Gema Nieves PT  12/1/2021

## 2021-12-01 NOTE — PROGRESS NOTES
Clinical Nutrition   Reason For Visit: NPO/Clear liquid    Patient Name: Moni Wallace  YOB: 1940  MRN: [id number]  Date of Encounter: 12/01/21 08:36 EST  Admission date: 11/28/2021      NPO/Clear liquids x 3 days    Nutrition Assessment     Admission Problem List:  Nausea  Abdominal pain  Peritonitis  Sepsis  Ileus r/t peritoneal inflammation      Applicable PMH:  T2DM  HTN  FM  ESRD on CAPD  C. Diff  Vitamin D deficiency  Hyperparathyroidism  S/p right kidney transplant (2010)      Applicable medical tests/procedures since admission:  (11/28) NGT to [facility] initiated       Reported/Observed/Food/Nutrition Related History   12/1) Per I/Os within past 24 hours: NGT output = 600 ml; No BM documented this admission.    11/29) Patient reports having poor appetite and poor PO intake starting earlier this year with resulting unintentional weight loss from >200 lbs down to ~160 lbs as of last month. Pt states she was prescribed a medication to improve appetite/PO intake and this has helped - she has been eating much better. She isn't sure if she has had any recent unintentional weight loss. Allergic to rice. Denies difficulty chewing/swallowing. Dislikes and declines ONS drinks. Last solid food intake evening of (11/27).    NGT output = 800 ml within past 24 hours per I/Os.    Denies BM this admission  + flatus      Anthropometrics   Height: 64 in  Weight: 178 lbs (bed scale weight 11/29) - ?accuracy  BMI: 30.6  BMI classification: Obese Class I: 30-34.9kg/m2   IBW: 120 lbs    UBW: Per EMR  166 lbs (10/11/21) MDOV  200 lbs (7/7/21) bed scale  208 lbs (1/12/21) bed scale    Per RD note (11/29):  Weight change: Unintentional weight loss of 42 lbs 1/12/21-10/11/21. Need current measured weight to determine weight changes within the past 1.5 months.    Labs reviewed   Labs reviewed: Yes    Results from last 7 days   Lab Units 12/01/21  0437 11/30/21  0428 11/29/21  0504   SODIUM mmol/L 137 137 139   POTASSIUM mmol/L  3.7 3.7 4.3   CHLORIDE mmol/L 95* 94* 97*   CO2 mmol/L 23.0 21.0* 20.0*   BUN mg/dL 46* 50* 55*   CREATININE mg/dL 8.74* 8.96* 9.11*   GLUCOSE mg/dL 123* 124* 172*   CALCIUM mg/dL 6.8* 7.1* 7.0*     Medications reviewed   Medications reviewed: Yes  Scheduled: protonix, sensipar, renvela, coumadin  PRN: [email or web address]    Current Nutrition Prescription   PO: Diet Clear Liquid    Average PO intake: insufficient data    Nutrition Diagnosis     11/29, 12/1  Problem Inadequate oral intake   Etiology Nausea, abdominal pain   Signs/Symptoms Pt reports minimal intake held down (11/27) = x4 days;  NPO/Clear liquids x 3 days during this admission   Status: ongoing    Goal:   General: Nutrition support treatment  PO: Tolerate PO, Increase intake, Advace diet as medically appropriate    Intervention   Intervention: Follow treatment progress, Care plan reviewed, Encourage intake    Monitoring/Evaluation:   Monitoring/Evaluation: Per protocol, I&O, PO intake, Pertinent labs, Weight, GI status, Symptoms    Virginia Boykin RD  Time Spent: 15 min

## 2021-12-01 NOTE — PLAN OF CARE
Goal Outcome Evaluation:  Plan of Care Reviewed With: patient, son           Outcome Summary: PT evaluation completed.  Pt transferred supine-->sit with MinAx1, stood and took a step to recliner with MinAx1 and HHA.  Skilled PT services warranted to improve mobility and safety.  Recommend home with assist at d/c.

## 2021-12-01 NOTE — PROGRESS NOTES
Cardiology Progress Note      Reason for visit:    · Atrial fibrillation with RVR in the setting of bacterial peritonitis    IDENTIFICATION: 81-year-old female who resides in [place], [place]    Active Hospital Problems    Diagnosis  POA   • **Peritonitis (HCC) [K65.9]  Yes     Priority: High   • Sepsis associated hypotension (HCC) [A41.9, I95.9]  Yes     Priority: High   • Paroxysmal atrial fibrillation (HCC) [I48.0]  Yes     Priority: High     · Diagnosed 2/2015  · CHADS-VASc = 5  · On Coumadin   · Echo (1/12/2021): LVEF 65%.  LVH.  Moderate AI.     • Type 2 diabetes mellitus (HCC) [E11.9]  Yes     Priority: High   • Chronic kidney disease, stage IV (severe) (HCC) [N18.4]  Yes     Priority: High     · IgA nephropathy with history of renal transplant, 2010.   · Patient on hemodialysis Monday, Wednesday, and Friday started July 2015.  · Hemodialysis discontinued 2/2017.     • Ileus (HCC) [K56.7]  Yes     Priority: Medium   • Hyperlipidemia LDL goal <100 [E78.5]  Yes     Priority: Medium     · Reports intolerance to statin     • Hyperparathyroidism (HCC) [E21.3]  Yes     Priority: Medium   • Essential hypertension [I10]  Yes     Priority: Low            Patient remains in atrial fibrillation but rates improved using IV metoprolol every 6 hours.  Blood pressures appear improved today.  INR is supratherapeutic at greater than 4.0           Vital Sign Min/Max for last 24 hours  Temp  Min: 97.3 °F (36.3 °C)  Max: 98.3 °F (36.8 °C)   BP  Min: 70/50  Max: 132/74   Pulse  Min: 80  Max: 169   Resp  Min: 16  Max: 18   SpO2  Min: 88 %  Max: 97 %   No data recorded      Intake/Output Summary (Last 24 hours) at 12/1/2021 0800  Last data filed at 12/1/2021 0609  Gross per 24 hour   Intake 6620 ml   Output 7800 ml   Net -1180 ml           Physical Exam  Constitutional:       General: She is awake.   Cardiovascular:      Rate and Rhythm: Normal rate. Rhythm irregularly irregular.   Pulmonary:      Effort: Pulmonary effort is  normal.      Breath sounds: Decreased breath sounds and rhonchi present.   Abdominal:      General: There is distension.      Tenderness: There is abdominal tenderness.   Skin:     General: Skin is warm and dry.   Neurological:      Mental Status: She is alert and oriented to person, place, and time.   Psychiatric:         Behavior: Behavior is cooperative.         Tele: Rate controlled atrial fibrillation    Results Review (reviewed the patient's recent labs in the electronic medical record):     EKG (11/30/2021): Atrial fibrillation with RVR    CXR (11/28/2021): No acute cardiopulmonary finding    ECHO (1/12/2021): Normal LVEF 65%.  LVH.  Mild AS and moderate AI.  RVSP less than 35 mmHg    Result Review:  I have personally reviewed the results from the time of this admission to 12/1/2021 08:00 EST and agree with these findings:  [x]  Laboratory  []  Microbiology  [x]  Radiology  [x]  EKG/Telemetry   [x]  Cardiology/Vascular   []  Pathology  []  Old records  []  Other:  Most notable findings include: WBC 13.05, hemoglobin 9.4, hematocrit 29.8, BUN 46, creatinine 8.74, INR 4.96    Results from last 7 days   Lab Units 12/01/21  0437 11/30/21  0428 11/29/21  0504 11/28/21  0902 11/28/21  0902   SODIUM mmol/L 137 137 139   < > 137   POTASSIUM mmol/L 3.7 3.7 4.3   < > 3.8   CHLORIDE mmol/L 95* 94* 97*  --  97*   BUN mg/dL 46* 50* 55*   < > 54*   CREATININE mg/dL 8.74* 8.96* 9.11*   < > 10.29*    < > = values in this interval not displayed.         Results from last 7 days   Lab Units 12/01/21  0437 11/30/21  0428 11/29/21  0504   WBC 10*3/mm3 13.05* 14.16* 12.22*   HEMOGLOBIN g/dL 9.4* 9.8* 10.5*   HEMATOCRIT % 29.8* 30.8* 34.6   PLATELETS 10*3/mm3 294 322 316       Lab Results   Component Value Date    HGBA1C 4.90 05/19/2019       Lab Results   Component Value Date    CHOL 227 (H) 03/04/2019    TRIG 160 (H) 03/04/2019    HDL 52 03/04/2019     (H) 03/04/2019                Atrial fibrillation with RVR  · Episode  occurred in the setting of bacterial peritonitis/sepsis  · Unable to tolerate p.o. dose of metoprolol and unable to tolerate IV Cardizem drip due to hypotension  · Being treated with IV metoprolol every 6 hours holding for SBP less than 90 mmHg  · Currently rate controlled atrial fibrillation  · Supratherapeutic INR greater than 4.0.  Coumadin dosage management per pharmacy.  Would it be beneficial to change from Coumadin to Xarelto or Eliquis?     Hypotension   · Improved with IV fluid resuscitation on arrival  · Likely component of sepsis from bacterial peritonitis    Spontaneous bacterial peritonitis  · Treatment per ID and hospitalist      End-stage renal failure on peritoneal dialysis  · Nephrology managing    Small bowel ileus  · Treatment per primary team                   · Once able to take p.o. would transition IV metoprolol to 25 mg p.o. metoprolol tartrate twice daily  · Monitor INRs closely and Coumadin dosage per pharmacy    Electronically signed by REY Galeana, 12/01/21, 8:00 AM EST.

## 2021-12-01 NOTE — PROGRESS NOTES
"   LOS: 3 days    Patient Care Team:  Alex Walters MD as PCP - General (Internal Medicine)  Jeff Joe IV, MD as Consulting Physician (Cardiology)  Donny Hodges MD as Consulting Physician (Nephrology)  Cory Castillo MD as Surgeon (General Surgery)  Slim Wick MD    Reason For Visit:  F/U ESRD  Subjective           Review of Systems:    Pulm: No soa   CV:  No CP      Objective     cinacalcet, 60 mg, Oral, Nightly  metoprolol tartrate, 5 mg, Intravenous, Q6H  nystatin, 5 mL, Swish & Swallow, 4x Daily  pantoprazole, 40 mg, Intravenous, Q AM  sevelamer, 1,600 mg, Oral, TID With Meals  sodium chloride, 10 mL, Intravenous, Q12H  dianeal with additives intermittent, , Intraperitoneal, Q24H  UltraBag/Dianeal PD-2/1.5% Dex (pappas #[id number]), 2,000 mL, Intraperitoneal, 5x Daily  warfarin (COUMADIN) (dosing per levels), , Does not apply, Daily      dilTIAZem, 5-15 mg/hr, Last Rate: Stopped (11/30/21 1404)  Pharmacy to dose vancomycin,   Pharmacy to dose warfarin,           Vital Signs:  Blood pressure 102/47, pulse 80, temperature 97.5 °F (36.4 °C), temperature source Oral, resp. rate 17, height 162.6 cm (64\"), weight 80.4 kg (177 lb 4.8 oz), SpO2 94 %, not currently breastfeeding.    Flowsheet Rows      First Filed Value   Admission Height 162.6 cm (64\") Documented at 11/28/2021 0854   Admission Weight 72.6 kg (160 lb) Documented at 11/28/2021 0854          11/30 0701 - 12/01 0700  In: 8620 [P.O.:620]  Out: 57605     Physical Exam:    General Appearance: NAD, alert and cooperative, Ox3. NGT.  Eyes: PER, conjunctivae and sclerae normal, no icterus  Lungs: respirations regular and unlabored, no crepitus, clear to auscultation  Heart/CV: regular rhythm & normal rate, no murmur, no gallop, no rub and no edema  Abdomen: not distended, soft, non-tender, no masses,  bowel sounds present. PD CATH  Skin: No rash, Warm and dry    Radiology:            Labs:  Results from last 7 days   Lab " Units 12/01/21 0437 11/30/21  0428 11/29/21  0504   WBC 10*3/mm3 13.05* 14.16* 12.22*   HEMOGLOBIN g/dL 9.4* 9.8* 10.5*   HEMATOCRIT % 29.8* 30.8* 34.6   PLATELETS 10*3/mm3 294 322 316     Results from last 7 days   Lab Units 12/01/21 0437 11/30/21  0428 11/29/21  0504 11/28/21  0902   SODIUM mmol/L 137 137 139 137   POTASSIUM mmol/L 3.7 3.7 4.3 3.8   CHLORIDE mmol/L 95* 94* 97* 97*   CO2 mmol/L 23.0 21.0* 20.0* 23.0   BUN mg/dL 46* 50* 55* 54*   CREATININE mg/dL 8.74* 8.96* 9.11* 10.29*   CALCIUM mg/dL 6.8* 7.1* 7.0* 6.8*   ALBUMIN g/dL 2.80* 2.40*  --  2.90*     Results from last 7 days   Lab Units 12/01/21  0437   GLUCOSE mg/dL 123*       Results from last 7 days   Lab Units 12/01/21  0437   ALK PHOS U/L 135*   BILIRUBIN mg/dL 0.2   ALT (SGPT) U/L <5   AST (SGOT) U/L 17           Results from last 7 days   Lab Units 11/28/21  1647   COLOR UA  Yellow   CLARITY UA  Turbid*   PH, URINE  6.5   SPECIFIC GRAVITY, URINE  1.012   GLUCOSE UA  Negative   KETONES UA  Negative   BILIRUBIN UA  Negative   PROTEIN UA  >=300 mg/dL (3+)*   BLOOD UA  Moderate (2+)*   LEUKOCYTES UA  Large (3+)*   NITRITE UA  Negative       Estimated Creatinine Clearance: 5.3 mL/min (A) (by C-G formula based on SCr of 8.74 mg/dL (H)).      Assessment       Peritonitis (HCC)    Paroxysmal atrial fibrillation (HCC)    Essential hypertension    Type 2 diabetes mellitus (HCC)    Chronic kidney disease, stage IV (severe) (HCC)    Hyperparathyroidism (HCC)    Hyperlipidemia LDL goal <100    Sepsis associated hypotension (HCC)    Ileus (HCC)    Hypotension            Impression: ESRD ON CAPD. PERITONITIS. ILEUS. ANEMIA.            Recommendations: PRESENT CAPD. AB'S PER ID.      Adryan Eden MD  12/01/21  12:47 EST

## 2021-12-01 NOTE — PROGRESS NOTES
"Pharmacy Consult  -  Warfarin    Moni Wallace is a  81 y.o. female   Height - 162.6 cm (64\")  Weight - 80.4 kg (177 lb 4.8 oz)    Consulting Provider: - Sonia Álvarez MD  Indication: - Afib  Goal INR: - 2 - 3  Home Regimen:   - warfarin 4 mg daily    Bridge Therapy: No        Drug-Drug Interactions with current regimen:  Fortaz in PD bags - may increase risk of bleeding  Acetaminophen PRN - may increase risk of bleeding (last given 11/28)  Pantoprazole (omeprazole PTA) - may increase INR    Warfarin Dosing During Admission:    Date  11/28 11/29 11/30 12/1        INR  2.84 3.22 4.34 4.96        Dose  3 mg 2 mg HOLD HOLD             Education Provided: [facility]    Discharge Follow up:   Following Provider - [facility]    Follow up time range or appointment - 3-5 days after discharge      Labs:    Results from last 7 days   Lab Units 12/01/21 0437 11/30/21  0428 11/29/21  0504 11/28/21  1716 11/28/21  0902   INR  4.96* 4.34* 3.22* 3.04* 2.84*   HEMOGLOBIN g/dL 9.4* 9.8* 10.5*  --  11.1*   HEMATOCRIT % 29.8* 30.8* 34.6  --  34.9     Results from last 7 days   Lab Units 12/01/21  0437 11/30/21  0428 11/29/21  0504 11/28/21  0902 11/28/21  0902   SODIUM mmol/L 137 137 139   < > 137   POTASSIUM mmol/L 3.7 3.7 4.3   < > 3.8   CHLORIDE mmol/L 95* 94* 97*   < > 97*   CO2 mmol/L 23.0 21.0* 20.0*   < > 23.0   BUN mg/dL 46* 50* 55*   < > 54*   CREATININE mg/dL 8.74* 8.96* 9.11*   < > 10.29*   CALCIUM mg/dL 6.8* 7.1* 7.0*   < > 6.8*   BILIRUBIN mg/dL 0.2 0.2  --   --  0.2   ALK PHOS U/L 135* 144*  --   --  214*   ALT (SGPT) U/L <5 12  --   --  30   AST (SGOT) U/L 17 23  --   --  30   GLUCOSE mg/dL 123* 124* 172*   < > 179*    < > = values in this interval not displayed.       Current dietary intake: poor PO intake overall, eating bowls of broth and some jello per RN today      Assessment/Plan:   Pharmacy consulted to dose warfarin for Afib, goal 2-3.   Patient's INR is supratherapeutic again at 4.96 today, increased " from 4.34 yesterday.  Will HOLD warfarin again today due to continued increase in INR. DDI with abx and Tylenol, low PO intake all likely contributing in addition to recent dose reduction outpatient.  Daily PT/INR ordered.  Monitor signs/symptoms of bleeding, dietary intake, and drug-drug interactions. Make dose adjustments as necessary.  Pharmacy will continue to follow.    Thank you  Johnson Villarreal, PharmD  12/1/2021  16:05 EST

## 2021-12-01 NOTE — CASE MANAGEMENT/SOCIAL WORK
Continued Stay Note  [facility]     Patient Name: Moni Wallace  MRN: [id number]  Today's Date: 12/1/2021    Admit Date: 11/28/2021     Discharge Plan     Row Name 12/01/21 1654       Plan    Plan discharge plan    Plan Comments Plan is home with family at discharge. Per discussion in MDR, pt not medically ready for discharge as she still has NG and had 600ml output.  CM will cont to follow.    Final Discharge Disposition Code 01 - home or self-care               Discharge Codes    No documentation.               Expected Discharge Date and Time     Expected Discharge Date Expected Discharge Time    Dec 3, 2021             Cyndy Hedrick RN

## 2021-12-01 NOTE — PROGRESS NOTES
[facility] Progress Note        Antibiotics:  Anti-Infectives (From admission, onward)    Ordered     Dose/Rate Route Frequency Start Stop    11/30/21 1115  UltraBag/Dianeal PD-2/1.5% Dex (pappas #[id number]) (DIANEAL) 2,000 mL with cefTAZidime (FORTAZ) 1,000 mg dialysis solution        Ordering Provider: Sawyer Llanos MD     Intraperitoneal Every 24 Hours 12/01/21 2200      11/30/21 1111  UltraBag/Dianeal PD-2/1.5% Dex (pappas #[id number]) (DIANEAL) 2,000 mL with vancomycin (VANCOCIN) 1,250 mg, cefTAZidime (FORTAZ) 1,000 mg dialysis solution        Ordering Provider: Sawyer Llanos MD     Intraperitoneal Once 11/30/21 2200 11/30/21 2200    11/29/21 1436  Pharmacy to dose vancomycin        Ordering Provider: Sonia Álvarez MD     Does not apply Continuous PRN 11/29/21 1432 12/03/21 1431    11/28/21 1942  UltraBag/Dianeal PD-2/1.5% Dex (pappas #[id number]) (DIANEAL) 2,000 mL with ceFAZolin (ANCEF) 2,000 mg dialysis solution        Ordering Provider: Kali Rojas MD     Intraperitoneal Once 11/29/21 0000 11/29/21 0004    11/28/21 1157  vancomycin 1500 mg/500 mL 0.9% NS IVPB ([facility])        Ordering Provider: Lex Marcum MD    20 mg/kg × 72.6 kg Intravenous Once 11/28/21 1159 11/28/21 1400    11/28/21 1157  piperacillin-tazobactam (ZOSYN) 3.375 g in iso-osmotic dextrose 50 ml (premix)        Ordering Provider: Lex Marcum MD    3.375 g Intravenous Once 11/28/21 1159 11/28/21 1317          CC: abd pain    HPI:      Patient is a 81 y.o.  Yr old female with history of ESRD on CAPD for several years, Hx CDiff years ago she was admitted November 28 with acute onset abdominal pain over 2 days, nausea/vomiting and abnormal CT scan raising concern for possible early ileus versus partial small bowel obstruction and hazy omental stranding concerning for peritonitis per notes.  She had leukocytosis and peritoneal fluid with 1748 WBC and predominance neutrophils.  Concern for CAPD associated peritonitis and empiric  "vancomycin/Zosyn initiated.  Nursing had reported peritoneal fluid had initially looked a little hazy but patient did not recall a hazy or bloody appearance.  No redness/swelling or pain or other problems at her PD catheter.     12/1/21 improving in general per her;  Less nausea and no vomiting today; She has vague/intermittent abdominal pain, mid abdomen, intermittent, nonradiating, 2 out of 10.  She denies diarrhea.  No hematochezia melena or hematemesis.  No rash.  Minimal urine output and no active urinary symptoms.  No dysuria hematuria or pyuria.  No flank pain.  No rash.  No shortness of breath or cough.      ROS:      12/1/21 No f/c/s. no rash. No new ADR to Abx.     Heent-- No new vision, hearing or throat complaints.  No epistaxis or oral sores.  Denies odynophagia or dysphagia.  No flashers, floaters or eye pain. No odynophagia or dysphagia. No headache, photophobia or neck stiffness.  CV-- No chest pain, palpitation or syncope  Resp-- No SOB/cough/Hemoptysis  GI-  As above.No hematochezia, melena, or hematemesis. Denies jaundice or chronic liver disease.  -- No dysuria, hematuria, or flank pain.  Denies hesitancy, urgency or flank pain.  Lymph- no swollen lymph nodes in neck/axilla or groin.   Heme- No active bruising or bleeding; no Hx of DVT or PE.  MS-- no swelling or pain in the bones or joints of arms/legs.  No new back pain.  Neuro-- No acute focal weakness or numbness in the arms or legs.  No seizures.     Full 12 point review of systems reviewed and negative otherwise for acute complaints, except for above      PE:   /64 (BP Location: Right leg, Patient Position: Lying)   Pulse 99   Temp 97.5 °F (36.4 °C) (Oral)   Resp 17   Ht 162.6 cm (64\")   Wt 80.4 kg (177 lb 4.8 oz)   LMP  (LMP Unknown)   SpO2 94%   BMI 30.43 kg/m²       GENERAL: Awake and alert, in no acute distress.   HEENT: Normocephalic, atraumatic.  PERRL. EOMI. No conjunctival injection. No icterus. Oropharynx clear " without evidence of thrush or exudate. No evidence of peridontal disease.    NECK: Supple without nuchal rigidity. No mass.  LYMPH: No cervical, axillary or inguinal lymphadenopathy.  HEART: RRR; No murmur, rubs, gallops.   LUNGS: diminished at bases but otherwise Clear to auscultation bilaterally without wheezing, rales, rhonchi. Normal respiratory effort. Nonlabored. No dullness.  ABDOMEN: Soft, mildly tender, nondistended. Positive bowel sounds. No rebound or guarding. NO mass or HSM.  EXT:  No cyanosis, clubbing or edema. No cord.  : Genitalia generally unremarkable.  Without Orozco catheter.  MSK: FROM without joint effusions noted arms/legs.    SKIN: Warm and dry without cutaneous eruptions on Inspection/palpation.    NEURO: Oriented to PPT. No focal deficits on motor/sensory exam at arms/legs.  PSYCHIATRIC: Normal insight and judgement. Cooperative with PE     PD catheter with no redness or purulent drainage.  Nontender at the exit site     IV without obvious redness or drainage     No peripheral stigmata/phenomenon of endocarditis       Laboratory Data    Results from last 7 days   Lab Units 12/01/21  0437 11/30/21  0428 11/29/21  0504   WBC 10*3/mm3 13.05* 14.16* 12.22*   HEMOGLOBIN g/dL 9.4* 9.8* 10.5*   HEMATOCRIT % 29.8* 30.8* 34.6   PLATELETS 10*3/mm3 294 322 316     Results from last 7 days   Lab Units 12/01/21  0437   SODIUM mmol/L 137   POTASSIUM mmol/L 3.7   CHLORIDE mmol/L 95*   CO2 mmol/L 23.0   BUN mg/dL 46*   CREATININE mg/dL 8.74*   GLUCOSE mg/dL 123*   CALCIUM mg/dL 6.8*     Results from last 7 days   Lab Units 12/01/21  0437   ALK PHOS U/L 135*   BILIRUBIN mg/dL 0.2   ALT (SGPT) U/L <5   AST (SGOT) U/L 17               Estimated Creatinine Clearance: 5.3 mL/min (A) (by C-G formula based on SCr of 8.74 mg/dL (H)).      Microbiology:      Radiology:  Imaging Results (Last 72 Hours)     Procedure Component Value Units Date/Time    XR Abdomen KUB [[id number]] Collected: 11/29/21 0913     Updated:  11/29/21 1125    Narrative:      EXAMINATION: XR ABDOMEN KUB- 11/29/2021     INDICATION: Ileus; R10.9-Unspecified abdominal pain; T85.71XA-Infection  and inflammatory reaction due to peritoneal dialysis catheter, initial  encounter; A41.9-Sepsis, unspecified organism      COMPARISON: NONE     FINDINGS: KUB reveals nasogastric tube tip in the stomach. Several  dilated loops of bowel seen along the left flank suggesting an ileus.  There is no obvious obstruction. Peritoneal dialysis catheter in place  in the pelvis. Degenerative changes seen within the spine and pelvis.          Impression:      Several dilated loops of bowel identified along the left  flank suggesting possible ileus. Nasogastric tube identified tip in the  stomach.      D: 11/29/2021  E: 11/29/2021          XR Chest 1 View [[id number]] Collected: 11/28/21 1200     Updated: 11/28/21 1204    Narrative:      EXAMINATION: XR CHEST 1 VW-      INDICATION: upper abd pain     TECHNIQUE: Frontal view of the chest     COMPARISON: Chest x-ray 9/19/2021     FINDINGS:      Stable cardiomediastinal silhouette. Mild elevation of the right  hemidiaphragm from prior. The lungs are clear without focal  consolidation. No pleural effusion or pneumothorax. No acute osseous  findings.       Impression:         No acute cardiopulmonary findings.     This report was finalized on 11/28/2021 12:01 PM by Paco Young MD.       CT Abdomen Pelvis Without Contrast [[id number]] Collected: 11/28/21 1046     Updated: 11/28/21 1117    Narrative:      EXAMINATION: CT ABDOMEN PELVIS WO CONTRAST-      INDICATION: Upper abdominal pain, history of peritoneal dialysis, on  Coumadin, low blood pressure     TECHNIQUE: Noncontrast CT of the abdomen and pelvis     COMPARISON: CT abdomen and pelvis 7/6/2021     FINDINGS:      Unremarkable appearance of the lung bases and lower thorax. Unremarkable  appearance of the liver. The gallbladder is decompressed. There is new  prominence of the common  bile duct measuring up to 10 mm in diameter. No  evidence of obstructing lesion or common duct stone. There is no  significant intrahepatic biliary ductal dilatation. Unremarkable  appearance of the spleen and pancreas, noting that the previously  described pancreatic body hypodense/cystic lesion is not well visualized  on this exam today. Stable size and appearance of a low-density 1.5 cm  left adrenal nodule, likely adenoma. Unremarkable appearance of the  right adrenal gland. Unchanged appearance of the kidneys demonstrating  severe bilateral atrophy and exophytic left renal cyst. Unchanged  appearance of the right lower quadrant transplant kidney. Unremarkable  appearance of the bladder.     New appearance of multiple distended and mildly dilated fecalized small  bowel loops throughout the abdomen. The distal ileum appears  decompressed. A discrete transition point is not identified although  caliber change may occur in the lower pelvis although this region is  difficult to evaluate in the setting of multiple coalescent bowel loops  and small fluid. Stool and gas is present within the colon. There is  mild colonic diverticulosis without diverticulitis. The appendix is  normal (series 2 image 47).     There is a small amount of dependent pelvic and scattered  intra-abdominal fluid as well as scattered small foci of  pneumoperitoneum; these findings likely relate to peritoneal dialysis.  Redemonstration of left lower quadrant peritoneal dialysis catheter  which appears curled in the left lower pelvis. There is new hazy fat  stranding and infiltration of the omentum and to lesser extent the  mesenteric fat (series 2 image 33). Redemonstration of a small  fluid-filled right anterior lower abdominal wall hernia without evidence  of herniated bowel. No acute osseous findings.          Impression:         New appearance of multiple distended and mildly dilated fecalized small  bowel loops throughout the abdomen. Stool  and gas remains within the  colon. The distal most ileum appears decompressed. A discrete transition  point is not definitively identified although caliber change may occur  in the lower pelvis. These findings may reflect partial or early small  bowel obstruction versus early ileus.     There is new hazy stranding/infiltration of the omental and to lesser  extent the mesenteric fat which is nonspecific and could reflect changes  related to peritoneal dialysis and/or volume status changes although  inflammatory fat stranding cannot be entirely excluded. Scattered  intra-abdominal ascites and pneumoperitoneum likely reflect peritoneal  dialysis.     There is new prominence of the common bile duct measuring up to 10 mm in  diameter, without evidence of significant intrahepatic ductal  dilatation. The gallbladder is decompressed. No evidence of obstructing  stone or mass lesion on this noncontrast exam. Correlate with LFTs.     This report was finalized on 11/28/2021 11:14 AM by Paco Young MD.               Impression:       --acute abdominal pain.  Associated with nausea/vomiting, abnormal PD fluid concerning for  Peritonitis, ?CAPD associated and less WBC with treatment on subsequent sampling; culture negative so far. Catheter exit site appears okay for now.  No outpatient cultures per patient. Transitioned to IP vancomycin/ceftaz empirically     --Acute vomiting, nausea with abnormal CT scan raising concern for possible ileus versus partial small bowel obstruction per radiology; supportive measures ongoing by medicine with NG tube     --End-stage renal disease with peritoneal dialysis     --History C. Difficile years ago per patient.  No diarrhea at present but at risk for relapse.     --abnormal CT scan with prominent common bile duct per radiology but without evidence for intrahepatic ductal dilation and normal transaminases/bilirubin on November 28.  Monitor exam and labs     --Chronic Coumadin    PLAN:        --IP vancomycin , ceftaz     --Check/review labs cultures and scans     --Partial history per nursing staff     --Discussed with microbiology     --Discussed with Dr. Mejía     --d/w Dr Eden; d/w pharmacy; they are guiding dosing/timing of IP abx     --Highly complex set of issues with high risk for further serious morbidity and other serious sequela        Sawyer Llanos MD  12/1/2021

## 2021-12-01 NOTE — PROGRESS NOTES
[facility] Medicine Services  PROGRESS NOTE    Patient Name: Moni Wallace  : 1940  MRN: [id number]    Date of Admission: 2021  Primary Care Physician: Alex Walters MD    Subjective   Subjective     CC: f/u peritonitis    HPI: Up in bed. Says she is feeling some better. Pain improving. Passing gas, no bm. Per nursing took antiemetics all night.    ROS:  Gen- No fevers, chills  CV- No chest pain, palpitations  Resp- No cough, dyspnea  GI- No N/V/D, abd pain    Objective   Objective     Vital Signs:   Temp:  [97.3 °F (36.3 °C)-98.3 °F (36.8 °C)] 97.5 °F (36.4 °C)  Heart Rate:  [] 80  Resp:  [16-18] 17  BP: ()/(47-82) 102/47     Physical Exam:  Constitutional: No acute distress, awake, alert  HENT: NCAT, mucous membranes moist, NGT  Respiratory: Clear to auscultation bilaterally, respiratory effort normal   Cardiovascular: RRR, no murmurs, rubs, or gallops  Gastrointestinal: Positive bowel sounds, soft, nontender, non-distended, peritoneal catheter in place  Musculoskeletal: No bilateral ankle edema  Psychiatric: Appropriate affect, cooperative  Neurologic: Oriented x 3, strength symmetric in all extremities, Cranial Nerves grossly intact to confrontation, speech clear  Skin: No rashes    Results Reviewed:  LAB RESULTS:      Lab 21  0437 21  0428 21  0504 21  1716 21  0902   WBC 13.05* 14.16* 12.22*  --  14.71*   HEMOGLOBIN 9.4* 9.8* 10.5*  --  11.1*   HEMATOCRIT 29.8* 30.8* 34.6  --  34.9   PLATELETS 294 322 316  --  326   NEUTROS ABS  --   --   --   --  10.55*   IMMATURE GRANS (ABS)  --   --   --   --  0.29*   LYMPHS ABS  --   --   --   --  2.51   MONOS ABS  --   --   --   --  0.75   EOS ABS  --   --   --   --  0.54*   MCV 98.7* 97.8* 102.4*  --  100.9*   LACTATE  --   --   --  2.3* 2.1*   PROTIME 43.8* 39.6* 31.5* 30.1* 28.6*         Lab 21  0437 21  0428 21  0504 21  0902   SODIUM 137 137 139 137    POTASSIUM 3.7 3.7 4.3 3.8   CHLORIDE 95* 94* 97* 97*   CO2 23.0 21.0* 20.0* 23.0   ANION GAP 19.0* 22.0* 22.0* 17.0*   BUN 46* 50* 55* 54*   CREATININE 8.74* 8.96* 9.11* 10.29*   GLUCOSE 123* 124* 172* 179*   CALCIUM 6.8* 7.1* 7.0* 6.8*         Lab 12/01/21  0437 11/30/21  0428 11/28/21  0902   TOTAL PROTEIN 6.3 6.5 7.1   ALBUMIN 2.80* 2.40* 2.90*   GLOBULIN 3.5 4.1 4.2   ALT (SGPT) <5 12 30   AST (SGOT) 17 23 30   BILIRUBIN 0.2 0.2 0.2   ALK PHOS 135* 144* 214*   LIPASE  --   --  19         Lab 12/01/21  0437 11/30/21  0428 11/29/21  0504 11/28/21  1716 11/28/21  0902   PROTIME 43.8* 39.6* 31.5* 30.1* 28.6*   INR 4.96* 4.34* 3.22* 3.04* 2.84*                 Brief Urine Lab Results  (Last result in the past 365 days)      Color   Clarity   Blood   Leuk Est   Nitrite   Protein   CREAT   Urine HCG        11/28/21 1647 Yellow   Turbid   Moderate (2+)   Large (3+)   Negative   >=300 mg/dL (3+)                 Microbiology Results Abnormal     Procedure Component Value - Date/Time    Body Fluid Culture - Body Fluid, Peritoneum [[id number]] Collected: 11/28/21 1214    Lab Status: Final result Specimen: Body Fluid from Peritoneum Updated: 12/01/21 1239     Body Fluid Culture No growth at 3 days     Gram Stain Few (2+) WBCs seen      No organisms seen    Blood Culture - Blood, Wrist, Left [[id number]]  (Normal) Collected: 11/28/21 1023    Lab Status: Preliminary result Specimen: Blood from Wrist, Left Updated: 12/01/21 1045     Blood Culture No growth at 3 days    Blood Culture - Blood, Wrist, Left [[id number]]  (Normal) Collected: 11/28/21 1023    Lab Status: Preliminary result Specimen: Blood from Wrist, Left Updated: 12/01/21 1045     Blood Culture No growth at 3 days    Urine Culture - Urine, Urine, Clean Catch [[id number]]  (Normal) Collected: 11/28/21 1647    Lab Status: Final result Specimen: Urine, Clean Catch Updated: 11/29/21 2255     Urine Culture No growth    COVID PRE-OP / PRE-PROCEDURE SCREENING ORDER (NO  ISOLATION) - Swab, Nasopharynx [[id number]]  (Normal) Collected: 11/28/21 1558    Lab Status: Final result Specimen: Swab from Nasopharynx Updated: 11/28/21 2048    Narrative:      The following orders were created for panel order COVID PRE-OP / PRE-PROCEDURE SCREENING ORDER (NO ISOLATION) - Swab, Nasopharynx.  Procedure                               Abnormality         Status                     ---------                               -----------         ------                     COVID-19, APTIMA PANTHER...[[id number]]  Normal              Final result                 Please view results for these tests on the individual orders.    COVID-19, APTIMA PANTHER [facility] IN-HOUSE NP/OP SWAB IN UTM/VTM/SALINE TRANSPORT MEDIA 24HR TAT - Swab, Nasopharynx [[id number]]  (Normal) Collected: 11/28/21 1558    Lab Status: Final result Specimen: Swab from Nasopharynx Updated: 11/28/21 2048     COVID19 Not Detected    Narrative:      Fact sheet for providers: https://www.fda.gov/media/568862/download     Fact sheet for patients: https://www.fda.gov/media/611809/download    Test performed by RT PCR.          No radiology results from the last 24 hrs    Results for orders placed during the hospital encounter of 01/11/21    Transthoracic Echo Complete With Contrast if Necessary Per Protocol    Interpretation Summary  · Left ventricular systolic function is normal. Estimated left ventricular EF = 65%  · Left ventricular wall thickness is consistent with hypertrophy.  · Left atrial volume is mildly increased.  · The aortic valve is calcified. There is mild aortic stenosis present. There is moderate aortic regurgitation present.  · Estimated right ventricular systolic pressure from tricuspid regurgitation is normal (<35 mmHg).      I have reviewed the medications:  Scheduled Meds:cinacalcet, 60 mg, Oral, Nightly  metoprolol tartrate, 5 mg, Intravenous, Q6H  nystatin, 5 mL, Swish & Swallow, 4x Daily  pantoprazole, 40 mg, Intravenous, Q  AM  sevelamer, 1,600 mg, Oral, TID With Meals  sodium chloride, 10 mL, Intravenous, Q12H  dianeal with additives intermittent, , Intraperitoneal, Q24H  UltraBag/Dianeal PD-2/1.5% Dex (pappas #[id number]), 2,000 mL, Intraperitoneal, 5x Daily  warfarin (COUMADIN) (dosing per levels), , Does not apply, Daily      Continuous Infusions:dilTIAZem, 5-15 mg/hr, Last Rate: Stopped (11/30/21 1404)  Pharmacy to dose vancomycin,   Pharmacy to dose warfarin,       PRN Meds:.•  acetaminophen  •  albuterol  •  benzocaine-menthol  •  HYDROmorphone  •  ondansetron **OR** ondansetron  •  Pharmacy to dose vancomycin  •  Pharmacy to dose warfarin  •  phenol  •  Sodium Chloride (PF)  •  sodium chloride  •  temazepam    Assessment/Plan   Assessment & Plan     Active Hospital Problems    Diagnosis  POA   • **Peritonitis (HCC) [K65.9]  Yes   • Hypotension [I95.9]  Yes   • Sepsis associated hypotension (HCC) [A41.9, I95.9]  Yes   • Ileus (HCC) [K56.7]  Yes   • Hyperlipidemia LDL goal <100 [E78.5]  Yes   • Paroxysmal atrial fibrillation (HCC) [I48.0]  Yes   • Type 2 diabetes mellitus (HCC) [E11.9]  Yes   • Chronic kidney disease, stage IV (severe) (HCC) [N18.4]  Yes   • Hyperparathyroidism (HCC) [E21.3]  Yes   • Essential hypertension [I10]  Yes      Resolved Hospital Problems   No resolved problems to display.        Brief Hospital Course to date:  Moni Wallace is a 81 y.o. female here with sepsis, peritonitis, and hypotension. This is my first day assessing patient's active medical issues.     Sepsis  Bacterial Peritonitis  --Peritoneal abx per ID.   --Improving. Repeat PD fluid analysis, with decreasing WBC  --Pain control     Ileus  --due to peritonitis  --Improving. NG placed, will clamp today - if tolerates without nausea will plan to pull.  --remains on liquids only with intermittent n/v, having flatus but slow progress  --Suppository today.  --continue to mobilize as able     PAF, w/ intermittent RVR  HTN  --Supratherapeutic INR. Holding  warfarin. Dosing per pharmacy  --Cardiology following. Initially her BB held for hypotension. Now on IV metoprolol.     This patient's problems and plans were partially entered by my partner and updated as appropriate by me 12/01/21.    DVT prophylaxis:  Medical DVT prophylaxis orders are present.       AM-PAC 6 Clicks Score (PT): 17 (12/01/21 0800)    Disposition: I expect the patient to be discharged TBD.    CODE STATUS:   Code Status and Medical Interventions:   Ordered at: 11/28/21 1356     Medical Intervention Limits:    NO intubation (DNI)     Level Of Support Discussed With:    Patient     Code Status (Patient has no pulse and is not breathing):    No CPR (Do Not Attempt to Resuscitate)     Medical Interventions (Patient has pulse or is breathing):    Limited Support       Марина Wasserman II, DO  12/01/21

## 2021-12-02 NOTE — PROGRESS NOTES
Cardiology Progress Note      Reason for visit:    · Atrial fibrillation with RVR in the setting of bacterial peritonitis    IDENTIFICATION: 81-year-old female who resides in [place], [place]    Active Hospital Problems    Diagnosis  POA   • **Peritonitis (HCC) [K65.9]  Yes     Priority: High   • Sepsis associated hypotension (HCC) [A41.9, I95.9]  Yes     Priority: High   • Paroxysmal atrial fibrillation (HCC) [I48.0]  Yes     Priority: High     · Diagnosed 2/2015  · CHADS-VASc = 5  · On Coumadin   · Echo (1/12/2021): LVEF 65%.  LVH.  Moderate AI.  · Beakthrough episodes of atrial fibrillation with RVR in setting of bacterial peritonitis 11/30/2021     • Type 2 diabetes mellitus (HCC) [E11.9]  Yes     Priority: High   • Chronic kidney disease, stage IV (severe) (HCC) [N18.4]  Yes     Priority: High     · IgA nephropathy with history of renal transplant, 2010.   · Patient on hemodialysis Monday, Wednesday, and Friday started July 2015.  · Hemodialysis discontinued 2/2017.     • Hypotension [I95.9]  Yes     Priority: Medium   • Ileus (HCC) [K56.7]  Yes     Priority: Medium   • Hyperlipidemia LDL goal <100 [E78.5]  Yes     Priority: Medium     · Reports intolerance to statin     • Hyperparathyroidism (HCC) [E21.3]  Yes     Priority: Medium   • Essential hypertension [I10]  Yes     Priority: Low            Patient currently in normal sinus rhythm.  She is lying in bed.  Reports just not feeling well today.  Her NG is supposed to be pulled today.  She is allowed to eat clear liquids and they're advancing her diet.           Vital Sign Min/Max for last 24 hours  Temp  Min: 97.6 °F (36.4 °C)  Max: 98.8 °F (37.1 °C)   BP  Min: 102/47  Max: 130/71   Pulse  Min: 73  Max: 97   Resp  Min: 16  Max: 18   SpO2  Min: 92 %  Max: 97 %   No data recorded      Intake/Output Summary (Last 24 hours) at 12/2/2021 0904  Last data filed at 12/2/2021 0546  Gross per 24 hour   Intake 6950 ml   Output 9650 ml   Net -2700 ml            Physical Exam  Constitutional:       General: She is awake.   Cardiovascular:      Rate and Rhythm: Normal rate and regular rhythm.   Pulmonary:      Effort: Pulmonary effort is normal.      Breath sounds: Decreased breath sounds present.   Abdominal:      General: There is distension.      Tenderness: There is abdominal tenderness.   Skin:     General: Skin is warm and dry.   Neurological:      Mental Status: She is alert and oriented to person, place, and time.   Psychiatric:         Behavior: Behavior is cooperative.         Tele: NSR    Results Review (reviewed the patient's recent labs in the electronic medical record):     EKG (11/30/2021): Atrial fibrillation with RVR    CXR (11/28/2021): No acute cardiopulmonary finding    ECHO (1/12/2021): Normal LVEF 65%.  LVH.  Mild AS and moderate AI.  RVSP less than 35 mmHg    Result Review:  I have personally reviewed the results from the time of this admission to 12/2/2021 09:04 EST and agree with these findings:  [x]  Laboratory  []  Microbiology  [x]  Radiology  [x]  EKG/Telemetry   [x]  Cardiology/Vascular   []  Pathology  []  Old records  []  Other:  Most notable findings include: BUN 41, creatinine 7.74 (on peritoneal dialysis), potassium 3.4, INR 4.1    Results from last 7 days   Lab Units 12/02/21 0441 12/01/21 0437 11/30/21 0428 11/29/21  0504 11/29/21  0504 11/28/21  0902 11/28/21  0902   SODIUM mmol/L 133* 137 137   < > 139   < > 137   POTASSIUM mmol/L 3.4* 3.7 3.7   < > 4.3   < > 3.8   CHLORIDE mmol/L 93* 95* 94*  --  97*  --  97*   BUN mg/dL 41* 46* 50*   < > 55*   < > 54*   CREATININE mg/dL 7.74* 8.74* 8.96*   < > 9.11*   < > 10.29*    < > = values in this interval not displayed.         Results from last 7 days   Lab Units 12/01/21 0437 11/30/21  0428 11/29/21  0504   WBC 10*3/mm3 13.05* 14.16* 12.22*   HEMOGLOBIN g/dL 9.4* 9.8* 10.5*   HEMATOCRIT % 29.8* 30.8* 34.6   PLATELETS 10*3/mm3 294 322 316       Lab Results   Component Value Date     HGBA1C 4.90 05/19/2019       Lab Results   Component Value Date    CHOL 227 (H) 03/04/2019    TRIG 160 (H) 03/04/2019    HDL 52 03/04/2019     (H) 03/04/2019                Atrial fibrillation with RVR  · Episode occurred in the setting of bacterial peritonitis/sepsis  · Unable to tolerate p.o. dose of metoprolol and unable to tolerate IV Cardizem drip due to hypotension  · Being treated with IV metoprolol every 6 hours holding for SBP less than 90 mmHg  · Currently rate controlled atrial fibrillation  · Supratherapeutic INR greater than 4.0.  Coumadin dosage management per pharmacy.  Would it be beneficial to change from Coumadin to Xarelto or Eliquis?     Hypotension   · Improved with IV fluid resuscitation on arrival  · Likely component of sepsis from bacterial peritonitis    Spontaneous bacterial peritonitis  · Treatment per ID and hospitalist      End-stage renal failure on peritoneal dialysis  · Nephrology managing    Small bowel ileus  · Treatment per primary team                   · Discontinue IV metoprolol and start p.o. metoprolol tartrate 25 mg twice daily hold for SBP less than 90 mmHg  · Continue to hold Coumadin and dosage adjustment per pharmacy.  Remains supratherapeutic

## 2021-12-02 NOTE — PROGRESS NOTES
[facility] Progress Note        Antibiotics:  Anti-Infectives (From admission, onward)    Ordered     Dose/Rate Route Frequency Start Stop    11/30/21 1115  UltraBag/Dianeal PD-2/1.5% Dex (pappas #[id number]) (DIANEAL) 2,000 mL with cefTAZidime (FORTAZ) 1,000 mg dialysis solution        Ordering Provider: Sawyer Llanos MD     Intraperitoneal Every 24 Hours 12/01/21 2200      11/30/21 1111  UltraBag/Dianeal PD-2/1.5% Dex (pappas #[id number]) (DIANEAL) 2,000 mL with vancomycin (VANCOCIN) 1,250 mg, cefTAZidime (FORTAZ) 1,000 mg dialysis solution        Ordering Provider: Sawyer Llanos MD     Intraperitoneal Once 11/30/21 2200 11/30/21 2200    11/29/21 1436  Pharmacy to dose vancomycin        Ordering Provider: Sawyer Llanos MD     Does not apply Continuous PRN 11/29/21 1432 12/24/21 1431    11/28/21 1942  UltraBag/Dianeal PD-2/1.5% Dex (pappas #[id number]) (DIANEAL) 2,000 mL with ceFAZolin (ANCEF) 2,000 mg dialysis solution        Ordering Provider: Kali Rojas MD     Intraperitoneal Once 11/29/21 0000 11/29/21 0004    11/28/21 1157  vancomycin 1500 mg/500 mL 0.9% NS IVPB ([facility])        Ordering Provider: Lex Marcum MD    20 mg/kg × 72.6 kg Intravenous Once 11/28/21 1159 11/28/21 1400    11/28/21 1157  piperacillin-tazobactam (ZOSYN) 3.375 g in iso-osmotic dextrose 50 ml (premix)        Ordering Provider: Lex Marcum MD    3.375 g Intravenous Once 11/28/21 1159 11/28/21 1317          CC: abd pain    HPI:      Patient is a 81 y.o.  Yr old female with history of ESRD on CAPD for several years, Hx CDiff years ago she was admitted November 28 with acute onset abdominal pain over 2 days, nausea/vomiting and abnormal CT scan raising concern for possible early ileus versus partial small bowel obstruction and hazy omental stranding concerning for peritonitis per notes.  She had leukocytosis and peritoneal fluid with 1748 WBC and predominance neutrophils.  Concern for CAPD associated peritonitis and empiric  "vancomycin/Zosyn initiated.  Nursing had reported peritoneal fluid had initially looked a little hazy but patient did not recall a hazy or bloody appearance.  No redness/swelling or pain or other problems at her PD catheter.     12/2/21 improving pain in general per her but dialysate red tinged;  Less nausea and no vomiting today; She has vague/intermittent abdominal pain, mid abdomen, intermittent, nonradiating, 2 out of 10.  She denies diarrhea.  No hematochezia melena or hematemesis.  No rash.  Minimal urine output and no active urinary symptoms.  No dysuria hematuria or pyuria.  No flank pain.  No rash.  No shortness of breath or cough.      ROS:      12/2/21 No f/c/s. no rash. No new ADR to Abx.     Heent-- No new vision, hearing or throat complaints.  No epistaxis or oral sores.  Denies odynophagia or dysphagia.  No flashers, floaters or eye pain. No odynophagia or dysphagia. No headache, photophobia or neck stiffness.  CV-- No chest pain, palpitation or syncope  Resp-- No SOB/cough/Hemoptysis  GI-  As above.No hematochezia, melena, or hematemesis. Denies jaundice or chronic liver disease.  -- No dysuria, hematuria, or flank pain.  Denies hesitancy, urgency or flank pain.  Lymph- no swollen lymph nodes in neck/axilla or groin.   Heme- No active bruising or bleeding; no Hx of DVT or PE.  MS-- no swelling or pain in the bones or joints of arms/legs.  No new back pain.  Neuro-- No acute focal weakness or numbness in the arms or legs.  No seizures.     Full 12 point review of systems reviewed and negative otherwise for acute complaints, except for above      PE:   /52   Pulse 83   Temp 98.4 °F (36.9 °C) (Oral)   Resp 16   Ht 162.6 cm (64\")   Wt 80.4 kg (177 lb 4.8 oz)   LMP  (LMP Unknown)   SpO2 95%   BMI 30.43 kg/m²       GENERAL: Awake and alert, in no acute distress.   HEENT: Normocephalic, atraumatic.  PERRL. EOMI. No conjunctival injection. No icterus. Oropharynx clear without evidence of " thrush or exudate. No evidence of peridontal disease.    NECK: Supple without nuchal rigidity. No mass.  LYMPH: No cervical, axillary or inguinal lymphadenopathy.  HEART: RRR; No murmur, rubs, gallops.   LUNGS: diminished at bases but otherwise Clear to auscultation bilaterally without wheezing, rales, rhonchi. Normal respiratory effort. Nonlabored. No dullness.  ABDOMEN: Soft, mildly tender, nondistended. Positive bowel sounds. No rebound or guarding. NO mass or HSM.  EXT:  No cyanosis, clubbing or edema. No cord.  : Genitalia generally unremarkable.  Without Orozco catheter.  MSK: FROM without joint effusions noted arms/legs.    SKIN: Warm and dry without cutaneous eruptions on Inspection/palpation.    NEURO: Oriented to PPT. No focal deficits on motor/sensory exam at arms/legs.  PSYCHIATRIC: Normal insight and judgement. Cooperative with PE     PD catheter with no redness or purulent drainage.  Nontender at the exit site     IV without obvious redness or drainage     No peripheral stigmata/phenomenon of endocarditis       Laboratory Data    Results from last 7 days   Lab Units 12/01/21  0437 11/30/21  0428 11/29/21  0504   WBC 10*3/mm3 13.05* 14.16* 12.22*   HEMOGLOBIN g/dL 9.4* 9.8* 10.5*   HEMATOCRIT % 29.8* 30.8* 34.6   PLATELETS 10*3/mm3 294 322 316     Results from last 7 days   Lab Units 12/02/21  0441   SODIUM mmol/L 133*   POTASSIUM mmol/L 3.4*   CHLORIDE mmol/L 93*   CO2 mmol/L 22.0   BUN mg/dL 41*   CREATININE mg/dL 7.74*   GLUCOSE mg/dL 144*   CALCIUM mg/dL 6.6*     Results from last 7 days   Lab Units 12/01/21  0437   ALK PHOS U/L 135*   BILIRUBIN mg/dL 0.2   ALT (SGPT) U/L <5   AST (SGOT) U/L 17               Estimated Creatinine Clearance: 5.9 mL/min (A) (by C-G formula based on SCr of 7.74 mg/dL (H)).      Microbiology:      Radiology:  Imaging Results (Last 72 Hours)     Procedure Component Value Units Date/Time    XR Abdomen KUB [[id number]] Collected: 11/29/21 0913     Updated: 12/01/21 1649     Narrative:      EXAMINATION: XR ABDOMEN KUB- 11/29/2021     INDICATION: Ileus; R10.9-Unspecified abdominal pain; T85.71XA-Infection  and inflammatory reaction due to peritoneal dialysis catheter, initial  encounter; A41.9-Sepsis, unspecified organism      COMPARISON: NONE     FINDINGS: KUB reveals nasogastric tube tip in the stomach. Several  dilated loops of bowel seen along the left flank suggesting an ileus.  There is no obvious obstruction. Peritoneal dialysis catheter in place  in the pelvis. Degenerative changes seen within the spine and pelvis.          Impression:      Several dilated loops of bowel identified along the left  flank suggesting possible ileus. Nasogastric tube identified tip in the  stomach.      D: 11/29/2021  E: 11/29/2021     This report was finalized on 12/1/2021 4:46 PM by Dr. Aracely Oates MD.               Impression:       --acute abdominal pain.  Associated with nausea/vomiting, abnormal PD fluid concerning for  Peritonitis, ?CAPD associated and less WBC with treatment on subsequent sampling; culture negative so far. Catheter exit site appears nonpurulent for now.  No outpatient cultures per patient. Transitioned to IP vancomycin/ceftaz empirically ; red tinged dialysate, ?associated with elevated INR -v- other    --Acute vomiting, nausea with abnormal CT scan raising concern for possible ileus versus partial small bowel obstruction per radiology; supportive measures ongoing by medicine with NG tube     --End-stage renal disease with peritoneal dialysis     --History C. Difficile years ago per patient.  No diarrhea at present but at risk for relapse.     --abnormal CT scan with prominent common bile duct per radiology but without evidence for intrahepatic ductal dilation and normal transaminases/bilirubin on November 28.  Monitor exam and labs     --Chronic Coumadin    PLAN:       --IP vancomycin , ceftaz     --Check/review labs cultures and scans     --Partial history per  nursing staff     --Discussed with microbiology     --d/w Dr Eden; d/w pharmacy; they are guiding dosing/timing of IP abx     --Highly complex set of issues with high risk for further serious morbidity and other serious sequela    --repeat dialysate pending with prior cultures negative, fungal/AFB studies pending        Sawyer Llanos MD  12/2/2021

## 2021-12-02 NOTE — PLAN OF CARE
Pt pain being controlled with PRN medication. Pt tolerating peritoneal dialysis well, Peritoneal dialysis continues to have strawberry  mucus threads. Will continue dialysis per MD orders and monitor for changes.   Problem: Adult Inpatient Plan of Care  Goal: Plan of Care Review  Outcome: Ongoing, Progressing  Goal: Patient-Specific Goal (Individualized)  Outcome: Ongoing, Progressing  Goal: Absence of Hospital-Acquired Illness or Injury  Outcome: Ongoing, Progressing  Intervention: Identify and Manage Fall Risk  Recent Flowsheet Documentation  Taken 12/2/2021 0400 by Jonny Rao RN  Safety Promotion/Fall Prevention:   activity supervised   assistive device/personal items within reach   clutter free environment maintained   fall prevention program maintained   lighting adjusted   nonskid shoes/slippers when out of bed   room organization consistent   safety round/check completed  Taken 12/2/2021 0000 by Jonny Rao RN  Safety Promotion/Fall Prevention:   activity supervised   assistive device/personal items within reach   clutter free environment maintained   fall prevention program maintained   lighting adjusted   nonskid shoes/slippers when out of bed   safety round/check completed   room organization consistent  Taken 12/1/2021 2000 by Jonny Rao RN  Safety Promotion/Fall Prevention:   activity supervised   assistive device/personal items within reach   clutter free environment maintained   fall prevention program maintained   lighting adjusted   nonskid shoes/slippers when out of bed   room organization consistent   safety round/check completed  Intervention: Prevent Skin Injury  Recent Flowsheet Documentation  Taken 12/1/2021 2000 by Jonny Rao RN  Body Position:   weight shift assistance provided   position changed independently  Skin Protection:   adhesive use limited   tubing/devices free from skin contact  Goal: Optimal Comfort and Wellbeing  Outcome: Ongoing, Progressing  Intervention:  Provide Person-Centered Care  Recent Flowsheet Documentation  Taken 12/1/2021 2000 by Jonny Rao RN  Trust Relationship/Rapport: care explained  Goal: Readiness for Transition of Care  Outcome: Ongoing, Progressing     Problem: Infection  Goal: Infection Symptom Resolution  Outcome: Ongoing, Progressing     Problem: Adjustment to Illness (Chronic Kidney Disease)  Goal: Optimal Coping with Chronic Illness  Outcome: Ongoing, Progressing     Problem: Electrolyte Imbalance (Chronic Kidney Disease)  Goal: Electrolyte Balance  Outcome: Ongoing, Progressing     Problem: Fluid Volume Excess (Chronic Kidney Disease)  Goal: Fluid Balance  Outcome: Ongoing, Progressing  Intervention: Monitor and Manage Hypervolemia  Recent Flowsheet Documentation  Taken 12/1/2021 2000 by Jonny Rao RN  Skin Protection:   adhesive use limited   tubing/devices free from skin contact     Problem: Functional Decline (Chronic Kidney Disease)  Goal: Optimal Functional Ability  Outcome: Ongoing, Progressing  Intervention: Optimize Functional Ability  Recent Flowsheet Documentation  Taken 12/2/2021 0400 by Jonny Rao RN  Activity Management: activity adjusted per tolerance  Taken 12/2/2021 0000 by Jonny Rao RN  Activity Management: activity adjusted per tolerance  Taken 12/1/2021 2000 by Jonny Rao RN  Activity Management: activity adjusted per tolerance     Problem: Hematologic Alteration (Chronic Kidney Disease)  Goal: Absence of Anemia Signs and Symptoms  Outcome: Ongoing, Progressing     Problem: Oral Intake Inadequate (Chronic Kidney Disease)  Goal: Optimal Oral Intake  Outcome: Ongoing, Progressing     Problem: Renal Function Impairment (Chronic Kidney Disease)  Goal: Laboratory Values and Blood Pressure Within Desired Range  Outcome: Ongoing, Progressing  Intervention: Monitor and Support Renal Function  Recent Flowsheet Documentation  Taken 12/1/2021 2000 by Jonny Rao RN  Medication Review/Management:  medications reviewed     Problem: Pain Acute  Goal: Optimal Pain Control  Outcome: Ongoing, Progressing   Goal Outcome Evaluation:

## 2021-12-02 NOTE — PROGRESS NOTES
"   LOS: 4 days    Patient Care Team:  Alex Walters MD as PCP - General (Internal Medicine)  Jeff Joe IV, MD as Consulting Physician (Cardiology)  Donny Hodges MD as Consulting Physician (Nephrology)  Cory Castillo MD as Surgeon (General Surgery)  Slim Wick MD    ESRD - PD     Subjective     Interval History:     No acute events overnight. No new complaints.     Review of Systems:   No CP or SOA , fever, chills, rigors, rash, N/V, Constipation.         Objective     Vital Sign Min/Max for last 24 hours  Temp  Min: 97.6 °F (36.4 °C)  Max: 98.8 °F (37.1 °C)   BP  Min: 102/47  Max: 130/71   Pulse  Min: 73  Max: 97   Resp  Min: 16  Max: 18   SpO2  Min: 92 %  Max: 97 %   No data recorded   No data recorded     Flowsheet Rows      First Filed Value   Admission Height 162.6 cm (64\") Documented at 11/28/2021 0854   Admission Weight 72.6 kg (160 lb) Documented at 11/28/2021 0854          No intake/output data recorded.  I/O last 3 completed shifts:  In: 54339 [P.O.:1190; Other:85470]  Out: 87364 [Emesis/NG output:600; Other:61011]    Physical Exam:    Gen: Alert, NAD   HENT: NC, AT, EOMI   NECK: Supple, no JVD, Trachea midline   LUNGS: CTA bilaterally, non labored respirtation   CVS: S1/S2 audible, RRR, no murmur   Abd: Soft, NT, ND, BS+   Ext: No pedal edema, no cyanosis   CNS: Alert, No focal deficit noted grossly  Psy: Cooperative  Skin: Warm, dry and intact      WBC WBC   Date Value Ref Range Status   12/01/2021 13.05 (H) 3.40 - 10.80 10*3/mm3 Final   11/30/2021 14.16 (H) 3.40 - 10.80 10*3/mm3 Final      HGB Hemoglobin   Date Value Ref Range Status   12/01/2021 9.4 (L) 12.0 - 15.9 g/dL Final   11/30/2021 9.8 (L) 12.0 - 15.9 g/dL Final      HCT Hematocrit   Date Value Ref Range Status   12/01/2021 29.8 (L) 34.0 - 46.6 % Final   11/30/2021 30.8 (L) 34.0 - 46.6 % Final      Platlets No results found for: LABPLAT   MCV MCV   Date Value Ref Range Status   12/01/2021 98.7 (H) " 79.0 - 97.0 fL Final   11/30/2021 97.8 (H) 79.0 - 97.0 fL Final          Sodium Sodium   Date Value Ref Range Status   12/02/2021 133 (L) 136 - 145 mmol/L Final   12/01/2021 137 136 - 145 mmol/L Final   11/30/2021 137 136 - 145 mmol/L Final      Potassium Potassium   Date Value Ref Range Status   12/02/2021 3.4 (L) 3.5 - 5.2 mmol/L Final   12/01/2021 3.7 3.5 - 5.2 mmol/L Final   11/30/2021 3.7 3.5 - 5.2 mmol/L Final      Chloride Chloride   Date Value Ref Range Status   12/02/2021 93 (L) 98 - 107 mmol/L Final   12/01/2021 95 (L) 98 - 107 mmol/L Final   11/30/2021 94 (L) 98 - 107 mmol/L Final      CO2 CO2   Date Value Ref Range Status   12/02/2021 22.0 22.0 - 29.0 mmol/L Final   12/01/2021 23.0 22.0 - 29.0 mmol/L Final   11/30/2021 21.0 (L) 22.0 - 29.0 mmol/L Final      BUN BUN   Date Value Ref Range Status   12/02/2021 41 (H) 8 - 23 mg/dL Final   12/01/2021 46 (H) 8 - 23 mg/dL Final   11/30/2021 50 (H) 8 - 23 mg/dL Final      Creatinine Creatinine   Date Value Ref Range Status   12/02/2021 7.74 (H) 0.57 - 1.00 mg/dL Final   12/01/2021 8.74 (H) 0.57 - 1.00 mg/dL Final   11/30/2021 8.96 (H) 0.57 - 1.00 mg/dL Final      Calcium Calcium   Date Value Ref Range Status   12/02/2021 6.6 (L) 8.6 - 10.5 mg/dL Final   12/01/2021 6.8 (L) 8.6 - 10.5 mg/dL Final   11/30/2021 7.1 (L) 8.6 - 10.5 mg/dL Final      PO4 No results found for: CAPO4   Albumin Albumin   Date Value Ref Range Status   12/01/2021 2.80 (L) 3.50 - 5.20 g/dL Final   11/30/2021 2.40 (L) 3.50 - 5.20 g/dL Final      Magnesium No results found for: MG   Uric Acid No results found for: URICACID        Results Review:     I reviewed the patient's new clinical results.    cinacalcet, 60 mg, Oral, Nightly  metoprolol tartrate, 5 mg, Intravenous, Q6H  nystatin, 5 mL, Swish & Swallow, 4x Daily  pantoprazole, 40 mg, Intravenous, Q AM  sevelamer, 1,600 mg, Oral, TID With Meals  sodium chloride, 10 mL, Intravenous, Q12H  dianeal with additives intermittent, ,  Intraperitoneal, Q24H  UltraBag/Dianeal PD-2/1.5% Dex (pappas #[id number]), 2,000 mL, Intraperitoneal, 5x Daily  warfarin (COUMADIN) (dosing per levels), , Does not apply, Daily      dilTIAZem, 5-15 mg/hr, Last Rate: Stopped (11/30/21 1404)  Pharmacy to dose vancomycin,   Pharmacy to dose warfarin,         Medication Review:    Assessment/Plan       Peritonitis (HCC)    Paroxysmal atrial fibrillation (HCC)    Essential hypertension    Type 2 diabetes mellitus (HCC)    Chronic kidney disease, stage IV (severe) (HCC)    Hyperparathyroidism (HCC)    Hyperlipidemia LDL goal <100    Sepsis associated hypotension (HCC)    Ileus (HCC)    Hypotension      1- ESRD - On PD   2- Peritonitis   3- Anemia   4- Supra-therapeutic INR   5- Low albumin level   6- Corrected Calcium for albumin - 7.56 - monitor   7- Secondary hyperparathyroidism on Sensipar.      Plan:  - Continue with PD   - Continue with intraperitoneal antibiotics   - Monitor I/O   - Avoid nephrotoxic agents.   - Support BP with albumin infusion   - LIDIA weekly.       Albina Will MD  12/02/21  09:25 EST

## 2021-12-02 NOTE — PROGRESS NOTES
"Pharmacy Consult  -  Warfarin    Moni Wallace is a  81 y.o. female   Height - 162.6 cm (64\")  Weight - 80.4 kg (177 lb 4.8 oz)    Consulting Provider: - Sonia Álvarez MD  Indication: - Afib  Goal INR: - 2 - 3  Home Regimen:   - warfarin 4 mg daily    Bridge Therapy: No        Drug-Drug Interactions with current regimen:  Fortaz in PD bags - may increase risk of bleeding  Acetaminophen PRN - may increase risk of bleeding (last given 11/28)  Pantoprazole (omeprazole PTA) - may increase INR    Warfarin Dosing During Admission:    Date  11/28 11/29 11/30 12/1 12/2       INR  2.84 3.22 4.34 4.96 4.10       Dose  3 mg 2 mg HOLD HOLD HOLD           Education Provided: follows with [facility]     Discharge Follow up:   Following Provider - [facility]    Follow up time range or appointment - 3-5 days after discharge      Labs:    Results from last 7 days   Lab Units 12/02/21  1233 12/02/21  0441 12/01/21 0437 11/30/21  0428 11/29/21  0504 11/28/21  1716 11/28/21  0902   INR   --  4.10* 4.96* 4.34* 3.22* 3.04* 2.84*   HEMOGLOBIN g/dL 10.1*  --  9.4* 9.8* 10.5*  --  11.1*   HEMATOCRIT % 31.4*  --  29.8* 30.8* 34.6  --  34.9     Results from last 7 days   Lab Units 12/02/21  0441 12/01/21  0437 11/30/21  0428 11/29/21  0504 11/28/21  0902   SODIUM mmol/L 133* 137 137   < > 137   POTASSIUM mmol/L 3.4* 3.7 3.7   < > 3.8   CHLORIDE mmol/L 93* 95* 94*   < > 97*   CO2 mmol/L 22.0 23.0 21.0*   < > 23.0   BUN mg/dL 41* 46* 50*   < > 54*   CREATININE mg/dL 7.74* 8.74* 8.96*   < > 10.29*   CALCIUM mg/dL 6.6* 6.8* 7.1*   < > 6.8*   BILIRUBIN mg/dL  --  0.2 0.2  --  0.2   ALK PHOS U/L  --  135* 144*  --  214*   ALT (SGPT) U/L  --  <5 12  --  30   AST (SGOT) U/L  --  17 23  --  30   GLUCOSE mg/dL 144* 123* 124*   < > 179*    < > = values in this interval not displayed.       Current dietary intake: poor PO intake overall, continuing to eat bowls of broth and some jello per RN today      Assessment/Plan:   Pharmacy consulted to dose warfarin " for Afib, goal 2-3.   Patient's INR remains supratherapeutic at 4.10 today, however decreased from 4.96 yesterday. Patient is having slight blood in peritoneal dialysate today, Dr Will suspects this is related to irritation in setting of supratherapeutic INR.  Will HOLD warfarin again today due to supratherapeutic INR and observation of blood tinged dialysate. Suspect low dietary intake is biggest cause of increased INR in addition to DDI with abx and Tylenol. Patient also recently had her dose reduced as outpatient.  Daily PT/INR ordered.  Monitor signs/symptoms of bleeding, dietary intake, and drug-drug interactions. Make dose adjustments as necessary.  Pharmacy will continue to follow.    Thank you  Johnson Villarreal, PharmD  12/2/2021  13:34 EST

## 2021-12-02 NOTE — PROGRESS NOTES
[facility] Medicine Services  PROGRESS NOTE    Patient Name: Moni Wallace  : 1940  MRN: [id number]    Date of Admission: 2021  Primary Care Physician: Alex Walters MD    Subjective   Subjective     CC: f/u peritonitis    HPI: Up in bed. Denies nausea. Tolerated breakfast. Minimal pain. Dialysate was slightly bloody this am.    ROS:  Gen- No fevers, chills  CV- No chest pain, palpitations  Resp- No cough, dyspnea  GI- No N/V/D, abd pain     Objective   Objective     Vital Signs:   Temp:  [97.6 °F (36.4 °C)-98.8 °F (37.1 °C)] 98.8 °F (37.1 °C)  Heart Rate:  [81-97] 88  Resp:  [16-17] 16  BP: (105-130)/(52-71) 116/63     Physical Exam:  Constitutional: No acute distress, awake, alert  HENT: NCAT, mucous membranes moist, NGT  Respiratory: Clear to auscultation bilaterally, respiratory effort normal   Cardiovascular: RRR, no murmurs, rubs, or gallops  Gastrointestinal: Positive bowel sounds, soft, nontender, nondistended  Musculoskeletal: No bilateral ankle edema  Psychiatric: Appropriate affect, cooperative  Neurologic: Oriented x 3, strength symmetric in all extremities, Cranial Nerves grossly intact to confrontation, speech clear  Skin: No rashes    Results Reviewed:  LAB RESULTS:      Lab 21  0441 21  0437 21  0428 21  0504 21  1716 21  0902 21  0902   WBC  --  13.05* 14.16* 12.22*  --   --  14.71*   HEMOGLOBIN  --  9.4* 9.8* 10.5*  --   --  11.1*   HEMATOCRIT  --  29.8* 30.8* 34.6  --   --  34.9   PLATELETS  --  294 322 316  --   --  326   NEUTROS ABS  --   --   --   --   --   --  10.55*   IMMATURE GRANS (ABS)  --   --   --   --   --   --  0.29*   LYMPHS ABS  --   --   --   --   --   --  2.51   MONOS ABS  --   --   --   --   --   --  0.75   EOS ABS  --   --   --   --   --   --  0.54*   MCV  --  98.7* 97.8* 102.4*  --   --  100.9*   LACTATE  --   --   --   --  2.3*  --  2.1*   PROTIME 37.9* 43.8* 39.6* 31.5* 30.1*   < > 28.6*     < > = values in this interval not displayed.         Lab 12/02/21 0441 12/01/21 0437 11/30/21 0428 11/29/21  0504 11/28/21  0902   SODIUM 133* 137 137 139 137   POTASSIUM 3.4* 3.7 3.7 4.3 3.8   CHLORIDE 93* 95* 94* 97* 97*   CO2 22.0 23.0 21.0* 20.0* 23.0   ANION GAP 18.0* 19.0* 22.0* 22.0* 17.0*   BUN 41* 46* 50* 55* 54*   CREATININE 7.74* 8.74* 8.96* 9.11* 10.29*   GLUCOSE 144* 123* 124* 172* 179*   CALCIUM 6.6* 6.8* 7.1* 7.0* 6.8*         Lab 12/01/21 0437 11/30/21  0428 11/28/21  0902   TOTAL PROTEIN 6.3 6.5 7.1   ALBUMIN 2.80* 2.40* 2.90*   GLOBULIN 3.5 4.1 4.2   ALT (SGPT) <5 12 30   AST (SGOT) 17 23 30   BILIRUBIN 0.2 0.2 0.2   ALK PHOS 135* 144* 214*   LIPASE  --   --  19         Lab 12/02/21 0441 12/01/21 0437 11/30/21 0428 11/29/21  0504 11/28/21  1716   PROTIME 37.9* 43.8* 39.6* 31.5* 30.1*   INR 4.10* 4.96* 4.34* 3.22* 3.04*                 Brief Urine Lab Results  (Last result in the past 365 days)      Color   Clarity   Blood   Leuk Est   Nitrite   Protein   CREAT   Urine HCG        11/28/21 1647 Yellow   Turbid   Moderate (2+)   Large (3+)   Negative   >=300 mg/dL (3+)                 Microbiology Results Abnormal     Procedure Component Value - Date/Time    Blood Culture - Blood, Wrist, Left [[id number]]  (Normal) Collected: 11/28/21 1023    Lab Status: Preliminary result Specimen: Blood from Wrist, Left Updated: 12/02/21 1045     Blood Culture No growth at 4 days    Blood Culture - Blood, Wrist, Left [[id number]]  (Normal) Collected: 11/28/21 1023    Lab Status: Preliminary result Specimen: Blood from Wrist, Left Updated: 12/02/21 1045     Blood Culture No growth at 4 days    Body Fluid Culture - Body Fluid, Peritoneum [[id number]] Collected: 11/28/21 1214    Lab Status: Final result Specimen: Body Fluid from Peritoneum Updated: 12/01/21 1239     Body Fluid Culture No growth at 3 days     Gram Stain Few (2+) WBCs seen      No organisms seen    Urine Culture - Urine, Urine, Clean Catch  [[id number]]  (Normal) Collected: 11/28/21 1647    Lab Status: Final result Specimen: Urine, Clean Catch Updated: 11/29/21 2255     Urine Culture No growth    COVID PRE-OP / PRE-PROCEDURE SCREENING ORDER (NO ISOLATION) - Swab, Nasopharynx [[id number]]  (Normal) Collected: 11/28/21 1558    Lab Status: Final result Specimen: Swab from Nasopharynx Updated: 11/28/21 2048    Narrative:      The following orders were created for panel order COVID PRE-OP / PRE-PROCEDURE SCREENING ORDER (NO ISOLATION) - Swab, Nasopharynx.  Procedure                               Abnormality         Status                     ---------                               -----------         ------                     COVID-19, APTIMA PANTHER...[[id number]]  Normal              Final result                 Please view results for these tests on the individual orders.    COVID-19, APTIMA PANTHER [facility] IN-HOUSE NP/OP SWAB IN UTM/VTM/[facility]ALINE TRANSPORT MEDIA 24HR TAT - Swab, Nasopharynx [[id number]]  (Normal) Collected: 11/28/21 1558    Lab Status: Final result Specimen: Swab from Nasopharynx Updated: 11/28/21 2048     COVID19 Not Detected    Narrative:      Fact sheet for providers: https://www.fda.gov/media/657044/download     Fact sheet for patients: https://www.fda.gov/media/798948/download    Test performed by RT PCR.          No radiology results from the last 24 hrs    Results for orders placed during the hospital encounter of 01/11/21    Transthoracic Echo Complete With Contrast if Necessary Per Protocol    Interpretation Summary  · Left ventricular systolic function is normal. Estimated left ventricular EF = 65%  · Left ventricular wall thickness is consistent with hypertrophy.  · Left atrial volume is mildly increased.  · The aortic valve is calcified. There is mild aortic stenosis present. There is moderate aortic regurgitation present.  · Estimated right ventricular systolic pressure from tricuspid regurgitation is normal (<35 mmHg).      I have  reviewed the medications:  Scheduled Meds:cinacalcet, 60 mg, Oral, Nightly  metoprolol tartrate, 25 mg, Oral, Q12H  nystatin, 5 mL, Swish & Swallow, 4x Daily  pantoprazole, 40 mg, Intravenous, Q AM  sevelamer, 1,600 mg, Oral, TID With Meals  sodium chloride, 10 mL, Intravenous, Q12H  dianeal with additives intermittent, , Intraperitoneal, Q24H  UltraBag/Dianeal PD-2/1.5% Dex ([employer] #[id number]), 2,000 mL, Intraperitoneal, 5x Daily  warfarin (COUMADIN) (dosing per levels), , Does not apply, Daily      Continuous Infusions:Pharmacy to dose vancomycin,   Pharmacy to dose warfarin,       PRN Meds:.•  acetaminophen  •  albuterol  •  benzocaine-menthol  •  HYDROmorphone  •  ondansetron **OR** ondansetron  •  Pharmacy to dose vancomycin  •  Pharmacy to dose warfarin  •  phenol  •  Sodium Chloride (PF)  •  sodium chloride  •  temazepam    Assessment/Plan   Assessment & Plan     Active Hospital Problems    Diagnosis  POA   • **Peritonitis (HCC) [K65.9]  Yes   • Hypotension [I95.9]  Yes   • Sepsis associated hypotension (HCC) [A41.9, I95.9]  Yes   • Ileus (HCC) [K56.7]  Yes   • Hyperlipidemia LDL goal <100 [E78.5]  Yes   • Paroxysmal atrial fibrillation (HCC) [I48.0]  Yes   • Type 2 diabetes mellitus (HCC) [E11.9]  Yes   • Chronic kidney disease, stage IV (severe) (HCC) [N18.4]  Yes   • Hyperparathyroidism (HCC) [E21.3]  Yes   • Essential hypertension [I10]  Yes      Resolved Hospital Problems   No resolved problems to display.        Brief Hospital Course to date:  Moni Wallace is a 81 y.o. female here with sepsis, peritonitis, and hypotension. This is my first day assessing patient's active medical issues.     Sepsis  Bacterial Peritonitis  --Peritoneal abx per ID. Improving. Repeat PD fluid analysis, with decreasing WBC.  --Dialysate bloody this am - suspect due to peritonitis in setting of supratherapeutic INR. NAL notified - to assess.  --Pain control  --CBC pending.     Ileus, resolved.  --due to peritonitis  --Pull NGT.  Advance diet.     PAF, w/ intermittent RVR  HTN  --Supratherapeutic INR. Holding warfarin. Dosing per pharmacy  --Cardiology following. Initially her BB held for hypotension. Now back on PO metoprolol.      This patient's problems and plans were partially entered by my partner and updated as appropriate by me 12/02/21.    DVT prophylaxis:  Medical DVT prophylaxis orders are present.       AM-PAC 6 Clicks Score (PT): 17 (12/02/21 0813)    Disposition: I expect the patient to be discharged TBD.    CODE STATUS:   Code Status and Medical Interventions:   Ordered at: 11/28/21 1356     Medical Intervention Limits:    NO intubation (DNI)     Level Of Support Discussed With:    Patient     Code Status (Patient has no pulse and is not breathing):    No CPR (Do Not Attempt to Resuscitate)     Medical Interventions (Patient has pulse or is breathing):    Limited Support       Марина Wasserman II, DO  12/02/21

## 2021-12-03 NOTE — PLAN OF CARE
Inpatient RN   Plan of Care Update  ________________________________________________    Patient Name:  Moni Wallace  YOB: 1940  MRN: [id number]  Admit Date:  11/28/2021      Assessment Date:  12/3/2021    Vital Signs unstable, On Telemetry, Pt is Atrial Fibrillation w/ a rapid ventricular response (RVR), Pt currently on  2 liters/min via nasal cannula Pt has been sleeping well with a Pain status of well controlled and finding nausea and vomiting.    Pt orientated to person, place, time and situation with LOC of alert.    UOP  peridialysis .    Pt has been hypotensive and tachycardic throughout the night. APRN notified. . New orders placed. Interventions performed as ordered. Pt's BP is now stable, but HR is still sustaining in the 130s.    Pt has no requests at this time.   Will continue to monitor       Electronically signed by:  LEA DONAHUE RN  12/03/21 07:50 EST      Problem: Adult Inpatient Plan of Care  Goal: Plan of Care Review  Outcome: Ongoing, Progressing  Goal: Patient-Specific Goal (Individualized)  Outcome: Ongoing, Progressing  Goal: Absence of Hospital-Acquired Illness or Injury  Outcome: Ongoing, Progressing  Intervention: Identify and Manage Fall Risk  Recent Flowsheet Documentation  Taken 12/3/2021 0400 by Lea Donahue, AVILA  Safety Promotion/Fall Prevention:   activity supervised   assistive device/personal items within reach   clutter free environment maintained   fall prevention program maintained   nonskid shoes/slippers when out of bed   safety round/check completed   room organization consistent  Intervention: Prevent Skin Injury  Recent Flowsheet Documentation  Taken 12/3/2021 0400 by Lea Donahue, RN  Skin Protection:   adhesive use limited   incontinence pads utilized   transparent dressing maintained   tubing/devices free from skin contact  Taken 12/2/2021 2100 by Lea Donahue RN  Body Position: position changed independently  Skin Protection:   adhesive use limited    incontinence pads utilized   transparent dressing maintained   tubing/devices free from skin contact  Intervention: Prevent Infection  Recent Flowsheet Documentation  Taken 12/3/2021 0400 by Imani Donahue RN  Infection Prevention:   hand hygiene promoted   personal protective equipment utilized   rest/sleep promoted   single patient room provided   visitors restricted/screened  Taken 12/2/2021 2100 by Imani Donahue RN  Infection Prevention:   hand hygiene promoted   personal protective equipment utilized   rest/sleep promoted   single patient room provided   visitors restricted/screened  Goal: Optimal Comfort and Wellbeing  Outcome: Ongoing, Progressing  Intervention: Provide Person-Centered Care  Recent Flowsheet Documentation  Taken 12/2/2021 2005 by Imani Donahue RN  Trust Relationship/Rapport:   care explained   choices provided   emotional support provided   empathic listening provided   questions answered   questions encouraged   reassurance provided   thoughts/feelings acknowledged  Goal: Readiness for Transition of Care  Outcome: Ongoing, Progressing     Problem: Infection  Goal: Infection Symptom Resolution  Outcome: Ongoing, Progressing     Problem: Adjustment to Illness (Chronic Kidney Disease)  Goal: Optimal Coping with Chronic Illness  Outcome: Ongoing, Progressing  Intervention: Support and Optimize Psychosocial Response to Chronic Illness  Recent Flowsheet Documentation  Taken 12/2/2021 2005 by Imani Donahue RN  Supportive Measures: active listening utilized  Family/Support System Care:   support provided   self-care encouraged     Problem: Electrolyte Imbalance (Chronic Kidney Disease)  Goal: Electrolyte Balance  Outcome: Ongoing, Progressing     Problem: Fluid Volume Excess (Chronic Kidney Disease)  Goal: Fluid Balance  Outcome: Ongoing, Progressing  Intervention: Monitor and Manage Hypervolemia  Recent Flowsheet Documentation  Taken 12/3/2021 0400 by Imani Donahue RN  Skin Protection:   adhesive  use limited   incontinence pads utilized   transparent dressing maintained   tubing/devices free from skin contact  Taken 12/2/2021 2100 by Imani Donahue, AVILA  Skin Protection:   adhesive use limited   incontinence pads utilized   transparent dressing maintained   tubing/devices free from skin contact     Problem: Functional Decline (Chronic Kidney Disease)  Goal: Optimal Functional Ability  Outcome: Ongoing, Progressing     Problem: Hematologic Alteration (Chronic Kidney Disease)  Goal: Absence of Anemia Signs and Symptoms  Outcome: Ongoing, Progressing     Problem: Oral Intake Inadequate (Chronic Kidney Disease)  Goal: Optimal Oral Intake  Outcome: Ongoing, Progressing     Problem: Renal Function Impairment (Chronic Kidney Disease)  Goal: Laboratory Values and Blood Pressure Within Desired Range  Outcome: Ongoing, Progressing  Intervention: Monitor and Support Renal Function  Recent Flowsheet Documentation  Taken 12/3/2021 0400 by Imani Donahue RN  Medication Review/Management: medications reviewed  Taken 12/2/2021 2100 by Imani Donahue RN  Medication Review/Management: medications reviewed     Problem: Pain Acute  Goal: Optimal Pain Control  Outcome: Ongoing, Progressing  Intervention: Optimize Psychosocial Wellbeing  Recent Flowsheet Documentation  Taken 12/2/2021 2005 by Imani Donahue RN  Supportive Measures: active listening utilized   Goal Outcome Evaluation:

## 2021-12-03 NOTE — PROGRESS NOTES
"   LOS: 5 days    Patient Care Team:  Alex Walters MD as PCP - General (Internal Medicine)  Jeff Joe IV, MD as Consulting Physician (Cardiology)  Donny Hodges MD as Consulting Physician (Nephrology)  Cory Castillo MD as Surgeon (General Surgery)  Slim Wick MD    ESRD - PD     Subjective     Interval History:     Afib with RVR noted. Hypotensive today. Feeling better though. Dialysis clearing up     Review of Systems:   No CP or SOA , fever, chills, rigors, rash, N/V, Constipation.         Objective     Vital Sign Min/Max for last 24 hours  Temp  Min: 97.4 °F (36.3 °C)  Max: 99.2 °F (37.3 °C)   BP  Min: 82/64  Max: 126/72   Pulse  Min: 83  Max: 133   Resp  Min: 15  Max: 22   SpO2  Min: 89 %  Max: 96 %   Flow (L/min)  Min: 2  Max: 2   No data recorded     Flowsheet Rows      First Filed Value   Admission Height 162.6 cm (64\") Documented at 11/28/2021 0854   Admission Weight 72.6 kg (160 lb) Documented at 11/28/2021 0854          No intake/output data recorded.  I/O last 3 completed shifts:  In: 6940 [P.O.:840; Other:6000; IV Piggyback:100]  Out: 56343 [Emesis/NG output:100; Other:63679]    Physical Exam:    Gen: Alert, NAD   HENT: NC, AT, EOMI   NECK: Supple, no JVD, Trachea midline   LUNGS: CTA bilaterally, non labored respirtation   CVS: S1/S2 audible, RRR, no murmur   Abd: Soft, NT, ND, BS+   Ext: No pedal edema, no cyanosis   CNS: Alert, No focal deficit noted grossly  Psy: Cooperative  Skin: Warm, dry and intact      WBC WBC   Date Value Ref Range Status   12/03/2021 16.80 (H) 3.40 - 10.80 10*3/mm3 Final   12/02/2021 18.56 (H) 3.40 - 10.80 10*3/mm3 Final   12/01/2021 13.05 (H) 3.40 - 10.80 10*3/mm3 Final      HGB Hemoglobin   Date Value Ref Range Status   12/03/2021 10.6 (L) 12.0 - 15.9 g/dL Final   12/02/2021 10.1 (L) 12.0 - 15.9 g/dL Final   12/01/2021 9.4 (L) 12.0 - 15.9 g/dL Final      HCT Hematocrit   Date Value Ref Range Status   12/03/2021 33.5 (L) 34.0 - " 46.6 % Final   12/02/2021 31.4 (L) 34.0 - 46.6 % Final   12/01/2021 29.8 (L) 34.0 - 46.6 % Final      Platlets No results found for: LABPLAT   MCV MCV   Date Value Ref Range Status   12/03/2021 99.1 (H) 79.0 - 97.0 fL Final   12/02/2021 100.0 (H) 79.0 - 97.0 fL Final   12/01/2021 98.7 (H) 79.0 - 97.0 fL Final          Sodium Sodium   Date Value Ref Range Status   12/03/2021 135 (L) 136 - 145 mmol/L Final   12/02/2021 133 (L) 136 - 145 mmol/L Final   12/01/2021 137 136 - 145 mmol/L Final      Potassium Potassium   Date Value Ref Range Status   12/03/2021 3.4 (L) 3.5 - 5.2 mmol/L Final   12/02/2021 3.4 (L) 3.5 - 5.2 mmol/L Final   12/01/2021 3.7 3.5 - 5.2 mmol/L Final      Chloride Chloride   Date Value Ref Range Status   12/03/2021 93 (L) 98 - 107 mmol/L Final   12/02/2021 93 (L) 98 - 107 mmol/L Final   12/01/2021 95 (L) 98 - 107 mmol/L Final      CO2 CO2   Date Value Ref Range Status   12/03/2021 21.0 (L) 22.0 - 29.0 mmol/L Final   12/02/2021 22.0 22.0 - 29.0 mmol/L Final   12/01/2021 23.0 22.0 - 29.0 mmol/L Final      BUN BUN   Date Value Ref Range Status   12/03/2021 39 (H) 8 - 23 mg/dL Final   12/02/2021 41 (H) 8 - 23 mg/dL Final   12/01/2021 46 (H) 8 - 23 mg/dL Final      Creatinine Creatinine   Date Value Ref Range Status   12/03/2021 7.27 (H) 0.57 - 1.00 mg/dL Final   12/02/2021 7.74 (H) 0.57 - 1.00 mg/dL Final   12/01/2021 8.74 (H) 0.57 - 1.00 mg/dL Final      Calcium Calcium   Date Value Ref Range Status   12/03/2021 7.0 (L) 8.6 - 10.5 mg/dL Final   12/02/2021 6.6 (L) 8.6 - 10.5 mg/dL Final   12/01/2021 6.8 (L) 8.6 - 10.5 mg/dL Final      PO4 No results found for: CAPO4   Albumin Albumin   Date Value Ref Range Status   12/03/2021 2.40 (L) 3.50 - 5.20 g/dL Final   12/01/2021 2.80 (L) 3.50 - 5.20 g/dL Final      Magnesium Magnesium   Date Value Ref Range Status   12/03/2021 1.6 1.6 - 2.4 mg/dL Final      Uric Acid No results found for: URICACID        Results Review:     I reviewed the patient's new clinical  results.    amiodarone, 150 mg, Intravenous, Once   Followed by  [START ON 12/4/2021] amiodarone, 200 mg, Oral, Once   Followed by  [START ON 12/4/2021] amiodarone, 200 mg, Oral, Q8H   Followed by  [START ON 12/11/2021] amiodarone, 200 mg, Oral, Q12H   Followed by  [START ON 12/25/2021] amiodarone, 200 mg, Oral, Daily  cinacalcet, 60 mg, Oral, Nightly  metoprolol tartrate, 25 mg, Oral, Q12H  micafungin (MYCAMINE) IV, 100 mg, Intravenous, Q24H  nystatin, 5 mL, Swish & Swallow, 4x Daily  pantoprazole, 40 mg, Intravenous, Q AM  piperacillin-tazobactam, 3.375 g, Intravenous, Once  piperacillin-tazobactam, 3.375 g, Intravenous, Q12H  sevelamer, 1,600 mg, Oral, TID With Meals  sodium chloride, 10 mL, Intravenous, Q12H  UltraBag/Dianeal PD-2/1.5% Dex ([employer] #[id number]), 2,000 mL, Intraperitoneal, 5x Daily  vancomycin (dosing per levels), , Does not apply, Daily  warfarin (COUMADIN) (dosing per levels), , Does not apply, Daily      amiodarone, 1 mg/min   Followed by  amiodarone, 0.5 mg/min  Pharmacy to dose vancomycin,   Pharmacy to dose warfarin,         Medication Review:    Assessment/Plan       Peritonitis (HCC)    Paroxysmal atrial fibrillation (HCC)    Essential hypertension    Type 2 diabetes mellitus (HCC)    Chronic kidney disease, stage IV (severe) (HCC)    Hyperparathyroidism (HCC)    Hyperlipidemia LDL goal <100    Sepsis associated hypotension (HCC)    Ileus (HCC)    Hypotension      1- ESRD - On PD   2- Peritonitis   3- Anemia   4- Supra-therapeutic INR   5- Low albumin level   6- Corrected Calcium for albumin - 7.56 - monitor   7- Secondary hyperparathyroidism on Sensipar.      Plan:  - Continue with PD   - Continue with intraperitoneal antibiotics   - Monitor I/O strictly.   - LIDIA weekly.   - Albumin infusion.       Ablina Will MD  12/03/21  11:05 EST

## 2021-12-03 NOTE — PROGRESS NOTES
Pharmacy Consult-Vancomycin Dosing  Moni Wallace is a  81 y.o. female receiving vancomycin therapy.     Indication: Peritonitis  Consulting Provider: Hospitalist  ID Consult: No    Goal Trough: >15 mcg/mL    Current Antimicrobial Therapy  Anti-Infectives (From admission, onward)      Ordered     Dose/Rate Route Frequency Start Stop    12/03/21 0857  piperacillin-tazobactam (ZOSYN) 3.375 g in iso-osmotic dextrose 50 ml (premix)        Ordering Provider: Sawyer Llanos MD    3.375 g  over 4 Hours Intravenous Every 12 Hours 12/03/21 1800 12/10/21 1759    12/03/21 0832  micafungin 100 mg/100 mL 0.9% NS IVPB (mbp)        Ordering Provider: Sawyer Llanos MD    100 mg Intravenous Every 24 Hours 12/03/21 1000 12/10/21 0959    12/03/21 0857  piperacillin-tazobactam (ZOSYN) 3.375 g in iso-osmotic dextrose 50 ml (premix)        Ordering Provider: Sawyer Llanos MD    3.375 g  over 30 Minutes Intravenous Once 12/03/21 0945 12/03/21 1214    12/03/21 0832  Pharmacy to dose vancomycin        Ordering Provider: Sawyer Llanos MD     Does not apply Continuous PRN 12/03/21 0831 12/13/21 0830    12/02/21 1434  vancomycin (dosing per levels)        Ordering Provider: Johnson Villarreal, PharmD     Does not apply Daily 12/02/21 1530 12/25/21 0859    11/30/21 1111  UltraBag/Dianeal PD-2/1.5% Dex (pappas #[id number]) (DIANEAL) 2,000 mL with vancomycin (VANCOCIN) 1,250 mg, cefTAZidime (FORTAZ) 1,000 mg dialysis solution        Ordering Provider: Sawyer Llanos MD     Intraperitoneal Once 11/30/21 2200 11/30/21 2200    11/28/21 1942  UltraBag/Dianeal PD-2/1.5% Dex (pappas #[id number]) (DIANEAL) 2,000 mL with ceFAZolin (ANCEF) 2,000 mg dialysis solution        Ordering Provider: Kali Rojas MD     Intraperitoneal Once 11/29/21 0000 11/29/21 0004    11/28/21 1157  vancomycin 1500 mg/500 mL 0.9% NS IVPB ([facility])        Ordering Provider: Lex Marcum MD    20 mg/kg × 72.6 kg Intravenous Once 11/28/21 1159 11/28/21 1400     "11/28/21 1157  piperacillin-tazobactam (ZOSYN) 3.375 g in iso-osmotic dextrose 50 ml (premix)        Ordering Provider: Lex Marcum MD    3.375 g Intravenous Once 11/28/21 1159 11/28/21 1317          Allergies  Allergies as of 11/28/2021 - Reviewed 11/28/2021   Allergen Reaction Noted    Hydrocodone Itching 05/19/2019    Rice Swelling 07/07/2021    Statins Other (See Comments) 07/21/2020    Codeine Rash 05/07/2018    Sulfa antibiotics Rash 05/07/2018     Labs    Results from last 7 days   Lab Units 12/03/21  0429 12/02/21  0441 12/01/21  0437   BUN mg/dL 39* 41* 46*   CREATININE mg/dL 7.27* 7.74* 8.74*     Results from last 7 days   Lab Units 12/03/21  0429 12/02/21  1233 12/01/21  0437   WBC 10*3/mm3 16.80* 18.56* 13.05*     Evaluation of Dosing    Is Patient on Dialysis or Renal Replacement: Y - PD    Ht - 162.6 cm (64\")  Wt - 80.4 kg (177 lb 4.8 oz)    Estimated Creatinine Clearance: 6.3 mL/min (A) (by C-G formula based on SCr of 7.27 mg/dL (H)).    Intake & Output (last 3 days)         11/30 0701  12/01 0700 12/01 0701  12/02 0700 12/02 0701  12/03 0700 12/03 0701  12/04 0700    P.O. 620 1070 840 240    Other 8000 6000 6000     IV Piggyback   100     Total Intake(mL/kg) 8620 (107.2) 7070 (87.9) 6940 (86.3) 240 (3)    Emesis/NG output 600 600 100     Other 9950 46818 9250     Stool  0      Total Output 90711 52462 9350     Net -1930 -0580 -2410 +240            Stool Unmeasured Occurrence  1 x            Microbiology and Radiology  Microbiology Results (last 10 days)       Procedure Component Value - Date/Time    Body Fluid Culture - Body Fluid, Peritoneum [[id number]] Collected: 12/02/21 1850    Lab Status: Preliminary result Specimen: Body Fluid from Peritoneum Updated: 12/03/21 1308     Body Fluid Culture No growth     Gram Stain Many (4+) WBCs seen      No organisms seen    Urine Culture - Urine, Urine, Clean Catch [[id number]]  (Normal) Collected: 11/28/21 1647    Lab Status: Final result Specimen: Urine, " Clean Catch Updated: 11/29/21 2255     Urine Culture No growth    COVID PRE-OP / PRE-PROCEDURE SCREENING ORDER (NO ISOLATION) - Swab, Nasopharynx [[id number]]  (Normal) Collected: 11/28/21 1558    Lab Status: Final result Specimen: Swab from Nasopharynx Updated: 11/28/21 2048    Narrative:      The following orders were created for panel order COVID PRE-OP / PRE-PROCEDURE SCREENING ORDER (NO ISOLATION) - Swab, Nasopharynx.  Procedure                               Abnormality         Status                     ---------                               -----------         ------                     COVID-19, APTIMA PANTHER...[[id number]]  Normal              Final result                 Please view results for these tests on the individual orders.    COVID-19, APTIMA PANTHER [facility] IN-HOUSE NP/OP SWAB IN UTM/VTM/SALINE TRANSPORT MEDIA 24HR TAT - Swab, Nasopharynx [[id number]]  (Normal) Collected: 11/28/21 1558    Lab Status: Final result Specimen: Swab from Nasopharynx Updated: 11/28/21 2048     COVID19 Not Detected    Narrative:      Fact sheet for providers: https://www.fda.gov/media/008838/download     Fact sheet for patients: https://www.fda.gov/media/237527/download    Test performed by RT PCR.    Body Fluid Culture - Body Fluid, Peritoneum [[id number]] Collected: 11/28/21 1214    Lab Status: Final result Specimen: Body Fluid from Peritoneum Updated: 12/01/21 1239     Body Fluid Culture No growth at 3 days     Gram Stain Few (2+) WBCs seen      No organisms seen    Blood Culture - Blood, Wrist, Left [[id number]]  (Normal) Collected: 11/28/21 1023    Lab Status: Final result Specimen: Blood from Wrist, Left Updated: 12/03/21 1046     Blood Culture No growth at 5 days    Blood Culture - Blood, Wrist, Left [[id number]]  (Normal) Collected: 11/28/21 1023    Lab Status: Final result Specimen: Blood from Wrist, Left Updated: 12/03/21 1046     Blood Culture No growth at 5 days          Vancomycin Levels:  Results from last 7 days    Lab Units 12/03/21  0429 11/30/21  0428   VANCOMYCIN RM mcg/mL 19.20 17.60           Assessment/Plan:    Pharmacy to dose vancomycin for peritonitis.   Patient received a dose of vancomycin 1250 mg via peritoneal dialysis (IP) on 11/30 @ 2200. Plan for further vancomycin doses to be given IV per ID.   Vancomycin random = 19.2 mcg/mL. No need to re-dose vancomycin today. Repeat vanc AM random 12/4 to assess need for further doses.  Monitor renal function, cultures and sensitivities, and clinical status, and adjust regimen as necessary.  Pharmacy will continue to follow.    Katherin Tierney, PharmD, BCPS  12/3/2021  13:37 EST

## 2021-12-03 NOTE — PROGRESS NOTES
Cardiology Progress Note      Reason for visit:    · Atrial fibrillation with RVR in the setting of bacterial peritonitis    IDENTIFICATION: 81-year-old female who resides in [facility]    Diagnosis  POA   • **Peritonitis (HCC) [K65.9]  Yes     Priority: High   • Sepsis associated hypotension (HCC) [A41.9, I95.9]  Yes     Priority: High   • Paroxysmal atrial fibrillation (HCC) [I48.0]  Yes     Priority: High     · Diagnosed 2/2015  · CHADS-VASc = 5  · On Coumadin   · Echo (1/12/2021): LVEF 65%.  LVH.  Moderate AI.  · Beakthrough episodes of atrial fibrillation with RVR in setting of bacterial peritonitis 11/30/2021     • Type 2 diabetes mellitus (HCC) [E11.9]  Yes     Priority: High   • Chronic kidney disease, stage IV (severe) (HCC) [N18.4]  Yes     Priority: High     · IgA nephropathy with history of renal transplant, 2010.   · Patient on hemodialysis Monday, Wednesday, and Friday started July 2015.  · Hemodialysis discontinued 2/2017.     • Hypotension [I95.9]  Yes     Priority: Medium   • Ileus (HCC) [K56.7]  Yes     Priority: Medium   • Hyperlipidemia LDL goal <100 [E78.5]  Yes     Priority: Medium     · Reports intolerance to statin     • Hyperparathyroidism (HCC) [E21.3]  Yes     Priority: Medium   • Essential hypertension [I10]  Yes     Priority: Low            Patient went back into atrial fibrillation with RVR last night.  She is currently in atrial fibrillation at 120 bpm.  She denies chest pain or shortness of breath.           Vital Sign Min/Max for last 24 hours  Temp  Min: 97.4 °F (36.3 °C)  Max: 99.2 °F (37.3 °C)   BP  Min: 82/64  Max: 126/72   Pulse  Min: 83  Max: 133   Resp  Min: 15  Max: 22   SpO2  Min: 89 %  Max: 96 %   Flow (L/min)  Min: 2  Max: 2      Intake/Output Summary (Last 24 hours) at 12/3/2021 0842  Last data filed at 12/3/2021 0619  Gross per 24 hour   Intake 6590 ml   Output 9350 ml   Net -2760 ml           Physical Exam  Constitutional:        General: She is awake.   Cardiovascular:      Rate and Rhythm: Normal rate. Rhythm irregularly irregular.   Pulmonary:      Effort: Pulmonary effort is normal.      Breath sounds: Decreased breath sounds present.   Abdominal:      General: There is distension.      Tenderness: There is abdominal tenderness.   Skin:     General: Skin is warm and dry.   Neurological:      Mental Status: She is alert and oriented to person, place, and time.   Psychiatric:         Behavior: Behavior is cooperative.         Tele: Atrial fibrillation with RVR    Results Review (reviewed the patient's recent labs in the electronic medical record):     EKG (11/30/2021): Atrial fibrillation with RVR    CXR (11/28/2021): No acute cardiopulmonary finding    ECHO (1/12/2021): Normal LVEF 65%.  LVH.  Mild AS and moderate AI.  RVSP less than 35 mmHg    Result Review:  I have personally reviewed the results from the time of this admission to 12/3/2021 08:42 EST and agree with these findings:  [x]  Laboratory  []  Microbiology  [x]  Radiology  [x]  EKG/Telemetry   [x]  Cardiology/Vascular   []  Pathology  []  Old records  []  Other:  Most notable findings include: BUN 39, creatinine 7.27, sodium 135, potassium 3.4, INR 3.83    Results from last 7 days   Lab Units 12/03/21  0429 12/02/21  0441 12/01/21  0437 11/30/21  0428 11/30/21  0428 11/29/21  0504 11/29/21  0504 11/28/21  0902 11/28/21  0902   SODIUM mmol/L 135* 133* 137   < > 137   < > 139   < > 137   POTASSIUM mmol/L 3.4* 3.4* 3.7   < > 3.7   < > 4.3   < > 3.8   CHLORIDE mmol/L 93* 93* 95*  --  94*  --  97*  --  97*   BUN mg/dL 39* 41* 46*   < > 50*   < > 55*   < > 54*   CREATININE mg/dL 7.27* 7.74* 8.74*   < > 8.96*   < > 9.11*   < > 10.29*   MAGNESIUM mg/dL 1.6  --   --   --   --   --   --   --   --     < > = values in this interval not displayed.         Results from last 7 days   Lab Units 12/03/21  0429 12/02/21  1233 12/01/21  0437   WBC 10*3/mm3 16.80* 18.56* 13.05*   HEMOGLOBIN g/dL  10.6* 10.1* 9.4*   HEMATOCRIT % 33.5* 31.4* 29.8*   PLATELETS 10*3/mm3 267 283 294       Lab Results   Component Value Date    HGBA1C 4.90 05/19/2019       Lab Results   Component Value Date    CHOL 227 (H) 03/04/2019    TRIG 160 (H) 03/04/2019    HDL 52 03/04/2019     (H) 03/04/2019                Atrial fibrillation with RVR  · Episode occurred in the setting of bacterial peritonitis/sepsis  · Unable to tolerate p.o. dose of metoprolol and unable to tolerate IV Cardizem drip due to hypotension  · Metoprolol tartrate 25 mg twice daily  · INR supratherapeutic but trending down.  Coumadin currently on hold and dosing per pharmacy.  Would it be beneficial to change from Coumadin to Xarelto or Eliquis?  · Was maintaining NSR but went back into atrial fibrillation with RVR 12/3/2021  · Unable to increase metoprolol due to hypotension     Hypotension   · Likely component of sepsis from bacterial peritonitis    Spontaneous bacterial peritonitis  · Treatment per ID and hospitalist      End-stage renal failure on peritoneal dialysis  · Nephrology managing    Small bowel ileus  · Treatment per primary team                   · Back in atrial fibrillation with RVR  · Continue to hold Coumadin as INR supratherapeutic.  Pharmacy to dose.  · Continue p.o. metoprolol.  Hold for SBP less than 90 mmHg  · Start IV amiodarone bolus and drip with eventual transition to p.o. taper per protocol.       REY Brown

## 2021-12-03 NOTE — CASE MANAGEMENT/SOCIAL WORK
Continued Stay Note  [facility]     Patient Name: Moni Wallace  MRN: [id number]  Today's Date: 12/3/2021    Admit Date: 11/28/2021     Discharge Plan     Row Name 12/03/21 1538       Plan    Plan discharge plan    Plan Comments Plan remains home with family at discharge. [facility] following and may need antibiotics at discharge, pending [facility]'s final recommendations. Per discussion in MDR, pt will be in the hospital over weekend. CM will follow up Monday.    Final Discharge Disposition Code 01 - home or self-care               Discharge Codes    No documentation.               Expected Discharge Date and Time     Expected Discharge Date Expected Discharge Time    Dec 3, 2021             Cyndy Hedrick RN

## 2021-12-03 NOTE — PROGRESS NOTES
[facility]  PROGRESS NOTE    Patient Name: Moni Wallace  : 1940  MRN: [id number]    Date of Admission: 2021  Primary Care Physician: Alex Walters MD    Subjective   Subjective     CC: f/u peritonitis    HPI: Had vomiting x 2 overnight. Increased abd pain and tachycardia this am.     ROS:  Gen- No fevers, chills  CV- No chest pain, palpitations  Resp- No cough, dyspnea  GI- No N/V/D, abd pain     Objective   Objective     Vital Signs:   Temp:  [97.4 °F (36.3 °C)-98.3 °F (36.8 °C)] 97.9 °F (36.6 °C)  Heart Rate:  [] 133  Resp:  [15-22] 22  BP: ()/(49-72) 126/72  Flow (L/min):  [2] 2     Physical Exam:  Constitutional: No acute distress, awake, alert  HENT: NCAT, mucous membranes moist  Respiratory: Clear to auscultation bilaterally, respiratory effort normal   Cardiovascular: Tachy, irr, no murmur  Gastrointestinal: Positive bowel sounds, soft, mod diffuse TTP.  Musculoskeletal: No bilateral ankle edema  Psychiatric: Appropriate affect, cooperative  Neurologic: Oriented x 3, strength symmetric in all extremities, Cranial Nerves grossly intact to confrontation, speech clear  Skin: No rashes    Results Reviewed:  LAB RESULTS:      Lab 21  0429 21  1233 21  0441 21  0437 21  0428 21  0504 21  1716 21  0902 21  0902   WBC 16.80* 18.56*  --  13.05* 14.16* 12.22*  --    < > 14.71*   HEMOGLOBIN 10.6* 10.1*  --  9.4* 9.8* 10.5*  --    < > 11.1*   HEMATOCRIT 33.5* 31.4*  --  29.8* 30.8* 34.6  --    < > 34.9   PLATELETS 267 283  --  294 322 316  --    < > 326   NEUTROS ABS 14.37* 15.88*  --   --   --   --   --   --  10.55*   IMMATURE GRANS (ABS) 0.20* 0.21*  --   --   --   --   --   --  0.29*   LYMPHS ABS 1.39 1.63  --   --   --   --   --   --  2.51   MONOS ABS 0.66 0.67  --   --   --   --   --   --  0.75   EOS ABS 0.13 0.13  --   --   --   --   --   --  0.54*   MCV 99.1* 100.0*  --  98.7* 97.8*  102.4*  --    < > 100.9*   LACTATE  --   --   --   --   --   --  2.3*  --  2.1*   PROTIME 35.4*  --  37.9* 43.8* 39.6* 31.5* 30.1*   < > 28.6*    < > = values in this interval not displayed.         Lab 12/03/21 0429 12/02/21 0441 12/01/21 0437 11/30/21 0428 11/29/21  0504   SODIUM 135* 133* 137 137 139   POTASSIUM 3.4* 3.4* 3.7 3.7 4.3   CHLORIDE 93* 93* 95* 94* 97*   CO2 21.0* 22.0 23.0 21.0* 20.0*   ANION GAP 21.0* 18.0* 19.0* 22.0* 22.0*   BUN 39* 41* 46* 50* 55*   CREATININE 7.27* 7.74* 8.74* 8.96* 9.11*   GLUCOSE 156* 144* 123* 124* 172*   CALCIUM 7.0* 6.6* 6.8* 7.1* 7.0*   MAGNESIUM 1.6  --   --   --   --          Lab 12/03/21 0429 12/01/21 0437 11/30/21 0428 11/28/21  0902   TOTAL PROTEIN 6.3 6.3 6.5 7.1   ALBUMIN 2.40* 2.80* 2.40* 2.90*   GLOBULIN 3.9 3.5 4.1 4.2   ALT (SGPT) <5 <5 12 30   AST (SGOT) 21 17 23 30   BILIRUBIN 0.3 0.2 0.2 0.2   ALK PHOS 136* 135* 144* 214*   LIPASE  --   --   --  19         Lab 12/03/21 0429 12/02/21 0441 12/01/21 0437 11/30/21 0428 11/29/21  0504   PROTIME 35.4* 37.9* 43.8* 39.6* 31.5*   INR 3.83* 4.10* 4.96* 4.34* 3.22*                 Brief Urine Lab Results  (Last result in the past 365 days)      Color   Clarity   Blood   Leuk Est   Nitrite   Protein   CREAT   Urine HCG        11/28/21 1647 Yellow   Turbid   Moderate (2+)   Large (3+)   Negative   >=300 mg/dL (3+)                 Microbiology Results Abnormal     Procedure Component Value - Date/Time    Blood Culture - Blood, Wrist, Left [[id number]]  (Normal) Collected: 11/28/21 1023    Lab Status: Final result Specimen: Blood from Wrist, Left Updated: 12/03/21 1046     Blood Culture No growth at 5 days    Blood Culture - Blood, Wrist, Left [[id number]]  (Normal) Collected: 11/28/21 1023    Lab Status: Final result Specimen: Blood from Wrist, Left Updated: 12/03/21 1046     Blood Culture No growth at 5 days    Body Fluid Culture - Body Fluid, Peritoneum [[id number]] Collected: 12/02/21 1850    Lab Status:  Preliminary result Specimen: Body Fluid from Peritoneum Updated: 12/02/21 2108     Gram Stain Many (4+) WBCs seen      No organisms seen    Body Fluid Culture - Body Fluid, Peritoneum [[id number]] Collected: 11/28/21 1214    Lab Status: Final result Specimen: Body Fluid from Peritoneum Updated: 12/01/21 1239     Body Fluid Culture No growth at 3 days     Gram Stain Few (2+) WBCs seen      No organisms seen    Urine Culture - Urine, Urine, Clean Catch [[id number]]  (Normal) Collected: 11/28/21 1647    Lab Status: Final result Specimen: Urine, Clean Catch Updated: 11/29/21 2255     Urine Culture No growth    COVID PRE-OP / PRE-PROCEDURE SCREENING ORDER (NO ISOLATION) - Swab, Nasopharynx [[id number]]  (Normal) Collected: 11/28/21 1558    Lab Status: Final result Specimen: Swab from Nasopharynx Updated: 11/28/21 2048    Narrative:      The following orders were created for panel order COVID PRE-OP / PRE-PROCEDURE SCREENING ORDER (NO ISOLATION) - Swab, Nasopharynx.  Procedure                               Abnormality         Status                     ---------                               -----------         ------                     COVID-19, APTIMA PANTHER...[[id number]]  Normal              Final result                 Please view results for these tests on the individual orders.    COVID-19, APTIMA PANTHER [facility] IN-HOUSE NP/OP SWAB IN UTM/VTM/SALINE TRANSPORT MEDIA 24HR TAT - Swab, Nasopharynx [[id number]]  (Normal) Collected: 11/28/21 1558    Lab Status: Final result Specimen: Swab from Nasopharynx Updated: 11/28/21 2048     COVID19 Not Detected    Narrative:      Fact sheet for providers: https://www.fda.gov/media/603486/download     Fact sheet for patients: https://www.fda.gov/media/221104/download    Test performed by RT PCR.        Personally reviewed CT A/P showing findings suspicious for SBO. Agree with interpretation.    CT Abdomen Pelvis Without Contrast    Result Date: 12/3/2021  EXAMINATION: CT ABDOMEN AND  "PELVIS WO CONTRAST-12/03/2021:  INDICATION: N/V, peritonitis, R/O ileus or PSBO; R10.9-Unspecified abdominal pain; T85.71XA-Infection and inflammatory reaction due to peritoneal dialysis catheter, initial encounter; A41.9-Sepsis, unspecified organism.  TECHNIQUE: 5 mm unenhanced images through the abdomen and pelvis.  The radiation dose reduction device was turned on for each scan per the ALARA (As Low as Reasonably Achievable) protocol.  COMPARISON: 11/28/2021 abdomen and pelvis CT scan.  FINDINGS: Previous exam report indicated new small bowel distention, suspicious for partial small bowel obstruction with no definite transition point. Findings related to peritoneal dialysis.  The included lung bases appear clear except for trace bibasilar pleural thickening/atelectasis.  There is a peritoneal dialysis catheter in the left lower quadrant, small amount of intrapelvic free fluid, upper abdominal free fluid and small amount of upper abdominal free air, all typical for peroneal dialysis. Mild nonspecific fat stranding of the omentum is similar to the prior study, perhaps mild edema.  No significant abnormalities are seen of the liver, spleen, the somewhat atrophic pancreas, or the adrenal glands. The kidneys are markedly atrophic and there is a large water-density left renal midpole cyst. The gallbladder is contracted around dense material, presumably gallbladder \"sludge\", but no gallbladder wall inflammation is seen.  CT scan  film again shows a pattern of dilated small bowel and relatively little colon gas, suspicious for distal obstruction.  film images appear to show worsening dilatation. The dilatation appears centered about a loop in the central and right pelvis, which is markedly decompressed at both ends, and normal caliber centrally with mild mucosal thickening. Please refer to the appearance of the central abdominal small bowel loop on axial image 57, tapering to the left on image 58, and tapering " to the right on image 71. Loops proximal to this appear mostly dilated. Loops distal to this level appear markedly decompressed. The colon appears decompressed, but otherwise unremarkable. The uterus and ovaries are appropriately atrophic. The bladder is nondistended. Incidental note is made of what appears to be a small abdominal wall hernia of the lower pelvis containing mild ascites.      Impression: 1. Findings consistent with incomplete distal small bowel obstruction, which appears to be centered about an abnormal appearing loop centrally in the upper pelvis. Degree of distention appears increased from 11/28/2021 scan.  2. Expected changes of peritoneal dialysis, with mild free fluid and free air in the abdomen.  3. No evidence of discrete, well-defined inflammatory focus.  D:  12/03/2021 E:  12/03/2021         Results for orders placed during the hospital encounter of 01/11/21    Transthoracic Echo Complete With Contrast if Necessary Per Protocol    Interpretation Summary  · Left ventricular systolic function is normal. Estimated left ventricular EF = 65%  · Left ventricular wall thickness is consistent with hypertrophy.  · Left atrial volume is mildly increased.  · The aortic valve is calcified. There is mild aortic stenosis present. There is moderate aortic regurgitation present.  · Estimated right ventricular systolic pressure from tricuspid regurgitation is normal (<35 mmHg).      I have reviewed the medications:  Scheduled Meds:albumin human, 25 g, Intravenous, TID  [START ON 12/4/2021] amiodarone, 200 mg, Oral, Once   Followed by  [START ON 12/4/2021] amiodarone, 200 mg, Oral, Q8H   Followed by  [START ON 12/11/2021] amiodarone, 200 mg, Oral, Q12H   Followed by  [START ON 12/25/2021] amiodarone, 200 mg, Oral, Daily  cinacalcet, 60 mg, Oral, Nightly  metoprolol tartrate, 25 mg, Oral, Q12H  micafungin (MYCAMINE) IV, 100 mg, Intravenous, Q24H  nystatin, 5 mL, Swish & Swallow, 4x Daily  pantoprazole, 40 mg,  Intravenous, Q AM  piperacillin-tazobactam, 3.375 g, Intravenous, Q12H  sevelamer, 1,600 mg, Oral, TID With Meals  sodium chloride, 10 mL, Intravenous, Q12H  UltraBag/Dianeal PD-2/1.5% Dex (pappas #[id number]), 2,000 mL, Intraperitoneal, 6x Daily  vancomycin (dosing per levels), , Does not apply, Daily  warfarin (COUMADIN) (dosing per levels), , Does not apply, Daily      Continuous Infusions:amiodarone, 1 mg/min, Last Rate: 1 mg/min (12/03/21 1143)   Followed by  amiodarone, 0.5 mg/min  Pharmacy to dose vancomycin,   Pharmacy to dose warfarin,       PRN Meds:.•  acetaminophen  •  albuterol  •  benzocaine-menthol  •  HYDROmorphone  •  ondansetron **OR** ondansetron  •  Pharmacy to dose vancomycin  •  Pharmacy to dose warfarin  •  phenol  •  Sodium Chloride (PF)  •  sodium chloride  •  temazepam    Assessment/Plan   Assessment & Plan     Active Hospital Problems    Diagnosis  POA   • **Peritonitis (HCC) [K65.9]  Yes   • Hypotension [I95.9]  Yes   • Sepsis associated hypotension (HCC) [A41.9, I95.9]  Yes   • Ileus (HCC) [K56.7]  Yes   • Hyperlipidemia LDL goal <100 [E78.5]  Yes   • Paroxysmal atrial fibrillation (HCC) [I48.0]  Yes   • Type 2 diabetes mellitus (HCC) [E11.9]  Yes   • Chronic kidney disease, stage IV (severe) (HCC) [N18.4]  Yes   • Hyperparathyroidism (HCC) [E21.3]  Yes   • Essential hypertension [I10]  Yes      Resolved Hospital Problems   No resolved problems to display.        Brief Hospital Course to date:  Moni Wallace is a 81 y.o. female here with sepsis, peritonitis, and hypotension.      Sepsis  Bacterial Peritonitis  --Seems a little worse this am, dialysate bloody this am - suspect due to peritonitis in setting of supratherapeutic INR. Nonetheless d/w Dr. Llanos - placing back on IV abx today.  --Pain control      pSBO  --due to peritonitis  --Replace NGT to LWS. NPO but chips.  --Surgery consult particularly to assess this in setting of infected PD catheter.     PAF, w/ intermittent  RVR  HTN  --Supratherapeutic INR. Holding warfarin. Dosing per pharmacy  --Cardiology following. Initially her BB held for hypotension. Now back on PO metoprolol along with addition of amiodarone today.     This patient's problems and plans were partially entered by my partner and updated as appropriate by me 12/03/21.       DVT prophylaxis:  Medical DVT prophylaxis orders are present.       AM-PAC 6 Clicks Score (PT): 17 (12/02/21 2100)    Disposition: I expect the patient to be discharged TBD.    CODE STATUS:   Code Status and Medical Interventions:   Ordered at: 11/28/21 1356     Medical Intervention Limits:    NO intubation (DNI)     Level Of Support Discussed With:    Patient     Code Status (Patient has no pulse and is not breathing):    No CPR (Do Not Attempt to Resuscitate)     Medical Interventions (Patient has pulse or is breathing):    Limited Support       Марина Wasserman II, DO  12/03/21

## 2021-12-03 NOTE — PROGRESS NOTES
[facility] Progress Note        Antibiotics:  Anti-Infectives (From admission, onward)    Ordered     Dose/Rate Route Frequency Start Stop    12/02/21 1434  vancomycin (dosing per levels)        Ordering Provider: Johnson Villarreal, PharmD     Does not apply Daily 12/02/21 1530 12/25/21 0859    11/30/21 1115  UltraBag/Dianeal PD-2/1.5% Dex (pappas #[id number]) (DIANEAL) 2,000 mL with cefTAZidime (FORTAZ) 1,000 mg dialysis solution        Ordering Provider: Sawyer Llanos MD     Intraperitoneal Every 24 Hours 12/01/21 2200      11/30/21 1111  UltraBag/Dianeal PD-2/1.5% Dex (pappas #[id number]) (DIANEAL) 2,000 mL with vancomycin (VANCOCIN) 1,250 mg, cefTAZidime (FORTAZ) 1,000 mg dialysis solution        Ordering Provider: Sawyer Llanos MD     Intraperitoneal Once 11/30/21 2200 11/30/21 2200    11/29/21 1436  Pharmacy to dose vancomycin        Ordering Provider: Sawyer Llanos MD     Does not apply Continuous PRN 11/29/21 1432 12/24/21 1431    11/28/21 1942  UltraBag/Dianeal PD-2/1.5% Dex (pappas #[id number]) (DIANEAL) 2,000 mL with ceFAZolin (ANCEF) 2,000 mg dialysis solution        Ordering Provider: Kali Rojas MD     Intraperitoneal Once 11/29/21 0000 11/29/21 0004    11/28/21 1157  vancomycin 1500 mg/500 mL 0.9% NS IVPB ([facility])        Ordering Provider: Lex Marcum MD    20 mg/kg × 72.6 kg Intravenous Once 11/28/21 1159 11/28/21 1400    11/28/21 1157  piperacillin-tazobactam (ZOSYN) 3.375 g in iso-osmotic dextrose 50 ml (premix)        Ordering Provider: Lex Marcum MD    3.375 g Intravenous Once 11/28/21 1159 11/28/21 1317          CC: abd pain    HPI:      Patient is a 81 y.o.  Yr old female with history of ESRD on CAPD for several years, Hx CDiff years ago she was admitted November 28 with acute onset abdominal pain over 2 days, nausea/vomiting and abnormal CT scan raising concern for possible early ileus versus partial small bowel obstruction and hazy omental stranding concerning for peritonitis per  "notes.  She had leukocytosis and peritoneal fluid with 1748 WBC and predominance neutrophils.  Concern for CAPD associated peritonitis and empiric vancomycin/Zosyn initiated.  Nursing had reported peritoneal fluid had initially looked a little hazy but patient did not recall a hazy or bloody appearance.  No redness/swelling or pain or other problems at her PD catheter.     12/3/21 NG out 12/2 with nausea and vomiting multiple times overnight; dialysate remains blood tinged per patient; same pain in general  ; She has   abdominal pain, mid abdomen, intermittent, nonradiating, 2 out of 10, not progressive.  She denies diarrhea.  No hematochezia melena or hematemesis.  No rash.  Minimal urine output and no active urinary symptoms.  No dysuria hematuria or pyuria.  No flank pain.  No rash.  No shortness of breath or cough.      ROS:      12/3/21 No f/c/s. no rash. No new ADR to Abx.     Heent-- No new vision, hearing or throat complaints.  No epistaxis or oral sores.  Denies odynophagia or dysphagia.  No flashers, floaters or eye pain. No odynophagia or dysphagia. No headache, photophobia or neck stiffness.  CV-- No chest pain, palpitation or syncope  Resp-- No SOB/cough/Hemoptysis  GI-  As above.No hematochezia, melena, or hematemesis. Denies jaundice or chronic liver disease.  -- No dysuria, hematuria, or flank pain.  Denies hesitancy, urgency or flank pain.  Lymph- no swollen lymph nodes in neck/axilla or groin.   Heme- No active bruising or bleeding; no Hx of DVT or PE.  MS-- no swelling or pain in the bones or joints of arms/legs.  No new back pain.  Neuro-- No acute focal weakness or numbness in the arms or legs.  No seizures.     Full 12 point review of systems reviewed and negative otherwise for acute complaints, except for above      PE:   BP (!) 82/64   Pulse (!) 133   Temp 97.4 °F (36.3 °C) (Oral)   Resp 22   Ht 162.6 cm (64\")   Wt 80.4 kg (177 lb 4.8 oz)   LMP  (LMP Unknown)   SpO2 95%   BMI 30.43 " kg/m²       GENERAL: Awake and alert, in no acute distress.   HEENT: Normocephalic, atraumatic.  PERRL. EOMI. No conjunctival injection. No icterus. Oropharynx clear without evidence of thrush or exudate. No evidence of peridontal disease.    NECK: Supple without nuchal rigidity. No mass.  LYMPH: No cervical, axillary or inguinal lymphadenopathy.  HEART: RRR; No murmur, rubs, gallops.   LUNGS: diminished at bases but otherwise Clear to auscultation bilaterally without wheezing, rales, rhonchi. Normal respiratory effort. Nonlabored. No dullness.  ABDOMEN: Soft, mildly tender, nondistended. Positive bowel sounds. No rebound or guarding. NO mass or HSM.  EXT:  No cyanosis, clubbing or edema. No cord.  : Genitalia generally unremarkable.  Without Orozco catheter.  MSK: FROM without joint effusions noted arms/legs.    SKIN: Warm and dry without cutaneous eruptions on Inspection/palpation.    NEURO: Oriented to PPT. No focal deficits on motor/sensory exam at arms/legs.  PSYCHIATRIC: Normal insight and judgement. Cooperative with PE     PD catheter with no redness or purulent drainage.  Nontender at the exit site     IV without obvious redness or drainage     No peripheral stigmata/phenomenon of endocarditis       Laboratory Data    Results from last 7 days   Lab Units 12/03/21  0429 12/02/21  1233 12/01/21  0437   WBC 10*3/mm3 16.80* 18.56* 13.05*   HEMOGLOBIN g/dL 10.6* 10.1* 9.4*   HEMATOCRIT % 33.5* 31.4* 29.8*   PLATELETS 10*3/mm3 267 283 294     Results from last 7 days   Lab Units 12/03/21  0429   SODIUM mmol/L 135*   POTASSIUM mmol/L 3.4*   CHLORIDE mmol/L 93*   CO2 mmol/L 21.0*   BUN mg/dL 39*   CREATININE mg/dL 7.27*   GLUCOSE mg/dL 156*   CALCIUM mg/dL 7.0*     Results from last 7 days   Lab Units 12/03/21  0429   ALK PHOS U/L 136*   BILIRUBIN mg/dL 0.3   ALT (SGPT) U/L <5   AST (SGOT) U/L 21               Estimated Creatinine Clearance: 6.3 mL/min (A) (by C-G formula based on SCr of 7.27 mg/dL  (H)).      Microbiology:      Radiology:  Imaging Results (Last 72 Hours)     Procedure Component Value Units Date/Time    XR Abdomen KUB [[id number]] Collected: 11/29/21 0913     Updated: 12/01/21 1649    Narrative:      EXAMINATION: XR ABDOMEN KUB- 11/29/2021     INDICATION: Ileus; R10.9-Unspecified abdominal pain; T85.71XA-Infection  and inflammatory reaction due to peritoneal dialysis catheter, initial  encounter; A41.9-Sepsis, unspecified organism      COMPARISON: NONE     FINDINGS: KUB reveals nasogastric tube tip in the stomach. Several  dilated loops of bowel seen along the left flank suggesting an ileus.  There is no obvious obstruction. Peritoneal dialysis catheter in place  in the pelvis. Degenerative changes seen within the spine and pelvis.          Impression:      Several dilated loops of bowel identified along the left  flank suggesting possible ileus. Nasogastric tube identified tip in the  stomach.      D: 11/29/2021  E: 11/29/2021     This report was finalized on 12/1/2021 4:46 PM by Dr. Aracely Oates MD.               Impression:       --acute abdominal pain.  Associated with nausea/vomiting, abnormal PD fluid concerning for  Peritonitis, ?CAPD  ; culture negative so far. Catheter exit site appears nonpurulent for now.  No outpatient cultures per patient. Transitioned to IP vancomycin/ceftaz empirically ; red tinged dialysate, ?associated with elevated INR -v- other    **systemic WBC fluctuating, NG out 12/2 and subsequent vomiting with ongoing abd pain;  Likely repeat CT scan, may need surgery to see regarding bowel/CAPD catheter, fungal/AFB studies pending    --Acute vomiting, nausea with abnormal CT scan raising concern for possible ileus versus partial small bowel obstruction per radiology; supportive measures ongoing by medicine with NG tube     --End-stage renal disease with peritoneal dialysis     --History C. Difficile years ago per patient.  No diarrhea at present but at risk for  relapse.     --abnormal CT scan with prominent common bile duct per radiology but without evidence for intrahepatic ductal dilation and normal transaminases/bilirubin on November 28.  Monitor exam and labs     --Chronic Coumadin    PLAN:       --IP vancomycin , ceftaz     --Check/review labs cultures and scans     --Partial history per nursing staff     --Discussed with microbiology     -- d/w pharmacy; they are guiding dosing/timing of IP abx     --Highly complex set of issues with high risk for further serious morbidity and other serious sequela    --repeat dialysate pending with prior cultures negative, fungal/AFB studies pending; d/w micro    --d/w Dr Wasserman; likely repeat CT, ?surgery input if not improving with respect to bowels/CAPD catheter, etc..        Sawyer Llanos MD  12/3/2021     12/3/21 08:30    D/w Dr Wasserman;  Some tachycardia, hypotension last 24 hours, WBC increased overall and at risk for systemic illness;  Will change abx back to IV vancomycin/zosyn, add empiric mycamine

## 2021-12-03 NOTE — CONSULTS
General Surgery Consultation Note    Date of Service: 12/3/2021  Moni EL Rodrigo  [id number]  1940      Referring Provider: Марина Wasserman II, DO    Location of Consult: Inpatient     Reason for Consultation: Abdominal pain, peritonitis in the setting of peritoneal dialysis       History of Present Illness:  I am seeing, Moni EL Wallace, in consultation at request of Марина Wasserman II, DO regarding abdominal pain and peritonitis in the setting of peritoneal dialysis.  She is an 81-year-old lady with history of end-stage renal disease, diabetes, A. fib on Coumadin, and prior C. difficile infection who was admitted to [facility] over November 28 due to concern for peritonitis related to peritoneal dialysis.  She reports that she has had 2 weeks of abdominal soreness.  She describes this as soreness throughout her abdomen which is worse in her periumbilical area.  She reports that the pain is not significantly changed since admission, although she reports it is somewhat less sore today.  She initially had episodes of nausea and vomiting and had an NG tube placed, however over the last several days this was removed.  Today she began to have repeat episodes of nausea and vomiting although she was still on a clear liquid diet.  She reports that she last passed flatus this morning.  She reports that she had 2 bowel movements on Tuesday after suppository.  She denies fevers but does report chills.  She denies any known prior episodes of peritonitis.  Abdominal surgical history includes prior hernia repair many years ago for which she reports no mesh was used, prior kidney transplant, tubal ligation, peritoneal dialysis catheter placement.  She is on Coumadin for history of atrial fibrillation.      Problems Addressed this Visit     None      Visit Diagnoses     Acute abdominal pain    -  Primary    Peritonitis associated with peritoneal dialysis, initial encounter (HCC)        Sepsis without acute organ  dysfunction, due to unspecified organism ([facility])          Diagnoses       Codes Comments    Acute abdominal pain    -  Primary ICD-10-CM: R10.9  ICD-9-CM: 789.00, 338.19     Peritonitis associated with peritoneal dialysis, initial encounter ([facility])     ICD-10-CM: T85.71XA  ICD-9-CM: 996.68, 567.9     Sepsis without acute organ dysfunction, due to unspecified organism ([facility])     ICD-10-CM: A41.9  ICD-9-CM: 038.9, 995.91           PMHx:  Past Medical History:   Diagnosis Date   • Adenomatous colon polyp    • Chronic kidney disease    • Clostridium difficile infection 06/2017   • Diabetes mellitus (HCC)    • Gastric polyps    • Hypothyroidism    • IgA nephropathy, acute    • Kidney transplanted    • Macular degeneration    • Paroxysmal atrial fibrillation (HCC)    • Tubular adenoma     excision    • Ulcer of gastric fundus    • Vitamin D deficiency        Past Surgical History:  Past Surgical History:   • BREAST BIOPSY   • CARPAL TUNNEL RELEASE   • CARPAL TUNNEL RELEASE   • CATARACT EXTRACTION   • CATARACT EXTRACTION   • DIAGNOSTIC LAPAROSCOPY   • DIALYSIS FISTULA CREATION   • HERNIA REPAIR   • KNEE ARTHROSCOPY INCISION AND DRAINAGE OF KNEE    Procedure: KNEE ARTHROSCOPY INCISION AND DRAINAGE OF KNEE;  Surgeon: Paco Lester MD;  Location:  [facility] OR;  Service: Orthopedics   • LAPAROSCOPIC TUBAL LIGATION   • TOTAL KNEE ARTHROPLASTY   • TOTAL KNEE ARTHROPLASTY    Left knee    • TRANSPLANTATION RENAL   • TRANSPLANTATION RENAL   • TRIGGER FINGER RELEASE   • TUBAL ABDOMINAL LIGATION   • UPPER GASTROINTESTINAL ENDOSCOPY   • VENOUS ACCESS DEVICE (PORT) INSERTION    Procedure: INSERTION GROSHONG;  Surgeon: Rolando Begum MD;  Location: [facility] OR;  Service: General       Allergies:  Allergies   Allergen Reactions   • Hydrocodone Itching   • Rice Swelling   • Statins Other (See Comments)     myalgia   • Codeine Rash   • Sulfa Antibiotics Rash       Medications:  No current facility-administered medications on file  prior to encounter.     Current Outpatient Medications on File Prior to Encounter   Medication Sig Dispense Refill   • acetaminophen (TYLENOL) 325 MG tablet Take 2 tablets by mouth Every 4 (Four) Hours As Needed for Mild Pain .     • albuterol (PROVENTIL) (2.5 MG/3ML) 0.083% nebulizer solution Take 2.5 mg by nebulization Every 4 (Four) Hours As Needed for Wheezing. 25 vial 2   • albuterol sulfate HFA (PROAIR HFA) 108 (90 Base) MCG/ACT inhaler Inhale 2 puffs Every 4 (Four) Hours As Needed for Wheezing or Shortness of Air. 1 inhaler 11   • allopurinol (ZYLOPRIM) 100 MG tablet Take 100 mg by mouth As Needed.     • amLODIPine (NORVASC) 5 MG tablet Take 5 mg by mouth Daily.     • cinacalcet (SENSIPAR) 60 MG tablet Take 60 mg by mouth Daily.     • colchicine 0.6 MG tablet Take 1 tablet by mouth 2 (Two) Times a Day. 28 tablet 0   • escitalopram (Lexapro) 5 MG tablet Take 1 tablet by mouth Every Morning. 30 tablet 5   • furosemide (LASIX) 40 MG tablet Take 80 mg by mouth Daily.     • gentamicin (GARAMYCIN) 0.1 % cream Apply 1 application topically to the appropriate area as directed Daily. - Apply to tube site-  3   • lidocaine (Lidoderm) 5 % Place 1 patch on the skin as directed by provider Daily. Remove & Discard patch within 12 hours or as directed by MD 30 each 1   • Methoxy PEG-Epoetin Beta (MIRCERA IJ) Inject 50 mcg under the skin into the appropriate area as directed.     • omeprazole (priLOSEC) 40 MG capsule TAKE ONE CAPSULE BY MOUTH DAILY 90 capsule 3   • predniSONE (DELTASONE) 20 MG tablet Take 1 tablet by mouth Daily. 4 tablet 0   • sevelamer (RENVELA) 800 MG tablet Take 1,600 mg by mouth 3 (Three) Times a Day.     • temazepam (RESTORIL) 15 MG capsule Take 15 mg by mouth At Night As Needed for Sleep.     • warfarin (COUMADIN) 2 MG tablet TAKE 2-3 TABLETS BY MOUTH DAILY OR AS DIRECTED BY ANTICOAGULATION CLINIC 200 tablet 0   • metoprolol tartrate (LOPRESSOR) 100 MG tablet Take 1 tablet by mouth Every 12 (Twelve)  Hours. 60 tablet 0         Current Facility-Administered Medications:   •  acetaminophen (TYLENOL) tablet 650 mg, 650 mg, Oral, Q6H PRN, Sonia Álvarez MD, 650 mg at 11/30/21 0115  •  albumin human 25 % IV SOLN 25 g, 25 g, Intravenous, TID, Suman, Albina Landis MD, 25 g at 12/03/21 1215  •  albuterol (PROVENTIL) nebulizer solution 0.083% 2.5 mg/3mL, 2.5 mg, Nebulization, Q4H PRN, Sonia Álvarez MD  •  [COMPLETED] amiodarone in dextrose 5% (NEXTERONE) loading dose 150mg/100mL, 150 mg, Intravenous, Once, 150 mg at 12/03/21 1126 **FOLLOWED BY** amiodarone 360 mg in 200 mL D5W infusion, 1 mg/min, Intravenous, Continuous, Last Rate: 33.3 mL/hr at 12/03/21 1143, 1 mg/min at 12/03/21 1143 **FOLLOWED BY** amiodarone 360 mg in 200 mL D5W infusion, 0.5 mg/min, Intravenous, Continuous **FOLLOWED BY** [START ON 12/4/2021] amiodarone (PACERONE) tablet 200 mg, 200 mg, Oral, Once **FOLLOWED BY** [START ON 12/4/2021] amiodarone (PACERONE) tablet 200 mg, 200 mg, Oral, Q8H **FOLLOWED BY** [START ON 12/11/2021] amiodarone (PACERONE) tablet 200 mg, 200 mg, Oral, Q12H **FOLLOWED BY** [START ON 12/25/2021] amiodarone (PACERONE) tablet 200 mg, 200 mg, Oral, Daily, Lily Rae APRN  •  benzocaine-menthol (CEPACOL) lozenge 1 lozenge, 1 lozenge, Mouth/Throat, Q2H PRN, Xavier Mejía MD, 1 lozenge at 11/30/21 4168  •  cinacalcet (SENSIPAR) tablet 60 mg - patient supplied, 60 mg, Oral, Nightly, Xavier Mejía MD, 60 mg at 12/02/21 2009  •  HYDROmorphone (DILAUDID) injection 0.25 mg, 0.25 mg, Intravenous, Q2H PRN, Sonia Álvarez MD, 0.25 mg at 12/03/21 0835  •  metoprolol tartrate (LOPRESSOR) tablet 25 mg, 25 mg, Oral, Q12H, Lily Rae APRN, 25 mg at 12/03/21 0830  •  micafungin 100 mg/100 mL 0.9% NS IVPB (mbp), 100 mg, Intravenous, Q24H, Sawyer Llanos MD, 100 mg at 12/03/21 1137  •  nystatin (MYCOSTATIN) 100,000 unit/mL suspension 500,000 Units, 5 mL, Swish & Swallow, 4x Daily, Kali Rojas MD, 500,000 Units at  12/02/21 2010  •  ondansetron (ZOFRAN) tablet 4 mg, 4 mg, Oral, Q6H PRN **OR** ondansetron (ZOFRAN) injection 4 mg, 4 mg, Intravenous, Q6H PRN, Sonia Álvarez MD, 4 mg at 12/03/21 0827  •  pantoprazole (PROTONIX) injection 40 mg, 40 mg, Intravenous, Q AM, Sonia Álvarez MD, 40 mg at 12/03/21 0521  •  Pharmacy to dose vancomycin, , Does not apply, Continuous PRN, Sawyer Llanos MD  •  Pharmacy to dose warfarin, , Does not apply, Continuous PRN, Sonia Álvarez MD  •  phenol (CHLORASEPTIC) 1.4 % liquid 2 spray, 2 spray, Mouth/Throat, Q2H PRN, Xavier Mejía MD, 2 spray at 12/01/21 0223  •  piperacillin-tazobactam (ZOSYN) 3.375 g in iso-osmotic dextrose 50 ml (premix), 3.375 g, Intravenous, Q12H, Sawyer Llanos MD  •  sevelamer (RENVELA) tablet 1,600 mg, 1,600 mg, Oral, TID With Meals, Xavier Mejía MD, 1,600 mg at 12/03/21 0830  •  Sodium Chloride (PF) 0.9 % 10 mL, 10 mL, Intravenous, PRN, Lex Marcum MD  •  sodium chloride 0.9 % flush 10 mL, 10 mL, Intravenous, Q12H, Sonia Álvarez MD, 10 mL at 12/03/21 0836  •  sodium chloride 0.9 % flush 10 mL, 10 mL, Intravenous, PRN, Sonia Álvarez MD  •  temazepam (RESTORIL) capsule 15 mg, 15 mg, Oral, Nightly PRN, Sonia Álvarez MD  •  UltraBag/Dianeal PD-2/1.5% Dex (pappas #[id number]) (DIANEAL) 2,000 mL, 2,000 mL, Intraperitoneal, 6x Daily, Suman, Albina Landis MD  •  vancomycin (dosing per levels), , Does not apply, Daily, Johnson Villarreal, PharmD  •  warfarin (COUMADIN) (dosing per levels), , Does not apply, Daily, Johnson Villarreal, PharmD      Family History:  Family History   Problem Relation Age of Onset   • Leukemia Mother    • Stroke Father    • Dementia Sister    • Kidney disease Sister    • Diabetic kidney disease Sister    • Lung cancer Brother    • Breast cancer Neg Hx    • Ovarian cancer Neg Hx    • Hypertension Sister    • Dementia Sister        Social History: Pt lives in [place].    Tobacco use: Denies     EtOH use : Denies    Illicit drug use:  "Denies       Review of Systems:   Constitutional: No fevers, chills or malaise   Eyes: Denies visual changes    Cardiovascular: Denies chest pain, palpitations   Pulmonary: Denies cough or shortness of breath   Abdominal/ GI: See HPI    Genitourinary: Denies dysuria or hematuria   Musculoskeletal: Denies any but chronic joint aches, pains or deformities   Psychiatric: No recent mood changes   Neurologic: No paresthesias or loss of function    BP 93/54   Pulse 120   Temp 97.9 °F (36.6 °C) (Tympanic)   Resp 22   Ht 162.6 cm (64\")   Wt 80.4 kg (177 lb 4.8 oz)   LMP  (LMP Unknown)   SpO2 95%   BMI 30.43 kg/m²   Body mass index is 30.43 kg/m².    Gen: Awake, alert, no acute distress, sitting up in bed, emesis bin at her bedside, chronically ill-appearing  Head: Normocephalic, atraumatic.   Eyes: Pupils equal, round, react to light and accommodation.   Mouth: Oral mucosa without lesions,   Neck: No masses, lymphadenopathy or carotid bruits bilaterally   CV: Rhythm and rate regular, no murmurs, rubs or gallops irregular, heart rate in the 90s  Lungs: Clear to auscultation bilaterally, not labored on room air   Abdomen: Obese, soft, peritoneal dialysis catheter in place in the left side with clear diastole fluid, scars consistent with prior laparoscopic and open surgery, diffuse tenderness to deep palpation only which appears worse in the periumbilical region, no rebound tenderness, no guarding, no diffuse peritonitis  Groin : No obvious hernias bilaterally   Extremities:  No cyanosis, clubbing or edema bilaterally, multiple sites of prior AV fistula placement  Lymphatics: No abnormal lymphadenopathy appreciated   Neurologic: No gross deficits         CBC  Results from last 7 days   Lab Units 12/03/21  0429   WBC 10*3/mm3 16.80*   HEMOGLOBIN g/dL 10.6*   HEMATOCRIT % 33.5*   PLATELETS 10*3/mm3 267       CMP  Results from last 7 days   Lab Units 12/03/21  0429   SODIUM mmol/L 135*   POTASSIUM mmol/L 3.4*   CHLORIDE " "mmol/L 93*   CO2 mmol/L 21.0*   BUN mg/dL 39*   CREATININE mg/dL 7.27*   CALCIUM mg/dL 7.0*   BILIRUBIN mg/dL 0.3   ALK PHOS U/L 136*   ALT (SGPT) U/L <5   AST (SGOT) U/L 21   GLUCOSE mg/dL 156*       Radiology  Imaging Results (Last 72 Hours)     Procedure Component Value Units Date/Time    CT Abdomen Pelvis Without Contrast [[id number]] Collected: 12/03/21 1125     Updated: 12/03/21 1143    Narrative:      EXAMINATION: CT ABDOMEN AND PELVIS WO CONTRAST-12/03/2021:      INDICATION: N/V, peritonitis, R/O ileus or PSBO; R10.9-Unspecified  abdominal pain; T85.71XA-Infection and inflammatory reaction due to  peritoneal dialysis catheter, initial encounter; A41.9-Sepsis,  unspecified organism.     TECHNIQUE: 5 mm unenhanced images through the abdomen and pelvis.     The radiation dose reduction device was turned on for each scan per the  ALARA (As Low as Reasonably Achievable) protocol.     COMPARISON: 11/28/2021 abdomen and pelvis CT scan.     FINDINGS: Previous exam report indicated new small bowel distention,  suspicious for partial small bowel obstruction with no definite  transition point. Findings related to peritoneal dialysis.     The included lung bases appear clear except for trace bibasilar pleural  thickening/atelectasis.     There is a peritoneal dialysis catheter in the left lower quadrant,  small amount of intrapelvic free fluid, upper abdominal free fluid and  small amount of upper abdominal free air, all typical for peroneal  dialysis. Mild nonspecific fat stranding of the omentum is similar to  the prior study, perhaps mild edema.     No significant abnormalities are seen of the liver, spleen, the somewhat  atrophic pancreas, or the adrenal glands. The kidneys are markedly  atrophic and there is a large water-density left renal midpole cyst. The  gallbladder is contracted around dense material, presumably gallbladder  \"sludge\", but no gallbladder wall inflammation is seen.     CT scan  film again " shows a pattern of dilated small bowel and  relatively little colon gas, suspicious for distal obstruction.   film images appear to show worsening dilatation. The dilatation appears  centered about a loop in the central and right pelvis, which is markedly  decompressed at both ends, and normal caliber centrally with mild  mucosal thickening. Please refer to the appearance of the central  abdominal small bowel loop on axial image 57, tapering to the left on  image 58, and tapering to the right on image 71. Loops proximal to this  appear mostly dilated. Loops distal to this level appear markedly  decompressed. The colon appears decompressed, but otherwise  unremarkable. The uterus and ovaries are appropriately atrophic. The  bladder is nondistended. Incidental note is made of what appears to be a  small abdominal wall hernia of the lower pelvis containing mild ascites.       Impression:      1. Findings consistent with incomplete distal small bowel obstruction,  which appears to be centered about an abnormal appearing loop centrally  in the upper pelvis. Degree of distention appears increased from  11/28/2021 scan.     2. Expected changes of peritoneal dialysis, with mild free fluid and  free air in the abdomen.     3. No evidence of discrete, well-defined inflammatory focus.     D:  12/03/2021  E:  12/03/2021          XR Abdomen KUB [[id number]] Collected: 11/29/21 0913     Updated: 12/01/21 1649    Narrative:      EXAMINATION: XR ABDOMEN KUB- 11/29/2021     INDICATION: Ileus; R10.9-Unspecified abdominal pain; T85.71XA-Infection  and inflammatory reaction due to peritoneal dialysis catheter, initial  encounter; A41.9-Sepsis, unspecified organism      COMPARISON: NONE     FINDINGS: KUB reveals nasogastric tube tip in the stomach. Several  dilated loops of bowel seen along the left flank suggesting an ileus.  There is no obvious obstruction. Peritoneal dialysis catheter in place  in the pelvis. Degenerative changes  seen within the spine and pelvis.          Impression:      Several dilated loops of bowel identified along the left  flank suggesting possible ileus. Nasogastric tube identified tip in the  stomach.      D: 11/29/2021  E: 11/29/2021     This report was finalized on 12/1/2021 4:46 PM by Dr. Aracely Oates MD.                 Results Review: I have personally reviewed all of the recent lab and imaging results available at this time.     Assessment:  Mrs. Wallace is an 81-year-old lady with history of end-stage renal disease on peritoneal dialysis, diabetes, A. fib on Coumadin, and prior C. difficile infection who was admitted due to concern for peritonitis related to peritoneal dialysis.  Heart rate has been anywhere from the 80s to 130s over the last 24 hours, she has been afebrile, blood pressures anywhere from the 80s to 120 systolic.  I have personally reviewed a noncontrast CT scan of the abdomen and pelvis from today, this demonstrates some free fluid and some free air as would be expected with peritoneal dialysis.  There is some small bowel dilation however, there is no definite pneumatosis or obvious bowel perforation.  She does not appear to be completely clinically obstructed as she has been passing flatus.  I think the most likely her symptoms are related to an ileus from bacterial peritonitis related to peritoneal dialysis, however certainly it is possible that she could have an enteritis or ischemic insult to her viscera.  For the time being I would recommend to continue with current management of antibiotics and supportive measures.  We will tentatively plan to obtain small bowel follow-through tomorrow to assess for obstruction.  Would recommend to hold her Coumadin in case of need for surgical intervention should she worsen    Plan:  -Continue supportive management of bacterial peritonitis related to peritoneal dialysis  -As needed pain control and antiemetics.  NG tube if needed for refractory  nausea vomiting  -We will tentatively plan to obtain small bowel follow-through tomorrow      I discussed the patient's findings and my recommendations with the patient and/or family, as well as the primary team     Robin Andersen MD  12/03/21  14:02 EST

## 2021-12-03 NOTE — PROGRESS NOTES
"Pharmacy Consult  -  Warfarin    Moni Wallace is a  81 y.o. female   Height - 162.6 cm (64\")  Weight - 80.4 kg (177 lb 4.8 oz)    Consulting Provider: - Sonia Álvarez MD  Indication: - Afib  Goal INR: - 2 - 3  Home Regimen:   - warfarin 4 mg daily    Bridge Therapy: No        Drug-Drug Interactions with current regimen:  Fortaz in PD bags - may increase risk of bleeding  Acetaminophen PRN - may increase risk of bleeding (last given 11/28)  Pantoprazole (omeprazole PTA) - may increase INR  Amiodarone (new start 12/3) - may increase INR and risk of bleeding    Warfarin Dosing During Admission:    Date  11/28 11/29 11/30 12/1 12/2 12/3      INR  2.84 3.22 4.34 4.96 4.10 3.83      Dose  3 mg 2 mg HOLD HOLD HOLD HOLD          Education Provided: follows with [facility]     Discharge Follow up:   Following Provider - [facility]     Follow up time range or appointment - 3-5 days after discharge      Labs:    Results from last 7 days   Lab Units 12/03/21  0429 12/02/21  1233 12/02/21 0441 12/01/21  0437 11/30/21  0428 11/29/21  0504 11/28/21  1716 11/28/21  0902   INR  3.83*  --  4.10* 4.96* 4.34* 3.22* 3.04* 2.84*   HEMOGLOBIN g/dL 10.6* 10.1*  --  9.4* 9.8* 10.5*  --  11.1*   HEMATOCRIT % 33.5* 31.4*  --  29.8* 30.8* 34.6  --  34.9     Results from last 7 days   Lab Units 12/03/21  0429 12/02/21  0441 12/01/21  0437 11/30/21  0428 11/30/21  0428   SODIUM mmol/L 135* 133* 137   < > 137   POTASSIUM mmol/L 3.4* 3.4* 3.7   < > 3.7   CHLORIDE mmol/L 93* 93* 95*   < > 94*   CO2 mmol/L 21.0* 22.0 23.0   < > 21.0*   BUN mg/dL 39* 41* 46*   < > 50*   CREATININE mg/dL 7.27* 7.74* 8.74*   < > 8.96*   CALCIUM mg/dL 7.0* 6.6* 6.8*   < > 7.1*   BILIRUBIN mg/dL 0.3  --  0.2  --  0.2   ALK PHOS U/L 136*  --  135*  --  144*   ALT (SGPT) U/L <5  --  <5  --  12   AST (SGOT) U/L 21  --  17  --  23   GLUCOSE mg/dL 156* 144* 123*   < > 124*    < > = values in this interval not displayed.       Current dietary intake: Now NPO possible NG placement " today      Assessment/Plan:   Pharmacy consulted to dose warfarin for Afib, goal 2-3.   Patient's INR remains supratherapeutic at 3.83 today, however decreased from 4.10 yesterday. Patient had slight blood in peritoneal dialysate yesterday, per Dr Will dialysis is clearing up today.  Will HOLD warfarin again today due to supratherapeutic INR, previous observation of blood tinged dialysate, and initiation of amiodarone. In addition patient now NPO.  Daily PT/INR ordered.  Monitor signs/symptoms of bleeding, dietary intake, and drug-drug interactions. Make dose adjustments as necessary.  Pharmacy will continue to follow.    Thank you  Johnson Villarreal, PharmD  12/3/2021  12:57 EST

## 2021-12-04 NOTE — PLAN OF CARE
Goal Outcome Evaluation:  Plan of Care Reviewed With: patient        Progress: no change     Pt c/o nausea/vomiting this am. CT done showed bowel blockage. NG inserted with minimal return. BP low this am -albumin ordered by nephrology. Amiodorone drip started for AFIB. In SR this evening-BP's better. Care complicated by slow indwelling of dialsylate. Nephrology ordered cath-sheri.

## 2021-12-04 NOTE — PROGRESS NOTES
[facility] Progress Note        Antibiotics:  Anti-Infectives (From admission, onward)    Ordered     Dose/Rate Route Frequency Start Stop    12/03/21 0857  piperacillin-tazobactam (ZOSYN) 3.375 g in iso-osmotic dextrose 50 ml (premix)        Ordering Provider: Sawyer Llanos MD    3.375 g  over 4 Hours Intravenous Every 12 Hours 12/03/21 1800 12/10/21 1759    12/03/21 0832  micafungin 100 mg/100 mL 0.9% NS IVPB (mbp)        Ordering Provider: Sawyer Llanos MD    100 mg Intravenous Every 24 Hours 12/03/21 1000 12/10/21 0959    12/03/21 0857  piperacillin-tazobactam (ZOSYN) 3.375 g in iso-osmotic dextrose 50 ml (premix)        Ordering Provider: Sawyer Llanos MD    3.375 g  over 30 Minutes Intravenous Once 12/03/21 0945 12/03/21 1214    12/03/21 0832  Pharmacy to dose vancomycin        Ordering Provider: Sawyer Llanos MD     Does not apply Continuous PRN 12/03/21 0831 12/13/21 0830    12/02/21 1434  vancomycin (dosing per levels)        Ordering Provider: Johnson Villarreal, PharmD     Does not apply Daily 12/02/21 1530 12/25/21 0859    11/30/21 1111  UltraBag/Dianeal PD-2/1.5% Dex (pappas #[id number]) (DIANEAL) 2,000 mL with vancomycin (VANCOCIN) 1,250 mg, cefTAZidime (FORTAZ) 1,000 mg dialysis solution        Ordering Provider: Sawyer Llanos MD     Intraperitoneal Once 11/30/21 2200 11/30/21 2200    11/28/21 1942  UltraBag/Dianeal PD-2/1.5% Dex (pappas #[id number]) (DIANEAL) 2,000 mL with ceFAZolin (ANCEF) 2,000 mg dialysis solution        Ordering Provider: Kali Rojas MD     Intraperitoneal Once 11/29/21 0000 11/29/21 0004    11/28/21 1157  vancomycin 1500 mg/500 mL 0.9% NS IVPB ([facility])        Ordering Provider: Lex Marcum MD    20 mg/kg × 72.6 kg Intravenous Once 11/28/21 1159 11/28/21 1400    11/28/21 1157  piperacillin-tazobactam (ZOSYN) 3.375 g in iso-osmotic dextrose 50 ml (premix)        Ordering Provider: Lex Marcum MD    3.375 g Intravenous Once 11/28/21 1159 11/28/21 1316           CC: abd pain    HPI:      Patient is a 81 y.o.  Yr old female with history of ESRD on CAPD for several years, Hx CDiff years ago she was admitted November 28 with acute onset abdominal pain over 2 days, nausea/vomiting and abnormal CT scan raising concern for possible early ileus versus partial small bowel obstruction and hazy omental stranding concerning for peritonitis per notes.  She had leukocytosis and peritoneal fluid with 1748 WBC and predominance neutrophils.  Concern for CAPD associated peritonitis and empiric vancomycin/Zosyn initiated.  Nursing had reported peritoneal fluid had initially looked a little hazy but patient did not recall a hazy or bloody appearance.  No redness/swelling or pain or other problems at her PD catheter.     12/3/21 NG out 12/2 with nausea and vomiting multiple times overnight; dialysate remains blood tinged per patient;  tachycardia, hypotension last 24 hours, WBC increased overall and at risk for systemic illness;  Will change abx back to IV vancomycin/zosyn, add empiric mycamine    12/4/21 cardiology following with paroxysmal afib/RVR; same pain in general  ; blood tinge less at dialysate;  She has   abdominal pain, mid abdomen, intermittent, nonradiating, 2 out of 10, not progressive.  She denies diarrhea.  No hematochezia melena or hematemesis.  No rash.  Minimal urine output and no active urinary symptoms.  No dysuria hematuria or pyuria.  No flank pain.  No rash.  No shortness of breath or cough.      ROS:      12/4/21 No f/c/s. no rash. No new ADR to Abx.     Heent-- No new vision, hearing or throat complaints.  No epistaxis or oral sores.  Denies odynophagia or dysphagia.  No flashers, floaters or eye pain. No odynophagia or dysphagia. No headache, photophobia or neck stiffness.  CV-- No chest pain, palpitation or syncope  Resp-- No SOB/cough/Hemoptysis  GI-  As above.No hematochezia, melena, or hematemesis. Denies jaundice or chronic liver disease.  -- No  "dysuria, hematuria, or flank pain.  Denies hesitancy, urgency or flank pain.  Lymph- no swollen lymph nodes in neck/axilla or groin.   Heme- No active bruising or bleeding; no Hx of DVT or PE.  MS-- no swelling or pain in the bones or joints of arms/legs.  No new back pain.  Neuro-- No acute focal weakness or numbness in the arms or legs.  No seizures.     Full 12 point review of systems reviewed and negative otherwise for acute complaints, except for above      PE:   /75 (BP Location: Right leg, Patient Position: Lying)   Pulse 83   Temp 97.7 °F (36.5 °C) (Oral)   Resp 18   Ht 162.6 cm (64\")   Wt 80.4 kg (177 lb 3.2 oz)   LMP  (LMP Unknown)   SpO2 95%   BMI 30.42 kg/m²       GENERAL: Awake and alert, in no acute distress.   HEENT: Normocephalic, atraumatic.  PERRL. EOMI. No conjunctival injection. No icterus. Oropharynx clear without evidence of thrush or exudate. No evidence of peridontal disease.    NECK: Supple without nuchal rigidity. No mass.  LYMPH: No cervical, axillary or inguinal lymphadenopathy.  HEART: RRR; No murmur, rubs, gallops.   LUNGS: diminished at bases but otherwise Clear to auscultation bilaterally without wheezing, rales, rhonchi. Normal respiratory effort. Nonlabored. No dullness.  ABDOMEN: Soft, mildly tender, nondistended. Positive bowel sounds. No rebound or guarding. NO mass or HSM.  EXT:  No cyanosis, clubbing or edema. No cord.  : Genitalia generally unremarkable.  Without Orozco catheter.  MSK: FROM without joint effusions noted arms/legs.    SKIN: Warm and dry without cutaneous eruptions on Inspection/palpation.    NEURO: Oriented to PPT. No focal deficits on motor/sensory exam at arms/legs.  PSYCHIATRIC: Normal insight and judgement. Cooperative with PE     PD catheter with no redness or purulent drainage.  Nontender at the exit site     IV without obvious redness or drainage     No peripheral stigmata/phenomenon of endocarditis       Laboratory Data    Results from " last 7 days   Lab Units 12/03/21  0429 12/02/21  1233 12/01/21  0437   WBC 10*3/mm3 16.80* 18.56* 13.05*   HEMOGLOBIN g/dL 10.6* 10.1* 9.4*   HEMATOCRIT % 33.5* 31.4* 29.8*   PLATELETS 10*3/mm3 267 283 294     Results from last 7 days   Lab Units 12/03/21  0429   SODIUM mmol/L 135*   POTASSIUM mmol/L 3.4*   CHLORIDE mmol/L 93*   CO2 mmol/L 21.0*   BUN mg/dL 39*   CREATININE mg/dL 7.27*   GLUCOSE mg/dL 156*   CALCIUM mg/dL 7.0*     Results from last 7 days   Lab Units 12/03/21  0429   ALK PHOS U/L 136*   BILIRUBIN mg/dL 0.3   ALT (SGPT) U/L <5   AST (SGOT) U/L 21               Estimated Creatinine Clearance: 6.3 mL/min (A) (by C-G formula based on SCr of 7.27 mg/dL (H)).      Microbiology:      Radiology:  Imaging Results (Last 72 Hours)     Procedure Component Value Units Date/Time    XR Abdomen KUB [[id number]] Collected: 12/03/21 2249     Updated: 12/03/21 2251    Narrative:      PROCEDURE: CR Abdomen 1 Vw    INDICATIONS: verification of NG placement; Unspecified abdominal pain; Infection and inflammatory reaction due to peritoneal dialysis catheter, initial encounter; Sepsis, unspecified organism ; verification of NG placement    FINDINGS &     Impression:      Single frontal torso demonstrates tube tip to be in the body the stomach. Incidental moderate gaseous distention of jejunum noted.              Signer Name: Pippa Enriquez MD   Signed: 12/3/2021 10:49 PM   Workstation Name: [facility]    [facility]    CT Abdomen Pelvis Without Contrast [[id number]] Collected: 12/03/21 1125     Updated: 12/03/21 1143    Narrative:      EXAMINATION: CT ABDOMEN AND PELVIS WO CONTRAST-12/03/2021:      INDICATION: N/V, peritonitis, R/O ileus or PSBO; R10.9-Unspecified  abdominal pain; T85.71XA-Infection and inflammatory reaction due to  peritoneal dialysis catheter, initial encounter; A41.9-Sepsis,  unspecified organism.     TECHNIQUE: 5 mm unenhanced images through the abdomen and pelvis.     The  "radiation dose reduction device was turned on for each scan per the  ALARA (As Low as Reasonably Achievable) protocol.     COMPARISON: 11/28/2021 abdomen and pelvis CT scan.     FINDINGS: Previous exam report indicated new small bowel distention,  suspicious for partial small bowel obstruction with no definite  transition point. Findings related to peritoneal dialysis.     The included lung bases appear clear except for trace bibasilar pleural  thickening/atelectasis.     There is a peritoneal dialysis catheter in the left lower quadrant,  small amount of intrapelvic free fluid, upper abdominal free fluid and  small amount of upper abdominal free air, all typical for peroneal  dialysis. Mild nonspecific fat stranding of the omentum is similar to  the prior study, perhaps mild edema.     No significant abnormalities are seen of the liver, spleen, the somewhat  atrophic pancreas, or the adrenal glands. The kidneys are markedly  atrophic and there is a large water-density left renal midpole cyst. The  gallbladder is contracted around dense material, presumably gallbladder  \"sludge\", but no gallbladder wall inflammation is seen.     CT scan  film again shows a pattern of dilated small bowel and  relatively little colon gas, suspicious for distal obstruction.   film images appear to show worsening dilatation. The dilatation appears  centered about a loop in the central and right pelvis, which is markedly  decompressed at both ends, and normal caliber centrally with mild  mucosal thickening. Please refer to the appearance of the central  abdominal small bowel loop on axial image 57, tapering to the left on  image 58, and tapering to the right on image 71. Loops proximal to this  appear mostly dilated. Loops distal to this level appear markedly  decompressed. The colon appears decompressed, but otherwise  unremarkable. The uterus and ovaries are appropriately atrophic. The  bladder is nondistended. Incidental " note is made of what appears to be a  small abdominal wall hernia of the lower pelvis containing mild ascites.       Impression:      1. Findings consistent with incomplete distal small bowel obstruction,  which appears to be centered about an abnormal appearing loop centrally  in the upper pelvis. Degree of distention appears increased from  11/28/2021 scan.     2. Expected changes of peritoneal dialysis, with mild free fluid and  free air in the abdomen.     3. No evidence of discrete, well-defined inflammatory focus.     D:  12/03/2021  E:  12/03/2021          XR Abdomen KUB [[id number]] Collected: 11/29/21 0913     Updated: 12/01/21 1649    Narrative:      EXAMINATION: XR ABDOMEN KUB- 11/29/2021     INDICATION: Ileus; R10.9-Unspecified abdominal pain; T85.71XA-Infection  and inflammatory reaction due to peritoneal dialysis catheter, initial  encounter; A41.9-Sepsis, unspecified organism      COMPARISON: NONE     FINDINGS: KUB reveals nasogastric tube tip in the stomach. Several  dilated loops of bowel seen along the left flank suggesting an ileus.  There is no obvious obstruction. Peritoneal dialysis catheter in place  in the pelvis. Degenerative changes seen within the spine and pelvis.          Impression:      Several dilated loops of bowel identified along the left  flank suggesting possible ileus. Nasogastric tube identified tip in the  stomach.      D: 11/29/2021  E: 11/29/2021     This report was finalized on 12/1/2021 4:46 PM by Dr. Aracely Oates MD.               Impression:       --acute abdominal pain.  Associated with nausea/vomiting, abnormal PD fluid concerning for  Peritonitis, ?CAPD  ; culture negative so far. Catheter exit site appears nonpurulent for now.  No outpatient cultures per patient. Transitioned to IP vancomycin/ceftaz empirically and back to systemic therapy 12/3      **systemic WBC fluctuating, NG out 12/2 and subsequent vomiting with   abd pain;   repeat CT scan 12/3, surgery  following with incomplete SBO per radiology, worse from 11/28 per radiology    --Acute vomiting, nausea  ; supportive measures ongoing by medicine with NG tube as above and surgery following     --End-stage renal disease with peritoneal dialysis     --History C. Difficile years ago per patient.  No diarrhea at present but at risk for relapse.     --abnormal CT scan with prominent common bile duct per radiology but without evidence for intrahepatic ductal dilation and normal transaminases/bilirubin on November 28.  Monitor exam and labs     --Chronic Coumadin    --paroxysmal Afib/RVR    PLAN:       --IV vancomycin, zosyn ,mycamine     --Check/review labs cultures and scans     --Partial history per nursing staff     --Discussed with microbiology     -- d/w pharmacy      --Highly complex set of issues with high risk for further serious morbidity and other serious sequela    --repeat dialysate for cultures  , fungal/AFB studies pending; d/w micro    --d/w Dr Wasserman;  repeat CT 12/3, surgery following;  On systemic therapy       Sawyer Llanos MD  12/4/2021

## 2021-12-04 NOTE — PROGRESS NOTES
[facility] Medicine Services  PROGRESS NOTE    Patient Name: Moni Wallace  : 1940  MRN: [id number]    Date of Admission: 2021  Primary Care Physician: Alex Walters MD    Subjective   Subjective     CC: f/u peritonitis    HPI: Up in bed. Says she feels about the same as yesterday. No bm. Still having pain.     ROS:  Gen- No fevers, chills  CV- No chest pain, palpitations  Resp- No cough, dyspnea  GI- No N/V/D, abd pain     Objective   Objective     Vital Signs:   Temp:  [97.5 °F (36.4 °C)-97.8 °F (36.6 °C)] 97.7 °F (36.5 °C)  Heart Rate:  [77-97] 83  Resp:  [12-20] 18  BP: ()/(53-75) 117/75     Physical Exam:  Constitutional: No acute distress, awake, alert  HENT: NCAT, mucous membranes moist, NGT  Respiratory: Clear to auscultation bilaterally, respiratory effort normal   Cardiovascular: RRR, no murmurs, rubs, or gallops  Gastrointestinal: Positive bowel sounds, soft, mild diffuse TTP  Musculoskeletal: No bilateral ankle edema  Psychiatric: Appropriate affect, cooperative  Neurologic: Oriented x 3, strength symmetric in all extremities, Cranial Nerves grossly intact to confrontation, speech clear  Skin: No rashes    Results Reviewed:  LAB RESULTS:      Lab 21  0947 21  0601 21  0429 21  1233 21  0441 21  0437 21  0428 21  0504 21  1716 21  0902 21  0902   WBC 14.72*  --  16.80* 18.56*  --  13.05* 14.16*   < >  --   --  14.71*   HEMOGLOBIN 9.7*  --  10.6* 10.1*  --  9.4* 9.8*   < >  --   --  11.1*   HEMATOCRIT 29.5*  --  33.5* 31.4*  --  29.8* 30.8*   < >  --   --  34.9   PLATELETS 227  --  267 283  --  294 322   < >  --   --  326   NEUTROS ABS  --   --  14.37* 15.88*  --   --   --   --   --   --  10.55*   IMMATURE GRANS (ABS)  --   --  0.20* 0.21*  --   --   --   --   --   --  0.29*   LYMPHS ABS  --   --  1.39 1.63  --   --   --   --   --   --  2.51   MONOS ABS  --   --  0.66 0.67  --   --   --   --    --   --  0.75   EOS ABS  --   --  0.13 0.13  --   --   --   --   --   --  0.54*   MCV 97.7*  --  99.1* 100.0*  --  98.7* 97.8*   < >  --   --  100.9*   LACTATE  --   --   --   --   --   --   --   --  2.3*  --  2.1*   PROTIME  --  33.8* 35.4*  --  37.9* 43.8* 39.6*   < > 30.1*   < > 28.6*    < > = values in this interval not displayed.         Lab 12/04/21 0947 12/03/21 0429 12/02/21 0441 12/01/21 0437 11/30/21 0428   SODIUM 135* 135* 133* 137 137   POTASSIUM 3.0* 3.4* 3.4* 3.7 3.7   CHLORIDE 91* 93* 93* 95* 94*   CO2 26.0 21.0* 22.0 23.0 21.0*   ANION GAP 18.0* 21.0* 18.0* 19.0* 22.0*   BUN 36* 39* 41* 46* 50*   CREATININE 6.98* 7.27* 7.74* 8.74* 8.96*   GLUCOSE 155* 156* 144* 123* 124*   CALCIUM 7.5* 7.0* 6.6* 6.8* 7.1*   MAGNESIUM  --  1.6  --   --   --          Lab 12/04/21  0947 12/03/21 0429 12/01/21 0437 11/30/21 0428 11/28/21  0902   TOTAL PROTEIN 5.6* 6.3 6.3 6.5 7.1   ALBUMIN 3.30* 2.40* 2.80* 2.40* 2.90*   GLOBULIN 2.3 3.9 3.5 4.1 4.2   ALT (SGPT) <5 <5 <5 12 30   AST (SGOT) 13 21 17 23 30   BILIRUBIN 0.2 0.3 0.2 0.2 0.2   ALK PHOS 108 136* 135* 144* 214*   LIPASE  --   --   --   --  19         Lab 12/04/21  0601 12/03/21 0429 12/02/21 0441 12/01/21  0437 11/30/21  0428   PROTIME 33.8* 35.4* 37.9* 43.8* 39.6*   INR 3.54* 3.83* 4.10* 4.96* 4.34*                 Brief Urine Lab Results  (Last result in the past 365 days)      Color   Clarity   Blood   Leuk Est   Nitrite   Protein   CREAT   Urine HCG        11/28/21 1647 Yellow   Turbid   Moderate (2+)   Large (3+)   Negative   >=300 mg/dL (3+)                 Microbiology Results Abnormal     Procedure Component Value - Date/Time    Body Fluid Culture - Body Fluid, Peritoneum [[id number]] Collected: 12/02/21 1850    Lab Status: Preliminary result Specimen: Body Fluid from Peritoneum Updated: 12/04/21 0728     Body Fluid Culture No growth at 2 days     Gram Stain Many (4+) WBCs seen      No organisms seen    Blood Culture - Blood, Wrist, Left  [[id number]]  (Normal) Collected: 11/28/21 1023    Lab Status: Final result Specimen: Blood from Wrist, Left Updated: 12/03/21 1046     Blood Culture No growth at 5 days    Blood Culture - Blood, Wrist, Left [[id number]]  (Normal) Collected: 11/28/21 1023    Lab Status: Final result Specimen: Blood from Wrist, Left Updated: 12/03/21 1046     Blood Culture No growth at 5 days    Body Fluid Culture - Body Fluid, Peritoneum [[id number]] Collected: 11/28/21 1214    Lab Status: Final result Specimen: Body Fluid from Peritoneum Updated: 12/01/21 1239     Body Fluid Culture No growth at 3 days     Gram Stain Few (2+) WBCs seen      No organisms seen    Urine Culture - Urine, Urine, Clean Catch [[id number]]  (Normal) Collected: 11/28/21 1647    Lab Status: Final result Specimen: Urine, Clean Catch Updated: 11/29/21 2255     Urine Culture No growth    COVID PRE-OP / PRE-PROCEDURE SCREENING ORDER (NO ISOLATION) - Swab, Nasopharynx [[id number]]  (Normal) Collected: 11/28/21 1558    Lab Status: Final result Specimen: Swab from Nasopharynx Updated: 11/28/21 2048    Narrative:      The following orders were created for panel order COVID PRE-OP / PRE-PROCEDURE SCREENING ORDER (NO ISOLATION) - Swab, Nasopharynx.  Procedure                               Abnormality         Status                     ---------                               -----------         ------                     COVID-19, APTIMA PANTHER...[[id number]]  Normal              Final result                 Please view results for these tests on the individual orders.    COVID-19, APTIMA PANTHER [facility] IN-HOUSE NP/OP SWAB IN UTM/VTM/[facility] TRANSPORT MEDIA 24HR TAT - Swab, Nasopharynx [[id number]]  (Normal) Collected: 11/28/21 1558    Lab Status: Final result Specimen: Swab from Nasopharynx Updated: 11/28/21 2048     COVID19 Not Detected    Narrative:      Fact sheet for providers: https://www.fda.gov/media/672079/download     Fact sheet for patients:  "https://www.fda.gov/media/959934/download    Test performed by RT PCR.        KUB personally reviewed with NGT in place. Agree with interpretation.    CT Abdomen Pelvis Without Contrast    Result Date: 12/3/2021  EXAMINATION: CT ABDOMEN AND PELVIS WO CONTRAST-12/03/2021:  INDICATION: N/V, peritonitis, R/O ileus or PSBO; R10.9-Unspecified abdominal pain; T85.71XA-Infection and inflammatory reaction due to peritoneal dialysis catheter, initial encounter; A41.9-Sepsis, unspecified organism.  TECHNIQUE: 5 mm unenhanced images through the abdomen and pelvis.  The radiation dose reduction device was turned on for each scan per the ALARA (As Low as Reasonably Achievable) protocol.  COMPARISON: 11/28/2021 abdomen and pelvis CT scan.  FINDINGS: Previous exam report indicated new small bowel distention, suspicious for partial small bowel obstruction with no definite transition point. Findings related to peritoneal dialysis.  The included lung bases appear clear except for trace bibasilar pleural thickening/atelectasis.  There is a peritoneal dialysis catheter in the left lower quadrant, small amount of intrapelvic free fluid, upper abdominal free fluid and small amount of upper abdominal free air, all typical for peroneal dialysis. Mild nonspecific fat stranding of the omentum is similar to the prior study, perhaps mild edema.  No significant abnormalities are seen of the liver, spleen, the somewhat atrophic pancreas, or the adrenal glands. The kidneys are markedly atrophic and there is a large water-density left renal midpole cyst. The gallbladder is contracted around dense material, presumably gallbladder \"sludge\", but no gallbladder wall inflammation is seen.  CT scan  film again shows a pattern of dilated small bowel and relatively little colon gas, suspicious for distal obstruction.  film images appear to show worsening dilatation. The dilatation appears centered about a loop in the central and right pelvis, " which is markedly decompressed at both ends, and normal caliber centrally with mild mucosal thickening. Please refer to the appearance of the central abdominal small bowel loop on axial image 57, tapering to the left on image 58, and tapering to the right on image 71. Loops proximal to this appear mostly dilated. Loops distal to this level appear markedly decompressed. The colon appears decompressed, but otherwise unremarkable. The uterus and ovaries are appropriately atrophic. The bladder is nondistended. Incidental note is made of what appears to be a small abdominal wall hernia of the lower pelvis containing mild ascites.      Impression: 1. Findings consistent with incomplete distal small bowel obstruction, which appears to be centered about an abnormal appearing loop centrally in the upper pelvis. Degree of distention appears increased from 11/28/2021 scan.  2. Expected changes of peritoneal dialysis, with mild free fluid and free air in the abdomen.  3. No evidence of discrete, well-defined inflammatory focus.  D:  12/03/2021 E:  12/03/2021       XR Abdomen KUB    Result Date: 12/3/2021  PROCEDURE: CR Abdomen 1 Vw INDICATIONS: verification of NG placement; Unspecified abdominal pain; Infection and inflammatory reaction due to peritoneal dialysis catheter, initial encounter; Sepsis, unspecified organism ; verification of NG placement FINDINGS &     Impression: Single frontal torso demonstrates tube tip to be in the body the stomach. Incidental moderate gaseous distention of jejunum noted.   Signer Name: Pippa Enriquez MD  Signed: 12/3/2021 10:49 PM  Workstation Name: [facility]  [facility]      Results for orders placed during the hospital encounter of 01/11/21    Transthoracic Echo Complete With Contrast if Necessary Per Protocol    Interpretation Summary  · Left ventricular systolic function is normal. Estimated left ventricular EF = 65%  · Left ventricular wall thickness is  consistent with hypertrophy.  · Left atrial volume is mildly increased.  · The aortic valve is calcified. There is mild aortic stenosis present. There is moderate aortic regurgitation present.  · Estimated right ventricular systolic pressure from tricuspid regurgitation is normal (<35 mmHg).      I have reviewed the medications:  Scheduled Meds:alteplase, 2 mg, Intracatheter, Once  amiodarone, 200 mg, Oral, Once   Followed by  amiodarone, 200 mg, Oral, Q8H   Followed by  [START ON 12/11/2021] amiodarone, 200 mg, Oral, Q12H   Followed by  [START ON 12/25/2021] amiodarone, 200 mg, Oral, Daily  cinacalcet, 60 mg, Oral, Nightly  metoprolol tartrate, 25 mg, Oral, Q12H  micafungin (MYCAMINE) IV, 100 mg, Intravenous, Q24H  pantoprazole, 40 mg, Intravenous, Q AM  piperacillin-tazobactam, 3.375 g, Intravenous, Q12H  sevelamer, 1,600 mg, Oral, TID With Meals  sodium chloride, 10 mL, Intravenous, Q12H  UltraBag/Dianeal PD-2/1.5% Dex (pappas #[id number]), 2,000 mL, Intraperitoneal, 6x Daily  vancomycin (dosing per levels), , Does not apply, Daily  warfarin (COUMADIN) (dosing per levels), , Does not apply, Daily      Continuous Infusions:amiodarone, 0.5 mg/min, Last Rate: 0.5 mg/min (12/04/21 0544)  Pharmacy to dose vancomycin,   Pharmacy to dose warfarin,       PRN Meds:.•  acetaminophen  •  albuterol  •  benzocaine-menthol  •  HYDROmorphone  •  ondansetron **OR** ondansetron  •  Pharmacy to dose vancomycin  •  Pharmacy to dose warfarin  •  phenol  •  Sodium Chloride (PF)  •  sodium chloride  •  temazepam    Assessment/Plan   Assessment & Plan     Active Hospital Problems    Diagnosis  POA   • **Peritonitis (HCC) [K65.9]  Yes   • Hypotension [I95.9]  Yes   • Sepsis associated hypotension (HCC) [A41.9, I95.9]  Yes   • Ileus (HCC) [K56.7]  Yes   • Hyperlipidemia LDL goal <100 [E78.5]  Yes   • Paroxysmal atrial fibrillation (HCC) [I48.0]  Yes   • Type 2 diabetes mellitus (HCC) [E11.9]  Yes   • Chronic kidney disease, stage IV (severe)  (HCC) [N18.4]  Yes   • Hyperparathyroidism (HCC) [E21.3]  Yes   • Essential hypertension [I10]  Yes      Resolved Hospital Problems   No resolved problems to display.        Brief Hospital Course to date:  Moni Wallace is a 81 y.o. female here with sepsis and hypotension due to peritonitis. Her stay is complicated by Ileus vs pSBO. Surgery and ID following.     Sepsis  Bacterial Peritonitis  --Looks a little better today. Have d/w Dr. Llanos - placed back on IV abx 12/3 rather than IP abx.  --Cultures remain negative.  --Pain control      Ileus vs pSBO  --due to peritonitis as above.  --Replaced NGT to LWS. NPO but chips.  --D/W Dr. Andersen this am - recommended continued ongoing conservative with with NGT. SBFT pending.  --Pain control.     PAF, w/ intermittent RVR  HTN  --Supratherapeutic INR. Holding warfarin. Dosing per pharmacy  --Cardiology following. Initially her BB held for hypotension. Now back on PO metoprolol along with addition of amiodarone, now rate controlled.     This patient's problems and plans were partially entered by my partner and updated as appropriate by me 12/04/21.    DVT prophylaxis:  Medical DVT prophylaxis orders are present.       AM-PAC 6 Clicks Score (PT): 17 (12/03/21 2000)    Disposition: I expect the patient to be discharged TBD.    CODE STATUS:   Code Status and Medical Interventions:   Ordered at: 11/28/21 1356     Medical Intervention Limits:    NO intubation (DNI)     Level Of Support Discussed With:    Patient     Code Status (Patient has no pulse and is not breathing):    No CPR (Do Not Attempt to Resuscitate)     Medical Interventions (Patient has pulse or is breathing):    Limited Support       Марина Wasserman II, DO  12/04/21

## 2021-12-04 NOTE — PROGRESS NOTES
"   LOS: 6 days    Patient Care Team:  Alex Walters MD as PCP - General (Internal Medicine)  Jeff Joe IV, MD as Consulting Physician (Cardiology)  Donny Hodges MD as Consulting Physician (Nephrology)  Cory Castillo MD as Surgeon (General Surgery)  Slim Wick MD    ESRD - PD     Subjective     Interval History:     Afib with RVR noted. Feeling better though. Dialysate clear now     Review of Systems:   No CP or SOA , fever, chills, rigors, rash, N/V, Constipation.         Objective     Vital Sign Min/Max for last 24 hours  Temp  Min: 97.5 °F (36.4 °C)  Max: 97.8 °F (36.6 °C)   BP  Min: 86/68  Max: 135/61   Pulse  Min: 77  Max: 97   Resp  Min: 12  Max: 20   SpO2  Min: 94 %  Max: 96 %   No data recorded   Weight  Min: 80.4 kg (177 lb 3.2 oz)  Max: 80.4 kg (177 lb 3.2 oz)     Flowsheet Rows      First Filed Value   Admission Height 162.6 cm (64\") Documented at 11/28/2021 0854   Admission Weight 72.6 kg (160 lb) Documented at 11/28/2021 0854          I/O this shift:  In: -   Out: 500 [Emesis/NG output:500]  I/O last 3 completed shifts:  In: 5389 [P.O.:240; I.V.:589; Other:4210; IV Piggyback:350]  Out: 9800 [Emesis/NG output:150; Other:9650]    Physical Exam:    Gen: Alert, NAD   HENT: NC, AT, EOMI   NECK: Supple, no JVD, Trachea midline   LUNGS: CTA bilaterally, non labored respirtation   CVS: S1/S2 audible, RRR, no murmur   Abd: Soft, NT, ND, BS+   Ext: No pedal edema, no cyanosis   CNS: Alert, No focal deficit noted grossly  Psy: Cooperative  Skin: Warm, dry and intact      WBC WBC   Date Value Ref Range Status   12/03/2021 16.80 (H) 3.40 - 10.80 10*3/mm3 Final   12/02/2021 18.56 (H) 3.40 - 10.80 10*3/mm3 Final      HGB Hemoglobin   Date Value Ref Range Status   12/03/2021 10.6 (L) 12.0 - 15.9 g/dL Final   12/02/2021 10.1 (L) 12.0 - 15.9 g/dL Final      HCT Hematocrit   Date Value Ref Range Status   12/03/2021 33.5 (L) 34.0 - 46.6 % Final   12/02/2021 31.4 (L) 34.0 - 46.6 " % Final      Platlets No results found for: LABPLAT   MCV MCV   Date Value Ref Range Status   12/03/2021 99.1 (H) 79.0 - 97.0 fL Final   12/02/2021 100.0 (H) 79.0 - 97.0 fL Final          Sodium Sodium   Date Value Ref Range Status   12/03/2021 135 (L) 136 - 145 mmol/L Final   12/02/2021 133 (L) 136 - 145 mmol/L Final      Potassium Potassium   Date Value Ref Range Status   12/03/2021 3.4 (L) 3.5 - 5.2 mmol/L Final   12/02/2021 3.4 (L) 3.5 - 5.2 mmol/L Final      Chloride Chloride   Date Value Ref Range Status   12/03/2021 93 (L) 98 - 107 mmol/L Final   12/02/2021 93 (L) 98 - 107 mmol/L Final      CO2 CO2   Date Value Ref Range Status   12/03/2021 21.0 (L) 22.0 - 29.0 mmol/L Final   12/02/2021 22.0 22.0 - 29.0 mmol/L Final      BUN BUN   Date Value Ref Range Status   12/03/2021 39 (H) 8 - 23 mg/dL Final   12/02/2021 41 (H) 8 - 23 mg/dL Final      Creatinine Creatinine   Date Value Ref Range Status   12/03/2021 7.27 (H) 0.57 - 1.00 mg/dL Final   12/02/2021 7.74 (H) 0.57 - 1.00 mg/dL Final      Calcium Calcium   Date Value Ref Range Status   12/03/2021 7.0 (L) 8.6 - 10.5 mg/dL Final   12/02/2021 6.6 (L) 8.6 - 10.5 mg/dL Final      PO4 No results found for: CAPO4   Albumin Albumin   Date Value Ref Range Status   12/03/2021 2.40 (L) 3.50 - 5.20 g/dL Final      Magnesium Magnesium   Date Value Ref Range Status   12/03/2021 1.6 1.6 - 2.4 mg/dL Final      Uric Acid No results found for: URICACID        Results Review:     I reviewed the patient's new clinical results.    alteplase, 2 mg, Intracatheter, Once  amiodarone, 200 mg, Oral, Once   Followed by  amiodarone, 200 mg, Oral, Q8H   Followed by  [START ON 12/11/2021] amiodarone, 200 mg, Oral, Q12H   Followed by  [START ON 12/25/2021] amiodarone, 200 mg, Oral, Daily  cinacalcet, 60 mg, Oral, Nightly  metoprolol tartrate, 25 mg, Oral, Q12H  micafungin (MYCAMINE) IV, 100 mg, Intravenous, Q24H  pantoprazole, 40 mg, Intravenous, Q AM  piperacillin-tazobactam, 3.375 g,  Intravenous, Q12H  sevelamer, 1,600 mg, Oral, TID With Meals  sodium chloride, 10 mL, Intravenous, Q12H  UltraBag/Dianeal PD-2/1.5% Dex (pappas #[id number]), 2,000 mL, Intraperitoneal, 6x Daily  vancomycin (dosing per levels), , Does not apply, Daily  warfarin (COUMADIN) (dosing per levels), , Does not apply, Daily      amiodarone, 0.5 mg/min, Last Rate: 0.5 mg/min (12/04/21 0544)  Pharmacy to dose vancomycin,   Pharmacy to dose warfarin,         Medication Review:    Assessment/Plan       Peritonitis (HCC)    Paroxysmal atrial fibrillation (HCC)    Essential hypertension    Type 2 diabetes mellitus (HCC)    Chronic kidney disease, stage IV (severe) (HCC)    Hyperparathyroidism (HCC)    Hyperlipidemia LDL goal <100    Sepsis associated hypotension (HCC)    Ileus (HCC)    Hypotension      1- ESRD - On PD   2- Peritonitis   3- Anemia   4- Supra-therapeutic INR   5- Low albumin level   6- Corrected Calcium for albumin - 7.56 - monitor   7- Secondary hyperparathyroidism on Sensipar.      Plan:  - Continue with PD   - Monitor I/O strictly.   - LIDIA weekly.   - K supplement daily.   - will check phos level. I think he will be better off Calcium based binder as he is on sensipar and calcium on lower side of normal. Will switch based on phos level @ AM   - Antibx per ID - now on IV.         Albina Will MD  12/04/21  09:05 EST

## 2021-12-04 NOTE — NURSING NOTE
PD rounds complete. Policy and procedure reviewed with AVILA Kumar. Orders and documentation reviewed. Supplies adequate. Denies further need at this time. Encouraged to call [phone] for assistance or for questions.    Scribe Attestation (For Scribes USE Only)... Attending Attestation (For Attendings USE Only).../Scribe Attestation (For Scribes USE Only)...

## 2021-12-04 NOTE — PROGRESS NOTES
"Pharmacy Consult  -  Warfarin    Moni Wallace is a  81 y.o. female   Height - 162.6 cm (64\")  Weight - 80.4 kg (177 lb 3.2 oz)    Consulting Provider: Hospitalist  Indication: A fib  Goal INR: 2-3  Home Regimen: Warfarin 4 mg daily     Bridge Therapy: No    Drug-Drug Interactions with current regimen:     Amiodarone (new start 12/3) - may increase INR and risk of bleeding    Warfarin Dosing During Admission:    Date  11/28 11/29 11/30 12/1 12/2 12/3 12/4     INR  2.84 3.22 4.34 4.96 4.10 3.83 3.54     Dose  3 mg 2 mg HOLD HOLD HOLD HOLD HOLD       Education Provided: follows with [facility], education has previously been provided    Discharge Follow up:     Following Provider - [facility]     Follow up time range or appointment - 3-5 days after discharge    Labs:    Results from last 7 days   Lab Units 12/04/21  0601 12/03/21  0429 12/02/21  1233 12/02/21 0441 12/01/21 0437 11/30/21  0428 11/29/21  0504 11/28/21  1716 11/28/21  0902 11/28/21  0902   INR  3.54* 3.83*  --  4.10* 4.96* 4.34* 3.22* 3.04*   < > 2.84*   HEMOGLOBIN g/dL  --  10.6* 10.1*  --  9.4* 9.8* 10.5*  --   --  11.1*   HEMATOCRIT %  --  33.5* 31.4*  --  29.8* 30.8* 34.6  --   --  34.9    < > = values in this interval not displayed.     Results from last 7 days   Lab Units 12/03/21  0429 12/02/21  0441 12/01/21  0437 11/30/21  0428 11/30/21  0428   SODIUM mmol/L 135* 133* 137   < > 137   POTASSIUM mmol/L 3.4* 3.4* 3.7   < > 3.7   CHLORIDE mmol/L 93* 93* 95*   < > 94*   CO2 mmol/L 21.0* 22.0 23.0   < > 21.0*   BUN mg/dL 39* 41* 46*   < > 50*   CREATININE mg/dL 7.27* 7.74* 8.74*   < > 8.96*   CALCIUM mg/dL 7.0* 6.6* 6.8*   < > 7.1*   BILIRUBIN mg/dL 0.3  --  0.2  --  0.2   ALK PHOS U/L 136*  --  135*  --  144*   ALT (SGPT) U/L <5  --  <5  --  12   AST (SGOT) U/L 21  --  17  --  23   GLUCOSE mg/dL 156* 144* 123*   < > 124*    < > = values in this interval not displayed.     Current dietary intake: Remains NPO, will " follow PO intake    Diet Order   Procedures    NPO Diet NPO Except: Ice chips     Assessment/Plan:     Patient's INR is 3.54 today.  Continue to HOLD warfarin doses until INR drops into therapeutic range.   Daily PT/INR ordered.  Monitor signs/symptoms of bleeding, dietary intake, and drug-drug interactions. Make dose adjustments as necessary.  Pharmacy will continue to follow.    Katherin Tierney, PharmD, BCPS  12/4/2021  08:00 EST

## 2021-12-04 NOTE — PROGRESS NOTES
Clinical Nutrition   Reason For Visit: Follow-up protocol Physician consult    Patient Name: Moni Wallace  YOB: 1940  MRN: [id number]  Date of Encounter: 12/03/21 20:35 EST  Admission date: 11/28/2021      Note plan possible SMFT 12/4  In last 5 days pt mostly NPO/clear liquid diet; however took 75% of 2 full liquid meals per documentation. Unable to evaluate wt hx w influence of fluid status. Likely muscle wasting especially in lower body, however unable to confirm w current condition. Serum albumin affected by sepsis, unable to determine possible contribution of nutrition deficit to value.     Nutrition Assessment     Admission Problem List:  Nausea  Abdominal pain  Peritonitis  Sepsis  Ileus r/t peritoneal inflammation      Applicable PMH:  T2DM  HTN  PAF  FM  ESRD on CAPD ongoing  C. Diff  hx  Vitamin D deficiency  Anemia  Hyperparathyroidism  S/p right kidney transplant (2010)      Applicable medical tests/procedures since admission:  (11/28) NGT to LWS initiated  (12/01)  NG clamp  (12/3) replace NGT to LWS for a time       Reported/Observed/Food/Nutrition Related History     12/3) Pt rpt 3/4 usual intake 1 week PTA. Currently NPO. per I/Os within past 24 hrs NGT output 100 ml  BM 12/1 12/1) Per I/Os within past 24 hours: NGT output = 600 ml; No BM documented this admission.    11/29) Patient reports having poor appetite and poor PO intake starting earlier this year with resulting unintentional weight loss from >200 lbs down to ~160 lbs as of last month. Pt states she was prescribed a medication to improve appetite/PO intake and this has helped - she has been eating much better. She isn't sure if she has had any recent unintentional weight loss. Allergic to rice. Denies difficulty chewing/swallowing. Dislikes and declines ONS drinks. Last solid food intake evening of (11/27).  NGT output = 800 ml within past 24 hours per I/Os.    Anthropometrics   This adm:  Height: 64 in  Weight: 178 lbs  (bed scale weight 11/29) - ?accuracy  BMI: 30.6  BMI classification: Obese Class I: 30-34.9kg/m2   IBW: 120 lbs    UBW: Per EMR  166 lbs (10/11/21) MDOV  200 lbs (7/7/21) bed scale  208 lbs (1/12/21) bed scale    Per RD note (11/29):  Weight change: Unintentional weight loss of 42 lbs 1/12/21-10/11/21. Unclear contribution of fluid status. Need current measured weight to determine weight changes within the past 1.5 months.    Labs reviewed   Labs reviewed: Yes  Note IV Alb ordered.  Results from last 7 days   Lab Units 12/03/21  0429 12/02/21 0441 12/01/21 0437 11/30/21  0428 11/30/21  0428   GLUCOSE mg/dL 156* 144* 123*   < > 124*   BUN mg/dL 39* 41* 46*   < > 50*   CREATININE mg/dL 7.27* 7.74* 8.74*   < > 8.96*   SODIUM mmol/L 135* 133* 137   < > 137   CHLORIDE mmol/L 93* 93* 95*   < > 94*   POTASSIUM mmol/L 3.4* 3.4* 3.7   < > 3.7   MAGNESIUM mg/dL 1.6  --   --   --   --    ALT (SGPT) U/L <5  --  <5  --  12    < > = values in this interval not displayed.     Results from last 7 days   Lab Units 12/03/21 0429 12/01/21 0437 11/30/21  0428   ALBUMIN g/dL 2.40* 2.80* 2.40*       Medications reviewed   Medications reviewed: Yes  Scheduled:abx, micafungin,protonix, sensipar, renvela, coumadin  PRN: [email or web address]    Nutrition Focused Physical Exam     Unable to perform exam due to fluid accumulation  Note poor tone, likely loss of lean mass could be detected in calf/thigh. Difficult to evaluate upper body ? lost lean mass vs fluid/fat accumulation w lack of tone.     Current Nutrition Prescription   PO: NPO Diet NPO Except: Ice chips    Average PO intake: 75% x 2 meals on Full Liquid, 50% x 5 meals recorded on Clear Liquid     Nutrition Diagnosis     11/29, 12/1, 12/3  Problem Inadequate oral intake   Etiology Nausea, abdominal pain   Signs/Symptoms Pt reports minimal intake held down (11/27) = x4 days;  NPO/Clear liquids x 3 days during this admission   Status: ongoing w 2 full liq meals within last 5 days otherwise NPO  or Clr liq. + 75% intake 1 week PTA     Goal:   General: Nutrition support treatment  PO: Advace diet as medically appropriate    Intervention   Intervention: Follow treatment progress, Care plan reviewed    Monitoring/Evaluation:   Monitoring/Evaluation: Per protocol, I&O, PO intake, Pertinent labs, Weight, GI status, Symptoms    Renuka Villanueva RD  Time Spent: 25 min

## 2021-12-04 NOTE — NURSING NOTE
"VSS this shift. Patient c/o back pain with repositioning. Pt ordered SBFT thru Dr. Andersen. Patient missed 0800 and 1200 peritoneal dialysis instillation as she needed to be \"empty\" for radiological imaging. MD. Will and MD. Boyce notified and made aware.     Patient NG Clamped for procedure, two episodes of Emesis. Jose Luis notified. Reglan and compazine ordered. Patient did have one moderate brown stool. NG output green/brown this AM.     Amio IV d/c and PO given per protocol. NSR this shift.     Patient potassium replacement ordered IV as patient unable to tolerated replacement per NG tube.   "

## 2021-12-04 NOTE — PROGRESS NOTES
"Patient Name:  Moni Wallace  YOB: 1940  [id number]    Surgery Progress Note    Date of visit: 12/4/2021      Subjective: No acute events overnight.  Reports that she continues to have some mild abdominal pain, maybe a little bit better than yesterday but not significantly changed.  Had an NG tube placed and has had a total of 500 cc of output.  Has passed flatus, no bowel movement.  Overall she feels a little bit better today          Objective:     /75 (BP Location: Right leg, Patient Position: Lying)   Pulse 83   Temp 97.7 °F (36.5 °C) (Oral)   Resp 18   Ht 162.6 cm (64\")   Wt 80.4 kg (177 lb 3.2 oz)   LMP  (LMP Unknown)   SpO2 95%   BMI 30.42 kg/m²     Intake/Output Summary (Last 24 hours) at 12/4/2021 1026  Last data filed at 12/4/2021 0959  Gross per 24 hour   Intake 7389 ml   Output 6450 ml   Net 939 ml       GEN:   Awake, alert, in no acute distress, resting comfortably in bed, elderly and frail  CV:   Regular rate and rhythm  L:  Clear to auscultation bilaterally, not labored on room air   Abd:  Soft, not obviously distended but currently undergoing peritoneal dialysis, peritoneal dialysis catheter is present on the left side without significant induration or purulence around the site, mild diffuse tenderness to palpation throughout the abdomen, left paramedian scar  Ext:  No cyanosis, clubbing, or edema    Recent labs that are back at this time have been reviewed.           Assessment/ Plan:    Mrs. Wallace is an 81-year-old lady with history of end-stage renal disease on peritoneal dialysis, diabetes, A. fib on Coumadin, and prior C. difficile infection who was admitted due to concern for peritonitis related to peritoneal dialysis    #Possible bowel obstruction in the setting of peritonitis related to peritoneal dialysis  -Vital signs stable.  White blood cell count down at 14 from 18 yesterday.  Feels a little bit better  -Passing flatus and does not appear to be completely " obstructed, however has had recurrent nausea and vomiting with inability to tolerate a diet for several days now had an NG tube replaced with 500 cc of output.  -Favor an ileus related to peritonitis from peritoneal dialysis, however does have some distended small bowel loops and certainly could be related to adhesive obstruction, ischemia, or enteritis.  We have to be judicious in considering operative intervention for her as this will take the option for peritoneal dialysis off the table for several weeks after surgery.  For the time being I recommended to obtain a small bowel follow-through today and we will continue to support her and assess her clinical progress  -Can you antibiotics per ID  -PRN pain control and antiemetics      Robin Andersen MD  12/4/2021  10:26 EST       negative detailed exam

## 2021-12-04 NOTE — PROGRESS NOTES
Pharmacy Consult-Vancomycin Dosing  Moni Wallace is a  81 y.o. female receiving vancomycin therapy.     Indication: Peritonitis  Consulting Provider: Hospitalist  ID Consult: No    Goal Trough: >15 mcg/mL    Current Antimicrobial Therapy  Anti-Infectives (From admission, onward)      Ordered     Dose/Rate Route Frequency Start Stop    12/03/21 0857  piperacillin-tazobactam (ZOSYN) 3.375 g in iso-osmotic dextrose 50 ml (premix)        Ordering Provider: Sawyer Llanos MD    3.375 g  over 4 Hours Intravenous Every 12 Hours 12/03/21 1800 12/10/21 1759    12/03/21 0832  micafungin 100 mg/100 mL 0.9% NS IVPB (mbp)        Ordering Provider: Sawyer Llanos MD    100 mg Intravenous Every 24 Hours 12/03/21 1000 12/10/21 0959    12/03/21 0857  piperacillin-tazobactam (ZOSYN) 3.375 g in iso-osmotic dextrose 50 ml (premix)        Ordering Provider: Sawyer Llanos MD    3.375 g  over 30 Minutes Intravenous Once 12/03/21 0945 12/03/21 1214    12/03/21 0832  Pharmacy to dose vancomycin        Ordering Provider: Sawyer Llanos MD     Does not apply Continuous PRN 12/03/21 0831 12/13/21 0830    12/02/21 1434  vancomycin (dosing per levels)        Ordering Provider: Johnson Villarreal, PharmD     Does not apply Daily 12/02/21 1530 12/25/21 0859    11/30/21 1111  UltraBag/Dianeal PD-2/1.5% Dex (pappas #[id number]) (DIANEAL) 2,000 mL with vancomycin (VANCOCIN) 1,250 mg, cefTAZidime (FORTAZ) 1,000 mg dialysis solution        Ordering Provider: Sawyer Llanos MD     Intraperitoneal Once 11/30/21 2200 11/30/21 2200    11/28/21 1942  UltraBag/Dianeal PD-2/1.5% Dex (pappas #[id number]) (DIANEAL) 2,000 mL with ceFAZolin (ANCEF) 2,000 mg dialysis solution        Ordering Provider: Kali Rojas MD     Intraperitoneal Once 11/29/21 0000 11/29/21 0004    11/28/21 1157  vancomycin 1500 mg/500 mL 0.9% NS IVPB ([facility])        Ordering Provider: Lex Marcum MD    20 mg/kg × 72.6 kg Intravenous Once 11/28/21 1159 11/28/21 1400     "11/28/21 1157  piperacillin-tazobactam (ZOSYN) 3.375 g in iso-osmotic dextrose 50 ml (premix)        Ordering Provider: Lex Marcum MD    3.375 g Intravenous Once 11/28/21 1159 11/28/21 1317          Allergies  Allergies as of 11/28/2021 - Reviewed 11/28/2021   Allergen Reaction Noted    Hydrocodone Itching 05/19/2019    Rice Swelling 07/07/2021    Statins Other (See Comments) 07/21/2020    Codeine Rash 05/07/2018    Sulfa antibiotics Rash 05/07/2018     Labs    Results from last 7 days   Lab Units 12/03/21  0429 12/02/21  0441 12/01/21  0437   BUN mg/dL 39* 41* 46*   CREATININE mg/dL 7.27* 7.74* 8.74*     Results from last 7 days   Lab Units 12/03/21  0429 12/02/21  1233 12/01/21  0437   WBC 10*3/mm3 16.80* 18.56* 13.05*     Evaluation of Dosing    Is Patient on Dialysis or Renal Replacement: Y - PD    Ht - 162.6 cm (64\")  Wt - 80.4 kg (177 lb 3.2 oz)    Estimated Creatinine Clearance: 6.3 mL/min (A) (by C-G formula based on SCr of 7.27 mg/dL (H)).    Intake & Output (last 3 days)         12/01 0701  12/02 0700 12/02 0701  12/03 0700 12/03 0701  12/04 0700 12/04 0701  12/05 0700    P.O. 1070 840 240     I.V. (mL/kg)   589 (7.3)     Other 6000 6000 4210     IV Piggyback  100 250     Total Intake(mL/kg) 7070 (87.9) 6940 (86.3) 5289 (65.8)     Emesis/NG output 600 100 150 500    Other 35296 9250 5150     Stool 0       Total Output 68830 9350 5300 500    Net -4980 -2410 -11 -500            Stool Unmeasured Occurrence 1 x             Microbiology and Radiology  Microbiology Results (last 10 days)       Procedure Component Value - Date/Time    Body Fluid Culture - Body Fluid, Peritoneum [[id number]] Collected: 12/02/21 1850    Lab Status: Preliminary result Specimen: Body Fluid from Peritoneum Updated: 12/04/21 0728     Body Fluid Culture No growth at 2 days     Gram Stain Many (4+) WBCs seen      No organisms seen    Urine Culture - Urine, Urine, Clean Catch [[id number]]  (Normal) Collected: 11/28/21 1647    Lab " Status: Final result Specimen: Urine, Clean Catch Updated: 11/29/21 2255     Urine Culture No growth    COVID PRE-OP / PRE-PROCEDURE SCREENING ORDER (NO ISOLATION) - Swab, Nasopharynx [[id number]]  (Normal) Collected: 11/28/21 1558    Lab Status: Final result Specimen: Swab from Nasopharynx Updated: 11/28/21 2048    Narrative:      The following orders were created for panel order COVID PRE-OP / PRE-PROCEDURE SCREENING ORDER (NO ISOLATION) - Swab, Nasopharynx.  Procedure                               Abnormality         Status                     ---------                               -----------         ------                     COVID-19, APTIMA PANTHER...[[id number]]  Normal              Final result                 Please view results for these tests on the individual orders.    COVID-19, APTIMA PANTHER [facility] IN-HOUSE NP/OP SWAB IN UTM/VTM/[facility]ALINE TRANSPORT MEDIA 24HR TAT - Swab, Nasopharynx [[id number]]  (Normal) Collected: 11/28/21 1558    Lab Status: Final result Specimen: Swab from Nasopharynx Updated: 11/28/21 2048     COVID19 Not Detected    Narrative:      Fact sheet for providers: https://www.fda.gov/media/285546/download     Fact sheet for patients: https://www.fda.gov/media/234289/download    Test performed by RT PCR.    Body Fluid Culture - Body Fluid, Peritoneum [[id number]] Collected: 11/28/21 1214    Lab Status: Final result Specimen: Body Fluid from Peritoneum Updated: 12/01/21 1239     Body Fluid Culture No growth at 3 days     Gram Stain Few (2+) WBCs seen      No organisms seen    Blood Culture - Blood, Wrist, Left [[id number]]  (Normal) Collected: 11/28/21 1023    Lab Status: Final result Specimen: Blood from Wrist, Left Updated: 12/03/21 1046     Blood Culture No growth at 5 days    Blood Culture - Blood, Wrist, Left [[id number]]  (Normal) Collected: 11/28/21 1023    Lab Status: Final result Specimen: Blood from Wrist, Left Updated: 12/03/21 1046     Blood Culture No growth at 5 days           Vancomycin Levels:  Results from last 7 days   Lab Units 12/04/21  0601 12/03/21  0429 11/30/21  0428   VANCOMYCIN RM mcg/mL 17.10 19.20 17.60           Assessment/Plan:    Pharmacy to dose vancomycin for peritonitis (note patient receives peritoneal dialysis).   Patient received a dose of vancomycin 1250 mg via peritoneal dialysis (IP) on 11/30 @ 2200. Plan for further vancomycin doses to be given IV per ID.   Vancomycin random = 17.1 mcg/mL, only slightly changed from yesterday. No need to re-dose vancomycin today. Repeat vanc AM random 12/5 to assess need for further doses.  Monitor renal function, cultures and sensitivities, and clinical status, and adjust regimen as necessary.  Pharmacy will continue to follow.    Katherin Tierney, PharmD, BCPS  12/4/2021  07:55 EST

## 2021-12-04 NOTE — PLAN OF CARE
Inpatient RN   Plan of Care Update  ________________________________________________    Patient Name:  Moni Wallace  YOB: 1940  MRN: [id number]  Admit Date:  11/28/2021      Assessment Date:  12/4/2021    Vital Signs stable, On Telemetry, Pt is Atrial Fibrillation w/ a controlled rate, Pt currently on room air Pt has been sleeping well with a Pain status of well controlled and finding no nausea and no vomiting.    Pt orientated to person, place, time and situation with LOC of alert.    UOP  . Peritoneal dialysis- I was able to unclog tubing so the peritoneum could drain by gravity into the bag.  .    NG tube output of 500 mL. Gastric contents is dark red/brown with coffee grounds.        Pt has no requests at this time.   Will continue to monitor,     Electronically signed by:  LEA GARZA RN  12/04/21 07:53 EST

## 2021-12-04 NOTE — PROGRESS NOTES
Moni Wallace  [id number]  1940   LOS: 6 days   Patient Care Team:  Alex Walters MD as PCP - General (Internal Medicine)  Jeff Joe IV, MD as Consulting Physician (Cardiology)  Donny Hodges MD as Consulting Physician (Nephrology)  Cory Castillo MD as Surgeon (General Surgery)  Slim Wick MD    Chief Complaint: Follow-up on atrial flutter with RVR, bacterial peritonitis    Subjective      The patient went into atrial fib with RVR yesterday morning again.  She states that she was unaware that she was out of rhythm except the monitor kept going off.  She denies any palpitations, chest pain, or shortness of breath.  She still has some abdominal discomfort.  Currently receiving PD.  Good spirits this morning although she did not sleep well through the night.    Objective     Vital Sign Min/Max for last 24 hours  Temp  Min: 97.5 °F (36.4 °C)  Max: 97.9 °F (36.6 °C)   BP  Min: 86/68  Max: 135/61   Pulse  Min: 77  Max: 140   Resp  Min: 12  Max: 22   SpO2  Min: 94 %  Max: 96 %      Weight  Min: 80.4 kg (177 lb 3.2 oz)  Max: 80.4 kg (177 lb 3.2 oz)         11/30/21  0815 12/04/21  0427   Weight: 80.4 kg (177 lb 4.8 oz) 80.4 kg (177 lb 3.2 oz)         Intake/Output Summary (Last 24 hours) at 12/4/2021 0701  Last data filed at 12/4/2021 0519  Gross per 24 hour   Intake 5289 ml   Output 5300 ml   Net -11 ml       Physical Exam:     General Appearance:    Alert, cooperative, in no acute distress, NG, PD in process   Lungs:    Left basilar crackles,respirations regular, even and                   unlabored off supplemental oxygen.    Heart:    Regular and normal rate, normal S1 and S2, grade 2/6            murmur, no gallop, no rub, no click   Abdomen:  Extremities:   Soft, non-tender, bowel sounds audible x4    No edema, normal range of motion   Pulses:   Pulses palpable and equal bilaterally      Results Review:   Results from last 7 days   Lab Units 12/03/21  6390  12/02/21 0441 12/02/21 0441 12/01/21 0437 12/01/21 0437   SODIUM mmol/L 135*  --  133*  --  137   POTASSIUM mmol/L 3.4*  --  3.4*  --  3.7   CHLORIDE mmol/L 93*  --  93*  --  95*   CO2 mmol/L 21.0*  --  22.0  --  23.0   BUN mg/dL 39*  --  41*  --  46*   CREATININE mg/dL 7.27*  --  7.74*  --  8.74*   GLUCOSE mg/dL 156*   < > 144*   < > 123*   CALCIUM mg/dL 7.0*  --  6.6*  --  6.8*    < > = values in this interval not displayed.     Results from last 7 days   Lab Units 12/03/21 0429 12/02/21 1233 12/01/21 0437   WBC 10*3/mm3 16.80* 18.56* 13.05*   HEMOGLOBIN g/dL 10.6* 10.1* 9.4*   HEMATOCRIT % 33.5* 31.4* 29.8*   PLATELETS 10*3/mm3 267 283 294     · KUB 12/3/2021:Single frontal torso demonstrates tube tip to be in the body the stomach. Incidental moderate gaseous distention of jejunum noted.    · No new chest x-ray to review    · ECG 12/3/2021:  Normal sinus rhythm  Cannot rule out Anterior infarct , age undetermined  ST & T wave abnormality, consider lateral ischemia  Abnormal ECG  When compared with ECG of 03-DEC-2021 15:03, (Unconfirmed)  Previous ECG has undetermined rhythm, needs review  Confirmed by MD Tanner, Kemar ([id number]) on 12/3/2021 5:35:22 PM    · INR 3.83 on 12/3/2021    Medication Review: Reviewed; DRIP = amiodarone at 0.5 mg/minute.    Assessment/Plan      Patient with paroxysmal atrial fib with RVR in the setting of bacterial peritonitis and end-stage renal disease on PD.  She is unable to tolerate high-dose p.o. metoprolol and IV Cardizem due to hypotension and would continue current metoprolol 25 mg twice daily (hold for systolic blood pressure less than 90 mmHg).  INRs have been supratherapeutic but trending downwards.  Patient had recurrent atrial fib with RVR and is on amiodarone protocol and currently in sinus rhythm.  No new labs to review yet this morning.  Blood cultures are pending.  Nutrition remains a concern.      Peritonitis (HCC)    Paroxysmal atrial fibrillation (HCC)     Essential hypertension    Type 2 diabetes mellitus (HCC)    Chronic kidney disease, stage IV (severe) (HCC)    Hyperparathyroidism (HCC)    Hyperlipidemia LDL goal <100    Sepsis associated hypotension (HCC)    Ileus (HCC)    Hypotension     Electronically signed by REY Kowalski, 12/04/21, 7:12 AM EST.    I have seen and examined the patient, case was discussed with the physician extender, reviewed the above note, necessary changes were made and I agree with the final note.     Farooq Rodriguez MD [facility]    12/04/21  07:01 EST

## 2021-12-05 NOTE — PROGRESS NOTES
Pharmacy Parenteral Nutrition Evaluation    Moni Wallace is a  81 y.o. female receiving TPN.     Indication: Inaccessible GI Tract  Consulting Physician: Dr. Stovall     Total Fluid Rate (if stated): 75ml/hr (PPN) per renal     Labs  Results from last 7 days   Lab Units 12/05/21  0649 12/04/21  0947 12/03/21  0429   SODIUM mmol/L 136 135* 135*   POTASSIUM mmol/L 2.9* 3.0* 3.4*   CHLORIDE mmol/L 89* 91* 93*   CO2 mmol/L 26.0 26.0 21.0*   BUN mg/dL 35* 36* 39*   CREATININE mg/dL 7.02* 6.98* 7.27*   CALCIUM mg/dL 8.3* 7.5* 7.0*   BILIRUBIN mg/dL 0.3 0.2 0.3   ALK PHOS U/L 109 108 136*   ALT (SGPT) U/L <5 <5 <5   AST (SGOT) U/L 13 13 21   GLUCOSE mg/dL 134* 155* 156*       Results from last 7 days   Lab Units 12/05/21  0649 12/03/21  0429   MAGNESIUM mg/dL  --  1.6   PHOSPHORUS mg/dL 5.7*  --        Results from last 7 days   Lab Units 12/05/21  0420 12/04/21  0947 12/03/21  0429   WBC 10*3/mm3 17.56* 14.72* 16.80*   HEMOGLOBIN g/dL 9.4* 9.7* 10.6*   HEMATOCRIT % 28.7* 29.5* 33.5*   PLATELETS 10*3/mm3 255 227 267       Triglycerides   Date Value Ref Range Status   03/04/2019 160 (H) 0 - 150 mg/dL Final       estimated creatinine clearance is 6.6 mL/min (A) (by C-G formula based on SCr of 7.02 mg/dL (H)).    Intake & Output (last 3 days)         12/02 0701 12/03 0700 12/03 0701 12/04 0700 12/04 0701 12/05 0700 12/05 0701 12/06 0700    P.O. 840 240 0     I.V. (mL/kg)  589 (7.3)      Other 6000 4210 2090 120    IV Piggyback 100 250 600 450    Total Intake(mL/kg) 6940 (86.3) 5289 (65.8) 2690 (33) 570 (7)    Urine (mL/kg/hr)   25 (0)     Emesis/NG output      Other 9250 5150 7000 2300    Stool   0     Total Output 9350 5300 9350 2300    Net -2410 -11 -6660 -1730            Stool Unmeasured Occurrence   1 x             Dietitian Recommendations  Diet Orders (active) (From admission, onward)       Start     Ordered    12/03/21 1248  NPO Diet NPO Except: Ice chips  Diet Effective Now         12/03/21 1247                     Current PPN Regimen Recommendation:  Dextrose 5% / Amino Acid 5.5% at goal rate of 75mL/hr.  20% SMOF lipids 250mL every 24 hours.    Assessment/Plan:  Will initiate PPN per dietary recommendations at D5%, AA5.5% @ 25 ml/hr and advance by 25 ml/hr Q 8 hours to goal rate 75 ml/hr. 250 ml 20% SMOF lipid daily.  Patient currently on peritoneal dialysis secondary to ESRD.   Will remove micronutrients, except Na 30mEq/L,  from PPN and replace as needed.    Low sliding scale coverage ordered as needed.   Pharmacy will continue to follow.      Slim Jones, PharmD, BCPS  12/5/2021  12:42 EST

## 2021-12-05 NOTE — PROGRESS NOTES
Pharmacy Consult-Vancomycin Dosing  Moni Wallace is a  81 y.o. female receiving vancomycin therapy.     Indication: Peritonitis  Consulting Provider: Hospitalist  ID Consult: No    Goal Trough: >15 mcg/mL    Current Antimicrobial Therapy  Anti-Infectives (From admission, onward)      Ordered     Dose/Rate Route Frequency Start Stop    12/03/21 0857  piperacillin-tazobactam (ZOSYN) 3.375 g in iso-osmotic dextrose 50 ml (premix)        Ordering Provider: Sawyer Llanos MD    3.375 g  over 4 Hours Intravenous Every 12 Hours 12/03/21 1800 12/10/21 1759    12/03/21 0832  micafungin 100 mg/100 mL 0.9% NS IVPB (mbp)        Ordering Provider: Sawyer Llanos MD    100 mg Intravenous Every 24 Hours 12/03/21 1000 12/10/21 0959    12/03/21 0857  piperacillin-tazobactam (ZOSYN) 3.375 g in iso-osmotic dextrose 50 ml (premix)        Ordering Provider: Sawyer Llanos MD    3.375 g  over 30 Minutes Intravenous Once 12/03/21 0945 12/03/21 1214    12/03/21 0832  Pharmacy to dose vancomycin        Ordering Provider: Sawyer Llanos MD     Does not apply Continuous PRN 12/03/21 0831 12/13/21 0830    12/02/21 1434  vancomycin (dosing per levels)        Ordering Provider: Johnson Villarreal, PharmD     Does not apply Daily 12/02/21 1530 12/25/21 0859    11/30/21 1111  UltraBag/Dianeal PD-2/1.5% Dex (pappas #[id number]) (DIANEAL) 2,000 mL with vancomycin (VANCOCIN) 1,250 mg, cefTAZidime (FORTAZ) 1,000 mg dialysis solution        Ordering Provider: Sawyer Llanos MD     Intraperitoneal Once 11/30/21 2200 11/30/21 2200    11/28/21 1942  UltraBag/Dianeal PD-2/1.5% Dex (pappas #[id number]) (DIANEAL) 2,000 mL with ceFAZolin (ANCEF) 2,000 mg dialysis solution        Ordering Provider: Kali Rojas MD     Intraperitoneal Once 11/29/21 0000 11/29/21 0004    11/28/21 1157  vancomycin 1500 mg/500 mL 0.9% NS IVPB ([facility])        Ordering Provider: Lex Marcum MD    20 mg/kg × 72.6 kg Intravenous Once 11/28/21 1159 11/28/21 1400     "11/28/21 1157  piperacillin-tazobactam (ZOSYN) 3.375 g in iso-osmotic dextrose 50 ml (premix)        Ordering Provider: Lex Marcum MD    3.375 g Intravenous Once 11/28/21 1159 11/28/21 1317          Allergies  Allergies as of 11/28/2021 - Reviewed 11/28/2021   Allergen Reaction Noted    Hydrocodone Itching 05/19/2019    Rice Swelling 07/07/2021    Statins Other (See Comments) 07/21/2020    Codeine Rash 05/07/2018    Sulfa antibiotics Rash 05/07/2018     Labs    Results from last 7 days   Lab Units 12/04/21  0947 12/03/21  0429 12/02/21  0441   BUN mg/dL 36* 39* 41*   CREATININE mg/dL 6.98* 7.27* 7.74*     Results from last 7 days   Lab Units 12/05/21  0420 12/04/21  0947 12/03/21  0429   WBC 10*3/mm3 17.56* 14.72* 16.80*     Evaluation of Dosing    Is Patient on Dialysis or Renal Replacement: Y - PD    Ht - 162.6 cm (64\")  Wt - 81.5 kg (179 lb 9.6 oz)    Estimated Creatinine Clearance: 6.6 mL/min (A) (by C-G formula based on SCr of 6.98 mg/dL (H)).    Intake & Output (last 3 days)         12/02 0701  12/03 0700 12/03 0701  12/04 0700 12/04 0701  12/05 0700 12/05 0701  12/06 0700    P.O. 840 240 0     I.V. (mL/kg)  589 (7.3)      Other 6000 4210 2090     IV Piggyback 100 250 600 250    Total Intake(mL/kg) 6940 (86.3) 5289 (65.8) 2690 (33) 250 (3.1)    Urine (mL/kg/hr)   25 (0)     Emesis/NG output      Other 9250 5150 7000 2300    Stool   0     Total Output 9350 5300 9150 2300    Net -2410 -11 -6460 -2050            Stool Unmeasured Occurrence   1 x           Microbiology and Radiology  Microbiology Results (last 10 days)       Procedure Component Value - Date/Time    Blood Culture - Blood, Hand, Left [[id number]]  (Normal) Collected: 12/03/21 1318    Lab Status: Preliminary result Specimen: Blood from Hand, Left Updated: 12/04/21 1501     Blood Culture No growth at 24 hours    Narrative:      Aerobic Only    Blood Culture - Blood, Hand, Left [[id number]]  (Normal) Collected: 12/03/21 1230    Lab " Status: Preliminary result Specimen: Blood from Hand, Left Updated: 12/04/21 1330     Blood Culture No growth at 24 hours    AFB Culture - Body Fluid, Peritoneum [[id number]] Collected: 12/03/21 1040    Lab Status: Preliminary result Specimen: Body Fluid from Peritoneum Updated: 12/04/21 1207     AFB Stain No acid fast bacilli seen on concentrated smear    Body Fluid Culture - Body Fluid, Peritoneum [[id number]] Collected: 12/02/21 1850    Lab Status: Final result Specimen: Body Fluid from Peritoneum Updated: 12/05/21 0736     Body Fluid Culture No growth at 3 days     Gram Stain Many (4+) WBCs seen      No organisms seen    Urine Culture - Urine, Urine, Clean Catch [[id number]]  (Normal) Collected: 11/28/21 1647    Lab Status: Final result Specimen: Urine, Clean Catch Updated: 11/29/21 2255     Urine Culture No growth    COVID PRE-OP / PRE-PROCEDURE SCREENING ORDER (NO ISOLATION) - Swab, Nasopharynx [[id number]]  (Normal) Collected: 11/28/21 1558    Lab Status: Final result Specimen: Swab from Nasopharynx Updated: 11/28/21 2048    Narrative:      The following orders were created for panel order COVID PRE-OP / PRE-PROCEDURE SCREENING ORDER (NO ISOLATION) - Swab, Nasopharynx.  Procedure                               Abnormality         Status                     ---------                               -----------         ------                     COVID-19, APTIMA PANTHER...[[id number]]  Normal              Final result                 Please view results for these tests on the individual orders.    COVID-19, APTIMA PANTHER [facility] IN-HOUSE NP/OP SWAB IN UTM/VTM/SALINE TRANSPORT MEDIA 24HR TAT - Swab, Nasopharynx [[id number]]  (Normal) Collected: 11/28/21 1558    Lab Status: Final result Specimen: Swab from Nasopharynx Updated: 11/28/21 2048     COVID19 Not Detected    Narrative:      Fact sheet for providers: https://www.fda.gov/media/358327/download     Fact sheet for patients:  https://www.fda.gov/media/378234/download    Test performed by RT PCR.    Body Fluid Culture - Body Fluid, Peritoneum [[id number]] Collected: 11/28/21 1214    Lab Status: Final result Specimen: Body Fluid from Peritoneum Updated: 12/01/21 1239     Body Fluid Culture No growth at 3 days     Gram Stain Few (2+) WBCs seen      No organisms seen    Blood Culture - Blood, Wrist, Left [[id number]]  (Normal) Collected: 11/28/21 1023    Lab Status: Final result Specimen: Blood from Wrist, Left Updated: 12/03/21 1046     Blood Culture No growth at 5 days    Blood Culture - Blood, Wrist, Left [[id number]]  (Normal) Collected: 11/28/21 1023    Lab Status: Final result Specimen: Blood from Wrist, Left Updated: 12/03/21 1046     Blood Culture No growth at 5 days          Vancomycin Levels:  Results from last 7 days   Lab Units 12/05/21  0649 12/04/21  0601 12/03/21  0429 11/30/21  0428   VANCOMYCIN RM mcg/mL 17.80 17.10 19.20 17.60           Assessment/Plan:    Pharmacy to dose vancomycin for peritonitis (note patient receives peritoneal dialysis).   Patient received a dose of vancomycin 1250 mg via peritoneal dialysis (IP) on 11/30 @ 2200. Plan for further vancomycin doses to be given IV per ID.   Vancomycin random = 17.8 mcg/mL, only slightly changed from the level on 12/4. No need to re-dose vancomycin today. Repeat vanc AM random 12/6 to assess need for further doses.  Monitor renal function, cultures and sensitivities, and clinical status, and adjust regimen as necessary.  Pharmacy will continue to follow.    Slim Jones, KwadwoD, BCPS  12/5/2021  07:50 EST

## 2021-12-05 NOTE — PROGRESS NOTES
[facility] Progress Note        Antibiotics:  Anti-Infectives (From admission, onward)    Ordered     Dose/Rate Route Frequency Start Stop    12/03/21 0857  piperacillin-tazobactam (ZOSYN) 3.375 g in iso-osmotic dextrose 50 ml (premix)        Ordering Provider: Sawyer Llanos MD    3.375 g  over 4 Hours Intravenous Every 12 Hours 12/03/21 1800 12/10/21 1759    12/03/21 0832  micafungin 100 mg/100 mL 0.9% NS IVPB (mbp)        Ordering Provider: Sawyer Llanos MD    100 mg Intravenous Every 24 Hours 12/03/21 1000 12/10/21 0959    12/03/21 0857  piperacillin-tazobactam (ZOSYN) 3.375 g in iso-osmotic dextrose 50 ml (premix)        Ordering Provider: Sawyer Llanos MD    3.375 g  over 30 Minutes Intravenous Once 12/03/21 0945 12/03/21 1214    12/03/21 0832  Pharmacy to dose vancomycin        Ordering Provider: Sawyer Llanos MD     Does not apply Continuous PRN 12/03/21 0831 12/13/21 0830    12/02/21 1434  vancomycin (dosing per levels)        Ordering Provider: Johnson Villarreal, PharmD     Does not apply Daily 12/02/21 1530 12/25/21 0859    11/30/21 1111  UltraBag/Dianeal PD-2/1.5% Dex (pappas #[id number]) (DIANEAL) 2,000 mL with vancomycin (VANCOCIN) 1,250 mg, cefTAZidime (FORTAZ) 1,000 mg dialysis solution        Ordering Provider: Sawyer Llanos MD     Intraperitoneal Once 11/30/21 2200 11/30/21 2200    11/28/21 1942  UltraBag/Dianeal PD-2/1.5% Dex (pappas #[id number]) (DIANEAL) 2,000 mL with ceFAZolin (ANCEF) 2,000 mg dialysis solution        Ordering Provider: Kali Rojas MD     Intraperitoneal Once 11/29/21 0000 11/29/21 0004    11/28/21 1157  vancomycin 1500 mg/500 mL 0.9% NS IVPB ([facility])        Ordering Provider: Lex Marcum MD    20 mg/kg × 72.6 kg Intravenous Once 11/28/21 1159 11/28/21 1400    11/28/21 1157  piperacillin-tazobactam (ZOSYN) 3.375 g in iso-osmotic dextrose 50 ml (premix)        Ordering Provider: Lex Marcum MD    3.375 g Intravenous Once 11/28/21 1159 11/28/21 1316           CC: abd pain    HPI:      Patient is a 81 y.o.  Yr old female with history of ESRD on CAPD for several years, Hx CDiff years ago she was admitted November 28 with acute onset abdominal pain over 2 days, nausea/vomiting and abnormal CT scan raising concern for possible early ileus versus partial small bowel obstruction and hazy omental stranding concerning for peritonitis per notes.  She had leukocytosis and peritoneal fluid with 1748 WBC and predominance neutrophils.  Concern for CAPD associated peritonitis and empiric vancomycin/Zosyn initiated.  Nursing had reported peritoneal fluid had initially looked a little hazy but patient did not recall a hazy or bloody appearance.  No redness/swelling or pain or other problems at her PD catheter.     12/3/21 NG out 12/2 with nausea and vomiting multiple times overnight; dialysate remains blood tinged per patient;  tachycardia, hypotension last 24 hours, WBC increased overall and at risk for systemic illness;  Will change abx back to IV vancomycin/zosyn, add empiric mycamine    12/4/21 cardiology following with paroxysmal afib/RVR    12/5/21 Dr Andersen following, surgery plans being made and SBFT with SBO per radiology;  Sleepy at my visit;  Per nursing, She has   abdominal pain, mid abdomen, intermittent, nonradiating, 2 out of 10, not progressive.  She denies diarrhea.  No hematochezia melena or hematemesis.  No rash.  Minimal urine output and no active urinary symptoms.  No dysuria hematuria or pyuria.  No flank pain.  No rash.  No shortness of breath or cough.      ROS:      12/5/21 No f/c/s. no rash. No new ADR to Abx.     Heent-- No new vision, hearing or throat complaints.  No epistaxis or oral sores.  Denies odynophagia or dysphagia.  No flashers, floaters or eye pain. No odynophagia or dysphagia. No headache, photophobia or neck stiffness.  CV-- No chest pain, palpitation or syncope  Resp-- No SOB/cough/Hemoptysis  GI-  As above.No hematochezia, melena, or  "hematemesis. Denies jaundice or chronic liver disease.  -- No dysuria, hematuria, or flank pain.  Denies hesitancy, urgency or flank pain.  Lymph- no swollen lymph nodes in neck/axilla or groin.   Heme- No active bruising or bleeding; no Hx of DVT or PE.  MS-- no swelling or pain in the bones or joints of arms/legs.  No new back pain.  Neuro-- No acute focal weakness or numbness in the arms or legs.  No seizures.     Full 12 point review of systems reviewed and negative otherwise for acute complaints, except for above      PE:   /49   Pulse 82   Temp 97.5 °F (36.4 °C)   Resp 18   Ht 162.6 cm (64\")   Wt 81.5 kg (179 lb 9.6 oz)   LMP  (LMP Unknown)   SpO2 99%   BMI 30.83 kg/m²       GENERAL: sleepy, in no acute distress.   HEENT: Normocephalic, atraumatic.  PERRL. EOMI. No conjunctival injection. No icterus. Oropharynx clear without evidence of thrush or exudate. No evidence of peridontal disease.    NECK: Supple without nuchal rigidity. No mass.  LYMPH: No cervical, axillary or inguinal lymphadenopathy.  HEART: RRR; No murmur, rubs, gallops.   LUNGS: diminished at bases but otherwise Clear to auscultation bilaterally without wheezing, rales, rhonchi. Normal respiratory effort. Nonlabored. No dullness.  ABDOMEN: Soft, mildly tender, nondistended. Positive bowel sounds. No rebound or guarding. NO mass or HSM.  EXT:  No cyanosis, clubbing or edema. No cord.   MSK: FROM without joint effusions noted arms/legs.    SKIN: Warm and dry without cutaneous eruptions on Inspection/palpation.    NEURO: sleepy     PD catheter with no redness or purulent drainage.  Nontender at the exit site     IV without obvious redness or drainage     No peripheral stigmata/phenomenon of endocarditis       Laboratory Data    Results from last 7 days   Lab Units 12/05/21  0420 12/04/21  0947 12/03/21  0429   WBC 10*3/mm3 17.56* 14.72* 16.80*   HEMOGLOBIN g/dL 9.4* 9.7* 10.6*   HEMATOCRIT % 28.7* 29.5* 33.5*   PLATELETS 10*3/mm3 " 255 227 267     Results from last 7 days   Lab Units 12/05/21  0649   SODIUM mmol/L 136   POTASSIUM mmol/L 2.9*   CHLORIDE mmol/L 89*   CO2 mmol/L 26.0   BUN mg/dL 35*   CREATININE mg/dL 7.02*   GLUCOSE mg/dL 134*   CALCIUM mg/dL 8.3*     Results from last 7 days   Lab Units 12/05/21  0649   ALK PHOS U/L 109   BILIRUBIN mg/dL 0.3   ALT (SGPT) U/L <5   AST (SGOT) U/L 13         Results from last 7 days   Lab Units 12/05/21  0649   CRP mg/dL 23.82*       Estimated Creatinine Clearance: 6.6 mL/min (A) (by C-G formula based on SCr of 7.02 mg/dL (H)).      Microbiology:      Radiology:  Imaging Results (Last 72 Hours)     Procedure Component Value Units Date/Time    FL Small Bowel Follow Through Single-Contrast [[id number]] Collected: 12/05/21 0806     Updated: 12/05/21 1321    Narrative:      EXAMINATION: FL SMALL BOWEL FOLLOW THROUGH SINGLE-CONTRAST - 12/04/2021     INDICATION: Abdominal distention     TECHNIQUE: Small bowel follow-through performed with 10 images and no  fluoroscopy.     COMPARISON: None       Impression:      FINDINGS/IMPRESSION:   imaging reveals dilated loops of small bowel  seen diffusely throughout the abdomen. Minimal air in the rectum. There  is a nasogastric tube tip in the stomach. Contrast is seen within the  stomach on the 30- minute image and 230-minute image with no advancement  through the small bowel. There is only minimal contrast seen within the  proximal small bowel on the 9 hour and 40-minute image with minimal  contrast remaining within the stomach in the fundus. The 17-hour image  reveals no advancement of the contrast. Findings most suggestive of a  small bowel obstruction.     DICTATED:   12/04/2021  EDITED/lfs:   12/05/2021       CT Abdomen Pelvis Without Contrast [[id number]] Collected: 12/03/21 1125     Updated: 12/04/21 1115    Narrative:      EXAMINATION: CT ABDOMEN AND PELVIS WO CONTRAST-12/03/2021:      INDICATION: N/V, peritonitis, R/O ileus or PSBO;  "R10.9-Unspecified  abdominal pain; T85.71XA-Infection and inflammatory reaction due to  peritoneal dialysis catheter, initial encounter; A41.9-Sepsis,  unspecified organism.     TECHNIQUE: 5 mm unenhanced images through the abdomen and pelvis.     The radiation dose reduction device was turned on for each scan per the  ALARA (As Low as Reasonably Achievable) protocol.     COMPARISON: 11/28/2021 abdomen and pelvis CT scan.     FINDINGS: Previous exam report indicated new small bowel distention,  suspicious for partial small bowel obstruction with no definite  transition point. Findings related to peritoneal dialysis.     The included lung bases appear clear except for trace bibasilar pleural  thickening/atelectasis.     There is a peritoneal dialysis catheter in the left lower quadrant,  small amount of intrapelvic free fluid, upper abdominal free fluid and  small amount of upper abdominal free air, all typical for peritoneal  dialysis. Mild nonspecific fat stranding of the omentum is similar to  the prior study, perhaps mild edema.     No significant abnormalities are seen of the liver, spleen, the somewhat  atrophic pancreas, or the adrenal glands. The kidneys are markedly  atrophic and there is a large water-density left renal midpole cyst. The  gallbladder is contracted around dense material, presumably gallbladder  \"sludge\", but no gallbladder wall inflammation is seen.     CT scan  film again shows a pattern of dilated small bowel and  relatively little colon gas, suspicious for distal obstruction.   film images appear to show worsening dilatation. The dilatation appears  centered about a loop in the central and right pelvis, which is markedly  decompressed at both ends, and normal caliber centrally with mild  mucosal thickening. Please refer to the appearance of the central  abdominal small bowel loop on axial image 57, tapering to the left on  image 58, and tapering to the right on image 71. Loops " proximal to this  appear mostly dilated. Loops distal to this level appear markedly  decompressed. The colon appears decompressed, but otherwise  unremarkable. The uterus and ovaries are appropriately atrophic. The  bladder is nondistended. Incidental note is made of what appears to be a  small abdominal wall hernia of the lower pelvis containing mild ascites.       Impression:      1. Findings consistent with incomplete distal small bowel obstruction,  which appears to be centered about an abnormal appearing loop centrally  in the upper pelvis. Degree of distention appears increased from  11/28/2021 scan.     2. Expected changes of peritoneal dialysis, with mild free fluid and  free air in the abdomen.     3. No evidence of discrete, well-defined inflammatory focus.     D:  12/03/2021  E:  12/03/2021     This report was finalized on 12/4/2021 11:12 AM by Dr. Xavier Cantor MD.       XR Abdomen KUB [[id number]] Collected: 12/03/21 2249     Updated: 12/03/21 2251    Narrative:      PROCEDURE: CR Abdomen 1 Vw    INDICATIONS: verification of NG placement; Unspecified abdominal pain; Infection and inflammatory reaction due to peritoneal dialysis catheter, initial encounter; Sepsis, unspecified organism ; verification of NG placement    FINDINGS &     Impression:      Single frontal torso demonstrates tube tip to be in the body the stomach. Incidental moderate gaseous distention of jejunum noted.              Signer Name: Pippa Enriquez MD   Signed: 12/3/2021 10:49 PM   Workstation Name: [facility]    [facility]            Impression:       --acute abdominal pain.  Associated with nausea/vomiting, abnormal PD fluid concerning for  Peritonitis, ?CAPD  ; culture negative so far. Catheter exit site appears nonpurulent for now.  No outpatient cultures per patient. Transitioned to IP vancomycin/ceftaz empirically and back to systemic therapy 12/3  ; SBFT with SBO per radiology and Dr Ganesh villa  surgical plans    **systemic WBC fluctuating, NG out 12/2 and subsequent vomiting with   abd pain;   repeat CT scan 12/3, surgery following with concern for  SBO per radiology     --Acute vomiting, nausea  ; supportive measures ongoing by medicine with NG tube as above and surgery following     --End-stage renal disease with peritoneal dialysis     --History C. Difficile years ago per patient.  No diarrhea at present but at risk for relapse.     --abnormal CT scan with prominent common bile duct per radiology but without evidence for intrahepatic ductal dilation and normal transaminases/bilirubin on November 28.  Monitor exam and labs     --Chronic Coumadin    --paroxysmal Afib/RVR    PLAN:       --IV vancomycin, zosyn ,mycamine     --Check/review labs cultures and scans     --Partial history per nursing staff     --Discussed with microbiology     -- d/w pharmacy      --Highly complex set of issues with high risk for further serious morbidity and other serious sequela    --repeat dialysate for cultures  , fungal/AFB studies pending; d/w micro    --d/w Dr Stovall;  repeat CT 12/3 and SBFT noted, surgery following;  On systemic therapy       Sawyer Llanos MD  12/5/2021

## 2021-12-05 NOTE — PROGRESS NOTES
[facility] Medicine Services  PROGRESS NOTE    Patient Name: Moni Wallace  : 1940  MRN: [id number]    Date of Admission: 2021  Primary Care Physician: Alex Walters MD    Subjective   Subjective     CC:  F/U SBO    HPI:  Patient seen and examined. RN notes reviewed. A fib RVR overnight. Pt states she actually feels better this morning. No abdominal pain. NG remains in place.     ROS:  Gen- No fevers, chills  CV- No chest pain, palpitations  Resp- No cough, dyspnea  GI- No N/V/D, abd pain    Objective   Objective     Vital Signs:   Temp:  [97.4 °F (36.3 °C)-98.2 °F (36.8 °C)] 98.1 °F (36.7 °C)  Heart Rate:  [] 94  Resp:  [16-18] 16  BP: ()/(36-80) 124/49  Flow (L/min):  [2] 2     Physical Exam:  Constitutional: No acute distress, awake, alert  HENT: NCAT, mucous membranes moist, NG in place   Respiratory: Clear to auscultation bilaterally, respiratory effort normal   Cardiovascular: RRR, no murmurs, rubs, or gallops  Gastrointestinal: Positive bowel sounds, soft, nontender, nondistended  Musculoskeletal: No bilateral ankle edema  Psychiatric: Appropriate affect, cooperative  Neurologic: Oriented x 3,  speech clear  Skin: No rashes    Results Reviewed:  LAB RESULTS:      Lab 21  0420 21  0947 21  0601 21  0429 21  1233 21  0441 21  0437 21  0504 21  1716   WBC 17.56* 14.72*  --  16.80* 18.56*  --  13.05*   < >  --    HEMOGLOBIN 9.4* 9.7*  --  10.6* 10.1*  --  9.4*   < >  --    HEMATOCRIT 28.7* 29.5*  --  33.5* 31.4*  --  29.8*   < >  --    PLATELETS 255 227  --  267 283  --  294   < >  --    NEUTROS ABS  --   --   --  14.37* 15.88*  --   --   --   --    IMMATURE GRANS (ABS)  --   --   --  0.20* 0.21*  --   --   --   --    LYMPHS ABS  --   --   --  1.39 1.63  --   --   --   --    MONOS ABS  --   --   --  0.66 0.67  --   --   --   --    EOS ABS  --   --   --  0.13 0.13  --   --   --   --    MCV 96.6 97.7*  --   99.1* 100.0*  --  98.7*   < >  --    LACTATE  --   --   --   --   --   --   --   --  2.3*   PROTIME 32.7*  --  33.8* 35.4*  --  37.9* 43.8*   < > 30.1*    < > = values in this interval not displayed.         Lab 12/05/21 0649 12/04/21  0947 12/03/21 0429 12/02/21 0441 12/01/21 0437   SODIUM 136 135* 135* 133* 137   POTASSIUM 2.9* 3.0* 3.4* 3.4* 3.7   CHLORIDE 89* 91* 93* 93* 95*   CO2 26.0 26.0 21.0* 22.0 23.0   ANION GAP 21.0* 18.0* 21.0* 18.0* 19.0*   BUN 35* 36* 39* 41* 46*   CREATININE 7.02* 6.98* 7.27* 7.74* 8.74*   GLUCOSE 134* 155* 156* 144* 123*   CALCIUM 8.3* 7.5* 7.0* 6.6* 6.8*   MAGNESIUM  --   --  1.6  --   --    PHOSPHORUS 5.7*  --   --   --   --          Lab 12/05/21 0649 12/04/21 0947 12/03/21 0429 12/01/21 0437 11/30/21 0428   TOTAL PROTEIN 6.8 5.6* 6.3 6.3 6.5   ALBUMIN 3.80 3.30* 2.40* 2.80* 2.40*   GLOBULIN 3.0 2.3 3.9 3.5 4.1   ALT (SGPT) <5 <5 <5 <5 12   AST (SGOT) 13 13 21 17 23   BILIRUBIN 0.3 0.2 0.3 0.2 0.2   ALK PHOS 109 108 136* 135* 144*         Lab 12/05/21  0420 12/04/21  0601 12/03/21 0429 12/02/21 0441 12/01/21 0437   PROTIME 32.7* 33.8* 35.4* 37.9* 43.8*   INR 3.39* 3.54* 3.83* 4.10* 4.96*                 Brief Urine Lab Results  (Last result in the past 365 days)      Color   Clarity   Blood   Leuk Est   Nitrite   Protein   CREAT   Urine HCG        11/28/21 1647 Yellow   Turbid   Moderate (2+)   Large (3+)   Negative   >=300 mg/dL (3+)                 Microbiology Results Abnormal     Procedure Component Value - Date/Time    Body Fluid Culture - Body Fluid, Peritoneum [[id number]] Collected: 12/02/21 1850    Lab Status: Final result Specimen: Body Fluid from Peritoneum Updated: 12/05/21 0736     Body Fluid Culture No growth at 3 days     Gram Stain Many (4+) WBCs seen      No organisms seen    Blood Culture - Blood, Hand, Left [[id number]]  (Normal) Collected: 12/03/21 1318    Lab Status: Preliminary result Specimen: Blood from Hand, Left Updated: 12/04/21 1501     Blood  Culture No growth at 24 hours    Narrative:      Aerobic Only    Blood Culture - Blood, Hand, Left [[id number]]  (Normal) Collected: 12/03/21 1230    Lab Status: Preliminary result Specimen: Blood from Hand, Left Updated: 12/04/21 1330     Blood Culture No growth at 24 hours    AFB Culture - Body Fluid, Peritoneum [[id number]] Collected: 12/03/21 1040    Lab Status: Preliminary result Specimen: Body Fluid from Peritoneum Updated: 12/04/21 1207     AFB Stain No acid fast bacilli seen on concentrated smear    Blood Culture - Blood, Wrist, Left [[id number]]  (Normal) Collected: 11/28/21 1023    Lab Status: Final result Specimen: Blood from Wrist, Left Updated: 12/03/21 1046     Blood Culture No growth at 5 days    Blood Culture - Blood, Wrist, Left [[id number]]  (Normal) Collected: 11/28/21 1023    Lab Status: Final result Specimen: Blood from Wrist, Left Updated: 12/03/21 1046     Blood Culture No growth at 5 days    Body Fluid Culture - Body Fluid, Peritoneum [[id number]] Collected: 11/28/21 1214    Lab Status: Final result Specimen: Body Fluid from Peritoneum Updated: 12/01/21 1239     Body Fluid Culture No growth at 3 days     Gram Stain Few (2+) WBCs seen      No organisms seen    Urine Culture - Urine, Urine, Clean Catch [[id number]]  (Normal) Collected: 11/28/21 1647    Lab Status: Final result Specimen: Urine, Clean Catch Updated: 11/29/21 2255     Urine Culture No growth    COVID PRE-OP / PRE-PROCEDURE SCREENING ORDER (NO ISOLATION) - Swab, Nasopharynx [[id number]]  (Normal) Collected: 11/28/21 1558    Lab Status: Final result Specimen: Swab from Nasopharynx Updated: 11/28/21 2048    Narrative:      The following orders were created for panel order COVID PRE-OP / PRE-PROCEDURE SCREENING ORDER (NO ISOLATION) - Swab, Nasopharynx.  Procedure                               Abnormality         Status                     ---------                               -----------         ------                     COVID-19, APTIMA  PANTHER...[[id number]]  Normal              Final result                 Please view results for these tests on the individual orders.    COVID-19, APTIMA PANTHER [facility] IN-HOUSE NP/OP SWAB IN UTM/VTM/SALINE TRANSPORT MEDIA 24HR TAT - Swab, Nasopharynx [[id number]]  (Normal) Collected: 11/28/21 1558    Lab Status: Final result Specimen: Swab from Nasopharynx Updated: 11/28/21 2048     COVID19 Not Detected    Narrative:      Fact sheet for providers: https://www.fda.gov/media/519236/download     Fact sheet for patients: https://www.fda.gov/media/662445/download    Test performed by RT PCR.          FL Small Bowel Follow Through Single-Contrast    Result Date: 12/5/2021  EXAMINATION: FL SMALL BOWEL FOLLOW THROUGH SINGLE-CONTRAST-  INDICATION: SBFT only, assess for bowel obstruction, no UGI abdominal distention  TECHNIQUE: Small bowel follow-through performed with 10 images and no fluoroscopy.  COMPARISON: NONE      Impression: FINDINGS/impression:  imaging reveals dilated loops of small bowel seen diffusely throughout the abdomen. Minimal air in the rectum. There is a nasogastric tube tip in the stomach. Contrast is seen within the stomach on the 30 minute image and 230 minute image with no advancement through the small bowel. There is only minimal contrast seen within the proximal small bowel on the 9 hour and 40 minute image with minimal contrast remaining within the stomach in the fundus. The 17 hour image reveals no advancement of the contrast. Findings most suggestive of a small bowel obstruction.         XR Abdomen KUB    Result Date: 12/3/2021  PROCEDURE: CR Abdomen 1 Vw INDICATIONS: verification of NG placement; Unspecified abdominal pain; Infection and inflammatory reaction due to peritoneal dialysis catheter, initial encounter; Sepsis, unspecified organism ; verification of NG placement FINDINGS &     Impression: Single frontal torso demonstrates tube tip to be in the body the stomach. Incidental moderate  gaseous distention of jejunum noted.   Signer Name: Pippa Enriquez MD  Signed: 12/3/2021 10:49 PM  Workstation Name: [facility]  [facility]      Results for orders placed during the hospital encounter of 01/11/21    Transthoracic Echo Complete With Contrast if Necessary Per Protocol    Interpretation Summary  · Left ventricular systolic function is normal. Estimated left ventricular EF = 65%  · Left ventricular wall thickness is consistent with hypertrophy.  · Left atrial volume is mildly increased.  · The aortic valve is calcified. There is mild aortic stenosis present. There is moderate aortic regurgitation present.  · Estimated right ventricular systolic pressure from tricuspid regurgitation is normal (<35 mmHg).      I have reviewed the medications:  Scheduled Meds:amiodarone, 200 mg, Nasogastric, Q8H   Followed by  [START ON 12/11/2021] amiodarone, 200 mg, Nasogastric, Q12H   Followed by  [START ON 12/25/2021] amiodarone, 200 mg, Nasogastric, Daily  calcium acetate, 667 mg, Oral, TID With Meals  cinacalcet, 60 mg, Oral, Nightly  epoetin blanca/blanca-epbx, 10,000 Units, Subcutaneous, Weekly  insulin lispro, 2-7 Units, Subcutaneous, Q6H  metoclopramide, 5 mg, Intravenous, Q6H  metoprolol tartrate, 12.5 mg, Oral, Once  metoprolol tartrate, 25 mg, Nasogastric, Q12H  micafungin (MYCAMINE) IV, 100 mg, Intravenous, Q24H  pantoprazole, 40 mg, Intravenous, Q AM  piperacillin-tazobactam, 3.375 g, Intravenous, Q12H  potassium chloride, 40 mEq, Nasogastric, Daily  potassium chloride, 10 mEq, Intravenous, Q1H  sevelamer, 1,600 mg, Nasogastric, TID With Meals  sodium chloride, 10 mL, Intravenous, Q12H  UltraBag/Dianeal PD-2/1.5% Dex (pappas #[id number]), 2,000 mL, Intraperitoneal, 6x Daily  vancomycin (dosing per levels), , Does not apply, Daily  warfarin (COUMADIN) (dosing per levels), , Does not apply, Daily      Continuous Infusions:Pharmacy Consult - Pharmacy to dose,   Pharmacy to dose vancomycin,    Pharmacy to dose warfarin,       PRN Meds:.•  acetaminophen  •  albuterol  •  benzocaine-menthol  •  HYDROmorphone  •  ondansetron **OR** ondansetron  •  Pharmacy Consult - Pharmacy to dose  •  Pharmacy to dose vancomycin  •  Pharmacy to dose warfarin  •  phenol  •  prochlorperazine  •  Sodium Chloride (PF)  •  sodium chloride  •  temazepam    Assessment/Plan   Assessment & Plan     Active Hospital Problems    Diagnosis  POA   • **Peritonitis (HCC) [K65.9]  Yes   • Hypotension [I95.9]  Yes   • Sepsis associated hypotension (HCC) [A41.9, I95.9]  Yes   • Ileus (HCC) [K56.7]  Yes   • Hyperlipidemia LDL goal <100 [E78.5]  Yes   • Paroxysmal atrial fibrillation (HCC) [I48.0]  Yes   • Type 2 diabetes mellitus (HCC) [E11.9]  Yes   • Chronic kidney disease, stage IV (severe) (HCC) [N18.4]  Yes   • Hyperparathyroidism (HCC) [E21.3]  Yes   • Essential hypertension [I10]  Yes      Resolved Hospital Problems   No resolved problems to display.        Brief Hospital Course to date:  Moni Wallace is a 81 y.o. female here with sepsis and hypotension due to peritonitis. Her stay is complicated by Ileus vs pSBO. Surgery and ID following.    This patient's problems and plans were partially entered by my partner and updated as appropriate by me 12/05/21.  All problems are new to me today      Sepsis  Bacterial Peritonitis  --ID following, continue Vanc, Zosyn, Mycamine   --Cultures remain negative.  --Pain control      SBO  --Discussed with Dr Andersen this AM. Plan for surgery tomorrow as SBFT with no advancement through small bowel   --Discussed with RD, plan for PPN to start today. This has been discussed with Dr Andersen and Dr Will.      PAF, w/ intermittent RVR  HTN  --Supratherapeutic INR. Holding warfarin.   --Will give 2 units FFP now, repeat INR this afternoon. If remains elevated. Will give Vitamin K  --Cardiology following. Continue BB and Amio     Hypotension  -Pt given NS 250cc bolus this AM along with IV albumin  -Pt ok  with pressor support if needed, monitor closely     ESRD on PD  -Nephrology following   -LIDIA weekly   -K supplement daily per Renal   -Phos level elevated     DVT prophylaxis:  Medical DVT prophylaxis orders are present.       AM-PAC 6 Clicks Score (PT): 16 (12/04/21 2000)    Disposition: I expect the patient to be discharged TBD. High risk for decompensation and needing ICU level care.     CODE STATUS:   Code Status and Medical Interventions:   Ordered at: 11/28/21 1356     Medical Intervention Limits:    NO intubation (DNI)     Level Of Support Discussed With:    Patient     Code Status (Patient has no pulse and is not breathing):    No CPR (Do Not Attempt to Resuscitate)     Medical Interventions (Patient has pulse or is breathing):    Limited Support       Debby Stovall, DO  12/05/21

## 2021-12-05 NOTE — PROGRESS NOTES
"   LOS: 7 days    Patient Care Team:  Alex Walters MD as PCP - General (Internal Medicine)  Jeff Joe IV, MD as Consulting Physician (Cardiology)  Donny Hodges MD as Consulting Physician (Nephrology)  Cory Castillo MD as Surgeon (General Surgery)  Slim Wick MD    ESRD - PD     Subjective     Interval History:     Afib with RVR noted. Feeling better though. Dialysate clear now     Review of Systems:   No CP or SOA , fever, chills, rigors, rash, N/V, Constipation.         Objective     Vital Sign Min/Max for last 24 hours  Temp  Min: 97.4 °F (36.3 °C)  Max: 98.2 °F (36.8 °C)   BP  Min: 73/37  Max: 145/75   Pulse  Min: 72  Max: 124   Resp  Min: 16  Max: 18   SpO2  Min: 94 %  Max: 100 %   Flow (L/min)  Min: 2  Max: 2   Weight  Min: 81.5 kg (179 lb 9.6 oz)  Max: 81.5 kg (179 lb 9.6 oz)     Flowsheet Rows      First Filed Value   Admission Height 162.6 cm (64\") Documented at 11/28/2021 0854   Admission Weight 72.6 kg (160 lb) Documented at 11/28/2021 0854          I/O this shift:  In: 570 [Other:120; IV Piggyback:450]  Out: 2300 [Other:2300]  I/O last 3 completed shifts:  In: 3539 [I.V.:389; Other:2300; IV Piggyback:850]  Out: 9350 [Urine:25; Emesis/NG output:2325; Other:7000]    Physical Exam:    Gen: Alert, NAD   HENT: NC, AT, EOMI   NECK: Supple, no JVD, Trachea midline   LUNGS: CTA bilaterally, non labored respirtation   CVS: S1/S2 audible, RRR, no murmur   Abd: Soft, NT, ND, BS+   Ext: No pedal edema, no cyanosis   CNS: Alert, No focal deficit noted grossly  Psy: Cooperative  Skin: Warm, dry and intact      WBC WBC   Date Value Ref Range Status   12/05/2021 17.56 (H) 3.40 - 10.80 10*3/mm3 Final   12/04/2021 14.72 (H) 3.40 - 10.80 10*3/mm3 Final   12/03/2021 16.80 (H) 3.40 - 10.80 10*3/mm3 Final   12/02/2021 18.56 (H) 3.40 - 10.80 10*3/mm3 Final      HGB Hemoglobin   Date Value Ref Range Status   12/05/2021 9.4 (L) 12.0 - 15.9 g/dL Final   12/04/2021 9.7 (L) 12.0 - 15.9 " g/dL Final   12/03/2021 10.6 (L) 12.0 - 15.9 g/dL Final   12/02/2021 10.1 (L) 12.0 - 15.9 g/dL Final      HCT Hematocrit   Date Value Ref Range Status   12/05/2021 28.7 (L) 34.0 - 46.6 % Final   12/04/2021 29.5 (L) 34.0 - 46.6 % Final   12/03/2021 33.5 (L) 34.0 - 46.6 % Final   12/02/2021 31.4 (L) 34.0 - 46.6 % Final      Platlets No results found for: LABPLAT   MCV MCV   Date Value Ref Range Status   12/05/2021 96.6 79.0 - 97.0 fL Final   12/04/2021 97.7 (H) 79.0 - 97.0 fL Final   12/03/2021 99.1 (H) 79.0 - 97.0 fL Final   12/02/2021 100.0 (H) 79.0 - 97.0 fL Final          Sodium Sodium   Date Value Ref Range Status   12/05/2021 136 136 - 145 mmol/L Final   12/04/2021 135 (L) 136 - 145 mmol/L Final   12/03/2021 135 (L) 136 - 145 mmol/L Final      Potassium Potassium   Date Value Ref Range Status   12/05/2021 2.9 (L) 3.5 - 5.2 mmol/L Final     Comment:     Slight hemolysis detected by analyzer. Results may be affected.   12/04/2021 3.0 (L) 3.5 - 5.2 mmol/L Final     Comment:     Slight hemolysis detected by analyzer. Results may be affected.   12/03/2021 3.4 (L) 3.5 - 5.2 mmol/L Final      Chloride Chloride   Date Value Ref Range Status   12/05/2021 89 (L) 98 - 107 mmol/L Final   12/04/2021 91 (L) 98 - 107 mmol/L Final   12/03/2021 93 (L) 98 - 107 mmol/L Final      CO2 CO2   Date Value Ref Range Status   12/05/2021 26.0 22.0 - 29.0 mmol/L Final   12/04/2021 26.0 22.0 - 29.0 mmol/L Final   12/03/2021 21.0 (L) 22.0 - 29.0 mmol/L Final      BUN BUN   Date Value Ref Range Status   12/05/2021 35 (H) 8 - 23 mg/dL Final   12/04/2021 36 (H) 8 - 23 mg/dL Final   12/03/2021 39 (H) 8 - 23 mg/dL Final      Creatinine Creatinine   Date Value Ref Range Status   12/05/2021 7.02 (H) 0.57 - 1.00 mg/dL Final   12/04/2021 6.98 (H) 0.57 - 1.00 mg/dL Final   12/03/2021 7.27 (H) 0.57 - 1.00 mg/dL Final      Calcium Calcium   Date Value Ref Range Status   12/05/2021 8.3 (L) 8.6 - 10.5 mg/dL Final   12/04/2021 7.5 (L) 8.6 - 10.5 mg/dL  Final   12/03/2021 7.0 (L) 8.6 - 10.5 mg/dL Final      PO4 No results found for: CAPO4   Albumin Albumin   Date Value Ref Range Status   12/05/2021 3.80 3.50 - 5.20 g/dL Final   12/04/2021 3.30 (L) 3.50 - 5.20 g/dL Final   12/03/2021 2.40 (L) 3.50 - 5.20 g/dL Final      Magnesium Magnesium   Date Value Ref Range Status   12/05/2021 1.7 1.6 - 2.4 mg/dL Final   12/03/2021 1.6 1.6 - 2.4 mg/dL Final      Uric Acid No results found for: URICACID        Results Review:     I reviewed the patient's new clinical results.    amiodarone, 200 mg, Nasogastric, Q8H   Followed by  [START ON 12/11/2021] amiodarone, 200 mg, Nasogastric, Q12H   Followed by  [START ON 12/25/2021] amiodarone, 200 mg, Nasogastric, Daily  calcium acetate, 667 mg, Oral, TID With Meals  cinacalcet, 60 mg, Oral, Nightly  epoetin blanca/blanca-epbx, 10,000 Units, Subcutaneous, Weekly  insulin lispro, 2-7 Units, Subcutaneous, Q6H  metoclopramide, 5 mg, Intravenous, Q6H  metoprolol tartrate, 12.5 mg, Oral, Once  metoprolol tartrate, 25 mg, Nasogastric, Q12H  micafungin (MYCAMINE) IV, 100 mg, Intravenous, Q24H  pantoprazole, 40 mg, Intravenous, Q AM  piperacillin-tazobactam, 3.375 g, Intravenous, Q12H  potassium chloride, 40 mEq, Nasogastric, Daily  potassium chloride, 10 mEq, Intravenous, Q1H  sevelamer, 1,600 mg, Nasogastric, TID With Meals  sodium chloride, 10 mL, Intravenous, Q12H  UltraBag/Dianeal PD-2/1.5% Dex (pappas #[id number]), 2,000 mL, Intraperitoneal, 6x Daily  vancomycin (dosing per levels), , Does not apply, Daily  warfarin (COUMADIN) (dosing per levels), , Does not apply, Daily      Pharmacy Consult - Pharmacy to dose,   Pharmacy to dose vancomycin,   Pharmacy to dose warfarin,         Medication Review:    Assessment/Plan       Peritonitis (HCC)    Paroxysmal atrial fibrillation (HCC)    Essential hypertension    Type 2 diabetes mellitus (HCC)    Chronic kidney disease, stage IV (severe) (HCC)    Hyperparathyroidism (HCC)    Hyperlipidemia LDL goal  <100    Sepsis associated hypotension (HCC)    Ileus (HCC)    Hypotension      1- ESRD - On PD   2- Peritonitis   3- Anemia   4- Supra-therapeutic INR   5- Low albumin level   6- Corrected Calcium for albumin - 7.56 - monitor   7- Secondary hyperparathyroidism on Sensipar.      Plan:  - Continue with PD - will decrease exchanges to 3 /day for today.   - Midodrine for low BP times one dose  - Albumin infusion to support BP.   - Monitor I/O strictly.   - LIDIA weekly.   - K supplement daily.   - Will add Calcium acetate. Recheck phos level in couple of week.   - PTH level 551 - once phos level is at goal, will start on calcitriol once a week.   - Antibx per ID - now on IV.         Albina Will MD  12/05/21  12:29 EST

## 2021-12-05 NOTE — PROGRESS NOTES
"Patient Name:  Moni Wallace  YOB: 1940  [id number]    Surgery Progress Note    Date of visit: 12/5/2021      Subjective: Has had difficulty with nausea since the small bowel follow-through was ordered yesterday.  NG tube with 1500 cc out.  Had a small bowel movement yesterday, but with nausea and emesis.  Had an episode of A. fib with RVR this morning and low blood pressures during this.  Overall feels somewhat worse.  No fevers.          Objective:     /62 (BP Location: Right leg, Patient Position: Lying)   Pulse 108   Temp 98.1 °F (36.7 °C) (Axillary)   Resp 16   Ht 162.6 cm (64\")   Wt 81.5 kg (179 lb 9.6 oz)   LMP  (LMP Unknown)   SpO2 100%   BMI 30.83 kg/m²     Intake/Output Summary (Last 24 hours) at 12/5/2021 0921  Last data filed at 12/5/2021 0900  Gross per 24 hour   Intake 3070 ml   Output 84775 ml   Net -8080 ml       GEN:   Awake, alert, resting in bed, chronically ill-appearing  CV:   Regular rate and rhythm  L:  Symmetric expansion, not labored on oxygen by nasal cannula  Abd:  Soft, tender to palpation diffusely without rebound tenderness or guarding, left paramedian scar, PD catheter in place, no diffuse peritonitis  Ext:  No cyanosis, clubbing, or edema    Recent labs that are back at this time have been reviewed.           Assessment/ Plan:    Mrs. Wallace is an 81-year-old lady with history of end-stage renal disease on peritoneal dialysis, diabetes, A. fib on Coumadin, and prior C. difficile infection who was admitted due to concern for peritonitis related to peritoneal dialysis     #Possible bowel obstruction in the setting of peritonitis related to peritoneal dialysis  -Afebrile.  Had an episode of A. fib with RVR this morning with some low blood pressures, better after fluid bolus  -Labs with increased leukocytosis of 17,000 today from 14,000 yesterday.  INR 3.3 this morning  -Small bowel follow-through from yesterday reviewed with no distal progression of contrast, " although did have a small bowel movement yesterday  -She has been here for close to a week now and is not having significant bowel function.  Small bowel follow-through yesterday with concern for obstruction.  Her dialysate fluid is clear and her CT scan does not suggest obvious evidence of perforation, internal hernia, or ischemia.  I think at this point we have no choice but to consider operative intervention to further evaluate.  I discussed with Dr. Stovall this morning.  Recommend to continue NG tube to low wall suction today and work to reverse her INR.  I have ordered for 2 units of FFP.  We will tentatively plan for diagnostic laparoscopy with possible exploratory laparotomy tomorrow unless she has rapid improvement.  Peritoneal dialysis will not be an option for several weeks after surgery, we may potentially have to remove her peritoneal dialysis catheter tomorrow depending on intraoperative findings.  Should make arrangements for potential transition to HD    Robin Andersen MD  12/5/2021  09:21 EST

## 2021-12-05 NOTE — PROGRESS NOTES
"Pharmacy Consult  -  Warfarin    Moni Wallace is a  81 y.o. female   Height - 162.6 cm (64\")  Weight - 81.5 kg (179 lb 9.6 oz)    Consulting Provider: Hospitalist  Indication: A fib  Goal INR: 2-3  Home Regimen: Warfarin 4 mg daily     Bridge Therapy: No    Drug-Drug Interactions with current regimen:     Amiodarone (new start 12/3) - may increase INR and risk of bleeding    Warfarin Dosing During Admission:    Date  11/28 11/29 11/30 12/1 12/2 12/3 12/4 12/5    INR  2.84 3.22 4.34 4.96 4.10 3.83 3.54 3.39    Dose  3 mg 2 mg HOLD HOLD HOLD HOLD HOLD HOLD      Education Provided: follows with [facility], education has previously been provided    Discharge Follow up:     Following Provider - [facility]     Follow up time range or appointment - 3-5 days after discharge    Labs:    Results from last 7 days   Lab Units 12/05/21  0420 12/04/21  0947 12/04/21  0601 12/03/21  0429 12/02/21  1233 12/02/21 0441 12/01/21  0437 11/30/21  0428 11/29/21  0504   INR  3.39*  --  3.54* 3.83*  --  4.10* 4.96* 4.34* 3.22*   HEMOGLOBIN g/dL 9.4* 9.7*  --  10.6* 10.1*  --  9.4* 9.8* 10.5*   HEMATOCRIT % 28.7* 29.5*  --  33.5* 31.4*  --  29.8* 30.8* 34.6     Results from last 7 days   Lab Units 12/04/21  0947 12/03/21  0429 12/02/21  0441 12/01/21  0437 12/01/21  0437   SODIUM mmol/L 135* 135* 133*   < > 137   POTASSIUM mmol/L 3.0* 3.4* 3.4*   < > 3.7   CHLORIDE mmol/L 91* 93* 93*   < > 95*   CO2 mmol/L 26.0 21.0* 22.0   < > 23.0   BUN mg/dL 36* 39* 41*   < > 46*   CREATININE mg/dL 6.98* 7.27* 7.74*   < > 8.74*   CALCIUM mg/dL 7.5* 7.0* 6.6*   < > 6.8*   BILIRUBIN mg/dL 0.2 0.3  --   --  0.2   ALK PHOS U/L 108 136*  --   --  135*   ALT (SGPT) U/L <5 <5  --   --  <5   AST (SGOT) U/L 13 21  --   --  17   GLUCOSE mg/dL 155* 156* 144*   < > 123*    < > = values in this interval not displayed.     Current dietary intake: Remains NPO, will follow PO intake    Diet Order   Procedures    NPO Diet NPO Except: Ice " chips     Assessment/Plan:     Patient's INR is 3.39 today. Remains NPO.   Continue to HOLD warfarin doses until INR drops into therapeutic range.   Daily PT/INR ordered.  Monitor signs/symptoms of bleeding, dietary intake, and drug-drug interactions. Make dose adjustments as necessary.  Pharmacy will continue to follow.    Slim Jones PharmD, BCPS  12/5/2021  07:19 EST

## 2021-12-05 NOTE — PROGRESS NOTES
Moni Wallace  [id number]  1940   LOS: 7 days   Patient Care Team:  Alex Walters MD as PCP - General (Internal Medicine)  Jeff Joe IV, MD as Consulting Physician (Cardiology)  Donny Hodges MD as Consulting Physician (Nephrology)  Cory Castillo MD as Surgeon (General Surgery)  Slim Wick MD    Chief Complaint:  Follow-up on atrial flutter with RVR, bacterial peritonitis    Subjective      Patient had multiple episodes of emesis yesterday.  She still has some nausea and abdominal discomfort.  She is unaware that she is in atrial fib with RVR with heart rates around 110 bpm.  She denies any chest pain or increased shortness of breath.    Objective     Vital Sign Min/Max for last 24 hours  Temp  Min: 97.4 °F (36.3 °C)  Max: 98.2 °F (36.8 °C)   BP  Min: 73/37  Max: 145/75   Pulse  Min: 72  Max: 124   Resp  Min: 16  Max: 18   SpO2  Min: 94 %  Max: 96 %      Weight  Min: 81.5 kg (179 lb 9.6 oz)  Max: 81.5 kg (179 lb 9.6 oz)         12/04/21  0427 12/05/21  0500   Weight: 80.4 kg (177 lb 3.2 oz) 81.5 kg (179 lb 9.6 oz)         Intake/Output Summary (Last 24 hours) at 12/5/2021 0714  Last data filed at 12/5/2021 0530  Gross per 24 hour   Intake 2690 ml   Output 8575 ml   Net -5885 ml       Physical Exam:     General Appearance:    Alert, cooperative, in no acute distress, NG, ill-appearing   Lungs:    Decreased breath sounds to auscultation,respirations regular, even and  unlabored    Heart:   Atrial fib with RVR, normal S1 and S2, grade 2/6            murmur, no gallop, no rub, no click   Abdomen:  Extremities:   Soft, non-tender, bowel sounds audible x4    No edema, normal range of motion   Pulses:   Pulses palpable and equal bilaterally      Results Review:   Results from last 7 days   Lab Units 12/04/21  0947 12/03/21  0429 12/03/21  0429 12/02/21  0441 12/02/21  0441   SODIUM mmol/L 135*  --  135*  --  133*   POTASSIUM mmol/L 3.0*  --  3.4*  --  3.4*   CHLORIDE mmol/L  91*  --  93*  --  93*   CO2 mmol/L 26.0  --  21.0*  --  22.0   BUN mg/dL 36*  --  39*  --  41*   CREATININE mg/dL 6.98*  --  7.27*  --  7.74*   GLUCOSE mg/dL 155*   < > 156*   < > 144*   CALCIUM mg/dL 7.5*  --  7.0*  --  6.6*    < > = values in this interval not displayed.     Results from last 7 days   Lab Units 12/05/21  0420 12/04/21  0947 12/03/21  0429   WBC 10*3/mm3 17.56* 14.72* 16.80*   HEMOGLOBIN g/dL 9.4* 9.7* 10.6*   HEMATOCRIT % 28.7* 29.5* 33.5*   PLATELETS 10*3/mm3 255 227 267     · No new chest x-ray to review    · ECG 12/5/2021:  Atrial fibrillation with rapid ventricular response  ST & T wave abnormality, consider inferior ischemia  ST & T wave abnormality, consider anterolateral ischemia  Abnormal ECG  When compared with ECG of 05-DEC-2021 00:03, (Unconfirmed)  Atrial fibrillation has replaced Sinus rhythm  Vent. rate has increased BY  48 BPM  ST now depressed in Anterior leads  Inverted T waves have replaced nonspecific T wave abnormality in Inferior leads  T wave inversion now evident in Anterior leads    INR 3.39 on 12/5/2021    Medication Review: Reviewed; NO DRIPS.    Assessment/Plan      Patient with paroxysmal atrial fib with RVR in the setting of bacterial peritonitis and end-stage renal disease on PD.  She is unable to tolerate high-dose p.o. metoprolol and IV Cardizem due to hypotension and would continue current metoprolol 25 mg twice daily (hold for systolic blood pressure less than 90 mmHg).  INRs have been supratherapeutic but trending downwards.  Patient had negative blood cultures.  No new BMP to review this morning but patient may need potassium replacement.  Pharmacy dosing Coumadin.  Will need to continue monitoring QTC and would defer QT prolonging agents except for amiodarone.  Patient's blood pressure is low this morning so I ordered a 250 bolus of saline as well as albumin.  The patient received multiple doses of albumin yesterday as well.  Electrolyte shifts/deficits are  likely contributing to recurrent atrial fibrillation.  Patient's small bowel follow-through pending.  Perplexed by her intermittent abdominal pain and continued nausea and emesis.  She may need repeat KUB as well as follow-up surgical opinion.  If hypotension persists would consider transfer to ICU for norepinephrine support.  I do not feel her intermittent atrial fibrillation with controlled ventricular spots is producing her hypotension.  No lab reports to review this morning.      Peritonitis (HCC)    Paroxysmal atrial fibrillation (HCC)    Essential hypertension    Type 2 diabetes mellitus (HCC)    Chronic kidney disease, stage IV (severe) (HCC)    Hyperparathyroidism (HCC)    Hyperlipidemia LDL goal <100    Sepsis associated hypotension (HCC)    Ileus (HCC)    Hypotension    Electronically signed by REY Kowalski, 12/05/21, 7:34 AM EST.    I have seen and examined the patient, case was discussed with the physician extender, reviewed the above note, necessary changes were made and I agree with the final note.     Farooq Rodriguez MD [facility]    12/05/21  07:14 EST

## 2021-12-05 NOTE — PLAN OF CARE
Inpatient RN   Plan of Care Update  ________________________________________________    Patient Name:  Moni Wallace  YOB: 1940  MRN: [id number]  Admit Date:  11/28/2021      Assessment Date:  12/5/2021    Vital Signs unstable, On Telemetry, Pt is Atrial Fibrillation w/ a rapid ventricular response (RVR), Pt currently on  2 liters/min via nasal cannula Pt has been up all night with a Pain status of not well contrlled and finding nausea and vomiting.    Pt orientated to person, place, time and situation with LOC of alert.    UOP  . Peritoneal dialysis .    At around 0500 pt went into Afib RVR while peritoneum was draining by gravity. Pt is hypotensive as well. REY Soriano notified. EKG ordered which resulted in Afib RVR. I contacted cardiology about patients current condition. REY Lamas placed orders to give pt 12.5 mg of Metoprolol now and have the day shift nurse give the patient another dose of 12.5 mg of Metoprolol at 0900. Due to patient BP trend, first dose of  was not given. REY Soriano notified again. REY advised for me to contact cardiology about patients condition. Provider notified by day shift nurse. New orders placed     Pt has no requests at this time.         Electronically signed by:  LEA DONAHUE RN  12/05/21 07:24 EST       Problem: Adult Inpatient Plan of Care  Goal: Absence of Hospital-Acquired Illness or Injury  Intervention: Identify and Manage Fall Risk  Recent Flowsheet Documentation  Taken 12/4/2021 2000 by Lea Donahue, RN  Safety Promotion/Fall Prevention:   activity supervised   assistive device/personal items within reach   clutter free environment maintained   fall prevention program maintained   nonskid shoes/slippers when out of bed   room organization consistent   safety round/check completed  Intervention: Prevent Skin Injury  Recent Flowsheet Documentation  Taken 12/4/2021 2000 by Lea Donahue, RN  Body Position: weight shift assistance  provided  Skin Protection:   adhesive use limited   incontinence pads utilized   transparent dressing maintained   tubing/devices free from skin contact  Intervention: Prevent Infection  Recent Flowsheet Documentation  Taken 12/4/2021 2000 by Imani Donahue RN  Infection Prevention:   hand hygiene promoted   personal protective equipment utilized   rest/sleep promoted   single patient room provided   visitors restricted/screened  Goal: Optimal Comfort and Wellbeing  Intervention: Provide Person-Centered Care  Recent Flowsheet Documentation  Taken 12/4/2021 2000 by Imani Donahue RN  Trust Relationship/Rapport:   care explained   choices provided   emotional support provided   empathic listening provided   questions answered   questions encouraged   reassurance provided   thoughts/feelings acknowledged     Problem: Adjustment to Illness (Chronic Kidney Disease)  Goal: Optimal Coping with Chronic Illness  Intervention: Support and Optimize Psychosocial Response to Chronic Illness  Recent Flowsheet Documentation  Taken 12/4/2021 2000 by Imani Donahue RN  Family/Support System Care:   self-care encouraged   support provided     Problem: Fluid Volume Excess (Chronic Kidney Disease)  Goal: Fluid Balance  Intervention: Monitor and Manage Hypervolemia  Recent Flowsheet Documentation  Taken 12/4/2021 2000 by Imani Donahue, RN  Skin Protection:   adhesive use limited   incontinence pads utilized   transparent dressing maintained   tubing/devices free from skin contact     Problem: Functional Decline (Chronic Kidney Disease)  Goal: Optimal Functional Ability  Intervention: Optimize Functional Ability  Recent Flowsheet Documentation  Taken 12/4/2021 2000 by Imani Donahue, RN  Activity Management: activity adjusted per tolerance     Problem: Renal Function Impairment (Chronic Kidney Disease)  Goal: Laboratory Values and Blood Pressure Within Desired Range  Intervention: Monitor and Support Renal Function  Recent Flowsheet  Documentation  Taken 12/4/2021 2000 by Imani Donahue, RN  Medication Review/Management: medications reviewed     Problem: Skin Injury Risk Increased  Goal: Skin Health and Integrity  Intervention: Optimize Skin Protection  Recent Flowsheet Documentation  Taken 12/4/2021 2000 by Imani Donahue, RN  Pressure Reduction Techniques:   frequent weight shift encouraged   pressure points protected   weight shift assistance provided  Head of Bed (HOB): HOB elevated  Pressure Reduction Devices:   positioning supports utilized   pressure-redistributing mattress utilized  Skin Protection:   adhesive use limited   incontinence pads utilized   transparent dressing maintained   tubing/devices free from skin contact     Problem: Fall Injury Risk  Goal: Absence of Fall and Fall-Related Injury  Intervention: Identify and Manage Contributors to Fall Injury Risk  Recent Flowsheet Documentation  Taken 12/4/2021 2000 by Imani Donahue RN  Medication Review/Management: medications reviewed  Intervention: Promote Injury-Free Environment  Recent Flowsheet Documentation  Taken 12/4/2021 2000 by Imani Donahue, RN  Safety Promotion/Fall Prevention:   activity supervised   assistive device/personal items within reach   clutter free environment maintained   fall prevention program maintained   nonskid shoes/slippers when out of bed   room organization consistent   safety round/check completed   Goal Outcome Evaluation:

## 2021-12-05 NOTE — PROGRESS NOTES
"Clinical Nutrition   Reason For Visit: Follow-up protocol    Patient Name: Moni Wallace  YOB: 1940  MRN: [id number]  Date of Encounter: 12/05/21 09:58 EST  Admission date: 11/28/2021      Minimal PO intake x 8 days.    RD recommends initiating PPN via peripheral line until patient's bowel function returns and able to tolerate PO intake --- Dr. Andersen and Dr. Stovall agreeable. RD informed pharmacist of plan.    RD recommends the following PPN regimen via peripheral IV:  Initiate D5%, AA5.5% @ 25 ml/hr and advance by 25 ml/hr Q 8 hours to goal rate 75 ml/hr. 250 ml 20% SMOF lipid daily.    At goal rate this regimen provides 1.8 L, 1202 calories (75% est needs), 99 g protein (101% est needs), GIR = 0.8 mg/kg/min.    Please defer to nephrology for appropriate total volume from PPN. Nephrology okay with starting with PPN @ 75 ml/hr for now and evaluating on daily basis. RD will adjust PPN recommendations based on MD recommendations.      Nutrition Assessment     Admission Problem List:  Nausea  Abdominal pain  Peritonitis  Sepsis  Ileus r/t peritoneal inflammation  pSBO      Applicable PMH:  T2DM  HTN  PAF  FM  ESRD on CAPD ongoing  C. Diff  hx  Vitamin D deficiency  Anemia  Hyperparathyroidism  S/p right kidney transplant (2010)      Applicable medical tests/procedures since admission:  (11/28) NGT to LWS initiated  (12/01) NGT removed - liquid diet  (12/3) NGT to LWS initiated, NPO  (12/4) SBFT - no distal progression of contrast - concern for obstruction  (12/5) tentative plan for ex lap       Reported/Observed/Food/Nutrition Related History   12/5) Per Dr. Andersen (12/5): \"We will tentatively plan for diagnostic laparoscopy with possible exploratory laparotomy tomorrow unless she has rapid improvement.  Peritoneal dialysis will not be an option for several weeks after surgery, we may potentially have to remove her peritoneal dialysis catheter tomorrow depending on intraoperative findings.  Should make " "arrangements for potential transition to HD\"    Per I/Os within past 24 hours:  NGT output = 2325 ml  Stool output = x1    RD spoke with Dr. Andersen who agrees patient needs PN. Recommends PPN via peripheral line and not TPN/PICC as patient has hx of multiple fistulas and will possibly need HD cath and HD if she has surgery tomorrow. RD spoke with Dr. Stovall who is also agreeable to PPN. RD spoke with nephrologist who is okay with starting PPN @ 75 ml/hr.        12/3) Pt rpt 3/4 usual intake 1 week PTA. Currently NPO. per I/Os within past 24 hrs NGT output 100 ml  BM 12/1 12/1) Per I/Os within past 24 hours: NGT output = 600 ml; No BM documented this admission.    11/29) Patient reports having poor appetite and poor PO intake starting earlier this year with resulting unintentional weight loss from >200 lbs down to ~160 lbs as of last month. Pt states she was prescribed a medication to improve appetite/PO intake and this has helped - she has been eating much better. She isn't sure if she has had any recent unintentional weight loss. Allergic to rice. Denies difficulty chewing/swallowing. Dislikes and declines ONS drinks. Last solid food intake evening of (11/27).  NGT output = 800 ml within past 24 hours per I/Os.    Anthropometrics   Height: 64 in  Weight: 179 lbs (bed scale 12/5)  BMI: 30.8  BMI classification: Obese Class I: 30-34.9kg/m2   IBW: 120 lbs    UBW: Per EMR  166 lbs (10/11/21) MDOV  200 lbs (7/7/21) bed scale  208 lbs (1/12/21) bed scale    Per RD note (11/29):  Weight change: Unintentional weight loss of 42 lbs 1/12/21-10/11/21. Unclear contribution of fluid status. Need current measured weight to determine weight changes within the past 1.5 months.    Labs reviewed   Labs reviewed: Yes    Results from last 7 days   Lab Units 12/05/21  0649 12/04/21  0947 12/03/21  0429   GLUCOSE mg/dL 134* 155* 156*   BUN mg/dL 35* 36* 39*   CREATININE mg/dL 7.02* 6.98* 7.27*   SODIUM mmol/L 136 135* 135* "   CHLORIDE mmol/L 89* 91* 93*   POTASSIUM mmol/L 2.9* 3.0* 3.4*   PHOSPHORUS mg/dL 5.7*  --   --    MAGNESIUM mg/dL  --   --  1.6   ALT (SGPT) U/L <5 <5 <5     Results from last 7 days   Lab Units 12/05/21  0649 12/04/21  0947 12/03/21  0429   ALBUMIN g/dL 3.80 3.30* 2.40*       Medications reviewed   Medications reviewed: Yes  Scheduled: antibiotic, KCl, protonix, reglan, calcium acetate, renvela, sensipar  PRN: zofran    Nutrition Focused Physical Exam   12/3)  Unable to perform exam due to fluid accumulation  Note poor tone, likely loss of lean mass could be detected in calf/thigh. Difficult to evaluate upper body ? lost lean mass vs fluid/fat accumulation w lack of tone.     Nutrition Needs Assessment (12/5)   Height used: 64 in  Weight used: 179 lbs/81.5 kg (actual wt);  120 lbs/54.5 kg (IBW)    Estimated calorie needs: ~1600 calories daily  18-20 kcal/kg actual wt = 5634-8280    Estimated protein needs: ~98 g protein daily  1.2 g/kg actual wt = 98  2.0 g/kg IBW = 109      Current Nutrition Prescription   PO: NPO Diet NPO Except: Ice chips    Average PO intake: NPO/clear liquids/full liquids since admission  12/3) 75% x 2 meals on Full Liquid, 50% x 5 meals recorded on Clear Liquid    Nutrition Diagnosis     11/29, 12/1, 12/3, 12/5  Problem Inadequate oral intake   Etiology N/V in setting of ileus/bowel obstruction   Signs/Symptoms Minimal PO intake x 8 days   Status: ongoing    Goal:   General: Nutrition support treatment  PO: Initiate diet as medically appropriate    Intervention   Intervention: Follow treatment progress, Care plan reviewed, Await begin PO, Nutrition support order placed    RD recommends the following PPN regimen via peripheral IV:  Initiate D5%, AA5.5% @ 25 ml/hr and advance by 25 ml/hr Q 8 hours to goal rate 75 ml/hr. 250 ml 20% SMOF lipid daily.    At goal rate this regimen provides 1.8 L, 1202 calories (75% est needs), 99 g protein (101% est needs), GIR = 0.8 mg/kg/min.    Defer to  nephrology for appropriate total volume from PPN. RD will adjust PPN recommendations based on those recommendations.    Monitoring/Evaluation:   Monitoring/Evaluation: Per protocol, I&O, PO intake, Pertinent labs, Weight, GI status, Symptoms    Virginia Boykin RD  Time Spent: 60 min

## 2021-12-06 NOTE — PLAN OF CARE
Problem: Adult Inpatient Plan of Care  Goal: Absence of Hospital-Acquired Illness or Injury  Intervention: Identify and Manage Fall Risk  Recent Flowsheet Documentation  Taken 12/6/2021 0400 by Melva Lopez RN  Safety Promotion/Fall Prevention:   activity supervised   assistive device/personal items within reach   clutter free environment maintained   fall prevention program maintained   nonskid shoes/slippers when out of bed   safety round/check completed   toileting scheduled  Taken 12/6/2021 0200 by Melva Lopez RN  Safety Promotion/Fall Prevention:   activity supervised   assistive device/personal items within reach   clutter free environment maintained   fall prevention program maintained   nonskid shoes/slippers when out of bed   safety round/check completed   toileting scheduled  Taken 12/6/2021 0000 by Melva Lopez RN  Safety Promotion/Fall Prevention:   activity supervised   assistive device/personal items within reach   clutter free environment maintained   fall prevention program maintained   nonskid shoes/slippers when out of bed   safety round/check completed   toileting scheduled  Taken 12/5/2021 2200 by Melva Lopez RN  Safety Promotion/Fall Prevention:   activity supervised   assistive device/personal items within reach   clutter free environment maintained   fall prevention program maintained   nonskid shoes/slippers when out of bed   toileting scheduled   safety round/check completed  Taken 12/5/2021 2000 by Melva Lopez RN  Safety Promotion/Fall Prevention:   activity supervised   assistive device/personal items within reach   clutter free environment maintained   fall prevention program maintained   nonskid shoes/slippers when out of bed   safety round/check completed   toileting scheduled  Intervention: Prevent Skin Injury  Recent Flowsheet Documentation  Taken 12/6/2021 0400 by Melva Lopez RN  Body Position: position changed independently  Skin Protection:   adhesive use  limited   incontinence pads utilized   tubing/devices free from skin contact  Taken 12/6/2021 0200 by Melva Lopez RN  Body Position: position changed independently  Skin Protection:   adhesive use limited   incontinence pads utilized   tubing/devices free from skin contact  Taken 12/6/2021 0000 by Melva Lopez RN  Body Position: position changed independently  Skin Protection:   adhesive use limited   incontinence pads utilized   tubing/devices free from skin contact  Taken 12/5/2021 2200 by Melva Lopez RN  Body Position: position changed independently  Skin Protection:   adhesive use limited   incontinence pads utilized   tubing/devices free from skin contact  Taken 12/5/2021 2000 by Melva Lopez RN  Body Position: position changed independently  Skin Protection:   adhesive use limited   incontinence pads utilized   tubing/devices free from skin contact  Intervention: Prevent and Manage VTE (venous thromboembolism) Risk  Recent Flowsheet Documentation  Taken 12/5/2021 2000 by Melva Lopez RN  VTE Prevention/Management:   bilateral   dorsiflexion/plantar flexion performed   bleeding risk factor(s) identified  Intervention: Prevent Infection  Recent Flowsheet Documentation  Taken 12/6/2021 0400 by Melva Lopez RN  Infection Prevention: environmental surveillance performed  Taken 12/6/2021 0200 by Melva Lopez RN  Infection Prevention: environmental surveillance performed  Taken 12/6/2021 0000 by Melva Lopez RN  Infection Prevention: environmental surveillance performed  Taken 12/5/2021 2200 by Melva Lopez RN  Infection Prevention: environmental surveillance performed  Taken 12/5/2021 2000 by Melva Lopez RN  Infection Prevention: environmental surveillance performed  Goal: Optimal Comfort and Wellbeing  Intervention: Provide Person-Centered Care  Recent Flowsheet Documentation  Taken 12/5/2021 2000 by Melva Lopez RN  Trust Relationship/Rapport:   care explained   choices provided    thoughts/feelings acknowledged     Problem: Adjustment to Illness (Chronic Kidney Disease)  Goal: Optimal Coping with Chronic Illness  Intervention: Support and Optimize Psychosocial Response to Chronic Illness  Recent Flowsheet Documentation  Taken 12/5/2021 2000 by Melva Lopez RN  Family/Support System Care:   support provided   self-care encouraged     Problem: Fluid Volume Excess (Chronic Kidney Disease)  Goal: Fluid Balance  Intervention: Monitor and Manage Hypervolemia  Recent Flowsheet Documentation  Taken 12/6/2021 0400 by Melva Lopez RN  Skin Protection:   adhesive use limited   incontinence pads utilized   tubing/devices free from skin contact  Taken 12/6/2021 0200 by Melva Lopez RN  Skin Protection:   adhesive use limited   incontinence pads utilized   tubing/devices free from skin contact  Taken 12/6/2021 0000 by Melva Lopez RN  Skin Protection:   adhesive use limited   incontinence pads utilized   tubing/devices free from skin contact  Taken 12/5/2021 2200 by Melva Lopez RN  Skin Protection:   adhesive use limited   incontinence pads utilized   tubing/devices free from skin contact  Taken 12/5/2021 2000 by Melva Lopez RN  Skin Protection:   adhesive use limited   incontinence pads utilized   tubing/devices free from skin contact     Problem: Functional Decline (Chronic Kidney Disease)  Goal: Optimal Functional Ability  Intervention: Optimize Functional Ability  Recent Flowsheet Documentation  Taken 12/6/2021 0400 by Melva Lopez RN  Activity Management:   activity adjusted per tolerance   activity encouraged  Taken 12/6/2021 0200 by Melva Lopez RN  Activity Management:   activity adjusted per tolerance   activity encouraged  Taken 12/6/2021 0000 by Melva Lopez RN  Activity Management:   activity adjusted per tolerance   activity encouraged  Taken 12/5/2021 2200 by Melva Lopez RN  Activity Management:   activity adjusted per tolerance   activity encouraged  Taken 12/5/2021  2000 by Lopez, Melva, RN  Activity Management: activity adjusted per tolerance     Problem: Renal Function Impairment (Chronic Kidney Disease)  Goal: Laboratory Values and Blood Pressure Within Desired Range  Intervention: Monitor and Support Renal Function  Recent Flowsheet Documentation  Taken 12/6/2021 0400 by Melva Lopez RN  Medication Review/Management: medications reviewed  Taken 12/6/2021 0200 by Melva Lopez RN  Medication Review/Management: medications reviewed  Taken 12/6/2021 0000 by Melva Lopez RN  Medication Review/Management: medications reviewed  Taken 12/5/2021 2200 by Melva Lopez RN  Medication Review/Management: medications reviewed     Problem: Pain Acute  Goal: Optimal Pain Control  Intervention: Optimize Psychosocial Wellbeing  Recent Flowsheet Documentation  Taken 12/5/2021 2000 by Melva Lopez RN  Diversional Activities: television     Problem: Skin Injury Risk Increased  Goal: Skin Health and Integrity  Intervention: Optimize Skin Protection  Recent Flowsheet Documentation  Taken 12/6/2021 0400 by Melva Lopez RN  Pressure Reduction Techniques:   frequent weight shift encouraged   weight shift assistance provided  Head of Bed ([facility]): [facility] elevated  Pressure Reduction Devices:   pressure-redistributing mattress utilized   positioning supports utilized  Skin Protection:   adhesive use limited   incontinence pads utilized   tubing/devices free from skin contact  Taken 12/6/2021 0200 by Melva Lopez RN  Pressure Reduction Techniques:   frequent weight shift encouraged   weight shift assistance provided  Head of Bed ([facility]): [facility] elevated  Pressure Reduction Devices:   pressure-redistributing mattress utilized   positioning supports utilized  Skin Protection:   adhesive use limited   incontinence pads utilized   tubing/devices free from skin contact  Taken 12/6/2021 0000 by Melva Lopez RN  Pressure Reduction Techniques:   frequent weight shift encouraged   weight shift assistance  provided  Head of Bed ([facility] [place] [place] elevated  Pressure Reduction Devices:   pressure-redistributing mattress utilized   positioning supports utilized  Skin Protection:   adhesive use limited   incontinence pads utilized   tubing/devices free from skin contact  Taken 12/5/2021 2200 by Melva Lopez RN  Pressure Reduction Techniques:   frequent weight shift encouraged   weight shift assistance provided  Head of Bed ([facility] [place] [place] elevated  Pressure Reduction Devices:   pressure-redistributing mattress utilized   positioning supports utilized  Skin Protection:   adhesive use limited   incontinence pads utilized   tubing/devices free from skin contact  Taken 12/5/2021 2000 by Melva Lopez RN  Pressure Reduction Techniques:   frequent weight shift encouraged   weight shift assistance provided  Head of Bed ([facility] [place] [place] elevated  Pressure Reduction Devices:   pressure-redistributing mattress utilized   positioning supports utilized  Skin Protection:   adhesive use limited   incontinence pads utilized   tubing/devices free from skin contact     Problem: Fall Injury Risk  Goal: Absence of Fall and Fall-Related Injury  Intervention: Identify and Manage Contributors to Fall Injury Risk  Recent Flowsheet Documentation  Taken 12/6/2021 0400 by Melva Lopez RN  Medication Review/Management: medications reviewed  Taken 12/6/2021 0200 by Melva Lopez RN  Medication Review/Management: medications reviewed  Taken 12/6/2021 0000 by Melva Lopez RN  Medication Review/Management: medications reviewed  Taken 12/5/2021 2200 by Melva Lopez RN  Medication Review/Management: medications reviewed  Intervention: Promote Injury-Free Environment  Recent Flowsheet Documentation  Taken 12/6/2021 0400 by Melva Lopez RN  Safety Promotion/Fall Prevention:   activity supervised   assistive device/personal items within reach   clutter free environment maintained   fall prevention program maintained   nonskid shoes/slippers when out of bed    safety round/check completed   toileting scheduled  Taken 12/6/2021 0200 by Melva Lopez, RN  Safety Promotion/Fall Prevention:   activity supervised   assistive device/personal items within reach   clutter free environment maintained   fall prevention program maintained   nonskid shoes/slippers when out of bed   safety round/check completed   toileting scheduled  Taken 12/6/2021 0000 by Melva Lopez, RN  Safety Promotion/Fall Prevention:   activity supervised   assistive device/personal items within reach   clutter free environment maintained   fall prevention program maintained   nonskid shoes/slippers when out of bed   safety round/check completed   toileting scheduled  Taken 12/5/2021 2200 by Melva Lopez, RN  Safety Promotion/Fall Prevention:   activity supervised   assistive device/personal items within reach   clutter free environment maintained   fall prevention program maintained   nonskid shoes/slippers when out of bed   toileting scheduled   safety round/check completed  Taken 12/5/2021 2000 by Melva Lopez, RN  Safety Promotion/Fall Prevention:   activity supervised   assistive device/personal items within reach   clutter free environment maintained   fall prevention program maintained   nonskid shoes/slippers when out of bed   safety round/check completed   toileting scheduled   Goal Outcome Evaluation:

## 2021-12-06 NOTE — PROGRESS NOTES
"Patient Name:  Moni Wallace  YOB: 1940  [id number]    Surgery Progress Note    Date of visit: 12/6/2021      Subjective: No acute events overnight.  Actually feels somewhat better.  Is passing flatus, no bowel movement.  Abdominal pain is better.  Denies nausea or vomiting.  NG tube with 400 cc of output yesterday.  Denies fevers or chills          Objective:     /66 (BP Location: Right leg, Patient Position: Lying)   Pulse 95   Temp 97.9 °F (36.6 °C) (Oral)   Resp 18   Ht 162.6 cm (64\")   Wt 83.5 kg (184 lb)   LMP  (LMP Unknown)   SpO2 96%   BMI 31.58 kg/m²     Intake/Output Summary (Last 24 hours) at 12/6/2021 0924  Last data filed at 12/6/2021 0832  Gross per 24 hour   Intake 3125 ml   Output 5150 ml   Net -2025 ml       GEN:   Awake, alert, in no acute distress, resting comfortably in bed, chronically ill-appearing  CV:   Regular rate and rhythm  L:  Clear to auscultation bilaterally, not labored on room air   Abd:  Obese, soft, tender to deep palpation throughout the abdomen, peritoneal dialysis catheter on the left side, not obviously distended  Ext:  No cyanosis, clubbing, or edema    Recent labs that are back at this time have been reviewed.           Assessment/ Plan:    Mrs. Wallace is an 81-year-old lady with history of end-stage renal disease on peritoneal dialysis, diabetes, A. fib on Coumadin, and prior C. difficile infection who was admitted due to concern for peritonitis related to peritoneal dialysis     #Possible bowel obstruction in the setting of peritonitis related to peritoneal dialysis  -Afebrile.  Vital signs were stable overnight.  -She feels somewhat better today with less pain and distention.  Still has not had a bowel movement.  White blood cell count down slightly at 14.  INR 1.5 after FFP and vitamin K  -Small bowel follow-through from Saturday reviewed with no distal progression of contrast.  Repeat KUB shows may be a small amount of contrast in the " transverse colon, however she still has an NG tube and has not had a bowel movement and does not appear to be clinically progressing.  -She has been here for close to a week now and is not having significant bowel function.    I think we have no choice at this point but to proceed to the operating room for abdominal exploration.  I discussed with her on multiple occasions.  We will tentatively plan to proceed to the operating room today for diagnostic laparoscopy, possible exploratory laparotomy, removal of peritoneal dialysis catheter, possible central line placement.    I discussed the risk, benefits, and alternatives to procedure with the patient.  I specifically discussed the risks of bleeding, infection, damage to adjacent structures, possible need further procedures, and risks of anesthesia and sedation up to and including death.         Robin Andersen MD  12/6/2021  09:24 EST

## 2021-12-06 NOTE — OP NOTE
General Surgery Operative Note    Moni Wallace  [id number]  1940    Date of Surgery:  12/6/2021 17:49 EST    Pre-op Diagnosis: Chronic renal failure                                   PD catheter removal with ileocolic resection    Post-op Diagnosis: Chronic renal failure                 PD catheter removal with ileocolic resection    Procedure:  Tunneled dialysis catheter, 23 cm palindrome, right internal jugular vein                       Guidance with fluoroscopy and ultrasound    Surgeon: Rolando Begum MD   Co -Surgeon: Robin Andersen MD    Anesthesia: General    Fluids: Ongoing    Estimated Blood Loss: Less than 10 mL    Findings and interpretation ultrasound:   The right internal jugular vein was easily compressible without intraluminal  defect.  The direction of venous flow was normal.  Attempted photodocumentation printer malfunction    Findings and interpretation fluoroscopy:   #1  The guidewire is in the right internal jugular vein   #2  The guidewire is in the superior vena cava   #3  Serial dilation over the wire   #4  The catheter tip is at the level of the cavoatrial junction    Complications: None apparent    History:   81-year-old lady on chronic ambulatory peritoneal dialysis underwent ileocolic resection today and along with removal of her peritoneal dialysis catheter.  She requires hemodialysis access for ongoing dialysis needs.       I had a discussion regarding the risks and benefits of hemodialysis catheter placement, including but not limited to: bleeding, infection, injury to adjacent viscera (the carotid artery, lung, myocardium), nonfunction, and medical issues from a cardiopulmonary standpoint.  After informed consent the patient was taken to the operating room.    Procedure:     The patient was in the supine position after having her laparotomy and PD catheter removal along with ileocolic resection by Dr. Andersen.  Her right neck and chest were then prepped and draped in the  standard sterile fashion.  A timeout was observed.      The patient's neck was interrogated with ultrasound and the right internal jugular vein was easily compressible without intraluminal defect.  The patient's neck was then prepped and draped in the standard sterile fashion.      I accessed the internal jugular vein under direct ultrasound guidance after anesthetizing the skin.  I placed my 0.035 guidewire through the introducer needle into the internal jugular vein and under fluoroscopic guidance maneuvered this into the superior vena cava.  I then serially dilated over the wire leaving a peel-away sheath in the superior vena cava.  The catheter was then tunneled appropriately.  The tip of the catheter was positioned at the level of the proximal right atrium.  The catheter flushed well and was instilled with heparinized saline.  The catheter was then sewn into place, the nick in the neck was closed with 4-0 Monocryl,  the catheter exit site was then covered with a chlorhexidine impregnated Tegaderm, and a sterile dressing was placed on the nick in the neck.       The patient tolerated the procedure well and was transferred back to the PACU in stable condition.  A stat portable chest x-ray will be obtained.      Rolando Begum MD     Date: 12/6/2021  Time: 17:49 EST

## 2021-12-06 NOTE — OP NOTE
Operative Report    Patient Name:  Moni Wallace  YOB: 1940  [id number]    12/6/2021      PREOPERATIVE DIAGNOSIS: Small bowel obstruction, peritonitis in the setting of peritoneal dialysis      POSTOPERATIVE DIAGNOSIS: Same        PROCEDURE PERFORMED:     1. Diagnostic laparoscopy converted to exploratory laparotomy  2. Open resection of terminal ileum and cecum with primary ileocolic anastomosis  3. Removal peritoneal dialysis catheter       SURGEON: Robin Andersen MD      ASSISTANT:    SOLOMON Rodriguez    Assistant surgeon responsibilities: She assisted with access of the abdomen, diagnostic laparoscopy, dissection, mobilization, resection of the specimen, creation of anastomosis, and abdominal closure.  Her assistance was necessary and required for successful completion of the case       SPECIMENS: Terminal ileum and cecum      ANESTHESIA: General        FINDINGS:  1.  Diagnostic laparoscopy demonstrated that the small bowel was diffusely dilated and visualization was difficult.  We were unable to fully evaluate the small bowel and therefore converted to exploratory laparotomy  2.  Examination of the abdomen revealed diffuse distention of the small bowel, as well as fibrosis and thickening of the small bowel which was more noticeable in the terminal ileum.  Approximately 6 to 7 cm proximal to the ileocecal valve there appeared to be an area of a transition point, with the last 6 cm of the ileum decompressed.  The small bowel at the site was significantly thickened and fibrotic.  Because of this we elected to perform an ileocolic resection with resection of 45 cm of distal small bowel that was frankly fibrotic and thickened  3.  A primary ileocolic anastomosis was created  4.  The patient's previous peritoneal dialysis catheter was removed       INDICATIONS:      This patient is a 81 y.o. female with a history of small bowel obstruction and peritonitis in the setting of peritoneal dialysis.  Pre-operative imaging including CT scan confirmed the diagnosis. The risks, benefits, and alternatives of the procedure were discussed with the patient and their family preoperatively and they agreed to proceed.          DESCRIPTION OF PROCEDURE:      After obtaining informed consent, the patient was taken to the operating room and placed in the supine position on the operating room table. General anesthesia was initiated. The abdomen was prepped and draped in the usual sterile fashion. A time-out was properly performed. Antibiotics were given preoperatively. SCDs were properly placed on the patient and turned on.      We began the procedure with a supraumbilical vertical incision.  Dissection was carried down bluntly to the level of the anterior abdominal wall fascia.  The anterior abdominal wall fascia was grasped and elevated.  Under direct visualization the fascia was incised sharply.  Stay sutures of 0 Vicryl were placed on either side of this.  The peritoneal cavity was then bluntly entered.  A 12 mm Ramos trocar was then advanced into the abdominal cavity.  Pneumoperitoneum was established to 15 mmHg.  The abdomen was surveyed with special attention to the viscera underlying the insertion site which is found to be free of injury.  Initial laparoscopic examination of the abdomen demonstrated diffuse distention of the small bowel.  2 additional 5 mm trochars were placed, one on the right side and 1 in the left side we then proceeded with exploration of the abdomen.     The small bowel was noted to be diffusely thickened.  There was some free fluid in the abdomen which was suctioned free.  We identified the more proximal small bowel at the base of the transverse colon mesentery and attempted to run the small bowel distally.  There was very little room in the abdominal cavity due to the small bowel distention in the small bowel was difficult to manipulate due to its distention.  Because of this we had difficulty  with mobilization and visualization and felt that it was not safe to continue with laparoscopic examination as we did not feel we would be able to successfully identify the pathology and would risk traction injury to the small bowel.     A midline laparotomy incision was then created in length with our midline trocar using Bovie electrocautery.  Dissection was carried down to the level of the anterior abdominal wall fascia and the anterior abdominal wall fascia was incised.  Laparotomy incision was then extended along the length of our skin incision.  All the trochars were removed.  With the abdomen opened a Bookwalter retractor was placed to aid with visualization.  We noted that all the peritoneal surfaces and omentum were thickened and fibrotic.  The small bowel was identified proximally at the ligament of Treitz and then run distally.  As we approached the distal small bowel we noted that the small bowel became quite a bit thickened and fibrotic.  There appeared to be a transition point approximately 6 cm proximal to the ileocecal valve, with the last 6 cm or so of the small bowel decompressed.  The small bowel at this site was significantly fibrotic and appeared to be somewhat adherent to the pelvic sidewall and retroperitoneum.  The small bowel mesentery was bluntly dissected and brought medially.  With this completed we noted that the terminal ileum was quite fibrotic and with the adhesions to the retroperitoneum taken down, it was clear that small bowel effluent did not appear to pass to the decompressed small bowel.  Because of this, we felt that there was no choice other than to proceed with small bowel resection as well as resection of the cecum.  The lateral attachments of the right colon were taken down along the white line of Toldt and the right colon was rotated medially.  The right colon was  from its retroperitoneal attachments and the duodenum was carefully identified and protected and  kept posteriorly.  With the right colon brought medially, we identified a site for transection at the junction of the cecum and ascending colon.  A window was made in the mesentery of the colon at the site and the colon was divided using a single firing of the NILA 75 blue load stapler.  We then ran the small bowel proximally and found an area where the small bowel appeared to become less fibrotic and appeared more healthy appearing.  This was 45 cm proximal to the ileocecal valve.  The small bowel was then divided at the site in the same manner using a NILA blue load stapler.  The mesentery of the terminal ileum and cecum was then sequentially divided using the Ethicon Enseal device.  The specimen was then passed off the field as terminal ileum and cecum.  Hemostasis of the cut edge of the mesentery as well as the right retroperitoneum was confirmed.  There was a small amount of bleeding and this was packed off.     We then turned our attention of the patient's left abdominal wall.  The previously placed peritoneal dialysis catheter was identified.  Traction was placed on the intraperitoneal portion of the catheter and using cautery we dissected free both of the cuffs of the peritoneal dialysis catheter.  With this completed the catheter was then removed.  The intraperitoneal location where the peritoneal dialysis catheter entered the abdomen was then closed using 0 Vicryl suture.  Attention was then again turned to the right side of the abdomen.  Some Surgicel was placed in the right retroperitoneum, and we noted that hemostasis was obtained.  A side-to-side functional end-to-end ileocolic anastomosis was then created.  This was created using a Ethicon blue load NILA 75 stapler to create the common channel with the common channel closed using a TX 60 blue load stapler.  A 3-0 silk crotch stitch was placed.  Interrupted 3-0 silk suture was used to obtain hemostasis at the staple line.  The anastomosis was palpated and  found to be widely patent.  The mesenteric defect was left open.     This completed the abdomen was again surveyed and hemostasis of all operative sites was confirmed.  The Bookwalter retractor was removed.  The abdomen was then closed using interrupted figure-of-eight 0 PDS suture. the subcutaneous wound was irrigated.  The skin was then closed using skin staples.  The laparoscopic incision sites and PD catheter cough site were closed with 4-0 Monocryl.  Clean dry dressing was applied all the incision sites.     At this point in time the case was turned over to Dr. Begum who performed hemodialysis catheter placement.     After the procedure, the patient was awakened, extubated, and taken to the postoperative anesthesia care unit for recovery. All needle, instrument, and lap counts were correct. I was personally present and performed all portions of the procedure except for placement of the hemodialysis catheter.. There were no immediate complications         Robin Andersen MD  12/6/2021  17:17 EST

## 2021-12-06 NOTE — PROGRESS NOTES
[facility] Progress Note        Antibiotics:  Anti-Infectives (From admission, onward)    Ordered     Dose/Rate Route Frequency Start Stop    12/03/21 0857  piperacillin-tazobactam (ZOSYN) 3.375 g in iso-osmotic dextrose 50 ml (premix)        Ordering Provider: Sawyer Llanos MD    3.375 g  over 4 Hours Intravenous Every 12 Hours 12/03/21 1800 12/10/21 1759    12/03/21 0832  micafungin 100 mg/100 mL 0.9% NS IVPB (mbp)        Ordering Provider: Sawyer Llanos MD    100 mg Intravenous Every 24 Hours 12/03/21 1000 12/10/21 0959    12/03/21 0857  piperacillin-tazobactam (ZOSYN) 3.375 g in iso-osmotic dextrose 50 ml (premix)        Ordering Provider: Sawyer Llanos MD    3.375 g  over 30 Minutes Intravenous Once 12/03/21 0945 12/03/21 1214    12/03/21 0832  Pharmacy to dose vancomycin        Ordering Provider: Sawyer Llanos MD     Does not apply Continuous PRN 12/03/21 0831 12/13/21 0830    12/02/21 1434  vancomycin (dosing per levels)        Ordering Provider: Johnson Villarreal, PharmD     Does not apply Daily 12/02/21 1530 12/25/21 0859    11/30/21 1111  UltraBag/Dianeal PD-2/1.5% Dex (pappas #[id number]) (DIANEAL) 2,000 mL with vancomycin (VANCOCIN) 1,250 mg, cefTAZidime (FORTAZ) 1,000 mg dialysis solution        Ordering Provider: Swayer Llanos MD     Intraperitoneal Once 11/30/21 2200 11/30/21 2200    11/28/21 1942  UltraBag/Dianeal PD-2/1.5% Dex (pappas #[id number]) (DIANEAL) 2,000 mL with ceFAZolin (ANCEF) 2,000 mg dialysis solution        Ordering Provider: Kali Rojas MD     Intraperitoneal Once 11/29/21 0000 11/29/21 0004    11/28/21 1157  vancomycin 1500 mg/500 mL 0.9% NS IVPB ([facility])        Ordering Provider: Lex Marcum MD    20 mg/kg × 72.6 kg Intravenous Once 11/28/21 1159 11/28/21 1400    11/28/21 1157  piperacillin-tazobactam (ZOSYN) 3.375 g in iso-osmotic dextrose 50 ml (premix)        Ordering Provider: Lex Marcum MD    3.375 g Intravenous Once 11/28/21 1159 11/28/21 1318           CC: abd pain    HPI:      Patient is a 81 y.o.  Yr old female with history of ESRD on CAPD for several years, Hx CDiff years ago she was admitted November 28 with acute onset abdominal pain over 2 days, nausea/vomiting and abnormal CT scan raising concern for possible early ileus versus partial small bowel obstruction and hazy omental stranding concerning for peritonitis per notes.  She had leukocytosis and peritoneal fluid with 1748 WBC and predominance neutrophils.  Concern for CAPD associated peritonitis and empiric vancomycin/Zosyn initiated.  Nursing had reported peritoneal fluid had initially looked a little hazy but patient did not recall a hazy or bloody appearance.  No redness/swelling or pain or other problems at her PD catheter.     12/3/21 NG out 12/2 with nausea and vomiting multiple times overnight; dialysate remains blood tinged per patient;  tachycardia, hypotension last 24 hours, WBC increased overall and at risk for systemic illness;  Will change abx back to IV vancomycin/zosyn, add empiric mycamine    12/4/21 cardiology following with paroxysmal afib/RVR    12/6/21 Dr Andersen following, surgery plans being made and SBFT with SBO per radiology;     Per nursing, She has   abdominal pain, mid abdomen, intermittent, nonradiating, 2-4 out of 10, not progressive.  She denies diarrhea.  No hematochezia melena or hematemesis.  No rash.  Minimal urine output and no active urinary symptoms.  No dysuria hematuria or pyuria.  No flank pain.  No rash.  No shortness of breath or cough.      ROS:      12/6/21 No f/c/s. no rash. No new ADR to Abx.     Heent-- No new vision, hearing or throat complaints.  No epistaxis or oral sores.  Denies odynophagia or dysphagia.  No flashers, floaters or eye pain. No odynophagia or dysphagia. No headache, photophobia or neck stiffness.  CV-- No chest pain, palpitation or syncope  Resp-- No SOB/cough/Hemoptysis  GI-  As above.No hematochezia, melena, or hematemesis. Denies  "jaundice or chronic liver disease.  -- No dysuria, hematuria, or flank pain.  Denies hesitancy, urgency or flank pain.  Lymph- no swollen lymph nodes in neck/axilla or groin.   Heme- No active bruising or bleeding; no Hx of DVT or PE.  MS-- no swelling or pain in the bones or joints of arms/legs.  No new back pain.  Neuro-- No acute focal weakness or numbness in the arms or legs.  No seizures.     Full 12 point review of systems reviewed and negative otherwise for acute complaints, except for above      PE:   /66 (BP Location: Right leg, Patient Position: Lying)   Pulse 95   Temp 97.9 °F (36.6 °C) (Oral)   Resp 18   Ht 162.6 cm (64\")   Wt 83.5 kg (184 lb)   LMP  (LMP Unknown)   SpO2 96%   BMI 31.58 kg/m²       GENERAL: awake, in no acute distress.   HEENT: Normocephalic, atraumatic.  PERRL. EOMI. No conjunctival injection. No icterus. Oropharynx clear without evidence of thrush or exudate. No evidence of peridontal disease.    NECK: Supple without nuchal rigidity. No mass.  LYMPH: No cervical, axillary or inguinal lymphadenopathy.  HEART: RRR; No murmur, rubs, gallops.   LUNGS: diminished at bases but otherwise Clear to auscultation bilaterally without wheezing, rales, rhonchi. Normal respiratory effort. Nonlabored. No dullness.  ABDOMEN: Soft, mildly tender, nondistended. Positive bowel sounds. No rebound or guarding. NO mass or HSM.  EXT:  No cyanosis, clubbing or edema. No cord.   MSK: FROM without joint effusions noted arms/legs.    SKIN: Warm and dry without cutaneous eruptions on Inspection/palpation.    NEURO: awake     PD catheter with no redness or purulent drainage.  Nontender at the exit site     IV without obvious redness or drainage     No peripheral stigmata/phenomenon of endocarditis       Laboratory Data    Results from last 7 days   Lab Units 12/06/21  0539 12/05/21  0420 12/04/21  0947   WBC 10*3/mm3 14.56* 17.56* 14.72*   HEMOGLOBIN g/dL 8.3* 9.4* 9.7*   HEMATOCRIT % 25.7* 28.7* " 29.5*   PLATELETS 10*3/mm3 197 255 227     Results from last 7 days   Lab Units 12/06/21  0539   SODIUM mmol/L 135*   POTASSIUM mmol/L 2.9*   CHLORIDE mmol/L 91*   CO2 mmol/L 26.0   BUN mg/dL 33*   CREATININE mg/dL 7.23*   GLUCOSE mg/dL 123*   CALCIUM mg/dL 8.1*     Results from last 7 days   Lab Units 12/06/21  0539   ALK PHOS U/L 117   BILIRUBIN mg/dL 0.5   ALT (SGPT) U/L <5   AST (SGOT) U/L 20         Results from last 7 days   Lab Units 12/05/21  0649   CRP mg/dL 23.82*       Estimated Creatinine Clearance: 6.5 mL/min (A) (by C-G formula based on SCr of 7.23 mg/dL (H)).      Microbiology:      Radiology:  Imaging Results (Last 72 Hours)     Procedure Component Value Units Date/Time    XR Abdomen KUB [[id number]] Collected: 12/06/21 0900     Updated: 12/06/21 0901    Narrative:         EXAMINATION: XR ABDOMEN KUB-      INDICATION: assess for contrast progression; R10.9-Unspecified abdominal  pain; T85.71XA-Infection and inflammatory reaction due to peritoneal  dialysis catheter, initial encounter; A41.9-Sepsis, unspecified organism        COMPARISON: 12/04/2021     FINDINGS: KUB reveals bowel gas pattern to be somewhat improving when  compared to the prior study. There is a normal contrast identified  within the transverse colon. Nasogastric tube identified tip in the  stomach. Peritoneal Dialysis catheter identified the left lower  quadrant.       Impression:      Slight improvement seen in the gaseous distended loops of  bowel throughout the abdomen. Minimal contrast identified in the  transverse colon. Nasogastric G-tube the stomach. No free edge  peritoneal air.          FL Small Bowel Follow Through Single-Contrast [[id number]] Collected: 12/05/21 0806     Updated: 12/05/21 1321    Narrative:      EXAMINATION: FL SMALL BOWEL FOLLOW THROUGH SINGLE-CONTRAST - 12/04/2021     INDICATION: Abdominal distention     TECHNIQUE: Small bowel follow-through performed with 10 images and no  fluoroscopy.     COMPARISON:  None       Impression:      FINDINGS/IMPRESSION:   imaging reveals dilated loops of small bowel  seen diffusely throughout the abdomen. Minimal air in the rectum. There  is a nasogastric tube tip in the stomach. Contrast is seen within the  stomach on the 30- minute image and 230-minute image with no advancement  through the small bowel. There is only minimal contrast seen within the  proximal small bowel on the 9 hour and 40-minute image with minimal  contrast remaining within the stomach in the fundus. The 17-hour image  reveals no advancement of the contrast. Findings most suggestive of a  small bowel obstruction.     DICTATED:   12/04/2021  EDITED/lfs:   12/05/2021       CT Abdomen Pelvis Without Contrast [[id number]] Collected: 12/03/21 1125     Updated: 12/04/21 1115    Narrative:      EXAMINATION: CT ABDOMEN AND PELVIS WO CONTRAST-12/03/2021:      INDICATION: N/V, peritonitis, R/O ileus or PSBO; R10.9-Unspecified  abdominal pain; T85.71XA-Infection and inflammatory reaction due to  peritoneal dialysis catheter, initial encounter; A41.9-Sepsis,  unspecified organism.     TECHNIQUE: 5 mm unenhanced images through the abdomen and pelvis.     The radiation dose reduction device was turned on for each scan per the  ALARA (As Low as Reasonably Achievable) protocol.     COMPARISON: 11/28/2021 abdomen and pelvis CT scan.     FINDINGS: Previous exam report indicated new small bowel distention,  suspicious for partial small bowel obstruction with no definite  transition point. Findings related to peritoneal dialysis.     The included lung bases appear clear except for trace bibasilar pleural  thickening/atelectasis.     There is a peritoneal dialysis catheter in the left lower quadrant,  small amount of intrapelvic free fluid, upper abdominal free fluid and  small amount of upper abdominal free air, all typical for peritoneal  dialysis. Mild nonspecific fat stranding of the omentum is similar to  the prior study,  "perhaps mild edema.     No significant abnormalities are seen of the liver, spleen, the somewhat  atrophic pancreas, or the adrenal glands. The kidneys are markedly  atrophic and there is a large water-density left renal midpole cyst. The  gallbladder is contracted around dense material, presumably gallbladder  \"sludge\", but no gallbladder wall inflammation is seen.     CT scan  film again shows a pattern of dilated small bowel and  relatively little colon gas, suspicious for distal obstruction.   film images appear to show worsening dilatation. The dilatation appears  centered about a loop in the central and right pelvis, which is markedly  decompressed at both ends, and normal caliber centrally with mild  mucosal thickening. Please refer to the appearance of the central  abdominal small bowel loop on axial image 57, tapering to the left on  image 58, and tapering to the right on image 71. Loops proximal to this  appear mostly dilated. Loops distal to this level appear markedly  decompressed. The colon appears decompressed, but otherwise  unremarkable. The uterus and ovaries are appropriately atrophic. The  bladder is nondistended. Incidental note is made of what appears to be a  small abdominal wall hernia of the lower pelvis containing mild ascites.       Impression:      1. Findings consistent with incomplete distal small bowel obstruction,  which appears to be centered about an abnormal appearing loop centrally  in the upper pelvis. Degree of distention appears increased from  11/28/2021 scan.     2. Expected changes of peritoneal dialysis, with mild free fluid and  free air in the abdomen.     3. No evidence of discrete, well-defined inflammatory focus.     D:  12/03/2021  E:  12/03/2021     This report was finalized on 12/4/2021 11:12 AM by Dr. Xavier Cantor MD.       XR Abdomen KUB [[id number]] Collected: 12/03/21 2249     Updated: 12/03/21 2251    Narrative:      PROCEDURE: CR Abdomen 1 " Vw    INDICATIONS: verification of NG placement; Unspecified abdominal pain; Infection and inflammatory reaction due to peritoneal dialysis catheter, initial encounter; Sepsis, unspecified organism ; verification of NG placement    FINDINGS &     Impression:      Single frontal torso demonstrates tube tip to be in the body the stomach. Incidental moderate gaseous distention of jejunum noted.              Signer Name: Pippa Enriquez MD   Signed: 12/3/2021 10:49 PM   Workstation Name: [facility]    [facility]            Impression:       --acute abdominal pain.  Associated with nausea/vomiting, abnormal PD fluid concerning for  Peritonitis, ?CAPD  ; culture negative so far. Catheter exit site appears nonpurulent for now.  No outpatient cultures per patient. Transitioned to IP vancomycin/ceftaz empirically and back to systemic therapy 12/3  ; SBFT with SBO per radiology and Dr Andersen making surgical plans    **systemic WBC fluctuating, NG out 12/2 and subsequent vomiting with   abd pain;   repeat CT scan 12/3, surgery following with concern for  SBO per radiology     --Acute vomiting, nausea  ; supportive measures ongoing by medicine with NG tube as above and surgery following     --End-stage renal disease with peritoneal dialysis     --History C. Difficile years ago per patient.  No diarrhea at present but at risk for relapse.     --abnormal CT scan with prominent common bile duct per radiology but without evidence for intrahepatic ductal dilation and normal transaminases/bilirubin on November 28.  Monitor exam and labs     --Chronic Coumadin    --paroxysmal Afib/RVR    PLAN:       --IV vancomycin, zosyn ,mycamine     --Check/review labs cultures and scans     --Partial history per nursing staff     --Discussed with microbiology     -- d/w pharmacy      --Highly complex set of issues with high risk for further serious morbidity and other serious sequela    --repeat dialysate for cultures  ,  fungal/AFB studies pending; d/w micro    --d/w Dr Stovall;  repeat CT 12/3 and SBFT noted, surgery following;  On systemic therapy  And surgery planned 12/6      Sawyer Llanos MD  12/6/2021

## 2021-12-06 NOTE — ANESTHESIA PROCEDURE NOTES
Airway  Urgency: elective    Date/Time: 12/6/2021 2:33 PM  Airway not difficult    General Information and Staff    Patient location during procedure: OR  Anesthesiologist: Stefan Huynh Jr., MD  CRNA: Bobbi Lisa CRNA    Indications and Patient Condition  Indications for airway management: airway protection    Preoxygenated: yes  MILS not maintained throughout  Mask difficulty assessment: 1 - vent by mask    Final Airway Details  Final airway type: endotracheal airway      Successful airway: ETT  Cuffed: yes   Successful intubation technique: video laryngoscopy  Endotracheal tube insertion site: oral  Blade: Mccray  Blade size: 3  ETT size (mm): 7.0  Cormack-Lehane Classification: grade I - full view of glottis  Placement verified by: chest auscultation and capnometry   Measured from: lips  ETT/EBT  to lips (cm): 20  Number of attempts at approach: 1  Assessment: lips, teeth, and gum same as pre-op and atraumatic intubation    Additional Comments  Negative epigastric sounds, Breath sound equal bilaterally with symmetric chest rise and fall

## 2021-12-06 NOTE — ANESTHESIA PROCEDURE NOTES
Peripheral Block      Patient reassessed immediately prior to procedure    Patient location during procedure: OR  Reason for block: at surgeon's request and post-op pain management  Performed by  CRNA: Jean-Pierre Ambrosio CRNA  Preanesthetic Checklist  Completed: patient identified, IV checked, site marked, risks and benefits discussed, surgical consent, monitors and equipment checked, pre-op evaluation and timeout performed  Prep:  Pt Position: supine  Sterile barriers:cap, gloves, sterile barriers and mask  Prep: ChloraPrep  Patient monitoring: blood pressure monitoring, continuous pulse oximetry and EKG  Procedure    Sedation: yes  Performed under: general  Guidance:ultrasound guided  Images:still images obtained, printed/placed on chart    Laterality:Bilateral  Block Type:TAP  Injection Technique:single-shot  Needle Type:short-bevel and echogenic  Needle Gauge:20 G  Resistance on Injection: none    Medications Used: dexamethasone sodium phosphate injection, 4 mg  bupivacaine PF (MARCAINE) 0.25 % injection, 60 mL  Med administered at 12/6/2021 5:50 PM      Medications  Comment:Block Injection:  LA dose divided between Right and Left block        Post Assessment  Injection Assessment: negative aspiration for heme, incremental injection and no paresthesia on injection  Patient Tolerance:comfortable throughout block  Complications:no  Additional Notes      Under Ultrasound guidance, a BBraun 4inch 360 degree needle was advanced with Normal Saline hydro dissection of tissue.  The Internal Oblique and Transversus Abdominus muscles where visualized.  At or before the aponeurosis of Internal Oblique, local anesthetic spread was visualized in the Transversus Abdominus Plane. Injection was made incrementally with aspiration every 5 mls.  There was no  intravascular injection,  injection pressure was normal, there was no neural injection, and the procedure was completed without difficulty.  Thank You.

## 2021-12-06 NOTE — BRIEF OP NOTE
LAPAROTOMY EXPLORATORY, COLON RESECTION SMALL BOWEL  Progress Note    Moni EL Rodrigo  12/6/2021    Pre-op Diagnosis:   Small bowel obstruction, peritonitis in the setting of peritoneal dialysis catheter       Post-Op Diagnosis Codes:  Same    Procedure/CPT® Codes:        Procedure(s):  DIAGNOSTIC LAPAROSCOPY, POSSIBLE LAPAROTOMY  COLON RESECTION SMALL BOWEL  TUNNELLED DIALYSIS CATHETER PLACEMENT    Surgeon(s):  Robin Andersen MD Baehler, Richard David, MD    Anesthesia: General    Staff:   Circulator: Yony Renee RN  Physician Assistant: Cris Khan PA-C  Scrub Person: Homero Martinez RN; Leti Davies RN  Nursing Assistant: Fantasma rGeenwood PCT; Breann Tello PCT         Estimated Blood Loss: 50 mL    Urine Voided: * No values recorded between 12/6/2021  2:20 PM and 12/6/2021  5:09 PM *    Specimens:                Specimens     ID Source Type Tests Collected By Collected At Frozen?    A Small Intestine Tissue · TISSUE PATHOLOGY EXAM   Robin Andersen MD 12/6/21 1606     Description: Small bowel and cecum    This specimen was not marked as sent.                Drains:   NG/OG Tube Nasogastric (Active)   Placement Verification X-ray 12/05/21 2000   Site Assessment Clean; Intact; Dry 12/06/21 0736   Securement anchored to forehead with adhesive device 12/06/21 0736   Secured at (cm) 58 12/06/21 0736   NG Site Interventions Site assessed 12/06/21 0736   Status Clamped 12/06/21 0824   Drainage Appearance Brown 12/06/21 0736   Surrounding Skin Dry; Intact 12/06/21 0736   Flush/ Irrigation Intake (mL) 120 12/05/21 0900   Output (mL) 350 mL 12/06/21 0824       Peritoneal Dialysis Catheter Left lower abdomen (Active)   Site Assessment Clean; Dry; Intact 12/06/21 0832   Dressing Intervention New dressing 12/05/21 0705   Status Draining 12/06/21 1015   Site Condition No complications 12/06/21 0832   Dressing Status Clean; Dry; Intact 12/06/21 0832   Dressing Gauze 12/06/21 0736   Effluent Appearance yellow  12/06/21 0736   Site Drainage Description Other (Comment) 12/06/21 0736       [REMOVED] NG/OG Tube Nasogastric 18 Fr Left nostril (Removed)   Placement Verification Other (Comment) 12/01/21 0800   Site Assessment Clean; Dry; Intact 12/02/21 1209   Securement anchored to nostril center with adhesive device 12/02/21 0813   Secured at (cm) 62 12/02/21 0813   NG Site Interventions Site assessed 12/02/21 0813   Dressing Intervention New dressing 11/30/21 0815   Status Clamped 12/02/21 1209   Drainage Appearance Brown 12/01/21 1805   Surrounding Skin Dry; Intact; Non reddened 12/02/21 0813   Output (mL) 100 mL 12/02/21 1209       Findings: Diffuse distention of the small bowel with what appeared to be a transition point approximately 6 cm proximal to the ileocecal valve, there was significant edema, thickening, and fibrosis of the small bowel at this site likely reactive.  An ileocolic resection was performed with 45 cm of small bowel.  Peritoneal dialysis catheter was removed    Complications: None immediate          Robin Andersen MD     Date: 12/6/2021  Time: 17:09 EST

## 2021-12-06 NOTE — ANESTHESIA PREPROCEDURE EVALUATION
Anesthesia Evaluation     Patient summary reviewed and Nursing notes reviewed   NPO Solid Status: > 8 hours  NPO Liquid Status: > 8 hours           Airway   Mallampati: I  TM distance: >3 FB  Neck ROM: full  No difficulty expected  Dental    (+) edentulous    Pulmonary    (-) COPD, asthma (MDI in past not needed now), shortness of breath, recent URI, sleep apnea, not a smoker  Cardiovascular     ECG reviewed    (+) hypertension, valvular problems/murmurs AS and MR, dysrhythmias Paroxysmal Atrial Fib, hyperlipidemia,  carotid artery disease (r carotid disease)  (-) past MI, angina, cardiac stents    ROS comment: ECG NSR  ST & T wave abnormality, consider inferior ischemia   Prolonged QT    ECHO EF = 65%  LVH LA mildly  enlarged  MILD AS calcified valve RVSP (TR) is normal <35 mmHg        Neuro/Psych  (-) seizures, CVA  GI/Hepatic/Renal/Endo    (+)  PUD,  renal disease (IGA nephropathy ) CRI, diabetes mellitus type 2,   (-)  obesity, morbid obesity    Musculoskeletal     Abdominal    Substance History      OB/GYN          Other   blood dyscrasia,     ROS/Med Hx Other: HCT 25  Creat >7  But K was 2.9 (repeat was 3.5 today- post dialysis)   Peritoneal Dialysis this am       Phys Exam Other: NGT   Mac 3 grade 1 2019 [facility]               Anesthesia Plan    ASA 4     general   (Discussed TAPs ( if need to open)   Aim basilio MAP >65 high risk (includes carotid disease- unk severity and AS(mild )   )  intravenous induction     Anesthetic plan, all risks, benefits, and alternatives have been provided, discussed and informed consent has been obtained with: patient.    Plan discussed with CRNA.

## 2021-12-06 NOTE — CASE MANAGEMENT/SOCIAL WORK
Continued Stay Note  [facility]     Patient Name: Moni Wallace  MRN: [id number]  Today's Date: 12/6/2021    Admit Date: 11/28/2021     Discharge Plan     Row Name 12/06/21 1649       Plan    Plan discharge plan    Plan Comments Pt having surgery today. [facility] following. CM will cont to follow for discharge needs.    Final Discharge Disposition Code 30 - still a patient               Discharge Codes    No documentation.               Expected Discharge Date and Time     Expected Discharge Date Expected Discharge Time    Dec 3, 2021             Cyndy Hedrick RN

## 2021-12-06 NOTE — PROGRESS NOTES
Pharmacy Parenteral Nutrition Evaluation    Moni Wallace is an 81 y.o. female receiving PPN.    Indication: Bowel obstruction  Consulting Physician: Dr. Stovall    Total Fluid Rate (if stated): 75 ml/hr (PPN) per renal    Labs  Results from last 7 days   Lab Units 12/06/21  1245 12/06/21  0539 12/05/21  1956 12/05/21  0649 12/05/21  0649 12/04/21  0947 12/04/21  0947   SODIUM mmol/L  --  135*  --   --  136  --  135*   POTASSIUM mmol/L 3.5 2.9* 3.1*   < > 2.9*   < > 3.0*   CHLORIDE mmol/L  --  91*  --   --  89*  --  91*   CO2 mmol/L  --  26.0  --   --  26.0  --  26.0   BUN mg/dL  --  33*  --   --  35*  --  36*   CREATININE mg/dL  --  7.23*  --   --  7.02*  --  6.98*   CALCIUM mg/dL  --  8.1*  --   --  8.3*  --  7.5*   BILIRUBIN mg/dL  --  0.5  --   --  0.3  --  0.2   ALK PHOS U/L  --  117  --   --  109  --  108   ALT (SGPT) U/L  --  <5  --   --  <5  --  <5   AST (SGOT) U/L  --  20  --   --  13  --  13   GLUCOSE mg/dL  --  123*  --   --  134*  --  155*    < > = values in this interval not displayed.     Results from last 7 days   Lab Units 12/06/21  0539 12/05/21  1643 12/05/21 0649 12/03/21  0429   MAGNESIUM mg/dL 1.5*  --  1.7 1.6   PHOSPHORUS mg/dL 3.6  --  5.7*  --    PREALBUMIN mg/dL  --  13.6*  --   --      Results from last 7 days   Lab Units 12/06/21  0539 12/05/21  0420 12/04/21  0947   WBC 10*3/mm3 14.56* 17.56* 14.72*   HEMOGLOBIN g/dL 8.3* 9.4* 9.7*   HEMATOCRIT % 25.7* 28.7* 29.5*   PLATELETS 10*3/mm3 197 255 227     Triglycerides   Date Value Ref Range Status   12/05/2021 104 0 - 150 mg/dL Final     Comment:     Falsely depressed results may occur on samples drawn from patients receiving N-Acetylcysteine (NAC) or Metamizole.     estimated creatinine clearance is 6.5 mL/min (A) (by C-G formula based on SCr of 7.23 mg/dL (H)).    Intake & Output (last 3 days)         12/03 0701  12/04 0700 12/04 0701 12/05 0700 12/05 0701 12/06 0700 12/06 0701  12/07 0700    P.O. 240 0      I.V. (mL/kg) 589 (7.3)   200  (2.4)    Blood   675     Other 4210 2090 2120 2000    IV Piggyback 250 600 800 200    Total Intake(mL/kg) 5289 (65.8) 2690 (33) 3595 (43.1) 2400 (28.7)    Urine (mL/kg/hr)  25 (0)  0 (0)    Emesis/NG output 150 2325 400 350    Other 5150 7000 6700     Stool  0  0    Blood    50    Total Output 5300 9350 7100 400    Net -11 -6660 -3505 +2000            Urine Unmeasured Occurrence    1 x    Stool Unmeasured Occurrence  1 x  1 x          Dietitian Recommendations  Dietary Orders (From admission, onward)       Start     Ordered    12/03/21 1248  NPO Diet NPO Except: Ice chips  Diet Effective Now        Question:  NPO Except:  Answer:  Ice chips    12/03/21 1247                  Current PPN Regimen Recommendation: Dextrose 5% / Amino Acid 5.5% at goal rate of 75mL/hr. 20% SMOF lipids 250mL every 24 hours.    Assessment/Plan:  1. Pharmacy to dose PPN ordered for bowel obstruction.  2. PPN continues today with macronutrients per RD recommendations as above at goal rate of 75 mL/hr. Patient currently on peritoneal dialysis secondary to ESRD. Will continue Na at 30 mEq/L, add Ca and Mg at 2 meq/L, and leave K + phos out for now.  3. Blood glucose currently controlled on correctional insulin at this time.   4. Pharmacy will continue to follow closely and make adjustments to PPN as necessary.    Thank you,  Jensen Ramirez, PharmD, BCPS  12/6/2021  15:36 EST

## 2021-12-06 NOTE — PROGRESS NOTES
[facility] Medicine Services  PROGRESS NOTE    Patient Name: Moni Wallace  : 1940  MRN: [id number]    Date of Admission: 2021  Primary Care Physician: Alex Walters MD    Subjective   Subjective     CC:  F/U SBO    HPI:  Patient seen and examined. RN notes reviewed. No acute events overnight. Pt states she actually feels better today. Passing gas, feels like she may need to have a BM. NG with 400cc output yesterday. Vitals much improved. Planning for surgery today.     ROS:  Gen- No fevers, chills  CV- No chest pain, palpitations  Resp- No cough, dyspnea  GI- No N/V/D, +abd pain (improved today)    Objective   Objective     Vital Signs:   Temp:  [97.5 °F (36.4 °C)-98.3 °F (36.8 °C)] 97.9 °F (36.6 °C)  Heart Rate:  [] 95  Resp:  [18-20] 18  BP: (112-169)/(49-76) 156/66  Flow (L/min):  [2] 2     Physical Exam:  Constitutional: No acute distress, awake, alert  HENT: NCAT, mucous membranes moist, NG in place   Respiratory: Clear to auscultation bilaterally, respiratory effort normal   Cardiovascular: RRR, no murmurs, rubs, or gallops  Gastrointestinal: Positive bowel sounds, soft, nontender, nondistended  Musculoskeletal: No bilateral ankle edema  Psychiatric: Appropriate affect, cooperative  Neurologic: Oriented x 3,  speech clear  Skin: No rashes    Results Reviewed:  LAB RESULTS:      Lab 21  0539 21  1643 21  0649 21  0420 21  0947 21  0601 21  0429 21  1233 21  0441   WBC 14.56*  --   --  17.56* 14.72*  --  16.80* 18.56*  --    HEMOGLOBIN 8.3*  --   --  9.4* 9.7*  --  10.6* 10.1*  --    HEMATOCRIT 25.7*  --   --  28.7* 29.5*  --  33.5* 31.4*  --    PLATELETS 197  --   --  255 227  --  267 283  --    NEUTROS ABS  --   --   --   --   --   --  14.37* 15.88*  --    IMMATURE GRANS (ABS)  --   --   --   --   --   --  0.20* 0.21*  --    LYMPHS ABS  --   --   --   --   --   --  1.39 1.63  --    MONOS ABS  --   --   --    --   --   --  0.66 0.67  --    EOS ABS  --   --   --   --   --   --  0.13 0.13  --    MCV 98.8*  --   --  96.6 97.7*  --  99.1* 100.0*  --    CRP  --   --  23.82*  --   --   --   --   --   --    PROTIME 17.7* 22.7*  --  32.7*  --  33.8* 35.4*  --    < >    < > = values in this interval not displayed.         Lab 12/06/21 0539 12/05/21  1956 12/05/21  1643 12/05/21 0649 12/04/21  0947 12/03/21 0429 12/02/21 0441 12/02/21 0441   SODIUM 135*  --   --  136 135* 135*  --  133*   POTASSIUM 2.9* 3.1*  --  2.9* 3.0* 3.4*   < > 3.4*   CHLORIDE 91*  --   --  89* 91* 93*  --  93*   CO2 26.0  --   --  26.0 26.0 21.0*  --  22.0   ANION GAP 18.0*  --   --  21.0* 18.0* 21.0*  --  18.0*   BUN 33*  --   --  35* 36* 39*  --  41*   CREATININE 7.23*  --   --  7.02* 6.98* 7.27*  --  7.74*   GLUCOSE 123*  --   --  134* 155* 156*  --  144*   CALCIUM 8.1*  --   --  8.3* 7.5* 7.0*  --  6.6*   IONIZED CALCIUM 1.06*  --  1.06*  --   --   --   --   --    MAGNESIUM 1.5*  --   --  1.7  --  1.6  --   --    PHOSPHORUS 3.6  --   --  5.7*  --   --   --   --     < > = values in this interval not displayed.         Lab 12/06/21 0539 12/05/21  0649 12/04/21  0947 12/03/21 0429 12/01/21 0437   TOTAL PROTEIN 6.7 6.8 5.6* 6.3 6.3   ALBUMIN 4.20 3.80 3.30* 2.40* 2.80*   GLOBULIN 2.5 3.0 2.3 3.9 3.5   ALT (SGPT) <5 <5 <5 <5 <5   AST (SGOT) 20 13 13 21 17   BILIRUBIN 0.5 0.3 0.2 0.3 0.2   ALK PHOS 117 109 108 136* 135*         Lab 12/06/21  0539 12/05/21  1643 12/05/21  0420 12/04/21  0601 12/03/21  0429   PROTIME 17.7* 22.7* 32.7* 33.8* 35.4*   INR 1.51* 2.10* 3.39* 3.54* 3.83*         Lab 12/05/21  0649   CHOLESTEROL 100   TRIGLYCERIDES 104         Lab 12/05/21  1020   ABO TYPING O   RH TYPING Negative   ANTIBODY SCREEN Negative         Brief Urine Lab Results  (Last result in the past 365 days)      Color   Clarity   Blood   Leuk Est   Nitrite   Protein   CREAT   Urine HCG        11/28/21 1647 Yellow   Turbid   Moderate (2+)   Large (3+)    Negative   >=300 mg/dL (3+)                 Microbiology Results Abnormal     Procedure Component Value - Date/Time    Blood Culture - Blood, Hand, Left [[id number]]  (Normal) Collected: 12/03/21 1318    Lab Status: Preliminary result Specimen: Blood from Hand, Left Updated: 12/05/21 1501     Blood Culture No growth at 2 days    Narrative:      Aerobic Only    Blood Culture - Blood, Hand, Left [[id number]]  (Normal) Collected: 12/03/21 1230    Lab Status: Preliminary result Specimen: Blood from Hand, Left Updated: 12/05/21 1330     Blood Culture No growth at 2 days    Body Fluid Culture - Body Fluid, Peritoneum [[id number]] Collected: 12/02/21 1850    Lab Status: Final result Specimen: Body Fluid from Peritoneum Updated: 12/05/21 0736     Body Fluid Culture No growth at 3 days     Gram Stain Many (4+) WBCs seen      No organisms seen    AFB Culture - Body Fluid, Peritoneum [[id number]] Collected: 12/03/21 1040    Lab Status: Preliminary result Specimen: Body Fluid from Peritoneum Updated: 12/04/21 1207     AFB Stain No acid fast bacilli seen on concentrated smear    Blood Culture - Blood, Wrist, Left [[id number]]  (Normal) Collected: 11/28/21 1023    Lab Status: Final result Specimen: Blood from Wrist, Left Updated: 12/03/21 1046     Blood Culture No growth at 5 days    Blood Culture - Blood, Wrist, Left [[id number]]  (Normal) Collected: 11/28/21 1023    Lab Status: Final result Specimen: Blood from Wrist, Left Updated: 12/03/21 1046     Blood Culture No growth at 5 days    Body Fluid Culture - Body Fluid, Peritoneum [[id number]] Collected: 11/28/21 1214    Lab Status: Final result Specimen: Body Fluid from Peritoneum Updated: 12/01/21 1239     Body Fluid Culture No growth at 3 days     Gram Stain Few (2+) WBCs seen      No organisms seen    Urine Culture - Urine, Urine, Clean Catch [[id number]]  (Normal) Collected: 11/28/21 1647    Lab Status: Final result Specimen: Urine, Clean Catch Updated: 11/29/21 2255     Urine  Culture No growth    COVID PRE-OP / PRE-PROCEDURE SCREENING ORDER (NO ISOLATION) - Swab, Nasopharynx [[id number]]  (Normal) Collected: 11/28/21 1558    Lab Status: Final result Specimen: Swab from Nasopharynx Updated: 11/28/21 2048    Narrative:      The following orders were created for panel order COVID PRE-OP / PRE-PROCEDURE SCREENING ORDER (NO ISOLATION) - Swab, Nasopharynx.  Procedure                               Abnormality         Status                     ---------                               -----------         ------                     COVID-19, APTIMA PANTHER...[[id number]]  Normal              Final result                 Please view results for these tests on the individual orders.    COVID-19, APTIMA PANTHER [facility] IN-HOUSE NP/OP SWAB IN UTM/VTM/SALINE TRANSPORT MEDIA 24HR TAT - Swab, Nasopharynx [[id number]]  (Normal) Collected: 11/28/21 1558    Lab Status: Final result Specimen: Swab from Nasopharynx Updated: 11/28/21 2048     COVID19 Not Detected    Narrative:      Fact sheet for providers: https://www.fda.gov/media/152773/download     Fact sheet for patients: https://www.fda.gov/media/913725/download    Test performed by RT PCR.          FL Small Bowel Follow Through Single-Contrast    Result Date: 12/5/2021  EXAMINATION: FL SMALL BOWEL FOLLOW THROUGH SINGLE-CONTRAST - 12/04/2021  INDICATION: Abdominal distention  TECHNIQUE: Small bowel follow-through performed with 10 images and no fluoroscopy.  COMPARISON: None      Impression: FINDINGS/IMPRESSION:   imaging reveals dilated loops of small bowel seen diffusely throughout the abdomen. Minimal air in the rectum. There is a nasogastric tube tip in the stomach. Contrast is seen within the stomach on the 30- minute image and 230-minute image with no advancement through the small bowel. There is only minimal contrast seen within the proximal small bowel on the 9 hour and 40-minute image with minimal contrast remaining within the stomach in the  fundus. The 17-hour image reveals no advancement of the contrast. Findings most suggestive of a small bowel obstruction.  DICTATED:   12/04/2021 EDITED/lfs:   12/05/2021      XR Abdomen KUB    Result Date: 12/6/2021   EXAMINATION: XR ABDOMEN KUB-  INDICATION: assess for contrast progression; R10.9-Unspecified abdominal pain; T85.71XA-Infection and inflammatory reaction due to peritoneal dialysis catheter, initial encounter; A41.9-Sepsis, unspecified organism   COMPARISON: 12/04/2021  FINDINGS: KUB reveals bowel gas pattern to be somewhat improving when compared to the prior study. There is a normal contrast identified within the transverse colon. Nasogastric tube identified tip in the stomach. Peritoneal Dialysis catheter identified the left lower quadrant.      Impression: Slight improvement seen in the gaseous distended loops of bowel throughout the abdomen. Minimal contrast identified in the transverse colon. Nasogastric G-tube the stomach. No free edge peritoneal air.         Results for orders placed during the hospital encounter of 01/11/21    Transthoracic Echo Complete With Contrast if Necessary Per Protocol    Interpretation Summary  · Left ventricular systolic function is normal. Estimated left ventricular EF = 65%  · Left ventricular wall thickness is consistent with hypertrophy.  · Left atrial volume is mildly increased.  · The aortic valve is calcified. There is mild aortic stenosis present. There is moderate aortic regurgitation present.  · Estimated right ventricular systolic pressure from tricuspid regurgitation is normal (<35 mmHg).      I have reviewed the medications:  Scheduled Meds:amiodarone, 200 mg, Nasogastric, Q8H   Followed by  [START ON 12/11/2021] amiodarone, 200 mg, Nasogastric, Q12H   Followed by  [START ON 12/25/2021] amiodarone, 200 mg, Nasogastric, Daily  calcium acetate, 667 mg, Oral, TID With Meals  cinacalcet, 60 mg, Oral, Nightly  epoetin blanca/blanca-epbx, 10,000 Units,  Subcutaneous, Weekly  Fat Emul Fish Oil/Plant Based, 250 mL, Intravenous, Q24H (TPN)  insulin lispro, 2-7 Units, Subcutaneous, Q6H  metoclopramide, 5 mg, Intravenous, Q6H  metoprolol tartrate, 12.5 mg, Oral, Once  metoprolol tartrate, 25 mg, Nasogastric, Q12H  micafungin (MYCAMINE) IV, 100 mg, Intravenous, Q24H  midodrine, 5 mg, Oral, Once  pantoprazole, 40 mg, Intravenous, Q AM  piperacillin-tazobactam, 3.375 g, Intravenous, Q12H  potassium chloride, 40 mEq, Nasogastric, Daily  sevelamer, 1,600 mg, Nasogastric, TID With Meals  sodium chloride, 10 mL, Intravenous, Q12H  dianeal with additives intermittent, , Intraperitoneal, 4x Daily  vancomycin (dosing per levels), , Does not apply, Daily  warfarin (COUMADIN) (dosing per levels), , Does not apply, Daily      Continuous Infusions:Adult Peripheral 2-in-1 TPN, , Last Rate: 50 mL/hr at 12/06/21 0616  Pharmacy to Dose TPN,   Pharmacy to dose vancomycin,   Pharmacy to dose warfarin,       PRN Meds:.•  acetaminophen  •  albuterol  •  benzocaine-menthol  •  ondansetron **OR** ondansetron  •  Pharmacy to Dose TPN  •  Pharmacy to dose vancomycin  •  Pharmacy to dose warfarin  •  phenol  •  prochlorperazine  •  Sodium Chloride (PF)  •  sodium chloride  •  temazepam    Assessment/Plan   Assessment & Plan     Active Hospital Problems    Diagnosis  POA   • **Peritonitis (HCC) [K65.9]  Yes   • Hypotension [I95.9]  Yes   • Sepsis associated hypotension (HCC) [A41.9, I95.9]  Yes   • Ileus (HCC) [K56.7]  Yes   • Hyperlipidemia LDL goal <100 [E78.5]  Yes   • Paroxysmal atrial fibrillation (HCC) [I48.0]  Yes   • Type 2 diabetes mellitus (HCC) [E11.9]  Yes   • Chronic kidney disease, stage IV (severe) (HCC) [N18.4]  Yes   • Hyperparathyroidism (HCC) [E21.3]  Yes   • Essential hypertension [I10]  Yes      Resolved Hospital Problems   No resolved problems to display.        Brief Hospital Course to date:  Moni Wallace is a 81 y.o. female here with sepsis and hypotension due to  peritonitis. Her stay is complicated by Ileus vs pSBO. Surgery and ID following.    This patient's problems and plans were partially entered by my partner and updated as appropriate by me 12/06/21.     Sepsis  Bacterial Peritonitis  --ID following, continue Vanc, Zosyn, Mycamine   --Cultures remain negative.  --Pain control      SBO  --Discussed with Dr Andersen. Plan for surgery today  --RD following for PPN      PAF, w/ intermittent RVR  HTN  --Hold Warfarin for surgery (has been on hold due to supra-therapeutic INR)   --S/P 2 units FFP and 10mg IV Vitamin K   --Cardiology following. Continue BB and Amio     Hypotension  -Currently resolved     ESRD on PD  Hypokalemia  Hypomagnesemia   Hypocalcemia   -Nephrology following   -LIDIA weekly   -K, Mg, Ca supplementation per Renal   -PD catheter will be removed during surgery so patient will have to be on HD.     DVT prophylaxis:  Medical DVT prophylaxis orders are present.       AM-PAC 6 Clicks Score (PT): 16 (12/05/21 2000)    Disposition: I expect the patient to be discharged TBD.     CODE STATUS:   Code Status and Medical Interventions:   Ordered at: 11/28/21 1356     Medical Intervention Limits:    NO intubation (DNI)     Level Of Support Discussed With:    Patient     Code Status (Patient has no pulse and is not breathing):    No CPR (Do Not Attempt to Resuscitate)     Medical Interventions (Patient has pulse or is breathing):    Limited Support       Debby Stovall, DO  12/06/21

## 2021-12-06 NOTE — ANESTHESIA POSTPROCEDURE EVALUATION
Patient: Moni Wallace    Procedure Summary     Date: 12/06/21 Room / Location:  [facility] /  [facility]    Anesthesia Start: 1420 Anesthesia Stop: 1800    Procedures:       DIAGNOSTIC LAPAROSCOPY, POSSIBLE LAPAROTOMY (N/A Abdomen)      COLON RESECTION SMALL BOWEL (N/A Abdomen)      TUNNELLED DIALYSIS CATHETER PLACEMENT (Right Neck) Diagnosis:     Surgeons: Robin Andersen MD; Rolando Begum MD Provider: Stefan Huynh Jr., MD    Anesthesia Type: general ASA Status: 4          Anesthesia Type: general    Vitals  Vitals Value Taken Time   BP     Temp     Pulse     Resp     SpO2 99 % 12/06/21 1800           Post Anesthesia Care and Evaluation    Patient location during evaluation: PACU  Patient participation: waiting for patient participation  Level of consciousness: lethargic and responsive to physical stimuli  Pain management: adequate  Airway patency: patent  Anesthetic complications: No anesthetic complications  PONV Status: none  Cardiovascular status: hemodynamically stable and acceptable  Respiratory status: nonlabored ventilation, acceptable, nasal cannula and oral airway  Hydration status: acceptable

## 2021-12-06 NOTE — PROGRESS NOTES
Cardiology Progress Note      Reason for visit:    · Atrial fibrillation with RVR in setting of bacterial peritonitis    IDENTIFICATION: 81-year-old female who resides in [place], [place]    Active Hospital Problems    Diagnosis  POA   • **Peritonitis (HCC) [K65.9]  Yes     Priority: High   • Sepsis associated hypotension (HCC) [A41.9, I95.9]  Yes     Priority: High   • Paroxysmal atrial fibrillation (HCC) [I48.0]  Yes     Priority: High     · Diagnosed 2/2015  · CHADS-VASc = 5  · On Coumadin   · Echo (1/12/2021): LVEF 65%.  LVH.  Moderate AI.  · Beakthrough episodes of atrial fibrillation with RVR in setting of bacterial peritonitis 11/30/2021     • Type 2 diabetes mellitus (HCC) [E11.9]  Yes     Priority: High   • Chronic kidney disease, stage IV (severe) (HCC) [N18.4]  Yes     Priority: High     · IgA nephropathy with history of renal transplant, 2010.   · Patient on hemodialysis Monday, Wednesday, and Friday started July 2015.  · Hemodialysis discontinued 2/2017.     • Hypotension [I95.9]  Yes     Priority: Medium   • Ileus (HCC) [K56.7]  Yes     Priority: Medium   • Hyperlipidemia LDL goal <100 [E78.5]  Yes     Priority: Medium     · Reports intolerance to statin     • Hyperparathyroidism (HCC) [E21.3]  Yes     Priority: Medium   • Essential hypertension [I10]  Yes     Priority: Low            Patient converted to normal sinus rhythm on IV amiodarone but did have some intermittent episodes yesterday.  She was unaware she was out of rhythm when it occurred.  She is currently in NSR today.  She is lying in the bed drowsy but awakens easily.  Her son is at bedside.  She denies any chest pain.  She still has NG tube and they are planning to do an exploratory lap today.           Vital Sign Min/Max for last 24 hours  Temp  Min: 97.5 °F (36.4 °C)  Max: 98.3 °F (36.8 °C)   BP  Min: 120/76  Max: 169/74   Pulse  Min: 77  Max: 95   Resp  Min: 18  Max: 20   SpO2  Min: 94 %  Max: 100 %   Flow (L/min)  Min: 2  Max: 2       Intake/Output Summary (Last 24 hours) at 12/6/2021 1046  Last data filed at 12/6/2021 1020  Gross per 24 hour   Intake 3125 ml   Output 5150 ml   Net -2025 ml           Physical Exam  Constitutional:       General: She is awake.   Cardiovascular:      Rate and Rhythm: Normal rate. Rhythm irregularly irregular.   Pulmonary:      Effort: Pulmonary effort is normal.      Breath sounds: Decreased breath sounds present.   Skin:     General: Skin is warm and dry.   Neurological:      Mental Status: She is alert and oriented to person, place, and time.   Psychiatric:         Behavior: Behavior is cooperative.         Tele: NSR    Results Review (reviewed the patient's recent labs in the electronic medical record):     EKG (12/6/2021): NSR    ECHO (1/12/2021): LVEF 65%.  LVH.  Mild AS.    Result Review:  I have personally reviewed the results from the time of this admission to 12/6/2021 10:46 EST and agree with these findings:  [x]  Laboratory  []  Microbiology  [x]  Radiology  [x]  EKG/Telemetry   [x]  Cardiology/Vascular   []  Pathology  []  Old records  []  Other:  Most notable findings include: WBC 14.56, hemoglobin 8.3, hematocrit 25.7, BUN 33, creatinine 7.23, potassium 2.9 (on peritoneal dialysis)    Results from last 7 days   Lab Units 12/06/21  0539 12/05/21  1956 12/05/21  0649 12/04/21  0947 12/04/21  0947 12/03/21  0429 12/03/21  0429 12/02/21  0441 12/02/21  0441 12/01/21  0437 12/01/21  0437 11/30/21  0428 11/30/21  0428   SODIUM mmol/L 135*  --  136  --  135*   < > 135*   < > 133*   < > 137   < > 137   POTASSIUM mmol/L 2.9* 3.1* 2.9*   < > 3.0*   < > 3.4*   < > 3.4*   < > 3.7   < > 3.7   CHLORIDE mmol/L 91*  --  89*  --  91*  --  93*  --  93*  --  95*  --  94*   BUN mg/dL 33*  --  35*  --  36*   < > 39*   < > 41*   < > 46*   < > 50*   CREATININE mg/dL 7.23*  --  7.02*  --  6.98*   < > 7.27*   < > 7.74*   < > 8.74*   < > 8.96*   MAGNESIUM mg/dL 1.5*  --  1.7  --   --   --  1.6  --   --   --   --   --   --      < > = values in this interval not displayed.         Results from last 7 days   Lab Units 12/06/21  0539 12/05/21  0420 12/04/21  0947   WBC 10*3/mm3 14.56* 17.56* 14.72*   HEMOGLOBIN g/dL 8.3* 9.4* 9.7*   HEMATOCRIT % 25.7* 28.7* 29.5*   PLATELETS 10*3/mm3 197 255 227       Lab Results   Component Value Date    HGBA1C 4.90 05/19/2019       Lab Results   Component Value Date    CHOL 100 12/05/2021    TRIG 104 12/05/2021    HDL 52 03/04/2019     (H) 03/04/2019              Atrial fibrillation with RVR  · Episode occurred in the setting of bacterial peritonitis/sepsis  · Unable to tolerate p.o. dose of metoprolol and unable to tolerate IV Cardizem drip due to hypotension  · Metoprolol tartrate 25 mg twice daily  · Coumadin on hold for exploratory lap today  · Remittent episodes of atrial fibrillation with RVR  · Started on IV amiodarone bolus to p.o. taper protocol 12/3/2021 with conversion to NSR     Hypotension   · Likely component of sepsis from bacterial peritonitis  · Blood pressures have improved     Spontaneous bacterial peritonitis  · Treatment per ID and hospitalist        End-stage renal failure on peritoneal dialysis  · Nephrology managing     Small bowel ileus  · Treatment per primary team                   · Continue to hold anticoagulation for exploratory lap/surgery with Dr. Andersen today  · Continue p.o. amiodarone per taper protocol    Electronically signed by REY Galeana, 12/06/21, 10:46 AM EST.

## 2021-12-06 NOTE — NURSING NOTE
PD rounds complete. Policy and procedure reviewed with AVILA Luther. Orders and documentation reviewed. Supplies adequate. Denies further need at this time. Encouraged to call [phone] for assistance or for questions.

## 2021-12-06 NOTE — PROGRESS NOTES
"Pharmacy Consult-Vancomycin Dosing    Moni Wallace is an 81 y.o. female receiving vancomycin therapy.    Indication: Peritonitis  Consulting Provider: Hospitalist  ID Consult: Yes    Goal Trough: >15 mcg/mL    Current Antimicrobial Therapy  Micafungin 12/3-now  Zosyn 12/3-now  Vancomycin 11/28-now    Allergies  Allergies as of 11/28/2021 - Reviewed 11/28/2021   Allergen Reaction Noted    Hydrocodone Itching 05/19/2019    Rice Swelling 07/07/2021    Statins Other (See Comments) 07/21/2020    Codeine Rash 05/07/2018    Sulfa antibiotics Rash 05/07/2018     Labs  Results from last 7 days   Lab Units 12/06/21  0539 12/05/21  0649 12/04/21  0947   BUN mg/dL 33* 35* 36*   CREATININE mg/dL 7.23* 7.02* 6.98*     Results from last 7 days   Lab Units 12/06/21  0539 12/05/21  0420 12/04/21  0947   WBC 10*3/mm3 14.56* 17.56* 14.72*     Evaluation of Dosing    Is Patient on Dialysis or Renal Replacement: Yes - PD    Ht - 162.6 cm (64\")  Wt - 83.5 kg (184 lb)    Estimated Creatinine Clearance: 6.5 mL/min (A) (by C-G formula based on SCr of 7.23 mg/dL (H)).    Intake & Output (last 3 days)         12/03 0701  12/04 0700 12/04 0701  12/05 0700 12/05 0701  12/06 0700 12/06 0701  12/07 0700    P.O. 240 0      I.V. (mL/kg) 589 (7.3)       Blood   675     Other 4210 2090 2120 2000    IV Piggyback 250 600 800 100    Total Intake(mL/kg) 5289 (65.8) 2690 (33) 3595 (43.1) 2100 (25.1)    Urine (mL/kg/hr)  25 (0)  0 (0)    Emesis/NG output 150 2325 400 350    Other 5150 7000 6700     Stool  0  0    Total Output 5300 9350 7100 350    Net -11 -6660 -3505 +2540            Urine Unmeasured Occurrence    1 x    Stool Unmeasured Occurrence  1 x  1 x          Microbiology and Radiology  Microbiology Results (last 10 days)       Procedure Component Value - Date/Time    Blood Culture - Blood, Hand, Left [[id number]]  (Normal) Collected: 12/03/21 1318    Lab Status: Preliminary result Specimen: Blood from Hand, Left Updated: 12/06/21 1500     Blood " Culture No growth at 3 days    Narrative:      Aerobic Only    Blood Culture - Blood, Hand, Left [[id number]]  (Normal) Collected: 12/03/21 1230    Lab Status: Preliminary result Specimen: Blood from Hand, Left Updated: 12/06/21 1330     Blood Culture No growth at 3 days    AFB Culture - Body Fluid, Peritoneum [[id number]] Collected: 12/03/21 1040    Lab Status: Preliminary result Specimen: Body Fluid from Peritoneum Updated: 12/04/21 1207     AFB Stain No acid fast bacilli seen on concentrated smear    Body Fluid Culture - Body Fluid, Peritoneum [[id number]] Collected: 12/02/21 1850    Lab Status: Final result Specimen: Body Fluid from Peritoneum Updated: 12/05/21 0736     Body Fluid Culture No growth at 3 days     Gram Stain Many (4+) WBCs seen      No organisms seen    Urine Culture - Urine, Urine, Clean Catch [[id number]]  (Normal) Collected: 11/28/21 1647    Lab Status: Final result Specimen: Urine, Clean Catch Updated: 11/29/21 2255     Urine Culture No growth    COVID PRE-OP / PRE-PROCEDURE SCREENING ORDER (NO ISOLATION) - Swab, Nasopharynx [[id number]]  (Normal) Collected: 11/28/21 1558    Lab Status: Final result Specimen: Swab from Nasopharynx Updated: 11/28/21 2048    Narrative:      The following orders were created for panel order COVID PRE-OP / PRE-PROCEDURE SCREENING ORDER (NO ISOLATION) - Swab, Nasopharynx.  Procedure                               Abnormality         Status                     ---------                               -----------         ------                     COVID-19, APTIMA PANTHER...[[id number]]  Normal              Final result                 Please view results for these tests on the individual orders.    COVID-19, APTIMA PANTHER [facility] NP/OP SWAB IN UTM/VTM/SALINE TRANSPORT MEDIA 24HR TAT - Swab, Nasopharynx [[id number]]  (Normal) Collected: 11/28/21 1558    Lab Status: Final result Specimen: Swab from Nasopharynx Updated: 11/28/21 2048     COVID19 Not Detected    Narrative:       Fact sheet for providers: https://www.fda.gov/media/691910/download     Fact sheet for patients: https://www.fda.gov/media/775252/download    Test performed by RT PCR.    Body Fluid Culture - Body Fluid, Peritoneum [[id number]] Collected: 11/28/21 1214    Lab Status: Final result Specimen: Body Fluid from Peritoneum Updated: 12/01/21 1239     Body Fluid Culture No growth at 3 days     Gram Stain Few (2+) WBCs seen      No organisms seen    Blood Culture - Blood, Wrist, Left [[id number]]  (Normal) Collected: 11/28/21 1023    Lab Status: Final result Specimen: Blood from Wrist, Left Updated: 12/03/21 1046     Blood Culture No growth at 5 days    Blood Culture - Blood, Wrist, Left [[id number]]  (Normal) Collected: 11/28/21 1023    Lab Status: Final result Specimen: Blood from Wrist, Left Updated: 12/03/21 1046     Blood Culture No growth at 5 days          Vancomycin Levels:  Results from last 7 days   Lab Units 12/06/21  0539 12/05/21  0649 12/04/21  0601 12/03/21  0429 11/30/21  0428   VANCOMYCIN RM mcg/mL 17.50 17.80 17.10 19.20 17.60           Assessment/Plan:  1. Pharmacy dosing vancomycin for peritonitis (note patient receives peritoneal dialysis).   2. Patient received a dose of vancomycin 1250mg via peritoneal dialysis (IP) on 11/30 @ 2200. Plan for further vancomycin doses to be given IV per ID.  3. Vancomycin random today is 17.5 mcg/mL, essentially unchanged from the past several days. No need to re-dose vancomycin today.   4. Patient likely to have peritoneal dialysis catheter removed during surgery today - will follow up on dialysis plan tomorrow.  5. Pharmacy will continue to monitor and adjust vancomycin dose as necessary based on renal function, cultures, labs, and clinical status.     Thank you,  Jensen Ramirez, PharmD, BCPS  12/6/2021  15:07 EST

## 2021-12-07 NOTE — PLAN OF CARE
Goal Outcome Evaluation:  Plan of Care Reviewed With: patient           Outcome Summary: PT re-evaluation completed d/t pt is now s/p ileocolic resection with PD catheter removal on 12/6.  Pt transferred supine-->sit with ModAx1, stood with MaxAx1, and completed stand pivot transfer with MaxAx1.  Skilled PT services warranted to improve mobility and safety.  Recommend SNF at d/c.

## 2021-12-07 NOTE — THERAPY RE-EVALUATION
Patient Name: Moni Wallace  : 1940    MRN: [id number]                              Today's Date: 2021       Admit Date: 2021    Visit Dx:     ICD-10-CM ICD-9-CM   1. Acute abdominal pain  R10.9 789.00     338.19   2. Peritonitis associated with peritoneal dialysis, initial encounter (formerly [facility])  T85.71XA 996.68     567.9   3. Sepsis without acute organ dysfunction, due to unspecified organism (formerly [facility])  A41.9 038.9     995.91   4. Bowel dysfunction  K59.9 564.9     Patient Active Problem List   Diagnosis   • Paroxysmal atrial fibrillation (HCC)   • Essential hypertension   • Type 2 diabetes mellitus (HCC)   • Chronic kidney disease, stage IV (severe) (HCC)   • Anemia of renal disease   • Hyperparathyroidism (HCC)   • Asthma   • Right-sided carotid artery disease (HCC)   • High output HF (heart failure) (CMS/HCC)   • Vitamin D deficiency   • Leukocytosis   • Nonrheumatic aortic valve stenosis   • Ulcer of gastric fundus   • Tubular adenoma   • Macular degeneration   • Hypothyroidism   • Chronic kidney disease   • Screen for colon cancer   • Postoperative anemia due to acute blood loss   • Hyperlipidemia LDL goal <100   • Morbidly obese (HCC)   • Atypical pneumonia   • Acute UTI (urinary tract infection)   • UTI (urinary tract infection)   • Urinary tract infection, site not specified   • Demand ischemia (HCC)   • Peritonitis (HCC)   • Sepsis associated hypotension (HCC)   • Ileus (HCC)   • Hypotension     Past Medical History:   Diagnosis Date   • Adenomatous colon polyp    • Chronic kidney disease    • Clostridium difficile infection 2017   • Diabetes mellitus (HCC)    • Gastric polyps    • Hypothyroidism    • IgA nephropathy, acute    • Kidney transplanted    • Macular degeneration    • Paroxysmal atrial fibrillation (HCC)    • Tubular adenoma     excision    • Ulcer of gastric fundus    • Vitamin D deficiency      Past Surgical History:   Procedure Laterality Date   • BREAST BIOPSY Left    •  CARPAL TUNNEL RELEASE     • CARPAL TUNNEL RELEASE Right    • CATARACT EXTRACTION     • CATARACT EXTRACTION Bilateral    • DIAGNOSTIC LAPAROSCOPY     • DIALYSIS FISTULA CREATION     • HERNIA REPAIR  85 and 86   • KNEE ARTHROSCOPY INCISION AND DRAINAGE OF KNEE Right 5/18/2019    Procedure: KNEE ARTHROSCOPY INCISION AND DRAINAGE OF KNEE;  Surgeon: Paco Lester MD;  Location:  [facility];  Service: Orthopedics   • LAPAROSCOPIC TUBAL LIGATION  1985   • TOTAL KNEE ARTHROPLASTY     • TOTAL KNEE ARTHROPLASTY      Left knee    • TRANSPLANTATION RENAL  2010   • TRANSPLANTATION RENAL Right    • TRIGGER FINGER RELEASE     • TUBAL ABDOMINAL LIGATION     • UPPER GASTROINTESTINAL ENDOSCOPY  05/20/2013   • VENOUS ACCESS DEVICE (PORT) INSERTION N/A 5/23/2019    Procedure: INSERTION GROSHONG;  Surgeon: Rolando Begum MD;  Location:  [facility];  Service: General      General Information     Row Name 12/07/21 1015          Physical Therapy Time and Intention    Document Type re-evaluation  -LM     Mode of Treatment individual therapy; physical therapy  -     Row Name 12/07/21 1015          General Information    Patient Profile Reviewed yes  -LM     Prior Level of Function independent:; transfer; w/c or scooter; feeding; grooming; mod assist:; dressing; bathing  Independent with w/c propulsion; Has no ambulated in years  -LM     Existing Precautions/Restrictions fall; other (see comments)  Abd Incision; NG Tube  -LM     Barriers to Rehab medically complex; previous functional deficit  -LM     Row Name 12/07/21 1015          Living Environment    Lives With child(chavo), adult  Son  -LM     Row Name 12/07/21 1015          Home Main Entrance    Number of Stairs, Main Entrance --  Entry Ramp  -LM     Row Name 12/07/21 1015          Stairs Within Home, Primary    Number of Stairs, Within Home, Primary none  -LM     Row Name 12/07/21 1015          Cognition    Orientation Status (Cognition) oriented x 4  -LM     Row Name  12/07/21 1015          Safety Issues, Functional Mobility    Safety Issues Affecting Function (Mobility) insight into deficits/self-awareness; safety precaution awareness; safety precautions follow-through/compliance; sequencing abilities  -LM     Impairments Affecting Function (Mobility) balance; endurance/activity tolerance; pain; range of motion (ROM); strength  -LM           User Key  (r) = Recorded By, (t) = Taken By, (c) = Cosigned By    Initials Name Provider Type    LM Gema Nieves PT Physical Therapist               Mobility     Row Name 12/07/21 1015          Bed Mobility    Supine-Sit [place] (Bed Mobility) moderate assist (50% patient effort); 1 person assist; verbal cues  -LM     Assistive Device (Bed Mobility) bed rails; head of bed elevated  -LM     Comment (Bed Mobility) Vc's for sequencing.  -LM     Row Name 12/07/21 1015          Transfers    Comment (Transfers) Stand pivot transfer completed from bed-->recliner.  Pt did not want to try using a walker to get to the chair, but may benefit from it next session.  -LM     Row Name 12/07/21 1015          Bed-Chair Transfer    Bed-Chair [place] (Transfers) maximum assist (25% patient effort); 1 person assist; verbal cues  -LM     Assistive Device (Bed-Chair Transfers) other (see comments)  HHA  -LM     Row Name 12/07/21 1015          Sit-Stand Transfer    Sit-Stand [place] (Transfers) maximum assist (25% patient effort); 1 person assist; verbal cues  -LM     Row Name 12/07/21 1015          Gait/Stairs (Locomotion)    [place] Level (Gait) unable to assess  -LM     Comment (Gait/Stairs) Non-ambulatory at baseline  -LM           User Key  (r) = Recorded By, (t) = Taken By, (c) = Cosigned By    Initials Name Provider Type    LM Gema Nieves PT Physical Therapist               Obj/Interventions     Row Name 12/07/21 1015          Range of Motion Comprehensive    General Range of Motion lower extremity range of motion deficits  identified  -LM     Comment, General Range of Motion LLE AROM WFL; RLE - Decreased active hip flexion; Knee/Ankle AROM WFL  -LM     Row Name 12/07/21 1015          Strength Comprehensive (MMT)    General Manual Muscle Testing (MMT) Assessment lower extremity strength deficits identified  -LM     Comment, General Manual Muscle Testing (MMT) Assessment LLE - Hip flex - 4/5; Knee flex/ext - 4+/5; Ankle DF - 4+/5; RLE - Hip flex - 2/5; Knee flex/ext - 4/5; Ankle DF - 4+/5  -LM     Row Name 12/07/21 1015          Balance    Balance Assessment sitting static balance; standing static balance; standing dynamic balance  -LM     Static Sitting Balance WFL; supported; sitting, edge of bed  -LM     Static Standing Balance moderate impairment; supported  -LM     Dynamic Standing Balance severe impairment; supported  -LM     Row Name 12/07/21 1015          Sensory Assessment (Somatosensory)    Sensory Assessment (Somatosensory) LE sensation intact  Reports chronic neuropathy in B feet  -LM           User Key  (r) = Recorded By, (t) = Taken By, (c) = Cosigned By    Initials Name Provider Type    LM Gema Nieves, PT Physical Therapist               Goals/Plan     Row Name 12/07/21 1015          Bed Mobility Goal 1 (PT)    Activity/Assistive Device (Bed Mobility Goal 1, PT) sit to supine/supine to sit  -LM     [place] Level/Cues Needed (Bed Mobility Goal 1, PT) standby assist  -LM     Time Frame (Bed Mobility Goal 1, PT) long term goal (LTG); 2 weeks  -LM     Progress/Outcomes (Bed Mobility Goal 1, PT) goal revised this date  -LM     Row Name 12/07/21 1015          Transfer Goal 1 (PT)    Activity/Assistive Device (Transfer Goal 1, PT) bed-to-chair/chair-to-bed  -LM     [place] Level/Cues Needed (Transfer Goal 1, PT) contact guard assist  -LM     Time Frame (Transfer Goal 1, PT) long term goal (LTG); 2 weeks  -LM     Progress/Outcome (Transfer Goal 1, PT) goal revised this date  -LM           User Key  (r) = Recorded By,  (t) = Taken By, (c) = Cosigned By    Initials Name Provider Type    LM Gema Nieves, PT Physical Therapist               Clinical Impression     Row Name 12/07/21 1015          Pain    Additional Documentation Pain Scale: Numbers Pre/Post-Treatment (Group)  -LM     Row Name 12/07/21 1015          Pain Scale: Numbers Pre/Post-Treatment    Pretreatment Pain Rating 3/10  -LM     Posttreatment Pain Rating 7/10  -LM     Pain Location - Orientation incisional  -LM     Pain Location abdomen  -LM     Pain Intervention(s) Repositioned; Ambulation/increased activity  -     Row Name 12/07/21 1015          Plan of Care Review    Plan of Care Reviewed With patient  -     Outcome Summary PT re-evaluation completed d/t pt is now s/p ileocolic resection with PD catheter removal on 12/6.  Pt transferred supine-->sit with ModAx1, stood with MaxAx1, and completed stand pivot transfer with MaxAx1.  Skilled PT services warranted to improve mobility and safety.  Recommend SNF at d/c.  -     Row Name 12/07/21 1015          Therapy Assessment/Plan (PT)    Rehab Potential (PT) good, to achieve stated therapy goals  -     Criteria for Skilled Interventions Met (PT) yes; meets criteria; skilled treatment is necessary  -     Row Name 12/07/21 1015          Vital Signs    Pre Systolic BP Rehab 133  -LM     Pre Treatment Diastolic BP 72  -LM     Post Systolic BP Rehab 146  -LM     Post Treatment Diastolic BP 72  -LM     Pretreatment Heart Rate (beats/min) 102  -LM     Posttreatment Heart Rate (beats/min) 103  -LM     Pre SpO2 (%) 96  -LM     O2 Delivery Pre Treatment room air  -LM     Post SpO2 (%) 93  -LM     O2 Delivery Post Treatment room air  -LM     Pre Patient Position Supine  -LM     Post Patient Position Sitting  -LM     Row Name 12/07/21 1015          Positioning and Restraints    Pre-Treatment Position in bed  -LM     Post Treatment Position chair  -LM     In Chair reclined; call light within reach; encouraged to call for  assist; exit alarm on; with family/caregiver; notified nsg  -           User Key  (r) = Recorded By, (t) = Taken By, (c) = Cosigned By    Initials Name Provider Type     Gema Nieves PT Physical Therapist               Outcome Measures     Row Name 12/07/21 1015          How much help from another person do you currently need...    Turning from your back to your side while in flat bed without using bedrails? 2  -LM     Moving from lying on back to sitting on the side of a flat bed without bedrails? 2  -LM     Moving to and from a bed to a chair (including a wheelchair)? 2  -LM     Standing up from a chair using your arms (e.g., wheelchair, bedside chair)? 2  -LM     Climbing 3-5 steps with a railing? 1  -LM     To walk in hospital room? 1  -LM     AM-PAC 6 Clicks Score (PT) 10  -LM     Row Name 12/07/21 1015          Functional Assessment    Outcome Measure Options AM-PAC 6 Clicks Basic Mobility (PT)  -           User Key  (r) = Recorded By, (t) = Taken By, (c) = Cosigned By    Initials Name Provider Type    LM Gema Nieves PT Physical Therapist                             Physical Therapy Education                 Title: PT OT SLP Therapies (In Progress)     Topic: Physical Therapy (In Progress)     Point: Mobility training (Done)     Learning Progress Summary           Patient Acceptance, REECE ASCENCIODU by  at 12/1/2021 1538                   Point: Home exercise program (Not Started)     Learner Progress:  Not documented in this visit.          Point: Body mechanics (Not Started)     Learner Progress:  Not documented in this visit.          Point: Precautions (Done)     Learning Progress Summary           Patient Acceptance, E VU,DU by  at 12/1/2021 1538                               User Key     Initials Effective Dates Name Provider Type [facility] 06/16/21 -  Gema Nieves PT Physical Therapist PT              PT Recommendation and Plan  Planned Therapy Interventions (PT): balance training, bed  mobility training, gait training, home exercise program, motor coordination training, neuromuscular re-education, patient/family education, postural re-education, ROM (range of motion), strengthening, stretching, transfer training, wheelchair management/propulsion training  Plan of Care Reviewed With: patient  Outcome Summary: PT re-evaluation completed d/t pt is now s/p ileocolic resection with PD catheter removal on 12/6.  Pt transferred supine-->sit with ModAx1, stood with MaxAx1, and completed stand pivot transfer with MaxAx1.  Skilled PT services warranted to improve mobility and safety.  Recommend SNF at d/c.     Time Calculation:    PT Charges     Row Name 12/07/21 1015             Time Calculation    Start Time 1015  -LM      PT Received On 12/07/21  -LM      PT Goal Re-Cert Due Date 12/17/21  -LM              Timed Charges    94349 - PT Therapeutic Activity Minutes 13  -LM              Untimed Charges    PT Eval/Re-eval Minutes 25  -LM              Total Minutes    Timed Charges Total Minutes 13  -LM      Untimed Charges Total Minutes 25  -LM       Total Minutes 38  -LM            User Key  (r) = Recorded By, (t) = Taken By, (c) = Cosigned By    Initials Name Provider Type    LM Gema Nieves, PT Physical Therapist              Therapy Charges for Today     Code Description Service Date Service Provider Modifiers Qty    60133596362 HC PT THERAPEUTIC ACT EA 15 MIN 12/7/2021 Gema Nieves, PT GP 1    29360482445 HC PT RE-EVAL ESTABLISHED PLAN 2 12/7/2021 Gema Nieves, PT GP 1          PT G-Codes  Outcome Measure Options: AM-PAC 6 Clicks Basic Mobility (PT)  AM-PAC 6 Clicks Score (PT): 10    Gema Nieves PT  12/7/2021

## 2021-12-07 NOTE — PROGRESS NOTES
Pharmacy Parenteral Nutrition Evaluation    Moni Wallace is an 81 y.o. female receiving PPN.    Indication: Bowel obstruction  Consulting Physician: Dr. Stovall    Total Fluid Rate (if stated): 75 ml/hr (PPN) per renal    Labs  Results from last 7 days   Lab Units 12/07/21  0550 12/06/21  1929 12/06/21  1245 12/06/21  0539 12/06/21  0539 12/05/21  1956 12/05/21  0649   SODIUM mmol/L 129* 131*  --   --  135*  --  136   POTASSIUM mmol/L 3.7 4.1 3.5   < > 2.9*   < > 2.9*   CHLORIDE mmol/L 88* 89*  --   --  91*  --  89*   CO2 mmol/L 22.0 19.0*  --   --  26.0  --  26.0   BUN mg/dL 49* 39*  --   --  33*  --  35*   CREATININE mg/dL 7.07* 6.59*  --   --  7.23*  --  7.02*   CALCIUM mg/dL 8.4* 8.1*  --   --  8.1*  --  8.3*   BILIRUBIN mg/dL 0.5  --   --   --  0.5  --  0.3   ALK PHOS U/L 136*  --   --   --  117  --  109   ALT (SGPT) U/L 7  --   --   --  <5  --  <5   AST (SGOT) U/L 25  --   --   --  20  --  13   GLUCOSE mg/dL 286* 294*  --   --  123*  --  134*    < > = values in this interval not displayed.     Results from last 7 days   Lab Units 12/07/21  0550 12/06/21  0539 12/05/21  1643 12/05/21  0649   MAGNESIUM mg/dL 1.5* 1.5*  --  1.7   PHOSPHORUS mg/dL 2.9 3.6  --  5.7*   PREALBUMIN mg/dL  --   --  13.6*  --      Results from last 7 days   Lab Units 12/07/21  0550 12/06/21 1928 12/06/21  0539   WBC 10*3/mm3 19.20* 23.51* 14.56*   HEMOGLOBIN g/dL 8.2* 8.6* 8.3*   HEMATOCRIT % 24.9* 25.9* 25.7*   PLATELETS 10*3/mm3 222 232 197     Triglycerides   Date Value Ref Range Status   12/05/2021 104 0 - 150 mg/dL Final     Comment:     Falsely depressed results may occur on samples drawn from patients receiving N-Acetylcysteine (NAC) or Metamizole.     estimated creatinine clearance is 6.5 mL/min (A) (by C-G formula based on SCr of 7.07 mg/dL (H)).    Intake & Output (last 3 days)         12/04 0701 12/05 0700 12/05 0701 12/06 0700 12/06 0701 12/07 0700 12/07 0701 12/08 0700    P.O. 0       I.V. (mL/kg)   401.3 (5) 0.8 (0)     Blood  675      Other 2090 2120 2000     IV Piggyback 600 800 350 100    Total Intake(mL/kg) 2690 (33) 3595 (43.1) 2751.3 (34.4) 100.8 (1.3)    Urine (mL/kg/hr) 25 (0)  0 (0)     Emesis/NG output 2325 400 350 300    Other 7000 6700      Stool 0  0     Blood   50     Total Output 9350 7100 400 300    Net -6660 -3505 +2351.3 -199.2            Urine Unmeasured Occurrence   1 x     Stool Unmeasured Occurrence 1 x  1 x           Dietitian Recommendations  Dietary Orders (From admission, onward)       Start     Ordered    12/06/21 1716  NPO Diet  Diet Effective Now         12/06/21 1715                  Current PPN Regimen Recommendation: Dextrose 5% / Amino Acid 5.5% at goal rate of 75 mL/hr. 20% SMOF lipids 250mL every 24 hours.    Assessment/Plan:  1. Pharmacy to dose PPN ordered for bowel obstruction.  2. PPN continues today with macronutrients per RD recommendations as above at goal rate of 75 mL/hr. Patient has ESRD and is currently transitioning from peritoneal dialysis to hemodialysis. Will increase Na to 40 mEq/L and Mg to 3 meq/L, continue Ca at 2 meq/L, and leave K + phos out for now.  3. Blood glucose elevated today on correctional insulin - will continue to follow trend.  4. Pharmacy will continue to follow closely and make adjustments to PPN as necessary.    Thank you,  Jensen Ramirez, PharmD, BCPS  12/7/2021  13:09 EST

## 2021-12-07 NOTE — PROGRESS NOTES
"Patient Name:  Moni Wallace  YOB: 1940  [id number]    Surgery Progress Note    Date of visit: 12/7/2021      Subjective: Had some uncontrolled pain overnight, this is better now after using her PCA.  Denies nausea or vomiting with NG tube in place.  Has not been out of bed yet.  Had a small amount of bleeding from the tunneled dialysis catheter site, however this is resolved this morning.  Denies fevers or chills.          Objective:     BP (!) 120/102 (BP Location: Right leg, Patient Position: Lying)   Pulse 94   Temp 97.5 °F (36.4 °C) (Oral)   Resp 16   Ht 162.6 cm (64\")   Wt 79.9 kg (176 lb 3.2 oz)   LMP  (LMP Unknown)   SpO2 94%   BMI 30.24 kg/m²     Intake/Output Summary (Last 24 hours) at 12/7/2021 0901  Last data filed at 12/7/2021 0853  Gross per 24 hour   Intake 751.6 ml   Output 350 ml   Net 401.6 ml       GEN:   Awake, alert, in no acute distress, resting comfortably in bed, chronically ill-appearing  CV:   Regular rate and rhythm  L:  Symmetric stanchion, not labored on room air, right chest tunneled dialysis catheter site appears appropriate  Abd:  Obese, soft, appropriately tender palpation along incision, incisions are clean dry and intact with dressing in place  Ext:  No cyanosis, clubbing, or edema    Recent labs that are back at this time have been reviewed.           Assessment/ Plan:    Mrs. Wallace is an 81-year-old lady with history of end-stage renal disease on peritoneal dialysis, diabetes, A. fib on Coumadin, and prior C. difficile infection who was admitted due to concern for peritonitis related to peritoneal dialysis     #Small bowel obstruction, peritonitis in the setting of peritoneal dialysis  -Postoperative day 1 after open ileocolic resection for small bowel obstruction  -Had an episode of A. fib with RVR.  This morning heart rate is in the 90s, systolics in the 120s to 140s.  -Hemoglobin is stable at 8.2 from 8.6 preop.  Electrolyte abnormalities consistent with " underlying end-stage renal disease.  INR is 1.2  -Recommend to continue to hold anticoagulation  -Continue NG tube to low wall suction  -Awaiting return of bowel function.  -Continue antibiotics per ID  -Out of bed and mobilize as able.  PT consult  -Pain was somewhat uncontrolled overnight, better with Dilaudid PCA.  We will have to judiciously monitor her to ensure that she tolerates the Dilaudid PCA given her ESRD      Robin Andersen MD  12/7/2021  09:01 EST

## 2021-12-07 NOTE — PROGRESS NOTES
[facility] Progress Note        Antibiotics:  Anti-Infectives (From admission, onward)    Ordered     Dose/Rate Route Frequency Start Stop    12/03/21 0857  piperacillin-tazobactam (ZOSYN) 3.375 g in iso-osmotic dextrose 50 ml (premix)        Ordering Provider: Robin Andersen MD    3.375 g  over 4 Hours Intravenous Every 12 Hours 12/03/21 1800 12/10/21 1759    12/03/21 0832  micafungin 100 mg/100 mL 0.9% NS IVPB (mbp)        Ordering Provider: Robin Andersen MD    100 mg Intravenous Every 24 Hours 12/03/21 1000 12/10/21 0959    12/03/21 0857  piperacillin-tazobactam (ZOSYN) 3.375 g in iso-osmotic dextrose 50 ml (premix)        Ordering Provider: Sawyer Llanos MD    3.375 g  over 30 Minutes Intravenous Once 12/03/21 0945 12/03/21 1214    12/03/21 0832  Pharmacy to dose vancomycin        Ordering Provider: Robin Andersen MD     Does not apply Continuous PRN 12/03/21 0831 12/13/21 0830    12/02/21 1434  vancomycin (dosing per levels)        Ordering Provider: Robin Andersen MD     Does not apply Daily 12/02/21 1530 12/25/21 0859    11/30/21 1111  UltraBag/Dianeal PD-2/1.5% Dex (pappas #[id number]) (DIANEAL) 2,000 mL with vancomycin (VANCOCIN) 1,250 mg, cefTAZidime (FORTAZ) 1,000 mg dialysis solution        Ordering Provider: Sawyer Llanos MD     Intraperitoneal Once 11/30/21 2200 11/30/21 2200    11/28/21 1942  UltraBag/Dianeal PD-2/1.5% Dex (pappas #[id number]) (DIANEAL) 2,000 mL with ceFAZolin (ANCEF) 2,000 mg dialysis solution        Ordering Provider: Kali Rojas MD     Intraperitoneal Once 11/29/21 0000 11/29/21 0004    11/28/21 1157  vancomycin 1500 mg/500 mL 0.9% NS IVPB ([facility])        Ordering Provider: Lex Marcum MD    20 mg/kg × 72.6 kg Intravenous Once 11/28/21 1159 11/28/21 1400    11/28/21 1157  piperacillin-tazobactam (ZOSYN) 3.375 g in iso-osmotic dextrose 50 ml (premix)        Ordering Provider: Lex Marcum MD    3.375 g Intravenous Once 11/28/21 1159 11/28/21 1317     "      CC: abd pain    HPI:      Patient is a 81 y.o.  Yr old female with history of ESRD on CAPD for several years, Hx CDiff years ago she was admitted November 28 with acute onset abdominal pain over 2 days, nausea/vomiting and abnormal CT scan raising concern for possible early ileus versus partial small bowel obstruction and hazy omental stranding concerning for peritonitis per notes.  She had leukocytosis and peritoneal fluid with 1748 WBC and predominance neutrophils.  Concern for CAPD associated peritonitis and empiric vancomycin/Zosyn initiated.  Nursing had reported peritoneal fluid had initially looked a little hazy but patient did not recall a hazy or bloody appearance.  No redness/swelling or pain or other problems at her PD catheter.     12/3/21 NG out 12/2 with nausea and vomiting multiple times overnight; dialysate remains blood tinged per patient;  tachycardia, hypotension last 24 hours, WBC increased overall and at risk for systemic illness;  Will change abx back to IV vancomycin/zosyn, add empiric mycamine    12/4/21 cardiology following with paroxysmal afib/RVR    12/6/21 Dr Andersen with surgery  \"PROCEDURE PERFORMED:      1. Diagnostic laparoscopy converted to exploratory laparotomy  2. Open resection of terminal ileum and cecum with primary ileocolic anastomosis  3. Removal peritoneal dialysis catheter\"     12/6 also HD catheter placed by Frankie Begum    12/7/21 NG and on TPN per nursing; less abd pain postop;     Per nursing, She has   abdominal pain, mid abdomen, intermittent, nonradiating, 2-3 out of 10, not progressive.  She denies diarrhea.  No hematochezia melena or hematemesis.  No rash.  Minimal urine output and no active urinary symptoms.  No dysuria hematuria or pyuria.  No flank pain.  No rash.  No shortness of breath or cough.      ROS:      12/7/21 No f/c/s. no rash. No new ADR to Abx.     Heent-- No new vision, hearing or throat complaints.  No epistaxis or oral sores.  Denies " "odynophagia or dysphagia.  No flashers, floaters or eye pain. No odynophagia or dysphagia. No headache, photophobia or neck stiffness.  CV-- No chest pain, palpitation or syncope  Resp-- No SOB/cough/Hemoptysis  GI-  As above.No hematochezia, melena, or hematemesis. Denies jaundice or chronic liver disease.  -- No dysuria, hematuria, or flank pain.  Denies hesitancy, urgency or flank pain.  Lymph- no swollen lymph nodes in neck/axilla or groin.   Heme- No active bruising or bleeding; no Hx of DVT or PE.  MS-- no swelling or pain in the bones or joints of arms/legs.  No new back pain.  Neuro-- No acute focal weakness or numbness in the arms or legs.  No seizures.     Full 12 point review of systems reviewed and negative otherwise for acute complaints, except for above      PE:   /67   Pulse 87   Temp 97.3 °F (36.3 °C) (Axillary)   Resp 18   Ht 162.6 cm (64\")   Wt 79.9 kg (176 lb 3.2 oz)   LMP  (LMP Unknown)   SpO2 96%   BMI 30.24 kg/m²       GENERAL: awake, in no acute distress.   HEENT: Normocephalic, atraumatic.  PERRL. EOMI. No conjunctival injection. No icterus. Oropharynx clear without evidence of thrush or exudate. No evidence of peridontal disease.    NECK: Supple without nuchal rigidity. No mass.  LYMPH: No cervical, axillary or inguinal lymphadenopathy.  HEART: RRR; No murmur, rubs, gallops.   LUNGS: diminished at bases but otherwise Clear to auscultation bilaterally without wheezing, rales, rhonchi. Normal respiratory effort. Nonlabored. No dullness.  ABDOMEN: Soft, mildly tender, nondistended.  bowel sounds  diminshed. No rebound or guarding. NO mass or HSM.  EXT:  No cyanosis, clubbing or edema. No cord.   MSK: FROM without joint effusions noted arms/legs.    SKIN: Warm and dry without cutaneous eruptions on Inspection/palpation.    NEURO: awake     PD catheter with no redness or purulent drainage.  Nontender at the exit site     IV without obvious redness or drainage     No peripheral " stigmata/phenomenon of endocarditis       Laboratory Data    Results from last 7 days   Lab Units 12/07/21  0550 12/06/21  1928 12/06/21  0539   WBC 10*3/mm3 19.20* 23.51* 14.56*   HEMOGLOBIN g/dL 8.2* 8.6* 8.3*   HEMATOCRIT % 24.9* 25.9* 25.7*   PLATELETS 10*3/mm3 222 232 197     Results from last 7 days   Lab Units 12/07/21  0550   SODIUM mmol/L 129*   POTASSIUM mmol/L 3.7   CHLORIDE mmol/L 88*   CO2 mmol/L 22.0   BUN mg/dL 49*   CREATININE mg/dL 7.07*   GLUCOSE mg/dL 286*   CALCIUM mg/dL 8.4*     Results from last 7 days   Lab Units 12/07/21  0550   ALK PHOS U/L 136*   BILIRUBIN mg/dL 0.5   ALT (SGPT) U/L 7   AST (SGOT) U/L 25         Results from last 7 days   Lab Units 12/05/21  0649   CRP mg/dL 23.82*       Estimated Creatinine Clearance: 6.5 mL/min (A) (by C-G formula based on SCr of 7.07 mg/dL (H)).      Microbiology:      Radiology:  Imaging Results (Last 72 Hours)     Procedure Component Value Units Date/Time    XR Chest 1 View [[id number]] Collected: 12/06/21 1835     Updated: 12/06/21 1836    Narrative:         EXAMINATION: XR CHEST 1 VW-      INDICATION: s/p central line placement; R10.9-Unspecified abdominal  pain; T85.71XA-Infection and inflammatory reaction due to peritoneal  dialysis catheter, initial encounter; A41.9-Sepsis, unspecified  organism; K59.9-Functional intestinal disorder, unspecified      COMPARISON: 11/28/2021     FINDINGS: Portable chest reveals dialysis catheter identified the right  tip in the SVC. Nasogastric G-tube the stomach. Prominence of the  pulmonary vascularity with some increased markings in the perihilar  regions bilaterally. Small left pleural effusion.           Impression:      Placement of a deep line catheter on the right tip in the  SVC. No pneumothorax. Prominence of the perihilar regions bilaterally  with small left pleural effusion.          FL C Arm During Surgery [[id number]] Resulted: 12/06/21 1734     Updated: 12/06/21 1801    XR Abdomen KUB [[id number]]  Collected: 12/06/21 0900     Updated: 12/06/21 1557    Narrative:      EXAMINATION: XR ABDOMEN KUB-      INDICATION: Assess for contrast progression; R10.9-Unspecified abdominal  pain; T85.71XA-Infection and inflammatory reaction due to peritoneal  dialysis catheter, initial encounter; A41.9-Sepsis, unspecified  organism.      COMPARISON: 12/04/2021.     FINDINGS: KUB reveals bowel gas pattern to be somewhat improving when  compared to the prior study. There is minimal contrast identified within  the transverse colon. Nasogastric tube identified with tip in the  stomach. Peritoneal dialysis catheter identified in the left lower  quadrant.       Impression:      Slight improvement seen in the gaseous distended loops of  bowel throughout the abdomen. Minimal contrast identified in the  transverse colon. Nasogastric tube with tip in the stomach. No free  intraperitoneal air.     D:  12/06/2021  E:  12/06/2021     This report was finalized on 12/6/2021 3:54 PM by Dr. Aracely Oates MD.       FL Small Bowel Follow Through Single-Contrast [[id number]] Collected: 12/05/21 0806     Updated: 12/06/21 1533    Narrative:      EXAMINATION: FL SMALL BOWEL FOLLOW THROUGH SINGLE-CONTRAST - 12/04/2021     INDICATION: Abdominal distention     TECHNIQUE: Small bowel follow-through performed with 10 images and no  fluoroscopy.     COMPARISON: None       Impression:      FINDINGS/IMPRESSION:   imaging reveals dilated loops of small bowel  seen diffusely throughout the abdomen. Minimal air in the rectum. There  is a nasogastric tube tip in the stomach. Contrast is seen within the  stomach on the 30- minute image and 230-minute image with no advancement  through the small bowel. There is only minimal contrast seen within the  proximal small bowel on the 9 hour and 40-minute image with minimal  contrast remaining within the stomach in the fundus. The 17-hour image  reveals no advancement of the contrast. Findings most suggestive  "of a  small bowel obstruction.     DICTATED:   12/04/2021  EDITED/lfs:   12/05/2021     This report was finalized on 12/6/2021 3:29 PM by Dr. Aracely Oates MD.       CT Abdomen Pelvis Without Contrast [[id number]] Collected: 12/03/21 1125     Updated: 12/04/21 1115    Narrative:      EXAMINATION: CT ABDOMEN AND PELVIS WO CONTRAST-12/03/2021:      INDICATION: N/V, peritonitis, R/O ileus or PSBO; R10.9-Unspecified  abdominal pain; T85.71XA-Infection and inflammatory reaction due to  peritoneal dialysis catheter, initial encounter; A41.9-Sepsis,  unspecified organism.     TECHNIQUE: 5 mm unenhanced images through the abdomen and pelvis.     The radiation dose reduction device was turned on for each scan per the  ALARA (As Low as Reasonably Achievable) protocol.     COMPARISON: 11/28/2021 abdomen and pelvis CT scan.     FINDINGS: Previous exam report indicated new small bowel distention,  suspicious for partial small bowel obstruction with no definite  transition point. Findings related to peritoneal dialysis.     The included lung bases appear clear except for trace bibasilar pleural  thickening/atelectasis.     There is a peritoneal dialysis catheter in the left lower quadrant,  small amount of intrapelvic free fluid, upper abdominal free fluid and  small amount of upper abdominal free air, all typical for peritoneal  dialysis. Mild nonspecific fat stranding of the omentum is similar to  the prior study, perhaps mild edema.     No significant abnormalities are seen of the liver, spleen, the somewhat  atrophic pancreas, or the adrenal glands. The kidneys are markedly  atrophic and there is a large water-density left renal midpole cyst. The  gallbladder is contracted around dense material, presumably gallbladder  \"sludge\", but no gallbladder wall inflammation is seen.     CT scan  film again shows a pattern of dilated small bowel and  relatively little colon gas, suspicious for distal obstruction.   film " images appear to show worsening dilatation. The dilatation appears  centered about a loop in the central and right pelvis, which is markedly  decompressed at both ends, and normal caliber centrally with mild  mucosal thickening. Please refer to the appearance of the central  abdominal small bowel loop on axial image 57, tapering to the left on  image 58, and tapering to the right on image 71. Loops proximal to this  appear mostly dilated. Loops distal to this level appear markedly  decompressed. The colon appears decompressed, but otherwise  unremarkable. The uterus and ovaries are appropriately atrophic. The  bladder is nondistended. Incidental note is made of what appears to be a  small abdominal wall hernia of the lower pelvis containing mild ascites.       Impression:      1. Findings consistent with incomplete distal small bowel obstruction,  which appears to be centered about an abnormal appearing loop centrally  in the upper pelvis. Degree of distention appears increased from  11/28/2021 scan.     2. Expected changes of peritoneal dialysis, with mild free fluid and  free air in the abdomen.     3. No evidence of discrete, well-defined inflammatory focus.     D:  12/03/2021  E:  12/03/2021     This report was finalized on 12/4/2021 11:12 AM by Dr. Xavier Cantor MD.               Impression:       --acute abdominal pain.  Associated with nausea/vomiting, abnormal PD fluid concerning for  Peritonitis, ?CAPD  ; culture negative so far. Catheter exit site appears nonpurulent for now.  No outpatient cultures per patient. Transitioned to IP vancomycin/ceftaz empirically and back to systemic therapy 12/3  ; SBFT with SBO per radiology and Dr Andersen with surgery as above on 12/6, PD catheter also out and HD catheter placed     --Acute vomiting, nausea  As above      --End-stage renal disease with peritoneal dialysis     --History C. Difficile years ago per patient.  No diarrhea at present but at risk for  relapse.     --abnormal CT scan with prominent common bile duct per radiology but without evidence for intrahepatic ductal dilation and normal transaminases/bilirubin on November 28.  Monitor exam and labs     --Chronic Coumadin    --paroxysmal Afib/RVR    PLAN:       --IV vancomycin, zosyn ,mycamine     --Check/review labs cultures and scans     --Partial history per nursing staff     --Discussed with microbiology     -- d/w pharmacy      --Highly complex set of issues with high risk for further serious morbidity and other serious sequela    --repeat dialysate for cultures  , fungal/AFB studies pending; d/w micro and no pathogen so far    --d/w Dr Stovall;  repeat CT 12/3 and SBFT noted, surgery following;  On systemic therapy  And surgery   12/6 as above      Sawyer Llanos MD  12/7/2021

## 2021-12-07 NOTE — PLAN OF CARE
After pt's procedure, pt was tachycardic. Due to rhythm change, an ECG was obtained. Pt was in Afib RVR. APRN on call and cardiology were paged. Pt was restarted on an amiodarone gtt. Pt also now has a hydromorphone PCA pump.     Problem: Adult Inpatient Plan of Care  Goal: Absence of Hospital-Acquired Illness or Injury  Intervention: Identify and Manage Fall Risk  Recent Flowsheet Documentation  Taken 12/7/2021 0400 by Melva Lopez RN  Safety Promotion/Fall Prevention:   activity supervised   assistive device/personal items within reach   clutter free environment maintained   fall prevention program maintained   nonskid shoes/slippers when out of bed   safety round/check completed   toileting scheduled  Taken 12/7/2021 0200 by Melva Lopez RN  Safety Promotion/Fall Prevention:   activity supervised   assistive device/personal items within reach   clutter free environment maintained   fall prevention program maintained   nonskid shoes/slippers when out of bed   safety round/check completed   toileting scheduled  Taken 12/7/2021 0000 by Melva Lopez RN  Safety Promotion/Fall Prevention:   activity supervised   assistive device/personal items within reach   clutter free environment maintained   fall prevention program maintained   nonskid shoes/slippers when out of bed   safety round/check completed   toileting scheduled  Taken 12/6/2021 2200 by Melva Lopez, AVILA  Safety Promotion/Fall Prevention:   activity supervised   assistive device/personal items within reach   clutter free environment maintained   fall prevention program maintained   nonskid shoes/slippers when out of bed   safety round/check completed   toileting scheduled  Taken 12/6/2021 2000 by Melva Lopez, AVILA  Safety Promotion/Fall Prevention:   activity supervised   clutter free environment maintained   assistive device/personal items within reach   fall prevention program maintained   nonskid shoes/slippers when out of bed   safety round/check  completed   toileting scheduled  Intervention: Prevent Skin Injury  Recent Flowsheet Documentation  Taken 12/7/2021 0400 by Melva Lopez RN  Body Position: position changed independently  Skin Protection:   adhesive use limited   incontinence pads utilized   tubing/devices free from skin contact  Taken 12/7/2021 0200 by Melva Lopez RN  Body Position: position changed independently  Skin Protection:   adhesive use limited   incontinence pads utilized   tubing/devices free from skin contact  Taken 12/7/2021 0000 by Melva Lopez RN  Body Position: position changed independently  Skin Protection:   adhesive use limited   incontinence pads utilized   tubing/devices free from skin contact  Taken 12/6/2021 2200 by Melva Lopez RN  Body Position: weight shift assistance provided  Skin Protection:   adhesive use limited   incontinence pads utilized   tubing/devices free from skin contact  Taken 12/6/2021 2000 by Melva Lopez RN  Body Position: weight shift assistance provided  Skin Protection:   adhesive use limited   incontinence pads utilized   tubing/devices free from skin contact  Intervention: Prevent and Manage VTE (venous thromboembolism) Risk  Recent Flowsheet Documentation  Taken 12/6/2021 2000 by Melva Lopez RN  VTE Prevention/Management:   bilateral   dorsiflexion/plantar flexion performed   bleeding risk factor(s) identified  Intervention: Prevent Infection  Recent Flowsheet Documentation  Taken 12/7/2021 0400 by Melva Lopez RN  Infection Prevention: environmental surveillance performed  Taken 12/7/2021 0200 by Melva Lopez RN  Infection Prevention: environmental surveillance performed  Taken 12/7/2021 0000 by Melva Lopez RN  Infection Prevention: environmental surveillance performed  Taken 12/6/2021 2200 by Melva Lopez RN  Infection Prevention: environmental surveillance performed  Taken 12/6/2021 2000 by Melva Lopez RN  Infection Prevention: environmental surveillance  performed  Goal: Optimal Comfort and Wellbeing  Intervention: Provide Person-Centered Care  Recent Flowsheet Documentation  Taken 12/6/2021 2000 by Melva Lopez RN  Trust Relationship/Rapport:   thoughts/feelings acknowledged   care explained   choices provided     Problem: Adjustment to Illness (Chronic Kidney Disease)  Goal: Optimal Coping with Chronic Illness  Intervention: Support and Optimize Psychosocial Response to Chronic Illness  Recent Flowsheet Documentation  Taken 12/6/2021 2000 by Melva Lopez RN  Family/Support System Care:   self-care encouraged   support provided     Problem: Fluid Volume Excess (Chronic Kidney Disease)  Goal: Fluid Balance  Intervention: Monitor and Manage Hypervolemia  Recent Flowsheet Documentation  Taken 12/7/2021 0400 by Melva Lopez RN  Skin Protection:   adhesive use limited   incontinence pads utilized   tubing/devices free from skin contact  Taken 12/7/2021 0200 by Melva Lopez RN  Skin Protection:   adhesive use limited   incontinence pads utilized   tubing/devices free from skin contact  Taken 12/7/2021 0000 by Melva Lopez RN  Skin Protection:   adhesive use limited   incontinence pads utilized   tubing/devices free from skin contact  Taken 12/6/2021 2200 by Melva Lopez RN  Skin Protection:   adhesive use limited   incontinence pads utilized   tubing/devices free from skin contact  Taken 12/6/2021 2000 by Melva Lopez RN  Skin Protection:   adhesive use limited   incontinence pads utilized   tubing/devices free from skin contact     Problem: Functional Decline (Chronic Kidney Disease)  Goal: Optimal Functional Ability  Intervention: Optimize Functional Ability  Recent Flowsheet Documentation  Taken 12/7/2021 0400 by Melva Lopez RN  Activity Management:   activity adjusted per tolerance   activity encouraged  Taken 12/7/2021 0200 by Melva Lopez RN  Activity Management:   activity adjusted per tolerance   activity encouraged  Taken 12/7/2021 0000 by  Lopez, Melva, RN  Activity Management:   activity adjusted per tolerance   activity encouraged  Taken 12/6/2021 2200 by Melva Lopez RN  Activity Management:   activity adjusted per tolerance   activity encouraged  Taken 12/6/2021 2000 by Melva Lopez RN  Activity Management:   activity adjusted per tolerance   activity encouraged     Problem: Renal Function Impairment (Chronic Kidney Disease)  Goal: Laboratory Values and Blood Pressure Within Desired Range  Intervention: Monitor and Support Renal Function  Recent Flowsheet Documentation  Taken 12/7/2021 0400 by Melva Lopez RN  Medication Review/Management: medications reviewed  Taken 12/7/2021 0200 by Melva Lopez RN  Medication Review/Management: medications reviewed  Taken 12/7/2021 0000 by Melva Lopez RN  Medication Review/Management: medications reviewed  Taken 12/6/2021 2200 by Melva Lopez RN  Medication Review/Management: medications reviewed  Taken 12/6/2021 2000 by Melva Lopez RN  Medication Review/Management: medications reviewed     Problem: Pain Acute  Goal: Optimal Pain Control  Intervention: Optimize Psychosocial Wellbeing  Recent Flowsheet Documentation  Taken 12/6/2021 2000 by Melva Lopez RN  Diversional Activities: television     Problem: Skin Injury Risk Increased  Goal: Skin Health and Integrity  Intervention: Optimize Skin Protection  Recent Flowsheet Documentation  Taken 12/7/2021 0400 by Melva Lopez RN  Pressure Reduction Techniques:   frequent weight shift encouraged   weight shift assistance provided  Head of Bed (HOB): [facility] elevated  Pressure Reduction Devices:   pressure-redistributing mattress utilized   positioning supports utilized  Skin Protection:   adhesive use limited   incontinence pads utilized   tubing/devices free from skin contact  Taken 12/7/2021 0200 by Melva Lopez RN  Pressure Reduction Techniques:   frequent weight shift encouraged   weight shift assistance provided  Head of Bed (HOB): [facility]  elevated  Pressure Reduction Devices:   pressure-redistributing mattress utilized   positioning supports utilized  Skin Protection:   adhesive use limited   incontinence pads utilized   tubing/devices free from skin contact  Taken 12/7/2021 0000 by Melva Lopez RN  Pressure Reduction Techniques:   frequent weight shift encouraged   weight shift assistance provided  Head of Bed ([facility]): [facility] elevated  Pressure Reduction Devices:   pressure-redistributing mattress utilized   positioning supports utilized  Skin Protection:   adhesive use limited   incontinence pads utilized   tubing/devices free from skin contact  Taken 12/6/2021 2200 by Melva Lopez RN  Pressure Reduction Techniques:   frequent weight shift encouraged   weight shift assistance provided  Head of Bed ([facility]): [facility] elevated  Pressure Reduction Devices:   positioning supports utilized   pressure-redistributing mattress utilized  Skin Protection:   adhesive use limited   incontinence pads utilized   tubing/devices free from skin contact  Taken 12/6/2021 2000 by Melva Lopez RN  Pressure Reduction Techniques:   frequent weight shift encouraged   weight shift assistance provided  Head of Bed ([facility]): [facility] elevated  Pressure Reduction Devices:   pressure-redistributing mattress utilized   positioning supports utilized  Skin Protection:   adhesive use limited   incontinence pads utilized   tubing/devices free from skin contact     Problem: Fall Injury Risk  Goal: Absence of Fall and Fall-Related Injury  Intervention: Identify and Manage Contributors to Fall Injury Risk  Recent Flowsheet Documentation  Taken 12/7/2021 0400 by Melva Lopez RN  Medication Review/Management: medications reviewed  Taken 12/7/2021 0200 by Melva Lopez RN  Medication Review/Management: medications reviewed  Taken 12/7/2021 0000 by Melva Lopez RN  Medication Review/Management: medications reviewed  Taken 12/6/2021 2200 by Melva Lopez RN  Medication Review/Management:  medications reviewed  Taken 12/6/2021 2000 by Melva Lopez RN  Medication Review/Management: medications reviewed  Intervention: Promote Injury-Free Environment  Recent Flowsheet Documentation  Taken 12/7/2021 0400 by Melva Lopez RN  Safety Promotion/Fall Prevention:   activity supervised   assistive device/personal items within reach   clutter free environment maintained   fall prevention program maintained   nonskid shoes/slippers when out of bed   safety round/check completed   toileting scheduled  Taken 12/7/2021 0200 by Melva Lopez RN  Safety Promotion/Fall Prevention:   activity supervised   assistive device/personal items within reach   clutter free environment maintained   fall prevention program maintained   nonskid shoes/slippers when out of bed   safety round/check completed   toileting scheduled  Taken 12/7/2021 0000 by Melva Lopez RN  Safety Promotion/Fall Prevention:   activity supervised   assistive device/personal items within reach   clutter free environment maintained   fall prevention program maintained   nonskid shoes/slippers when out of bed   safety round/check completed   toileting scheduled  Taken 12/6/2021 2200 by Melva Lopez RN  Safety Promotion/Fall Prevention:   activity supervised   assistive device/personal items within reach   clutter free environment maintained   fall prevention program maintained   nonskid shoes/slippers when out of bed   safety round/check completed   toileting scheduled  Taken 12/6/2021 2000 by Melva Lopez RN  Safety Promotion/Fall Prevention:   activity supervised   clutter free environment maintained   assistive device/personal items within reach   fall prevention program maintained   nonskid shoes/slippers when out of bed   safety round/check completed   toileting scheduled   Goal Outcome Evaluation:

## 2021-12-07 NOTE — PROGRESS NOTES
"   LOS: 9 days    Patient Care Team:  Alex Walters MD as PCP - General (Internal Medicine)  Jeff Joe IV, MD as Consulting Physician (Cardiology)  Donny Hodges MD as Consulting Physician (Nephrology)  Cory Castillo MD as Surgeon (General Surgery)  Slim Wick MD    ESRD - PD     Subjective     Interval History:     S/p laproscopy and dialysis access placement.     Review of Systems:   No CP or SOA , fever, chills, rigors, rash, N/V, Constipation.         Objective     Vital Sign Min/Max for last 24 hours  Temp  Min: 97.3 °F (36.3 °C)  Max: 98.2 °F (36.8 °C)   BP  Min: 120/102  Max: 160/77   Pulse  Min: 75  Max: 117   Resp  Min: 16  Max: 20   SpO2  Min: 90 %  Max: 99 %   Flow (L/min)  Min: 2  Max: 4   Weight  Min: 79.9 kg (176 lb 3.2 oz)  Max: 83.5 kg (184 lb)     Flowsheet Rows      First Filed Value   Admission Height 162.6 cm (64\") Documented at 11/28/2021 0854   Admission Weight 72.6 kg (160 lb) Documented at 11/28/2021 0854          I/O this shift:  In: 0.3 [I.V.:0.3]  Out: 300 [Emesis/NG output:300]  I/O last 3 completed shifts:  In: 2751.3 [I.V.:401.3; Other:2000; IV Piggyback:350]  Out: 2600 [Emesis/NG output:350; Other:2200; Blood:50]    Physical Exam:    Gen: Alert, NAD   HENT: NC, AT, EOMI   NECK: Supple, no JVD, Trachea midline   LUNGS: CTA bilaterally, non labored respirtation   CVS: S1/S2 audible, RRR, no murmur   Abd: Soft, NT, ND, BS+   Ext: No pedal edema, no cyanosis   CNS: Alert, No focal deficit noted grossly  Psy: Cooperative  Skin: Warm, dry and intact      WBC WBC   Date Value Ref Range Status   12/07/2021 19.20 (H) 3.40 - 10.80 10*3/mm3 Final   12/06/2021 23.51 (H) 3.40 - 10.80 10*3/mm3 Final   12/06/2021 14.56 (H) 3.40 - 10.80 10*3/mm3 Final   12/05/2021 17.56 (H) 3.40 - 10.80 10*3/mm3 Final   12/04/2021 14.72 (H) 3.40 - 10.80 10*3/mm3 Final      HGB Hemoglobin   Date Value Ref Range Status   12/07/2021 8.2 (L) 12.0 - 15.9 g/dL Final   12/06/2021 " 8.6 (L) 12.0 - 15.9 g/dL Final   12/06/2021 8.3 (L) 12.0 - 15.9 g/dL Final   12/05/2021 9.4 (L) 12.0 - 15.9 g/dL Final   12/04/2021 9.7 (L) 12.0 - 15.9 g/dL Final      HCT Hematocrit   Date Value Ref Range Status   12/07/2021 24.9 (L) 34.0 - 46.6 % Final   12/06/2021 25.9 (L) 34.0 - 46.6 % Final   12/06/2021 25.7 (L) 34.0 - 46.6 % Final   12/05/2021 28.7 (L) 34.0 - 46.6 % Final   12/04/2021 29.5 (L) 34.0 - 46.6 % Final      Platlets No results found for: LABPLAT   MCV MCV   Date Value Ref Range Status   12/07/2021 96.9 79.0 - 97.0 fL Final   12/06/2021 96.3 79.0 - 97.0 fL Final   12/06/2021 98.8 (H) 79.0 - 97.0 fL Final   12/05/2021 96.6 79.0 - 97.0 fL Final   12/04/2021 97.7 (H) 79.0 - 97.0 fL Final          Sodium Sodium   Date Value Ref Range Status   12/07/2021 129 (L) 136 - 145 mmol/L Final   12/06/2021 131 (L) 136 - 145 mmol/L Final   12/06/2021 135 (L) 136 - 145 mmol/L Final   12/05/2021 136 136 - 145 mmol/L Final   12/04/2021 135 (L) 136 - 145 mmol/L Final      Potassium Potassium   Date Value Ref Range Status   12/07/2021 3.7 3.5 - 5.2 mmol/L Final     Comment:     Slight hemolysis detected by analyzer. Results may be affected.   12/06/2021 4.1 3.5 - 5.2 mmol/L Final     Comment:     Slight hemolysis detected by analyzer. Results may be affected.   12/06/2021 3.5 3.5 - 5.2 mmol/L Final   12/06/2021 2.9 (L) 3.5 - 5.2 mmol/L Final   12/05/2021 3.1 (L) 3.5 - 5.2 mmol/L Final     Comment:     Slight hemolysis detected by analyzer. Results may be affected.   12/05/2021 2.9 (L) 3.5 - 5.2 mmol/L Final     Comment:     Slight hemolysis detected by analyzer. Results may be affected.   12/04/2021 3.0 (L) 3.5 - 5.2 mmol/L Final     Comment:     Slight hemolysis detected by analyzer. Results may be affected.      Chloride Chloride   Date Value Ref Range Status   12/07/2021 88 (L) 98 - 107 mmol/L Final   12/06/2021 89 (L) 98 - 107 mmol/L Final   12/06/2021 91 (L) 98 - 107 mmol/L Final   12/05/2021 89 (L) 98 - 107 mmol/L  Final   12/04/2021 91 (L) 98 - 107 mmol/L Final      CO2 CO2   Date Value Ref Range Status   12/07/2021 22.0 22.0 - 29.0 mmol/L Final   12/06/2021 19.0 (L) 22.0 - 29.0 mmol/L Final   12/06/2021 26.0 22.0 - 29.0 mmol/L Final   12/05/2021 26.0 22.0 - 29.0 mmol/L Final   12/04/2021 26.0 22.0 - 29.0 mmol/L Final      BUN BUN   Date Value Ref Range Status   12/07/2021 49 (H) 8 - 23 mg/dL Final   12/06/2021 39 (H) 8 - 23 mg/dL Final   12/06/2021 33 (H) 8 - 23 mg/dL Final   12/05/2021 35 (H) 8 - 23 mg/dL Final   12/04/2021 36 (H) 8 - 23 mg/dL Final      Creatinine Creatinine   Date Value Ref Range Status   12/07/2021 7.07 (H) 0.57 - 1.00 mg/dL Final   12/06/2021 6.59 (H) 0.57 - 1.00 mg/dL Final   12/06/2021 7.23 (H) 0.57 - 1.00 mg/dL Final   12/05/2021 7.02 (H) 0.57 - 1.00 mg/dL Final   12/04/2021 6.98 (H) 0.57 - 1.00 mg/dL Final      Calcium Calcium   Date Value Ref Range Status   12/07/2021 8.4 (L) 8.6 - 10.5 mg/dL Final   12/06/2021 8.1 (L) 8.6 - 10.5 mg/dL Final   12/06/2021 8.1 (L) 8.6 - 10.5 mg/dL Final   12/05/2021 8.3 (L) 8.6 - 10.5 mg/dL Final   12/04/2021 7.5 (L) 8.6 - 10.5 mg/dL Final      PO4 No results found for: CAPO4   Albumin Albumin   Date Value Ref Range Status   12/07/2021 3.70 3.50 - 5.20 g/dL Final   12/06/2021 4.20 3.50 - 5.20 g/dL Final   12/05/2021 3.80 3.50 - 5.20 g/dL Final   12/04/2021 3.30 (L) 3.50 - 5.20 g/dL Final      Magnesium Magnesium   Date Value Ref Range Status   12/07/2021 1.5 (L) 1.6 - 2.4 mg/dL Final   12/06/2021 1.5 (L) 1.6 - 2.4 mg/dL Final   12/05/2021 1.7 1.6 - 2.4 mg/dL Final      Uric Acid No results found for: URICACID        Results Review:     I reviewed the patient's new clinical results.    acetaminophen, 650 mg, Oral, Q6H  amiodarone, 200 mg, Nasogastric, Q8H   Followed by  [START ON 12/11/2021] amiodarone, 200 mg, Nasogastric, Q12H   Followed by  [START ON 12/25/2021] amiodarone, 200 mg, Nasogastric, Daily  calcium acetate, 667 mg, Oral, TID With Meals  cinacalcet, 60  mg, Oral, Nightly  epoetin blanca/blanca-epbx, 10,000 Units, Subcutaneous, Weekly  Fat Emul Fish Oil/Plant Based, 250 mL, Intravenous, Q24H (TPN)  insulin lispro, 2-7 Units, Subcutaneous, Q6H  metoprolol tartrate, 12.5 mg, Oral, Once  metoprolol tartrate, 25 mg, Nasogastric, Q12H  micafungin (MYCAMINE) IV, 100 mg, Intravenous, Q24H  midodrine, 5 mg, Oral, Once  pantoprazole, 40 mg, Oral, Q AM  pantoprazole, 40 mg, Intravenous, Q AM  piperacillin-tazobactam, 3.375 g, Intravenous, Q12H  potassium chloride, 40 mEq, Nasogastric, Daily  sevelamer, 1,600 mg, Nasogastric, TID With Meals  sodium chloride, 10 mL, Intravenous, Q12H  dianeal with additives intermittent, , Intraperitoneal, 4x Daily  vancomycin (dosing per levels), , Does not apply, Daily      Adult Peripheral 2-in-1 TPN, , Last Rate: 75 mL/hr at 12/06/21 2117  amiodarone, 1 mg/min, Last Rate: 1 mg/min (12/07/21 0621)  HYDROmorphone HCl-NaCl,   lactated ringers, 9 mL/hr  Pharmacy to Dose TPN,   Pharmacy to dose vancomycin,   Pharmacy to dose warfarin,         Medication Review:    Assessment/Plan       Peritonitis (HCC)    Paroxysmal atrial fibrillation (HCC)    Essential hypertension    Type 2 diabetes mellitus (HCC)    Chronic kidney disease, stage IV (severe) (HCC)    Hyperparathyroidism (HCC)    Hyperlipidemia LDL goal <100    Sepsis associated hypotension (HCC)    Ileus (HCC)    Hypotension      1- ESRD - On PD - transitioning to HD.   2- Peritonitis   3- Anemia   4- Supra-therapeutic INR   5- Low albumin level   6- Corrected Calcium for albumin - 7.56 - monitor   7- Secondary hyperparathyroidism on Sensipar.      Plan:  - Exploratory Laprascopy done yesterday. Will be transitioning to HD for now.   - HD tomorrow. UF as tolerated.   - LIDIA with HD   - Antibx per ID     CM to start working for outpatient hemodialysis chair.       Albina Will MD  12/07/21  09:06 EST

## 2021-12-07 NOTE — PROGRESS NOTES
[facility] Medicine Services  PROGRESS NOTE    Patient Name: Moni Wallace  : 1940  MRN: [id number]    Date of Admission: 2021  Primary Care Physician: Alex Walters MD    Subjective   Subjective     CC:  F/U SBO    HPI:  Patient seen and examined. RN notes reviewed. A fib RVR overnight, now on Amio gtt. States she feels better this morning after being started on PCA for pain.     ROS:  Gen- No fevers, chills  CV- No chest pain, palpitations  Resp- No cough, dyspnea  GI- No N/V/D, +abd pain    Objective   Objective     Vital Signs:   Temp:  [97.3 °F (36.3 °C)-98.2 °F (36.8 °C)] 97.5 °F (36.4 °C)  Heart Rate:  [] 94  Resp:  [16-20] 16  BP: (120-160)/() 120/102  Flow (L/min):  [2-4] 4     Physical Exam:  Constitutional: No acute distress, awake, alert  HENT: NCAT, mucous membranes moist, NG in place   Respiratory: Clear to auscultation bilaterally, respiratory effort normal   Cardiovascular: RRR, no murmurs, rubs, or gallops  Gastrointestinal: Positive bowel sounds, soft, nontender, nondistended  Musculoskeletal: No bilateral ankle edema  Psychiatric: Appropriate affect, cooperative  Neurologic: Oriented x 3,  speech clear  Skin: No rashes, tunneled dialysis catheter in place R chest, incision sites c/d/i     Results Reviewed:  LAB RESULTS:      Lab 21  0550 21  1929 21  1928 21  0539 21  1643 21  0649 21  0420 21  0947 21  0601 21  0429 21  1233 21  1233   WBC 19.20*  --  23.51* 14.56*  --   --  17.56* 14.72*  --  16.80*   < > 18.56*   HEMOGLOBIN 8.2*  --  8.6* 8.3*  --   --  9.4* 9.7*  --  10.6*   < > 10.1*   HEMATOCRIT 24.9*  --  25.9* 25.7*  --   --  28.7* 29.5*  --  33.5*   < > 31.4*   PLATELETS 222  --  232 197  --   --  255 227  --  267   < > 283   NEUTROS ABS 16.37*  --  20.68*  --   --   --   --   --   --  14.37*  --  15.88*   IMMATURE GRANS (ABS) 1.03*  --  1.25*  --   --   --   --    --   --  0.20*  --  0.21*   LYMPHS ABS 1.01  --  0.81  --   --   --   --   --   --  1.39  --  1.63   MONOS ABS 0.74  --  0.66  --   --   --   --   --   --  0.66  --  0.67   EOS ABS 0.00  --  0.03  --   --   --   --   --   --  0.13  --  0.13   MCV 96.9  --  96.3 98.8*  --   --  96.6 97.7*  --  99.1*   < > 100.0*   CRP  --   --   --   --   --  23.82*  --   --   --   --   --   --    PROTIME 15.2* 15.6*  --  17.7* 22.7*  --  32.7*  --    < > 35.4*  --   --     < > = values in this interval not displayed.         Lab 12/07/21  0550 12/06/21  1929 12/06/21  1245 12/06/21  0539 12/05/21  1956 12/05/21  1643 12/05/21  0649 12/05/21  0649 12/04/21  0947 12/04/21  0947 12/03/21  0429 12/03/21 0429   SODIUM 129* 131*  --  135*  --   --   --  136  --  135*   < > 135*   POTASSIUM 3.7 4.1 3.5 2.9* 3.1*  --    < > 2.9*   < > 3.0*   < > 3.4*   CHLORIDE 88* 89*  --  91*  --   --   --  89*  --  91*   < > 93*   CO2 22.0 19.0*  --  26.0  --   --   --  26.0  --  26.0   < > 21.0*   ANION GAP 19.0* 23.0*  --  18.0*  --   --   --  21.0*  --  18.0*   < > 21.0*   BUN 49* 39*  --  33*  --   --   --  35*  --  36*   < > 39*   CREATININE 7.07* 6.59*  --  7.23*  --   --   --  7.02*  --  6.98*   < > 7.27*   GLUCOSE 286* 294*  --  123*  --   --   --  134*  --  155*   < > 156*   CALCIUM 8.4* 8.1*  --  8.1*  --   --   --  8.3*  --  7.5*   < > 7.0*   IONIZED CALCIUM 1.10*  --   --  1.06*  --  1.06*  --   --   --   --   --   --    MAGNESIUM 1.5*  --   --  1.5*  --   --   --  1.7  --   --   --  1.6   PHOSPHORUS 2.9  --   --  3.6  --   --   --  5.7*  --   --   --   --     < > = values in this interval not displayed.         Lab 12/07/21  0550 12/06/21  0539 12/05/21  0649 12/04/21  0947 12/03/21  0429   TOTAL PROTEIN 6.7 6.7 6.8 5.6* 6.3   ALBUMIN 3.70 4.20 3.80 3.30* 2.40*   GLOBULIN 3.0 2.5 3.0 2.3 3.9   ALT (SGPT) 7 <5 <5 <5 <5   AST (SGOT) 25 20 13 13 21   BILIRUBIN 0.5 0.5 0.3 0.2 0.3   ALK PHOS 136* 117 109 108 136*         Lab 12/07/21  0550  12/06/21  1929 12/06/21  0539 12/05/21  1643 12/05/21  0420   PROTIME 15.2* 15.6* 17.7* 22.7* 32.7*   INR 1.24* 1.29* 1.51* 2.10* 3.39*         Lab 12/05/21  0649   CHOLESTEROL 100   TRIGLYCERIDES 104         Lab 12/05/21  1020   ABO TYPING O   RH TYPING Negative   ANTIBODY SCREEN Negative         Brief Urine Lab Results  (Last result in the past 365 days)      Color   Clarity   Blood   Leuk Est   Nitrite   Protein   CREAT   Urine HCG        11/28/21 1647 Yellow   Turbid   Moderate (2+)   Large (3+)   Negative   >=300 mg/dL (3+)                 Microbiology Results Abnormal     Procedure Component Value - Date/Time    Fungus Smear - Body Fluid, Peritoneum [[id number]] Collected: 12/03/21 1042    Lab Status: Final result Specimen: Body Fluid from Peritoneum Updated: 12/06/21 1559     Fungal Stain No fungal elements seen    Blood Culture - Blood, Hand, Left [[id number]]  (Normal) Collected: 12/03/21 1318    Lab Status: Preliminary result Specimen: Blood from Hand, Left Updated: 12/06/21 1500     Blood Culture No growth at 3 days    Narrative:      Aerobic Only    Blood Culture - Blood, Hand, Left [[id number]]  (Normal) Collected: 12/03/21 1230    Lab Status: Preliminary result Specimen: Blood from Hand, Left Updated: 12/06/21 1330     Blood Culture No growth at 3 days    Body Fluid Culture - Body Fluid, Peritoneum [[id number]] Collected: 12/02/21 1850    Lab Status: Final result Specimen: Body Fluid from Peritoneum Updated: 12/05/21 0736     Body Fluid Culture No growth at 3 days     Gram Stain Many (4+) WBCs seen      No organisms seen    AFB Culture - Body Fluid, Peritoneum [[id number]] Collected: 12/03/21 1040    Lab Status: Preliminary result Specimen: Body Fluid from Peritoneum Updated: 12/04/21 1207     AFB Stain No acid fast bacilli seen on concentrated smear    Blood Culture - Blood, Wrist, Left [[id number]]  (Normal) Collected: 11/28/21 1023    Lab Status: Final result Specimen: Blood from Wrist, Left Updated:  12/03/21 1046     Blood Culture No growth at 5 days    Blood Culture - Blood, Wrist, Left [[id number]]  (Normal) Collected: 11/28/21 1023    Lab Status: Final result Specimen: Blood from Wrist, Left Updated: 12/03/21 1046     Blood Culture No growth at 5 days    Body Fluid Culture - Body Fluid, Peritoneum [[id number]] Collected: 11/28/21 1214    Lab Status: Final result Specimen: Body Fluid from Peritoneum Updated: 12/01/21 1239     Body Fluid Culture No growth at 3 days     Gram Stain Few (2+) WBCs seen      No organisms seen    Urine Culture - Urine, Urine, Clean Catch [[id number]]  (Normal) Collected: 11/28/21 1647    Lab Status: Final result Specimen: Urine, Clean Catch Updated: 11/29/21 2255     Urine Culture No growth    COVID PRE-OP / PRE-PROCEDURE SCREENING ORDER (NO ISOLATION) - Swab, Nasopharynx [[id number]]  (Normal) Collected: 11/28/21 1558    Lab Status: Final result Specimen: Swab from Nasopharynx Updated: 11/28/21 2048    Narrative:      The following orders were created for panel order COVID PRE-OP / PRE-PROCEDURE SCREENING ORDER (NO ISOLATION) - Swab, Nasopharynx.  Procedure                               Abnormality         Status                     ---------                               -----------         ------                     COVID-19, APTIMA PANTHER...[[id number]]  Normal              Final result                 Please view results for these tests on the individual orders.    COVID-19, APTIMA PANTHER [facility] IN-HOUSE NP/OP SWAB IN UTM/VTM/SALINE TRANSPORT MEDIA 24HR TAT - Swab, Nasopharynx [[id number]]  (Normal) Collected: 11/28/21 1558    Lab Status: Final result Specimen: Swab from Nasopharynx Updated: 11/28/21 2048     COVID19 Not Detected    Narrative:      Fact sheet for providers: https://www.fda.gov/media/088683/download     Fact sheet for patients: https://www.fda.gov/media/954563/download    Test performed by RT PCR.          XR Chest 1 View    Result Date: 12/6/2021   EXAMINATION: XR  CHEST 1 VW-  INDICATION: s/p central line placement; R10.9-Unspecified abdominal pain; T85.71XA-Infection and inflammatory reaction due to peritoneal dialysis catheter, initial encounter; A41.9-Sepsis, unspecified organism; K59.9-Functional intestinal disorder, unspecified  COMPARISON: 11/28/2021  FINDINGS: Portable chest reveals dialysis catheter identified the right tip in the SVC. Nasogastric G-tube the stomach. Prominence of the pulmonary vascularity with some increased markings in the perihilar regions bilaterally. Small left pleural effusion.         Impression: Placement of a deep line catheter on the right tip in the SVC. No pneumothorax. Prominence of the perihilar regions bilaterally with small left pleural effusion.       Peripheral Block    Result Date: 12/6/2021  Jean-Pierre Ambrosio CRNA     12/6/2021  5:50 PM Peripheral Block Patient reassessed immediately prior to procedure Patient location during procedure: OR Reason for block: at surgeon's request and post-op pain management Performed by CRNA: Jean-Pierre Ambroiso CRNA Preanesthetic Checklist Completed: patient identified, IV checked, site marked, risks and benefits discussed, surgical consent, monitors and equipment checked, pre-op evaluation and timeout performed Prep: Pt Position: supine Sterile barriers:cap, gloves, sterile barriers and mask Prep: ChloraPrep Patient monitoring: blood pressure monitoring, continuous pulse oximetry and EKG Procedure Sedation: yes Performed under: general Guidance:ultrasound guided Images:still images obtained, printed/placed on chart Laterality:Bilateral Block Type:TAP Injection Technique:single-shot Needle Type:short-bevel and echogenic Needle Gauge:20 G Resistance on Injection: none Medications Used: dexamethasone sodium phosphate injection, 4 mg bupivacaine PF (MARCAINE) 0.25 % injection, 60 mL Med administered at 12/6/2021 5:50 PM Medications Comment:Block Injection:  LA dose divided between Right and Left block  Post Assessment Injection Assessment: negative aspiration for heme, incremental injection and no paresthesia on injection Patient Tolerance:comfortable throughout block Complications:no Additional Notes   Under Ultrasound guidance, a BBraun 4inch 360 degree needle was advanced with Normal Saline hydro dissection of tissue.  The Internal Oblique and Transversus Abdominus muscles where visualized.  At or before the aponeurosis of Internal Oblique, local anesthetic spread was visualized in the Transversus Abdominus Plane. Injection was made incrementally with aspiration every 5 mls.  There was no  intravascular injection,  injection pressure was normal, there was no neural injection, and the procedure was completed without difficulty.  Thank You.     FL C Arm During Surgery    Result Date: 12/7/2021  EXAMINATION: FL C ARM DURING SURGERY-  INDICATION: TUNNELLED DIALYSIS CATHETER PLACEMENT; R10.9-Unspecified abdominal pain; T85.71XA-Infection and inflammatory reaction due to peritoneal dialysis catheter, initial encounter; A41.9-Sepsis, unspecified organism; K59.9-Functional intestinal disorder, unspecified  TECHNIQUE:  COMPARISON: NONE  FINDINGS: Dictation is to record four seconds of fluoroscopy time. Two intraprocedural images obtained show the guidewire and dialysis catheter placement, into the distal SVC or superior right atrium. No pneumothorax is seen on these images. Please see the procedure report for full details.       Impression: Fluoroscopy provided for tunneled dialysis catheter placement.        XR Abdomen KUB    Result Date: 12/6/2021  EXAMINATION: XR ABDOMEN KUB-  INDICATION: Assess for contrast progression; R10.9-Unspecified abdominal pain; T85.71XA-Infection and inflammatory reaction due to peritoneal dialysis catheter, initial encounter; A41.9-Sepsis, unspecified organism.  COMPARISON: 12/04/2021.  FINDINGS: KUB reveals bowel gas pattern to be somewhat improving when compared to the prior study. There  is minimal contrast identified within the transverse colon. Nasogastric tube identified with tip in the stomach. Peritoneal dialysis catheter identified in the left lower quadrant.      Impression: Slight improvement seen in the gaseous distended loops of bowel throughout the abdomen. Minimal contrast identified in the transverse colon. Nasogastric tube with tip in the stomach. No free intraperitoneal air.  D:  12/06/2021 E:  12/06/2021  This report was finalized on 12/6/2021 3:54 PM by Dr. Aracely Oates MD.        Results for orders placed during the hospital encounter of 01/11/21    Transthoracic Echo Complete With Contrast if Necessary Per Protocol    Interpretation Summary  · Left ventricular systolic function is normal. Estimated left ventricular EF = 65%  · Left ventricular wall thickness is consistent with hypertrophy.  · Left atrial volume is mildly increased.  · The aortic valve is calcified. There is mild aortic stenosis present. There is moderate aortic regurgitation present.  · Estimated right ventricular systolic pressure from tricuspid regurgitation is normal (<35 mmHg).      I have reviewed the medications:  Scheduled Meds:acetaminophen, 650 mg, Oral, Q6H  amiodarone, 200 mg, Nasogastric, Q8H   Followed by  [START ON 12/11/2021] amiodarone, 200 mg, Nasogastric, Q12H   Followed by  [START ON 12/25/2021] amiodarone, 200 mg, Nasogastric, Daily  calcium acetate, 667 mg, Oral, TID With Meals  cinacalcet, 60 mg, Oral, Nightly  epoetin blanca/blanca-epbx, 10,000 Units, Subcutaneous, Weekly  Fat Emul Fish Oil/Plant Based, 250 mL, Intravenous, Q24H (TPN)  insulin lispro, 2-7 Units, Subcutaneous, Q6H  metoprolol tartrate, 12.5 mg, Oral, Once  metoprolol tartrate, 25 mg, Nasogastric, Q12H  micafungin (MYCAMINE) IV, 100 mg, Intravenous, Q24H  midodrine, 5 mg, Oral, Once  pantoprazole, 40 mg, Oral, Q AM  pantoprazole, 40 mg, Intravenous, Q AM  piperacillin-tazobactam, 3.375 g, Intravenous, Q12H  potassium  chloride, 40 mEq, Nasogastric, Daily  sevelamer, 1,600 mg, Nasogastric, TID With Meals  sodium chloride, 10 mL, Intravenous, Q12H  vancomycin (dosing per levels), , Does not apply, Daily      Continuous Infusions:Adult Peripheral 2-in-1 TPN, , Last Rate: 75 mL/hr at 12/06/21 2117  amiodarone, 1 mg/min, Last Rate: 1 mg/min (12/07/21 0621)  HYDROmorphone HCl-NaCl,   lactated ringers, 9 mL/hr  Pharmacy to Dose TPN,   Pharmacy to dose vancomycin,   Pharmacy to dose warfarin,       PRN Meds:.•  acetaminophen  •  albuterol  •  benzocaine-menthol  •  diphenhydrAMINE  •  lactated ringers  •  naloxone  •  ondansetron **OR** ondansetron  •  Pharmacy to Dose TPN  •  Pharmacy to dose vancomycin  •  Pharmacy to dose warfarin  •  phenol  •  prochlorperazine  •  Sodium Chloride (PF)  •  sodium chloride  •  temazepam    Assessment/Plan   Assessment & Plan     Active Hospital Problems    Diagnosis  POA   • **Peritonitis (HCC) [K65.9]  Yes   • Hypotension [I95.9]  Yes   • Sepsis associated hypotension (HCC) [A41.9, I95.9]  Yes   • Ileus (HCC) [K56.7]  Yes   • Hyperlipidemia LDL goal <100 [E78.5]  Yes   • Paroxysmal atrial fibrillation (HCC) [I48.0]  Yes   • Type 2 diabetes mellitus (HCC) [E11.9]  Yes   • Chronic kidney disease, stage IV (severe) (HCC) [N18.4]  Yes   • Hyperparathyroidism (HCC) [E21.3]  Yes   • Essential hypertension [I10]  Yes      Resolved Hospital Problems   No resolved problems to display.        Brief Hospital Course to date:  Moni Wallace is a 81 y.o. female here with sepsis and hypotension due to peritonitis. Her stay is complicated by Ileus vs pSBO. Surgery and ID following.    This patient's problems and plans were partially entered by my partner and updated as appropriate by me 12/07/21.     Sepsis  Bacterial Peritonitis  --ID following, continue Vanc, Zosyn, Mycamine   --Cultures remain negative.      SBO  --S/P Open resection of terminal ileum and cecum with primary ileocolic anastomosis and removal of PD  catheter per Dr Andersen 12/6/21  --NG remains in place  --Dilaudid PCA for pain control  --PT to evaluate   --RD following for PPN      PAF, w/ intermittent RVR  HTN  --Continue to hold Warfain   --S/P 2 units FFP and 10mg IV Vitamin K pre-op   --Currently on Amio gtt   --Cardiology following     Hypotension  -Currently resolved     ESRD on PD-->now on HD   Hypokalemia  Hypomagnesemia   Hypocalcemia   -Nephrology following   -LIDIA weekly   -K, Mg, Ca supplementation per Renal   -PD catheter removed and Dr Curiel placed tunneled dialysis catheter. Pt will now be on HD.     DVT prophylaxis:  Medical and mechanical DVT prophylaxis orders are present.       AM-PAC 6 Clicks Score (PT): 12 (12/06/21 2000)    Disposition: I expect the patient to be discharged TBD.     CODE STATUS:   Code Status and Medical Interventions:   Ordered at: 11/28/21 1356     Medical Intervention Limits:    NO intubation (DNI)     Level Of Support Discussed With:    Patient     Code Status (Patient has no pulse and is not breathing):    No CPR (Do Not Attempt to Resuscitate)     Medical Interventions (Patient has pulse or is breathing):    Limited Support       Debby Stovall,   12/07/21

## 2021-12-08 NOTE — PROGRESS NOTES
[facility] Progress Note        Antibiotics:  Anti-Infectives (From admission, onward)    Ordered     Dose/Rate Route Frequency Start Stop    12/03/21 0857  piperacillin-tazobactam (ZOSYN) 3.375 g in iso-osmotic dextrose 50 ml (premix)        Ordering Provider: Sawyer Llanos MD    3.375 g  over 4 Hours Intravenous Every 12 Hours 12/03/21 1800 12/15/21 1759    12/03/21 0832  micafungin 100 mg/100 mL 0.9% NS IVPB (mbp)        Ordering Provider: Sawyer Llanos MD    100 mg Intravenous Every 24 Hours 12/03/21 1000 12/15/21 0959    12/03/21 0857  piperacillin-tazobactam (ZOSYN) 3.375 g in iso-osmotic dextrose 50 ml (premix)        Ordering Provider: Sawyer Llanos MD    3.375 g  over 30 Minutes Intravenous Once 12/03/21 0945 12/03/21 1214    12/03/21 0832  Pharmacy to dose vancomycin        Ordering Provider: Robin Andersen MD     Does not apply Continuous PRN 12/03/21 0831 12/13/21 0830    12/02/21 1434  vancomycin (dosing per levels)        Ordering Provider: Robin Andersen MD     Does not apply Daily 12/02/21 1530 12/25/21 0859    11/30/21 1111  UltraBag/Dianeal PD-2/1.5% Dex (pappas #[id number]) (DIANEAL) 2,000 mL with vancomycin (VANCOCIN) 1,250 mg, cefTAZidime (FORTAZ) 1,000 mg dialysis solution        Ordering Provider: Sawyer Llanos MD     Intraperitoneal Once 11/30/21 2200 11/30/21 2200    11/28/21 1942  UltraBag/Dianeal PD-2/1.5% Dex (pappas #[id number]) (DIANEAL) 2,000 mL with ceFAZolin (ANCEF) 2,000 mg dialysis solution        Ordering Provider: Kali Rojas MD     Intraperitoneal Once 11/29/21 0000 11/29/21 0004    11/28/21 1157  vancomycin 1500 mg/500 mL 0.9% NS IVPB ([facility])        Ordering Provider: Lex Marcum MD    20 mg/kg × 72.6 kg Intravenous Once 11/28/21 1159 11/28/21 1400    11/28/21 1157  piperacillin-tazobactam (ZOSYN) 3.375 g in iso-osmotic dextrose 50 ml (premix)        Ordering Provider: Lex Marcum MD    3.375 g Intravenous Once 11/28/21 1159 11/28/21 1317     "      CC: abd pain    HPI:      Patient is a 81 y.o.  Yr old female with history of ESRD on CAPD for several years, Hx CDiff years ago she was admitted November 28 with acute onset abdominal pain over 2 days, nausea/vomiting and abnormal CT scan raising concern for possible early ileus versus partial small bowel obstruction and hazy omental stranding concerning for peritonitis per notes.  She had leukocytosis and peritoneal fluid with 1748 WBC and predominance neutrophils.  Concern for CAPD associated peritonitis and empiric vancomycin/Zosyn initiated.  Nursing had reported peritoneal fluid had initially looked a little hazy but patient did not recall a hazy or bloody appearance.  No redness/swelling or pain or other problems at her PD catheter.     12/3/21 NG out 12/2 with nausea and vomiting multiple times overnight; dialysate remains blood tinged per patient;  tachycardia, hypotension last 24 hours, WBC increased overall and at risk for systemic illness;  Will change abx back to IV vancomycin/zosyn, add empiric mycamine    12/4/21 cardiology following with paroxysmal afib/RVR    12/6/21 Dr Andersen with surgery  \"PROCEDURE PERFORMED:      1. Diagnostic laparoscopy converted to exploratory laparotomy  2. Open resection of terminal ileum and cecum with primary ileocolic anastomosis  3. Removal peritoneal dialysis catheter\"     12/6 also HD catheter placed by Frankie Begum    12/8/21 HD per nephrology;  less abd pain postop;     Per nursing, She has abdominal pain, mid abdomen, intermittent, nonradiating, 2-3 out of 10, not progressive.  She denies diarrhea.  No hematochezia melena or hematemesis.  No rash.  Minimal urine output and no active urinary symptoms.  No dysuria hematuria or pyuria.  No flank pain.  No rash.  No shortness of breath or cough.      ROS:      12/8/21 No f/c/s. no rash. No new ADR to Abx.     Heent-- No new vision, hearing or throat complaints.  No epistaxis or oral sores.  Denies odynophagia or " "dysphagia.  No flashers, floaters or eye pain. No odynophagia or dysphagia. No headache, photophobia or neck stiffness.  CV-- No chest pain, palpitation or syncope  Resp-- No SOB/cough/Hemoptysis  GI-  As above.No hematochezia, melena, or hematemesis. Denies jaundice or chronic liver disease.  -- No dysuria, hematuria, or flank pain.  Denies hesitancy, urgency or flank pain.  Lymph- no swollen lymph nodes in neck/axilla or groin.   Heme- No active bruising or bleeding; no Hx of DVT or PE.  MS-- no swelling or pain in the bones or joints of arms/legs.  No new back pain.  Neuro-- No acute focal weakness or numbness in the arms or legs.  No seizures.     Full 12 point review of systems reviewed and negative otherwise for acute complaints, except for above      PE:   /59 (BP Location: Right leg, Patient Position: Lying)   Pulse 80   Temp 97.9 °F (36.6 °C) (Oral)   Resp 20   Ht 162.6 cm (64\")   Wt 82.6 kg (182 lb 3.2 oz)   LMP  (LMP Unknown)   SpO2 96%   BMI 31.27 kg/m²       GENERAL: awake, in no acute distress.   HEENT: Normocephalic, atraumatic.  PERRL. EOMI. No conjunctival injection. No icterus. Oropharynx clear without evidence of thrush or exudate. No evidence of peridontal disease.    NECK: Supple without nuchal rigidity. No mass.  LYMPH: No cervical, axillary or inguinal lymphadenopathy.  HEART: RRR; No murmur, rubs, gallops.   LUNGS: diminished at bases but otherwise Clear to auscultation bilaterally without wheezing, rales, rhonchi. Normal respiratory effort. Nonlabored. No dullness.  ABDOMEN: Soft, mildly tender, nondistended.  bowel sounds  diminshed. No rebound or guarding. NO mass or HSM.  EXT:  No cyanosis, clubbing or edema. No cord.   MSK: FROM without joint effusions noted arms/legs.    SKIN: Warm and dry without cutaneous eruptions on Inspection/palpation.    NEURO: awake     PD catheter with no redness or purulent drainage.  Nontender at the exit site     IV without obvious redness or " drainage     No peripheral stigmata/phenomenon of endocarditis       Laboratory Data    Results from last 7 days   Lab Units 12/08/21  0527 12/07/21  0550 12/06/21  1928   WBC 10*3/mm3 21.79* 19.20* 23.51*   HEMOGLOBIN g/dL 8.1* 8.2* 8.6*   HEMATOCRIT % 24.1* 24.9* 25.9*   PLATELETS 10*3/mm3 213 222 232     Results from last 7 days   Lab Units 12/08/21  0527   SODIUM mmol/L 125*   POTASSIUM mmol/L 3.2*   CHLORIDE mmol/L 83*   CO2 mmol/L 21.0*   BUN mg/dL 73*   CREATININE mg/dL 7.94*   GLUCOSE mg/dL 163*   CALCIUM mg/dL 8.3*     Results from last 7 days   Lab Units 12/08/21  0527   ALK PHOS U/L 201*   BILIRUBIN mg/dL 0.7   ALT (SGPT) U/L 17   AST (SGOT) U/L 50*         Results from last 7 days   Lab Units 12/05/21  0649   CRP mg/dL 23.82*       Estimated Creatinine Clearance: 5.9 mL/min (A) (by C-G formula based on SCr of 7.94 mg/dL (H)).      Microbiology:      Radiology:  Imaging Results (Last 72 Hours)     Procedure Component Value Units Date/Time    FL C Arm During Surgery [[id number]] Collected: 12/07/21 0846     Updated: 12/07/21 2157    Narrative:      EXAMINATION: FLUOROSCOPY C-ARM DURING SURGERY-      INDICATION: Tunnelled dialysis catheter placement; R10.9-Unspecified  abdominal pain; T85.71XA-Infection and inflammatory reaction due to  peritoneal dialysis catheter, initial encounter; A41.9-Sepsis,  unspecified organism; K59.9-Functional intestinal disorder, unspecified.     COMPARISON: None.     FINDINGS: Dictation is to record four seconds of fluoroscopy time. Two  intraprocedural images obtained show the guidewire and dialysis catheter  placement, into the distal SVC or superior right atrium. No pneumothorax  is seen on these images. Please see the procedure report for full  details.       Impression:      Fluoroscopy provided for tunneled dialysis catheter  placement.     D:  12/07/2021  E:  12/07/2021        This report was finalized on 12/7/2021 9:53 PM by Dr. Xavier Cantor MD.       XR Chest 1 View  [[id number]] Collected: 12/06/21 1835     Updated: 12/07/21 1228    Narrative:      EXAMINATION: XR CHEST 1 VW-      INDICATION: Status post central line placement; R10.9-Unspecified  abdominal pain; T85.71XA-Infection and inflammatory reaction due to  peritoneal dialysis catheter, initial encounter; A41.9-Sepsis,  unspecified organism; K59.9-Functional intestinal disorder, unspecified.        COMPARISON: 11/28/2021.     FINDINGS: Portable chest reveals dialysis catheter identified on the  right with tip in the SVC. Nasogastric tube with tip in the stomach.  Prominence of the pulmonary vascularity with some increased markings in  the perihilar regions bilaterally. Small left pleural effusion.           Impression:      Placement of a deep line catheter on the right with tip in  the SVC. No pneumothorax. Prominence of the perihilar regions  bilaterally with small left pleural effusion.     D:  12/06/2021  E:  12/07/2021       XR Abdomen KUB [[id number]] Collected: 12/06/21 0900     Updated: 12/06/21 1557    Narrative:      EXAMINATION: XR ABDOMEN KUB-      INDICATION: Assess for contrast progression; R10.9-Unspecified abdominal  pain; T85.71XA-Infection and inflammatory reaction due to peritoneal  dialysis catheter, initial encounter; A41.9-Sepsis, unspecified  organism.      COMPARISON: 12/04/2021.     FINDINGS: KUB reveals bowel gas pattern to be somewhat improving when  compared to the prior study. There is minimal contrast identified within  the transverse colon. Nasogastric tube identified with tip in the  stomach. Peritoneal dialysis catheter identified in the left lower  quadrant.       Impression:      Slight improvement seen in the gaseous distended loops of  bowel throughout the abdomen. Minimal contrast identified in the  transverse colon. Nasogastric tube with tip in the stomach. No free  intraperitoneal air.     D:  12/06/2021  E:  12/06/2021     This report was finalized on 12/6/2021 3:54 PM by   Aracely Oates MD.       FL Small Bowel Follow Through Single-Contrast [[id number]] Collected: 12/05/21 0806     Updated: 12/06/21 1533    Narrative:      EXAMINATION: FL SMALL BOWEL FOLLOW THROUGH SINGLE-CONTRAST - 12/04/2021     INDICATION: Abdominal distention     TECHNIQUE: Small bowel follow-through performed with 10 images and no  fluoroscopy.     COMPARISON: None       Impression:      FINDINGS/IMPRESSION:   imaging reveals dilated loops of small bowel  seen diffusely throughout the abdomen. Minimal air in the rectum. There  is a nasogastric tube tip in the stomach. Contrast is seen within the  stomach on the 30- minute image and 230-minute image with no advancement  through the small bowel. There is only minimal contrast seen within the  proximal small bowel on the 9 hour and 40-minute image with minimal  contrast remaining within the stomach in the fundus. The 17-hour image  reveals no advancement of the contrast. Findings most suggestive of a  small bowel obstruction.     DICTATED:   12/04/2021  EDITED/lfs:   12/05/2021     This report was finalized on 12/6/2021 3:29 PM by Dr. Aracely Oates MD.               Impression:       --acute abdominal pain.  Associated with nausea/vomiting, abnormal PD fluid concerning for  Peritonitis, ?CAPD  ; culture negative so far. Catheter exit site appears nonpurulent for now.  No outpatient cultures per patient. Transitioned to IP vancomycin/ceftaz empirically and back to systemic therapy 12/3  ; SBFT with SBO per radiology and Dr Andersen with surgery as above on 12/6, PD catheter also out and HD catheter placed     --Acute vomiting, nausea  As above      --End-stage renal disease with peritoneal dialysis previously, HD at present     --History C. Difficile years ago per patient.  No diarrhea at present but at risk for relapse.     --abnormal CT scan with prominent common bile duct per radiology but without evidence for intrahepatic ductal dilation and normal  transaminases/bilirubin on November 28.  Monitor exam and labs     --Chronic Coumadin    --paroxysmal Afib/RVR    PLAN:       --IV vancomycin, zosyn ,mycamine     --Check/review labs cultures and scans     --Partial history per nursing staff     --Discussed with microbiology     -- d/w pharmacy      --Highly complex set of issues with high risk for further serious morbidity and other serious sequela    --repeat dialysate for cultures  , fungal/AFB studies pending; d/w micro and no pathogen so far    -- repeat CT 12/3 and SBFT noted, surgery following;  On systemic therapy  And surgery 12/6 as above      Sawyer Llanos MD  12/8/2021

## 2021-12-08 NOTE — PROGRESS NOTES
Pharmacy Parenteral Nutrition Evaluation    Moni Wallace is an 81 y.o. female receiving PPN.    Indication: Bowel obstruction  Consulting Physician: Dr. Stovall / Dr. Andersen    Total Fluid Rate (if stated): 75 ml/hr (PPN) per renal    Labs  Results from last 7 days   Lab Units 12/08/21  0527 12/07/21  0550 12/06/21  1929 12/06/21  1245 12/06/21  0539   SODIUM mmol/L 125* 129* 131*  --  135*   POTASSIUM mmol/L 3.2* 3.7 4.1   < > 2.9*   CHLORIDE mmol/L 83* 88* 89*  --  91*   CO2 mmol/L 21.0* 22.0 19.0*  --  26.0   BUN mg/dL 73* 49* 39*  --  33*   CREATININE mg/dL 7.94* 7.07* 6.59*  --  7.23*   CALCIUM mg/dL 8.3* 8.4* 8.1*  --  8.1*   BILIRUBIN mg/dL 0.7 0.5  --   --  0.5   ALK PHOS U/L 201* 136*  --   --  117   ALT (SGPT) U/L 17 7  --   --  <5   AST (SGOT) U/L 50* 25  --   --  20   GLUCOSE mg/dL 163* 286* 294*  --  123*    < > = values in this interval not displayed.     Results from last 7 days   Lab Units 12/08/21  0527 12/07/21  0550 12/06/21  0539 12/05/21  1643 12/05/21  1020   MAGNESIUM mg/dL 1.7 1.5* 1.5*  --    < >   PHOSPHORUS mg/dL 2.2* 2.9 3.6  --    < >   PREALBUMIN mg/dL  --   --   --  13.6*  --     < > = values in this interval not displayed.     Results from last 7 days   Lab Units 12/08/21 0527 12/07/21  0550 12/06/21  1928   WBC 10*3/mm3 21.79* 19.20* 23.51*   HEMOGLOBIN g/dL 8.1* 8.2* 8.6*   HEMATOCRIT % 24.1* 24.9* 25.9*   PLATELETS 10*3/mm3 213 222 232     Triglycerides   Date Value Ref Range Status   12/05/2021 104 0 - 150 mg/dL Final     Comment:     Falsely depressed results may occur on samples drawn from patients receiving N-Acetylcysteine (NAC) or Metamizole.     estimated creatinine clearance is 5.9 mL/min (A) (by C-G formula based on SCr of 7.94 mg/dL (H)).    Intake & Output (last 3 days)         12/05 0701 12/06 0700 12/06 0701 12/07 0700 12/07 0701 12/08 0700 12/08 0701 12/09 0700    P.O.        I.V. (mL/kg)  401.3 (5) 4.2 (0.1)     Blood 675       Other 9024 2000 60     IV  Piggyback 800 350 150 200    Total Intake(mL/kg) 3595 (43.1) 2751.3 (34.4) 214.2 (2.6) 200 (2.4)    Urine (mL/kg/hr)  0 (0)      Emesis/NG output 400 350 550     Other 6700   1880    Stool  0      Blood  50      Total Output 7100     Net -3505 +2351.3 -335.8 -1680            Urine Unmeasured Occurrence  1 x      Stool Unmeasured Occurrence  1 x            Dietitian Recommendations  Dietary Orders (From admission, onward)       Start     Ordered    12/06/21 1716  NPO Diet  Diet Effective Now         12/06/21 1715                  Current PPN Regimen Recommendation: Dextrose 5% / Amino Acid 5.5% at goal rate of 75 mL/hr. 20% SMOF lipids 250mL every 24 hours.    Assessment/Plan:  1. Pharmacy to dose PPN ordered for bowel obstruction.  2. PPN continues today with macronutrients per RD recommendations as above at goal rate of 75 mL/hr. Patient has ESRD and has transitioned from peritoneal dialysis to hemodialysis. Will increase Na to 60 mEq/L, add K at 5 meq/L, continue Ca at 2 meq/L and Mg at 3 meq/L, and leave phos out for now (being replaced exogenously today).  3. Blood glucose controlled on correctional insulin at this time.  4. Pharmacy will continue to follow closely and make adjustments to PPN as necessary.    Thank you,  Jensen Ramirez, PharmD, BCPS  12/8/2021  14:16 EST

## 2021-12-08 NOTE — PLAN OF CARE
Problem: Adult Inpatient Plan of Care  Goal: Absence of Hospital-Acquired Illness or Injury  Intervention: Identify and Manage Fall Risk  Recent Flowsheet Documentation  Taken 12/8/2021 0200 by Melva Lopez RN  Safety Promotion/Fall Prevention:   activity supervised   clutter free environment maintained   assistive device/personal items within reach   fall prevention program maintained   nonskid shoes/slippers when out of bed   safety round/check completed   toileting scheduled  Taken 12/7/2021 2200 by Melva Lopez RN  Safety Promotion/Fall Prevention:   activity supervised   assistive device/personal items within reach   clutter free environment maintained   fall prevention program maintained   nonskid shoes/slippers when out of bed   safety round/check completed   toileting scheduled  Taken 12/7/2021 2000 by Melva Lopez RN  Safety Promotion/Fall Prevention:   activity supervised   assistive device/personal items within reach   clutter free environment maintained   fall prevention program maintained   nonskid shoes/slippers when out of bed   safety round/check completed   toileting scheduled  Intervention: Prevent Skin Injury  Recent Flowsheet Documentation  Taken 12/8/2021 0200 by Melva Lopez RN  Body Position: position changed independently  Skin Protection:   adhesive use limited   incontinence pads utilized   tubing/devices free from skin contact  Taken 12/7/2021 2200 by Melva Lopez RN  Body Position: position changed independently  Skin Protection:   adhesive use limited   incontinence pads utilized   tubing/devices free from skin contact  Taken 12/7/2021 2000 by Melva Lopez RN  Body Position: position changed independently  Skin Protection:   adhesive use limited   incontinence pads utilized   tubing/devices free from skin contact  Intervention: Prevent and Manage VTE (venous thromboembolism) Risk  Recent Flowsheet Documentation  Taken 12/7/2021 2000 by Melva Lopez RN  VTE  Prevention/Management:   bilateral   dorsiflexion/plantar flexion performed   bleeding risk factor(s) identified  Intervention: Prevent Infection  Recent Flowsheet Documentation  Taken 12/8/2021 0200 by Melva Lopez RN  Infection Prevention: environmental surveillance performed  Taken 12/7/2021 2200 by Melva Lopez RN  Infection Prevention: environmental surveillance performed  Taken 12/7/2021 2000 by Melva Lopez RN  Infection Prevention: environmental surveillance performed  Goal: Optimal Comfort and Wellbeing  Intervention: Provide Person-Centered Care  Recent Flowsheet Documentation  Taken 12/7/2021 2000 by Melva Lopez RN  Trust Relationship/Rapport:   care explained   choices provided   thoughts/feelings acknowledged     Problem: Adjustment to Illness (Chronic Kidney Disease)  Goal: Optimal Coping with Chronic Illness  Intervention: Support and Optimize Psychosocial Response to Chronic Illness  Recent Flowsheet Documentation  Taken 12/7/2021 2000 by Melva Lopez RN  Family/Support System Care:   support provided   self-care encouraged     Problem: Fluid Volume Excess (Chronic Kidney Disease)  Goal: Fluid Balance  Intervention: Monitor and Manage Hypervolemia  Recent Flowsheet Documentation  Taken 12/8/2021 0200 by Melva Lopez RN  Skin Protection:   adhesive use limited   incontinence pads utilized   tubing/devices free from skin contact  Taken 12/7/2021 2200 by Melva Lopez RN  Skin Protection:   adhesive use limited   incontinence pads utilized   tubing/devices free from skin contact  Taken 12/7/2021 2000 by Melva Lopez RN  Skin Protection:   adhesive use limited   incontinence pads utilized   tubing/devices free from skin contact     Problem: Functional Decline (Chronic Kidney Disease)  Goal: Optimal Functional Ability  Intervention: Optimize Functional Ability  Recent Flowsheet Documentation  Taken 12/8/2021 0200 by Melva Lopez RN  Activity Management:   activity adjusted per  tolerance   activity encouraged  Taken 12/7/2021 2200 by Melva Lopez RN  Activity Management:   activity adjusted per tolerance   activity encouraged  Taken 12/7/2021 2000 by Melva Lopez RN  Activity Management:   activity adjusted per tolerance   activity encouraged     Problem: Renal Function Impairment (Chronic Kidney Disease)  Goal: Laboratory Values and Blood Pressure Within Desired Range  Intervention: Monitor and Support Renal Function  Recent Flowsheet Documentation  Taken 12/8/2021 0200 by Melva Lopez RN  Medication Review/Management: medications reviewed  Taken 12/7/2021 2200 by Melva Lopez RN  Medication Review/Management: medications reviewed     Problem: Pain Acute  Goal: Optimal Pain Control  Intervention: Optimize Psychosocial Wellbeing  Recent Flowsheet Documentation  Taken 12/7/2021 2000 by Melva Lopez RN  Diversional Activities: television     Problem: Skin Injury Risk Increased  Goal: Skin Health and Integrity  Intervention: Optimize Skin Protection  Recent Flowsheet Documentation  Taken 12/8/2021 0200 by Melva Lopez RN  Pressure Reduction Techniques:   frequent weight shift encouraged   weight shift assistance provided  Head of Bed ([facility]): [facility] elevated  Pressure Reduction Devices:   pressure-redistributing mattress utilized   positioning supports utilized  Skin Protection:   adhesive use limited   incontinence pads utilized   tubing/devices free from skin contact  Taken 12/7/2021 2200 by Melva Lopez RN  Pressure Reduction Techniques:   frequent weight shift encouraged   weight shift assistance provided  Head of Bed (HOB): [facility] elevated  Pressure Reduction Devices:   pressure-redistributing mattress utilized   positioning supports utilized  Skin Protection:   adhesive use limited   incontinence pads utilized   tubing/devices free from skin contact  Taken 12/7/2021 2000 by Melva Lopez RN  Pressure Reduction Techniques:   frequent weight shift encouraged   weight shift  assistance provided  Head of Bed (HOB): HOB elevated  Pressure Reduction Devices:   pressure-redistributing mattress utilized   positioning supports utilized  Skin Protection:   adhesive use limited   incontinence pads utilized   tubing/devices free from skin contact     Problem: Fall Injury Risk  Goal: Absence of Fall and Fall-Related Injury  Intervention: Identify and Manage Contributors to Fall Injury Risk  Recent Flowsheet Documentation  Taken 12/8/2021 0200 by Melva Lopez RN  Medication Review/Management: medications reviewed  Taken 12/7/2021 2200 by Melva Lopez RN  Medication Review/Management: medications reviewed  Intervention: Promote Injury-Free Environment  Recent Flowsheet Documentation  Taken 12/8/2021 0200 by Melva Lopez RN  Safety Promotion/Fall Prevention:   activity supervised   clutter free environment maintained   assistive device/personal items within reach   fall prevention program maintained   nonskid shoes/slippers when out of bed   safety round/check completed   toileting scheduled  Taken 12/7/2021 2200 by Melva Lopez RN  Safety Promotion/Fall Prevention:   activity supervised   assistive device/personal items within reach   clutter free environment maintained   fall prevention program maintained   nonskid shoes/slippers when out of bed   safety round/check completed   toileting scheduled  Taken 12/7/2021 2000 by Melva Lopez RN  Safety Promotion/Fall Prevention:   activity supervised   assistive device/personal items within reach   clutter free environment maintained   fall prevention program maintained   nonskid shoes/slippers when out of bed   safety round/check completed   toileting scheduled   Goal Outcome Evaluation:

## 2021-12-08 NOTE — PROGRESS NOTES
[facility] Medicine Services  PROGRESS NOTE    Patient Name: Moni Wallace  : 1940  MRN: [id number]    Date of Admission: 2021  Primary Care Physician: Alex Walters MD    Subjective   Subjective     CC:  F/U SBO    HPI:  Patient seen and examined. RN notes reviewed. No acute events overnight. Patient had HD this AM, tolerating well. Does have some back pain, denies abdominal pain. Asks when her NG can come out.     ROS:  Gen- No fevers, chills  CV- No chest pain, palpitations  Resp- No cough, dyspnea  GI- No N/V/D, abd pain  MSK-+LBP (chronic)     Objective   Objective     Vital Signs:   Temp:  [97.5 °F (36.4 °C)-98 °F (36.7 °C)] 97.6 °F (36.4 °C)  Heart Rate:  [69-94] 94  Resp:  [16-20] 18  BP: (104-169)/(49-81) 166/71  Flow (L/min):  [2] 2     Physical Exam:  Constitutional: No acute distress, awake, alert  HENT: NCAT, mucous membranes moist, NG in place   Respiratory: Clear to auscultation bilaterally, respiratory effort normal   Cardiovascular: RRR, no murmurs, rubs, or gallops  Gastrointestinal: Positive bowel sounds, soft, nontender, nondistended  Musculoskeletal: No bilateral ankle edema  Psychiatric: Appropriate affect, cooperative  Neurologic: Oriented x 3,  speech clear  Skin: No rashes, tunneled dialysis catheter in place R chest, incision sites c/d/i     Results Reviewed:  LAB RESULTS:      Lab 21  0527 21  0550 21  1929 21  1928 21  0539 21  1643 21  0649 21  0420 21  0420 21  0601 21  0429 21  1233 21  1233   WBC 21.79* 19.20*  --  23.51* 14.56*  --   --   --  17.56*   < > 16.80*   < > 18.56*   HEMOGLOBIN 8.1* 8.2*  --  8.6* 8.3*  --   --   --  9.4*   < > 10.6*   < > 10.1*   HEMATOCRIT 24.1* 24.9*  --  25.9* 25.7*  --   --   --  28.7*   < > 33.5*   < > 31.4*   PLATELETS 213 222  --  232 197  --   --   --  255   < > 267   < > 283   NEUTROS ABS  --  16.37*  --  20.68*  --   --   --    --   --   --  14.37*  --  15.88*   IMMATURE GRANS (ABS)  --  1.03*  --  1.25*  --   --   --   --   --   --  0.20*  --  0.21*   LYMPHS ABS  --  1.01  --  0.81  --   --   --   --   --   --  1.39  --  1.63   MONOS ABS  --  0.74  --  0.66  --   --   --   --   --   --  0.66  --  0.67   EOS ABS  --  0.00  --  0.03  --   --   --   --   --   --  0.13  --  0.13   MCV 96.4 96.9  --  96.3 98.8*  --   --   --  96.6   < > 99.1*   < > 100.0*   CRP  --   --   --   --   --   --  23.82*  --   --   --   --   --   --    PROTIME 14.0 15.2* 15.6*  --  17.7* 22.7*  --    < > 32.7*   < > 35.4*  --   --     < > = values in this interval not displayed.         Lab 12/08/21  0527 12/07/21  0550 12/06/21  1929 12/06/21  1245 12/06/21  0539 12/05/21  1956 12/05/21  1643 12/05/21  1020 12/05/21  0649   SODIUM 125* 129* 131*  --  135*  --   --   --  136   POTASSIUM 3.2* 3.7 4.1 3.5 2.9*   < >  --   --  2.9*   CHLORIDE 83* 88* 89*  --  91*  --   --   --  89*   CO2 21.0* 22.0 19.0*  --  26.0  --   --   --  26.0   ANION GAP 21.0* 19.0* 23.0*  --  18.0*  --   --   --  21.0*   BUN 73* 49* 39*  --  33*  --   --   --  35*   CREATININE 7.94* 7.07* 6.59*  --  7.23*  --   --   --  7.02*   GLUCOSE 163* 286* 294*  --  123*  --   --   --  134*   CALCIUM 8.3* 8.4* 8.1*  --  8.1*  --   --   --  8.3*   IONIZED CALCIUM  --  1.10*  --   --  1.06*  --  1.06*  --   --    MAGNESIUM 1.7 1.5*  --   --  1.5*  --   --  1.7 1.7   PHOSPHORUS 2.2* 2.9  --   --  3.6  --   --  5.5* 5.7*    < > = values in this interval not displayed.         Lab 12/08/21  0527 12/07/21  0550 12/06/21  0539 12/05/21  0649 12/04/21  0947   TOTAL PROTEIN 5.4* 6.7 6.7 6.8 5.6*   ALBUMIN 3.40* 3.70 4.20 3.80 3.30*   GLOBULIN 2.0 3.0 2.5 3.0 2.3   ALT (SGPT) 17 7 <5 <5 <5   AST (SGOT) 50* 25 20 13 13   BILIRUBIN 0.7 0.5 0.5 0.3 0.2   ALK PHOS 201* 136* 117 109 108         Lab 12/08/21  0527 12/07/21  0550 12/06/21  1929 12/06/21  0539 12/05/21  1643   PROTIME 14.0 15.2* 15.6* 17.7* 22.7*   INR  1.11 1.24* 1.29* 1.51* 2.10*         Lab 12/05/21  0649   CHOLESTEROL 100   TRIGLYCERIDES 104         Lab 12/05/21  1020   ABO TYPING O   RH TYPING Negative   ANTIBODY SCREEN Negative         Brief Urine Lab Results  (Last result in the past 365 days)      Color   Clarity   Blood   Leuk Est   Nitrite   Protein   CREAT   Urine HCG        11/28/21 1647 Yellow   Turbid   Moderate (2+)   Large (3+)   Negative   >=300 mg/dL (3+)                 Microbiology Results Abnormal     Procedure Component Value - Date/Time    AFB Culture - Body Fluid, Peritoneum [[id number]] Collected: 12/03/21 1040    Lab Status: Preliminary result Specimen: Body Fluid from Peritoneum Updated: 12/08/21 1046     AFB Culture No AFB isolated at less than 1 week     AFB Stain No acid fast bacilli seen on concentrated smear    Fungus Culture - Body Fluid, Peritoneum [[id number]] Collected: 12/03/21 1043    Lab Status: Preliminary result Specimen: Body Fluid from Peritoneum Updated: 12/08/21 1046     Fungus Culture No fungus isolated at less than 1 week    Blood Culture - Blood, Hand, Left [[id number]]  (Normal) Collected: 12/03/21 1318    Lab Status: Preliminary result Specimen: Blood from Hand, Left Updated: 12/07/21 1515     Blood Culture No growth at 4 days    Narrative:      Aerobic Only    Blood Culture - Blood, Hand, Left [[id number]]  (Normal) Collected: 12/03/21 1230    Lab Status: Preliminary result Specimen: Blood from Hand, Left Updated: 12/07/21 1330     Blood Culture No growth at 4 days    Fungus Smear - Body Fluid, Peritoneum [[id number]] Collected: 12/03/21 1042    Lab Status: Final result Specimen: Body Fluid from Peritoneum Updated: 12/06/21 1559     Fungal Stain No fungal elements seen    Body Fluid Culture - Body Fluid, Peritoneum [[id number]] Collected: 12/02/21 1850    Lab Status: Final result Specimen: Body Fluid from Peritoneum Updated: 12/05/21 0736     Body Fluid Culture No growth at 3 days     Gram Stain Many (4+) WBCs seen       No organisms seen    Blood Culture - Blood, Wrist, Left [[id number]]  (Normal) Collected: 11/28/21 1023    Lab Status: Final result Specimen: Blood from Wrist, Left Updated: 12/03/21 1046     Blood Culture No growth at 5 days    Blood Culture - Blood, Wrist, Left [[id number]]  (Normal) Collected: 11/28/21 1023    Lab Status: Final result Specimen: Blood from Wrist, Left Updated: 12/03/21 1046     Blood Culture No growth at 5 days    Body Fluid Culture - Body Fluid, Peritoneum [[id number]] Collected: 11/28/21 1214    Lab Status: Final result Specimen: Body Fluid from Peritoneum Updated: 12/01/21 1239     Body Fluid Culture No growth at 3 days     Gram Stain Few (2+) WBCs seen      No organisms seen    Urine Culture - Urine, Urine, Clean Catch [[id number]]  (Normal) Collected: 11/28/21 1647    Lab Status: Final result Specimen: Urine, Clean Catch Updated: 11/29/21 2255     Urine Culture No growth    COVID PRE-OP / PRE-PROCEDURE SCREENING ORDER (NO ISOLATION) - Swab, Nasopharynx [[id number]]  (Normal) Collected: 11/28/21 1558    Lab Status: Final result Specimen: Swab from Nasopharynx Updated: 11/28/21 2048    Narrative:      The following orders were created for panel order COVID PRE-OP / PRE-PROCEDURE SCREENING ORDER (NO ISOLATION) - Swab, Nasopharynx.  Procedure                               Abnormality         Status                     ---------                               -----------         ------                     COVID-19, APTIMA PANTHER...[[id number]]  Normal              Final result                 Please view results for these tests on the individual orders.    COVID-19, APTIMA PANTHER [facility] IN-HOUSE NP/OP SWAB IN UTM/VTM/SALINE TRANSPORT MEDIA 24HR TAT - Swab, Nasopharynx [[id number]]  (Normal) Collected: 11/28/21 1558    Lab Status: Final result Specimen: Swab from Nasopharynx Updated: 11/28/21 2048     COVID19 Not Detected    Narrative:      Fact sheet for providers: https://www.fda.gov/media/158690/download      Fact sheet for patients: https://www.[employer].gov/media/656042/download    Test performed by RT PCR.          XR Chest 1 View    Result Date: 12/7/2021  EXAMINATION: XR CHEST 1 VW-  INDICATION: Status post central line placement; R10.9-Unspecified abdominal pain; T85.71XA-Infection and inflammatory reaction due to peritoneal dialysis catheter, initial encounter; A41.9-Sepsis, unspecified organism; K59.9-Functional intestinal disorder, unspecified.   COMPARISON: 11/28/2021.  FINDINGS: Portable chest reveals dialysis catheter identified on the right with tip in the SVC. Nasogastric tube with tip in the stomach. Prominence of the pulmonary vascularity with some increased markings in the perihilar regions bilaterally. Small left pleural effusion.         Impression: Placement of a deep line catheter on the right with tip in the SVC. No pneumothorax. Prominence of the perihilar regions bilaterally with small left pleural effusion.  D:  12/06/2021 E:  12/07/2021      Peripheral Block    Result Date: 12/6/2021  Jean-Pierre Ambrosio CRNA     12/6/2021  5:50 PM Peripheral Block Patient reassessed immediately prior to procedure Patient location during procedure: OR Reason for block: at surgeon's request and post-op pain management Performed by CRNA: Jean-Pierre Ambrosio CRNA Preanesthetic Checklist Completed: patient identified, IV checked, site marked, risks and benefits discussed, surgical consent, monitors and equipment checked, pre-op evaluation and timeout performed Prep: Pt Position: supine Sterile barriers:cap, gloves, sterile barriers and mask Prep: ChloraPrep Patient monitoring: blood pressure monitoring, continuous pulse oximetry and EKG Procedure Sedation: yes Performed under: general Guidance:ultrasound guided Images:still images obtained, printed/placed on chart Laterality:Bilateral Block Type:TAP Injection Technique:single-shot Needle Type:short-bevel and echogenic Needle Gauge:20 G Resistance on Injection: none  Medications Used: dexamethasone sodium phosphate injection, 4 mg bupivacaine PF (MARCAINE) 0.25 % injection, 60 mL Med administered at 12/6/2021 5:50 PM Medications Comment:Block Injection:  LA dose divided between Right and Left block Post Assessment Injection Assessment: negative aspiration for heme, incremental injection and no paresthesia on injection Patient Tolerance:comfortable throughout block Complications:no Additional Notes   Under Ultrasound guidance, a BBraun 4inch 360 degree needle was advanced with Normal Saline hydro dissection of tissue.  The Internal Oblique and Transversus Abdominus muscles where visualized.  At or before the aponeurosis of Internal Oblique, local anesthetic spread was visualized in the Transversus Abdominus Plane. Injection was made incrementally with aspiration every 5 mls.  There was no  intravascular injection,  injection pressure was normal, there was no neural injection, and the procedure was completed without difficulty.  Thank You.     FL C Arm During Surgery    Result Date: 12/7/2021  EXAMINATION: FLUOROSCOPY C-ARM DURING SURGERY-  INDICATION: Tunnelled dialysis catheter placement; R10.9-Unspecified abdominal pain; T85.71XA-Infection and inflammatory reaction due to peritoneal dialysis catheter, initial encounter; A41.9-Sepsis, unspecified organism; K59.9-Functional intestinal disorder, unspecified.  COMPARISON: None.  FINDINGS: Dictation is to record four seconds of fluoroscopy time. Two intraprocedural images obtained show the guidewire and dialysis catheter placement, into the distal SVC or superior right atrium. No pneumothorax is seen on these images. Please see the procedure report for full details.      Impression: Fluoroscopy provided for tunneled dialysis catheter placement.  D:  12/07/2021 E:  12/07/2021   This report was finalized on 12/7/2021 9:53 PM by Dr. Xavier Cantor MD.        Results for orders placed during the hospital encounter of  01/11/21    Transthoracic Echo Complete With Contrast if Necessary Per Protocol    Interpretation Summary  · Left ventricular systolic function is normal. Estimated left ventricular EF = 65%  · Left ventricular wall thickness is consistent with hypertrophy.  · Left atrial volume is mildly increased.  · The aortic valve is calcified. There is mild aortic stenosis present. There is moderate aortic regurgitation present.  · Estimated right ventricular systolic pressure from tricuspid regurgitation is normal (<35 mmHg).      I have reviewed the medications:  Scheduled Meds:acetaminophen, 650 mg, Nasogastric, Q6H  albumin human, , ,   amiodarone, 200 mg, Nasogastric, Q8H   Followed by  [START ON 12/11/2021] amiodarone, 200 mg, Nasogastric, Q12H   Followed by  [START ON 12/25/2021] amiodarone, 200 mg, Nasogastric, Daily  calcitriol, 0.25 mcg, Oral, Once per day on Mon Wed Fri  cinacalcet, 60 mg, Oral, Nightly  epoetin blanca/blanca-epbx, 10,000 Units, Subcutaneous, Once per day on Mon Wed Fri  Fat Emul Fish Oil/Plant Based, 250 mL, Intravenous, Q24H (TPN)  insulin lispro, 2-7 Units, Subcutaneous, Q6H  [START ON 12/9/2021] lansoprazole, 30 mg, Nasogastric, Q AM  metoprolol tartrate, 25 mg, Nasogastric, Q12H  micafungin (MYCAMINE) IV, 100 mg, Intravenous, Q24H  piperacillin-tazobactam, 3.375 g, Intravenous, Q12H  potassium & sodium phosphates, 1 packet, Nasogastric, TID AC  potassium chloride, 40 mEq, Nasogastric, Daily  sodium chloride, 10 mL, Intravenous, Q12H  vancomycin (dosing per levels), , Does not apply, Daily  vancomycin, 10 mg/kg, Intravenous, Once      Continuous Infusions:Adult Peripheral 2-in-1 TPN, , Last Rate: 75 mL/hr at 12/07/21 1753  HYDROmorphone HCl-NaCl,   Pharmacy to Dose TPN,   Pharmacy to dose vancomycin,       PRN Meds:.•  acetaminophen  •  albumin human  •  albuterol  •  benzocaine-menthol  •  diphenhydrAMINE  •  naloxone  •  ondansetron **OR** ondansetron  •  Pharmacy to Dose TPN  •  Pharmacy to dose  vancomycin  •  phenol  •  prochlorperazine  •  Sodium Chloride (PF)  •  sodium chloride  •  temazepam    Assessment/Plan   Assessment & Plan     Active Hospital Problems    Diagnosis  POA   • **Peritonitis (HCC) [K65.9]  Yes   • Hypotension [I95.9]  Yes   • Sepsis associated hypotension (HCC) [A41.9, I95.9]  Yes   • Ileus (HCC) [K56.7]  Yes   • Hyperlipidemia LDL goal <100 [E78.5]  Yes   • Paroxysmal atrial fibrillation (HCC) [I48.0]  Yes   • Type 2 diabetes mellitus (HCC) [E11.9]  Yes   • Chronic kidney disease, stage IV (severe) (HCC) [N18.4]  Yes   • Hyperparathyroidism (HCC) [E21.3]  Yes   • Essential hypertension [I10]  Yes      Resolved Hospital Problems   No resolved problems to display.        Brief Hospital Course to date:  Moni Wallace is a 81 y.o. female here with sepsis and hypotension due to peritonitis. Her stay is complicated by Ileus vs pSBO. Surgery and ID following.    This patient's problems and plans were partially entered by my partner and updated as appropriate by me 12/08/21.     Sepsis  Bacterial Peritonitis  --ID following, continue Vanc, Zosyn, Mycamine   --Cultures remain negative.  --Monitor WBC count       SBO  --S/P Open resection of terminal ileum and cecum with primary ileocolic anastomosis and removal of PD catheter per Dr Andersen 12/6/21  --NG remains in place  --Dilaudid PCA for pain control  --PT following   --RD following for PPN   --Awaiting return of bowel function. Pt has not passed any gas or had BM as of yet     PAF, w/ intermittent RVR  HTN  --Continue to hold Warfain per surgery. If felt to be high risk, could start heparin gtt  Per surgery, will defer to Cardiology.   --S/P 2 units FFP and 10mg IV Vitamin K pre-op   --Continue oral Amio   --Cardiology following     Hypotension  -Currently resolved     ESRD on PD-->now on HD   Hypokalemia  Hypomagnesemia   Hypocalcemia   Hyponatremia   -Nephrology following   -LIDIA with HD  -K, Mg, Ca supplementation per Renal   -Holding  phos binders   -PD catheter removed and Dr Curiel placed tunneled dialysis catheter.   -Pt tolerating HD  -CM to begin search for outpatient HD chair     DVT prophylaxis:  Mechanical DVT prophylaxis orders are present.       AM-PAC 6 Clicks Score (PT): 12 (12/07/21 2000)    Disposition: I expect the patient to be discharged TBD.     CODE STATUS:   Code Status and Medical Interventions:   Ordered at: 11/28/21 1356     Medical Intervention Limits:    NO intubation (DNI)     Level Of Support Discussed With:    Patient     Code Status (Patient has no pulse and is not breathing):    No CPR (Do Not Attempt to Resuscitate)     Medical Interventions (Patient has pulse or is breathing):    Limited Support       Debby Stovall, DO  12/08/21

## 2021-12-08 NOTE — CASE MANAGEMENT/SOCIAL WORK
Continued Stay Note  [facility]     Patient Name: Moni Wallace  MRN: [id number]  Today's Date: 12/8/2021    Admit Date: 11/28/2021     Discharge Plan     Row Name 12/08/21 1334       Plan    Plan discharge plan    Plan Comments CM spoke with pt and pt's son in room regarding HD: facility and chair time.  Pt wants to go to [facility] off [facility] in [place]. CM working on chair time/spot at [facility] and faxed referral to [facility] at [phone]. Pt not medically ready for discharge at this time as she still had NGT. CM will cont to follow    Final Discharge Disposition Code 01 - home or self-care               Discharge Codes    No documentation.               Expected Discharge Date and Time     Expected Discharge Date Expected Discharge Time    Dec 13, 2021             Cyndy Hedrick RN

## 2021-12-08 NOTE — PROGRESS NOTES
"Clinical Nutrition   Reason For Visit: Follow-up protocol, PN    Patient Name: Moni Wallace  YOB: 1940  MRN: [id number]  Date of Encounter: 12/08/21 12:08 EST  Admission date: 11/28/2021      RD recommends continuing current PPN regimen via peripheral IV:  D5%, AA5.5% @ 75 ml/hr. 250 ml 20% SMOF lipid daily.      Nutrition Assessment     Admission Problem List:  Nausea  Abdominal pain  Peritonitis  Sepsis  Ileus r/t peritoneal inflammation  pSBO      Applicable PMH:  T2DM  HTN  PAF  FM  ESRD on CAPD ongoing  C. Diff  hx  Vitamin D deficiency  Anemia  Hyperparathyroidism  S/p right kidney transplant (2010)      Applicable medical tests/procedures since admission:  (11/28) NGT to LWS initiated  (12/01) NGT removed - liquid diet  (12/3) NGT to LWS initiated, NPO  (12/4) SBFT - no distal progression of contrast - concern for obstruction  (12/6) s/p ex lap, open resection terminal ileum and cecum with primary ileocolic anastomosis, removal of peritoneal dialysis catheter;  Tunneled dialysis catheter placed  (12/8) HD initiated       Reported/Observed/Food/Nutrition Related History   12/8) Awaiting return of bowel function. Continue PPN per Dr. Andersen.  Per I/Os within past 24 hours: NGT output = 550 ml      12/5) Per Dr. Andersen (12/5): \"We will tentatively plan for diagnostic laparoscopy with possible exploratory laparotomy tomorrow unless she has rapid improvement.  Peritoneal dialysis will not be an option for several weeks after surgery, we may potentially have to remove her peritoneal dialysis catheter tomorrow depending on intraoperative findings.  Should make arrangements for potential transition to HD\"    Per I/Os within past 24 hours:  NGT output = 2325 ml  Stool output = x1    RD spoke with Dr. Andersen who agrees patient needs PN. Recommends PPN via peripheral line and not TPN/PICC as patient has hx of multiple fistulas and will possibly need HD cath and HD if she has surgery tomorrow. RD spoke " with Dr. Stovall who is also agreeable to PPN. RD spoke with nephrologist who is okay with starting PPN @ 75 ml/hr.        12/3) Pt rpt 3/4 usual intake 1 week PTA. Currently NPO. per I/Os within past 24 hrs NGT output 100 ml  BM 12/1 12/1) Per I/Os within past 24 hours: NGT output = 600 ml; No BM documented this admission.    11/29) Patient reports having poor appetite and poor PO intake starting earlier this year with resulting unintentional weight loss from >200 lbs down to ~160 lbs as of last month. Pt states she was prescribed a medication to improve appetite/PO intake and this has helped - she has been eating much better. She isn't sure if she has had any recent unintentional weight loss. Allergic to rice. Denies difficulty chewing/swallowing. Dislikes and declines ONS drinks. Last solid food intake evening of (11/27).  NGT output = 800 ml within past 24 hours per I/Os.    Anthropometrics   Height: 64 in  Weight: 182 lbs (bed scale 12/8)  BMI: 31.3  BMI classification: Obese Class I: 30-34.9kg/m2   IBW: 120 lbs    UBW: Per EMR  166 lbs (10/11/21) MDOV  200 lbs (7/7/21) bed scale  208 lbs (1/12/21) bed scale    Per RD note (11/29):  Weight change: Unintentional weight loss of 42 lbs 1/12/21-10/11/21. Unclear contribution of fluid status. Need current measured weight to determine weight changes within the past 1.5 months.    Labs reviewed   Labs reviewed: Yes    Results from last 7 days   Lab Units 12/08/21  0527 12/07/21  0550 12/06/21  1929 12/06/21  1245 12/06/21  0539   GLUCOSE mg/dL 163* 286* 294*  --  123*   BUN mg/dL 73* 49* 39*  --  33*   CREATININE mg/dL 7.94* 7.07* 6.59*  --  7.23*   SODIUM mmol/L 125* 129* 131*  --  135*   CHLORIDE mmol/L 83* 88* 89*  --  91*   POTASSIUM mmol/L 3.2* 3.7 4.1   < > 2.9*   PHOSPHORUS mg/dL 2.2* 2.9  --   --  3.6   MAGNESIUM mg/dL 1.7 1.5*  --   --  1.5*   ALT (SGPT) U/L 17 7  --   --  <5    < > = values in this interval not displayed.       Medications reviewed    Medications reviewed: Yes  Scheduled: antibiotic, phos-nak, KCl, sensipar, calcitriol  GTT: dilaudid PCA    Nutrition Focused Physical Exam   12/3)  Unable to perform exam due to fluid accumulation  Note poor tone, likely loss of lean mass could be detected in calf/thigh. Difficult to evaluate upper body ? lost lean mass vs fluid/fat accumulation w lack of tone.     Nutrition Needs Assessment (12/5)   Height used: 64 in  Weight used: 179 lbs/81.5 kg (actual wt);  120 lbs/54.5 kg (IBW)    Estimated calorie needs: ~1600 calories daily  18-20 kcal/kg actual wt = 8402-9902    Estimated protein needs: ~98 g protein daily  1.2 g/kg actual wt = 98  2.0 g/kg IBW = 109      Current Nutrition Prescription   PO: NPO Diet (since 12/6)    PN: D5%, AA5.5% @ 75 ml/hr. 250 ml 20% SMOF lipid daily.  Route: peripheral IV  Nutrition provided at goal rate: 1.8 L, 1202 calories (75% est needs), 99 g protein (101% est needs), GIR = 0.8 mg/kg/min.    Average PO intake: NPO/clear liquids/full liquids since admission  12/3) 75% x 2 meals on Full Liquid, 50% x 5 meals recorded on Clear Liquid    Nutrition Diagnosis     11/29, 12/1, 12/3, 12/5, 12/8  Problem Inadequate oral intake   Etiology N/V in setting of ileus/bowel obstruction   Signs/Symptoms Minimal PO intake x 8 days   Status: ongoing - PPN meeting majority of calorie/protein needs since 12/5    Goal:   General: Nutrition support treatment  PO: Initiate diet as medically appropriate  EN/PN: Maintain PN    Intervention   Intervention: Follow treatment progress, Care plan reviewed, Await begin PO, Nutrition support order placed    -Continue current PPN regimen.    Monitoring/Evaluation:   Monitoring/Evaluation: Per protocol, I&O, PO intake, Pertinent labs, PN delivery/tolerance, Weight, GI status, Symptoms, POC/GOC    Virginia Boykin RD  Time Spent: 45 min

## 2021-12-08 NOTE — PROGRESS NOTES
"Pharmacy Consult - Vancomycin Dosing    Moni Wallace is an 81 y.o. female receiving vancomycin therapy.    Indication: Peritonitis  Consulting Provider: Hospitalist  ID Consult: Yes    Goal Trough: 15-20 mcg/mL    Current Antimicrobial Therapy  Micafungin 12/3-now  Zosyn 12/3-now  Vancomycin 11/28-now    Allergies  Allergies as of 11/28/2021 - Reviewed 11/28/2021   Allergen Reaction Noted    Hydrocodone Itching 05/19/2019    Rice Swelling 07/07/2021    Statins Other (See Comments) 07/21/2020    Codeine Rash 05/07/2018    Sulfa antibiotics Rash 05/07/2018     Labs  Results from last 7 days   Lab Units 12/08/21  0527 12/07/21  0550 12/06/21  1929   BUN mg/dL 73* 49* 39*   CREATININE mg/dL 7.94* 7.07* 6.59*     Results from last 7 days   Lab Units 12/08/21  0527 12/07/21  0550 12/06/21  1928   WBC 10*3/mm3 21.79* 19.20* 23.51*     Evaluation of Dosing    Is Patient on Dialysis or Renal Replacement: Yes - transitioned from PD to HD this admission    Ht - 162.6 cm (64\")  Wt - 82.6 kg (182 lb 3.2 oz)    Estimated Creatinine Clearance: 5.9 mL/min (A) (by C-G formula based on SCr of 7.94 mg/dL (H)).    Intake & Output (last 3 days)         12/05 0701 12/06 0700 12/06 0701 12/07 0700 12/07 0701 12/08 0700 12/08 0701 12/09 0700    P.O.        I.V. (mL/kg)  401.3 (5) 4.2 (0.1)     Blood 675       Other 2120 2000 60     IV Piggyback 800 350 150 200    Total Intake(mL/kg) 3595 (43.1) 2751.3 (34.4) 214.2 (2.6) 200 (2.4)    Urine (mL/kg/hr)  0 (0)      Emesis/NG output 400 350 550     Other 6700   1880    Stool  0      Blood  50      Total Output 7100     Net -3505 +2351.3 -335.8 -1680            Urine Unmeasured Occurrence  1 x      Stool Unmeasured Occurrence  1 x            Microbiology and Radiology  Microbiology Results (last 10 days)       Procedure Component Value - Date/Time    Blood Culture - Blood, Hand, Left [[id number]]  (Normal) Collected: 12/03/21 5258    Lab Status: Preliminary result Specimen: Blood " from Hand, Left Updated: 12/07/21 1515     Blood Culture No growth at 4 days    Narrative:      Aerobic Only    Blood Culture - Blood, Hand, Left [[id number]]  (Normal) Collected: 12/03/21 1230    Lab Status: Preliminary result Specimen: Blood from Hand, Left Updated: 12/07/21 1330     Blood Culture No growth at 4 days    Fungus Culture - Body Fluid, Peritoneum [[id number]] Collected: 12/03/21 1043    Lab Status: Preliminary result Specimen: Body Fluid from Peritoneum Updated: 12/08/21 1046     Fungus Culture No fungus isolated at less than 1 week    Fungus Smear - Body Fluid, Peritoneum [[id number]] Collected: 12/03/21 1042    Lab Status: Final result Specimen: Body Fluid from Peritoneum Updated: 12/06/21 1559     Fungal Stain No fungal elements seen    AFB Culture - Body Fluid, Peritoneum [[id number]] Collected: 12/03/21 1040    Lab Status: Preliminary result Specimen: Body Fluid from Peritoneum Updated: 12/08/21 1046     AFB Culture No AFB isolated at less than 1 week     AFB Stain No acid fast bacilli seen on concentrated smear    Body Fluid Culture - Body Fluid, Peritoneum [[id number]] Collected: 12/02/21 1850    Lab Status: Final result Specimen: Body Fluid from Peritoneum Updated: 12/05/21 0736     Body Fluid Culture No growth at 3 days     Gram Stain Many (4+) WBCs seen      No organisms seen    Urine Culture - Urine, Urine, Clean Catch [[id number]]  (Normal) Collected: 11/28/21 1647    Lab Status: Final result Specimen: Urine, Clean Catch Updated: 11/29/21 2255     Urine Culture No growth    COVID PRE-OP / PRE-PROCEDURE SCREENING ORDER (NO ISOLATION) - Swab, Nasopharynx [[id number]]  (Normal) Collected: 11/28/21 1558    Lab Status: Final result Specimen: Swab from Nasopharynx Updated: 11/28/21 2048    Narrative:      The following orders were created for panel order COVID PRE-OP / PRE-PROCEDURE SCREENING ORDER (NO ISOLATION) - Swab, Nasopharynx.  Procedure                               Abnormality         Status                      ---------                               -----------         ------                     COVID-19, APTIMA PANTHER...[[id number]]  Normal              Final result                 Please view results for these tests on the individual orders.    COVID-19, APTIMA PANTHER [facility] IN-HOUSE NP/OP SWAB IN UTM/VTM/SALINE TRANSPORT MEDIA 24HR TAT - Swab, Nasopharynx [[id number]]  (Normal) Collected: 11/28/21 1558    Lab Status: Final result Specimen: Swab from Nasopharynx Updated: 11/28/21 2048     COVID19 Not Detected    Narrative:      Fact sheet for providers: https://www.fda.gov/media/756007/download     Fact sheet for patients: https://www.fda.gov/media/134144/download    Test performed by RT PCR.    Body Fluid Culture - Body Fluid, Peritoneum [[id number]] Collected: 11/28/21 1214    Lab Status: Final result Specimen: Body Fluid from Peritoneum Updated: 12/01/21 1239     Body Fluid Culture No growth at 3 days     Gram Stain Few (2+) WBCs seen      No organisms seen          Vancomycin Levels:  Results from last 7 days   Lab Units 12/06/21  0539 12/05/21  0649 12/04/21  0601 12/03/21  0429   VANCOMYCIN RM mcg/mL 17.50 17.80 17.10 19.20             Assessment/Plan:  1. Pharmacy dosing vancomycin for peritonitis. Note patient transitioned from PD to HD this admission.  2. Patient received a dose of vancomycin 1250mg via peritoneal dialysis (IP) on 11/30 @ 2200. Plan for further vancomycin doses to be given IV per ID.  3. Vancomycin levels have remained therapeutic since administration of IP vancomycin on 11/30. Patient received her first hemodialysis session today. Will give vancomycin 750mg IV x1 post-dialysis today. Vancomycin level ordered for Friday 12/10 AM prior to next scheduled hemodialysis session.  4. Pharmacy will continue to monitor and adjust vancomycin dose as necessary based on renal function, cultures, labs, and clinical status.     Thank you,  Jensen Ramirez, PharmD, BCPS  12/8/2021  11:36 EST

## 2021-12-08 NOTE — PROGRESS NOTES
Cardiology Progress Note      Reason for visit:    · Atrial fibrillation with RVR in setting of bacterial peritonitis    IDENTIFICATION: 81-year-old female who resides in [place], [place]    Active Hospital Problems    Diagnosis  POA   • **Peritonitis (HCC) [K65.9]  Yes     Priority: High   • Sepsis associated hypotension (HCC) [A41.9, I95.9]  Yes     Priority: High   • Paroxysmal atrial fibrillation (HCC) [I48.0]  Yes     Priority: High     · Diagnosed 2/2015  · CHADS-VASc = 5  · On Coumadin   · Echo (1/12/2021): LVEF 65%.  LVH.  Moderate AI.  · Beakthrough episodes of atrial fibrillation with RVR in setting of bacterial peritonitis 11/30/2021     • Type 2 diabetes mellitus (HCC) [E11.9]  Yes     Priority: High   • Chronic kidney disease, stage IV (severe) (HCC) [N18.4]  Yes     Priority: High     · IgA nephropathy with history of renal transplant, 2010.   · Patient on hemodialysis Monday, Wednesday, and Friday started July 2015.  · Hemodialysis discontinued 2/2017.     • Hypotension [I95.9]  Yes     Priority: Medium   • Ileus (HCC) [K56.7]  Yes     Priority: Medium   • Hyperlipidemia LDL goal <100 [E78.5]  Yes     Priority: Medium     · Reports intolerance to statin     • Hyperparathyroidism (HCC) [E21.3]  Yes     Priority: Medium   • Essential hypertension [I10]  Yes     Priority: Low            Patient currently undergoing hemodialysis.  She looks better today than she did Monday.  She underwent a ileocolic resection for small bowel obstruction on Monday with Dr. Andersen who recommends holding off on restarting warfarin at this time.  The patient denies any chest pain and does think she actually feels better today than she has.  She is maintaining normal sinus rhythm on p.o. amiodarone           Vital Sign Min/Max for last 24 hours  Temp  Min: 97.5 °F (36.4 °C)  Max: 98 °F (36.7 °C)   BP  Min: 104/49  Max: 169/81   Pulse  Min: 69  Max: 92   Resp  Min: 16  Max: 20   SpO2  Min: 94 %  Max: 97 %   Flow (L/min)   Min: 2  Max: 2      Intake/Output Summary (Last 24 hours) at 12/8/2021 1059  Last data filed at 12/8/2021 0808  Gross per 24 hour   Intake 253.9 ml   Output 250 ml   Net 3.9 ml           Physical Exam  Constitutional:       General: She is awake.   Cardiovascular:      Rate and Rhythm: Normal rate and regular rhythm.   Pulmonary:      Effort: Pulmonary effort is normal.      Breath sounds: Decreased breath sounds present.   Skin:     General: Skin is warm and dry.   Neurological:      Mental Status: She is alert and oriented to person, place, and time.   Psychiatric:         Behavior: Behavior is cooperative.         Tele: NSR    Results Review (reviewed the patient's recent labs in the electronic medical record):     EKG (12/6/2021): NSR    ECHO (1/12/2021): LVEF 65%.  LVH.  Mild AS.    Result Review:  I have personally reviewed the results from the time of this admission to 12/8/2021 10:59 EST and agree with these findings:  [x]  Laboratory  []  Microbiology  [x]  Radiology  [x]  EKG/Telemetry   [x]  Cardiology/Vascular   []  Pathology  []  Old records  []  Other:  Most notable findings include: WBC 21.79, hemoglobin 8.1, hematocrit 24.1, BUN 73, creatinine 7.94, sodium 125, potassium 3.2, ALT 17, AST 50    Results from last 7 days   Lab Units 12/08/21  0527 12/07/21  0550 12/06/21  1929 12/06/21  1245 12/06/21  0539 12/05/21  1956 12/05/21  1020 12/05/21  0649 12/05/21  0649 12/04/21  0947 12/03/21  0429 12/03/21  0429   SODIUM mmol/L 125* 129* 131*  --  135*  --   --    < > 136 135*   < > 135*   POTASSIUM mmol/L 3.2* 3.7 4.1   < > 2.9*   < >  --   --  2.9* 3.0*   < > 3.4*   CHLORIDE mmol/L 83* 88* 89*  --  91*  --   --   --  89* 91*  --  93*   BUN mg/dL 73* 49* 39*  --  33*  --   --    < > 35* 36*   < > 39*   CREATININE mg/dL 7.94* 7.07* 6.59*  --  7.23*  --   --    < > 7.02* 6.98*   < > 7.27*   MAGNESIUM mg/dL  --  1.5*  --   --  1.5*  --  1.7   < > 1.7  --    < > 1.6    < > = values in this interval not  displayed.         Results from last 7 days   Lab Units 12/08/21  0527 12/07/21  0550 12/06/21  1928   WBC 10*3/mm3 21.79* 19.20* 23.51*   HEMOGLOBIN g/dL 8.1* 8.2* 8.6*   HEMATOCRIT % 24.1* 24.9* 25.9*   PLATELETS 10*3/mm3 213 222 232       Lab Results   Component Value Date    HGBA1C 4.90 05/19/2019       Lab Results   Component Value Date    CHOL 100 12/05/2021    TRIG 104 12/05/2021    HDL 52 03/04/2019     (H) 03/04/2019              Atrial fibrillation with RVR  · Episode occurred in the setting of bacterial peritonitis/sepsis  · Unable to tolerate p.o. dose of metoprolol and unable to tolerate IV Cardizem drip due to hypotension  · Metoprolol tartrate 25 mg twice daily  · Intermittent episodes of atrial fibrillation with RVR  · Started on IV amiodarone bolus to p.o. taper protocol 12/3/2021 with conversion to NSR  · Maintaining NSR on p.o. amiodarone  · Coumadin on hold due to recent ileocolic resection for small bowel obstruction     Hypotension   · Likely component of sepsis from bacterial peritonitis  · Blood pressures have improved     Spontaneous bacterial peritonitis  · Treatment per ID and hospitalist  · Peritoneal dialysis catheter removed on 12/6/2021  · Transitioning to hemodialysis        End-stage renal failure on peritoneal dialysis  · Nephrology managing     Small bowel ileus  · Treatment per primary team  · Exploratory lap with ileocolic resection with Dr. Andersen on 12/6/2021                   · Continue to hold Coumadin per surgery request due to recent small bowel resection.   · Currently maintaining NSR.  Could consider IV heparin drip if needed  · Continue p.o. amiodarone per taper protocol    Electronically signed by REY Galeana, 12/06/21, 10:46 AM EST.

## 2021-12-08 NOTE — PROGRESS NOTES
"Patient Name:  Moni Wallace  YOB: 1940  [id number]    Surgery Progress Note    Date of visit: 12/8/2021      Subjective: No acute events overnight.  Vital signs have been stable.  NG with 550 mL of output.  No flatus or bowel movement yet.  Pain is controlled.  Denies nausea or vomiting.          Objective:     /58 (BP Location: Right leg, Patient Position: Lying)   Pulse 88   Temp 97.9 °F (36.6 °C) (Oral)   Resp 20   Ht 162.6 cm (64\")   Wt 82.6 kg (182 lb 3.2 oz)   LMP  (LMP Unknown)   SpO2 97%   BMI 31.27 kg/m²     Intake/Output Summary (Last 24 hours) at 12/8/2021 0901  Last data filed at 12/8/2021 0808  Gross per 24 hour   Intake 353.9 ml   Output 250 ml   Net 103.9 ml       GEN:   Awake, alert, in no acute distress, resting comfortably in bed, on hemodialysis, chronically ill-appearing  CV:   Regular rate and rhythm  L:  Clear to auscultation bilaterally, not labored on room air   Abd:  Soft, appropriately tender palpation along incision, incision is clean dry and intact, not obviously distended  Ext:  No cyanosis, clubbing, or edema    Recent labs that are back at this time have been reviewed.           Assessment/ Plan:    Mrs. Wallace is an 81-year-old lady with history of end-stage renal disease on peritoneal dialysis, diabetes, A. fib on Coumadin, and prior C. difficile infection who was admitted due to concern for peritonitis related to peritoneal dialysis     #Small bowel obstruction, peritonitis in the setting of peritoneal dialysis  -Postoperative day 2 after open ileocolic resection for small bowel obstruction  -Vital signs have been stable  -Hemoglobin stable at 8.1.  White blood cell count up slightly at 21 from 19.  -Would recommend to hold on restarting warfarin.  If felt to be very high risk could potentially be started on a heparin drip  -Continue NG tube to low wall suction  -Awaiting return of bowel function.  -Continue antibiotics per ID  -Out of bed and mobilize as " able.    -Continue PCA and scheduled pain meds  -Continue PPN      Robin Andersen MD  12/8/2021  09:01 EST

## 2021-12-08 NOTE — PROGRESS NOTES
"   LOS: 10 days    Patient Care Team:  Alex Walters MD as PCP - General (Internal Medicine)  Jeff Joe IV, MD as Consulting Physician (Cardiology)  Donny Hodges MD as Consulting Physician (Nephrology)  Cory Castillo MD as Surgeon (General Surgery)  Slim Wick MD    ESRD - PD     Subjective     Interval History:   No acute events overnight. Feeling better.     Review of Systems:   No CP or SOA ,N/V, Constipation.         Objective     Vital Sign Min/Max for last 24 hours  Temp  Min: 97.5 °F (36.4 °C)  Max: 98 °F (36.7 °C)   BP  Min: 104/49  Max: 169/81   Pulse  Min: 69  Max: 108   Resp  Min: 16  Max: 20   SpO2  Min: 92 %  Max: 97 %   Flow (L/min)  Min: 2  Max: 2   Weight  Min: 82.6 kg (182 lb 3.2 oz)  Max: 82.6 kg (182 lb 3.2 oz)     Flowsheet Rows      First Filed Value   Admission Height 162.6 cm (64\") Documented at 11/28/2021 0854   Admission Weight 72.6 kg (160 lb) Documented at 11/28/2021 0854          I/O this shift:  In: 200 [IV Piggyback:200]  Out: -   I/O last 3 completed shifts:  In: 215.5 [I.V.:5.5; Other:60; IV Piggyback:150]  Out: 550 [Emesis/NG output:550]    Physical Exam:    Gen: Alert, NAD   HENT: NC, AT, EOMI   NECK: Supple, no JVD, Trachea midline   LUNGS: CTA bilaterally, non labored respirtation   CVS: S1/S2 audible, RRR, no murmur   Abd: Soft, NT, ND, BS+   Ext: No pedal edema, no cyanosis   CNS: Alert, No focal deficit noted grossly  Psy: Cooperative  Skin: Warm, dry and intact      WBC WBC   Date Value Ref Range Status   12/08/2021 21.79 (H) 3.40 - 10.80 10*3/mm3 Final   12/07/2021 19.20 (H) 3.40 - 10.80 10*3/mm3 Final   12/06/2021 23.51 (H) 3.40 - 10.80 10*3/mm3 Final   12/06/2021 14.56 (H) 3.40 - 10.80 10*3/mm3 Final      HGB Hemoglobin   Date Value Ref Range Status   12/08/2021 8.1 (L) 12.0 - 15.9 g/dL Final   12/07/2021 8.2 (L) 12.0 - 15.9 g/dL Final   12/06/2021 8.6 (L) 12.0 - 15.9 g/dL Final   12/06/2021 8.3 (L) 12.0 - 15.9 g/dL Final    "   HCT Hematocrit   Date Value Ref Range Status   12/08/2021 24.1 (L) 34.0 - 46.6 % Final   12/07/2021 24.9 (L) 34.0 - 46.6 % Final   12/06/2021 25.9 (L) 34.0 - 46.6 % Final   12/06/2021 25.7 (L) 34.0 - 46.6 % Final      Platlets No results found for: LABPLAT   MCV MCV   Date Value Ref Range Status   12/08/2021 96.4 79.0 - 97.0 fL Final   12/07/2021 96.9 79.0 - 97.0 fL Final   12/06/2021 96.3 79.0 - 97.0 fL Final   12/06/2021 98.8 (H) 79.0 - 97.0 fL Final          Sodium Sodium   Date Value Ref Range Status   12/08/2021 125 (L) 136 - 145 mmol/L Final   12/07/2021 129 (L) 136 - 145 mmol/L Final   12/06/2021 131 (L) 136 - 145 mmol/L Final   12/06/2021 135 (L) 136 - 145 mmol/L Final      Potassium Potassium   Date Value Ref Range Status   12/08/2021 3.2 (L) 3.5 - 5.2 mmol/L Final   12/07/2021 3.7 3.5 - 5.2 mmol/L Final     Comment:     Slight hemolysis detected by analyzer. Results may be affected.   12/06/2021 4.1 3.5 - 5.2 mmol/L Final     Comment:     Slight hemolysis detected by analyzer. Results may be affected.   12/06/2021 3.5 3.5 - 5.2 mmol/L Final   12/06/2021 2.9 (L) 3.5 - 5.2 mmol/L Final   12/05/2021 3.1 (L) 3.5 - 5.2 mmol/L Final     Comment:     Slight hemolysis detected by analyzer. Results may be affected.      Chloride Chloride   Date Value Ref Range Status   12/08/2021 83 (L) 98 - 107 mmol/L Final   12/07/2021 88 (L) 98 - 107 mmol/L Final   12/06/2021 89 (L) 98 - 107 mmol/L Final   12/06/2021 91 (L) 98 - 107 mmol/L Final      CO2 CO2   Date Value Ref Range Status   12/08/2021 21.0 (L) 22.0 - 29.0 mmol/L Final   12/07/2021 22.0 22.0 - 29.0 mmol/L Final   12/06/2021 19.0 (L) 22.0 - 29.0 mmol/L Final   12/06/2021 26.0 22.0 - 29.0 mmol/L Final      BUN BUN   Date Value Ref Range Status   12/08/2021 73 (H) 8 - 23 mg/dL Final   12/07/2021 49 (H) 8 - 23 mg/dL Final   12/06/2021 39 (H) 8 - 23 mg/dL Final   12/06/2021 33 (H) 8 - 23 mg/dL Final      Creatinine Creatinine   Date Value Ref Range Status    12/08/2021 7.94 (H) 0.57 - 1.00 mg/dL Final   12/07/2021 7.07 (H) 0.57 - 1.00 mg/dL Final   12/06/2021 6.59 (H) 0.57 - 1.00 mg/dL Final   12/06/2021 7.23 (H) 0.57 - 1.00 mg/dL Final      Calcium Calcium   Date Value Ref Range Status   12/08/2021 8.3 (L) 8.6 - 10.5 mg/dL Final   12/07/2021 8.4 (L) 8.6 - 10.5 mg/dL Final   12/06/2021 8.1 (L) 8.6 - 10.5 mg/dL Final   12/06/2021 8.1 (L) 8.6 - 10.5 mg/dL Final      PO4 No results found for: CAPO4   Albumin Albumin   Date Value Ref Range Status   12/08/2021 3.40 (L) 3.50 - 5.20 g/dL Final   12/07/2021 3.70 3.50 - 5.20 g/dL Final   12/06/2021 4.20 3.50 - 5.20 g/dL Final      Magnesium Magnesium   Date Value Ref Range Status   12/07/2021 1.5 (L) 1.6 - 2.4 mg/dL Final   12/06/2021 1.5 (L) 1.6 - 2.4 mg/dL Final   12/05/2021 1.7 1.6 - 2.4 mg/dL Final      Uric Acid No results found for: URICACID        Results Review:     I reviewed the patient's new clinical results.    acetaminophen, 650 mg, Oral, Q6H  albumin human, , ,   amiodarone, 200 mg, Nasogastric, Q8H   Followed by  [START ON 12/11/2021] amiodarone, 200 mg, Nasogastric, Q12H   Followed by  [START ON 12/25/2021] amiodarone, 200 mg, Nasogastric, Daily  calcitriol, 0.25 mcg, Oral, Once per day on Mon Wed Fri  calcium acetate, 667 mg, Oral, TID With Meals  cinacalcet, 60 mg, Oral, Nightly  epoetin blanca/blanca-epbx, 10,000 Units, Subcutaneous, Once per day on Mon Wed Fri  Fat Emul Fish Oil/Plant Based, 250 mL, Intravenous, Q24H (TPN)  insulin lispro, 2-7 Units, Subcutaneous, Q6H  metoprolol tartrate, 12.5 mg, Oral, Once  metoprolol tartrate, 25 mg, Nasogastric, Q12H  micafungin (MYCAMINE) IV, 100 mg, Intravenous, Q24H  midodrine, 5 mg, Oral, Once  pantoprazole, 40 mg, Oral, Q AM  piperacillin-tazobactam, 3.375 g, Intravenous, Q12H  potassium chloride, 40 mEq, Nasogastric, Daily  sevelamer, 1,600 mg, Nasogastric, TID With Meals  sodium chloride, 10 mL, Intravenous, Q12H  vancomycin (dosing per levels), , Does not apply,  Daily      Adult Peripheral 2-in-1 TPN, , Last Rate: 75 mL/hr at 12/07/21 1753  amiodarone, 1 mg/min, Last Rate: Stopped (12/07/21 1750)  HYDROmorphone HCl-NaCl,   Pharmacy to Dose TPN,   Pharmacy to dose vancomycin,   Pharmacy to dose warfarin,         Medication Review:    Assessment/Plan       Peritonitis (HCC)    Paroxysmal atrial fibrillation (HCC)    Essential hypertension    Type 2 diabetes mellitus (HCC)    Chronic kidney disease, stage IV (severe) (HCC)    Hyperparathyroidism (HCC)    Hyperlipidemia LDL goal <100    Sepsis associated hypotension (HCC)    Ileus (HCC)    Hypotension      1- ESRD - On PD - transitioning to HD. S/p laproscopy -PD catheter out  2- Peritonitis ?- culture so far negative.   3- Anemia   4- Supra-therapeutic INR   5- Low albumin level   6- Corrected Calcium for albumin - 7.56 - monitor   7- Secondary hyperparathyroidism on Sensipar.   8- Hypophosphatemia      Plan:  - Patient seen on dialysis. UF as tolerated.   - LIDIA with HD   - Antibx per ID   - Will hold phos binders. Replete phosphorus.     CM to start working for outpatient hemodialysis chair.       Albina Will MD  12/08/21  08:46 EST

## 2021-12-09 NOTE — PROGRESS NOTES
"   LOS: 11 days    Patient Care Team:  Alex Walters MD as PCP - General (Internal Medicine)  Jeff Joe IV, MD as Consulting Physician (Cardiology)  Donny Hodges MD as Consulting Physician (Nephrology)  Cory Castillo MD as Surgeon (General Surgery)  Slim Wick MD    ESRD - PD     Subjective     Interval History:   No acute events overnight. Feeling better.     Review of Systems:   No CP or SOA ,N/V, Constipation.         Objective     Vital Sign Min/Max for last 24 hours  Temp  Min: 97.6 °F (36.4 °C)  Max: 98.2 °F (36.8 °C)   BP  Min: 142/67  Max: 166/71   Pulse  Min: 80  Max: 120   Resp  Min: 18  Max: 20   SpO2  Min: 92 %  Max: 98 %   Flow (L/min)  Min: 2  Max: 2   Weight  Min: 83.5 kg (184 lb)  Max: 83.5 kg (184 lb)     Flowsheet Rows      First Filed Value   Admission Height 162.6 cm (64\") Documented at 11/28/2021 0854   Admission Weight 72.6 kg (160 lb) Documented at 11/28/2021 0854          No intake/output data recorded.  I/O last 3 completed shifts:  In: 5670.5 [I.V.:4.5; Other:360; NG/GT:285; IV Piggyback:250]  Out: 2585 [Emesis/NG output:705; Other:1880]    Physical Exam:    Gen: Alert, NAD   HENT: NC, AT, EOMI   NECK: Supple, no JVD, Trachea midline   LUNGS: CTA bilaterally, non labored respirtation   CVS: S1/S2 audible, RRR, no murmur   Abd: Soft, NT, ND, BS+   Ext: No pedal edema, no cyanosis   CNS: Alert, No focal deficit noted grossly  Psy: Cooperative  Skin: Warm, dry and intact      WBC WBC   Date Value Ref Range Status   12/09/2021 19.35 (H) 3.40 - 10.80 10*3/mm3 Final   12/08/2021 21.79 (H) 3.40 - 10.80 10*3/mm3 Final   12/07/2021 19.20 (H) 3.40 - 10.80 10*3/mm3 Final   12/06/2021 23.51 (H) 3.40 - 10.80 10*3/mm3 Final      HGB Hemoglobin   Date Value Ref Range Status   12/09/2021 7.3 (L) 12.0 - 15.9 g/dL Final   12/08/2021 8.1 (L) 12.0 - 15.9 g/dL Final   12/07/2021 8.2 (L) 12.0 - 15.9 g/dL Final   12/06/2021 8.6 (L) 12.0 - 15.9 g/dL Final      HCT " Hematocrit   Date Value Ref Range Status   12/09/2021 22.8 (L) 34.0 - 46.6 % Final   12/08/2021 24.1 (L) 34.0 - 46.6 % Final   12/07/2021 24.9 (L) 34.0 - 46.6 % Final   12/06/2021 25.9 (L) 34.0 - 46.6 % Final      Platlets No results found for: LABPLAT   MCV MCV   Date Value Ref Range Status   12/09/2021 99.6 (H) 79.0 - 97.0 fL Final   12/08/2021 96.4 79.0 - 97.0 fL Final   12/07/2021 96.9 79.0 - 97.0 fL Final   12/06/2021 96.3 79.0 - 97.0 fL Final          Sodium Sodium   Date Value Ref Range Status   12/09/2021 127 (L) 136 - 145 mmol/L Final   12/08/2021 125 (L) 136 - 145 mmol/L Final   12/07/2021 129 (L) 136 - 145 mmol/L Final   12/06/2021 131 (L) 136 - 145 mmol/L Final      Potassium Potassium   Date Value Ref Range Status   12/09/2021 3.2 (L) 3.5 - 5.2 mmol/L Final   12/08/2021 3.2 (L) 3.5 - 5.2 mmol/L Final   12/07/2021 3.7 3.5 - 5.2 mmol/L Final     Comment:     Slight hemolysis detected by analyzer. Results may be affected.   12/06/2021 4.1 3.5 - 5.2 mmol/L Final     Comment:     Slight hemolysis detected by analyzer. Results may be affected.   12/06/2021 3.5 3.5 - 5.2 mmol/L Final      Chloride Chloride   Date Value Ref Range Status   12/09/2021 89 (L) 98 - 107 mmol/L Final   12/08/2021 83 (L) 98 - 107 mmol/L Final   12/07/2021 88 (L) 98 - 107 mmol/L Final   12/06/2021 89 (L) 98 - 107 mmol/L Final      CO2 CO2   Date Value Ref Range Status   12/09/2021 20.0 (L) 22.0 - 29.0 mmol/L Final   12/08/2021 21.0 (L) 22.0 - 29.0 mmol/L Final   12/07/2021 22.0 22.0 - 29.0 mmol/L Final   12/06/2021 19.0 (L) 22.0 - 29.0 mmol/L Final      BUN BUN   Date Value Ref Range Status   12/09/2021 52 (H) 8 - 23 mg/dL Final   12/08/2021 73 (H) 8 - 23 mg/dL Final   12/07/2021 49 (H) 8 - 23 mg/dL Final   12/06/2021 39 (H) 8 - 23 mg/dL Final      Creatinine Creatinine   Date Value Ref Range Status   12/09/2021 5.02 (H) 0.57 - 1.00 mg/dL Final   12/08/2021 7.94 (H) 0.57 - 1.00 mg/dL Final   12/07/2021 7.07 (H) 0.57 - 1.00 mg/dL Final    12/06/2021 6.59 (H) 0.57 - 1.00 mg/dL Final      Calcium Calcium   Date Value Ref Range Status   12/09/2021 8.7 8.6 - 10.5 mg/dL Final   12/08/2021 8.3 (L) 8.6 - 10.5 mg/dL Final   12/07/2021 8.4 (L) 8.6 - 10.5 mg/dL Final   12/06/2021 8.1 (L) 8.6 - 10.5 mg/dL Final      PO4 No results found for: CAPO4   Albumin Albumin   Date Value Ref Range Status   12/08/2021 3.40 (L) 3.50 - 5.20 g/dL Final   12/07/2021 3.70 3.50 - 5.20 g/dL Final      Magnesium Magnesium   Date Value Ref Range Status   12/09/2021 1.7 1.6 - 2.4 mg/dL Final   12/08/2021 1.7 1.6 - 2.4 mg/dL Final   12/07/2021 1.5 (L) 1.6 - 2.4 mg/dL Final      Uric Acid No results found for: URICACID        Results Review:     I reviewed the patient's new clinical results.    acetaminophen, 650 mg, Nasogastric, Q6H  albumin human, , ,   amiodarone, 200 mg, Nasogastric, Q8H   Followed by  [START ON 12/11/2021] amiodarone, 200 mg, Nasogastric, Q12H   Followed by  [START ON 12/25/2021] amiodarone, 200 mg, Nasogastric, Daily  calcitriol, 0.25 mcg, Oral, Once per day on Mon Wed Fri  cinacalcet, 60 mg, Oral, Nightly  epoetin blanca/blanca-epbx, 10,000 Units, Subcutaneous, Once per day on Mon Wed Fri  Fat Emul Fish Oil/Plant Based, 250 mL, Intravenous, Q24H (TPN)  insulin lispro, 2-7 Units, Subcutaneous, Q6H  lansoprazole, 30 mg, Nasogastric, Q AM  metoprolol tartrate, 25 mg, Nasogastric, Q12H  micafungin (MYCAMINE) IV, 100 mg, Intravenous, Q24H  piperacillin-tazobactam, 3.375 g, Intravenous, Q12H  potassium & sodium phosphates, 1 packet, Nasogastric, TID AC  potassium phosphate, 15 mmol, Intravenous, Once  sodium chloride, 10 mL, Intravenous, Q12H  vancomycin (dosing per levels), , Does not apply, Daily      Adult Peripheral 2-in-1 TPN, , Last Rate: 75 mL/hr at 12/08/21 1820  HYDROmorphone HCl-NaCl,   Pharmacy to Dose TPN,   Pharmacy to dose vancomycin,         Medication Review:    Assessment/Plan       Peritonitis (HCC)    Paroxysmal atrial fibrillation (HCC)     Essential hypertension    Type 2 diabetes mellitus (HCC)    Chronic kidney disease, stage IV (severe) (HCC)    Hyperparathyroidism (HCC)    Hyperlipidemia LDL goal <100    Sepsis associated hypotension (HCC)    Ileus (HCC)    Hypotension      1- ESRD - On PD - transitioning to HD. S/p laproscopy -PD catheter out  2- Peritonitis ?- culture so far negative.   3- Anemia   4- Supra-therapeutic INR   5- Low albumin level   6- Corrected Calcium for albumin - 7.56 - monitor   7- Secondary hyperparathyroidism on Sensipar.   8- Hypophosphatemia      Plan:  - Patient seen on dialysis, UF as tolerated. .   - LIIDA with HD   - Antibx per ID   - Replete phosphorus   - Electrolyte will be corrected with HD  - Transfuse unit of blood     CM to start working for outpatient hemodialysis chair.       Albina Will MD  12/09/21  09:09 EST

## 2021-12-09 NOTE — OP NOTE
OPERATIVE NOTE    Patient Name:  Moin Wallace  YOB: 1940  [id number]    12/9/2021      PREOPERATIVE DIAGNOSIS: need for IV access      POSTOPERATIVE DIAGNOSIS: Same       PROCEDURE PERFORMED:  Left IJ Triple-lumen central line placement under Ultrasound guidance       SURGEON: Robin Andersen MD        SPECIMENS: None       ANESTHESIA: Local       FINDINGS:   1.  7 Portuguese triple-lumen catheter placed in the left internal jugular vein to a depth of 20 cm       INDICATIONS: The patient is a 81 y.o. female who presented with need for IV access . They are in need of central venous access. The risks and benefits of central line placement were discussed at length with the patient and their family, and they agree to proceed.       DESCRIPTION OF PROCEDURE:      After obtaining informed consent, the patient remained in their room and was placed in the Trendelenburg position.  The neck and chest were prepped and draped in the usual sterile fashion.  Ultrasound examination of the proposed access site demonstrated a patent and compressible left internal jugular vein. The access site was infiltrated with local anesthetic. An 18-gauge needle was then advanced without difficulty into the vein under ultrasound guidance, with excellent blood draw.  A guidewire was placed through the needle to the level of the cavoatrial junction, there was initially some resistance however following this the wire advanced without difficulty.  The needle was removed over the guidewire and the tract was dilated.  Using the Seldinger technique a 7 Portuguese triple lumen catheter was placed to a depth of  cm. The guidewire was removed and Edgar testing confirmed venous cannulation.  All 3 ports юлия blood easily and were flushed with normal saline.  The catheter was sutured to the skin at a depth of 20 cm, dressed in standard sterile fashion and covered with a dry sterile dressing.  There were no immediate complications. The patient  tolerated the procedure without difficulty.     All needle, instrument, and sponge counts were correct times two at the completion of the procedure.  A postprocedure chest x-ray is pending at the time of this dictation.        Robin Andersen MD  12/9/2021  13:26 EST    Post procedure showed new Left IJ line in appropriate position without pneumothorax. Line was somewhat deep with deep in the right atrium. Line was retracted at the bedside and re-secured. Following this another CXR demonstrates the line in appropriate position    Electronically signed by Robin Andersen MD, 12/10/21, 6:23 AM EST.

## 2021-12-09 NOTE — PROGRESS NOTES
[facility] Medicine Services  PROGRESS NOTE    Patient Name: Moni Wallace  : 1940  MRN: [id number]    Date of Admission: 2021  Primary Care Physician: Alex Walters MD    Subjective   Subjective     CC:  F/U SBO    HPI:  Npo. Pain reasonably controlled. No bowel function. Some palpitations w/ afib rvr    ROS:  Gen- No fevers, chills  CV- No chest pain, palpitations  Resp- No cough, dyspnea  GI- No N/V/D, abd pain  MSK-+LBP (chronic)     Objective   Objective     Vital Signs:   Temp:  [97.5 °F (36.4 °C)-98.2 °F (36.8 °C)] 97.5 °F (36.4 °C)  Heart Rate:  [] 121  Resp:  [18-20] 18  BP: (125-164)/(62-71) 125/66     Physical Exam:  Constitutional: No acute distress, awake, alert, in dialysis, no distress  HENT: NCAT, mucous membranes moist, NG in place   Respiratory: Clear to auscultation bilaterally, respiratory effort normal   Cardiovascular: tachycardic, irrir, no murmurs, rubs, or gallops  Gastrointestinal: Positive bowel sounds, soft, nontender, nondistended  Musculoskeletal: No bilateral ankle edema  Psychiatric: Appropriate affect, cooperative  Neurologic: Oriented x 3,  speech clear  Skin: No rashes, tunneled dialysis catheter in place R chest, abdomeinal incision sites c/d/i     Results Reviewed:  LAB RESULTS:      Lab 21  0409 21  0527 21  0550 21  1929 21  1928 21  0539 21  1643 21  0649 21  0420 21  0601 21  0429 21  1233 21  1233   WBC 19.35* 21.79* 19.20*  --  23.51* 14.56*  --   --    < >   < > 16.80*   < > 18.56*   HEMOGLOBIN 7.3* 8.1* 8.2*  --  8.6* 8.3*  --   --    < >   < > 10.6*   < > 10.1*   HEMATOCRIT 22.8* 24.1* 24.9*  --  25.9* 25.7*  --   --    < >   < > 33.5*   < > 31.4*   PLATELETS 177 213 222  --  232 197  --   --    < >   < > 267   < > 283   NEUTROS ABS 16.06*  --  16.37*  --  20.68*  --   --   --   --   --  14.37*  --  15.88*   IMMATURE GRANS (ABS)  --   --  1.03*   --  1.25*  --   --   --   --   --  0.20*  --  0.21*   LYMPHS ABS  --   --  1.01  --  0.81  --   --   --   --   --  1.39  --  1.63   MONOS ABS  --   --  0.74  --  0.66  --   --   --   --   --  0.66  --  0.67   EOS ABS 0.19  --  0.00  --  0.03  --   --   --   --   --  0.13  --  0.13   MCV 99.6* 96.4 96.9  --  96.3 98.8*  --   --    < >   < > 99.1*   < > 100.0*   CRP  --   --   --   --   --   --   --  23.82*  --   --   --   --   --    PROTIME  --  14.0 15.2* 15.6*  --  17.7* 22.7*  --    < >   < > 35.4*  --   --     < > = values in this interval not displayed.         Lab 12/09/21  0409 12/08/21  0527 12/07/21  0550 12/06/21  1929 12/06/21  1245 12/06/21  0539 12/06/21  0539 12/05/21  1956 12/05/21  1643 12/05/21  1020   0000   SODIUM 127* 125* 129* 131*  --   --  135*  --   --   --    < >   POTASSIUM 3.2* 3.2* 3.7 4.1 3.5   < > 2.9*   < >  --   --   --    CHLORIDE 89* 83* 88* 89*  --   --  91*  --   --   --    < >   CO2 20.0* 21.0* 22.0 19.0*  --   --  26.0  --   --   --    < >   ANION GAP 18.0* 21.0* 19.0* 23.0*  --   --  18.0*  --   --   --    < >   BUN 52* 73* 49* 39*  --   --  33*  --   --   --    < >   CREATININE 5.02* 7.94* 7.07* 6.59*  --   --  7.23*  --   --   --    < >   GLUCOSE 165* 163* 286* 294*  --   --  123*  --   --   --    < >   CALCIUM 8.7 8.3* 8.4* 8.1*  --   --  8.1*  --   --   --    < >   IONIZED CALCIUM  --   --  1.10*  --   --   --  1.06*  --  1.06*  --   --    MAGNESIUM 1.7 1.7 1.5*  --   --   --  1.5*  --   --  1.7  --    PHOSPHORUS 1.1* 2.2* 2.9  --   --   --  3.6  --   --  5.5*  --     < > = values in this interval not displayed.         Lab 12/08/21 0527 12/07/21  0550 12/06/21  0539 12/05/21  0649 12/04/21  0947   TOTAL PROTEIN 5.4* 6.7 6.7 6.8 5.6*   ALBUMIN 3.40* 3.70 4.20 3.80 3.30*   GLOBULIN 2.0 3.0 2.5 3.0 2.3   ALT (SGPT) 17 7 <5 <5 <5   AST (SGOT) 50* 25 20 13 13   BILIRUBIN 0.7 0.5 0.5 0.3 0.2   ALK PHOS 201* 136* 117 109 108         Lab 12/08/21  0527 12/07/21  0550  12/06/21  1929 12/06/21  0539 12/05/21  1643   PROTIME 14.0 15.2* 15.6* 17.7* 22.7*   INR 1.11 1.24* 1.29* 1.51* 2.10*         Lab 12/05/21  0649   CHOLESTEROL 100   TRIGLYCERIDES 104         Lab 12/09/21  0409 12/05/21  1020   IRON 45  --    IRON SATURATION 35  --    TIBC 130*  --    TRANSFERRIN 87*  --    ABO TYPING  --  O   RH TYPING  --  Negative   ANTIBODY SCREEN  --  Negative         Brief Urine Lab Results  (Last result in the past 365 days)      Color   Clarity   Blood   Leuk Est   Nitrite   Protein   CREAT   Urine HCG        11/28/21 1647 Yellow   Turbid   Moderate (2+)   Large (3+)   Negative   >=300 mg/dL (3+)                 Microbiology Results Abnormal     Procedure Component Value - Date/Time    Blood Culture - Blood, Hand, Left [[id number]]  (Normal) Collected: 12/03/21 1318    Lab Status: Final result Specimen: Blood from Hand, Left Updated: 12/08/21 1515     Blood Culture No growth at 5 days    Narrative:      Aerobic Only    Blood Culture - Blood, Hand, Left [[id number]]  (Normal) Collected: 12/03/21 1230    Lab Status: Final result Specimen: Blood from Hand, Left Updated: 12/08/21 1330     Blood Culture No growth at 5 days    AFB Culture - Body Fluid, Peritoneum [[id number]] Collected: 12/03/21 1040    Lab Status: Preliminary result Specimen: Body Fluid from Peritoneum Updated: 12/08/21 1046     AFB Culture No AFB isolated at less than 1 week     AFB Stain No acid fast bacilli seen on concentrated smear    Fungus Culture - Body Fluid, Peritoneum [[id number]] Collected: 12/03/21 1043    Lab Status: Preliminary result Specimen: Body Fluid from Peritoneum Updated: 12/08/21 1046     Fungus Culture No fungus isolated at less than 1 week    Fungus Smear - Body Fluid, Peritoneum [[id number]] Collected: 12/03/21 1042    Lab Status: Final result Specimen: Body Fluid from Peritoneum Updated: 12/06/21 1559     Fungal Stain No fungal elements seen    Body Fluid Culture - Body Fluid, Peritoneum [[id number]]  Collected: 12/02/21 1850    Lab Status: Final result Specimen: Body Fluid from Peritoneum Updated: 12/05/21 0736     Body Fluid Culture No growth at 3 days     Gram Stain Many (4+) WBCs seen      No organisms seen    Blood Culture - Blood, Wrist, Left [[id number]]  (Normal) Collected: 11/28/21 1023    Lab Status: Final result Specimen: Blood from Wrist, Left Updated: 12/03/21 1046     Blood Culture No growth at 5 days    Blood Culture - Blood, Wrist, Left [[id number]]  (Normal) Collected: 11/28/21 1023    Lab Status: Final result Specimen: Blood from Wrist, Left Updated: 12/03/21 1046     Blood Culture No growth at 5 days    Body Fluid Culture - Body Fluid, Peritoneum [[id number]] Collected: 11/28/21 1214    Lab Status: Final result Specimen: Body Fluid from Peritoneum Updated: 12/01/21 1239     Body Fluid Culture No growth at 3 days     Gram Stain Few (2+) WBCs seen      No organisms seen    Urine Culture - Urine, Urine, Clean Catch [[id number]]  (Normal) Collected: 11/28/21 1647    Lab Status: Final result Specimen: Urine, Clean Catch Updated: 11/29/21 2255     Urine Culture No growth    COVID PRE-OP / PRE-PROCEDURE SCREENING ORDER (NO ISOLATION) - Swab, Nasopharynx [[id number]]  (Normal) Collected: 11/28/21 1558    Lab Status: Final result Specimen: Swab from Nasopharynx Updated: 11/28/21 2048    Narrative:      The following orders were created for panel order COVID PRE-OP / PRE-PROCEDURE SCREENING ORDER (NO ISOLATION) - Swab, Nasopharynx.  Procedure                               Abnormality         Status                     ---------                               -----------         ------                     COVID-19, APTIMA PANTHER...[[id number]]  Normal              Final result                 Please view results for these tests on the individual orders.    COVID-19, APTIMA PANTHER [facility] IN-HOUSE NP/OP SWAB IN UTM/VTM/[facility]AL[facility] TRANSPORT MEDIA 24HR TAT - Swab, Nasopharynx [[id number]]  (Normal) Collected: 11/28/21  1558    Lab Status: Final result Specimen: Swab from Nasopharynx Updated: 11/28/21 2048     COVID19 Not Detected    Narrative:      Fact sheet for providers: https://www.fda.gov/media/834721/download     Fact sheet for patients: https://www.fda.gov/media/092703/download    Test performed by RT PCR.          No radiology results from the last 24 hrs    Results for orders placed during the hospital encounter of 01/11/21    Transthoracic Echo Complete With Contrast if Necessary Per Protocol    Interpretation Summary  · Left ventricular systolic function is normal. Estimated left ventricular EF = 65%  · Left ventricular wall thickness is consistent with hypertrophy.  · Left atrial volume is mildly increased.  · The aortic valve is calcified. There is mild aortic stenosis present. There is moderate aortic regurgitation present.  · Estimated right ventricular systolic pressure from tricuspid regurgitation is normal (<35 mmHg).      I have reviewed the medications:  Scheduled Meds:acetaminophen, 650 mg, Nasogastric, Q6H  albumin human, , ,   albumin human, , ,   amiodarone, 200 mg, Nasogastric, Q8H   Followed by  [START ON 12/11/2021] amiodarone, 200 mg, Nasogastric, Q12H   Followed by  [START ON 12/25/2021] amiodarone, 200 mg, Nasogastric, Daily  calcitriol, 0.25 mcg, Oral, Once per day on Mon Wed Fri  cinacalcet, 60 mg, Oral, Nightly  epoetin blanca/blanca-epbx, 10,000 Units, Subcutaneous, Once per day on Mon Wed Fri  Fat Emul Fish Oil/Plant Based, 250 mL, Intravenous, Q24H (TPN)  insulin lispro, 2-7 Units, Subcutaneous, Q6H  lansoprazole, 30 mg, Nasogastric, Q AM  metoprolol tartrate, 25 mg, Nasogastric, Q12H  micafungin (MYCAMINE) IV, 100 mg, Intravenous, Q24H  piperacillin-tazobactam, 3.375 g, Intravenous, Q12H  potassium & sodium phosphates, 1 packet, Nasogastric, TID AC  potassium phosphate, 15 mmol, Intravenous, Once  sodium chloride, 10 mL, Intravenous, Q12H  vancomycin (dosing per levels), , Does not apply,  Daily      Continuous Infusions:Adult Peripheral 2-in-1 TPN, , Last Rate: 75 mL/hr at 12/08/21 1820  HYDROmorphone HCl-NaCl,   Pharmacy to Dose TPN,   Pharmacy to dose vancomycin,       PRN Meds:.•  acetaminophen  •  albuterol  •  benzocaine-menthol  •  diphenhydrAMINE  •  naloxone  •  ondansetron **OR** ondansetron  •  Pharmacy to Dose TPN  •  Pharmacy to dose vancomycin  •  phenol  •  potassium & sodium phosphates  •  prochlorperazine  •  Sodium Chloride (PF)  •  sodium chloride  •  temazepam    Assessment/Plan   Assessment & Plan     Active Hospital Problems    Diagnosis  POA   • **Peritonitis (HCC) [K65.9]  Yes   • Hypotension [I95.9]  Yes   • Sepsis associated hypotension (HCC) [A41.9, I95.9]  Yes   • Ileus (HCC) [K56.7]  Yes   • Hyperlipidemia LDL goal <100 [E78.5]  Yes   • Paroxysmal atrial fibrillation (HCC) [I48.0]  Yes   • Type 2 diabetes mellitus (HCC) [E11.9]  Yes   • Chronic kidney disease, stage IV (severe) (HCC) [N18.4]  Yes   • Hyperparathyroidism (HCC) [E21.3]  Yes   • Essential hypertension [I10]  Yes      Resolved Hospital Problems   No resolved problems to display.        Brief Hospital Course to date:  Moni Wallace is a 81 y.o. female here with sepsis and hypotension due to peritonitis. Her stay is complicated by Ileus vs pSBO. Surgery and ID following.    This patient's problems and plans were partially entered by my partner and updated as appropriate by me 12/09/21. Today is my first day evaluating this patient's active medical problems. I Personally reviewed chart and adjusted note to reflect daily changes in management/clinical condition       Sepsis  Bacterial Peritonitis  --ID following, continue Vanc, Zosyn, Mycamine   --Cultures remain negative.  --Monitor WBC count       SBO  --S/P Open resection of terminal ileum and cecum with primary ileocolic anastomosis and removal of PD catheter per Dr Andersen 12/6/21  --NG remains in place to LWS; Dilaudid PCA for pain control; PPN (continue npo);  postop care per Dr. Andersen    Post-op anemia  -hgb 7.3 12/9/21; getting 1 unit prbc w/ dialysis 12/9/21     PAF, w/ intermittent RVR  HTN  --Continue to hold Warfain per surgery. If felt to be high risk, could start heparin gtt  Per surgery, will defer to Cardiology.   --S/P 2 units FFP and 10mg IV Vitamin K pre-op   --Cards managing rate control, currently heart rate ~130-140 in dialysis. Cards note mentions amiodarone drip, have asked dialysis nurse to contact cardiology to clarify      Hypotension  -Currently resolved     ESRD on PD-->now on HD via tunneled cath right ij  Hypokalemia  Hypomagnesemia   Hypocalcemia   Hyponatremia   -PD catheter removed and Dr Curiel placed right sided tunneled dialysis catheter.  -Nephrology following & managing; dialysis 12/9/21 (w/ blood)  -LIDIA with HD  -K, Mg, Ca supplementation per Renal   -Holding phos binders    -CM to begin search for outpatient HD chair     Poor iv access  -per discussion w/ nurse on 12/9/21, has 3 peripheral iv's, unable to get any further peripherals; getting ppn, iv antibiotics, dilaudid pca, and now w/ probable iv amiodarone.  -I spoke w/ Dr. Andersen earlier (12/9/21) he is in OR today, could put in left ij central line later this afternoon-early evening, asked me to contact IR Dr. Riddle. Dr. Riddle in a procedure for 3-6 hours starting at 1pm so not sure he will be able to put in the line, but he requested I put the order in just in case can get to it. Nurse instructed to call Dr. Andersen by 4pm if no line in place    Am labs: cbc,bmp    Addendum:  -called by Dr. 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 I will consult med-onc. Appears will be poor overall candidate for systemic therapy, given age, comorbidities and ongoing infection, but would be helpful to follow for information/prognosis, etc.     DVT prophylaxis:  Mechanical DVT prophylaxis orders are present.       AM-PAC 6 Clicks  Score (PT): 14 (12/09/21 0800)    Disposition: I expect the patient to be discharged TBD.     CODE STATUS:   Code Status and Medical Interventions:   Ordered at: 11/28/21 1356     Medical Intervention Limits:    NO intubation (DNI)     Level Of Support Discussed With:    Patient     Code Status (Patient has no pulse and is not breathing):    No CPR (Do Not Attempt to Resuscitate)     Medical Interventions (Patient has pulse or is breathing):    Limited Support       Luis Carlos Koenig MD  12/09/21

## 2021-12-09 NOTE — PROGRESS NOTES
"Pharmacy Consult - Vancomycin Dosing    Moni Wallace is an 81 y.o. female receiving vancomycin therapy.    Indication: Peritonitis  Consulting Provider: Hospitalist  ID Consult: Yes    Goal Trough: 15-20 mcg/mL    Current Antimicrobial Therapy  Micafungin 12/3-now  Zosyn 12/3-now  Vancomycin 11/28-now    Allergies  Allergies as of 11/28/2021 - Reviewed 11/28/2021   Allergen Reaction Noted    Hydrocodone Itching 05/19/2019    Rice Swelling 07/07/2021    Statins Other (See Comments) 07/21/2020    Codeine Rash 05/07/2018    Sulfa antibiotics Rash 05/07/2018     Labs  Results from last 7 days   Lab Units 12/09/21  0409 12/08/21  0527 12/07/21  0550   BUN mg/dL 52* 73* 49*   CREATININE mg/dL 5.02* 7.94* 7.07*     Results from last 7 days   Lab Units 12/09/21  0409 12/08/21  0527 12/07/21  0550   WBC 10*3/mm3 19.35* 21.79* 19.20*     Evaluation of Dosing    Is Patient on Dialysis or Renal Replacement: Yes - transitioned from PD to HD this admission    Ht - 162.6 cm (64\")  Wt - 83.5 kg (184 lb)    Estimated Creatinine Clearance: 9.3 mL/min (A) (by C-G formula based on SCr of 5.02 mg/dL (H)).    Intake & Output (last 3 days)         12/06 0701 12/07 0700 12/07 0701  12/08 0700 12/08 0701  12/09 0700 12/09 0701  12/10 0700    I.V. (mL/kg) 401.3 (5) 4.2 (0.1) 2 (0) 0.5 (0)    Blood        Other 2000 60 360     NG/GT   285 60    IV Piggyback 350 150 250     TPN   4771     Total Intake(mL/kg) 2751.3 (34.4) 214.2 (2.6) 5668 (67.9) 60.5 (0.7)    Urine (mL/kg/hr) 0 (0)  0 (0)     Emesis/NG output 350 550 705     Other   1880 1020    Stool 0       Blood 50       Total Output  1020    Net +2351.3 -335.8 +3083 -959.6            Urine Unmeasured Occurrence 1 x  1 x     Stool Unmeasured Occurrence 1 x             Microbiology and Radiology  Microbiology Results (last 10 days)       Procedure Component Value - Date/Time    Blood Culture - Blood, Hand, Left [[id number]]  (Normal) Collected: 12/03/21 1318    Lab Status: Final " result Specimen: Blood from Hand, Left Updated: 12/08/21 1515     Blood Culture No growth at 5 days    Narrative:      Aerobic Only    Blood Culture - Blood, Hand, Left [[id number]]  (Normal) Collected: 12/03/21 1230    Lab Status: Final result Specimen: Blood from Hand, Left Updated: 12/08/21 1330     Blood Culture No growth at 5 days    Fungus Culture - Body Fluid, Peritoneum [[id number]] Collected: 12/03/21 1043    Lab Status: Preliminary result Specimen: Body Fluid from Peritoneum Updated: 12/08/21 1046     Fungus Culture No fungus isolated at less than 1 week    Fungus Smear - Body Fluid, Peritoneum [[id number]] Collected: 12/03/21 1042    Lab Status: Final result Specimen: Body Fluid from Peritoneum Updated: 12/06/21 1559     Fungal Stain No fungal elements seen    AFB Culture - Body Fluid, Peritoneum [[id number]] Collected: 12/03/21 1040    Lab Status: Preliminary result Specimen: Body Fluid from Peritoneum Updated: 12/08/21 1046     AFB Culture No AFB isolated at less than 1 week     AFB Stain No acid fast bacilli seen on concentrated smear    Body Fluid Culture - Body Fluid, Peritoneum [[id number]] Collected: 12/02/21 1850    Lab Status: Final result Specimen: Body Fluid from Peritoneum Updated: 12/05/21 0736     Body Fluid Culture No growth at 3 days     Gram Stain Many (4+) WBCs seen      No organisms seen          Vancomycin Levels:  Results from last 7 days   Lab Units 12/06/21  0539 12/05/21  0649 12/04/21  0601 12/03/21  0429   VANCOMYCIN RM mcg/mL 17.50 17.80 17.10 19.20             Assessment/Plan:  1. Pharmacy dosing vancomycin for peritonitis. Note patient transitioned from PD to HD this admission.  2. Patient received a dose of vancomycin 1250mg via peritoneal dialysis (IP) on 11/30 @ 2200. Plan for further vancomycin doses to be given IV per ID.  3. Vancomycin levels have remained therapeutic since administration of IP vancomycin on 11/30. Patient had her first hemodialysis session on 12/8 and  received vancomycin 750mg IV x1 post-dialysis. Patient underwent hemodialysis again today so will give vancomycin 500mg IV x1 post-dialysis today. Vancomycin level ordered for Friday 12/10 AM.  4. Pharmacy will continue to monitor and adjust vancomycin dose as necessary based on renal function, cultures, labs, and clinical status.     Thank you,  Jensen Ramirez, PharmD, BCPS  12/9/2021  14:37 EST

## 2021-12-09 NOTE — PROGRESS NOTES
Pharmacy Parenteral Nutrition Evaluation    Moni Wallace is an 81 y.o. female receiving PPN.    Indication: Bowel obstruction  Consulting Physician: Dr. Stovlal / Dr. Andersen    Total Fluid Rate (if stated): 75 ml/hr (PPN) per renal    Labs  Results from last 7 days   Lab Units 12/09/21  0409 12/08/21  0527 12/07/21  0550 12/06/21  1245 12/06/21  0539   SODIUM mmol/L 127* 125* 129*   < > 135*   POTASSIUM mmol/L 3.2* 3.2* 3.7   < > 2.9*   CHLORIDE mmol/L 89* 83* 88*   < > 91*   CO2 mmol/L 20.0* 21.0* 22.0   < > 26.0   BUN mg/dL 52* 73* 49*   < > 33*   CREATININE mg/dL 5.02* 7.94* 7.07*   < > 7.23*   CALCIUM mg/dL 8.7 8.3* 8.4*   < > 8.1*   BILIRUBIN mg/dL  --  0.7 0.5  --  0.5   ALK PHOS U/L  --  201* 136*  --  117   ALT (SGPT) U/L  --  17 7  --  <5   AST (SGOT) U/L  --  50* 25  --  20   GLUCOSE mg/dL 165* 163* 286*   < > 123*    < > = values in this interval not displayed.     Results from last 7 days   Lab Units 12/09/21  1035 12/09/21  0409 12/08/21  0527 12/07/21  0550 12/07/21  0550 12/06/21  0539 12/05/21  1643   MAGNESIUM mg/dL  --  1.7 1.7  --  1.5*   < >  --    PHOSPHORUS mg/dL 0.3* 1.1* 2.2*   < > 2.9   < >  --    PREALBUMIN mg/dL  --   --   --   --   --   --  13.6*    < > = values in this interval not displayed.     Results from last 7 days   Lab Units 12/09/21  0409 12/08/21  0527 12/07/21  0550   WBC 10*3/mm3 19.35* 21.79* 19.20*   HEMOGLOBIN g/dL 7.3* 8.1* 8.2*   HEMATOCRIT % 22.8* 24.1* 24.9*   PLATELETS 10*3/mm3 177 213 222     Triglycerides   Date Value Ref Range Status   12/05/2021 104 0 - 150 mg/dL Final     Comment:     Falsely depressed results may occur on samples drawn from patients receiving N-Acetylcysteine (NAC) or Metamizole.     estimated creatinine clearance is 9.3 mL/min (A) (by C-G formula based on SCr of 5.02 mg/dL (H)).    Intake & Output (last 3 days)         12/06 0701  12/07 0700 12/07 0701  12/08 0700 12/08 0701  12/09 0700 12/09 0701  12/10 0700    I.V. (mL/kg) 401.3 (5) 4.2  (0.1) 2 (0) 0.5 (0)    Blood        Other 2000 60 360     NG/GT   285 60    IV Piggyback 350 150 250     TPN   4771     Total Intake(mL/kg) 2751.3 (34.4) 214.2 (2.6) 5668 (67.9) 60.5 (0.7)    Urine (mL/kg/hr) 0 (0)  0 (0)     Emesis/NG output 350 550 705     Other   1880 1020    Stool 0       Blood 50       Total Output  1020    Net +2351.3 -335.8 +3083 -959.6            Urine Unmeasured Occurrence 1 x  1 x     Stool Unmeasured Occurrence 1 x             Dietitian Recommendations  Dietary Orders (From admission, onward)       Start     Ordered    12/06/21 1716  NPO Diet  Diet Effective Now         12/06/21 1715                  Current PPN Regimen Recommendation: Dextrose 5% / Amino Acid 5.5% at goal rate of 75 mL/hr. 20% SMOF lipids 250mL every 24 hours.    Assessment/Plan:  1. Pharmacy to dose PPN ordered for bowel obstruction.  2. PPN continues today with macronutrients per RD recommendations as above at goal rate of 75 mL/hr. Patient has ESRD and has transitioned from peritoneal dialysis to hemodialysis. Will add phos at 3 mmol/L and continue Na at 60 mEq/L, K at 5 meq/L, Ca at 2 meq/L and Mg at 3 meq/L. Phosphorus being replaced exogenously today per nephrology.  3. Blood glucose controlled on correctional insulin at this time.  4. Pharmacy will continue to follow closely and make adjustments to PPN as necessary.    Thank you,  Jensen Ramirez, PharmD, BCPS  12/9/2021  14:33 EST

## 2021-12-09 NOTE — CASE MANAGEMENT/SOCIAL WORK
Continued Stay Note  [facility]     Patient Name: Moni Wallace  MRN: [id number]  Today's Date: 12/9/2021    Admit Date: 11/28/2021     Discharge Plan     Row Name 12/09/21 1515       Plan    Plan discharge plan    Plan Comments CM faxed hepatitis panel lab work to [facility]([phone]) and left a voice message for Trosalind(CM at [phone], [phone]).  CM will cont to follow and work on chair spot/time.    Final Discharge Disposition Code 01 - home or self-care               Discharge Codes    No documentation.               Expected Discharge Date and Time     Expected Discharge Date Expected Discharge Time    Dec 14, 2021             Cyndy Hedrick RN

## 2021-12-09 NOTE — DISCHARGE PLACEMENT REQUEST
Moni Wallace (81 y.o. Female)     To [facility]  From Phyllis at [facility]() [phone]                                Hepatitis Panel, Acute [[id number]] (Order [id number])  Order  Date: 12/9/2021 Department: [address] Released By: Javon Olvera RN (auto-released) Authorizing: Luis Carlos Koenig MD       Reprint Order Requisition    Hepatitis Panel, Acute (Order #[id number]) on 12/9/21           Hepatitis Panel, Acute  Order: [id number]   Status: Final result       Visible to patient: No (scheduled for 12/10/2021  1:39 AM)       Next appt: 12/10/2021 at 02:05 PM in Radiology ([facility])      Specimen Information: Blood         0 Result Notes      Component   Ref Range & Units 10:35   (12/9/21) 3 yr ago   (1/3/18) 3 yr ago   (1/3/18)   Hepatitis B Surface Ag   Non-Reactive Non-Reactive   Non-Reactive    Hep A IgM   Non-Reactive Non-Reactive      Hep B C IgM   Non-Reactive Non-Reactive      Hepatitis C Ab   Non-Reactive Non-Reactive  Non-Reactive     Resulting Agency  [facility]  [facility]  [facility]               Narrative  Performed by:  [facility]  Results may be falsely decreased if patient taking Biotin.       Specimen Collected: 12/09/21 10:35 Last Resulted: 12/09/21 11:39         Lab Flowsheet        Order Details        View Encounter        Lab and Collection Details        Routing        Result History               Result Care Coordination        Patient Communication     12/10/2021  1:39 AM   Not seen Back to Top             Provider Comment To Patient     12/10/2021  1:39 AM   Not seen     Routing History    Priority Sent On From To Message Type    12/9/2021  2:58 PM Lab, Background User Robin nAdersen MD Results    12/9/2021  2:58 PM Lab, Background User Alex Walters MD Results    12/9/2021  2:58 PM Lab, Background User Luis Carlos Koenig MD Results     Result Read / Acknowledged     Acknowledge result  No acknowledgement history exists for this order.     Lab Component SmartPhrase  "Guide    Hepatitis Panel, Acute (Order #[id number]) on 12/9/21       Other Results from 11/28/2021     Prepare RBC, 1 Units  Final result     POC Glucose Once  Final result 12/9/2021    Phosphorus  Final result 12/9/2021    Type & Screen  Edited Result - FINAL 12/9/2021    POC Glucose Once  Final result 12/9/2021    Magnesium  Final result 12/9/2021    Phosphorus  Final result 12/9/2021    Basic Metabolic Panel  Final result 12/9/2021    CBC Auto Differential  Final result 12/9/2021    Manual Differential  Final result 12/9/2021    Iron Profile  Final result 12/9/2021    POC Glucose Once  Final result 12/8/2021    POC Glucose Once  Final result 12/8/2021    POC Glucose Once  Final result 12/8/2021    POC Glucose Once  Final result 12/8/2021    Protime-INR  Final result 12/8/2021    Comprehensive Metabolic Panel  Final result 12/8/2021    CBC (No Diff)  Final result 12/8/2021    Phosphorus  Final result 12/8/2021    Magnesium  Final result 12/8/2021    (important suggestion)  Warning: Additional results from 11/28/2021 are available but are not displayed in this report.                     Date of Birth Social Security Number Address Home Phone MRN    1940  [address] [id number] [phone] [id number]    [employer] Marital Status             List of hospitals in [place]        Admission Date Admission Type Admitting Provider Attending Provider Department, Room/Bed    11/28/21 Emergency Luis Carlos Koenig MD West, Christopher R, MD [facility] 5H, S573/1    Discharge Date Discharge Disposition Discharge Destination                         Attending Provider: Luis Carlos Koenig MD    Allergies: Hydrocodone, Rice, Statins, Codeine, Sulfa Antibiotics    Isolation: None   Infection: None   Code Status: No CPR   Advance Care Planning Activity    Ht: 162.6 cm (64\")   Wt: 83.5 kg (184 lb)    Admission Cmt: None   Principal Problem: Peritonitis (HCC) [K65.9]                 Active Insurance " as of 11/28/2021     Primary Coverage     Payor Plan Insurance Group Employer/Plan Group    MEDICARE MEDICARE A & B      Payor Plan Address Payor Plan Phone Number Payor Plan Fax Number Effective Dates    [address] [phone]  1/1/2006 - None Entered    [facility] [id number]       Subscriber Name Subscriber Birth Date Member ID       MOIN WALLACE 1940 [id number]           Secondary Coverage     Payor Plan Insurance Group Employer/Plan Group    ANTHEM BLUE CROSS formerly [facility] SUPP [id number]     Payor Plan Address Payor Plan Phone Number Payor Plan Fax Number Effective Dates    [address]   1/1/2006 - None Entered    [facility] [address]       Subscriber Name Subscriber Birth Date Member ID       MONI WALLACE 1940 [id number]                 Emergency Contacts      (Rel.) Home Phone Work Phone Mobile Phone    Yadiel Wallace (Son) [phone] -- [phone]    Genesis Becerril (Daughter) [phone] -- --    Gema Wallace (Daughter) [phone] -- --    Sawyer Wallace (Son) [phone] -- --    Gerry Wallace (Son) [phone] -- --            Insurance Information                MEDICARE/MEDICARE A & B Phone: [phone]    Subscriber: Moni Wallace Subscriber#: [id number]    Group#: -- Precert#: --        ANTHEM BLUE CROSS/ANTHEARL [facility] SUPP Phone: --    Subscriber: Moni Wallace Subscriber#: [id number]    Group#: [id number] Precert#: --          Consult Notes (last 24 hours)  Notes from 12/08/21 1512 through 12/09/21 1512   No notes of this type exist for this encounter.

## 2021-12-09 NOTE — PROGRESS NOTES
"Patient Name:  Moni Wallace  YOB: 1940  [id number]    Surgery Progress Note    Date of visit: 12/9/2021      Subjective: No acute events overnight.  No bowel movement or flatus yet.  NG tube with 700 mL of output.  Tells me she feels a little bit groggy this morning.  Denies nausea or vomiting          Objective:     /66 (BP Location: Right leg, Patient Position: Lying)   Pulse (!) 121   Temp 97.5 °F (36.4 °C) (Axillary)   Resp 18   Ht 162.6 cm (64\")   Wt 83.5 kg (184 lb)   LMP  (LMP Unknown)   SpO2 96%   BMI 31.58 kg/m²     Intake/Output Summary (Last 24 hours) at 12/9/2021 1034  Last data filed at 12/9/2021 0800  Gross per 24 hour   Intake 5528 ml   Output 705 ml   Net 4823 ml       GEN:   Awake, alert, in no acute distress, resting comfortably in bed, chronically ill-appearing  CV:   Regular rate and rhythm  L:  Clear to auscultation bilaterally, not labored on room air   Abd:  Soft, appropriately tender palpation along incision, incision is clean dry and intact  Ext:  No cyanosis, clubbing, or edema    Recent labs that are back at this time have been reviewed.           Assessment/ Plan:    Mrs. Wallace is an 81-year-old lady with history of end-stage renal disease on peritoneal dialysis, diabetes, A. fib on Coumadin, and prior C. difficile infection who was admitted due to concern for peritonitis related to peritoneal dialysis     #Small bowel obstruction, peritonitis in the setting of peritoneal dialysis  -Postoperative day 3 after open ileocolic resection for small bowel obstruction  -Underwent hemodialysis yesterday.  White blood cell count is 19.  Hemoglobin is down somewhat at 7.3.  -Would recommend to hold on restarting warfarin.  If felt to be very high risk could potentially be started on a heparin drip  -Continue NG tube to low wall suction  -Awaiting return of bowel function.  -Continue antibiotics per ID  -Continue PCA and scheduled pain meds  -Continue PPN  -Awaiting " return of bowel function before we can advance her much further      Robin Andersen MD  12/9/2021  10:34 EST    I was notified by Dr. 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 Margins of resection are positive. This is somewhat surprising. Operative findings demonstrated significant fibrosis and distal small bowel thickening as well as some omental caking, however without obvious peritoneal implants.  Fibrosis and thickening can often be seen with peritoneal dialysis. Regardless she had a large extent of her small bowel involved and a positive margin and given that this is a metastatic disease process I do not think this is surgically resectable. I think she would likely be a poor candidate for systemic therapy given her medical comorbidities and frailty, however I do think it is worthwhile to have medical oncology opinion as to what potential treatment options are possible vs palliative options. I discussed this with the patient and she understands the plan    Electronically signed by Robin Andersen MD, 12/09/21, 1:58 PM EST.

## 2021-12-09 NOTE — CONSULTS
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  Pathology pending.  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  For her obstruction, Dr. Andersen performed laparotomy with ileocecal ectomy on 12/6/2021.  12/3/2021 CT abdomen pelvis without contrast showed the obstruction.  She has end-stage renal disease, hypotension with sepsis, paroxysmal atrial fibrillation, diabetes.      Past Medical History:   Diagnosis Date   • Adenomatous colon polyp    • Chronic kidney disease    • Clostridium difficile infection 06/2017   • Diabetes mellitus (HCC)    • Gastric polyps    • Hypothyroidism    • IgA nephropathy, acute    • Kidney transplanted    • Macular degeneration    • Paroxysmal atrial fibrillation (HCC)    • Tubular adenoma     excision    • Ulcer of gastric fundus    • Vitamin D deficiency      Past Surgical History:   Procedure Laterality Date   • BREAST BIOPSY Left 1990'S   • CARPAL TUNNEL RELEASE     • CARPAL TUNNEL RELEASE Right    • CATARACT EXTRACTION     • CATARACT EXTRACTION Bilateral    • COLON RESECTION SMALL BOWEL N/A 12/6/2021    Procedure: COLON RESECTION SMALL BOWEL;  Surgeon: Robin Andersen MD;  Location: [facility] OR;  Service: General;  Laterality: N/A;   • DIAGNOSTIC LAPAROSCOPY     • DIALYSIS FISTULA CREATION     • EXPLORATORY LAPAROTOMY N/A 12/6/2021    Procedure: DIAGNOSTIC LAPAROSCOPY;  Surgeon: Robin Andersen MD;  Location:  [facility] OR;  Service: General;  Laterality: N/A;   • HERNIA REPAIR  85 and 86   • INSERTION HEMODIALYSIS CATHETER Right 12/6/2021    Procedure: TUNNELED DIALYSIS CATHETER INSERTION;  Surgeon: Rolando Begum MD;  Location:  [facility] OR;  Service: Vascular;  Laterality: Right;   • KNEE ARTHROSCOPY INCISION AND DRAINAGE OF KNEE Right  5/18/2019    Procedure: KNEE ARTHROSCOPY INCISION AND DRAINAGE OF KNEE;  Surgeon: Paco Lester MD;  Location:  [facility] OR;  Service: Orthopedics   • LAPAROSCOPIC TUBAL LIGATION  1985   • TOTAL KNEE ARTHROPLASTY     • TOTAL KNEE ARTHROPLASTY      Left knee    • TRANSPLANTATION RENAL  2010   • TRANSPLANTATION RENAL Right    • TRIGGER FINGER RELEASE     • TUBAL ABDOMINAL LIGATION     • UPPER GASTROINTESTINAL ENDOSCOPY  05/20/2013   • VENOUS ACCESS DEVICE (PORT) INSERTION N/A 5/23/2019    Procedure: INSERTION GROSHONG;  Surgeon: Rolando Begum MD;  Location:  [facility] OR;  Service: General       No current facility-administered medications on file prior to encounter.     Current Outpatient Medications on File Prior to Encounter   Medication Sig Dispense Refill   • acetaminophen (TYLENOL) 325 MG tablet Take 2 tablets by mouth Every 4 (Four) Hours As Needed for Mild Pain .     • albuterol (PROVENTIL) (2.5 MG/3ML) 0.083% nebulizer solution Take 2.5 mg by nebulization Every 4 (Four) Hours As Needed for Wheezing. 25 vial 2   • albuterol sulfate HFA (PROAIR HFA) 108 (90 Base) MCG/ACT inhaler Inhale 2 puffs Every 4 (Four) Hours As Needed for Wheezing or Shortness of Air. 1 inhaler 11   • allopurinol (ZYLOPRIM) 100 MG tablet Take 100 mg by mouth As Needed.     • amLODIPine (NORVASC) 5 MG tablet Take 5 mg by mouth Daily.     • cinacalcet (SENSIPAR) 60 MG tablet Take 60 mg by mouth Daily.     • colchicine 0.6 MG tablet Take 1 tablet by mouth 2 (Two) Times a Day. 28 tablet 0   • escitalopram (Lexapro) 5 MG tablet Take 1 tablet by mouth Every Morning. 30 tablet 5   • furosemide (LASIX) 40 MG tablet Take 80 mg by mouth Daily.     • gentamicin (GARAMYCIN) 0.1 % cream Apply 1 application topically to the appropriate area as directed Daily. - Apply to tube site-  3   • lidocaine (Lidoderm) 5 % Place 1 patch on the skin as directed by provider Daily. Remove & Discard patch within 12 hours or as directed by MD 30 each 1   •  "Methoxy PEG-Epoetin Beta (MIRCERA IJ) Inject 50 mcg under the skin into the appropriate area as directed.     • omeprazole (priLOSEC) 40 MG capsule TAKE ONE CAPSULE BY MOUTH DAILY 90 capsule 3   • predniSONE (DELTASONE) 20 MG tablet Take 1 tablet by mouth Daily. 4 tablet 0   • sevelamer (RENVELA) 800 MG tablet Take 1,600 mg by mouth 3 (Three) Times a Day.     • temazepam (RESTORIL) 15 MG capsule Take 15 mg by mouth At Night As Needed for Sleep.     • warfarin (COUMADIN) 2 MG tablet TAKE 2-3 TABLETS BY MOUTH DAILY OR AS DIRECTED BY ANTICOAGULATION CLINIC 200 tablet 0   • metoprolol tartrate (LOPRESSOR) 100 MG tablet Take 1 tablet by mouth Every 12 (Twelve) Hours. 60 tablet 0       Allergies   Allergen Reactions   • Hydrocodone Itching   • Rice Swelling   • Statins Other (See Comments)     myalgia   • Codeine Rash   • Sulfa Antibiotics Rash       Social History     Socioeconomic History   • Marital status:    Tobacco Use   • Smoking status: Never Smoker   • Smokeless tobacco: Never Used   Vaping Use   • Vaping Use: Never used   Substance and Sexual Activity   • Alcohol use: No   • Drug use: No   • Sexual activity: Defer       Family History   Problem Relation Age of Onset   • Leukemia Mother    • Stroke Father    • Dementia Sister    • Kidney disease Sister    • Diabetic kidney disease Sister    • Lung cancer Brother    • Breast cancer Neg Hx    • Ovarian cancer Neg Hx    • Hypertension Sister    • Dementia Sister          REVIEW OF SYSTEMS:  A 14 point review of systems was performed and is negative except as noted above.    Objective   PHYSICAL EXAM:    /96   Pulse 104   Temp 97.6 °F (36.4 °C)   Resp 18   Ht 162.6 cm (64\")   Wt 83.5 kg (184 lb)   LMP  (LMP Unknown)   SpO2 98%   BMI 31.58 kg/m²               ECOG: (3) Capable of Limited Self Care, Confined to Bed or Chair Greater Than 50% of Waking Hours  General: Frail appearing female in no acute distress  HEENT: sclerae anicteric, oropharynx " clear  Lymphatics: no cervical, supraclavicular, inguinal, or axillary adenopathy  Neck: Supple. No thyromegaly.  Cardiovascular: regular rate and rhythm, no murmurs  Lungs: clear to auscultation bilaterally. No respiratory distress  Abdomen: soft, nontender, nondistended.  No palpable organomegaly  Extremities: no lower extremity edema, cyanosis, or clubbing  Skin: no rashes, lesions, bruising, or petechiae  Neuro: Alert and oriented x3. Moves all extremities.    Results:    Results from last 7 days   Lab Units 12/09/21  0409 12/08/21  0527 12/07/21  0550   WBC 10*3/mm3 19.35* 21.79* 19.20*   HEMOGLOBIN g/dL 7.3* 8.1* 8.2*   PLATELETS 10*3/mm3 177 213 222     Results from last 7 days   Lab Units 12/09/21  0409 12/08/21  0527 12/07/21  0550   SODIUM mmol/L 127* 125* 129*   POTASSIUM mmol/L 3.2* 3.2* 3.7   CO2 mmol/L 20.0* 21.0* 22.0   BUN mg/dL 52* 73* 49*   CREATININE mg/dL 5.02* 7.94* 7.07*   GLUCOSE mg/dL 165* 163* 286*     Results from last 7 days   Lab Units 12/08/21  0527 12/07/21  0550 12/06/21  0539   AST (SGOT) U/L 50* 25 20   ALT (SGPT) U/L 17 7 <5   BILIRUBIN mg/dL 0.7 0.5 0.5   ALK PHOS U/L 201* 136* 117         CT Abdomen Pelvis Without Contrast    Result Date: 12/4/2021  1. Findings consistent with incomplete distal small bowel obstruction, which appears to be centered about an abnormal appearing loop centrally in the upper pelvis. Degree of distention appears increased from 11/28/2021 scan.  2. Expected changes of peritoneal dialysis, with mild free fluid and free air in the abdomen.  3. No evidence of discrete, well-defined inflammatory focus.  D:  12/03/2021 E:  12/03/2021  This report was finalized on 12/4/2021 11:12 AM by Dr. Xavier Cantor MD.      XR Chest 1 View    Result Date: 12/9/2021   Interval placement of left internal jugular central venous catheter with the tip terminating low in the right atrium. Otherwise stable and unchanged medical devices from prior. Stable cardiomediastinal  silhouette. Similar bibasilar streaky opacities and likely small left pleural effusion. No pneumothorax.  This report was finalized on 12/9/2021 2:26 PM by Paco Young MD.      XR Chest 1 View    Result Date: 12/8/2021  Placement of a deep line catheter on the right with tip in the SVC. No pneumothorax. Prominence of the perihilar regions bilaterally with small left pleural effusion.  D:  12/06/2021 E:  12/07/2021  This report was finalized on 12/8/2021 4:56 PM by Dr. Aracely Oates MD.      FL C Arm During Surgery    Result Date: 12/7/2021  Fluoroscopy provided for tunneled dialysis catheter placement.  D:  12/07/2021 E:  12/07/2021   This report was finalized on 12/7/2021 9:53 PM by Dr. Xavier Cantor MD.      FL Small Bowel Follow Through Single-Contrast    Result Date: 12/6/2021  FINDINGS/IMPRESSION:   imaging reveals dilated loops of small bowel seen diffusely throughout the abdomen. Minimal air in the rectum. There is a nasogastric tube tip in the stomach. Contrast is seen within the stomach on the 30- minute image and 230-minute image with no advancement through the small bowel. There is only minimal contrast seen within the proximal small bowel on the 9 hour and 40-minute image with minimal contrast remaining within the stomach in the fundus. The 17-hour image reveals no advancement of the contrast. Findings most suggestive of a small bowel obstruction.  DICTATED:   12/04/2021 EDITED/lfs:   12/05/2021  This report was finalized on 12/6/2021 3:29 PM by Dr. Aracely Oates MD.      XR Abdomen KUB    Result Date: 12/6/2021  Slight improvement seen in the gaseous distended loops of bowel throughout the abdomen. Minimal contrast identified in the transverse colon. Nasogastric tube with tip in the stomach. No free intraperitoneal air.  D:  12/06/2021 E:  12/06/2021  This report was finalized on 12/6/2021 3:54 PM by Dr. Aracely Oates MD.      XR Abdomen KUB    Result Date: 12/3/2021  Single frontal  torso demonstrates tube tip to be in the body the stomach. Incidental moderate gaseous distention of jejunum noted.   Signer Name: Pippa Enriquez MD  Signed: 12/3/2021 10:49 PM  Workstation Name: [facility]  [facility]      Assessment    ASSESSMENT & PLAN:    1.  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  End-stage renal disease on peritoneal dialysis complicated by peritonitis.  3.  Chronic atrial fibrillation on anticoagulation    Discussion: No matter what malignancy we find, what ever we do will be palliative and she is has no desire to take any form of systemic therapy be that chemotherapeutic, immunotherapeutic or otherwise and is at peace and wants to move towards best supportive care with hospice if malignancy is found.  I will check back tomorrow on the path    Total time of care today discussing this complex decision tree with this delightful Saint and coming to the decisions as outlined above and communications with Dr. Koenig took 1 hour of patient care time throughout the day today  Solomon Harman MD    12/9/2021

## 2021-12-09 NOTE — PLAN OF CARE
Problem: Adult Inpatient Plan of Care  Goal: Plan of Care Review  Outcome: Ongoing, Progressing  Flowsheets (Taken 12/9/2021 0625)  Progress: improving  Plan of Care Reviewed With: patient  Goal: Patient-Specific Goal (Individualized)  Outcome: Ongoing, Progressing  Goal: Absence of Hospital-Acquired Illness or Injury  Outcome: Ongoing, Progressing  Intervention: Identify and Manage Fall Risk  Recent Flowsheet Documentation  Taken 12/9/2021 0600 by Debi Greco RN  Safety Promotion/Fall Prevention:   activity supervised   assistive device/personal items within reach   clutter free environment maintained   room organization consistent   safety round/check completed  Taken 12/9/2021 0200 by Debi Greco RN  Safety Promotion/Fall Prevention:   activity supervised   assistive device/personal items within reach   safety round/check completed   room organization consistent  Taken 12/9/2021 0000 by Debi Greco RN  Safety Promotion/Fall Prevention:   activity supervised   assistive device/personal items within reach   clutter free environment maintained   safety round/check completed   room organization consistent  Taken 12/8/2021 2200 by Debi Greco RN  Safety Promotion/Fall Prevention:   activity supervised   assistive device/personal items within reach   clutter free environment maintained   room organization consistent   safety round/check completed  Taken 12/8/2021 2000 by Debi Greco RN  Safety Promotion/Fall Prevention:   assistive device/personal items within reach   activity supervised   clutter free environment maintained   room organization consistent   safety round/check completed  Intervention: Prevent Skin Injury  Recent Flowsheet Documentation  Taken 12/9/2021 0600 by Debi Greco RN  Body Position: weight shift assistance provided  Skin Protection:   adhesive use limited   tubing/devices free from skin contact   transparent dressing maintained   skin-to-device  areas padded   skin-to-skin areas padded  Taken 12/9/2021 0200 by Debi Greco RN  Body Position: weight shift assistance provided  Skin Protection:   adhesive use limited   tubing/devices free from skin contact   transparent dressing maintained   skin-to-skin areas padded   skin-to-device areas padded  Taken 12/9/2021 0000 by Debi Greco RN  Body Position:   weight shift assistance provided   tilted, right  Skin Protection:   adhesive use limited   tubing/devices free from skin contact   skin-to-device areas padded   skin-to-skin areas padded   transparent dressing maintained  Taken 12/8/2021 2200 by Debi Greco RN  Body Position: tilted, left  Skin Protection:   adhesive use limited   tubing/devices free from skin contact   skin-to-device areas padded   skin-to-skin areas padded   transparent dressing maintained  Taken 12/8/2021 2000 by Debi Greco RN  Body Position:   supine   supine, legs elevated  Skin Protection:   adhesive use limited   tubing/devices free from skin contact   transparent dressing maintained   skin-to-skin areas padded   skin-to-device areas padded  Intervention: Prevent and Manage VTE (venous thromboembolism) Risk  Recent Flowsheet Documentation  Taken 12/8/2021 2000 by Debi Greco RN  VTE Prevention/Management:   bilateral   dorsiflexion/plantar flexion performed  Intervention: Prevent Infection  Recent Flowsheet Documentation  Taken 12/8/2021 2000 by Debi Greco RN  Infection Prevention:   environmental surveillance performed   equipment surfaces disinfected   hand hygiene promoted   rest/sleep promoted   personal protective equipment utilized   single patient room provided   visitors restricted/screened  Goal: Optimal Comfort and Wellbeing  Outcome: Ongoing, Progressing  Intervention: Provide Person-Centered Care  Recent Flowsheet Documentation  Taken 12/8/2021 2000 by Debi Greco RN  Trust Relationship/Rapport:   care explained    choices provided   emotional support provided   empathic listening provided   questions answered   questions encouraged   reassurance provided   thoughts/feelings acknowledged  Goal: Readiness for Transition of Care  Outcome: Ongoing, Progressing     Problem: Infection  Goal: Infection Symptom Resolution  Outcome: Ongoing, Progressing     Problem: Adjustment to Illness (Chronic Kidney Disease)  Goal: Optimal Coping with Chronic Illness  Outcome: Ongoing, Progressing     Problem: Electrolyte Imbalance (Chronic Kidney Disease)  Goal: Electrolyte Balance  Outcome: Ongoing, Progressing     Problem: Fluid Volume Excess (Chronic Kidney Disease)  Goal: Fluid Balance  Outcome: Ongoing, Progressing  Intervention: Monitor and Manage Hypervolemia  Recent Flowsheet Documentation  Taken 12/9/2021 0600 by Debi Greco RN  Skin Protection:   adhesive use limited   tubing/devices free from skin contact   transparent dressing maintained   skin-to-device areas padded   skin-to-skin areas padded  Taken 12/9/2021 0200 by Debi Greco RN  Skin Protection:   adhesive use limited   tubing/devices free from skin contact   transparent dressing maintained   skin-to-skin areas padded   skin-to-device areas padded  Taken 12/9/2021 0000 by Debi Greco RN  Skin Protection:   adhesive use limited   tubing/devices free from skin contact   skin-to-device areas padded   skin-to-skin areas padded   transparent dressing maintained  Taken 12/8/2021 2200 by Debi Greco RN  Skin Protection:   adhesive use limited   tubing/devices free from skin contact   skin-to-device areas padded   skin-to-skin areas padded   transparent dressing maintained  Taken 12/8/2021 2000 by Debi Greco RN  Skin Protection:   adhesive use limited   tubing/devices free from skin contact   transparent dressing maintained   skin-to-skin areas padded   skin-to-device areas padded     Problem: Functional Decline (Chronic Kidney Disease)  Goal:  Optimal Functional Ability  Outcome: Ongoing, Progressing  Intervention: Optimize Functional Ability  Recent Flowsheet Documentation  Taken 12/8/2021 2200 by Debi Greco RN  Activity Management: activity adjusted per tolerance  Taken 12/8/2021 2000 by Debi Greco RN  Activity Management: activity adjusted per tolerance     Problem: Hematologic Alteration (Chronic Kidney Disease)  Goal: Absence of Anemia Signs and Symptoms  Outcome: Ongoing, Progressing     Problem: Oral Intake Inadequate (Chronic Kidney Disease)  Goal: Optimal Oral Intake  Outcome: Ongoing, Progressing     Problem: Renal Function Impairment (Chronic Kidney Disease)  Goal: Laboratory Values and Blood Pressure Within Desired Range  Outcome: Ongoing, Progressing  Intervention: Monitor and Support Renal Function  Recent Flowsheet Documentation  Taken 12/8/2021 2000 by Debi Greco RN  Medication Review/Management:   medications reviewed   high-risk medications identified     Problem: Pain Acute  Goal: Optimal Pain Control  Outcome: Ongoing, Progressing     Problem: Skin Injury Risk Increased  Goal: Skin Health and Integrity  Outcome: Ongoing, Progressing  Intervention: Optimize Skin Protection  Recent Flowsheet Documentation  Taken 12/9/2021 0600 by Debi Greco RN  Pressure Reduction Techniques: weight shift assistance provided  Head of Bed (HOB): HOB at 30 degrees  Pressure Reduction Devices: pressure-redistributing mattress utilized  Skin Protection:   adhesive use limited   tubing/devices free from skin contact   transparent dressing maintained   skin-to-device areas padded   skin-to-skin areas padded  Taken 12/9/2021 0200 by Debi Greco RN  Pressure Reduction Techniques: frequent weight shift encouraged  Head of Bed (HOB): HOB at 30-45 degrees  Pressure Reduction Devices: pressure-redistributing mattress utilized  Skin Protection:   adhesive use limited   tubing/devices free from skin contact   transparent  dressing maintained   skin-to-skin areas padded   skin-to-device areas padded  Taken 12/9/2021 0000 by Debi Greco RN  Pressure Reduction Techniques: frequent weight shift encouraged  Head of Bed (HOB): HOB at 30-45 degrees  Pressure Reduction Devices: pressure-redistributing mattress utilized  Skin Protection:   adhesive use limited   tubing/devices free from skin contact   skin-to-device areas padded   skin-to-skin areas padded   transparent dressing maintained  Taken 12/8/2021 2200 by Debi Greco RN  Pressure Reduction Techniques: frequent weight shift encouraged  Head of Bed (HOB): HOB at 30-45 degrees  Pressure Reduction Devices: pressure-redistributing mattress utilized  Skin Protection:   adhesive use limited   tubing/devices free from skin contact   skin-to-device areas padded   skin-to-skin areas padded   transparent dressing maintained  Taken 12/8/2021 2000 by Debi Greco RN  Pressure Reduction Techniques: frequent weight shift encouraged  Head of Bed (HOB): HOB at 20-30 degrees  Pressure Reduction Devices: pressure-redistributing mattress utilized  Skin Protection:   adhesive use limited   tubing/devices free from skin contact   transparent dressing maintained   skin-to-skin areas padded   skin-to-device areas padded     Problem: Fall Injury Risk  Goal: Absence of Fall and Fall-Related Injury  Outcome: Ongoing, Progressing  Intervention: Identify and Manage Contributors to Fall Injury Risk  Recent Flowsheet Documentation  Taken 12/8/2021 2000 by Debi Greco RN  Medication Review/Management:   medications reviewed   high-risk medications identified  Intervention: Promote Injury-Free Environment  Recent Flowsheet Documentation  Taken 12/9/2021 0600 by Debi Greco RN  Safety Promotion/Fall Prevention:   activity supervised   assistive device/personal items within reach   clutter free environment maintained   room organization consistent   safety round/check  completed  Taken 12/9/2021 0200 by Debi Greco RN  Safety Promotion/Fall Prevention:   activity supervised   assistive device/personal items within reach   safety round/check completed   room organization consistent  Taken 12/9/2021 0000 by Debi Greco RN  Safety Promotion/Fall Prevention:   activity supervised   assistive device/personal items within reach   clutter free environment maintained   safety round/check completed   room organization consistent  Taken 12/8/2021 2200 by Debi Greco RN  Safety Promotion/Fall Prevention:   activity supervised   assistive device/personal items within reach   clutter free environment maintained   room organization consistent   safety round/check completed  Taken 12/8/2021 2000 by Debi Greco RN  Safety Promotion/Fall Prevention:   assistive device/personal items within reach   activity supervised   clutter free environment maintained   room organization consistent   safety round/check completed   Goal Outcome Evaluation:  Plan of Care Reviewed With: patient        Progress: improving

## 2021-12-09 NOTE — PROGRESS NOTES
[facility] Progress Note        Antibiotics:  Anti-Infectives (From admission, onward)    Ordered     Dose/Rate Route Frequency Start Stop    12/08/21 1126  vancomycin in dextrose 5% 150 mL (VANCOCIN) IVPB 750 mg        Ordering Provider: Jensen Ramirez PharmD    10 mg/kg × 82.6 kg  over 60 Minutes Intravenous Once 12/08/21 1300 12/08/21 1414    12/03/21 0857  piperacillin-tazobactam (ZOSYN) 3.375 g in iso-osmotic dextrose 50 ml (premix)        Ordering Provider: Sawyer Llanos MD    3.375 g  over 4 Hours Intravenous Every 12 Hours 12/03/21 1800 12/15/21 1759    12/03/21 0832  micafungin 100 mg/100 mL 0.9% NS IVPB (mbp)        Ordering Provider: Sawyer Llanos MD    100 mg Intravenous Every 24 Hours 12/03/21 1000 12/15/21 0959    12/03/21 0857  piperacillin-tazobactam (ZOSYN) 3.375 g in iso-osmotic dextrose 50 ml (premix)        Ordering Provider: Sawyer Llanos MD    3.375 g  over 30 Minutes Intravenous Once 12/03/21 0945 12/03/21 1214    12/03/21 0832  Pharmacy to dose vancomycin        Ordering Provider: Robin Andersen MD     Does not apply Continuous PRN 12/03/21 0831 12/13/21 0830    12/02/21 1434  vancomycin (dosing per levels)        Ordering Provider: Robin Andersen MD     Does not apply Daily 12/02/21 1530 12/25/21 0859    11/30/21 1111  UltraBag/Dianeal PD-2/1.5% Dex (pappas #[id number]) (DIANEAL) 2,000 mL with vancomycin (VANCOCIN) 1,250 mg, cefTAZidime (FORTAZ) 1,000 mg dialysis solution        Ordering Provider: Sawyer Llanos MD     Intraperitoneal Once 11/30/21 2200 11/30/21 2200    11/28/21 1942  UltraBag/Dianeal PD-2/1.5% Dex (pappas #[id number]) (DIANEAL) 2,000 mL with ceFAZolin (ANCEF) 2,000 mg dialysis solution        Ordering Provider: Kali Rojas MD     Intraperitoneal Once 11/29/21 0000 11/29/21 0004    11/28/21 1157  vancomycin 1500 mg/500 mL 0.9% NS IVPB ([facility])        Ordering Provider: Lex Marcum MD    20 mg/kg × 72.6 kg Intravenous Once 11/28/21 1159 11/28/21  "1400    11/28/21 1157  piperacillin-tazobactam (ZOSYN) 3.375 g in iso-osmotic dextrose 50 ml (premix)        Ordering Provider: Lex Marcum MD    3.375 g Intravenous Once 11/28/21 1159 11/28/21 1317          CC: abd pain    HPI:      Patient is a 81 y.o.  Yr old female with history of ESRD on CAPD for several years, Hx CDiff years ago she was admitted November 28 with acute onset abdominal pain over 2 days, nausea/vomiting and abnormal CT scan raising concern for possible early ileus versus partial small bowel obstruction and hazy omental stranding concerning for peritonitis per notes.  She had leukocytosis and peritoneal fluid with 1748 WBC and predominance neutrophils.  Concern for CAPD associated peritonitis and empiric vancomycin/Zosyn initiated.  Nursing had reported peritoneal fluid had initially looked a little hazy but patient did not recall a hazy or bloody appearance.  No redness/swelling or pain or other problems at her PD catheter.     12/3/21 NG out 12/2 with nausea and vomiting multiple times overnight; dialysate remains blood tinged per patient;  tachycardia, hypotension last 24 hours, WBC increased overall and at risk for systemic illness;  Will change abx back to IV vancomycin/zosyn, add empiric mycamine    12/4/21 cardiology following with paroxysmal afib/RVR    12/6/21 Dr Andersen with surgery  \"PROCEDURE PERFORMED:      1. Diagnostic laparoscopy converted to exploratory laparotomy  2. Open resection of terminal ileum and cecum with primary ileocolic anastomosis  3. Removal peritoneal dialysis catheter\"     12/6 also HD catheter placed by Frankie Begum    12/9/21 seen early and sleepy; per nursing,  less abd pain postop;  mid abdomen, intermittent, nonradiating, 2-3 out of 10, not progressive.  No diarrhea.  No hematochezia melena or hematemesis.  No rash.  Minimal urine output and no active urinary symptoms.  No dysuria hematuria or pyuria.  No flank pain.  No rash.  No shortness of breath or " "cough.      ROS:      12/9/21 No f/c/s. no rash. No new ADR to Abx.     Heent-- No new vision, hearing or throat complaints.  No epistaxis or oral sores.  Denies odynophagia or dysphagia.  No flashers, floaters or eye pain. No odynophagia or dysphagia. No headache, photophobia or neck stiffness.  CV-- No chest pain, palpitation or syncope  Resp-- No SOB/cough/Hemoptysis  GI-  As above.No hematochezia, melena, or hematemesis. Denies jaundice or chronic liver disease.  -- No dysuria, hematuria, or flank pain.  Denies hesitancy, urgency or flank pain.  Lymph- no swollen lymph nodes in neck/axilla or groin.   Heme- No active bruising or bleeding; no Hx of DVT or PE.  MS-- no swelling or pain in the bones or joints of arms/legs.  No new back pain.  Neuro-- No acute focal weakness or numbness in the arms or legs.  No seizures.     Full 12 point review of systems reviewed and negative otherwise for acute complaints, except for above      PE:   /67   Pulse 120   Temp 97.8 °F (36.6 °C) (Axillary)   Resp 20   Ht 162.6 cm (64\")   Wt 83.5 kg (184 lb)   LMP  (LMP Unknown)   SpO2 92%   BMI 31.58 kg/m²       GENERAL: sleepy, in no acute distress.   HEENT: Normocephalic, atraumatic.  PERRL. EOMI. No conjunctival injection. No icterus. Oropharynx clear without evidence of thrush or exudate. No evidence of peridontal disease.    NECK: Supple without nuchal rigidity. No mass.  LYMPH: No cervical, axillary or inguinal lymphadenopathy.  HEART: RRR; No murmur, rubs, gallops.   LUNGS: diminished at bases but otherwise Clear to auscultation bilaterally without wheezing, rales, rhonchi. Normal respiratory effort. Nonlabored. No dullness.  ABDOMEN: Soft, mildly tender, nondistended.  bowel sounds  diminshed. No rebound or guarding. NO mass or HSM.  EXT:  No cyanosis, clubbing or edema. No cord.   MSK: FROM without joint effusions noted arms/legs.    SKIN: Warm and dry without cutaneous eruptions on Inspection/palpation.  "   NEURO: sleepy     PD catheter with no redness or purulent drainage.  Nontender at the exit site     IV without obvious redness or drainage     No peripheral stigmata/phenomenon of endocarditis       Laboratory Data    Results from last 7 days   Lab Units 12/09/21  0409 12/08/21  0527 12/07/21  0550   WBC 10*3/mm3 19.35* 21.79* 19.20*   HEMOGLOBIN g/dL 7.3* 8.1* 8.2*   HEMATOCRIT % 22.8* 24.1* 24.9*   PLATELETS 10*3/mm3 177 213 222     Results from last 7 days   Lab Units 12/09/21  0409   SODIUM mmol/L 127*   POTASSIUM mmol/L 3.2*   CHLORIDE mmol/L 89*   CO2 mmol/L 20.0*   BUN mg/dL 52*   CREATININE mg/dL 5.02*   GLUCOSE mg/dL 165*   CALCIUM mg/dL 8.7     Results from last 7 days   Lab Units 12/08/21  0527   ALK PHOS U/L 201*   BILIRUBIN mg/dL 0.7   ALT (SGPT) U/L 17   AST (SGOT) U/L 50*         Results from last 7 days   Lab Units 12/05/21  0649   CRP mg/dL 23.82*       Estimated Creatinine Clearance: 9.3 mL/min (A) (by C-G formula based on SCr of 5.02 mg/dL (H)).      Microbiology:      Radiology:  Imaging Results (Last 72 Hours)     Procedure Component Value Units Date/Time    XR Chest 1 View [[id number]] Collected: 12/06/21 1835     Updated: 12/08/21 1659    Narrative:      EXAMINATION: XR CHEST 1 VW-      INDICATION: Status post central line placement; R10.9-Unspecified  abdominal pain; T85.71XA-Infection and inflammatory reaction due to  peritoneal dialysis catheter, initial encounter; A41.9-Sepsis,  unspecified organism; K59.9-Functional intestinal disorder, unspecified.        COMPARISON: 11/28/2021.     FINDINGS: Portable chest reveals dialysis catheter identified on the  right with tip in the SVC. Nasogastric tube with tip in the stomach.  Prominence of the pulmonary vascularity with some increased markings in  the perihilar regions bilaterally. Small left pleural effusion.           Impression:      Placement of a deep line catheter on the right with tip in  the SVC. No pneumothorax. Prominence of the  perihilar regions  bilaterally with small left pleural effusion.     D:  12/06/2021  E:  12/07/2021     This report was finalized on 12/8/2021 4:56 PM by Dr. Aracely Oates MD.       FL C Arm During Surgery [[id number]] Collected: 12/07/21 0846     Updated: 12/07/21 2157    Narrative:      EXAMINATION: FLUOROSCOPY C-ARM DURING SURGERY-      INDICATION: Tunnelled dialysis catheter placement; R10.9-Unspecified  abdominal pain; T85.71XA-Infection and inflammatory reaction due to  peritoneal dialysis catheter, initial encounter; A41.9-Sepsis,  unspecified organism; K59.9-Functional intestinal disorder, unspecified.     COMPARISON: None.     FINDINGS: Dictation is to record four seconds of fluoroscopy time. Two  intraprocedural images obtained show the guidewire and dialysis catheter  placement, into the distal SVC or superior right atrium. No pneumothorax  is seen on these images. Please see the procedure report for full  details.       Impression:      Fluoroscopy provided for tunneled dialysis catheter  placement.     D:  12/07/2021  E:  12/07/2021        This report was finalized on 12/7/2021 9:53 PM by Dr. Xavier Cantor MD.       XR Abdomen KUB [[id number]] Collected: 12/06/21 0900     Updated: 12/06/21 1557    Narrative:      EXAMINATION: XR ABDOMEN KUB-      INDICATION: Assess for contrast progression; R10.9-Unspecified abdominal  pain; T85.71XA-Infection and inflammatory reaction due to peritoneal  dialysis catheter, initial encounter; A41.9-Sepsis, unspecified  organism.      COMPARISON: 12/04/2021.     FINDINGS: KUB reveals bowel gas pattern to be somewhat improving when  compared to the prior study. There is minimal contrast identified within  the transverse colon. Nasogastric tube identified with tip in the  stomach. Peritoneal dialysis catheter identified in the left lower  quadrant.       Impression:      Slight improvement seen in the gaseous distended loops of  bowel throughout the abdomen. Minimal  contrast identified in the  transverse colon. Nasogastric tube with tip in the stomach. No free  intraperitoneal air.     D:  12/06/2021  E:  12/06/2021     This report was finalized on 12/6/2021 3:54 PM by Dr. Aracely Oates MD.       FL Small Bowel Follow Through Single-Contrast [[id number]] Collected: 12/05/21 0806     Updated: 12/06/21 1533    Narrative:      EXAMINATION: FL SMALL BOWEL FOLLOW THROUGH SINGLE-CONTRAST - 12/04/2021     INDICATION: Abdominal distention     TECHNIQUE: Small bowel follow-through performed with 10 images and no  fluoroscopy.     COMPARISON: None       Impression:      FINDINGS/IMPRESSION:   imaging reveals dilated loops of small bowel  seen diffusely throughout the abdomen. Minimal air in the rectum. There  is a nasogastric tube tip in the stomach. Contrast is seen within the  stomach on the 30- minute image and 230-minute image with no advancement  through the small bowel. There is only minimal contrast seen within the  proximal small bowel on the 9 hour and 40-minute image with minimal  contrast remaining within the stomach in the fundus. The 17-hour image  reveals no advancement of the contrast. Findings most suggestive of a  small bowel obstruction.     DICTATED:   12/04/2021  EDITED/lfs:   12/05/2021     This report was finalized on 12/6/2021 3:29 PM by Dr. Aracely Oates MD.               Impression:       --acute abdominal pain.  Associated with nausea/vomiting, abnormal PD fluid concerning for  Peritonitis, ?CAPD  ; culture negative so far. Catheter exit site appears nonpurulent for now.  No outpatient cultures per patient. Transitioned to IP vancomycin/ceftaz empirically and back to systemic therapy 12/3  ; SBFT with SBO per radiology and Dr Andersen with surgery as above on 12/6, PD catheter also out and HD catheter placed     --Acute vomiting, nausea  As above      --End-stage renal disease with peritoneal dialysis previously, HD at present     --History C.  Difficile years ago per patient.  No diarrhea at present but at risk for relapse.     --abnormal CT scan with prominent common bile duct per radiology but without evidence for intrahepatic ductal dilation and normal transaminases/bilirubin on November 28.  Monitor exam and labs     --Chronic Coumadin    --paroxysmal Afib/RVR    PLAN:       --IV vancomycin, zosyn ,mycamine     --Check/review labs cultures and scans     --Partial history per nursing staff     --Discussed with microbiology     -- d/w pharmacy      --Highly complex set of issues with high risk for further serious morbidity and other serious sequela    --repeat dialysate for cultures  , fungal/AFB studies pending; d/w micro and no pathogen so far    -- repeat CT 12/3 and SBFT noted, surgery following;  On systemic therapy  And surgery 12/6 as above      Sawyer Llanos MD  12/9/2021

## 2021-12-09 NOTE — PROGRESS NOTES
Cardiology Progress Note      Reason for visit:    · Atrial fibrillation with RVR in setting of bacterial peritonitis    IDENTIFICATION: 81-year-old female who resides in [place], [place]    Active Hospital Problems    Diagnosis  POA   • **Peritonitis (HCC) [K65.9]  Yes     Priority: High   • Sepsis associated hypotension (HCC) [A41.9, I95.9]  Yes     Priority: High   • Paroxysmal atrial fibrillation (HCC) [I48.0]  Yes     Priority: High     · Diagnosed 2/2015  · CHADS-VASc = 5  · On Coumadin   · Echo (1/12/2021): LVEF 65%.  LVH.  Moderate AI.  · Beakthrough episodes of atrial fibrillation with RVR in setting of bacterial peritonitis 11/30/2021     • Type 2 diabetes mellitus (HCC) [E11.9]  Yes     Priority: High   • Chronic kidney disease, stage IV (severe) (HCC) [N18.4]  Yes     Priority: High     · IgA nephropathy with history of renal transplant, 2010.   · Patient on hemodialysis Monday, Wednesday, and Friday started July 2015.  · Hemodialysis discontinued 2/2017.     • Hypotension [I95.9]  Yes     Priority: Medium   • Ileus (HCC) [K56.7]  Yes     Priority: Medium   • Hyperlipidemia LDL goal <100 [E78.5]  Yes     Priority: Medium     · Reports intolerance to statin     • Hyperparathyroidism (HCC) [E21.3]  Yes     Priority: Medium   • Essential hypertension [I10]  Yes     Priority: Low            Patient undergoing hemodialysis second day in a row.  She is sleeping but arouses easily.  She still does not feel well.  She has went back into atrial fibrillation with RVR with rates in the 140s.           Vital Sign Min/Max for last 24 hours  Temp  Min: 97.6 °F (36.4 °C)  Max: 98.2 °F (36.8 °C)   BP  Min: 123/59  Max: 166/71   Pulse  Min: 79  Max: 119   Resp  Min: 18  Max: 20   SpO2  Min: 92 %  Max: 98 %   Flow (L/min)  Min: 2  Max: 2      Intake/Output Summary (Last 24 hours) at 12/9/2021 0818  Last data filed at 12/9/2021 0642  Gross per 24 hour   Intake 5468 ml   Output 2585 ml   Net 2883 ml           Physical  Exam  Constitutional:       General: She is sleeping.   Cardiovascular:      Rate and Rhythm: Tachycardia present. Rhythm irregularly irregular.   Pulmonary:      Effort: Pulmonary effort is normal.      Breath sounds: Decreased breath sounds present.   Musculoskeletal:      Right lower leg: Edema present.      Left lower leg: Edema present.   Skin:     General: Skin is warm and dry.      Coloration: Skin is pale.   Neurological:      Mental Status: She is oriented to person, place, and time and easily aroused.   Psychiatric:         Behavior: Behavior is cooperative.         Tele: Atrial fibrillation with RVR    Results Review (reviewed the patient's recent labs in the electronic medical record):     EKG (12/8/2021): NSR       ECHO (1/12/2021): LVEF 65%.  LVH.  Mild AS.    Result Review:  I have personally reviewed the results from the time of this admission to 12/9/2021 08:18 EST and agree with these findings:  [x]  Laboratory  []  Microbiology  [x]  Radiology  [x]  EKG/Telemetry   [x]  Cardiology/Vascular   []  Pathology  []  Old records  []  Other:  Most notable findings include: WBC 19.35, hemoglobin 7.3, hematocrit 22.8, BUN 52, creatinine 5.02, sodium 127, potassium 3.2    Results from last 7 days   Lab Units 12/09/21  0409 12/08/21  0527 12/07/21  0550 12/06/21  1929 12/06/21  1245 12/06/21  0539 12/06/21  0539 12/05/21  1020 12/05/21  0649 12/04/21  0947 12/03/21  0429   SODIUM mmol/L 127* 125* 129* 131*  --    < > 135*   < > 136 135*   < >   POTASSIUM mmol/L 3.2* 3.2* 3.7 4.1   < >   < > 2.9*   < > 2.9* 3.0*   < >   CHLORIDE mmol/L 89* 83* 88* 89*  --   --  91*  --  89* 91*  --    BUN mg/dL 52* 73* 49* 39*  --    < > 33*   < > 35* 36*   < >   CREATININE mg/dL 5.02* 7.94* 7.07* 6.59*  --    < > 7.23*   < > 7.02* 6.98*   < >   MAGNESIUM mg/dL 1.7 1.7 1.5*  --   --    < > 1.5*   < > 1.7  --    < >    < > = values in this interval not displayed.         Results from last 7 days   Lab Units 12/09/21  0408  12/08/21  0527 12/07/21  0550   WBC 10*3/mm3 19.35* 21.79* 19.20*   HEMOGLOBIN g/dL 7.3* 8.1* 8.2*   HEMATOCRIT % 22.8* 24.1* 24.9*   PLATELETS 10*3/mm3 177 213 222       Lab Results   Component Value Date    HGBA1C 4.90 05/19/2019       Lab Results   Component Value Date    CHOL 100 12/05/2021    TRIG 104 12/05/2021    HDL 52 03/04/2019     (H) 03/04/2019              Atrial fibrillation with RVR  · Episode occurred in the setting of bacterial peritonitis/sepsis  · Unable to tolerate p.o. dose of metoprolol and unable to tolerate IV Cardizem drip due to hypotension  · Metoprolol tartrate 25 mg twice daily  · Intermittent episodes of atrial fibrillation with RVR  · Started on IV amiodarone bolus to p.o. taper protocol 12/3/2021 with conversion to NSR  · Converted back to atrial fibrillation with RVR on 12/9/2021  · Coumadin on hold due to recent ileocolic resection for small bowel obstruction     Hypotension   · Likely component of sepsis from bacterial peritonitis  · Blood pressures have improved     Spontaneous bacterial peritonitis  · Treatment per ID and hospitalist  · Peritoneal dialysis catheter removed on 12/6/2021  · Transitioning to hemodialysis        End-stage renal failure on peritoneal dialysis  · Nephrology managing     Small bowel ileus  · Treatment per primary team  · Exploratory lap with ileocolic resection with Dr. Anderesn on 12/6/2021                   · Discontinue p.o. amiodarone and restart IV amiodarone with bolus to drip per protocol.  · Continue to hold Coumadin as hemoglobin hematocrit has decreased from yesterday.  If H&H stays stable and does not convert to NSR consider starting IV heparin tomorrow    Electronically signed by REY Galeana, 12/06/21, 10:46 AM EST.

## 2021-12-10 NOTE — PROGRESS NOTES
"Clinical Nutrition   Reason For Visit: Follow-up protocol, PN    Patient Name: Moni Wallace  YOB: 1940  MRN: [id number]  Date of Encounter: 12/10/21 11:10 EST  Admission date: 11/28/2021      RD recommends continuing current PPN regimen via peripheral IV:  D5%, AA5.5% @ 75 ml/hr. 250 ml 20% SMOF lipid daily.      Nutrition Assessment     Admission Problem List:  Nausea  Abdominal pain  Peritonitis  Sepsis  Ileus r/t peritoneal inflammation  pSBO  Metastatic adenocarcinoma      Applicable PMH:  T2DM  HTN  PAF  FM  ESRD on CAPD ongoing  C. Diff  hx  Vitamin D deficiency  Anemia  Hyperparathyroidism  S/p right kidney transplant (2010)      Applicable medical tests/procedures since admission:  (11/28) NGT to LWS initiated  (12/01) NGT removed - liquid diet  (12/3) NGT to LWS initiated, NPO  (12/4) SBFT - no distal progression of contrast - concern for obstruction  (12/6) s/p ex lap, open resection terminal ileum and cecum with primary ileocolic anastomosis, removal of peritoneal dialysis catheter;  Tunneled dialysis catheter (right IJ) placed  (12/8) HD initiated  (12/9) s/p placement of left IJ triple lumen central line placed (2/2 having multiple peripheral lines)       Reported/Observed/Food/Nutrition Related History   12/10) RD notes plan to clamp NG tube today and check residuals per surgeon. Also note oncology following and pt currently wishes to pursue a palliative course. Still awaiting return of bowel function - no BM.      12/8) Awaiting return of bowel function. Continue PPN per Dr. Andersen.  Per I/Os within past 24 hours: NGT output = 550 ml      12/5) Per Dr. Andersen (12/5): \"We will tentatively plan for diagnostic laparoscopy with possible exploratory laparotomy tomorrow unless she has rapid improvement.  Peritoneal dialysis will not be an option for several weeks after surgery, we may potentially have to remove her peritoneal dialysis catheter tomorrow depending on intraoperative findings.  " "Should make arrangements for potential transition to HD\"    Per I/Os within past 24 hours:  NGT output = 2325 ml  Stool output = x1    RD spoke with Dr. Andersen who agrees patient needs PN. Recommends PPN via peripheral line and not TPN/PICC as patient has hx of multiple fistulas and will possibly need HD cath and HD if she has surgery tomorrow. RD spoke with Dr. Stovall who is also agreeable to PPN. RD spoke with nephrologist who is okay with starting PPN @ 75 ml/hr.        12/3) Pt rpt 3/4 usual intake 1 week PTA. Currently NPO. per I/Os within past 24 hrs NGT output 100 ml  BM 12/1 12/1) Per I/Os within past 24 hours: NGT output = 600 ml; No BM documented this admission.    11/29) Patient reports having poor appetite and poor PO intake starting earlier this year with resulting unintentional weight loss from >200 lbs down to ~160 lbs as of last month. Pt states she was prescribed a medication to improve appetite/PO intake and this has helped - she has been eating much better. She isn't sure if she has had any recent unintentional weight loss. Allergic to rice. Denies difficulty chewing/swallowing. Dislikes and declines ONS drinks. Last solid food intake evening of (11/27).  NGT output = 800 ml within past 24 hours per I/Os.    Anthropometrics   Height: 64 in  Weight: 182 lbs (bed scale 12/8)  BMI: 31.3  BMI classification: Obese Class I: 30-34.9kg/m2   IBW: 120 lbs    UBW: Per EMR  166 lbs (10/11/21) MDOV  200 lbs (7/7/21) bed scale  208 lbs (1/12/21) bed scale    Per RD note (11/29):  Weight change: Unintentional weight loss of 42 lbs 1/12/21-10/11/21. Unclear contribution of fluid status. Need current measured weight to determine weight changes within the past 1.5 months.    Labs reviewed   Labs reviewed: Yes    Results from last 7 days   Lab Units 12/10/21  0502 12/09/21  1937 12/09/21  1035 12/09/21  0409 12/09/21  0409 12/08/21  0527 12/08/21  0527 12/07/21  0550 12/07/21  0550 12/06/21  1245 " 12/06/21  0539   GLUCOSE mg/dL 226*  --   --   --  165*  --  163*   < > 286*   < > 123*   BUN mg/dL 40*  --   --   --  52*  --  73*   < > 49*   < > 33*   CREATININE mg/dL 3.66*  --   --   --  5.02*  --  7.94*   < > 7.07*   < > 7.23*   SODIUM mmol/L 128*  --   --   --  127*  --  125*   < > 129*   < > 135*   CHLORIDE mmol/L 94*  --   --   --  89*  --  83*   < > 88*   < > 91*   POTASSIUM mmol/L 3.7  --   --   --  3.2*  --  3.2*   < > 3.7   < > 2.9*   PHOSPHORUS mg/dL 1.2* 1.3* 0.3*   < > 1.1*   < > 2.2*   < > 2.9  --  3.6   MAGNESIUM mg/dL 1.7  --   --   --  1.7  --  1.7   < > 1.5*  --  1.5*   ALT (SGPT) U/L  --   --   --   --   --   --  17  --  7  --  <5    < > = values in this interval not displayed.       Medications reviewed   Medications reviewed: Yes  Scheduled: antibiotic, KCl, sensipar, calcitriol, insulin, protonix  GTT: dilaudid PCA  PRN: zofran, potassium, phosphorus    Nutrition Focused Physical Exam   12/3)  Unable to perform exam due to fluid accumulation  Note poor tone, likely loss of lean mass could be detected in calf/thigh. Difficult to evaluate upper body ? lost lean mass vs fluid/fat accumulation w lack of tone.     Nutrition Needs Assessment (12/5)   Height used: 64 in  Weight used: 179 lbs/81.5 kg (actual wt);  120 lbs/54.5 kg (IBW)    Estimated calorie needs: ~1600 calories daily  18-20 kcal/kg actual wt = 8716-1367    Estimated protein needs: ~98 g protein daily  1.2 g/kg actual wt = 98  2.0 g/kg IBW = 109      Current Nutrition Prescription   PO: NPO Diet (since 12/6)    PN: D5%, AA5.5% @ 75 ml/hr. 250 ml 20% SMOF lipid daily.  Route: peripheral IV  Nutrition provided at goal rate: 1.8 L, 1202 calories (75% est needs), 99 g protein (101% est needs), GIR = 0.8 mg/kg/min.    Average PO intake: insufficient data (NPO)  12/3) 75% x 2 meals on Full Liquid, 50% x 5 meals recorded on Clear Liquid  ---NPO/clear liquids/full liquids since admission    Nutrition Diagnosis     11/29, 12/1, 12/3, 12/5,  12/8, 12/10  Problem Inadequate oral intake   Etiology N/V in setting of ileus/bowel obstruction   Signs/Symptoms Minimal PO intake   Status: ongoing - PPN meeting majority of calorie/protein needs since 12/5    Goal:   General: Nutrition support treatment  PO: Initiate diet as medically appropriate  EN/PN: Maintain PPN    Intervention   Intervention: Follow treatment progress, Care plan reviewed, Await begin PO, Nutrition support order placed    -Continue current PPN regimen.    Monitoring/Evaluation:   Monitoring/Evaluation: Per protocol, I&O, PO intake, Pertinent labs, PN delivery/tolerance, Weight, GI status, Symptoms, POC/GOC    Virginia Boykin, FRANCISCO  Time Spent: 45 min

## 2021-12-10 NOTE — CONSULTS
Palliative Care Initial Consult Note    Patient Name:  Moni Wallace   : 1940   Sex: female    Patient Care Team:  Alex Walters MD as PCP - General (Internal Medicine)  Jeff Joe IV, MD as Consulting Physician (Cardiology)  Donny Hodges MD as Consulting Physician (Nephrology)  Cory Castillo MD as Surgeon (General Surgery)  Slim Wick MD    Advance care planning discussed: yes   Code Status: DNR Limited   Advance Directive: yes   Surrogate decision maker: Yadiel Wallace (Son)   [phone] (Mobile)    Referring Provider: Yehuda Koenig MD   Reason for Consult: goals of care     Subjective     Ms Moni Wallace is a 81 y.o. female admitted to [facility] on 2021 with DX peritonitis with small bowel obstruction with chronic peritoneal inflammation from peritoneal dialysis.  For her obstruction, Dr. Andersen performed laparotomy with ileocecal ectomy on 2021.  12/3/2021 CT abdomen pelvis without contrast showed the obstruction.  PMH includes  end-stage renal disease, hypotension with sepsis, paroxysmal atrial fibrillation, diabetes.Pathology returned pos for adenocarcinoma. Per oncology note dated yesterday any tx would be palliative only.     Palliative was consulted to further address goals of care.     Review of Systems   Unable to perform ROS: Other   very sleepy, mild confusion     History  Past Medical History:   Diagnosis Date   • Adenomatous colon polyp    • Chronic kidney disease    • Clostridium difficile infection 2017   • Diabetes mellitus (HCC)    • Gastric polyps    • Hypothyroidism    • IgA nephropathy, acute    • Kidney transplanted    • Macular degeneration    • Paroxysmal atrial fibrillation (HCC)    • Tubular adenoma     excision    • Ulcer of gastric fundus    • Vitamin D deficiency      Past Surgical History:   Procedure Laterality Date   • BREAST BIOPSY Left    • CARPAL TUNNEL RELEASE     • CARPAL TUNNEL RELEASE Right    • CATARACT  EXTRACTION     • CATARACT EXTRACTION Bilateral    • COLON RESECTION SMALL BOWEL N/A 12/6/2021    Procedure: COLON RESECTION SMALL BOWEL;  Surgeon: Robin Andersen MD;  Location:  [facility];  Service: General;  Laterality: N/A;   • DIAGNOSTIC LAPAROSCOPY     • DIALYSIS FISTULA CREATION     • EXPLORATORY LAPAROTOMY N/A 12/6/2021    Procedure: DIAGNOSTIC LAPAROSCOPY;  Surgeon: Robin Andersen MD;  Location:  [facility];  Service: General;  Laterality: N/A;   • HERNIA REPAIR  85 and 86   • INSERTION HEMODIALYSIS CATHETER Right 12/6/2021    Procedure: TUNNELED DIALYSIS CATHETER INSERTION;  Surgeon: Rolando Begum MD;  Location:  [facility];  Service: Vascular;  Laterality: Right;   • KNEE ARTHROSCOPY INCISION AND DRAINAGE OF KNEE Right 5/18/2019    Procedure: KNEE ARTHROSCOPY INCISION AND DRAINAGE OF KNEE;  Surgeon: Paco Lester MD;  Location:  [facility];  Service: Orthopedics   • LAPAROSCOPIC TUBAL LIGATION  1985   • TOTAL KNEE ARTHROPLASTY     • TOTAL KNEE ARTHROPLASTY      Left knee    • TRANSPLANTATION RENAL  2010   • TRANSPLANTATION RENAL Right    • TRIGGER FINGER RELEASE     • TUBAL ABDOMINAL LIGATION     • UPPER GASTROINTESTINAL ENDOSCOPY  05/20/2013   • VENOUS ACCESS DEVICE (PORT) INSERTION N/A 5/23/2019    Procedure: INSERTION GROSHONG;  Surgeon: Rolando Begum MD;  Location:  [facility];  Service: General     Current Facility-Administered Medications   Medication Dose Route Frequency Provider Last Rate Last Admin   • acetaminophen (TYLENOL) tablet 650 mg  650 mg Nasogastric Q6H PRN Robin Andersen MD   650 mg at 12/06/21 0815   • acetaminophen (TYLENOL) tablet 650 mg  650 mg Nasogastric Q6H Robin Andersen MD   650 mg at 12/10/21 1228   • Adult Peripheral 2-in-1 TPN   Intravenous Continuous Jensen Ramirez, PharmD 75 mL/hr at 12/09/21 1818 New Bag at 12/09/21 1818   • Adult Peripheral 2-in-1 TPN   Intravenous Continuous Jensen Ramirez, PharmD       • albumin human 25 % IV SOLN   - ADS Override Pull            • albumin human 25 % IV SOLN  - ADS Override Pull            • albuterol (PROVENTIL) nebulizer solution 0.083% 2.5 mg/3mL  2.5 mg Nebulization Q4H PRN Robin Andersen MD       • amiodarone 360 mg in 200 mL D5W infusion  0.5 mg/min Intravenous Continuous Lily Rae APRN 16.67 mL/hr at 12/10/21 0544 0.5 mg/min at 12/10/21 0544   • benzocaine-menthol (CEPACOL) lozenge 1 lozenge  1 lozenge Mouth/Throat Q2H PRN Robin Andersen MD   1 lozenge at 11/30/21 1829   • calcitriol (ROCALTROL) capsule 0.25 mcg  0.25 mcg Oral Once per day on Mon Wed Fri Albina Will MD   0.25 mcg at 12/10/21 0758   • camphor-menthol (SARNA) 0.5-0.5 % lotion   Topical Q8H PRN Jazmin Duran APRN       • cinacalcet (SENSIPAR) tablet 60 mg  60 mg Oral Nightly Robin Andersen MD       • diphenhydrAMINE (BENADRYL) injection 25 mg  25 mg Intravenous Q6H PRN Robin Andersen MD       • epoetin blanca-epbx (RETACRIT) injection 10,000 Units  10,000 Units Subcutaneous Once per day on Mon Wed Fri Albina Will MD   10,000 Units at 12/10/21 1229   • Fat Emul Fish Oil/Plant Based (SMOFLIPID) 20 % emulsion 250 mL  250 mL Intravenous Q24H (TPN) Robin Andersen MD 20.8 mL/hr at 12/09/21 1823 250 mL at 12/09/21 1823   • HYDROmorphone (DILAUDID) PCA 1 mg/mL syringe   Intravenous Continuous Luis Carlos Koenig MD   Currently Infusing at 12/10/21 1219   • hydrOXYzine (ATARAX) tablet 25 mg  25 mg Oral TID PRN Luis Carlos Koenig MD   25 mg at 12/10/21 0146   • insulin lispro (humaLOG) injection 2-7 Units  2-7 Units Subcutaneous Q6H Robin Andersen MD   3 Units at 12/10/21 1228   • lansoprazole (FIRST) oral suspension 30 mg  30 mg Nasogastric Q AM Robin Andersen MD   30 mg at 12/10/21 0537   • metoprolol tartrate (LOPRESSOR) tablet 25 mg  25 mg Nasogastric Q12H Robin Andersen MD   25 mg at 12/10/21 0758   • micafungin 100 mg/100 mL 0.9% NS IVPB (mbp)  100 mg Intravenous Q24H Romi  Sawyer VO MD   100 mg at 12/10/21 1015   • naloxone (NARCAN) injection 0.1 mg  0.1 mg Intravenous Q5 Min PRN Robin Andersen MD       • ondansetron (ZOFRAN) tablet 4 mg  4 mg Nasogastric Q6H PRN Robin Andersen MD        Or   • ondansetron (ZOFRAN) injection 4 mg  4 mg Intravenous Q6H PRN Robin Andersen MD   4 mg at 12/10/21 0146   • Pharmacy to Dose TPN   Does not apply Continuous PRN Robin Andersen MD       • Pharmacy to dose vancomycin   Does not apply Continuous PRN Robin Andersen MD       • phenol (CHLORASEPTIC) 1.4 % liquid 2 spray  2 spray Mouth/Throat Q2H PRN Robin Andersen MD   2 spray at 12/01/21 0223   • piperacillin-tazobactam (ZOSYN) 3.375 g in iso-osmotic dextrose 50 ml (premix)  3.375 g Intravenous Q12H Sawyer Llanos MD   3.375 g at 12/10/21 0538   • potassium & sodium phosphates (PHOS-NAK) 280-160-250 MG packet - for Phosphorus 1.3-2.5 mg/dL  1 packet Oral BID PRN Luis Carlos Koenig MD   1 packet at 12/09/21 2004   • potassium & sodium phosphates (PHOS-NAK) oral packet  1 packet Nasogastric TID AC SumanAlbina jesus MD   1 packet at 12/10/21 0758   • potassium phosphate 21 mmol in sodium chloride 0.9 % 250 mL infusion  21 mmol Intravenous PRN Albina Will MD 41.7 mL/hr at 12/10/21 1015 21 mmol at 12/10/21 1015    Or   • potassium phosphate 15 mmol in sodium chloride 0.9 % 100 mL infusion  15 mmol Intravenous PRN Albina Will MD        Or   • potassium phosphate 9 mmol in sodium chloride 0.9 % 100 mL infusion  9 mmol Intravenous PRN Albina Will MD       • prochlorperazine (COMPAZINE) injection 5 mg  5 mg Intravenous Q6H PRN Robin Andersen MD       • sodium bicarbonate tablet 650 mg  650 mg Nasogastric TID Suman, Albina Landis MD   650 mg at 12/10/21 1228   • Sodium Chloride (PF) 0.9 % 10 mL  10 mL Intravenous PRN Robin Andersen MD       • sodium chloride 0.9 % flush 10 mL  10 mL Intravenous Q12H Robin Andersen MD   10 mL at  12/09/21 2011   • sodium chloride 0.9 % flush 10 mL  10 mL Intravenous PRN Robin Andersen MD       • sodium chloride 0.9 % flush 10 mL  10 mL Intravenous Q12H Luis Carlos Koenig MD   10 mL at 12/09/21 2011   • sodium chloride 0.9 % flush 10 mL  10 mL Intravenous Q12H Luis Carlos Koenig MD   10 mL at 12/09/21 2010   • sodium chloride 0.9 % flush 10 mL  10 mL Intravenous Q12H Luis Carlos Koenig MD   10 mL at 12/10/21 1024   • sodium chloride 0.9 % flush 10 mL  10 mL Intravenous PRN Luis Carlos Koenig MD       • sodium chloride 0.9 % flush 20 mL  20 mL Intravenous PRN Luis Carlos Koenig MD       • temazepam (RESTORIL) capsule 15 mg  15 mg Nasogastric Nightly PRN Robin Andersen MD   15 mg at 12/10/21 0146   • vancomycin (dosing per levels)   Does not apply Daily Robin Andersen MD         Adult Peripheral 2-in-1 TPN, , Last Rate: 75 mL/hr at 12/09/21 1818  Adult Peripheral 2-in-1 TPN,   amiodarone, 0.5 mg/min, Last Rate: 0.5 mg/min (12/10/21 0544)  HYDROmorphone HCl-NaCl,   Pharmacy to Dose TPN,   Pharmacy to dose vancomycin,       •  acetaminophen  •  albuterol  •  benzocaine-menthol  •  camphor-menthol  •  diphenhydrAMINE  •  hydrOXYzine  •  naloxone  •  ondansetron **OR** ondansetron  •  Pharmacy to Dose TPN  •  Pharmacy to dose vancomycin  •  phenol  •  potassium & sodium phosphates  •  potassium phosphate infusion greater than 15 mMoles **OR** potassium phosphate **OR** potassium phosphate  •  prochlorperazine  •  Sodium Chloride (PF)  •  sodium chloride  •  sodium chloride  •  sodium chloride  •  temazepam  Allergies   Allergen Reactions   • Hydrocodone Itching   • Rice Swelling   • Statins Other (See Comments)     myalgia   • Codeine Rash   • Sulfa Antibiotics Rash     Family History   Problem Relation Age of Onset   • Leukemia Mother    • Stroke Father    • Dementia Sister    • Kidney disease Sister    • Diabetic kidney disease Sister    • Lung cancer Brother    • Breast cancer Neg Hx    •  Ovarian cancer Neg Hx    • Hypertension Sister    • Dementia Sister      Social History     Socioeconomic History   • Marital status:    Tobacco Use   • Smoking status: Never Smoker   • Smokeless tobacco: Never Used   Vaping Use   • Vaping Use: Never used   Substance and Sexual Activity   • Alcohol use: No   • Drug use: No   • Sexual activity: Defer       Objective     Vital Signs  Temp:  [98 °F (36.7 °C)-98.4 °F (36.9 °C)] 98.4 °F (36.9 °C)  Heart Rate:  [] 87  Resp:  [18] 18  BP: (106-163)/(49-78) 106/49    PPS:  50% based on the following measures:   Ambulation: Mainly sit or lie down  Activity and Evidence of Disease: Unable to do any work, extensive evidence of disease  Self-Care: Considerable assistance required  Intake: Normal or reduced   LOC: Full or confusion    Physical Exam:  Vitals and nursing note reviewed.  General Appearance: chronically ill appearing, alert but confused, able to make her needs known   HEENT: Normocephalic; atraumatic. PERTL, Conjunctivae clear,  mm moist   Neck:   supple; trachea midline  Lungs: BBS = CTA   Heart: RRR,  peripheral pulses pos, neg for edema   Abdomen: soft, round, pain with palpation. BS +   Extremities: MOONEY willfully,   Skin: Warm and dry,   Neurological: Alert, mild confusion,   Psych: calm, cooperative     Results Review:   Lab Results   Component Value Date    HGBA1C 4.90 05/19/2019       Lab Results   Component Value Date    GLUCOSE 226 (H) 12/10/2021    BUN 40 (H) 12/10/2021    CREATININE 3.66 (H) 12/10/2021    EGFRIFNONA 12 (L) 12/10/2021    EGFRIFAFRI  12/10/2021      Comment:      <15 Indicative of kidney failure.    BCR 10.9 12/10/2021    K 3.7 12/10/2021    CO2 18.0 (L) 12/10/2021    CALCIUM 9.0 12/10/2021    PROTENTOTREF 5.9 (L) 02/13/2015    ALBUMIN 3.40 (L) 12/08/2021    LABIL2 1.0 02/13/2015    AST 50 (H) 12/08/2021    ALT 17 12/08/2021       WBC   Date Value Ref Range Status   12/10/2021 20.44 (H) 3.40 - 10.80 10*3/mm3 Final     RBC    Date Value Ref Range Status   12/10/2021 2.60 (L) 3.77 - 5.28 10*6/mm3 Final     Hemoglobin   Date Value Ref Range Status   12/10/2021 8.3 (L) 12.0 - 15.9 g/dL Final     Hematocrit   Date Value Ref Range Status   12/10/2021 24.0 (L) 34.0 - 46.6 % Final     MCV   Date Value Ref Range Status   12/10/2021 92.3 79.0 - 97.0 fL Final     MCH   Date Value Ref Range Status   12/10/2021 31.9 26.6 - 33.0 pg Final     MCHC   Date Value Ref Range Status   12/10/2021 34.6 31.5 - 35.7 g/dL Final     RDW   Date Value Ref Range Status   12/10/2021 16.4 (H) 12.3 - 15.4 % Final     RDW-SD   Date Value Ref Range Status   12/10/2021 53.0 37.0 - 54.0 fl Final     MPV   Date Value Ref Range Status   12/10/2021 10.6 6.0 - 12.0 fL Final     Platelets   Date Value Ref Range Status   12/10/2021 181 140 - 450 10*3/mm3 Final     Neutrophils Absolute   Date Value Ref Range Status   12/09/2021 16.06 (H) 1.70 - 7.00 10*3/mm3 Final     Eosinophils Absolute   Date Value Ref Range Status   12/09/2021 0.19 0.00 - 0.40 10*3/mm3 Final     Basophils Absolute   Date Value Ref Range Status   12/09/2021 0.00 0.00 - 0.20 10*3/mm3 Final     nRBC   Date Value Ref Range Status   12/09/2021 2.0 (H) 0.0 - 0.2 /100 WBC Final       Peritonitis (HCC)    Paroxysmal atrial fibrillation (HCC)    Essential hypertension    Type 2 diabetes mellitus (HCC)    Chronic kidney disease, stage IV (severe) (HCC)    Hyperparathyroidism (HCC)    Hyperlipidemia LDL goal <100    Sepsis associated hypotension (HCC)    Ileus (HCC)    Hypotension      Assessment/Plan: 81 yoF with PMH ESRD, had been on home peritoneal dialysis, admitted with peritonitis/SBO and 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 Continues IV ABX post op;     Pain: scheduled Tylenol, dilaudid PCA for pain control.   Dyspnea: room air   Nausea/Vomiting: zofran 4 mg q 6 hr PRN   Anxiety/Agitation:  Confusion/Delerium:    Conversation with pt/son Sawyer at bedside this morning. Pt states she does not  "want to do aggressive tx/chemo but wan ts to go home as soon as possible. Son supports this as being a previous stated wish. He also reports she is very familiar with hospice care and would want that.  Other children are coming to hospital later today to further discuss options. Consult placed to home hospice liaison to meet with family.     Called to bedside at 1530 for c/o itching unrelieved by meds. On arrival she reports \"itching all over\", per exam there is no erythema/pruritis/welting, there are scratch marks across trunk/shoulders. Pt reports she is allergic to benadryl (I have DCd this) and that atarax was used early this am but only made it worse. I have placed order for low dose Doxepin 10 mg and for sarna lotion PRN.     Educated patient/family about using palliative approach with advanced age and multiple chronic disease processes that cannot be reversed.     Medication adjustments as above. Thank you for this consult and allowing us to participate in patient's plan of care. Palliative Care Team will continue to follow patient. Please call for needs    Total Visit Time:  45   Face to Face Time: 30     Jazmin Duran, APRN  [phone]  12/10/21  16:00 EST                                                                          "

## 2021-12-10 NOTE — PROGRESS NOTES
[facility] Medicine Services  PROGRESS NOTE    Patient Name: Moni Wallace  : 1940  MRN: [id number]    Date of Admission: 2021  Primary Care Physician: Alex Walters MD    Subjective   Subjective     CC:  F/U SBO    HPI:  Pain reasonably controlled today, no dyspnea currently, no palpitations during visit. No bm yet    ROS:  Gen- No fevers, chills  CV- No chest pain, palpitations  Resp- No cough, dyspnea  GI- No N/V/D, abd pain  MSK-+LBP (chronic)     Objective   Objective     Vital Signs:   Temp:  [97.6 °F (36.4 °C)-98.4 °F (36.9 °C)] 98.4 °F (36.9 °C)  Heart Rate:  [] 87  Resp:  [18] 18  BP: (106-163)/(49-96) 106/49     Physical Exam:  Constitutional: No acute distress, awake, alert, in dialysis, no distress, sitting upright normal work of breathing  HENT: NCAT, mucous membranes moist, NG in place   Respiratory: Clear to auscultation bilaterally, respiratory effort normal   Cardiovascular:  Regular rate, irregular rhythm, no murmurs, rubs, or gallops  Gastrointestinal: Positive bowel sounds, soft, nontender, nondistended  Musculoskeletal: No bilateral ankle edema  Psychiatric: Appropriate affect, cooperative  Neurologic: Oriented x 3,  speech clear  Skin: No rashes, tunneled dialysis catheter in place R chest, abdomeinal incision sites c/d/i     Results Reviewed:  LAB RESULTS:      Lab 12/10/21  0501 21  0409 21  0527 21  0550 21  1929 21  1928 21  0539 21  0539 21  1643 21  0649 21  0420   WBC 20.44* 19.35* 21.79* 19.20*  --  23.51*   < > 14.56*  --   --    < >   HEMOGLOBIN 8.3* 7.3* 8.1* 8.2*  --  8.6*   < > 8.3*  --   --    < >   HEMATOCRIT 24.0* 22.8* 24.1* 24.9*  --  25.9*   < > 25.7*  --   --    < >   PLATELETS 181 177 213 222  --  232   < > 197  --   --    < >   NEUTROS ABS  --  16.06*  --  16.37*  --  20.68*  --   --   --   --   --    IMMATURE GRANS (ABS)  --   --   --  1.03*  --  1.25*  --   --    --   --   --    LYMPHS ABS  --   --   --  1.01  --  0.81  --   --   --   --   --    MONOS ABS  --   --   --  0.74  --  0.66  --   --   --   --   --    EOS ABS  --  0.19  --  0.00  --  0.03  --   --   --   --   --    MCV 92.3 99.6* 96.4 96.9  --  96.3   < > 98.8*  --   --    < >   CRP  --   --   --   --   --   --   --   --   --  23.82*  --    PROTIME  --   --  14.0 15.2* 15.6*  --   --  17.7* 22.7*  --    < >    < > = values in this interval not displayed.         Lab 12/10/21  0502 12/09/21  1937 12/09/21  1035 12/09/21  0409 12/08/21  0527 12/07/21  0550 12/07/21  0550 12/06/21  1929 12/06/21  1245 12/06/21  0539 12/06/21  0539 12/05/21  1956 12/05/21  1643   SODIUM 128*  --   --  127* 125*  --  129* 131*  --   --  135*  --   --    POTASSIUM 3.7  --   --  3.2* 3.2*  --  3.7 4.1   < >  --  2.9*   < >  --    CHLORIDE 94*  --   --  89* 83*  --  88* 89*  --   --  91*  --   --    CO2 18.0*  --   --  20.0* 21.0*  --  22.0 19.0*  --   --  26.0  --   --    ANION GAP 16.0*  --   --  18.0* 21.0*  --  19.0* 23.0*  --   --  18.0*  --   --    BUN 40*  --   --  52* 73*  --  49* 39*  --   --  33*  --   --    CREATININE 3.66*  --   --  5.02* 7.94*  --  7.07* 6.59*  --   --  7.23*  --   --    GLUCOSE 226*  --   --  165* 163*  --  286* 294*  --   --  123*  --   --    CALCIUM 9.0  --   --  8.7 8.3*  --  8.4* 8.1*  --   --  8.1*  --   --    IONIZED CALCIUM  --   --   --   --   --   --  1.10*  --   --   --  1.06*  --  1.06*   MAGNESIUM 1.7  --   --  1.7 1.7  --  1.5*  --   --   --  1.5*  --   --    PHOSPHORUS 1.2* 1.3* 0.3* 1.1* 2.2*   < > 2.9  --   --    < > 3.6  --   --     < > = values in this interval not displayed.         Lab 12/08/21  0527 12/07/21  0550 12/06/21  0539 12/05/21  0649 12/04/21  0947   TOTAL PROTEIN 5.4* 6.7 6.7 6.8 5.6*   ALBUMIN 3.40* 3.70 4.20 3.80 3.30*   GLOBULIN 2.0 3.0 2.5 3.0 2.3   ALT (SGPT) 17 7 <5 <5 <5   AST (SGOT) 50* 25 20 13 13   BILIRUBIN 0.7 0.5 0.5 0.3 0.2   ALK PHOS 201* 136* 117 109 108          Lab 12/08/21  0527 12/07/21  0550 12/06/21  1929 12/06/21  0539 12/05/21  1643   PROTIME 14.0 15.2* 15.6* 17.7* 22.7*   INR 1.11 1.24* 1.29* 1.51* 2.10*         Lab 12/05/21  0649   CHOLESTEROL 100   TRIGLYCERIDES 104         Lab 12/09/21  1035 12/09/21  0409 12/05/21  1020 12/05/21  1020   IRON  --  45  --   --    IRON SATURATION  --  35  --   --    TIBC  --  130*  --   --    TRANSFERRIN  --  87*  --   --    ABO TYPING O  --    < > O   RH TYPING Negative  --    < > Negative   ANTIBODY SCREEN Negative  --   --  Negative    < > = values in this interval not displayed.         Brief Urine Lab Results  (Last result in the past 365 days)      Color   Clarity   Blood   Leuk Est   Nitrite   Protein   CREAT   Urine HCG        11/28/21 1647 Yellow   Turbid   Moderate (2+)   Large (3+)   Negative   >=300 mg/dL (3+)                 Microbiology Results Abnormal     Procedure Component Value - Date/Time    Fungus Culture - Body Fluid, Peritoneum [[id number]] Collected: 12/03/21 1043    Lab Status: Preliminary result Specimen: Body Fluid from Peritoneum Updated: 12/10/21 1046     Fungus Culture No fungus isolated at 1 week    AFB Culture - Body Fluid, Peritoneum [[id number]] Collected: 12/03/21 1040    Lab Status: Preliminary result Specimen: Body Fluid from Peritoneum Updated: 12/10/21 1046     AFB Culture No AFB isolated at 1 week     AFB Stain No acid fast bacilli seen on concentrated smear    Blood Culture - Blood, Hand, Left [[id number]]  (Normal) Collected: 12/03/21 1318    Lab Status: Final result Specimen: Blood from Hand, Left Updated: 12/08/21 1515     Blood Culture No growth at 5 days    Narrative:      Aerobic Only    Blood Culture - Blood, Hand, Left [[id number]]  (Normal) Collected: 12/03/21 1230    Lab Status: Final result Specimen: Blood from Hand, Left Updated: 12/08/21 1330     Blood Culture No growth at 5 days    Fungus Smear - Body Fluid, Peritoneum [[id number]] Collected: 12/03/21 1042    Lab Status: Final  result Specimen: Body Fluid from Peritoneum Updated: 12/06/21 1559     Fungal Stain No fungal elements seen    Body Fluid Culture - Body Fluid, Peritoneum [[id number]] Collected: 12/02/21 1850    Lab Status: Final result Specimen: Body Fluid from Peritoneum Updated: 12/05/21 0736     Body Fluid Culture No growth at 3 days     Gram Stain Many (4+) WBCs seen      No organisms seen    Blood Culture - Blood, Wrist, Left [[id number]]  (Normal) Collected: 11/28/21 1023    Lab Status: Final result Specimen: Blood from Wrist, Left Updated: 12/03/21 1046     Blood Culture No growth at 5 days    Blood Culture - Blood, Wrist, Left [[id number]]  (Normal) Collected: 11/28/21 1023    Lab Status: Final result Specimen: Blood from Wrist, Left Updated: 12/03/21 1046     Blood Culture No growth at 5 days    Body Fluid Culture - Body Fluid, Peritoneum [[id number]] Collected: 11/28/21 1214    Lab Status: Final result Specimen: Body Fluid from Peritoneum Updated: 12/01/21 1239     Body Fluid Culture No growth at 3 days     Gram Stain Few (2+) WBCs seen      No organisms seen    Urine Culture - Urine, Urine, Clean Catch [[id number]]  (Normal) Collected: 11/28/21 1647    Lab Status: Final result Specimen: Urine, Clean Catch Updated: 11/29/21 2255     Urine Culture No growth    COVID PRE-OP / PRE-PROCEDURE SCREENING ORDER (NO ISOLATION) - Swab, Nasopharynx [[id number]]  (Normal) Collected: 11/28/21 1558    Lab Status: Final result Specimen: Swab from Nasopharynx Updated: 11/28/21 2048    Narrative:      The following orders were created for panel order COVID PRE-OP / PRE-PROCEDURE SCREENING ORDER (NO ISOLATION) - Swab, Nasopharynx.  Procedure                               Abnormality         Status                     ---------                               -----------         ------                     COVID-19, APTIMA PANTHER...[[id number]]  Normal              Final result                 Please view results for these tests on the individual  orders.    COVID-19, APTIMA PANTHER [facility] IN-HOUSE NP/OP SWAB IN UTM/VTM/SALINE TRANSPORT MEDIA 24HR TAT - Swab, Nasopharynx [[id number]]  (Normal) Collected: 11/28/21 1558    Lab Status: Final result Specimen: Swab from Nasopharynx Updated: 11/28/21 2048     COVID19 Not Detected    Narrative:      Fact sheet for providers: https://www.fda.gov/media/677990/download     Fact sheet for patients: https://www.fda.gov/media/563636/download    Test performed by RT PCR.          XR Chest 1 View    Result Date: 12/9/2021  CHEST X-RAY, 12/9/2021  HISTORY:  Reposition left IJ central line.  TECHNIQUE: AP portable chest x-ray (19:15) COMPARISON: *  Chest x-ray, 12/9/2021 (13:55) FINDINGS: Left IJ central line tip in the low SVC near its junction with the right atrium. No pneumothorax. Right IJ dialysis catheter in the low SVC. NG tube below the diaphragm. Low lung volumes with mild bibasilar atelectasis, unchanged. Lungs otherwise clear. Pulmonary vascularity remains normal.     Impression: Left IJ central line, right IJ dialysis catheter in NG tube are in good position. No pneumothorax. Signer Name: Rolando Whiteside MD  Signed: 12/9/2021 7:45 PM  Workstation Name: [email or web address]  [facility]    XR Chest 1 View    Result Date: 12/9/2021  EXAMINATION: XR CHEST 1 VW-  INDICATION: s/p left IJ central line; R10.9-Unspecified abdominal pain; T85.71XA-Infection and inflammatory reaction due to peritoneal dialysis catheter, initial encounter; A41.9-Sepsis, unspecified organism; K59.9-Functional intestinal disorder, unspecified  TECHNIQUE: Frontal view of the chest  COMPARISON: 12/6/2021  FINDINGS:  Interval placement of left internal jugular central venous catheter with the tip terminating low in the right atrium. Otherwise stable and unchanged medical devices from prior. Stable cardiomediastinal silhouette. Similar bibasilar streaky opacities and likely small left pleural effusion. No pneumothorax.       Impression:  Interval placement of left internal jugular central venous catheter with the tip terminating low in the right atrium. Otherwise stable and unchanged medical devices from prior. Stable cardiomediastinal silhouette. Similar bibasilar streaky opacities and likely small left pleural effusion. No pneumothorax.  This report was finalized on 12/9/2021 2:26 PM by Paco Young MD.        Results for orders placed during the hospital encounter of 01/11/21    Transthoracic Echo Complete With Contrast if Necessary Per Protocol    Interpretation Summary  · Left ventricular systolic function is normal. Estimated left ventricular EF = 65%  · Left ventricular wall thickness is consistent with hypertrophy.  · Left atrial volume is mildly increased.  · The aortic valve is calcified. There is mild aortic stenosis present. There is moderate aortic regurgitation present.  · Estimated right ventricular systolic pressure from tricuspid regurgitation is normal (<35 mmHg).      I have reviewed the medications:  Scheduled Meds:acetaminophen, 650 mg, Nasogastric, Q6H  albumin human, , ,   albumin human, , ,   calcitriol, 0.25 mcg, Oral, Once per day on Mon Wed Fri  cinacalcet, 60 mg, Oral, Nightly  doxepin, 10 mg, Oral, Once  epoetin blanca/blanca-epbx, 10,000 Units, Subcutaneous, Once per day on Mon Wed Fri  Fat Emul Fish Oil/Plant Based, 250 mL, Intravenous, Q24H (TPN)  insulin lispro, 2-7 Units, Subcutaneous, Q6H  lansoprazole, 30 mg, Nasogastric, Q AM  metoprolol tartrate, 25 mg, Nasogastric, Q12H  micafungin (MYCAMINE) IV, 100 mg, Intravenous, Q24H  piperacillin-tazobactam, 3.375 g, Intravenous, Q12H  potassium & sodium phosphates, 1 packet, Nasogastric, TID AC  sodium bicarbonate, 650 mg, Nasogastric, TID  sodium chloride, 10 mL, Intravenous, Q12H  sodium chloride, 10 mL, Intravenous, Q12H  sodium chloride, 10 mL, Intravenous, Q12H  sodium chloride, 10 mL, Intravenous, Q12H  vancomycin (dosing per levels), , Does not  apply, Daily      Continuous Infusions:Adult Peripheral 2-in-1 TPN, , Last Rate: 75 mL/hr at 12/09/21 1818  Adult Peripheral 2-in-1 TPN,   amiodarone, 0.5 mg/min, Last Rate: 0.5 mg/min (12/10/21 0544)  HYDROmorphone HCl-NaCl,   Pharmacy to Dose TPN,   Pharmacy to dose vancomycin,       PRN Meds:.•  acetaminophen  •  albuterol  •  benzocaine-menthol  •  camphor-menthol  •  diphenhydrAMINE  •  hydrOXYzine  •  naloxone  •  ondansetron **OR** ondansetron  •  Pharmacy to Dose TPN  •  Pharmacy to dose vancomycin  •  phenol  •  potassium & sodium phosphates  •  potassium phosphate infusion greater than 15 mMoles **OR** potassium phosphate **OR** potassium phosphate  •  prochlorperazine  •  Sodium Chloride (PF)  •  sodium chloride  •  sodium chloride  •  sodium chloride  •  temazepam    Assessment/Plan   Assessment & Plan     Active Hospital Problems    Diagnosis  POA   • **Peritonitis (HCC) [K65.9]  Yes   • Hypotension [I95.9]  Yes   • Sepsis associated hypotension (HCC) [A41.9, I95.9]  Yes   • Ileus (HCC) [K56.7]  Yes   • Hyperlipidemia LDL goal <100 [E78.5]  Yes   • Paroxysmal atrial fibrillation (HCC) [I48.0]  Yes   • Type 2 diabetes mellitus (HCC) [E11.9]  Yes   • Chronic kidney disease, stage IV (severe) (HCC) [N18.4]  Yes   • Hyperparathyroidism (HCC) [E21.3]  Yes   • Essential hypertension [I10]  Yes      Resolved Hospital Problems   No resolved problems to display.        Brief Hospital Course to date:  Moni Wallace is a 81 y.o. female here with sepsis and hypotension due to peritonitis. Her stay is complicated by Ileus vs pSBO. Surgery and ID following.    This patient's problems and plans were partially entered by my partner and updated as appropriate by me 12/10/21. Today is my first day evaluating this patient's active medical problems. I Personally reviewed chart and adjusted note to reflect daily changes in management/clinical condition       Sepsis  Bacterial Peritonitis  --ID following, continue Vanc,  Zosyn, Mycamine   --Cultures remain negative.  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 Andersen: ng clamp (checking residuals); awaiting bowel function return  -hold coumadin until bowel function returning  -continue pca / pain meds  -Med-oncology (Dr. Harman) saw patient 12/9/21, discussed w/ patient that no matter which primary malignancy is responsible, any systemic therapies would be palliative (not curative). Patient not interested w/ pursuing systemic therapies  -palliative now following (assisting in goals/limits and symptoms; elderly, debilitated, now on hemodialysis); patient is DNR/DNI/Limited currently  -    Post-op anemia  -hgb 7.3 12/9/21; s/p 1 unit prbc on 12/9/21; monitor and tranfuse prn     PAF, w/ intermittent RVR  HTN  --Continue to hold Warfain per surgery until return of bowel function  --S/P 2 units FFP and 10mg IV Vitamin K pre-op   -cards following, currently amiodarone drip for now; restart anticoagulation after return of bowel function and procedures/surgeries ruled out     S/p Hypotension  -Currently resolved     ESRD on PD-->now on HD via tunneled cath right ij  Hypokalemia  Hypomagnesemia   Hypocalcemia   Hyponatremia   -PD catheter removed and Dr Curiel placed right sided tunneled dialysis catheter.  -Nephrology following & managing; dialysis 12/9/21 (w/ blood)  -LIDIA with HD  -K, Mg, Ca supplementation per Renal   -Holding phos binders    -CM to begin search for outpatient HD chair     Poor iv access, s/p left ij central line placement 12/9/21  -per discussion w/ nurse on 12/9/21, has 3 peripheral iv's, unable to get any further peripherals; getting ppn, iv antibiotics, dilaudid pca, and now w/ probable iv amiodarone.  -Dr. Andersen  placed triple lumen left ij central line 12/9/21      Am labs: cbc,bmp      DVT prophylaxis:  Mechanical DVT prophylaxis orders are present.       AM-PAC 6 Clicks Score (PT): 10 (12/10/21 0922)    Disposition: I expect the patient to be discharged TBD.     CODE STATUS:   Code Status and Medical Interventions:   Ordered at: 11/28/21 6786     Medical Intervention Limits:    NO intubation (DNI)     Level Of Support Discussed With:    Patient     Code Status (Patient has no pulse and is not breathing):    No CPR (Do Not Attempt to Resuscitate)     Medical Interventions (Patient has pulse or is breathing):    Limited Support       Luis Carlos Koenig MD  12/10/21

## 2021-12-10 NOTE — CASE MANAGEMENT/SOCIAL WORK
Continued Stay Note  [facility]     Patient Name: Moni Wallace  MRN: [id number]  Today's Date: 12/10/2021    Admit Date: 11/28/2021     Discharge Plan     Row Name 12/10/21 1613       Plan    Plan discharge plan    Plan Comments Pt has palliative and now hospice consult due to malignancy. HD chair spot/time in process pending pt's hospital course. Per discussion in MDR, pt will be here over weekend. CM will follow up Monday.    Final Discharge Disposition Code 30 - still a patient               Discharge Codes    No documentation.               Expected Discharge Date and Time     Expected Discharge Date Expected Discharge Time    Dec 14, 2021             Cyndy Hedrick RN

## 2021-12-10 NOTE — PLAN OF CARE
Problem: Adjustment to Illness (Chronic Kidney Disease)  Goal: Optimal Coping with Chronic Illness  Intervention: Support and Optimize Psychosocial Response to Chronic Illness  Recent Flowsheet Documentation  Taken 12/10/2021 1724 by Alie Coleman RN  Supportive Measures:   active listening utilized   decision-making supported   goal setting facilitated   problem-solving facilitated   verbalization of feelings encouraged  Family/Support System Care:   caregiver stress acknowledged   family care conference arranged   involvement promoted   presence promoted   self-care encouraged   support provided     Problem: Pain Acute  Goal: Optimal Pain Control  Intervention: Optimize Psychosocial Wellbeing  Recent Flowsheet Documentation  Taken 12/10/2021 1724 by Alie Coleman RN  Supportive Measures:   active listening utilized   decision-making supported   goal setting facilitated   problem-solving facilitated   verbalization of feelings encouraged   Goal Outcome Evaluation:  Plan of Care Reviewed With: patient, son        Progress: no change  Outcome Summary: new palliative consult from Dr solis for [facility]. Met with pt and sons Carmelo and Nolberto. Pt is somewhat sleeping in between conversation. Pt able to sit on the side of the bed. pain upon movement. Pt is using pca that is effective. Pt having itching that is most bothersome. doxepin ordered as well as sarna lotion. Pt vomited this morning and had 300+ out from NG per nursing. Pt is clamped at present. Pt used w/c at home to move about house and needed help with meals.  sons want pt to be at home. Hospice consulted. Pt currently receiving dialysis and tpn.  further discussion about stopping these to be at home.

## 2021-12-10 NOTE — PLAN OF CARE
Problem: Adult Inpatient Plan of Care  Goal: Plan of Care Review  Outcome: Ongoing, Progressing  Flowsheets (Taken 12/10/2021 0559)  Progress: improving  Plan of Care Reviewed With: patient  Goal: Patient-Specific Goal (Individualized)  Outcome: Ongoing, Progressing  Goal: Absence of Hospital-Acquired Illness or Injury  Outcome: Ongoing, Progressing  Intervention: Identify and Manage Fall Risk  Recent Flowsheet Documentation  Taken 12/10/2021 0557 by Debi Greco RN  Safety Promotion/Fall Prevention:   activity supervised   assistive device/personal items within reach   clutter free environment maintained   room organization consistent   safety round/check completed  Taken 12/10/2021 0400 by Debi Greco RN  Safety Promotion/Fall Prevention:   activity supervised   clutter free environment maintained   assistive device/personal items within reach   room organization consistent   safety round/check completed  Taken 12/10/2021 0200 by Debi Greco RN  Safety Promotion/Fall Prevention:   activity supervised   assistive device/personal items within reach   clutter free environment maintained   room organization consistent   safety round/check completed  Taken 12/10/2021 0000 by Debi Greco RN  Safety Promotion/Fall Prevention:   activity supervised   assistive device/personal items within reach   room organization consistent   safety round/check completed  Taken 12/9/2021 2200 by Debi Greco RN  Safety Promotion/Fall Prevention:   activity supervised   assistive device/personal items within reach   clutter free environment maintained   room organization consistent   safety round/check completed  Taken 12/9/2021 2000 by Debi Greco RN  Safety Promotion/Fall Prevention:   activity supervised   assistive device/personal items within reach   clutter free environment maintained   room organization consistent   safety round/check completed  Intervention: Prevent Skin  Injury  Recent Flowsheet Documentation  Taken 12/10/2021 0557 by Debi Greco RN  Body Position: weight shift assistance provided  Skin Protection:   adhesive use limited   tubing/devices free from skin contact   transparent dressing maintained   skin-to-device areas padded   skin-to-skin areas padded  Taken 12/10/2021 0400 by Debi Greco RN  Body Position: weight shift assistance provided  Skin Protection:   adhesive use limited   tubing/devices free from skin contact   skin-to-skin areas padded   skin-to-device areas padded   transparent dressing maintained  Taken 12/10/2021 0200 by Debi Greco RN  Body Position: weight shift assistance provided  Skin Protection:   adhesive use limited   tubing/devices free from skin contact   transparent dressing maintained   skin-to-device areas padded   skin-to-skin areas padded  Taken 12/10/2021 0000 by Debi Greco RN  Body Position: weight shift assistance provided  Skin Protection:   adhesive use limited   transparent dressing maintained   skin-to-skin areas padded   skin-to-device areas padded   tubing/devices free from skin contact  Taken 12/9/2021 2200 by Debi Greco RN  Body Position: weight shift assistance provided  Skin Protection:   adhesive use limited   transparent dressing maintained   tubing/devices free from skin contact   skin-to-skin areas padded   skin-to-device areas padded  Taken 12/9/2021 2000 by Debi Greco RN  Body Position:   weight shift assistance provided   supine, legs elevated  Skin Protection:   adhesive use limited   tubing/devices free from skin contact   transparent dressing maintained   skin-to-skin areas padded   skin-to-device areas padded  Intervention: Prevent and Manage VTE (venous thromboembolism) Risk  Recent Flowsheet Documentation  Taken 12/9/2021 2000 by Debi Greco RN  VTE Prevention/Management:   bilateral   dorsiflexion/plantar flexion performed  Intervention: Prevent  Infection  Recent Flowsheet Documentation  Taken 12/9/2021 2000 by Debi Greco RN  Infection Prevention:   environmental surveillance performed   equipment surfaces disinfected   hand hygiene promoted   personal protective equipment utilized   rest/sleep promoted   single patient room provided   visitors restricted/screened  Goal: Optimal Comfort and Wellbeing  Outcome: Ongoing, Progressing  Intervention: Provide Person-Centered Care  Recent Flowsheet Documentation  Taken 12/9/2021 2000 by Debi Greco RN  Trust Relationship/Rapport:   care explained   choices provided   empathic listening provided   questions answered   reassurance provided   thoughts/feelings acknowledged   questions encouraged   emotional support provided  Goal: Readiness for Transition of Care  Outcome: Ongoing, Progressing     Problem: Infection  Goal: Infection Symptom Resolution  Outcome: Ongoing, Progressing     Problem: Adjustment to Illness (Chronic Kidney Disease)  Goal: Optimal Coping with Chronic Illness  Outcome: Ongoing, Progressing     Problem: Electrolyte Imbalance (Chronic Kidney Disease)  Goal: Electrolyte Balance  Outcome: Ongoing, Progressing     Problem: Fluid Volume Excess (Chronic Kidney Disease)  Goal: Fluid Balance  Outcome: Ongoing, Progressing  Intervention: Monitor and Manage Hypervolemia  Recent Flowsheet Documentation  Taken 12/10/2021 0557 by Debi Greco RN  Skin Protection:   adhesive use limited   tubing/devices free from skin contact   transparent dressing maintained   skin-to-device areas padded   skin-to-skin areas padded  Taken 12/10/2021 0400 by Debi Greco RN  Skin Protection:   adhesive use limited   tubing/devices free from skin contact   skin-to-skin areas padded   skin-to-device areas padded   transparent dressing maintained  Taken 12/10/2021 0200 by Debi Greco RN  Skin Protection:   adhesive use limited   tubing/devices free from skin contact   transparent dressing  maintained   skin-to-device areas padded   skin-to-skin areas padded  Taken 12/10/2021 0000 by Debi Greco RN  Skin Protection:   adhesive use limited   transparent dressing maintained   skin-to-skin areas padded   skin-to-device areas padded   tubing/devices free from skin contact  Taken 12/9/2021 2200 by Debi Greco RN  Skin Protection:   adhesive use limited   transparent dressing maintained   tubing/devices free from skin contact   skin-to-skin areas padded   skin-to-device areas padded  Taken 12/9/2021 2000 by Debi Greco RN  Skin Protection:   adhesive use limited   tubing/devices free from skin contact   transparent dressing maintained   skin-to-skin areas padded   skin-to-device areas padded     Problem: Functional Decline (Chronic Kidney Disease)  Goal: Optimal Functional Ability  Outcome: Ongoing, Progressing  Intervention: Optimize Functional Ability  Recent Flowsheet Documentation  Taken 12/10/2021 0200 by Debi Greco RN  Activity Management: (could not get up to chair) unable to complete activity (see comments)  Taken 12/9/2021 2200 by Debi Greco RN  Activity Management: activity adjusted per tolerance  Taken 12/9/2021 2000 by Debi Greco RN  Activity Management: activity adjusted per tolerance     Problem: Hematologic Alteration (Chronic Kidney Disease)  Goal: Absence of Anemia Signs and Symptoms  Outcome: Ongoing, Progressing     Problem: Oral Intake Inadequate (Chronic Kidney Disease)  Goal: Optimal Oral Intake  Outcome: Ongoing, Progressing     Problem: Renal Function Impairment (Chronic Kidney Disease)  Goal: Laboratory Values and Blood Pressure Within Desired Range  Outcome: Ongoing, Progressing  Intervention: Monitor and Support Renal Function  Recent Flowsheet Documentation  Taken 12/9/2021 2000 by Debi Greco RN  Medication Review/Management:   medications reviewed   high-risk medications identified     Problem: Pain Acute  Goal: Optimal  Pain Control  Outcome: Ongoing, Progressing  Intervention: Optimize Psychosocial Wellbeing  Recent Flowsheet Documentation  Taken 12/9/2021 2000 by Debi Greco RN  Diversional Activities: television     Problem: Skin Injury Risk Increased  Goal: Skin Health and Integrity  Outcome: Ongoing, Progressing  Intervention: Optimize Skin Protection  Recent Flowsheet Documentation  Taken 12/10/2021 0557 by Debi Greco RN  Pressure Reduction Techniques: frequent weight shift encouraged  Head of Bed (HOB): HOB at 20-30 degrees  Pressure Reduction Devices: pressure-redistributing mattress utilized  Skin Protection:   adhesive use limited   tubing/devices free from skin contact   transparent dressing maintained   skin-to-device areas padded   skin-to-skin areas padded  Taken 12/10/2021 0400 by Debi Greco RN  Pressure Reduction Techniques: frequent weight shift encouraged  Head of Bed (HOB): HOB at 20-30 degrees  Pressure Reduction Devices: pressure-redistributing mattress utilized  Skin Protection:   adhesive use limited   tubing/devices free from skin contact   skin-to-skin areas padded   skin-to-device areas padded   transparent dressing maintained  Taken 12/10/2021 0200 by Debi Greco RN  Pressure Reduction Techniques: frequent weight shift encouraged  Head of Bed (HOB): HOB at 20-30 degrees  Pressure Reduction Devices: pressure-redistributing mattress utilized  Skin Protection:   adhesive use limited   tubing/devices free from skin contact   transparent dressing maintained   skin-to-device areas padded   skin-to-skin areas padded  Taken 12/10/2021 0000 by Debi Greco RN  Pressure Reduction Techniques: frequent weight shift encouraged  Head of Bed (HOB): HOB at 30 degrees  Pressure Reduction Devices: pressure-redistributing mattress utilized  Skin Protection:   adhesive use limited   transparent dressing maintained   skin-to-skin areas padded   skin-to-device areas padded    tubing/devices free from skin contact  Taken 12/9/2021 2200 by Debi Greco RN  Pressure Reduction Techniques: frequent weight shift encouraged  Head of Bed (HOB): HOB at 20-30 degrees  Pressure Reduction Devices: pressure-redistributing mattress utilized  Skin Protection:   adhesive use limited   transparent dressing maintained   tubing/devices free from skin contact   skin-to-skin areas padded   skin-to-device areas padded  Taken 12/9/2021 2000 by Debi Greco RN  Pressure Reduction Techniques: frequent weight shift encouraged  Head of Bed (HOB): HOB at 20-30 degrees  Pressure Reduction Devices: pressure-redistributing mattress utilized  Skin Protection:   adhesive use limited   tubing/devices free from skin contact   transparent dressing maintained   skin-to-skin areas padded   skin-to-device areas padded     Problem: Fall Injury Risk  Goal: Absence of Fall and Fall-Related Injury  Outcome: Ongoing, Progressing  Intervention: Identify and Manage Contributors to Fall Injury Risk  Recent Flowsheet Documentation  Taken 12/9/2021 2000 by Debi Greco RN  Medication Review/Management:   medications reviewed   high-risk medications identified  Intervention: Promote Injury-Free Environment  Recent Flowsheet Documentation  Taken 12/10/2021 0557 by Debi Greco RN  Safety Promotion/Fall Prevention:   activity supervised   assistive device/personal items within reach   clutter free environment maintained   room organization consistent   safety round/check completed  Taken 12/10/2021 0400 by Debi Greco RN  Safety Promotion/Fall Prevention:   activity supervised   clutter free environment maintained   assistive device/personal items within reach   room organization consistent   safety round/check completed  Taken 12/10/2021 0200 by Debi Greco RN  Safety Promotion/Fall Prevention:   activity supervised   assistive device/personal items within reach   clutter free environment  maintained   room organization consistent   safety round/check completed  Taken 12/10/2021 0000 by Debi Greco, RN  Safety Promotion/Fall Prevention:   activity supervised   assistive device/personal items within reach   room organization consistent   safety round/check completed  Taken 12/9/2021 2200 by Debi Greco, RN  Safety Promotion/Fall Prevention:   activity supervised   assistive device/personal items within reach   clutter free environment maintained   room organization consistent   safety round/check completed  Taken 12/9/2021 2000 by Debi Greco, RN  Safety Promotion/Fall Prevention:   activity supervised   assistive device/personal items within reach   clutter free environment maintained   room organization consistent   safety round/check completed   Goal Outcome Evaluation:  Plan of Care Reviewed With: patient        Progress: improving

## 2021-12-10 NOTE — PROGRESS NOTES
"   LOS: 12 days    Patient Care Team:  Alex Walters MD as PCP - General (Internal Medicine)  Jeff Joe IV, MD as Consulting Physician (Cardiology)  Donny Hodges MD as Consulting Physician (Nephrology)  Cory Castillo MD as Surgeon (General Surgery)  Slim Wick MD    ESRD - PD     Subjective     Interval History:   No acute events overnight. Feeling better.     Review of Systems:   No CP or SOA ,N/V, Constipation.         Objective     Vital Sign Min/Max for last 24 hours  Temp  Min: 97.2 °F (36.2 °C)  Max: 98.4 °F (36.9 °C)   BP  Min: 89/63  Max: 163/78   Pulse  Min: 89  Max: 158   Resp  Min: 16  Max: 18   SpO2  Min: 95 %  Max: 100 %   No data recorded   Weight  Min: 83.1 kg (183 lb 4.8 oz)  Max: 83.1 kg (183 lb 4.8 oz)     Flowsheet Rows      First Filed Value   Admission Height 162.6 cm (64\") Documented at 11/28/2021 0854   Admission Weight 72.6 kg (160 lb) Documented at 11/28/2021 0854          I/O this shift:  In: 1.7 [I.V.:1.7]  Out: -   I/O last 3 completed shifts:  In: 2266.9 [I.V.:1.9; Blood:294; Other:600; NG/GT:325; IV Piggyback:50]  Out: 1945 [Emesis/NG output:925; Other:1020]    Physical Exam:    Gen: Alert, NAD   HENT: NC, AT, EOMI   NECK: Supple, no JVD, Trachea midline   LUNGS: CTA bilaterally, non labored respirtation   CVS: S1/S2 audible, RRR, no murmur   Abd: Soft, NT, ND, BS+   Ext: No pedal edema, no cyanosis   CNS: Alert, No focal deficit noted grossly  Psy: Cooperative  Skin: Warm, dry and intact      WBC WBC   Date Value Ref Range Status   12/10/2021 20.44 (H) 3.40 - 10.80 10*3/mm3 Final   12/09/2021 19.35 (H) 3.40 - 10.80 10*3/mm3 Final   12/08/2021 21.79 (H) 3.40 - 10.80 10*3/mm3 Final      HGB Hemoglobin   Date Value Ref Range Status   12/10/2021 8.3 (L) 12.0 - 15.9 g/dL Final   12/09/2021 7.3 (L) 12.0 - 15.9 g/dL Final   12/08/2021 8.1 (L) 12.0 - 15.9 g/dL Final      HCT Hematocrit   Date Value Ref Range Status   12/10/2021 24.0 (L) 34.0 - " 46.6 % Final   12/09/2021 22.8 (L) 34.0 - 46.6 % Final   12/08/2021 24.1 (L) 34.0 - 46.6 % Final      Platlets No results found for: LABPLAT   MCV MCV   Date Value Ref Range Status   12/10/2021 92.3 79.0 - 97.0 fL Final   12/09/2021 99.6 (H) 79.0 - 97.0 fL Final   12/08/2021 96.4 79.0 - 97.0 fL Final          Sodium Sodium   Date Value Ref Range Status   12/10/2021 128 (L) 136 - 145 mmol/L Final   12/09/2021 127 (L) 136 - 145 mmol/L Final   12/08/2021 125 (L) 136 - 145 mmol/L Final      Potassium Potassium   Date Value Ref Range Status   12/10/2021 3.7 3.5 - 5.2 mmol/L Final   12/09/2021 3.2 (L) 3.5 - 5.2 mmol/L Final   12/08/2021 3.2 (L) 3.5 - 5.2 mmol/L Final      Chloride Chloride   Date Value Ref Range Status   12/10/2021 94 (L) 98 - 107 mmol/L Final   12/09/2021 89 (L) 98 - 107 mmol/L Final   12/08/2021 83 (L) 98 - 107 mmol/L Final      CO2 CO2   Date Value Ref Range Status   12/10/2021 18.0 (L) 22.0 - 29.0 mmol/L Final   12/09/2021 20.0 (L) 22.0 - 29.0 mmol/L Final   12/08/2021 21.0 (L) 22.0 - 29.0 mmol/L Final      BUN BUN   Date Value Ref Range Status   12/10/2021 40 (H) 8 - 23 mg/dL Final   12/09/2021 52 (H) 8 - 23 mg/dL Final   12/08/2021 73 (H) 8 - 23 mg/dL Final      Creatinine Creatinine   Date Value Ref Range Status   12/10/2021 3.66 (H) 0.57 - 1.00 mg/dL Final   12/09/2021 5.02 (H) 0.57 - 1.00 mg/dL Final   12/08/2021 7.94 (H) 0.57 - 1.00 mg/dL Final      Calcium Calcium   Date Value Ref Range Status   12/10/2021 9.0 8.6 - 10.5 mg/dL Final   12/09/2021 8.7 8.6 - 10.5 mg/dL Final   12/08/2021 8.3 (L) 8.6 - 10.5 mg/dL Final      PO4 No results found for: CAPO4   Albumin Albumin   Date Value Ref Range Status   12/08/2021 3.40 (L) 3.50 - 5.20 g/dL Final      Magnesium Magnesium   Date Value Ref Range Status   12/10/2021 1.7 1.6 - 2.4 mg/dL Final   12/09/2021 1.7 1.6 - 2.4 mg/dL Final   12/08/2021 1.7 1.6 - 2.4 mg/dL Final      Uric Acid No results found for: URICACID        Results Review:     I reviewed  the patient's new clinical results.    acetaminophen, 650 mg, Nasogastric, Q6H  albumin human, , ,   albumin human, , ,   calcitriol, 0.25 mcg, Oral, Once per day on Mon Wed Fri  cinacalcet, 60 mg, Oral, Nightly  epoetin blanca/blanca-epbx, 10,000 Units, Subcutaneous, Once per day on Mon Wed Fri  Fat Emul Fish Oil/Plant Based, 250 mL, Intravenous, Q24H (TPN)  insulin lispro, 2-7 Units, Subcutaneous, Q6H  lansoprazole, 30 mg, Nasogastric, Q AM  metoprolol tartrate, 25 mg, Nasogastric, Q12H  micafungin (MYCAMINE) IV, 100 mg, Intravenous, Q24H  piperacillin-tazobactam, 3.375 g, Intravenous, Q12H  potassium & sodium phosphates, 1 packet, Nasogastric, TID AC  sodium chloride, 10 mL, Intravenous, Q12H  sodium chloride, 10 mL, Intravenous, Q12H  sodium chloride, 10 mL, Intravenous, Q12H  sodium chloride, 10 mL, Intravenous, Q12H  vancomycin (dosing per levels), , Does not apply, Daily      Adult Peripheral 2-in-1 TPN, , Last Rate: 75 mL/hr at 12/09/21 1818  amiodarone, 0.5 mg/min, Last Rate: 0.5 mg/min (12/10/21 0544)  Pharmacy to Dose TPN,   Pharmacy to dose vancomycin,         Medication Review:    Assessment/Plan       Peritonitis (HCC)    Paroxysmal atrial fibrillation (HCC)    Essential hypertension    Type 2 diabetes mellitus (HCC)    Chronic kidney disease, stage IV (severe) (HCC)    Hyperparathyroidism (HCC)    Hyperlipidemia LDL goal <100    Sepsis associated hypotension (HCC)    Ileus (HCC)    Hypotension      1- ESRD - On PD - transitioning to HD. S/p laproscopy -PD catheter out  2- Peritonitis ?- culture so far negative.   3- Anemia   4- Supra-therapeutic INR   5- Low albumin level   6- Corrected Calcium for albumin - 7.56 - monitor   7- Secondary hyperparathyroidism on Sensipar.   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 UF as tolerated.   - Sodium bicarb tablets   - Replete Phos, mag and K per protocol.   - Antibx per ID   - Transfuse blood for Hgb less than  7.0     CM to start working for outpatient hemodialysis chair.       Albina Will MD  12/10/21  09:44 EST

## 2021-12-10 NOTE — PROGRESS NOTES
HEMATOLOGY/ONCOLOGY PROGRESS NOTE    Subjective      CC: Patient comfortable    SUBJECTIVE: No new somatic complaints        Past Medical History, Past Surgical History, Social History, Family History have been reviewed and are without significant changes except as mentioned.      Medications:  The current medication list was reviewed in the EMR    ALLERGIES:   Allergies   Allergen Reactions   • Hydrocodone Itching   • Rice Swelling   • Statins Other (See Comments)     myalgia   • Codeine Rash   • Sulfa Antibiotics Rash       ROS:  A comprehensive 14 point review of systems was performed and was negative except as mentioned.      Objective      Vitals:    12/10/21 0758 12/10/21 0821 12/10/21 0900 12/10/21 1000   BP: 138/65 130/64 114/70 106/49   BP Location: Right leg Right leg     Patient Position: Lying Lying     Pulse: 89 100 93 87   Resp:  18     Temp:  98.4 °F (36.9 °C)     TempSrc:  Oral     SpO2:  100% 98%    Weight:       Height:                       ECOG: (3) Capable of Limited Self Care, Confined to Bed or Chair Greater Than 50% of Waking Hours    General: well appearing, in no acute distress  HEENT: sclerae anicteric, oropharynx clear  Lymphatics: no cervical, supraclavicular, inguinal, or axillary adenopathy  Cardiovascular: regular rate and rhythm, no murmurs  Lungs: clear to auscultation bilaterally  Abdomen: soft, nontender, nondistended.  No palpable organomegaly  Extremities: no lower extremity edema  Skin: no rashes, lesions, bruising, or petechiae  Neuro: Alert and oriented x 3. Moves all extremities.    RECENT LABS:    Results from last 7 days   Lab Units 12/10/21  0501 12/09/21  0409 12/08/21  0527   WBC 10*3/mm3 20.44* 19.35* 21.79*   HEMOGLOBIN g/dL 8.3* 7.3* 8.1*   PLATELETS 10*3/mm3 181 177 213     Results from last 7 days   Lab Units 12/10/21  0502 12/09/21  0409 12/08/21  0527   SODIUM mmol/L 128* 127* 125*   POTASSIUM mmol/L 3.7 3.2* 3.2*   CO2 mmol/L 18.0* 20.0* 21.0*   BUN mg/dL 40*  52* 73*   CREATININE mg/dL 3.66* 5.02* 7.94*   GLUCOSE mg/dL 226* 165* 163*     Results from last 7 days   Lab Units 12/08/21  0527 12/07/21  0550 12/06/21  0539   AST (SGOT) U/L 50* 25 20   ALT (SGPT) U/L 17 7 <5   BILIRUBIN mg/dL 0.7 0.5 0.5   ALK PHOS U/L 201* 136* 117         CT Abdomen Pelvis Without Contrast    Result Date: 12/4/2021  1. Findings consistent with incomplete distal small bowel obstruction, which appears to be centered about an abnormal appearing loop centrally in the upper pelvis. Degree of distention appears increased from 11/28/2021 scan.  2. Expected changes of peritoneal dialysis, with mild free fluid and free air in the abdomen.  3. No evidence of discrete, well-defined inflammatory focus.  D:  12/03/2021 E:  12/03/2021  This report was finalized on 12/4/2021 11:12 AM by Dr. Xavier Cantor MD.      XR Chest 1 View    Result Date: 12/9/2021  Left IJ central line, right IJ dialysis catheter in NG tube are in good position. No pneumothorax. Signer Name: Rolando Whiteside MD  Signed: 12/9/2021 7:45 PM  Workstation [facility]: [email or web address]  [facility]    XR Chest 1 View    Result Date: 12/9/2021   Interval placement of left internal jugular central venous catheter with the tip terminating low in the right atrium. Otherwise stable and unchanged medical devices from prior. Stable cardiomediastinal silhouette. Similar bibasilar streaky opacities and likely small left pleural effusion. No pneumothorax.  This report was finalized on 12/9/2021 2:26 PM by Paco Young MD.      XR Chest 1 View    Result Date: 12/8/2021  Placement of a deep line catheter on the right with tip in the SVC. No pneumothorax. Prominence of the perihilar regions bilaterally with small left pleural effusion.  D:  12/06/2021 E:  12/07/2021  This report was finalized on 12/8/2021 4:56 PM by Dr. Aracely Oates MD.      FL C Arm During Surgery    Result Date: 12/7/2021  Fluoroscopy provided for tunneled  dialysis catheter placement.  D:  12/07/2021 E:  12/07/2021   This report was finalized on 12/7/2021 9:53 PM by Dr. Xavier Cantor MD.      FL Small Bowel Follow Through Single-Contrast    Result Date: 12/6/2021  FINDINGS/IMPRESSION:   imaging reveals dilated loops of small bowel seen diffusely throughout the abdomen. Minimal air in the rectum. There is a nasogastric tube tip in the stomach. Contrast is seen within the stomach on the 30- minute image and 230-minute image with no advancement through the small bowel. There is only minimal contrast seen within the proximal small bowel on the 9 hour and 40-minute image with minimal contrast remaining within the stomach in the fundus. The 17-hour image reveals no advancement of the contrast. Findings most suggestive of a small bowel obstruction.  DICTATED:   12/04/2021 EDITED/lfs:   12/05/2021  This report was finalized on 12/6/2021 3:29 PM by Dr. Aracely Oates MD.      XR Abdomen KUB    Result Date: 12/6/2021  Slight improvement seen in the gaseous distended loops of bowel throughout the abdomen. Minimal contrast identified in the transverse colon. Nasogastric tube with tip in the stomach. No free intraperitoneal air.  D:  12/06/2021 E:  12/06/2021  This report was finalized on 12/6/2021 3:54 PM by Dr. Aracely aOtes MD.      XR Abdomen KUB    Result Date: 12/3/2021  Single frontal torso demonstrates tube tip to be in the body the stomach. Incidental moderate gaseous distention of jejunum noted.   Signer Name: Pippa Enriquez MD  Signed: 12/3/2021 10:49 PM  Workstation Name: [facility]  [facility]              Assessment   ASSESSMENT & PLAN:    1.  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  She wants no systemic therapies.  Perfectly reasonable to go home with palliative care with hospice.  Call if I can be of any further assistance.  Discussed with patient and family.  Total  time of care today 15 minutes                  Solomon Harman MD    12/10/2021

## 2021-12-10 NOTE — NURSING NOTE
Cardiology Navigator Note      Patient Name:  Moni Wallace  :  1940  DOS:  12/10/21    Active Hospital Problems    Diagnosis    • **Peritonitis (HCC)    • Hypotension    • Sepsis associated hypotension (HCC)    • Ileus (HCC)    • Hyperlipidemia LDL goal <100      · Reports intolerance to statin     • Paroxysmal atrial fibrillation (HCC)      · Diagnosed 2015  · CHADS-VASc = 5  · On Coumadin   · Echo (2021): LVEF 65%.  LVH.  Moderate AI.  · Beakthrough episodes of atrial fibrillation with RVR in setting of bacterial peritonitis 2021     • Type 2 diabetes mellitus (HCC)    • Chronic kidney disease, stage IV (severe) (HCC)      · IgA nephropathy with history of renal transplant, .   · Patient on hemodialysis Monday, Wednesday, and Friday started 2015.  · Hemodialysis discontinued 2017.     • Hyperparathyroidism (HCC)    • Essential hypertension        Consult has been received.  Medical records have been reviewed. Patient to follow up with PCP and Cardiology post discharge     Echo Results:  2021:    · Left ventricular systolic function is normal. Estimated left ventricular EF = 65%  · Left ventricular wall thickness is consistent with hypertrophy.  · Left atrial volume is mildly increased.  · The aortic valve is calcified. There is mild aortic stenosis present. There is moderate aortic regurgitation present.  · Estimated right ventricular systolic pressure from tricuspid regurgitation is normal (<35 mmHg).

## 2021-12-10 NOTE — PROGRESS NOTES
"Pharmacy Consult - Vancomycin Dosing    Moni Wallace is an 81 y.o. female receiving vancomycin therapy.    Indication: Peritonitis  Consulting Provider: Hospitalist  ID Consult: Yes    Goal Trough: 15-20 mcg/mL    Current Antimicrobial Therapy  Micafungin 12/3-now  Zosyn 12/3-now  Vancomycin 11/28-now    Allergies  Allergies as of 11/28/2021 - Reviewed 11/28/2021   Allergen Reaction Noted    Hydrocodone Itching 05/19/2019    Rice Swelling 07/07/2021    Statins Other (See Comments) 07/21/2020    Codeine Rash 05/07/2018    Sulfa antibiotics Rash 05/07/2018     Labs  Results from last 7 days   Lab Units 12/10/21  0502 12/09/21  0409 12/08/21  0527   BUN mg/dL 40* 52* 73*   CREATININE mg/dL 3.66* 5.02* 7.94*     Results from last 7 days   Lab Units 12/10/21  0501 12/09/21  0409 12/08/21  0527   WBC 10*3/mm3 20.44* 19.35* 21.79*     Evaluation of Dosing    Is Patient on Dialysis or Renal Replacement: Yes - transitioned from PD to HD this admission    Ht - 162.6 cm (64\")  Wt - 83.1 kg (183 lb 4.8 oz)    Estimated Creatinine Clearance: 12.8 mL/min (A) (by C-G formula based on SCr of 3.66 mg/dL (H)).    Intake & Output (last 3 days)         12/07 0701 12/08 0700 12/08 0701 12/09 0700 12/09 0701  12/10 0700 12/10 0701 12/11 0700    I.V. (mL/kg) 4.2 (0.1) 2 (0) 0.5 (0) 1.7 (0)    Blood   294     Other 60 360 240     NG/GT  285 160     IV Piggyback 150 250  350    TPN  4771      Total Intake(mL/kg) 214.2 (2.6) 5668 (67.9) 694.5 (8.4) 351.7 (4.2)    Urine (mL/kg/hr)  0 (0)      Emesis/NG output 550 705 220     Other  1880 1020     Stool        Blood        Total Output 550 2585 1240     Net -335.8 +3083 -545.6 +351.7            Urine Unmeasured Occurrence  1 x            Microbiology and Radiology  Microbiology Results (last 10 days)       Procedure Component Value - Date/Time    Blood Culture - Blood, Hand, Left [[id number]]  (Normal) Collected: 12/03/21 1318    Lab Status: Final result Specimen: Blood from Hand, Left " Updated: 12/08/21 1515     Blood Culture No growth at 5 days    Narrative:      Aerobic Only    Blood Culture - Blood, Hand, Left [[id number]]  (Normal) Collected: 12/03/21 1230    Lab Status: Final result Specimen: Blood from Hand, Left Updated: 12/08/21 1330     Blood Culture No growth at 5 days    Fungus Culture - Body Fluid, Peritoneum [[id number]] Collected: 12/03/21 1043    Lab Status: Preliminary result Specimen: Body Fluid from Peritoneum Updated: 12/10/21 1046     Fungus Culture No fungus isolated at 1 week    Fungus Smear - Body Fluid, Peritoneum [[id number]] Collected: 12/03/21 1042    Lab Status: Final result Specimen: Body Fluid from Peritoneum Updated: 12/06/21 1559     Fungal Stain No fungal elements seen    AFB Culture - Body Fluid, Peritoneum [[id number]] Collected: 12/03/21 1040    Lab Status: Preliminary result Specimen: Body Fluid from Peritoneum Updated: 12/10/21 1046     AFB Culture No AFB isolated at 1 week     AFB Stain No acid fast bacilli seen on concentrated smear    Body Fluid Culture - Body Fluid, Peritoneum [[id number]] Collected: 12/02/21 1850    Lab Status: Final result Specimen: Body Fluid from Peritoneum Updated: 12/05/21 0736     Body Fluid Culture No growth at 3 days     Gram Stain Many (4+) WBCs seen      No organisms seen          Vancomycin Levels:  Results from last 7 days   Lab Units 12/10/21  0502 12/06/21  0539 12/05/21  0649 12/04/21  0601   VANCOMYCIN RM mcg/mL 18.50 17.50 17.80 17.10             Doses received:  12/8: HD; Vanc 750mg x1  12/9: HD; Vanc 500mg x1  12/10: random level 18.5; no HD    Assessment/Plan:  1. Pharmacy dosing vancomycin for peritonitis. Note patient transitioned from PD to HD this admission.  2. Patient received a dose of vancomycin 1250mg via peritoneal dialysis (IP) on 11/30 @ 2200. Plan for further vancomycin doses to be given IV per ID.  3. Patient had her first hemodialysis session on 12/8 and received vancomycin 750mg IV x1 post-dialysis. Patient  underwent hemodialysis again on 12/9 and received vancomycin 500mg IV x1 post-dialysis. Vancomycin level this morning was therapeutic at 18.5 mcg/mL. Next HD session planned for tomorrow 12/11. As patient with little to no residual renal function as evidenced by previous vancomycin levels, recommend to re-dose vancomycin tomorrow after dialysis as estimate post-HD level to be ~14 mcg/mL.  4. Pharmacy will continue to monitor and adjust vancomycin dose as necessary based on renal function, cultures, labs, and clinical status.    Thank you,  Jensen Ramirez, PharmD, BCPS  12/10/2021  14:07 EST

## 2021-12-10 NOTE — PLAN OF CARE
Goal Outcome Evaluation:  Plan of Care Reviewed With: patient, son        Progress: no change  Outcome Summary: Pt stood multiple reps with and without walker with MaxAx1.  Pt is able to clear buttocks, but unable to stand fully upright and extend knees.  Stand pivot completed from recliner-->bed with MaxAx1, and pt transferred sit-->supine with ModAx1.  BLE ther ex completed without complaint.  Continue with skilled PT to improve mobility and safety.

## 2021-12-10 NOTE — PROGRESS NOTES
[facility] Progress Note        Antibiotics:  Anti-Infectives (From admission, onward)    Ordered     Dose/Rate Route Frequency Start Stop    12/09/21 1351  vancomycin 500 mg/100 mL 0.9% NS IVPB (mbp)        Ordering Provider: Jensen Ramirez PharmD    500 mg  over 60 Minutes Intravenous Once 12/09/21 1500 12/09/21 1631    12/08/21 1126  vancomycin in dextrose 5% 150 mL (VANCOCIN) IVPB 750 mg        Ordering Provider: Jensen Ramirez PharmD    10 mg/kg × 82.6 kg  over 60 Minutes Intravenous Once 12/08/21 1300 12/08/21 1414    12/03/21 0857  piperacillin-tazobactam (ZOSYN) 3.375 g in iso-osmotic dextrose 50 ml (premix)        Ordering Provider: Sawyer Llanos MD    3.375 g  over 4 Hours Intravenous Every 12 Hours 12/03/21 1800 12/15/21 1759    12/03/21 0832  micafungin 100 mg/100 mL 0.9% NS IVPB (mbp)        Ordering Provider: Sawyer Llanos MD    100 mg Intravenous Every 24 Hours 12/03/21 1000 12/15/21 0959    12/03/21 0857  piperacillin-tazobactam (ZOSYN) 3.375 g in iso-osmotic dextrose 50 ml (premix)        Ordering Provider: Sawyer Llanos MD    3.375 g  over 30 Minutes Intravenous Once 12/03/21 0945 12/03/21 1214    12/03/21 0832  Pharmacy to dose vancomycin        Ordering Provider: Robin Andersen MD     Does not apply Continuous PRN 12/03/21 0831 12/13/21 0830    12/02/21 1434  vancomycin (dosing per levels)        Ordering Provider: Robin Andersen MD     Does not apply Daily 12/02/21 1530 12/25/21 0859    11/30/21 1111  UltraBag/Dianeal PD-2/1.5% Dex (pappas #[id number]) (DIANEAL) 2,000 mL with vancomycin (VANCOCIN) 1,250 mg, cefTAZidime (FORTAZ) 1,000 mg dialysis solution        Ordering Provider: Sawyer Llanos MD     Intraperitoneal Once 11/30/21 2200 11/30/21 2200 11/28/21 1942  UltraBag/Dianeal PD-2/1.5% Dex (pappas #[id number]) (DIANEAL) 2,000 mL with ceFAZolin (ANCEF) 2,000 mg dialysis solution        Ordering Provider: Kali Rojas MD     Intraperitoneal Once 11/29/21 0000  "11/29/21 0004    11/28/21 1157  vancomycin 1500 mg/500 mL 0.9% NS IVPB ([facility])        Ordering Provider: Lex Marcum MD    20 mg/kg × 72.6 kg Intravenous Once 11/28/21 1159 11/28/21 1400    11/28/21 1157  piperacillin-tazobactam (ZOSYN) 3.375 g in iso-osmotic dextrose 50 ml (premix)        Ordering Provider: Lex Marcum MD    3.375 g Intravenous Once 11/28/21 1159 11/28/21 1317          CC: abd pain    HPI:      Patient is a 81 y.o.  Yr old female with history of ESRD on CAPD for several years, Hx CDiff years ago she was admitted November 28 with acute onset abdominal pain over 2 days, nausea/vomiting and abnormal CT scan raising concern for possible early ileus versus partial small bowel obstruction and hazy omental stranding concerning for peritonitis per notes.  She had leukocytosis and peritoneal fluid with 1748 WBC and predominance neutrophils.  Concern for CAPD associated peritonitis and empiric vancomycin/Zosyn initiated.  Nursing had reported peritoneal fluid had initially looked a little hazy but patient did not recall a hazy or bloody appearance.  No redness/swelling or pain or other problems at her PD catheter.     12/3/21 NG out 12/2 with nausea and vomiting multiple times overnight; dialysate remains blood tinged per patient;  tachycardia, hypotension last 24 hours, WBC increased overall and at risk for systemic illness;  Will change abx back to IV vancomycin/zosyn, add empiric mycamine    12/4/21 cardiology following with paroxysmal afib/RVR    12/6/21 Dr Andersen with surgery  \"PROCEDURE PERFORMED:      1. Diagnostic laparoscopy converted to exploratory laparotomy  2. Open resection of terminal ileum and cecum with primary ileocolic anastomosis  3. Removal peritoneal dialysis catheter\"     12/6 also HD catheter placed by Frankie Begum    12/10/21 oncology note seen with sandro, seen by Dr Harman and plans for palliative approach; seen early and sleepy; per nursing,  less abd pain postop;  mid " "abdomen, intermittent, nonradiating, 2-3 out of 10, not progressive.  No diarrhea.  No hematochezia melena or hematemesis.  No rash.  Minimal urine output and no active urinary symptoms.  No dysuria hematuria or pyuria.  No flank pain.  No rash.  No shortness of breath or cough.      ROS:      12/10/21 No f/c/s. no rash. No new ADR to Abx.     Heent-- No new vision, hearing or throat complaints.  No epistaxis or oral sores.  Denies odynophagia or dysphagia.  No flashers, floaters or eye pain. No odynophagia or dysphagia. No headache, photophobia or neck stiffness.  CV-- No chest pain, palpitation or syncope  Resp-- No SOB/cough/Hemoptysis  GI-  As above.No hematochezia, melena, or hematemesis. Denies jaundice or chronic liver disease.  -- No dysuria, hematuria, or flank pain.  Denies hesitancy, urgency or flank pain.  Lymph- no swollen lymph nodes in neck/axilla or groin.   Heme- No active bruising or bleeding; no Hx of DVT or PE.  MS-- no swelling or pain in the bones or joints of arms/legs.  No new back pain.  Neuro-- No acute focal weakness or numbness in the arms or legs.  No seizures.     Full 12 point review of systems reviewed and negative otherwise for acute complaints, except for above      PE:   /64 (BP Location: Right leg, Patient Position: Lying)   Pulse 100   Temp 98.4 °F (36.9 °C) (Oral)   Resp 18   Ht 162.6 cm (64\")   Wt 83.1 kg (183 lb 4.8 oz)   LMP  (LMP Unknown)   SpO2 100%   BMI 31.46 kg/m²       GENERAL: sleepy, in no acute distress.   HEENT: Normocephalic, atraumatic.  PERRL. EOMI. No conjunctival injection. No icterus. Oropharynx clear without evidence of thrush or exudate. No evidence of peridontal disease.    NECK: Supple without nuchal rigidity. No mass.  LYMPH: No cervical, axillary or inguinal lymphadenopathy.  HEART: RRR; No murmur, rubs, gallops.   LUNGS: diminished at bases but otherwise Clear to auscultation bilaterally without wheezing, rales, rhonchi. Normal " respiratory effort. Nonlabored. No dullness.  ABDOMEN: Soft, mildly tender, nondistended.  bowel sounds  diminshed. No rebound or guarding. NO mass or HSM.  EXT:  No cyanosis, clubbing or edema. No cord.   MSK: FROM without joint effusions noted arms/legs.    SKIN: Warm and dry without cutaneous eruptions on Inspection/palpation.    NEURO: sleepy     IV without obvious redness or drainage     No peripheral stigmata/phenomenon of endocarditis    Surgical sites no new redness/purulence       Laboratory Data    Results from last 7 days   Lab Units 12/10/21  0501 12/09/21  0409 12/08/21  0527   WBC 10*3/mm3 20.44* 19.35* 21.79*   HEMOGLOBIN g/dL 8.3* 7.3* 8.1*   HEMATOCRIT % 24.0* 22.8* 24.1*   PLATELETS 10*3/mm3 181 177 213     Results from last 7 days   Lab Units 12/10/21  0502   SODIUM mmol/L 128*   POTASSIUM mmol/L 3.7   CHLORIDE mmol/L 94*   CO2 mmol/L 18.0*   BUN mg/dL 40*   CREATININE mg/dL 3.66*   GLUCOSE mg/dL 226*   CALCIUM mg/dL 9.0     Results from last 7 days   Lab Units 12/08/21  0527   ALK PHOS U/L 201*   BILIRUBIN mg/dL 0.7   ALT (SGPT) U/L 17   AST (SGOT) U/L 50*         Results from last 7 days   Lab Units 12/05/21  0649   CRP mg/dL 23.82*       Estimated Creatinine Clearance: 12.8 mL/min (A) (by C-G formula based on SCr of 3.66 mg/dL (H)).      Microbiology:      Radiology:  Imaging Results (Last 72 Hours)     Procedure Component Value Units Date/Time    XR Chest 1 View [[id number]] Collected: 12/09/21 1945     Updated: 12/09/21 1947    Narrative:      CHEST X-RAY, 12/9/2021      HISTORY:    Reposition left IJ central line.      TECHNIQUE:  AP portable chest x-ray (19:15)    COMPARISON:  *  Chest x-ray, 12/9/2021 (13:55)    FINDINGS:  Left IJ central line tip in the low SVC near its junction with the right atrium. No pneumothorax.    Right IJ dialysis catheter in the low SVC. NG tube below the diaphragm.    Low lung volumes with mild bibasilar atelectasis, unchanged. Lungs otherwise clear. Pulmonary  vascularity remains normal.      Impression:      Left IJ central line, right IJ dialysis catheter in NG tube are in good position. No pneumothorax.    Signer Name: Rolando Whiteside MD   Signed: 12/9/2021 7:45 PM   Workstation Name: [id number]    [facility]    XR Chest 1 View [[id number]] Collected: 12/09/21 1424     Updated: 12/09/21 1429    Narrative:      EXAMINATION: XR CHEST 1 VW-      INDICATION: s/p left IJ central line; R10.9-Unspecified abdominal pain;  T85.71XA-Infection and inflammatory reaction due to peritoneal dialysis  catheter, initial encounter; A41.9-Sepsis, unspecified organism;  K59.9-Functional intestinal disorder, unspecified     TECHNIQUE: Frontal view of the chest     COMPARISON: 12/6/2021     FINDINGS:      Interval placement of left internal jugular central venous catheter with  the tip terminating low in the right atrium. Otherwise stable and  unchanged medical devices from prior. Stable cardiomediastinal  silhouette. Similar bibasilar streaky opacities and likely small left  pleural effusion. No pneumothorax.       Impression:         Interval placement of left internal jugular central venous catheter with  the tip terminating low in the right atrium. Otherwise stable and  unchanged medical devices from prior. Stable cardiomediastinal  silhouette. Similar bibasilar streaky opacities and likely small left  pleural effusion. No pneumothorax.     This report was finalized on 12/9/2021 2:26 PM by Paco Young MD.       XR Chest 1 View [[id number]] Collected: 12/06/21 1835     Updated: 12/08/21 1659    Narrative:      EXAMINATION: XR CHEST 1 VW-      INDICATION: Status post central line placement; R10.9-Unspecified  abdominal pain; T85.71XA-Infection and inflammatory reaction due to  peritoneal dialysis catheter, initial encounter; A41.9-Sepsis,  unspecified organism; K59.9-Functional intestinal disorder, unspecified.        COMPARISON: 11/28/2021.     FINDINGS:  Portable chest reveals dialysis catheter identified on the  right with tip in the SVC. Nasogastric tube with tip in the stomach.  Prominence of the pulmonary vascularity with some increased markings in  the perihilar regions bilaterally. Small left pleural effusion.           Impression:      Placement of a deep line catheter on the right with tip in  the SVC. No pneumothorax. Prominence of the perihilar regions  bilaterally with small left pleural effusion.     D:  12/06/2021  E:  12/07/2021     This report was finalized on 12/8/2021 4:56 PM by Dr. Aracely Oates MD.       FL C Arm During Surgery [[id number]] Collected: 12/07/21 0846     Updated: 12/07/21 2157    Narrative:      EXAMINATION: FLUOROSCOPY C-ARM DURING SURGERY-      INDICATION: Tunnelled dialysis catheter placement; R10.9-Unspecified  abdominal pain; T85.71XA-Infection and inflammatory reaction due to  peritoneal dialysis catheter, initial encounter; A41.9-Sepsis,  unspecified organism; K59.9-Functional intestinal disorder, unspecified.     COMPARISON: None.     FINDINGS: Dictation is to record four seconds of fluoroscopy time. Two  intraprocedural images obtained show the guidewire and dialysis catheter  placement, into the distal SVC or superior right atrium. No pneumothorax  is seen on these images. Please see the procedure report for full  details.       Impression:      Fluoroscopy provided for tunneled dialysis catheter  placement.     D:  12/07/2021  E:  12/07/2021        This report was finalized on 12/7/2021 9:53 PM by Dr. Xavier Cantor MD.               Impression:       --acute abdominal pain.  Associated with nausea/vomiting, abnormal PD fluid concerning for  Peritonitis, ?CAPD in addition to SBO and adenocarcinoma per notes  ; culture negative so far. Catheter out.  No outpatient cultures per patient. 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 Difficile years ago per patient.  No diarrhea at present but at risk for relapse.     --abnormal CT scan with prominent common bile duct per radiology but without evidence for intrahepatic ductal dilation and normal transaminases/bilirubin on November 28.  Monitor exam and labs     --Chronic Coumadin    --paroxysmal Afib/RVR    PLAN:       --IV vancomycin, zosyn ,mycamine     --Check/review labs cultures and scans     --Partial history per nursing staff     --Discussed with microbiology     -- d/w pharmacy      --Highly complex set of issues with high risk for further serious morbidity and other serious sequela    --repeat dialysate for cultures  , fungal/AFB studies pending; d/w micro and no pathogen so far    -- repeat CT 12/3 and SBFT noted, surgery following;  On systemic therapy  And surgery 12/6 as above      Sawyer Llanos MD  12/10/2021

## 2021-12-10 NOTE — PROGRESS NOTES
Pharmacy Parenteral Nutrition Evaluation    Moni Wallace is an 81 y.o. female receiving PPN.    Indication: Bowel obstruction  Consulting Physician: Hospitalist / Dr. Andersen    Total Fluid Rate (if stated): 75 ml/hr (PPN) per renal    Labs  Results from last 7 days   Lab Units 12/10/21  0502 12/09/21  0409 12/08/21  0527 12/07/21  0550 12/07/21  0550 12/06/21  1245 12/06/21  0539   SODIUM mmol/L 128* 127* 125*   < > 129*   < > 135*   POTASSIUM mmol/L 3.7 3.2* 3.2*   < > 3.7   < > 2.9*   CHLORIDE mmol/L 94* 89* 83*   < > 88*   < > 91*   CO2 mmol/L 18.0* 20.0* 21.0*   < > 22.0   < > 26.0   BUN mg/dL 40* 52* 73*   < > 49*   < > 33*   CREATININE mg/dL 3.66* 5.02* 7.94*   < > 7.07*   < > 7.23*   CALCIUM mg/dL 9.0 8.7 8.3*   < > 8.4*   < > 8.1*   BILIRUBIN mg/dL  --   --  0.7  --  0.5  --  0.5   ALK PHOS U/L  --   --  201*  --  136*  --  117   ALT (SGPT) U/L  --   --  17  --  7  --  <5   AST (SGOT) U/L  --   --  50*  --  25  --  20   GLUCOSE mg/dL 226* 165* 163*   < > 286*   < > 123*    < > = values in this interval not displayed.     Results from last 7 days   Lab Units 12/10/21  0502 12/09/21  1937 12/09/21  1035 12/09/21  0409 12/09/21  0409 12/08/21  0527 12/08/21  0527 12/06/21  0539 12/05/21  1643   MAGNESIUM mg/dL 1.7  --   --   --  1.7  --  1.7   < >  --    PHOSPHORUS mg/dL 1.2* 1.3* 0.3*   < > 1.1*   < > 2.2*   < >  --    PREALBUMIN mg/dL  --   --   --   --   --   --   --   --  13.6*    < > = values in this interval not displayed.     Results from last 7 days   Lab Units 12/10/21  0501 12/09/21  0409 12/08/21  0527   WBC 10*3/mm3 20.44* 19.35* 21.79*   HEMOGLOBIN g/dL 8.3* 7.3* 8.1*   HEMATOCRIT % 24.0* 22.8* 24.1*   PLATELETS 10*3/mm3 181 177 213     Triglycerides   Date Value Ref Range Status   12/05/2021 104 0 - 150 mg/dL Final     Comment:     Falsely depressed results may occur on samples drawn from patients receiving N-Acetylcysteine (NAC) or Metamizole.     estimated creatinine clearance is 12.8 mL/min (A)  (by C-G formula based on SCr of 3.66 mg/dL (H)).    Intake & Output (last 3 days)         12/07 0701  12/08 0700 12/08 0701  12/09 0700 12/09 0701  12/10 0700 12/10 0701  12/11 0700    I.V. (mL/kg) 4.2 (0.1) 2 (0) 0.5 (0) 1.7 (0)    Blood   294     Other 60 360 240     NG/GT  285 160     IV Piggyback 150 250  350    TPN  4771      Total Intake(mL/kg) 214.2 (2.6) 5668 (67.9) 694.5 (8.4) 351.7 (4.2)    Urine (mL/kg/hr)  0 (0)      Emesis/NG output 550 705 220     Other  1880 1020     Stool        Blood        Total Output 550 2585 1240     Net -335.8 +3083 -545.6 +351.7            Urine Unmeasured Occurrence  1 x            Dietitian Recommendations  Dietary Orders (From admission, onward)       Start     Ordered    12/10/21 0709  NPO Diet NPO Except: Ice Chips, Sips With Meds  Diet Effective Now        Comments: Ok for popsicles   Question Answer Comment   NPO Except: Ice Chips    NPO Except: Sips With Meds        12/10/21 0708                  Current PPN Regimen Recommendation: Dextrose 5% / Amino Acid 5.5% at goal rate of 75 mL/hr. 20% SMOF lipids 250mL every 24 hours.    Assessment/Plan:  1. Pharmacy to dose PPN ordered for bowel obstruction.  2. PPN continues today with macronutrients per RD recommendations as above at goal rate of 75 mL/hr. Patient has ESRD and has transitioned from peritoneal dialysis to hemodialysis this admission. Will continue Na at 60 mEq/L, K at 5 meq/L, Ca at 2 meq/L, Mg at 3 meq/L, and phos at 3 mmol/L for now. Phosphorus, magnesium, and potassium being replaced exogenously today per nephrology.  3. Blood glucose elevated today on correctional insulin - will continue to follow trend to determine need for adjustment in insulin regimen.  4. Pharmacy will continue to follow closely and make adjustments to PPN as necessary.    Thank you,  Jensen Ramirez, PharmD, BCPS  12/10/2021  13:57 EST

## 2021-12-10 NOTE — PROGRESS NOTES
Continued Stay Note  [facility]     Patient Name: Moni Wallace  MRN: [id number]  Today's Date: 12/10/2021    Admit Date: 11/28/2021     Discharge Plan     Row Name 12/10/21 7366       Plan    Plan Home with [facility]    Plan Comments Hospice referral received, chart reviewed. Visit made to pt, pt with eyes closed. Pt's 3 sons and daughter present. Family stated is aware of the hospice consult. Family stated a decision has been made to take pt home, focus on pt's comfort and no further aggressive treatment. Family wants for pt to continue with dialysis while hospitalized, then will stop when pt is discharged. Teaching done on hospice philosophy, services and goals of care. Family does want hospice services for pt. Discussed equipment needs, pt will need a hospital bed, overbed table, suction machine for NG tube. Family will transport pt via private vehicle. Family asking to have the equipment delivered Tuesday morning and pt discharge home on Tuesday. Message sent to Dr. Koenig regarding discharge plans. Will continue to follow. Please call [phone] if can be of further assistance.    Row Name 12/10/21 1613       Plan    Plan     Plan Comments     Final Discharge Disposition Code 30 - still a patient               Discharge Codes    No documentation.               Expected Discharge Date and Time     Expected Discharge Date Expected Discharge Time    Dec 14, 2021             Marlyn Bojorquez RN

## 2021-12-10 NOTE — DISCHARGE PLACEMENT REQUEST
"Moni Phillips (81 y.o. Female)     Referred 12/10/2021 by MOODY LE  PCP-Dr. STERLING Walters  Dx- peritoneal carcinomatosis  Tested negative for covid 19 on 11/28/2021        Date of Birth Social Security Number Address Home Phone MRN    1940  [address]  [facility] [address] [phone] [id number]    Religious Marital Status             Yarsanism        Admission Date Admission Type Admitting Provider Attending Provider Department, Room/Bed    11/28/21 Emergency Luis Carlos Koenig MD West, Christopher R, MD [facility] 5H, S573/1    Discharge Date Discharge Disposition Discharge Destination                         Attending Provider: Luis Carlos Koenig MD    Allergies: Hydrocodone, Rice, Statins, Codeine, Sulfa Antibiotics    Isolation: None   Infection: None   Code Status: No CPR   Advance Care Planning Activity    Ht: 162.6 cm (64\")   Wt: 83.1 kg (183 lb 4.8 oz)    Admission Cmt: None   Principal Problem: Peritonitis (HCC) [K65.9]                 Active Insurance as of 11/28/2021     Primary Coverage     Payor Plan Insurance Group Employer/Plan Group    MEDICARE MEDICARE A & B      Payor Plan Address Payor Plan Phone Number Payor Plan Fax Number Effective Dates    [address] [phone]  1/1/2006 - None Entered    [facility] [id number]       Subscriber Name Subscriber Birth Date Member ID       MONI PHILLIPS 1940 [id number]           Secondary Coverage     Payor Plan Insurance Group Employer/Plan Group    [facility] SUPP [id number]     Payor Plan Address Payor Plan Phone Number Payor Plan Fax Number Effective Dates    [address]   1/1/2006 - None Entered    [facility] [id number]       Subscriber Name Subscriber Birth Date Member ID       MONI PHILLIPS 1940 [id number]                 Emergency Contacts      (Rel.) Home Phone Work Phone Mobile Phone    Yadiel Phillips (Son) [phone] -- [phone]    JoneGenesis (Daughter) " [phone] -- --    Gema Wallace (Daughter) [phone] -- --    Sawyer Wallace (Son) [phone] -- --    Gerry Wallace (Son) [phone] -- --            Emergency Contact Information     Name Relation Home Work Mobile    Yadiel Wallace [phone]    Genesis Becerril Daughter [phone]      Gema Wallace Daughter [phone]      Sawyer Wallace Son [phone]      Gerry Wallace [phone]            Insurance Information                MEDICARE/MEDICARE A & B Phone: [phone]    Subscriber: Moni Wallace Subscriber#: [id number]    Group#: -- Precert#: --        ANTHEM BLUE CROSS/ANTHEM BC  SUPP Phone: --    Subscriber: Moni Wallace Subscriber#: [id number]    Group#: [id number] Precert#: --             History & Physical      Sonia Álvarez MD at [address] [place] [address] Medicine Services  HISTORY AND PHYSICAL    Patient Name: Moni Wallace  : 1940  MRN: [id number]  Primary Care Physician: Alex Walters MD  Date of admission: 2021      Subjective   Subjective     Chief Complaint:  Abdominal pain    HPI:  Moni Wallace is a 81 y.o. female on peritoneal dialysis for several years who presented to the ED due to diffuse abdominal pain that has progressively worsened over the past two days.  Pain improves some with acetaminophen.  She denies any associated fever.  She has not had any vomiting but does have some nausea today.  In the ED she was found to be hypotensive which improved with IV fluid.  CT abdomen/pelvis showed hazy omental stranding concerning for peritonitis as well as evidence of possible early ileus or partial SBO.  She had leukocytosis of 14k and peritoneal fluid had 1748 WBC with 77% neutrophils.  She was started on IV vancomycin and Zosyn and admission requested for further management.      COVID Details:    Symptoms:    [x] NONE [] Fever []  Cough [] Shortness of breath [] Change in taste/smell      The patient has started,  but not completed, their COVID-19 vaccination series.      Review of Systems   Gen- No fevers, chills  CV- No chest pain, palpitations  Resp- No cough, dyspnea  GI- As above    All other systems reviewed and are negative.     Personal History     Past Medical History:   Diagnosis Date   • Adenomatous colon polyp    • Chronic kidney disease    • Clostridium difficile infection 06/2017   • Diabetes mellitus (HCC)    • Gastric polyps    • Hypothyroidism    • IgA nephropathy, acute    • Kidney transplanted    • Macular degeneration    • Paroxysmal atrial fibrillation (HCC)    • Tubular adenoma     excision    • Ulcer of gastric fundus    • Vitamin D deficiency        Past Surgical History:   Procedure Laterality Date   • BREAST BIOPSY Left 1990'S   • CARPAL TUNNEL RELEASE     • CARPAL TUNNEL RELEASE Right    • CATARACT EXTRACTION     • CATARACT EXTRACTION Bilateral    • DIAGNOSTIC LAPAROSCOPY     • DIALYSIS FISTULA CREATION     • HERNIA REPAIR  85 and 86   • KNEE ARTHROSCOPY INCISION AND DRAINAGE OF KNEE Right 5/18/2019    Procedure: KNEE ARTHROSCOPY INCISION AND DRAINAGE OF KNEE;  Surgeon: Paco Lester MD;  Location: [facility];  Service: Orthopedics   • LAPAROSCOPIC TUBAL LIGATION  1985   • TOTAL KNEE ARTHROPLASTY     • TOTAL KNEE ARTHROPLASTY      Left knee    • TRANSPLANTATION RENAL  2010   • TRANSPLANTATION RENAL Right    • TRIGGER FINGER RELEASE     • TUBAL ABDOMINAL LIGATION     • UPPER GASTROINTESTINAL ENDOSCOPY  05/20/2013   • VENOUS ACCESS DEVICE (PORT) INSERTION N/A 5/23/2019    Procedure: INSERTION GROSHONG;  Surgeon: Rolando Begum MD;  Location: [facility];  Service: General       Family History:  family history includes Dementia in her sister and sister; Diabetic kidney disease in her sister; Hypertension in her sister; Kidney disease in her sister; Leukemia in her mother; Lung cancer in her brother; Stroke in her father. Otherwise pertinent FHx was reviewed and unremarkable.     Social  History:  reports that she has never smoked. She has never used smokeless tobacco. She reports that she does not drink alcohol and does not use drugs.  Social History     Social History Narrative    ** Merged History Encounter **         Lives at home, 1 son lives with her    Not current with HH    No assistive devices used    Caffeine decaf tea       Medications:  Available home medication information reviewed.  (Not in a hospital admission)      Allergies   Allergen Reactions   • Hydrocodone Itching   • Rice Swelling   • Statins Other (See Comments)     myalgia   • Codeine Rash   • Sulfa Antibiotics Rash       Objective   Objective     Vital Signs:   Temp:  [97.9 °F (36.6 °C)] 97.9 °F (36.6 °C)  Heart Rate:  [70-81] 71  Resp:  [16-18] 16  BP: ()/(40-92) 94/54       Physical Exam   Constitutional: Awake, alert, sitting up in bed  Eyes: Sclerae anicteric, no conjunctival injection  HENT: NCAT, mucous membranes moist  Neck: Supple, trachea midline  Respiratory: Clear to auscultation bilaterally, nonlabored respirations   Cardiovascular: RRR, no murmurs, rubs, or gallops  Gastrointestinal: Positive bowel sounds, soft, diffuse mild tenderness to palpation without guarding, nondistended  Musculoskeletal: No bilateral ankle edema, no clubbing or cyanosis to extremities  Psychiatric: Appropriate affect, cooperative  Neurologic: Cranial Nerves grossly intact to confrontation, speech clear  Skin: No rashes on exposed surfaces    Result Review:  I have personally reviewed the results from the time of this admission to 11/28/2021 14:34 EST and agree with these findings:  [x]  Laboratory  []  Microbiology  [x]  Radiology  []  EKG/Telemetry   []  Cardiology/Vascular   []  Pathology  []  Old records  []  Other:  Most notable findings include:       LAB RESULTS:      Lab 11/28/21  0902 11/22/21  0000   WBC 14.71*  --    HEMOGLOBIN 11.1*  --    HEMATOCRIT 34.9  --    PLATELETS 326  --    NEUTROS ABS 10.55*  --    IMMATURE  GRANS (ABS) 0.29*  --    LYMPHS ABS 2.51  --    MONOS ABS 0.75  --    EOS ABS 0.54*  --    .9*  --    LACTATE 2.1*  --    PROTIME 28.6*  --    INR 2.84* 3.10         Lab 11/28/21  0902   SODIUM 137   POTASSIUM 3.8   CHLORIDE 97*   CO2 23.0   ANION GAP 17.0*   BUN 54*   CREATININE 10.29*   GLUCOSE 179*   CALCIUM 6.8*         Lab 11/28/21  0902   TOTAL PROTEIN 7.1   ALBUMIN 2.90*   GLOBULIN 4.2   ALT (SGPT) 30   AST (SGOT) 30   BILIRUBIN 0.2   ALK PHOS 214*   LIPASE 19                     UA    Urinalysis 7/6/21 7/6/21 7/22/21 8/12/21    1240 1240     Squamous Epithelial Cells, UA  13-20 (A)     Specific Gravity, UA 1.012      Ketones, UA Negative  Negative Negative   Blood, UA Small (1+) (A)      Leukocytes, UA Large (3+) (A)  500 Sean/ul (A) Moderate (2+) (A)   Nitrite, UA Negative      RBC, UA  3-6 (A)     WBC, UA  Too Numerous to Count (A)     Bacteria, UA  3+ (A)     (A) Abnormal value              Microbiology Results (last 10 days)     Procedure Component Value - Date/Time    Body Fluid Culture - Body Fluid, Peritoneum [[id number]] Collected: 11/28/21 1214    Lab Status: Preliminary result Specimen: Body Fluid from Peritoneum Updated: 11/28/21 1407     Gram Stain Few (2+) WBCs seen      No organisms seen          CT Abdomen Pelvis Without Contrast    Result Date: 11/28/2021  EXAMINATION: CT ABDOMEN PELVIS WO CONTRAST-  INDICATION: Upper abdominal pain, history of peritoneal dialysis, on Coumadin, low blood pressure  TECHNIQUE: Noncontrast CT of the abdomen and pelvis  COMPARISON: CT abdomen and pelvis 7/6/2021  FINDINGS:  Unremarkable appearance of the lung bases and lower thorax. Unremarkable appearance of the liver. The gallbladder is decompressed. There is new prominence of the common bile duct measuring up to 10 mm in diameter. No evidence of obstructing lesion or common duct stone. There is no significant intrahepatic biliary ductal dilatation. Unremarkable appearance of the spleen and pancreas,  noting that the previously described pancreatic body hypodense/cystic lesion is not well visualized on this exam today. Stable size and appearance of a low-density 1.5 cm left adrenal nodule, likely adenoma. Unremarkable appearance of the right adrenal gland. Unchanged appearance of the kidneys demonstrating severe bilateral atrophy and exophytic left renal cyst. Unchanged appearance of the right lower quadrant transplant kidney. Unremarkable appearance of the bladder.  New appearance of multiple distended and mildly dilated fecalized small bowel loops throughout the abdomen. The distal ileum appears decompressed. A discrete transition point is not identified although caliber change may occur in the lower pelvis although this region is difficult to evaluate in the setting of multiple coalescent bowel loops and small fluid. Stool and gas is present within the colon. There is mild colonic diverticulosis without diverticulitis. The appendix is normal (series 2 image 47).  There is a small amount of dependent pelvic and scattered intra-abdominal fluid as well as scattered small foci of pneumoperitoneum; these findings likely relate to peritoneal dialysis. Redemonstration of left lower quadrant peritoneal dialysis catheter which appears curled in the left lower pelvis. There is new hazy fat stranding and infiltration of the omentum and to lesser extent the mesenteric fat (series 2 image 33). Redemonstration of a small fluid-filled right anterior lower abdominal wall hernia without evidence of herniated bowel. No acute osseous findings.       Impression:  New appearance of multiple distended and mildly dilated fecalized small bowel loops throughout the abdomen. Stool and gas remains within the colon. The distal most ileum appears decompressed. A discrete transition point is not definitively identified although caliber change may occur in the lower pelvis. These findings may reflect partial or early small bowel obstruction  versus early ileus.  There is new hazy stranding/infiltration of the omental and to lesser extent the mesenteric fat which is nonspecific and could reflect changes related to peritoneal dialysis and/or volume status changes although inflammatory fat stranding cannot be entirely excluded. Scattered intra-abdominal ascites and pneumoperitoneum likely reflect peritoneal dialysis.  There is new prominence of the common bile duct measuring up to 10 mm in diameter, without evidence of significant intrahepatic ductal dilatation. The gallbladder is decompressed. No evidence of obstructing stone or mass lesion on this noncontrast exam. Correlate with LFTs.  This report was finalized on 11/28/2021 11:14 AM by Paco Young MD.      XR Chest 1 View    Result Date: 11/28/2021  EXAMINATION: XR CHEST 1 VW-  INDICATION: upper abd pain  TECHNIQUE: Frontal view of the chest  COMPARISON: Chest x-ray 9/19/2021  FINDINGS:  Stable cardiomediastinal silhouette. Mild elevation of the right hemidiaphragm from prior. The lungs are clear without focal consolidation. No pleural effusion or pneumothorax. No acute osseous findings.      Impression:  No acute cardiopulmonary findings.  This report was finalized on 11/28/2021 12:01 PM by Paco Young MD.        Results for orders placed during the hospital encounter of 01/11/21    Transthoracic Echo Complete With Contrast if Necessary Per Protocol    Interpretation Summary  · Left ventricular systolic function is normal. Estimated left ventricular EF = 65%  · Left ventricular wall thickness is consistent with hypertrophy.  · Left atrial volume is mildly increased.  · The aortic valve is calcified. There is mild aortic stenosis present. There is moderate aortic regurgitation present.  · Estimated right ventricular systolic pressure from tricuspid regurgitation is normal (<35 mmHg).      Assessment/Plan   Assessment & Plan     Active Hospital Problems    Diagnosis  POA   • **Peritonitis (HCC)  [K65.9]  Yes   • Sepsis associated hypotension (HCC) [A41.9, I95.9]  Yes   • Ileus (HCC) [K56.7]  Yes   • Paroxysmal atrial fibrillation (HCC) [I48.0]  Yes     · Diagnosed 2/2015  · CHADS-VASc = 5  · On Coumadin   · Echo (1/12/2021): LVEF 65%.  LVH.  Moderate AI.     • Type 2 diabetes mellitus (HCC) [E11.9]  Yes   • Chronic kidney disease, stage IV (severe) (HCC) [N18.4]  Yes     · IgA nephropathy with history of renal transplant, 2010.   · Patient on hemodialysis Monday, Wednesday, and Friday started July 2015.  · Hemodialysis discontinued 2/2017.     • Hyperparathyroidism (HCC) [E21.3]  Yes       Sepsis due to peritonitis related to peritoneal dialysis  Hypotension  Elevated lactate  -Blood and peritoneal fluid cultures pending  -IV vancomycin and Zosyn  -Consulted nephrology  -Will ask ID to see her in the am    Ileus  -Likely secondary to peritoneal inflammation  -NPO for now, place NG tube with any vomiting  -KUB in am and may consider advancing diet in am    Paroxysmal atrial fibrillation  HTN  -Continue warfarin  -Hold beta blocker due to hypotension      DVT prophylaxis:  Warfarin      CODE STATUS:    Code Status and Medical Interventions:   Ordered at: 11/28/21 1406     Medical Intervention Limits:    NO intubation (DNI)     Level Of Support Discussed With:    Patient     Code Status (Patient has no pulse and is not breathing):    No CPR (Do Not Attempt to Resuscitate)     Medical Interventions (Patient has pulse or is breathing):    Limited Support       Admission Status:  I believe this patient meets INPATIENT status due to sepsis.  I feel patient’s risk for adverse outcomes and need for care warrant INPATIENT evaluation and I predict the patient’s care encounter to likely last beyond 2 midnights.    Sonia Álvarez MD  11/28/21    Electronically signed by Sonia Álvarez MD at 11/28/21 7981         Current Facility-Administered Medications   Medication Dose Route Frequency Provider Last Rate Last Admin   •  acetaminophen (TYLENOL) tablet 650 mg  650 mg Nasogastric Q6H PRN Robin Andersen MD   650 mg at 12/06/21 0815   • acetaminophen (TYLENOL) tablet 650 mg  650 mg Nasogastric Q6H Robin Andersen MD   650 mg at 12/10/21 1228   • Adult Peripheral 2-in-1 TPN   Intravenous Continuous Jensen Ramirez PharmANUM 75 mL/hr at 12/09/21 1818 New Bag at 12/09/21 1818   • Adult Peripheral 2-in-1 TPN   Intravenous Continuous Jensen Ramirez, PharmD       • albumin human 25 % IV SOLN  - ADS Override Pull            • albumin human 25 % IV SOLN  - ADS Override Pull            • albuterol (PROVENTIL) nebulizer solution 0.083% 2.5 mg/3mL  2.5 mg Nebulization Q4H PRN Robin Andersen MD       • amiodarone 360 mg in 200 mL D5W infusion  0.5 mg/min Intravenous Continuous Lily Rae APRN 16.67 mL/hr at 12/10/21 0838 0.5 mg/min at 12/10/21 0838   • benzocaine-menthol (CEPACOL) lozenge 1 lozenge  1 lozenge Mouth/Throat Q2H PRN Robin Andersen MD   1 lozenge at 11/30/21 1829   • calcitriol (ROCALTROL) capsule 0.25 mcg  0.25 mcg Oral Once per day on Mon Wed Fri Albina Will MD   0.25 mcg at 12/10/21 0758   • camphor-menthol (SARNA) 0.5-0.5 % lotion   Topical Q8H PRN Jazmin Duran, APRN       • cinacalcet (SENSIPAR) tablet 60 mg  60 mg Oral Nightly Robin Andersen MD       • doxepin (SINEquan) capsule 10 mg  10 mg Oral Q8H PRN Jazmin Duran, APRN       • epoetin blanca-epbx (RETACRIT) injection 10,000 Units  10,000 Units Subcutaneous Once per day on Mon Wed Fri Albina Will MD   10,000 Units at 12/10/21 1229   • Fat Emul Fish Oil/Plant Based (SMOFLIPID) 20 % emulsion 250 mL  250 mL Intravenous Q24H (TPN) Robin Andersen MD 20.8 mL/hr at 12/09/21 1823 250 mL at 12/09/21 1823   • HYDROmorphone (DILAUDID) PCA 1 mg/mL syringe   Intravenous Continuous Luis Carlos Koenig MD   Currently Infusing at 12/10/21 1219   • insulin lispro (humaLOG) injection 2-7 Units  2-7 Units Subcutaneous Q6H Robin Andersen  MD BARB   3 Units at 12/10/21 1228   • lansoprazole (FIRST) oral suspension 30 mg  30 mg Nasogastric Q AM Robin Andersen MD   30 mg at 12/10/21 0537   • metoprolol tartrate (LOPRESSOR) tablet 25 mg  25 mg Nasogastric Q12H Robin Andersen MD   25 mg at 12/10/21 0758   • micafungin 100 mg/100 mL 0.9% NS IVPB (mbp)  100 mg Intravenous Q24H Sawyer Llanos MD   100 mg at 12/10/21 1015   • naloxone (NARCAN) injection 0.1 mg  0.1 mg Intravenous Q5 Min PRN Robin Andersen MD       • ondansetron (ZOFRAN) tablet 4 mg  4 mg Nasogastric Q6H PRN Robin Andersen MD        Or   • ondansetron (ZOFRAN) injection 4 mg  4 mg Intravenous Q6H PRN Robin Andersen MD   4 mg at 12/10/21 0146   • Pharmacy to Dose TPN   Does not apply Continuous PRN Robin Andersen MD       • Pharmacy to dose vancomycin   Does not apply Continuous PRN Robin Andersen MD       • phenol (CHLORASEPTIC) 1.4 % liquid 2 spray  2 spray Mouth/Throat Q2H PRN Robin Andersen MD   2 spray at 12/01/21 0223   • piperacillin-tazobactam (ZOSYN) 3.375 g in iso-osmotic dextrose 50 ml (premix)  3.375 g Intravenous Q12H Sawyer Llanos MD   3.375 g at 12/10/21 0538   • potassium & sodium phosphates (PHOS-NAK) 280-160-250 MG packet - for Phosphorus 1.3-2.5 mg/dL  1 packet Oral BID PRN Luis Carlos Koenig MD   1 packet at 12/09/21 2004   • potassium & sodium phosphates (PHOS-NAK) oral packet  1 packet Nasogastric TID AC Albina Will MD   1 packet at 12/10/21 0758   • potassium phosphate 21 mmol in sodium chloride 0.9 % 250 mL infusion  21 mmol Intravenous PRN Albina Will MD 41.7 mL/hr at 12/10/21 1015 21 mmol at 12/10/21 1015    Or   • potassium phosphate 15 mmol in sodium chloride 0.9 % 100 mL infusion  15 mmol Intravenous PRN Albina Will MD        Or   • potassium phosphate 9 mmol in sodium chloride 0.9 % 100 mL infusion  9 mmol Intravenous PRN Albina Will MD       • prochlorperazine (COMPAZINE) injection  5 mg  5 mg Intravenous Q6H PRN Robin Andersen MD       • sodium bicarbonate tablet 650 mg  650 mg Nasogastric TID Suman, Albina Landis MD   650 mg at 12/10/21 1228   • Sodium Chloride (PF) 0.9 % 10 mL  10 mL Intravenous PRN Robin Andersen MD       • sodium chloride 0.9 % flush 10 mL  10 mL Intravenous Q12H Robin Andersen MD   10 mL at 21   • sodium chloride 0.9 % flush 10 mL  10 mL Intravenous PRN Robin Andersen MD       • sodium chloride 0.9 % flush 10 mL  10 mL Intravenous Q12H Luis Carlos Koenig MD   10 mL at 21   • sodium chloride 0.9 % flush 10 mL  10 mL Intravenous Q12H Luis Carlos Koenig MD   10 mL at 21   • sodium chloride 0.9 % flush 10 mL  10 mL Intravenous Q12H Luis Carlos Koenig MD   10 mL at 12/10/21 1024   • sodium chloride 0.9 % flush 10 mL  10 mL Intravenous PRN Luis Carlos Koenig MD       • sodium chloride 0.9 % flush 20 mL  20 mL Intravenous PRN Luis Carlos Koenig MD       • temazepam (RESTORIL) capsule 15 mg  15 mg Nasogastric Nightly PRN Robin Andersen MD   15 mg at 12/10/21 0146   • vancomycin (dosing per levels)   Does not apply Daily Robin Andersen MD              Consult Notes (last 48 hours)      Jazmin Duran APRN at 12/10/21 1600      Consult Orders    1. Inpatient Palliative Care MD Consult [[id number]] ordered by Luis Carlos Koengi MD at 12/10/21 0808               Palliative Care Initial Consult Note    Patient Name:  Moni Wallace   : 1940   Sex: female    Patient Care Team:  Alex Walters MD as PCP - General (Internal Medicine)  Jeff Joe IV, MD as Consulting Physician (Cardiology)  Donny Hodges MD as Consulting Physician (Nephrology)  Cory Castillo MD as Surgeon (General Surgery)  Slim Wick MD    Advance care planning discussed: yes   Code Status: DNR Limited   Advance Directive: yes   Surrogate decision maker: Yadiel Wallace (Son)   [phone]  (Mobile)    Referring Provider: Yehuda Koenig MD   Reason for Consult: goals of care     Subjective     Ms Moni Wallace is a 81 y.o. female admitted to [facility] on 11/28/2021 with DX peritonitis with small bowel obstruction with chronic peritoneal inflammation from peritoneal dialysis.  For her obstruction, Dr. Andersen performed laparotomy with ileocecal ectomy on 12/6/2021.  12/3/2021 CT abdomen pelvis without contrast showed the obstruction.  PMH includes  end-stage renal disease, hypotension with sepsis, paroxysmal atrial fibrillation, diabetes.Pathology returned pos for adenocarcinoma. Per oncology note dated yesterday any tx would be palliative only.     Palliative was consulted to further address goals of care.     Review of Systems   Unable to perform ROS: Other   very sleepy, mild confusion     History  Past Medical History:   Diagnosis Date   • Adenomatous colon polyp    • Chronic kidney disease    • Clostridium difficile infection 06/2017   • Diabetes mellitus (HCC)    • Gastric polyps    • Hypothyroidism    • IgA nephropathy, acute    • Kidney transplanted    • Macular degeneration    • Paroxysmal atrial fibrillation (HCC)    • Tubular adenoma     excision    • Ulcer of gastric fundus    • Vitamin D deficiency      Past Surgical History:   Procedure Laterality Date   • BREAST BIOPSY Left 1990'S   • CARPAL TUNNEL RELEASE     • CARPAL TUNNEL RELEASE Right    • CATARACT EXTRACTION     • CATARACT EXTRACTION Bilateral    • COLON RESECTION SMALL BOWEL N/A 12/6/2021    Procedure: COLON RESECTION SMALL BOWEL;  Surgeon: Roibn Andersen MD;  Location:  [facility];  Service: General;  Laterality: N/A;   • DIAGNOSTIC LAPAROSCOPY     • DIALYSIS FISTULA CREATION     • EXPLORATORY LAPAROTOMY N/A 12/6/2021    Procedure: DIAGNOSTIC LAPAROSCOPY;  Surgeon: Robin Andersen MD;  Location:  [facility];  Service: General;  Laterality: N/A;   • HERNIA REPAIR  85 and 86   • INSERTION HEMODIALYSIS CATHETER Right 12/6/2021    Procedure:  TUNNELED DIALYSIS CATHETER INSERTION;  Surgeon: Rolando Begum MD;  Location:  [facility];  Service: Vascular;  Laterality: Right;   • KNEE ARTHROSCOPY INCISION AND DRAINAGE OF KNEE Right 5/18/2019    Procedure: KNEE ARTHROSCOPY INCISION AND DRAINAGE OF KNEE;  Surgeon: Paco Lester MD;  Location:  [facility];  Service: Orthopedics   • LAPAROSCOPIC TUBAL LIGATION  1985   • TOTAL KNEE ARTHROPLASTY     • TOTAL KNEE ARTHROPLASTY      Left knee    • TRANSPLANTATION RENAL  2010   • TRANSPLANTATION RENAL Right    • TRIGGER FINGER RELEASE     • TUBAL ABDOMINAL LIGATION     • UPPER GASTROINTESTINAL ENDOSCOPY  05/20/2013   • VENOUS ACCESS DEVICE (PORT) INSERTION N/A 5/23/2019    Procedure: INSERTION GROSHONG;  Surgeon: Rolando Begum MD;  Location:  [facility];  Service: General     Current Facility-Administered Medications   Medication Dose Route Frequency Provider Last Rate Last Admin   • acetaminophen (TYLENOL) tablet 650 mg  650 mg Nasogastric Q6H PRN Robin Andersen MD   650 mg at 12/06/21 0815   • acetaminophen (TYLENOL) tablet 650 mg  650 mg Nasogastric Q6H Robin Andersen MD   650 mg at 12/10/21 1228   • Adult Peripheral 2-in-1 TPN   Intravenous Continuous Jensen Ramirez, PharmD 75 mL/hr at 12/09/21 1818 New Bag at 12/09/21 1818   • Adult Peripheral 2-in-1 TPN   Intravenous Continuous Jensen Ramirez, PharmD       • albumin human 25 % IV SOLN  - ADS Override Pull            • albumin human 25 % IV SOLN  - ADS Override Pull            • albuterol (PROVENTIL) nebulizer solution 0.083% 2.5 mg/3mL  2.5 mg Nebulization Q4H PRN Robin Andersen MD       • amiodarone 360 mg in 200 mL D5W infusion  0.5 mg/min Intravenous Continuous Lily Rae APRN 16.67 mL/hr at 12/10/21 0544 0.5 mg/min at 12/10/21 0544   • benzocaine-menthol (CEPACOL) lozenge 1 lozenge  1 lozenge Mouth/Throat Q2H PRN Robin Andersen MD   1 lozenge at 11/30/21 1829   • calcitriol (ROCALTROL) capsule 0.25 mcg  0.25  mcg Oral Once per day on Mon Wed Fri Albina Will MD   0.25 mcg at 12/10/21 0758   • camphor-menthol (SARNA) 0.5-0.5 % lotion   Topical Q8H PRN Jazmin Duran APRN       • cinacalcet (SENSIPAR) tablet 60 mg  60 mg Oral Nightly Robin Andersen MD       • diphenhydrAMINE (BENADRYL) injection 25 mg  25 mg Intravenous Q6H PRN Robin Andersen MD       • epoetin blanca-epbx (RETACRIT) injection 10,000 Units  10,000 Units Subcutaneous Once per day on Mon Wed Fri Albina Will MD   10,000 Units at 12/10/21 1229   • Fat Emul Fish Oil/Plant Based (SMOFLIPID) 20 % emulsion 250 mL  250 mL Intravenous Q24H (TPN) Robin Andersen MD 20.8 mL/hr at 12/09/21 1823 250 mL at 12/09/21 1823   • HYDROmorphone (DILAUDID) PCA 1 mg/mL syringe   Intravenous Continuous Luis Carlos Koenig MD   Currently Infusing at 12/10/21 1219   • hydrOXYzine (ATARAX) tablet 25 mg  25 mg Oral TID PRN Luis Carlos Koenig MD   25 mg at 12/10/21 0146   • insulin lispro (humaLOG) injection 2-7 Units  2-7 Units Subcutaneous Q6H Robin Andersen MD   3 Units at 12/10/21 1228   • lansoprazole (FIRST) oral suspension 30 mg  30 mg Nasogastric Q AM Robin Andersen MD   30 mg at 12/10/21 0537   • metoprolol tartrate (LOPRESSOR) tablet 25 mg  25 mg Nasogastric Q12H Robin Andersen MD   25 mg at 12/10/21 0758   • micafungin 100 mg/100 mL 0.9% NS IVPB (mbp)  100 mg Intravenous Q24H Sawyer Llanos MD   100 mg at 12/10/21 1015   • naloxone (NARCAN) injection 0.1 mg  0.1 mg Intravenous Q5 Min PRN Robin Andersen MD       • ondansetron (ZOFRAN) tablet 4 mg  4 mg Nasogastric Q6H PRN Robin Andersen MD        Or   • ondansetron (ZOFRAN) injection 4 mg  4 mg Intravenous Q6H PRN Robin Andersen MD   4 mg at 12/10/21 0146   • Pharmacy to Dose TPN   Does not apply Continuous PRN Robin Andersen MD       • Pharmacy to dose vancomycin   Does not apply Continuous PRN Robin nAdersen MD       • phenol (CHLORASEPTIC) 1.4 % liquid  2 spray  2 spray Mouth/Throat Q2H PRN Robin Andersen MD   2 spray at 12/01/21 0223   • piperacillin-tazobactam (ZOSYN) 3.375 g in iso-osmotic dextrose 50 ml (premix)  3.375 g Intravenous Q12H Sawyer Llanos MD   3.375 g at 12/10/21 0538   • potassium & sodium phosphates (PHOS-NAK) 280-160-250 MG packet - for Phosphorus 1.3-2.5 mg/dL  1 packet Oral BID PRN Luis Carlos Koenig MD   1 packet at 12/09/21 2004   • potassium & sodium phosphates (PHOS-NAK) oral packet  1 packet Nasogastric TID AC Albina Will MD   1 packet at 12/10/21 0758   • potassium phosphate 21 mmol in sodium chloride 0.9 % 250 mL infusion  21 mmol Intravenous PRN Albina Will MD 41.7 mL/hr at 12/10/21 1015 21 mmol at 12/10/21 1015    Or   • potassium phosphate 15 mmol in sodium chloride 0.9 % 100 mL infusion  15 mmol Intravenous PRN Albina Will MD        Or   • potassium phosphate 9 mmol in sodium chloride 0.9 % 100 mL infusion  9 mmol Intravenous PRN Albina Will MD       • prochlorperazine (COMPAZINE) injection 5 mg  5 mg Intravenous Q6H PRN Robin Andersen MD       • sodium bicarbonate tablet 650 mg  650 mg Nasogastric TID Albina Will MD   650 mg at 12/10/21 1228   • Sodium Chloride (PF) 0.9 % 10 mL  10 mL Intravenous PRN Robin Andersen MD       • sodium chloride 0.9 % flush 10 mL  10 mL Intravenous Q12H Robin Andersen MD   10 mL at 12/09/21 2011   • sodium chloride 0.9 % flush 10 mL  10 mL Intravenous PRN Robin Andersen MD       • sodium chloride 0.9 % flush 10 mL  10 mL Intravenous Q12H Luis Carlos Koenig MD   10 mL at 12/09/21 2011   • sodium chloride 0.9 % flush 10 mL  10 mL Intravenous Q12H Luis Carlos Koenig MD   10 mL at 12/09/21 2010   • sodium chloride 0.9 % flush 10 mL  10 mL Intravenous Q12H Luis Carlos Koenig MD   10 mL at 12/10/21 1024   • sodium chloride 0.9 % flush 10 mL  10 mL Intravenous PRN Luis Carlos Koenig MD       • sodium chloride 0.9 % flush 20  mL  20 mL Intravenous PRN Luis Carlos Koenig MD       • temazepam (RESTORIL) capsule 15 mg  15 mg Nasogastric Nightly PRN Robin Andersen MD   15 mg at 12/10/21 0146   • vancomycin (dosing per levels)   Does not apply Daily Robin Andersen MD         Adult Peripheral 2-in-1 TPN, , Last Rate: 75 mL/hr at 12/09/21 1818  Adult Peripheral 2-in-1 TPN,   amiodarone, 0.5 mg/min, Last Rate: 0.5 mg/min (12/10/21 0544)  HYDROmorphone HCl-NaCl,   Pharmacy to Dose TPN,   Pharmacy to dose vancomycin,       •  acetaminophen  •  albuterol  •  benzocaine-menthol  •  camphor-menthol  •  diphenhydrAMINE  •  hydrOXYzine  •  naloxone  •  ondansetron **OR** ondansetron  •  Pharmacy to Dose TPN  •  Pharmacy to dose vancomycin  •  phenol  •  potassium & sodium phosphates  •  potassium phosphate infusion greater than 15 mMoles **OR** potassium phosphate **OR** potassium phosphate  •  prochlorperazine  •  Sodium Chloride (PF)  •  sodium chloride  •  sodium chloride  •  sodium chloride  •  temazepam  Allergies   Allergen Reactions   • Hydrocodone Itching   • Rice Swelling   • Statins Other (See Comments)     myalgia   • Codeine Rash   • Sulfa Antibiotics Rash     Family History   Problem Relation Age of Onset   • Leukemia Mother    • Stroke Father    • Dementia Sister    • Kidney disease Sister    • Diabetic kidney disease Sister    • Lung cancer Brother    • Breast cancer Neg Hx    • Ovarian cancer Neg Hx    • Hypertension Sister    • Dementia Sister      Social History     Socioeconomic History   • Marital status:    Tobacco Use   • Smoking status: Never Smoker   • Smokeless tobacco: Never Used   Vaping Use   • Vaping Use: Never used   Substance and Sexual Activity   • Alcohol use: No   • Drug use: No   • Sexual activity: Defer       Objective     Vital Signs  Temp:  [98 °F (36.7 °C)-98.4 °F (36.9 °C)] 98.4 °F (36.9 °C)  Heart Rate:  [] 87  Resp:  [18] 18  BP: (106-163)/(49-78) 106/49    PPS:  50% based on the  following measures:   Ambulation: Mainly sit or lie down  Activity and Evidence of Disease: Unable to do any work, extensive evidence of disease  Self-Care: Considerable assistance required  Intake: Normal or reduced   LOC: Full or confusion    Physical Exam:  Vitals and nursing note reviewed.  General Appearance: chronically ill appearing, alert but confused, able to make her needs known   HEENT: Normocephalic; atraumatic. PERTL, Conjunctivae clear,  mm moist   Neck:   supple; trachea midline  Lungs: BBS = CTA   Heart: RRR,  peripheral pulses pos, neg for edema   Abdomen: soft, round, pain with palpation. BS +   Extremities: MOONEY willfully,   Skin: Warm and dry,   Neurological: Alert, mild confusion,   Psych: calm, cooperative     Results Review:   Lab Results   Component Value Date    HGBA1C 4.90 05/19/2019       Lab Results   Component Value Date    GLUCOSE 226 (H) 12/10/2021    BUN 40 (H) 12/10/2021    CREATININE 3.66 (H) 12/10/2021    EGFRIFNONA 12 (L) 12/10/2021    EGFRIFAFRI  12/10/2021      Comment:      <15 Indicative of kidney failure.    BCR 10.9 12/10/2021    K 3.7 12/10/2021    CO2 18.0 (L) 12/10/2021    CALCIUM 9.0 12/10/2021    PROTENTOTREF 5.9 (L) 02/13/2015    ALBUMIN 3.40 (L) 12/08/2021    LABIL2 1.0 02/13/2015    AST 50 (H) 12/08/2021    ALT 17 12/08/2021       WBC   Date Value Ref Range Status   12/10/2021 20.44 (H) 3.40 - 10.80 10*3/mm3 Final     RBC   Date Value Ref Range Status   12/10/2021 2.60 (L) 3.77 - 5.28 10*6/mm3 Final     Hemoglobin   Date Value Ref Range Status   12/10/2021 8.3 (L) 12.0 - 15.9 g/dL Final     Hematocrit   Date Value Ref Range Status   12/10/2021 24.0 (L) 34.0 - 46.6 % Final     MCV   Date Value Ref Range Status   12/10/2021 92.3 79.0 - 97.0 fL Final     MCH   Date Value Ref Range Status   12/10/2021 31.9 26.6 - 33.0 pg Final     MCHC   Date Value Ref Range Status   12/10/2021 34.6 31.5 - 35.7 g/dL Final     RDW   Date Value Ref Range Status   12/10/2021 16.4 (H) 12.3 -  "15.4 % Final     RDW-SD   Date Value Ref Range Status   12/10/2021 53.0 37.0 - 54.0 fl Final     MPV   Date Value Ref Range Status   12/10/2021 10.6 6.0 - 12.0 fL Final     Platelets   Date Value Ref Range Status   12/10/2021 181 140 - 450 10*3/mm3 Final     Neutrophils Absolute   Date Value Ref Range Status   12/09/2021 16.06 (H) 1.70 - 7.00 10*3/mm3 Final     Eosinophils Absolute   Date Value Ref Range Status   12/09/2021 0.19 0.00 - 0.40 10*3/mm3 Final     Basophils Absolute   Date Value Ref Range Status   12/09/2021 0.00 0.00 - 0.20 10*3/mm3 Final     nRBC   Date Value Ref Range Status   12/09/2021 2.0 (H) 0.0 - 0.2 /100 WBC Final       Peritonitis (HCC)    Paroxysmal atrial fibrillation (HCC)    Essential hypertension    Type 2 diabetes mellitus (HCC)    Chronic kidney disease, stage IV (severe) (HCC)    Hyperparathyroidism (HCC)    Hyperlipidemia LDL goal <100    Sepsis associated hypotension (HCC)    Ileus (HCC)    Hypotension      Assessment/Plan: 81 yoF with PMH ESRD, had been on home peritoneal dialysis, admitted with peritonitis/SBO and 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 Continues IV ABX post op;     Pain: scheduled Tylenol, dilaudid PCA for pain control.   Dyspnea: room air   Nausea/Vomiting: zofran 4 mg q 6 hr PRN   Anxiety/Agitation:  Confusion/Delerium:    Conversation with pt/son Sawyer at bedside this morning. Pt states she does not want to do aggressive tx/chemo but wan ts to go home as soon as possible. Son supports this as being a previous stated wish. He also reports she is very familiar with hospice care and would want that.  Other children are coming to hospital later today to further discuss options. Consult placed to home hospice liaison to meet with family.     Called to bedside at 1530 for c/o itching unrelieved by meds. On arrival she reports \"itching all over\", per exam there is no erythema/pruritis/welting, there are scratch marks across trunk/shoulders. Pt reports " she is allergic to benadryl (I have DCd this) and that atarax was used early this am but only made it worse. I have placed order for low dose Doxepin 10 mg and for sarna lotion PRN.     Educated patient/family about using palliative approach with advanced age and multiple chronic disease processes that cannot be reversed.     Medication adjustments as above. Thank you for this consult and allowing us to participate in patient's plan of care. Palliative Care Team will continue to follow patient. Please call for needs    Total Visit Time:  45   Face to Face Time: 30     REY Beltre  [phone]  12/10/21  16:00 EST                                                                            Electronically signed by Jazmin Duran APRN at 12/10/21 5317     Solomon Harman MD at 12/09/21 1734      Consult Orders    1. 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  Pathology pending.  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  For her obstruction, Dr. Andersen performed laparotomy with ileocecal ectomy on 12/6/2021.  12/3/2021 CT abdomen pelvis without contrast showed the obstruction.  She has end-stage renal disease, hypotension with sepsis, paroxysmal atrial fibrillation, diabetes.      Past Medical History:   Diagnosis Date   • Adenomatous colon polyp    • Chronic kidney disease    • Clostridium difficile infection 06/2017   • Diabetes mellitus (HCC)    • Gastric polyps    • Hypothyroidism    • IgA nephropathy, acute    • Kidney transplanted    • Macular degeneration    • Paroxysmal atrial fibrillation (HCC)    • Tubular adenoma     excision    • Ulcer of  gastric fundus    • Vitamin D deficiency      Past Surgical History:   Procedure Laterality Date   • BREAST BIOPSY Left 1990'S   • CARPAL TUNNEL RELEASE     • CARPAL TUNNEL RELEASE Right    • CATARACT EXTRACTION     • CATARACT EXTRACTION Bilateral    • COLON RESECTION SMALL BOWEL N/A 12/6/2021    Procedure: COLON RESECTION SMALL BOWEL;  Surgeon: Robin Andersen MD;  Location:  [facility];  Service: General;  Laterality: N/A;   • DIAGNOSTIC LAPAROSCOPY     • DIALYSIS FISTULA CREATION     • EXPLORATORY LAPAROTOMY N/A 12/6/2021    Procedure: DIAGNOSTIC LAPAROSCOPY;  Surgeon: Robin Andersen MD;  Location:  [facility];  Service: General;  Laterality: N/A;   • HERNIA REPAIR  86 and 87   • INSERTION HEMODIALYSIS CATHETER Right 12/6/2021    Procedure: TUNNELED DIALYSIS CATHETER INSERTION;  Surgeon: Rolando Begum MD;  Location:  [facility];  Service: Vascular;  Laterality: Right;   • KNEE ARTHROSCOPY INCISION AND DRAINAGE OF KNEE Right 5/18/2019    Procedure: KNEE ARTHROSCOPY INCISION AND DRAINAGE OF KNEE;  Surgeon: Paco Lester MD;  Location:  [facility];  Service: Orthopedics   • LAPAROSCOPIC TUBAL LIGATION  1985   • TOTAL KNEE ARTHROPLASTY     • TOTAL KNEE ARTHROPLASTY      Left knee    • TRANSPLANTATION RENAL  2010   • TRANSPLANTATION RENAL Right    • TRIGGER FINGER RELEASE     • TUBAL ABDOMINAL LIGATION     • UPPER GASTROINTESTINAL ENDOSCOPY  05/20/2013   • VENOUS ACCESS DEVICE (PORT) INSERTION N/A 5/23/2019    Procedure: INSERTION GROSHONG;  Surgeon: Rolando Begum MD;  Location:  [facility];  Service: General       No current facility-administered medications on file prior to encounter.     Current Outpatient Medications on File Prior to Encounter   Medication Sig Dispense Refill   • acetaminophen (TYLENOL) 325 MG tablet Take 2 tablets by mouth Every 4 (Four) Hours As Needed for Mild Pain .     • albuterol (PROVENTIL) (2.5 MG/3ML) 0.083% nebulizer solution Take 2.5 mg by nebulization Every 4  (Four) Hours As Needed for Wheezing. 25 vial 2   • albuterol sulfate HFA (PROAIR HFA) 108 (90 Base) MCG/ACT inhaler Inhale 2 puffs Every 4 (Four) Hours As Needed for Wheezing or Shortness of Air. 1 inhaler 11   • allopurinol (ZYLOPRIM) 100 MG tablet Take 100 mg by mouth As Needed.     • amLODIPine (NORVASC) 5 MG tablet Take 5 mg by mouth Daily.     • cinacalcet (SENSIPAR) 60 MG tablet Take 60 mg by mouth Daily.     • colchicine 0.6 MG tablet Take 1 tablet by mouth 2 (Two) Times a Day. 28 tablet 0   • escitalopram (Lexapro) 5 MG tablet Take 1 tablet by mouth Every Morning. 30 tablet 5   • furosemide (LASIX) 40 MG tablet Take 80 mg by mouth Daily.     • gentamicin (GARAMYCIN) 0.1 % cream Apply 1 application topically to the appropriate area as directed Daily. - Apply to tube site-  3   • lidocaine (Lidoderm) 5 % Place 1 patch on the skin as directed by provider Daily. Remove & Discard patch within 12 hours or as directed by MD 30 each 1   • Methoxy PEG-Epoetin Beta (MIRCERA IJ) Inject 50 mcg under the skin into the appropriate area as directed.     • omeprazole (priLOSEC) 40 MG capsule TAKE ONE CAPSULE BY MOUTH DAILY 90 capsule 3   • predniSONE (DELTASONE) 20 MG tablet Take 1 tablet by mouth Daily. 4 tablet 0   • sevelamer (RENVELA) 800 MG tablet Take 1,600 mg by mouth 3 (Three) Times a Day.     • temazepam (RESTORIL) 15 MG capsule Take 15 mg by mouth At Night As Needed for Sleep.     • warfarin (COUMADIN) 2 MG tablet TAKE 2-3 TABLETS BY MOUTH DAILY OR AS DIRECTED BY ANTICOAGULATION CLINIC 200 tablet 0   • metoprolol tartrate (LOPRESSOR) 100 MG tablet Take 1 tablet by mouth Every 12 (Twelve) Hours. 60 tablet 0       Allergies   Allergen Reactions   • Hydrocodone Itching   • Rice Swelling   • Statins Other (See Comments)     myalgia   • Codeine Rash   • Sulfa Antibiotics Rash       Social History     Socioeconomic History   • Marital status:    Tobacco Use   • Smoking status: Never Smoker   • Smokeless tobacco:  "Never Used   Vaping Use   • Vaping Use: Never used   Substance and Sexual Activity   • Alcohol use: No   • Drug use: No   • Sexual activity: Defer       Family History   Problem Relation Age of Onset   • Leukemia Mother    • Stroke Father    • Dementia Sister    • Kidney disease Sister    • Diabetic kidney disease Sister    • Lung cancer Brother    • Breast cancer Neg Hx    • Ovarian cancer Neg Hx    • Hypertension Sister    • Dementia Sister          REVIEW OF SYSTEMS:  A 14 point review of systems was performed and is negative except as noted above.    Objective   PHYSICAL EXAM:    /96   Pulse 104   Temp 97.6 °F (36.4 °C)   Resp 18   Ht 162.6 cm (64\")   Wt 83.5 kg (184 lb)   LMP  (LMP Unknown)   SpO2 98%   BMI 31.58 kg/m²               ECOG: (3) Capable of Limited Self Care, Confined to Bed or Chair Greater Than 50% of Waking Hours  General: Frail appearing female in no acute distress  HEENT: sclerae anicteric, oropharynx clear  Lymphatics: no cervical, supraclavicular, inguinal, or axillary adenopathy  Neck: Supple. No thyromegaly.  Cardiovascular: regular rate and rhythm, no murmurs  Lungs: clear to auscultation bilaterally. No respiratory distress  Abdomen: soft, nontender, nondistended.  No palpable organomegaly  Extremities: no lower extremity edema, cyanosis, or clubbing  Skin: no rashes, lesions, bruising, or petechiae  Neuro: Alert and oriented x3. Moves all extremities.    Results:    Results from last 7 days   Lab Units 12/09/21  0409 12/08/21  0527 12/07/21  0550   WBC 10*3/mm3 19.35* 21.79* 19.20*   HEMOGLOBIN g/dL 7.3* 8.1* 8.2*   PLATELETS 10*3/mm3 177 213 222     Results from last 7 days   Lab Units 12/09/21  0409 12/08/21  0527 12/07/21  0550   SODIUM mmol/L 127* 125* 129*   POTASSIUM mmol/L 3.2* 3.2* 3.7   CO2 mmol/L 20.0* 21.0* 22.0   BUN mg/dL 52* 73* 49*   CREATININE mg/dL 5.02* 7.94* 7.07*   GLUCOSE mg/dL 165* 163* 286*     Results from last 7 days   Lab Units 12/08/21  0527 " 12/07/21  0550 12/06/21  0539   AST (SGOT) U/L 50* 25 20   ALT (SGPT) U/L 17 7 <5   BILIRUBIN mg/dL 0.7 0.5 0.5   ALK PHOS U/L 201* 136* 117         CT Abdomen Pelvis Without Contrast    Result Date: 12/4/2021  1. Findings consistent with incomplete distal small bowel obstruction, which appears to be centered about an abnormal appearing loop centrally in the upper pelvis. Degree of distention appears increased from 11/28/2021 scan.  2. Expected changes of peritoneal dialysis, with mild free fluid and free air in the abdomen.  3. No evidence of discrete, well-defined inflammatory focus.  D:  12/03/2021 E:  12/03/2021  This report was finalized on 12/4/2021 11:12 AM by Dr. Xavier Cantor MD.      XR Chest 1 View    Result Date: 12/9/2021   Interval placement of left internal jugular central venous catheter with the tip terminating low in the right atrium. Otherwise stable and unchanged medical devices from prior. Stable cardiomediastinal silhouette. Similar bibasilar streaky opacities and likely small left pleural effusion. No pneumothorax.  This report was finalized on 12/9/2021 2:26 PM by Paco Young MD.      XR Chest 1 View    Result Date: 12/8/2021  Placement of a deep line catheter on the right with tip in the SVC. No pneumothorax. Prominence of the perihilar regions bilaterally with small left pleural effusion.  D:  12/06/2021 E:  12/07/2021  This report was finalized on 12/8/2021 4:56 PM by Dr. Aracely Oates MD.      FL C Arm During Surgery    Result Date: 12/7/2021  Fluoroscopy provided for tunneled dialysis catheter placement.  D:  12/07/2021 E:  12/07/2021   This report was finalized on 12/7/2021 9:53 PM by Dr. Xavier Cantor MD.      FL Small Bowel Follow Through Single-Contrast    Result Date: 12/6/2021  FINDINGS/IMPRESSION:   imaging reveals dilated loops of small bowel seen diffusely throughout the abdomen. Minimal air in the rectum. There is a nasogastric tube tip in the stomach. Contrast is seen  within the stomach on the 30- minute image and 230-minute image with no advancement through the small bowel. There is only minimal contrast seen within the proximal small bowel on the 9 hour and 40-minute image with minimal contrast remaining within the stomach in the fundus. The 17-hour image reveals no advancement of the contrast. Findings most suggestive of a small bowel obstruction.  DICTATED:   12/04/2021 EDITED/lfs:   12/05/2021  This report was finalized on 12/6/2021 3:29 PM by Dr. Aracely Oates MD.      XR Abdomen KUB    Result Date: 12/6/2021  Slight improvement seen in the gaseous distended loops of bowel throughout the abdomen. Minimal contrast identified in the transverse colon. Nasogastric tube with tip in the stomach. No free intraperitoneal air.  D:  12/06/2021 E:  12/06/2021  This report was finalized on 12/6/2021 3:54 PM by Dr. Aracely Oates MD.      XR Abdomen KUB    Result Date: 12/3/2021  Single frontal torso demonstrates tube tip to be in the body the stomach. Incidental moderate gaseous distention of jejunum noted.   Signer Name: Pippa Enriquez MD  Signed: 12/3/2021 10:49 PM  Workstation Name: [facility]  [facility]      Assessment    ASSESSMENT & PLAN:    1.  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  End-stage renal disease on peritoneal dialysis complicated by peritonitis.  3.  Chronic atrial fibrillation on anticoagulation    Discussion: No matter what malignancy we find, what ever we do will be palliative and she is has no desire to take any form of systemic therapy be that chemotherapeutic, immunotherapeutic or otherwise and is at peace and wants to move towards best supportive care with hospice if malignancy is found.  I will check back tomorrow on the path    Total time of care today discussing this complex decision tree with this delightful Saint and coming to the decisions as  outlined above and communications with Dr. Koenig took 1 hour of patient care time throughout the day today  Solomon Harman MD    12/9/2021                Electronically signed by Solomon Harman MD at 12/09/21 [id number]

## 2021-12-10 NOTE — PROGRESS NOTES
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  LVH.  Moderate AI.  · Beakthrough episodes of atrial fibrillation with RVR in setting of bacterial peritonitis 11/30/2021     • Type 2 diabetes mellitus (HCC) [E11.9]  Yes     Priority: High   • Chronic kidney disease, stage IV (severe) (HCC) [N18.4]  Yes     Priority: High     · IgA nephropathy with history of renal transplant, 2010.   · Patient on hemodialysis Monday, Wednesday, and Friday started July 2015.  · Hemodialysis discontinued 2/2017.     • Hypotension [I95.9]  Yes     Priority: Medium   • Ileus (HCC) [K56.7]  Yes     Priority: Medium   • Hyperlipidemia LDL goal <100 [E78.5]  Yes     Priority: Medium     · Reports intolerance to statin     • Hyperparathyroidism (HCC) [E21.3]  Yes     Priority: Medium   • Essential hypertension [I10]  Yes     Priority: Low            The patient was rebolused with IV amiodarone and drip started after returning to atrial fibrillation with RVR.  She has now converted back to NSR.  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  Pathology is pending.  Patient is currently lying in bed.  She is somnolent.  Breathing easy on room air.  Maintaining NSR.  Denies chest pain.           Vital Sign Min/Max for last 24 hours  Temp  Min: 97.2 °F (36.2 °C)  Max: 98.1 °F (36.7 °C)   BP  Min: 89/63  Max: 163/78   Pulse  Min: 93  Max: 158   Resp  Min: 16  Max: 20   SpO2  Min: 95 %  Max: 99 %   No data recorded       Intake/Output Summary (Last 24 hours) at 12/10/2021 0731  Last data filed at 12/10/2021 0200  Gross per 24 hour   Intake 694.45 ml   Output 1240 ml   Net -545.55 ml           Physical Exam  Constitutional:       General: She is sleeping.   Cardiovascular:      Rate and Rhythm: Normal rate and regular rhythm.   Pulmonary:      Effort: Pulmonary effort is normal.      Breath sounds: Decreased breath sounds present.   Musculoskeletal:      Right lower leg: No edema.      Left lower leg: No edema.   Skin:     General: Skin is warm and dry.      Coloration: Skin is pale.   Neurological:      Mental Status: She is oriented to person, place, and time and easily aroused.   Psychiatric:         Behavior: Behavior is cooperative.         Tele: Atrial fibrillation with RVR    Results Review (reviewed the patient's recent labs in the electronic medical record):     EKG (12/9/2021): Atrial fibrillation with  bpm    Chest x-ray (12/9/2021): Left IJ central line right IJ dialysis catheter in position. No pneumothorax. Lungs clear. Pulmonary vascularity remains normal.    ECHO (1/12/2021): LVEF 65%.  LVH.  Mild AS.    Result Review:  I have personally reviewed the results from the time of this admission to 12/10/2021 07:31 EST and agree with these findings:  [x]  Laboratory  []  Microbiology  [x]  Radiology  [x]  EKG/Telemetry   [x]  Cardiology/Vascular   []  Pathology  []  Old records  []  Other:  Most notable findings include: BUN 40, creatinine 3.66, sodium 128, chloride 94, phosphorus 1.2, WBC 20.44, hemoglobin 8.3, hematocrit 24    Results from last 7 days   Lab Units 12/10/21  0502 12/09/21  0409 12/08/21  0527 12/07/21  0550 12/07/21  0550 12/06/21  1929 12/06/21  1245 12/06/21  0539 12/06/21  0539 12/05/21  1020 12/05/21  0649   SODIUM mmol/L 128* 127* 125*   < > 129* 131*  --    < > 135*   < > 136   POTASSIUM mmol/L 3.7 3.2* 3.2*   < > 3.7 4.1   < >   < > 2.9*   < > 2.9*   CHLORIDE mmol/L 94* 89* 83*  --  88* 89*   --   --  91*  --  89*   BUN mg/dL 40* 52* 73*   < > 49* 39*  --    < > 33*   < > 35*   CREATININE mg/dL 3.66* 5.02* 7.94*   < > 7.07* 6.59*  --    < > 7.23*   < > 7.02*   MAGNESIUM mg/dL 1.7 1.7 1.7   < > 1.5*  --   --    < > 1.5*   < > 1.7    < > = values in this interval not displayed.         Results from last 7 days   Lab Units 12/10/21  0501 12/09/21  0409 12/08/21  0527   WBC 10*3/mm3 20.44* 19.35* 21.79*   HEMOGLOBIN g/dL 8.3* 7.3* 8.1*   HEMATOCRIT % 24.0* 22.8* 24.1*   PLATELETS 10*3/mm3 181 177 213       Lab Results   Component Value Date    HGBA1C 4.90 05/19/2019       Lab Results   Component Value Date    CHOL 100 12/05/2021    TRIG 104 12/05/2021    HDL 52 03/04/2019     (H) 03/04/2019              Atrial fibrillation with RVR  · Episode occurred in the setting of bacterial peritonitis/sepsis  · Unable to tolerate p.o. dose of metoprolol and unable to tolerate IV Cardizem drip due to hypotension  · Metoprolol tartrate 25 mg twice daily  · Intermittent episodes of atrial fibrillation with RVR  · Started on IV amiodarone bolus to p.o. taper protocol 12/3/2021 with conversion to NSR  · Converted back to atrial fibrillation with RVR on 12/9/2021 and rebolused with IV amiodarone and IV drip infusing.  · Coumadin on hold due to recent ileocolic resection for small bowel obstruction     Hypotension   · Likely component of sepsis from bacterial peritonitis  · Blood pressures have improved     Spontaneous bacterial peritonitis  · Treatment per ID and hospitalist  · Peritoneal dialysis catheter removed on 12/6/2021  · Transitioning to hemodialysis        End-stage renal failure on peritoneal dialysis  · Nephrology managing     Small bowel obstruction  · Treatment per primary team  · Exploratory lap with ileocolic resection with Dr. 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  Hematology following                   · Continue IV amiodarone   · Defer Coumadin due to recent  surgery and per surgery request.  · If returns to atrial fibrillation may need to consider IV heparin.      Lily Rae, APRN

## 2021-12-10 NOTE — PROGRESS NOTES
"Patient Name:  Moni Wallace  YOB: 1940  [id number]    Surgery Progress Note    Date of visit: 12/10/2021      Subjective: No acute events overnight.  She denies nausea or vomiting to me.  NG tube only with 220 mL of output.  Denies flatus or bowel movement.  Pain is controlled.  Heart rate better in the 90s          Objective:     /62 (BP Location: Right leg, Patient Position: Lying)   Pulse 97   Temp 98.1 °F (36.7 °C) (Oral)   Resp 18   Ht 162.6 cm (64\")   Wt 83.1 kg (183 lb 4.8 oz)   LMP  (LMP Unknown)   SpO2 96%   BMI 31.46 kg/m²     Intake/Output Summary (Last 24 hours) at 12/10/2021 0703  Last data filed at 12/10/2021 0200  Gross per 24 hour   Intake 694.45 ml   Output 1240 ml   Net -545.55 ml       GEN:   Awake, alert, in no acute distress, resting comfortably in bed, chronically ill-appearing  CV:   Regular rate and rhythm  L:  Symmetric expansion, not labored on room air  Abd:  Soft, appropriately tender palpation along incision, incision is clean dry and intact, not obviously distended  Ext:  No cyanosis, clubbing, or edema    Recent labs that are back at this time have been reviewed.           Assessment/ Plan:    Mrs. Wallace is an 81-year-old lady with history of end-stage renal disease on peritoneal dialysis, diabetes, A. fib on Coumadin, and prior C. 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  -No flatus or bowel movement yet  -NG tube output has been low and no complaints of nausea  -Clamp NG tube today and check residuals  -Would recommend to hold on restarting warfarin until she is having bowel function.  If felt to be very high risk could potentially be started on a heparin drip  -Continue PCA and scheduled pain meds  -Pathology yesterday with " evidence bowel obstruction related to metastatic adenocarcinoma.  Medical oncology saw her yesterday and given her medical comorbidities and wishes appears that we will be pursuing a palliative course.         Robin Andersen MD  12/10/2021  07:03 EST

## 2021-12-11 NOTE — PROGRESS NOTES
"   LOS: 13 days    Patient Care Team:  Alex Walters MD as PCP - General (Internal Medicine)  Jeff Joe IV, MD as Consulting Physician (Cardiology)  Donny Hodges MD as Consulting Physician (Nephrology)  Cory Castillo MD as Surgeon (General Surgery)  Slim Wick MD    ESRD - PD     Subjective     Interval History:   No acute events overnight. Feeling better.     Review of Systems:   No CP or SOA ,N/V, Constipation.         Objective     Vital Sign Min/Max for last 24 hours  Temp  Min: 97.3 °F (36.3 °C)  Max: 97.8 °F (36.6 °C)   BP  Min: 95/72  Max: 186/96   Pulse  Min: 81  Max: 127   Resp  Min: 18  Max: 18   SpO2  Min: 77 %  Max: 100 %   Flow (L/min)  Min: 2  Max: 2   No data recorded     Flowsheet Rows      First Filed Value   Admission Height 162.6 cm (64\") Documented at 11/28/2021 0854   Admission Weight 72.6 kg (160 lb) Documented at 11/28/2021 0854          No intake/output data recorded.  I/O last 3 completed shifts:  In: 4854 [I.V.:2677; Other:240; NG/GT:100; IV Piggyback:945]  Out: 220 [Emesis/NG output:220]    Physical Exam:    Gen: Alert, NAD   HENT: NC, AT, EOMI   NECK: Supple, no JVD, Trachea midline   LUNGS: CTA bilaterally, non labored respirtation   CVS: S1/S2 audible, RRR, no murmur   Abd: Soft, NT, ND, BS+   Ext: No pedal edema, no cyanosis   CNS: Alert, No focal deficit noted grossly  Psy: Cooperative  Skin: Warm, dry and intact      WBC WBC   Date Value Ref Range Status   12/11/2021 19.74 (H) 3.40 - 10.80 10*3/mm3 Final   12/10/2021 20.44 (H) 3.40 - 10.80 10*3/mm3 Final   12/09/2021 19.35 (H) 3.40 - 10.80 10*3/mm3 Final      HGB Hemoglobin   Date Value Ref Range Status   12/11/2021 8.4 (L) 12.0 - 15.9 g/dL Final   12/10/2021 8.3 (L) 12.0 - 15.9 g/dL Final   12/09/2021 7.3 (L) 12.0 - 15.9 g/dL Final      HCT Hematocrit   Date Value Ref Range Status   12/11/2021 23.9 (L) 34.0 - 46.6 % Final   12/10/2021 24.0 (L) 34.0 - 46.6 % Final   12/09/2021 22.8 (L) " 34.0 - 46.6 % Final      Platlets No results found for: LABPLAT   MCV MCV   Date Value Ref Range Status   12/11/2021 89.5 79.0 - 97.0 fL Final   12/10/2021 92.3 79.0 - 97.0 fL Final   12/09/2021 99.6 (H) 79.0 - 97.0 fL Final          Sodium Sodium   Date Value Ref Range Status   12/11/2021 124 (L) 136 - 145 mmol/L Final   12/10/2021 128 (L) 136 - 145 mmol/L Final   12/09/2021 127 (L) 136 - 145 mmol/L Final      Potassium Potassium   Date Value Ref Range Status   12/11/2021 3.4 (L) 3.5 - 5.2 mmol/L Final     Comment:     Slight hemolysis detected by analyzer. Results may be affected.   12/10/2021 3.7 3.5 - 5.2 mmol/L Final   12/09/2021 3.2 (L) 3.5 - 5.2 mmol/L Final      Chloride Chloride   Date Value Ref Range Status   12/11/2021 87 (L) 98 - 107 mmol/L Final   12/10/2021 94 (L) 98 - 107 mmol/L Final   12/09/2021 89 (L) 98 - 107 mmol/L Final      CO2 CO2   Date Value Ref Range Status   12/11/2021 18.0 (L) 22.0 - 29.0 mmol/L Final   12/10/2021 18.0 (L) 22.0 - 29.0 mmol/L Final   12/09/2021 20.0 (L) 22.0 - 29.0 mmol/L Final      BUN BUN   Date Value Ref Range Status   12/11/2021 60 (H) 8 - 23 mg/dL Final   12/10/2021 40 (H) 8 - 23 mg/dL Final   12/09/2021 52 (H) 8 - 23 mg/dL Final      Creatinine Creatinine   Date Value Ref Range Status   12/11/2021 4.32 (H) 0.57 - 1.00 mg/dL Final   12/10/2021 3.66 (H) 0.57 - 1.00 mg/dL Final   12/09/2021 5.02 (H) 0.57 - 1.00 mg/dL Final      Calcium Calcium   Date Value Ref Range Status   12/11/2021 8.9 8.6 - 10.5 mg/dL Final   12/10/2021 9.0 8.6 - 10.5 mg/dL Final   12/09/2021 8.7 8.6 - 10.5 mg/dL Final      PO4 No results found for: CAPO4   Albumin No results found for: ALBUMIN   Magnesium Magnesium   Date Value Ref Range Status   12/11/2021 2.0 1.6 - 2.4 mg/dL Final   12/10/2021 1.7 1.6 - 2.4 mg/dL Final   12/09/2021 1.7 1.6 - 2.4 mg/dL Final      Uric Acid No results found for: URICACID        Results Review:     I reviewed the patient's new clinical results.    acetaminophen,  650 mg, Nasogastric, Q6H  albumin human, , ,   albumin human, , ,   albumin human, , ,   [START ON 12/13/2021] calcitriol, 0.5 mcg, Oral, Once per day on Mon Wed Fri  cinacalcet, 30 mg, Oral, Nightly  epoetin blanca/blanca-epbx, 10,000 Units, Subcutaneous, Once per day on Mon Wed Fri  Fat Emul Fish Oil/Plant Based, 250 mL, Intravenous, Q24H (TPN)  insulin lispro, 2-7 Units, Subcutaneous, Q6H  lansoprazole, 30 mg, Nasogastric, Q AM  metoprolol tartrate, 25 mg, Nasogastric, Q12H  micafungin (MYCAMINE) IV, 100 mg, Intravenous, Q24H  piperacillin-tazobactam, 3.375 g, Intravenous, Q12H  potassium & sodium phosphates, 1 packet, Nasogastric, TID AC  sodium bicarbonate, 650 mg, Nasogastric, TID  sodium chloride, 10 mL, Intravenous, Q12H  sodium chloride, 10 mL, Intravenous, Q12H  sodium chloride, 10 mL, Intravenous, Q12H  sodium chloride, 10 mL, Intravenous, Q12H  vancomycin (dosing per levels), , Does not apply, Daily      Adult Peripheral 2-in-1 TPN, , Last Rate: 75 mL/hr at 12/10/21 1741  amiodarone, 0.5 mg/min, Last Rate: 0.5 mg/min (12/11/21 0545)  HYDROmorphone HCl-NaCl,   Pharmacy to Dose TPN,   Pharmacy to dose vancomycin,         Medication Review:    Assessment/Plan       Peritonitis (HCC)    Paroxysmal atrial fibrillation (HCC)    Essential hypertension    Type 2 diabetes mellitus (HCC)    Chronic kidney disease, stage IV (severe) (HCC)    Hyperparathyroidism (HCC)    Hyperlipidemia LDL goal <100    Sepsis associated hypotension (HCC)    Ileus (HCC)    Hypotension      1- ESRD - On PD - transitioning to HD. S/p laproscopy -PD catheter out  2- Peritonitis ?- culture so far negative.   3- Anemia   4- Supra-therapeutic INR   5- Low albumin level   6- Corrected Calcium for albumin - 7.56 - monitor   7- Secondary hyperparathyroidism on Sensipar.   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 UF as tolerated.   - Replete Phos, mag and K per protocol.   - Antibx  per ID   - Transfuse blood for Hgb less than 7.0     Patient will go home with hospice.       Albina Will MD  12/11/21  10:08 EST

## 2021-12-11 NOTE — PROGRESS NOTES
Unable to assess patient this am as she is down in dialysis. Per nursing no acute cardiac events overnight. 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 Patient declined undergoing systemic therapies when evaluated by Dr. Harman. Palliative and hospice now following. Plan to discharge home Tuesday 12/14/21 with hospice care. Will continue dialysis while in hospital.  VSS over last 24 hours. Continue IV amiodarone for now will transition to p.o. prior to discharge home. Continue metoprolol, defer coumadin due to recent surgery. If returns to AF may consider heparin gtt.     Cardiology will continue to follow while hospitalized.     Electronically signed by REY Alberto, 12/11/21, 10:02 AM EST.

## 2021-12-11 NOTE — PROGRESS NOTES
"Patient Name:  Moni Wallace  YOB: 1940  [id number]    Surgery Progress Note    Date of visit: 12/11/2021      Subjective: No acute events overnight.  She tells me she has passed flatus.  Denies nausea or vomiting.  Pain is controlled          Objective:     /75 (BP Location: Right leg, Patient Position: Lying)   Pulse 86   Temp 97.8 °F (36.6 °C) (Temporal)   Resp 18   Ht 162.6 cm (64\")   Wt 83.1 kg (183 lb 4.8 oz)   LMP  (LMP Unknown)   SpO2 98%   BMI 31.46 kg/m²     Intake/Output Summary (Last 24 hours) at 12/11/2021 0955  Last data filed at 12/11/2021 0545  Gross per 24 hour   Intake 4512.35 ml   Output --   Net 4512.35 ml       GEN:   Awake, alert, in no acute distress, resting comfortably in bed, chronically ill-appearing,, currently undergoing hemodialysis  CV:   Regular rate and rhythm  L:  Clear to auscultation bilaterally, not labored on room air   Abd:  Soft, appropriately tender palpation along incision, incision is clean dry intact, not obviously distended, obese  Ext:  No cyanosis, clubbing, or edema    Recent labs that are back at this time have been reviewed.           Assessment/ Plan:    Mrs. Wallace is an 81-year-old lady with history of end-stage renal disease on peritoneal dialysis, diabetes, A. fib on Coumadin, and prior C. 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  No bowel movement yet.  NG tube output has been low  -Clamp NG tube and allow clear liquids today  -After discussion it appears that the family has decided to go home with hospice.  I am hopeful that after recent surgery she will have resolution of obstruction and be able to tolerate a diet and remove her NG tube before she leaves.  If she discontinues hemodialysis at discharge, I think it is " more likely that she will pass away from sequela of renal failure in the short term than a recurrent bowel obstruction from her metastatic cancer and may not need NGT at discharge.  I will continue to follow with you         Robin Andersen MD  12/11/2021  09:55 EST

## 2021-12-11 NOTE — PROGRESS NOTES
Pharmacy Parenteral Nutrition Evaluation    Moni Wallace is an 81 y.o. female receiving PPN.    Indication: Bowel obstruction  Consulting Physician: Hospitalist / Dr. Andersen    Total Fluid Rate (if stated): 75 ml/hr (PPN) per renal    Labs  Results from last 7 days   Lab Units 12/11/21  0829 12/10/21  0502 12/09/21  0409 12/08/21  0527 12/08/21  0527 12/07/21  0550 12/07/21  0550 12/06/21  1245 12/06/21  0539   SODIUM mmol/L 124* 128* 127*   < > 125*   < > 129*   < > 135*   POTASSIUM mmol/L 3.4* 3.7 3.2*   < > 3.2*   < > 3.7   < > 2.9*   CHLORIDE mmol/L 87* 94* 89*   < > 83*   < > 88*   < > 91*   CO2 mmol/L 18.0* 18.0* 20.0*   < > 21.0*   < > 22.0   < > 26.0   BUN mg/dL 60* 40* 52*   < > 73*   < > 49*   < > 33*   CREATININE mg/dL 4.32* 3.66* 5.02*   < > 7.94*   < > 7.07*   < > 7.23*   CALCIUM mg/dL 8.9 9.0 8.7   < > 8.3*   < > 8.4*   < > 8.1*   BILIRUBIN mg/dL  --   --   --   --  0.7  --  0.5  --  0.5   ALK PHOS U/L  --   --   --   --  201*  --  136*  --  117   ALT (SGPT) U/L  --   --   --   --  17  --  7  --  <5   AST (SGOT) U/L  --   --   --   --  50*  --  25  --  20   GLUCOSE mg/dL 187* 226* 165*   < > 163*   < > 286*   < > 123*    < > = values in this interval not displayed.     Results from last 7 days   Lab Units 12/11/21  0829 12/10/21  2105 12/10/21  0502 12/09/21  1035 12/09/21  0409 12/06/21  0539 12/05/21  1643   MAGNESIUM mg/dL 2.0  --  1.7  --  1.7   < >  --    PHOSPHORUS mg/dL 1.5* 1.7* 1.2*   < > 1.1*   < >  --    PREALBUMIN mg/dL  --   --   --   --   --   --  13.6*    < > = values in this interval not displayed.     Results from last 7 days   Lab Units 12/11/21  0829 12/10/21  0501 12/09/21  0409   WBC 10*3/mm3 19.74* 20.44* 19.35*   HEMOGLOBIN g/dL 8.4* 8.3* 7.3*   HEMATOCRIT % 23.9* 24.0* 22.8*   PLATELETS 10*3/mm3 163 181 177     Triglycerides   Date Value Ref Range Status   12/05/2021 104 0 - 150 mg/dL Final     Comment:     Falsely depressed results may occur on samples drawn from patients  receiving N-Acetylcysteine (NAC) or Metamizole.     estimated creatinine clearance is 10.8 mL/min (A) (by C-G formula based on SCr of 4.32 mg/dL (H)).    Intake & Output (last 3 days)         12/08 0701 12/09 0700 12/09 0701  12/10 0700 12/10 0701 12/11 0700 12/11 0701 12/12 0700    I.V. (mL/kg) 2 (0) 0.5 (0) 2677 (32.2)     Blood  294      Other 360 240      NG/ 160      IV Piggyback 250  945     TPN 4771  892     Total Intake(mL/kg) 5668 (67.9) 694.5 (8.4) 4514 (54.3)     Urine (mL/kg/hr) 0 (0)       Emesis/NG output 705 220      Other 1880 1020  1000    Total Output 2585 1240  1000    Net +3083 -545.6 +4514 -1000            Urine Unmeasured Occurrence 1 x             Dietitian Recommendations  Dietary Orders (From admission, onward)       Start     Ordered    12/11/21 0956  Diet Clear Liquid; Renal  Diet Effective Now        Question Answer Comment   Diet Texture / Consistency Clear Liquid    Common Modifiers Renal        12/11/21 0955                  Current PPN Regimen Recommendation: Dextrose 5% / Amino Acid 5.5% at goal rate of 75 mL/hr. 20% SMOF lipids 250mL every 24 hours.    Assessment/Plan:  1. Pharmacy to dose PPN ordered for bowel obstruction.  2. Per phone call with RD, PPN continues today with macronutrients per RD recommendations as above at goal rate of 75 mL/hr. Will continue Na at 60 mEq/L, K at 5 meq/L, Ca at 2 meq/L, Mg at 3 meq/L, and Phos at 3 mmol/L for now.   3. Potassium and Phosphorus are low, other electrolytes WNL. Phosphorus, magnesium, and potassium are to be replaced exogenously per nephrology.  4. Blood glucose levels are trending down. Patient has correctional insulin ordered - will continue to follow trend to determine need for adjustment in insulin regimen.  5. Pharmacy will continue to follow closely and make adjustments to PPN as necessary.    Thank you,    Katarzyna Odonnell, PharmD  12/11/2021  12:29 EST

## 2021-12-11 NOTE — PLAN OF CARE
Goal Outcome Evaluation:     Had Hemodialysis today   Pt c/o nausea and vomiting.Verbalized relief with IV Zofran and NG tube to intermittent LWS. HR between 100 to 130's  On Amiodarone drip and TPN  Pt orientated to person, place, time and situation with LOC of alert.  Family member present at bedside, assisting with care

## 2021-12-11 NOTE — PROGRESS NOTES
[facility]  PROGRESS NOTE    Patient Name: Moni Wallace  : 1940  MRN: [id number]    Date of Admission: 2021  Primary Care Physician: Alex Walters MD    Subjective   Subjective     CC:  F/U SBO    HPI:  Nausea, abd mildly distended since ng clamped. No overt pain currently. Mild dyspnea    ROS:  Gen- No fevers, chills  CV- No chest pain, palpitations  Resp- No cough, dyspnea  GI- No N/V/D, abd pain  MSK-+LBP (chronic)     Objective   Objective     Vital Signs:   Temp:  [97.3 °F (36.3 °C)-97.8 °F (36.6 °C)] 97.8 °F (36.6 °C)  Heart Rate:  [] 124  Resp:  [18] 18  BP: ()/(56-87) 133/81  Flow (L/min):  [2] 2     Physical Exam:  Constitutional: lying down, ill appearing, nausea. Normal work of breathing  HENT: NCAT, mucous membranes moist, NG in place   Respiratory: Clear to auscultation bilaterally, respiratory effort normal   Cardiovascular: rrr, no murmurs, rubs, or gallops  Gastrointestinal: Positive bowel sounds, soft, nontender, nondistended  Musculoskeletal: No bilateral ankle edema  Psychiatric: Appropriate affect, cooperative  Neurologic: Oriented x 3,  speech clear  Skin: No rashes, tunneled dialysis catheter in place R chest, incision c/d/i  Results Reviewed:  LAB RESULTS:      Lab 21  0829 12/10/21  0501 21  0409 21  0527 21  0550 21  1929 21  1928 21  1928 21  0539 21  0539 21  1643 21  0649 21  0420   WBC 19.74* 20.44* 19.35* 21.79* 19.20*  --    < > 23.51*   < > 14.56*  --   --    < >   HEMOGLOBIN 8.4* 8.3* 7.3* 8.1* 8.2*  --    < > 8.6*   < > 8.3*  --   --    < >   HEMATOCRIT 23.9* 24.0* 22.8* 24.1* 24.9*  --    < > 25.9*   < > 25.7*  --   --    < >   PLATELETS 163 181 177 213 222  --    < > 232   < > 197  --   --    < >   NEUTROS ABS  --   --  16.06*  --  16.37*  --   --  20.68*  --   --   --   --   --    IMMATURE GRANS (ABS)  --   --   --   --  1.03*  --    --  1.25*  --   --   --   --   --    LYMPHS ABS  --   --   --   --  1.01  --   --  0.81  --   --   --   --   --    MONOS ABS  --   --   --   --  0.74  --   --  0.66  --   --   --   --   --    EOS ABS  --   --  0.19  --  0.00  --   --  0.03  --   --   --   --   --    MCV 89.5 92.3 99.6* 96.4 96.9  --    < > 96.3   < > 98.8*  --   --    < >   CRP  --   --   --   --   --   --   --   --   --   --   --  23.82*  --    PROTIME  --   --   --  14.0 15.2* 15.6*  --   --   --  17.7* 22.7*  --    < >    < > = values in this interval not displayed.         Lab 12/11/21  1400 12/11/21  0829 12/10/21  2105 12/10/21  0502 12/09/21  1937 12/09/21  1035 12/09/21  0409 12/08/21  0527 12/08/21  0527 12/07/21  0550 12/07/21  0550 12/06/21  1245 12/06/21  0539 12/05/21  1956 12/05/21  1643   SODIUM  --  124*  --  128*  --   --  127*  --  125*  --  129*   < > 135*  --   --    POTASSIUM  --  3.4*  --  3.7  --   --  3.2*  --  3.2*  --  3.7   < > 2.9*   < >  --    CHLORIDE  --  87*  --  94*  --   --  89*  --  83*  --  88*   < > 91*  --   --    CO2  --  18.0*  --  18.0*  --   --  20.0*  --  21.0*  --  22.0   < > 26.0  --   --    ANION GAP  --  19.0*  --  16.0*  --   --  18.0*  --  21.0*  --  19.0*   < > 18.0*  --   --    BUN  --  60*  --  40*  --   --  52*  --  73*  --  49*   < > 33*  --   --    CREATININE  --  4.32*  --  3.66*  --   --  5.02*  --  7.94*  --  7.07*   < > 7.23*  --   --    GLUCOSE  --  187*  --  226*  --   --  165*  --  163*  --  286*   < > 123*  --   --    CALCIUM  --  8.9  --  9.0  --   --  8.7  --  8.3*  --  8.4*   < > 8.1*  --   --    IONIZED CALCIUM  --   --   --   --   --   --   --   --   --   --  1.10*  --  1.06*  --  1.06*   MAGNESIUM  --  2.0  --  1.7  --   --  1.7  --  1.7  --  1.5*  --  1.5*  --   --    PHOSPHORUS 1.1* 1.5* 1.7* 1.2* 1.3*   < > 1.1*   < > 2.2*   < > 2.9  --  3.6  --   --     < > = values in this interval not displayed.         Lab 12/08/21  0527 12/07/21  0550 12/06/21  0539 12/05/21  0649   TOTAL  PROTEIN 5.4* 6.7 6.7 6.8   ALBUMIN 3.40* 3.70 4.20 3.80   GLOBULIN 2.0 3.0 2.5 3.0   ALT (SGPT) 17 7 <5 <5   AST (SGOT) 50* 25 20 13   BILIRUBIN 0.7 0.5 0.5 0.3   ALK PHOS 201* 136* 117 109         Lab 12/08/21  0527 12/07/21  0550 12/06/21  1929 12/06/21  0539 12/05/21  1643   PROTIME 14.0 15.2* 15.6* 17.7* 22.7*   INR 1.11 1.24* 1.29* 1.51* 2.10*         Lab 12/05/21  0649   CHOLESTEROL 100   TRIGLYCERIDES 104         Lab 12/09/21  1035 12/09/21  0409 12/05/21  1020 12/05/21  1020   IRON  --  45  --   --    IRON SATURATION  --  35  --   --    TIBC  --  130*  --   --    TRANSFERRIN  --  87*  --   --    ABO TYPING O  --    < > O   RH TYPING Negative  --    < > Negative   ANTIBODY SCREEN Negative  --   --  Negative    < > = values in this interval not displayed.         Brief Urine Lab Results  (Last result in the past 365 days)      Color   Clarity   Blood   Leuk Est   Nitrite   Protein   CREAT   Urine HCG        11/28/21 1647 Yellow   Turbid   Moderate (2+)   Large (3+)   Negative   >=300 mg/dL (3+)                 Microbiology Results Abnormal     Procedure Component Value - Date/Time    Fungus Culture - Body Fluid, Peritoneum [[id number]] Collected: 12/03/21 1043    Lab Status: Preliminary result Specimen: Body Fluid from Peritoneum Updated: 12/10/21 1046     Fungus Culture No fungus isolated at 1 week    AFB Culture - Body Fluid, Peritoneum [[id number]] Collected: 12/03/21 1040    Lab Status: Preliminary result Specimen: Body Fluid from Peritoneum Updated: 12/10/21 1046     AFB Culture No AFB isolated at 1 week     AFB Stain No acid fast bacilli seen on concentrated smear    Blood Culture - Blood, Hand, Left [[id number]]  (Normal) Collected: 12/03/21 1318    Lab Status: Final result Specimen: Blood from Hand, Left Updated: 12/08/21 1515     Blood Culture No growth at 5 days    Narrative:      Aerobic Only    Blood Culture - Blood, Hand, Left [[id number]]  (Normal) Collected: 12/03/21 1230    Lab Status: Final result  Specimen: Blood from Hand, Left Updated: 12/08/21 1330     Blood Culture No growth at 5 days    Fungus Smear - Body Fluid, Peritoneum [[id number]] Collected: 12/03/21 1042    Lab Status: Final result Specimen: Body Fluid from Peritoneum Updated: 12/06/21 1559     Fungal Stain No fungal elements seen    Body Fluid Culture - Body Fluid, Peritoneum [[id number]] Collected: 12/02/21 1850    Lab Status: Final result Specimen: Body Fluid from Peritoneum Updated: 12/05/21 0736     Body Fluid Culture No growth at 3 days     Gram Stain Many (4+) WBCs seen      No organisms seen    Blood Culture - Blood, Wrist, Left [[id number]]  (Normal) Collected: 11/28/21 1023    Lab Status: Final result Specimen: Blood from Wrist, Left Updated: 12/03/21 1046     Blood Culture No growth at 5 days    Blood Culture - Blood, Wrist, Left [[id number]]  (Normal) Collected: 11/28/21 1023    Lab Status: Final result Specimen: Blood from Wrist, Left Updated: 12/03/21 1046     Blood Culture No growth at 5 days    Body Fluid Culture - Body Fluid, Peritoneum [[id number]] Collected: 11/28/21 1214    Lab Status: Final result Specimen: Body Fluid from Peritoneum Updated: 12/01/21 1239     Body Fluid Culture No growth at 3 days     Gram Stain Few (2+) WBCs seen      No organisms seen    Urine Culture - Urine, Urine, Clean Catch [[id number]]  (Normal) Collected: 11/28/21 1647    Lab Status: Final result Specimen: Urine, Clean Catch Updated: 11/29/21 2255     Urine Culture No growth    COVID PRE-OP / PRE-PROCEDURE SCREENING ORDER (NO ISOLATION) - Swab, Nasopharynx [[id number]]  (Normal) Collected: 11/28/21 1558    Lab Status: Final result Specimen: Swab from Nasopharynx Updated: 11/28/21 2048    Narrative:      The following orders were created for panel order COVID PRE-OP / PRE-PROCEDURE SCREENING ORDER (NO ISOLATION) - Swab, Nasopharynx.  Procedure                               Abnormality         Status                     ---------                                -----------         ------                     COVID-19, APTIMA PANTHER...[[id number]]  Normal              Final result                 Please view results for these tests on the individual orders.    COVID-19, APTIMA PANTHER [facility] IN-HOUSE NP/OP SWAB IN UTM/VTM/SALINE TRANSPORT MEDIA 24HR TAT - Swab, Nasopharynx [[id number]]  (Normal) Collected: 11/28/21 1558    Lab Status: Final result Specimen: Swab from Nasopharynx Updated: 11/28/21 2048     COVID19 Not Detected    Narrative:      Fact sheet for providers: https://www.fda.gov/media/367040/download     Fact sheet for patients: https://www.fda.gov/media/436203/download    Test performed by RT PCR.          XR Chest 1 View    Result Date: 12/9/2021  CHEST X-RAY, 12/9/2021  HISTORY:  Reposition left IJ central line.  TECHNIQUE: AP portable chest x-ray (19:15) COMPARISON: *  Chest x-ray, 12/9/2021 (13:55) FINDINGS: Left IJ central line tip in the low SVC near its junction with the right atrium. No pneumothorax. Right IJ dialysis catheter in the low SVC. NG tube below the diaphragm. Low lung volumes with mild bibasilar atelectasis, unchanged. Lungs otherwise clear. Pulmonary vascularity remains normal.     Impression: Left IJ central line, right IJ dialysis catheter in NG tube are in good position. No pneumothorax. Signer Name: Rolando Whiteside MD  Signed: 12/9/2021 7:45 PM  Workstation Name: [email or web address]  [facility]      Results for orders placed during the hospital encounter of 01/11/21    Transthoracic Echo Complete With Contrast if Necessary Per Protocol    Interpretation Summary  · Left ventricular systolic function is normal. Estimated left ventricular EF = 65%  · Left ventricular wall thickness is consistent with hypertrophy.  · Left atrial volume is mildly increased.  · The aortic valve is calcified. There is mild aortic stenosis present. There is moderate aortic regurgitation present.  · Estimated right ventricular systolic pressure  from tricuspid regurgitation is normal (<35 mmHg).      I have reviewed the medications:  Scheduled Meds:acetaminophen, 650 mg, Nasogastric, Q6H  albumin human, , ,   albumin human, , ,   albumin human, , ,   [START ON 12/13/2021] calcitriol, 0.5 mcg, Oral, Once per day on Mon Wed Fri  cinacalcet, 30 mg, Oral, Nightly  epoetin blanca/blanca-epbx, 10,000 Units, Subcutaneous, Once per day on Mon Wed Fri  Fat Emul Fish Oil/Plant Based, 250 mL, Intravenous, Q24H (TPN)  insulin lispro, 2-7 Units, Subcutaneous, Q6H  lansoprazole, 30 mg, Nasogastric, Q AM  metoprolol tartrate, 25 mg, Nasogastric, Q12H  micafungin (MYCAMINE) IV, 100 mg, Intravenous, Q24H  piperacillin-tazobactam, 3.375 g, Intravenous, Q12H  potassium & sodium phosphates, 1 packet, Nasogastric, TID AC  sodium bicarbonate, 650 mg, Nasogastric, TID  sodium chloride, 10 mL, Intravenous, Q12H  sodium chloride, 10 mL, Intravenous, Q12H  sodium chloride, 10 mL, Intravenous, Q12H  sodium chloride, 10 mL, Intravenous, Q12H  vancomycin (dosing per levels), , Does not apply, Daily  vancomycin, 500 mg, Intravenous, Once      Continuous Infusions:Adult Peripheral 2-in-1 TPN, , Last Rate: 75 mL/hr at 12/10/21 1741  Adult Peripheral 2-in-1 TPN,   amiodarone, 0.5 mg/min, Last Rate: 0.5 mg/min (12/11/21 0545)  HYDROmorphone HCl-NaCl,   Pharmacy to Dose TPN,   Pharmacy to dose vancomycin,       PRN Meds:.•  acetaminophen  •  albuterol  •  benzocaine-menthol  •  camphor-menthol  •  doxepin  •  naloxone  •  ondansetron **OR** ondansetron  •  ondansetron  •  Pharmacy to Dose TPN  •  Pharmacy to dose vancomycin  •  phenol  •  potassium & sodium phosphates  •  potassium chloride **OR** potassium chloride  •  potassium phosphate infusion greater than 15 mMoles **OR** potassium phosphate **OR** potassium phosphate  •  prochlorperazine  •  Sodium Chloride (PF)  •  sodium chloride  •  sodium chloride  •  sodium chloride  •  temazepam    Assessment/Plan   Assessment & Plan     Active  Hospital Problems    Diagnosis  POA   • **Peritonitis (HCC) [K65.9]  Yes   • Hypotension [I95.9]  Yes   • Sepsis associated hypotension (HCC) [A41.9, I95.9]  Yes   • Ileus (HCC) [K56.7]  Yes   • Hyperlipidemia LDL goal <100 [E78.5]  Yes   • Paroxysmal atrial fibrillation (HCC) [I48.0]  Yes   • Type 2 diabetes mellitus (HCC) [E11.9]  Yes   • Chronic kidney disease, stage IV (severe) (HCC) [N18.4]  Yes   • Hyperparathyroidism (HCC) [E21.3]  Yes   • Essential hypertension [I10]  Yes      Resolved Hospital Problems   No resolved problems to display.        Brief Hospital Course to date:  Moni Wallace is a 81 y.o. female here with sepsis and hypotension due to peritonitis. Her stay is complicated by Ileus vs pSBO. Surgery and ID following.       Sepsis due to Bacterial Peritonitis  -Peritoneal catheter catheter d/c'd  --ID following, continue Vanc, Zosyn, Mycamine for now (stop at time of discharge when transitioning home w/ hospice for comfort measures)  --Cultures remain negative.  --Monitor WBC count       SBO (w/ intraoperative path consistent w/ advanced adenocarcinoma)  Probable metastatic peritoneal carcinomatotis (see above)  --S/P Open resection of terminal ileum and cecum with primary ileocolic anastomosis and removal of PD catheter per Dr Andersen 12/6/21  -post-op care per Dr. Andersen: awaiting bowel function return  -Med-oncology (Dr. Harman) has evaluated (12/9 & 12/10); patient w/ poor prognosis, patient not interested in systemic therapies which is appropriate.   -being followed by palliative & hospice: current plan is to d/c home w/ hospice tentatively on Tuesday 12/14/21; will continue dialysis while in hospital and plans to stop at discharge (can decide on discontinuing tunneled cath prior to d/c); continue current abx til time of d/c. Will ultimately require transition from pca pump to other modality for pain control. Will ask palliative to help address this. Switch to po amiodarone at  discharge  -12/11/21: ng was clamped , abdomen more distended and n/v x 1. Put ng back to LWS    Post-op anemia  -hgb 7.3 12/9/21; s/p 1 unit prbc on 12/9/21; monitor and tranfuse prn     PAF, w/ intermittent RVR  HTN  --Continue to hold Warfain per surgery until return of bowel function  --S/P 2 units FFP and 10mg IV Vitamin K pre-op   -cards following, currently amiodarone drip for now; restart po amiodarone at time of d/c  -no further anticoagulation planned     S/p Hypotension  -Currently resolved     ESRD on PD-->now on HD via tunneled cath right ij  Hypokalemia  Hypomagnesemia   Hypocalcemia   Hyponatremia   -PD catheter removed   -Dr Curiel placed right sided tunneled dialysis catheter; currently getting dialysis while here, plans to stop at time of d/c (tentatively Tuesday home w/ hospice)    Poor iv access, s/p left ij central line placement 12/9/21  -per discussion w/ nurse on 12/9/21, has 3 peripheral iv's, unable to get any further peripherals; getting ppn, iv antibiotics, dilaudid pca, and now w/ probable iv amiodarone.  -Dr. Andersen placed triple lumen left ij central line 12/9/21. Plan to d/c at time of discharge    Am labs: cbc,bmp    Code status: dnr/dni/focus comfort/no more invasive procedures/continue current level of care without escalation    DVT prophylaxis:  Mechanical DVT prophylaxis orders are present.       AM-PAC 6 Clicks Score (PT): 10 (12/11/21 7502)        CODE STATUS:   Code Status and Medical Interventions:   Ordered at: 11/28/21 4198     Medical Intervention Limits:    NO intubation (DNI)     Level Of Support Discussed With:    Patient     Code Status (Patient has no pulse and is not breathing):    No CPR (Do Not Attempt to Resuscitate)     Medical Interventions (Patient has pulse or is breathing):    Limited Support       Luis Carlos Koenig MD  12/11/21

## 2021-12-11 NOTE — PROGRESS NOTES
[facility] Progress Note        Antibiotics:  Anti-Infectives (From admission, onward)    Ordered     Dose/Rate Route Frequency Start Stop    12/09/21 1351  vancomycin 500 mg/100 mL 0.9% NS IVPB (mbp)        Ordering Provider: Jensen Ramirez PharmD    500 mg  over 60 Minutes Intravenous Once 12/09/21 1500 12/09/21 1631    12/08/21 1126  vancomycin in dextrose 5% 150 mL (VANCOCIN) IVPB 750 mg        Ordering Provider: Jensen Ramirez PharmD    10 mg/kg × 82.6 kg  over 60 Minutes Intravenous Once 12/08/21 1300 12/08/21 1414    12/03/21 0857  piperacillin-tazobactam (ZOSYN) 3.375 g in iso-osmotic dextrose 50 ml (premix)        Ordering Provider: Sawyer Llanos MD    3.375 g  over 4 Hours Intravenous Every 12 Hours 12/03/21 1800 12/15/21 1759    12/03/21 0832  micafungin 100 mg/100 mL 0.9% NS IVPB (mbp)        Ordering Provider: Sawyer Llanos MD    100 mg Intravenous Every 24 Hours 12/03/21 1000 12/15/21 0959    12/03/21 0857  piperacillin-tazobactam (ZOSYN) 3.375 g in iso-osmotic dextrose 50 ml (premix)        Ordering Provider: Sawyer Llanos MD    3.375 g  over 30 Minutes Intravenous Once 12/03/21 0945 12/03/21 1214    12/03/21 0832  Pharmacy to dose vancomycin        Ordering Provider: Robin Andersen MD     Does not apply Continuous PRN 12/03/21 0831 12/13/21 0830    12/02/21 1434  vancomycin (dosing per levels)        Ordering Provider: Robin Andersen MD     Does not apply Daily 12/02/21 1530 12/25/21 0859    11/30/21 1111  UltraBag/Dianeal PD-2/1.5% Dex (pappas #[id number]) (DIANEAL) 2,000 mL with vancomycin (VANCOCIN) 1,250 mg, cefTAZidime (FORTAZ) 1,000 mg dialysis solution        Ordering Provider: Sawyer Llanos MD     Intraperitoneal Once 11/30/21 2200 11/30/21 2200 11/28/21 1942  UltraBag/Dianeal PD-2/1.5% Dex (pappas #[id number]) (DIANEAL) 2,000 mL with ceFAZolin (ANCEF) 2,000 mg dialysis solution        Ordering Provider: Kali Rojas MD     Intraperitoneal Once 11/29/21 0000  "11/29/21 0004    11/28/21 1157  vancomycin 1500 mg/500 mL 0.9% NS IVPB ([facility])        Ordering Provider: Lex Marcum MD    20 mg/kg × 72.6 kg Intravenous Once 11/28/21 1159 11/28/21 1400    11/28/21 1157  piperacillin-tazobactam (ZOSYN) 3.375 g in iso-osmotic dextrose 50 ml (premix)        Ordering Provider: Lex Marcum MD    3.375 g Intravenous Once 11/28/21 1159 11/28/21 1317          CC: abd pain    HPI:      Patient is a 81 y.o.  Yr old female with history of ESRD on CAPD for several years, Hx CDiff years ago she was admitted November 28 with acute onset abdominal pain over 2 days, nausea/vomiting and abnormal CT scan raising concern for possible early ileus versus partial small bowel obstruction and hazy omental stranding concerning for peritonitis per notes.  She had leukocytosis and peritoneal fluid with 1748 WBC and predominance neutrophils.  Concern for CAPD associated peritonitis and empiric vancomycin/Zosyn initiated.  Nursing had reported peritoneal fluid had initially looked a little hazy but patient did not recall a hazy or bloody appearance.  No redness/swelling or pain or other problems at her PD catheter.     12/3/21 NG out 12/2 with nausea and vomiting multiple times overnight; dialysate remains blood tinged per patient;  tachycardia, hypotension last 24 hours, WBC increased overall and at risk for systemic illness;  Will change abx back to IV vancomycin/zosyn, add empiric mycamine    12/4/21 cardiology following with paroxysmal afib/RVR    12/6/21 Dr Andersen with surgery  \"PROCEDURE PERFORMED:      1. Diagnostic laparoscopy converted to exploratory laparotomy  2. Open resection of terminal ileum and cecum with primary ileocolic anastomosis  3. Removal peritoneal dialysis catheter\"     12/6 also HD catheter placed by Frankie Begum    12/10/21 oncology note seen with chalo, seen by Dr Harman and plans for palliative approach/hospice    12/11/21 no new pain; seen early and sleepy; per nursing,  " "less abd pain postop;  mid abdomen, intermittent, nonradiating, 2-3 out of 10, not progressive.  No diarrhea.  No hematochezia melena or hematemesis.  No rash.  Minimal urine output and no active urinary symptoms.  No dysuria hematuria or pyuria.  No flank pain.  No rash.  No shortness of breath or cough.      ROS:      12/11/21 No f/c/s. no rash. No new ADR to Abx.     Heent-- No new vision, hearing or throat complaints.  No epistaxis or oral sores.  Denies odynophagia or dysphagia.  No flashers, floaters or eye pain. No odynophagia or dysphagia. No headache, photophobia or neck stiffness.  CV-- No chest pain, palpitation or syncope  Resp-- No SOB/cough/Hemoptysis  GI-  As above.No hematochezia, melena, or hematemesis. Denies jaundice or chronic liver disease.  -- No dysuria, hematuria, or flank pain.  Denies hesitancy, urgency or flank pain.  Lymph- no swollen lymph nodes in neck/axilla or groin.   Heme- No active bruising or bleeding; no Hx of DVT or PE.  MS-- no swelling or pain in the bones or joints of arms/legs.  No new back pain.  Neuro-- No acute focal weakness or numbness in the arms or legs.  No seizures.     Full 12 point review of systems reviewed and negative otherwise for acute complaints, except for above      PE:   /78   Pulse 86   Temp 97.5 °F (36.4 °C)   Resp 18   Ht 162.6 cm (64\")   Wt 83.1 kg (183 lb 4.8 oz)   LMP  (LMP Unknown)   SpO2 96%   BMI 31.46 kg/m²       GENERAL: sleepy, in no acute distress.   HEENT: Normocephalic, atraumatic.  PERRL. EOMI. No conjunctival injection. No icterus. Oropharynx clear without evidence of thrush or exudate. No evidence of peridontal disease.    NECK: Supple without nuchal rigidity. No mass.  LYMPH: No cervical, axillary or inguinal lymphadenopathy.  HEART: RRR; No murmur, rubs, gallops.   LUNGS: diminished at bases but otherwise Clear to auscultation bilaterally without wheezing, rales, rhonchi. Normal respiratory effort. Nonlabored. No " dullness.  ABDOMEN: Soft, mildly tender, nondistended.  bowel sounds  diminshed. No rebound or guarding. NO mass or HSM.  EXT:  No cyanosis, clubbing or edema. No cord.   MSK: FROM without joint effusions noted arms/legs.    SKIN: Warm and dry without cutaneous eruptions on Inspection/palpation.    NEURO: sleepy     IV without obvious redness or drainage     No peripheral stigmata/phenomenon of endocarditis    Surgical sites no new redness/purulence       Laboratory Data    Results from last 7 days   Lab Units 12/11/21  0829 12/10/21  0501 12/09/21  0409   WBC 10*3/mm3 19.74* 20.44* 19.35*   HEMOGLOBIN g/dL 8.4* 8.3* 7.3*   HEMATOCRIT % 23.9* 24.0* 22.8*   PLATELETS 10*3/mm3 163 181 177     Results from last 7 days   Lab Units 12/10/21  0502   SODIUM mmol/L 128*   POTASSIUM mmol/L 3.7   CHLORIDE mmol/L 94*   CO2 mmol/L 18.0*   BUN mg/dL 40*   CREATININE mg/dL 3.66*   GLUCOSE mg/dL 226*   CALCIUM mg/dL 9.0     Results from last 7 days   Lab Units 12/08/21  0527   ALK PHOS U/L 201*   BILIRUBIN mg/dL 0.7   ALT (SGPT) U/L 17   AST (SGOT) U/L 50*         Results from last 7 days   Lab Units 12/05/21  0649   CRP mg/dL 23.82*       Estimated Creatinine Clearance: 12.8 mL/min (A) (by C-G formula based on SCr of 3.66 mg/dL (H)).      Microbiology:      Radiology:  Imaging Results (Last 72 Hours)     Procedure Component Value Units Date/Time    XR Chest 1 View [[id number]] Collected: 12/09/21 1945     Updated: 12/09/21 1947    Narrative:      CHEST X-RAY, 12/9/2021      HISTORY:    Reposition left IJ central line.      TECHNIQUE:  AP portable chest x-ray (19:15)    COMPARISON:  *  Chest x-ray, 12/9/2021 (13:55)    FINDINGS:  Left IJ central line tip in the low SVC near its junction with the right atrium. No pneumothorax.    Right IJ dialysis catheter in the low SVC. NG tube below the diaphragm.    Low lung volumes with mild bibasilar atelectasis, unchanged. Lungs otherwise clear. Pulmonary vascularity remains normal.       Impression:      Left IJ central line, right IJ dialysis catheter in NG tube are in good position. No pneumothorax.    Signer Name: Rolando Whiteside MD   Signed: 12/9/2021 7:45 PM   Workstation Name: [email or web address]    [facility]    XR Chest 1 View [[id number]] Collected: 12/09/21 1424     Updated: 12/09/21 1429    Narrative:      EXAMINATION: XR CHEST 1 VW-      INDICATION: s/p left IJ central line; R10.9-Unspecified abdominal pain;  T85.71XA-Infection and inflammatory reaction due to peritoneal dialysis  catheter, initial encounter; A41.9-Sepsis, unspecified organism;  K59.9-Functional intestinal disorder, unspecified     TECHNIQUE: Frontal view of the chest     COMPARISON: 12/6/2021     FINDINGS:      Interval placement of left internal jugular central venous catheter with  the tip terminating low in the right atrium. Otherwise stable and  unchanged medical devices from prior. Stable cardiomediastinal  silhouette. Similar bibasilar streaky opacities and likely small left  pleural effusion. No pneumothorax.       Impression:         Interval placement of left internal jugular central venous catheter with  the tip terminating low in the right atrium. Otherwise stable and  unchanged medical devices from prior. Stable cardiomediastinal  silhouette. Similar bibasilar streaky opacities and likely small left  pleural effusion. No pneumothorax.     This report was finalized on 12/9/2021 2:26 PM by Paco Young MD.       XR Chest 1 View [[id number]] Collected: 12/06/21 1835     Updated: 12/08/21 1659    Narrative:      EXAMINATION: XR CHEST 1 VW-      INDICATION: Status post central line placement; R10.9-Unspecified  abdominal pain; T85.71XA-Infection and inflammatory reaction due to  peritoneal dialysis catheter, initial encounter; A41.9-Sepsis,  unspecified organism; K59.9-Functional intestinal disorder, unspecified.        COMPARISON: 11/28/2021.     FINDINGS: Portable chest reveals dialysis  catheter identified on the  right with tip in the SVC. Nasogastric tube with tip in the stomach.  Prominence of the pulmonary vascularity with some increased markings in  the perihilar regions bilaterally. Small left pleural effusion.           Impression:      Placement of a deep line catheter on the right with tip in  the SVC. No pneumothorax. Prominence of the perihilar regions  bilaterally with small left pleural effusion.     D:  12/06/2021  E:  12/07/2021     This report was finalized on 12/8/2021 4:56 PM by Dr. Aracely Oates MD.               Impression:       --acute abdominal pain.  Associated with nausea/vomiting, abnormal PD fluid concerning for  Peritonitis, ?CAPD in addition to SBO and adenocarcinoma per notes  ; culture negative so far. Catheter out.  No outpatient cultures per patient. 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 Difficile years ago per patient.  No diarrhea at present but at risk for relapse.     --abnormal CT scan with prominent common bile duct per radiology but without evidence for intrahepatic ductal dilation and normal transaminases/bilirubin on November 28.  Monitor exam and labs     --Chronic Coumadin    --paroxysmal Afib/RVR    PLAN:       --IV vancomycin, zosyn ,mycamine for now but likely stop if transition to comfort measures/hospice     --Check/review labs cultures and scans     --Partial history per nursing staff     --Discussed with microbiology     -- d/w pharmacy      --Highly complex set of issues with high risk for further serious morbidity and other serious sequela    --repeat dialysate for cultures  , fungal/AFB studies pending;  d/w micro and no pathogen so far    -- repeat CT 12/3 and SBFT noted,  On systemic therapy  And surgery 12/6 as above    **likely hospice per notes      Sawyer Llanos MD  12/11/2021

## 2021-12-12 NOTE — PROGRESS NOTES
[facility] Progress Note        Antibiotics:  Anti-Infectives (From admission, onward)    Ordered     Dose/Rate Route Frequency Start Stop    12/11/21 1132  vancomycin 500 mg/100 mL 0.9% NS IVPB ([facility])        Ordering Provider: Jacob Dean PharmD    500 mg  over 30 Minutes Intravenous Once 12/11/21 1400 12/11/21 1528    12/09/21 1351  vancomycin 500 mg/100 mL 0.9% NS IVPB (mbp)        Ordering Provider: Jensen Ramirez PharmD    500 mg  over 60 Minutes Intravenous Once 12/09/21 1500 12/09/21 1631    12/08/21 1126  vancomycin in dextrose 5% 150 mL (VANCOCIN) IVPB 750 mg        Ordering Provider: Jensen Ramirez PharmD    10 mg/kg × 82.6 kg  over 60 Minutes Intravenous Once 12/08/21 1300 12/08/21 1414    12/03/21 0857  piperacillin-tazobactam (ZOSYN) 3.375 g in iso-osmotic dextrose 50 ml (premix)        Ordering Provider: Sawyer Llanos MD    3.375 g  over 4 Hours Intravenous Every 12 Hours 12/03/21 1800 12/15/21 1759    12/03/21 0832  micafungin 100 mg/100 mL 0.9% NS IVPB (mbp)        Ordering Provider: Sawyer Llanos MD    100 mg Intravenous Every 24 Hours 12/03/21 1000 12/15/21 0959    12/03/21 0857  piperacillin-tazobactam (ZOSYN) 3.375 g in iso-osmotic dextrose 50 ml (premix)        Ordering Provider: Sawyer Llanos MD    3.375 g  over 30 Minutes Intravenous Once 12/03/21 0945 12/03/21 1214    12/03/21 0832  Pharmacy to dose vancomycin        Ordering Provider: Sawyer Llanos MD     Does not apply Continuous PRN 12/03/21 0831 12/18/21 0830    12/02/21 1434  vancomycin (dosing per levels)        Ordering Provider: Robin Andersen MD     Does not apply Daily 12/02/21 1530 12/25/21 0859    11/30/21 1111  UltraBag/Dianeal PD-2/1.5% Dex (pappas #[id number]) (DIANEAL) 2,000 mL with vancomycin (VANCOCIN) 1,250 mg, cefTAZidime (FORTAZ) 1,000 mg dialysis solution        Ordering Provider: Sawyer Llanos MD     Intraperitoneal Once 11/30/21 2200 11/30/21 2200 11/28/21 1942  UltraBag/Dianeal  "PD-2/1.5% Dex (pappas #[id number]) (DIANEAL) 2,000 mL with ceFAZolin (ANCEF) 2,000 mg dialysis solution        Ordering Provider: Kali Rojas MD     Intraperitoneal Once 11/29/21 0000 11/29/21 0004    11/28/21 1157  vancomycin 1500 mg/500 mL 0.9% NS IVPB ([facility])        Ordering Provider: Lex Marcum MD    20 mg/kg × 72.6 kg Intravenous Once 11/28/21 1159 11/28/21 1400    11/28/21 1157  piperacillin-tazobactam (ZOSYN) 3.375 g in iso-osmotic dextrose 50 ml (premix)        Ordering Provider: Lex Marcum MD    3.375 g Intravenous Once 11/28/21 1159 11/28/21 1317          CC: abd pain    HPI:      Patient is a 81 y.o.  Yr old female with history of ESRD on CAPD for several years, Hx CDiff years ago she was admitted November 28 with acute onset abdominal pain over 2 days, nausea/vomiting and abnormal CT scan raising concern for possible early ileus versus partial small bowel obstruction and hazy omental stranding concerning for peritonitis per notes.  She had leukocytosis and peritoneal fluid with 1748 WBC and predominance neutrophils.  Concern for CAPD associated peritonitis and empiric vancomycin/Zosyn initiated.  Nursing had reported peritoneal fluid had initially looked a little hazy but patient did not recall a hazy or bloody appearance.  No redness/swelling or pain or other problems at her PD catheter.     12/3/21 NG out 12/2 with nausea and vomiting multiple times overnight; dialysate remains blood tinged per patient;  tachycardia, hypotension last 24 hours, WBC increased overall and at risk for systemic illness;  Will change abx back to IV vancomycin/zosyn, add empiric mycamine    12/4/21 cardiology following with paroxysmal afib/RVR    12/6/21 Dr Andersen with surgery  \"PROCEDURE PERFORMED:      1. Diagnostic laparoscopy converted to exploratory laparotomy  2. Open resection of terminal ileum and cecum with primary ileocolic anastomosis  3. Removal peritoneal dialysis catheter\"     12/6 also HD catheter " "placed by Frankie Begum    12/10/21 oncology note seen with chalo, seen by Dr Harman and plans for palliative approach/hospice    12/12/21  ; seen early and sleepy; per nursing,  Not feeling well per nursing;  abd pain postop;  mid abdomen, intermittent, nonradiating, 2-3 out of 10, not progressive.  No diarrhea.  No hematochezia melena or hematemesis.  No rash.  Minimal urine output and no active urinary symptoms.  No dysuria hematuria or pyuria.  No flank pain.  No rash.  No shortness of breath or cough.      ROS:      12/12/21 No f/c/s. no rash. No new ADR to Abx.     Heent-- No new vision, hearing or throat complaints.  No epistaxis or oral sores.  Denies odynophagia or dysphagia.  No flashers, floaters or eye pain. No odynophagia or dysphagia. No headache, photophobia or neck stiffness.  CV-- No chest pain, palpitation or syncope  Resp-- No SOB/cough/Hemoptysis  GI-  As above.No hematochezia, melena, or hematemesis. Denies jaundice or chronic liver disease.  -- No dysuria, hematuria, or flank pain.  Denies hesitancy, urgency or flank pain.  Lymph- no swollen lymph nodes in neck/axilla or groin.   Heme- No active bruising or bleeding; no Hx of DVT or PE.  MS-- no swelling or pain in the bones or joints of arms/legs.  No new back pain.  Neuro-- No acute focal weakness or numbness in the arms or legs.  No seizures.     Full 12 point review of systems reviewed and negative otherwise for acute complaints, except for above      PE:   /70   Pulse 93   Temp 97.8 °F (36.6 °C)   Resp 20   Ht 162.6 cm (64\")   Wt 86.2 kg (190 lb)   LMP  (LMP Unknown)   SpO2 98%   BMI 32.61 kg/m²       GENERAL: sleepy, in no acute distress.   HEENT: Normocephalic, atraumatic.  PERRL. EOMI. No conjunctival injection. No icterus. Oropharynx clear without evidence of thrush or exudate. No evidence of peridontal disease.    NECK: Supple without nuchal rigidity. No mass.  LYMPH: No cervical, axillary or inguinal " lymphadenopathy.  HEART: RRR; No murmur, rubs, gallops.   LUNGS: diminished at bases   without wheezing, rales, rhonchi. Normal respiratory effort. Nonlabored. No dullness.  ABDOMEN: Soft, mildly tender, nondistended.  bowel sounds  diminshed. No rebound or guarding. NO mass or HSM.  EXT:  No cyanosis, clubbing or edema. No cord.   MSK: FROM without joint effusions noted arms/legs.    SKIN: Warm and dry without cutaneous eruptions on Inspection/palpation.    NEURO: sleepy     IV without obvious redness or drainage     No peripheral stigmata/phenomenon of endocarditis    Surgical sites no new redness/purulence       Laboratory Data    Results from last 7 days   Lab Units 12/12/21  0512 12/11/21  0829 12/10/21  0501   WBC 10*3/mm3 18.80* 19.74* 20.44*   HEMOGLOBIN g/dL 8.2* 8.4* 8.3*   HEMATOCRIT % 23.8* 23.9* 24.0*   PLATELETS 10*3/mm3 160 163 181     Results from last 7 days   Lab Units 12/12/21  0512   SODIUM mmol/L 127*   POTASSIUM mmol/L 3.5   CHLORIDE mmol/L 91*   CO2 mmol/L 20.0*   BUN mg/dL 44*   CREATININE mg/dL 3.46*   GLUCOSE mg/dL 174*   CALCIUM mg/dL 9.0     Results from last 7 days   Lab Units 12/08/21  0527   ALK PHOS U/L 201*   BILIRUBIN mg/dL 0.7   ALT (SGPT) U/L 17   AST (SGOT) U/L 50*               Estimated Creatinine Clearance: 13.8 mL/min (A) (by C-G formula based on SCr of 3.46 mg/dL (H)).      Microbiology:      Radiology:  Imaging Results (Last 72 Hours)     Procedure Component Value Units Date/Time    XR Chest 1 View [[id number]] Collected: 12/09/21 1945     Updated: 12/09/21 1947    Narrative:      CHEST X-RAY, 12/9/2021      HISTORY:    Reposition left IJ central line.      TECHNIQUE:  AP portable chest x-ray (19:15)    COMPARISON:  *  Chest x-ray, 12/9/2021 (13:55)    FINDINGS:  Left IJ central line tip in the low SVC near its junction with the right atrium. No pneumothorax.    Right IJ dialysis catheter in the low SVC. NG tube below the diaphragm.    Low lung volumes with mild bibasilar  atelectasis, unchanged. Lungs otherwise clear. Pulmonary vascularity remains normal.      Impression:      Left IJ central line, right IJ dialysis catheter in NG tube are in good position. No pneumothorax.    Signer Name: Rolando Whiteside MD   Signed: 12/9/2021 7:45 PM   Workstation Name: [id number]    [facility]    XR Chest 1 View [[id number]] Collected: 12/09/21 1424     Updated: 12/09/21 1429    Narrative:      EXAMINATION: XR CHEST 1 VW-      INDICATION: s/p left IJ central line; R10.9-Unspecified abdominal pain;  T85.71XA-Infection and inflammatory reaction due to peritoneal dialysis  catheter, initial encounter; A41.9-Sepsis, unspecified organism;  K59.9-Functional intestinal disorder, unspecified     TECHNIQUE: Frontal view of the chest     COMPARISON: 12/6/2021     FINDINGS:      Interval placement of left internal jugular central venous catheter with  the tip terminating low in the right atrium. Otherwise stable and  unchanged medical devices from prior. Stable cardiomediastinal  silhouette. Similar bibasilar streaky opacities and likely small left  pleural effusion. No pneumothorax.       Impression:         Interval placement of left internal jugular central venous catheter with  the tip terminating low in the right atrium. Otherwise stable and  unchanged medical devices from prior. Stable cardiomediastinal  silhouette. Similar bibasilar streaky opacities and likely small left  pleural effusion. No pneumothorax.     This report was finalized on 12/9/2021 2:26 PM by Paco Young MD.               Impression:       --acute abdominal pain.  Associated with nausea/vomiting, abnormal PD fluid concerning for  Peritonitis, ?CAPD in addition to SBO and adenocarcinoma per notes  ; culture negative so far. Catheter out.  No outpatient cultures per patient. 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 Difficile years ago per patient.  No diarrhea at present but at risk for relapse.     --abnormal CT scan with prominent common bile duct per radiology but without evidence for intrahepatic ductal dilation and normal transaminases/bilirubin on November 28.  Monitor exam and labs     --Chronic Coumadin    --paroxysmal Afib/RVR    PLAN:       --IV vancomycin, zosyn ,mycamine for now but likely stop if transition to comfort measures/hospice     --Check/review labs cultures and scans     --Partial history per nursing staff     --Discussed with microbiology     -- d/w pharmacy      --Highly complex set of issues with high risk for further serious morbidity and other serious sequela    --repeat dialysate for cultures  , fungal/AFB studies pending; d/w micro and no pathogen so far    -- repeat CT 12/3 and SBFT noted,  On systemic therapy  And surgery 12/6 as above    **likely hospice per notes      Sawyer Llanos MD  12/12/2021

## 2021-12-12 NOTE — PROGRESS NOTES
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  LVH.  Moderate AI.  · Beakthrough episodes of atrial fibrillation with RVR in setting of bacterial peritonitis 11/30/2021     • Type 2 diabetes mellitus (HCC) [E11.9]  Yes   • Chronic kidney disease, stage IV (severe) (HCC) [N18.4]  Yes     · IgA nephropathy with history of renal transplant, 2010.   · Patient on hemodialysis Monday, Wednesday, and Friday started July 2015.  · Hemodialysis discontinued 2/2017.     • Hyperparathyroidism (HCC) [E21.3]  Yes   • Essential hypertension [I10]  Yes            Patient lying in bed resting with son at her bedside. Now in NSR 96 bpm. Denies any CP, LH/Dizziness. Restless overnight some improvement with .25 ativan. VSS.          Vital Sign Min/Max for last 24 hours  Temp  Min: 97.5 °F (36.4 °C)  Max: 98.2 °F (36.8 °C)   BP  Min: 125/87  Max: 150/73   Pulse  Min: 93  Max: 143   Resp  Min: 20  Max: 20   SpO2  Min: 98 %  Max: 100 %   No data recorded      Intake/Output Summary (Last 24 hours) at 12/12/2021 0936  Last data filed at 12/12/2021 0500  Gross per 24 hour   Intake 3296 ml   Output 1550 ml   Net 1746 ml           Physical Exam  Constitutional:       General: She is sleeping.   Cardiovascular:      Rate and Rhythm: Normal rate and regular rhythm.   Pulmonary:      Effort: Pulmonary effort is normal.      Breath sounds: Decreased  breath sounds present.   Musculoskeletal:      Right lower leg: No edema.      Left lower leg: No edema.   Skin:     General: Skin is warm and dry.      Coloration: Skin is pale.   Neurological:      Mental Status: She is oriented to person, place, and time and easily aroused.   Psychiatric:         Behavior: Behavior is cooperative.         Tele: NSR 96 bpm     Results Review (reviewed the patient's recent labs in the electronic medical record):     EKG (12/9/2021): Atrial fibrillation with  bpm     Chest x-ray (12/9/2021): Left IJ central line right IJ dialysis catheter in position. No pneumothorax. Lungs clear. Pulmonary vascularity remains normal.    ECHO (1/12/2021): LVEF 65%.  LVH.  Mild AS.    Result Review:  I have personally reviewed the results from the time of this admission to 12/12/2021 09:36 EST and agree with these findings:  [x]  Laboratory  []  Microbiology  [x]  Radiology  [x]  EKG/Telemetry   [x]  Cardiology/Vascular   []  Pathology  []  Old records  []  Other:  Most notable findings include: BUN 40, creatinine 3.66, sodium 128, chloride 94, phosphorus 1.2, WBC 20.44, hemoglobin 8.3, hematocrit 24    Results from last 7 days   Lab Units 12/12/21  0512 12/11/21  0829 12/10/21  0502 12/09/21  0409 12/09/21  0409 12/08/21  0527 12/08/21  0527 12/07/21  0550 12/07/21  0550 12/06/21  1929 12/06/21  0539   SODIUM mmol/L 127* 124* 128*   < > 127*   < > 125*   < > 129* 131*   < >   POTASSIUM mmol/L 3.5 3.4* 3.7   < > 3.2*   < > 3.2*   < > 3.7 4.1   < >   CHLORIDE mmol/L 91* 87* 94*  --  89*  --  83*  --  88* 89*  --    BUN mg/dL 44* 60* 40*   < > 52*   < > 73*   < > 49* 39*   < >   CREATININE mg/dL 3.46* 4.32* 3.66*   < > 5.02*   < > 7.94*   < > 7.07* 6.59*   < >   MAGNESIUM mg/dL 1.9 2.0 1.7   < > 1.7   < > 1.7   < > 1.5*  --    < >    < > = values in this interval not displayed.         Results from last 7 days   Lab Units 12/12/21  0512 12/11/21  0829 12/10/21  0501   WBC 10*3/mm3 18.80* 19.74*  20.44*   HEMOGLOBIN g/dL 8.2* 8.4* 8.3*   HEMATOCRIT % 23.8* 23.9* 24.0*   PLATELETS 10*3/mm3 160 163 181       Lab Results   Component Value Date    HGBA1C 4.90 05/19/2019       Lab Results   Component Value Date    CHOL 100 12/05/2021    TRIG 104 12/05/2021    HDL 52 03/04/2019     (H) 03/04/2019              Atrial fibrillation with RVR  · Episode occurred in the setting of bacterial peritonitis/sepsis  · Unable to tolerate p.o. dose of metoprolol and unable to tolerate IV Cardizem drip due to hypotension  · Metoprolol tartrate 25 mg twice daily  · Intermittent episodes of atrial fibrillation with RVR  · Started on IV amiodarone bolus to p.o. taper protocol 12/3/2021 with conversion to NSR  · Converted back to atrial fibrillation with RVR on 12/9/2021 and rebolused with IV amiodarone and IV drip infusing at 0.5 mg/min.   · Coumadin on hold due to recent ileocolic resection for small bowel obstruction     Hypotension   · Likely component of sepsis from bacterial peritonitis  · Blood pressures have improved     Spontaneous bacterial peritonitis  · Treatment per ID and hospitalist  · Peritoneal dialysis catheter removed on 12/6/2021  · Transitioning to hemodialysis        End-stage renal failure on peritoneal dialysis  · Nephrology managing  · Dialysis will be continued during hospitalization      Small bowel obstruction  · Treatment per primary team  · Exploratory lap with ileocolic resection with Dr. 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 Harman with decision to not pursue systemic therapies  -Pallative and hospice following  -Plan to discharge home Tuesday 12/14/21 with hospice care.                 · Continue IV amiodarone, transition to p.o. amiodarone tomorrow.    · Defer Coumadin due to recent surgery and per surgery request.  · If returns to atrial fibrillation may need to consider IV heparin.    Electronically signed by Danay Browning  REY Barragan, 12/12/21, 9:36 AM EST.

## 2021-12-12 NOTE — PLAN OF CARE
Goal Outcome Evaluation:      Pt c/o itching all over the body. Sarna lotion applied. Has intermittent nausea and vomiting. NG to LWS

## 2021-12-12 NOTE — PLAN OF CARE
Goal Outcome Evaluation:               VSS. Pt very restless this shift. NP notified and .25 ativan given with no change. Readjusting in bed often. Will continue to monitor.

## 2021-12-12 NOTE — PROGRESS NOTES
Palliative Care Progress Note    Date of Admission: 11/28/2021    Code Status:   Current Code Status     Date Active Code Status Order ID Comments User Context       11/28/2021 1356 No CPR (Do Not Attempt to Resuscitate) [id number]  Sonia Álvarez MD ED     Advance Care Planning Activity      Questions for Current Code Status     Question Answer    Code Status (Patient has no pulse and is not breathing) No CPR (Do Not Attempt to Resuscitate)    Medical Interventions (Patient has pulse or is breathing) Limited Support    Medical Intervention Limits: NO intubation (DNI)    Level Of Support Discussed With Patient          Subjective: Ms. Montenegro done at bedside and examined her.  She had a family friend Sharon who is also there at bedside, no  family present.  Ms. Wallace reported worsening pain, did not seem to recall she had a IV PCA initially, but when prompted to pushbutton promptly fell asleep.  Prior to my exit she woke up and said that pain medication was working well.  Also concerned with whole body itching as well as some nausea.  No bowel movement since small bowel resection.  Denies any abdominal discomfort at this time.    Patient reported pain was brought to a comfortable level within 48 hours after initial assessment yes    Reviewed current scheduled and prn medications for route, type, dose, and frequency.  Adult Peripheral 2-in-1 TPN, , Last Rate: 75 mL/hr at 12/11/21 1701  Adult Peripheral 2-in-1 TPN,   amiodarone, 0.5 mg/min, Last Rate: 0.5 mg/min (12/12/21 4717)  HYDROmorphone HCl-NaCl,   Pharmacy to Dose TPN,   Pharmacy to dose vancomycin,       •  acetaminophen  •  albuterol  •  benzocaine-menthol  •  camphor-menthol  •  diphenhydrAMINE  •  doxepin  •  naloxone  •  ondansetron **OR** ondansetron  •  ondansetron  •  Pharmacy to Dose TPN  •  Pharmacy to dose vancomycin  •  phenol  •  potassium & sodium phosphates  •  potassium phosphate infusion greater than 15 mMoles **OR** potassium phosphate **OR**  "potassium phosphate  •  prochlorperazine  •  Sodium Chloride (PF)  •  sodium chloride  •  sodium chloride  •  sodium chloride  •  temazepam    Objective:  PPS 20% /70   Pulse 93   Temp 97.8 °F (36.6 °C)   Resp 20   Ht 162.6 cm (64\")   Wt 86.2 kg (190 lb)   LMP  (LMP Unknown)   SpO2 98%   BMI 32.61 kg/m²    Intake & Output (last day)       12/11 0701 12/12 0700 12/12 0701 12/13 0700    I.V. (mL/kg) 198 (2.3)     Other 120     IV Piggyback 200     TPN 2778     Total Intake(mL/kg) 3296 (38.2)     Emesis/NG output 750     Other 1000     Total Output 1750     Net +1546               Lab Results (last 24 hours)     Procedure Component Value Units Date/Time    POC Glucose Once [[id number]]  (Abnormal) Collected: 12/12/21 1117    Specimen: Blood Updated: 12/12/21 1118     Glucose 226 mg/dL      Comment: Meter: [id number] : [id number] Andrew Avila       Phosphorus [[id number]]  (Abnormal) Collected: 12/12/21 0512    Specimen: Blood Updated: 12/12/21 0634     Phosphorus 1.0 mg/dL     Basic Metabolic Panel [[id number]]  (Abnormal) Collected: 12/12/21 0512    Specimen: Blood Updated: 12/12/21 0628     Glucose 174 mg/dL      BUN 44 mg/dL      Creatinine 3.46 mg/dL      Sodium 127 mmol/L      Potassium 3.5 mmol/L      Chloride 91 mmol/L      CO2 20.0 mmol/L      Calcium 9.0 mg/dL      eGFR   Amer --     Comment: <15 Indicative of kidney failure.        eGFR Non African Amer 13 mL/min/1.73      Comment: <15 Indicative of kidney failure.        BUN/Creatinine Ratio 12.7     Anion Gap 16.0 mmol/L     Narrative:      GFR Normal >60  Chronic Kidney Disease <60  Kidney Failure <15      Magnesium [[id number]]  (Normal) Collected: 12/12/21 0512    Specimen: Blood Updated: 12/12/21 0624     Magnesium 1.9 mg/dL     CBC (No Diff) [[id number]]  (Abnormal) Collected: 12/12/21 0512    Specimen: Blood Updated: 12/12/21 0610     WBC 18.80 10*3/mm3      RBC 2.62 10*6/mm3      Hemoglobin 8.2 g/dL      Hematocrit 23.8 %      " MCV 90.8 fL      MCH 31.3 pg      MCHC 34.5 g/dL      RDW 20.2 %      RDW-SD 54.1 fl      MPV 11.2 fL      Platelets 160 10*3/mm3     POC Glucose Once [[id number]]  (Abnormal) Collected: 12/12/21 0446    Specimen: Blood Updated: 12/12/21 0447     Glucose 176 mg/dL      Comment: Meter: [id number] : [id number] Iftikhar Duque       POC Glucose Once [[id number]]  (Abnormal) Collected: 12/11/21 2329    Specimen: Blood Updated: 12/11/21 2330     Glucose 212 mg/dL      Comment: Meter: [id number] : [id number] Iftikhar Dunia       POC Glucose Once [[id number]]  (Abnormal) Collected: 12/11/21 1754    Specimen: Blood Updated: 12/11/21 1755     Glucose 186 mg/dL      Comment: Meter: [id number] : [id number] Andrew Avila       Phosphorus [[id number]]  (Abnormal) Collected: 12/11/21 1400    Specimen: Blood Updated: 12/11/21 1504     Phosphorus 1.1 mg/dL     POC Glucose Once [[id number]]  (Abnormal) Collected: 12/11/21 1216    Specimen: Blood Updated: 12/11/21 1217     Glucose 171 mg/dL      Comment: Meter: [id number] : [id number] Andrew Avila           Imaging Results (Last 24 Hours)     ** No results found for the last 24 hours. **          Physical Exam:  Gen: Chronically ill-appearing, frail, elderly female resting in bed  Skin: warm, dry  Eyes:  conjunctivae clear and moist  HEENT: Temporal wasting with NG tube in place  Resp/thorax: even effort, nonlabored, CTA  CV: Tachycardia with no peripheral edema  ABD: soft, hypoactive bowel sounds with tinkles appreciated  Ext: No mottling or cyanosis  Neuro: Easily arouses to voice, confused about current situation/why she is hospitalized.  Is oriented to person  Psych: Normal mood, does get details confused as above    Reviewed labs and diagnostic results.   Lab Results   Component Value Date    HGBA1C 4.90 05/19/2019     Results from last 7 days   Lab Units 12/12/21  0512   WBC 10*3/mm3 18.80*   HEMOGLOBIN g/dL 8.2*   HEMATOCRIT % 23.8*   PLATELETS 10*3/mm3 160      Results from last 7 days   Lab Units 12/12/21  0512 12/09/21  0409 12/08/21  0527   SODIUM mmol/L 127*   < > 125*   POTASSIUM mmol/L 3.5   < > 3.2*   CHLORIDE mmol/L 91*   < > 83*   CO2 mmol/L 20.0*   < > 21.0*   BUN mg/dL 44*   < > 73*   CREATININE mg/dL 3.46*   < > 7.94*   CALCIUM mg/dL 9.0   < > 8.3*   BILIRUBIN mg/dL  --   --  0.7   ALK PHOS U/L  --   --  201*   ALT (SGPT) U/L  --   --  17   AST (SGOT) U/L  --   --  50*   GLUCOSE mg/dL 174*   < > 163*    < > = values in this interval not displayed.       Impression: 81 y.o. 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  PPS 20%.  Palliative care consulted for pain and symptom management    1.  Complex medical decision making: Patient not decisional at this time and no family at bedside, per chart review and discussion with primary hospitalist, plan for home with hospice care on Tuesday 12/14    Symptoms:  2.  Pain: Multifactorial etiology including SBO, peritonitis, prolonged immobility, known malignancy.  Difficult to discern level of control given patient poor historian and acutely ill; however her pump interrogation does seem to be utilizing Dilaudid 0.1 mg patient bolus approximately every hour for the past 9 hours, but do not have data prior to this (if I extrapolate this out for 24-hour She is using approximately 30 oral morphine equivalents).  I worry she may need this more as I needed to prompt and remind her that she had this available for pain management.  She has no basal rate, as needed only.  Also some concern IV medications may be causing pruritus, therefore will recommend the following:  --I start low-dose fentanyl patch, 12mg which will be approximately equivalent to 25 oral morphine equivalents  --Will continue IV PCA, may need to further titrate fentanyl patch  --Will continue with passive range of motion, APAP as needed    3.  Pruritus:  Uncertain etiology, unclear onset, no transaminitis and actively receiving dialysis.  Allergic to Benadryl, hydroxyzine minimally effective.  Strongly suspect this may be due to IV opiate, will therefore replace some drug with fentanyl patch as above in hopes of alleviating symptoms.  We will continue to monitor clinically.  Would like to avoid gabapentin at this time given end-stage renal disease and likely poor absorption given SBO, but this may be an option that could help if these things change  --Rotating opiates as above, will continue to clinically monitor; hopeful that addition of fentanyl patch will alleviate need for as much IV Dilaudid use the a PCA    4.  Opiate-induced constipation, SBO: Currently, given SBO, will continue with bowel rest.    5.  Nausea in the setting of SBO: Continue bowel rest as above.  Also agree with NG tube to low wall suction.  Given probable etiology, if does not resolve suspect will be very challenging to manage as an outpatient as medications often do not alleviate nausea in the setting.  May be best served to remain inpatient hospice for aggressive symptom management if ileus does not resolve.  Also discussed with patient and with family friend at bedside, in my limited experience patients are often more comfortable with keeping NG tube in rather than removal  --Continue with NG tube to low wall suction  --I will start Ativan as below for anxiety and for nausea, if helpful, would consider scheduling    6.  Anxiety: New, worsening.  Discussed possible use of IV Ativan as needed, this may also help with nausea as above.  We will start 0.5 mg every 4 hours       Time: 40 minutes   > 50% time spent in counseling and education concerning current orders for symptom management with bedside nurse, patient, family friend at bedside.    Francoise Roche MD  [phone]  12/12/21  11:54 EST

## 2021-12-12 NOTE — PROGRESS NOTES
[facility] Medicine Services  PROGRESS NOTE    Patient Name: Moni Wallace  : 1940  MRN: [id number]    Date of Admission: 2021  Primary Care Physician: Alex Walters MD    Subjective   Subjective     CC:  F/U SBO    HPI:  Itchy, nausea, sore throat. +generally weak    ROS:  Gen- No fevers, chills  CV- No chest pain, palpitations  Resp- No cough, dyspnea  GI- No N/V/D, abd pain  MSK-+LBP (chronic)     Objective   Objective     Vital Signs:   Temp:  [97.5 °F (36.4 °C)-98.2 °F (36.8 °C)] 97.8 °F (36.6 °C)  Heart Rate:  [] 93  Resp:  [20] 20  BP: (133-150)/(67-81) 149/70     Physical Exam:  Constitutional:ill appearing, sitting up in chair, ng tube in place, appropriate in conversation but eyes slightly closed appearing slightly uncomfortable but in no distress  HENT: NCAT, mucous membranes moist, NG in place   Respiratory: Clear to auscultation bilaterally, respiratory effort normal   Cardiovascular: rrr, no murmurs, rubs, or gallops  Gastrointestinal: Positive bowel sounds, soft, nontender to light palpation  Psychiatric: Appropriate affect, cooperative  Neurologic: Oriented x 3,  speech clear  Skin: No rashes, tunneled dialysis catheter in place R chest, incision c/d/i  Results Reviewed:  LAB RESULTS:      Lab 21  0512 21  0829 12/10/21  0501 21  0409 21  0527 21  0550 21  0550 21  1929 21  1928 21  1928 21  0539 21  0539 21  1643   WBC 18.80* 19.74* 20.44* 19.35* 21.79*   < > 19.20*  --    < > 23.51*   < > 14.56*  --    HEMOGLOBIN 8.2* 8.4* 8.3* 7.3* 8.1*   < > 8.2*  --    < > 8.6*   < > 8.3*  --    HEMATOCRIT 23.8* 23.9* 24.0* 22.8* 24.1*   < > 24.9*  --    < > 25.9*   < > 25.7*  --    PLATELETS 160 163 181 177 213   < > 222  --    < > 232   < > 197  --    NEUTROS ABS  --   --   --  16.06*  --   --  16.37*  --   --  20.68*  --   --   --    IMMATURE GRANS (ABS)  --   --   --   --   --   --   1.03*  --   --  1.25*  --   --   --    LYMPHS ABS  --   --   --   --   --   --  1.01  --   --  0.81  --   --   --    MONOS ABS  --   --   --   --   --   --  0.74  --   --  0.66  --   --   --    EOS ABS  --   --   --  0.19  --   --  0.00  --   --  0.03  --   --   --    MCV 90.8 89.5 92.3 99.6* 96.4   < > 96.9  --    < > 96.3   < > 98.8*  --    PROTIME  --   --   --   --  14.0  --  15.2* 15.6*  --   --   --  17.7* 22.7*    < > = values in this interval not displayed.         Lab 12/12/21  0512 12/11/21  1400 12/11/21  0829 12/10/21  2105 12/10/21  0502 12/09/21  1035 12/09/21  0409 12/08/21  0527 12/08/21  0527 12/07/21  0550 12/07/21  0550 12/06/21  1245 12/06/21  0539 12/05/21  1956 12/05/21  1643   SODIUM 127*  --  124*  --  128*  --  127*  --  125*   < > 129*   < > 135*  --   --    POTASSIUM 3.5  --  3.4*  --  3.7  --  3.2*  --  3.2*   < > 3.7   < > 2.9*   < >  --    CHLORIDE 91*  --  87*  --  94*  --  89*  --  83*   < > 88*   < > 91*  --   --    CO2 20.0*  --  18.0*  --  18.0*  --  20.0*  --  21.0*   < > 22.0   < > 26.0  --   --    ANION GAP 16.0*  --  19.0*  --  16.0*  --  18.0*  --  21.0*   < > 19.0*   < > 18.0*  --   --    BUN 44*  --  60*  --  40*  --  52*  --  73*   < > 49*   < > 33*  --   --    CREATININE 3.46*  --  4.32*  --  3.66*  --  5.02*  --  7.94*   < > 7.07*   < > 7.23*  --   --    GLUCOSE 174*  --  187*  --  226*  --  165*  --  163*   < > 286*   < > 123*  --   --    CALCIUM 9.0  --  8.9  --  9.0  --  8.7  --  8.3*   < > 8.4*   < > 8.1*  --   --    IONIZED CALCIUM  --   --   --   --   --   --   --   --   --   --  1.10*  --  1.06*  --  1.06*   MAGNESIUM 1.9  --  2.0  --  1.7  --  1.7  --  1.7   < > 1.5*  --  1.5*   < >  --    PHOSPHORUS 1.0* 1.1* 1.5* 1.7* 1.2*   < > 1.1*   < > 2.2*   < > 2.9  --  3.6   < >  --     < > = values in this interval not displayed.         Lab 12/08/21  0527 12/07/21  0550 12/06/21  0539   TOTAL PROTEIN 5.4* 6.7 6.7   ALBUMIN 3.40* 3.70 4.20   GLOBULIN 2.0 3.0 2.5   ALT  (SGPT) 17 7 <5   AST (SGOT) 50* 25 20   BILIRUBIN 0.7 0.5 0.5   ALK PHOS 201* 136* 117         Lab 12/08/21  0527 12/07/21  0550 12/06/21  1929 12/06/21  0539 12/05/21  1643   PROTIME 14.0 15.2* 15.6* 17.7* 22.7*   INR 1.11 1.24* 1.29* 1.51* 2.10*             Lab 12/09/21  1035 12/09/21  0409   IRON  --  45   IRON SATURATION  --  35   TIBC  --  130*   TRANSFERRIN  --  87*   ABO TYPING O  --    RH TYPING Negative  --    ANTIBODY SCREEN Negative  --          Brief Urine Lab Results  (Last result in the past 365 days)      Color   Clarity   Blood   Leuk Est   Nitrite   Protein   CREAT   Urine HCG        11/28/21 1647 Yellow   Turbid   Moderate (2+)   Large (3+)   Negative   >=300 mg/dL (3+)                 Microbiology Results Abnormal     Procedure Component Value - Date/Time    Fungus Culture - Body Fluid, Peritoneum [[id number]] Collected: 12/03/21 1043    Lab Status: Preliminary result Specimen: Body Fluid from Peritoneum Updated: 12/10/21 1046     Fungus Culture No fungus isolated at 1 week    AFB Culture - Body Fluid, Peritoneum [[id number]] Collected: 12/03/21 1040    Lab Status: Preliminary result Specimen: Body Fluid from Peritoneum Updated: 12/10/21 1046     AFB Culture No AFB isolated at 1 week     AFB Stain No acid fast bacilli seen on concentrated smear    Blood Culture - Blood, Hand, Left [[id number]]  (Normal) Collected: 12/03/21 1318    Lab Status: Final result Specimen: Blood from Hand, Left Updated: 12/08/21 1515     Blood Culture No growth at 5 days    Narrative:      Aerobic Only    Blood Culture - Blood, Hand, Left [[id number]]  (Normal) Collected: 12/03/21 1230    Lab Status: Final result Specimen: Blood from Hand, Left Updated: 12/08/21 1330     Blood Culture No growth at 5 days    Fungus Smear - Body Fluid, Peritoneum [[id number]] Collected: 12/03/21 1042    Lab Status: Final result Specimen: Body Fluid from Peritoneum Updated: 12/06/21 1559     Fungal Stain No fungal elements seen    Body Fluid  Culture - Body Fluid, Peritoneum [[id number]] Collected: 12/02/21 1850    Lab Status: Final result Specimen: Body Fluid from Peritoneum Updated: 12/05/21 0736     Body Fluid Culture No growth at 3 days     Gram Stain Many (4+) WBCs seen      No organisms seen    Blood Culture - Blood, Wrist, Left [[id number]]  (Normal) Collected: 11/28/21 1023    Lab Status: Final result Specimen: Blood from Wrist, Left Updated: 12/03/21 1046     Blood Culture No growth at 5 days    Blood Culture - Blood, Wrist, Left [[id number]]  (Normal) Collected: 11/28/21 1023    Lab Status: Final result Specimen: Blood from Wrist, Left Updated: 12/03/21 1046     Blood Culture No growth at 5 days    Body Fluid Culture - Body Fluid, Peritoneum [[id number]] Collected: 11/28/21 1214    Lab Status: Final result Specimen: Body Fluid from Peritoneum Updated: 12/01/21 1239     Body Fluid Culture No growth at 3 days     Gram Stain Few (2+) WBCs seen      No organisms seen    Urine Culture - Urine, Urine, Clean Catch [[id number]]  (Normal) Collected: 11/28/21 1647    Lab Status: Final result Specimen: Urine, Clean Catch Updated: 11/29/21 2255     Urine Culture No growth    COVID PRE-OP / PRE-PROCEDURE SCREENING ORDER (NO ISOLATION) - Swab, Nasopharynx [[id number]]  (Normal) Collected: 11/28/21 1558    Lab Status: Final result Specimen: Swab from Nasopharynx Updated: 11/28/21 2048    Narrative:      The following orders were created for panel order COVID PRE-OP / PRE-PROCEDURE SCREENING ORDER (NO ISOLATION) - Swab, Nasopharynx.  Procedure                               Abnormality         Status                     ---------                               -----------         ------                     COVID-19, APTIMA PANTHER...[[id number]]  Normal              Final result                 Please view results for these tests on the individual orders.    COVID-19, APTIMA PANTHER [facility] IN-HOUSE NP/OP SWAB IN UTM/VTM/SALINE TRANSPORT MEDIA 24HR TAT - Swab, Nasopharynx  [[id number]]  (Normal) Collected: 11/28/21 1558    Lab Status: Final result Specimen: Swab from Nasopharynx Updated: 11/28/21 2048     COVID19 Not Detected    Narrative:      Fact sheet for providers: https://www.fda.gov/media/785468/download     Fact sheet for patients: https://www.fda.gov/media/629203/download    Test performed by RT PCR.          No radiology results from the last 24 hrs    Results for orders placed during the hospital encounter of 01/11/21    Transthoracic Echo Complete With Contrast if Necessary Per Protocol    Interpretation Summary  · Left ventricular systolic function is normal. Estimated left ventricular EF = 65%  · Left ventricular wall thickness is consistent with hypertrophy.  · Left atrial volume is mildly increased.  · The aortic valve is calcified. There is mild aortic stenosis present. There is moderate aortic regurgitation present.  · Estimated right ventricular systolic pressure from tricuspid regurgitation is normal (<35 mmHg).      I have reviewed the medications:  Scheduled Meds:acetaminophen, 650 mg, Nasogastric, Q6H  albumin human, , ,   albumin human, , ,   albumin human, , ,   [START ON 12/13/2021] calcitriol, 0.5 mcg, Oral, Once per day on Mon Wed Fri  cinacalcet, 30 mg, Oral, Nightly  epoetin blanca/blanca-epbx, 10,000 Units, Subcutaneous, Once per day on Mon Wed Fri  Fat Emul Fish Oil/Plant Based, 250 mL, Intravenous, Q24H (TPN)  insulin lispro, 2-7 Units, Subcutaneous, Q6H  lansoprazole, 30 mg, Nasogastric, Q AM  metoprolol tartrate, 25 mg, Nasogastric, Q12H  micafungin (MYCAMINE) IV, 100 mg, Intravenous, Q24H  piperacillin-tazobactam, 3.375 g, Intravenous, Q12H  potassium & sodium phosphates, 2 packet, Nasogastric, TID AC  potassium phosphate, 30 mmol, Intravenous, Once  sodium bicarbonate, 650 mg, Nasogastric, TID  sodium chloride, 10 mL, Intravenous, Q12H  sodium chloride, 10 mL, Intravenous, Q12H  sodium chloride, 10 mL, Intravenous, Q12H  sodium chloride, 10 mL,  Intravenous, Q12H  vancomycin (dosing per levels), , Does not apply, Daily      Continuous Infusions:Adult Peripheral 2-in-1 TPN, , Last Rate: 75 mL/hr at 12/11/21 1701  amiodarone, 0.5 mg/min, Last Rate: 0.5 mg/min (12/12/21 0454)  HYDROmorphone HCl-NaCl,   Pharmacy to Dose TPN,   Pharmacy to dose vancomycin,       PRN Meds:.•  acetaminophen  •  albuterol  •  benzocaine-menthol  •  camphor-menthol  •  diphenhydrAMINE  •  doxepin  •  naloxone  •  ondansetron **OR** ondansetron  •  ondansetron  •  Pharmacy to Dose TPN  •  Pharmacy to dose vancomycin  •  phenol  •  potassium & sodium phosphates  •  potassium phosphate infusion greater than 15 mMoles **OR** potassium phosphate **OR** potassium phosphate  •  prochlorperazine  •  Sodium Chloride (PF)  •  sodium chloride  •  sodium chloride  •  sodium chloride  •  temazepam    Assessment/Plan   Assessment & Plan     Active Hospital Problems    Diagnosis  POA   • **Peritonitis (HCC) [K65.9]  Yes   • Hypotension [I95.9]  Yes   • Sepsis associated hypotension (HCC) [A41.9, I95.9]  Yes   • Ileus (HCC) [K56.7]  Yes   • Hyperlipidemia LDL goal <100 [E78.5]  Yes   • Paroxysmal atrial fibrillation (HCC) [I48.0]  Yes   • Type 2 diabetes mellitus (HCC) [E11.9]  Yes   • Chronic kidney disease, stage IV (severe) (HCC) [N18.4]  Yes   • Hyperparathyroidism (HCC) [E21.3]  Yes   • Essential hypertension [I10]  Yes      Resolved Hospital Problems   No resolved problems to display.        Brief Hospital Course to date:  Moni Wallace is a 81 y.o. female here with sepsis and hypotension due to peritonitis. Her stay is complicated by Ileus vs pSBO. Surgery and ID following.       Sepsis due to Bacterial Peritonitis  -Peritoneal catheter catheter d/c'd  --ID following, continue Vanc, Zosyn, Mycamine for now (stop at time of discharge when transitioning home w/ hospice for comfort measures)  --Cultures remain negative.  --Monitor WBC count       SBO (w/ intraoperative path consistent w/  advanced adenocarcinoma)  Probable metastatic peritoneal carcinomatotis (see above)  --S/P Open resection of terminal ileum and cecum with primary ileocolic anastomosis and removal of PD catheter per Dr Andersen 12/6/21  Nausea  Pruritis  -post-op care per Dr. Andersen: awaiting bowel function return  -Med-oncology (Dr. Harman) has evaluated (12/9 & 12/10); patient w/ poor prognosis, patient not interested in systemic therapies which is appropriate.   -12/11/21: abd distension, n/v so NG placed back to LWS  -being followed by palliative & hospice: current plan is to d/c home w/ hospice tentatively on Tuesday 12/14/21; will continue dialysis while in hospital and plans to stop at discharge (can decide on discontinuing tunneled cath prior to d/c); continue current abx til time of d/c. Will ultimately require transition from pca pump to other modality for pain control; add iv benadryl, chloraseptic; continue ng t to LWS (distension/nausea); per family, I will ask palliative to see and begin addressing pca (patient wishes to go home at discharge w/ hospice, but son concerned about ability to balance pain since on pca)      Post-op anemia  -hgb 7.3 12/9/21; s/p 1 unit prbc on 12/9/21; monitor and tranfuse prn     PAF, w/ intermittent RVR  HTN  --Continue to hold Warfain per surgery until return of bowel function  --S/P 2 units FFP and 10mg IV Vitamin K pre-op   -cards following, currently amiodarone drip for now; restart po amiodarone at time of discharge; no further anticoagulation planned     S/p Hypotension  -Currently resolved     ESRD on PD-->now on HD via tunneled cath right ij  Hypokalemia  Hypomagnesemia   Hypocalcemia   Hyponatremia   -PD catheter removed   -Dr Curiel placed right sided tunneled dialysis catheter; currently getting dialysis while here, plans to stop at time of d/c (tentatively Tuesday home w/ hospice)    Poor iv access, s/p left ij central line placement 12/9/21  -per discussion w/ nurse on 12/9/21,  has 3 peripheral iv's, unable to get any further peripherals; getting ppn, iv antibiotics, dilaudid pca, and now w/ probable iv amiodarone.  -Dr. Andersen placed triple lumen left ij central line 12/9/21. Plan to d/c at time of discharge    Am labs: bmp    Code status: dnr/dni/focus comfort/no more invasive procedures/continue current level of care without escalation    DVT prophylaxis:  Mechanical DVT prophylaxis orders are present.       AM-PAC 6 Clicks Score (PT): 12 (12/12/21 0800)        CODE STATUS:   Code Status and Medical Interventions:   Ordered at: 11/28/21 0626     Medical Intervention Limits:    NO intubation (DNI)     Level Of Support Discussed With:    Patient     Code Status (Patient has no pulse and is not breathing):    No CPR (Do Not Attempt to Resuscitate)     Medical Interventions (Patient has pulse or is breathing):    Limited Support       Luis Carlos Koenig MD  12/12/21

## 2021-12-12 NOTE — PROGRESS NOTES
"   LOS: 14 days    Patient Care Team:  Alex Walters MD as PCP - General (Internal Medicine)  Jeff Joe IV, MD as Consulting Physician (Cardiology)  Donny Hodges MD as Consulting Physician (Nephrology)  Cory Castillo MD as Surgeon (General Surgery)  Slim Wick MD    ESRD - PD     Subjective     Interval History:   No acute events overnight. Feeling better.     Review of Systems:   No CP or SOA ,N/V, Constipation.         Objective     Vital Sign Min/Max for last 24 hours  Temp  Min: 97.5 °F (36.4 °C)  Max: 98.2 °F (36.8 °C)   BP  Min: 125/87  Max: 150/73   Pulse  Min: 93  Max: 143   Resp  Min: 20  Max: 20   SpO2  Min: 98 %  Max: 100 %   No data recorded   Weight  Min: 86.2 kg (190 lb)  Max: 86.2 kg (190 lb)     Flowsheet Rows      First Filed Value   Admission Height 162.6 cm (64\") Documented at 11/28/2021 0854   Admission Weight 72.6 kg (160 lb) Documented at 11/28/2021 0854          No intake/output data recorded.  I/O last 3 completed shifts:  In: 7457 [I.V.:2872; Other:120; IV Piggyback:795]  Out: 1750 [Emesis/NG output:750; Other:1000]    Physical Exam:    Gen: Alert, NAD   HENT: NC, AT, EOMI   NECK: Supple, no JVD, Trachea midline   LUNGS: CTA bilaterally, non labored respirtation   CVS: S1/S2 audible, RRR, no murmur   Abd: Soft, NT, ND, BS+   Ext: No pedal edema, no cyanosis   CNS: Alert, No focal deficit noted grossly  Psy: Cooperative  Skin: Warm, dry and intact      WBC WBC   Date Value Ref Range Status   12/12/2021 18.80 (H) 3.40 - 10.80 10*3/mm3 Final   12/11/2021 19.74 (H) 3.40 - 10.80 10*3/mm3 Final   12/10/2021 20.44 (H) 3.40 - 10.80 10*3/mm3 Final      HGB Hemoglobin   Date Value Ref Range Status   12/12/2021 8.2 (L) 12.0 - 15.9 g/dL Final   12/11/2021 8.4 (L) 12.0 - 15.9 g/dL Final   12/10/2021 8.3 (L) 12.0 - 15.9 g/dL Final      HCT Hematocrit   Date Value Ref Range Status   12/12/2021 23.8 (L) 34.0 - 46.6 % Final   12/11/2021 23.9 (L) 34.0 - 46.6 % " Final   12/10/2021 24.0 (L) 34.0 - 46.6 % Final      Platlets No results found for: LABPLAT   MCV MCV   Date Value Ref Range Status   12/12/2021 90.8 79.0 - 97.0 fL Final   12/11/2021 89.5 79.0 - 97.0 fL Final   12/10/2021 92.3 79.0 - 97.0 fL Final          Sodium Sodium   Date Value Ref Range Status   12/12/2021 127 (L) 136 - 145 mmol/L Final   12/11/2021 124 (L) 136 - 145 mmol/L Final   12/10/2021 128 (L) 136 - 145 mmol/L Final      Potassium Potassium   Date Value Ref Range Status   12/12/2021 3.5 3.5 - 5.2 mmol/L Final   12/11/2021 3.4 (L) 3.5 - 5.2 mmol/L Final     Comment:     Slight hemolysis detected by analyzer. Results may be affected.   12/10/2021 3.7 3.5 - 5.2 mmol/L Final      Chloride Chloride   Date Value Ref Range Status   12/12/2021 91 (L) 98 - 107 mmol/L Final   12/11/2021 87 (L) 98 - 107 mmol/L Final   12/10/2021 94 (L) 98 - 107 mmol/L Final      CO2 CO2   Date Value Ref Range Status   12/12/2021 20.0 (L) 22.0 - 29.0 mmol/L Final   12/11/2021 18.0 (L) 22.0 - 29.0 mmol/L Final   12/10/2021 18.0 (L) 22.0 - 29.0 mmol/L Final      BUN BUN   Date Value Ref Range Status   12/12/2021 44 (H) 8 - 23 mg/dL Final   12/11/2021 60 (H) 8 - 23 mg/dL Final   12/10/2021 40 (H) 8 - 23 mg/dL Final      Creatinine Creatinine   Date Value Ref Range Status   12/12/2021 3.46 (H) 0.57 - 1.00 mg/dL Final   12/11/2021 4.32 (H) 0.57 - 1.00 mg/dL Final   12/10/2021 3.66 (H) 0.57 - 1.00 mg/dL Final      Calcium Calcium   Date Value Ref Range Status   12/12/2021 9.0 8.6 - 10.5 mg/dL Final   12/11/2021 8.9 8.6 - 10.5 mg/dL Final   12/10/2021 9.0 8.6 - 10.5 mg/dL Final      PO4 No results found for: CAPO4   Albumin No results found for: ALBUMIN   Magnesium Magnesium   Date Value Ref Range Status   12/12/2021 1.9 1.6 - 2.4 mg/dL Final   12/11/2021 2.0 1.6 - 2.4 mg/dL Final   12/10/2021 1.7 1.6 - 2.4 mg/dL Final      Uric Acid No results found for: URICACID        Results Review:     I reviewed the patient's new clinical  results.    acetaminophen, 650 mg, Nasogastric, Q6H  albumin human, , ,   albumin human, , ,   albumin human, , ,   [START ON 12/13/2021] calcitriol, 0.5 mcg, Oral, Once per day on Mon Wed Fri  cinacalcet, 30 mg, Oral, Nightly  epoetin blanca/blanca-epbx, 10,000 Units, Subcutaneous, Once per day on Mon Wed Fri  Fat Emul Fish Oil/Plant Based, 250 mL, Intravenous, Q24H (TPN)  insulin lispro, 2-7 Units, Subcutaneous, Q6H  lansoprazole, 30 mg, Nasogastric, Q AM  metoprolol tartrate, 25 mg, Nasogastric, Q12H  micafungin (MYCAMINE) IV, 100 mg, Intravenous, Q24H  piperacillin-tazobactam, 3.375 g, Intravenous, Q12H  potassium & sodium phosphates, 2 packet, Nasogastric, TID AC  potassium phosphate, 30 mmol, Intravenous, Once  sodium bicarbonate, 650 mg, Nasogastric, TID  sodium chloride, 10 mL, Intravenous, Q12H  sodium chloride, 10 mL, Intravenous, Q12H  sodium chloride, 10 mL, Intravenous, Q12H  sodium chloride, 10 mL, Intravenous, Q12H  vancomycin (dosing per levels), , Does not apply, Daily      Adult Peripheral 2-in-1 TPN, , Last Rate: 75 mL/hr at 12/11/21 1701  amiodarone, 0.5 mg/min, Last Rate: 0.5 mg/min (12/12/21 0454)  HYDROmorphone HCl-NaCl,   Pharmacy to Dose TPN,   Pharmacy to dose vancomycin,         Medication Review:    Assessment/Plan       Peritonitis (HCC)    Paroxysmal atrial fibrillation (HCC)    Essential hypertension    Type 2 diabetes mellitus (HCC)    Chronic kidney disease, stage IV (severe) (HCC)    Hyperparathyroidism (HCC)    Hyperlipidemia LDL goal <100    Sepsis associated hypotension (HCC)    Ileus (HCC)    Hypotension      1- ESRD - On PD - transitioning to HD. S/p laproscopy -PD catheter out  2- Peritonitis ?- culture so far negative.   3- Anemia   4- Supra-therapeutic INR   5- Low albumin level   6- Corrected Calcium for albumin - 7.56 - monitor   7- Secondary hyperparathyroidism on Sensipar.   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 No need today.   - Replete Phos, mag and K per protocol.   - Antibx per ID   - Transfuse blood for Hgb less than 7.0     Patient will go home with hospice.       Albina Will MD  12/12/21  10:10 EST

## 2021-12-12 NOTE — PROGRESS NOTES
Pharmacy Parenteral Nutrition Evaluation    Moni Wallace is an 81 y.o. female receiving PPN.    Indication: Bowel obstruction  Consulting Physician: Hospitalist / Dr. Andersen    Total Fluid Rate (if stated): 75 ml/hr (PPN) per renal    Labs  Results from last 7 days   Lab Units 12/12/21  0512 12/11/21  0829 12/10/21  0502 12/09/21  0409 12/08/21  0527 12/07/21  0550 12/07/21  0550 12/06/21  1245 12/06/21  0539   SODIUM mmol/L 127* 124* 128*   < > 125*   < > 129*   < > 135*   POTASSIUM mmol/L 3.5 3.4* 3.7   < > 3.2*   < > 3.7   < > 2.9*   CHLORIDE mmol/L 91* 87* 94*   < > 83*   < > 88*   < > 91*   CO2 mmol/L 20.0* 18.0* 18.0*   < > 21.0*   < > 22.0   < > 26.0   BUN mg/dL 44* 60* 40*   < > 73*   < > 49*   < > 33*   CREATININE mg/dL 3.46* 4.32* 3.66*   < > 7.94*   < > 7.07*   < > 7.23*   CALCIUM mg/dL 9.0 8.9 9.0   < > 8.3*   < > 8.4*   < > 8.1*   BILIRUBIN mg/dL  --   --   --   --  0.7  --  0.5  --  0.5   ALK PHOS U/L  --   --   --   --  201*  --  136*  --  117   ALT (SGPT) U/L  --   --   --   --  17  --  7  --  <5   AST (SGOT) U/L  --   --   --   --  50*  --  25  --  20   GLUCOSE mg/dL 174* 187* 226*   < > 163*   < > 286*   < > 123*    < > = values in this interval not displayed.     Results from last 7 days   Lab Units 12/12/21  0512 12/11/21  1400 12/11/21  0829 12/10/21  2105 12/10/21  0502 12/06/21  0539 12/05/21  1643   MAGNESIUM mg/dL 1.9  --  2.0  --  1.7   < >  --    PHOSPHORUS mg/dL 1.0* 1.1* 1.5*   < > 1.2*   < >  --    PREALBUMIN mg/dL  --   --   --   --   --   --  13.6*    < > = values in this interval not displayed.     Results from last 7 days   Lab Units 12/12/21  0512 12/11/21  0829 12/10/21  0501   WBC 10*3/mm3 18.80* 19.74* 20.44*   HEMOGLOBIN g/dL 8.2* 8.4* 8.3*   HEMATOCRIT % 23.8* 23.9* 24.0*   PLATELETS 10*3/mm3 160 163 181     Triglycerides   Date Value Ref Range Status   12/05/2021 104 0 - 150 mg/dL Final     Comment:     Falsely depressed results may occur on samples drawn from patients  receiving N-Acetylcysteine (NAC) or Metamizole.     estimated creatinine clearance is 13.8 mL/min (A) (by C-G formula based on SCr of 3.46 mg/dL (H)).    Intake & Output (last 3 days)         12/09 0701  12/10 0700 12/10 0701 12/11 0700 12/11 0701 12/12 0700 12/12 0701 12/13 0700    I.V. (mL/kg) 0.5 (0) 2677 (32.2) 198 (2.3)     Blood 294       Other 240  120     NG/       IV Piggyback  945 200     TPN  892 2778     Total Intake(mL/kg) 694.5 (8.4) 4514 (54.3) 3296 (38.2)     Urine (mL/kg/hr)        Emesis/NG output 220  750     Other 1020  1000     Total Output 1240  1750     Net -545.6 +4514 +1546                   Dietitian Recommendations  Dietary Orders (From admission, onward)       Start     Ordered    12/11/21 0956  Diet Clear Liquid; Renal  Diet Effective Now        Question Answer Comment   Diet Texture / Consistency Clear Liquid    Common Modifiers Renal        12/11/21 0955                  Current PPN Regimen Recommendation: Dextrose 5% / Amino Acid 5.5% at goal rate of 75 mL/hr. 20% SMOF lipids 250mL every 24 hours.    Assessment/Plan:  1. Pharmacy to dose PPN ordered for bowel obstruction.  2. Per phone call with RD, PPN continues today with macronutrients per RD recommendations as above at goal rate of 75 mL/hr. Will continue Na at 60 mEq/L, K at 5 meq/L, Ca at 2 meq/L, Mg at 3 meq/L, and Phos at 4 mmol/L for now.   3. Phosphorus is low, other electrolytes WNL. Phosphorus, magnesium, and potassium are to be replaced exogenously per nephrology. Patient has scheduled and PRN phosphorus replacements ordered.  4. Blood glucose levels are elevated with correctional insulin - will continue to follow trend to determine need for adjustment in insulin regimen.  5. Pharmacy will continue to follow closely and make adjustments to PPN as necessary.    Thank you,    Katarzyna Odonnell, PharmD  12/12/2021  11:51 EST

## 2021-12-13 NOTE — PLAN OF CARE
Problem: Adjustment to Illness (Chronic Kidney Disease)  Goal: Optimal Coping with Chronic Illness  Intervention: Support and Optimize Psychosocial Response to Chronic Illness  Recent Flowsheet Documentation  Taken 12/13/2021 1605 by Alie Coleman, RN  Supportive Measures:   active listening utilized   decision-making supported   goal setting facilitated   problem-solving facilitated   self-care encouraged   verbalization of feelings encouraged  Family/Support System Care:   involvement promoted   presence promoted   self-care encouraged   support provided   Goal Outcome Evaluation:  Plan of Care Reviewed With: patient, grandchild(chavo)        Progress: no change  Outcome Summary: Pt has been restless throughout the day. Pt is jaundice, lethargic bur arouseable. Pt is dyspneic. 02 for comfort. Pt denies anxiety. Pt's bladder scanned due to enlarging abd and pt has not had urine out in 3 days.  Orozco anchored for urine rtn and comfort. Plan is home with hospice unless abrupt decline. Discussed with family about terminal restlessness and treatment of symptoms. They want pt to be comfortable even is if she is less arouseable.  Basal rate added to pca for comfort. prn dilaudid added for nurse to push if pt is unable to use button and ativan scheduled for restlessness. Spoke to Zora Garcia and she is working on approval for family to stay in due to decline. They do not want pt to die alone.Team Meeting 1300 Humberto Mendieta DO,  Margarita Lopez LCSW, Jazmin Duran APRN, Alie Coleman RN CHPN, Beth Rivera RN CHVICTOR M, Corry LE

## 2021-12-13 NOTE — CASE MANAGEMENT/SOCIAL WORK
Continued Stay Note  [facility]     Patient Name: Moni Wallace  MRN: [id number]  Today's Date: 12/13/2021    Admit Date: 11/28/2021     Discharge Plan     Row Name 12/13/21 1538       Plan    Plan discharge plan    Plan Comments Per discussion in MDR rounds, pt is going home with hospice tomorrow and not appropriate for CM to see at this time. CM will cont to follow and assist with discharge plan as needed.    Final Discharge Disposition Code 50 - home with hospice               Discharge Codes    No documentation.               Expected Discharge Date and Time     Expected Discharge Date Expected Discharge Time    Dec 16, 2021             Cyndy Hedrick RN

## 2021-12-13 NOTE — PROGRESS NOTES
"   LOS: 15 days    Patient Care Team:  Alex Walters MD as PCP - General (Internal Medicine)  Jeff Joe IV, MD as Consulting Physician (Cardiology)  Donny Hodges MD as Consulting Physician (Nephrology)  Cory Castillo MD as Surgeon (General Surgery)  Slim Wick MD    ESRD - PD     Subjective     Interval History:   No acute events overnight. Weak and lethargic.      Review of Systems:   No CP or SOA         Objective     Vital Sign Min/Max for last 24 hours  Temp  Min: 97.4 °F (36.3 °C)  Max: 97.8 °F (36.6 °C)   BP  Min: 151/73  Max: 151/73   Pulse  Min: 97  Max: 97   Resp  Min: 20  Max: 20   SpO2  Min: 97 %  Max: 98 %   Flow (L/min)  Min: 2  Max: 2   Weight  Min: 91.9 kg (202 lb 9.6 oz)  Max: 91.9 kg (202 lb 9.6 oz)     Flowsheet Rows      First Filed Value   Admission Height 162.6 cm (64\") Documented at 11/28/2021 0854   Admission Weight 72.6 kg (160 lb) Documented at 11/28/2021 0854          No intake/output data recorded.  I/O last 3 completed shifts:  In: 2630.1 [I.V.:392.1; Other:120; NG/GT:80; IV Piggyback:50]  Out: 1150 [Emesis/NG output:1150]    Physical Exam:    Gen: Alert, lethargic -NG tube in place  HENT: NC, AT, EOMI   NECK: Supple, no JVD, Trachea midline   LUNGS: CTA bilaterally, non labored respirtation   CVS: S1/S2 audible, RRR, no murmur   Abd: Soft, NT, ND, BS+   Ext: No pedal edema, no cyanosis   CNS: Alert, No focal deficit noted grossly  Psy: Cooperative  Skin: Warm, dry and intact      WBC WBC   Date Value Ref Range Status   12/12/2021 18.80 (H) 3.40 - 10.80 10*3/mm3 Final   12/11/2021 19.74 (H) 3.40 - 10.80 10*3/mm3 Final      HGB Hemoglobin   Date Value Ref Range Status   12/12/2021 8.2 (L) 12.0 - 15.9 g/dL Final   12/11/2021 8.4 (L) 12.0 - 15.9 g/dL Final      HCT Hematocrit   Date Value Ref Range Status   12/12/2021 23.8 (L) 34.0 - 46.6 % Final   12/11/2021 23.9 (L) 34.0 - 46.6 % Final      Platlets No results found for: LABPLAT   MCV MCV "   Date Value Ref Range Status   12/12/2021 90.8 79.0 - 97.0 fL Final   12/11/2021 89.5 79.0 - 97.0 fL Final          Sodium Sodium   Date Value Ref Range Status   12/13/2021 124 (L) 136 - 145 mmol/L Final   12/12/2021 127 (L) 136 - 145 mmol/L Final   12/11/2021 124 (L) 136 - 145 mmol/L Final      Potassium Potassium   Date Value Ref Range Status   12/13/2021 3.7 3.5 - 5.2 mmol/L Final   12/12/2021 3.5 3.5 - 5.2 mmol/L Final   12/11/2021 3.4 (L) 3.5 - 5.2 mmol/L Final     Comment:     Slight hemolysis detected by analyzer. Results may be affected.      Chloride Chloride   Date Value Ref Range Status   12/13/2021 86 (L) 98 - 107 mmol/L Final   12/12/2021 91 (L) 98 - 107 mmol/L Final   12/11/2021 87 (L) 98 - 107 mmol/L Final      CO2 CO2   Date Value Ref Range Status   12/13/2021 19.0 (L) 22.0 - 29.0 mmol/L Final   12/12/2021 20.0 (L) 22.0 - 29.0 mmol/L Final   12/11/2021 18.0 (L) 22.0 - 29.0 mmol/L Final      BUN BUN   Date Value Ref Range Status   12/13/2021 66 (H) 8 - 23 mg/dL Final   12/12/2021 44 (H) 8 - 23 mg/dL Final   12/11/2021 60 (H) 8 - 23 mg/dL Final      Creatinine Creatinine   Date Value Ref Range Status   12/13/2021 4.13 (H) 0.57 - 1.00 mg/dL Final   12/12/2021 3.46 (H) 0.57 - 1.00 mg/dL Final   12/11/2021 4.32 (H) 0.57 - 1.00 mg/dL Final      Calcium Calcium   Date Value Ref Range Status   12/13/2021 9.1 8.6 - 10.5 mg/dL Final   12/12/2021 9.0 8.6 - 10.5 mg/dL Final   12/11/2021 8.9 8.6 - 10.5 mg/dL Final      PO4 No results found for: CAPO4   Albumin No results found for: ALBUMIN   Magnesium Magnesium   Date Value Ref Range Status   12/13/2021 2.0 1.6 - 2.4 mg/dL Final   12/12/2021 1.9 1.6 - 2.4 mg/dL Final   12/11/2021 2.0 1.6 - 2.4 mg/dL Final      Uric Acid No results found for: URICACID        Results Review:     I reviewed the patient's new clinical results.    acetaminophen, 650 mg, Nasogastric, Q6H  albumin human, , ,   albumin human, , ,   albumin human, , ,   calcitriol, 0.5 mcg, Oral, Once  per day on Mon Wed Fri  fentaNYL, 1 patch, Transdermal, Q72H   And  Check Fentanyl Patch Placement, 1 each, Does not apply, Q12H  cinacalcet, 30 mg, Oral, Nightly  epoetin blanca/blanca-epbx, 10,000 Units, Subcutaneous, Once per day on Mon Wed Fri  Fat Emul Fish Oil/Plant Based, 250 mL, Intravenous, Q24H (TPN)  insulin lispro, 2-7 Units, Subcutaneous, Q6H  lansoprazole, 30 mg, Nasogastric, Q AM  metoprolol tartrate, 25 mg, Nasogastric, Q12H  micafungin (MYCAMINE) IV, 100 mg, Intravenous, Q24H  piperacillin-tazobactam, 3.375 g, Intravenous, Q12H  potassium & sodium phosphates, 2 packet, Nasogastric, BID AC  sodium bicarbonate, 650 mg, Nasogastric, TID  sodium chloride, 10 mL, Intravenous, Q12H  sodium chloride, 10 mL, Intravenous, Q12H  sodium chloride, 10 mL, Intravenous, Q12H  sodium chloride, 10 mL, Intravenous, Q12H  vancomycin (dosing per levels), , Does not apply, Daily      Adult Peripheral 2-in-1 TPN, , Last Rate: 75 mL/hr at 12/12/21 1702  amiodarone, 0.5 mg/min, Last Rate: Stopped (12/13/21 0527)  HYDROmorphone HCl-NaCl,   Pharmacy to Dose TPN,   Pharmacy to dose vancomycin,         Medication Review:    Assessment/Plan       Peritonitis (HCC)    Paroxysmal atrial fibrillation (HCC)    Essential hypertension    Type 2 diabetes mellitus (HCC)    Chronic kidney disease, stage IV (severe) (HCC)    Hyperparathyroidism (HCC)    Hyperlipidemia LDL goal <100    Sepsis associated hypotension (HCC)    Ileus (HCC)    Hypotension      1- ESRD - On PD - transitioning to HD. S/p laproscopy -PD catheter out  2- Peritonitis ?- culture so far negative.   3- Anemia   4- Supra-therapeutic INR   5- Low albumin level   6- Corrected Calcium for albumin - 7.56 - monitor   7- Secondary hyperparathyroidism on Sensipar.   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 No need today. Will plan for tomorrow.   - Replete Phos, mag and K per  protocol.   - Antibx per ID   - Transfuse blood for Hgb less than 7.0     Patient will go home with hospice.       Albina Will MD  12/13/21  11:24 EST

## 2021-12-13 NOTE — PROGRESS NOTES
[facility] Progress Note        Antibiotics:  Anti-Infectives (From admission, onward)    Ordered     Dose/Rate Route Frequency Start Stop    12/11/21 1132  vancomycin 500 mg/100 mL 0.9% NS IVPB ([facility])        Ordering Provider: Jacob Dean PharmD    500 mg  over 30 Minutes Intravenous Once 12/11/21 1400 12/11/21 1528    12/09/21 1351  vancomycin 500 mg/100 mL 0.9% NS IVPB (mbp)        Ordering Provider: Jensen Ramirez PharmD    500 mg  over 60 Minutes Intravenous Once 12/09/21 1500 12/09/21 1631    12/08/21 1126  vancomycin in dextrose 5% 150 mL (VANCOCIN) IVPB 750 mg        Ordering Provider: Jensen Ramirez PharmD    10 mg/kg × 82.6 kg  over 60 Minutes Intravenous Once 12/08/21 1300 12/08/21 1414    12/03/21 0857  piperacillin-tazobactam (ZOSYN) 3.375 g in iso-osmotic dextrose 50 ml (premix)        Ordering Provider: Sawyer Llanos MD    3.375 g  over 4 Hours Intravenous Every 12 Hours 12/03/21 1800 12/15/21 1759    12/03/21 0832  micafungin 100 mg/100 mL 0.9% NS IVPB (mbp)        Ordering Provider: Sawyer Llanos MD    100 mg Intravenous Every 24 Hours 12/03/21 1000 12/15/21 0959    12/03/21 0857  piperacillin-tazobactam (ZOSYN) 3.375 g in iso-osmotic dextrose 50 ml (premix)        Ordering Provider: Sawyer Llanos MD    3.375 g  over 30 Minutes Intravenous Once 12/03/21 0945 12/03/21 1214    12/03/21 0832  Pharmacy to dose vancomycin        Ordering Provider: Sawyer Llanos MD     Does not apply Continuous PRN 12/03/21 0831 12/18/21 0830    12/02/21 1434  vancomycin (dosing per levels)        Ordering Provider: Robin Andersen MD     Does not apply Daily 12/02/21 1530 12/25/21 0859    11/30/21 1111  UltraBag/Dianeal PD-2/1.5% Dex (pappas #[id number]) (DIANEAL) 2,000 mL with vancomycin (VANCOCIN) 1,250 mg, cefTAZidime (FORTAZ) 1,000 mg dialysis solution        Ordering Provider: Sawyer Llanos MD     Intraperitoneal Once 11/30/21 2200 11/30/21 2200 11/28/21 1942  UltraBag/Dianeal  "PD-2/1.5% Dex (pappas #[id number]) (DIANEAL) 2,000 mL with ceFAZolin (ANCEF) 2,000 mg dialysis solution        Ordering Provider: Kali Rojas MD     Intraperitoneal Once 11/29/21 0000 11/29/21 0004    11/28/21 1157  vancomycin 1500 mg/500 mL 0.9% NS IVPB ([facility])        Ordering Provider: Lex Marcum MD    20 mg/kg × 72.6 kg Intravenous Once 11/28/21 1159 11/28/21 1400    11/28/21 1157  piperacillin-tazobactam (ZOSYN) 3.375 g in iso-osmotic dextrose 50 ml (premix)        Ordering Provider: Lex Marcum MD    3.375 g Intravenous Once 11/28/21 1159 11/28/21 1317          CC: abd pain    HPI:      Patient is a 81 y.o.  Yr old female with history of ESRD on CAPD for several years, Hx CDiff years ago she was admitted November 28 with acute onset abdominal pain over 2 days, nausea/vomiting and abnormal CT scan raising concern for possible early ileus versus partial small bowel obstruction and hazy omental stranding concerning for peritonitis per notes.  She had leukocytosis and peritoneal fluid with 1748 WBC and predominance neutrophils.  Concern for CAPD associated peritonitis and empiric vancomycin/Zosyn initiated.  Nursing had reported peritoneal fluid had initially looked a little hazy but patient did not recall a hazy or bloody appearance.  No redness/swelling or pain or other problems at her PD catheter.     12/3/21 NG out 12/2 with nausea and vomiting multiple times overnight; dialysate remains blood tinged per patient;  tachycardia, hypotension last 24 hours, WBC increased overall and at risk for systemic illness;  Will change abx back to IV vancomycin/zosyn, add empiric mycamine    12/4/21 cardiology following with paroxysmal afib/RVR    12/6/21 Dr Andersen with surgery  \"PROCEDURE PERFORMED:      1. Diagnostic laparoscopy converted to exploratory laparotomy  2. Open resection of terminal ileum and cecum with primary ileocolic anastomosis  3. Removal peritoneal dialysis catheter\"     12/6 also HD catheter " "placed by Frankie Begum    12/10/21 oncology note seen with chalo, seen by Dr Harman and plans for palliative approach/hospice    12/13/21  ; seen early and sleepy; per nursing, plans for hospice;  per nursing no flatus or BM;  abd pain postop;  She won't characterize any further.  No rash.     No shortness of breath or cough.      ROS:      12/13/21 per nursing, No f/c/s. no rash. No new ADR to Abx.      Patient does not cooperate further with ROS    PE:   /73 (BP Location: Right leg, Patient Position: Lying)   Pulse 97   Temp 97.8 °F (36.6 °C) (Oral)   Resp 20   Ht 162.6 cm (64\")   Wt 91.9 kg (202 lb 9.6 oz)   LMP  (LMP Unknown)   SpO2 97%   BMI 34.78 kg/m²       GENERAL: sleepy  HEENT: Normocephalic, atraumatic.  PERRL. EOMI. No conjunctival injection. No icterus. Oropharynx clear without evidence of thrush or exudate. No evidence of peridontal disease.    NECK: Supple without nuchal rigidity. No mass.  LYMPH: No cervical, axillary or inguinal lymphadenopathy.  HEART: RRR; No murmur, rubs, gallops.   LUNGS: diminished at bases   without wheezing, rales, rhonchi. Normal respiratory effort. Nonlabored. No dullness.  ABDOMEN: Soft,  tender,  distended.  bowel sounds  diminshed. No rebound or guarding. NO mass or HSM.  EXT:  No cyanosis, clubbing or edema. No cord.   MSK: FROM without joint effusions noted arms/legs.    SKIN: Warm and dry without cutaneous eruptions on Inspection/palpation.    NEURO: sleepy     IV without obvious redness or drainage     No peripheral stigmata/phenomenon of endocarditis    Surgical sites no new redness/purulence       Laboratory Data    Results from last 7 days   Lab Units 12/12/21  0512 12/11/21  0829 12/10/21  0501   WBC 10*3/mm3 18.80* 19.74* 20.44*   HEMOGLOBIN g/dL 8.2* 8.4* 8.3*   HEMATOCRIT % 23.8* 23.9* 24.0*   PLATELETS 10*3/mm3 160 163 181     Results from last 7 days   Lab Units 12/13/21  0552   SODIUM mmol/L 124*   POTASSIUM mmol/L 3.7   CHLORIDE mmol/L 86* "   CO2 mmol/L 19.0*   BUN mg/dL 66*   CREATININE mg/dL 4.13*   GLUCOSE mg/dL 191*   CALCIUM mg/dL 9.1     Results from last 7 days   Lab Units 12/08/21  0527   ALK PHOS U/L 201*   BILIRUBIN mg/dL 0.7   ALT (SGPT) U/L 17   AST (SGOT) U/L 50*               Estimated Creatinine Clearance: 11.9 mL/min (A) (by C-G formula based on SCr of 4.13 mg/dL (H)).      Microbiology:      Radiology:  Imaging Results (Last 72 Hours)     ** No results found for the last 72 hours. **            Impression:       --acute abdominal pain.  Associated with nausea/vomiting, abnormal PD fluid concerning for  Peritonitis, ?CAPD in addition to SBO and adenocarcinoma per notes  ; culture negative so far. Catheter out.  No outpatient cultures per patient. 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 Difficile years ago per patient.  No diarrhea at present but at risk for relapse.     --abnormal CT scan with prominent common bile duct per radiology but without evidence for intrahepatic ductal dilation and normal transaminases/bilirubin on November 28.  Monitor exam and labs     --Chronic Coumadin    --paroxysmal Afib/RVR    PLAN:       --IV vancomycin, zosyn ,mycamine for now but likely stop if transition to comfort measures/hospice     --Check/review labs cultures and scans     --Partial history per nursing staff     --Discussed with microbiology     -- d/w pharmacy  And with Dr Koenig     --Highly complex set of issues with high risk for further serious morbidity and other serious sequela    --repeat dialysate for cultures  , fungal/AFB studies pending; d/w micro and no pathogen so far    -- repeat CT 12/3 and SBFT noted,   On systemic therapy  And surgery 12/6 as above    **likely hospice per notes      Sawyer Llanos MD  12/13/2021

## 2021-12-13 NOTE — PROGRESS NOTES
Pharmacy Parenteral Nutrition Evaluation    Moni Wallace is an 81 y.o. female receiving PPN.    Indication: Bowel obstruction  Consulting Physician: Hospitalist / Dr. Andersen    Total Fluid Rate (if stated): 75 ml/hr (PPN) per renal    Labs  Results from last 7 days   Lab Units 12/13/21  0552 12/12/21  0512 12/11/21  0829 12/09/21  0409 12/08/21  0527 12/07/21  0550 12/07/21  0550   SODIUM mmol/L 124* 127* 124*   < > 125*   < > 129*   POTASSIUM mmol/L 3.7 3.5 3.4*   < > 3.2*   < > 3.7   CHLORIDE mmol/L 86* 91* 87*   < > 83*   < > 88*   CO2 mmol/L 19.0* 20.0* 18.0*   < > 21.0*   < > 22.0   BUN mg/dL 66* 44* 60*   < > 73*   < > 49*   CREATININE mg/dL 4.13* 3.46* 4.32*   < > 7.94*   < > 7.07*   CALCIUM mg/dL 9.1 9.0 8.9   < > 8.3*   < > 8.4*   BILIRUBIN mg/dL  --   --   --   --  0.7  --  0.5   ALK PHOS U/L  --   --   --   --  201*  --  136*   ALT (SGPT) U/L  --   --   --   --  17  --  7   AST (SGOT) U/L  --   --   --   --  50*  --  25   GLUCOSE mg/dL 191* 174* 187*   < > 163*   < > 286*    < > = values in this interval not displayed.     Results from last 7 days   Lab Units 12/13/21  0552 12/12/21  0512 12/11/21  1400 12/11/21  0829 12/11/21  0829   MAGNESIUM mg/dL 2.0 1.9  --   --  2.0   PHOSPHORUS mg/dL 3.3 1.0* 1.1*   < > 1.5*    < > = values in this interval not displayed.     Results from last 7 days   Lab Units 12/12/21  0512 12/11/21  0829 12/10/21  0501   WBC 10*3/mm3 18.80* 19.74* 20.44*   HEMOGLOBIN g/dL 8.2* 8.4* 8.3*   HEMATOCRIT % 23.8* 23.9* 24.0*   PLATELETS 10*3/mm3 160 163 181     Triglycerides   Date Value Ref Range Status   12/05/2021 104 0 - 150 mg/dL Final     Comment:     Falsely depressed results may occur on samples drawn from patients receiving N-Acetylcysteine (NAC) or Metamizole.     estimated creatinine clearance is 11.9 mL/min (A) (by C-G formula based on SCr of 4.13 mg/dL (H)).    Intake & Output (last 3 days)         12/10 0701 12/11 0700 12/11 0701 12/12 0700 12/12 0701 12/13 0700  12/13 0701  12/14 0700    I.V. (mL/kg) 2677 (32.2) 198 (2.3) 194.1 (2.1)     Blood        Other  120 60     NG/GT   80     IV Piggyback 945 200       2778 903     Total Intake(mL/kg) 4514 (54.3) 3296 (38.2) 1237.1 (13.5)     Urine (mL/kg/hr)   0 (0)     Emesis/NG output  750 800     Other  1000      Total Output  1750 800     Net +4514 +1546 +437.1                   Dietitian Recommendations  Dietary Orders (From admission, onward)       Start     Ordered    12/11/21 0956  Diet Clear Liquid; Renal  Diet Effective Now        Question Answer Comment   Diet Texture / Consistency Clear Liquid    Common Modifiers Renal        12/11/21 0955                  Current PPN Regimen Recommendation: Dextrose 5% / Amino Acid 5.5% at goal rate of 75 mL/hr. 20% SMOF lipids 250mL every 24 hours.    Assessment/Plan:  1. Pharmacy to dose PPN ordered for bowel obstruction.  2. PPN continues today with macronutrients per RD recommendations as above at goal rate of 75 mL/hr. Patient has ESRD and has transitioned from peritoneal dialysis to hemodialysis this admission. Will continue Na at 60 mEq/L, K at 5 meq/L, Ca at 2 meq/L, Mg at 3 meq/L, and Phos at 3.4 mmol/L for now. Patient has scheduled and PRN phosphorus replacements ordered per nephrology.  3. Blood glucose levels slightly elevated with correctional insulin - will continue to follow trend to determine need for adjustment in insulin regimen.  4. Pharmacy will continue to follow closely and make adjustments to PPN as necessary.    Thank you,  Jensen Ramirez, PharmD, BCPS  12/13/2021  13:21 EST

## 2021-12-13 NOTE — PLAN OF CARE
Goal Outcome Evaluation:  Plan of Care Reviewed With: patient        Progress: no change  Outcome Summary: VSS, pt c/o abdominal pain, PCA in use, Ng tube output 400 ml LWS, No BM and no UOP, will continue to monitor.      Problem: Adult Inpatient Plan of Care  Goal: Absence of Hospital-Acquired Illness or Injury  Intervention: Identify and Manage Fall Risk  Recent Flowsheet Documentation  Taken 12/12/2021 2000 by Francisco Eaton RN  Safety Promotion/Fall Prevention: activity supervised     Problem: Adult Inpatient Plan of Care  Goal: Absence of Hospital-Acquired Illness or Injury  Intervention: Prevent Skin Injury  Recent Flowsheet Documentation  Taken 12/12/2021 2000 by Francisco Eaton RN  Body Position:   supine   side-lying, right

## 2021-12-13 NOTE — PROGRESS NOTES
"Patient Name:  Moni Wallace  YOB: 1940  [id number]    Surgery Progress Note    Date of visit: 12/13/2021      Subjective: No acute events.  Still has some abdominal pain and nausea.  No flatus or bowel movement over the last 24 hours.  More distended.          Objective:     /73 (BP Location: Right leg, Patient Position: Lying)   Pulse 97   Temp 97.8 °F (36.6 °C) (Oral)   Resp 20   Ht 162.6 cm (64\")   Wt 91.9 kg (202 lb 9.6 oz)   LMP  (LMP Unknown)   SpO2 97%   BMI 34.78 kg/m²     Intake/Output Summary (Last 24 hours) at 12/13/2021 0732  Last data filed at 12/13/2021 0556  Gross per 24 hour   Intake 1237.07 ml   Output 800 ml   Net 437.07 ml       GEN:   Awake, alert, in no acute distress, resting comfortably in bed, chronically ill-appearing  CV:   Regular rate and rhythm  L:  Clear to auscultation bilaterally, not labored on room air   Abd:  Soft, appropriately tender to palpation along incision, incision is clean dry and intact, moderately distended and more so today than yesterday  Ext:  No cyanosis, clubbing, or edema    Recent labs that are back at this time have been reviewed.           Assessment/ Plan:    Mrs. Wallace is an 81-year-old lady with history of end-stage renal disease on peritoneal dialysis, diabetes, A. fib on Coumadin, and prior C. difficile infection who was admitted due to concern for peritonitis related to peritoneal dialysis     #Small bowel obstruction due to metastatic adenocarcinoma  -Postoperative day 7 after open ileocolic resection for small bowel obstruction  -No bowel movements or flatus over the last 24 hours.  Appears more distended.  Has difficulty with nausea when the NG tube is clamped.  Delay in return of bowel function could potentially be related to ileus, continued obstruction related to metastatic malignancy, or potentially anastomotic breakdown.  Regardless at this point we have a transitioned to a hospice palliative care approach and would not " plan for any further surgical interventions.  For the time being I would recommend to continue NG tube to low wall suction and continue supportive therapies while we are awaiting hospice placement  -I'll continue to follow         Robin Andersen MD  12/13/2021  07:32 EST

## 2021-12-13 NOTE — PROGRESS NOTES
[facility] Medicine Services  PROGRESS NOTE    Patient Name: Moni Wallace  : 1940  MRN: [id number]    Date of Admission: 2021  Primary Care Physician: Alex Walters MD    Subjective   Subjective     CC:  F/U SBO    HPI:  Itchy, nausea, sore throat. +generally weak    ROS:  Gen- No fevers, chills  CV- No chest pain, palpitations  Resp- No cough, dyspnea  GI- No N/V/D, abd pain  MSK-+LBP (chronic)     Objective   Objective     Vital Signs:   Temp:  [97.4 °F (36.3 °C)-97.8 °F (36.6 °C)] 97.6 °F (36.4 °C)  Heart Rate:  [85-97] 85  Resp:  [20] 20  BP: (129-151)/(67-76) 129/76  Flow (L/min):  [2] 2     Physical Exam:  Constitutional:ill appearing, sitting up in chair, ng tube in place, appropriate in conversation but eyes slightly closed appearing slightly uncomfortable but in no distress  HENT: NCAT, mucous membranes moist, NG in place   Respiratory: Clear to auscultation bilaterally, respiratory effort normal   Cardiovascular: rrr, no murmurs, rubs, or gallops  Gastrointestinal: Positive bowel sounds, soft, nontender to light palpation  Psychiatric: Appropriate affect, cooperative  Neurologic: Oriented x 3,  speech clear  Skin: No rashes, tunneled dialysis catheter in place R chest, incision c/d/i  Results Reviewed:  LAB RESULTS:      Lab 21  0512 21  0829 12/10/21  0501 21  0409 21  0527 21  0550 21  0550 21  1929 21  1928 21   WBC 18.80* 19.74* 20.44* 19.35* 21.79*   < > 19.20*  --    < > 23.51*   HEMOGLOBIN 8.2* 8.4* 8.3* 7.3* 8.1*   < > 8.2*  --    < > 8.6*   HEMATOCRIT 23.8* 23.9* 24.0* 22.8* 24.1*   < > 24.9*  --    < > 25.9*   PLATELETS 160 163 181 177 213   < > 222  --    < > 232   NEUTROS ABS  --   --   --  16.06*  --   --  16.37*  --   --  20.68*   IMMATURE GRANS (ABS)  --   --   --   --   --   --  1.03*  --   --  1.25*   LYMPHS ABS  --   --   --   --   --   --  1.01  --   --  0.81   MONOS ABS  --   --    --   --   --   --  0.74  --   --  0.66   EOS ABS  --   --   --  0.19  --   --  0.00  --   --  0.03   MCV 90.8 89.5 92.3 99.6* 96.4   < > 96.9  --    < > 96.3   PROTIME  --   --   --   --  14.0  --  15.2* 15.6*  --   --     < > = values in this interval not displayed.         Lab 12/13/21  0552 12/12/21  0512 12/11/21  1400 12/11/21  0829 12/10/21  2105 12/10/21  0502 12/10/21  0502 12/09/21  1035 12/09/21  0409 12/08/21  0527 12/07/21  0550   SODIUM 124* 127*  --  124*  --   --  128*  --  127*   < > 129*   POTASSIUM 3.7 3.5  --  3.4*  --   --  3.7  --  3.2*   < > 3.7   CHLORIDE 86* 91*  --  87*  --   --  94*  --  89*   < > 88*   CO2 19.0* 20.0*  --  18.0*  --   --  18.0*  --  20.0*   < > 22.0   ANION GAP 19.0* 16.0*  --  19.0*  --   --  16.0*  --  18.0*   < > 19.0*   BUN 66* 44*  --  60*  --   --  40*  --  52*   < > 49*   CREATININE 4.13* 3.46*  --  4.32*  --   --  3.66*  --  5.02*   < > 7.07*   GLUCOSE 191* 174*  --  187*  --   --  226*  --  165*   < > 286*   CALCIUM 9.1 9.0  --  8.9  --   --  9.0  --  8.7   < > 8.4*   IONIZED CALCIUM  --   --   --   --   --   --   --   --   --   --  1.10*   MAGNESIUM 2.0 1.9  --  2.0  --   --  1.7  --  1.7   < > 1.5*   PHOSPHORUS 3.3 1.0* 1.1* 1.5* 1.7*   < > 1.2*   < > 1.1*   < > 2.9    < > = values in this interval not displayed.         Lab 12/08/21  0527 12/07/21  0550   TOTAL PROTEIN 5.4* 6.7   ALBUMIN 3.40* 3.70   GLOBULIN 2.0 3.0   ALT (SGPT) 17 7   AST (SGOT) 50* 25   BILIRUBIN 0.7 0.5   ALK PHOS 201* 136*         Lab 12/08/21  0527 12/07/21  0550 12/06/21  1929   PROTIME 14.0 15.2* 15.6*   INR 1.11 1.24* 1.29*             Lab 12/09/21  1035 12/09/21  0409   IRON  --  45   IRON SATURATION  --  35   TIBC  --  130*   TRANSFERRIN  --  87*   ABO TYPING O  --    RH TYPING Negative  --    ANTIBODY SCREEN Negative  --          Brief Urine Lab Results  (Last result in the past 365 days)      Color   Clarity   Blood   Leuk Est   Nitrite   Protein   CREAT   Urine HCG        11/28/21  1647 Yellow   Turbid   Moderate (2+)   Large (3+)   Negative   >=300 mg/dL (3+)                 Microbiology Results Abnormal     Procedure Component Value - Date/Time    Fungus Culture - Body Fluid, Peritoneum [[id number]] Collected: 12/03/21 1043    Lab Status: Preliminary result Specimen: Body Fluid from Peritoneum Updated: 12/10/21 1046     Fungus Culture No fungus isolated at 1 week    AFB Culture - Body Fluid, Peritoneum [[id number]] Collected: 12/03/21 1040    Lab Status: Preliminary result Specimen: Body Fluid from Peritoneum Updated: 12/10/21 1046     AFB Culture No AFB isolated at 1 week     AFB Stain No acid fast bacilli seen on concentrated smear    Blood Culture - Blood, Hand, Left [[id number]]  (Normal) Collected: 12/03/21 1318    Lab Status: Final result Specimen: Blood from Hand, Left Updated: 12/08/21 1515     Blood Culture No growth at 5 days    Narrative:      Aerobic Only    Blood Culture - Blood, Hand, Left [[id number]]  (Normal) Collected: 12/03/21 1230    Lab Status: Final result Specimen: Blood from Hand, Left Updated: 12/08/21 1330     Blood Culture No growth at 5 days    Fungus Smear - Body Fluid, Peritoneum [[id number]] Collected: 12/03/21 1042    Lab Status: Final result Specimen: Body Fluid from Peritoneum Updated: 12/06/21 1559     Fungal Stain No fungal elements seen    Body Fluid Culture - Body Fluid, Peritoneum [[id number]] Collected: 12/02/21 1850    Lab Status: Final result Specimen: Body Fluid from Peritoneum Updated: 12/05/21 0736     Body Fluid Culture No growth at 3 days     Gram Stain Many (4+) WBCs seen      No organisms seen    Blood Culture - Blood, Wrist, Left [[id number]]  (Normal) Collected: 11/28/21 1023    Lab Status: Final result Specimen: Blood from Wrist, Left Updated: 12/03/21 1046     Blood Culture No growth at 5 days    Blood Culture - Blood, Wrist, Left [[id number]]  (Normal) Collected: 11/28/21 1023    Lab Status: Final result Specimen: Blood from Wrist, Left Updated:  12/03/21 1046     Blood Culture No growth at 5 days    Body Fluid Culture - Body Fluid, Peritoneum [[id number]] Collected: 11/28/21 1214    Lab Status: Final result Specimen: Body Fluid from Peritoneum Updated: 12/01/21 1239     Body Fluid Culture No growth at 3 days     Gram Stain Few (2+) WBCs seen      No organisms seen    Urine Culture - Urine, Urine, Clean Catch [[id number]]  (Normal) Collected: 11/28/21 1647    Lab Status: Final result Specimen: Urine, Clean Catch Updated: 11/29/21 2255     Urine Culture No growth    COVID PRE-OP / PRE-PROCEDURE SCREENING ORDER (NO ISOLATION) - Swab, Nasopharynx [[id number]]  (Normal) Collected: 11/28/21 1558    Lab Status: Final result Specimen: Swab from Nasopharynx Updated: 11/28/21 2048    Narrative:      The following orders were created for panel order COVID PRE-OP / PRE-PROCEDURE SCREENING ORDER (NO ISOLATION) - Swab, Nasopharynx.  Procedure                               Abnormality         Status                     ---------                               -----------         ------                     COVID-19, APTIMA PANTHER...[[id number]]  Normal              Final result                 Please view results for these tests on the individual orders.    COVID-19, APTIMA PANTHER [facility] IN-HOUSE NP/OP SWAB IN UTM/VTM/SALINE TRANSPORT MEDIA 24HR TAT - Swab, Nasopharynx [[id number]]  (Normal) Collected: 11/28/21 1558    Lab Status: Final result Specimen: Swab from Nasopharynx Updated: 11/28/21 2048     COVID19 Not Detected    Narrative:      Fact sheet for providers: https://www.fda.gov/media/506787/download     Fact sheet for patients: https://www.fda.gov/media/604150/download    Test performed by RT PCR.          No radiology results from the last 24 hrs    Results for orders placed during the hospital encounter of 01/11/21    Transthoracic Echo Complete With Contrast if Necessary Per Protocol    Interpretation Summary  · Left ventricular systolic function is normal. Estimated  left ventricular EF = 65%  · Left ventricular wall thickness is consistent with hypertrophy.  · Left atrial volume is mildly increased.  · The aortic valve is calcified. There is mild aortic stenosis present. There is moderate aortic regurgitation present.  · Estimated right ventricular systolic pressure from tricuspid regurgitation is normal (<35 mmHg).      I have reviewed the medications:  Scheduled Meds:acetaminophen, 650 mg, Nasogastric, Q6H  albumin human, , ,   albumin human, , ,   albumin human, , ,   calcitriol, 0.5 mcg, Oral, Once per day on Mon Wed Fri  fentaNYL, 1 patch, Transdermal, Q72H   And  Check Fentanyl Patch Placement, 1 each, Does not apply, Q12H  cinacalcet, 30 mg, Oral, Nightly  epoetin blanca/blanca-epbx, 10,000 Units, Subcutaneous, Once per day on Mon Wed Fri  Fat Emul Fish Oil/Plant Based, 250 mL, Intravenous, Q24H (TPN)  insulin lispro, 2-7 Units, Subcutaneous, Q6H  lansoprazole, 30 mg, Nasogastric, Q AM  LORazepam, 0.5 mg, Intravenous, Q6H  LORazepam, 0.5 mg, Intravenous, Once  metoprolol tartrate, 25 mg, Nasogastric, Q12H  micafungin (MYCAMINE) IV, 100 mg, Intravenous, Q24H  piperacillin-tazobactam, 3.375 g, Intravenous, Q12H  potassium & sodium phosphates, 2 packet, Nasogastric, BID AC  sodium bicarbonate, 650 mg, Nasogastric, TID  sodium chloride, 10 mL, Intravenous, Q12H  sodium chloride, 10 mL, Intravenous, Q12H  sodium chloride, 10 mL, Intravenous, Q12H  vancomycin (dosing per levels), , Does not apply, Daily      Continuous Infusions:Adult Peripheral 2-in-1 TPN, , Last Rate: 75 mL/hr at 12/12/21 1702  Adult Peripheral 2-in-1 TPN, , Last Rate: Stopped (12/13/21 1800)  amiodarone, 0.5 mg/min, Last Rate: Stopped (12/13/21 0527)  HYDROmorphone HCl-NaCl,   Pharmacy to Dose TPN,   Pharmacy to dose vancomycin,       PRN Meds:.•  acetaminophen  •  albuterol  •  benzocaine-menthol  •  camphor-menthol  •  diphenhydrAMINE  •  HYDROmorphone  •  LORazepam  •  naloxone  •  ondansetron **OR**  ondansetron  •  ondansetron  •  Pharmacy to Dose TPN  •  Pharmacy to dose vancomycin  •  phenol  •  potassium & sodium phosphates  •  potassium phosphate infusion greater than 15 mMoles **OR** potassium phosphate **OR** potassium phosphate  •  prochlorperazine  •  Sodium Chloride (PF)  •  sodium chloride  •  sodium chloride  •  sodium chloride  •  temazepam    Assessment/Plan   Assessment & Plan     Active Hospital Problems    Diagnosis  POA   • **Peritonitis (HCC) [K65.9]  Yes   • Hypotension [I95.9]  Yes   • Sepsis associated hypotension (HCC) [A41.9, I95.9]  Yes   • Ileus (HCC) [K56.7]  Yes   • Hyperlipidemia LDL goal <100 [E78.5]  Yes   • Paroxysmal atrial fibrillation (HCC) [I48.0]  Yes   • Type 2 diabetes mellitus (HCC) [E11.9]  Yes   • Chronic kidney disease, stage IV (severe) (HCC) [N18.4]  Yes   • Hyperparathyroidism (HCC) [E21.3]  Yes   • Essential hypertension [I10]  Yes      Resolved Hospital Problems   No resolved problems to display.        Brief Hospital Course to date:  Moni Wallace is a 81 y.o. female here with sepsis and hypotension due to peritonitis. Her stay is complicated by Ileus vs pSBO. Surgery and ID following.       Sepsis due to Bacterial Peritonitis  -Peritoneal catheter catheter d/c'd  --ID following, continue Vanc, Zosyn, Mycamine for now (stop at time of discharge when transitioning home w/ hospice for comfort measures)  --Cultures remain negative.  --Monitor WBC count       SBO (w/ intraoperative path consistent w/ advanced adenocarcinoma)  Probable metastatic peritoneal carcinomatotis (see above)  --S/P Open resection of terminal ileum and cecum with primary ileocolic anastomosis and removal of PD catheter per Dr Andersen 12/6/21  Nausea  Pruritis  -post-op care per Dr. Andersen: awaiting bowel function return  -Med-oncology (Dr. Harman)  evaluated (12/9 & 12/10); patient w/ poor prognosis, patient not interested in systemic therapies which is appropriate.   12/13/21: worsening pain, abd  distension, dyspnea. Gave lasix 80mg iv x 1, zofran and compazine in place, anchored bueno for retention & comfort. Palliative increased dilaudid pca. Stopping tpn. Planning home w/ hospice tomorrow if clinical condition allows. Dialysis planned prior to discharge if condition allows. If significant clinical deterioration overnight would consider inpatient hospice      Post-op anemia  -hgb 7.3 12/9/21; s/p 1 unit prbc on 12/9/21; monitor and tranfuse prn     PAF, w/ intermittent RVR  HTN  --Continue to hold Warfain per surgery until return of bowel function  --S/P 2 units FFP and 10mg IV Vitamin K pre-op   -cards following, currently amiodarone drip for now; restart po amiodarone at time of discharge; no further anticoagulation planned     S/p Hypotension  -Currently resolved     ESRD on PD-->now on HD via tunneled cath right ij  Hypokalemia  Hypomagnesemia   Hypocalcemia   Hyponatremia   -PD catheter removed   -Dr Curiel placed right sided tunneled dialysis catheter; currently getting dialysis while here, plans to stop dialysis at time of d/c (tentatively Tuesday home w/ hospice)    Poor iv access, s/p left ij central line placement 12/9/21  -Dr. Andersen placed triple lumen left ij central line 12/9/21. Plan to d/c at time of discharge    **Summary of 12/13/21 Plan*:  -dnr/dni/comfort measures  -home tomorrow w/ hospice unless clinicaly deteriorates further in which case would consider inpatient hospice here  -stopping tpn; continue abx (stop at time of d/c home w/ hospice)  -pallaitive adjusting pca/symptom control        Code status: dnr/dni/focus comfort/no more invasive procedures/continue current level of care without escalation    DVT prophylaxis:  Mechanical DVT prophylaxis orders are present.       AM-PAC 6 Clicks Score (PT): 12 (12/12/21 0800)        CODE STATUS:   Code Status and Medical Interventions:   Ordered at: 12/13/21 1621     Level Of Support Discussed With:    Patient    Next of Kin (If No  Surrogate)     Code Status (Patient has no pulse and is not breathing):    No CPR (Do Not Attempt to Resuscitate)     Medical Interventions (Patient has pulse or is breathing):    Comfort Measures     Comments:    per request family, pt to discharge with home hospice tomorrow       Luis Carlos Koenig MD  12/13/21

## 2021-12-13 NOTE — PROGRESS NOTES
"Palliative Care Consult Note    Patient Name: Moni Wallace   : 1940  Sex: female    Date of Admission: 2021    Subjective:  Pt is resting in bed, awake and oriented to person but otherwise confused. Does follow simple commands. Reports constant mild pain in abd,  rates at 3/10. Describes as constant and aching. Denies dyspnea,  NV (NGT to LWS),  Continues with generalized itching. Noted jaundice skin/conjunctiva. Two sons and granddaughter are at bedside.     Review of Systems   Unable to perform ROS: Mental status change       Reviewed current scheduled and prn medications for route, type, dose and frequency.  Adult Peripheral 2-in-1 TPN, , Last Rate: 75 mL/hr at 21 1702  amiodarone, 0.5 mg/min, Last Rate: Stopped (21 05)  HYDROmorphone HCl-NaCl,   HYDROmorphone HCl-NaCl,   Pharmacy to Dose TPN,   Pharmacy to dose vancomycin,       •  acetaminophen  •  albuterol  •  benzocaine-menthol  •  camphor-menthol  •  diphenhydrAMINE  •  LORazepam  •  naloxone  •  ondansetron **OR** ondansetron  •  ondansetron  •  Pharmacy to Dose TPN  •  Pharmacy to dose vancomycin  •  phenol  •  potassium & sodium phosphates  •  potassium phosphate infusion greater than 15 mMoles **OR** potassium phosphate **OR** potassium phosphate  •  prochlorperazine  •  Sodium Chloride (PF)  •  sodium chloride  •  sodium chloride  •  sodium chloride  •  temazepam    Objective:   /73 (BP Location: Right leg, Patient Position: Lying)   Pulse 97   Temp 97.8 °F (36.6 °C) (Oral)   Resp 20   Ht 162.6 cm (64\")   Wt 91.9 kg (202 lb 9.6 oz)   LMP  (LMP Unknown)   SpO2 97%   BMI 34.78 kg/m²    Intake & Output (last day)        07 07 07 07    I.V. (mL/kg) 194.1 (2.1)     Other 60     NG/GT 80     IV Piggyback           Total Intake(mL/kg) 1237.1 (13.5)     Urine (mL/kg/hr) 0 (0)     Emesis/NG output 800     Other      Total Output 800     Net +437.1               Lab Results (last " 24 hours)     Procedure Component Value Units Date/Time    Basic Metabolic Panel [[id number]]  (Abnormal) Collected: 12/13/21 0552    Specimen: Blood Updated: 12/13/21 0806     Glucose 191 mg/dL      BUN 66 mg/dL      Creatinine 4.13 mg/dL      Sodium 124 mmol/L      Potassium 3.7 mmol/L      Chloride 86 mmol/L      CO2 19.0 mmol/L      Calcium 9.1 mg/dL      eGFR   Amer --     Comment: <15 Indicative of kidney failure.        eGFR Non African Amer 10 mL/min/1.73      Comment: <15 Indicative of kidney failure.        BUN/Creatinine Ratio 16.0     Anion Gap 19.0 mmol/L     Narrative:      GFR Normal >60  Chronic Kidney Disease <60  Kidney Failure <15      Phosphorus [[id number]]  (Normal) Collected: 12/13/21 0552    Specimen: Blood Updated: 12/13/21 0805     Phosphorus 3.3 mg/dL     Magnesium [[id number]]  (Normal) Collected: 12/13/21 0552    Specimen: Blood Updated: 12/13/21 0804     Magnesium 2.0 mg/dL     POC Glucose Once [[id number]]  (Abnormal) Collected: 12/13/21 0601    Specimen: Blood Updated: 12/13/21 0602     Glucose 210 mg/dL      Comment: Meter: [id number] : [id number] [facility]       POC Glucose Once [[id number]]  (Abnormal) Collected: 12/13/21 0006    Specimen: Blood Updated: 12/13/21 0007     Glucose 190 mg/dL      Comment: Meter: [id number] : [id number] [facility]       POC Glucose Once [[id number]]  (Abnormal) Collected: 12/12/21 1703    Specimen: Blood Updated: 12/12/21 1710     Glucose 211 mg/dL      Comment: Meter: [id number] : [id number] [facility]           Imaging Results (Last 24 Hours)     ** No results found for the last 24 hours. **          PPS:   20% based on the following measures:   Ambulation: Totally bed bound  Activity and Evidence of Disease: Unable to do any work, extensive evidence of disease  Self-Care: Total care  Intake:Minimal sips  LOC: Full, drowsy or confusion    Physical Exam:  Vitals and nursing note reviewed.  General Appearance:  chronically ill appearing, alert but confused, able to make her needs known   HEENT: Normocephalic; atraumatic. PERTL, Conjunctivae clear,  mm moist   Neck:   supple; trachea midline  Lungs: BBS = diminished in bases.   Heart: RRR,  peripheral pulses pos, 1+ BLE edema   Abdomen: soft, round, tender to palpation. BS +; NGT LWS    Extremities: MOONEY willfully,   Skin: jaundiced, pruritis     Neurological: Alert, mild confusion,   Psych: calm, cooperative       Peritonitis (HCC)    Paroxysmal atrial fibrillation (HCC)    Essential hypertension    Type 2 diabetes mellitus (HCC)    Chronic kidney disease, stage IV (severe) (HCC)    Hyperparathyroidism (HCC)    Hyperlipidemia LDL goal <100    Sepsis associated hypotension (HCC)    Ileus (HCC)    Hypotension      Assessment/Plan: 81 yoF with PMH ESRD, had been on home peritoneal dialysis, admitted with peritonitis/SBO; she underwent surgery on 12/6 for SBO and 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 Pt has requested no aggressive tx, plan it DC hoe with home hospice tomorrow. Per chart review she will continue IV ABX/amiodarone till DC; than convert to po. Her continued sx of generalized itching was thought possibly d/t IV dilaudid.  Now with jaundice this am, suspect source is d/t pathology. She was started on Fentanyl 12 mcg yesterday for pain and to (hopefully) decrease IV Dilaudid doses. We will need full 24 hrs to determine effect prior to adjusting diladid PCA down. .     Pain: scheduled Tylenol, dilaudid PCA for pain control. 1mg every 10 minutes   Dyspnea: NCO2 PRN.    Nausea/Vomiting: zofran 4 mg q 6 hr PRN   Anxiety/Agitation: Lorazepam 0.5 mg q 4 hr PRN   Confusion/Delerium:  Itching: benadryl 12.5 mg q 6 hr PRN     Plan for discharge home with home hospice tomorrow afternoon; transport tentatively per ambulance. Will continue fentanyl patch at home; continue dilaudid PCA for PRN analgesia, PRN Benadryl for itching. Continue NGT for bowel  compression/sx relief.      Care coordination with Hospice Liaison Yadira.     1600: called by nurse at bedside with increased restlessness. abd is more distended. Bladder scan and bueno cath placed with 200+ cc uop. Pt continued to be very restless, moaning. Dilaudid PCA changed to 0.2 mg basal rate and 0.2 mg IV q 1 hr PRN; Ativan 0.5 mg has been administered with little effect; order to repeat dose now and scheduled q 6 hrs, continue PRN. Palliative RN continues to educate/support family at bedside. I have changed Code status to comfort measures only.     Care coordination with Alie Coleman Palliative RN.     Educated patient/family about using palliative approach with advanced age and multiple chronic disease processes that cannot be reversed.     Total Visit Time: 45  Face to Face Time: 30     Jazmin Duran, APRN  [phone]  12/13/21  11:33 EST

## 2021-12-13 NOTE — PLAN OF CARE
Problem: Adult Inpatient Plan of Care  Goal: Plan of Care Review  Outcome: Ongoing, Progressing  Goal: Patient-Specific Goal (Individualized)  Outcome: Ongoing, Progressing  Goal: Absence of Hospital-Acquired Illness or Injury  Outcome: Ongoing, Progressing  Intervention: Identify and Manage Fall Risk  Recent Flowsheet Documentation  Taken 12/13/2021 1400 by Zora Beck RN  Safety Promotion/Fall Prevention: safety round/check completed  Taken 12/13/2021 1200 by Zora Beck RN  Safety Promotion/Fall Prevention: safety round/check completed  Taken 12/13/2021 1000 by Zora Beck RN  Safety Promotion/Fall Prevention: safety round/check completed  Taken 12/13/2021 0800 by Zora Beck RN  Safety Promotion/Fall Prevention:   activity supervised   assistive device/personal items within reach   clutter free environment maintained   fall prevention program maintained   mobility aid in reach   muscle strengthening facilitated   nonskid shoes/slippers when out of bed   room organization consistent   safety round/check completed   toileting scheduled  Intervention: Prevent Skin Injury  Recent Flowsheet Documentation  Taken 12/13/2021 1400 by Zora Beck RN  Skin Protection:   adhesive use limited   incontinence pads utilized  Taken 12/13/2021 1200 by Zora Beck RN  Skin Protection:   adhesive use limited   incontinence pads utilized  Taken 12/13/2021 1000 by Zora Beck RN  Skin Protection:   adhesive use limited   incontinence pads utilized  Taken 12/13/2021 0800 by Zora Beck RN  Skin Protection:   adhesive use limited   incontinence pads utilized  Intervention: Prevent and Manage VTE (venous thromboembolism) Risk  Recent Flowsheet Documentation  Taken 12/13/2021 0800 by Zora Beck RN  VTE Prevention/Management:   bilateral   dorsiflexion/plantar flexion performed   bleeding risk factor(s) identified  Goal: Optimal Comfort and Wellbeing  Outcome: Ongoing, Progressing  Intervention: Provide  Person-Centered Care  Recent Flowsheet Documentation  Taken 12/13/2021 0800 by Zora Beck, RN  Trust Relationship/Rapport:   care explained   choices provided  Goal: Readiness for Transition of Care  Outcome: Ongoing, Progressing     Problem: Infection  Goal: Infection Symptom Resolution  Outcome: Ongoing, Progressing     Problem: Adjustment to Illness (Chronic Kidney Disease)  Goal: Optimal Coping with Chronic Illness  Outcome: Ongoing, Progressing     Problem: Electrolyte Imbalance (Chronic Kidney Disease)  Goal: Electrolyte Balance  Outcome: Ongoing, Progressing     Problem: Fluid Volume Excess (Chronic Kidney Disease)  Goal: Fluid Balance  Outcome: Ongoing, Progressing  Intervention: Monitor and Manage Hypervolemia  Recent Flowsheet Documentation  Taken 12/13/2021 1400 by Zora Beck, RN  Skin Protection:   adhesive use limited   incontinence pads utilized  Taken 12/13/2021 1200 by Zora Beck RN  Skin Protection:   adhesive use limited   incontinence pads utilized  Taken 12/13/2021 1000 by Zora Beck RN  Skin Protection:   adhesive use limited   incontinence pads utilized  Taken 12/13/2021 0800 by Zora Beck RN  Skin Protection:   adhesive use limited   incontinence pads utilized     Problem: Functional Decline (Chronic Kidney Disease)  Goal: Optimal Functional Ability  Outcome: Ongoing, Progressing  Intervention: Optimize Functional Ability  Recent Flowsheet Documentation  Taken 12/13/2021 1400 by Zora Beck, RN  Activity Management: activity adjusted per tolerance  Taken 12/13/2021 1200 by Zora Beck, RN  Activity Management: activity adjusted per tolerance  Taken 12/13/2021 1000 by Zora Beck RN  Activity Management: activity adjusted per tolerance  Taken 12/13/2021 0800 by Zora Beck RN  Activity Management: activity adjusted per tolerance     Problem: Hematologic Alteration (Chronic Kidney Disease)  Goal: Absence of Anemia Signs and Symptoms  Outcome: Ongoing, Progressing      Problem: Oral Intake Inadequate (Chronic Kidney Disease)  Goal: Optimal Oral Intake  Outcome: Ongoing, Progressing     Problem: Renal Function Impairment (Chronic Kidney Disease)  Goal: Laboratory Values and Blood Pressure Within Desired Range  Outcome: Ongoing, Progressing  Intervention: Monitor and Support Renal Function  Recent Flowsheet Documentation  Taken 12/13/2021 1400 by Zora Beck RN  Medication Review/Management: medications reviewed  Taken 12/13/2021 1200 by Zora Beck RN  Medication Review/Management: medications reviewed  Taken 12/13/2021 1000 by Zora Beck RN  Medication Review/Management: medications reviewed  Taken 12/13/2021 0800 by Zora Beck RN  Medication Review/Management: medications reviewed     Problem: Pain Acute  Goal: Optimal Pain Control  Outcome: Ongoing, Progressing  Intervention: Develop Pain Management Plan  Recent Flowsheet Documentation  Taken 12/13/2021 1400 by Zora Beck RN  Pain Management Interventions: pain pump in use  Taken 12/13/2021 1200 by Zora Beck RN  Pain Management Interventions:   around-the-clock dosing utilized   pain management plan reviewed with patient/caregiver  Taken 12/13/2021 1000 by Zora Beck RN  Pain Management Interventions: around-the-clock dosing utilized  Taken 12/13/2021 0800 by Zora Beck RN  Pain Management Interventions: around-the-clock dosing utilized     Problem: Skin Injury Risk Increased  Goal: Skin Health and Integrity  Outcome: Ongoing, Progressing  Intervention: Optimize Skin Protection  Recent Flowsheet Documentation  Taken 12/13/2021 1400 by Zora Beck RN  Pressure Reduction Techniques:   frequent weight shift encouraged   weight shift assistance provided  Pressure Reduction Devices:   pressure-redistributing mattress utilized   positioning supports utilized  Skin Protection:   adhesive use limited   incontinence pads utilized  Taken 12/13/2021 1200 by Zora Beck RN  Pressure Reduction  Techniques:   frequent weight shift encouraged   weight shift assistance provided  Pressure Reduction Devices:   pressure-redistributing mattress utilized   positioning supports utilized  Skin Protection:   adhesive use limited   incontinence pads utilized  Taken 12/13/2021 1000 by Zora Beck RN  Pressure Reduction Techniques:   frequent weight shift encouraged   weight shift assistance provided  Pressure Reduction Devices:   positioning supports utilized   pressure-redistributing mattress utilized  Skin Protection:   adhesive use limited   incontinence pads utilized  Taken 12/13/2021 0800 by Zora Beck RN  Pressure Reduction Techniques:   frequent weight shift encouraged   weight shift assistance provided  Pressure Reduction Devices:   positioning supports utilized   pressure-redistributing mattress utilized  Skin Protection:   adhesive use limited   incontinence pads utilized     Problem: Fall Injury Risk  Goal: Absence of Fall and Fall-Related Injury  Outcome: Ongoing, Progressing  Intervention: Identify and Manage Contributors to Fall Injury Risk  Recent Flowsheet Documentation  Taken 12/13/2021 1400 by Zora Beck RN  Medication Review/Management: medications reviewed  Taken 12/13/2021 1200 by Zora Beck RN  Medication Review/Management: medications reviewed  Taken 12/13/2021 1000 by Zora Beck RN  Medication Review/Management: medications reviewed  Taken 12/13/2021 0800 by Zora Beck RN  Medication Review/Management: medications reviewed  Intervention: Promote Injury-Free Environment  Recent Flowsheet Documentation  Taken 12/13/2021 1400 by Zora Beck RN  Safety Promotion/Fall Prevention: safety round/check completed  Taken 12/13/2021 1200 by Zora Beck RN  Safety Promotion/Fall Prevention: safety round/check completed  Taken 12/13/2021 1000 by Zora Beck RN  Safety Promotion/Fall Prevention: safety round/check completed  Taken 12/13/2021 0800 by Zora Beck RN  Safety  Promotion/Fall Prevention:   activity supervised   assistive device/personal items within reach   clutter free environment maintained   fall prevention program maintained   mobility aid in reach   muscle strengthening facilitated   nonskid shoes/slippers when out of bed   room organization consistent   safety round/check completed   toileting scheduled     Problem: Palliative Care  Goal: Enhanced Quality of Life  Outcome: Ongoing, Progressing  Intervention: Maximize Comfort  Recent Flowsheet Documentation  Taken 12/13/2021 1400 by Zora Beck, RN  Pain Management Interventions: pain pump in use  Taken 12/13/2021 1200 by Zora Beck, RN  Pain Management Interventions:   around-the-clock dosing utilized   pain management plan reviewed with patient/caregiver  Taken 12/13/2021 1000 by Zora Beck, RN  Pain Management Interventions: around-the-clock dosing utilized  Taken 12/13/2021 0800 by Zora Beck, RN  Pain Management Interventions: around-the-clock dosing utilized   Goal Outcome Evaluation:

## 2021-12-13 NOTE — PROGRESS NOTES
Cardiology Progress Note      Reason for visit:    · Atrial fibrillation with RVR in the setting of peritoneal carcinoma/peritonitis    IDENTIFICATION: 81-year-old female who resides in [place], [place]    Active Hospital Problems    Diagnosis  POA   • **Peritonitis (HCC) [K65.9]  Yes     Priority: High   • Sepsis associated hypotension (HCC) [A41.9, I95.9]  Yes     Priority: High   • Paroxysmal atrial fibrillation (HCC) [I48.0]  Yes     Priority: High     · Diagnosed 2/2015  · CHADS-VASc = 5  · On Coumadin   · Echo (1/12/2021): LVEF 65%.  LVH.  Moderate AI.  · Beakthrough episodes of atrial fibrillation with RVR in setting of bacterial peritonitis 11/30/2021     • Type 2 diabetes mellitus (HCC) [E11.9]  Yes     Priority: High   • Chronic kidney disease, stage IV (severe) (HCC) [N18.4]  Yes     Priority: High     · IgA nephropathy with history of renal transplant, 2010.   · Patient on hemodialysis Monday, Wednesday, and Friday started July 2015.  · Hemodialysis discontinued 2/2017.     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  She is now transition to comfort care and expects to be discharged home with hospice tomorrow.           Vital Sign Min/Max for last 24 hours  Temp  Min: 97.4 °F (36.3 °C)  Max: 98 °F (36.7 °C)   BP  Min: 149/71  Max: 151/73   Pulse  Min: 97  Max: 97   Resp  Min: 20  Max: 20   SpO2  Min: 97 %  Max: 98 %   Flow (L/min)  Min: 2  Max: 2      Intake/Output Summary (Last 24 hours) at 12/13/2021 1106  Last data filed at 12/13/2021 0556  Gross per 24 hour   Intake 1237.07 ml   Output 800 ml   Net 437.07 ml           Physical Exam  Cardiovascular:      Rate and  Rhythm: Normal rate and regular rhythm.   Pulmonary:      Effort: Pulmonary effort is normal.      Breath sounds: Normal breath sounds.   Skin:     Coloration: Skin is ashen.   Neurological:      Mental Status: She is easily aroused. She is lethargic.   Psychiatric:         Mood and Affect: Mood is depressed.         Tele: NSR    Results Review (reviewed the patient's recent labs in the electronic medical record):     ECHO (1/12/2021): LVEF 65%.  Mild AS    Result Review:  I have personally reviewed the results from the time of this admission to 12/13/2021 11:06 EST and agree with these findings:  [x]  Laboratory  []  Microbiology  [x]  Radiology  [x]  EKG/Telemetry   [x]  Cardiology/Vascular   []  Pathology  []  Old records  []  Other:  Most notable findings include: BUN 66, creatinine 4.13, sodium 124, WBC 18.8, hemoglobin 8.2, hematocrit 23.8    Results from last 7 days   Lab Units 12/13/21  0552 12/12/21  0512 12/11/21  0829 12/10/21  0502 12/10/21  0502 12/09/21  0409 12/09/21  0409 12/08/21  0527 12/08/21  0527 12/07/21  0550 12/07/21  0550   SODIUM mmol/L 124* 127* 124*   < > 128*   < > 127*   < > 125*   < > 129*   POTASSIUM mmol/L 3.7 3.5 3.4*   < > 3.7   < > 3.2*   < > 3.2*   < > 3.7   CHLORIDE mmol/L 86* 91* 87*  --  94*  --  89*  --  83*  --  88*   BUN mg/dL 66* 44* 60*   < > 40*   < > 52*   < > 73*   < > 49*   CREATININE mg/dL 4.13* 3.46* 4.32*   < > 3.66*   < > 5.02*   < > 7.94*   < > 7.07*   MAGNESIUM mg/dL 2.0 1.9 2.0   < > 1.7   < > 1.7   < > 1.7   < > 1.5*    < > = values in this interval not displayed.         Results from last 7 days   Lab Units 12/12/21  0512 12/11/21  0829 12/10/21  0501   WBC 10*3/mm3 18.80* 19.74* 20.44*   HEMOGLOBIN g/dL 8.2* 8.4* 8.3*   HEMATOCRIT % 23.8* 23.9* 24.0*   PLATELETS 10*3/mm3 160 163 181       Lab Results   Component Value Date    HGBA1C 4.90 05/19/2019       Lab Results   Component Value Date    CHOL 100 12/05/2021    TRIG 104 12/05/2021    HDL 52 03/04/2019      (H) 03/04/2019                 Atrial fibrillation with RVR  · Episode occurred in the setting of bacterial peritonitis/sepsis  · Unable to tolerate p.o. dose of metoprolol and unable to tolerate IV Cardizem drip due to hypotension  · Metoprolol tartrate 25 mg twice daily  · Intermittent episodes of atrial fibrillation with RVR  · Started on IV amiodarone bolus to p.o. taper protocol 12/3/2021 with conversion to NSR  · Converted back to atrial fibrillation with RVR on 12/9/2021 and rebolused with IV amiodarone and IV drip infusing.  · Coumadin on hold due to recent ileocolic resection for small bowel obstruction     Hypotension   · Likely component of sepsis from bacterial peritonitis  · Blood pressures have improved     Spontaneous bacterial peritonitis  · Treatment per ID and hospitalist  · Peritoneal dialysis catheter removed on 12/6/2021  · Transitioning to hemodialysis      End-stage renal failure on peritoneal dialysis  · Nephrology managing    Small bowel obstruction  · Treatment per primary team  · Exploratory lap with ileocolic resection with Dr. 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 Harman with decision to not pursue systemic therapies  · Pallative and hospice following  · Plan to discharge home Tuesday 12/14/21 with hospice care.                  · Patient transitioning to hospice palliative care with plans to be discharged tomorrow  · Cardiology will sign off    Electronically signed by REY Galeana, 12/13/21, 11:06 AM EST.

## 2021-12-14 PROBLEM — A41.9 SEPSIS (HCC): Status: ACTIVE | Noted: 2021-01-01

## 2021-12-14 PROBLEM — C80.1 ADENOCARCINOMA OF UNKNOWN PRIMARY (HCC): Status: ACTIVE | Noted: 2021-01-01

## 2021-12-14 NOTE — PLAN OF CARE
Goal Outcome Evaluation:  Plan of Care Reviewed With: patient, family        Progress: declining  Outcome Summary  Pt's condition is declining, BP systolic decreasing to 70s and 80s, still alert oriented x3, on 2L O2 NC sats>93 %, pt c/o pain all over, PCA pump in use and ativan given prn for sleep, son and grnd daughter stay on the bedside and request to call other family members because of pt actively dying . No BM, ng tube 350 ml output and 100 ml UOP.      Problem: Adult Inpatient Plan of Care  Goal: Absence of Hospital-Acquired Illness or Injury  Intervention: Prevent Skin Injury  Recent Flowsheet Documentation  Taken 12/13/2021 2000 by Francisco Eaton RN  Body Position:   supine   tilted, right  Skin Protection: adhesive use limited     Problem: Fluid Volume Excess (Chronic Kidney Disease)  Goal: Fluid Balance  Intervention: Monitor and Manage Hypervolemia  Recent Flowsheet Documentation  Taken 12/13/2021 2000 by Francisco Eaton RN  Skin Protection: adhesive use limited     Problem: Adult Inpatient Plan of Care  Goal: Absence of Hospital-Acquired Illness or Injury  Intervention: Identify and Manage Fall Risk  Recent Flowsheet Documentation  Taken 12/13/2021 2000 by Francisco Eaton RN  Safety Promotion/Fall Prevention: safety round/check completed

## 2021-12-14 NOTE — DISCHARGE SUMMARY
[facility] Medicine Services  DISCHARGE SUMMARY    Patient Name: Moni Wallace  : 1940  MRN: [id number]    Date of Admission: 2021  8:49 AM  Date of Discharge: 2021  Primary Care Physician: Alex Walters MD    Consults     Date and Time Order Name Status Description    12/10/2021  8:08 AM Inpatient Palliative Care MD Consult Completed     2021 12:32 PM Inpatient Hematology & Oncology Consult Completed     12/3/2021 12:48 PM Inpatient General Surgery Consult Completed     2021  1:12 PM Inpatient Cardiology Consult Completed     2021 12:35 AM Inpatient Infectious Diseases Consult Completed     2021  2:41 PM Inpatient Nephrology Consult Completed           Hospital Course     Presenting Problem:   Peritonitis (HCC) [K65.9]    Active Hospital Problems    Diagnosis  POA   • **Peritonitis (HCC) [K65.9]  Yes   • Hypotension [I95.9]  Yes   • Sepsis associated hypotension (HCC) [A41.9, I95.9]  Yes   • Ileus (HCC) [K56.7]  Yes   • Hyperlipidemia LDL goal <100 [E78.5]  Yes   • Paroxysmal atrial fibrillation (HCC) [I48.0]  Yes   • Type 2 diabetes mellitus (HCC) [E11.9]  Yes   • Chronic kidney disease, stage IV (severe) (HCC) [N18.4]  Yes   • Hyperparathyroidism (HCC) [E21.3]  Yes   • Essential hypertension [I10]  Yes      Resolved Hospital Problems   No resolved problems to display.          Hospital Course:  Moni Wallace is a 81 y.o. female here with sepsis and hypotension due to peritonitis. Her stay is complicated by Ileus vs pSBO. Surgery and ID following for care and antibiotic management. Patient family had plans home with hospice today, however in the interim patient's condition has worsened and is being converted to inpatient hospice:      Overview Hospital Course:    Sepsis due to Bacterial Peritonitis  -Peritoneal catheter d/c'd  --ID following for antibiotics- discontinue today  --Cultures remain negative.      SBO (w/ intraoperative path  consistent w/ advanced adenocarcinoma)  Probable metastatic peritoneal carcinomatotis (see above)  --S/P Open resection of terminal ileum and cecum with primary ileocolic anastomosis and removal of PD catheter per Dr Andersen 12/6/21  Nausea  Pruritis  -post-op care per Dr. Andersen  -Med-oncology (Dr. Harman)  evaluated (12/9 & 12/10); patient w/ poor prognosis, patient not interested in systemic therapies which is appropriate.   - NG in place  -CLD diet as desired      Post-op anemia  -hgb 7.3 12/9/21; s/p 1 unit prbc on 12/9/21     PAF, w/ intermittent RVR  HTN  --warfarin held  --S/P 2 units FFP and 10mg IV Vitamin K pre-op   -cards following, currently amiodarone drip- can be discontinued this morning     Hypotension  -returned this morning/ declining condition       ESRD on PD-->now on HD via tunneled cath right ij  Hypokalemia  Hypomagnesemia   Hypocalcemia   Hyponatremia   -PD catheter removed   -Dr Curiel placed right sided tunneled dialysis catheter; had been getting dialysis while here     Poor iv access, s/p left ij central line placement 12/9/21  -Dr. Andersen placed triple lumen left ij central line 12/9/21.        Discharge Follow Up Recommendations for outpatient labs/diagnostics:  Inpatient hospice    Day of Discharge     HPI:   Family at bedside with hospice. Appears to be actively dying.     Review of Systems  SAIMA    Vital Signs:   Temp:  [97.6 °F (36.4 °C)-98.9 °F (37.2 °C)] 98.9 °F (37.2 °C)  Heart Rate:  [] 97  Resp:  [14-20] 16  BP: ()/(57-76) 88/58     Physical Exam:  Constitutional: sleeping, did not wake, appears ill/ actively dying  Respiratory: unlabofred  Cardiovascular: reg on tele  Musculoskeletal: No edema        Pertinent  and/or Most Recent Results     LAB RESULTS:      Lab 12/12/21  0512 12/11/21  0829 12/10/21  0501 12/09/21  0409 12/08/21  0527   WBC 18.80* 19.74* 20.44* 19.35* 21.79*   HEMOGLOBIN 8.2* 8.4* 8.3* 7.3* 8.1*   HEMATOCRIT 23.8* 23.9* 24.0* 22.8* 24.1*    PLATELETS 160 163 181 177 213   NEUTROS ABS  --   --   --  16.06*  --    EOS ABS  --   --   --  0.19  --    MCV 90.8 89.5 92.3 99.6* 96.4   PROTIME  --   --   --   --  14.0         Lab 12/14/21  0709 12/13/21  0552 12/12/21  0512 12/11/21  1400 12/11/21  0829 12/10/21  2105 12/10/21  0502   SODIUM 126* 124* 127*  --  124*  --  128*   POTASSIUM 4.3 3.7 3.5  --  3.4*  --  3.7   CHLORIDE 86* 86* 91*  --  87*  --  94*   CO2 15.0* 19.0* 20.0*  --  18.0*  --  18.0*   ANION GAP 25.0* 19.0* 16.0*  --  19.0*  --  16.0*   BUN 81* 66* 44*  --  60*  --  40*   CREATININE 5.11* 4.13* 3.46*  --  4.32*  --  3.66*   GLUCOSE 100* 191* 174*  --  187*  --  226*   CALCIUM 9.5 9.1 9.0  --  8.9  --  9.0   MAGNESIUM 2.4 2.0 1.9  --  2.0  --  1.7   PHOSPHORUS 4.4 3.3 1.0* 1.1* 1.5*   < > 1.2*    < > = values in this interval not displayed.         Lab 12/08/21  0527   TOTAL PROTEIN 5.4*   ALBUMIN 3.40*   GLOBULIN 2.0   ALT (SGPT) 17   AST (SGOT) 50*   BILIRUBIN 0.7   ALK PHOS 201*         Lab 12/08/21  0527   PROTIME 14.0   INR 1.11             Lab 12/09/21  1035 12/09/21  0409   IRON  --  45   IRON SATURATION  --  35   TIBC  --  130*   TRANSFERRIN  --  87*   ABO TYPING O  --    RH TYPING Negative  --    ANTIBODY SCREEN Negative  --          Brief Urine Lab Results  (Last result in the past 365 days)      Color   Clarity   Blood   Leuk Est   Nitrite   Protein   CREAT   Urine HCG        11/28/21 1647 Yellow   Turbid   Moderate (2+)   Large (3+)   Negative   >=300 mg/dL (3+)               Microbiology Results (last 10 days)     ** No results found for the last 240 hours. **          CT Abdomen Pelvis Without Contrast    Result Date: 12/4/2021  EXAMINATION: CT ABDOMEN AND PELVIS WO CONTRAST-12/03/2021:  INDICATION: N/V, peritonitis, R/O ileus or PSBO; R10.9-Unspecified abdominal pain; T85.71XA-Infection and inflammatory reaction due to peritoneal dialysis catheter, initial encounter; A41.9-Sepsis, unspecified organism.  TECHNIQUE: 5 mm  "unenhanced images through the abdomen and pelvis.  The radiation dose reduction device was turned on for each scan per the ALARA (As Low as Reasonably Achievable) protocol.  COMPARISON: 11/28/2021 abdomen and pelvis CT scan.  FINDINGS: Previous exam report indicated new small bowel distention, suspicious for partial small bowel obstruction with no definite transition point. Findings related to peritoneal dialysis.  The included lung bases appear clear except for trace bibasilar pleural thickening/atelectasis.  There is a peritoneal dialysis catheter in the left lower quadrant, small amount of intrapelvic free fluid, upper abdominal free fluid and small amount of upper abdominal free air, all typical for peritoneal dialysis. Mild nonspecific fat stranding of the omentum is similar to the prior study, perhaps mild edema.  No significant abnormalities are seen of the liver, spleen, the somewhat atrophic pancreas, or the adrenal glands. The kidneys are markedly atrophic and there is a large water-density left renal midpole cyst. The gallbladder is contracted around dense material, presumably gallbladder \"sludge\", but no gallbladder wall inflammation is seen.  CT scan  film again shows a pattern of dilated small bowel and relatively little colon gas, suspicious for distal obstruction.  film images appear to show worsening dilatation. The dilatation appears centered about a loop in the central and right pelvis, which is markedly decompressed at both ends, and normal caliber centrally with mild mucosal thickening. Please refer to the appearance of the central abdominal small bowel loop on axial image 57, tapering to the left on image 58, and tapering to the right on image 71. Loops proximal to this appear mostly dilated. Loops distal to this level appear markedly decompressed. The colon appears decompressed, but otherwise unremarkable. The uterus and ovaries are appropriately atrophic. The bladder is " nondistended. Incidental note is made of what appears to be a small abdominal wall hernia of the lower pelvis containing mild ascites.      1. Findings consistent with incomplete distal small bowel obstruction, which appears to be centered about an abnormal appearing loop centrally in the upper pelvis. Degree of distention appears increased from 11/28/2021 scan.  2. Expected changes of peritoneal dialysis, with mild free fluid and free air in the abdomen.  3. No evidence of discrete, well-defined inflammatory focus.  D:  12/03/2021 E:  12/03/2021  This report was finalized on 12/4/2021 11:12 AM by Dr. Xavier Cantor MD.      XR Chest 1 View    Result Date: 12/9/2021  CHEST X-RAY, 12/9/2021  HISTORY:  Reposition left IJ central line.  TECHNIQUE: AP portable chest x-ray (19:15) COMPARISON: *  Chest x-ray, 12/9/2021 (13:55) FINDINGS: Left IJ central line tip in the low SVC near its junction with the right atrium. No pneumothorax. Right IJ dialysis catheter in the low SVC. NG tube below the diaphragm. Low lung volumes with mild bibasilar atelectasis, unchanged. Lungs otherwise clear. Pulmonary vascularity remains normal.     Left IJ central line, right IJ dialysis catheter in NG tube are in good position. No pneumothorax. Signer Name: Rolando Whiteside MD  Signed: 12/9/2021 7:45 PM  Workstation Name: [email or web address]-  [facility]    XR Chest 1 View    Result Date: 12/9/2021  EXAMINATION: XR CHEST 1 VW-  INDICATION: s/p left IJ central line; R10.9-Unspecified abdominal pain; T85.71XA-Infection and inflammatory reaction due to peritoneal dialysis catheter, initial encounter; A41.9-Sepsis, unspecified organism; K59.9-Functional intestinal disorder, unspecified  TECHNIQUE: Frontal view of the chest  COMPARISON: 12/6/2021  FINDINGS:  Interval placement of left internal jugular central venous catheter with the tip terminating low in the right atrium. Otherwise stable and unchanged medical devices from prior.  Stable cardiomediastinal silhouette. Similar bibasilar streaky opacities and likely small left pleural effusion. No pneumothorax.       Interval placement of left internal jugular central venous catheter with the tip terminating low in the right atrium. Otherwise stable and unchanged medical devices from prior. Stable cardiomediastinal silhouette. Similar bibasilar streaky opacities and likely small left pleural effusion. No pneumothorax.  This report was finalized on 12/9/2021 2:26 PM by Paco Young MD.      XR Chest 1 View    Result Date: 12/8/2021  EXAMINATION: XR CHEST 1 VW-  INDICATION: Status post central line placement; R10.9-Unspecified abdominal pain; T85.71XA-Infection and inflammatory reaction due to peritoneal dialysis catheter, initial encounter; A41.9-Sepsis, unspecified organism; K59.9-Functional intestinal disorder, unspecified.   COMPARISON: 11/28/2021.  FINDINGS: Portable chest reveals dialysis catheter identified on the right with tip in the SVC. Nasogastric tube with tip in the stomach. Prominence of the pulmonary vascularity with some increased markings in the perihilar regions bilaterally. Small left pleural effusion.         Placement of a deep line catheter on the right with tip in the SVC. No pneumothorax. Prominence of the perihilar regions bilaterally with small left pleural effusion.  D:  12/06/2021 E:  12/07/2021  This report was finalized on 12/8/2021 4:56 PM by Dr. Aracely Oates MD.      Peripheral Block    Result Date: 12/6/2021  Jean-Pierre Ambrosio CRNA     12/6/2021  5:50 PM Peripheral Block Patient reassessed immediately prior to procedure Patient location during procedure: OR Reason for block: at surgeon's request and post-op pain management Performed by CRNA: Jean-Pierre Ambrosio CRNA Preanesthetic Checklist Completed: patient identified, IV checked, site marked, risks and benefits discussed, surgical consent, monitors and equipment checked, pre-op evaluation and timeout  performed Prep: Pt Position: supine Sterile barriers:cap, gloves, sterile barriers and mask Prep: ChloraPrep Patient monitoring: blood pressure monitoring, continuous pulse oximetry and EKG Procedure Sedation: yes Performed under: general Guidance:ultrasound guided Images:still images obtained, printed/placed on chart Laterality:Bilateral Block Type:TAP Injection Technique:single-shot Needle Type:short-bevel and echogenic Needle Gauge:20 G Resistance on Injection: none Medications Used: dexamethasone sodium phosphate injection, 4 mg bupivacaine PF (MARCAINE) 0.25 % injection, 60 mL Med administered at 12/6/2021 5:50 PM Medications Comment:Block Injection:  LA dose divided between Right and Left block Post Assessment Injection Assessment: negative aspiration for heme, incremental injection and no paresthesia on injection Patient Tolerance:comfortable throughout block Complications:no Additional Notes   Under Ultrasound guidance, a BBraun 4inch 360 degree needle was advanced with Normal Saline hydro dissection of tissue.  The Internal Oblique and Transversus Abdominus muscles where visualized.  At or before the aponeurosis of Internal Oblique, local anesthetic spread was visualized in the Transversus Abdominus Plane. Injection was made incrementally with aspiration every 5 mls.  There was no  intravascular injection,  injection pressure was normal, there was no neural injection, and the procedure was completed without difficulty.  Thank You.     FL C Arm During Surgery    Result Date: 12/7/2021  EXAMINATION: FLUOROSCOPY C-ARM DURING SURGERY-  INDICATION: Tunnelled dialysis catheter placement; R10.9-Unspecified abdominal pain; T85.71XA-Infection and inflammatory reaction due to peritoneal dialysis catheter, initial encounter; A41.9-Sepsis, unspecified organism; K59.9-Functional intestinal disorder, unspecified.  COMPARISON: None.  FINDINGS: Dictation is to record four seconds of fluoroscopy time. Two intraprocedural  images obtained show the guidewire and dialysis catheter placement, into the distal SVC or superior right atrium. No pneumothorax is seen on these images. Please see the procedure report for full details.      Fluoroscopy provided for tunneled dialysis catheter placement.  D:  12/07/2021 E:  12/07/2021   This report was finalized on 12/7/2021 9:53 PM by Dr. Xavier Cantor MD.      FL Small Bowel Follow Through Single-Contrast    Result Date: 12/6/2021  EXAMINATION: FL SMALL BOWEL FOLLOW THROUGH SINGLE-CONTRAST - 12/04/2021  INDICATION: Abdominal distention  TECHNIQUE: Small bowel follow-through performed with 10 images and no fluoroscopy.  COMPARISON: None      FINDINGS/IMPRESSION:   imaging reveals dilated loops of small bowel seen diffusely throughout the abdomen. Minimal air in the rectum. There is a nasogastric tube tip in the stomach. Contrast is seen within the stomach on the 30- minute image and 230-minute image with no advancement through the small bowel. There is only minimal contrast seen within the proximal small bowel on the 9 hour and 40-minute image with minimal contrast remaining within the stomach in the fundus. The 17-hour image reveals no advancement of the contrast. Findings most suggestive of a small bowel obstruction.  DICTATED:   12/04/2021 EDITED/lfs:   12/05/2021  This report was finalized on 12/6/2021 3:29 PM by Dr. Aracely Oates MD.      XR Abdomen KUB    Result Date: 12/6/2021  EXAMINATION: XR ABDOMEN KUB-  INDICATION: Assess for contrast progression; R10.9-Unspecified abdominal pain; T85.71XA-Infection and inflammatory reaction due to peritoneal dialysis catheter, initial encounter; A41.9-Sepsis, unspecified organism.  COMPARISON: 12/04/2021.  FINDINGS: KUB reveals bowel gas pattern to be somewhat improving when compared to the prior study. There is minimal contrast identified within the transverse colon. Nasogastric tube identified with tip in the stomach. Peritoneal dialysis  catheter identified in the left lower quadrant.      Slight improvement seen in the gaseous distended loops of bowel throughout the abdomen. Minimal contrast identified in the transverse colon. Nasogastric tube with tip in the stomach. No free intraperitoneal air.  D:  12/06/2021 E:  12/06/2021  This report was finalized on 12/6/2021 3:54 PM by Dr. Aracely Oates MD.      XR Abdomen KUB    Result Date: 12/3/2021  PROCEDURE: CR Abdomen 1 Vw INDICATIONS: verification of NG placement; Unspecified abdominal pain; Infection and inflammatory reaction due to peritoneal dialysis catheter, initial encounter; Sepsis, unspecified organism ; verification of NG placement FINDINGS &     Single frontal torso demonstrates tube tip to be in the body the stomach. Incidental moderate gaseous distention of jejunum noted.   Signer Name: Pippa Enriquez MD  Signed: 12/3/2021 10:49 PM  Workstation Name: [facility]-  [facility]      Results for orders placed during the hospital encounter of 05/09/17    Duplex Carotid Ultrasound CAR    Interpretation Summary  · Proximal right internal carotid artery plaque without significant stenosis.  · Right internal carotid artery stenosis of 0-49%.  · Proximal left internal carotid artery plaque without significant stenosis.  · Left internal carotid artery stenosis of 0-49%.      Results for orders placed during the hospital encounter of 05/09/17    Duplex Carotid Ultrasound CAR    Interpretation Summary  · Proximal right internal carotid artery plaque without significant stenosis.  · Right internal carotid artery stenosis of 0-49%.  · Proximal left internal carotid artery plaque without significant stenosis.  · Left internal carotid artery stenosis of 0-49%.      Results for orders placed during the hospital encounter of 01/11/21    Transthoracic Echo Complete With Contrast if Necessary Per Protocol    Interpretation Summary  · Left ventricular systolic function is normal.  Estimated left ventricular EF = 65%  · Left ventricular wall thickness is consistent with hypertrophy.  · Left atrial volume is mildly increased.  · The aortic valve is calcified. There is mild aortic stenosis present. There is moderate aortic regurgitation present.  · Estimated right ventricular systolic pressure from tricuspid regurgitation is normal (<35 mmHg).      Plan for Follow-up of Pending Labs/Results:   Pending Labs     Order Current Status    AFB Culture - Body Fluid, Peritoneum Preliminary result    Fungus Culture - Body Fluid, Peritoneum Preliminary result        Discharge Details        Discharge Medications      ASK your doctor about these medications      Instructions Start Date   acetaminophen 325 MG tablet  Commonly known as: TYLENOL   650 mg, Oral, Every 4 Hours PRN      albuterol sulfate  (90 Base) MCG/ACT inhaler  Commonly known as: ProAir HFA   2 puffs, Inhalation, Every 4 Hours PRN      albuterol (2.5 MG/3ML) 0.083% nebulizer solution  Commonly known as: PROVENTIL   2.5 mg, Nebulization, Every 4 Hours PRN      allopurinol 100 MG tablet  Commonly known as: ZYLOPRIM   100 mg, Oral, As Needed      amLODIPine 5 MG tablet  Commonly known as: NORVASC   5 mg, Oral, Daily      cinacalcet 60 MG tablet  Commonly known as: SENSIPAR   60 mg, Oral, Daily      colchicine 0.6 MG tablet   0.6 mg, Oral, 2 Times Daily      escitalopram 5 MG tablet  Commonly known as: Lexapro   5 mg, Oral, Every Morning      furosemide 40 MG tablet  Commonly known as: LASIX   80 mg, Oral, Daily      gentamicin 0.1 % cream  Commonly known as: GARAMYCIN   1 application, Topical, Daily, - Apply to tube site-      lidocaine 5 %  Commonly known as: Lidoderm   1 patch, Transdermal, Every 24 Hours, Remove & Discard patch within 12 hours or as directed by MD      metoprolol tartrate 100 MG tablet  Commonly known as: LOPRESSOR   Take 1 tablet by mouth Every 12 (Twelve) Hours.      MIRCERA IJ   50 mcg, Subcutaneous      omeprazole  40 MG capsule  Commonly known as: priLOSEC   TAKE ONE CAPSULE BY MOUTH DAILY      predniSONE 20 MG tablet  Commonly known as: DELTASONE   20 mg, Oral, Daily      sevelamer 800 MG tablet  Commonly known as: RENVELA   1,600 mg, Oral, 3 Times Daily      temazepam 15 MG capsule  Commonly known as: RESTORIL   15 mg, Oral, Nightly PRN      warfarin 2 MG tablet  Commonly known as: COUMADIN   TAKE 2-3 TABLETS BY MOUTH DAILY OR AS DIRECTED BY ANTICOAGULATION CLINIC             Allergies   Allergen Reactions   • Benadryl [Diphenhydramine] Irritability     Per son   • Hydrocodone Itching   • Rice Swelling   • Statins Other (See Comments)     myalgia   • Codeine Rash   • Sulfa Antibiotics Rash         Discharge Disposition:      Diet:  Hospital:  Diet Order   Procedures   • Diet Clear Liquid; Renal       Activity:      Restrictions or Other Recommendations:         CODE STATUS:    Code Status and Medical Interventions:   Ordered at: 12/13/21 1621     Level Of Support Discussed With:    Patient    Next of Kin (If No Surrogate)     Code Status (Patient has no pulse and is not breathing):    No CPR (Do Not Attempt to Resuscitate)     Medical Interventions (Patient has pulse or is breathing):    Comfort Measures     Comments:    per request family, pt to discharge with home hospice tomorrow       No future appointments.              REY Torres  12/14/21      Time Spent on Discharge:  I spent  37  minutes on this discharge activity which included: face-to-face encounter with the patient, reviewing the data in the system, coordination of the care with the nursing staff as well as consultants, documentation, and entering orders.        Electronically signed by REY Torres, 12/14/21, 9:38 AM EST.

## 2021-12-14 NOTE — PROGRESS NOTES
Continued Stay Note  [facility]     Patient Name: Moni Wallace  MRN: [id number]  Today's Date: 12/14/2021    Admit Date: 12/14/2021     Discharge Plan     Row Name 12/14/21 1205       Plan    Plan Admission    Plan Comments Patient admitted into inpatient hospice today. POC formulated with the family and new orders received. Dr. Koenig aware of the admission.    Final Discharge Disposition Code 51 - hospice medical facility               Discharge Codes    No documentation.                     Rocio Thakur RN

## 2021-12-14 NOTE — H&P
Hospice History and Physical     Patient Name:  Moni Wallace   : 1940   Sex: female    Patient Care Team:  Alex Walters MD as PCP - General (Internal Medicine)  Jeff Joe IV, MD as Consulting Physician (Cardiology)  Donny Hodges MD as Consulting Physician (Nephrology)  Cory Castillo MD as Surgeon (General Surgery)  Slim Wick MD    Code Status: Comfort Measures    Subjective     Per Hospitalist Discharge Summary:    Moni Wallace is a 81 y.o. female here with sepsis and hypotension due to peritonitis. Her stay is complicated by Ileus vs pSBO. Surgery and ID following for care and antibiotic management. Patient family had plans home with hospice today, however in the interim patient's condition has worsened and is being converted to inpatient hospice:      Overview Hospital Course:     Sepsis due to Bacterial Peritonitis  -Peritoneal catheter d/c'd  --ID following for antibiotics- discontinue today  --Cultures remain negative.      SBO (w/ intraoperative path consistent w/ advanced adenocarcinoma)  Probable metastatic peritoneal carcinomatotis (see above)  --S/P Open resection of terminal ileum and cecum with primary ileocolic anastomosis and removal of PD catheter per Dr Andersen 21  Nausea  Pruritis  -post-op care per Dr. Andersen  -Med-oncology (Dr. Harman)  evaluated ( & 12/10); patient w/ poor prognosis, patient not interested in systemic therapies which is appropriate.   - NG in place  -CLD diet as desired      Post-op anemia  -hgb 7.3 21; s/p 1 unit prbc on 21     PAF, w/ intermittent RVR  HTN  --warfarin held  --S/P 2 units FFP and 10mg IV Vitamin K pre-op   -cards following, currently amiodarone drip- can be discontinued this morning     Hypotension  -returned this morning/ declining condition        ESRD on PD-->now on HD via tunneled cath right ij  Hypokalemia  Hypomagnesemia   Hypocalcemia   Hyponatremia   -PD catheter removed   -  Moisés placed right sided tunneled dialysis catheter; had been getting dialysis while here     Poor iv access, s/p left ij central line placement 12/9/21  -Dr. Andersen placed triple lumen left ij central line 12/9/21.      [end of copied text]    Ms. 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     Family at bedside for today's visit. Pt more comfortable as visit continued - received prn medications an hour prior to visit. Easily responsive to light touch and at times to conversation throughout visit.     Review of Systems  Review of Systems   Unable to perform ROS: Acuity of condition       History  Past Medical History:   Diagnosis Date   • Adenomatous colon polyp    • Chronic kidney disease    • Clostridium difficile infection 06/2017   • Diabetes mellitus (HCC)    • Gastric polyps    • Hypothyroidism    • IgA nephropathy, acute    • Kidney transplanted    • Macular degeneration    • Paroxysmal atrial fibrillation (HCC)    • Tubular adenoma     excision    • Ulcer of gastric fundus    • Vitamin D deficiency      Past Surgical History:   Procedure Laterality Date   • BREAST BIOPSY Left 1990'S   • CARPAL TUNNEL RELEASE     • CARPAL TUNNEL RELEASE Right    • CATARACT EXTRACTION     • CATARACT EXTRACTION Bilateral    • COLON RESECTION SMALL BOWEL N/A 12/6/2021    Procedure: COLON RESECTION SMALL BOWEL;  Surgeon: Robin Andersen MD;  Location:  [facility];  Service: General;  Laterality: N/A;   • DIAGNOSTIC LAPAROSCOPY     • DIALYSIS FISTULA CREATION     • EXPLORATORY LAPAROTOMY N/A 12/6/2021    Procedure: DIAGNOSTIC LAPAROSCOPY;  Surgeon: Robin Andersen MD;  Location:  [facility];  Service: General;  Laterality: N/A;   • HERNIA REPAIR  85 and 86   • INSERTION HEMODIALYSIS CATHETER Right 12/6/2021    Procedure: TUNNELED DIALYSIS CATHETER INSERTION;  Surgeon: Rolando Begum MD;  Location:  [facility];  Service: Vascular;   Laterality: Right;   • KNEE ARTHROSCOPY INCISION AND DRAINAGE OF KNEE Right 5/18/2019    Procedure: KNEE ARTHROSCOPY INCISION AND DRAINAGE OF KNEE;  Surgeon: Paco Lester MD;  Location: [facility] OR;  Service: Orthopedics   • LAPAROSCOPIC TUBAL LIGATION  1985   • TOTAL KNEE ARTHROPLASTY     • TOTAL KNEE ARTHROPLASTY      Left knee    • TRANSPLANTATION RENAL  2010   • TRANSPLANTATION RENAL Right    • TRIGGER FINGER RELEASE     • TUBAL ABDOMINAL LIGATION     • UPPER GASTROINTESTINAL ENDOSCOPY  05/20/2013   • VENOUS ACCESS DEVICE (PORT) INSERTION N/A 5/23/2019    Procedure: INSERTION GROSHONG;  Surgeon: Rolando Begum MD;  Location: [facility] OR;  Service: General     Current Facility-Administered Medications   Medication Dose Route Frequency Provider Last Rate Last Admin   • [START ON 12/15/2021] acetaminophen (TYLENOL) suppository 650 mg  650 mg Rectal Q4H PRN Yaneth Watson, APRN       • bisacodyl (DULCOLAX) suppository 10 mg  10 mg Rectal Daily PRN Yaneth Watson, APRN       • [START ON 12/16/2021] fentaNYL (DURAGESIC) 12 MCG/HR 1 patch  1 patch Transdermal Q72H Yaneth Watson, APRN        And   • Check Fentanyl Patch Placement  1 each Does not apply Q12H Yaneth Watson, APRN       • glycopyrrolate (ROBINUL) injection 0.2 mg  0.2 mg Intravenous Q6H PRN Yaneth Watson, APRN       • haloperidol lactate (HALDOL) injection 1 mg  1 mg Intravenous Q4H PRN Yaneth Watson, APRN       • HYDROmorphone (DILAUDID) injection 0.5 mg  0.5 mg Intravenous Q1H PRN Yaneth Watson, APRN       • HYDROmorphone (DILAUDID) PCA 1 mg/mL syringe   Intravenous Continuous Yaneth Watson H, APRN       • LORazepam (ATIVAN) injection 0.5 mg  0.5 mg Intravenous Q4H PRN Yaneth Watson, APRN       • ondansetron (ZOFRAN) injection 4 mg  4 mg Intravenous Q6H PRN Yaneth Watson H, APRN       • palliative care oral rinse   Mouth/Throat PRN Yaneth Watson, APRN       • polyvinyl alcohol (LIQUIFILM) 1.4  "% ophthalmic solution 2 drop  2 drop Both Eyes Q1H PRN Yaneth Watson APRN       • scopolamine patch 1 mg/72 hr  1 patch Transdermal Q72H PRN Yaneth Watson APRN         HYDROmorphone HCl-NaCl,       •  [START ON 12/15/2021] acetaminophen  •  bisacodyl  •  glycopyrrolate  •  haloperidol lactate  •  HYDROmorphone  •  LORazepam  •  ondansetron  •  palliative care oral rinse  •  polyvinyl alcohol  •  Scopolamine  Allergies   Allergen Reactions   • Benadryl [Diphenhydramine] Irritability     Per son   • Hydrocodone Itching   • Rice Swelling   • Statins Other (See Comments)     myalgia   • Codeine Rash   • Sulfa Antibiotics Rash     Family History   Problem Relation Age of Onset   • Leukemia Mother    • Stroke Father    • Dementia Sister    • Kidney disease Sister    • Diabetic kidney disease Sister    • Lung cancer Brother    • Breast cancer Neg Hx    • Ovarian cancer Neg Hx    • Hypertension Sister    • Dementia Sister      Social History     Socioeconomic History   • Marital status:    Tobacco Use   • Smoking status: Never Smoker   • Smokeless tobacco: Never Used   Vaping Use   • Vaping Use: Never used   Substance and Sexual Activity   • Alcohol use: No   • Drug use: No   • Sexual activity: Defer       Objective     Vital Signs  Temp:  [97.6 °F (36.4 °C)-98.9 °F (37.2 °C)] 98.9 °F (37.2 °C)  Heart Rate:  [] 97  Resp:  [14-20] 16  BP: ()/(57-76) 88/58    PPS: Palliative Performance Scale score as of 12/14/2021, 17:32 EST is 20% based on the following measures:   Ambulation: Totally bed bound  Activity and Evidence of Disease: Unable to do any work, extensive evidence of disease  Self-Care: Total care  Intake:Minimal sips  LOC: Full, drowsy or confusion      Physical Exam:  Physical Exam  Constitutional:       Appearance: She is ill-appearing.      Comments: Could be more comfortable; better after repositioning. C/o pain back, \"butt\". Pt scratched herself only twice during visit. Did not " appear to be uncomfortable d/t itching.    HENT:      Nose:      Comments: NGT in place  Eyes:      Comments: Eyes kept closed throughout visit   Neck:      Comments: Increased neck circumference  Cardiovascular:      Rate and Rhythm: Normal rate and regular rhythm.   Pulmonary:      Comments: Shallow, even, nonlabored. A: CTA  Abdominal:      General: There is distension.      Comments: abd staples In place with no s/s infection; BS on RUQ - soft, hypoactive other quads; dressings on R and L upper quads care C/D/I.    Musculoskeletal:      Right lower leg: No edema.      Left lower leg: No edema.   Skin:     Comments: jaundice   Neurological:      Comments: Does not follow commands or answer questions   Psychiatric:      Comments: + facial grimacing with occasional moan       Results Review:   Lab Results   Component Value Date    HGBA1C 4.90 05/19/2019       Lab Results   Component Value Date    GLUCOSE 100 (H) 12/14/2021    BUN 81 (H) 12/14/2021    CREATININE 5.11 (H) 12/14/2021    EGFRIFNONA 8 (L) 12/14/2021    EGFRIFAFRI  12/14/2021      Comment:      <15 Indicative of kidney failure.    BCR 15.9 12/14/2021    K 4.3 12/14/2021    CO2 15.0 (L) 12/14/2021    CALCIUM 9.5 12/14/2021    PROTENTOTREF 5.9 (L) 02/13/2015    ALBUMIN 3.40 (L) 12/08/2021    LABIL2 1.0 02/13/2015    AST 50 (H) 12/08/2021    ALT 17 12/08/2021       WBC   Date Value Ref Range Status   12/12/2021 18.80 (H) 3.40 - 10.80 10*3/mm3 Final     RBC   Date Value Ref Range Status   12/12/2021 2.62 (L) 3.77 - 5.28 10*6/mm3 Final     Hemoglobin   Date Value Ref Range Status   12/12/2021 8.2 (L) 12.0 - 15.9 g/dL Final     Hematocrit   Date Value Ref Range Status   12/12/2021 23.8 (L) 34.0 - 46.6 % Final     MCV   Date Value Ref Range Status   12/12/2021 90.8 79.0 - 97.0 fL Final     MCH   Date Value Ref Range Status   12/12/2021 31.3 26.6 - 33.0 pg Final     MCHC   Date Value Ref Range Status   12/12/2021 34.5 31.5 - 35.7 g/dL Final     RDW   Date Value  Ref Range Status   12/12/2021 20.2 (H) 12.3 - 15.4 % Final     RDW-SD   Date Value Ref Range Status   12/12/2021 54.1 (H) 37.0 - 54.0 fl Final     MPV   Date Value Ref Range Status   12/12/2021 11.2 6.0 - 12.0 fL Final     Platelets   Date Value Ref Range Status   12/12/2021 160 140 - 450 10*3/mm3 Final         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     Pain, abd, back, buttocks, existential  Pruritis: noted allergy to benadryl; 2/2 opioid use; not present prior to hospitalization  Anxiety  Dyspnea  Nausea  EOLC    - Leave NGT in at this time. >300ml output overnight with continuous output today    - Transfer to N    - Consider discontinuing fentanyl TD once pt more comfortable    - Ativan 0.5mg q6h    - Palliative oral rinse scheduled and prn    - Discontinue diet    - Prns for comfort    - Continue dilaudid pca 0.2mg/hr ---- increased to 0.3mg/hr during visit    - Orozco placement for comfort - unsure of baseline urine output    Discussion at bedside with family regarding plan of care. Support provided. Hospice contact shared.     Coordinated care with Nursing and Hospice IDT.    Discharge: EOLC -- home should pt survive this admission    Total Visit Time: > 65min  Face to Face Time: 30min    Justification for care:  Patient meets criteria for acute in-patient care with required nursing assessment and interventions for symptoms with IV medications.      Yaneth Watson, STEPHENIE, MHA, APRN  [facility]  Hospice and Palliative Care Nurse Practitioner  12/14/21  10:47 EST

## 2021-12-14 NOTE — PROGRESS NOTES
Worsened clinical status noted. Systolics in the 60s this morning.  Family was at the bedside and discussing with hospice when I was rounding, so I did not disturb them.  Appears that she is dying.  I will be available as needed

## 2021-12-14 NOTE — PROGRESS NOTES
Continued Stay Note  [facility]     Patient Name: Moni Wallace  MRN: [id number]  Today's Date: 12/13/2021    Admit Date: 11/28/2021     Discharge Plan     Row Name 12/1940       Plan    Plan Home with [facility]    Plan Comments Visit made to pt, two of pt's sons present. Sons stated is concerned with transporting pt home via private vehicle, as pt appears weaker, pt kept eyes closed during the visit. Telephone call to [facility], ambulance transportation arranged for 1715 tomorrow. Pt's son Stefan completed the EMS/DNR form. Provided family with the hospice 24 hr number to call to initiate hospice services when pt arrives home. Plan is for pt to discharge home with a cadd pump. Hospice will provide the pump and will connect pt to the pump prior to pt's discharge. Will continue to follow. Please call [phone] if can be of further assistance.               Discharge Codes    No documentation.               Expected Discharge Date and Time     Expected Discharge Date Expected Discharge Time    Dec 16, 2021             Marlyn Bojorquez RN

## 2021-12-14 NOTE — INTERVAL H&P NOTE
Pt admitted to [facility] on 12/14/2021. Please see Hospice documentation for further information.

## 2021-12-14 NOTE — DISCHARGE PLACEMENT REQUEST
"Daija Phillipsa K (81 y.o. Female)             Date of Birth Social Security Number Address Home Phone MRN    1940  [id number]  [facility] [address] [phone] [id number]    Jainism Marital Status             Quaker        Admission Date Admission Type Admitting Provider Attending Provider Department, Room/Bed    11/28/21 Emergency Luis Carlos Koenig MD West, Christopher R, MD [address], S573/1    Discharge Date Discharge Disposition Discharge Destination                         Attending Provider: Luis Carlos Koenig MD    Allergies: Benadryl [Diphenhydramine], Hydrocodone, Rice, Statins, Codeine, Sulfa Antibiotics    Isolation: None   Infection: None   Code Status: No CPR   Advance Care Planning Activity    Ht: 162.6 cm (64\")   Wt: 90.8 kg (200 lb 3.2 oz)    Admission Cmt: None   Principal Problem: Peritonitis (HCC) [K65.9]                 Active Insurance as of 11/28/2021     Primary Coverage     Payor Plan Insurance Group Employer/Plan Group    MEDICARE MEDICARE A & B      Payor Plan Address Payor Plan Phone Number Payor Plan Fax Number Effective Dates    [address] [phone]  1/1/2006 - None Entered    [facility] [id number]       Subscriber Name Subscriber Birth Date Member ID       RUY PHILLIPS 1940 [id number]           Secondary Coverage     Payor Plan Insurance Group Employer/Plan Group    ANTHEM BLUE CROSS [facility] SUPP [id number]     Payor Plan Address Payor Plan Phone Number Payor Plan Fax Number Effective Dates    [address]   1/1/2006 - None Entered    [facility] [id number]       Subscriber Name Subscriber Birth Date Member ID       RUY PHILLIPS 1940 [id number]                 Emergency Contacts      (Rel.) Home Phone Work Phone Mobile Phone    Yadiel Phillips (Son) [phone] -- [phone]    Genesis Becerril (Daughter) [phone] -- --    Gema Phillips (Daughter) [phone] -- --    Sawyer Phillips (Son) [phone] -- --    " Gerry Wallace (Son) [phone] -- --            Emergency Contact Information     Name Relation Home Work Mobile    Yadiel Wallace Son [phone]    Genesis Becerril Daughter [phone]      Gema Wallace Daughter [phone]      Sawyer Wallace Son [phone]      Gerry Wallace Son [phone]            Insurance Information                MEDICARE/MEDICARE A & B Phone: [phone]    Subscriber: Wallace Moni EL Subscriber#: [id number]    Group#: -- Precert#: --        ANTHEM BLUE CROSS/ANTHEM BC MC SUPP Phone: --    Subscriber: Moni Wallace Subscriber#: [id number]    Group#: [id number] Precert#: --             History & Physical      Sonia Álvarez MD at [address] [place] [address] Medicine Services  HISTORY AND PHYSICAL    Patient Name: Moni Wallace  : 1940  MRN: [id number]  Primary Care Physician: Alex Walters MD  Date of admission: 2021      Subjective   Subjective     Chief Complaint:  Abdominal pain    HPI:  Moni Wallace is a 81 y.o. female on peritoneal dialysis for several years who presented to the ED due to diffuse abdominal pain that has progressively worsened over the past two days.  Pain improves some with acetaminophen.  She denies any associated fever.  She has not had any vomiting but does have some nausea today.  In the ED she was found to be hypotensive which improved with IV fluid.  CT abdomen/pelvis showed hazy omental stranding concerning for peritonitis as well as evidence of possible early ileus or partial SBO.  She had leukocytosis of 14k and peritoneal fluid had 1748 WBC with 77% neutrophils.  She was started on IV vancomycin and Zosyn and admission requested for further management.      COVID Details:    Symptoms:    [x] NONE [] Fever []  Cough [] Shortness of breath [] Change in taste/smell      The patient has started, but not completed, their COVID-19 vaccination series.      Review of Systems   Gen- No fevers, chills  CV-  No chest pain, palpitations  Resp- No cough, dyspnea  GI- As above    All other systems reviewed and are negative.     Personal History     Past Medical History:   Diagnosis Date   • Adenomatous colon polyp    • Chronic kidney disease    • Clostridium difficile infection 06/2017   • Diabetes mellitus (HCC)    • Gastric polyps    • Hypothyroidism    • IgA nephropathy, acute    • Kidney transplanted    • Macular degeneration    • Paroxysmal atrial fibrillation (HCC)    • Tubular adenoma     excision    • Ulcer of gastric fundus    • Vitamin D deficiency        Past Surgical History:   Procedure Laterality Date   • BREAST BIOPSY Left 1990'S   • CARPAL TUNNEL RELEASE     • CARPAL TUNNEL RELEASE Right    • CATARACT EXTRACTION     • CATARACT EXTRACTION Bilateral    • DIAGNOSTIC LAPAROSCOPY     • DIALYSIS FISTULA CREATION     • HERNIA REPAIR  85 and 86   • KNEE ARTHROSCOPY INCISION AND DRAINAGE OF KNEE Right 5/18/2019    Procedure: KNEE ARTHROSCOPY INCISION AND DRAINAGE OF KNEE;  Surgeon: Paco Lester MD;  Location:  [facility] OR;  Service: Orthopedics   • LAPAROSCOPIC TUBAL LIGATION  1985   • TOTAL KNEE ARTHROPLASTY     • TOTAL KNEE ARTHROPLASTY      Left knee    • TRANSPLANTATION RENAL  2010   • TRANSPLANTATION RENAL Right    • TRIGGER FINGER RELEASE     • TUBAL ABDOMINAL LIGATION     • UPPER GASTROINTESTINAL ENDOSCOPY  05/20/2013   • VENOUS ACCESS DEVICE (PORT) INSERTION N/A 5/23/2019    Procedure: INSERTION GROSHONG;  Surgeon: Rolando Begum MD;  Location: [facility] OR;  Service: General       Family History:  family history includes Dementia in her sister and sister; Diabetic kidney disease in her sister; Hypertension in her sister; Kidney disease in her sister; Leukemia in her mother; Lung cancer in her brother; Stroke in her father. Otherwise pertinent FHx was reviewed and unremarkable.     Social History:  reports that she has never smoked. She has never used smokeless tobacco. She reports that she does  not drink alcohol and does not use drugs.  Social History     Social History Narrative    ** Merged History Encounter **         Lives at home, 1 son lives with her    Not current with HH    No assistive devices used    Caffeine decaf tea       Medications:  Available home medication information reviewed.  (Not in a hospital admission)      Allergies   Allergen Reactions   • Hydrocodone Itching   • Rice Swelling   • Statins Other (See Comments)     myalgia   • Codeine Rash   • Sulfa Antibiotics Rash       Objective   Objective     Vital Signs:   Temp:  [97.9 °F (36.6 °C)] 97.9 °F (36.6 °C)  Heart Rate:  [70-81] 71  Resp:  [16-18] 16  BP: ()/(40-92) 94/54       Physical Exam   Constitutional: Awake, alert, sitting up in bed  Eyes: Sclerae anicteric, no conjunctival injection  HENT: NCAT, mucous membranes moist  Neck: Supple, trachea midline  Respiratory: Clear to auscultation bilaterally, nonlabored respirations   Cardiovascular: RRR, no murmurs, rubs, or gallops  Gastrointestinal: Positive bowel sounds, soft, diffuse mild tenderness to palpation without guarding, nondistended  Musculoskeletal: No bilateral ankle edema, no clubbing or cyanosis to extremities  Psychiatric: Appropriate affect, cooperative  Neurologic: Cranial Nerves grossly intact to confrontation, speech clear  Skin: No rashes on exposed surfaces    Result Review:  I have personally reviewed the results from the time of this admission to 11/28/2021 14:34 EST and agree with these findings:  [x]  Laboratory  []  Microbiology  [x]  Radiology  []  EKG/Telemetry   []  Cardiology/Vascular   []  Pathology  []  Old records  []  Other:  Most notable findings include:       LAB RESULTS:      Lab 11/28/21  0902 11/22/21  0000   WBC 14.71*  --    HEMOGLOBIN 11.1*  --    HEMATOCRIT 34.9  --    PLATELETS 326  --    NEUTROS ABS 10.55*  --    IMMATURE GRANS (ABS) 0.29*  --    LYMPHS ABS 2.51  --    MONOS ABS 0.75  --    EOS ABS 0.54*  --    .9*  --     LACTATE 2.1*  --    PROTIME 28.6*  --    INR 2.84* 3.10         Lab 11/28/21  0902   SODIUM 137   POTASSIUM 3.8   CHLORIDE 97*   CO2 23.0   ANION GAP 17.0*   BUN 54*   CREATININE 10.29*   GLUCOSE 179*   CALCIUM 6.8*         Lab 11/28/21  0902   TOTAL PROTEIN 7.1   ALBUMIN 2.90*   GLOBULIN 4.2   ALT (SGPT) 30   AST (SGOT) 30   BILIRUBIN 0.2   ALK PHOS 214*   LIPASE 19                     UA    Urinalysis 7/6/21 7/6/21 7/22/21 8/12/21    1240 1240     Squamous Epithelial Cells, UA  13-20 (A)     Specific Gravity, UA 1.012      Ketones, UA Negative  Negative Negative   Blood, UA Small (1+) (A)      Leukocytes, UA Large (3+) (A)  500 Sean/ul (A) Moderate (2+) (A)   Nitrite, UA Negative      RBC, UA  3-6 (A)     WBC, UA  Too Numerous to Count (A)     Bacteria, UA  3+ (A)     (A) Abnormal value              Microbiology Results (last 10 days)     Procedure Component Value - Date/Time    Body Fluid Culture - Body Fluid, Peritoneum [[id number]] Collected: 11/28/21 1214    Lab Status: Preliminary result Specimen: Body Fluid from Peritoneum Updated: 11/28/21 1407     Gram Stain Few (2+) WBCs seen      No organisms seen          CT Abdomen Pelvis Without Contrast    Result Date: 11/28/2021  EXAMINATION: CT ABDOMEN PELVIS WO CONTRAST-  INDICATION: Upper abdominal pain, history of peritoneal dialysis, on Coumadin, low blood pressure  TECHNIQUE: Noncontrast CT of the abdomen and pelvis  COMPARISON: CT abdomen and pelvis 7/6/2021  FINDINGS:  Unremarkable appearance of the lung bases and lower thorax. Unremarkable appearance of the liver. The gallbladder is decompressed. There is new prominence of the common bile duct measuring up to 10 mm in diameter. No evidence of obstructing lesion or common duct stone. There is no significant intrahepatic biliary ductal dilatation. Unremarkable appearance of the spleen and pancreas, noting that the previously described pancreatic body hypodense/cystic lesion is not well visualized on this exam  today. Stable size and appearance of a low-density 1.5 cm left adrenal nodule, likely adenoma. Unremarkable appearance of the right adrenal gland. Unchanged appearance of the kidneys demonstrating severe bilateral atrophy and exophytic left renal cyst. Unchanged appearance of the right lower quadrant transplant kidney. Unremarkable appearance of the bladder.  New appearance of multiple distended and mildly dilated fecalized small bowel loops throughout the abdomen. The distal ileum appears decompressed. A discrete transition point is not identified although caliber change may occur in the lower pelvis although this region is difficult to evaluate in the setting of multiple coalescent bowel loops and small fluid. Stool and gas is present within the colon. There is mild colonic diverticulosis without diverticulitis. The appendix is normal (series 2 image 47).  There is a small amount of dependent pelvic and scattered intra-abdominal fluid as well as scattered small foci of pneumoperitoneum; these findings likely relate to peritoneal dialysis. Redemonstration of left lower quadrant peritoneal dialysis catheter which appears curled in the left lower pelvis. There is new hazy fat stranding and infiltration of the omentum and to lesser extent the mesenteric fat (series 2 image 33). Redemonstration of a small fluid-filled right anterior lower abdominal wall hernia without evidence of herniated bowel. No acute osseous findings.       Impression:  New appearance of multiple distended and mildly dilated fecalized small bowel loops throughout the abdomen. Stool and gas remains within the colon. The distal most ileum appears decompressed. A discrete transition point is not definitively identified although caliber change may occur in the lower pelvis. These findings may reflect partial or early small bowel obstruction versus early ileus.  There is new hazy stranding/infiltration of the omental and to lesser extent the mesenteric  fat which is nonspecific and could reflect changes related to peritoneal dialysis and/or volume status changes although inflammatory fat stranding cannot be entirely excluded. Scattered intra-abdominal ascites and pneumoperitoneum likely reflect peritoneal dialysis.  There is new prominence of the common bile duct measuring up to 10 mm in diameter, without evidence of significant intrahepatic ductal dilatation. The gallbladder is decompressed. No evidence of obstructing stone or mass lesion on this noncontrast exam. Correlate with LFTs.  This report was finalized on 11/28/2021 11:14 AM by Paco Young MD.      XR Chest 1 View    Result Date: 11/28/2021  EXAMINATION: XR CHEST 1 VW-  INDICATION: upper abd pain  TECHNIQUE: Frontal view of the chest  COMPARISON: Chest x-ray 9/19/2021  FINDINGS:  Stable cardiomediastinal silhouette. Mild elevation of the right hemidiaphragm from prior. The lungs are clear without focal consolidation. No pleural effusion or pneumothorax. No acute osseous findings.      Impression:  No acute cardiopulmonary findings.  This report was finalized on 11/28/2021 12:01 PM by Paco Young MD.        Results for orders placed during the hospital encounter of 01/11/21    Transthoracic Echo Complete With Contrast if Necessary Per Protocol    Interpretation Summary  · Left ventricular systolic function is normal. Estimated left ventricular EF = 65%  · Left ventricular wall thickness is consistent with hypertrophy.  · Left atrial volume is mildly increased.  · The aortic valve is calcified. There is mild aortic stenosis present. There is moderate aortic regurgitation present.  · Estimated right ventricular systolic pressure from tricuspid regurgitation is normal (<35 mmHg).      Assessment/Plan   Assessment & Plan     Active Hospital Problems    Diagnosis  POA   • **Peritonitis (HCC) [K65.9]  Yes   • Sepsis associated hypotension (HCC) [A41.9, I95.9]  Yes   • Ileus (HCC) [K56.7]  Yes   •  Paroxysmal atrial fibrillation (HCC) [I48.0]  Yes     · Diagnosed 2/2015  · CHADS-VASc = 5  · On Coumadin   · Echo (1/12/2021): LVEF 65%.  LVH.  Moderate AI.     • Type 2 diabetes mellitus (HCC) [E11.9]  Yes   • Chronic kidney disease, stage IV (severe) (HCC) [N18.4]  Yes     · IgA nephropathy with history of renal transplant, 2010.   · Patient on hemodialysis Monday, Wednesday, and Friday started July 2015.  · Hemodialysis discontinued 2/2017.     • Hyperparathyroidism (HCC) [E21.3]  Yes       Sepsis due to peritonitis related to peritoneal dialysis  Hypotension  Elevated lactate  -Blood and peritoneal fluid cultures pending  -IV vancomycin and Zosyn  -Consulted nephrology  -Will ask ID to see her in the am    Ileus  -Likely secondary to peritoneal inflammation  -NPO for now, place NG tube with any vomiting  -KUB in am and may consider advancing diet in am    Paroxysmal atrial fibrillation  HTN  -Continue warfarin  -Hold beta blocker due to hypotension      DVT prophylaxis:  Warfarin      CODE STATUS:    Code Status and Medical Interventions:   Ordered at: 11/28/21 1976     Medical Intervention Limits:    NO intubation (DNI)     Level Of Support Discussed With:    Patient     Code Status (Patient has no pulse and is not breathing):    No CPR (Do Not Attempt to Resuscitate)     Medical Interventions (Patient has pulse or is breathing):    Limited Support       Admission Status:  I believe this patient meets INPATIENT status due to sepsis.  I feel patient’s risk for adverse outcomes and need for care warrant INPATIENT evaluation and I predict the patient’s care encounter to likely last beyond 2 midnights.    Sonia Álvarez MD  11/28/21    Electronically signed by Sonia Álvarez MD at 11/28/21 5943

## 2021-12-15 NOTE — DISCHARGE SUMMARY
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 female here with sepsis and hypotension due to peritonitis. Her stay is complicated by Ileus vs pSBO. Surgery and ID following for care and antibiotic management. Patient family had plans home with hospice today, however in the interim patient's condition has worsened and is being converted to inpatient hospice:      Overview Hospital Course:     Sepsis due to Bacterial Peritonitis  -Peritoneal catheter d/c'd  --ID following for antibiotics- discontinue today  --Cultures remain negative.      SBO (w/ intraoperative path consistent w/ advanced adenocarcinoma)  Probable metastatic peritoneal carcinomatotis (see above)  --S/P Open resection of terminal ileum and cecum with primary ileocolic anastomosis and removal of PD catheter per Dr Andersen 12/6/21  Nausea  Pruritis  -post-op care per Dr. Andersen  -Med-oncology (Dr. Harman)  evaluated (12/9 & 12/10); patient w/ poor prognosis, patient not interested in systemic therapies which is appropriate.   - NG in place  -CLD diet as desired      Post-op anemia  -hgb 7.3 12/9/21; s/p 1 unit prbc on 12/9/21     PAF, w/ intermittent RVR  HTN  --warfarin held  --S/P 2 units FFP and 10mg IV Vitamin K pre-op   -cards following, currently amiodarone drip- can be discontinued this morning     Hypotension  -returned this morning/ declining condition        ESRD on PD-->now on HD via tunneled cath right ij  Hypokalemia  Hypomagnesemia   Hypocalcemia   Hyponatremia   -PD catheter removed   -Dr Curiel placed right sided tunneled dialysis catheter; had been getting dialysis while here     Poor iv access, s/p left ij central line placement 12/9/21  -Dr. Andersen placed triple lumen left ij central line 12/9/21.       [end of copied  text]     Ms. 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      Family at bedside for today's visit. Pt more comfortable as visit continued - received prn medications an hour prior to visit. Easily responsive to light touch and at times to conversation throughout visit.     Social History:  Social History     Tobacco Use   • Smoking status: Never Smoker   • Smokeless tobacco: Never Used   Substance Use Topics   • Alcohol use: No         Consults:   Consults     Date and Time Order Name Status Description    12/10/2021  8:08 AM Inpatient Palliative Care MD Consult Completed     12/9/2021 12:32 PM Inpatient Hematology & Oncology Consult Completed     12/3/2021 12:48 PM Inpatient General Surgery Consult Completed     11/30/2021  1:12 PM Inpatient Cardiology Consult Completed     11/29/2021 12:35 AM Inpatient Infectious Diseases Consult Completed     11/28/2021  2:41 PM Inpatient Nephrology Consult Completed           Exam confirms with auscultation zero audible heart tones and zero audible respirations. Ms.Viola NIKKO Wallace was pronounced dead at 1128.  MD notified by Patient's RN.     Dinah Vu, RN  Clinical House Supervisor  12/15/2021 11:47 EST      Yaneth Watson, DNP, MHA, APRN  [facility]  Hospice and Palliative Care Nurse Practitioner  12/15/21  15:07 EST

## 2021-12-15 NOTE — PLAN OF CARE
Goal Outcome Evaluation:  Plan of Care Reviewed With: patient, son        Progress: declining  Outcome Summary: Pt's condition slowly declining, coarse crackle lungs sounds, robinul given x1, oral suctions performed, ativan given prn and schedulled. Son stays at bedside. lasix given x1 w/ no UOP. large amount 350 ml NG tube output.      Problem: Adult Inpatient Plan of Care  Goal: Absence of Hospital-Acquired Illness or Injury  Intervention: Identify and Manage Fall Risk  Recent Flowsheet Documentation  Taken 12/14/2021 2000 by Francisco Eaton RN  Safety Promotion/Fall Prevention: activity supervised     Problem: Adult Inpatient Plan of Care  Goal: Absence of Hospital-Acquired Illness or Injury  Intervention: Prevent Skin Injury  Recent Flowsheet Documentation  Taken 12/14/2021 2000 by Francisco Eaton RN  Body Position: position maintained

## 2021-12-15 NOTE — PROGRESS NOTES
Continued Stay Note  [facility]     Patient Name: Moni Wallace  MRN: [id number]  Today's Date: 12/15/2021    Admit Date: 12/14/2021     Discharge Plan     Row Name 12/15/21 0904       Plan    Plan Inpatient hospice    Plan Comments Patient lying in bed with eyes closed, facial grimacing, restless, reaching for oxygen tubing in her nose.  Copious audible secretions.  Asked staff nurse, Bjorn to give prn Dilaudid, Ativan, and Robinul.  Yadiel Monteiro and Sawyer at bedside.  Teaching done on secretions and medications for them, circulation changes they may see.  They verbalize understanding.                 Discharge Codes    No documentation.                     Olga Miller RN

## 2021-12-15 NOTE — SIGNIFICANT NOTE
Exam confirms with auscultation zero audible heart tones and zero audible respirations. Ms.Viola NIKKO Wallace was pronounced dead at 1128.  MD notified by Patient's RN.    Dinah Vu RN  Clinical House Supervisor  12/15/2021 11:47 EST

## 2022-01-03 LAB
QT INTERVAL: 324 MS
QT INTERVAL: 354 MS
QT INTERVAL: 388 MS
QT INTERVAL: 452 MS
QTC INTERVAL: 465 MS
QTC INTERVAL: 477 MS
QTC INTERVAL: 502 MS
QTC INTERVAL: 504 MS

## 2022-01-14 LAB
FUNGUS WND CULT: NORMAL
MYCOBACTERIUM SPEC CULT: NORMAL
NIGHT BLUE STAIN TISS: NORMAL

## 2024-02-08 NOTE — PROGRESS NOTES
"Patient Name:  Moni Wallace  YOB: 1940  [id number]    Surgery Progress Note    Date of visit: 12/12/2021      Subjective: No acute events. Does not feel well. Had nausea vomiting yesterday when NG tube was clamped, this is been placed back to suction. She reports that she feels more distended today. Passing flatus, no bowel movement          Objective:     /70   Pulse 93   Temp 97.8 °F (36.6 °C)   Resp 20   Ht 162.6 cm (64\")   Wt 86.2 kg (190 lb)   LMP  (LMP Unknown)   SpO2 98%   BMI 32.61 kg/m²     Intake/Output Summary (Last 24 hours) at 12/12/2021 1007  Last data filed at 12/12/2021 0500  Gross per 24 hour   Intake 3296 ml   Output 1550 ml   Net 1746 ml       GEN:   Awake, alert, in no acute distress, resting comfortably in bed   CV:   Regular rate and rhythm  L:  Clear to auscultation bilaterally, not labored on room air   Abd:  Soft, more distended than yesterday, appropriately tender palpation along incision, incision is clean dry intact  Ext:  No cyanosis, clubbing, or edema    Recent labs that are back at this time have been reviewed.           Assessment/ Plan:    Mrs. Wallace is an 81-year-old lady with history of end-stage renal disease on peritoneal dialysis, diabetes, A. fib on Coumadin, and prior C. difficile infection who was admitted due to concern for peritonitis related to peritoneal dialysis     #Small bowel obstruction due to metastatic adenocarcinoma  -Postoperative day 6 after open ileocolic resection for small bowel obstruction  -Passing flatus.   Has not had a bowel movement yet. Did not tolerate NG tube clamped yesterday and had recurrent nausea vomiting. More distended today and uncomfortable. May be a component of continued ileus or obstruction related to peritoneal malignancy versus anastomotic breakdown. Regardless at this point they have decided to pursue a palliative course and are planning for discharge with hospice. At this point would just focus on comfort " Physical Therapy Visit    Visit Type: Daily Treatment Note  Visit: 6  Referring Provider: MARTHA John  Medical Diagnosis (from order): M54.42, G89.29 - Chronic left-sided low back pain with left-sided sciatica     SUBJECTIVE                                                                                                               Patient reports that her back has been feeling okay, she stated that after last session her back felt sore but she took tylenol arthritis pills, which seemed to help. She said she plans on starting the gym on Monday. Patient felt that pulldowns and rows were beneficial last time. She likes using the bike at home.      OBJECTIVE                                                                                                                                    Outcome/Assessments  Outcome Measures:   OSWESTRY Total Scored: 13  OSWESTRY Total Possible Score: 45  OSWESTRY Score Calculated: 28.89 %  (0-20% = minimal disability; 20-40% = moderate disability; 40-60% = severe disability; 60-80% = crippled; % = bed bound) see flowsheet for additional documentation        Treatment     Therapeutic Exercise  NuStep x 8 min level 2 for warm up, collecting subjective     Cues given for form, education on progression of exercise when independenty working out at gym    Seated leg extensions 3 x 10 to improve quad strength 30#  Seated HS curls 3 x 10 30#  Leg press 3 x 10 to improve functional leg strength 90#  Lateral walks with Blue TB 3 laps to improve functional glute med strength  Seated pulldowns with silver TB 3 x 10 to strengthen lats        Skilled input: verbal instruction/cues, tactile instruction/cues, demonstration and as detailed above    Writer verbally educated and received verbal consent for hand placement, positioning of patient, and techniques to be performed today from patient for clothing adjustments for techniques, hand placement and palpation for techniques and  based measures  -Would continue NG tube to low wall suction, okay for sips of clears for comfort   -I'll continue to follow            Robin Andersen MD  12/12/2021  10:07 EST       therapist position for techniques as described above and how they are pertinent to the patient's plan of care.  Home Exercise Program  Access Code: [email or web address]  URL: [email or web address]  Date: 02/08/2024  Prepared by: Shana De La Torre    Exercises  - Supine Bilateral Hip Internal Rotation Stretch  - 1 x daily - 7 x weekly - 3 sets - 10 reps  - Supine Piriformis Stretch with Foot on Ground  - 1 x daily - 7 x weekly - 2 sets - 15 sec hold  - Supine Bridge with Resistance Band  - 1 x daily - 7 x weekly - 3 sets - 10 reps  - Hooklying Lumbar Traction  - 1 x daily - 7 x weekly  - Shoulder extension with resistance - Neutral  - 1 x daily - 7 x weekly - 3 sets - 10 reps  - Anti-Rotation Press With Sidesteps and Anchored Resistance  - 1 x daily - 7 x weekly - 3 sets - 10 reps  - Standing Single Arm Row with Resistance Thumb Up  - 1 x daily - 7 x weekly - 3 sets - 10 reps  - Standing Plank on Wall  - 1 x daily - 7 x weekly - 2 sets - 30 sec hold  - Primal Push Up  - 1 x daily - 7 x weekly - 3 sets - 10 reps - 3 sec  hold  - Full Leg Press  - 1 x daily - 2 x weekly - 3 sets - 10 reps  - Knee Extension with Weight Machine  - 1 x daily - 2 x weekly - 3 sets - 10 reps  - Hamstring Curl with Weight Machine  - 1 x daily - 2 x weekly - 3 sets - 10 reps  - Side Stepping with Resistance at Ankles  - 1 x daily - 2 x weekly - 3 sets - 10 reps  - Shoulder Lat Pull Down Seated on Swiss Ball  - 1 x daily - 2 x weekly - 3 sets - 10 reps      ASSESSMENT                                                                                                            Patient presents to therapy with increased soreness during today's session. Patient wanted today to be her last session as she plans to resume independent workout program at the gym on Monday. Patient demonstrated improved observed strength, range of motion, and decreases in pain since the start of therapy. Plan on keeping note open for 30 days incase patient has  setback at the gym, needs assistance with progression of exercises, or increased pain    No formal pain rating given at end of session.  Education:   - Results of above outlined education: Verbalizes understanding and Needs reinforcement    PLAN                                                                                                                           Suggestions for next session as indicated: Assess response to updated HEP  DC, keep note open for 30 days incase patient has set back        I was in the immediate presence of the student and directed the student’s performance of the services. I am responsible for all treatment, assessment, documentation, and billing rendered for this patient.   Shana De La Torre, PT    Therapy procedure time and total treatment time can be found documented on the Time Entry flowsheet

## 2024-04-10 NOTE — PROGRESS NOTES
Anticoagulation Clinic - Remote Progress Note  Home Monitor- Frequency: weekly    Indication: afib  Referring Provider: Heath  Goal INR: 2-3  Current Drug Interactions: amiodarone, levothyroxine; omeprazole  CHADS-VASc: 5 (age, gender, HTN, DM)    Diet: rare GLV  Alcohol: none  Tobacco: none  OTC Pain Medication: APAP PRN    1st clinic visit: 9/14/17    INR History:  Date 8/31 9/7 9/14 9/21 9/28 10/2 10/9 10/16 10/23   Total Weekly Dose 38mg 38mg 10mg 18 mg 26mg 26mg 30mg 28mg 28mg   INR 2.6 5.7 2.2 1.4 1.8 1.6 2.3 2.1 2.5   Notes amio BID amio BID 2 held doses amio once daily omepr omepr boost  Trintellix     Date 11/9 11/16 11/22 11/29        Total Weekly Dose 28mg 28mg 28mg 28mg        INR 2.1 1.9 2.3 2.4        Notes  Ate less, sick            Phone Interview:  Tablet Strength: pt has 2mg tablets  Current Maintenance Dose: 4mg daily    Patient Findings      Negatives Signs/symptoms of thrombosis, Signs/symptoms of bleeding, Laboratory test error suspected, Change in health, Change in alcohol use, Change in activity, Upcoming invasive procedure, Emergency department visit, Upcoming dental procedure, Missed doses, Extra doses, Change in medications, Change in diet/appetite, Hospital admission, Bruising, Other complaints     Patient Contact Info: [phone]   Lab Contact Info: Shayy    Plan:  1. INR is therapeutic today at 2.4. Instructed Mrs. Wallace to continue warfarin 4mg daily.  2. Repeat INR in 1 week on 12/6.  3. Verbal information provided over the phone to patient, who RBV dosing instructions, expresses understanding with teach back, and has no further questions at this time.    Elza To, Pharmacy Technician   11/29/2017  3:25 PM    Katherin [facility], have reviewed the note in full and agree with the assessment and plan.  11/29/17  4:01 PM     0

## 2024-09-24 NOTE — PROGRESS NOTES
Anticoagulation Clinic - Remote Progress Note  [facility] HOME MONITOR  Testing Frequency: 7 days    Indication: paroxysmal afib  Referring Provider: Butch Joe (Last seen: 11/5/19  next appt: 6/9/20)  Goal INR: 2.0-3.0  Current Drug Interactions: , levothyroxine; omeprazole, azaTHIOprine, ezetimibe, allopurinol (11/20)  CHADS-VASc: 5 (age, gender, HTN, DM)  HAS-BLED: 3 (HTN, CKD, Age)     Diet: rare GLV, rarely green beans - 8/19/19  Alcohol: none  Tobacco: none   OTC Pain Medication: APAP PRN    1st clinic: 9/14/17  2nd clinic: 6/26/18  3rd clinic: 11/5/19    INR History:  Date 7/23 7/26 8/2 8/9 8/16 8/23 8/29 9/5 9/12 9/19 9/24   Total Weekly Dose 36mg 38mg 36mg 38mg 36mg 32mg 30mg?? 32mg 32 mg 32mg 30mg   INR 1.7 2.3 2.0 3.4 3.8 3.5 2.5 2.6 2.5 3.3 2.0   Notes Keflex boost   1x decr dose 1x decr dose    Keflex Keflex     Date 9/27 10/1 10/8 10/15  10/22 10/29 11/5 11/14 11/21 11/28 12/4   Total Weekly Dose 32mg 32mg 32mg 30mg  32mg 28mg 32mg 32mg 32mg 28mg 26mg   INR 2.3 2.4 3.2 2.5  3.9 2.7 3.0 2.8 2.7 3.7 1.9   Notes Levaquin start 9/26 stopped Levaquin        stop MVI, 1 miss 1 hold     Date 12/10 12/17 12/21 12/31 1/7/19 1/14 1/22 1/24 1/28 1/31 2/7   Total Weekly Dose 30mg 30mg 30mg 26mg 28mg 28mg 28mg 28mg 30mg 30mg 28mg   INR 2.4 3.0 3.6 2.5 2.6 2.6 2.9 1.7 2.7 2.5 2.7   Notes        doxy start 1/22, incr GLV 2x boost; doxy Finish doxy 1/29      Date 2/14 2/20 2/28 3/7 3/14 3/21 3/28 4/4 4/11 4/18 4/25   Total Weekly Dose 28mg 28mg 28mg 30mg 31mg 30mg 30mg 31mg 32mg 31mg 31mg   INR 2.1 2.4 1.8 1.6 2.2 2.5 1.9 1.9 2.4 2.8 2.4   Notes   sick Zetia            Date 5/2 5/6 5/15 RE[facility]AB 7/19 7/22 7/26 7/30 8/2 8/5 8/12   Total Weekly Dose 31mg 31mg 31mg  ????? ??? Unable to confirm 34mg? 34mg **30mg ? 30mg   INR 2.0 2.4 2.5  1.2 1.3 2.7 1.2 2.0 2.2 1.6   Notes ampicillin amp       stopped lexapro cefdinir      Date 8/15 8/19 8/26 8/30 9/5 9/9 9/13 9/17 10/1 10/7 10/15 10/21   Total Weekly Dose 32mg 33mg  [facility] access hospital  Neurosurgery Progress Note    Isabella Kevin Patient Status:  Inpatient    1970 MRN [id number]   Location [facility] 6NE-A Attending Cornelio Guzman MD   Hosp Day # 12 PCP Mildred Steward MD     Chief Complaint:  POD #11 L EDAS for moyamoya    Subjective:  Neuro exam has remained stable overnight. Experienced some tachycardia, but no neuro changes.    Objective/Physical Exam:    Vital Signs:  Blood pressure 149/85, pulse 70, temperature 98 °F (36.7 °C), temperature source Temporal, resp. rate 19, weight 125 lb (56.7 kg), SpO2 97%, not currently breastfeeding.  Neurologic:   Appears tired on exam, sitting upright in chair.   Delayed speech with quiet voice. Not oriented to self this morning. Oriented to location and time   Follows commands briskly  PERRL, EOMi  Right facial droop  R  4+/5, R triceps 4+/5 now full strength all other muscle groups of RUE   5/5 strength LUE and BLE     Incision clean dry and intact      Labs:  Recent Labs   Lab 24  0528 24  0431 24  0514 24  0445 24  0508 24  0444   RBC 3.83 3.96   < > 3.76* 3.52* 3.56*   HGB 11.6* 11.9*   < > 11.3* 10.8* 10.9*   HCT 33.2* 34.0*   < > 32.2* 30.6* 31.0*   MCV 86.7 85.9   < > 85.6 86.9 87.1   MCH 30.3 30.1   < > 30.1 30.7 30.6   MCHC 34.9 35.0   < > 35.1 35.3 35.2   RDW 12.2 12.2   < > 12.2 12.4 12.3   NEPRELIM 2.79 3.07  --   --   --   --    WBC 4.3 4.5   < > 5.0 5.4 5.6   .0 262.0   < > 246.0 229.0 237.0    < > = values in this interval not displayed.       Recent Labs   Lab 24  0445 24  0508 24  0444   GLU 81 87 90   BUN 10 10 5*   CREATSERUM 0.66 0.65 0.53*   EGFRCR 105 105 111   CA 9.5 9.2 9.1   * 135* 136   K 3.7  3.7 4.0  4.0 3.3*   CL 99 102 102   CO2 23.0 25.0 24.0     Assessment:  54-year-old  s/p left-sided EDAS for symptomatic moyamoya disease    Plan:  -Optimization per NCC  -Q2' neurochecks  -Pain control  -SCDs and  31mg 32mg 31mg 32mg 28mg 24mg 28mg 28mg 26mg 28mg   INR 2.6 2.2 1.8 1.9 2.7 3.3 4.0 3.2 2.5 3.7 2.0 2.2   Notes 3x boost   cefdinir; 1x boost keflex x2 days sick Sick; dose decrease  1x decr dose  1 dose reduction      Date 10/29 11/1 11/5 11/12 11/18 11/27 12/3 12/10 12/18 12/26     Total Weekly Dose 28mg 27mg 27mg 28mg 26mg 27mg 28mg 28mg 28mg 28mg 26mg    INR 2.8 2.7 2.4 3.5 2.9 2.0 2.3 3.3 2.9 3.5     Notes keflex start 10/28 1x decr  dose; keflex Clinic /  1x decr dose amio last dose 11/5/19 1x decr dose allopurinol started broccoli casserole;1x boost   1x decr dose      * Amiodarone LD 11/5/2019. Monitor as likely her warfarin needs would increase over the next few months      Phone Interview:  Verbal Release Authorization signed on 6/26/18 and again on 11/5/2019-- may speak with Alexy Wallace (son), Genesis Becerril (daughter), Sawyer or Gema Wallace (son and daughter-in-law), Gerry Wallace (son)  Tablet Strength: 2mg tablets  Patient Contact Info: preferred [phone] - home with carlito Cotto- cell [phone]; call if can't get in touch with home phone; Mrs. Wallace's cell phone number - [phone]     Patient Findings   Positives:  Change in diet/appetite   Negatives:  Signs/symptoms of thrombosis, Signs/symptoms of bleeding, Laboratory test error suspected, Change in health, Change in alcohol use, Change in activity, Upcoming invasive procedure, Emergency department visit, Upcoming dental procedure, Missed doses, Extra doses, Change in medications, Hospital admission, Bruising, Other complaints   Comments:  Mrs. Wallace reports eating fewer GLV over the Christmas holiday. Mrs. Wallace started lisinopril 5mg daily recently.       Plan:  1. INR is SUPRAtherapeutic today at 3.5. Instructed Ms. Wallace to take reduced dose of warfarin 2mg tonight, followed by warfarin 4mg daily until recheck.  2. Repeat INR in 1 week to ensure WNL.  3. Verbal information provided over the phone. Patient RBV dosing instructions, expresses  SQH for DVT ppx  -Continue daily ASA 325mg  -ADAT  -Maintain euvolemia      Patient discussed with Dr. Thomas nAand [facility]  9/24/2024    Is this a shared or split note between Advanced Practice Provider and Physician? Yes       understanding by teach back, and has no further questions at this time.      Dinah Ray, PharmD  Pharmacy Resident  12/26/2019  3:51 PM

## (undated) DEVICE — SNAP KOVER: Brand: UNBRANDED

## (undated) DEVICE — PAD ARMBRD SURG CONVOL 7.5X20X2IN

## (undated) DEVICE — APPL CHLORAPREP TINTED 26ML TEAL

## (undated) DEVICE — ENDOCUT SCISSOR TIP, DISPOSABLE: Brand: RENEW

## (undated) DEVICE — ADHS LIQ MASTISOL 2/3ML

## (undated) DEVICE — INTRAOPERATIVE COVER KIT, 10 PACK: Brand: SITE-RITE

## (undated) DEVICE — GLV SURG TRIUMPH ORTHO W/ALOE PF LTX 8.5 STRL

## (undated) DEVICE — GLV SURG PREMIERPRO MIC LTX PF SZ8 BRN

## (undated) DEVICE — SHEET,DRAPE,53X77,STERILE: Brand: MEDLINE

## (undated) DEVICE — [HIGH FLOW INSUFFLATOR,  DO NOT USE IF PACKAGE IS DAMAGED,  KEEP DRY,  KEEP AWAY FROM SUNLIGHT,  PROTECT FROM HEAT AND RADIOACTIVE SOURCES.]: Brand: PNEUMOSURE

## (undated) DEVICE — BANDAGE,GAUZE,BULKEE II,4.5"X4.1YD,STRL: Brand: MEDLINE

## (undated) DEVICE — SUT MNCRYL PLS ANTIB UD 4/0 PS2 18IN

## (undated) DEVICE — ENCORE® LATEX MICRO SIZE 8, STERILE LATEX POWDER-FREE SURGICAL GLOVE: Brand: ENCORE

## (undated) DEVICE — GLV SURG SENSICARE PI MIC PF SZ8.5 LF STRL

## (undated) DEVICE — 3M™ TEGADERM™ IV TRANSPARENT FILM DRESSING WITH BORDER 100 BAGS/CARTON 4 CARTONS/CASE 1633: Brand: 3M™ TEGADERM™

## (undated) DEVICE — SUT VIC 0 UR6 27IN VCP603H

## (undated) DEVICE — SYR LUERLOK 5CC

## (undated) DEVICE — GOWN,REINF,POLY,ECL,PP SLV,XXL: Brand: MEDLINE

## (undated) DEVICE — DRSNG SURESITE WNDW 4X4.5

## (undated) DEVICE — GLV SURG SENSICARE MICRO PF LF 9 STRL

## (undated) DEVICE — NDL HYPO ECLPS SFTY 18G 1 1/2IN

## (undated) DEVICE — GLV SURG SIGNATURE TOUCH PF LTX 7.5 STRL BX/50

## (undated) DEVICE — BNDG ELAS ELITE V/CLOSE 6IN 5YD LF STRL

## (undated) DEVICE — DECANT BG O JET

## (undated) DEVICE — ENCORE® LATEX MICRO SIZE 7.5, STERILE LATEX POWDER-FREE SURGICAL GLOVE: Brand: ENCORE

## (undated) DEVICE — GOWN,PREVENTION PLUS,XXLARGE,STERILE: Brand: MEDLINE

## (undated) DEVICE — KT CATH TAL PALINDROME RUBY 14.5F23CM

## (undated) DEVICE — SUT ETHLN 2/0 PS 18IN 585H

## (undated) DEVICE — GLV SURG SENSICARE PI MIC PF SZ8 LF STRL

## (undated) DEVICE — GLV SURG PREMIERPRO MIC LTX PF SZ8.5 BRN

## (undated) DEVICE — CLN MEGADYNE TP SS

## (undated) DEVICE — DRSNG PAD ABD 8X10IN STRL

## (undated) DEVICE — DRAPE,TOP,102X53,STERILE: Brand: MEDLINE

## (undated) DEVICE — PATIENT RETURN ELECTRODE, SINGLE-USE, CONTACT QUALITY MONITORING, ADULT, WITH 9FT CORD, FOR PATIENTS WEIGING OVER 33LBS. (15KG): Brand: MEGADYNE

## (undated) DEVICE — 3M™ TEGADERM™ CHG DRESSING 25/CARTON 4 CARTONS/CASE 1658: Brand: TEGADERM™

## (undated) DEVICE — PENCL ROCKRSWCH MEGADYNE W/HOLSTR 10FT SS

## (undated) DEVICE — GOWN,REINFRCE,POLY,SIRUS,BREATH SLV,XXLG: Brand: MEDLINE

## (undated) DEVICE — BLD CUT FORMLA RESECTOR 3.5MM

## (undated) DEVICE — BNDG ESMARK 6INX9FT STRL

## (undated) DEVICE — TB CASSET ARTHSCP CROSSFLOW INFLOW LF

## (undated) DEVICE — ENDOPATH XCEL BLADELESS TROCARS WITH STABILITY SLEEVES: Brand: ENDOPATH XCEL

## (undated) DEVICE — PK MINOR SPLT 10

## (undated) DEVICE — UNDERCAST PADDING: Brand: DEROYAL

## (undated) DEVICE — SYR LL TP 10ML STRL

## (undated) DEVICE — DRSNG SURESITE WNDW 2.38X2.75

## (undated) DEVICE — PK ARTHSCP KN 10

## (undated) DEVICE — GROSHONG 9.5F DUAL-LUMEN CV CATHETER WITH SURECUFF TISSUE INGROWTH CUFF INTERMEDIATE TRAY: Brand: GROSHONG

## (undated) DEVICE — PK LAP LASR CHOLE 10

## (undated) DEVICE — SYR LUERLOK 20CC BX/50

## (undated) DEVICE — INTENDED FOR TISSUE SEPARATION, AND OTHER PROCEDURES THAT REQUIRE A SHARP SURGICAL BLADE TO PUNCTURE OR CUT.: Brand: BARD-PARKER ® STAINLESS STEEL BLADES

## (undated) DEVICE — ENSEAL 20 CM SHAFT, LARGE JAW: Brand: ENSEAL X1

## (undated) DEVICE — SUT ETHLN 3/0 PC5 18IN 1893G

## (undated) DEVICE — CVR PROB ULTRASND/TRANSD W/GEL 18X120CM STRL

## (undated) DEVICE — DRSNG GZ PETROLTM XEROFORM CURAD 1X8IN STRL

## (undated) DEVICE — ANTIBACTERIAL UNDYED BRAIDED (POLYGLACTIN 910), SYNTHETIC ABSORBABLE SUTURE: Brand: COATED VICRYL

## (undated) DEVICE — ELECTRD BLD EZ CLN STD 6.5IN

## (undated) DEVICE — CVR HNDL LIGHT RIGID

## (undated) DEVICE — GLV SURG PREMIERPRO MIC LTX PF SZ7.5 BRN

## (undated) DEVICE — UNDERGLV SURG BIOGEL INDICAT PI SZ8.5 BLU

## (undated) DEVICE — HOLDER: Brand: DEROYAL

## (undated) DEVICE — TRY SKINPREP DRYPREP

## (undated) DEVICE — 30977 SEE SHARP - ENHANCED INTRAOPERATIVE LAPAROSCOPE CLEANING & DEFOGGING: Brand: 30977 SEE SHARP - ENHANCED INTRAOPERATIVE LAPAROSCOPE CLEANING & DEFOGGING

## (undated) DEVICE — DECANTER BAG 9": Brand: MEDLINE INDUSTRIES, INC.